# Patient Record
Sex: MALE | Race: WHITE | Employment: UNEMPLOYED | URBAN - METROPOLITAN AREA
[De-identification: names, ages, dates, MRNs, and addresses within clinical notes are randomized per-mention and may not be internally consistent; named-entity substitution may affect disease eponyms.]

---

## 2017-03-14 ENCOUNTER — HOSPITAL ENCOUNTER (INPATIENT)
Age: 47
LOS: 13 days | Discharge: HOME | DRG: 885 | End: 2017-03-28
Attending: EMERGENCY MEDICINE
Payer: MEDICARE

## 2017-03-14 DIAGNOSIS — F20.0 PARANOID SCHIZOPHRENIA (HCC): Primary | ICD-10-CM

## 2017-03-14 DIAGNOSIS — F29 PSYCHOSIS, UNSPECIFIED PSYCHOSIS TYPE (HCC): ICD-10-CM

## 2017-03-14 PROCEDURE — 80053 COMPREHEN METABOLIC PANEL: CPT | Performed by: EMERGENCY MEDICINE

## 2017-03-14 PROCEDURE — 81001 URINALYSIS AUTO W/SCOPE: CPT | Performed by: EMERGENCY MEDICINE

## 2017-03-14 PROCEDURE — 36415 COLL VENOUS BLD VENIPUNCTURE: CPT

## 2017-03-14 PROCEDURE — 82947 ASSAY GLUCOSE BLOOD QUANT: CPT

## 2017-03-14 PROCEDURE — 80307 DRUG TEST PRSMV CHEM ANLYZR: CPT | Performed by: EMERGENCY MEDICINE

## 2017-03-14 PROCEDURE — 80061 LIPID PANEL: CPT

## 2017-03-14 PROCEDURE — 83036 HEMOGLOBIN GLYCOSYLATED A1C: CPT | Performed by: PSYCHIATRY & NEUROLOGY

## 2017-03-14 PROCEDURE — 99283 EMERGENCY DEPT VISIT LOW MDM: CPT

## 2017-03-14 PROCEDURE — 84443 ASSAY THYROID STIM HORMONE: CPT

## 2017-03-14 PROCEDURE — 80164 ASSAY DIPROPYLACETIC ACD TOT: CPT | Performed by: PHYSICIAN ASSISTANT

## 2017-03-14 PROCEDURE — 85025 COMPLETE CBC W/AUTO DIFF WBC: CPT | Performed by: EMERGENCY MEDICINE

## 2017-03-14 NOTE — IP AVS SNAPSHOT
[address] 
[phone] Patient: Simran Cristobal MRN: [id number] :1970 You are allergic to the following Allergen Reactions Fluphenazine Unknown (comments) Pt is unable to communicate properly. Penicillins Unknown (comments) Pt is unable to communicate properly. Recent Documentation Height Weight BMI Smoking Status 1.803 m 95.6 kg 29.39 kg/m2 Never Smoker Emergency Contacts Name Discharge Info Relation Home Work Mobile Daisy Lindsay About your hospitalization You were admitted on:  March 15, 2017 You last received care in the:  [facility] You were discharged on:  2017 Unit phone number:  [phone] Why you were hospitalized Your primary diagnosis was:  Schizophrenia (Hcc) Providers Seen During Your Hospitalizations Provider Role Specialty Primary office phone Arturo Marvin MD Attending Provider Emergency Medicine [phone] Yovani Oseguera MD Attending Provider Psychiatry [phone] Arturo Thakkar MD Attending Provider Psychiatry [phone] Your Primary Care Physician (PCP) Primary Care Physician Office Phone Office Fax UNKNOWN, PROVIDER ** None ** ** None ** Follow-up Information Follow up With Details Comments Contact Info Haloperidol decanoate IM injection On 2017 Continuation of haloperidol decanoate 200 mg IM injection monthly. Patient received haloperidol decanoate 150 mg IM on 3/15/17 and haloperidol decanoate 50 mg IM on 3/20/17. Plan for haloperidol decanoate 200 mg IM on 17. [address] [place] [address] On 3/29/2017 you have a 9:30 appointment with Dr. Bishop Weston and a 10:00 appointment with Anel Reaves  [phone] [address] [address] [place],[address] [address] [phone] Current Discharge Medication List  
  
START taking these medications Dose & Instructions Dispensing Information Comments Morning Noon Evening Bedtime  
 haloperidol 5 mg tablet Commonly known as:  HALDOL Dose:  15 mg Take 3 Tabs by mouth nightly. Indications: discontinue after 2 weeks, when haldol dec 200 mg IM given Quantity:  42 Tab Refills:  0  
     
   
   
   
  
  
 sertraline 50 mg tablet Commonly known as:  ZOLOFT Dose:  50 mg Take 1 Tab by mouth daily. Indications: ANXIETY WITH DEPRESSION Quantity:  30 Tab Refills:  0  
     
9 am  
   
   
   
  
  
CONTINUE these medications which have CHANGED Dose & Instructions Dispensing Information Comments Morning Noon Evening Bedtime  
 benztropine 2 mg tablet Commonly known as:  COGENTIN What changed:  when to take this Dose:  2 mg Take 1 Tab by mouth nightly. Indications: extrapyramidal disease Quantity:  30 Tab Refills:  0  
     
   
   
   
  
  
 divalproex  mg ER tablet Commonly known as:  DEPAKOTE ER What changed:   
- how much to take - when to take this Dose:  2000 mg Take 4 Tabs by mouth nightly. Indications: MIXED BIPOLAR I DISORDER Quantity:  120 Tab Refills:  0  
     
   
   
   
  
  
 haloperidol decanoate 100 mg/mL injection Commonly known as:  HALDOL DECANOATE Start taking on:  4/17/2017 What changed:  how much to take Dose:  200 mg  
2 mL by IntraMUSCular route every twenty-eight (28) days. Indications: SCHIZOPHRENIA Quantity:  2 mL Refills:  0 STOP taking these medications   
 cloZAPine 200 mg tablet  
   
  
 traZODone 100 mg tablet Commonly known as:  Valentina Andrews Where to Get Your Medications Information on where to get these meds will be given to you by the nurse or doctor. ! Ask your nurse or doctor about these medications  
  benztropine 2 mg tablet divalproex  mg ER tablet  
 haloperidol 5 mg tablet  
 haloperidol decanoate 100 mg/mL injection  
 sertraline 50 mg tablet Discharge Instructions DISCHARGE SUMMARY 
 
NAME:Edwin Anderson : 1970 MRN: [id number] The patient Roger Armijo exhibits the ability to control behavior in a less restrictive environment. Patient's level of functioning is improving. No assaultive/destructive behavior has been observed for the past 24 hours. No suicidal/homicidal threat or behavior has been observed for the past 24 hours. There is no evidence of serious medication side effects. Patient has not been in physical or protective restraints for at least the past 24 hours. If weapons involved, how are they secured? No weapons involved Is patient aware of and in agreement with discharge plan? Patient is aware of discharre and is in agreement Arrangements for medication:  Prescriptions given to patient. Patient is a smoker and needs referral for smoking cessation? Does not smoke 1. Patient referred to the following for smoking cessation with an appointment: 2. Patient was offered medication to assist with smoking cessation at discharge: 3.  Education for smoking cessation added to discharge instructions:  
 
Patient has a history of substance/alcohol abuse and requires a referral for treatment ?  no 
1. Patient referred to the following for substance/alcohol abuse treatment with an appointment: 2. Patient was offered medication to assist with alcohol cessation at discharge: 3.  Education for substance/alcohol abuse added to discharge instructions:  
 
Copy of discharge instructions to  provider?:  Faxed to [phone] Arrangements for transportation home:  Sister to  Psychiatric Advanced Care Directives-- yes Name of Decision maker if patient has Psychiatric  Care Directive) Patient was offered information -  patient declined information. Keep all follow up appointments as scheduled, continue to take prescribed medications per physician instructions. Mental health crisis number:  081 or your local mental health crisis line number at [phone] DISCHARGE SUMMARY from Nurse The following personal items are in your possession at time of discharge: 
 
Dental Appliances: None Visual Aid: None Home Medications: None Jewelry: None Clothing: Pajamas, Shirt, With patient Other Valuables: None Personal Items Sent to Safe:  (none) PATIENT INSTRUCTIONS: 
 
 
What to do at Home: 
Recommended activity: Activity as tolerated. *  Please give a list of your current medications to your Primary Care Provider. *  Please update this list whenever your medications are discontinued, doses are 
    changed, or new medications (including over-the-counter products) are added. *  Please carry medication information at all times in case of emergency situations. These are general instructions for a healthy lifestyle: No smoking/ No tobacco products/ Avoid exposure to second hand smoke Surgeon General's Warning:  Quitting smoking now greatly reduces serious risk to your health. Obesity, smoking, and sedentary lifestyle greatly increases your risk for illness A healthy diet, regular physical exercise & weight monitoring are important for maintaining a healthy lifestyle You may be retaining fluid if you have a history of heart failure or if you experience any of the following symptoms:  Weight gain of 3 pounds or more overnight or 5 pounds in a week, increased swelling in our hands or feet or shortness of breath while lying flat in bed. Please call your doctor as soon as you notice any of these symptoms; do not wait until your next office visit. Recognize signs and symptoms of STROKE: 
 
F-face looks uneven A-arms unable to move or move unevenly S-speech slurred or non-existent T-time-call 911 as soon as signs and symptoms begin-DO NOT go Back to bed or wait to see if you get better-TIME IS BRAIN. Warning Signs of HEART ATTACK Call 911 if you have these symptoms: 
? Chest discomfort. Most heart attacks involve discomfort in the center of the chest that lasts more than a few minutes, or that goes away and comes back. It can feel like uncomfortable pressure, squeezing, fullness, or pain. ? Discomfort in other areas of the upper body. Symptoms can include pain or discomfort in one or both arms, the back, neck, jaw, or stomach. ? Shortness of breath with or without chest discomfort. ? Other signs may include breaking out in a cold sweat, nausea, or lightheadedness. Don't wait more than five minutes to call [address]! Fast action can save your life. Calling 911 is almost always the fastest way to get lifesaving treatment. Emergency Medical Services staff can begin treatment when they arrive  up to an hour sooner than if someone gets to the hospital by car. The discharge information has been reviewed with the patient. The patient verbalized understanding. Discharge medications reviewed with the patient and appropriate educational materials and side effects teaching were provided. Discharge Orders None [employer] Announcement We are excited to announce that we are making your provider's discharge notes available to you in [employer]. You will see these notes when they are completed and signed by the physician that discharged you from your recent hospital stay. If you have any questions or concerns about any information you see in [employer], please call the Health Information Department where you were seen or reach out to your Primary Care Provider for more information about your plan of care. Introducing [facility]!    
 Daniella Ivan introduces [employer] patient portal. Now you can access parts of your medical record, email your doctor's office, and request medication refills online. 1. In your internet browser, go to [email or web address] 2. Click on the First Time User? Click Here link in the Sign In box. You will see the New Member Sign Up page. 3. Enter your [employer] Access Code exactly as it appears below. You will not need to use this code after youve completed the sign-up process. If you do not sign up before the expiration date, you must request a new code. · [employer] Access Code: [email or web address] Expires: 6/12/2017 11:18 PM 
 
4. Enter the last four digits of your Social Security Number (xxxx) and Date of Birth (mm/dd/yyyy) as indicated and click Submit. You will be taken to the next sign-up page. 5. Create a [employer] ID. This will be your [employer] login ID and cannot be changed, so think of one that is secure and easy to remember. 6. Create a [employer] password. You can change your password at any time. 7. Enter your Password Reset Question and Answer. This can be used at a later time if you forget your password. 8. Enter your e-mail address. You will receive e-mail notification when new information is available in [address] 9. Click Sign Up. You can now view and download portions of your medical record. 10. Click the Download Summary menu link to download a portable copy of your medical information. If you have questions, please visit the Frequently Asked Questions section of the [employer] website. Remember, [employer] is NOT to be used for urgent needs. For medical emergencies, dial 911. Now available from your iPhone and Android! General Information Please provide this summary of care documentation to your next provider. Patient Signature:  ____________________________________________________________ Date:  ____________________________________________________________  
  
Burke Mais  Provider Signature: ____________________________________________________________ Date:  ____________________________________________________________

## 2017-03-14 NOTE — IP AVS SNAPSHOT
Summary of Care Report The Summary of Care report has been created to help improve care coordination. Users with access to [employer] or [address] (Web-based application) may access additional patient information including the Discharge Summary. If you are not currently a [address] user and need more information, please call the number listed below in the [place] [address] section and ask to be connected with Medical Records. Facility Information Name Address Phone [address] [phone] Patient Information Patient Name Sex  Alem Flood ([id number]) Male 1970 Discharge Information Admitting Provider Service Area Unit Larry Isidro MD / [phone] [address] [place] / [phone] Discharge Provider Discharge Date/Time Discharge Disposition Destination (none) 3/28/2017 (Pending) AHR (none) Patient Language Language ENGLISH [13] Hospital Problems as of 3/28/2017  Reviewed: 3/15/2017 10:53 AM by Sammie Snyder MD  
  
  
  
 Class Noted - Resolved Last Modified POA Active Problems * (Principal)Schizophrenia ([facility]  3/15/2017 - Present 3/15/2017 by Sammie Snyder MD Unknown Entered by Larry Isidro MD  
  
Non-Hospital Problems as of 3/28/2017  Reviewed: 3/15/2017 10:53 AM by Sammie Snyder MD  
  
  
  
 Class Noted - Resolved Last Modified Active Problems Psychosis (Chronic)  2012 - Present 2012 Entered by Phuc Greene MD  
  Overview Signed 2012 11:12 AM by Phuc Greene MD  
   R/o disorg schizophrenia vs. Schizoaffective dis vs. Bipolar dis manic w/psychosis   Schizophrenia, disorganized ([facility]  2015 - Present 2015 by Alexandre Fuchs MD  
  Entered by Korin Clarke MD  
 [facility]onia ([facility]  2/7/2015 - Present 2/9/2015 by Frances Bennett MD  
  Entered by Nasim Rosas You are allergic to the following Allergen Reactions Fluphenazine Unknown (comments) Pt is unable to communicate properly. Penicillins Unknown (comments) Pt is unable to communicate properly. Current Discharge Medication List  
  
START taking these medications Dose & Instructions Dispensing Information Comments  
 haloperidol 5 mg tablet Commonly known as:  HALDOL Dose:  15 mg Take 3 Tabs by mouth nightly. Indications: discontinue after 2 weeks, when haldol dec 200 mg IM given Quantity:  42 Tab Refills:  0  
   
 sertraline 50 mg tablet Commonly known as:  ZOLOFT Dose:  50 mg Take 1 Tab by mouth daily. Indications: ANXIETY WITH DEPRESSION Quantity:  30 Tab Refills:  0 CONTINUE these medications which have CHANGED Dose & Instructions Dispensing Information Comments  
 benztropine 2 mg tablet Commonly known as:  COGENTIN What changed:  when to take this Dose:  2 mg Take 1 Tab by mouth nightly. Indications: extrapyramidal disease Quantity:  30 Tab Refills:  0  
   
 divalproex  mg ER tablet Commonly known as:  DEPAKOTE ER What changed:   
- how much to take - when to take this Dose:  2000 mg Take 4 Tabs by mouth nightly. Indications: MIXED BIPOLAR I DISORDER Quantity:  120 Tab Refills:  0  
   
 haloperidol decanoate 100 mg/mL injection Commonly known as:  HALDOL DECANOATE Start taking on:  4/17/2017 What changed:  how much to take Dose:  200 mg  
2 mL by IntraMUSCular route every twenty-eight (28) days. Indications: SCHIZOPHRENIA Quantity:  2 mL Refills:  0 STOP taking these medications Comments  
 cloZAPine 200 mg tablet  
   
   
 traZODone 100 mg tablet Commonly known as:  Jose Luis Clarke Current Immunizations Name Date Influenza Vaccine Split  Deferred (Patient Refused) Follow-up Information Follow up With Details Comments Contact Info Haloperidol decanoate IM injection On 2017 Continuation of haloperidol decanoate 200 mg IM injection monthly. Patient received haloperidol decanoate 150 mg IM on 3/15/17 and haloperidol decanoate 50 mg IM on 3/20/17. Plan for haloperidol decanoate 200 mg IM on 17. [address]  [facility] On 3/29/2017 you have a 9:30 appointment with Dr. Georgeanna Cooks and a 10:00 appointment with Leeroy Oar  [phone] [address] [address] [address] [phone] Discharge Instructions DISCHARGE SUMMARY 
 
NAME:Edwin Anderson : 1970 MRN: [id number] The patient Sunil Miller exhibits the ability to control behavior in a less restrictive environment. Patient's level of functioning is improving. No assaultive/destructive behavior has been observed for the past 24 hours. No suicidal/homicidal threat or behavior has been observed for the past 24 hours. There is no evidence of serious medication side effects. Patient has not been in physical or protective restraints for at least the past 24 hours. If weapons involved, how are they secured? No weapons involved Is patient aware of and in agreement with discharge plan? Patient is aware of discharre and is in agreement Arrangements for medication:  Prescriptions given to patient. Patient is a smoker and needs referral for smoking cessation? Does not smoke 1. Patient referred to the following for smoking cessation with an appointment: 2. Patient was offered medication to assist with smoking cessation at discharge: 3.  Education for smoking cessation added to discharge instructions:  
 
Patient has a history of substance/alcohol abuse and requires a referral for treatment ?  no 
1.   Patient referred to the following for substance/alcohol abuse treatment with an appointment: 2. Patient was offered medication to assist with alcohol cessation at discharge: 3.  Education for substance/alcohol abuse added to discharge instructions:  
 
Copy of discharge instructions to  provider?:  Faxed to [phone] Arrangements for transportation home:  Sister to  Psychiatric Advanced Care Directives-- yes Name of Decision maker if patient has Psychiatric  Care Directive) Patient was offered information -  patient declined information. Keep all follow up appointments as scheduled, continue to take prescribed medications per physician instructions. Mental health crisis number:  803 or your local mental health crisis line number at [phone] DISCHARGE SUMMARY from Nurse The following personal items are in your possession at time of discharge: 
 
Dental Appliances: None Visual Aid: None Home Medications: None Jewelry: None Clothing: Pajamas, Shirt, With patient Other Valuables: None Personal Items Sent to Safe:  (none) PATIENT INSTRUCTIONS: 
 
 
What to do at Home: 
Recommended activity: Activity as tolerated. *  Please give a list of your current medications to your Primary Care Provider. *  Please update this list whenever your medications are discontinued, doses are 
    changed, or new medications (including over-the-counter products) are added. *  Please carry medication information at all times in case of emergency situations. These are general instructions for a healthy lifestyle: No smoking/ No tobacco products/ Avoid exposure to second hand smoke Surgeon General's Warning:  Quitting smoking now greatly reduces serious risk to your health. Obesity, smoking, and sedentary lifestyle greatly increases your risk for illness A healthy diet, regular physical exercise & weight monitoring are important for maintaining a healthy lifestyle You may be retaining fluid if you have a history of heart failure or if you experience any of the following symptoms:  Weight gain of 3 pounds or more overnight or 5 pounds in a week, increased swelling in our hands or feet or shortness of breath while lying flat in bed. Please call your doctor as soon as you notice any of these symptoms; do not wait until your next office visit. Recognize signs and symptoms of STROKE: 
 
F-face looks uneven A-arms unable to move or move unevenly S-speech slurred or non-existent T-time-call 911 as soon as signs and symptoms begin-DO NOT go Back to bed or wait to see if you get better-TIME IS BRAIN. Warning Signs of HEART ATTACK Call 911 if you have these symptoms: 
? Chest discomfort. Most heart attacks involve discomfort in the center of the chest that lasts more than a few minutes, or that goes away and comes back. It can feel like uncomfortable pressure, squeezing, fullness, or pain. ? Discomfort in other areas of the upper body. Symptoms can include pain or discomfort in one or both arms, the back, neck, jaw, or stomach. ? Shortness of breath with or without chest discomfort. ? Other signs may include breaking out in a cold sweat, nausea, or lightheadedness. Don't wait more than five minutes to call [address]! Fast action can save your life. Calling 911 is almost always the fastest way to get lifesaving treatment. Emergency Medical Services staff can begin treatment when they arrive  up to an hour sooner than if someone gets to the hospital by car. The discharge information has been reviewed with the patient. The patient verbalized understanding. Discharge medications reviewed with the patient and appropriate educational materials and side effects teaching were provided. Chart Review Routing History Recipient Method Report Sent By Jeffrie Purdue Provider Unknown, MD  
[address] Mail IP Auto Routed Notes Blaze Eddie [[id number]] 2/7/2015  3:29 PM 02/07/2015 Provider Hernan, MD Damien Solomon Mail IP Auto Routed Notes Baljinder Levin MD [[id number]] 2/13/2015  9:05 AM 02/13/2015

## 2017-03-14 NOTE — IP AVS SNAPSHOT
Current Discharge Medication List  
  
START taking these medications Dose & Instructions Dispensing Information Comments Morning Noon Evening Bedtime  
 haloperidol 5 mg tablet Commonly known as:  HALDOL Dose:  15 mg Take 3 Tabs by mouth nightly. Indications: discontinue after 2 weeks, when haldol dec 200 mg IM given Quantity:  42 Tab Refills:  0  
     
   
   
   
  
  
 sertraline 50 mg tablet Commonly known as:  ZOLOFT Dose:  50 mg Take 1 Tab by mouth daily. Indications: ANXIETY WITH DEPRESSION Quantity:  30 Tab Refills:  0  
     
9 am  
   
   
   
  
  
CONTINUE these medications which have CHANGED Dose & Instructions Dispensing Information Comments Morning Noon Evening Bedtime  
 benztropine 2 mg tablet Commonly known as:  COGENTIN What changed:  when to take this Dose:  2 mg Take 1 Tab by mouth nightly. Indications: extrapyramidal disease Quantity:  30 Tab Refills:  0  
     
   
   
   
  
  
 divalproex  mg ER tablet Commonly known as:  DEPAKOTE ER What changed:   
- how much to take - when to take this Dose:  2000 mg Take 4 Tabs by mouth nightly. Indications: MIXED BIPOLAR I DISORDER Quantity:  120 Tab Refills:  0  
     
   
   
   
  
  
 haloperidol decanoate 100 mg/mL injection Commonly known as:  HALDOL DECANOATE Start taking on:  4/17/2017 What changed:  how much to take Dose:  200 mg  
2 mL by IntraMUSCular route every twenty-eight (28) days. Indications: SCHIZOPHRENIA Quantity:  2 mL Refills:  0 STOP taking these medications   
 cloZAPine 200 mg tablet  
   
  
 traZODone 100 mg tablet Commonly known as:  Clarence Palomo Where to Get Your Medications Information on where to get these meds will be given to you by the nurse or doctor. ! Ask your nurse or doctor about these medications  
  benztropine 2 mg tablet divalproex  mg ER tablet  
 haloperidol 5 mg tablet  
 haloperidol decanoate 100 mg/mL injection  
 sertraline 50 mg tablet

## 2017-03-15 PROBLEM — F20.9 SCHIZOPHRENIA (HCC): Status: ACTIVE | Noted: 2017-03-15

## 2017-03-15 LAB
ALBUMIN SERPL BCP-MCNC: 3.8 G/DL (ref 3.5–5)
ALBUMIN/GLOB SERPL: 0.9 {RATIO} (ref 1.1–2.2)
ALP SERPL-CCNC: 128 U/L (ref 45–117)
ALT SERPL-CCNC: 43 U/L (ref 12–78)
AMPHET UR QL SCN: NEGATIVE
ANION GAP BLD CALC-SCNC: 8 MMOL/L (ref 5–15)
APAP SERPL-MCNC: <2 UG/ML (ref 10–30)
APPEARANCE UR: CLEAR
AST SERPL W P-5'-P-CCNC: 20 U/L (ref 15–37)
BACTERIA URNS QL MICRO: NEGATIVE /HPF
BARBITURATES UR QL SCN: NEGATIVE
BASOPHILS # BLD AUTO: 0.1 K/UL (ref 0–0.1)
BASOPHILS # BLD: 1 % (ref 0–1)
BENZODIAZ UR QL: NEGATIVE
BILIRUB SERPL-MCNC: 0.3 MG/DL (ref 0.2–1)
BILIRUB UR QL: NEGATIVE
BUN SERPL-MCNC: 16 MG/DL (ref 6–20)
BUN/CREAT SERPL: 18 (ref 12–20)
CALCIUM SERPL-MCNC: 9.2 MG/DL (ref 8.5–10.1)
CANNABINOIDS UR QL SCN: NEGATIVE
CHLORIDE SERPL-SCNC: 107 MMOL/L (ref 97–108)
CHOLEST SERPL-MCNC: 195 MG/DL
CO2 SERPL-SCNC: 25 MMOL/L (ref 21–32)
COCAINE UR QL SCN: NEGATIVE
COLOR UR: NORMAL
CREAT SERPL-MCNC: 0.87 MG/DL (ref 0.7–1.3)
DRUG SCRN COMMENT,DRGCM: NORMAL
EOSINOPHIL # BLD: 0.2 K/UL (ref 0–0.4)
EOSINOPHIL NFR BLD: 3 % (ref 0–7)
EPITH CASTS URNS QL MICRO: NORMAL /LPF
ERYTHROCYTE [DISTWIDTH] IN BLOOD BY AUTOMATED COUNT: 13.8 % (ref 11.5–14.5)
EST. AVERAGE GLUCOSE BLD GHB EST-MCNC: 114 MG/DL
ETHANOL SERPL-MCNC: <10 MG/DL
GLOBULIN SER CALC-MCNC: 4.1 G/DL (ref 2–4)
GLUCOSE P FAST SERPL-MCNC: 101 MG/DL (ref 65–100)
GLUCOSE SERPL-MCNC: 108 MG/DL (ref 65–100)
GLUCOSE UR STRIP.AUTO-MCNC: NEGATIVE MG/DL
HBA1C MFR BLD: 5.6 % (ref 4.2–6.3)
HCT VFR BLD AUTO: 44 % (ref 36.6–50.3)
HDLC SERPL-MCNC: 67 MG/DL
HDLC SERPL: 2.9 {RATIO} (ref 0–5)
HGB BLD-MCNC: 14.5 G/DL (ref 12.1–17)
HGB UR QL STRIP: NEGATIVE
HYALINE CASTS URNS QL MICRO: NORMAL /LPF (ref 0–5)
KETONES UR QL STRIP.AUTO: NEGATIVE MG/DL
LDLC SERPL CALC-MCNC: 87.2 MG/DL (ref 0–100)
LEUKOCYTE ESTERASE UR QL STRIP.AUTO: NEGATIVE
LIPID PROFILE,FLP: ABNORMAL
LYMPHOCYTES # BLD AUTO: 32 % (ref 12–49)
LYMPHOCYTES # BLD: 2.2 K/UL (ref 0.8–3.5)
MCH RBC QN AUTO: 29.3 PG (ref 26–34)
MCHC RBC AUTO-ENTMCNC: 33 G/DL (ref 30–36.5)
MCV RBC AUTO: 88.9 FL (ref 80–99)
METHADONE UR QL: NEGATIVE
MONOCYTES # BLD: 0.7 K/UL (ref 0–1)
MONOCYTES NFR BLD AUTO: 10 % (ref 5–13)
NEUTS SEG # BLD: 3.7 K/UL (ref 1.8–8)
NEUTS SEG NFR BLD AUTO: 54 % (ref 32–75)
NITRITE UR QL STRIP.AUTO: NEGATIVE
OPIATES UR QL: NEGATIVE
PCP UR QL: NEGATIVE
PH UR STRIP: 6.5 [PH] (ref 5–8)
PLATELET # BLD AUTO: 146 K/UL (ref 150–400)
POTASSIUM SERPL-SCNC: 3.8 MMOL/L (ref 3.5–5.1)
PROT SERPL-MCNC: 7.9 G/DL (ref 6.4–8.2)
PROT UR STRIP-MCNC: NEGATIVE MG/DL
RBC # BLD AUTO: 4.95 M/UL (ref 4.1–5.7)
RBC #/AREA URNS HPF: NORMAL /HPF (ref 0–5)
SALICYLATES SERPL-MCNC: <1.7 MG/DL (ref 2.8–20)
SODIUM SERPL-SCNC: 140 MMOL/L (ref 136–145)
SP GR UR REFRACTOMETRY: 1.02 (ref 1–1.03)
TRIGL SERPL-MCNC: 204 MG/DL (ref ?–150)
TSH SERPL DL<=0.05 MIU/L-ACNC: 4.29 UIU/ML (ref 0.36–3.74)
UA: UC IF INDICATED,UAUC: NORMAL
UROBILINOGEN UR QL STRIP.AUTO: 1 EU/DL (ref 0.2–1)
VALPROATE SERPL-MCNC: 62 UG/ML (ref 50–100)
VLDLC SERPL CALC-MCNC: 40.8 MG/DL
WBC # BLD AUTO: 6.9 K/UL (ref 4.1–11.1)
WBC URNS QL MICRO: NORMAL /HPF (ref 0–4)

## 2017-03-15 PROCEDURE — 74011000250 HC RX REV CODE- 250

## 2017-03-15 PROCEDURE — 65220000003 HC RM SEMIPRIVATE PSYCH

## 2017-03-15 PROCEDURE — 74011250637 HC RX REV CODE- 250/637: Performed by: PSYCHIATRY & NEUROLOGY

## 2017-03-15 PROCEDURE — 74011250636 HC RX REV CODE- 250/636

## 2017-03-15 PROCEDURE — 74011250636 HC RX REV CODE- 250/636: Performed by: PSYCHIATRY & NEUROLOGY

## 2017-03-15 RX ORDER — CLOZAPINE 100 MG/1
100 TABLET ORAL
Status: DISCONTINUED | OUTPATIENT
Start: 2017-03-15 | End: 2017-03-16

## 2017-03-15 RX ORDER — CLOZAPINE 200 MG/1
200 TABLET ORAL DAILY
COMMUNITY
End: 2017-03-28

## 2017-03-15 RX ORDER — HALOPERIDOL DECANOATE 100 MG/ML
150 INJECTION INTRAMUSCULAR
Status: DISCONTINUED | OUTPATIENT
Start: 2017-03-15 | End: 2017-03-20

## 2017-03-15 RX ORDER — ADHESIVE BANDAGE
30 BANDAGE TOPICAL DAILY PRN
Status: DISCONTINUED | OUTPATIENT
Start: 2017-03-15 | End: 2017-03-28 | Stop reason: HOSPADM

## 2017-03-15 RX ORDER — BENZTROPINE MESYLATE 2 MG/1
2 TABLET ORAL
Status: DISCONTINUED | OUTPATIENT
Start: 2017-03-15 | End: 2017-03-28 | Stop reason: HOSPADM

## 2017-03-15 RX ORDER — OLANZAPINE 5 MG/1
5 TABLET ORAL
Status: DISCONTINUED | OUTPATIENT
Start: 2017-03-15 | End: 2017-03-28 | Stop reason: HOSPADM

## 2017-03-15 RX ORDER — DIVALPROEX SODIUM 500 MG/1
1000 TABLET, EXTENDED RELEASE ORAL 2 TIMES DAILY
COMMUNITY
End: 2017-03-28

## 2017-03-15 RX ORDER — IBUPROFEN 400 MG/1
400 TABLET ORAL
Status: DISCONTINUED | OUTPATIENT
Start: 2017-03-15 | End: 2017-03-28 | Stop reason: HOSPADM

## 2017-03-15 RX ORDER — LORAZEPAM 1 MG/1
1 TABLET ORAL
Status: DISCONTINUED | OUTPATIENT
Start: 2017-03-15 | End: 2017-03-28 | Stop reason: HOSPADM

## 2017-03-15 RX ORDER — ZOLPIDEM TARTRATE 10 MG/1
10 TABLET ORAL
Status: DISCONTINUED | OUTPATIENT
Start: 2017-03-15 | End: 2017-03-28 | Stop reason: HOSPADM

## 2017-03-15 RX ORDER — LORAZEPAM 2 MG/ML
2 INJECTION INTRAMUSCULAR
Status: DISCONTINUED | OUTPATIENT
Start: 2017-03-15 | End: 2017-03-28 | Stop reason: HOSPADM

## 2017-03-15 RX ORDER — ACETAMINOPHEN 325 MG/1
650 TABLET ORAL
Status: DISCONTINUED | OUTPATIENT
Start: 2017-03-15 | End: 2017-03-28 | Stop reason: HOSPADM

## 2017-03-15 RX ORDER — IBUPROFEN 200 MG
1 TABLET ORAL
Status: DISCONTINUED | OUTPATIENT
Start: 2017-03-15 | End: 2017-03-28 | Stop reason: HOSPADM

## 2017-03-15 RX ORDER — CLOZAPINE 200 MG/1
600 TABLET ORAL
COMMUNITY
End: 2017-03-28

## 2017-03-15 RX ORDER — BENZTROPINE MESYLATE 1 MG/ML
2 INJECTION INTRAMUSCULAR; INTRAVENOUS
Status: DISCONTINUED | OUTPATIENT
Start: 2017-03-15 | End: 2017-03-28 | Stop reason: HOSPADM

## 2017-03-15 RX ORDER — LORAZEPAM 2 MG/1
2 TABLET ORAL 3 TIMES DAILY
Status: DISCONTINUED | OUTPATIENT
Start: 2017-03-15 | End: 2017-03-17

## 2017-03-15 RX ADMIN — HALOPERIDOL DECANOATE 150 MG: 100 INJECTION INTRAMUSCULAR at 17:27

## 2017-03-15 RX ADMIN — LORAZEPAM 2 MG: 2 INJECTION INTRAMUSCULAR; INTRAVENOUS at 03:09

## 2017-03-15 RX ADMIN — WATER 20 MG: 1 INJECTION INTRAMUSCULAR; INTRAVENOUS; SUBCUTANEOUS at 03:11

## 2017-03-15 RX ADMIN — LORAZEPAM 2 MG: 2 INJECTION INTRAMUSCULAR; INTRAVENOUS at 09:54

## 2017-03-15 NOTE — PROGRESS NOTES
Problem: Altered Thought Process (Adult/Pediatric)  Goal: *STG: Remains safe in hospital  Outcome: Progressing Towards Goal  Visible on the unit. Pacing the unit. Mood is anxious. Continue to encourage and monitor.

## 2017-03-15 NOTE — BH NOTES
TRANSFER - IN REPORT:    Verbal report received from Jossue Chaniff on Alma Delia Pavy  being received from [facility] [place] [facility] for routine progression of care      Report consisted of patients Situation, Background, Assessment and   Recommendations(SBAR). Information from the following report(s) SBAR, ED Summary, [facility] and Recent Results was reviewed with the receiving nurse. Opportunity for questions and clarification was provided. Assessment completed upon patients arrival to unit and care assumed.

## 2017-03-15 NOTE — ED PROVIDER NOTES
HPI Comments: 50 yo AAM with medical hx remarkable for paranoid schizophrenia presenting escorted by [facility] police for medical evaluation associated with TDO for in patient psychiatric therapy. Pt acutely psychotic and unable to give hx. Patient is a 49 y.o. male presenting with mental health disorder. The history is provided by the patient. Mental Health Problem           Past Medical History:   Diagnosis Date    Psychotic disorder     Withdrawal syndrome ([address]        History reviewed. No pertinent surgical history. History reviewed. No pertinent family history. Social History     Social History    Marital status: UNKNOWN     Spouse name: N/A    Number of children: N/A    Years of education: N/A     Occupational History    Not on file. Social History Main Topics    Smoking status: Never Smoker    Smokeless tobacco: Not on file    Alcohol use No    Drug use: Not on file    Sexual activity: Not on file     Other Topics Concern    Not on file     Social History Narrative         ALLERGIES: Fluphenazine and Penicillins    Review of Systems   Unable to perform ROS: Psychiatric disorder       Vitals:    03/14/17 2328   BP: 117/63   Pulse: (!) 124   Resp: 18   Temp: 97.8 °F (36.6 °C)   SpO2: 97%            Physical Exam   Constitutional: He appears well-developed and well-nourished. No distress. Calm non-communicative male, withdrawn in NAD   HENT:   Head: Normocephalic and atraumatic. Right Ear: Tympanic membrane, external ear and ear canal normal.   Left Ear: Tympanic membrane, external ear and ear canal normal.   Nose: Nose normal.   Mouth/Throat: Oropharynx is clear and moist. No oropharyngeal exudate. Eyes: Conjunctivae and EOM are normal. Pupils are equal, round, and reactive to light. Right eye exhibits no discharge. Left eye exhibits no discharge. Neck: Normal range of motion. Neck supple. Cardiovascular: Regular rhythm and normal heart sounds.     Pulmonary/Chest: Effort normal and breath sounds normal. He has no wheezes. He has no rales. Abdominal: Soft. Bowel sounds are normal. He exhibits no distension. There is no tenderness. There is no guarding. Musculoskeletal: Normal range of motion. Lymphadenopathy:     He has no cervical adenopathy. Neurological: He is alert. Skin: Skin is warm and dry. He is not diaphoretic. Psychiatric: His affect is inappropriate. He is slowed and withdrawn. He expresses inappropriate judgment. He is noncommunicative. Nursing note and vitals reviewed. MDM  Number of Diagnoses or Management Options  Diagnosis management comments: 50 yo male presenting for medical evaluation prior to in patient psychiatric TDO. Plan  CBC, CMP, ETOH, salicylate, acetaminophen, UA, UDS, depakote level and reassess Monalisa CollazoPlatter, [place]         Amount and/or Complexity of Data Reviewed  Clinical lab tests: ordered and reviewed      ED Course       Procedures      Progress note     Labs reviewed.  Monalisa RAMAN [place], [place]

## 2017-03-15 NOTE — ED TRIAGE NOTES
Triage note: Pt arrived in handcuffs with two [employer]. TDO in hand. Pt here for medical clearance. Pt non-verbal in triage.

## 2017-03-15 NOTE — PROGRESS NOTES
Admission Medication Reconciliation:    Information obtained from: Patient's sister, [employer]    Significant PMH/Disease States:   Past Medical History:   Diagnosis Date    Psychiatric disorder     Psychotic disorder     Withdrawal syndrome ([address]        Allergies:  Fluphenazine and Penicillins    Prior to Admission Medications:   Prior to Admission Medications   Prescriptions Last Dose Informant Patient Reported? Taking?   benztropine (COGENTIN) 2 mg tablet  Transfer Papers Yes Yes   Sig: Take 2 mg by mouth two (2) times a day. Indications: extrapyramidal disease   cloZAPine 200 mg tablet   Yes Yes   Sig: Take 600 mg by mouth nightly. Indications: TREATMENT-RESISTANT SCHIZOPHRENIA   cloZAPine 200 mg tablet   Yes Yes   Sig: Take 200 mg by mouth daily. Indications: TREATMENT-RESISTANT SCHIZOPHRENIA   divalproex ER (DEPAKOTE ER) 500 mg ER tablet   Yes Yes   Sig: Take 1,000 mg by mouth two (2) times a day. Indications: MOOD   haloperidol decanoate (HALDOL DECANOATE) 100 mg/mL injection 2017 Transfer Papers Yes Yes   Si mg by IntraMUSCular route every twenty-eight (28) days. Indications: SCHIZOPHRENIA   traZODone (DESYREL) 100 mg tablet  Transfer Papers Yes Yes   Sig: Take 100 mg by mouth nightly as needed for Sleep. Indications: sleep      Facility-Administered Medications: None         Comments/Recommendations: Of note, patient's sister reported that the patient last received a haloperidol decanoate injection on 17.       Mookie Cervantes, PharmD, [address], BCPS

## 2017-03-15 NOTE — ED NOTES
Pt repositioned for comfort. V/S stable, no distress noted. Will continue to assess and monitor closely. Officers at bedside.

## 2017-03-15 NOTE — BH NOTES
Patient admitted as a [facility] TDO under the care of Dr Stephanie Banegas  Patient brought to [facility] by his sister for deterioration in ADL's, AH and \"talking to Cristhian\"  Patient has a history of paranoid schizophrenia. Sister did not accompany patient to [facility]. Patient is non communicative and unable/unwilling to answer questions. He continues to sit with his hands pressed together as in prayer. Difficult time obtaining vitals.    Patient has been admitted to [facility] in January 2012 and February of 2015  Recently admitted to [facility] 10/27/2016 for 2 weeks    Patient had a valproic acid level drawn in the ED = 62      6:23 AM  Patient arrived on the unit at 2:30 am   Patient given IM medications about 3 am.  slept 2 hours

## 2017-03-15 NOTE — PROGRESS NOTES
Laboratory Monitoring for Antipsychotics and Mood Stabilizers    This patient is currently prescribed the following medication(s):   Current Facility-Administered Medications   Medication Dose Route Frequency    LORazepam (ATIVAN) tablet 1 mg  1 mg Oral TID    haloperidol (HALDOL) 2 mg/mL oral solution 10 mg+++++Court ordered medication+++++  10 mg Oral TID    Or    haloperidol lactate (HALDOL) injection 5 mg  5 mg IntraMUSCular TID    divalproex ER (DEPAKOTE ER) 24 hour tablet 750 mg+++++Court ordered medication+++++  750 mg Oral QHS    Or    LORazepam (ATIVAN) injection 1 mg  1 mg IntraMUSCular QHS    haloperidol decanoate (HALDOL DECANOATE) 100 mg/mL injection 150 mg  150 mg IntraMUSCular Q28D       The following labs have been completed for monitoring of antipsychotics and/or mood stabilizers:    Height, Weight, BMI Estimation  Estimated body mass index is 29.49 kg/(m^2) as calculated from the following:    Height as of this encounter: 180.3 cm (71\"). Weight as of this encounter: 95.9 kg (211 lb 7 oz). Vital Signs/Blood Pressure  Visit Vitals    /75    Pulse 88    Temp 97.4 °F (36.3 °C)    Resp 16    Ht 180.3 cm (71\")    Wt 95.9 kg (211 lb 7 oz)    SpO2 98%    BMI 29.49 kg/m2       Renal Function, Hepatic Function and Chemistry  Estimated Creatinine Clearance: 122.6 mL/min (based on Cr of 0.87). Lab Results   Component Value Date/Time    Sodium 140 03/14/2017 11:49 PM    Potassium 3.8 03/14/2017 11:49 PM    Chloride 107 03/14/2017 11:49 PM    CO2 25 03/14/2017 11:49 PM    Anion gap 8 03/14/2017 11:49 PM    BUN 16 03/14/2017 11:49 PM    Creatinine 0.87 03/14/2017 11:49 PM    BUN/Creatinine ratio 18 03/14/2017 11:49 PM    Bilirubin, total 0.3 03/14/2017 11:49 PM    Protein, total 7.9 03/14/2017 11:49 PM    Albumin 3.8 03/14/2017 11:49 PM    Globulin 4.1 03/14/2017 11:49 PM    A-G Ratio 0.9 03/14/2017 11:49 PM    ALT (SGPT) 43 03/14/2017 11:49 PM    Alk.  phosphatase 128 03/14/2017 11:49 PM       Lab Results   Component Value Date/Time    Glucose 108 03/14/2017 11:49 PM    Glucose 101 03/14/2017 11:49 PM    Glucose (POC) 112 01/19/2012 09:59 PM       Lab Results   Component Value Date/Time    Hemoglobin A1c 5.6 03/14/2017 11:49 PM       Hematology  Lab Results   Component Value Date/Time    WBC 6.9 03/14/2017 11:49 PM    RBC 4.95 03/14/2017 11:49 PM    HGB 14.5 03/14/2017 11:49 PM    HCT 44.0 03/14/2017 11:49 PM    MCV 88.9 03/14/2017 11:49 PM    MCH 29.3 03/14/2017 11:49 PM    MCHC 33.0 03/14/2017 11:49 PM    RDW 13.8 03/14/2017 11:49 PM    PLATELET 218 12/91/5975 11:49 PM       Lipids  Lab Results   Component Value Date/Time    Cholesterol, total 195 03/14/2017 11:49 PM    HDL Cholesterol 67 03/14/2017 11:49 PM    LDL, calculated 87.2 03/14/2017 11:49 PM    Triglyceride 204 03/14/2017 11:49 PM    CHOL/HDL Ratio 2.9 03/14/2017 11:49 PM       Thyroid Function  Lab Results   Component Value Date/Time    TSH 4.29 03/14/2017 11:49 PM    T4, Free 1.0 03/17/2017 05:15 AM       Assessment/Plan:  Recommended baseline laboratory monitoring has been completed based on this patient's current medication regimen.          Linda Ford, PharmD, [address], BCPS

## 2017-03-15 NOTE — INTERDISCIPLINARY ROUNDS
Behavioral Health Interdisciplinary Rounds     Patient Name: Phil Ryan  Age: 49 y.o. Room/Bed:  728/  Primary Diagnosis: <principal problem not specified>   Admission Status: TDO     Readmission within 30 days: no  Power of  in place:   Patient requires a blocked bed: yes        Reason for blocked bed: aggressive at home     VTE Prophylaxis:   Influenza vaccine screen completed:  Influenza vaccine given:   Mobility needs/Fall risk:     Nutritional Plan:   Consults:          Labs/Testing due today?:     Sleep hours:        Participation in Care/Groups:    Medication Compliant?:   PRNS (last 24 hours):     Restraints (last 24 hours):    Substance Abuse:  no  CIWA (range last 24 hours):no COWS (range last 24 hours): no  Alcohol screening (AUDIT) completed -     If applicable, date SBIRT discussed in treatment team AND documented:   Tobacco - patient is a smoker:   Date tobacco education completed by RN:   24 hour chart check complete:      Patient goal(s) for today:   Treatment team focus/goals: Plan to assess for medications and set up his hearing. SW to call his care team He will have his hearing tomorrow. LOS:  0  Expected LOS:TBD   Financial concerns/prescription coverage:   Date of last family contact: SW will call his sister      Family requesting physician contact today:   Discharge plan: he will return home when ready for discharge        Outpatient provider(s): [phone]   Participating treatment team members: Renato Barnes, RN - Kandis Ozuna, PharmD.

## 2017-03-15 NOTE — ED NOTES
TRANSFER - OUT REPORT:    Verbal report given to Sae Lewis (name) on Abraham Arevalo  being transferred to 7 psych ICU (unit) for routine progression of care       Report consisted of patients Situation, Background, Assessment and   Recommendations(SBAR). Information from the following report(s) SBAR, ED Summary, [facility] and Recent Results was reviewed with the receiving nurse. Lines:       Opportunity for questions and clarification was provided.       Patient transported with:   Registered Nurse

## 2017-03-15 NOTE — H&P
INITIAL PSYCHIATRIC EVALUATION         IDENTIFICATION:    Patient Name  Romana Martínez   Date of Birth 1970   [facility] [id number]   Medical Record Number  [id number]      Age  49 y.o. PCP PROVIDER UNKNOWN   Admit date:  3/14/2017    Room Number  732/01  @ [facility]   Date of Service  3/15/2017            HISTORY         REASON FOR HOSPITALIZATION:  CC: \"psychosis and catatonic sxs\". Pt admitted under a temporary prison order (TDO) for severe depression with suicidal ideations  for severe psychosis posing an imminent danger to self and others and an inability to care for self. HISTORY OF PRESENT ILLNESS:    The patient, Romana Martínez, is a 49 y.o. WHITE [place]  male with a past psychiatric history significant for schizophrenia, who presents at this time with complaints of (and/or evidence of) the following emotional symptoms: paranoid behavior. Additional symptomatology include anxiety. The above symptoms have been present for 3 days . These symptoms are of severe severity. These symptoms are constant  in nature. The patient's condition has been precipitated by medication non compliance and psychosocial stressors (limited social supports  ). Patient's condition made worse by treatment noncompliance. UDS: negative; BAL=0. ALLERGIES:  Allergies   Allergen Reactions    Fluphenazine Unknown (comments)     Pt is unable to communicate properly.  Penicillins Unknown (comments)     Pt is unable to communicate properly. MEDICATIONS PRIOR TO ADMISSION:  Prescriptions Prior to Admission   Medication Sig    divalproex ER (DEPAKOTE ER) 500 mg ER tablet Take 1,000 mg by mouth two (2) times a day.  cloZAPine 200 mg tablet Take 600 mg by mouth nightly.  cloZAPine 200 mg tablet Take 200 mg by mouth daily.  haloperidol decanoate (HALDOL DECANOATE) 100 mg/mL injection 100 mg by IntraMUSCular route every twenty-eight (28) days.  Indications: SCHIZOPHRENIA    traZODone (DESYREL) 100 mg tablet Take 100 mg by mouth nightly as needed for Sleep. Indications: sleep    benztropine (COGENTIN) 2 mg tablet Take 2 mg by mouth two (2) times a day. Indications: extrapyramidal disease      PAST MEDICAL HISTORY:  Past Medical History:   Diagnosis Date    Psychiatric disorder     Psychotic disorder     Withdrawal syndrome ([facility] [address]    History reviewed. No pertinent surgical history. SOCIAL HISTORY:    Social History     Social History    Marital status: UNKNOWN     Spouse name: N/A    Number of children: N/A    Years of education: N/A     Occupational History    Not on file. Social History Main Topics    Smoking status: Never Smoker    Smokeless tobacco: Not on file    Alcohol use No    Drug use: Not on file    Sexual activity: Not on file     Other Topics Concern    Not on file     Social History Narrative    49year old single  male admitted on TDO for worsening schizophrenia with catatonic symptoms. Medication compliance has been questionable. Pt was last admitted to [facility] in 2015 and successfully discharged to [facility] on clozapine, haldol dec, trazodone and depakote. Pt is a HS grad and is unemployed. He lives with his mother. FAMILY HISTORY:    History reviewed. No pertinent family history. REVIEW OF SYSTEMS:   Psychological ROS: positive for - disorientation  Respiratory ROS: no cough, shortness of breath, or wheezing  Cardiovascular ROS: no chest pain or dyspnea on exertion  Pertinent items are noted in the History of Present Illness. All other Systems reviewed and are considered negative.            MENTAL STATUS EXAM & VITALS         MENTAL STATUS EXAM (MSE):    MSE FINDINGS ARE WITHIN NORMAL LIMITS (WNL) UNLESS OTHERWISE STATED BELOW. ( ALL OF THE BELOW CATEGORIES OF THE MSE HAVE BEEN REVIEWED (reviewed 3/15/2017) AND UPDATED AS DEEMED APPROPRIATE )  General Presentation age appropriate, uncooperative   Orientation disoriented   Vital Signs  See below (reviewed 3/15/2017); Vital Signs (BP, Pulse, & Temp) are within normal limits if not listed below. Gait and Station Stable/steady, no ataxia   Musculoskeletal System No extrapyramidal symptoms (EPS); no abnormal muscular movements or Tardive Dyskinesia (TD); muscle strength and tone are within normal limits   Language No aphasia or dysarthria   Speech:  hypoverbal   Thought Processes blockedl; slow rate of thoughts; poor abstract reasoning/computation   Thought Associations blocked    Thought Content poverty of content   Suicidal Ideations none   Homicidal Ideations none   Mood:  neutral    Affect:  flat   Memory recent  impaired   Memory remote:  impaired   Concentration/Attention:  poor   Fund of Knowledge average   Insight:  poor   Reliability poor   Judgment:  poor            VITALS:     Patient Vitals for the past 24 hrs:   Temp Pulse Resp BP SpO2   03/15/17 0800 - - 16 - -   03/15/17 0230 97.5 °F (36.4 °C) 98 18 138/88 97 %   03/15/17 0131 97.5 °F (36.4 °C) 93 18 (!) 150/99 97 %   03/14/17 2328 97.8 °F (36.6 °C) (!) 124 18 117/63 97 %     Wt Readings from Last 3 Encounters:   02/09/15 97.5 kg (215 lb)   02/07/15 97.5 kg (215 lb)   01/19/12 81.6 kg (180 lb)     Temp Readings from Last 3 Encounters:   03/15/17 97.5 °F (36.4 °C)   02/07/15 98.7 °F (37.1 °C)     BP Readings from Last 3 Encounters:   03/15/17 138/88   02/12/15 105/73   02/07/15 148/79     Pulse Readings from Last 3 Encounters:   03/15/17 98   02/12/15 87   02/07/15 (!) 111            DATA       LABORATORY DATA:  Labs Reviewed   CBC WITH AUTOMATED DIFF - Abnormal; Notable for the following:        Result Value    PLATELET 557 (*)     All other components within normal limits   METABOLIC PANEL, COMPREHENSIVE - Abnormal; Notable for the following:     Glucose 108 (*)     Alk.  phosphatase 128 (*)     Globulin 4.1 (*)     A-G Ratio 0.9 (*)     All other components within normal limits   ACETAMINOPHEN - Abnormal; Notable for the following: ACETAMINOPHEN <2 (*)     All other components within normal limits   SALICYLATE - Abnormal; Notable for the following:     SALICYLATE <8.2 (*)     All other components within normal limits   TSH 3RD GENERATION - Abnormal; Notable for the following:     TSH 4.29 (*)     All other components within normal limits   LIPID PANEL - Abnormal; Notable for the following:     Triglyceride 204 (*)     All other components within normal limits   GLUCOSE, FASTING - Abnormal; Notable for the following:     Glucose 101 (*)     All other components within normal limits   ETHYL ALCOHOL   URINALYSIS W/ REFLEX CULTURE   DRUG SCREEN, URINE   VALPROIC ACID   HEMOGLOBIN A1C WITH EAG   SAMPLES BEING HELD   CLOZAPINE     Admission on 03/14/2017   Component Date Value Ref Range Status    WBC 03/14/2017 6.9  4.1 - 11.1 K/uL Final    RBC 03/14/2017 4.95  4.10 - 5.70 M/uL Final    HGB 03/14/2017 14.5  12.1 - 17.0 g/dL Final    HCT 03/14/2017 44.0  36.6 - 50.3 % Final    MCV 03/14/2017 88.9  80.0 - 99.0 FL Final    MCH 03/14/2017 29.3  26.0 - 34.0 PG Final    MCHC 03/14/2017 33.0  30.0 - 36.5 g/dL Final    RDW 03/14/2017 13.8  11.5 - 14.5 % Final    PLATELET 88/74/0796 049* 150 - 400 K/uL Final    NEUTROPHILS 03/14/2017 54  32 - 75 % Final    LYMPHOCYTES 03/14/2017 32  12 - 49 % Final    MONOCYTES 03/14/2017 10  5 - 13 % Final    EOSINOPHILS 03/14/2017 3  0 - 7 % Final    BASOPHILS 03/14/2017 1  0 - 1 % Final    ABS. NEUTROPHILS 03/14/2017 3.7  1.8 - 8.0 K/UL Final    ABS. LYMPHOCYTES 03/14/2017 2.2  0.8 - 3.5 K/UL Final    ABS. MONOCYTES 03/14/2017 0.7  0.0 - 1.0 K/UL Final    ABS. EOSINOPHILS 03/14/2017 0.2  0.0 - 0.4 K/UL Final    ABS.  BASOPHILS 03/14/2017 0.1  0.0 - 0.1 K/UL Final    Sodium 03/14/2017 140  136 - 145 mmol/L Final    Potassium 03/14/2017 3.8  3.5 - 5.1 mmol/L Final    Chloride 03/14/2017 107  97 - 108 mmol/L Final    CO2 03/14/2017 25  21 - 32 mmol/L Final    Anion gap 03/14/2017 8  5 - 15 mmol/L Final  Glucose 03/14/2017 108* 65 - 100 mg/dL Final    BUN 03/14/2017 16  6 - 20 MG/DL Final    Creatinine 03/14/2017 0.87  0.70 - 1.30 MG/DL Final    BUN/Creatinine ratio 03/14/2017 18  12 - 20   Final    GFR est AA 03/14/2017 >60  >60 ml/min/1.73m2 Final    GFR est non-AA 03/14/2017 >60  >60 ml/min/1.73m2 Final    Calcium 03/14/2017 9.2  8.5 - 10.1 MG/DL Final    Bilirubin, total 03/14/2017 0.3  0.2 - 1.0 MG/DL Final    ALT (SGPT) 03/14/2017 43  12 - 78 U/L Final    AST (SGOT) 03/14/2017 20  15 - 37 U/L Final    Alk.  phosphatase 03/14/2017 128* 45 - 117 U/L Final    Protein, total 03/14/2017 7.9  6.4 - 8.2 g/dL Final    Albumin 03/14/2017 3.8  3.5 - 5.0 g/dL Final    Globulin 03/14/2017 4.1* 2.0 - 4.0 g/dL Final    A-G Ratio 03/14/2017 0.9* 1.1 - 2.2   Final    ALCOHOL(ETHYL),SERUM 03/14/2017 <10  <10 MG/DL Final    Color 03/14/2017 YELLOW/STRAW    Final    Appearance 03/14/2017 CLEAR  CLEAR   Final    Specific gravity 03/14/2017 1.024  1.003 - 1.030   Final    pH (UA) 03/14/2017 6.5  5.0 - 8.0   Final    Protein 03/14/2017 NEGATIVE   NEG mg/dL Final    Glucose 03/14/2017 NEGATIVE   NEG mg/dL Final    Ketone 03/14/2017 NEGATIVE   NEG mg/dL Final    Bilirubin 03/14/2017 NEGATIVE   NEG   Final    Blood 03/14/2017 NEGATIVE   NEG   Final    Urobilinogen 03/14/2017 1.0  0.2 - 1.0 EU/dL Final    Nitrites 03/14/2017 NEGATIVE   NEG   Final    Leukocyte Esterase 03/14/2017 NEGATIVE   NEG   Final    WBC 03/14/2017 0-4  0 - 4 /hpf Final    RBC 03/14/2017 0-5  0 - 5 /hpf Final    Epithelial cells 03/14/2017 FEW  FEW /lpf Final    Bacteria 03/14/2017 NEGATIVE   NEG /hpf Final    UA:UC IF INDICATED 03/14/2017 CULTURE NOT INDICATED BY UA RESULT  CNI   Final    Hyaline cast 03/14/2017 0-2  0 - 5 /lpf Final    AMPHETAMINE 03/14/2017 NEGATIVE   NEG   Final    BARBITURATES 03/14/2017 NEGATIVE   NEG   Final    BENZODIAZEPINE 03/14/2017 NEGATIVE   NEG   Final    COCAINE 03/14/2017 NEGATIVE   NEG Final    METHADONE 03/14/2017 NEGATIVE   NEG   Final    OPIATES 03/14/2017 NEGATIVE   NEG   Final    PCP(PHENCYCLIDINE) 03/14/2017 NEGATIVE   NEG   Final    THC (TH-CANNABINOL) 03/14/2017 NEGATIVE   NEG   Final    Drug screen comment 03/14/2017 (NOTE)   Final    ACETAMINOPHEN 03/14/2017 <2* 10 - 30 ug/mL Final    SALICYLATE 70/73/0007 <4.5* 2.8 - 20.0 MG/DL Final    Valproic acid 03/14/2017 62  50 - 100 ug/ml Final    TSH 03/14/2017 4.29* 0.36 - 3.74 uIU/mL Final    LIPID PROFILE 03/14/2017        Final    Cholesterol, total 03/14/2017 195  <200 MG/DL Final    Triglyceride 03/14/2017 204* <150 MG/DL Final    HDL Cholesterol 03/14/2017 67  MG/DL Final    LDL, calculated 03/14/2017 87.2  0 - 100 MG/DL Final    VLDL, calculated 03/14/2017 40.8  MG/DL Final    CHOL/HDL Ratio 03/14/2017 2.9  0 - 5.0   Final    Glucose 03/14/2017 101* 65 - 100 MG/DL Final    Hemoglobin A1c 03/14/2017 5.6  4.2 - 6.3 % Final    Est. average glucose 03/14/2017 114  mg/dL Final        RADIOLOGY REPORTS:    Results from Hospital Encounter encounter on 02/07/15   XR CHEST PORT   Narrative **Final Report**      ICD Codes / Adm. Diagnosis: 295.30   / schizophrenia    Examination:  CR CHEST PORT  - [id number] - Feb 11 2015  9:30AM  Accession No:  [id number]  Reason:  rule out infection      REPORT:  INDICATION: Rule out infection. Portable AP upright view of the chest.    There is no prior study for direct comparison. Cardiomediastinal silhouette is within normal limits. Lungs are clear   bilaterally. Pleural spaces are normal. Osseous structures are intact. IMPRESSION: No acute cardiopulmonary disease. Signing/Reading Doctor: VICTORINA Bonner ([id number])    Approved: VICTORINA Bonner ([id number])  Feb 11 2015  9:34AM                                  No results found.            MEDICATIONS       ALL MEDICATIONS  Current Facility-Administered Medications   Medication Dose Route Frequency    ziprasidone (GEODON) 20 mg in sterile water (preservative free) 1 mL injection  20 mg IntraMUSCular BID PRN    OLANZapine (ZyPREXA) tablet 5 mg  5 mg Oral Q6H PRN    benztropine (COGENTIN) tablet 2 mg  2 mg Oral BID PRN    benztropine (COGENTIN) injection 2 mg  2 mg IntraMUSCular BID PRN    LORazepam (ATIVAN) injection 2 mg  2 mg IntraMUSCular Q4H PRN    LORazepam (ATIVAN) tablet 1 mg  1 mg Oral Q4H PRN    zolpidem (AMBIEN) tablet 10 mg  10 mg Oral QHS PRN    acetaminophen (TYLENOL) tablet 650 mg  650 mg Oral Q4H PRN    ibuprofen (MOTRIN) tablet 400 mg  400 mg Oral Q8H PRN    magnesium hydroxide (MILK OF MAGNESIA) 400 mg/5 mL oral suspension 30 mL  30 mL Oral DAILY PRN    nicotine (NICODERM CQ) 21 mg/24 hr patch 1 Patch  1 Patch TransDERmal DAILY PRN    LORazepam (ATIVAN) tablet 2 mg  2 mg Oral TID      SCHEDULED MEDICATIONS  Current Facility-Administered Medications   Medication Dose Route Frequency    LORazepam (ATIVAN) tablet 2 mg  2 mg Oral TID                ASSESSMENT & PLAN        The patient Zachary Pete is a 49 y.o.  male who presents at this time for treatment of the following diagnoses:  Patient Active Hospital Problem List:   Schizophrenia [facility]) (3/15/2017)    Assessment: negativism, posturing, waxy flexibility, mutism, internal preoccupation    Plan: Start ativan for catatonia. Increase haldol dec to 150 mg im q 28 d. Get clozapine level and restart dose. Continue depakote          In summary, Zachary Pete presents with a severe exacerbation of the principal diagnosis, Schizophrenia ([facility] [address]    While on the unit Zachary Pete will be provided with individual, milieu, occupational, group, and substance abuse therapies to address target symptoms as deemed appropriate for the individual patient.     I will continue to monitor blood levels (Depakote, Tegretol, lithium, clozapine---a drug with a narrow therapeutic index= NTI) and associated labs for drug therapy implemented that require intense monitoring for toxicity as deemed appropriate base on current medication side effects and pharmacodynamically determined drug 1/2 lives. I agree with decision to admit patient. I have spoken to [facility] psychiatric /ED staff regarding the nature of patients's admission at this time. A coordinated, multidisplinary treatment team (includes the nurse, unit pharmcist,  and writer) round was conducted for this initial evaluation with the patient present. The following regarding medications was addressed during rounds with patient:   the risks and benefits of the proposed medication. The patient was given the opportunity to ask questions. Informed consent given to the use of the above medications. I will continue to adjust psychiatric and non-psychiatric medications (see above \"medication\" section and orders section for details) as deemed appropriate & based upon diagnoses and response to treatment. I have reviewed admission (and previous/old) labs and medical tests in the EHR and or transferring hospital documents. I will continue to order blood tests/labs and diagnostic tests as deemed appropriate and review results as they become available (see orders for details). I have reviewed old psychiatric and medical records available in the EHR. I Will order additional psychiatric records from other institutions to further elucidate the nature of patient's psychopathology and review once available. I will gather additional collateral information from friends, family and o/p treatment team to further elucidate the nature of patient's psychopathology and baselline level of psychiatric functioning.         ESTIMATED LENGTH OF STAY:   5-10 days       STRENGTHS:  Access to housing/residential stability and Knowledge of medications                      SIGNED:    Garett Daigle MD  3/15/2017

## 2017-03-15 NOTE — BH NOTES
PRN Medication Documentation    Specific patient behavior that led to need for PRN medication: Patient fighting nurses and tech during his body assessment. Staff interventions attempted prior to PRN being given: talking  PRN medication given: Ativan and Geodon  Patient response/effectiveness of PRN medication: Patient in bed.

## 2017-03-15 NOTE — PROGRESS NOTES
Problem: Altered Thought Process (Adult/Pediatric)  Goal: *STG: Participates in treatment plan  Variance: Patient Condition Servando JOSEPHO scheduled for tomorrow  At 0730    Pt.  Met with Dr. Carol johansen  Requiring IM ativan to follow directions and get out of bed     eating mesals with much encouragement  Refusing vital signs and po medications  INTERVENTION   Will continue to monitor  Altered thought process  Medication compliance  Assist with meals and hygeine

## 2017-03-15 NOTE — BH NOTES
Primary Nurse Beltran Barrow RN and Lynnda Holter, RN performed a dual skin assessment on this patient. Patient was very uncooperative with skin assessment. Patients skin is unremarkable    Estrada score is 23    Pressure Ulcer Documentation  (COMPLETE ONE LABEL PER PRESSURE ULCER)  For further information, please review corresponding Wound Care flowsheet. Yrn Cloud has:    No pressure ulcer observed.  Pressure ulcer prevention protocol initiated      Beltran Barrow RN

## 2017-03-15 NOTE — BH NOTES
PSYCHOSOCIAL ASSESSMENT  :Patient identifying info: Lennox Mahajan is a 49 y.o., male admitted 3/14/2017 11:34 PM     Presenting problem and precipitating factors: Patient was brought in a [place] TDO due to inability to care for self. He has been none compliant with his medications and not caring for his ADL's. Lives with his mother who has been sick and she is unable to care for him. Sister brought  him to his appointment at [facility]   He has a long history of psych treatment and has been treated at many [facility] . He has had ECT int he past, and  has been treated at [facility]       Current psychiatric providers and contact info: [facility] Dr. Genia White and Luanne Desouza  [phone]    Previous psychiatric services/providers and response to treatment: long history of psych treatment       Substance abuse history:    Social History   Substance Use Topics    Smoking status: Never Smoker    Smokeless tobacco: Not on file    Alcohol use No       Family constellation: single, no children     Is significant other involved?        Describe support system:     Describe living arrangements and home environment:lives with his mother   Health issues:   Hospital Problems  Never Reviewed          Codes Class Noted POA    Schizophrenia [facility]) ICD-10-CM: F20.9  ICD-9-CM: 295.90  3/15/2017 Unknown              Trauma history: none noted     Legal issues: no    History of  service: no  Financial status: SSDI     Yazidism/cultural factors: none noted     Education/work history: high school education     Have you been licensed as a stephenie care professional (current or ): no  Leisure and recreation preferences: unknown  Describe coping skills:ineffectual     Narayan Vick  3/15/2017

## 2017-03-16 PROCEDURE — 74011250637 HC RX REV CODE- 250/637: Performed by: PSYCHIATRY & NEUROLOGY

## 2017-03-16 PROCEDURE — 65220000003 HC RM SEMIPRIVATE PSYCH

## 2017-03-16 RX ORDER — HALOPERIDOL 2 MG/ML
10 SOLUTION ORAL 2 TIMES DAILY
Status: DISCONTINUED | OUTPATIENT
Start: 2017-03-16 | End: 2017-03-17

## 2017-03-16 RX ADMIN — LORAZEPAM 2 MG: 2 TABLET ORAL at 09:43

## 2017-03-16 RX ADMIN — HALOPERIDOL 10 MG: 2 SOLUTION ORAL at 13:06

## 2017-03-16 RX ADMIN — HALOPERIDOL 10 MG: 2 SOLUTION ORAL at 16:22

## 2017-03-16 RX ADMIN — LORAZEPAM 2 MG: 2 TABLET ORAL at 16:20

## 2017-03-16 RX ADMIN — LORAZEPAM 2 MG: 2 TABLET ORAL at 21:30

## 2017-03-16 NOTE — BH NOTES
GROUP THERAPY PROGRESS NOTE    Hilda Samaria did not participate in an Afternoon Process Group with a focus on identifying feelings and planning for the rest of the day.

## 2017-03-16 NOTE — BH NOTES
1920: Visitation Note    Visitor and relation to patient: mother and grandmother    Staffs impression of interaction during visitation: Visit went well

## 2017-03-16 NOTE — BH NOTES
Second Opinion (regarding): Capacity to refuse medications    Reason for Admission:  Quin Gordillo was admitted for severe psychosis and pretty posing a risk of harm to himself and others. Diagnosis: Schizophrenia ([address]    The patient does not recognize or acknowledge that he has a mental illness requiring medications to stabilize his mental status. The patient does not recognize the dangers of not taking the recommended psychiatric medications. Kevin Sharp continues to refuse medications. Determination: Based on my independent evaluation of the patient on 3/16/2017, the patient does not have the capacity to refuse the psychiatric medications currently recommended for his illness. Recommendation: Referral to [facility] Over Objection order. The patient does not have a surrogate decision maker or guardian to make these decisions on his behalf.       Signed By:   Marine Vivar MD  3/16/2017

## 2017-03-16 NOTE — INTERDISCIPLINARY ROUNDS
Behavioral Health Interdisciplinary Rounds     Patient Name: Rajni Tinoco  Age: 49 y.o. Room/Bed:  732/01  Primary Diagnosis: Schizophrenia ([facility] [address]   Admission Status: TDO     Readmission within 30 days: no  Power of  in place: no  Patient requires a blocked bed: yes          Reason for blocked bed: aggression    VTE Prophylaxis: Not indicated  Influenza vaccine screen completed: yes Influenza vaccine given: no refused  Mobility needs/Fall risk: no    Nutritional Plan: no  Consults:           Labs/Testing due today?: yes refused labs    Sleep hours:   7 + hours      Participation in Care/Groups:  no  Medication Compliant?: Refusing Psychiatric Medications  PRNS (last 24 hours): Antipsychotic (IM) and Antianxiety    Restraints (last 24 hours):  no  Substance Abuse:  no  CIWA (range last 24 hours):   COWS (range last 24 hours):    Alcohol screening (AUDIT) completed -     If applicable, date SBIRT discussed in treatment team AND documented:    Tobacco - patient is a smoker: no   Date tobacco education completed by RN:    24 hour chart check complete: yes     Patient goal(s) for today:    Treatment team focus/goals:  Plan to set up an forced medication hearing. Plan to resume medications. LOS:  1  Expected LOS:  TBD  Financial concerns/prescription coverage:     Date of last family contact:        Family requesting physician contact today:     Discharge plan:  He will return home when ready for discharge        Outpatient provider(s):  [address]     Participating treatment team members: Lisa Oseguera, PharmD.  --  Babar Dixon

## 2017-03-16 NOTE — PROGRESS NOTES
Problem: Altered Thought Process (Adult/Pediatric)  Goal: *STG: Remains safe in hospital  Outcome: Progressing Towards Goal  Pt is pacing back and forth near nurses station. Appears to be preoccupied. With much encouragement from staff pt complies with scheduled medications. Staff will continue to monitor q 15 min checks.

## 2017-03-16 NOTE — BH NOTES
PSYCHIATRIC PROGRESS NOTE         Patient Name  Mauro Cruz   Date of Birth 1970   [facility] [id number]   Medical Record Number  [id number]      Age  49 y.o. PCP PROVIDER UNKNOWN   Admit date:  3/14/2017    Room Number  732/01  @ [facility]   Date of Service  3/16/2017          PSYCHOTHERAPY SESSION NOTE:  Length of psychotherapy session: 45 minutes    Main condition/diagnosis/issues treated during session today, 3/16/2017 :medication non compliance, paranoia, internal stimuli, poor coping skills    I employed Cognitive Behavioral therapy techniques, Reality-Oriented psychotherapy, as well as supportive psychotherapy in regards to various ongoing psychosocial stressors, including the following: pre-admission and current problems; housing issues; academic issues; medical issues; and stress of hospitalization. Interpersonal relationship issues and psychodynamic conflicts explored. Attempts made to alleviate maladaptive patterns. Overall, patient is not progressing    Treatment Plan Update (reviewed an updated 3/16/2017) : I will modify psychotherapy tx plan by implementing more stress management strategies, building upon cognitive behavioral techniques, increasing coping skills, as well as shoring up psychological defenses). An extended energy and skill set was needed to engage pt in psychotherapy due to some of the following: resistiveness, complexity, negativity, confrontational nature, hostile behaviors, and/or severe abnormalities in thought processes/psychosis resulting in the loss of expressive/receptive language communication skills. E & M PROGRESS NOTE:         HISTORY       CC:  \"SI, poor self care, medication non compliance resulting in catatonia \"  HISTORY OF PRESENT ILLNESS/INTERVAL HISTORY:  (reviewed/updated 3/16/2017).   per initial evaluation:     Mauro Cruz presents/reports/evidences the following emotional symptoms today, 3/16/2017:depression and psychotic behavior. The above symptoms have been present for years . These symptoms are of severe severity. The symptoms are constant  in nature. Additional symptomatology and features include psychosis. SIDE EFFECTS: (reviewed/updated 3/16/2017)  None reported or admitted to. No noted toxicity with use of Depakote/Tegretol/lithium/Clozaril/TCAs   ALLERGIES:(reviewed/updated 3/16/2017)  Allergies   Allergen Reactions    Fluphenazine Unknown (comments)     Pt is unable to communicate properly.  Penicillins Unknown (comments)     Pt is unable to communicate properly. MEDICATIONS PRIOR TO ADMISSION:(reviewed/updated 3/16/2017)  Prescriptions Prior to Admission   Medication Sig    divalproex ER (DEPAKOTE ER) 500 mg ER tablet Take 1,000 mg by mouth two (2) times a day. Indications: MOOD    cloZAPine 200 mg tablet Take 600 mg by mouth nightly. Indications: TREATMENT-RESISTANT SCHIZOPHRENIA    cloZAPine 200 mg tablet Take 200 mg by mouth daily. Indications: TREATMENT-RESISTANT SCHIZOPHRENIA    haloperidol decanoate (HALDOL DECANOATE) 100 mg/mL injection 100 mg by IntraMUSCular route every twenty-eight (28) days. Indications: SCHIZOPHRENIA    traZODone (DESYREL) 100 mg tablet Take 100 mg by mouth nightly as needed for Sleep. Indications: sleep    benztropine (COGENTIN) 2 mg tablet Take 2 mg by mouth two (2) times a day. Indications: extrapyramidal disease      PAST MEDICAL HISTORY: Past medical history from the initial psychiatric evaluation has been reviewed (reviewed/updated 3/16/2017) with no additional updates (I asked patient and no additional past medical history provided). Past Medical History:   Diagnosis Date    Psychiatric disorder     Psychotic disorder     Withdrawal syndrome ([facility] [address]    History reviewed. No pertinent surgical history.    SOCIAL HISTORY: Social history from the initial psychiatric evaluation has been reviewed (reviewed/updated 3/16/2017) with no additional updates (I asked patient and no additional social history provided). Social History     Social History    Marital status: UNKNOWN     Spouse name: N/A    Number of children: N/A    Years of education: N/A     Occupational History    Not on file. Social History Main Topics    Smoking status: Never Smoker    Smokeless tobacco: Not on file    Alcohol use No    Drug use: Not on file    Sexual activity: Not on file     Other Topics Concern    Not on file     Social History Narrative    49year old single  male admitted on TDO for worsening schizophrenia with catatonic symptoms. Medication compliance has been questionable. Pt was last admitted to [facility] in 2015 and successfully discharged to [facility] on clozapine, haldol dec, trazodone and depakote. Pt is a HS grad and is unemployed. He lives with his mother. FAMILY HISTORY: Family history from the initial psychiatric evaluation has been reviewed (reviewed/updated 3/16/2017) with no additional updates (I asked patient and no additional family history provided). History reviewed. No pertinent family history. REVIEW OF SYSTEMS: (reviewed/updated 3/16/2017)  Appetite:no change from normal   Sleep: does not feel rested   All other Review of Systems: Psychological ROS: positive for - hallucinations  Respiratory ROS: no cough, shortness of breath, or wheezing  Cardiovascular ROS: no chest pain or dyspnea on exertion         [facility] (MSE):    MSE FINDINGS ARE WITHIN NORMAL LIMITS (WNL) UNLESS OTHERWISE STATED BELOW. ( ALL OF THE BELOW CATEGORIES OF THE MSE HAVE BEEN REVIEWED (reviewed 3/16/2017) AND UPDATED AS DEEMED APPROPRIATE )  General Presentation age appropriate, evasive, guarded and uncooperative   Orientation disorganized   Vital Signs  See below (reviewed 3/16/2017); Vital Signs (BP, Pulse, & Temp) are within normal limits if not listed below.    Gait and Station Stable/steady, no ataxia Musculoskeletal System No extrapyramidal symptoms (EPS); no abnormal muscular movements or Tardive Dyskinesia (TD); muscle strength and tone are within normal limits   Language No aphasia or dysarthria   Speech:  hypoverbal   Thought Processes [place] ; slow rate of thoughts; poor abstract reasoning/computation   Thought Associations blocked    Thought Content paranoid delusions   Suicidal Ideations none   Homicidal Ideations none   Mood:  irritable   Affect:  mood-congruent   Memory recent  impaired   Memory remote:  impaired   Concentration/Attention:  poor   Fund of Knowledge average   Insight:  poor   Reliability poor   Judgment:  poor          VITALS:     Patient Vitals for the past 24 hrs:   Temp Pulse Resp BP SpO2   03/16/17 0800 - - 16 - -   03/15/17 2000 98.4 °F (36.9 °C) (!) 105 18 108/73 -   03/15/17 1545 96.7 °F (35.9 °C) (!) 107 16 124/73 98 %     Wt Readings from Last 3 Encounters:   02/09/15 97.5 kg (215 lb)   02/07/15 97.5 kg (215 lb)   01/19/12 81.6 kg (180 lb)     Temp Readings from Last 3 Encounters:   02/07/15 98.7 °F (37.1 °C)     BP Readings from Last 3 Encounters:   03/15/17 108/73   02/12/15 105/73   02/07/15 148/79     Pulse Readings from Last 3 Encounters:   03/15/17 (!) 105   02/12/15 87   02/07/15 (!) 111            DATA     LABORATORY DATA:(reviewed/updated 3/16/2017)  No results found for this or any previous visit (from the past 24 hour(s)). Lab Results   Component Value Date/Time    Valproic acid 62 03/14/2017 11:49 PM     No results found for: LI, LIH, LITHM   RADIOLOGY REPORTS:(reviewed/updated 3/16/2017)  No results found.        MEDICATIONS     ALL MEDICATIONS:   Current Facility-Administered Medications   Medication Dose Route Frequency    haloperidol (HALDOL) 2 mg/mL oral solution 10 mg  10 mg Oral BID    ziprasidone (GEODON) 20 mg in sterile water (preservative free) 1 mL injection  20 mg IntraMUSCular BID PRN    OLANZapine (ZyPREXA) tablet 5 mg  5 mg Oral Q6H PRN    benztropine (COGENTIN) tablet 2 mg  2 mg Oral BID PRN    benztropine (COGENTIN) injection 2 mg  2 mg IntraMUSCular BID PRN    LORazepam (ATIVAN) injection 2 mg  2 mg IntraMUSCular Q4H PRN    LORazepam (ATIVAN) tablet 1 mg  1 mg Oral Q4H PRN    zolpidem (AMBIEN) tablet 10 mg  10 mg Oral QHS PRN    acetaminophen (TYLENOL) tablet 650 mg  650 mg Oral Q4H PRN    ibuprofen (MOTRIN) tablet 400 mg  400 mg Oral Q8H PRN    magnesium hydroxide (MILK OF MAGNESIA) 400 mg/5 mL oral suspension 30 mL  30 mL Oral DAILY PRN    nicotine (NICODERM CQ) 21 mg/24 hr patch 1 Patch  1 Patch TransDERmal DAILY PRN    LORazepam (ATIVAN) tablet 2 mg  2 mg Oral TID    haloperidol decanoate (HALDOL DECANOATE) 100 mg/mL injection 150 mg  150 mg IntraMUSCular Q28D      SCHEDULED MEDICATIONS:   Current Facility-Administered Medications   Medication Dose Route Frequency    haloperidol (HALDOL) 2 mg/mL oral solution 10 mg  10 mg Oral BID    LORazepam (ATIVAN) tablet 2 mg  2 mg Oral TID    haloperidol decanoate (HALDOL DECANOATE) 100 mg/mL injection 150 mg  150 mg IntraMUSCular Q28D          ASSESSMENT & PLAN     DIAGNOSES REQUIRING ACTIVE TREATMENT AND MONITORING: (reviewed/updated 3/16/2017)  Patient Active Hospital Problem List:   Schizophrenia [facility]) (3/15/2017)    Assessment: delusions, formal thought disorder, paranoia, bizarre behavior    Plan: increase haldol dec. Add po haldol- as pt is refusing clozapine and if it is to be given needs to be restarted at 50 mg /day. For forced med hearing. (Depakote, Tegretol, lithium, clozapine---a drug with a narrow therapeutic index= NTI) and associated labs for drug therapy implemented that require intense monitoring for toxicity as deemed appropriate based on current medication side effects and pharmacodynamically determined drug 1/2 lives.     In summary, Genny Herrera, is a 49 y.o.  male who presents with a severe exacerbation of the principal diagnosis of Schizophrenia [facility])  Patient's condition is worsening/not improving/not stable Patient requires continued inpatient hospitalization for further stabilization, safety monitoring and medication management. I will continue to coordinate the provision of individual, milieu, occupational, group, and substance abuse therapies to address target symptoms/diagnoses as deemed appropriate for the individual patient. A coordinated, multidisplinary treatment team round was conducted with the patient (this team consists of the nurse, psychiatric unit pharmcist,  and writer). Complete current electronic health record for patient has been reviewed today including consultant notes, ancillary staff notes, nurses and psychiatric tech notes. Suicide risk assessment completed and patient deemed to be of low risk for suicide at this time. The following regarding medications was addressed during rounds with patient:   the risks and benefits of the proposed medication. The patient was given the opportunity to ask questions. Informed consent given to the use of the above medications. Will continue to adjust psychiatric and non-psychiatric medications (see above \"medication\" section and orders section for details) as deemed appropriate & based upon diagnoses and response to treatment. I will continue to order blood tests/labs and diagnostic tests as deemed appropriate and review results as they become available (see orders for details and above listed lab/test results). I will order psychiatric records from previous [facility] hospitals to further elucidate the nature of patient's psychopathology and review once available. I will gather additional collateral information from friends, family and o/p treatment team to further elucidate the nature of patient's psychopathology and baselline level of psychiatric functioning.          I certify that this patient's inpatient psychiatric hospital services furnished since the previous certification were, and continue to be, required for treatment that could reasonably be expected to improve the patient's condition, or for diagnostic study, and that the patient continues to need, on a daily basis, active treatment furnished directly by or requiring the supervision of inpatient psychiatric facility personnel. In addition, the hospital records show that services furnished were intensive treatment services, admission or related services, or equivalent services. EXPECTED DISCHARGE DATE/DAY: TBD     DISPOSITION: Home     Patient's family notified of forced med hearing. They do not want to be in the position to be the enforcers of forced meds for pt, but feel strongly that pt needs forced meds. They will support court ordered meds. Pt not refusing on [facility] or moral grounds.   Signed By:   Sammie Snyder MD  3/16/2017

## 2017-03-16 NOTE — PROGRESS NOTES
Problem: Altered Thought Process (Adult/Pediatric)  Goal: *STG: Participates in treatment plan  Pt. Involuntarily  Committed to [facility] this morning   Pt.  Met with Dr. Domingo Leung talking softly answering with one word  States no when asked to take his medications and allow  His vital signs to be taken  Planning court ordered medication hearing     Intervention:  Will continue to monitor   Assess vital signs  Delusional thinking

## 2017-03-16 NOTE — BH NOTES
Pt. Up fpr TDO hearing  Involuntarily committed to [facility]  Refusing vital signs  And breakfast on 15 min. Checks for safety.

## 2017-03-16 NOTE — BH NOTES
Received patient laying quietly in bed. Respirations are equal and unlabored. Patient in NAD. Continue to monitor Q 15 minutes and hourly for safety.     24 hour chart check complete

## 2017-03-17 LAB
T3FREE SERPL-MCNC: 2.4 PG/ML (ref 2.2–4)
T4 FREE SERPL-MCNC: 1 NG/DL (ref 0.8–1.5)

## 2017-03-17 PROCEDURE — 36415 COLL VENOUS BLD VENIPUNCTURE: CPT | Performed by: PSYCHIATRY & NEUROLOGY

## 2017-03-17 PROCEDURE — 74011250637 HC RX REV CODE- 250/637: Performed by: PSYCHIATRY & NEUROLOGY

## 2017-03-17 PROCEDURE — 80159 DRUG ASSAY CLOZAPINE: CPT | Performed by: PSYCHIATRY & NEUROLOGY

## 2017-03-17 PROCEDURE — 84481 FREE ASSAY (FT-3): CPT | Performed by: PSYCHIATRY & NEUROLOGY

## 2017-03-17 PROCEDURE — 65220000003 HC RM SEMIPRIVATE PSYCH

## 2017-03-17 PROCEDURE — 84439 ASSAY OF FREE THYROXINE: CPT | Performed by: PSYCHIATRY & NEUROLOGY

## 2017-03-17 RX ORDER — LORAZEPAM 1 MG/1
1 TABLET ORAL 3 TIMES DAILY
Status: DISCONTINUED | OUTPATIENT
Start: 2017-03-17 | End: 2017-03-18

## 2017-03-17 RX ORDER — HALOPERIDOL 2 MG/ML
10 SOLUTION ORAL 3 TIMES DAILY
Status: DISCONTINUED | OUTPATIENT
Start: 2017-03-17 | End: 2017-03-27

## 2017-03-17 RX ORDER — HALOPERIDOL 5 MG/ML
5 INJECTION INTRAMUSCULAR 3 TIMES DAILY
Status: DISCONTINUED | OUTPATIENT
Start: 2017-03-17 | End: 2017-03-27

## 2017-03-17 RX ORDER — LORAZEPAM 2 MG/ML
1 INJECTION INTRAMUSCULAR
Status: DISCONTINUED | OUTPATIENT
Start: 2017-03-17 | End: 2017-03-20

## 2017-03-17 RX ADMIN — LORAZEPAM 1 MG: 1 TABLET ORAL at 17:02

## 2017-03-17 RX ADMIN — HALOPERIDOL 10 MG: 2 SOLUTION ORAL at 08:52

## 2017-03-17 RX ADMIN — HALOPERIDOL 10 MG: 2 SOLUTION ORAL at 20:19

## 2017-03-17 RX ADMIN — LORAZEPAM 1 MG: 1 TABLET ORAL at 20:18

## 2017-03-17 RX ADMIN — DIVALPROEX SODIUM 750 MG: 500 TABLET, FILM COATED, EXTENDED RELEASE ORAL at 20:18

## 2017-03-17 RX ADMIN — HALOPERIDOL 10 MG: 2 SOLUTION ORAL at 17:02

## 2017-03-17 RX ADMIN — LORAZEPAM 2 MG: 2 TABLET ORAL at 08:51

## 2017-03-17 NOTE — PROGRESS NOTES
Problem: Altered Thought Process (Adult/Pediatric)  Goal: Interventions  Outcome: Progressing Towards Goal  Pt alert in bed. Calm. Distracted guarded. NAD. Pt isolating in room. Eating dinner in dining room sitting to self. Holding food try on lap. Laughing inappropriately. Not engaging with staff or peers. Refuse prn medications.

## 2017-03-17 NOTE — BH NOTES
GRATITUDE GROUP THERAPY PROGRESS NOTE    The patient Teddy Ganser a 49 y.o. male is participating in [facility].     Group time: 15 minutes    Personal goal for participation: Appreciation of all things big and small    Goal orientation: Coping exercises    Group therapy participation: active    Therapeutic interventions reviewed and discussed:  Yes    Impression of participation: active    Verenice Muñoz  3/16/2017  8:51 PM

## 2017-03-17 NOTE — INTERDISCIPLINARY ROUNDS
Behavioral Health Interdisciplinary Rounds     Patient Name: Zachary Malagon  Age: 49 y.o. Room/Bed:  732/01  Primary Diagnosis: Schizophrenia ([facility] [address]   Admission Status: TDO     Readmission within 30 days: no  Power of  in place: no  Patient requires a blocked bed: yes          Reason for blocked bed: aggression    VTE Prophylaxis: Not indicated  Influenza vaccine screen completed: yes Influenza vaccine given: no  Mobility needs/Fall risk: no    Nutritional Plan: no  Consults:          Labs/Testing due today?: yes    Sleep hours:  6      Participation in Care/Groups:  no  Medication Compliant?:  PRNS (last 24 hours):     Restraints (last 24 hours):  no  Substance Abuse:  no  CIWA (range last 24 hours):  COWS (range last 24 hours):   Alcohol screening (AUDIT) completed -  AUDIT Score: 0  If applicable, date SBIRT discussed in treatment team AND documented:   Tobacco - patient is a smoker: no   Date tobacco education completed by RN:   24 hour chart check complete: yes     Patient goal(s) for today:   Treatment team focus/goals: Plan to continue to titrate her medications. LOS:  2  Expected LOS: TBD   Financial concerns/prescription coverage:    Date of last family contact:       Family requesting physician contact today:   Discharge plan: He will return home when ready for discharge. Outpatient provider(s): [phone]     Participating treatment team members: Stasia Bureau, RN Dr. Barrington Severance, PharmD.

## 2017-03-17 NOTE — BH NOTES
GROUP THERAPY PROGRESS NOTE    Hilda Romeroe did not participate in a Process Group on the Acute Unit with a focus identifying feelings and planning for the rest of the day.

## 2017-03-17 NOTE — BH NOTES
PSYCHIATRIC PROGRESS NOTE         Patient Name  Elijah Kruger   Date of Birth 1970   [facility] [id number]   Medical Record Number  [id number]      Age  49 y.o. PCP PROVIDER UNKNOWN   Admit date:  3/14/2017    Room Number  732/01  @ [facility]   Date of Service  3/17/2017          PSYCHOTHERAPY SESSION NOTE:  Length of psychotherapy session: 20 minutes    Main condition/diagnosis/issues treated during session today, 3/17/2017 :medication non compliance, paranoia, internal stimuli, poor coping skills    I employed Cognitive Behavioral therapy techniques, Reality-Oriented psychotherapy, as well as supportive psychotherapy in regards to various ongoing psychosocial stressors, including the following: pre-admission and current problems; housing issues; academic issues; medical issues; and stress of hospitalization. Interpersonal relationship issues and psychodynamic conflicts explored. Attempts made to alleviate maladaptive patterns. Overall, patient is not progressing    Treatment Plan Update (reviewed an updated 3/17/2017) : I will modify psychotherapy tx plan by implementing more stress management strategies, building upon cognitive behavioral techniques, increasing coping skills, as well as shoring up psychological defenses). An extended energy and skill set was needed to engage pt in psychotherapy due to some of the following: resistiveness, complexity, negativity, confrontational nature, hostile behaviors, and/or severe abnormalities in thought processes/psychosis resulting in the loss of expressive/receptive language communication skills. E & M PROGRESS NOTE:         HISTORY       CC:  \"SI, poor self care, medication non compliance resulting in catatonia \"  HISTORY OF PRESENT ILLNESS/INTERVAL HISTORY:  (reviewed/updated 3/17/2017).   per initial evaluation:     Elijah Kruger presents/reports/evidences the following emotional symptoms today, 3/17/2017:depression and psychotic behavior. The above symptoms have been present for years . These symptoms are of severe severity. The symptoms are constant  in nature. Additional symptomatology and features include psychosis. 3/17/17- Patient was court approved for forced medications. When this was explained to him he had a very sudden change of affect (from blunted to animated with a sheepish grin). He whispered responses to questions and appeared to be amused by this. He had moments of thought blocking and eye movements indicating RTIS. Will increase haldol. Will start clozapine when patient demonstrates ability to consistently comply with oral medications. SIDE EFFECTS: (reviewed/updated 3/17/2017)  None reported or admitted to. No noted toxicity with use of Depakote/Tegretol/lithium/Clozaril/TCAs   ALLERGIES:(reviewed/updated 3/17/2017)  Allergies   Allergen Reactions    Fluphenazine Unknown (comments)     Pt is unable to communicate properly.  Penicillins Unknown (comments)     Pt is unable to communicate properly. MEDICATIONS PRIOR TO ADMISSION:(reviewed/updated 3/17/2017)  Prescriptions Prior to Admission   Medication Sig    divalproex ER (DEPAKOTE ER) 500 mg ER tablet Take 1,000 mg by mouth two (2) times a day. Indications: MOOD    cloZAPine 200 mg tablet Take 600 mg by mouth nightly. Indications: TREATMENT-RESISTANT SCHIZOPHRENIA    cloZAPine 200 mg tablet Take 200 mg by mouth daily. Indications: TREATMENT-RESISTANT SCHIZOPHRENIA    haloperidol decanoate (HALDOL DECANOATE) 100 mg/mL injection 100 mg by IntraMUSCular route every twenty-eight (28) days. Indications: SCHIZOPHRENIA    traZODone (DESYREL) 100 mg tablet Take 100 mg by mouth nightly as needed for Sleep. Indications: sleep    benztropine (COGENTIN) 2 mg tablet Take 2 mg by mouth two (2) times a day.  Indications: extrapyramidal disease      PAST MEDICAL HISTORY: Past medical history from the initial psychiatric evaluation has been reviewed (reviewed/updated 3/17/2017) with no additional updates (I asked patient and no additional past medical history provided). Past Medical History:   Diagnosis Date    Psychiatric disorder     Psychotic disorder     Withdrawal syndrome ([address]    History reviewed. No pertinent surgical history. SOCIAL HISTORY: Social history from the initial psychiatric evaluation has been reviewed (reviewed/updated 3/17/2017) with no additional updates (I asked patient and no additional social history provided). Social History     Social History    Marital status: UNKNOWN     Spouse name: N/A    Number of children: N/A    Years of education: N/A     Occupational History    Not on file. Social History Main Topics    Smoking status: Never Smoker    Smokeless tobacco: Not on file    Alcohol use No    Drug use: Not on file    Sexual activity: Not on file     Other Topics Concern    Not on file     Social History Narrative    49year old single  male admitted on TDO for worsening schizophrenia with catatonic symptoms. Medication compliance has been questionable. Pt was last admitted to [facility] in 2015 and successfully discharged to [facility] on clozapine, haldol dec, trazodone and depakote. Pt is a HS grad and is unemployed. He lives with his mother. FAMILY HISTORY: Family history from the initial psychiatric evaluation has been reviewed (reviewed/updated 3/17/2017) with no additional updates (I asked patient and no additional family history provided). History reviewed. No pertinent family history.     REVIEW OF SYSTEMS: (reviewed/updated 3/17/2017)  Appetite:no change from normal   Sleep: does not feel rested   All other Review of Systems: Psychological ROS: positive for - hallucinations  Respiratory ROS: no cough, shortness of breath, or wheezing  Cardiovascular ROS: no chest pain or dyspnea on exertion         [facility] ([facility]):    [facility] FINDINGS ARE WITHIN NORMAL LIMITS (WNL) UNLESS OTHERWISE STATED BELOW. ( ALL OF THE BELOW CATEGORIES OF THE MSE HAVE BEEN REVIEWED (reviewed 3/17/2017) AND UPDATED AS DEEMED APPROPRIATE )  General Presentation age appropriate, evasive, guarded and uncooperative   Orientation disorganized   Vital Signs  See below (reviewed 3/17/2017); Vital Signs (BP, Pulse, & Temp) are within normal limits if not listed below.    Gait and Station Stable/steady, no ataxia   Musculoskeletal System No extrapyramidal symptoms (EPS); no abnormal muscular movements or Tardive Dyskinesia (TD); muscle strength and tone are within normal limits   Language No aphasia or dysarthria   Speech:  hypoverbal   Thought Processes [place] ; slow rate of thoughts; poor abstract reasoning/computation   Thought Associations blocked    Thought Content paranoid delusions   Suicidal Ideations none   Homicidal Ideations none   Mood:  irritable   Affect:  mood-congruent   Memory recent  impaired   Memory remote:  impaired   Concentration/Attention:  poor   Fund of Knowledge average   Insight:  poor   Reliability poor   Judgment:  poor          VITALS:     Patient Vitals for the past 24 hrs:   Temp Pulse Resp BP SpO2   03/17/17 0735 97.4 °F (36.3 °C) 88 16 103/75 98 %   03/16/17 1620 99.5 °F (37.5 °C) 97 18 119/87 92 %   03/16/17 1200 97 °F (36.1 °C) 80 16 111/69 -     Wt Readings from Last 3 Encounters:   03/16/17 95.9 kg (211 lb 7 oz)   02/09/15 97.5 kg (215 lb)   02/07/15 97.5 kg (215 lb)     Temp Readings from Last 3 Encounters:   03/17/17 97.4 °F (36.3 °C)   02/07/15 98.7 °F (37.1 °C)     BP Readings from Last 3 Encounters:   03/17/17 103/75   02/12/15 105/73   02/07/15 148/79     Pulse Readings from Last 3 Encounters:   03/17/17 88   02/12/15 87   02/07/15 (!) 111            DATA     LABORATORY DATA:(reviewed/updated 3/17/2017)  Recent Results (from the past 24 hour(s))   T3, FREE    Collection Time: 03/17/17  5:15 AM   Result Value Ref Range    Free Triiodothyronine 2.4 2.2 - 4.0 pg/mL   T4, FREE    Collection Time: 03/17/17  5:15 AM   Result Value Ref Range    T4, Free 1.0 0.8 - 1.5 NG/DL     Lab Results   Component Value Date/Time    Valproic acid 62 03/14/2017 11:49 PM     No results found for: LI, LIH, LITHM   RADIOLOGY REPORTS:(reviewed/updated 3/17/2017)  No results found.        MEDICATIONS     ALL MEDICATIONS:   Current Facility-Administered Medications   Medication Dose Route Frequency    LORazepam (ATIVAN) tablet 1 mg  1 mg Oral TID    haloperidol (HALDOL) 2 mg/mL oral solution 10 mg+++++Court ordered medication+++++  10 mg Oral TID    Or    haloperidol lactate (HALDOL) injection 5 mg  5 mg IntraMUSCular TID    divalproex ER (DEPAKOTE ER) 24 hour tablet 750 mg+++++Court ordered medication+++++  750 mg Oral QHS    Or    LORazepam (ATIVAN) injection 1 mg  1 mg IntraMUSCular QHS    ziprasidone (GEODON) 20 mg in sterile water (preservative free) 1 mL injection  20 mg IntraMUSCular BID PRN    OLANZapine (ZyPREXA) tablet 5 mg  5 mg Oral Q6H PRN    benztropine (COGENTIN) tablet 2 mg  2 mg Oral BID PRN    benztropine (COGENTIN) injection 2 mg  2 mg IntraMUSCular BID PRN    LORazepam (ATIVAN) injection 2 mg  2 mg IntraMUSCular Q4H PRN    LORazepam (ATIVAN) tablet 1 mg  1 mg Oral Q4H PRN    zolpidem (AMBIEN) tablet 10 mg  10 mg Oral QHS PRN    acetaminophen (TYLENOL) tablet 650 mg  650 mg Oral Q4H PRN    ibuprofen (MOTRIN) tablet 400 mg  400 mg Oral Q8H PRN    magnesium hydroxide (MILK OF MAGNESIA) 400 mg/5 mL oral suspension 30 mL  30 mL Oral DAILY PRN    nicotine (NICODERM CQ) 21 mg/24 hr patch 1 Patch  1 Patch TransDERmal DAILY PRN    haloperidol decanoate (HALDOL DECANOATE) 100 mg/mL injection 150 mg  150 mg IntraMUSCular Q28D      SCHEDULED MEDICATIONS:   Current Facility-Administered Medications   Medication Dose Route Frequency    LORazepam (ATIVAN) tablet 1 mg  1 mg Oral TID    haloperidol (HALDOL) 2 mg/mL oral solution 10 mg+++++Court ordered medication+++++  10 mg Oral TID    Or    haloperidol lactate (HALDOL) injection 5 mg  5 mg IntraMUSCular TID    divalproex ER (DEPAKOTE ER) 24 hour tablet 750 mg+++++Court ordered medication+++++  750 mg Oral QHS    Or    LORazepam (ATIVAN) injection 1 mg  1 mg IntraMUSCular QHS    haloperidol decanoate (HALDOL DECANOATE) 100 mg/mL injection 150 mg  150 mg IntraMUSCular Q28D          ASSESSMENT & PLAN     DIAGNOSES REQUIRING ACTIVE TREATMENT AND MONITORING: (reviewed/updated 3/17/2017)  Patient Active Hospital Problem List:   Schizophrenia [facility]) (3/15/2017)    Assessment: delusions, formal thought disorder, paranoia, bizarre behavior    Plan: increase haldol dec. Add po haldol- as pt is refusing clozapine and if it is to be given needs to be restarted at 50 mg /day. For forced med hearing. (Depakote, Tegretol, lithium, clozapine---a drug with a narrow therapeutic index= NTI) and associated labs for drug therapy implemented that require intense monitoring for toxicity as deemed appropriate based on current medication side effects and pharmacodynamically determined drug 1/2 lives. In summary, Lyudmila Palmer, is a 49 y.o.  male who presents with a severe exacerbation of the principal diagnosis of Schizophrenia ([address]  Patient's condition is worsening/not improving/not stable Patient requires continued inpatient hospitalization for further stabilization, safety monitoring and medication management. I will continue to coordinate the provision of individual, milieu, occupational, group, and substance abuse therapies to address target symptoms/diagnoses as deemed appropriate for the individual patient. A coordinated, multidisplinary treatment team round was conducted with the patient (this team consists of the nurse, psychiatric unit pharmcist,  and writer).      Complete current electronic health record for patient has been reviewed today including consultant notes, ancillary staff notes, nurses and psychiatric tech notes. Suicide risk assessment completed and patient deemed to be of low risk for suicide at this time. The following regarding medications was addressed during rounds with patient:   the risks and benefits of the proposed medication. The patient was given the opportunity to ask questions. Informed consent given to the use of the above medications. Will continue to adjust psychiatric and non-psychiatric medications (see above \"medication\" section and orders section for details) as deemed appropriate & based upon diagnoses and response to treatment. I will continue to order blood tests/labs and diagnostic tests as deemed appropriate and review results as they become available (see orders for details and above listed lab/test results). I will order psychiatric records from previous [facility] hospitals to further elucidate the nature of patient's psychopathology and review once available. I will gather additional collateral information from friends, family and o/p treatment team to further elucidate the nature of patient's psychopathology and baselline level of psychiatric functioning. I certify that this patient's inpatient psychiatric hospital services furnished since the previous certification were, and continue to be, required for treatment that could reasonably be expected to improve the patient's condition, or for diagnostic study, and that the patient continues to need, on a daily basis, active treatment furnished directly by or requiring the supervision of inpatient psychiatric facility personnel. In addition, the hospital records show that services furnished were intensive treatment services, admission or related services, or equivalent services. EXPECTED DISCHARGE DATE/DAY: TBD     DISPOSITION: Home     Patient's family notified of forced med hearing.  They do not want to be in the position to be the enforcers of forced meds for pt, but feel strongly that pt needs forced meds. They will support court ordered meds. Pt not refusing on [employer] or moral grounds.   Signed By:   Santosh Morejon MD  3/17/2017

## 2017-03-17 NOTE — PROGRESS NOTES
Problem: Altered Thought Process (Adult/Pediatric)  Goal: *STG: Remains safe in hospital  Outcome: Progressing Towards Goal  Patient was received asleep on bed, not in acute distress. Respirations even and unlabored. Environmental checks done for safety.  Will monitor every 15 minutes

## 2017-03-17 NOTE — PROGRESS NOTES
Problem: Altered Thought Process (Adult/Pediatric)  Goal: *STG: Participates in treatment plan  Outcome: Progressing Towards Goal  Pt participated in treatment team. Arabella Cure in his room. Meal and medication compliant with am medication. Hygiene is poor and appearance is disheveled. Continues to appear preoccupied. Will continue to monitor.

## 2017-03-18 PROCEDURE — 65220000003 HC RM SEMIPRIVATE PSYCH

## 2017-03-18 PROCEDURE — 74011250637 HC RX REV CODE- 250/637: Performed by: PSYCHIATRY & NEUROLOGY

## 2017-03-18 RX ADMIN — LORAZEPAM 1 MG: 1 TABLET ORAL at 08:42

## 2017-03-18 RX ADMIN — LORAZEPAM 1.5 MG: 1 TABLET ORAL at 21:30

## 2017-03-18 RX ADMIN — LORAZEPAM 1.5 MG: 1 TABLET ORAL at 17:01

## 2017-03-18 RX ADMIN — HALOPERIDOL 10 MG: 2 SOLUTION ORAL at 08:41

## 2017-03-18 RX ADMIN — DIVALPROEX SODIUM 750 MG: 500 TABLET, FILM COATED, EXTENDED RELEASE ORAL at 21:30

## 2017-03-18 RX ADMIN — HALOPERIDOL 10 MG: 2 SOLUTION ORAL at 21:30

## 2017-03-18 RX ADMIN — HALOPERIDOL 10 MG: 2 SOLUTION ORAL at 17:01

## 2017-03-18 NOTE — BH NOTES
PSYCHIATRIC PROGRESS NOTE         Patient Name  Sunil Miller   Date of Birth 1970   CSN [id number]   Medical Record Number  [id number]      Age  49 y.o. PCP PROVIDER UNKNOWN   Admit date:  3/14/2017    Room Number  732/01  @ [facility]   Date of Service  3/18/2017          PSYCHOTHERAPY SESSION NOTE:  Length of psychotherapy session: 20 minutes    Main condition/diagnosis/issues treated during session today, 3/18/2017 :medication non compliance, paranoia, internal stimuli, poor coping skills    I employed Cognitive Behavioral therapy techniques, Reality-Oriented psychotherapy, as well as supportive psychotherapy in regards to various ongoing psychosocial stressors, including the following: pre-admission and current problems; housing issues; academic issues; medical issues; and stress of hospitalization. Interpersonal relationship issues and psychodynamic conflicts explored. Attempts made to alleviate maladaptive patterns. Overall, patient is not progressing    Treatment Plan Update (reviewed an updated 3/18/2017) : I will modify psychotherapy tx plan by implementing more stress management strategies, building upon cognitive behavioral techniques, increasing coping skills, as well as shoring up psychological defenses). An extended energy and skill set was needed to engage pt in psychotherapy due to some of the following: resistiveness, complexity, negativity, confrontational nature, hostile behaviors, and/or severe abnormalities in thought processes/psychosis resulting in the loss of expressive/receptive language communication skills. E & M PROGRESS NOTE:         HISTORY       CC:  \"SI, poor self care, medication non compliance resulting in catatonia \"  HISTORY OF PRESENT ILLNESS/INTERVAL HISTORY:  (reviewed/updated 3/18/2017).   per initial evaluation:     Sunil Miller presents/reports/evidences the following emotional symptoms today, 3/18/2017:depression and psychotic behavior. The above symptoms have been present for years . These symptoms are of severe severity. The symptoms are constant  in nature. Additional symptomatology and features include psychosis. 3/17/17- Patient was court approved for forced medications. When this was explained to him he had a very sudden change of affect (from blunted to animated with a sheepish grin). He whispered responses to questions and appeared to be amused by this. He had moments of thought blocking and eye movements indicating RTIS. Will increase haldol. Will start clozapine when patient demonstrates ability to consistently comply with oral medications. 3/18/17- Pt continues internally preoccupied, selectively mute and has RTIS. Will increase Ativan. SIDE EFFECTS: (reviewed/updated 3/18/2017)  None reported or admitted to. No noted toxicity with use of Depakote/Tegretol/lithium/Clozaril/TCAs   ALLERGIES:(reviewed/updated 3/18/2017)  Allergies   Allergen Reactions    Fluphenazine Unknown (comments)     Pt is unable to communicate properly.  Penicillins Unknown (comments)     Pt is unable to communicate properly. MEDICATIONS PRIOR TO ADMISSION:(reviewed/updated 3/18/2017)  Prescriptions Prior to Admission   Medication Sig    divalproex ER (DEPAKOTE ER) 500 mg ER tablet Take 1,000 mg by mouth two (2) times a day. Indications: MOOD    cloZAPine 200 mg tablet Take 600 mg by mouth nightly. Indications: TREATMENT-RESISTANT SCHIZOPHRENIA    cloZAPine 200 mg tablet Take 200 mg by mouth daily. Indications: TREATMENT-RESISTANT SCHIZOPHRENIA    haloperidol decanoate (HALDOL DECANOATE) 100 mg/mL injection 100 mg by IntraMUSCular route every twenty-eight (28) days. Indications: SCHIZOPHRENIA    traZODone (DESYREL) 100 mg tablet Take 100 mg by mouth nightly as needed for Sleep. Indications: sleep    benztropine (COGENTIN) 2 mg tablet Take 2 mg by mouth two (2) times a day.  Indications: extrapyramidal disease      PAST MEDICAL HISTORY: Past medical history from the initial psychiatric evaluation has been reviewed (reviewed/updated 3/18/2017) with no additional updates (I asked patient and no additional past medical history provided). Past Medical History:   Diagnosis Date    Psychiatric disorder     Psychotic disorder     Withdrawal syndrome ([facility] [address]    History reviewed. No pertinent surgical history. SOCIAL HISTORY: Social history from the initial psychiatric evaluation has been reviewed (reviewed/updated 3/18/2017) with no additional updates (I asked patient and no additional social history provided). Social History     Social History    Marital status: UNKNOWN     Spouse name: N/A    Number of children: N/A    Years of education: N/A     Occupational History    Not on file. Social History Main Topics    Smoking status: Never Smoker    Smokeless tobacco: Not on file    Alcohol use No    Drug use: Not on file    Sexual activity: Not on file     Other Topics Concern    Not on file     Social History Narrative    49year old single  male admitted on TDO for worsening schizophrenia with catatonic symptoms. Medication compliance has been questionable. Pt was last admitted to [facility] in 2015 and successfully discharged to [facility] on clozapine, haldol dec, trazodone and depakote. Pt is a HS grad and is unemployed. He lives with his mother. FAMILY HISTORY: Family history from the initial psychiatric evaluation has been reviewed (reviewed/updated 3/18/2017) with no additional updates (I asked patient and no additional family history provided). History reviewed. No pertinent family history.     REVIEW OF SYSTEMS: (reviewed/updated 3/18/2017)  Appetite:no change from normal   Sleep: does not feel rested   All other Review of Systems: Psychological ROS: positive for - hallucinations  Respiratory ROS: no cough, shortness of breath, or wheezing  Cardiovascular ROS: no chest pain or dyspnea on exertion         MENTAL STATUS EXAM & VITALS     MENTAL STATUS EXAM (MSE):    MSE FINDINGS ARE WITHIN NORMAL LIMITS (WNL) UNLESS OTHERWISE STATED BELOW. ( ALL OF THE BELOW CATEGORIES OF THE MSE HAVE BEEN REVIEWED (reviewed 3/18/2017) AND UPDATED AS DEEMED APPROPRIATE )  General Presentation age appropriate, evasive, guarded and uncooperative   Orientation disorganized   Vital Signs  See below (reviewed 3/18/2017); Vital Signs (BP, Pulse, & Temp) are within normal limits if not listed below.    Gait and Station Stable/steady, no ataxia   Musculoskeletal System No extrapyramidal symptoms (EPS); no abnormal muscular movements or Tardive Dyskinesia (TD); muscle strength and tone are within normal limits   Language No aphasia or dysarthria   Speech:  hypoverbal   Thought Processes [place] ; slow rate of thoughts; poor abstract reasoning/computation   Thought Associations blocked    Thought Content paranoid delusions   Suicidal Ideations none   Homicidal Ideations none   Mood:  irritable   Affect:  mood-congruent   Memory recent  impaired   Memory remote:  impaired   Concentration/Attention:  poor   Fund of Knowledge average   Insight:  poor   Reliability poor   Judgment:  poor          VITALS:     Patient Vitals for the past 24 hrs:   Temp Pulse Resp BP SpO2   03/18/17 1200 97 °F (36.1 °C) 85 19 134/77 94 %   03/18/17 0732 97.3 °F (36.3 °C) 94 19 122/80 96 %   03/17/17 1635 98.5 °F (36.9 °C) 84 16 108/67 -     Wt Readings from Last 3 Encounters:   03/16/17 95.9 kg (211 lb 7 oz)   02/09/15 97.5 kg (215 lb)   02/07/15 97.5 kg (215 lb)     Temp Readings from Last 3 Encounters:   03/18/17 97 °F (36.1 °C)   02/07/15 98.7 °F (37.1 °C)     BP Readings from Last 3 Encounters:   03/18/17 134/77   02/12/15 105/73   02/07/15 148/79     Pulse Readings from Last 3 Encounters:   03/18/17 85   02/12/15 87   02/07/15 (!) 111            DATA     LABORATORY DATA:(reviewed/updated 3/18/2017)  No results found for this or any previous visit (from the past 24 hour(s)). Lab Results   Component Value Date/Time    Valproic acid 62 03/14/2017 11:49 PM     No results found for: ESTUARDO, ASHLEY, IKKI   RADIOLOGY REPORTS:(reviewed/updated 3/18/2017)  No results found.        MEDICATIONS     ALL MEDICATIONS:   Current Facility-Administered Medications   Medication Dose Route Frequency    LORazepam (ATIVAN) tablet 1 mg  1 mg Oral TID    haloperidol (HALDOL) 2 mg/mL oral solution 10 mg+++++Court ordered medication+++++  10 mg Oral TID    Or    haloperidol lactate (HALDOL) injection 5 mg  5 mg IntraMUSCular TID    divalproex ER (DEPAKOTE ER) 24 hour tablet 750 mg+++++Court ordered medication+++++  750 mg Oral QHS    Or    LORazepam (ATIVAN) injection 1 mg  1 mg IntraMUSCular QHS    ziprasidone (GEODON) 20 mg in sterile water (preservative free) 1 mL injection  20 mg IntraMUSCular BID PRN    OLANZapine (ZyPREXA) tablet 5 mg  5 mg Oral Q6H PRN    benztropine (COGENTIN) tablet 2 mg  2 mg Oral BID PRN    benztropine (COGENTIN) injection 2 mg  2 mg IntraMUSCular BID PRN    LORazepam (ATIVAN) injection 2 mg  2 mg IntraMUSCular Q4H PRN    LORazepam (ATIVAN) tablet 1 mg  1 mg Oral Q4H PRN    zolpidem (AMBIEN) tablet 10 mg  10 mg Oral QHS PRN    acetaminophen (TYLENOL) tablet 650 mg  650 mg Oral Q4H PRN    ibuprofen (MOTRIN) tablet 400 mg  400 mg Oral Q8H PRN    magnesium hydroxide (MILK OF MAGNESIA) 400 mg/5 mL oral suspension 30 mL  30 mL Oral DAILY PRN    nicotine (NICODERM CQ) 21 mg/24 hr patch 1 Patch  1 Patch TransDERmal DAILY PRN    haloperidol decanoate (HALDOL DECANOATE) 100 mg/mL injection 150 mg  150 mg IntraMUSCular Q28D      SCHEDULED MEDICATIONS:   Current Facility-Administered Medications   Medication Dose Route Frequency    LORazepam (ATIVAN) tablet 1 mg  1 mg Oral TID    haloperidol (HALDOL) 2 mg/mL oral solution 10 mg+++++Court ordered medication+++++  10 mg Oral TID    Or    haloperidol lactate (HALDOL) injection 5 mg  5 mg IntraMUSCular TID  divalproex ER (DEPAKOTE ER) 24 hour tablet 750 mg+++++Court ordered medication+++++  750 mg Oral QHS    Or    LORazepam (ATIVAN) injection 1 mg  1 mg IntraMUSCular QHS    haloperidol decanoate (HALDOL DECANOATE) 100 mg/mL injection 150 mg  150 mg IntraMUSCular Q28D          ASSESSMENT & PLAN     DIAGNOSES REQUIRING ACTIVE TREATMENT AND MONITORING: (reviewed/updated 3/18/2017)  Patient Active Hospital Problem List:   Schizophrenia [facility]) (3/15/2017)    Assessment: delusions, formal thought disorder, paranoia, bizarre behavior    Plan: increase haldol dec. Add po haldol- as pt is refusing clozapine and if it is to be given needs to be restarted at 50 mg /day. For forced med hearing. (Depakote, Tegretol, lithium, clozapine---a drug with a narrow therapeutic index= NTI) and associated labs for drug therapy implemented that require intense monitoring for toxicity as deemed appropriate based on current medication side effects and pharmacodynamically determined drug 1/2 lives. In summary, Hilda Mera, is a 49 y.o.  male who presents with a severe exacerbation of the principal diagnosis of Schizophrenia ([facility] [address]  Patient's condition is worsening/not improving/not stable Patient requires continued inpatient hospitalization for further stabilization, safety monitoring and medication management. I will continue to coordinate the provision of individual, milieu, occupational, group, and substance abuse therapies to address target symptoms/diagnoses as deemed appropriate for the individual patient. A coordinated, multidisplinary treatment team round was conducted with the patient (this team consists of the nurse, psychiatric unit pharmcist,  and writer). Complete current electronic health record for patient has been reviewed today including consultant notes, ancillary staff notes, nurses and psychiatric tech notes.     Suicide risk assessment completed and patient deemed to be of low risk for suicide at this time. The following regarding medications was addressed during rounds with patient:   the risks and benefits of the proposed medication. The patient was given the opportunity to ask questions. Informed consent given to the use of the above medications. Will continue to adjust psychiatric and non-psychiatric medications (see above \"medication\" section and orders section for details) as deemed appropriate & based upon diagnoses and response to treatment. I will continue to order blood tests/labs and diagnostic tests as deemed appropriate and review results as they become available (see orders for details and above listed lab/test results). I will order psychiatric records from previous [facility] hospitals to further elucidate the nature of patient's psychopathology and review once available. I will gather additional collateral information from friends, family and o/p treatment team to further elucidate the nature of patient's psychopathology and baselline level of psychiatric functioning. I certify that this patient's inpatient psychiatric hospital services furnished since the previous certification were, and continue to be, required for treatment that could reasonably be expected to improve the patient's condition, or for diagnostic study, and that the patient continues to need, on a daily basis, active treatment furnished directly by or requiring the supervision of inpatient psychiatric facility personnel. In addition, the hospital records show that services furnished were intensive treatment services, admission or related services, or equivalent services. EXPECTED DISCHARGE DATE/DAY: TBD     DISPOSITION: Home     Patient's family notified of forced med hearing. They do not want to be in the position to be the enforcers of forced meds for pt, but feel strongly that pt needs forced meds. They will support court ordered meds. Pt not refusing on Sabianist or moral grounds.   Signed By:   Tj Lloyd MD  3/18/2017

## 2017-03-18 NOTE — INTERDISCIPLINARY ROUNDS
Behavioral Health Interdisciplinary Rounds     Patient Name: Fahad Cloud  Age: 49 y.o. Room/Bed:  732/  Primary Diagnosis: [facility] ([facility]   Admission Status: TDO     Readmission within 30 days: no  Power of  in place: no  Patient requires a blocked bed: yes          Reason for blocked bed: aggression    VTE Prophylaxis: Not indicated  Influenza vaccine screen completed: yes Influenza vaccine given: no  Mobility needs/Fall risk: no    Nutritional Plan: no  Consults:          Labs/Testing due today?: no    Sleep hours:  9.5      Participation in Care/Groups:  no  Medication Compliant?:   PRNS (last 24 hours): None    Restraints (last 24 hours):  no  Substance Abuse:  no  CIWA (range last 24 hours):  COWS (range last 24 hours):   Alcohol screening (AUDIT) completed -  AUDIT Score: 0  If applicable, date SBIRT discussed in treatment team AND documented:   Tobacco - patient is a smoker: no   Date tobacco education completed by RN:   24 hour chart check complete: yes     Patient goal(s) for today:   Treatment team focus/goals: Titrate medications  LOS:  3  Expected LOS:   Financial concerns/prescription coverage:    Date of last family contact:       Family requesting physician contact today:    Discharge plan: Return home      Outpatient provider(s): [facility]/ [address]     Participating treatment team members:  Fahad Cloud, Dr Johan Harmon and Gelacio Little RN

## 2017-03-18 NOTE — BH NOTES
GROUP THERAPY PROGRESS NOTE    Yrn Ai is participating in Reflections. Group time: 10 minutes    Personal goal for participation: unit rules and daily progress    Goal orientation: personal    Group therapy participation: minimal    Therapeutic interventions reviewed and discussed: yes    Impression of participation: Quiet in group. No verbal input. Seems withdrawn. Poor eye contact with staff.

## 2017-03-18 NOTE — BH NOTES
2015: \"That gets to me. \" Patient referring to vital signs machine as staff attempts to get patient's vital signs. Patient speaks in a whisper. Staff lets patient decide if he wants vital signs taken as he looks down at the floor and appears to be anxious and fearful. Patient ultimately declines vital signs. Staff will monitor and provide structure and supports.

## 2017-03-18 NOTE — PROGRESS NOTES
Problem: Altered Thought Process (Adult/Pediatric)  Goal: *STG: Participates in treatment plan  Variance: Patient Condition  Pt.  Met with Dr. Silvia Phan in treatment team  Soft spoken  One word answers  No eye contact   Preoccupied   Appears to be responding to internal stimuli  Medication compliant with PO medications (court ordered)  Not attending group  Poor hygiene  Goal: Interventions  Variance: Patient Condition  Will continue to monitor assess hallucinations  Medication compliance effectiveness   Encourage patient to shower

## 2017-03-18 NOTE — PROGRESS NOTES
Problem: Altered Thought Process (Adult/Pediatric)  Goal: *STG: Participates in treatment plan  Outcome: Progressing Towards Goal  Patient lying in bed with eyes closed. Respirations even and unlabored. No acute distress noted at this time. Will continue to monitor every 15 minutes for safety.

## 2017-03-19 PROCEDURE — 74011250637 HC RX REV CODE- 250/637: Performed by: PSYCHIATRY & NEUROLOGY

## 2017-03-19 PROCEDURE — 65220000003 HC RM SEMIPRIVATE PSYCH

## 2017-03-19 RX ADMIN — LORAZEPAM 1.5 MG: 1 TABLET ORAL at 09:20

## 2017-03-19 RX ADMIN — HALOPERIDOL 10 MG: 2 SOLUTION ORAL at 09:19

## 2017-03-19 RX ADMIN — LORAZEPAM 1.5 MG: 1 TABLET ORAL at 15:49

## 2017-03-19 RX ADMIN — LORAZEPAM 1.5 MG: 1 TABLET ORAL at 21:33

## 2017-03-19 RX ADMIN — HALOPERIDOL 10 MG: 2 SOLUTION ORAL at 21:34

## 2017-03-19 RX ADMIN — DIVALPROEX SODIUM 750 MG: 500 TABLET, FILM COATED, EXTENDED RELEASE ORAL at 21:32

## 2017-03-19 RX ADMIN — HALOPERIDOL 10 MG: 2 SOLUTION ORAL at 15:48

## 2017-03-19 NOTE — PROGRESS NOTES
Problem: Altered Thought Process (Adult/Pediatric)  Goal: *STG: Decreased delusional thinking  Outcome: Not Progressing Towards Goal  Patient remains delusional and paranoid. Pt refused vitals to be taken this morning. Refusing to shower. Isolative. No interaction with staff or peers. Pt is med and meal compliant. NAD. Q-15 checks for safety. Goal: Interventions  Staff to encourage group activities. Reorient patient to reality.

## 2017-03-19 NOTE — BH NOTES
PSYCHIATRIC PROGRESS NOTE         Patient Name  Sunil Miller   Date of Birth 1970   [facility] [id number]   Medical Record Number  [id number]      Age  49 y.o. PCP PROVIDER UNKNOWN   Admit date:  3/14/2017    Room Number  732/01  @ [facility]   Date of Service  3/19/2017          PSYCHOTHERAPY SESSION NOTE:  Length of psychotherapy session: 20 minutes    Main condition/diagnosis/issues treated during session today, 3/19/2017 :medication non compliance, paranoia, internal stimuli, poor coping skills    I employed Cognitive Behavioral therapy techniques, Reality-Oriented psychotherapy, as well as supportive psychotherapy in regards to various ongoing psychosocial stressors, including the following: pre-admission and current problems; housing issues; academic issues; medical issues; and stress of hospitalization. Interpersonal relationship issues and psychodynamic conflicts explored. Attempts made to alleviate maladaptive patterns. Overall, patient is not progressing    Treatment Plan Update (reviewed an updated 3/19/2017) : I will modify psychotherapy tx plan by implementing more stress management strategies, building upon cognitive behavioral techniques, increasing coping skills, as well as shoring up psychological defenses). An extended energy and skill set was needed to engage pt in psychotherapy due to some of the following: resistiveness, complexity, negativity, confrontational nature, hostile behaviors, and/or severe abnormalities in thought processes/psychosis resulting in the loss of expressive/receptive language communication skills. E & M PROGRESS NOTE:         HISTORY       CC:  \"SI, poor self care, medication non compliance resulting in catatonia \"  HISTORY OF PRESENT ILLNESS/INTERVAL HISTORY:  (reviewed/updated 3/19/2017).   per initial evaluation:     Sunil Miller presents/reports/evidences the following emotional symptoms today, 3/19/2017:depression and psychotic behavior. The above symptoms have been present for years . These symptoms are of severe severity. The symptoms are constant  in nature. Additional symptomatology and features include psychosis. 3/17/17- Patient was court approved for forced medications. When this was explained to him he had a very sudden change of affect (from blunted to animated with a sheepish grin). He whispered responses to questions and appeared to be amused by this. He had moments of thought blocking and eye movements indicating RTIS. Will increase haldol. Will start clozapine when patient demonstrates ability to consistently comply with oral medications. 3/18/17- medication compliant. Still RTIS and internally preoccupied. 3/19/17- refused team meeting today. Still internally preoccupied. Has a good visit w/ family. Continues to comply w/ meds. SIDE EFFECTS: (reviewed/updated 3/19/2017)  None reported or admitted to. No noted toxicity with use of Depakote/Tegretol/lithium/Clozaril/TCAs   ALLERGIES:(reviewed/updated 3/19/2017)  Allergies   Allergen Reactions    Fluphenazine Unknown (comments)     Pt is unable to communicate properly.  Penicillins Unknown (comments)     Pt is unable to communicate properly. MEDICATIONS PRIOR TO ADMISSION:(reviewed/updated 3/19/2017)  Prescriptions Prior to Admission   Medication Sig    divalproex ER (DEPAKOTE ER) 500 mg ER tablet Take 1,000 mg by mouth two (2) times a day. Indications: MOOD    cloZAPine 200 mg tablet Take 600 mg by mouth nightly. Indications: TREATMENT-RESISTANT SCHIZOPHRENIA    cloZAPine 200 mg tablet Take 200 mg by mouth daily. Indications: TREATMENT-RESISTANT SCHIZOPHRENIA    haloperidol decanoate (HALDOL DECANOATE) 100 mg/mL injection 100 mg by IntraMUSCular route every twenty-eight (28) days. Indications: SCHIZOPHRENIA    traZODone (DESYREL) 100 mg tablet Take 100 mg by mouth nightly as needed for Sleep.  Indications: sleep    benztropine (COGENTIN) 2 mg tablet Take 2 mg by mouth two (2) times a day. Indications: extrapyramidal disease      PAST MEDICAL HISTORY: Past medical history from the initial psychiatric evaluation has been reviewed (reviewed/updated 3/19/2017) with no additional updates (I asked patient and no additional past medical history provided). Past Medical History:   Diagnosis Date    Psychiatric disorder     Psychotic disorder     Withdrawal syndrome ([facility] [address]    History reviewed. No pertinent surgical history. SOCIAL HISTORY: Social history from the initial psychiatric evaluation has been reviewed (reviewed/updated 3/19/2017) with no additional updates (I asked patient and no additional social history provided). Social History     Social History    Marital status: UNKNOWN     Spouse name: N/A    Number of children: N/A    Years of education: N/A     Occupational History    Not on file. Social History Main Topics    Smoking status: Never Smoker    Smokeless tobacco: Not on file    Alcohol use No    Drug use: Not on file    Sexual activity: Not on file     Other Topics Concern    Not on file     Social History Narrative    49year old single  male admitted on TDO for worsening schizophrenia with catatonic symptoms. Medication compliance has been questionable. Pt was last admitted to [facility] in 2015 and successfully discharged to [facility] on clozapine, haldol dec, trazodone and depakote. Pt is a HS grad and is unemployed. He lives with his mother. FAMILY HISTORY: Family history from the initial psychiatric evaluation has been reviewed (reviewed/updated 3/19/2017) with no additional updates (I asked patient and no additional family history provided). History reviewed. No pertinent family history.     REVIEW OF SYSTEMS: (reviewed/updated 3/19/2017)  Appetite:no change from normal   Sleep: does not feel rested   All other Review of Systems: Psychological ROS: positive for - hallucinations  Respiratory ROS: no cough, shortness of breath, or wheezing  Cardiovascular ROS: no chest pain or dyspnea on exertion         [facility] (MSE):    MSE FINDINGS ARE WITHIN NORMAL LIMITS (WNL) UNLESS OTHERWISE STATED BELOW. ( ALL OF THE BELOW CATEGORIES OF THE MSE HAVE BEEN REVIEWED (reviewed 3/19/2017) AND UPDATED AS DEEMED APPROPRIATE )  General Presentation age appropriate, evasive, guarded and uncooperative   Orientation disorganized   Vital Signs  See below (reviewed 3/19/2017); Vital Signs (BP, Pulse, & Temp) are within normal limits if not listed below.    Gait and Station Stable/steady, no ataxia   Musculoskeletal System No extrapyramidal symptoms (EPS); no abnormal muscular movements or Tardive Dyskinesia (TD); muscle strength and tone are within normal limits   Language No aphasia or dysarthria   Speech:  hypoverbal   Thought Processes [place] ; slow rate of thoughts; poor abstract reasoning/computation   Thought Associations blocked    Thought Content paranoid delusions   Suicidal Ideations none   Homicidal Ideations none   Mood:  irritable   Affect:  mood-congruent   Memory recent  impaired   Memory remote:  impaired   Concentration/Attention:  poor   Fund of Knowledge average   Insight:  poor   Reliability poor   Judgment:  poor          VITALS:     Patient Vitals for the past 24 hrs:   Temp Pulse Resp BP SpO2   03/19/17 1200 - 97 16 (!) 118/92 -   03/19/17 0730 - 87 16 - 97 %   03/18/17 1625 98.1 °F (36.7 °C) 90 18 117/82 95 %     Wt Readings from Last 3 Encounters:   03/16/17 95.9 kg (211 lb 7 oz)   02/09/15 97.5 kg (215 lb)   02/07/15 97.5 kg (215 lb)     Temp Readings from Last 3 Encounters:   02/07/15 98.7 °F (37.1 °C)     BP Readings from Last 3 Encounters:   03/19/17 (!) 118/92   02/12/15 105/73   02/07/15 148/79     Pulse Readings from Last 3 Encounters:   03/19/17 97   02/12/15 87   02/07/15 (!) 111            DATA     LABORATORY DATA:(reviewed/updated 3/19/2017)  No results found for this or any previous visit (from the past 24 hour(s)). Lab Results   Component Value Date/Time    Valproic acid 62 03/14/2017 11:49 PM     No results found for: ASHLEY MARTE, KIKI   RADIOLOGY REPORTS:(reviewed/updated 3/19/2017)  No results found.        MEDICATIONS     ALL MEDICATIONS:   Current Facility-Administered Medications   Medication Dose Route Frequency    LORazepam (ATIVAN) tablet 1.5 mg  1.5 mg Oral TID    haloperidol (HALDOL) 2 mg/mL oral solution 10 mg+++++Court ordered medication+++++  10 mg Oral TID    Or    haloperidol lactate (HALDOL) injection 5 mg  5 mg IntraMUSCular TID    divalproex ER (DEPAKOTE ER) 24 hour tablet 750 mg+++++Court ordered medication+++++  750 mg Oral QHS    Or    LORazepam (ATIVAN) injection 1 mg  1 mg IntraMUSCular QHS    ziprasidone (GEODON) 20 mg in sterile water (preservative free) 1 mL injection  20 mg IntraMUSCular BID PRN    OLANZapine (ZyPREXA) tablet 5 mg  5 mg Oral Q6H PRN    benztropine (COGENTIN) tablet 2 mg  2 mg Oral BID PRN    benztropine (COGENTIN) injection 2 mg  2 mg IntraMUSCular BID PRN    LORazepam (ATIVAN) injection 2 mg  2 mg IntraMUSCular Q4H PRN    LORazepam (ATIVAN) tablet 1 mg  1 mg Oral Q4H PRN    zolpidem (AMBIEN) tablet 10 mg  10 mg Oral QHS PRN    acetaminophen (TYLENOL) tablet 650 mg  650 mg Oral Q4H PRN    ibuprofen (MOTRIN) tablet 400 mg  400 mg Oral Q8H PRN    magnesium hydroxide (MILK OF MAGNESIA) 400 mg/5 mL oral suspension 30 mL  30 mL Oral DAILY PRN    nicotine (NICODERM CQ) 21 mg/24 hr patch 1 Patch  1 Patch TransDERmal DAILY PRN    haloperidol decanoate (HALDOL DECANOATE) 100 mg/mL injection 150 mg  150 mg IntraMUSCular Q28D      SCHEDULED MEDICATIONS:   Current Facility-Administered Medications   Medication Dose Route Frequency    LORazepam (ATIVAN) tablet 1.5 mg  1.5 mg Oral TID    haloperidol (HALDOL) 2 mg/mL oral solution 10 mg+++++Court ordered medication+++++  10 mg Oral TID    Or    haloperidol lactate (HALDOL) injection 5 mg  5 mg IntraMUSCular TID    divalproex ER (DEPAKOTE ER) 24 hour tablet 750 mg+++++Court ordered medication+++++  750 mg Oral QHS    Or    LORazepam (ATIVAN) injection 1 mg  1 mg IntraMUSCular QHS    haloperidol decanoate (HALDOL DECANOATE) 100 mg/mL injection 150 mg  150 mg IntraMUSCular Q28D          ASSESSMENT & PLAN     DIAGNOSES REQUIRING ACTIVE TREATMENT AND MONITORING: (reviewed/updated 3/19/2017)  Patient Active Hospital Problem List:   Schizophrenia [facility]) (3/15/2017)    Assessment: delusions, formal thought disorder, paranoia, bizarre behavior    Plan: increase haldol dec. Add po haldol- as pt is refusing clozapine and if it is to be given needs to be restarted at 50 mg /day. For forced med hearing. (Depakote, Tegretol, lithium, clozapine---a drug with a narrow therapeutic index= NTI) and associated labs for drug therapy implemented that require intense monitoring for toxicity as deemed appropriate based on current medication side effects and pharmacodynamically determined drug 1/2 lives. In summary, Phil Ryan is a 49 y.o.  male who presents with a severe exacerbation of the principal diagnosis of Schizophrenia ([facility] [address]  Patient's condition is worsening/not improving/not stable Patient requires continued inpatient hospitalization for further stabilization, safety monitoring and medication management. I will continue to coordinate the provision of individual, milieu, occupational, group, and substance abuse therapies to address target symptoms/diagnoses as deemed appropriate for the individual patient. A coordinated, multidisplinary treatment team round was conducted with the patient (this team consists of the nurse, psychiatric unit pharmcist,  and writer). Complete current electronic health record for patient has been reviewed today including consultant notes, ancillary staff notes, nurses and psychiatric tech notes.     Suicide risk assessment completed and patient deemed to be of low risk for suicide at this time. The following regarding medications was addressed during rounds with patient:   the risks and benefits of the proposed medication. The patient was given the opportunity to ask questions. Informed consent given to the use of the above medications. Will continue to adjust psychiatric and non-psychiatric medications (see above \"medication\" section and orders section for details) as deemed appropriate & based upon diagnoses and response to treatment. I will continue to order blood tests/labs and diagnostic tests as deemed appropriate and review results as they become available (see orders for details and above listed lab/test results). I will order psychiatric records from previous [facility] hospitals to further elucidate the nature of patient's psychopathology and review once available. I will gather additional collateral information from friends, family and o/p treatment team to further elucidate the nature of patient's psychopathology and baselline level of psychiatric functioning. I certify that this patient's inpatient psychiatric hospital services furnished since the previous certification were, and continue to be, required for treatment that could reasonably be expected to improve the patient's condition, or for diagnostic study, and that the patient continues to need, on a daily basis, active treatment furnished directly by or requiring the supervision of inpatient psychiatric facility personnel. In addition, the hospital records show that services furnished were intensive treatment services, admission or related services, or equivalent services. EXPECTED DISCHARGE DATE/DAY: TBD     DISPOSITION: Home     Patient's family notified of forced med hearing. They do not want to be in the position to be the enforcers of forced meds for pt, but feel strongly that pt needs forced meds.  They will support court ordered meds. Pt not refusing on Anab[employer]ist or moral grounds.   Signed By:   Hermelinda Albarado MD  3/19/2017

## 2017-03-19 NOTE — PROGRESS NOTES
03/19/17 0730   Vital Signs   Temp (refused)   Pulse (Heart Rate) 87   Resp Rate 16   O2 Sat (%) 97 %   Level of Consciousness Alert   BP (refused)   Pain 1   Pain Scale 1 Numeric (0 - 10)   Pain Intensity 1 0   Patient Stated Pain Goal 0   Patient Observation   Observations nad   Hourly Rounds Yes

## 2017-03-19 NOTE — PROGRESS NOTES
Problem: Altered Thought Process (Adult/Pediatric)  Goal: *STG: Decreased delusional thinking  Outcome: Not Progressing Towards Goal  Variance: Patient Condition  Pt showered. Behavior essentially unchanged. Essentially mute. No interaction with staff or peers. Avoidant of eye contact. Meal and med compliant. Isolates in room much of the time. Will continue to monitor.

## 2017-03-19 NOTE — PROGRESS NOTES
Problem: Falls - Risk of  Goal: *Absence of falls  Outcome: Progressing Towards Goal  2315 Pt appears asleep in bed. Respirations even and unlabored. Night light on, door remains open. Will continue to monitor with Q 15 safety checks. Pt appeared to sleep 6.5 + hours.

## 2017-03-20 LAB
CLOZAPINE SERPL-MCNC: 116 NG/ML (ref 350–650)
CLOZAPINE+NOR SERPL-MCNC: 181 NG/ML
NORCLOZAPINE SERPL-MCNC: 65 NG/ML

## 2017-03-20 PROCEDURE — 74011250636 HC RX REV CODE- 250/636: Performed by: PSYCHIATRY & NEUROLOGY

## 2017-03-20 PROCEDURE — 74011250637 HC RX REV CODE- 250/637: Performed by: PSYCHIATRY & NEUROLOGY

## 2017-03-20 PROCEDURE — 65220000003 HC RM SEMIPRIVATE PSYCH

## 2017-03-20 RX ORDER — HALOPERIDOL DECANOATE 100 MG/ML
200 INJECTION INTRAMUSCULAR
Status: DISCONTINUED | OUTPATIENT
Start: 2017-04-17 | End: 2017-03-28 | Stop reason: HOSPADM

## 2017-03-20 RX ORDER — DIVALPROEX SODIUM 500 MG/1
1500 TABLET, EXTENDED RELEASE ORAL
Status: DISCONTINUED | OUTPATIENT
Start: 2017-03-20 | End: 2017-03-25

## 2017-03-20 RX ORDER — LORAZEPAM 2 MG/ML
1 INJECTION INTRAMUSCULAR
Status: DISCONTINUED | OUTPATIENT
Start: 2017-03-20 | End: 2017-03-25

## 2017-03-20 RX ORDER — BENZTROPINE MESYLATE 1 MG/1
1 TABLET ORAL 2 TIMES DAILY
Status: DISCONTINUED | OUTPATIENT
Start: 2017-03-20 | End: 2017-03-28

## 2017-03-20 RX ORDER — HALOPERIDOL DECANOATE 100 MG/ML
50 INJECTION INTRAMUSCULAR ONCE
Status: COMPLETED | OUTPATIENT
Start: 2017-03-20 | End: 2017-03-20

## 2017-03-20 RX ORDER — HALOPERIDOL DECANOATE 100 MG/ML
50 INJECTION INTRAMUSCULAR
Status: DISCONTINUED | OUTPATIENT
Start: 2017-03-20 | End: 2017-03-20

## 2017-03-20 RX ADMIN — HALOPERIDOL 10 MG: 2 SOLUTION ORAL at 22:01

## 2017-03-20 RX ADMIN — HALOPERIDOL DECANOATE 50 MG: 100 INJECTION INTRAMUSCULAR at 12:11

## 2017-03-20 RX ADMIN — HALOPERIDOL 10 MG: 2 SOLUTION ORAL at 17:41

## 2017-03-20 RX ADMIN — HALOPERIDOL 10 MG: 2 SOLUTION ORAL at 09:10

## 2017-03-20 RX ADMIN — LORAZEPAM 1.5 MG: 1 TABLET ORAL at 21:59

## 2017-03-20 RX ADMIN — DIVALPROEX SODIUM 1500 MG: 500 TABLET, FILM COATED, EXTENDED RELEASE ORAL at 22:00

## 2017-03-20 RX ADMIN — LORAZEPAM 1.5 MG: 1 TABLET ORAL at 09:10

## 2017-03-20 RX ADMIN — BENZTROPINE MESYLATE 1 MG: 1 TABLET ORAL at 12:10

## 2017-03-20 RX ADMIN — BENZTROPINE MESYLATE 1 MG: 1 TABLET ORAL at 22:00

## 2017-03-20 RX ADMIN — LORAZEPAM 1.5 MG: 1 TABLET ORAL at 17:42

## 2017-03-20 NOTE — INTERDISCIPLINARY ROUNDS
Behavioral Health Interdisciplinary Rounds     Patient Name: Quin Gordillo  Age: 49 y.o. Room/Bed:  732/01  Primary Diagnosis: Schizophrenia ([facility] [address]   Admission Status: Involuntary Commitment     Readmission within 30 days: no  Power of  in place: no  Patient requires a blocked bed: yes          Reason for blocked bed: behavioral    VTE Prophylaxis: No  Influenza vaccine screen completed: yes Influenza vaccine given: no  Mobility needs/Fall risk: no    Nutritional Plan: no  Consults:          Labs/Testing due today?: no    Sleep hours:  8.0      Participation in Care/Groups:  no  Medication Compliant?: Yes  PRNS (last 24 hours): None    Restraints (last 24 hours):  no  Substance Abuse:  no  CIWA (range last 24 hours):  COWS (range last 24 hours):   Alcohol screening (AUDIT) completed -  AUDIT Score: 0  If applicable, date SBIRT discussed in treatment team AND documented:   Tobacco - patient is a smoker: no   Date tobacco education completed by RN:   24 hour chart check complete: yes     Patient goal(s) for today: \" I dreamed about Cristhian \"  Treatment team focus/goals: Plan to continue to titrate her medications. Plan to encourage ADL care. LOS:  5  Expected LOS: TBD   Financial concerns/prescription coverage:  no  Date of last family contact:       Family requesting physician contact today:  no  Discharge plan: He will return home with his family when ready for dishcarge        Outpatient provider(s): [phone]     Participating treatment team members:  Sonja Blanco, RN

## 2017-03-20 NOTE — BH NOTES
PSYCHIATRIC PROGRESS NOTE         Patient Name  Romana Martínez   Date of Birth 1970   [facility] [id number]   Medical Record Number  [id number]      Age  49 y.o. PCP PROVIDER UNKNOWN   Admit date:  3/14/2017    Room Number  732/01  @ [facility]   Date of Service  3/20/2017          PSYCHOTHERAPY SESSION NOTE:  Length of psychotherapy session: 20 minutes    Main condition/diagnosis/issues treated during session today, 3/20/2017 :medication non compliance, paranoia, internal stimuli, poor coping skills    I employed Cognitive Behavioral therapy techniques, Reality-Oriented psychotherapy, as well as supportive psychotherapy in regards to various ongoing psychosocial stressors, including the following: pre-admission and current problems; housing issues; academic issues; medical issues; and stress of hospitalization. Interpersonal relationship issues and psychodynamic conflicts explored. Attempts made to alleviate maladaptive patterns. Overall, patient is not progressing    Treatment Plan Update (reviewed an updated 3/20/2017) : I will modify psychotherapy tx plan by implementing more stress management strategies, building upon cognitive behavioral techniques, increasing coping skills, as well as shoring up psychological defenses). An extended energy and skill set was needed to engage pt in psychotherapy due to some of the following: resistiveness, complexity, negativity, confrontational nature, hostile behaviors, and/or severe abnormalities in thought processes/psychosis resulting in the loss of expressive/receptive language communication skills. E & M PROGRESS NOTE:         HISTORY       CC:  \"SI, poor self care, medication non compliance resulting in catatonia \"  HISTORY OF PRESENT ILLNESS/INTERVAL HISTORY:  (reviewed/updated 3/20/2017).   per initial evaluation:     Romana Martínez presents/reports/evidences the following emotional symptoms today, 3/20/2017:depression and psychotic behavior. The above symptoms have been present for years . These symptoms are of severe severity. The symptoms are constant  in nature. Additional symptomatology and features include psychosis. 3/17/17- Patient was court approved for forced medications. When this was explained to him he had a very sudden change of affect (from blunted to animated with a sheepish grin). He whispered responses to questions and appeared to be amused by this. He had moments of thought blocking and eye movements indicating RTIS. Will increase haldol. Will start clozapine when patient demonstrates ability to consistently comply with oral medications. 3/18/17- medication compliant. Still RTIS and internally preoccupied. 3/19/17- refused team meeting today. Still internally preoccupied. Has a good visit w/ family. Continues to comply w/ meds. 3/20/17- More compliant with unit rules. Still isolative and internally preoccupied with thoughts blocking. Will increase haaldol dec      SIDE EFFECTS: (reviewed/updated 3/20/2017)  None reported or admitted to. No noted toxicity with use of Depakote/Tegretol/lithium/Clozaril/TCAs   ALLERGIES:(reviewed/updated 3/20/2017)  Allergies   Allergen Reactions    Fluphenazine Unknown (comments)     Pt is unable to communicate properly.  Penicillins Unknown (comments)     Pt is unable to communicate properly. MEDICATIONS PRIOR TO ADMISSION:(reviewed/updated 3/20/2017)  Prescriptions Prior to Admission   Medication Sig    divalproex ER (DEPAKOTE ER) 500 mg ER tablet Take 1,000 mg by mouth two (2) times a day. Indications: MOOD    cloZAPine 200 mg tablet Take 600 mg by mouth nightly. Indications: TREATMENT-RESISTANT SCHIZOPHRENIA    cloZAPine 200 mg tablet Take 200 mg by mouth daily. Indications: TREATMENT-RESISTANT SCHIZOPHRENIA    haloperidol decanoate (HALDOL DECANOATE) 100 mg/mL injection 100 mg by IntraMUSCular route every twenty-eight (28) days.  Indications: SCHIZOPHRENIA  traZODone (DESYREL) 100 mg tablet Take 100 mg by mouth nightly as needed for Sleep. Indications: sleep    benztropine (COGENTIN) 2 mg tablet Take 2 mg by mouth two (2) times a day. Indications: extrapyramidal disease      PAST MEDICAL HISTORY: Past medical history from the initial psychiatric evaluation has been reviewed (reviewed/updated 3/20/2017) with no additional updates (I asked patient and no additional past medical history provided). Past Medical History:   Diagnosis Date    Psychiatric disorder     Psychotic disorder     Withdrawal syndrome ([facility] [address]    History reviewed. No pertinent surgical history. SOCIAL HISTORY: Social history from the initial psychiatric evaluation has been reviewed (reviewed/updated 3/20/2017) with no additional updates (I asked patient and no additional social history provided). Social History     Social History    Marital status: UNKNOWN     Spouse name: N/A    Number of children: N/A    Years of education: N/A     Occupational History    Not on file. Social History Main Topics    Smoking status: Never Smoker    Smokeless tobacco: Not on file    Alcohol use No    Drug use: Not on file    Sexual activity: Not on file     Other Topics Concern    Not on file     Social History Narrative    49year old single  male admitted on TDO for worsening schizophrenia with catatonic symptoms. Medication compliance has been questionable. Pt was last admitted to [facility] in 2015 and successfully discharged to [facility] on clozapine, haldol dec, trazodone and depakote. Pt is a HS grad and is unemployed. He lives with his mother. FAMILY HISTORY: Family history from the initial psychiatric evaluation has been reviewed (reviewed/updated 3/20/2017) with no additional updates (I asked patient and no additional family history provided). History reviewed. No pertinent family history.     REVIEW OF SYSTEMS: (reviewed/updated 3/20/2017)  Appetite:no change from normal   Sleep: does not feel rested   All other Review of Systems: Psychological ROS: positive for - hallucinations  Respiratory ROS: no cough, shortness of breath, or wheezing  Cardiovascular ROS: no chest pain or dyspnea on exertion         [facility] ([facility] [place]    MSE FINDINGS ARE WITHIN NORMAL LIMITS (WNL) UNLESS OTHERWISE STATED BELOW. ( ALL OF THE BELOW CATEGORIES OF THE MSE HAVE BEEN REVIEWED (reviewed 3/20/2017) AND UPDATED AS DEEMED APPROPRIATE )  General Presentation age appropriate, evasive, guarded and uncooperative   Orientation disorganized   Vital Signs  See below (reviewed 3/20/2017); Vital Signs (BP, Pulse, & Temp) are within normal limits if not listed below.    Gait and Station Stable/steady, no ataxia   Musculoskeletal System No extrapyramidal symptoms (EPS); no abnormal muscular movements or Tardive Dyskinesia (TD); muscle strength and tone are within normal limits   Language No aphasia or dysarthria   Speech:  hypoverbal   Thought Processes [place] ; slow rate of thoughts; poor abstract reasoning/computation   Thought Associations blocked    Thought Content paranoid delusions   Suicidal Ideations none   Homicidal Ideations none   Mood:  irritable   Affect:  mood-congruent   Memory recent  impaired   Memory remote:  impaired   Concentration/Attention:  poor   Fund of Knowledge average   Insight:  poor   Reliability poor   Judgment:  poor          VITALS:     Patient Vitals for the past 24 hrs:   Temp Pulse Resp BP SpO2   03/20/17 0747 97.7 °F (36.5 °C) 93 18 107/75 96 %   03/19/17 1530 97.6 °F (36.4 °C) 83 18 124/87 96 %     Wt Readings from Last 3 Encounters:   03/19/17 94.8 kg (209 lb 1.6 oz)   02/09/15 97.5 kg (215 lb)   02/07/15 97.5 kg (215 lb)     Temp Readings from Last 3 Encounters:   03/20/17 97.7 °F (36.5 °C)   02/07/15 98.7 °F (37.1 °C)     BP Readings from Last 3 Encounters:   03/20/17 107/75   02/12/15 105/73   02/07/15 148/79     Pulse Readings from Last 3 Encounters:   03/20/17 93   02/12/15 87   02/07/15 (!) 111            DATA     LABORATORY DATA:(reviewed/updated 3/20/2017)  No results found for this or any previous visit (from the past 24 hour(s)). Lab Results   Component Value Date/Time    Valproic acid 62 03/14/2017 11:49 PM     No results found for: LI, LIH, LITHM   RADIOLOGY REPORTS:(reviewed/updated 3/20/2017)  No results found.        MEDICATIONS     ALL MEDICATIONS:   Current Facility-Administered Medications   Medication Dose Route Frequency    divalproex ER (DEPAKOTE ER) 24 hour tablet 1,500 mg+++++Court ordered medication+++++  1,500 mg Oral QHS    Or    LORazepam (ATIVAN) injection 1 mg  1 mg IntraMUSCular QHS    benztropine (COGENTIN) tablet 1 mg  1 mg Oral BID    [START ON 4/17/2017] haloperidol decanoate (HALDOL DECANOATE) 100 mg/mL injection 200 mg  200 mg IntraMUSCular Q28D    LORazepam (ATIVAN) tablet 1.5 mg  1.5 mg Oral TID    haloperidol (HALDOL) 2 mg/mL oral solution 10 mg+++++Court ordered medication+++++  10 mg Oral TID    Or    haloperidol lactate (HALDOL) injection 5 mg  5 mg IntraMUSCular TID    ziprasidone (GEODON) 20 mg in sterile water (preservative free) 1 mL injection  20 mg IntraMUSCular BID PRN    OLANZapine (ZyPREXA) tablet 5 mg  5 mg Oral Q6H PRN    benztropine (COGENTIN) tablet 2 mg  2 mg Oral BID PRN    benztropine (COGENTIN) injection 2 mg  2 mg IntraMUSCular BID PRN    LORazepam (ATIVAN) injection 2 mg  2 mg IntraMUSCular Q4H PRN    LORazepam (ATIVAN) tablet 1 mg  1 mg Oral Q4H PRN    zolpidem (AMBIEN) tablet 10 mg  10 mg Oral QHS PRN    acetaminophen (TYLENOL) tablet 650 mg  650 mg Oral Q4H PRN    ibuprofen (MOTRIN) tablet 400 mg  400 mg Oral Q8H PRN    magnesium hydroxide (MILK OF MAGNESIA) 400 mg/5 mL oral suspension 30 mL  30 mL Oral DAILY PRN    nicotine (NICODERM CQ) 21 mg/24 hr patch 1 Patch  1 Patch TransDERmal DAILY PRN      SCHEDULED MEDICATIONS:   Current Facility-Administered Medications   Medication Dose Route Frequency    divalproex ER (DEPAKOTE ER) 24 hour tablet 1,500 mg+++++Court ordered medication+++++  1,500 mg Oral QHS    Or    LORazepam (ATIVAN) injection 1 mg  1 mg IntraMUSCular QHS    benztropine (COGENTIN) tablet 1 mg  1 mg Oral BID    [START ON 4/17/2017] haloperidol decanoate (HALDOL DECANOATE) 100 mg/mL injection 200 mg  200 mg IntraMUSCular Q28D    LORazepam (ATIVAN) tablet 1.5 mg  1.5 mg Oral TID    haloperidol (HALDOL) 2 mg/mL oral solution 10 mg+++++Court ordered medication+++++  10 mg Oral TID    Or    haloperidol lactate (HALDOL) injection 5 mg  5 mg IntraMUSCular TID          ASSESSMENT & PLAN     DIAGNOSES REQUIRING ACTIVE TREATMENT AND MONITORING: (reviewed/updated 3/20/2017)  Patient Active Hospital Problem List:   Schizophrenia [facility]) (3/15/2017)    Assessment: delusions, formal thought disorder, paranoia, bizarre behavior    Plan: increase haldol dec. Add po haldol- as pt is refusing clozapine and if it is to be given needs to be restarted at 50 mg /day. For forced med hearing. (Depakote, Tegretol, lithium, clozapine---a drug with a narrow therapeutic index= NTI) and associated labs for drug therapy implemented that require intense monitoring for toxicity as deemed appropriate based on current medication side effects and pharmacodynamically determined drug 1/2 lives. In summary, Rajni Tinoco, is a 49 y.o.  male who presents with a severe exacerbation of the principal diagnosis of Schizophrenia ([facility] [address]  Patient's condition is worsening/not improving/not stable Patient requires continued inpatient hospitalization for further stabilization, safety monitoring and medication management. I will continue to coordinate the provision of individual, milieu, occupational, group, and substance abuse therapies to address target symptoms/diagnoses as deemed appropriate for the individual patient.   A coordinated, multidisplinary treatment team round was conducted with the patient (this team consists of the nurse, psychiatric unit pharmcist,  and writer). Complete current electronic health record for patient has been reviewed today including consultant notes, ancillary staff notes, nurses and psychiatric tech notes. Suicide risk assessment completed and patient deemed to be of low risk for suicide at this time. The following regarding medications was addressed during rounds with patient:   the risks and benefits of the proposed medication. The patient was given the opportunity to ask questions. Informed consent given to the use of the above medications. Will continue to adjust psychiatric and non-psychiatric medications (see above \"medication\" section and orders section for details) as deemed appropriate & based upon diagnoses and response to treatment. I will continue to order blood tests/labs and diagnostic tests as deemed appropriate and review results as they become available (see orders for details and above listed lab/test results). I will order psychiatric records from previous [facility] hospitals to further elucidate the nature of patient's psychopathology and review once available. I will gather additional collateral information from friends, family and o/p treatment team to further elucidate the nature of patient's psychopathology and baselline level of psychiatric functioning. I certify that this patient's inpatient psychiatric hospital services furnished since the previous certification were, and continue to be, required for treatment that could reasonably be expected to improve the patient's condition, or for diagnostic study, and that the patient continues to need, on a daily basis, active treatment furnished directly by or requiring the supervision of inpatient psychiatric facility personnel.  In addition, the hospital records show that services furnished were intensive treatment services, admission or related services, or equivalent services. EXPECTED DISCHARGE DATE/DAY: TBD     DISPOSITION: Home     Patient's family notified of forced med hearing. They do not want to be in the position to be the enforcers of forced meds for pt, but feel strongly that pt needs forced meds. They will support court ordered meds. Pt not refusing on Hindu or moral grounds.   Signed By:   Gini Garsia MD  3/20/2017

## 2017-03-20 NOTE — PROGRESS NOTES
Problem: Altered Thought Process (Adult/Pediatric)  Goal: *STG: Participates in treatment plan  Pt.  Up for breakfast  Remains preoccupied  Delusional   Answers with one word  Softly spoken  Minimal interaction with peers   Isolates in his room not attending groups    Goal: Interventions  Will continue to monitor  Assess thought process  medication compliance and effectiveness  Encourage group participation

## 2017-03-20 NOTE — PROGRESS NOTES
Problem: Falls - Risk of  Goal: *Absence of falls  Outcome: Progressing Towards Goal  Patient has been fall free since arriving on unit. He is currently sleeping. No stress noted.

## 2017-03-20 NOTE — BH NOTES
GROUP THERAPY PROGRESS NOTE    Zachary Malagon did not participate in an Afternoon Process Group with a focus on identifying feelings and planning for the rest of the day.

## 2017-03-20 NOTE — PROGRESS NOTES
Problem: Altered Thought Process (Adult/Pediatric)  Goal: *STG: Participates in treatment plan  Outcome: Progressing Towards Goal  Pt is anxious and blunted. Slow to respond and whispers to staff, stating \"I don't want the COURTNEY\", seems afraid/distrustful of medication. Verbalizes no hallucinations at this time. Med compliant with encouragement, isolative, sleeps often. Focus on redirection, reassurance, and monitoring. Q 15 min checks.

## 2017-03-21 PROCEDURE — 74011250637 HC RX REV CODE- 250/637: Performed by: PSYCHIATRY & NEUROLOGY

## 2017-03-21 PROCEDURE — 65220000003 HC RM SEMIPRIVATE PSYCH

## 2017-03-21 RX ORDER — LORAZEPAM 1 MG/1
1 TABLET ORAL 3 TIMES DAILY
Status: DISCONTINUED | OUTPATIENT
Start: 2017-03-21 | End: 2017-03-24

## 2017-03-21 RX ADMIN — BENZTROPINE MESYLATE 1 MG: 1 TABLET ORAL at 21:30

## 2017-03-21 RX ADMIN — BENZTROPINE MESYLATE 1 MG: 1 TABLET ORAL at 08:40

## 2017-03-21 RX ADMIN — HALOPERIDOL 10 MG: 2 SOLUTION ORAL at 08:40

## 2017-03-21 RX ADMIN — LORAZEPAM 1.5 MG: 1 TABLET ORAL at 08:41

## 2017-03-21 RX ADMIN — HALOPERIDOL 10 MG: 2 SOLUTION ORAL at 21:42

## 2017-03-21 RX ADMIN — HALOPERIDOL 10 MG: 2 SOLUTION ORAL at 16:42

## 2017-03-21 RX ADMIN — DIVALPROEX SODIUM 1500 MG: 500 TABLET, FILM COATED, EXTENDED RELEASE ORAL at 21:43

## 2017-03-21 RX ADMIN — LORAZEPAM 1 MG: 1 TABLET ORAL at 16:43

## 2017-03-21 RX ADMIN — LORAZEPAM 1 MG: 1 TABLET ORAL at 21:43

## 2017-03-21 NOTE — PROGRESS NOTES
Problem: Falls - Risk of  Goal: *Absence of falls  Outcome: Progressing Towards Goal  Lying quietly in bed with eyes closed , respirations even and unlabored , NAD noted   Bathroom light on for safety , maintaining Q15 min rounds for safety

## 2017-03-21 NOTE — BH NOTES
PSYCHIATRIC PROGRESS NOTE         Patient Name  Davina Miramontes   Date of Birth 1970   [facility] [id number]   Medical Record Number  [id number]      Age  49 y.o. PCP PROVIDER UNKNOWN   Admit date:  3/14/2017    Room Number  732/01  @ [facility]   Date of Service  3/21/2017          PSYCHOTHERAPY SESSION NOTE:  Length of psychotherapy session: 20 minutes    Main condition/diagnosis/issues treated during session today, 3/21/2017 :medication non compliance, paranoia, internal stimuli, poor coping skills    I employed Cognitive Behavioral therapy techniques, Reality-Oriented psychotherapy, as well as supportive psychotherapy in regards to various ongoing psychosocial stressors, including the following: pre-admission and current problems; housing issues; academic issues; medical issues; and stress of hospitalization. Interpersonal relationship issues and psychodynamic conflicts explored. Attempts made to alleviate maladaptive patterns. Overall, patient is not progressing    Treatment Plan Update (reviewed an updated 3/21/2017) : I will modify psychotherapy tx plan by implementing more stress management strategies, building upon cognitive behavioral techniques, increasing coping skills, as well as shoring up psychological defenses). An extended energy and skill set was needed to engage pt in psychotherapy due to some of the following: resistiveness, complexity, negativity, confrontational nature, hostile behaviors, and/or severe abnormalities in thought processes/psychosis resulting in the loss of expressive/receptive language communication skills. 3/21/17- Very poor hygiene. Still has internal stimuli, bizarre affect. Selectively mute or barely whispers responses. E & M PROGRESS NOTE:         HISTORY       CC:  \"SI, poor self care, medication non compliance resulting in catatonia \"  HISTORY OF PRESENT ILLNESS/INTERVAL HISTORY:  (reviewed/updated 3/21/2017).   per initial evaluation:     Alysia Fabry presents/reports/evidences the following emotional symptoms today, 3/21/2017:depression and psychotic behavior. The above symptoms have been present for years . These symptoms are of severe severity. The symptoms are constant  in nature. Additional symptomatology and features include psychosis. 3/17/17- Patient was court approved for forced medications. When this was explained to him he had a very sudden change of affect (from blunted to animated with a sheepish grin). He whispered responses to questions and appeared to be amused by this. He had moments of thought blocking and eye movements indicating RTIS. Will increase haldol. Will start clozapine when patient demonstrates ability to consistently comply with oral medications. 3/18/17- medication compliant. Still RTIS and internally preoccupied. 3/19/17- refused team meeting today. Still internally preoccupied. Has a good visit w/ family. Continues to comply w/ meds. 3/20/17- More compliant with unit rules. Still isolative and internally preoccupied with thoughts blocking. Will increase haaldol dec      SIDE EFFECTS: (reviewed/updated 3/21/2017)  None reported or admitted to. No noted toxicity with use of Depakote/Tegretol/lithium/Clozaril/TCAs   ALLERGIES:(reviewed/updated 3/21/2017)  Allergies   Allergen Reactions    Fluphenazine Unknown (comments)     Pt is unable to communicate properly.  Penicillins Unknown (comments)     Pt is unable to communicate properly. MEDICATIONS PRIOR TO ADMISSION:(reviewed/updated 3/21/2017)  Prescriptions Prior to Admission   Medication Sig    divalproex ER (DEPAKOTE ER) 500 mg ER tablet Take 1,000 mg by mouth two (2) times a day. Indications: MOOD    cloZAPine 200 mg tablet Take 600 mg by mouth nightly. Indications: TREATMENT-RESISTANT SCHIZOPHRENIA    cloZAPine 200 mg tablet Take 200 mg by mouth daily.  Indications: TREATMENT-RESISTANT SCHIZOPHRENIA    haloperidol decanoate (HALDOL DECANOATE) 100 mg/mL injection 100 mg by IntraMUSCular route every twenty-eight (28) days. Indications: SCHIZOPHRENIA    traZODone (DESYREL) 100 mg tablet Take 100 mg by mouth nightly as needed for Sleep. Indications: sleep    benztropine (COGENTIN) 2 mg tablet Take 2 mg by mouth two (2) times a day. Indications: extrapyramidal disease      PAST MEDICAL HISTORY: Past medical history from the initial psychiatric evaluation has been reviewed (reviewed/updated 3/21/2017) with no additional updates (I asked patient and no additional past medical history provided). Past Medical History:   Diagnosis Date    Psychiatric disorder     Psychotic disorder     Withdrawal syndrome ([facility] [address]    History reviewed. No pertinent surgical history. SOCIAL HISTORY: Social history from the initial psychiatric evaluation has been reviewed (reviewed/updated 3/21/2017) with no additional updates (I asked patient and no additional social history provided). Social History     Social History    Marital status: UNKNOWN     Spouse name: N/A    Number of children: N/A    Years of education: N/A     Occupational History    Not on file. Social History Main Topics    Smoking status: Never Smoker    Smokeless tobacco: Not on file    Alcohol use No    Drug use: Not on file    Sexual activity: Not on file     Other Topics Concern    Not on file     Social History Narrative    49year old single  male admitted on TDO for worsening schizophrenia with catatonic symptoms. Medication compliance has been questionable. Pt was last admitted to [facility] in 2015 and successfully discharged to [facility] on clozapine, haldol dec, trazodone and depakote. Pt is a HS grad and is unemployed. He lives with his mother. FAMILY HISTORY: Family history from the initial psychiatric evaluation has been reviewed (reviewed/updated 3/21/2017) with no additional updates (I asked patient and no additional family history provided). History reviewed. No pertinent family history. REVIEW OF SYSTEMS: (reviewed/updated 3/21/2017)  Appetite:no change from normal   Sleep: does not feel rested   All other Review of Systems: Psychological ROS: positive for - hallucinations  Respiratory ROS: no cough, shortness of breath, or wheezing  Cardiovascular ROS: no chest pain or dyspnea on exertion         [facility] [place]    MSE FINDINGS ARE WITHIN NORMAL LIMITS (WNL) UNLESS OTHERWISE STATED BELOW. ( ALL OF THE BELOW CATEGORIES OF THE MSE HAVE BEEN REVIEWED (reviewed 3/21/2017) AND UPDATED AS DEEMED APPROPRIATE )  General Presentation age appropriate, evasive, guarded and uncooperative   Orientation disorganized   Vital Signs  See below (reviewed 3/21/2017); Vital Signs (BP, Pulse, & Temp) are within normal limits if not listed below.    Gait and Station Stable/steady, no ataxia   Musculoskeletal System No extrapyramidal symptoms (EPS); no abnormal muscular movements or Tardive Dyskinesia (TD); muscle strength and tone are within normal limits   Language No aphasia or dysarthria   Speech:  hypoverbal   Thought Processes [facility] ; slow rate of thoughts; poor abstract reasoning/computation   Thought Associations blocked    Thought Content paranoid delusions   Suicidal Ideations none   Homicidal Ideations none   Mood:  irritable   Affect:  mood-congruent   Memory recent  impaired   Memory remote:  impaired   Concentration/Attention:  poor   Fund of Knowledge average   Insight:  poor   Reliability poor   Judgment:  poor          VITALS:     Patient Vitals for the past 24 hrs:   Temp Pulse Resp BP SpO2   03/20/17 1630 98.8 °F (37.1 °C) 78 16 125/86 95 %     Wt Readings from Last 3 Encounters:   03/19/17 94.8 kg (209 lb 1.6 oz)   02/09/15 97.5 kg (215 lb)   02/07/15 97.5 kg (215 lb)     Temp Readings from Last 3 Encounters:   03/20/17 98.8 °F (37.1 °C)   02/07/15 98.7 °F (37.1 °C)     BP Readings from Last 3 Encounters: 03/20/17 125/86   02/12/15 105/73   02/07/15 148/79     Pulse Readings from Last 3 Encounters:   03/20/17 78   02/12/15 87   02/07/15 (!) 111            DATA     LABORATORY DATA:(reviewed/updated 3/21/2017)  No results found for this or any previous visit (from the past 24 hour(s)). Lab Results   Component Value Date/Time    Valproic acid 62 03/14/2017 11:49 PM     No results found for: ESTUARDO, ASHLEY, KIKI   RADIOLOGY REPORTS:(reviewed/updated 3/21/2017)  No results found.        MEDICATIONS     ALL MEDICATIONS:   Current Facility-Administered Medications   Medication Dose Route Frequency    LORazepam (ATIVAN) tablet 1 mg  1 mg Oral TID    divalproex ER (DEPAKOTE ER) 24 hour tablet 1,500 mg+++++Court ordered medication+++++  1,500 mg Oral QHS    Or    LORazepam (ATIVAN) injection 1 mg  1 mg IntraMUSCular QHS    benztropine (COGENTIN) tablet 1 mg  1 mg Oral BID    [START ON 4/17/2017] haloperidol decanoate (HALDOL DECANOATE) 100 mg/mL injection 200 mg  200 mg IntraMUSCular Q28D    haloperidol (HALDOL) 2 mg/mL oral solution 10 mg+++++Court ordered medication+++++  10 mg Oral TID    Or    haloperidol lactate (HALDOL) injection 5 mg  5 mg IntraMUSCular TID    ziprasidone (GEODON) 20 mg in sterile water (preservative free) 1 mL injection  20 mg IntraMUSCular BID PRN    OLANZapine (ZyPREXA) tablet 5 mg  5 mg Oral Q6H PRN    benztropine (COGENTIN) tablet 2 mg  2 mg Oral BID PRN    benztropine (COGENTIN) injection 2 mg  2 mg IntraMUSCular BID PRN    LORazepam (ATIVAN) injection 2 mg  2 mg IntraMUSCular Q4H PRN    LORazepam (ATIVAN) tablet 1 mg  1 mg Oral Q4H PRN    zolpidem (AMBIEN) tablet 10 mg  10 mg Oral QHS PRN    acetaminophen (TYLENOL) tablet 650 mg  650 mg Oral Q4H PRN    ibuprofen (MOTRIN) tablet 400 mg  400 mg Oral Q8H PRN    magnesium hydroxide (MILK OF MAGNESIA) 400 mg/5 mL oral suspension 30 mL  30 mL Oral DAILY PRN    nicotine (NICODERM CQ) 21 mg/24 hr patch 1 Patch  1 Patch TransDERmal DAILY PRN SCHEDULED MEDICATIONS:   Current Facility-Administered Medications   Medication Dose Route Frequency    LORazepam (ATIVAN) tablet 1 mg  1 mg Oral TID    divalproex ER (DEPAKOTE ER) 24 hour tablet 1,500 mg+++++Court ordered medication+++++  1,500 mg Oral QHS    Or    LORazepam (ATIVAN) injection 1 mg  1 mg IntraMUSCular QHS    benztropine (COGENTIN) tablet 1 mg  1 mg Oral BID    [START ON 4/17/2017] haloperidol decanoate (HALDOL DECANOATE) 100 mg/mL injection 200 mg  200 mg IntraMUSCular Q28D    haloperidol (HALDOL) 2 mg/mL oral solution 10 mg+++++Court ordered medication+++++  10 mg Oral TID    Or    haloperidol lactate (HALDOL) injection 5 mg  5 mg IntraMUSCular TID          ASSESSMENT & PLAN     DIAGNOSES REQUIRING ACTIVE TREATMENT AND MONITORING: (reviewed/updated 3/21/2017)  Patient Active Hospital Problem List:   Schizophrenia [facility]) (3/15/2017)    Assessment: delusions, formal thought disorder, paranoia, bizarre behavior    Plan: increase haldol dec. Add po haldol- as pt is refusing clozapine and if it is to be given needs to be restarted at 50 mg /day. For forced med hearing. (Depakote, Tegretol, lithium, clozapine---a drug with a narrow therapeutic index= NTI) and associated labs for drug therapy implemented that require intense monitoring for toxicity as deemed appropriate based on current medication side effects and pharmacodynamically determined drug 1/2 lives. In summary, Lennox Mahajan, is a 49 y.o.  male who presents with a severe exacerbation of the principal diagnosis of Schizophrenia ([facility] [address]  Patient's condition is worsening/not improving/not stable Patient requires continued inpatient hospitalization for further stabilization, safety monitoring and medication management. I will continue to coordinate the provision of individual, milieu, occupational, group, and substance abuse therapies to address target symptoms/diagnoses as deemed appropriate for the individual patient.   A coordinated, multidisplinary treatment team round was conducted with the patient (this team consists of the nurse, psychiatric unit pharmcist,  and writer). Complete current electronic health record for patient has been reviewed today including consultant notes, ancillary staff notes, nurses and psychiatric tech notes. Suicide risk assessment completed and patient deemed to be of low risk for suicide at this time. The following regarding medications was addressed during rounds with patient:   the risks and benefits of the proposed medication. The patient was given the opportunity to ask questions. Informed consent given to the use of the above medications. Will continue to adjust psychiatric and non-psychiatric medications (see above \"medication\" section and orders section for details) as deemed appropriate & based upon diagnoses and response to treatment. I will continue to order blood tests/labs and diagnostic tests as deemed appropriate and review results as they become available (see orders for details and above listed lab/test results). I will order psychiatric records from previous [facility] hospitals to further elucidate the nature of patient's psychopathology and review once available. I will gather additional collateral information from friends, family and o/p treatment team to further elucidate the nature of patient's psychopathology and baselline level of psychiatric functioning. I certify that this patient's inpatient psychiatric hospital services furnished since the previous certification were, and continue to be, required for treatment that could reasonably be expected to improve the patient's condition, or for diagnostic study, and that the patient continues to need, on a daily basis, active treatment furnished directly by or requiring the supervision of inpatient psychiatric facility personnel.  In addition, the hospital records show that services furnished were intensive treatment services, admission or related services, or equivalent services. EXPECTED DISCHARGE DATE/DAY: TBD     DISPOSITION: Home     Patient's family notified of forced med hearing. They do not want to be in the position to be the enforcers of forced meds for pt, but feel strongly that pt needs forced meds. They will support court ordered meds. Pt not refusing on Islam or moral grounds.   Signed By:   Adolfo Starkey MD  3/21/2017

## 2017-03-21 NOTE — PROGRESS NOTES
Problem: Altered Thought Process (Adult/Pediatric)  Goal: *STG: Decreased delusional thinking  Outcome: Not Progressing Towards Goal  Patient with blunted affect. Pt is selectively mute, suspicious of others. Paranoid. Pt remains isolative to room coming out only for med and meals. No interaction with staff or peers. NAD. Q-15 checks for safety. Goal: Interventions  Outcome: Not Progressing Towards Goal  Staff encourage pt to be more social, less isolative. Staff reorient pt to reality. 1600: There's no change in patient's behavior. Med and meals compliant. NAD. Q-15 checks for safety.

## 2017-03-21 NOTE — INTERDISCIPLINARY ROUNDS
Behavioral Health Interdisciplinary Rounds     Patient Name: Alysia Fabry  Age: 49 y.o. Room/Bed:  732/  [facility]: [facility]   Admission Status: Involuntary Commitment     Readmission within 30 days: no  Power of  in place: no  Patient requires a blocked bed: yes          Reason for blocked bed: behavioral     VTE Prophylaxis: Yes  Influenza vaccine screen completed: yes Influenza vaccine given: no, pt declined   Mobility needs/Fall risk: no    Nutritional Plan:  no  Consults:        Labs/Testing due today?: no    Sleep hours:        Participation in Care/Groups:  no  Medication Compliant?: Yes  PRNS (last 24 hours):    Restraints (last 24 hours):  no  Substance Abuse:  no  CIWA (range last 24 hours):  COWS (range last 24 hours):   Alcohol screening (AUDIT) completed -  AUDIT Score: 0  If applicable, date SBIRT discussed in treatment team AND documented:   Tobacco - patient is a smoker: no   Date tobacco education completed by RN:   24 hour chart check complete: yes    Patient goal(s) for today:   Treatment team focus/goals: Plan to continue  to titrate his medications    LOS:  6  Expected LOS: TBD   Financial concerns/prescription coverage:  no  Date of last family contact:      Family requesting physician contact today:  no  Discharge plan: he will return home with his sister        Outpatient provider(s): [phone]     Participating treatment team members:  Handy Schmidt

## 2017-03-21 NOTE — BH NOTES
GROUP THERAPY PROGRESS NOTE    Yaima Vera did not participate in an Afternoon Process Group on the Acute Unit with a focus on identifying feelings, planning for the day, and the Language of Empowerment.

## 2017-03-22 PROCEDURE — 74011250637 HC RX REV CODE- 250/637: Performed by: PSYCHIATRY & NEUROLOGY

## 2017-03-22 PROCEDURE — 65220000003 HC RM SEMIPRIVATE PSYCH

## 2017-03-22 RX ADMIN — DIVALPROEX SODIUM 1500 MG: 500 TABLET, FILM COATED, EXTENDED RELEASE ORAL at 21:25

## 2017-03-22 RX ADMIN — LORAZEPAM 1 MG: 1 TABLET ORAL at 09:12

## 2017-03-22 RX ADMIN — BENZTROPINE MESYLATE 1 MG: 1 TABLET ORAL at 09:12

## 2017-03-22 RX ADMIN — BENZTROPINE MESYLATE 1 MG: 1 TABLET ORAL at 21:25

## 2017-03-22 RX ADMIN — HALOPERIDOL 10 MG: 2 SOLUTION ORAL at 09:12

## 2017-03-22 RX ADMIN — LORAZEPAM 1 MG: 1 TABLET ORAL at 21:25

## 2017-03-22 RX ADMIN — HALOPERIDOL 10 MG: 2 SOLUTION ORAL at 16:21

## 2017-03-22 RX ADMIN — LORAZEPAM 1 MG: 1 TABLET ORAL at 16:22

## 2017-03-22 RX ADMIN — HALOPERIDOL 10 MG: 2 SOLUTION ORAL at 21:25

## 2017-03-22 NOTE — PROGRESS NOTES
Problem: Altered Thought Process (Adult/Pediatric)  Goal: *STG: Complies with medication therapy  Outcome: Progressing Towards Goal  Patient is medication complaint. Pt remains delusional and isolative to room. Blunted. Voices no concern at this time. NAD. Q-15 checks for safety. Goal: Interventions  Outcome: Not Progressing Towards Goal  Staff to encourage pt to be more engage with group activities. 1545: Patient is in a brighter mood this evening. Pt is smiling. Pt took a shower. Voices no concern at his time. NAD. Q-15 checks for safety.

## 2017-03-22 NOTE — BH NOTES
PSYCHIATRIC PROGRESS NOTE         Patient Name  Odalis Henao   Date of Birth 1970   [facility] [id number]   Medical Record Number  [id number]      Age  49 y.o. PCP PROVIDER UNKNOWN   Admit date:  3/14/2017    Room Number  732/01  @ [facility]   Date of Service  3/22/2017          PSYCHOTHERAPY SESSION NOTE:  Length of psychotherapy session: 20 minutes    Main condition/diagnosis/issues treated during session today, 3/22/2017 :medication non compliance, paranoia, internal stimuli, poor coping skills    I employed Cognitive Behavioral therapy techniques, Reality-Oriented psychotherapy, as well as supportive psychotherapy in regards to various ongoing psychosocial stressors, including the following: pre-admission and current problems; housing issues; academic issues; medical issues; and stress of hospitalization. Interpersonal relationship issues and psychodynamic conflicts explored. Attempts made to alleviate maladaptive patterns. Overall, patient is not progressing    Treatment Plan Update (reviewed an updated 3/22/2017) : I will modify psychotherapy tx plan by implementing more stress management strategies, building upon cognitive behavioral techniques, increasing coping skills, as well as shoring up psychological defenses). An extended energy and skill set was needed to engage pt in psychotherapy due to some of the following: resistiveness, complexity, negativity, confrontational nature, hostile behaviors, and/or severe abnormalities in thought processes/psychosis resulting in the loss of expressive/receptive language communication skills. E & M PROGRESS NOTE:         HISTORY       CC:  \"SI, poor self care, medication non compliance resulting in catatonia \"  HISTORY OF PRESENT ILLNESS/INTERVAL HISTORY:  (reviewed/updated 3/22/2017).   per initial evaluation:     Odalis Henao presents/reports/evidences the following emotional symptoms today, 3/22/2017:depression and psychotic behavior. The above symptoms have been present for years . These symptoms are of severe severity. The symptoms are constant  in nature. Additional symptomatology and features include psychosis. 3/17/17- Patient was court approved for forced medications. When this was explained to him he had a very sudden change of affect (from blunted to animated with a sheepish grin). He whispered responses to questions and appeared to be amused by this. He had moments of thought blocking and eye movements indicating RTIS. Will increase haldol. Will start clozapine when patient demonstrates ability to consistently comply with oral medications. 3/18/17- medication compliant. Still RTIS and internally preoccupied. 3/19/17- refused team meeting today. Still internally preoccupied. Has a good visit w/ family. Continues to comply w/ meds. 3/20/17- More compliant with unit rules. Still isolative and internally preoccupied with thoughts blocking. Will increase haaldol dec  3/22/17- Isolative and selectively mute. Still appears to have internal stimuli- but much less. Poor hygiene. Will contact sister and  to determine where we are from baseline. SIDE EFFECTS: (reviewed/updated 3/22/2017)  None reported or admitted to. No noted toxicity with use of Depakote/Tegretol/lithium/Clozaril/TCAs   ALLERGIES:(reviewed/updated 3/22/2017)  Allergies   Allergen Reactions    Fluphenazine Unknown (comments)     Pt is unable to communicate properly.  Penicillins Unknown (comments)     Pt is unable to communicate properly. MEDICATIONS PRIOR TO ADMISSION:(reviewed/updated 3/22/2017)  Prescriptions Prior to Admission   Medication Sig    divalproex ER (DEPAKOTE ER) 500 mg ER tablet Take 1,000 mg by mouth two (2) times a day. Indications: MOOD    cloZAPine 200 mg tablet Take 600 mg by mouth nightly. Indications: TREATMENT-RESISTANT SCHIZOPHRENIA    cloZAPine 200 mg tablet Take 200 mg by mouth daily.  Indications: TREATMENT-RESISTANT SCHIZOPHRENIA    haloperidol decanoate (HALDOL DECANOATE) 100 mg/mL injection 100 mg by IntraMUSCular route every twenty-eight (28) days. Indications: SCHIZOPHRENIA    traZODone (DESYREL) 100 mg tablet Take 100 mg by mouth nightly as needed for Sleep. Indications: sleep    benztropine (COGENTIN) 2 mg tablet Take 2 mg by mouth two (2) times a day. Indications: extrapyramidal disease      PAST MEDICAL HISTORY: Past medical history from the initial psychiatric evaluation has been reviewed (reviewed/updated 3/22/2017) with no additional updates (I asked patient and no additional past medical history provided). Past Medical History:   Diagnosis Date    Psychiatric disorder     Psychotic disorder     Withdrawal syndrome ([address]    History reviewed. No pertinent surgical history. SOCIAL HISTORY: Social history from the initial psychiatric evaluation has been reviewed (reviewed/updated 3/22/2017) with no additional updates (I asked patient and no additional social history provided). Social History     Social History    Marital status: UNKNOWN     Spouse name: N/A    Number of children: N/A    Years of education: N/A     Occupational History    Not on file. Social History Main Topics    Smoking status: Never Smoker    Smokeless tobacco: Not on file    Alcohol use No    Drug use: Not on file    Sexual activity: Not on file     Other Topics Concern    Not on file     Social History Narrative    49year old single  male admitted on TDO for worsening schizophrenia with catatonic symptoms. Medication compliance has been questionable. Pt was last admitted to [facility] in 2015 and successfully [facility] to [facility] on clozapine, haldol dec, trazodone and depakote. Pt is a HS grad and is unemployed. He lives with his mother.       FAMILY HISTORY: Family history from the initial psychiatric evaluation has been reviewed (reviewed/updated 3/22/2017) with no additional updates (I asked patient and no additional family history provided). History reviewed. No pertinent family history. REVIEW OF SYSTEMS: (reviewed/updated 3/22/2017)  Appetite:no change from normal   Sleep: does not feel rested   All other Review of Systems: Psychological ROS: positive for - hallucinations  Respiratory ROS: no cough, shortness of breath, or wheezing  Cardiovascular ROS: no chest pain or dyspnea on exertion         [facility] (MSE):    MSE FINDINGS ARE WITHIN NORMAL LIMITS (WNL) UNLESS OTHERWISE STATED BELOW. ( ALL OF THE BELOW CATEGORIES OF THE MSE HAVE BEEN REVIEWED (reviewed 3/22/2017) AND UPDATED AS DEEMED APPROPRIATE )  General Presentation age appropriate, evasive, guarded and uncooperative   Orientation disorganized   Vital Signs  See below (reviewed 3/22/2017); Vital Signs (BP, Pulse, & Temp) are within normal limits if not listed below.    Gait and Station Stable/steady, no ataxia   Musculoskeletal System No extrapyramidal symptoms (EPS); no abnormal muscular movements or Tardive Dyskinesia (TD); muscle strength and tone are within normal limits   Language No aphasia or dysarthria   Speech:  hypoverbal   Thought Processes [place] ; slow rate of thoughts; poor abstract reasoning/computation   Thought Associations blocked    Thought Content paranoid delusions   Suicidal Ideations none   Homicidal Ideations none   Mood:  irritable   Affect:  mood-congruent   Memory recent  impaired   Memory remote:  impaired   Concentration/Attention:  poor   Fund of Knowledge average   Insight:  poor   Reliability poor   Judgment:  poor          VITALS:     Patient Vitals for the past 24 hrs:   Temp Pulse Resp BP SpO2   03/22/17 0730 98.1 °F (36.7 °C) 65 18 104/73 100 %     Wt Readings from Last 3 Encounters:   03/19/17 94.8 kg (209 lb 1.6 oz)   02/09/15 97.5 kg (215 lb)   02/07/15 97.5 kg (215 lb)     Temp Readings from Last 3 Encounters:   03/22/17 98.1 °F (36.7 °C) 02/07/15 98.7 °F (37.1 °C)     BP Readings from Last 3 Encounters:   03/22/17 104/73   02/12/15 105/73   02/07/15 148/79     Pulse Readings from Last 3 Encounters:   03/22/17 65   02/12/15 87   02/07/15 (!) 111            DATA     LABORATORY DATA:(reviewed/updated 3/22/2017)  No results found for this or any previous visit (from the past 24 hour(s)). Lab Results   Component Value Date/Time    Valproic acid 62 03/14/2017 11:49 PM     No results found for: LI, LIH, KATHM   RADIOLOGY REPORTS:(reviewed/updated 3/22/2017)  No results found.        MEDICATIONS     ALL MEDICATIONS:   Current Facility-Administered Medications   Medication Dose Route Frequency    LORazepam (ATIVAN) tablet 1 mg  1 mg Oral TID    divalproex ER (DEPAKOTE ER) 24 hour tablet 1,500 mg+++++Court ordered medication+++++  1,500 mg Oral QHS    Or    LORazepam (ATIVAN) injection 1 mg  1 mg IntraMUSCular QHS    benztropine (COGENTIN) tablet 1 mg  1 mg Oral BID    [START ON 4/17/2017] haloperidol decanoate (HALDOL DECANOATE) 100 mg/mL injection 200 mg  200 mg IntraMUSCular Q28D    haloperidol (HALDOL) 2 mg/mL oral solution 10 mg+++++Court ordered medication+++++  10 mg Oral TID    Or    haloperidol lactate (HALDOL) injection 5 mg  5 mg IntraMUSCular TID    ziprasidone (GEODON) 20 mg in sterile water (preservative free) 1 mL injection  20 mg IntraMUSCular BID PRN    OLANZapine (ZyPREXA) tablet 5 mg  5 mg Oral Q6H PRN    benztropine (COGENTIN) tablet 2 mg  2 mg Oral BID PRN    benztropine (COGENTIN) injection 2 mg  2 mg IntraMUSCular BID PRN    LORazepam (ATIVAN) injection 2 mg  2 mg IntraMUSCular Q4H PRN    LORazepam (ATIVAN) tablet 1 mg  1 mg Oral Q4H PRN    zolpidem (AMBIEN) tablet 10 mg  10 mg Oral QHS PRN    acetaminophen (TYLENOL) tablet 650 mg  650 mg Oral Q4H PRN    ibuprofen (MOTRIN) tablet 400 mg  400 mg Oral Q8H PRN    magnesium hydroxide (MILK OF MAGNESIA) 400 mg/5 mL oral suspension 30 mL  30 mL Oral DAILY PRN    nicotine (NICODERM CQ) 21 mg/24 hr patch 1 Patch  1 Patch TransDERmal DAILY PRN      SCHEDULED MEDICATIONS:   Current Facility-Administered Medications   Medication Dose Route Frequency    LORazepam (ATIVAN) tablet 1 mg  1 mg Oral TID    divalproex ER (DEPAKOTE ER) 24 hour tablet 1,500 mg+++++Court ordered medication+++++  1,500 mg Oral QHS    Or    LORazepam (ATIVAN) injection 1 mg  1 mg IntraMUSCular QHS    benztropine (COGENTIN) tablet 1 mg  1 mg Oral BID    [START ON 4/17/2017] haloperidol decanoate (HALDOL DECANOATE) 100 mg/mL injection 200 mg  200 mg IntraMUSCular Q28D    haloperidol (HALDOL) 2 mg/mL oral solution 10 mg+++++Court ordered medication+++++  10 mg Oral TID    Or    haloperidol lactate (HALDOL) injection 5 mg  5 mg IntraMUSCular TID          ASSESSMENT & PLAN     DIAGNOSES REQUIRING ACTIVE TREATMENT AND MONITORING: (reviewed/updated 3/22/2017)  Patient Active Hospital Problem List:   Schizophrenia [facility]) (3/15/2017)    Assessment: delusions, formal thought disorder, paranoia, bizarre behavior    Plan: increase haldol dec. Add po haldol- as pt is refusing clozapine and if it is to be given needs to be restarted at 50 mg /day. For forced med hearing. (Depakote, Tegretol, lithium, clozapine---a drug with a narrow therapeutic index= NTI) and associated labs for drug therapy implemented that require intense monitoring for toxicity as deemed appropriate based on current medication side effects and pharmacodynamically determined drug 1/2 lives. In summary, Genny Herrera, is a 49 y.o.  male who presents with a severe exacerbation of the principal diagnosis of Schizophrenia ([facility] [address]  Patient's condition is worsening/not improving/not stable Patient requires continued inpatient hospitalization for further stabilization, safety monitoring and medication management.   I will continue to coordinate the provision of individual, milieu, occupational, group, and substance abuse therapies to address target symptoms/diagnoses as deemed appropriate for the individual patient. A coordinated, multidisplinary treatment team round was conducted with the patient (this team consists of the nurse, psychiatric unit pharmcist,  and writer). Complete current electronic health record for patient has been reviewed today including consultant notes, ancillary staff notes, nurses and psychiatric tech notes. Suicide risk assessment completed and patient deemed to be of low risk for suicide at this time. The following regarding medications was addressed during rounds with patient:   the risks and benefits of the proposed medication. The patient was given the opportunity to ask questions. Informed consent given to the use of the above medications. Will continue to adjust psychiatric and non-psychiatric medications (see above \"medication\" section and orders section for details) as deemed appropriate & based upon diagnoses and response to treatment. I will continue to order blood tests/labs and diagnostic tests as deemed appropriate and review results as they become available (see orders for details and above listed lab/test results). I will order psychiatric records from previous [facility] hospitals to further elucidate the nature of patient's psychopathology and review once available. I will gather additional collateral information from friends, family and o/p treatment team to further elucidate the nature of patient's psychopathology and baselline level of psychiatric functioning.          I certify that this patient's inpatient psychiatric hospital services furnished since the previous certification were, and continue to be, required for treatment that could reasonably be expected to improve the patient's condition, or for diagnostic study, and that the patient continues to need, on a daily basis, active treatment furnished directly by or requiring the supervision of inpatient psychiatric facility personnel. In addition, the hospital records show that services furnished were intensive treatment services, admission or related services, or equivalent services. EXPECTED DISCHARGE DATE/DAY: TBD     DISPOSITION: Home     Patient's family notified of forced med hearing. They do not want to be in the position to be the enforcers of forced meds for pt, but feel strongly that pt needs forced meds. They will support court ordered meds. Pt not refusing on [place] or moral grounds.   Signed By:   Tj Lloyd MD  3/22/2017

## 2017-03-22 NOTE — INTERDISCIPLINARY ROUNDS
Behavioral Health Interdisciplinary Rounds     Patient Name: Joe Duong  Age: 49 y.o. Room/Bed:  732/  Primary Diagnosis: Schizophrenia ([facility] [address]   Admission Status: Involuntary Commitment     Readmission within 30 days: no  Power of  in place: no  Patient requires a blocked bed: yes          Reason for blocked bed: behavior    VTE Prophylaxis: Yes  Influenza vaccine screen completed: yes Influenza vaccine given: no declined  Mobility needs/Fall risk: no    Nutritional Plan: no  Consults:           Labs/Testing due today?: no    Sleep hours:   6 plus hours      Participation in Care/Groups:  no  Medication Compliant?: Yes  PRNS (last 24 hours): None    Restraints (last 24 hours):  no  Substance Abuse:  no  CIWA (range last 24 hours):   COWS (range last 24 hours):    Alcohol screening (AUDIT) completed -  AUDIT Score: 0  If applicable, date SBIRT discussed in treatment team AND documented:    Tobacco - patient is a smoker: no   Date tobacco education completed by RN:    24 hour chart check complete: yes     Patient goal(s) for today:    Treatment team focus/goals: Plan to have his  stop by and assess for discharge plans   LOS:  7  Expected LOS: TBD   Financial concerns/prescription coverage:     Date of last family contact:        Family requesting physician contact today:     Discharge plan:  He will return home when ready for discharge. Outpatient provider(s):  [facility] treatment team members:  Karoline Horn RN

## 2017-03-22 NOTE — PROGRESS NOTES
03/21/17 2000   Vital Signs   Temp (refused)   Resp Rate (refused)   Level of Consciousness Alert   BP (refused)   pt is refusing at this time.

## 2017-03-22 NOTE — BH NOTES
The documentation for this period is being entered following the guidelines as defined in the [facility] policy by Sandra Mendoza RN.

## 2017-03-23 PROCEDURE — 74011250637 HC RX REV CODE- 250/637: Performed by: PSYCHIATRY & NEUROLOGY

## 2017-03-23 PROCEDURE — 65220000003 HC RM SEMIPRIVATE PSYCH

## 2017-03-23 RX ADMIN — HALOPERIDOL 10 MG: 2 SOLUTION ORAL at 21:17

## 2017-03-23 RX ADMIN — LORAZEPAM 1 MG: 1 TABLET ORAL at 08:53

## 2017-03-23 RX ADMIN — HALOPERIDOL 10 MG: 2 SOLUTION ORAL at 08:53

## 2017-03-23 RX ADMIN — BENZTROPINE MESYLATE 1 MG: 1 TABLET ORAL at 21:16

## 2017-03-23 RX ADMIN — LORAZEPAM 1 MG: 1 TABLET ORAL at 21:16

## 2017-03-23 RX ADMIN — LORAZEPAM 1 MG: 1 TABLET ORAL at 16:41

## 2017-03-23 RX ADMIN — DIVALPROEX SODIUM 1500 MG: 500 TABLET, FILM COATED, EXTENDED RELEASE ORAL at 21:16

## 2017-03-23 RX ADMIN — BENZTROPINE MESYLATE 1 MG: 1 TABLET ORAL at 08:53

## 2017-03-23 RX ADMIN — HALOPERIDOL 10 MG: 2 SOLUTION ORAL at 16:41

## 2017-03-23 NOTE — BH NOTES
GROUP THERAPY PROGRESS NOTE    Deanna Cohen did not participate in the Acute Unit's Process Group, with a focus identifying feelings and planning for the rest of the day.    This is a late note for March 22, 2017 1:04 PM  1:44 PM.

## 2017-03-23 NOTE — BH NOTES
PSYCHIATRIC PROGRESS NOTE         Patient Name  Dangelo Torrez   Date of Birth 1970   CSN [id number]   Medical Record Number  [id number]      Age  49 y.o. PCP PROVIDER UNKNOWN   Admit date:  3/14/2017    Room Number  732/01  @ [facility]   Date of Service  3/23/2017          PSYCHOTHERAPY SESSION NOTE:  Length of psychotherapy session: 20 minutes    Main condition/diagnosis/issues treated during session today, 3/23/2017 :medication non compliance, paranoia, internal stimuli, poor coping skills    I employed Cognitive Behavioral therapy techniques, Reality-Oriented psychotherapy, as well as supportive psychotherapy in regards to various ongoing psychosocial stressors, including the following: pre-admission and current problems; housing issues; academic issues; medical issues; and stress of hospitalization. Interpersonal relationship issues and psychodynamic conflicts explored. Attempts made to alleviate maladaptive patterns. Overall, patient is not progressing    Treatment Plan Update (reviewed an updated 3/23/2017) : I will modify psychotherapy tx plan by implementing more stress management strategies, building upon cognitive behavioral techniques, increasing coping skills, as well as shoring up psychological defenses). An extended energy and skill set was needed to engage pt in psychotherapy due to some of the following: resistiveness, complexity, negativity, confrontational nature, hostile behaviors, and/or severe abnormalities in thought processes/psychosis resulting in the loss of expressive/receptive language communication skills. E & M PROGRESS NOTE:         HISTORY       CC:  \"SI, poor self care, medication non compliance resulting in catatonia \"  HISTORY OF PRESENT ILLNESS/INTERVAL HISTORY:  (reviewed/updated 3/23/2017).   per initial evaluation:     Dangelo Torrez presents/reports/evidences the following emotional symptoms today, 3/23/2017:depression and psychotic behavior. The above symptoms have been present for years . These symptoms are of severe severity. The symptoms are constant  in nature. Additional symptomatology and features include psychosis. 3/17/17- Patient was court approved for forced medications. When this was explained to him he had a very sudden change of affect (from blunted to animated with a sheepish grin). He whispered responses to questions and appeared to be amused by this. He had moments of thought blocking and eye movements indicating RTIS. Will increase haldol. Will start clozapine when patient demonstrates ability to consistently comply with oral medications. 3/18/17- medication compliant. Still RTIS and internally preoccupied. 3/19/17- refused team meeting today. Still internally preoccupied. Has a good visit w/ family. Continues to comply w/ meds. 3/20/17- More compliant with unit rules. Still isolative and internally preoccupied with thoughts blocking. Will increase haaldol dec  3/22/17- Isolative and selectively mute. Still appears to have internal stimuli- but much less. Poor hygiene. Will contact sister and  to determine where we are from baseline. 3/23/17- Eating well and getting good sleep. Still internally preoccupied, with sparse speech and thought blocking.  to visit to determine how close pt is to baseline. SIDE EFFECTS: (reviewed/updated 3/23/2017)  None reported or admitted to. No noted toxicity with use of Depakote/Tegretol/lithium/Clozaril/TCAs   ALLERGIES:(reviewed/updated 3/23/2017)  Allergies   Allergen Reactions    Fluphenazine Unknown (comments)     Pt is unable to communicate properly.  Penicillins Unknown (comments)     Pt is unable to communicate properly. MEDICATIONS PRIOR TO ADMISSION:(reviewed/updated 3/23/2017)  Prescriptions Prior to Admission   Medication Sig    divalproex ER (DEPAKOTE ER) 500 mg ER tablet Take 1,000 mg by mouth two (2) times a day. Indications: MOOD    cloZAPine 200 mg tablet Take 600 mg by mouth nightly. Indications: TREATMENT-RESISTANT SCHIZOPHRENIA    cloZAPine 200 mg tablet Take 200 mg by mouth daily. Indications: TREATMENT-RESISTANT SCHIZOPHRENIA    haloperidol decanoate (HALDOL DECANOATE) 100 mg/mL injection 100 mg by IntraMUSCular route every twenty-eight (28) days. Indications: SCHIZOPHRENIA    traZODone (DESYREL) 100 mg tablet Take 100 mg by mouth nightly as needed for Sleep. Indications: sleep    benztropine (COGENTIN) 2 mg tablet Take 2 mg by mouth two (2) times a day. Indications: extrapyramidal disease      PAST MEDICAL HISTORY: Past medical history from the initial psychiatric evaluation has been reviewed (reviewed/updated 3/23/2017) with no additional updates (I asked patient and no additional past medical history provided). Past Medical History:   Diagnosis Date    Psychiatric disorder     Psychotic disorder     Withdrawal syndrome ([facility] [address]    History reviewed. No pertinent surgical history. SOCIAL HISTORY: Social history from the initial psychiatric evaluation has been reviewed (reviewed/updated 3/23/2017) with no additional updates (I asked patient and no additional social history provided). Social History     Social History    Marital status: UNKNOWN     Spouse name: N/A    Number of children: N/A    Years of education: N/A     Occupational History    Not on file. Social History Main Topics    Smoking status: Never Smoker    Smokeless tobacco: Not on file    Alcohol use No    Drug use: Not on file    Sexual activity: Not on file     Other Topics Concern    Not on file     Social History Narrative    49year old single  male admitted on TDO for worsening schizophrenia with catatonic symptoms. Medication compliance has been questionable. Pt was last admitted to [facility] in 2015 and successfully discharged to [facility] on clozapine, haldol dec, trazodone and depakote.  Pt is a HS grad and is unemployed. He lives with his mother. FAMILY HISTORY: Family history from the initial psychiatric evaluation has been reviewed (reviewed/updated 3/23/2017) with no additional updates (I asked patient and no additional family history provided). History reviewed. No pertinent family history. REVIEW OF SYSTEMS: (reviewed/updated 3/23/2017)  Appetite:no change from normal   Sleep: does not feel rested   All other Review of Systems: Psychological ROS: positive for - hallucinations  Respiratory ROS: no cough, shortness of breath, or wheezing  Cardiovascular ROS: no chest pain or dyspnea on exertion         [address] (MSE):    MSE FINDINGS ARE WITHIN NORMAL LIMITS (WNL) UNLESS OTHERWISE STATED BELOW. ( ALL OF THE BELOW CATEGORIES OF THE MSE HAVE BEEN REVIEWED (reviewed 3/23/2017) AND UPDATED AS DEEMED APPROPRIATE )  General Presentation age appropriate, evasive, guarded and uncooperative   Orientation disorganized   Vital Signs  See below (reviewed 3/23/2017); Vital Signs (BP, Pulse, & Temp) are within normal limits if not listed below.    Gait and Station Stable/steady, no ataxia   Musculoskeletal System No extrapyramidal symptoms (EPS); no abnormal muscular movements or Tardive Dyskinesia (TD); muscle strength and tone are within normal limits   Language No aphasia or dysarthria   Speech:  hypoverbal   Thought Processes [place] ; slow rate of thoughts; poor abstract reasoning/computation   Thought Associations blocked    Thought Content paranoid delusions   Suicidal Ideations none   Homicidal Ideations none   Mood:  irritable   Affect:  mood-congruent   Memory recent  impaired   Memory remote:  impaired   Concentration/Attention:  poor   Fund of Knowledge average   Insight:  poor   Reliability poor   Judgment:  poor          VITALS:     Patient Vitals for the past 24 hrs:   Temp Pulse Resp BP SpO2   03/23/17 0745 97.8 °F (36.6 °C) 87 18 103/73 98 %   03/22/17 2130 97.8 °F (36.6 °C) (!) 52 16 124/88 -   03/22/17 1614 98.4 °F (36.9 °C) 70 16 119/84 97 %     Wt Readings from Last 3 Encounters:   03/19/17 94.8 kg (209 lb 1.6 oz)   02/09/15 97.5 kg (215 lb)   02/07/15 97.5 kg (215 lb)     Temp Readings from Last 3 Encounters:   03/23/17 97.8 °F (36.6 °C)   02/07/15 98.7 °F (37.1 °C)     BP Readings from Last 3 Encounters:   03/23/17 103/73   02/12/15 105/73   02/07/15 148/79     Pulse Readings from Last 3 Encounters:   03/23/17 87   02/12/15 87   02/07/15 (!) 111            DATA     LABORATORY DATA:(reviewed/updated 3/23/2017)  No results found for this or any previous visit (from the past 24 hour(s)). Lab Results   Component Value Date/Time    Valproic acid 62 03/14/2017 11:49 PM     No results found for: LI, LIMALLORY, KATHM   RADIOLOGY REPORTS:(reviewed/updated 3/23/2017)  No results found.        MEDICATIONS     ALL MEDICATIONS:   Current Facility-Administered Medications   Medication Dose Route Frequency    LORazepam (ATIVAN) tablet 1 mg  1 mg Oral TID    divalproex ER (DEPAKOTE ER) 24 hour tablet 1,500 mg+++++Court ordered medication+++++  1,500 mg Oral QHS    Or    LORazepam (ATIVAN) injection 1 mg  1 mg IntraMUSCular QHS    benztropine (COGENTIN) tablet 1 mg  1 mg Oral BID    [START ON 4/17/2017] haloperidol decanoate (HALDOL DECANOATE) 100 mg/mL injection 200 mg  200 mg IntraMUSCular Q28D    haloperidol (HALDOL) 2 mg/mL oral solution 10 mg+++++Court ordered medication+++++  10 mg Oral TID    Or    haloperidol lactate (HALDOL) injection 5 mg  5 mg IntraMUSCular TID    ziprasidone (GEODON) 20 mg in sterile water (preservative free) 1 mL injection  20 mg IntraMUSCular BID PRN    OLANZapine (ZyPREXA) tablet 5 mg  5 mg Oral Q6H PRN    benztropine (COGENTIN) tablet 2 mg  2 mg Oral BID PRN    benztropine (COGENTIN) injection 2 mg  2 mg IntraMUSCular BID PRN    LORazepam (ATIVAN) injection 2 mg  2 mg IntraMUSCular Q4H PRN    LORazepam (ATIVAN) tablet 1 mg  1 mg Oral Q4H PRN    zolpidem (AMBIEN) tablet 10 mg  10 mg Oral QHS PRN    acetaminophen (TYLENOL) tablet 650 mg  650 mg Oral Q4H PRN    ibuprofen (MOTRIN) tablet 400 mg  400 mg Oral Q8H PRN    magnesium hydroxide (MILK OF MAGNESIA) 400 mg/5 mL oral suspension 30 mL  30 mL Oral DAILY PRN    nicotine (NICODERM CQ) 21 mg/24 hr patch 1 Patch  1 Patch TransDERmal DAILY PRN      SCHEDULED MEDICATIONS:   Current Facility-Administered Medications   Medication Dose Route Frequency    LORazepam (ATIVAN) tablet 1 mg  1 mg Oral TID    divalproex ER (DEPAKOTE ER) 24 hour tablet 1,500 mg+++++Court ordered medication+++++  1,500 mg Oral QHS    Or    LORazepam (ATIVAN) injection 1 mg  1 mg IntraMUSCular QHS    benztropine (COGENTIN) tablet 1 mg  1 mg Oral BID    [START ON 4/17/2017] haloperidol decanoate (HALDOL DECANOATE) 100 mg/mL injection 200 mg  200 mg IntraMUSCular Q28D    haloperidol (HALDOL) 2 mg/mL oral solution 10 mg+++++Court ordered medication+++++  10 mg Oral TID    Or    haloperidol lactate (HALDOL) injection 5 mg  5 mg IntraMUSCular TID          ASSESSMENT & PLAN     DIAGNOSES REQUIRING ACTIVE TREATMENT AND MONITORING: (reviewed/updated 3/23/2017)  Patient Active Hospital Problem List:   Schizophrenia [facility]) (3/15/2017)    Assessment: delusions, formal thought disorder, paranoia, bizarre behavior    Plan: increase haldol dec. Add po haldol- as pt is refusing clozapine and if it is to be given needs to be restarted at 50 mg /day. For forced med hearing. (Depakote, Tegretol, lithium, clozapine---a drug with a narrow therapeutic index= NTI) and associated labs for drug therapy implemented that require intense monitoring for toxicity as deemed appropriate based on current medication side effects and pharmacodynamically determined drug 1/2 lives.     In summary, Yrn Cloud, is a 49 y.o.  male who presents with a severe exacerbation of the principal diagnosis of Schizophrenia ([facility] [address]  Patient's condition is worsening/not improving/not stable Patient requires continued inpatient hospitalization for further stabilization, safety monitoring and medication management. I will continue to coordinate the provision of individual, milieu, occupational, group, and substance abuse therapies to address target symptoms/diagnoses as deemed appropriate for the individual patient. A coordinated, multidisplinary treatment team round was conducted with the patient (this team consists of the nurse, psychiatric unit pharmcist,  and writer). Complete current electronic health record for patient has been reviewed today including consultant notes, ancillary staff notes, nurses and psychiatric tech notes. Suicide risk assessment completed and patient deemed to be of low risk for suicide at this time. The following regarding medications was addressed during rounds with patient:   the risks and benefits of the proposed medication. The patient was given the opportunity to ask questions. Informed consent given to the use of the above medications. Will continue to adjust psychiatric and non-psychiatric medications (see above \"medication\" section and orders section for details) as deemed appropriate & based upon diagnoses and response to treatment. I will continue to order blood tests/labs and diagnostic tests as deemed appropriate and review results as they become available (see orders for details and above listed lab/test results). I will order psychiatric records from previous [facility] hospitals to further elucidate the nature of patient's psychopathology and review once available. I will gather additional collateral information from friends, family and o/p treatment team to further elucidate the nature of patient's psychopathology and baselline level of psychiatric functioning.          I certify that this patient's inpatient psychiatric hospital services furnished since the previous certification were, and continue to be, required for treatment that could reasonably be expected to improve the patient's condition, or for diagnostic study, and that the patient continues to need, on a daily basis, active treatment furnished directly by or requiring the supervision of inpatient psychiatric facility personnel. In addition, the hospital records show that services furnished were intensive treatment services, admission or related services, or equivalent services. EXPECTED DISCHARGE DATE/DAY: TBD     DISPOSITION: Home     Patient's family notified of forced med hearing. They do not want to be in the position to be the enforcers of forced meds for pt, but feel strongly that pt needs forced meds. They will support court ordered meds. Pt not refusing on Jewish or moral grounds.   Signed By:   Hermelinda Albarado MD  3/23/2017

## 2017-03-23 NOTE — PROGRESS NOTES
03/23/17 1700   Vital Signs   Temp (refused)   Pulse (Heart Rate) (refused)   Resp Rate (refused)   Level of Consciousness Alert   BP (refused)   pt is refusing vitals at this time

## 2017-03-23 NOTE — INTERDISCIPLINARY ROUNDS
Behavioral Health Interdisciplinary Rounds     Patient Name: Oneida Macedo  Age: 49 y.o. Room/Bed:  732/  Primary Diagnosis: Schizophrenia ([facility] [address]   Admission Status: Involuntary Commitment     Readmission within 30 days: no  Power of  in place: no  Patient requires a blocked bed: yes          Reason for blocked bed: Behavior    VTE Prophylaxis: Yes  Influenza vaccine screen completed: yes Influenza vaccine given: no -Declined  Mobility needs/Fall risk: no    Nutritional Plan: no  Consults:          Labs/Testing due today?: no    Sleep hours: 7.25       Participation in Care/Groups:  no  Medication Compliant?: Yes  PRNS (last 24 hours): None    Restraints (last 24 hours):  no  Substance Abuse:  no  CIWA (range last 24 hours):  COWS (range last 24 hours):   Alcohol screening (AUDIT) completed -  AUDIT Score: 0  If applicable, date SBIRT discussed in treatment team AND documented:   Tobacco - patient is a smoker: no   Date tobacco education completed by RN: n/a  24 hour chart check complete: yes     Patient goal(s) for today:   Treatment team focus/goals:P;devan to call his sister regarding baseline. Plan to recall his  at [facility]    LOS:  8  Expected LOS: TBD   Financial concerns/prescription coverage:    Date of last family contact:       Family requesting physician contact today:    Discharge plan: he will return to his sister's house with ready for discharge        Outpatient provider(s): [facility]     Participating treatment team members:  Samuel Mir - Dr. Angelita Caldwell RN

## 2017-03-23 NOTE — PROGRESS NOTES
Problem: Altered Thought Process (Adult/Pediatric)  Goal: *STG: Participates in treatment plan  Variance: Patient slowly responding  Pt.  Met in treatment team with Dr. Antony Dejesus  Preoccupied less paranoid  Eating at table with peers  speaking softly  Answers no often   Court medications compliant  With PO medications  Goal: Interventions  Will continue to monitor  Assess thought process   Medication effectiveness and compliance  Encourage group

## 2017-03-23 NOTE — BH NOTES
GROUP THERAPY PROGRESS NOTE    John Son did not participate in an Afternoon Process Group with a focus on identifying feelings and planning for the rest of the day.

## 2017-03-23 NOTE — PROGRESS NOTES
Problem: Falls - Risk of  Goal: *Absence of falls  Outcome: Progressing Towards Goal  Received in bed asleep at the start of this shift. NAD. No falls noted/reported. Bathroom light on and Q 15 minute checks maintained for safety.

## 2017-03-24 PROCEDURE — 65220000003 HC RM SEMIPRIVATE PSYCH

## 2017-03-24 PROCEDURE — 74011250637 HC RX REV CODE- 250/637: Performed by: PSYCHIATRY & NEUROLOGY

## 2017-03-24 RX ORDER — SERTRALINE HYDROCHLORIDE 50 MG/1
50 TABLET, FILM COATED ORAL DAILY
Status: DISCONTINUED | OUTPATIENT
Start: 2017-03-25 | End: 2017-03-28 | Stop reason: HOSPADM

## 2017-03-24 RX ORDER — LORAZEPAM 0.5 MG/1
0.5 TABLET ORAL 2 TIMES DAILY
Status: DISCONTINUED | OUTPATIENT
Start: 2017-03-24 | End: 2017-03-27

## 2017-03-24 RX ADMIN — LORAZEPAM 0.5 MG: 0.5 TABLET ORAL at 17:11

## 2017-03-24 RX ADMIN — DIVALPROEX SODIUM 1500 MG: 500 TABLET, FILM COATED, EXTENDED RELEASE ORAL at 21:24

## 2017-03-24 RX ADMIN — HALOPERIDOL 10 MG: 2 SOLUTION ORAL at 21:24

## 2017-03-24 RX ADMIN — LORAZEPAM 1 MG: 1 TABLET ORAL at 09:14

## 2017-03-24 RX ADMIN — HALOPERIDOL 10 MG: 2 SOLUTION ORAL at 15:52

## 2017-03-24 RX ADMIN — BENZTROPINE MESYLATE 1 MG: 1 TABLET ORAL at 21:24

## 2017-03-24 RX ADMIN — BENZTROPINE MESYLATE 1 MG: 1 TABLET ORAL at 09:13

## 2017-03-24 RX ADMIN — HALOPERIDOL 10 MG: 2 SOLUTION ORAL at 08:08

## 2017-03-24 NOTE — PROGRESS NOTES
Problem: Falls - Risk of  Goal: *Absence of falls  Outcome: Progressing Towards Goal  2330 Pt appears asleep in bed. Respirations even and unlabored. Night light on. Will continue to monitor with Q 15 safety checks.

## 2017-03-24 NOTE — BH NOTES
PSYCHIATRIC PROGRESS NOTE         Patient Name  Phil Ryan   Date of Birth 1970   CSN [id number]   Medical Record Number  [id number]      Age  49 y.o. PCP PROVIDER UNKNOWN   Admit date:  3/14/2017    Room Number  732/01  @ [facility]   Date of Service  3/24/2017          PSYCHOTHERAPY SESSION NOTE:  Length of psychotherapy session: 20 minutes    Main condition/diagnosis/issues treated during session today, 3/24/2017 :medication non compliance, paranoia, internal stimuli, poor coping skills    I employed Cognitive Behavioral therapy techniques, Reality-Oriented psychotherapy, as well as supportive psychotherapy in regards to various ongoing psychosocial stressors, including the following: pre-admission and current problems; housing issues; academic issues; medical issues; and stress of hospitalization. Interpersonal relationship issues and psychodynamic conflicts explored. Attempts made to alleviate maladaptive patterns. Overall, patient is not progressing    Treatment Plan Update (reviewed an updated 3/24/2017) : I will modify psychotherapy tx plan by implementing more stress management strategies, building upon cognitive behavioral techniques, increasing coping skills, as well as shoring up psychological defenses). An extended energy and skill set was needed to engage pt in psychotherapy due to some of the following: resistiveness, complexity, negativity, confrontational nature, hostile behaviors, and/or severe abnormalities in thought processes/psychosis resulting in the loss of expressive/receptive language communication skills. E & M PROGRESS NOTE:         HISTORY       CC:  \"SI, poor self care, medication non compliance resulting in catatonia \"  HISTORY OF PRESENT ILLNESS/INTERVAL HISTORY:  (reviewed/updated 3/24/2017).   per initial evaluation:     Phil Ryan presents/reports/evidences the following emotional symptoms today, 3/24/2017:depression and psychotic behavior. The above symptoms have been present for years . These symptoms are of severe severity. The symptoms are constant  in nature. Additional symptomatology and features include psychosis. 3/17/17- Patient was court approved for forced medications. When this was explained to him he had a very sudden change of affect (from blunted to animated with a sheepish grin). He whispered responses to questions and appeared to be amused by this. He had moments of thought blocking and eye movements indicating RTIS. Will increase haldol. Will start clozapine when patient demonstrates ability to consistently comply with oral medications. 3/18/17- medication compliant. Still RTIS and internally preoccupied. 3/19/17- refused team meeting today. Still internally preoccupied. Has a good visit w/ family. Continues to comply w/ meds. 3/20/17- More compliant with unit rules. Still isolative and internally preoccupied with thoughts blocking. Will increase haaldol dec  3/22/17- Isolative and selectively mute. Still appears to have internal stimuli- but much less. Poor hygiene. Will contact sister and  to determine where we are from baseline. 3/23/17- Eating well and getting good sleep. Still internally preoccupied, with sparse speech and thought blocking.  to visit to determine how close pt is to baseline. 3/24/17- Patient is more goal directed in his thoughts. Reports on questioning that his mood is a bit depressed. Will add zoloft. SIDE EFFECTS: (reviewed/updated 3/24/2017)  None reported or admitted to. No noted toxicity with use of Depakote/Tegretol/lithium/Clozaril/TCAs   ALLERGIES:(reviewed/updated 3/24/2017)  Allergies   Allergen Reactions    Fluphenazine Unknown (comments)     Pt is unable to communicate properly.  Penicillins Unknown (comments)     Pt is unable to communicate properly.       MEDICATIONS PRIOR TO ADMISSION:(reviewed/updated 3/24/2017)  Prescriptions Prior to Admission   Medication Sig    divalproex ER (DEPAKOTE ER) 500 mg ER tablet Take 1,000 mg by mouth two (2) times a day. Indications: MOOD    cloZAPine 200 mg tablet Take 600 mg by mouth nightly. Indications: TREATMENT-RESISTANT SCHIZOPHRENIA    cloZAPine 200 mg tablet Take 200 mg by mouth daily. Indications: TREATMENT-RESISTANT SCHIZOPHRENIA    haloperidol decanoate (HALDOL DECANOATE) 100 mg/mL injection 100 mg by IntraMUSCular route every twenty-eight (28) days. Indications: SCHIZOPHRENIA    traZODone (DESYREL) 100 mg tablet Take 100 mg by mouth nightly as needed for Sleep. Indications: sleep    benztropine (COGENTIN) 2 mg tablet Take 2 mg by mouth two (2) times a day. Indications: extrapyramidal disease      PAST MEDICAL HISTORY: Past medical history from the initial psychiatric evaluation has been reviewed (reviewed/updated 3/24/2017) with no additional updates (I asked patient and no additional past medical history provided). Past Medical History:   Diagnosis Date    Psychiatric disorder     Psychotic disorder     Withdrawal syndrome ([facility] [address]    History reviewed. No pertinent surgical history. SOCIAL HISTORY: Social history from the initial psychiatric evaluation has been reviewed (reviewed/updated 3/24/2017) with no additional updates (I asked patient and no additional social history provided). Social History     Social History    Marital status: UNKNOWN     Spouse name: N/A    Number of children: N/A    Years of education: N/A     Occupational History    Not on file. Social History Main Topics    Smoking status: Never Smoker    Smokeless tobacco: Not on file    Alcohol use No    Drug use: Not on file    Sexual activity: Not on file     Other Topics Concern    Not on file     Social History Narrative    49year old single  male admitted on TDO for worsening schizophrenia with catatonic symptoms. Medication compliance has been questionable.  Pt was last admitted to [facility] in 2015 and successfully discharged to [facility] on clozapine, haldol dec, trazodone and depakote. Pt is a HS grad and is unemployed. He lives with his mother. FAMILY HISTORY: Family history from the initial psychiatric evaluation has been reviewed (reviewed/updated 3/24/2017) with no additional updates (I asked patient and no additional family history provided). History reviewed. No pertinent family history. REVIEW OF SYSTEMS: (reviewed/updated 3/24/2017)  Appetite:no change from normal   Sleep: does not feel rested   All other Review of Systems: Psychological ROS: positive for - hallucinations  Respiratory ROS: no cough, shortness of breath, or wheezing  Cardiovascular ROS: no chest pain or dyspnea on exertion         [facility] (MSE):    MSE FINDINGS ARE WITHIN NORMAL LIMITS (WNL) UNLESS OTHERWISE STATED BELOW. ( ALL OF THE BELOW CATEGORIES OF THE MSE HAVE BEEN REVIEWED (reviewed 3/24/2017) AND UPDATED AS DEEMED APPROPRIATE )  General Presentation age appropriate, evasive, guarded and uncooperative   Orientation disorganized   Vital Signs  See below (reviewed 3/24/2017); Vital Signs (BP, Pulse, & Temp) are within normal limits if not listed below.    Gait and Station Stable/steady, no ataxia   Musculoskeletal System No extrapyramidal symptoms (EPS); no abnormal muscular movements or Tardive Dyskinesia (TD); muscle strength and tone are within normal limits   Language No aphasia or dysarthria   Speech:  hypoverbal   Thought Processes [place] ; slow rate of thoughts; poor abstract reasoning/computation   Thought Associations blocked    Thought Content paranoid delusions   Suicidal Ideations none   Homicidal Ideations none   Mood:  irritable   Affect:  mood-congruent   Memory recent  impaired   Memory remote:  impaired   Concentration/Attention:  poor   Fund of Knowledge average   Insight:  poor   Reliability poor   Judgment:  poor          VITALS:     Patient Vitals for the past 24 hrs:   Temp Pulse Resp BP SpO2   03/24/17 0909 - 78 - 108/74 -   03/24/17 0800 97.3 °F (36.3 °C) 69 18 (!) 86/61 95 %   03/23/17 2100 97.9 °F (36.6 °C) 69 16 95/62 -     Wt Readings from Last 3 Encounters:   03/19/17 94.8 kg (209 lb 1.6 oz)   02/09/15 97.5 kg (215 lb)   02/07/15 97.5 kg (215 lb)     Temp Readings from Last 3 Encounters:   03/24/17 97.3 °F (36.3 °C)   02/07/15 98.7 °F (37.1 °C)     BP Readings from Last 3 Encounters:   03/24/17 108/74   02/12/15 105/73   02/07/15 148/79     Pulse Readings from Last 3 Encounters:   03/24/17 78   02/12/15 87   02/07/15 (!) 111            DATA     LABORATORY DATA:(reviewed/updated 3/24/2017)  No results found for this or any previous visit (from the past 24 hour(s)). Lab Results   Component Value Date/Time    Valproic acid 62 03/14/2017 11:49 PM     No results found for: ESTUARDO, ASHLEY, KIKI   RADIOLOGY REPORTS:(reviewed/updated 3/24/2017)  No results found.        MEDICATIONS     ALL MEDICATIONS:   Current Facility-Administered Medications   Medication Dose Route Frequency    [START ON 3/25/2017] sertraline (ZOLOFT) tablet 50 mg  50 mg Oral DAILY    LORazepam (ATIVAN) tablet 0.5 mg  0.5 mg Oral BID    divalproex ER (DEPAKOTE ER) 24 hour tablet 1,500 mg+++++Court ordered medication+++++  1,500 mg Oral QHS    Or    LORazepam (ATIVAN) injection 1 mg  1 mg IntraMUSCular QHS    benztropine (COGENTIN) tablet 1 mg  1 mg Oral BID    [START ON 4/17/2017] haloperidol decanoate (HALDOL DECANOATE) 100 mg/mL injection 200 mg  200 mg IntraMUSCular Q28D    haloperidol (HALDOL) 2 mg/mL oral solution 10 mg+++++Court ordered medication+++++  10 mg Oral TID    Or    haloperidol lactate (HALDOL) injection 5 mg  5 mg IntraMUSCular TID    ziprasidone (GEODON) 20 mg in sterile water (preservative free) 1 mL injection  20 mg IntraMUSCular BID PRN    OLANZapine (ZyPREXA) tablet 5 mg  5 mg Oral Q6H PRN    benztropine (COGENTIN) tablet 2 mg  2 mg Oral BID PRN    benztropine (COGENTIN) injection 2 mg  2 mg IntraMUSCular BID PRN    LORazepam (ATIVAN) injection 2 mg  2 mg IntraMUSCular Q4H PRN    LORazepam (ATIVAN) tablet 1 mg  1 mg Oral Q4H PRN    zolpidem (AMBIEN) tablet 10 mg  10 mg Oral QHS PRN    acetaminophen (TYLENOL) tablet 650 mg  650 mg Oral Q4H PRN    ibuprofen (MOTRIN) tablet 400 mg  400 mg Oral Q8H PRN    magnesium hydroxide (MILK OF MAGNESIA) 400 mg/5 mL oral suspension 30 mL  30 mL Oral DAILY PRN    nicotine (NICODERM CQ) 21 mg/24 hr patch 1 Patch  1 Patch TransDERmal DAILY PRN      SCHEDULED MEDICATIONS:   Current Facility-Administered Medications   Medication Dose Route Frequency    [START ON 3/25/2017] sertraline (ZOLOFT) tablet 50 mg  50 mg Oral DAILY    LORazepam (ATIVAN) tablet 0.5 mg  0.5 mg Oral BID    divalproex ER (DEPAKOTE ER) 24 hour tablet 1,500 mg+++++Court ordered medication+++++  1,500 mg Oral QHS    Or    LORazepam (ATIVAN) injection 1 mg  1 mg IntraMUSCular QHS    benztropine (COGENTIN) tablet 1 mg  1 mg Oral BID    [START ON 4/17/2017] haloperidol decanoate (HALDOL DECANOATE) 100 mg/mL injection 200 mg  200 mg IntraMUSCular Q28D    haloperidol (HALDOL) 2 mg/mL oral solution 10 mg+++++Court ordered medication+++++  10 mg Oral TID    Or    haloperidol lactate (HALDOL) injection 5 mg  5 mg IntraMUSCular TID          ASSESSMENT & PLAN     DIAGNOSES REQUIRING ACTIVE TREATMENT AND MONITORING: (reviewed/updated 3/24/2017)  Patient Active Hospital Problem List:   Schizophrenia [facility]) (3/15/2017)    Assessment: delusions, formal thought disorder, paranoia, bizarre behavior    Plan: increase haldol dec. Add po haldol- as pt is refusing clozapine and if it is to be given needs to be restarted at 50 mg /day. For forced med hearing.          (Depakote, Tegretol, lithium, clozapine---a drug with a narrow therapeutic index= NTI) and associated labs for drug therapy implemented that require intense monitoring for toxicity as deemed appropriate based on current medication side effects and pharmacodynamically determined drug 1/2 lives. In summary, Checo Clark, is a 49 y.o.  male who presents with a severe exacerbation of the principal diagnosis of Schizophrenia ([facility] [address]  Patient's condition is worsening/not improving/not stable Patient requires continued inpatient hospitalization for further stabilization, safety monitoring and medication management. I will continue to coordinate the provision of individual, milieu, occupational, group, and substance abuse therapies to address target symptoms/diagnoses as deemed appropriate for the individual patient. A coordinated, multidisplinary treatment team round was conducted with the patient (this team consists of the nurse, psychiatric unit pharmcist,  and writer). Complete current electronic health record for patient has been reviewed today including consultant notes, ancillary staff notes, nurses and psychiatric tech notes. Suicide risk assessment completed and patient deemed to be of low risk for suicide at this time. The following regarding medications was addressed during rounds with patient:   the risks and benefits of the proposed medication. The patient was given the opportunity to ask questions. Informed consent given to the use of the above medications. Will continue to adjust psychiatric and non-psychiatric medications (see above \"medication\" section and orders section for details) as deemed appropriate & based upon diagnoses and response to treatment. I will continue to order blood tests/labs and diagnostic tests as deemed appropriate and review results as they become available (see orders for details and above listed lab/test results). I will order psychiatric records from previous [facility] hospitals to further elucidate the nature of patient's psychopathology and review once available.     I will gather additional collateral information from friends, family and o/p treatment team to further elucidate the nature of patient's psychopathology and baselline level of psychiatric functioning. I certify that this patient's inpatient psychiatric hospital services furnished since the previous certification were, and continue to be, required for treatment that could reasonably be expected to improve the patient's condition, or for diagnostic study, and that the patient continues to need, on a daily basis, active treatment furnished directly by or requiring the supervision of inpatient psychiatric facility personnel. In addition, the hospital records show that services furnished were intensive treatment services, admission or related services, or equivalent services. EXPECTED DISCHARGE DATE/DAY: TBD     DISPOSITION: Home     Patient's family notified of forced med hearing. They do not want to be in the position to be the enforcers of forced meds for pt, but feel strongly that pt needs forced meds. They will support court ordered meds. Pt not refusing on Oriental orthodox or moral grounds.   Signed By:   Santosh Morejon MD  3/24/2017

## 2017-03-24 NOTE — BH NOTES
GROUP THERAPY PROGRESS NOTE    Ary Torresalixluis is participating in D/C Planning Group    Group time: 45 minutes    Personal goal for participation: Developing Your Individual Wellness    Goal orientation: community    Group therapy participation: minimal    Therapeutic interventions reviewed and discussed:     Impression of participation: Pt was in attendance,  made limited but significant contributions to the group by his feedback and support.

## 2017-03-24 NOTE — PROGRESS NOTES
Problem: Altered Thought Process (Adult/Pediatric)  Goal: *STG: Complies with medication therapy  Patient is resting quietly in bed. Awake and alert. No distress noted. Will continue to monitor.

## 2017-03-24 NOTE — PROGRESS NOTES
Problem: Altered Thought Process (Adult/Pediatric)  Goal: *STG: Complies with medication therapy  Outcome: Progressing Towards Goal  Has been medication compliant. Hygiene is poor and declines attendance on frequent basis. Appears preoccupied with minimal speech at very low tone and volume. Appetite is good. Whispered \"I want to go. \"

## 2017-03-25 PROCEDURE — 65220000003 HC RM SEMIPRIVATE PSYCH

## 2017-03-25 PROCEDURE — 74011250637 HC RX REV CODE- 250/637: Performed by: PSYCHIATRY & NEUROLOGY

## 2017-03-25 RX ORDER — DIVALPROEX SODIUM 500 MG/1
2000 TABLET, EXTENDED RELEASE ORAL
Status: DISCONTINUED | OUTPATIENT
Start: 2017-03-25 | End: 2017-03-28 | Stop reason: HOSPADM

## 2017-03-25 RX ORDER — LORAZEPAM 2 MG/ML
1 INJECTION INTRAMUSCULAR
Status: DISCONTINUED | OUTPATIENT
Start: 2017-03-25 | End: 2017-03-28 | Stop reason: HOSPADM

## 2017-03-25 RX ADMIN — BENZTROPINE MESYLATE 1 MG: 1 TABLET ORAL at 21:34

## 2017-03-25 RX ADMIN — HALOPERIDOL 10 MG: 2 SOLUTION ORAL at 16:10

## 2017-03-25 RX ADMIN — LORAZEPAM 0.5 MG: 0.5 TABLET ORAL at 17:45

## 2017-03-25 RX ADMIN — HALOPERIDOL 10 MG: 2 SOLUTION ORAL at 08:36

## 2017-03-25 RX ADMIN — HALOPERIDOL 10 MG: 2 SOLUTION ORAL at 21:34

## 2017-03-25 RX ADMIN — SERTRALINE HYDROCHLORIDE 50 MG: 50 TABLET ORAL at 08:36

## 2017-03-25 RX ADMIN — LORAZEPAM 0.5 MG: 0.5 TABLET ORAL at 08:37

## 2017-03-25 RX ADMIN — DIVALPROEX SODIUM 2000 MG: 500 TABLET, EXTENDED RELEASE ORAL at 21:34

## 2017-03-25 RX ADMIN — BENZTROPINE MESYLATE 1 MG: 1 TABLET ORAL at 08:37

## 2017-03-25 NOTE — PROGRESS NOTES
Problem: Altered Thought Process (Adult/Pediatric)  Goal: *STG: Complies with medication therapy  Outcome: Progressing Towards Goal  Visible on the unit. Compliant with meals and medications. Pt denies SI. Quiet. Pacing the unit. Continue to encourage and educate.

## 2017-03-25 NOTE — BH NOTES
PSYCHIATRIC PROGRESS NOTE         Patient Name  Phil Ryan   Date of Birth 1970   [facility] [id number]   Medical Record Number  [id number]      Age  49 y.o. PCP PROVIDER UNKNOWN   Admit date:  3/14/2017    Room Number  732/01  @ [facility]   Date of Service  3/25/2017          PSYCHOTHERAPY SESSION NOTE:  Length of psychotherapy session: 20 minutes    Main condition/diagnosis/issues treated during session today, 3/25/2017 :medication non compliance, paranoia, internal stimuli, poor coping skills    I employed Cognitive Behavioral therapy techniques, Reality-Oriented psychotherapy, as well as supportive psychotherapy in regards to various ongoing psychosocial stressors, including the following: pre-admission and current problems; housing issues; academic issues; medical issues; and stress of hospitalization. Interpersonal relationship issues and psychodynamic conflicts explored. Attempts made to alleviate maladaptive patterns. Overall, patient is not progressing    Treatment Plan Update (reviewed an updated 3/25/2017) : I will modify psychotherapy tx plan by implementing more stress management strategies, building upon cognitive behavioral techniques, increasing coping skills, as well as shoring up psychological defenses). An extended energy and skill set was needed to engage pt in psychotherapy due to some of the following: resistiveness, complexity, negativity, confrontational nature, hostile behaviors, and/or severe abnormalities in thought processes/psychosis resulting in the loss of expressive/receptive language communication skills. E & M PROGRESS NOTE:         HISTORY       CC:  \"SI, poor self care, medication non compliance resulting in catatonia \"  HISTORY OF PRESENT ILLNESS/INTERVAL HISTORY:  (reviewed/updated 3/25/2017).   per initial evaluation:     Phil Ryan presents/reports/evidences the following emotional symptoms today, 3/25/2017:depression and psychotic behavior. The above symptoms have been present for years . These symptoms are of severe severity. The symptoms are constant  in nature. Additional symptomatology and features include psychosis. 3/17/17- Patient was court approved for forced medications. When this was explained to him he had a very sudden change of affect (from blunted to animated with a sheepish grin). He whispered responses to questions and appeared to be amused by this. He had moments of thought blocking and eye movements indicating RTIS. Will increase haldol. Will start clozapine when patient demonstrates ability to consistently comply with oral medications. 3/18/17- medication compliant. Still RTIS and internally preoccupied. 3/19/17- refused team meeting today. Still internally preoccupied. Has a good visit w/ family. Continues to comply w/ meds. 3/20/17- More compliant with unit rules. Still isolative and internally preoccupied with thoughts blocking. Will increase haaldol dec  3/22/17- Isolative and selectively mute. Still appears to have internal stimuli- but much less. Poor hygiene. Will contact sister and  to determine where we are from baseline. 3/23/17- Eating well and getting good sleep. Still internally preoccupied, with sparse speech and thought blocking.  to visit to determine how close pt is to baseline. 3/24/17- Patient is more goal directed in his thoughts. Reports on questioning that his mood is a bit depressed. Will add zoloft. 3/25- isolative, bizarre behavior, guarded and paranoid, sleep is improved. Refusing labs      SIDE EFFECTS: (reviewed/updated 3/25/2017)  None reported or admitted to. No noted toxicity with use of Depakote/Tegretol/lithium/Clozaril/TCAs   ALLERGIES:(reviewed/updated 3/25/2017)  Allergies   Allergen Reactions    Fluphenazine Unknown (comments)     Pt is unable to communicate properly.     Penicillins Unknown (comments)     Pt is unable to communicate properly. MEDICATIONS PRIOR TO ADMISSION:(reviewed/updated 3/25/2017)  Prescriptions Prior to Admission   Medication Sig    divalproex ER (DEPAKOTE ER) 500 mg ER tablet Take 1,000 mg by mouth two (2) times a day. Indications: MOOD    cloZAPine 200 mg tablet Take 600 mg by mouth nightly. Indications: TREATMENT-RESISTANT SCHIZOPHRENIA    cloZAPine 200 mg tablet Take 200 mg by mouth daily. Indications: TREATMENT-RESISTANT SCHIZOPHRENIA    haloperidol decanoate (HALDOL DECANOATE) 100 mg/mL injection 100 mg by IntraMUSCular route every twenty-eight (28) days. Indications: SCHIZOPHRENIA    traZODone (DESYREL) 100 mg tablet Take 100 mg by mouth nightly as needed for Sleep. Indications: sleep    benztropine (COGENTIN) 2 mg tablet Take 2 mg by mouth two (2) times a day. Indications: extrapyramidal disease      PAST MEDICAL HISTORY: Past medical history from the initial psychiatric evaluation has been reviewed (reviewed/updated 3/25/2017) with no additional updates (I asked patient and no additional past medical history provided). Past Medical History:   Diagnosis Date    Psychiatric disorder     Psychotic disorder     Withdrawal syndrome ([facility] [address]    History reviewed. No pertinent surgical history. SOCIAL HISTORY: Social history from the initial psychiatric evaluation has been reviewed (reviewed/updated 3/25/2017) with no additional updates (I asked patient and no additional social history provided). Social History     Social History    Marital status: UNKNOWN     Spouse name: N/A    Number of children: N/A    Years of education: N/A     Occupational History    Not on file.      Social History Main Topics    Smoking status: Never Smoker    Smokeless tobacco: Not on file    Alcohol use No    Drug use: Not on file    Sexual activity: Not on file     Other Topics Concern    Not on file     Social History Narrative    49year old single  male admitted on TDO for worsening schizophrenia with catatonic symptoms. Medication compliance has been questionable. Pt was last admitted to [facility] in 2015 and successfully discharged to [facility] on clozapine, haldol dec, trazodone and depakote. Pt is a HS grad and is unemployed. He lives with his mother. FAMILY HISTORY: Family history from the initial psychiatric evaluation has been reviewed (reviewed/updated 3/25/2017) with no additional updates (I asked patient and no additional family history provided). History reviewed. No pertinent family history. REVIEW OF SYSTEMS: (reviewed/updated 3/25/2017)  Appetite:no change from normal   Sleep: 7 hrs  All other Review of Systems: Psychological ROS: positive for - hallucinations  Respiratory ROS: no cough, shortness of breath, or wheezing  Cardiovascular ROS: no chest pain or dyspnea on exertion         [facility] (MSE):    MSE FINDINGS ARE WITHIN NORMAL LIMITS (WNL) UNLESS OTHERWISE STATED BELOW. ( ALL OF THE BELOW CATEGORIES OF THE MSE HAVE BEEN REVIEWED (reviewed 3/25/2017) AND UPDATED AS DEEMED APPROPRIATE )  General Presentation age appropriate, evasive, guarded and uncooperative   Orientation disorganized   Vital Signs  See below (reviewed 3/25/2017); Vital Signs (BP, Pulse, & Temp) are within normal limits if not listed below.    Gait and Station Stable/steady, no ataxia   Musculoskeletal System No extrapyramidal symptoms (EPS); no abnormal muscular movements or Tardive Dyskinesia (TD); muscle strength and tone are within normal limits   Language No aphasia or dysarthria   Speech:  Soft,    Thought Processes [place] ; slow rate of thoughts; poor abstract reasoning/computation   Thought Associations blocked    Thought Content paranoid delusions   Suicidal Ideations none   Homicidal Ideations none   Mood:  irritable, labile   Affect:  mood-congruent   Memory recent  impaired   Memory remote:  impaired   Concentration/Attention:  poor   Fund of Knowledge average   Insight:  poor   Reliability poor   Judgment:  poor          VITALS:     Patient Vitals for the past 24 hrs:   Temp Pulse Resp BP SpO2   03/25/17 0831 - 65 - 109/71 -   03/25/17 0740 97.3 °F (36.3 °C) 73 16 96/68 97 %     Wt Readings from Last 3 Encounters:   03/25/17 95.6 kg (210 lb 11.2 oz)   02/09/15 97.5 kg (215 lb)   02/07/15 97.5 kg (215 lb)     Temp Readings from Last 3 Encounters:   03/25/17 97.3 °F (36.3 °C)   02/07/15 98.7 °F (37.1 °C)     BP Readings from Last 3 Encounters:   03/25/17 109/71   02/12/15 105/73   02/07/15 148/79     Pulse Readings from Last 3 Encounters:   03/25/17 65   02/12/15 87   02/07/15 (!) 111            DATA     LABORATORY DATA:(reviewed/updated 3/25/2017)  No results found for this or any previous visit (from the past 24 hour(s)). Lab Results   Component Value Date/Time    Valproic acid 62 03/14/2017 11:49 PM     No results found for: LI, LIH, LITHM   RADIOLOGY REPORTS:(reviewed/updated 3/25/2017)  No results found.        MEDICATIONS     ALL MEDICATIONS:   Current Facility-Administered Medications   Medication Dose Route Frequency    divalproex ER (DEPAKOTE ER) 24 hour tablet 2,000 mg  2,000 mg Oral QHS    Or    LORazepam (ATIVAN) injection 1 mg  1 mg IntraMUSCular QHS    sertraline (ZOLOFT) tablet 50 mg  50 mg Oral DAILY    LORazepam (ATIVAN) tablet 0.5 mg  0.5 mg Oral BID    benztropine (COGENTIN) tablet 1 mg  1 mg Oral BID    [START ON 4/17/2017] haloperidol decanoate (HALDOL DECANOATE) 100 mg/mL injection 200 mg  200 mg IntraMUSCular Q28D    haloperidol (HALDOL) 2 mg/mL oral solution 10 mg+++++Court ordered medication+++++  10 mg Oral TID    Or    haloperidol lactate (HALDOL) injection 5 mg  5 mg IntraMUSCular TID    ziprasidone (GEODON) 20 mg in sterile water (preservative free) 1 mL injection  20 mg IntraMUSCular BID PRN    OLANZapine (ZyPREXA) tablet 5 mg  5 mg Oral Q6H PRN    benztropine (COGENTIN) tablet 2 mg  2 mg Oral BID PRN    benztropine (COGENTIN) injection 2 mg  2 mg IntraMUSCular BID PRN    LORazepam (ATIVAN) injection 2 mg  2 mg IntraMUSCular Q4H PRN    LORazepam (ATIVAN) tablet 1 mg  1 mg Oral Q4H PRN    zolpidem (AMBIEN) tablet 10 mg  10 mg Oral QHS PRN    acetaminophen (TYLENOL) tablet 650 mg  650 mg Oral Q4H PRN    ibuprofen (MOTRIN) tablet 400 mg  400 mg Oral Q8H PRN    magnesium hydroxide (MILK OF MAGNESIA) 400 mg/5 mL oral suspension 30 mL  30 mL Oral DAILY PRN    nicotine (NICODERM CQ) 21 mg/24 hr patch 1 Patch  1 Patch TransDERmal DAILY PRN      SCHEDULED MEDICATIONS:   Current Facility-Administered Medications   Medication Dose Route Frequency    divalproex ER (DEPAKOTE ER) 24 hour tablet 2,000 mg  2,000 mg Oral QHS    Or    LORazepam (ATIVAN) injection 1 mg  1 mg IntraMUSCular QHS    sertraline (ZOLOFT) tablet 50 mg  50 mg Oral DAILY    LORazepam (ATIVAN) tablet 0.5 mg  0.5 mg Oral BID    benztropine (COGENTIN) tablet 1 mg  1 mg Oral BID    [START ON 4/17/2017] haloperidol decanoate (HALDOL DECANOATE) 100 mg/mL injection 200 mg  200 mg IntraMUSCular Q28D    haloperidol (HALDOL) 2 mg/mL oral solution 10 mg+++++Court ordered medication+++++  10 mg Oral TID    Or    haloperidol lactate (HALDOL) injection 5 mg  5 mg IntraMUSCular TID          ASSESSMENT & PLAN     DIAGNOSES REQUIRING ACTIVE TREATMENT AND MONITORING: (reviewed/updated 3/25/2017)  Patient Active Hospital Problem List:   Schizophrenia [facility]) (3/15/2017)    Assessment: delusions, formal thought disorder, paranoia, bizarre behavior    Plan: increase haldol dec. Add po haldol- as pt is refusing clozapine and if it is to be given needs to be restarted at 50 mg /day. For forced med hearing.     3/25- remains paranoid, labile in mood- titrate Depakote ( pt has been reported to be on 1000 mg bid in the past) , refusing lab     (Depakote, Tegretol, lithium, clozapine---a drug with a narrow therapeutic index= NTI) and associated labs for drug therapy implemented that require intense monitoring for toxicity as deemed appropriate based on current medication side effects and pharmacodynamically determined drug 1/2 lives. In summary, Lyudmila Palmer, is a 49 y.o.  male who presents with a severe exacerbation of the principal diagnosis of Schizophrenia ([address]  Patient's condition is worsening/not improving/not stable Patient requires continued inpatient hospitalization for further stabilization, safety monitoring and medication management. I will continue to coordinate the provision of individual, milieu, occupational, group, and substance abuse therapies to address target symptoms/diagnoses as deemed appropriate for the individual patient. A coordinated, multidisplinary treatment team round was conducted with the patient (this team consists of the nurse, psychiatric unit pharmcist,  and writer). Complete current electronic health record for patient has been reviewed today including consultant notes, ancillary staff notes, nurses and psychiatric tech notes. Suicide risk assessment completed and patient deemed to be of low risk for suicide at this time. The following regarding medications was addressed during rounds with patient:   the risks and benefits of the proposed medication. The patient was given the opportunity to ask questions. Informed consent given to the use of the above medications. Will continue to adjust psychiatric and non-psychiatric medications (see above \"medication\" section and orders section for details) as deemed appropriate & based upon diagnoses and response to treatment. I will continue to order blood tests/labs and diagnostic tests as deemed appropriate and review results as they become available (see orders for details and above listed lab/test results). I will order psychiatric records from previous [facility] hospitals to further elucidate the nature of patient's psychopathology and review once available.     I will gather additional collateral information from friends, family and o/p treatment team to further elucidate the nature of patient's psychopathology and baselline level of psychiatric functioning. I certify that this patient's inpatient psychiatric hospital services furnished since the previous certification were, and continue to be, required for treatment that could reasonably be expected to improve the patient's condition, or for diagnostic study, and that the patient continues to need, on a daily basis, active treatment furnished directly by or requiring the supervision of inpatient psychiatric facility personnel. In addition, the hospital records show that services furnished were intensive treatment services, admission or related services, or equivalent services. EXPECTED DISCHARGE DATE/DAY: TBD     DISPOSITION: Home     Patient's family notified of forced med hearing. They do not want to be in the position to be the enforcers of forced meds for pt, but feel strongly that pt needs forced meds. They will support court ordered meds. Pt not refusing on Voodoo or moral grounds.   Signed By:   Papa Alfonso MD  3/25/2017

## 2017-03-25 NOTE — INTERDISCIPLINARY ROUNDS
Behavioral Health Interdisciplinary Rounds     Patient Name: Ary Sandra  Age: 49 y.o. Room/Bed:  732/  Primary Diagnosis: [facility]izophren[facility] ([facility]   Admission Status: Involuntary Commitment and Forced Medication Order     Readmission within 30 days: no  Power of  in place: no  Patient requires a blocked bed: yes          Reason for blocked bed: Disruptive Behavior    VTE Prophylaxis: Not indicated  Influenza vaccine screen completed: yes Influenza vaccine given: no - Pt refused  Mobility needs/Fall risk: no    Nutritional Plan: no  Consults:          Labs/Testing due today?: No - Refused labs ordered for 3/24/17. Will attempt to obtain this am (3/25/17). Refused this am also. Sleep hours: 8.5       Participation in Care/Groups:  no  Medication Compliant?: Yes  PRNS (last 24 hours): None    Restraints (last 24 hours):  no  Substance Abuse:  no  CIWA (range last 24 hours):  COWS (range last 24 hours):   Alcohol screening (AUDIT) completed -  AUDIT Score: 0  If applicable, date SBIRT discussed in treatment team AND documented:   Tobacco - patient is a smoker: no   Date tobacco education completed by RN: n/a  24 hour chart check complete: yes     Patient goal(s) for today:   Treatment team focus/goals:   LOS:  10  Expected LOS:   Financial concerns/prescription coverage:    Date of last family contact:       Family requesting physician contact today:    Discharge plan:        Outpatient provider(s):     Participating treatment team members:  Ary Sandra

## 2017-03-25 NOTE — PROGRESS NOTES
Problem: Falls - Risk of  Goal: *Absence of falls  Outcome: Progressing Towards Goal  Received in bed asleep. NAD. No falls noted/reported. Bathroom Light is on and Q 15 minute checks maintained for safety. 2215 - Pt refused his ordered lab work on 3/24/17. Asked again this am in event pt might comply. Pt refused this am also.

## 2017-03-25 NOTE — PROGRESS NOTES
Problem: Altered Thought Process (Adult/Pediatric)  Goal: *STG: Complies with medication therapy  Patient is resting quietly in bed. Appears to be asleep. No respiratory distress noted. Will continue to monitor.

## 2017-03-26 PROCEDURE — 74011250637 HC RX REV CODE- 250/637: Performed by: PSYCHIATRY & NEUROLOGY

## 2017-03-26 PROCEDURE — 65220000003 HC RM SEMIPRIVATE PSYCH

## 2017-03-26 RX ADMIN — HALOPERIDOL 10 MG: 2 SOLUTION ORAL at 15:57

## 2017-03-26 RX ADMIN — DIVALPROEX SODIUM 2000 MG: 500 TABLET, EXTENDED RELEASE ORAL at 21:49

## 2017-03-26 RX ADMIN — LORAZEPAM 0.5 MG: 0.5 TABLET ORAL at 08:26

## 2017-03-26 RX ADMIN — LORAZEPAM 0.5 MG: 0.5 TABLET ORAL at 18:33

## 2017-03-26 RX ADMIN — BENZTROPINE MESYLATE 1 MG: 1 TABLET ORAL at 21:48

## 2017-03-26 RX ADMIN — HALOPERIDOL 10 MG: 2 SOLUTION ORAL at 08:25

## 2017-03-26 RX ADMIN — SERTRALINE HYDROCHLORIDE 50 MG: 50 TABLET ORAL at 08:26

## 2017-03-26 RX ADMIN — HALOPERIDOL 10 MG: 2 SOLUTION ORAL at 21:49

## 2017-03-26 RX ADMIN — BENZTROPINE MESYLATE 1 MG: 1 TABLET ORAL at 08:26

## 2017-03-26 NOTE — BH NOTES
Pt resting quietly in his room. Pt appears pre occupied at this time. No acute distress noted at this time.

## 2017-03-26 NOTE — BH NOTES
PSYCHIATRIC PROGRESS NOTE         Patient Name  Ary Sandra   Date of Birth 1970   [facility] [id number]   Medical Record Number  [id number]      Age  49 y.o. PCP PROVIDER UNKNOWN   Admit date:  3/14/2017    Room Number  732/01  @ [facility]   Date of Service  3/26/2017          PSYCHOTHERAPY SESSION NOTE:  Length of psychotherapy session: 20 minutes    Main condition/diagnosis/issues treated during session today, 3/26/2017 :medication non compliance, paranoia, internal stimuli, poor coping skills    I employed Cognitive Behavioral therapy techniques, Reality-Oriented psychotherapy, as well as supportive psychotherapy in regards to various ongoing psychosocial stressors, including the following: pre-admission and current problems; housing issues; academic issues; medical issues; and stress of hospitalization. Interpersonal relationship issues and psychodynamic conflicts explored. Attempts made to alleviate maladaptive patterns. Overall, patient is not progressing    Treatment Plan Update (reviewed an updated 3/26/2017) : I will modify psychotherapy tx plan by implementing more stress management strategies, building upon cognitive behavioral techniques, increasing coping skills, as well as shoring up psychological defenses). An extended energy and skill set was needed to engage pt in psychotherapy due to some of the following: resistiveness, complexity, negativity, confrontational nature, hostile behaviors, and/or severe abnormalities in thought processes/psychosis resulting in the loss of expressive/receptive language communication skills. E & M PROGRESS NOTE:         HISTORY       CC:  \"SI, poor self care, medication non compliance resulting in catatonia \"  HISTORY OF PRESENT ILLNESS/INTERVAL HISTORY:  (reviewed/updated 3/26/2017).   per initial evaluation:     Ary Sandra presents/reports/evidences the following emotional symptoms today, 3/26/2017:depression and psychotic behavior. The above symptoms have been present for years . These symptoms are of severe severity. The symptoms are constant  in nature. Additional symptomatology and features include psychosis. 3/17/17- Patient was court approved for forced medications. When this was explained to him he had a very sudden change of affect (from blunted to animated with a sheepish grin). He whispered responses to questions and appeared to be amused by this. He had moments of thought blocking and eye movements indicating RTIS. Will increase haldol. Will start clozapine when patient demonstrates ability to consistently comply with oral medications. 3/18/17- medication compliant. Still RTIS and internally preoccupied. 3/19/17- refused team meeting today. Still internally preoccupied. Has a good visit w/ family. Continues to comply w/ meds. 3/20/17- More compliant with unit rules. Still isolative and internally preoccupied with thoughts blocking. Will increase haaldol dec  3/22/17- Isolative and selectively mute. Still appears to have internal stimuli- but much less. Poor hygiene. Will contact sister and  to determine where we are from baseline. 3/23/17- Eating well and getting good sleep. Still internally preoccupied, with sparse speech and thought blocking.  to visit to determine how close pt is to baseline. 3/24/17- Patient is more goal directed in his thoughts. Reports on questioning that his mood is a bit depressed. Will add zoloft. 3/25- isolative, bizarre behavior, guarded and paranoid, sleep is improved. Refusing labs  3/26- evidence of thought block, speaking softly, paranoid delusional themes+, responding to internal stimuli. SIDE EFFECTS: (reviewed/updated 3/26/2017)  None reported or admitted to.   No noted toxicity with use of Depakote/Tegretol/lithium/Clozaril/TCAs   ALLERGIES:(reviewed/updated 3/26/2017)  Allergies   Allergen Reactions    Fluphenazine Unknown (comments) Pt is unable to communicate properly.  Penicillins Unknown (comments)     Pt is unable to communicate properly. MEDICATIONS PRIOR TO ADMISSION:(reviewed/updated 3/26/2017)  Prescriptions Prior to Admission   Medication Sig    divalproex ER (DEPAKOTE ER) 500 mg ER tablet Take 1,000 mg by mouth two (2) times a day. Indications: MOOD    cloZAPine 200 mg tablet Take 600 mg by mouth nightly. Indications: TREATMENT-RESISTANT SCHIZOPHRENIA    cloZAPine 200 mg tablet Take 200 mg by mouth daily. Indications: TREATMENT-RESISTANT SCHIZOPHRENIA    haloperidol decanoate (HALDOL DECANOATE) 100 mg/mL injection 100 mg by IntraMUSCular route every twenty-eight (28) days. Indications: SCHIZOPHRENIA    traZODone (DESYREL) 100 mg tablet Take 100 mg by mouth nightly as needed for Sleep. Indications: sleep    benztropine (COGENTIN) 2 mg tablet Take 2 mg by mouth two (2) times a day. Indications: extrapyramidal disease      PAST MEDICAL HISTORY: Past medical history from the initial psychiatric evaluation has been reviewed (reviewed/updated 3/26/2017) with no additional updates (I asked patient and no additional past medical history provided). Past Medical History:   Diagnosis Date    Psychiatric disorder     Psychotic disorder     Withdrawal syndrome ([facility] [address]    History reviewed. No pertinent surgical history. SOCIAL HISTORY: Social history from the initial psychiatric evaluation has been reviewed (reviewed/updated 3/26/2017) with no additional updates (I asked patient and no additional social history provided). Social History     Social History    Marital status: UNKNOWN     Spouse name: N/A    Number of children: N/A    Years of education: N/A     Occupational History    Not on file.      Social History Main Topics    Smoking status: Never Smoker    Smokeless tobacco: Not on file    Alcohol use No    Drug use: Not on file    Sexual activity: Not on file     Other Topics Concern    Not on file     Social History Narrative    49year old single  male admitted on TDO for worsening schizophrenia with catatonic symptoms. Medication compliance has been questionable. Pt was last admitted to [facility] in 2015 and successfully discharged to [facility] on clozapine, haldol dec, trazodone and depakote. Pt is a HS grad and is unemployed. He lives with his mother. FAMILY HISTORY: Family history from the initial psychiatric evaluation has been reviewed (reviewed/updated 3/26/2017) with no additional updates (I asked patient and no additional family history provided). History reviewed. No pertinent family history. REVIEW OF SYSTEMS: (reviewed/updated 3/26/2017)  Appetite:no change from normal   Sleep: 5 hrs  All other Review of Systems: Psychological ROS: positive for - hallucinations  Respiratory ROS: no cough, shortness of breath, or wheezing  Cardiovascular ROS: no chest pain or dyspnea on exertion         [facility] (MSE):    MSE FINDINGS ARE WITHIN NORMAL LIMITS (WNL) UNLESS OTHERWISE STATED BELOW. ( ALL OF THE BELOW CATEGORIES OF THE MSE HAVE BEEN REVIEWED (reviewed 3/26/2017) AND UPDATED AS DEEMED APPROPRIATE )  General Presentation age appropriate, evasive, guarded and uncooperative   Orientation disorganized   Vital Signs  See below (reviewed 3/26/2017); Vital Signs (BP, Pulse, & Temp) are within normal limits if not listed below.    Gait and Station Stable/steady, no ataxia   Musculoskeletal System No extrapyramidal symptoms (EPS); no abnormal muscular movements or Tardive Dyskinesia (TD); muscle strength and tone are within normal limits   Language No aphasia or dysarthria   Speech:  Soft, incoherent   Thought Processes Willis ; slow rate of thoughts; poor abstract reasoning/computation   Thought Associations Thought block   Thought Content paranoid delusions   Suicidal Ideations none   Homicidal Ideations none   Mood:  irritable, labile   Affect: mood-congruent   Memory recent  impaired   Memory remote:  impaired   Concentration/Attention:  poor   Fund of Knowledge average   Insight:  poor   Reliability poor   Judgment:  poor          VITALS:     Patient Vitals for the past 24 hrs:   Temp Pulse Resp BP SpO2   03/26/17 0808 97.9 °F (36.6 °C) 79 16 107/78 98 %     Wt Readings from Last 3 Encounters:   03/25/17 95.6 kg (210 lb 11.2 oz)   02/09/15 97.5 kg (215 lb)   02/07/15 97.5 kg (215 lb)     Temp Readings from Last 3 Encounters:   03/26/17 97.9 °F (36.6 °C)   02/07/15 98.7 °F (37.1 °C)     BP Readings from Last 3 Encounters:   03/26/17 107/78   02/12/15 105/73   02/07/15 148/79     Pulse Readings from Last 3 Encounters:   03/26/17 79   02/12/15 87   02/07/15 (!) 111            DATA     LABORATORY DATA:(reviewed/updated 3/26/2017)  No results found for this or any previous visit (from the past 24 hour(s)). Lab Results   Component Value Date/Time    Valproic acid 62 03/14/2017 11:49 PM     No results found for: LI, LIMALLORY, KATHM   RADIOLOGY REPORTS:(reviewed/updated 3/26/2017)  No results found.        MEDICATIONS     ALL MEDICATIONS:   Current Facility-Administered Medications   Medication Dose Route Frequency    divalproex ER (DEPAKOTE ER) 24 hour tablet 2,000mg +++++Court ordered medication+++++  2,000 mg Oral QHS    Or    LORazepam (ATIVAN) injection 1 mg  1 mg IntraMUSCular QHS    sertraline (ZOLOFT) tablet 50 mg  50 mg Oral DAILY    LORazepam (ATIVAN) tablet 0.5 mg  0.5 mg Oral BID    benztropine (COGENTIN) tablet 1 mg  1 mg Oral BID    [START ON 4/17/2017] haloperidol decanoate (HALDOL DECANOATE) 100 mg/mL injection 200 mg  200 mg IntraMUSCular Q28D    haloperidol (HALDOL) 2 mg/mL oral solution 10 mg+++++Court ordered medication+++++  10 mg Oral TID    Or    haloperidol lactate (HALDOL) injection 5 mg  5 mg IntraMUSCular TID    ziprasidone (GEODON) 20 mg in sterile water (preservative free) 1 mL injection  20 mg IntraMUSCular BID PRN    OLANZapine (ZyPREXA) tablet 5 mg  5 mg Oral Q6H PRN    benztropine (COGENTIN) tablet 2 mg  2 mg Oral BID PRN    benztropine (COGENTIN) injection 2 mg  2 mg IntraMUSCular BID PRN    LORazepam (ATIVAN) injection 2 mg  2 mg IntraMUSCular Q4H PRN    LORazepam (ATIVAN) tablet 1 mg  1 mg Oral Q4H PRN    zolpidem (AMBIEN) tablet 10 mg  10 mg Oral QHS PRN    acetaminophen (TYLENOL) tablet 650 mg  650 mg Oral Q4H PRN    ibuprofen (MOTRIN) tablet 400 mg  400 mg Oral Q8H PRN    magnesium hydroxide (MILK OF MAGNESIA) 400 mg/5 mL oral suspension 30 mL  30 mL Oral DAILY PRN    nicotine (NICODERM CQ) 21 mg/24 hr patch 1 Patch  1 Patch TransDERmal DAILY PRN      SCHEDULED MEDICATIONS:   Current Facility-Administered Medications   Medication Dose Route Frequency    divalproex ER (DEPAKOTE ER) 24 hour tablet 2,000mg +++++Court ordered medication+++++  2,000 mg Oral QHS    Or    LORazepam (ATIVAN) injection 1 mg  1 mg IntraMUSCular QHS    sertraline (ZOLOFT) tablet 50 mg  50 mg Oral DAILY    LORazepam (ATIVAN) tablet 0.5 mg  0.5 mg Oral BID    benztropine (COGENTIN) tablet 1 mg  1 mg Oral BID    [START ON 4/17/2017] haloperidol decanoate (HALDOL DECANOATE) 100 mg/mL injection 200 mg  200 mg IntraMUSCular Q28D    haloperidol (HALDOL) 2 mg/mL oral solution 10 mg+++++Court ordered medication+++++  10 mg Oral TID    Or    haloperidol lactate (HALDOL) injection 5 mg  5 mg IntraMUSCular TID          ASSESSMENT & PLAN     DIAGNOSES REQUIRING ACTIVE TREATMENT AND MONITORING: (reviewed/updated 3/26/2017)  Patient Active Hospital Problem List:   Schizophrenia [facility]) (3/15/2017)    Assessment: delusions, formal thought disorder, paranoia, bizarre behavior    Plan: increase haldol dec. Add po haldol- as pt is refusing clozapine and if it is to be given needs to be restarted at 50 mg /day. For forced med hearing.     3/26- remains paranoid, labile in mood- ct to adjust med- titrate Depakote ( pt has been reported to be on 1000 mg bid in the past) , refusing lab, consider augmentation with second AP, tx resistant     (Depakote, Tegretol, lithium, clozapine---a drug with a narrow therapeutic index= NTI) and associated labs for drug therapy implemented that require intense monitoring for toxicity as deemed appropriate based on current medication side effects and pharmacodynamically determined drug 1/2 lives. In summary, Albania Friedman, is a 49 y.o.  male who presents with a severe exacerbation of the principal diagnosis of Schizophrenia ([facility] [address]  Patient's condition is worsening/not improving/not stable Patient requires continued inpatient hospitalization for further stabilization, safety monitoring and medication management. I will continue to coordinate the provision of individual, milieu, occupational, group, and substance abuse therapies to address target symptoms/diagnoses as deemed appropriate for the individual patient. A coordinated, multidisplinary treatment team round was conducted with the patient (this team consists of the nurse, psychiatric unit pharmcist,  and writer). Complete current electronic health record for patient has been reviewed today including consultant notes, ancillary staff notes, nurses and psychiatric tech notes. Suicide risk assessment completed and patient deemed to be of low risk for suicide at this time. The following regarding medications was addressed during rounds with patient:   the risks and benefits of the proposed medication. The patient was given the opportunity to ask questions. Informed consent given to the use of the above medications. Will continue to adjust psychiatric and non-psychiatric medications (see above \"medication\" section and orders section for details) as deemed appropriate & based upon diagnoses and response to treatment.      I will continue to order blood tests/labs and diagnostic tests as deemed appropriate and review results as they become available (see orders for details and above listed lab/test results). I will order psychiatric records from previous [facility] hospitals to further elucidate the nature of patient's psychopathology and review once available. I will gather additional collateral information from friends, family and o/p treatment team to further elucidate the nature of patient's psychopathology and baselline level of psychiatric functioning. I certify that this patient's inpatient psychiatric hospital services furnished since the previous certification were, and continue to be, required for treatment that could reasonably be expected to improve the patient's condition, or for diagnostic study, and that the patient continues to need, on a daily basis, active treatment furnished directly by or requiring the supervision of inpatient psychiatric facility personnel. In addition, the hospital records show that services furnished were intensive treatment services, admission or related services, or equivalent services. EXPECTED DISCHARGE DATE/DAY: TBD     DISPOSITION: Home     Patient's family notified of forced med hearing. They do not want to be in the position to be the enforcers of forced meds for pt, but feel strongly that pt needs forced meds. They will support court ordered meds. Pt not refusing on L[employer] day or moral grounds.   Signed By:   Peter Ramirez MD  3/26/2017

## 2017-03-26 NOTE — PROGRESS NOTES
Problem: Altered Thought Process (Adult/Pediatric)  Goal: *STG: Complies with medication therapy  Outcome: Progressing Towards Goal  Received pt resting. Pt denies SI. Continue to encourage and educate.

## 2017-03-26 NOTE — BH NOTES
Behavioral Health Interdisciplinary Rounds     Patient Name: Joe Duong  Age: 49 y.o. Room/Bed:  732/  Primary Diagnosis: Schizophrenia ([facility] [address]   Admission Status: Involuntary Commitment     Readmission within 30 days: no  Power of  in place: no  Patient requires a blocked bed: yes          Reason for blocked bed: Behavior    VTE Prophylaxis: Not indicated  Influenza vaccine screen completed: yes Influenza vaccine given: no-pt refused  Mobility needs/Fall risk: no    Nutritional Plan: no  Consults:          Labs/Testing due today?: no    Sleep hours: 5.75       Participation in Care/Groups:  no  Medication Compliant?: Yes  PRNS (last 24 hours): None    Restraints (last 24 hours):  no  Substance Abuse:  no  CIWA (range last 24 hours):  COWS (range last 24 hours):   Alcohol screening (AUDIT) completed -  AUDIT Score: 0  If applicable, date SBIRT discussed in treatment team AND documented:   Tobacco - patient is a smoker: no   Date tobacco education completed by RN: n/a  24 hour chart check complete: yes     Patient goal(s) for today:   Treatment team focus/goals:   LOS:  11  Expected LOS: TBD  Financial concerns/prescription coverage:    Date of last family contact:       Family requesting physician contact today:   Discharge plan: TBD       Outpatient provider(s):     Participating treatment team members:  Joe Duong,

## 2017-03-27 PROCEDURE — 74011250637 HC RX REV CODE- 250/637: Performed by: PSYCHIATRY & NEUROLOGY

## 2017-03-27 PROCEDURE — 74011250637 HC RX REV CODE- 250/637

## 2017-03-27 PROCEDURE — 65220000003 HC RM SEMIPRIVATE PSYCH

## 2017-03-27 RX ORDER — HALOPERIDOL 10 MG/1
10 TABLET ORAL 2 TIMES DAILY
Status: DISCONTINUED | OUTPATIENT
Start: 2017-03-27 | End: 2017-03-28

## 2017-03-27 RX ORDER — HALOPERIDOL 5 MG/ML
5 INJECTION INTRAMUSCULAR 2 TIMES DAILY
Status: DISCONTINUED | OUTPATIENT
Start: 2017-03-27 | End: 2017-03-28

## 2017-03-27 RX ADMIN — ZOLPIDEM TARTRATE 10 MG: 10 TABLET ORAL at 01:29

## 2017-03-27 RX ADMIN — HALOPERIDOL 10 MG: 10 TABLET ORAL at 17:28

## 2017-03-27 RX ADMIN — BENZTROPINE MESYLATE 1 MG: 1 TABLET ORAL at 08:48

## 2017-03-27 RX ADMIN — SERTRALINE HYDROCHLORIDE 50 MG: 50 TABLET ORAL at 08:48

## 2017-03-27 RX ADMIN — HALOPERIDOL 10 MG: 2 SOLUTION ORAL at 08:48

## 2017-03-27 RX ADMIN — DIVALPROEX SODIUM 2000 MG: 500 TABLET, EXTENDED RELEASE ORAL at 21:35

## 2017-03-27 RX ADMIN — LORAZEPAM 0.5 MG: 0.5 TABLET ORAL at 08:48

## 2017-03-27 RX ADMIN — BENZTROPINE MESYLATE 1 MG: 1 TABLET ORAL at 21:35

## 2017-03-27 NOTE — INTERDISCIPLINARY ROUNDS
Behavioral Health Interdisciplinary Rounds     Patient Name: Checo Clark  Age: 49 y.o. Room/Bed:  732/01  [facility]: [facility]   Admission Status: Involuntary Commitment     Readmission within 30 days: no  Power of  in place: no  Patient requires a blocked bed: yes          Reason for blocked bed: Behavior    VTE Prophylaxis: Not indicated  Influenza vaccine screen completed: yes Influenza vaccine given: no -Pt refused  Mobility needs/Fall risk: no    Nutritional Plan: no  Consults:          Labs/Testing due today?: no    Sleep hours: 4.75       Participation in Care/Groups: No  Medication Compliant?: Yes  PRNS (last 24 hours): None    Restraints (last 24 hours):  no  Substance Abuse:  no  CIWA (range last 24 hours):  COWS (range last 24 hours):   Alcohol screening (AUDIT) completed -  AUDIT Score: 0  If applicable, date SBIRT discussed in treatment team AND documented:   Tobacco - patient is a smoker: no   Date tobacco education completed by RN: n/a  24 hour chart check complete: yes     Patient goal(s) for today:   Treatment team focus/goals: Plan for discharge today   LOS:  12  Expected LOS: plan for discharge today   Financial concerns/prescription coverage:    Date of last family contact: SW spoke to his sister this am.        Family requesting physician contact today:    Discharge plan: He will live with his sister        Outpatient provider(s): [phone]     Participating treatment team members:  Gregoria Schaumann Dr. Avie Bean, RN

## 2017-03-27 NOTE — PROGRESS NOTES
Problem: Altered Thought Process (Adult/Pediatric)  Goal: *STG: Decreased hallucinations  Outcome: Progressing Towards Goal  Appears to be responding to internal stimuli. Has been isolative and speaks softly with limited verbal interaction. Has been medication compliant. Behavior and mannerism are bizarre. No agitation or aggression observed.

## 2017-03-27 NOTE — PROGRESS NOTES
Problem: Altered Thought Process (Adult/Pediatric)  Goal: *STG: Complies with medication therapy  Outcome: Progressing Towards Goal  Patient is medication complaint. Pt remains delusional. Paranoid. Blunted. NAD. Q-15 checks for safety. Goal: Interventions  Outcome: Not Progressing Towards Goal  Staff encourage pt to participate in group activities.

## 2017-03-27 NOTE — BH NOTES
PSYCHIATRIC PROGRESS NOTE         Patient Name  Rajni Tinoco   Date of Birth 1970   [facility] [id number]   Medical Record Number  [id number]      Age  49 y.o. PCP PROVIDER UNKNOWN   Admit date:  3/14/2017    Room Number  732/01  @ [facility]   Date of Service  3/27/2017          PSYCHOTHERAPY SESSION NOTE:  Length of psychotherapy session: 20 minutes    Main condition/diagnosis/issues treated during session today, 3/27/2017 :medication non compliance, paranoia, internal stimuli, poor coping skills    I employed Cognitive Behavioral therapy techniques, Reality-Oriented psychotherapy, as well as supportive psychotherapy in regards to various ongoing psychosocial stressors, including the following: pre-admission and current problems; housing issues; academic issues; medical issues; and stress of hospitalization. Interpersonal relationship issues and psychodynamic conflicts explored. Attempts made to alleviate maladaptive patterns. Overall, patient is not progressing    Treatment Plan Update (reviewed an updated 3/27/2017) : I will modify psychotherapy tx plan by implementing more stress management strategies, building upon cognitive behavioral techniques, increasing coping skills, as well as shoring up psychological defenses). An extended energy and skill set was needed to engage pt in psychotherapy due to some of the following: resistiveness, complexity, negativity, confrontational nature, hostile behaviors, and/or severe abnormalities in thought processes/psychosis resulting in the loss of expressive/receptive language communication skills. E & M PROGRESS NOTE:         HISTORY       CC:  \"SI, poor self care, medication non compliance resulting in catatonia \"  HISTORY OF PRESENT ILLNESS/INTERVAL HISTORY:  (reviewed/updated 3/27/2017).   per initial evaluation:     Rajni Tinoco presents/reports/evidences the following emotional symptoms today, 3/27/2017:depression and psychotic behavior. The above symptoms have been present for years . These symptoms are of severe severity. The symptoms are constant  in nature. Additional symptomatology and features include psychosis. 3/17/17- Patient was court approved for forced medications. When this was explained to him he had a very sudden change of affect (from blunted to animated with a sheepish grin). He whispered responses to questions and appeared to be amused by this. He had moments of thought blocking and eye movements indicating RTIS. Will increase haldol. Will start clozapine when patient demonstrates ability to consistently comply with oral medications. 3/18/17- medication compliant. Still RTIS and internally preoccupied. 3/19/17- refused team meeting today. Still internally preoccupied. Has a good visit w/ family. Continues to comply w/ meds. 3/20/17- More compliant with unit rules. Still isolative and internally preoccupied with thoughts blocking. Will increase haaldol dec  3/22/17- Isolative and selectively mute. Still appears to have internal stimuli- but much less. Poor hygiene. Will contact sister and  to determine where we are from baseline. 3/23/17- Eating well and getting good sleep. Still internally preoccupied, with sparse speech and thought blocking.  to visit to determine how close pt is to baseline. 3/24/17- Patient is more goal directed in his thoughts. Reports on questioning that his mood is a bit depressed. Will add zoloft. 3/25- isolative, bizarre behavior, guarded and paranoid, sleep is improved. Refusing labs  3/26- evidence of thought block, speaking softly, paranoid delusional themes+, responding to internal stimuli. 3/27/17- seen by . Appears to be at baseline. Has benefitted from Zoloft and haldol dec      SIDE EFFECTS: (reviewed/updated 3/27/2017)  None reported or admitted to.   No noted toxicity with use of Depakote/Tegretol/lithium/Clozaril/TCAs ALLERGIES:(reviewed/updated 3/27/2017)  Allergies   Allergen Reactions    Fluphenazine Unknown (comments)     Pt is unable to communicate properly.  Penicillins Unknown (comments)     Pt is unable to communicate properly. MEDICATIONS PRIOR TO ADMISSION:(reviewed/updated 3/27/2017)  Prescriptions Prior to Admission   Medication Sig    divalproex ER (DEPAKOTE ER) 500 mg ER tablet Take 1,000 mg by mouth two (2) times a day. Indications: MOOD    cloZAPine 200 mg tablet Take 600 mg by mouth nightly. Indications: TREATMENT-RESISTANT SCHIZOPHRENIA    cloZAPine 200 mg tablet Take 200 mg by mouth daily. Indications: TREATMENT-RESISTANT SCHIZOPHRENIA    haloperidol decanoate (HALDOL DECANOATE) 100 mg/mL injection 100 mg by IntraMUSCular route every twenty-eight (28) days. Indications: SCHIZOPHRENIA    traZODone (DESYREL) 100 mg tablet Take 100 mg by mouth nightly as needed for Sleep. Indications: sleep    benztropine (COGENTIN) 2 mg tablet Take 2 mg by mouth two (2) times a day. Indications: extrapyramidal disease      PAST MEDICAL HISTORY: Past medical history from the initial psychiatric evaluation has been reviewed (reviewed/updated 3/27/2017) with no additional updates (I asked patient and no additional past medical history provided). Past Medical History:   Diagnosis Date    Psychiatric disorder     Psychotic disorder     Withdrawal syndrome ([facility] [address]    History reviewed. No pertinent surgical history. SOCIAL HISTORY: Social history from the initial psychiatric evaluation has been reviewed (reviewed/updated 3/27/2017) with no additional updates (I asked patient and no additional social history provided). Social History     Social History    Marital status: UNKNOWN     Spouse name: N/A    Number of children: N/A    Years of education: N/A     Occupational History    Not on file.      Social History Main Topics    Smoking status: Never Smoker    Smokeless tobacco: Not on file    Alcohol use No  Drug use: Not on file    Sexual activity: Not on file     Other Topics Concern    Not on file     Social History Narrative    49year old single  male admitted on TDO for worsening schizophrenia with catatonic symptoms. Medication compliance has been questionable. Pt was last admitted to [facility] in 2015 and successfully discharged to [facility] on clozapine, haldol dec, trazodone and depakote. Pt is a HS grad and is unemployed. He lives with his mother. FAMILY HISTORY: Family history from the initial psychiatric evaluation has been reviewed (reviewed/updated 3/27/2017) with no additional updates (I asked patient and no additional family history provided). History reviewed. No pertinent family history. REVIEW OF SYSTEMS: (reviewed/updated 3/27/2017)  Appetite:no change from normal   Sleep: 5 hrs  All other Review of Systems: Psychological ROS: positive for - hallucinations  Respiratory ROS: no cough, shortness of breath, or wheezing  Cardiovascular ROS: no chest pain or dyspnea on exertion         [address] ([facility]):    [facility] FINDINGS ARE WITHIN NORMAL LIMITS (WNL) UNLESS OTHERWISE STATED BELOW. ( ALL OF THE BELOW CATEGORIES OF THE MSE HAVE BEEN REVIEWED (reviewed 3/27/2017) AND UPDATED AS DEEMED APPROPRIATE )  General Presentation age appropriate, evasive, guarded and uncooperative   Orientation disorganized   Vital Signs  See below (reviewed 3/27/2017); Vital Signs (BP, Pulse, & Temp) are within normal limits if not listed below.    Gait and Station Stable/steady, no ataxia   Musculoskeletal System No extrapyramidal symptoms (EPS); no abnormal muscular movements or Tardive Dyskinesia (TD); muscle strength and tone are within normal limits   Language No aphasia or dysarthria   Speech:  Soft, incoherent   Thought Processes Cannon ; slow rate of thoughts; poor abstract reasoning/computation   Thought Associations Thought block   Thought Content paranoid delusions   Suicidal Ideations none   Homicidal Ideations none   Mood:  irritable, labile   Affect:  mood-congruent   Memory recent  impaired   Memory remote:  impaired   Concentration/Attention:  poor   Fund of Knowledge average   Insight:  poor   Reliability poor   Judgment:  poor          VITALS:     Patient Vitals for the past 24 hrs:   Temp Pulse Resp BP SpO2   03/27/17 0746 97.6 °F (36.4 °C) 68 18 106/64 97 %   03/27/17 0128 - 65 18 (!) 132/101 97 %   03/26/17 2030 97.1 °F (36.2 °C) 60 16 101/52 97 %     Wt Readings from Last 3 Encounters:   03/25/17 95.6 kg (210 lb 11.2 oz)   02/09/15 97.5 kg (215 lb)   02/07/15 97.5 kg (215 lb)     Temp Readings from Last 3 Encounters:   03/27/17 97.6 °F (36.4 °C)   02/07/15 98.7 °F (37.1 °C)     BP Readings from Last 3 Encounters:   03/27/17 106/64   02/12/15 105/73   02/07/15 148/79     Pulse Readings from Last 3 Encounters:   03/27/17 68   02/12/15 87   02/07/15 (!) 111            DATA     LABORATORY DATA:(reviewed/updated 3/27/2017)  No results found for this or any previous visit (from the past 24 hour(s)). Lab Results   Component Value Date/Time    Valproic acid 62 03/14/2017 11:49 PM     No results found for: LI, LIH, LITHM   RADIOLOGY REPORTS:(reviewed/updated 3/27/2017)  No results found.        MEDICATIONS     ALL MEDICATIONS:   Current Facility-Administered Medications   Medication Dose Route Frequency    haloperidol (HALDOL) tablet 10mg +++++court ordered medication+++++  10 mg Oral BID    Or    haloperidol lactate (HALDOL) injection 5 mg  5 mg IntraMUSCular BID    divalproex ER (DEPAKOTE ER) 24 hour tablet 2,000mg +++++Court ordered medication+++++  2,000 mg Oral QHS    Or    LORazepam (ATIVAN) injection 1 mg  1 mg IntraMUSCular QHS    sertraline (ZOLOFT) tablet 50 mg  50 mg Oral DAILY    benztropine (COGENTIN) tablet 1 mg  1 mg Oral BID    [START ON 4/17/2017] haloperidol decanoate (HALDOL DECANOATE) 100 mg/mL injection 200 mg  200 mg IntraMUSCular Q28D    ziprasidone (GEODON) 20 mg in sterile water (preservative free) 1 mL injection  20 mg IntraMUSCular BID PRN    OLANZapine (ZyPREXA) tablet 5 mg  5 mg Oral Q6H PRN    benztropine (COGENTIN) tablet 2 mg  2 mg Oral BID PRN    benztropine (COGENTIN) injection 2 mg  2 mg IntraMUSCular BID PRN    LORazepam (ATIVAN) injection 2 mg  2 mg IntraMUSCular Q4H PRN    LORazepam (ATIVAN) tablet 1 mg  1 mg Oral Q4H PRN    zolpidem (AMBIEN) tablet 10 mg  10 mg Oral QHS PRN    acetaminophen (TYLENOL) tablet 650 mg  650 mg Oral Q4H PRN    ibuprofen (MOTRIN) tablet 400 mg  400 mg Oral Q8H PRN    magnesium hydroxide (MILK OF MAGNESIA) 400 mg/5 mL oral suspension 30 mL  30 mL Oral DAILY PRN    nicotine (NICODERM CQ) 21 mg/24 hr patch 1 Patch  1 Patch TransDERmal DAILY PRN      SCHEDULED MEDICATIONS:   Current Facility-Administered Medications   Medication Dose Route Frequency    haloperidol (HALDOL) tablet 10mg +++++court ordered medication+++++  10 mg Oral BID    Or    haloperidol lactate (HALDOL) injection 5 mg  5 mg IntraMUSCular BID    divalproex ER (DEPAKOTE ER) 24 hour tablet 2,000mg +++++Court ordered medication+++++  2,000 mg Oral QHS    Or    LORazepam (ATIVAN) injection 1 mg  1 mg IntraMUSCular QHS    sertraline (ZOLOFT) tablet 50 mg  50 mg Oral DAILY    benztropine (COGENTIN) tablet 1 mg  1 mg Oral BID    [START ON 4/17/2017] haloperidol decanoate (HALDOL DECANOATE) 100 mg/mL injection 200 mg  200 mg IntraMUSCular Q28D          ASSESSMENT & PLAN     DIAGNOSES REQUIRING ACTIVE TREATMENT AND MONITORING: (reviewed/updated 3/27/2017)  Patient Active Hospital Problem List:   Schizophrenia [facility]) (3/15/2017)    Assessment: delusions, formal thought disorder, paranoia, bizarre behavior    Plan: increase haldol dec. Add po haldol- as pt is refusing clozapine and if it is to be given needs to be restarted at 50 mg /day. For forced med hearing.     3/26- remains paranoid, labile in mood- ct to adjust med- titrate Depakote ( pt has been reported to be on 1000 mg bid in the past) , refusing lab, consider augmentation with second AP, tx resistant     (Depakote, Tegretol, lithium, clozapine---a drug with a narrow therapeutic index= NTI) and associated labs for drug therapy implemented that require intense monitoring for toxicity as deemed appropriate based on current medication side effects and pharmacodynamically determined drug 1/2 lives. In summary, Quin Gordillo, is a 49 y.o.  male who presents with a severe exacerbation of the principal diagnosis of Schizophrenia ([facility] [address]  Patient's condition is worsening/not improving/not stable Patient requires continued inpatient hospitalization for further stabilization, safety monitoring and medication management. I will continue to coordinate the provision of individual, milieu, occupational, group, and substance abuse therapies to address target symptoms/diagnoses as deemed appropriate for the individual patient. A coordinated, multidisplinary treatment team round was conducted with the patient (this team consists of the nurse, psychiatric unit pharmcist,  and writer). Complete current electronic health record for patient has been reviewed today including consultant notes, ancillary staff notes, nurses and psychiatric tech notes. Suicide risk assessment completed and patient deemed to be of low risk for suicide at this time. The following regarding medications was addressed during rounds with patient:   the risks and benefits of the proposed medication. The patient was given the opportunity to ask questions. Informed consent given to the use of the above medications. Will continue to adjust psychiatric and non-psychiatric medications (see above \"medication\" section and orders section for details) as deemed appropriate & based upon diagnoses and response to treatment.      I will continue to order blood tests/labs and diagnostic tests as deemed appropriate and review results as they become available (see orders for details and above listed lab/test results). I will order psychiatric records from previous [facility] hospitals to further elucidate the nature of patient's psychopathology and review once available. I will gather additional collateral information from friends, family and o/p treatment team to further elucidate the nature of patient's psychopathology and baselline level of psychiatric functioning. I certify that this patient's inpatient psychiatric hospital services furnished since the previous certification were, and continue to be, required for treatment that could reasonably be expected to improve the patient's condition, or for diagnostic study, and that the patient continues to need, on a daily basis, active treatment furnished directly by or requiring the supervision of inpatient psychiatric facility personnel. In addition, the hospital records show that services furnished were intensive treatment services, admission or related services, or equivalent services. EXPECTED DISCHARGE DATE/DAY: TBD     DISPOSITION: Home     Patient's family notified of forced med hearing. They do not want to be in the position to be the enforcers of forced meds for pt, but feel strongly that pt needs forced meds. They will support court ordered meds. Pt not refusing on Holiness or moral grounds.   Signed By:   Claudetta Bi, MD  3/27/2017

## 2017-03-27 NOTE — BH NOTES
PRN Medication Documentation    Specific patient behavior that led to need for PRN medication: Patient is restless and unable to sleep. Staff interventions attempted prior to PRN being given: Decreased stimulations and light dimmed. PRN medication given: Ambien 10mg po. Patient response/effectiveness of PRN medication: Will assess later. 0230 Medication effective, patient sleeping.

## 2017-03-27 NOTE — BH NOTES
GROUP THERAPY PROGRESS NOTE    Roger Armijo did not participate in an Afternoon Process Group with a focus on identifying feelings and planning for the rest of the day.

## 2017-03-27 NOTE — PROGRESS NOTES
Problem: Falls - Risk of  Goal: *Absence of falls  Outcome: Progressing Towards Goal  In bed asleep. NAD. No falls noted/reported. Bathroom light is on  And Q 15 minute checks maintained for safety. 0123 - Pt c/o inability to sleep due to \"Legs moving\". No observable movement of legs noted. VS assessed prior to administering Ambien due to low diastolic pressure earlier. Diastolic pressure was increased; therefore Ambien 10 mg PO administered. (See MAR & VS flow sheet).

## 2017-03-28 VITALS
BODY MASS INDEX: 29.5 KG/M2 | DIASTOLIC BLOOD PRESSURE: 68 MMHG | TEMPERATURE: 97.5 F | RESPIRATION RATE: 16 BRPM | SYSTOLIC BLOOD PRESSURE: 100 MMHG | OXYGEN SATURATION: 95 % | HEART RATE: 67 BPM | WEIGHT: 210.7 LBS | HEIGHT: 71 IN

## 2017-03-28 LAB
ALBUMIN SERPL BCP-MCNC: 3.5 G/DL (ref 3.5–5)
ALBUMIN/GLOB SERPL: 1 {RATIO} (ref 1.1–2.2)
ALP SERPL-CCNC: 125 U/L (ref 45–117)
ALT SERPL-CCNC: 39 U/L (ref 12–78)
ANION GAP BLD CALC-SCNC: 9 MMOL/L (ref 5–15)
AST SERPL W P-5'-P-CCNC: 18 U/L (ref 15–37)
BILIRUB SERPL-MCNC: 0.4 MG/DL (ref 0.2–1)
BUN SERPL-MCNC: 14 MG/DL (ref 6–20)
BUN/CREAT SERPL: 17 (ref 12–20)
CALCIUM SERPL-MCNC: 8.8 MG/DL (ref 8.5–10.1)
CHLORIDE SERPL-SCNC: 103 MMOL/L (ref 97–108)
CO2 SERPL-SCNC: 24 MMOL/L (ref 21–32)
CREAT SERPL-MCNC: 0.84 MG/DL (ref 0.7–1.3)
ERYTHROCYTE [DISTWIDTH] IN BLOOD BY AUTOMATED COUNT: 13.5 % (ref 11.5–14.5)
GLOBULIN SER CALC-MCNC: 3.5 G/DL (ref 2–4)
GLUCOSE SERPL-MCNC: 86 MG/DL (ref 65–100)
HCT VFR BLD AUTO: 41 % (ref 36.6–50.3)
HGB BLD-MCNC: 13.7 G/DL (ref 12.1–17)
MCH RBC QN AUTO: 29.1 PG (ref 26–34)
MCHC RBC AUTO-ENTMCNC: 33.4 G/DL (ref 30–36.5)
MCV RBC AUTO: 87.2 FL (ref 80–99)
PLATELET # BLD AUTO: 201 K/UL (ref 150–400)
POTASSIUM SERPL-SCNC: 4 MMOL/L (ref 3.5–5.1)
PROT SERPL-MCNC: 7 G/DL (ref 6.4–8.2)
RBC # BLD AUTO: 4.7 M/UL (ref 4.1–5.7)
SODIUM SERPL-SCNC: 136 MMOL/L (ref 136–145)
VALPROATE SERPL-MCNC: 89 UG/ML (ref 50–100)
WBC # BLD AUTO: 5.6 K/UL (ref 4.1–11.1)

## 2017-03-28 PROCEDURE — 36415 COLL VENOUS BLD VENIPUNCTURE: CPT | Performed by: PSYCHIATRY & NEUROLOGY

## 2017-03-28 PROCEDURE — 85027 COMPLETE CBC AUTOMATED: CPT | Performed by: PSYCHIATRY & NEUROLOGY

## 2017-03-28 PROCEDURE — 36600 WITHDRAWAL OF ARTERIAL BLOOD: CPT

## 2017-03-28 PROCEDURE — 80053 COMPREHEN METABOLIC PANEL: CPT | Performed by: PSYCHIATRY & NEUROLOGY

## 2017-03-28 PROCEDURE — 74011250637 HC RX REV CODE- 250/637: Performed by: PSYCHIATRY & NEUROLOGY

## 2017-03-28 PROCEDURE — 80164 ASSAY DIPROPYLACETIC ACD TOT: CPT | Performed by: PSYCHIATRY & NEUROLOGY

## 2017-03-28 PROCEDURE — 74011250637 HC RX REV CODE- 250/637

## 2017-03-28 RX ORDER — LORAZEPAM 1 MG/1
1 TABLET ORAL ONCE
Status: COMPLETED | OUTPATIENT
Start: 2017-03-28 | End: 2017-03-28

## 2017-03-28 RX ORDER — SERTRALINE HYDROCHLORIDE 50 MG/1
50 TABLET, FILM COATED ORAL DAILY
Qty: 30 TAB | Refills: 0 | Status: ON HOLD | OUTPATIENT
Start: 2017-03-28 | End: 2017-11-27

## 2017-03-28 RX ORDER — HALOPERIDOL DECANOATE 100 MG/ML
200 INJECTION INTRAMUSCULAR
Qty: 2 ML | Refills: 0 | Status: ON HOLD | OUTPATIENT
Start: 2017-04-17 | End: 2020-09-21

## 2017-03-28 RX ORDER — HALOPERIDOL 5 MG/1
15 TABLET ORAL
Qty: 42 TAB | Refills: 0 | Status: ON HOLD | OUTPATIENT
Start: 2017-03-28 | End: 2017-11-27

## 2017-03-28 RX ORDER — HALOPERIDOL 5 MG/ML
5 INJECTION INTRAMUSCULAR
Status: DISCONTINUED | OUTPATIENT
Start: 2017-03-28 | End: 2017-03-28

## 2017-03-28 RX ORDER — DIVALPROEX SODIUM 500 MG/1
2000 TABLET, EXTENDED RELEASE ORAL
Qty: 120 TAB | Refills: 0 | Status: ON HOLD | OUTPATIENT
Start: 2017-03-28 | End: 2017-11-27

## 2017-03-28 RX ORDER — BENZTROPINE MESYLATE 2 MG/1
2 TABLET ORAL
Status: DISCONTINUED | OUTPATIENT
Start: 2017-03-28 | End: 2017-03-28 | Stop reason: HOSPADM

## 2017-03-28 RX ORDER — BENZTROPINE MESYLATE 2 MG/1
2 TABLET ORAL
Qty: 30 TAB | Refills: 0 | Status: SHIPPED | OUTPATIENT
Start: 2017-03-28 | End: 2018-01-24

## 2017-03-28 RX ORDER — HALOPERIDOL 5 MG/ML
5 INJECTION INTRAMUSCULAR
Status: DISCONTINUED | OUTPATIENT
Start: 2017-03-28 | End: 2017-03-28 | Stop reason: HOSPADM

## 2017-03-28 RX ADMIN — LORAZEPAM 1 MG: 1 TABLET ORAL at 04:25

## 2017-03-28 RX ADMIN — SERTRALINE HYDROCHLORIDE 50 MG: 50 TABLET ORAL at 09:11

## 2017-03-28 RX ADMIN — LORAZEPAM 1 MG: 1 TABLET ORAL at 10:49

## 2017-03-28 RX ADMIN — BENZTROPINE MESYLATE 1 MG: 1 TABLET ORAL at 09:11

## 2017-03-28 RX ADMIN — HALOPERIDOL 10 MG: 10 TABLET ORAL at 09:11

## 2017-03-28 NOTE — BH NOTES
Pt discharged per MD order. All belongings returned, upon discharge. Pt verbally contracts for safety. Copy of discharge instructions/prescriptions given to patient and pt's sister, who verbalizes understanding. Pt ambulatory off the unit with his sister.

## 2017-03-28 NOTE — PROGRESS NOTES
Laboratory Monitoring for Antipsychotics and Mood Stabilizers    This patient is currently prescribed the following medication(s):   Current Facility-Administered Medications   Medication Dose Route Frequency    benztropine (COGENTIN) tablet 2 mg  2 mg Oral QHS    haloperidol (HALDOL) tablet 15 mg+++++Court ordered medication+++++  15 mg Oral QHS    Or    haloperidol lactate (HALDOL) injection 5 mg  5 mg IntraMUSCular QHS    divalproex ER (DEPAKOTE ER) 24 hour tablet 2,000mg +++++Court ordered medication+++++  2,000 mg Oral QHS    Or    LORazepam (ATIVAN) injection 1 mg  1 mg IntraMUSCular QHS    sertraline (ZOLOFT) tablet 50 mg  50 mg Oral DAILY    [START ON 4/17/2017] haloperidol decanoate (HALDOL DECANOATE) 100 mg/mL injection 200 mg  200 mg IntraMUSCular Q28D       The following labs have been completed for monitoring of valproic acid:    Valproic Acid Serum Concentration  Lab Results   Component Value Date/Time    Valproic acid 89 03/28/2017 10:35 AM         Hepatic Function  Lab Results   Component Value Date/Time    Bilirubin, total 0.4 03/28/2017 10:35 AM    Protein, total 7.0 03/28/2017 10:35 AM    Albumin 3.5 03/28/2017 10:35 AM    Globulin 3.5 03/28/2017 10:35 AM    A-G Ratio 1.0 03/28/2017 10:35 AM    ALT (SGPT) 39 03/28/2017 10:35 AM    Alk. phosphatase 125 03/28/2017 10:35 AM       Hematology  Lab Results   Component Value Date/Time    WBC 5.6 03/28/2017 10:35 AM    RBC 4.70 03/28/2017 10:35 AM    HGB 13.7 03/28/2017 10:35 AM    HCT 41.0 03/28/2017 10:35 AM    MCV 87.2 03/28/2017 10:35 AM    MCH 29.1 03/28/2017 10:35 AM    MCHC 33.4 03/28/2017 10:35 AM    RDW 13.5 03/28/2017 10:35 AM    PLATELET 506 34/80/7161 10:35 AM       Assessment/Plan:  Valproic acid serum concentration is within therapeutic range. LFTs are within normal limits with the exception of slightly elevated alk phosphatase. PLT are within normal limits. No further laboratory monitoring for divalproex is needed at this time. Mariela Oswald, PharmD, [address], BCPS

## 2017-03-28 NOTE — DISCHARGE SUMMARY
PSYCHIATRIC DISCHARGE SUMMARY         IDENTIFICATION:    Patient Name  Terry Orozco   Date of Birth 1970   [facility] [id number]   Medical Record Number  [id number]      Age  49 y.o. PCP PROVIDER UNKNOWN   Admit date:  3/14/2017    Discharge date: 3/28/2017   Room Number  732/01  @ [facility]   Date of Service  3/28/2017               TYPE OF DISCHARGE: REGULAR               CONDITION AT DISCHARGE: good and improved       PROVISIONAL & DISCHARGE DIAGNOSES:    Problem List  Date Reviewed: 3/15/2017          Codes Class    * (Principal)Schizophrenia ([facility] ICD-10-CM: F20.9  ICD-9-CM: 295.90         Schizophrenia, disorganized ([facility] ICD-10-CM: F20.1  ICD-9-CM: 295.10         Catatonia ([facility] ICD-10-CM: F06.1  ICD-9-CM: 781.99, 295.20         Psychosis (Chronic) ICD-10-CM: F29  ICD-9-CM: 298.9     Overview Signed 1/20/2012 11:12 AM by Kaleigh Mota MD     R/o disorg schizophrenia vs. Schizoaffective dis vs. Bipolar dis manic w/psychosis                   Active Hospital Problems    *Schizophrenia ([facility]        DISCHARGE DIAGNOSIS:   Axis I:  SEE ABOVE  Axis II: SEE ABOVE  Axis III: SEE ABOVE  Axis IV:  lack of structure  Axis V:  40 on admission,60 on discharge 60(baseline)       CC & HISTORY OF PRESENT ILLNESS:  49year old single  male admitted for severe schizophrenia  disorder. Patient had stopped taking clozaril, and became catatonic (on admission). He responded well to ativan- which was tapered off. He was started on a higher dose of Haldol Dec (200 mg im q 4 weeks) and returned to baseline. At discharge he denied SI/HI intent and plan and had no catatonic sxs. SOCIAL HISTORY:    Social History     Social History    Marital status: UNKNOWN     Spouse name: N/A    Number of children: N/A    Years of education: N/A     Occupational History    Not on file.      Social History Main Topics    Smoking status: Never Smoker    Smokeless tobacco: Not on file    Alcohol use No    Drug use: Not on file    Sexual activity: Not on file     Other Topics Concern    Not on file     Social History Narrative    49year old single  male admitted on TDO for worsening schizophrenia with catatonic symptoms. Medication compliance has been questionable. Pt was last admitted to [facility] in 2015 and successfully discharged to [facility] on clozapine, haldol dec, trazodone and depakote. Pt is a HS grad and is unemployed. He lives with his mother. FAMILY HISTORY:   History reviewed. No pertinent family history. HOSPITALIZATION COURSE:    Deanna Cohen was admitted to the inpatient psychiatric unit [facility] for acute psychiatric stabilization in regards to symptomatology as described in the HPI above. The differential diagnosis at time of admission included: bipolar dis vs. schizoaffective vs. Schizophrenia. While on the unit Deanna Cohen was involved in individual, group, occupational and milieu therapy. Psychiatric medications were adjusted during this hospitalization including haldol decanoate. Deanna Cohen demonstrated a slow, but progressive improvement in overall condition. Much of patient's depression appeared to be related to situational stressors and psychological factors. Please see individual progress notes for more specific details regarding patient's hospitalization course. At time of dc, Deanna Cohen was without significant problems with depression psychosis  pretty. Overall presentation at time of discharge is most consistent with the diagnosis of schizophrenia Patient with request for discharge today. There are no grounds to seek a TDO. Patient has maximized benefit to be derived from acute inpatient psychiatric treatment.   All members of the treatment team concur with each other in regards to plans for discharge today per patient's request.          LABS AND IMAGAING:    Labs Reviewed   CBC WITH AUTOMATED DIFF - Abnormal; Notable for the following:        Result Value    PLATELET 426 (*)     All other components within normal limits   METABOLIC PANEL, COMPREHENSIVE - Abnormal; Notable for the following:     Glucose 108 (*)     Alk.  phosphatase 128 (*)     Globulin 4.1 (*)     A-G Ratio 0.9 (*)     All other components within normal limits   ACETAMINOPHEN - Abnormal; Notable for the following:     ACETAMINOPHEN <2 (*)     All other components within normal limits   SALICYLATE - Abnormal; Notable for the following:     SALICYLATE <5.4 (*)     All other components within normal limits   TSH 3RD GENERATION - Abnormal; Notable for the following:     TSH 4.29 (*)     All other components within normal limits   LIPID PANEL - Abnormal; Notable for the following:     Triglyceride 204 (*)     All other components within normal limits   GLUCOSE, FASTING - Abnormal; Notable for the following:     Glucose 101 (*)     All other components within normal limits   CLOZAPINE - Abnormal; Notable for the following:     Clozapine 116 (*)     All other components within normal limits   ETHYL ALCOHOL   URINALYSIS W/ REFLEX CULTURE   DRUG SCREEN, URINE   VALPROIC ACID   HEMOGLOBIN A1C WITH EAG   T3, FREE   T4, FREE   SAMPLES BEING HELD   CBC W/O DIFF   METABOLIC PANEL, COMPREHENSIVE   VALPROIC ACID     Admission on 03/14/2017   Component Date Value Ref Range Status    WBC 03/14/2017 6.9  4.1 - 11.1 K/uL Final    RBC 03/14/2017 4.95  4.10 - 5.70 M/uL Final    HGB 03/14/2017 14.5  12.1 - 17.0 g/dL Final    HCT 03/14/2017 44.0  36.6 - 50.3 % Final    MCV 03/14/2017 88.9  80.0 - 99.0 FL Final    MCH 03/14/2017 29.3  26.0 - 34.0 PG Final    MCHC 03/14/2017 33.0  30.0 - 36.5 g/dL Final    RDW 03/14/2017 13.8  11.5 - 14.5 % Final    PLATELET 30/42/2323 724* 150 - 400 K/uL Final    NEUTROPHILS 03/14/2017 54  32 - 75 % Final    LYMPHOCYTES 03/14/2017 32  12 - 49 % Final    MONOCYTES 03/14/2017 10  5 - 13 % Final    EOSINOPHILS 03/14/2017 3  0 - 7 % Final    BASOPHILS 03/14/2017 1  0 - 1 % Final    ABS. NEUTROPHILS 03/14/2017 3.7  1.8 - 8.0 K/UL Final    ABS. LYMPHOCYTES 03/14/2017 2.2  0.8 - 3.5 K/UL Final    ABS. MONOCYTES 03/14/2017 0.7  0.0 - 1.0 K/UL Final    ABS. EOSINOPHILS 03/14/2017 0.2  0.0 - 0.4 K/UL Final    ABS. BASOPHILS 03/14/2017 0.1  0.0 - 0.1 K/UL Final    Sodium 03/14/2017 140  136 - 145 mmol/L Final    Potassium 03/14/2017 3.8  3.5 - 5.1 mmol/L Final    Chloride 03/14/2017 107  97 - 108 mmol/L Final    CO2 03/14/2017 25  21 - 32 mmol/L Final    Anion gap 03/14/2017 8  5 - 15 mmol/L Final    Glucose 03/14/2017 108* 65 - 100 mg/dL Final    BUN 03/14/2017 16  6 - 20 MG/DL Final    Creatinine 03/14/2017 0.87  0.70 - 1.30 MG/DL Final    BUN/Creatinine ratio 03/14/2017 18  12 - 20   Final    GFR est AA 03/14/2017 >60  >60 ml/min/1.73m2 Final    GFR est non-AA 03/14/2017 >60  >60 ml/min/1.73m2 Final    Calcium 03/14/2017 9.2  8.5 - 10.1 MG/DL Final    Bilirubin, total 03/14/2017 0.3  0.2 - 1.0 MG/DL Final    ALT (SGPT) 03/14/2017 43  12 - 78 U/L Final    AST (SGOT) 03/14/2017 20  15 - 37 U/L Final    Alk.  phosphatase 03/14/2017 128* 45 - 117 U/L Final    Protein, total 03/14/2017 7.9  6.4 - 8.2 g/dL Final    Albumin 03/14/2017 3.8  3.5 - 5.0 g/dL Final    Globulin 03/14/2017 4.1* 2.0 - 4.0 g/dL Final    A-G Ratio 03/14/2017 0.9* 1.1 - 2.2   Final    ALCOHOL(ETHYL),SERUM 03/14/2017 <10  <10 MG/DL Final    Color 03/14/2017 YELLOW/STRAW    Final    Appearance 03/14/2017 CLEAR  CLEAR   Final    Specific gravity 03/14/2017 1.024  1.003 - 1.030   Final    pH (UA) 03/14/2017 6.5  5.0 - 8.0   Final    Protein 03/14/2017 NEGATIVE   NEG mg/dL Final    Glucose 03/14/2017 NEGATIVE   NEG mg/dL Final    Ketone 03/14/2017 NEGATIVE   NEG mg/dL Final    Bilirubin 03/14/2017 NEGATIVE   NEG   Final    Blood 03/14/2017 NEGATIVE   NEG   Final    Urobilinogen 03/14/2017 1.0  0.2 - 1.0 EU/dL Final    Nitrites 03/14/2017 NEGATIVE   NEG   Final    Leukocyte Esterase 03/14/2017 NEGATIVE   NEG   Final    WBC 03/14/2017 0-4  0 - 4 /hpf Final    RBC 03/14/2017 0-5  0 - 5 /hpf Final    Epithelial cells 03/14/2017 FEW  FEW /lpf Final    Bacteria 03/14/2017 NEGATIVE   NEG /hpf Final    UA:UC IF INDICATED 03/14/2017 CULTURE NOT INDICATED BY UA RESULT  CNI   Final    Hyaline cast 03/14/2017 0-2  0 - 5 /lpf Final    AMPHETAMINE 03/14/2017 NEGATIVE   NEG   Final    BARBITURATES 03/14/2017 NEGATIVE   NEG   Final    BENZODIAZEPINE 03/14/2017 NEGATIVE   NEG   Final    COCAINE 03/14/2017 NEGATIVE   NEG   Final    METHADONE 03/14/2017 NEGATIVE   NEG   Final    OPIATES 03/14/2017 NEGATIVE   NEG   Final    PCP(PHENCYCLIDINE) 03/14/2017 NEGATIVE   NEG   Final    THC (TH-CANNABINOL) 03/14/2017 NEGATIVE   NEG   Final    Drug screen comment 03/14/2017 (NOTE)   Final    ACETAMINOPHEN 03/14/2017 <2* 10 - 30 ug/mL Final    SALICYLATE 04/04/2227 <7.0* 2.8 - 20.0 MG/DL Final    Valproic acid 03/14/2017 62  50 - 100 ug/ml Final    TSH 03/14/2017 4.29* 0.36 - 3.74 uIU/mL Final    LIPID PROFILE 03/14/2017        Final    Cholesterol, total 03/14/2017 195  <200 MG/DL Final    Triglyceride 03/14/2017 204* <150 MG/DL Final    HDL Cholesterol 03/14/2017 67  MG/DL Final    LDL, calculated 03/14/2017 87.2  0 - 100 MG/DL Final    VLDL, calculated 03/14/2017 40.8  MG/DL Final    CHOL/HDL Ratio 03/14/2017 2.9  0 - 5.0   Final    Glucose 03/14/2017 101* 65 - 100 MG/DL Final    Hemoglobin A1c 03/14/2017 5.6  4.2 - 6.3 % Final    Est. average glucose 03/14/2017 114  mg/dL Final    Free Triiodothyronine 03/17/2017 2.4  2.2 - 4.0 pg/mL Final    T4, Free 03/17/2017 1.0  0.8 - 1.5 NG/DL Final    Clozapine 03/17/2017 116* 350 - 650 ng/mL Final    Norclozapine, Serum 03/17/2017 65  Not Estab. ng/mL Final    Total(Cloz+Norcloz) 03/17/2017 181  ng/mL Final     No results found. DISPOSITION:    ome.  Patient to f/u with o/p psychiatric, and psychotherapy appointments. Patient is to f/u with internist as directed. Patient should have a depakote level and associated labs checked within the next 1-2 weeks by patient's o/p psychiatrist/internist.               FOLLOW-UP CARE:    Activity as tolerated  Regular Diet  Wound Care: none needed. Follow-up Information     Follow up With Details Comments Contact Info    Haloperidol decanoate IM injection On 4/17/2017 Continuation of haloperidol decanoate 200 mg IM injection monthly. Patient received haloperidol decanoate 150 mg IM on 3/15/17 and haloperidol decanoate 50 mg IM on 3/20/17. Plan for haloperidol decanoate 200 mg IM on 4/17/17. [address]    Provider Unknown   Patient not available to ask                   PROGNOSIS:  Greatly dependent upon patient's ability to f/u with o/p  and psychiatric/psychotherapy appointments as well as to comply with psychiatric medications as prescribed. Patient denies suicidal or homicidal ideations. Hilda Mera fully contracts for safety. Patient reports many positive predictive factors in terms of not attempting suicide or homicide. Patient appears to be at low risk of suicide or homicide. Patient and family are aware and in agreement with discharge and discharge plan. DISCHARGE MEDICATIONS: (no changes made). Informed consent given for the use of following psychotropic medications:  Current Discharge Medication List      START taking these medications    Details   sertraline (ZOLOFT) 50 mg tablet Take 1 Tab by mouth daily. Indications: ANXIETY WITH DEPRESSION  Qty: 30 Tab, Refills: 0      haloperidol (HALDOL) 5 mg tablet Take 3 Tabs by mouth nightly. Indications: discontinue after 2 weeks, when haldol dec 200 mg IM given  Qty: 42 Tab, Refills: 0         CONTINUE these medications which have CHANGED    Details   haloperidol decanoate (HALDOL DECANOATE) 100 mg/mL injection 2 mL by IntraMUSCular route every twenty-eight (28) days.  Indications: SCHIZOPHRENIA  Qty: 2 mL, Refills: 0      divalproex ER (DEPAKOTE ER) 500 mg ER tablet Take 4 Tabs by mouth nightly. Indications: MIXED BIPOLAR I DISORDER  Qty: 120 Tab, Refills: 0      benztropine (COGENTIN) 2 mg tablet Take 1 Tab by mouth nightly. Indications: extrapyramidal disease  Qty: 30 Tab, Refills: 0         STOP taking these medications       cloZAPine 200 mg tablet Comments:   Reason for Stopping:         cloZAPine 200 mg tablet Comments:   Reason for Stopping:         traZODone (DESYREL) 100 mg tablet Comments:   Reason for Stopping:                      A coordinated, multidisplinary treatment team round was conducted with Lesa Pedroza is done daily here at [facility] . This team consists of the nurse, psychiatric unit pharmcist,  and writer. I have spent greater than 35 minutes on discharge work.     Signed:  Liat Moeller MD  3/28/2017

## 2017-03-28 NOTE — BH NOTES
Patient received in bed asleep. NAD noted. Q 15 minute safety checks maintained. Nursing will continue to monitor and give support.      Chart reviewed      PRN Medication Documentation  0425 hrs    Specific patient behavior that led to need for PRN medication: Anxiety  Staff interventions attempted prior to PRN being given:   PRN medication given: Ativan 1 mg  Patient response/effectiveness of PRN medication: Anxiety decreases

## 2017-03-28 NOTE — BH NOTES
GROUP THERAPY PROGRESS NOTE    John Huy did not participate in the Acute Unit's Process Group, with a focus identifying feelings and planning for the rest of the day.

## 2017-03-28 NOTE — PROGRESS NOTES
Pharmacist Discharge Medication Reconciliation    Discharging Provider: Dr. Tricia Turner [facility]:   Past Medical History:   Diagnosis Date    Psychiatric disorder     Psychotic disorder     Withdrawal syndrome [facility])      Chief Complaint for this Admission:   Chief Complaint   Patient presents with    Mental Health Problem     Allergies: Fluphenazine and Penicillins    Discharge Medications:   Current Discharge Medication List        START taking these medications    Details   sertraline (ZOLOFT) 50 mg tablet Take 1 Tab by mouth daily. Indications: ANXIETY WITH DEPRESSION  Qty: 30 Tab, Refills: 0      haloperidol (HALDOL) 5 mg tablet Take 3 Tabs by mouth nightly. Indications: discontinue after 2 weeks, when haldol dec 200 mg IM given  Qty: 42 Tab, Refills: 0           CONTINUE these medications which have CHANGED    Details   haloperidol decanoate (HALDOL DECANOATE) 100 mg/mL injection 2 mL by IntraMUSCular route every twenty-eight (28) days. Indications: SCHIZOPHRENIA  Qty: 2 mL, Refills: 0      divalproex ER (DEPAKOTE ER) 500 mg ER tablet Take 4 Tabs by mouth nightly. Indications: MIXED BIPOLAR I DISORDER  Qty: 120 Tab, Refills: 0      benztropine (COGENTIN) 2 mg tablet Take 1 Tab by mouth nightly.  Indications: extrapyramidal disease  Qty: 30 Tab, Refills: 0           STOP taking these medications       cloZAPine 200 mg tablet Comments:   Reason for Stopping:         cloZAPine 200 mg tablet Comments:   Reason for Stopping:         traZODone (DESYREL) 100 mg tablet Comments:   Reason for Stopping:               The patient's chart, MAR and AVS were reviewed by Aaron Caballero PHARMD.

## 2017-03-28 NOTE — BH NOTES
Pt isolating in his room. Plan for pt to be discharged today at 6 pm. No distress noted at this time. Will continue to monitor.

## 2017-03-28 NOTE — BH NOTES
Behavioral Health Transition Record to Provider    Patient Name: Yaima Vera  YOB: 1970  Medical Record Number: [id number]  Date of Admission: 3/14/2017  Date of Discharge: 3/28/2017    Attending Provider: Joe Earl MD  Discharging Provider: Dr. Gamboa Best   To contact this individual call [phone]  and ask the  to page. If unavailable, ask to be transferred to [facility] Provider on call. A Behavioral Health Provider will be available on call 24/7 and during holidays     Primary Care Provider: PROVIDER UNKNOWN    Allergies   Allergen Reactions    Fluphenazine Unknown (comments)     Pt is unable to communicate properly.  Penicillins Unknown (comments)     Pt is unable to communicate properly. H&P Summary Notes      H&P by Joe Earl MD at 03/15/17 8793     Author:  Joe Earl MD Service:  PSYCHIATRY Author Type:  Physician    Filed:  03/15/17 1547 Date of Service:  03/15/17 6327 Status:  Signed    :  Joe Earl MD (Physician)             INITIAL PSYCHIATRIC EVALUATION         IDENTIFICATION:    Patient Name  Yaima Vera   Date of Birth 1970   [facility] [id number]   Medical Record Number  [id number]      Age  49 y.o. PCP PROVIDER UNKNOWN   Admit date:  3/14/2017    Room Number  732/01  @ [facility]   Date of Service  3/15/2017            HISTORY         REASON FOR HOSPITALIZATION:  CC: \"psychosis and catatonic sxs\". Pt admitted under a temporary senior care order (TDO) for severe depression with suicidal ideations  for severe psychosis posing an imminent danger to self and others and an inability to care for self. HISTORY OF PRESENT ILLNESS:    The patient, Yaima Vera, is a 49 y.o. WHITE [place]  male with a past psychiatric history significant for[MH1.1] schizophrenia[MH1.2], who presents at this time with complaints of (and/or evidence of) the following emotional symptoms:[MH1.1] paranoid behavior[MH1.2].   Additional symptomatology include[MH1.1] anxiety[MH1.2]. The above symptoms have been present for[MH1.1] 3 days[MH1.2] . These symptoms are of[MH1.1] severe[MH1.2] severity. These symptoms are constant  in nature. The patient's condition has been precipitated by[MH1.1] medication non compliance[MH1.2] and psychosocial stressors ([MH1.1]limited social supports[MH1.2]  ). Patient's condition made worse by treatment noncompliance. UDS: negative; BAL=0. ALLERGIES:  Allergies   Allergen Reactions    Fluphenazine Unknown (comments)     Pt is unable to communicate properly.  Penicillins Unknown (comments)     Pt is unable to communicate properly. MEDICATIONS PRIOR TO ADMISSION:  Prescriptions Prior to Admission   Medication Sig    divalproex ER (DEPAKOTE ER) 500 mg ER tablet Take 1,000 mg by mouth two (2) times a day.  cloZAPine 200 mg tablet Take 600 mg by mouth nightly.  cloZAPine 200 mg tablet Take 200 mg by mouth daily.  haloperidol decanoate (HALDOL DECANOATE) 100 mg/mL injection 100 mg by IntraMUSCular route every twenty-eight (28) days. Indications: SCHIZOPHRENIA    traZODone (DESYREL) 100 mg tablet Take 100 mg by mouth nightly as needed for Sleep. Indications: sleep    benztropine (COGENTIN) 2 mg tablet Take 2 mg by mouth two (2) times a day. Indications: extrapyramidal disease      PAST MEDICAL HISTORY:  Past Medical History:   Diagnosis Date    Psychiatric disorder     Psychotic disorder     Withdrawal syndrome ([address]    History reviewed. No pertinent surgical history. SOCIAL HISTORY:    Social History     Social History    Marital status: UNKNOWN     Spouse name: N/A    Number of children: N/A    Years of education: N/A     Occupational History    Not on file.      Social History Main Topics    Smoking status: Never Smoker    Smokeless tobacco: Not on file    Alcohol use No    Drug use: Not on file    Sexual activity: Not on file     Other Topics Concern    Not on file     Social History Narrative    49year old single  male admitted on TDO for worsening schizophrenia with catatonic symptoms. Medication compliance has been questionable. Pt was last admitted to [facility] in 2015 and successfully discharged to [facility] on clozapine, haldol dec, trazodone and depakote. Pt is a HS grad and is unemployed. He lives with his mother. FAMILY HISTORY:    History reviewed. No pertinent family history. REVIEW OF SYSTEMS:[MH1.1]   Psychological ROS: positive for - disorientation  Respiratory ROS: no cough, shortness of breath, or wheezing  Cardiovascular ROS: no chest pain or dyspnea on exertion[MH1.2]  Pertinent items are noted in the History of Present Illness. All other Systems reviewed and are considered negative. MENTAL STATUS EXAM & VITALS         MENTAL STATUS EXAM (MSE):    MSE FINDINGS ARE WITHIN NORMAL LIMITS (WNL) UNLESS OTHERWISE STATED BELOW. ( ALL OF THE BELOW CATEGORIES OF THE MSE HAVE BEEN REVIEWED (reviewed 3/15/2017) AND UPDATED AS DEEMED APPROPRIATE )  General Presentation[MH1.1] age appropriate[MH1.2],[MH1.1] uncooperative[MH1.2]   Orientation[MH1.1] disoriented[MH1.2]   Vital Signs  See below (reviewed 3/15/2017); Vital Signs (BP, Pulse, & Temp) are within normal limits if not listed below. Gait and Station Stable/steady, no ataxia   Musculoskeletal System No extrapyramidal symptoms (EPS); no abnormal muscular movements or Tardive Dyskinesia (TD); muscle strength and tone are within normal limits   Language No aphasia or dysarthria   Speech:[MH1.1]  hypoverbal[MH1.2]   Thought Processes[MH1.1] blocked[MH1.2]l;[MH1.1] slow rate of thoughts[MH1.2]; [MH1.1] poor abstract reasoning/computation[MH1.2]   Thought Associations[MH1.1] blocked[MH1.2]    Thought Content[MH1.1] poverty of content[MH1.2]   Suicidal Ideations[MH1.1] none[MH1.2]   Homicidal Ideations[MH1.1] none[MH1.2]   Mood:[MH1.1]  neutral[MH1.2]    Affect:[MH1.1]  flat[MH1.2]   Memory recent[MH1.1]  impaired[MH1.2]   Memory remote:[MH1.1]  impaired[MH1.2]   Concentration/Attention:[MH1.1]  poor[MH1.2]   Fund of Knowledge[MH1.1] average[MH1.2]   Insight:[MH1.1]  poor[MH1.2]   Reliability[MH1.1] poor[MH1.2]   Judgment:[MH1.1]  poor[MH1.2]            VITALS:     Patient Vitals for the past 24 hrs:   Temp Pulse Resp BP SpO2   03/15/17 0800 - - 16 - -   03/15/17 0230 97.5 °F (36.4 °C) 98 18 138/88 97 %   03/15/17 0131 97.5 °F (36.4 °C) 93 18 (!) 150/99 97 %   03/14/17 2328 97.8 °F (36.6 °C) (!) 124 18 117/63 97 %     Wt Readings from Last 3 Encounters:   02/09/15 97.5 kg (215 lb)   02/07/15 97.5 kg (215 lb)   01/19/12 81.6 kg (180 lb)     Temp Readings from Last 3 Encounters:   03/15/17 97.5 °F (36.4 °C)   02/07/15 98.7 °F (37.1 °C)     BP Readings from Last 3 Encounters:   03/15/17 138/88   02/12/15 105/73   02/07/15 148/79     Pulse Readings from Last 3 Encounters:   03/15/17 98   02/12/15 87   02/07/15 (!) 111            DATA       LABORATORY DATA:  Labs Reviewed   CBC WITH AUTOMATED DIFF - Abnormal; Notable for the following:        Result Value    PLATELET 873 (*)     All other components within normal limits   METABOLIC PANEL, COMPREHENSIVE - Abnormal; Notable for the following:     Glucose 108 (*)     Alk.  phosphatase 128 (*)     Globulin 4.1 (*)     A-G Ratio 0.9 (*)     All other components within normal limits   ACETAMINOPHEN - Abnormal; Notable for the following:     ACETAMINOPHEN <2 (*)     All other components within normal limits   SALICYLATE - Abnormal; Notable for the following:     SALICYLATE <3.7 (*)     All other components within normal limits   TSH 3RD GENERATION - Abnormal; Notable for the following:     TSH 4.29 (*)     All other components within normal limits   LIPID PANEL - Abnormal; Notable for the following:     Triglyceride 204 (*)     All other components within normal limits   GLUCOSE, FASTING - Abnormal; Notable for the following:     Glucose 101 (*)     All other components within normal limits   ETHYL ALCOHOL   URINALYSIS W/ REFLEX CULTURE   DRUG SCREEN, URINE   VALPROIC ACID   HEMOGLOBIN A1C WITH EAG   SAMPLES BEING HELD   CLOZAPINE     Admission on 03/14/2017   Component Date Value Ref Range Status    WBC 03/14/2017 6.9  4.1 - 11.1 K/uL Final    RBC 03/14/2017 4.95  4.10 - 5.70 M/uL Final    HGB 03/14/2017 14.5  12.1 - 17.0 g/dL Final    HCT 03/14/2017 44.0  36.6 - 50.3 % Final    MCV 03/14/2017 88.9  80.0 - 99.0 FL Final    MCH 03/14/2017 29.3  26.0 - 34.0 PG Final    MCHC 03/14/2017 33.0  30.0 - 36.5 g/dL Final    RDW 03/14/2017 13.8  11.5 - 14.5 % Final    PLATELET 16/14/4715 578* 150 - 400 K/uL Final    NEUTROPHILS 03/14/2017 54  32 - 75 % Final    LYMPHOCYTES 03/14/2017 32  12 - 49 % Final    MONOCYTES 03/14/2017 10  5 - 13 % Final    EOSINOPHILS 03/14/2017 3  0 - 7 % Final    BASOPHILS 03/14/2017 1  0 - 1 % Final    ABS. NEUTROPHILS 03/14/2017 3.7  1.8 - 8.0 K/UL Final    ABS. LYMPHOCYTES 03/14/2017 2.2  0.8 - 3.5 K/UL Final    ABS. MONOCYTES 03/14/2017 0.7  0.0 - 1.0 K/UL Final    ABS. EOSINOPHILS 03/14/2017 0.2  0.0 - 0.4 K/UL Final    ABS.  BASOPHILS 03/14/2017 0.1  0.0 - 0.1 K/UL Final    Sodium 03/14/2017 140  136 - 145 mmol/L Final    Potassium 03/14/2017 3.8  3.5 - 5.1 mmol/L Final    Chloride 03/14/2017 107  97 - 108 mmol/L Final    CO2 03/14/2017 25  21 - 32 mmol/L Final    Anion gap 03/14/2017 8  5 - 15 mmol/L Final    Glucose 03/14/2017 108* 65 - 100 mg/dL Final    BUN 03/14/2017 16  6 - 20 MG/DL Final    Creatinine 03/14/2017 0.87  0.70 - 1.30 MG/DL Final    BUN/Creatinine ratio 03/14/2017 18  12 - 20   Final    GFR est AA 03/14/2017 >60  >60 ml/min/1.73m2 Final    GFR est non-AA 03/14/2017 >60  >60 ml/min/1.73m2 Final    Calcium 03/14/2017 9.2  8.5 - 10.1 MG/DL Final    Bilirubin, total 03/14/2017 0.3  0.2 - 1.0 MG/DL Final    ALT (SGPT) 03/14/2017 43  12 - 78 U/L Final    AST (SGOT) 03/14/2017 20  15 - 37 U/L Final    Alk. phosphatase 03/14/2017 128* 45 - 117 U/L Final    Protein, total 03/14/2017 7.9  6.4 - 8.2 g/dL Final    Albumin 03/14/2017 3.8  3.5 - 5.0 g/dL Final    Globulin 03/14/2017 4.1* 2.0 - 4.0 g/dL Final    A-G Ratio 03/14/2017 0.9* 1.1 - 2.2   Final    ALCOHOL(ETHYL),SERUM 03/14/2017 <10  <10 MG/DL Final    Color 03/14/2017 YELLOW/STRAW    Final    Appearance 03/14/2017 CLEAR  CLEAR   Final    Specific gravity 03/14/2017 1.024  1.003 - 1.030   Final    pH (UA) 03/14/2017 6.5  5.0 - 8.0   Final    Protein 03/14/2017 NEGATIVE   NEG mg/dL Final    Glucose 03/14/2017 NEGATIVE   NEG mg/dL Final    Ketone 03/14/2017 NEGATIVE   NEG mg/dL Final    Bilirubin 03/14/2017 NEGATIVE   NEG   Final    Blood 03/14/2017 NEGATIVE   NEG   Final    Urobilinogen 03/14/2017 1.0  0.2 - 1.0 EU/dL Final    Nitrites 03/14/2017 NEGATIVE   NEG   Final    Leukocyte Esterase 03/14/2017 NEGATIVE   NEG   Final    WBC 03/14/2017 0-4  0 - 4 /hpf Final    RBC 03/14/2017 0-5  0 - 5 /hpf Final    Epithelial cells 03/14/2017 FEW  FEW /lpf Final    Bacteria 03/14/2017 NEGATIVE   NEG /hpf Final    UA:UC IF INDICATED 03/14/2017 CULTURE NOT INDICATED BY UA RESULT  CNI   Final    Hyaline cast 03/14/2017 0-2  0 - 5 /lpf Final    AMPHETAMINE 03/14/2017 NEGATIVE   NEG   Final    BARBITURATES 03/14/2017 NEGATIVE   NEG   Final    BENZODIAZEPINE 03/14/2017 NEGATIVE   NEG   Final    COCAINE 03/14/2017 NEGATIVE   NEG   Final    METHADONE 03/14/2017 NEGATIVE   NEG   Final    OPIATES 03/14/2017 NEGATIVE   NEG   Final    PCP(PHENCYCLIDINE) 03/14/2017 NEGATIVE   NEG   Final    THC (TH-CANNABINOL) 03/14/2017 NEGATIVE   NEG   Final    Drug screen comment 03/14/2017 (NOTE)   Final    ACETAMINOPHEN 03/14/2017 <2* 10 - 30 ug/mL Final    SALICYLATE 14/82/7610 <3.6* 2.8 - 20.0 MG/DL Final    Valproic acid 03/14/2017 62  50 - 100 ug/ml Final    TSH 03/14/2017 4.29* 0.36 - 3.74 uIU/mL Final    LIPID PROFILE 03/14/2017        Final    Cholesterol, total 03/14/2017 195  <200 MG/DL Final    Triglyceride 03/14/2017 204* <150 MG/DL Final    HDL Cholesterol 03/14/2017 67  MG/DL Final    LDL, calculated 03/14/2017 87.2  0 - 100 MG/DL Final    VLDL, calculated 03/14/2017 40.8  MG/DL Final    CHOL/HDL Ratio 03/14/2017 2.9  0 - 5.0   Final    Glucose 03/14/2017 101* 65 - 100 MG/DL Final    Hemoglobin A1c 03/14/2017 5.6  4.2 - 6.3 % Final    Est. average glucose 03/14/2017 114  mg/dL Final        RADIOLOGY REPORTS:    Results from Hospital Encounter encounter on 02/07/15   XR CHEST PORT   Narrative **Final Report**      ICD Codes / Adm. Diagnosis: 295.30   / schizophrenia    Examination:  CR CHEST PORT  - [id number] - Feb 11 2015  9:30AM  Accession No:  [id number]  Reason:  rule out infection      REPORT:  INDICATION: Rule out infection. Portable AP upright view of the chest.    There is no prior study for direct comparison. Cardiomediastinal silhouette is within normal limits. Lungs are clear   bilaterally. Pleural spaces are normal. Osseous structures are intact. IMPRESSION: No acute cardiopulmonary disease. Signing/Reading Doctor: VICTORINA Thomas ([id number])    Approved: VICTORINA Thomas ([id number])  Feb 11 2015  9:34AM                                  No results found.            MEDICATIONS       ALL MEDICATIONS  Current Facility-Administered Medications   Medication Dose Route Frequency    ziprasidone (GEODON) 20 mg in sterile water (preservative free) 1 mL injection  20 mg IntraMUSCular BID PRN    OLANZapine (ZyPREXA) tablet 5 mg  5 mg Oral Q6H PRN    benztropine (COGENTIN) tablet 2 mg  2 mg Oral BID PRN    benztropine (COGENTIN) injection 2 mg  2 mg IntraMUSCular BID PRN    LORazepam (ATIVAN) injection 2 mg  2 mg IntraMUSCular Q4H PRN    LORazepam (ATIVAN) tablet 1 mg  1 mg Oral Q4H PRN    zolpidem (AMBIEN) tablet 10 mg  10 mg Oral QHS PRN    acetaminophen (TYLENOL) tablet 650 mg  650 mg Oral Q4H PRN    ibuprofen (MOTRIN) tablet 400 mg  400 mg Oral Q8H PRN    magnesium hydroxide (MILK OF MAGNESIA) 400 mg/5 mL oral suspension 30 mL  30 mL Oral DAILY PRN    nicotine (NICODERM CQ) 21 mg/24 hr patch 1 Patch  1 Patch TransDERmal DAILY PRN    LORazepam (ATIVAN) tablet 2 mg  2 mg Oral TID      SCHEDULED MEDICATIONS  Current Facility-Administered Medications   Medication Dose Route Frequency    LORazepam (ATIVAN) tablet 2 mg  2 mg Oral TID                ASSESSMENT & PLAN        The patient Simran Cristobal is a 49 y.o.  male who presents at this time for treatment of the following diagnoses:  Patient Active Hospital Problem List:   Schizophrenia ([facility] (3/15/2017)    Assessment:[MH1.1] negativism, posturing, waxy flexibility, mutism, internal preoccupation[MH1.2]    Plan:[MH1.1] Start ativan for catatonia. Increase haldol dec to 150 mg im q 28 d. Get clozapine level and restart dose. Continue depakote[MH1.2]          In summary, Simran Cristobal presents with a severe exacerbation of the principal diagnosis, Schizophrenia ([facility]    While on the unit Simran Cristobal will be provided with individual, milieu, occupational, group, and substance abuse therapies to address target symptoms as deemed appropriate for the individual patient. I will continue to monitor blood levels (Depakote, Tegretol, lithium, clozapine---a drug with a narrow therapeutic index= NTI) and associated labs for drug therapy implemented that require intense monitoring for toxicity as deemed appropriate base on current medication side effects and pharmacodynamically determined drug 1/2 lives. I agree with decision to admit patient. I have spoken to [facility] psychiatric /ED staff regarding the nature of patients's admission at this time. A coordinated, multidisplinary treatment team (includes the nurse, unit pharmcist,  and writer) round was conducted for this initial evaluation with the patient present.      The following regarding medications was addressed during rounds with patient:   the risks and benefits of the proposed medication. The patient was given the opportunity to ask questions. Informed consent given to the use of the above medications. I will continue to adjust psychiatric and non-psychiatric medications (see above \"medication\" section and orders section for details) as deemed appropriate & based upon diagnoses and response to treatment. I have reviewed admission (and previous/old) labs and medical tests in the EHR and or transferring hospital documents. I will continue to order blood tests/labs and diagnostic tests as deemed appropriate and review results as they become available (see orders for details). I have reviewed old psychiatric and medical records available in the EHR. I Will order additional psychiatric records from other institutions to further elucidate the nature of patient's psychopathology and review once available. I will gather additional collateral information from friends, family and o/p treatment team to further elucidate the nature of patient's psychopathology and baselline level of psychiatric functioning.         ESTIMATED LENGTH OF STAY:[MH1.1]   5-10[MH1.2] days       STRENGTHS:[MH1.1]  Access to housing/residential stability and Knowledge of medications[MH1.2]                      SIGNED:    Santosh Morejon MD  3/15/2017[MH1.1]                  Revision History       User Key Date/Time User Provider Type Action    > MH1.2 03/15/17 1547 Santosh Morejon MD Physician Sign     MH1.1 03/15/17 1055 Santosh Morejon MD Physician               Admission Diagnosis: [facility] ([facility]    * No surgery found *    Results for orders placed or performed during the hospital encounter of 03/14/17   CBC WITH AUTOMATED DIFF   Result Value Ref Range    WBC 6.9 4.1 - 11.1 K/uL    RBC 4.95 4.10 - 5.70 M/uL    HGB 14.5 12.1 - 17.0 g/dL    HCT 44.0 36.6 - 50.3 %    MCV 88.9 80.0 - 99.0 FL    MCH 29.3 26.0 - 34.0 PG    MCHC 33.0 30.0 - 36.5 g/dL    RDW 13.8 11.5 - 14.5 %    PLATELET 686 (L) 665 - 400 K/uL    NEUTROPHILS 54 32 - 75 %    LYMPHOCYTES 32 12 - 49 %    MONOCYTES 10 5 - 13 %    EOSINOPHILS 3 0 - 7 %    BASOPHILS 1 0 - 1 %    ABS. NEUTROPHILS 3.7 1.8 - 8.0 K/UL    ABS. LYMPHOCYTES 2.2 0.8 - 3.5 K/UL    ABS. MONOCYTES 0.7 0.0 - 1.0 K/UL    ABS. EOSINOPHILS 0.2 0.0 - 0.4 K/UL    ABS. BASOPHILS 0.1 0.0 - 0.1 K/UL   METABOLIC PANEL, COMPREHENSIVE   Result Value Ref Range    Sodium 140 136 - 145 mmol/L    Potassium 3.8 3.5 - 5.1 mmol/L    Chloride 107 97 - 108 mmol/L    CO2 25 21 - 32 mmol/L    Anion gap 8 5 - 15 mmol/L    Glucose 108 (H) 65 - 100 mg/dL    BUN 16 6 - 20 MG/DL    Creatinine 0.87 0.70 - 1.30 MG/DL    BUN/Creatinine ratio 18 12 - 20      GFR est AA >60 >60 ml/min/1.73m2    GFR est non-AA >60 >60 ml/min/1.73m2    Calcium 9.2 8.5 - 10.1 MG/DL    Bilirubin, total 0.3 0.2 - 1.0 MG/DL    ALT (SGPT) 43 12 - 78 U/L    AST (SGOT) 20 15 - 37 U/L    Alk.  phosphatase 128 (H) 45 - 117 U/L    Protein, total 7.9 6.4 - 8.2 g/dL    Albumin 3.8 3.5 - 5.0 g/dL    Globulin 4.1 (H) 2.0 - 4.0 g/dL    A-G Ratio 0.9 (L) 1.1 - 2.2     ETHYL ALCOHOL   Result Value Ref Range    ALCOHOL(ETHYL),SERUM <10 <10 MG/DL   URINALYSIS W/ REFLEX CULTURE   Result Value Ref Range    Color YELLOW/STRAW      Appearance CLEAR CLEAR      Specific gravity 1.024 1.003 - 1.030      pH (UA) 6.5 5.0 - 8.0      Protein NEGATIVE  NEG mg/dL    Glucose NEGATIVE  NEG mg/dL    Ketone NEGATIVE  NEG mg/dL    Bilirubin NEGATIVE  NEG      Blood NEGATIVE  NEG      Urobilinogen 1.0 0.2 - 1.0 EU/dL    Nitrites NEGATIVE  NEG      Leukocyte Esterase NEGATIVE  NEG      WBC 0-4 0 - 4 /hpf    RBC 0-5 0 - 5 /hpf    Epithelial cells FEW FEW /lpf    Bacteria NEGATIVE  NEG /hpf    UA:UC IF INDICATED CULTURE NOT INDICATED BY UA RESULT CNI      Hyaline cast 0-2 0 - 5 /lpf   DRUG SCREEN, URINE   Result Value Ref Range    AMPHETAMINE NEGATIVE  NEG      BARBITURATES NEGATIVE  NEG BENZODIAZEPINE NEGATIVE  NEG      COCAINE NEGATIVE  NEG      METHADONE NEGATIVE  NEG      OPIATES NEGATIVE  NEG      PCP(PHENCYCLIDINE) NEGATIVE  NEG      THC (TH-CANNABINOL) NEGATIVE  NEG      Drug screen comment (NOTE)    ACETAMINOPHEN   Result Value Ref Range    ACETAMINOPHEN <2 (L) 10 - 30 ug/mL   SALICYLATE   Result Value Ref Range    SALICYLATE <0.0 (L) 2.8 - 20.0 MG/DL   VALPROIC ACID   Result Value Ref Range    Valproic acid 62 50 - 100 ug/ml   TSH 3RD GENERATION   Result Value Ref Range    TSH 4.29 (H) 0.36 - 3.74 uIU/mL   LIPID PANEL   Result Value Ref Range    LIPID PROFILE          Cholesterol, total 195 <200 MG/DL    Triglyceride 204 (H) <150 MG/DL    HDL Cholesterol 67 MG/DL    LDL, calculated 87.2 0 - 100 MG/DL    VLDL, calculated 40.8 MG/DL    CHOL/HDL Ratio 2.9 0 - 5.0     GLUCOSE, FASTING   Result Value Ref Range    Glucose 101 (H) 65 - 100 MG/DL   HEMOGLOBIN A1C WITH EAG   Result Value Ref Range    Hemoglobin A1c 5.6 4.2 - 6.3 %    Est. average glucose 114 mg/dL   T3, FREE   Result Value Ref Range    Free Triiodothyronine 2.4 2.2 - 4.0 pg/mL   T4, FREE   Result Value Ref Range    T4, Free 1.0 0.8 - 1.5 NG/DL   CLOZAPINE   Result Value Ref Range    Clozapine 116 (L) 350 - 650 ng/mL    Norclozapine, Serum 65 Not Estab. ng/mL    Total(Cloz+Norcloz) 181 ng/mL   CBC W/O DIFF   Result Value Ref Range    WBC 5.6 4.1 - 11.1 K/uL    RBC 4.70 4. 10 - 5.70 M/uL    HGB 13.7 12.1 - 17.0 g/dL    HCT 41.0 36.6 - 50.3 %    MCV 87.2 80.0 - 99.0 FL    MCH 29.1 26.0 - 34.0 PG    MCHC 33.4 30.0 - 36.5 g/dL    RDW 13.5 11.5 - 14.5 %    PLATELET 965 551 - 876 K/uL   METABOLIC PANEL, COMPREHENSIVE   Result Value Ref Range    Sodium 136 136 - 145 mmol/L    Potassium 4.0 3.5 - 5.1 mmol/L    Chloride 103 97 - 108 mmol/L    CO2 24 21 - 32 mmol/L    Anion gap 9 5 - 15 mmol/L    Glucose 86 65 - 100 mg/dL    BUN 14 6 - 20 MG/DL    Creatinine 0.84 0.70 - 1.30 MG/DL    BUN/Creatinine ratio 17 12 - 20      GFR est AA >60 >60 ml/min/1.73m2    GFR est non-AA >60 >60 ml/min/1.73m2    Calcium 8.8 8.5 - 10.1 MG/DL    Bilirubin, total 0.4 0.2 - 1.0 MG/DL    ALT (SGPT) 39 12 - 78 U/L    AST (SGOT) 18 15 - 37 U/L    Alk. phosphatase 125 (H) 45 - 117 U/L    Protein, total 7.0 6.4 - 8.2 g/dL    Albumin 3.5 3.5 - 5.0 g/dL    Globulin 3.5 2.0 - 4.0 g/dL    A-G Ratio 1.0 (L) 1.1 - 2.2     VALPROIC ACID   Result Value Ref Range    Valproic acid 89 50 - 100 ug/ml       Immunizations administered during this encounter: There is no immunization history for the selected administration types on file for this patient. Screening for Metabolic Disorders for Patients on Antipsychotic Medications    Estimated Body Mass Index  Estimated body mass index is 29.39 kg/(m^2) as calculated from the following:    Height as of this encounter: 5' 11\" (1.803 m). Weight as of this encounter: 95.6 kg (210 lb 11.2 oz). Vital Signs/Blood Pressure  Visit Vitals    /68    Pulse 69    Temp 97.3 °F (36.3 °C)    Resp 16    Ht 5' 11\" (1.803 m)    Wt 95.6 kg (210 lb 11.2 oz)    SpO2 96%    BMI 29.39 kg/m2       Blood Glucose/Hemoglobin A1c  Lab Results   Component Value Date/Time    Glucose 86 2017 10:35 AM    Glucose 101 2017 11:49 PM    Glucose (POC) 112 2012 09:59 PM        Lab Results   Component Value Date/Time    Hemoglobin A1c 5.6 2017 11:49 PM        Lipid Panel  Lab Results   Component Value Date/Time    Cholesterol, total 195 2017 11:49 PM    HDL Cholesterol 67 2017 11:49 PM    LDL, calculated 87.2 2017 11:49 PM    Triglyceride 204 2017 11:49 PM    CHOL/HDL Ratio 2.9 2017 11:49 PM       Discharge Diagnosis: Schizophrenia     Discharge Plan:   DISCHARGE SUMMARY    NAME:Edwin Anderson  : 1970  MRN: [id number]    The patient Alysia Fabry exhibits the ability to control behavior in a less restrictive environment. Patient's level of functioning is improving.   No assaultive/destructive behavior has been observed for the past 24 hours. No suicidal/homicidal threat or behavior has been observed for the past 24 hours. There is no evidence of serious medication side effects. Patient has not been in physical or protective restraints for at least the past 24 hours. If weapons involved, how are they secured? No weapons involved     Is patient aware of and in agreement with discharge plan? Patient is aware of discharre and is in agreement     Arrangements for medication:  Prescriptions given to patient. Patient is a smoker and needs referral for smoking cessation? Does not smoke   1. Patient referred to the following for smoking cessation with an appointment:   2. Patient was offered medication to assist with smoking cessation at discharge:   3.  Education for smoking cessation added to discharge instructions:     Patient has a history of substance/alcohol abuse and requires a referral for treatment ?  no  1. Patient referred to the following for substance/alcohol abuse treatment with an appointment:     2. Patient was offered medication to assist with alcohol cessation at discharge:    3.  Education for substance/alcohol abuse added to discharge instructions:     Copy of discharge instructions to  provider?:  Faxed to [phone]    Arrangements for transportation home:  Sister to    [address] [place] [address]-- yes   Name of Decision maker if patient has Psychiatric  Care Directive)  Patient was offered information -  patient declined information. Keep all follow up appointments as scheduled, continue to take prescribed medications per physician instructions. Mental health crisis number:  [phone] or your local mental health crisis line number at [phone]            Discharge Medication List and Instructions:   Current Discharge Medication List      START taking these medications    Details   sertraline (ZOLOFT) 50 mg tablet Take 1 Tab by mouth daily.  Indications: ANXIETY WITH DEPRESSION  Qty: 30 Tab, Refills: 0      haloperidol (HALDOL) 5 mg tablet Take 3 Tabs by mouth nightly. Indications: discontinue after 2 weeks, when haldol dec 200 mg IM given  Qty: 42 Tab, Refills: 0         CONTINUE these medications which have CHANGED    Details   haloperidol decanoate (HALDOL DECANOATE) 100 mg/mL injection 2 mL by IntraMUSCular route every twenty-eight (28) days. Indications: SCHIZOPHRENIA  Qty: 2 mL, Refills: 0      divalproex ER (DEPAKOTE ER) 500 mg ER tablet Take 4 Tabs by mouth nightly. Indications: MIXED BIPOLAR I DISORDER  Qty: 120 Tab, Refills: 0      benztropine (COGENTIN) 2 mg tablet Take 1 Tab by mouth nightly. Indications: extrapyramidal disease  Qty: 30 Tab, Refills: 0         STOP taking these medications       cloZAPine 200 mg tablet Comments:   Reason for Stopping:         cloZAPine 200 mg tablet Comments:   Reason for Stopping:         traZODone (DESYREL) 100 mg tablet Comments:   Reason for Stopping:               Unresulted Labs     None        To obtain results of studies pending at discharge, please contact [phone]    Follow-up Information     Follow up With Details Comments Contact Info    Haloperidol decanoate IM injection On 4/17/2017 Continuation of haloperidol decanoate 200 mg IM injection monthly. Patient received haloperidol decanoate 150 mg IM on 3/15/17 and haloperidol decanoate 50 mg IM on 3/20/17. Plan for haloperidol decanoate 200 mg IM on 4/17/17. [facility] [address] On 3/29/2017 you have a 9:30 appointment with Dr. Derwin Schwab and a 10:00 appointment with Cherelle Han  ( [phone]  H .1 LAMINE/Silvano Brown Final  [phone]          Advanced Care Plan:   Confirm Advance Directive: None     Patient Would Like to Complete Advance Directive: No patient declined         Does the patient have other document types:  Other (comment) (n/a)    Patient Instructions: Please continue all medications until otherwise directed by physician. Patient discharged to Home; discussed with patient/caregiver, provided to the patient/caregiver either in hard copy or electronically. and patient refused hard copy.

## 2017-03-28 NOTE — PROGRESS NOTES
Problem: Altered Thought Process (Adult/Pediatric)  Goal: *STG: Complies with medication therapy  Outcome: Progressing Towards Goal  Pt took medications as ordered in the morning. Isolating in his room. Appears to be responding to internal stimuli. Hygiene is poor and appearance is disheveled. Behavior continues to be bizarre. Will continue to monitor.

## 2017-03-28 NOTE — DISCHARGE INSTRUCTIONS
DISCHARGE SUMMARY    NAME:Edwin Anderson  : 1970  MRN: [id number]    The patient Yaima Vera exhibits the ability to control behavior in a less restrictive environment. Patient's level of functioning is improving. No assaultive/destructive behavior has been observed for the past 24 hours. No suicidal/homicidal threat or behavior has been observed for the past 24 hours. There is no evidence of serious medication side effects. Patient has not been in physical or protective restraints for at least the past 24 hours. If weapons involved, how are they secured? No weapons involved     Is patient aware of and in agreement with discharge plan? Patient is aware of discharre and is in agreement     Arrangements for medication:  Prescriptions given to patient. Patient is a smoker and needs referral for smoking cessation? Does not smoke   1. Patient referred to the following for smoking cessation with an appointment:   2. Patient was offered medication to assist with smoking cessation at discharge:   3.  Education for smoking cessation added to discharge instructions:     Patient has a history of substance/alcohol abuse and requires a referral for treatment ?  no  1. Patient referred to the following for substance/alcohol abuse treatment with an appointment:     2. Patient was offered medication to assist with alcohol cessation at discharge:    3.  Education for substance/alcohol abuse added to discharge instructions:     Copy of discharge instructions to  provider?:  Faxed to [phone]    Arrangements for transportation home:  Sister to    [address]-- yes   Name of Decision maker if patient has Psychiatric  Care Directive)  Patient was offered information -  patient declined information. Keep all follow up appointments as scheduled, continue to take prescribed medications per physician instructions.   Mental health crisis number:  [phone] or your local mental health crisis line number at [facility] from Nurse    The following personal items are in your possession at time of discharge:    Dental Appliances: None  Visual Aid: None     Home Medications: None  Jewelry: None  Clothing: Pajamas, Shirt, With patient  Other Valuables: None  Personal Items Sent to Safe:  (none)          PATIENT INSTRUCTIONS:      What to do at Home:  Recommended activity: Activity as tolerated. *  Please give a list of your current medications to your Primary Care Provider. *  Please update this list whenever your medications are discontinued, doses are      changed, or new medications (including over-the-counter products) are added. *  Please carry medication information at all times in case of emergency situations. These are general instructions for a healthy lifestyle:    No smoking/ No tobacco products/ Avoid exposure to second hand smoke    Surgeon General's Warning:  Quitting smoking now greatly reduces serious risk to your health. Obesity, smoking, and sedentary lifestyle greatly increases your risk for illness    A healthy diet, regular physical exercise & weight monitoring are important for maintaining a healthy lifestyle    You may be retaining fluid if you have a history of heart failure or if you experience any of the following symptoms:  Weight gain of 3 pounds or more overnight or 5 pounds in a week, increased swelling in our hands or feet or shortness of breath while lying flat in bed. Please call your doctor as soon as you notice any of these symptoms; do not wait until your next office visit. Recognize signs and symptoms of STROKE:    F-face looks uneven    A-arms unable to move or move unevenly    S-speech slurred or non-existent    T-time-call 911 as soon as signs and symptoms begin-DO NOT go       Back to bed or wait to see if you get better-TIME IS BRAIN.     Warning Signs of HEART ATTACK     Call 911 if you have these symptoms:   Chest discomfort. Most heart attacks involve discomfort in the center of the chest that lasts more than a few minutes, or that goes away and comes back. It can feel like uncomfortable pressure, squeezing, fullness, or pain.  Discomfort in other areas of the upper body. Symptoms can include pain or discomfort in one or both arms, the back, neck, jaw, or stomach.  Shortness of breath with or without chest discomfort.  Other signs may include breaking out in a cold sweat, nausea, or lightheadedness. Don't wait more than five minutes to call 911 - MINUTES MATTER! Fast action can save your life. Calling 911 is almost always the fastest way to get lifesaving treatment. Emergency Medical Services staff can begin treatment when they arrive -- up to an hour sooner than if someone gets to the hospital by car. The discharge information has been reviewed with the patient. The patient verbalized understanding. Discharge medications reviewed with the patient and appropriate educational materials and side effects teaching were provided.

## 2017-03-28 NOTE — BH NOTES
Behavioral Health Interdisciplinary Rounds     Patient Name: Rajni Tinoco  Age: 49 y.o. Room/Bed:  732/  [facility]: [facility]   Admission Status: Involuntary Commitment     Readmission within 30 days: no  Power of  in place: no  Patient requires a blocked bed: yes          Reason for blocked bed: Behavior    VTE Prophylaxis: Not indicated  Influenza vaccine screen completed: yes Influenza vaccine given: no, pt refused  Mobility needs/Fall risk: yes    Nutritional Plan: no  Consults:          Labs/Testing due today?: yes    Sleep hours:  4 hours 45 minutes      Participation in Care/Groups:  no  Medication Compliant?: Yes  PRNS (last 24 hours): None    Restraints (last 24 hours):  no  Substance Abuse:  no  CIWA (range last 24 hours):  COWS (range last 24 hours):   Alcohol screening (AUDIT) completed -  AUDIT Score: 0  If applicable, date SBIRT discussed in treatment team AND documented:   Tobacco - patient is a smoker: no   Date tobacco education completed by RN: n/.a  24 hour chart check complete: yes    Patient goal(s) for today:   Treatment team focus/goals:   LOS:  13  Expected LOS: TBD  Financial concerns/prescription coverage:  no  Date of last family contact:       Family requesting physician contact today:  no  Discharge plan: TBD       Outpatient provider(s):     Participating treatment team members:  Rajni Tinoco

## 2017-11-25 ENCOUNTER — HOSPITAL ENCOUNTER (INPATIENT)
Age: 47
LOS: 60 days | Discharge: ANOTHER HOSPITAL | DRG: 885 | End: 2018-01-24
Attending: PSYCHIATRY & NEUROLOGY | Admitting: PSYCHIATRY & NEUROLOGY
Payer: MEDICARE

## 2017-11-25 DIAGNOSIS — F25.0 SCHIZOAFFECTIVE DISORDER, BIPOLAR TYPE (HCC): Primary | ICD-10-CM

## 2017-11-25 PROCEDURE — 65220000003 HC RM SEMIPRIVATE PSYCH

## 2017-11-25 RX ORDER — IBUPROFEN 200 MG
1 TABLET ORAL
Status: DISCONTINUED | OUTPATIENT
Start: 2017-11-25 | End: 2018-01-24 | Stop reason: HOSPADM

## 2017-11-25 RX ORDER — ACETAMINOPHEN 325 MG/1
650 TABLET ORAL
Status: DISCONTINUED | OUTPATIENT
Start: 2017-11-25 | End: 2018-01-24 | Stop reason: HOSPADM

## 2017-11-25 RX ORDER — LORAZEPAM 1 MG/1
1 TABLET ORAL
Status: DISCONTINUED | OUTPATIENT
Start: 2017-11-25 | End: 2018-01-24 | Stop reason: HOSPADM

## 2017-11-25 RX ORDER — LORAZEPAM 2 MG/ML
2 INJECTION INTRAMUSCULAR
Status: DISCONTINUED | OUTPATIENT
Start: 2017-11-25 | End: 2018-01-24 | Stop reason: HOSPADM

## 2017-11-25 RX ORDER — BENZTROPINE MESYLATE 1 MG/ML
2 INJECTION INTRAMUSCULAR; INTRAVENOUS
Status: DISCONTINUED | OUTPATIENT
Start: 2017-11-25 | End: 2018-01-24 | Stop reason: HOSPADM

## 2017-11-25 RX ORDER — ZOLPIDEM TARTRATE 10 MG/1
10 TABLET ORAL
Status: DISCONTINUED | OUTPATIENT
Start: 2017-11-25 | End: 2018-01-24 | Stop reason: HOSPADM

## 2017-11-25 RX ORDER — IBUPROFEN 400 MG/1
400 TABLET ORAL
Status: DISCONTINUED | OUTPATIENT
Start: 2017-11-25 | End: 2018-01-24 | Stop reason: HOSPADM

## 2017-11-25 RX ORDER — BENZTROPINE MESYLATE 2 MG/1
2 TABLET ORAL
Status: DISCONTINUED | OUTPATIENT
Start: 2017-11-25 | End: 2018-01-24 | Stop reason: HOSPADM

## 2017-11-25 RX ORDER — ADHESIVE BANDAGE
30 BANDAGE TOPICAL DAILY PRN
Status: DISCONTINUED | OUTPATIENT
Start: 2017-11-25 | End: 2018-01-24 | Stop reason: HOSPADM

## 2017-11-25 RX ORDER — OLANZAPINE 5 MG/1
5 TABLET ORAL
Status: DISCONTINUED | OUTPATIENT
Start: 2017-11-25 | End: 2018-01-24 | Stop reason: HOSPADM

## 2017-11-25 NOTE — IP AVS SNAPSHOT
Summary of Care Report The Summary of Care report has been created to help improve care coordination. Users with access to [employer] or [employer] [facility] (Web-based application) may access additional patient information including the Discharge Summary. If you are not currently a [address] user and need more information, please call the number listed below in the [place] [address] section and ask to be connected with Medical Records. Facility Information Name Address Phone [facility] [address] [phone] [phone] Patient Information Patient Name Sex  Eusebio Collins ([id number]) Male 1970 Discharge Information Admitting Provider Service Area Unit Mare Zee MD / [address] [place] [address] / [phone] Discharge Provider Discharge Date/Time Discharge Disposition Destination (none) 2018 Afternoon (Pending) AHR (none) Patient Language Language ENGLISH [13] Hospital Problems as of 2018  Reviewed: 3/15/2017 10:53 AM by Gertrude Reyes MD  
  
  
  
 Class Noted - Resolved Last Modified POA Active Problems * (Principal)Paranoid schizophrenia ([facility]  3/15/2017 - Present 2017 by Mare Zee MD Unknown Entered by Radha Chaparro MD  
  Schizophrenia [facility])  2017 - Present 2017 by Mare Zee MD Unknown Entered by Mare Zee MD  
  
Non-Hospital Problems as of 2018  Reviewed: 3/15/2017 10:53 AM by Gertrude Reyes MD  
 None You are allergic to the following Allergen Reactions Fluphenazine Unknown (comments) Pt is unable to communicate properly. Penicillins Unknown (comments) Pt is unable to communicate properly. Current Discharge Medication List  
  
START taking these medications Dose & Instructions Dispensing Information Comments FLUoxetine 40 mg capsule Commonly known as:  PROzac Start taking on:  1/25/2018 Dose:  40 mg Take 1 Cap by mouth daily for 14 days. Indications: major depressive disorder Quantity:  14 Cap Refills:  0 LORazepam 0.5 mg tablet Commonly known as:  ATIVAN Dose:  0.5 mg Take 1 Tab by mouth two (2) times a day for 14 days. Max Daily Amount: 1 mg. Indications: Catatonia Quantity:  28 Tab Refills:  0  
   
 OLANZapine 20 mg disintegrating tablet Commonly known as:  ZyPREXA zydis Dose:  20 mg Take 1 Tab by mouth nightly for 14 days. Indications: Schizophrenia Quantity:  14 Tab Refills:  0  
   
 valproic acid (as sodium salt) 250 mg/5 mL (5 mL) Soln oral solution Commonly known as:  Ladell Netter Dose:  750 mg Take 15 mL by mouth two (2) times a day for 14 days. Indications: Bipolar Disorder Quantity:  420 mL Refills:  0 CONTINUE these medications which have CHANGED Dose & Instructions Dispensing Information Comments  
 benztropine 2 mg tablet Commonly known as:  COGENTIN What changed:  when to take this Dose:  2 mg Take 1 Tab by mouth nightly for 14 days. Indications: extrapyramidal disease Quantity:  14 Tab Refills:  0  
   
 * haloperidol decanoate 100 mg/mL injection Commonly known as:  HALDOL DECANOATE What changed:  Another medication with the same name was added. Make sure you understand how and when to take each. Dose:  200 mg  
2 mL by IntraMUSCular route every twenty-eight (28) days. Indications: SCHIZOPHRENIA Quantity:  2 mL Refills:  0  
   
 * haloperidol decanoate 100 mg/mL injection Commonly known as:  HALDOL DECANOATE Start taking on:  2/12/2018 What changed: You were already taking a medication with the same name, and this prescription was added. Make sure you understand how and when to take each.   
 Dose:  200 mg  
 2 mL by IntraMUSCular route every twenty-eight (28) days for 28 days. Indications: Schizophrenia Quantity:  2 mL Refills:  0  
   
 * Notice: This list has 2 medication(s) that are the same as other medications prescribed for you. Read the directions carefully, and ask your doctor or other care provider to review them with you. STOP taking these medications Comments  
 cloZAPine 200 mg tablet  
   
   
 divalproex  mg ER tablet Commonly known as:  DEPAKOTE ER Follow-up Information Follow up With Details Comments Contact Info Dr. Eunice Hart on 2/13/2018 Medication management appointment on February 13th at 11:30 AM.  [facility] 
[phone] Fax: [phone] Haloperidol decanoate IM injection On 2/12/2018 Haloperidol decanoate 200 mg IM injection was given on 12/18/17 and 1/15/18 during hospitalization. Next injection is due on 2/12/18. [facility] Antonio Mauro   Case Management appointment on February 29th at 10:00 AM.  [facility] 
[phone] Fax: [phone] Provider Unknown   Patient not available to ask Discharge Instructions DISCHARGE SUMMARY from Nurse PATIENT INSTRUCTIONS: 
 
What to do at Home: 
 
Recommended activity: Activity as tolerated, *  Please give a list of your current medications to your Primary Care Provider. *  Please update this list whenever your medications are discontinued, doses are 
    changed, or new medications (including over-the-counter products) are added. *  Please carry medication information at all times in case of emergency situations. These are general instructions for a healthy lifestyle: No smoking/ No tobacco products/ Avoid exposure to second hand smoke Surgeon General's Warning:  Quitting smoking now greatly reduces serious risk to your health. Obesity, smoking, and sedentary lifestyle greatly increases your risk for illness A healthy diet, regular physical exercise & weight monitoring are important for maintaining a healthy lifestyle You may be retaining fluid if you have a history of heart failure or if you experience any of the following symptoms:  Weight gain of 3 pounds or more overnight or 5 pounds in a week, increased swelling in our hands or feet or shortness of breath while lying flat in bed. Please call your doctor as soon as you notice any of these symptoms; do not wait until your next office visit. Recognize signs and symptoms of STROKE: 
 
F-face looks uneven A-arms unable to move or move unevenly S-speech slurred or non-existent T-time-call 911 as soon as signs and symptoms begin-DO NOT go Back to bed or wait to see if you get better-TIME IS BRAIN. Warning Signs of HEART ATTACK Call 911 if you have these symptoms: 
? Chest discomfort. Most heart attacks involve discomfort in the center of the chest that lasts more than a few minutes, or that goes away and comes back. It can feel like uncomfortable pressure, squeezing, fullness, or pain. ? Discomfort in other areas of the upper body. Symptoms can include pain or discomfort in one or both arms, the back, neck, jaw, or stomach. ? Shortness of breath with or without chest discomfort. ? Other signs may include breaking out in a cold sweat, nausea, or lightheadedness. Don't wait more than five minutes to call [address]! Fast action can save your life. Calling 911 is almost always the fastest way to get lifesaving treatment. Emergency Medical Services staff can begin treatment when they arrive  up to an hour sooner than if someone gets to the hospital by car. The discharge information has been reviewed with the patient. The patient verbalized understanding.  
Discharge medications reviewed with the patient and appropriate educational materials and side effects teaching were provided. If I feel that I can not keep these promises and I am at risk of hurting myself or others,I will call the crisis office and speak with a crisis worker who will assist me during my crisis. [address] [place] [address] [phone] [address] [place] [address] [phone] [facility] Levi Jensen Crisis [phone] [facility] [phone] 
___________________________________________________________________________________________________________________________________ Chart Review Routing History Recipient Method Report Sent By Nigel Ortiz Provider Unknown, MD  
[address] Mail IP Auto Routed Notes Westley Ayala [[id number]] 2/7/2015  3:29 PM 02/07/2015 Provider Unknown, MD  
[address] IP Auto Routed Notes Kalina Mena MD [[id number]] 2/13/2015  9:05 AM 02/13/2015 Carmen Pruitt MD  
Fax: [phone] Phone: [phone] Fax IP Auto Routed [address] 1/19/2018  3:09 PM 01/19/2018

## 2017-11-25 NOTE — BH NOTES
ADMISSION NOTE:    Pt was admitted under a TDO for the professional services of Dr. Augie Cruz. Per report, pt sister notice that he was behaving bizarrely (responding to internal stimuli, pacing, yelling and non-compliant with medications). Pt UDS and BAL are negative. Pt has a recent admission to [facility] this year. Per screening, pt has a history of sexually inappropriate behavior therefore his room was blocked. Pt presented with a dull affect, selectively mute and withdrawn. He was cooperative with search done by Susana Morin and [facility], RN. Pt oriented to the unit, although he did not verbalize understand. Orders received. Will monitor pt q15 minutes for safety and support.

## 2017-11-25 NOTE — IP AVS SNAPSHOT
[facility] 
 
 
 [address] Patient: Juliette Shoemaker MRN: [id number] :1970 A check celeste indicates which time of day the medication should be taken. My Medications START taking these medications Instructions Each Dose to Equal  
 Morning Noon Evening Bedtime FLUoxetine 40 mg capsule Commonly known as:  PROzac Start taking on:  2018 Your last dose was: Your next dose is: Take 1 Cap by mouth daily for 14 days. Indications: major depressive disorder 40 mg LORazepam 0.5 mg tablet Commonly known as:  ATIVAN Your last dose was: Your next dose is: Take 1 Tab by mouth two (2) times a day for 14 days. Max Daily Amount: 1 mg. Indications: Catatonia  
 0.5 mg  
    
   
   
   
  
 OLANZapine 20 mg disintegrating tablet Commonly known as:  ZyPREXA zydis Your last dose was: Your next dose is: Take 1 Tab by mouth nightly for 14 days. Indications: Schizophrenia 20 mg  
    
   
   
   
  
 valproic acid (as sodium salt) 250 mg/5 mL (5 mL) Soln oral solution Commonly known as:  Ilsa Ledezma Your last dose was: Your next dose is: Take 15 mL by mouth two (2) times a day for 14 days. Indications: Bipolar Disorder 750 mg CHANGE how you take these medications Instructions Each Dose to Equal  
 Morning Noon Evening Bedtime  
 benztropine 2 mg tablet Commonly known as:  COGENTIN What changed:  when to take this Your last dose was: Your next dose is: Take 1 Tab by mouth nightly for 14 days. Indications: extrapyramidal disease 2 mg * haloperidol decanoate 100 mg/mL injection Commonly known as:  HALDOL DECANOATE What changed:  Another medication with the same name was added.  Make sure you understand how and when to take each. Your last dose was: Your next dose is:    
   
   
 2 mL by IntraMUSCular route every twenty-eight (28) days. Indications: SCHIZOPHRENIA  
 200 mg  
    
   
   
   
  
 * haloperidol decanoate 100 mg/mL injection Commonly known as:  HALDOL DECANOATE Start taking on:  2/12/2018 What changed: You were already taking a medication with the same name, and this prescription was added. Make sure you understand how and when to take each. Your last dose was: Your next dose is:    
   
   
 2 mL by IntraMUSCular route every twenty-eight (28) days for 28 days. Indications: Schizophrenia  
 200 mg * Notice: This list has 2 medication(s) that are the same as other medications prescribed for you. Read the directions carefully, and ask your doctor or other care provider to review them with you. STOP taking these medications   
 cloZAPine 200 mg tablet  
   
  
 divalproex  mg ER tablet Commonly known as:  DEPAKOTE ER Where to Get Your Medications Information on where to get these meds will be given to you by the nurse or doctor. ! Ask your nurse or doctor about these medications  
  benztropine 2 mg tablet FLUoxetine 40 mg capsule  
 haloperidol decanoate 100 mg/mL injection LORazepam 0.5 mg tablet OLANZapine 20 mg disintegrating tablet  
 valproic acid (as sodium salt) 250 mg/5 mL (5 mL) Soln oral solution

## 2017-11-25 NOTE — IP AVS SNAPSHOT
[facility] 
 
 
 [address] Patient: Conor Cabrera MRN: [id number] :[id number] About your hospitalization You were admitted on:  2017 You last received care in the:  [facility] You were discharged on:  2018 Why you were hospitalized Your primary diagnosis was:  Paranoid Schizophrenia (Hcc) Your diagnoses also included:  Schizophrenia (Hcc) Follow-up Information Follow up With Details Comments Contact Info Dr. Sruthi Be on 2018 Medication management appointment on  at 11:30 AM.  [facility] 
[phone] Fax: [phone] Haloperidol decanoate IM injection On 2018 Haloperidol decanoate 200 mg IM injection was given on 17 and 1/15/18 during hospitalization. Next injection is due on 18. [facility] Jesús Renae   Case Management appointment on  at 10:00 AM.  [facility] 
[phone] Fax: [phone] Provider Unknown   Patient not available to ask Discharge Orders None A check celeste indicates which time of day the medication should be taken. My Medications START taking these medications Instructions Each Dose to Equal  
 Morning Noon Evening Bedtime FLUoxetine 40 mg capsule Commonly known as:  PROzac Start taking on:  2018 Your last dose was: Your next dose is: Take 1 Cap by mouth daily for 14 days. Indications: major depressive disorder 40 mg LORazepam 0.5 mg tablet Commonly known as:  ATIVAN Your last dose was: Your next dose is: Take 1 Tab by mouth two (2) times a day for 14 days. Max Daily Amount: 1 mg.  Indications: Catatonia  
 0.5 mg  
    
   
   
   
  
 OLANZapine 20 mg disintegrating tablet Commonly known as:  ZyPREXA zydis Your last dose was: Your next dose is: Take 1 Tab by mouth nightly for 14 days. Indications: Schizophrenia 20 mg  
    
   
   
   
  
 valproic acid (as sodium salt) 250 mg/5 mL (5 mL) Soln oral solution Commonly known as:  Benjamin Wagner Your last dose was: Your next dose is: Take 15 mL by mouth two (2) times a day for 14 days. Indications: Bipolar Disorder 750 mg CHANGE how you take these medications Instructions Each Dose to Equal  
 Morning Noon Evening Bedtime  
 benztropine 2 mg tablet Commonly known as:  COGENTIN What changed:  when to take this Your last dose was: Your next dose is: Take 1 Tab by mouth nightly for 14 days. Indications: extrapyramidal disease 2 mg * haloperidol decanoate 100 mg/mL injection Commonly known as:  HALDOL DECANOATE What changed:  Another medication with the same name was added. Make sure you understand how and when to take each. Your last dose was: Your next dose is:    
   
   
 2 mL by IntraMUSCular route every twenty-eight (28) days. Indications: SCHIZOPHRENIA  
 200 mg  
    
   
   
   
  
 * haloperidol decanoate 100 mg/mL injection Commonly known as:  HALDOL DECANOATE Start taking on:  2/12/2018 What changed: You were already taking a medication with the same name, and this prescription was added. Make sure you understand how and when to take each. Your last dose was: Your next dose is:    
   
   
 2 mL by IntraMUSCular route every twenty-eight (28) days for 28 days. Indications: Schizophrenia  
 200 mg * Notice: This list has 2 medication(s) that are the same as other medications prescribed for you.  Read the directions carefully, and ask your doctor or other care provider to review them with you. STOP taking these medications   
 cloZAPine 200 mg tablet  
   
  
 divalproex  mg ER tablet Commonly known as:  DEPAKOTE ER Where to Get Your Medications Information on where to get these meds will be given to you by the nurse or doctor. ! Ask your nurse or doctor about these medications  
  benztropine 2 mg tablet FLUoxetine 40 mg capsule  
 haloperidol decanoate 100 mg/mL injection LORazepam 0.5 mg tablet OLANZapine 20 mg disintegrating tablet  
 valproic acid (as sodium salt) 250 mg/5 mL (5 mL) Soln oral solution Discharge Instructions DISCHARGE SUMMARY from Nurse PATIENT INSTRUCTIONS: 
 
What to do at Home: 
 
Recommended activity: Activity as tolerated, *  Please give a list of your current medications to your Primary Care Provider. *  Please update this list whenever your medications are discontinued, doses are 
    changed, or new medications (including over-the-counter products) are added. *  Please carry medication information at all times in case of emergency situations. These are general instructions for a healthy lifestyle: No smoking/ No tobacco products/ Avoid exposure to second hand smoke Surgeon General's Warning:  Quitting smoking now greatly reduces serious risk to your health. Obesity, smoking, and sedentary lifestyle greatly increases your risk for illness A healthy diet, regular physical exercise & weight monitoring are important for maintaining a healthy lifestyle You may be retaining fluid if you have a history of heart failure or if you experience any of the following symptoms:  Weight gain of 3 pounds or more overnight or 5 pounds in a week, increased swelling in our hands or feet or shortness of breath while lying flat in bed.   Please call your doctor as soon as you notice any of these symptoms; do not wait until your next office visit. Recognize signs and symptoms of STROKE: 
 
F-face looks uneven A-arms unable to move or move unevenly S-speech slurred or non-existent T-time-call 911 as soon as signs and symptoms begin-DO NOT go Back to bed or wait to see if you get better-TIME IS BRAIN. Warning Signs of HEART ATTACK Call 911 if you have these symptoms: 
? Chest discomfort. Most heart attacks involve discomfort in the center of the chest that lasts more than a few minutes, or that goes away and comes back. It can feel like uncomfortable pressure, squeezing, fullness, or pain. ? Discomfort in other areas of the upper body. Symptoms can include pain or discomfort in one or both arms, the back, neck, jaw, or stomach. ? Shortness of breath with or without chest discomfort. ? Other signs may include breaking out in a cold sweat, nausea, or lightheadedness. Don't wait more than five minutes to call [address]! Fast action can save your life. Calling 911 is almost always the fastest way to get lifesaving treatment. Emergency Medical Services staff can begin treatment when they arrive  up to an hour sooner than if someone gets to the hospital by car. The discharge information has been reviewed with the patient. The patient verbalized understanding. Discharge medications reviewed with the patient and appropriate educational materials and side effects teaching were provided. If I feel that I can not keep these promises and I am at risk of hurting myself or others,I will call the crisis office and speak with a crisis worker who will assist me during my crisis. [address] [place] [address] [phone] [address] [place] [address] [phone] Gayle Jensen [facility] [phone] [facility] [phone] 
___________________________________________________________________________________________________________________________________ Zwipe Announcement We are excited to announce that we are making your provider's discharge notes available to you in Zwipe. You will see these notes when they are completed and signed by the physician that discharged you from your recent hospital stay. If you have any questions or concerns about any information you see in Zwipe, please call the Health Information Department where you were seen or reach out to your Primary Care Provider for more information about your plan of care. Introducing [facility] [place] [facility]! [facility] introduces Zwipe patient portal. Now you can access parts of your medical record, email your doctor's office, and request medication refills online. 1. In your internet browser, go to [email or web address] 2. Click on the First Time User? Click Here link in the Sign In box. You will see the New Member Sign Up page. 3. Enter your Zwipe Access Code exactly as it appears below. You will not need to use this code after youve completed the sign-up process. If you do not sign up before the expiration date, you must request a new code. · Zwipe Access Code: [email or web address] Expires: 3/1/2018  1:38 PM 
 
4. Enter the last four digits of your Social Security Number (xxxx) and Date of Birth (mm/dd/yyyy) as indicated and click Submit. You will be taken to the next sign-up page. 5. Create a Zwipe ID. This will be your Zwipe login ID and cannot be changed, so think of one that is secure and easy to remember. 6. Create a Zwipe password. You can change your password at any time. 7. Enter your Password Reset Question and Answer. This can be used at a later time if you forget your password. 8. Enter your e-mail address. You will receive e-mail notification when new information is available in [address] 9. Click Sign Up. You can now view and download portions of your medical record.  
10. Click the Download Summary menu link to download a portable copy of your medical information. If you have questions, please visit the Frequently Asked Questions section of the MyChart website. Remember, MyChart is NOT to be used for urgent needs. For medical emergencies, dial 911. Now available from your iPhone and Android! Providers Seen During Your Hospitalization Provider Specialty Primary office phone Mercedes Rodriguez MD Psychiatry [phone] Diann Khan MD Psychiatry [phone] Your Primary Care Physician (PCP) Primary Care Physician Office Phone Office Fax UNKNOWN, PROVIDER ** None ** ** None ** You are allergic to the following Allergen Reactions Fluphenazine Unknown (comments) Pt is unable to communicate properly. Penicillins Unknown (comments) Pt is unable to communicate properly. Recent Documentation Height Weight BMI Smoking Status 1.702 m 84.9 kg 29.3 kg/m2 Never Smoker Emergency Contacts Name Discharge Info Relation Home Work Mobile No,One DECLINED CAREGIVER [4] Other Relative [6] [phone] Patient Belongings The following personal items are in your possession at time of discharge: 
                             
 
  
  
 Please provide this summary of care documentation to your next provider. Signatures-by signing, you are acknowledging that this After Visit Summary has been reviewed with you and you have received a copy. Patient Signature:  ____________________________________________________________ Date:  ____________________________________________________________  
  
Susan Tovar Provider Signature:  ____________________________________________________________ Date:  ____________________________________________________________

## 2017-11-26 PROBLEM — F20.0 PARANOID SCHIZOPHRENIA (HCC): Status: ACTIVE | Noted: 2017-03-15

## 2017-11-26 PROCEDURE — 65220000003 HC RM SEMIPRIVATE PSYCH

## 2017-11-26 PROCEDURE — 74011250637 HC RX REV CODE- 250/637: Performed by: PSYCHIATRY & NEUROLOGY

## 2017-11-26 RX ORDER — BENZTROPINE MESYLATE 2 MG/1
2 TABLET ORAL
Status: DISCONTINUED | OUTPATIENT
Start: 2017-11-26 | End: 2018-01-24 | Stop reason: HOSPADM

## 2017-11-26 RX ORDER — DIVALPROEX SODIUM 500 MG/1
1000 TABLET, EXTENDED RELEASE ORAL
Status: DISCONTINUED | OUTPATIENT
Start: 2017-11-26 | End: 2017-11-27

## 2017-11-26 RX ORDER — SERTRALINE HYDROCHLORIDE 50 MG/1
50 TABLET, FILM COATED ORAL DAILY
Status: DISCONTINUED | OUTPATIENT
Start: 2017-11-26 | End: 2017-11-28

## 2017-11-26 RX ADMIN — DIVALPROEX SODIUM 1000 MG: 500 TABLET, FILM COATED, EXTENDED RELEASE ORAL at 21:37

## 2017-11-26 RX ADMIN — BENZTROPINE MESYLATE 2 MG: 2 TABLET ORAL at 21:37
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
## 2017-11-26 NOTE — BH NOTES
COMMUNITY GROUP THERAPY PROGRESS NOTE    The patient Reed Mckeon is participating in the Community Therapy Group. Group time: 30 minutes    Personal goal for participation: To review unit guidelines and treatment plan    Goal orientation: personal    Group therapy participation: active    Therapeutic interventions reviewed and discussed: Yes    Impression of participation:  Positive input.     Elvira Chavez  11/26/2017

## 2017-11-26 NOTE — PROGRESS NOTES
Problem: Altered Thought Process (Adult/Pediatric)  Goal: *STG: Remains safe in hospital  Outcome: Progressing Towards Goal  Pt has been isolated to his room most of the time during the shift with dull affect and depressed mood. Pt has been dishelved and selectively mute, and was food compliant. Pt denied any S/I and H/I at this time. Pt encouraged to attend all group activities and to discuss any issues or concerns with staff. Staff will also continue to monitor pt with Q -15 checks for safety and needs.

## 2017-11-26 NOTE — BH NOTES
Alert and oriented to all spheres. Pt.'s self care skills and grooming habits are poor; dishleved. Encouraged to shower; refused. Pt.'s nutritional intake is adequate. During the shift this pt. have been seclusive to self in own room. Pt.'s insight and judgement regarding hospitalization and mental illness is poor. Presents as being guarded and preoccupied. Affect/Mood: Sad, depressed. Promotes compliance with ADL's. Encourages to verbalize issues and concerns; insight poor. Offers support and anticipates needs. Q-15 minute checks maintained for safety.

## 2017-11-26 NOTE — BH NOTES
GROUP THERAPY PROGRESS NOTE    The patient Lilian marie 49 y.o. male is participating in Ref[facility] Group    Group time: 30 minutes    Personal goal for participation: To discuss the daily goals    Goal orientation: personal    Group therapy participation: passive    Therapeutic interventions reviewed and discussed:  Yes    Impression of participation: nick Weston  11/25/2017  9:17 PM

## 2017-11-26 NOTE — CONSULTS
Medical Consult for [address] [place] [address] Patient    Attempted to see pt who appeared sleep and would not awake. Pt appeared as if he was playing sleep. Will attempt to evaluate pt later.    Bhavna Castillo MD  11/26/2017, 5:18 PM

## 2017-11-26 NOTE — BH NOTES
Pt is selectively mute and refused scheduled med. He would not respond to RN offering med. His eyes were open but pt stared away blankly when RN offered meds.

## 2017-11-27 PROBLEM — F20.9 SCHIZOPHRENIA (HCC): Status: ACTIVE | Noted: 2017-11-27

## 2017-11-27 PROCEDURE — 74011250637 HC RX REV CODE- 250/637: Performed by: PSYCHIATRY & NEUROLOGY

## 2017-11-27 PROCEDURE — 65220000003 HC RM SEMIPRIVATE PSYCH

## 2017-11-27 RX ORDER — DIVALPROEX SODIUM 500 MG/1
1500 TABLET, EXTENDED RELEASE ORAL
Status: DISCONTINUED | OUTPATIENT
Start: 2017-11-27 | End: 2017-11-28

## 2017-11-27 RX ORDER — CLOZAPINE 200 MG/1
600 TABLET ORAL
COMMUNITY
End: 2018-01-24

## 2017-11-27 RX ORDER — DIVALPROEX SODIUM 500 MG/1
1500 TABLET, EXTENDED RELEASE ORAL
COMMUNITY
End: 2018-01-24

## 2017-11-27 RX ADMIN — OLANZAPINE 5 MG: 5 TABLET, FILM COATED ORAL at 20:07

## 2017-11-27 NOTE — PROGRESS NOTES
Laboratory monitoring for mood stabilizer and antipsychotics:    Recommended baseline monitoring has been completed based on this patient's current medication regimen. The following labs have been ordered to complete baseline monitoring:N/A; lipid panel OK (TG elevated but likely not fasting), TSH OK, and height/weight entered into EHR. The following labs were completed at transferring facility: WBC 8.49, Hgb 13.2, Hct 40.5, Plts 192, sodium 140, potassium 4.0, BUN 13, creatinine 1.02, glucose 84, calcium 9.2, AST 11, ALT 21, alk phos 156, total bili 0.4, albumin 3.5, UDS negative, BAL negative, UA (+) protein, trace ketones     The patient is currently taking the following medication(s):   Current Facility-Administered Medications   Medication Dose Route Frequency    valproic acid (as sodium salt) (DEPAKENE) 250 mg/5 mL (5 mL) oral solution 750 mg  750 mg Oral TID    haloperidol decanoate (HALDOL DECANOATE) 100 mg/mL injection 200 mg  200 mg IntraMUSCular Q28D    LORazepam (ATIVAN) tablet 1 mg  1 mg Oral BID    OLANZapine (ZyPREXA zydis) disintegrating tablet 30 mg  30 mg Oral QHS    FLUoxetine (PROzac) capsule 40 mg  40 mg Oral DAILY    benztropine (COGENTIN) tablet 2 mg  2 mg Oral QHS       Serum Drug Level(s)  VALPROIC ACID  88 mcg/mL on 12/9/17    Height, Weight, BMI Estimation  Estimated body mass index is 29.3 kg/(m^2) as calculated from the following:    Height as of this encounter: 170.2 cm (67\"). Weight as of this encounter: 84.9 kg (187 lb 1.6 oz). Renal Function, Hepatic Function and Chemistry  Estimated Creatinine Clearance: 114.7 mL/min (based on Cr of 0.82).     Lab Results   Component Value Date/Time    Sodium 140 12/09/2017 08:10 AM    Potassium 3.7 12/09/2017 08:10 AM    Chloride 101 12/09/2017 08:10 AM    CO2 28 12/09/2017 08:10 AM    Anion gap 11 12/09/2017 08:10 AM    Glucose 81 12/09/2017 08:10 AM    Glucose 101 03/14/2017 11:49 PM    BUN 14 12/09/2017 08:10 AM    Creatinine 0.82 12/09/2017 08:10 AM    BUN/Creatinine ratio 17 12/09/2017 08:10 AM    GFR est AA >60 12/09/2017 08:10 AM    GFR est non-AA >60 12/09/2017 08:10 AM    Calcium 9.6 12/09/2017 08:10 AM    ALT (SGPT) 26 12/09/2017 08:10 AM    AST (SGOT) 16 12/09/2017 08:10 AM    Alk.  phosphatase 151 12/09/2017 08:10 AM    Protein, total 8.6 12/09/2017 08:10 AM    Albumin 4.1 12/09/2017 08:10 AM    Globulin 4.5 12/09/2017 08:10 AM    A-G Ratio 0.9 12/09/2017 08:10 AM    Bilirubin, total 0.5 12/09/2017 08:10 AM       Lab Results   Component Value Date/Time    Glucose 81 12/09/2017 08:10 AM    Glucose 101 03/14/2017 11:49 PM    Glucose (POC) 112 01/19/2012 09:59 PM       Lab Results   Component Value Date/Time    Hemoglobin A1c 5.6 03/14/2017 11:49 PM       Hematology  Lab Results   Component Value Date/Time    WBC 5.6 03/28/2017 10:35 AM    Hemoglobin (POC) 15.6 01/19/2012 09:59 PM    HGB 13.7 03/28/2017 10:35 AM    Hematocrit (POC) 46 01/19/2012 09:59 PM    HCT 41.0 03/28/2017 10:35 AM    PLATELET 266 08/58/2345 10:35 AM    MCV 87.2 03/28/2017 10:35 AM       Lipids  Lab Results   Component Value Date/Time    Cholesterol, total 184 12/09/2017 08:10 AM    HDL Cholesterol 48 12/09/2017 08:10 AM    LDL, calculated 90.6 12/09/2017 08:10 AM    Triglyceride 227 12/09/2017 08:10 AM    CHOL/HDL Ratio 3.8 12/09/2017 08:10 AM       Thyroid Function    Lab Results   Component Value Date/Time    TSH 3.78 12/09/2017 08:10 AM    T4, Free 1.0 03/17/2017 05:15 AM     Vitals  Visit Vitals    BP (!) 145/106 (BP 1 Location: Right arm)    Pulse 83    Temp 98.7 °F (37.1 °C)    Resp 18    Ht 170.2 cm (67\")    Wt 84.9 kg (187 lb 1.6 oz)    BMI 29.3 kg/m2       Akira Mahajan PHARMD, BCPS  Contact: [phone]

## 2017-11-27 NOTE — BH NOTES
GROUP THERAPY PROGRESS NOTE    Arpita Baumann is participating in [place].      Group time: 30 minutes    Personal goal for participation: daily orientation    Goal orientation: personal    Group therapy participation: active    Therapeutic interventions reviewed and discussed: yes    Impression of participation: ok

## 2017-11-27 NOTE — PROGRESS NOTES
[facility] Medication Reconciliation     Recommendations/Findings:   1) Patient last had haloperidol decanoate 200 mg IM injection on 17. He has been on this injection since at least . 2) At this time, I am unable to confirm when patient last took his clozapine. Patient is sleeping, did not participate in interview, and has not yet signed a release of information. Confirmed clozapine dose to be 600 mg nightly with patient's pharmacy and [facility]. Patient last filled clozapine on 10/18 and would have run out 9 days ago if he was 100% compliant. A new prescription is available at the pharmacy but has not been processed yet due to needing lab work. Per clozapine REMS program, patient's last [address] reported to the registry was [phone] on 10/28/17 and his monitoring frequency is monthly. Total Time Spent: 20 minutes    Information obtained from: [facility], [facility]     Patient allergies: Allergies as of 2017 - Review Complete 2017   Allergen Reaction Noted    Fluphenazine Unknown (comments) 2012    Penicillins Unknown (comments) 2012         Prior to Admission Medications   Prescriptions Last Dose Informant Patient Reported? Taking?   benztropine (COGENTIN) 2 mg tablet   No Yes   Sig: Take 1 Tab by mouth nightly. Indications: extrapyramidal disease   cloZAPine 200 mg tablet Unknown at Unknown time  Yes No   Sig: Take 600 mg by mouth nightly. Indications: TREATMENT-RESISTANT SCHIZOPHRENIA   divalproex ER (DEPAKOTE ER) 500 mg ER tablet   Yes Yes   Sig: Take 1,500 mg by mouth nightly. Indications: Treatment-resistant schizophrenia   haloperidol decanoate (HALDOL DECANOATE) 100 mg/mL injection 2017  No Yes   Si mL by IntraMUSCular route every twenty-eight (28) days.  Indications: SCHIZOPHRENIA      Facility-Administered Medications: None        Thank you,  Niko Dutta, PHARMD, BCPS

## 2017-11-27 NOTE — BH NOTES
A&Ox2.  Pt.'s nutritional intake is fair. Pt.'s personal hygiene and grooming habits are poor; dishelved. Throughout the shift this pt. have been isolative to self in own room. Pt.'s insight and judgement is poor. Affect/Mood: depressed. Pt. Denies presence of A/V hallucinations and S/H ideations. Offers support and anticipates needs. Q-15  Minute checks maintained for support and safety.

## 2017-11-27 NOTE — BH NOTES
PSYCHOSOCIAL ASSESSMENT  :Patient identifying info: Cecille Jain is a 49 y.o., male admitted 11/25/2017  3:07 PM     Presenting problem and precipitating factors: Admitted under a TDO to [facility]. Pt is selectively mute, refusing to talk to the treatment team. Per report, pt is disorganized in thought process, delusional themes and refusing to take some of his medications. Poor hygiene, poor insight, psychotic and sexually inappropriate. Per prescreening report, the sister states she does not feel safe when client is not on medications and she nicole snot want him in her house. Mental status assessment: Pt was selectively mute during this treatment team meeting/assessment. Pt appeared unkept, laying in bed with eyes closed. Current psychiatric providers and contact info: Pt is followed by [facility] and  is Zackary Jon -  left voicemail. Previous psychiatric services/providers and response to treatment: Client is known to [facility] and [facility] for medication noncompliant behaviors. Pt was hospitalized at [facility] is 1. Family history of mental illness: Unknown at this time. Substance abuse history:  UDS negative; BAL=0. Social History   Substance Use Topics    Smoking status: Never Smoker    Smokeless tobacco: Never Used    Alcohol use No       Family constellation: Pt is single and lives with sister. Is significant other involved? N/A      Describe support system: Pt's sister is supportive. Describe living arrangements and home environment: Pt lives with his sister. An ANEESH will be signed when pt will talk with this . Health issues:   Hospital Problems  Date Reviewed: 3/15/2017          Codes Class Noted POA    Schizophrenia ([facility] ICD-10-CM: F20.9  ICD-9-CM: 295.90  11/27/2017 Unknown        * (Principal)Paranoid schizophrenia ([facility] ICD-10-CM: F20.0  ICD-9-CM: 295.30  3/15/2017 Unknown              Trauma history: Unknown.      Legal issues: None known at this time. History of  service: None. Financial status: SSDI    Anabaptism/cultural factors: Unknown at this time. Education/work history: Unknown at this time. Have you been licensed as a stephenie care professional (current or ): No.   Leisure and recreation preferences: Unknown at this time. Describe coping skills:  Limited.      Bruno Velazquez  2017

## 2017-11-27 NOTE — CONSULTS
Medical Consult for [address] [place] [address] Patient    Consult H&P   dictated, see patient chart    Impression:    Diana marie 49 y.o. male with past medical history of schizophrenia presents with behavioral health problems of psychosis admitted for further psychiatric evaluation and treatment. Plan:   1. Psychiatry to manage mental health issues. 2. Medically stable at this time, will follow up as needed. 3. No VTE prophylaxis indicated or necessary at this time.      Thank you  Luc Booth MD  11/27/2017, 2:14 PM

## 2017-11-27 NOTE — BH NOTES
PSYCHIATRIC PROGRESS NOTE         Patient Name  Obey Marc   Date of Birth 1970   [facility] [id number]   Medical Record Number  [id number]      Age  49 y.o. PCP PROVIDER UNKNOWN   Admit date:  11/25/2017    Room Number  555/45  @ [facility]   Date of Service  11/27/2017           E & M PROGRESS NOTE:         HISTORY       CC:  \"selectively mute\"  HISTORY OF PRESENT ILLNESS/INTERVAL HISTORY:  (reviewed/updated 11/27/2017). per initial evaluation: The patient, Obey Marc, is a 49 y.o. WHITE OR  male with a past psychiatric history significant for schizophrenia, who presents at this time with complaints of (and/or evidence of) the following emotional symptoms: psychotic behavior. Additional symptomatology include paranoid delusion, sexually inappropriate behavior, pepisodes of yelling screaming followed by selectively mute, disorganized thought process, anxiety, concern about health problems, difficulty sleeping, fearfulness, hearing voices, increased irritability, poor concentration and problem with medication. The above symptoms have been present for many years. These symptoms are of severe severity. These symptoms are constant  in nature. The patient's condition has been precipitated by and psychosocial stressors (conflict with sister, non compliance ). Patient's condition made worse by treatment noncompliance. UDS: negative; BAL=0. Obey Marc presents/reports/evidences the following emotional symptoms today, 11/27/2017:psychotic behavior. The above symptoms have been present for many years. These symptoms are of severe severity. The symptoms are constant in nature. Additional symptomatology and features include selectively mute, refusing to talk to tx team. Pt is disorganized in thought process, delusional themes + and refusing to take some of his med.  Poor hygiene and poor insight       SIDE EFFECTS: (reviewed/updated 11/27/2017)  None reported or admitted to.  No noted toxicity with use of current med   ALLERGIES:(reviewed/updated 11/27/2017)  Allergies   Allergen Reactions    Fluphenazine Unknown (comments)     Pt is unable to communicate properly.  Penicillins Unknown (comments)     Pt is unable to communicate properly. MEDICATIONS PRIOR TO ADMISSION:(reviewed/updated 11/27/2017)  Prescriptions Prior to Admission   Medication Sig    divalproex ER (DEPAKOTE ER) 500 mg ER tablet Take 1,500 mg by mouth nightly.  benztropine (COGENTIN) 2 mg tablet Take 1 Tab by mouth nightly. Indications: extrapyramidal disease    sertraline (ZOLOFT) 50 mg tablet Take 1 Tab by mouth daily. Indications: ANXIETY WITH DEPRESSION    haloperidol decanoate (HALDOL DECANOATE) 100 mg/mL injection 2 mL by IntraMUSCular route every twenty-eight (28) days. Indications: SCHIZOPHRENIA    haloperidol (HALDOL) 5 mg tablet Take 3 Tabs by mouth nightly. Indications: discontinue after 2 weeks, when haldol dec 200 mg IM given      PAST MEDICAL HISTORY: Past medical history from the initial psychiatric evaluation has been reviewed (reviewed/updated 11/27/2017) with no additional updates (I asked patient and no additional past medical history provided). Past Medical History:   Diagnosis Date    Psychiatric disorder     Psychotic disorder     Withdrawal syndrome ([facility]    No past surgical history on file. SOCIAL HISTORY: Social history from the initial psychiatric evaluation has been reviewed (reviewed/updated 11/27/2017) with no additional updates (I asked patient and no additional social history provided). Social History     Social History    Marital status: SINGLE     Spouse name: N/A    Number of children: N/A    Years of education: N/A     Occupational History    Not on file.      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 He lives with his sister. On disability    No pending legal charge reported      FAMILY HISTORY: Family history from the initial psychiatric evaluation has been reviewed (reviewed/updated 11/27/2017) with no additional updates (I asked patient and no additional family history provided). No family history on file. REVIEW OF SYSTEMS: (reviewed/updated 11/27/2017)  Appetite:no change from normal   Sleep: fitful   All other Review of Systems: Psychological ROS: positive for - behavioral disorder, concentration difficulties, hallucinations, irritability, mood swings and delusion  Respiratory ROS: no cough, shortness of breath, or wheezing  Cardiovascular ROS: no chest pain or dyspnea on exertion         [facility] (MSE):    MSE FINDINGS ARE WITHIN NORMAL LIMITS (WNL) UNLESS OTHERWISE STATED BELOW. ( ALL OF THE BELOW CATEGORIES OF THE MSE HAVE BEEN REVIEWED (reviewed 11/27/2017) AND UPDATED AS DEEMED APPROPRIATE )  General Presentation age appropriate and disheveled, uncooperative and unreliable   Orientation disorganized   Vital Signs  See below (reviewed 11/27/2017); Vital Signs (BP, Pulse, & Temp) are within normal limits if not listed below.    Gait and Station Stable/steady, no ataxia   Musculoskeletal System No extrapyramidal symptoms (EPS); no abnormal muscular movements or Tardive Dyskinesia (TD); muscle strength and tone are within normal limits   Language No aphasia or dysarthria   Speech:  mute   Thought Processes illogical; slow rate of thoughts; poor abstract reasoning/computation   Thought Associations blocked    Thought Content paranoid delusions, bizarre delusions, auditory hallucinations and internally preoccupied   Suicidal Ideations none   Homicidal Ideations none   Mood:  anhedonia   Affect:  constricted, inappropriate and increased in intensity   Memory recent  impaired   Memory remote: intact   Concentration/Attention:  distractable and hypervigilance   Fund of Knowledge average   Insight:  limited   Reliability poor   Judgment:  limited          VITALS:     No data found. Wt Readings from Last 3 Encounters:   03/25/17 95.6 kg (210 lb 11.2 oz)   02/09/15 97.5 kg (215 lb)   02/07/15 97.5 kg (215 lb)     Temp Readings from Last 3 Encounters:   03/28/17 97.5 °F (36.4 °C)   02/07/15 98.7 °F (37.1 °C)     BP Readings from Last 3 Encounters:   11/25/17 121/88   03/28/17 100/68   02/12/15 105/73     Pulse Readings from Last 3 Encounters:   11/25/17 98   03/28/17 67   02/12/15 87            DATA     LABORATORY DATA:(reviewed/updated 11/27/2017)  No results found for this or any previous visit (from the past 24 hour(s)). Lab Results   Component Value Date/Time    Valproic acid 89 03/28/2017 10:35 AM     No results found for: KIKI   RADIOLOGY REPORTS:(reviewed/updated 11/27/2017)  No results found.        MEDICATIONS     ALL MEDICATIONS:   Current Facility-Administered Medications   Medication Dose Route Frequency    divalproex ER (DEPAKOTE ER) 24 hour tablet 1,500 mg  1,500 mg Oral QHS    sertraline (ZOLOFT) tablet 50 mg  50 mg Oral DAILY    benztropine (COGENTIN) tablet 2 mg  2 mg Oral QHS    ziprasidone (GEODON) 20 mg in sterile water (preservative free) 1 mL injection  20 mg IntraMUSCular BID PRN    OLANZapine (ZyPREXA) tablet 5 mg  5 mg Oral Q6H PRN    benztropine (COGENTIN) tablet 2 mg  2 mg Oral BID PRN    benztropine (COGENTIN) injection 2 mg  2 mg IntraMUSCular BID PRN    LORazepam (ATIVAN) injection 2 mg  2 mg IntraMUSCular Q4H PRN    LORazepam (ATIVAN) tablet 1 mg  1 mg Oral Q4H PRN    zolpidem (AMBIEN) tablet 10 mg  10 mg Oral QHS PRN    acetaminophen (TYLENOL) tablet 650 mg  650 mg Oral Q4H PRN    ibuprofen (MOTRIN) tablet 400 mg  400 mg Oral Q8H PRN    magnesium hydroxide (MILK OF MAGNESIA) 400 mg/5 mL oral suspension 30 mL  30 mL Oral DAILY PRN    nicotine (NICODERM CQ) 21 mg/24 hr patch 1 Patch  1 Patch TransDERmal DAILY PRN      SCHEDULED MEDICATIONS:   Current Facility-Administered Medications   Medication Dose Route Frequency    divalproex ER (DEPAKOTE ER) 24 hour tablet 1,500 mg  1,500 mg Oral QHS    sertraline (ZOLOFT) tablet 50 mg  50 mg Oral DAILY    benztropine (COGENTIN) tablet 2 mg  2 mg Oral QHS          ASSESSMENT & PLAN     DIAGNOSES REQUIRING ACTIVE TREATMENT AND MONITORING: (reviewed/updated 11/27/2017)  Patient Active Hospital Problem List:   Paranoid schizophrenia ([facility] (3/15/2017)- tx resistant    Assessment: acute on chronic- severe- psychotic, selectively mute, poor insight-no sig change    Plan: I will ct to adjust med- need collateral info, check Clozapine dose ? 600 mg HS - titrate Depakote        I will continue to monitor blood levels (Depakote,, clozapine---a drug with a narrow therapeutic index= NTI) and associated labs for drug therapy implemented that require intense monitoring for toxicity as deemed appropriate based on current medication side effects and pharmacodynamically determined drug 1/2 lives. In summary, Raquel Laurent, is a 49 y.o.  male who presents with a severe exacerbation of the principal diagnosis of Paranoid schizophrenia ([facility]  Patient's condition is worsening/not improving/not stable . Patient requires continued inpatient hospitalization for further stabilization, safety monitoring and medication management. I will continue to coordinate the provision of individual, milieu, occupational, group, and substance abuse therapies to address target symptoms/diagnoses as deemed appropriate for the individual patient. A coordinated, multidisplinary treatment team round was conducted with the patient (this team consists of the nurse, psychiatric unit pharmcist,  and writer).      Complete current electronic health record for patient has been reviewed today including consultant notes, ancillary staff notes, nurses and psychiatric tech notes. Suicide risk assessment completed and patient deemed to be of low risk for suicide at this time. The following regarding medications was addressed during rounds with patient:   the risks and benefits of the proposed medication. The patient was given the opportunity to ask questions. Informed consent given to the use of the above medications. Will continue to adjust psychiatric and non-psychiatric medications (see above \"medication\" section and orders section for details) as deemed appropriate & based upon diagnoses and response to treatment. I will continue to order blood tests/labs and diagnostic tests as deemed appropriate and review results as they become available (see orders for details and above listed lab/test results). I will order psychiatric records from previous [facility] hospitals to further elucidate the nature of patient's psychopathology and review once available. I will gather additional collateral information from friends, family and o/p treatment team to further elucidate the nature of patient's psychopathology and baselline level of psychiatric functioning. I certify that this patient's inpatient psychiatric hospital services furnished since the previous certification were, and continue to be, required for treatment that could reasonably be expected to improve the patient's condition, or for diagnostic study, and that the patient continues to need, on a daily basis, active treatment furnished directly by or requiring the supervision of inpatient psychiatric facility personnel. In addition, the hospital records show that services furnished were intensive treatment services, admission or related services, or equivalent services.     EXPECTED DISCHARGE DATE/DAY: TBD     DISPOSITION: Home       Signed By:   Greyson Casiano MD  11/27/2017

## 2017-11-27 NOTE — BH NOTES
GROUP THERAPY PROGRESS NOTE    The patient Trace marie 49 y.o. male is participating in [employer]    Group time: 30 minutes    Personal goal for participation: To discuss the daily goals    Goal orientation: personal    Group therapy participation: passive    Therapeutic interventions reviewed and discussed:  Yes    Impression of participation: randy Delgado  11/26/2017  9:00 PM

## 2017-11-28 PROCEDURE — 65220000001 HC RM PRIVATE PSYCH

## 2017-11-28 PROCEDURE — 74011250637 HC RX REV CODE- 250/637: Performed by: PSYCHIATRY & NEUROLOGY

## 2017-11-28 RX ORDER — HALOPERIDOL DECANOATE 100 MG/ML
200 INJECTION INTRAMUSCULAR
Status: DISCONTINUED | OUTPATIENT
Start: 2017-12-19 | End: 2017-12-17

## 2017-11-28 RX ORDER — LORAZEPAM 2 MG/ML
0.5 INJECTION INTRAMUSCULAR
Status: DISCONTINUED | OUTPATIENT
Start: 2017-11-28 | End: 2017-12-20

## 2017-11-28 RX ORDER — DIVALPROEX SODIUM 500 MG/1
1500 TABLET, EXTENDED RELEASE ORAL
Status: DISCONTINUED | OUTPATIENT
Start: 2017-11-28 | End: 2017-12-20

## 2017-11-28 RX ADMIN — DIVALPROEX SODIUM 1500 MG: 500 TABLET, FILM COATED, EXTENDED RELEASE ORAL at 21:12

## 2017-11-28 RX ADMIN — BENZTROPINE MESYLATE 2 MG: 2 TABLET ORAL at 21:12

## 2017-11-28 RX ADMIN — SERTRALINE 50 MG: 50 TABLET, FILM COATED ORAL at 08:56

## 2017-11-28 NOTE — BH NOTES
During free time pt observed in room resting ,staff initiate 1:1 with no self disclosure Also staff encouraged him to work his program by following his unit rules,compliant with medications and participating in all activities .  He will be monitor q15min

## 2017-11-28 NOTE — PROGRESS NOTES
Problem: Altered Thought Process (Adult/Pediatric)  Goal: *STG: Remains safe in hospital  Outcome: Progressing Towards Goal  Pt rested quietly in bed with eyes closed. No signs/symptoms of distress, agitation, or anxiety. Will monitor for changes. Q 15 minute checks continue.  Pt slept 7 hrs  Pt refused to have bloodwork drawn this am

## 2017-11-28 NOTE — PROGRESS NOTES
Problem: Altered Thought Process (Adult/Pediatric)  Goal: *STG: Remains safe in hospital  Outcome: Progressing Towards Goal  Pt is alert but not oriented to situation and has been isolative to room. Pt is disorganized and disheveled. Has spent most of shift in his room, does not attend groups. Pt is minimally verbal and has thought blocking. Assist to reality test, assess mood and behavior, encourage to verbalize thoughts and feeling, med and illness teaching, encourage participation in tx plan, observe on q15' checks.

## 2017-11-28 NOTE — CONSULTS
[facility] Callie Coulter, [address]   [address] [facility]       Name:  Olivia Westfall   MR#:  [id number]   :  1970   Account #:  [de-identified]    Date of Consultation:  2017   Date of Adm:  2017       REFERRING PHYSICIAN: David Gibbons MD.    REASON FOR CONSULTATION: Medical evaluation for psychiatric   admission. CHIEF COMPLAINT: Psychosis. HISTORY OF PRESENT ILLNESS: This is a 43-year-old male who   presents psychotic, requiring further psychiatric evaluation and   treatment. The patient is a poor historian and he is averbal. Hence, the   majority of history comes from his medical record. He does not   respond to verbal cues, is alert. PAST MEDICAL HISTORY: Schizophrenia and mental health disease. PAST SURGICAL HISTORY: None. ALLERGIES:    1. FLUPHENAZINE. 2. PENICILLIN. MEDICATIONS: Per record include    1. Zoloft 50 mg.   2. Haldol. 3. Depakote  mg 4 tablets at bedtime. 4. Cogentin 2 mg at bedtime. SOCIAL HISTORY: Unable to be determined. PHYSICAL EXAMINATION   VITAL SIGNS: Temperature 97.6, blood pressure 121/88, pulse 98,   respirations 18. GENERAL: The patient appears alert and again is just averbal, just will   not talk or follow commands. THROAT: Oropharynx is not able to be examined. NECK: Supple. LUNGS: Clear to auscultation. CARDIOVASCULAR: Regular rate, no murmurs, gallops, or rubs. ABDOMEN: Soft and benign. EXTREMITIES: No cyanosis, clubbing, edema. LABORATORY DATA: Urine drug screen was negative. Alcohol level   was negative. Remainder is pending from outside. IMPRESSION: This is a 43-year-old male with past medical history of   schizophrenia, mental health disease, presents with psychosis and is   averbal. Admitted for further psychiatric evaluation and treatment. PLAN:   1. Psychiatry management of mental health issues. 2. Medically stable. Followup on a p.r.n. basis. 3. No VTE prophylaxis indicated or warranted at this time.         MD Monalisa Lua / Amaya Parish   D:  11/28/2017   05:51   T:  11/28/2017   10:49   Job #:  [id number]

## 2017-11-28 NOTE — BH NOTES
GROUP THERAPY PROGRESS NOTE    Rui Hyde is participating in [employer].      Group time: 30 minutes    Personal goal for participation:  Unit orientation    Goal orientation: Community    Group therapy participation: active    Therapeutic interventions reviewed and discussed: Yes    Impression of participation: good

## 2017-11-28 NOTE — BH NOTES
Pt was received up, alert, and visible on unit when staff arrived. Pt is meal compliant but not so much medication. Pt continues to pace unit and stare bizarrely. Pt's hygiene is not good at all but at the present moment pt is not processing enough to allow staff to assist him. Will continue to monitor pt Q 15 min checks for safety and support.

## 2017-11-29 PROCEDURE — 74011250637 HC RX REV CODE- 250/637: Performed by: PSYCHIATRY & NEUROLOGY

## 2017-11-29 PROCEDURE — 65220000001 HC RM PRIVATE PSYCH

## 2017-11-29 RX ORDER — OLANZAPINE 5 MG/1
5 TABLET ORAL 2 TIMES DAILY
Status: DISCONTINUED | OUTPATIENT
Start: 2017-11-29 | End: 2017-11-30

## 2017-11-29 RX ORDER — LORAZEPAM 2 MG/ML
0.5 INJECTION INTRAMUSCULAR 2 TIMES DAILY
Status: DISCONTINUED | OUTPATIENT
Start: 2017-11-29 | End: 2017-11-30

## 2017-11-29 RX ADMIN — DIVALPROEX SODIUM 1500 MG: 500 TABLET, FILM COATED, EXTENDED RELEASE ORAL at 21:02

## 2017-11-29 RX ADMIN — BENZTROPINE MESYLATE 2 MG: 2 TABLET ORAL at 21:02

## 2017-11-29 RX ADMIN — OLANZAPINE 5 MG: 5 TABLET, FILM COATED ORAL at 19:11

## 2017-11-29 RX ADMIN — OLANZAPINE 5 MG: 5 TABLET, FILM COATED ORAL at 11:48

## 2017-11-29 NOTE — BH NOTES
PSYCHIATRIC PROGRESS NOTE         Patient Name  Carina Yancey   Date of Birth 1970   [facility] [id number]   Medical Record Number  [id number]      Age  49 y.o. PCP PROVIDER UNKNOWN   Admit date:  11/25/2017    Room Number  307/01  @ [facility]   Date of Service  11/29/2017           E & M PROGRESS NOTE:         HISTORY       CC:  \"selectively mute\"  HISTORY OF PRESENT ILLNESS/INTERVAL HISTORY:  (reviewed/updated 11/29/2017). per initial evaluation: The patient, Carina Yancey, is a 49 y.o. WHITE OR  male with a past psychiatric history significant for schizophrenia, who presents at this time with complaints of (and/or evidence of) the following emotional symptoms: psychotic behavior. Additional symptomatology include paranoid delusion, sexually inappropriate behavior, pepisodes of yelling screaming followed by selectively mute, disorganized thought process, anxiety, concern about health problems, difficulty sleeping, fearfulness, hearing voices, increased irritability, poor concentration and problem with medication. The above symptoms have been present for many years. These symptoms are of severe severity. These symptoms are constant  in nature. The patient's condition has been precipitated by and psychosocial stressors (conflict with sister, non compliance ). Patient's condition made worse by treatment noncompliance. UDS: negative; BAL=0. Carina Yancey presents/reports/evidences the following emotional symptoms today, 11/29/2017:psychotic behavior. The above symptoms have been present for many years. These symptoms are of severe severity. The symptoms are constant in nature. Additional symptomatology and features include selectively mute, refusing to talk to tx team. Pt is disorganized in thought process, delusional themes + and refusing to take some of his med. Poor hygiene and poor insight   11/28- minimal change, refusing labs and medication.  disorganize thought process. Delusional themes and poor insight. Evidence of thought block and poor hygiene  11/29- minimal change- guarded , preoccupied and delusional themes, hygiene is sl better. Poor insight. Refusing lab. Labile affect      SIDE EFFECTS: (reviewed/updated 11/29/2017)  None reported or admitted to. No noted toxicity with use of current med   ALLERGIES:(reviewed/updated 11/29/2017)  Allergies   Allergen Reactions    Fluphenazine Unknown (comments)     Pt is unable to communicate properly.  Penicillins Unknown (comments)     Pt is unable to communicate properly. MEDICATIONS PRIOR TO ADMISSION:(reviewed/updated 11/29/2017)  Prescriptions Prior to Admission   Medication Sig    divalproex ER (DEPAKOTE ER) 500 mg ER tablet Take 1,500 mg by mouth nightly. Indications: Treatment-resistant schizophrenia    haloperidol decanoate (HALDOL DECANOATE) 100 mg/mL injection 2 mL by IntraMUSCular route every twenty-eight (28) days. Indications: SCHIZOPHRENIA    benztropine (COGENTIN) 2 mg tablet Take 1 Tab by mouth nightly. Indications: extrapyramidal disease    cloZAPine 200 mg tablet Take 600 mg by mouth nightly. Indications: TREATMENT-RESISTANT SCHIZOPHRENIA      PAST MEDICAL HISTORY: Past medical history from the initial psychiatric evaluation has been reviewed (reviewed/updated 11/29/2017) with no additional updates (I asked patient and no additional past medical history provided). Past Medical History:   Diagnosis Date    Psychiatric disorder     Psychotic disorder     Withdrawal syndrome ([facility] [address]    No past surgical history on file. SOCIAL HISTORY: Social history from the initial psychiatric evaluation has been reviewed (reviewed/updated 11/29/2017) with no additional updates (I asked patient and no additional social history provided).    Social History     Social History    Marital status: SINGLE     Spouse name: N/A    Number of children: N/A    Years of education: N/A     Occupational History    Not on file.     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 He lives with his sister. On disability    No pending legal charge reported      FAMILY HISTORY: Family history from the initial psychiatric evaluation has been reviewed (reviewed/updated 11/29/2017) with no additional updates (I asked patient and no additional family history provided). No family history on file. REVIEW OF SYSTEMS: (reviewed/updated 11/29/2017)  Appetite:no change from normal   Sleep: fitful   All other Review of Systems: Psychological ROS: positive for - behavioral disorder, concentration difficulties, hallucinations, irritability, mood swings and delusion  Respiratory ROS: no cough, shortness of breath, or wheezing  Cardiovascular ROS: no chest pain or dyspnea on exertion         [address] (MSE):    MSE FINDINGS ARE WITHIN NORMAL LIMITS (WNL) UNLESS OTHERWISE STATED BELOW. ( ALL OF THE BELOW CATEGORIES OF THE MSE HAVE BEEN REVIEWED (reviewed 11/29/2017) AND UPDATED AS DEEMED APPROPRIATE )  General Presentation age appropriate and disheveled, uncooperative and unreliable   Orientation disorganized   Vital Signs  See below (reviewed 11/29/2017); Vital Signs (BP, Pulse, & Temp) are within normal limits if not listed below.    Gait and Station Stable/steady, no ataxia   Musculoskeletal System No extrapyramidal symptoms (EPS); no abnormal muscular movements or Tardive Dyskinesia (TD); muscle strength and tone are within normal limits   Language No aphasia or dysarthria   Speech:  mute   Thought Processes illogical; slow rate of thoughts; poor abstract reasoning/computation   Thought Associations blocked    Thought Content paranoid delusions, bizarre delusions, auditory hallucinations and internally preoccupied   Suicidal Ideations none   Homicidal Ideations none   Mood:  Labile mood   Affect:  Constricted, labile inappropriate and increased in intensity   Memory recent  impaired   Memory remote:  intact   Concentration/Attention:  distractable and hypervigilance   Fund of Knowledge average   Insight:  limited   Reliability poor   Judgment:  limited          VITALS:     Patient Vitals for the past 24 hrs:   Temp Pulse Resp BP   11/28/17 2020 - - 16 -     Wt Readings from Last 3 Encounters:   03/25/17 95.6 kg (210 lb 11.2 oz)   02/09/15 97.5 kg (215 lb)   02/07/15 97.5 kg (215 lb)     Temp Readings from Last 3 Encounters:   03/28/17 97.5 °F (36.4 °C)   02/07/15 98.7 °F (37.1 °C)     BP Readings from Last 3 Encounters:   03/28/17 100/68   02/12/15 105/73   02/07/15 148/79     Pulse Readings from Last 3 Encounters:   03/28/17 67   02/12/15 87   02/07/15 (!) 111            DATA     LABORATORY DATA:(reviewed/updated 11/29/2017)  No results found for this or any previous visit (from the past 24 hour(s)). Lab Results   Component Value Date/Time    Valproic acid 89 03/28/2017 10:35 AM     No results found for: LITHM   RADIOLOGY REPORTS:(reviewed/updated 11/29/2017)  No results found. MEDICATIONS     ALL MEDICATIONS:      SCHEDULED MEDICATIONS:          ASSESSMENT & PLAN     DIAGNOSES REQUIRING ACTIVE TREATMENT AND MONITORING: (reviewed/updated 11/29/2017)  Patient Active Hospital Problem List:   Paranoid schizophrenia ([facility] [address] (3/15/2017)- tx resistant    Assessment: acute on chronic- severe- psychotic, selectively mute, poor insight-refusing lab. Previous record reviewed- he has been tx resistant with poor baseline functioning. He has trial of several AP/AD/Mood stabilizer but has partial response to combination of Clozapine,Haldol dec, Depakote previously. Plan: I will ct to adjust med- Depakote, Haldol dec( received on 11/21),add cogentin.    pt refusing labs for Clozapine- not appropriate to re-start. start augmentation with Zyprexa    Patient is on two antipsychotics now due to the relative refractoriness to treatment thus far. Prior to admission patient had THREE or more failed trails of monotherapy, including: Haldol, Zyprexa, Risperdal and Clozapine. The patient is a very slow responder to medications. Risks and benefits in the use of two antipsychotics have been weighed in full, including the risk of metabolic syndrome and the potential increased risk of QTc  Based on this analysis, it is considered favorable for the utilization of two antipsychotics. In the future, once stable on the two antipsychotics, patient can possibly be tapered to one of the chosen antipsychotics. Will check on EKG results today to monitor QTc. Non compliance with tx- request court order med. I will continue to monitor blood levels (Depakote,, clozapine---a drug with a narrow therapeutic index= NTI) and associated labs for drug therapy implemented that require intense monitoring for toxicity as deemed appropriate based on current medication side effects and pharmacodynamically determined drug 1/2 lives. In summary, Luiza Pablo, is a 49 y.o.  male who presents with a severe exacerbation of the principal diagnosis of Paranoid schizophrenia ([facility] [address]  Patient's condition is worsening/not improving/not stable . Patient requires continued inpatient hospitalization for further stabilization, safety monitoring and medication management. I will continue to coordinate the provision of individual, milieu, occupational, group, and substance abuse therapies to address target symptoms/diagnoses as deemed appropriate for the individual patient. A coordinated, multidisplinary treatment team round was conducted with the patient (this team consists of the nurse, psychiatric unit pharmcist,  and writer).      Complete current electronic health record for patient has been reviewed today including consultant notes, ancillary staff notes, nurses and psychiatric tech notes. Suicide risk assessment completed and patient deemed to be of low risk for suicide at this time. The following regarding medications was addressed during rounds with patient:   the risks and benefits of the proposed medication. The patient was given the opportunity to ask questions. Informed consent given to the use of the above medications. Will continue to adjust psychiatric and non-psychiatric medications (see above \"medication\" section and orders section for details) as deemed appropriate & based upon diagnoses and response to treatment. I will continue to order blood tests/labs and diagnostic tests as deemed appropriate and review results as they become available (see orders for details and above listed lab/test results). I will order psychiatric records from previous [facility] hospitals to further elucidate the nature of patient's psychopathology and review once available. I will gather additional collateral information from friends, family and o/p treatment team to further elucidate the nature of patient's psychopathology and baselline level of psychiatric functioning. I certify that this patient's inpatient psychiatric hospital services furnished since the previous certification were, and continue to be, required for treatment that could reasonably be expected to improve the patient's condition, or for diagnostic study, and that the patient continues to need, on a daily basis, active treatment furnished directly by or requiring the supervision of inpatient psychiatric facility personnel. In addition, the hospital records show that services furnished were intensive treatment services, admission or related services, or equivalent services.     EXPECTED DISCHARGE DATE/DAY: TBD     DISPOSITION: Home       Signed By:   Kacy Wilson MD  11/29/2017

## 2017-11-29 NOTE — BH NOTES
PSYCHIATRIC PROGRESS NOTE         Patient Name  Zahra Mo   Date of Birth 1970   [facility] [id number]   Medical Record Number  [id number]      Age  49 y.o. PCP PROVIDER UNKNOWN   Admit date:  11/25/2017    Room Number  307/01  @ [facility]   Date of Service  11/28/2017           E & M PROGRESS NOTE:         HISTORY       CC:  \"selectively mute\"  HISTORY OF PRESENT ILLNESS/INTERVAL HISTORY:  (reviewed/updated 11/28/2017). per initial evaluation: The patient, Zahra Mo, is a 49 y.o. WHITE [place]  male with a past psychiatric history significant for schizophrenia, who presents at this time with complaints of (and/or evidence of) the following emotional symptoms: psychotic behavior. Additional symptomatology include paranoid delusion, sexually inappropriate behavior, pepisodes of yelling screaming followed by selectively mute, disorganized thought process, anxiety, concern about health problems, difficulty sleeping, fearfulness, hearing voices, increased irritability, poor concentration and problem with medication. The above symptoms have been present for many years. These symptoms are of severe severity. These symptoms are constant  in nature. The patient's condition has been precipitated by and psychosocial stressors (conflict with sister, non compliance ). Patient's condition made worse by treatment noncompliance. UDS: negative; BAL=0. Zahra Mo presents/reports/evidences the following emotional symptoms today, 11/28/2017:psychotic behavior. The above symptoms have been present for many years. These symptoms are of severe severity. The symptoms are constant in nature. Additional symptomatology and features include selectively mute, refusing to talk to tx team. Pt is disorganized in thought process, delusional themes + and refusing to take some of his med. Poor hygiene and poor insight   11/28- minimal change, refusing labs and medication.  disorganize thought process. Delusional themes and poor insight. Evidence of thought block and poor hygiene      SIDE EFFECTS: (reviewed/updated 11/28/2017)  None reported or admitted to. No noted toxicity with use of current med   ALLERGIES:(reviewed/updated 11/28/2017)  Allergies   Allergen Reactions    Fluphenazine Unknown (comments)     Pt is unable to communicate properly.  Penicillins Unknown (comments)     Pt is unable to communicate properly. MEDICATIONS PRIOR TO ADMISSION:(reviewed/updated 11/28/2017)  Prescriptions Prior to Admission   Medication Sig    divalproex ER (DEPAKOTE ER) 500 mg ER tablet Take 1,500 mg by mouth nightly. Indications: Treatment-resistant schizophrenia    haloperidol decanoate (HALDOL DECANOATE) 100 mg/mL injection 2 mL by IntraMUSCular route every twenty-eight (28) days. Indications: SCHIZOPHRENIA    benztropine (COGENTIN) 2 mg tablet Take 1 Tab by mouth nightly. Indications: extrapyramidal disease    cloZAPine 200 mg tablet Take 600 mg by mouth nightly. Indications: TREATMENT-RESISTANT SCHIZOPHRENIA      PAST MEDICAL HISTORY: Past medical history from the initial psychiatric evaluation has been reviewed (reviewed/updated 11/28/2017) with no additional updates (I asked patient and no additional past medical history provided). Past Medical History:   Diagnosis Date    Psychiatric disorder     Psychotic disorder     Withdrawal syndrome ([facility] [address]    No past surgical history on file. SOCIAL HISTORY: Social history from the initial psychiatric evaluation has been reviewed (reviewed/updated 11/28/2017) with no additional updates (I asked patient and no additional social history provided). Social History     Social History    Marital status: SINGLE     Spouse name: N/A    Number of children: N/A    Years of education: N/A     Occupational History    Not on file.      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 He lives with his sister. On disability    No pending legal charge reported      FAMILY HISTORY: Family history from the initial psychiatric evaluation has been reviewed (reviewed/updated 11/28/2017) with no additional updates (I asked patient and no additional family history provided). No family history on file. REVIEW OF SYSTEMS: (reviewed/updated 11/28/2017)  Appetite:no change from normal   Sleep: fitful   All other Review of Systems: Psychological ROS: positive for - behavioral disorder, concentration difficulties, hallucinations, irritability, mood swings and delusion  Respiratory ROS: no cough, shortness of breath, or wheezing  Cardiovascular ROS: no chest pain or dyspnea on exertion         [address] [facility] (MSE):    [facility] FINDINGS ARE WITHIN NORMAL LIMITS (WNL) UNLESS OTHERWISE STATED BELOW. ( ALL OF THE BELOW CATEGORIES OF THE MSE HAVE BEEN REVIEWED (reviewed 11/28/2017) AND UPDATED AS DEEMED APPROPRIATE )  General Presentation age appropriate and disheveled, uncooperative and unreliable   Orientation disorganized   Vital Signs  See below (reviewed 11/28/2017); Vital Signs (BP, Pulse, & Temp) are within normal limits if not listed below.    Gait and Station Stable/steady, no ataxia   Musculoskeletal System No extrapyramidal symptoms (EPS); no abnormal muscular movements or Tardive Dyskinesia (TD); muscle strength and tone are within normal limits   Language No aphasia or dysarthria   Speech:  mute   Thought Processes illogical; slow rate of thoughts; poor abstract reasoning/computation   Thought Associations blocked    Thought Content paranoid delusions, bizarre delusions, auditory hallucinations and internally preoccupied   Suicidal Ideations none   Homicidal Ideations none   Mood:  anhedonia   Affect:  constricted, inappropriate and increased in intensity   Memory recent  impaired   Memory remote:  intact   Concentration/Attention:  distractable and hypervigilance   Fund of Knowledge average   Insight:  limited   Reliability poor   Judgment:  limited          VITALS:     No data found. Wt Readings from Last 3 Encounters:   03/25/17 95.6 kg (210 lb 11.2 oz)   02/09/15 97.5 kg (215 lb)   02/07/15 97.5 kg (215 lb)     Temp Readings from Last 3 Encounters:   03/28/17 97.5 °F (36.4 °C)   02/07/15 98.7 °F (37.1 °C)     BP Readings from Last 3 Encounters:   11/25/17 121/88   03/28/17 100/68   02/12/15 105/73     Pulse Readings from Last 3 Encounters:   11/25/17 98   03/28/17 67   02/12/15 87            DATA     LABORATORY DATA:(reviewed/updated 11/28/2017)  No results found for this or any previous visit (from the past 24 hour(s)). Lab Results   Component Value Date/Time    Valproic acid 89 03/28/2017 10:35 AM     No results found for: LITHM   RADIOLOGY REPORTS:(reviewed/updated 11/28/2017)  No results found. MEDICATIONS     ALL MEDICATIONS:      SCHEDULED MEDICATIONS:          ASSESSMENT & PLAN     DIAGNOSES REQUIRING ACTIVE TREATMENT AND MONITORING: (reviewed/updated 11/28/2017)  Patient Active Hospital Problem List:   Paranoid schizophrenia ([facility] [address] (3/15/2017)- tx resistant    Assessment: acute on chronic- severe- psychotic, selectively mute, poor insight-refusing lab. Previous record reviewed- he has been tx resistant with poor baseline functioning. He has trial of several AP/AD/Mood stabilizer but has partial response to combination of Clozapine,Haldol dec, Depakote previously. Plan: I will ct to adjust med- Depakote, Haldol dec( received on 11/21),add cogentin. pt refusing labs for Clozapine- will discuss with [facility] for appropriateness of using Clozapine ( due to non compliance), may consider Augmentation with AP/ Topamax. Non compliance with tx- request court order med.         I will continue to monitor blood levels (Depakote,, clozapine---a drug with a narrow therapeutic index= NTI) and associated labs for drug therapy implemented that require intense monitoring for toxicity as deemed appropriate based on current medication side effects and pharmacodynamically determined drug 1/2 lives. In summary, David Huang, is a 49 y.o.  male who presents with a severe exacerbation of the principal diagnosis of Paranoid schizophrenia ([facility] [address]  Patient's condition is worsening/not improving/not stable . Patient requires continued inpatient hospitalization for further stabilization, safety monitoring and medication management. I will continue to coordinate the provision of individual, milieu, occupational, group, and substance abuse therapies to address target symptoms/diagnoses as deemed appropriate for the individual patient. A coordinated, multidisplinary treatment team round was conducted with the patient (this team consists of the nurse, psychiatric unit pharmcist,  and writer). Complete current electronic health record for patient has been reviewed today including consultant notes, ancillary staff notes, nurses and psychiatric tech notes. Suicide risk assessment completed and patient deemed to be of low risk for suicide at this time. The following regarding medications was addressed during rounds with patient:   the risks and benefits of the proposed medication. The patient was given the opportunity to ask questions. Informed consent given to the use of the above medications. Will continue to adjust psychiatric and non-psychiatric medications (see above \"medication\" section and orders section for details) as deemed appropriate & based upon diagnoses and response to treatment. I will continue to order blood tests/labs and diagnostic tests as deemed appropriate and review results as they become available (see orders for details and above listed lab/test results).     I will order psychiatric records from previous [facility] hospitals to further elucidate the nature of patient's psychopathology and review once available. I will gather additional collateral information from friends, family and o/p treatment team to further elucidate the nature of patient's psychopathology and baselline level of psychiatric functioning. I certify that this patient's inpatient psychiatric hospital services furnished since the previous certification were, and continue to be, required for treatment that could reasonably be expected to improve the patient's condition, or for diagnostic study, and that the patient continues to need, on a daily basis, active treatment furnished directly by or requiring the supervision of inpatient psychiatric facility personnel. In addition, the hospital records show that services furnished were intensive treatment services, admission or related services, or equivalent services.     EXPECTED DISCHARGE DATE/DAY: TBD     DISPOSITION: Home       Signed By:   Adilson Tamayo MD  11/28/2017

## 2017-11-29 NOTE — PROGRESS NOTES
Problem: Altered Thought Process (Adult/Pediatric)  Goal: *STG: Participates in treatment plan  [address] [facility] [place] [address]  Master Treatment Plan for Arpita Baumann    Date Treatment Plan Initiated: 11/25/17    Treatment Plan Modalities:    Type of Modality Amount(x minutes) Frequency (x/week) Duration (x days) Name of Responsible Staff    [facility] meetings to encourage peer interactions   x60 minutes, 14x/week, x7 days                                                                                                     Ezequiel Hamman, BHT       Group psychotherapy to assist in building coping skills and internal controls        Therapeutic activity groups to build coping skills  x60 minutes, 7x/week, x 7 days                                                                                Cindy Kendall        Psychoeducation in group setting to address:    Medication education        Coping skills        Relaxation techniques        Symptom management        Discharge planning        Spirituality         CHARLIE        Recovery/AA/NA        Physician medication management  7x/week          X 7days                                              Dr. Neelam Olvera        Family meeting/discharge planning                                     Outcome: Not Progressing Towards Goal  Pt rested quietly in bed with eyes closed. No signs/symptoms of distress, agitation, or anxiety. Will monitor for changes. Q 15 minute checks continue. PT slept 7 hrs .  Pt continue to refuse procedures such as vital signs and blood work

## 2017-11-29 NOTE — BH NOTES
Pt was seen in treatment team this morning. Pt denies SI/HI. Pt's mood is anxious, affect is labile. Pt's thought process is preoccupied with delusional themes. Pt's hygiene has improved and took shower yesterday with technician staff encouragement. Pt has been refusing labs.  spoke with  Fernando Mendieta who stated that pt experiences auditory hallucinations at baseline and has long history of medication noncompliance. Worker reports that pt has a history of decompensating around holiday due to his sister's active involvement in Voodoo and inability to assist in watching pt take medication during this time. Worker states that pt has been screened for ICT services multiple times and always denied due to behavioral issues - not wanting to take medications? This  will need further clarification on what criteria pt's must meet for [address] [place] [address].  spoke with sister who feels this is a physical problem - she reports pt has complained for a year of prostate pain but voices tell him not to tell others. Sister states that over the weekend he spent most of the time in the bathroom and could not urinate which was \"freaking him out\". Pt's sister would like the medications to remain the same but understands he is refusing lab work at this time.

## 2017-11-30 PROCEDURE — 65220000001 HC RM PRIVATE PSYCH

## 2017-11-30 PROCEDURE — 74011250637 HC RX REV CODE- 250/637: Performed by: PSYCHIATRY & NEUROLOGY

## 2017-11-30 RX ORDER — OLANZAPINE 10 MG/1
10 TABLET ORAL 2 TIMES DAILY
Status: DISCONTINUED | OUTPATIENT
Start: 2017-11-30 | End: 2017-12-05

## 2017-11-30 RX ORDER — LORAZEPAM 2 MG/ML
0.5 INJECTION INTRAMUSCULAR 2 TIMES DAILY
Status: DISCONTINUED | OUTPATIENT
Start: 2017-11-30 | End: 2017-12-05

## 2017-11-30 RX ADMIN — DIVALPROEX SODIUM 1500 MG: 500 TABLET, FILM COATED, EXTENDED RELEASE ORAL at 21:00

## 2017-11-30 RX ADMIN — OLANZAPINE 5 MG: 5 TABLET, FILM COATED ORAL at 08:43

## 2017-11-30 RX ADMIN — ZOLPIDEM TARTRATE 10 MG: 10 TABLET, FILM COATED ORAL at 21:00

## 2017-11-30 RX ADMIN — OLANZAPINE 10 MG: 10 TABLET, FILM COATED ORAL at 19:16

## 2017-11-30 RX ADMIN — BENZTROPINE MESYLATE 2 MG: 2 TABLET ORAL at 21:00

## 2017-11-30 NOTE — PROGRESS NOTES
Problem: Altered Thought Process (Adult/Pediatric)  Goal: *STG: Complies with medication therapy  Outcome: Progressing Towards Goal  Pt remains secluded in his room most of the shift. No communication noticed between staff or peers. Ate 75% of his dinner and 100% of snacks. Ate dinner and snacks in his room. Accepted scheduled medications at bed time with out any problems. No management issues noted.

## 2017-11-30 NOTE — PROGRESS NOTES
Problem: Altered Thought Process (Adult/Pediatric)  Goal: *STG: Remains safe in hospital  Outcome: Not Progressing Towards Goal  Pt continue to refuse to have vital signs done.  He slept 7 hrs

## 2017-11-30 NOTE — BH NOTES
PSYCHIATRIC PROGRESS NOTE         Patient Name  Germania Slade   Date of Birth 1970   [facility] [id number]   Medical Record Number  [id number]      Age  49 y.o. PCP PROVIDER UNKNOWN   Admit date:  11/25/2017    Room Number  307/01  @ [facility]   Date of Service  11/30/2017           E & M PROGRESS NOTE:         HISTORY       CC:  \"selectively mute\"  HISTORY OF PRESENT ILLNESS/INTERVAL HISTORY:  (reviewed/updated 11/30/2017). per initial evaluation: The patient, Germania Salde, is a 49 y.o. WHITE OR  male with a past psychiatric history significant for schizophrenia, who presents at this time with complaints of (and/or evidence of) the following emotional symptoms: psychotic behavior. Additional symptomatology include paranoid delusion, sexually inappropriate behavior, pepisodes of yelling screaming followed by selectively mute, disorganized thought process, anxiety, concern about health problems, difficulty sleeping, fearfulness, hearing voices, increased irritability, poor concentration and problem with medication. The above symptoms have been present for many years. These symptoms are of severe severity. These symptoms are constant  in nature. The patient's condition has been precipitated by and psychosocial stressors (conflict with sister, non compliance ). Patient's condition made worse by treatment noncompliance. UDS: negative; BAL=0. Germania Slade presents/reports/evidences the following emotional symptoms today, 11/30/2017:psychotic behavior. The above symptoms have been present for many years. These symptoms are of severe severity. The symptoms are constant in nature. Additional symptomatology and features include selectively mute, refusing to talk to tx team. Pt is disorganized in thought process, delusional themes + and refusing to take some of his med. Poor hygiene and poor insight   11/28- minimal change, refusing labs and medication.  disorganize thought process. Delusional themes and poor insight. Evidence of thought block and poor hygiene  11/29- minimal change- guarded , preoccupied and delusional themes, hygiene is sl better. Poor insight. Refusing lab. Labile affect  11/30- refused EKG/UA/Labs. Disorganized and delusional themes+. Guarded and sitting on his bed in monk like position with eyes closed. Selectively answer question      SIDE EFFECTS: (reviewed/updated 11/30/2017)  None reported or admitted to. No noted toxicity with use of current med   ALLERGIES:(reviewed/updated 11/30/2017)  Allergies   Allergen Reactions    Fluphenazine Unknown (comments)     Pt is unable to communicate properly.  Penicillins Unknown (comments)     Pt is unable to communicate properly. MEDICATIONS PRIOR TO ADMISSION:(reviewed/updated 11/30/2017)  Prescriptions Prior to Admission   Medication Sig    divalproex ER (DEPAKOTE ER) 500 mg ER tablet Take 1,500 mg by mouth nightly. Indications: Treatment-resistant schizophrenia    haloperidol decanoate (HALDOL DECANOATE) 100 mg/mL injection 2 mL by IntraMUSCular route every twenty-eight (28) days. Indications: SCHIZOPHRENIA    benztropine (COGENTIN) 2 mg tablet Take 1 Tab by mouth nightly. Indications: extrapyramidal disease    cloZAPine 200 mg tablet Take 600 mg by mouth nightly. Indications: TREATMENT-RESISTANT SCHIZOPHRENIA      PAST MEDICAL HISTORY: Past medical history from the initial psychiatric evaluation has been reviewed (reviewed/updated 11/30/2017) with no additional updates (I asked patient and no additional past medical history provided). Past Medical History:   Diagnosis Date    Psychiatric disorder     Psychotic disorder     Withdrawal syndrome ([facility] [address]    No past surgical history on file. SOCIAL HISTORY: Social history from the initial psychiatric evaluation has been reviewed (reviewed/updated 11/30/2017) with no additional updates (I asked patient and no additional social history provided).    Social History     Social History    Marital status: SINGLE     Spouse name: N/A    Number of children: N/A    Years of education: N/A     Occupational History    Not on file. 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 He lives with his sister. On disability    No pending legal charge reported      FAMILY HISTORY: Family history from the initial psychiatric evaluation has been reviewed (reviewed/updated 11/30/2017) with no additional updates (I asked patient and no additional family history provided). No family history on file. REVIEW OF SYSTEMS: (reviewed/updated 11/30/2017)  Appetite:no change from normal   Sleep: fitful   All other Review of Systems: Psychological ROS: positive for - behavioral disorder, concentration difficulties, hallucinations, irritability, mood swings and delusion  Respiratory ROS: no cough, shortness of breath, or wheezing  Cardiovascular ROS: no chest pain or dyspnea on exertion         [address] [facility] ([facility]):    [facility] FINDINGS ARE WITHIN NORMAL LIMITS (WNL) UNLESS OTHERWISE STATED BELOW. ( ALL OF THE BELOW CATEGORIES OF THE MSE HAVE BEEN REVIEWED (reviewed 11/30/2017) AND UPDATED AS DEEMED APPROPRIATE )  General Presentation age appropriate and disheveled, uncooperative and unreliable   Orientation disorganized   Vital Signs  See below (reviewed 11/30/2017); Vital Signs (BP, Pulse, & Temp) are within normal limits if not listed below.    Gait and Station Stable/steady, no ataxia   Musculoskeletal System No extrapyramidal symptoms (EPS); no abnormal muscular movements or Tardive Dyskinesia (TD); muscle strength and tone are within normal limits   Language No aphasia or dysarthria   Speech:  mute   Thought Processes illogical; slow rate of thoughts; poor abstract reasoning/computation   Thought Associations blocked    Thought Content paranoid delusions, bizarre delusions, auditory hallucinations and internally preoccupied   Suicidal Ideations none   Homicidal Ideations none   Mood:  Labile mood   Affect:  Constricted, labile inappropriate and increased in intensity   Memory recent  impaired   Memory remote:  intact   Concentration/Attention:  distractable and hypervigilance   Fund of Knowledge average   Insight:  limited   Reliability poor   Judgment:  limited          VITALS:     No data found. Wt Readings from Last 3 Encounters:   03/25/17 95.6 kg (210 lb 11.2 oz)   02/09/15 97.5 kg (215 lb)   02/07/15 97.5 kg (215 lb)     Temp Readings from Last 3 Encounters:   03/28/17 97.5 °F (36.4 °C)   02/07/15 98.7 °F (37.1 °C)     BP Readings from Last 3 Encounters:   03/28/17 100/68   02/12/15 105/73   02/07/15 148/79     Pulse Readings from Last 3 Encounters:   03/28/17 67   02/12/15 87   02/07/15 (!) 111            DATA     LABORATORY DATA:(reviewed/updated 11/30/2017)  No results found for this or any previous visit (from the past 24 hour(s)). Lab Results   Component Value Date/Time    Valproic acid 89 03/28/2017 10:35 AM     No results found for: LITHM   RADIOLOGY REPORTS:(reviewed/updated 11/30/2017)  No results found. MEDICATIONS     ALL MEDICATIONS:      SCHEDULED MEDICATIONS:          ASSESSMENT & PLAN     DIAGNOSES REQUIRING ACTIVE TREATMENT AND MONITORING: (reviewed/updated 11/30/2017)  Patient Active Hospital Problem List:   Paranoid schizophrenia ([facility] [address] (3/15/2017)- tx resistant    Assessment: no sig change- - severe- psychotic, selectively mute, poor insight-refusing lab. Previous record reviewed- he has been tx resistant with poor baseline functioning. He has trial of several AP/AD/Mood stabilizer but has partial response to combination of Clozapine,Haldol dec, Depakote previously.        Plan: I will ct to adjust med- Depakote, Haldol dec( received on 11/21),add cogentin. pt refusing labs for Clozapine- not appropriate to re-start. start augmentation with Zyprexa    Patient is on two antipsychotics now due to the relative refractoriness to treatment thus far. Prior to admission patient had THREE or more failed trails of monotherapy, including: Haldol, Zyprexa, Risperdal and Clozapine. The patient is a very slow responder to medications. Risks and benefits in the use of two antipsychotics have been weighed in full, including the risk of metabolic syndrome and the potential increased risk of QTc  Based on this analysis, it is considered favorable for the utilization of two antipsychotics. In the future, once stable on the two antipsychotics, patient can possibly be tapered to one of the chosen antipsychotics. Will check on EKG results today to monitor QTc.- refusing EKG    Non compliance with tx- request court order med. - granted        I will continue to monitor blood levels (Depakote,, clozapine---a drug with a narrow therapeutic index= NTI) and associated labs for drug therapy implemented that require intense monitoring for toxicity as deemed appropriate based on current medication side effects and pharmacodynamically determined drug 1/2 lives. In summary, Rui Hyde, is a 49 y.o.  male who presents with a severe exacerbation of the principal diagnosis of Paranoid schizophrenia ([facility] [address]  Patient's condition is worsening/not improving/not stable . Patient requires continued inpatient hospitalization for further stabilization, safety monitoring and medication management. I will continue to coordinate the provision of individual, milieu, occupational, group, and substance abuse therapies to address target symptoms/diagnoses as deemed appropriate for the individual patient.   A coordinated, multidisplinary treatment team round was conducted with the patient (this team consists of the nurse, psychiatric unit pharmcist, social worker and writer). Complete current electronic health record for patient has been reviewed today including consultant notes, ancillary staff notes, nurses and psychiatric tech notes. Suicide risk assessment completed and patient deemed to be of low risk for suicide at this time. The following regarding medications was addressed during rounds with patient:   the risks and benefits of the proposed medication. The patient was given the opportunity to ask questions. Informed consent given to the use of the above medications. Will continue to adjust psychiatric and non-psychiatric medications (see above \"medication\" section and orders section for details) as deemed appropriate & based upon diagnoses and response to treatment. I will continue to order blood tests/labs and diagnostic tests as deemed appropriate and review results as they become available (see orders for details and above listed lab/test results). I will order psychiatric records from previous [facility] hospitals to further elucidate the nature of patient's psychopathology and review once available. I will gather additional collateral information from friends, family and o/p treatment team to further elucidate the nature of patient's psychopathology and baselline level of psychiatric functioning. I certify that this patient's inpatient psychiatric hospital services furnished since the previous certification were, and continue to be, required for treatment that could reasonably be expected to improve the patient's condition, or for diagnostic study, and that the patient continues to need, on a daily basis, active treatment furnished directly by or requiring the supervision of inpatient psychiatric facility personnel. In addition, the hospital records show that services furnished were intensive treatment services, admission or related services, or equivalent services.     EXPECTED DISCHARGE DATE/DAY: TBD     DISPOSITION: Home Signed By:   Janell Payne MD  11/30/2017

## 2017-11-30 NOTE — BH NOTES
The patient continues to be confused disorganized and stays to himself. The patient is also continuing to refuse any labs. any blood pressure checks. He came to the medication window and accepted it. Safety checks Q 15 minutes.

## 2017-11-30 NOTE — BH NOTES
GROUP THERAPY PROGRESS NOTE    Juliette Shoemaker is participating in Reflections.      Group time: 30 minutes    Personal goal for participation: to review daily goal    Goal orientation: personal    Group therapy participation: active    Therapeutic interventions reviewed and discussed: yes    Impression of participation: good

## 2017-12-01 PROCEDURE — 65220000001 HC RM PRIVATE PSYCH

## 2017-12-01 PROCEDURE — 74011250637 HC RX REV CODE- 250/637: Performed by: PSYCHIATRY & NEUROLOGY

## 2017-12-01 RX ADMIN — BENZTROPINE MESYLATE 2 MG: 2 TABLET ORAL at 22:09

## 2017-12-01 RX ADMIN — OLANZAPINE 10 MG: 10 TABLET, FILM COATED ORAL at 08:18

## 2017-12-01 RX ADMIN — OLANZAPINE 10 MG: 10 TABLET, FILM COATED ORAL at 22:09

## 2017-12-01 RX ADMIN — DIVALPROEX SODIUM 1500 MG: 500 TABLET, FILM COATED, EXTENDED RELEASE ORAL at 22:09

## 2017-12-01 NOTE — PROGRESS NOTES
Problem: Altered Thought Process (Adult/Pediatric)  Goal: *STG: Remains safe in hospital  Outcome: Progressing Towards Goal  Pt rested quietly in bed with eyes closed. No signs/symptoms of distress, agitation, or anxiety. Will monitor for changes. Q 15 minute checks continue.  Pt slept 7 hrs

## 2017-12-01 NOTE — PROGRESS NOTES
Problem: Altered Thought Process (Adult/Pediatric)  Goal: *STG: Complies with medication therapy  Outcome: Progressing Towards Goal  Pt is mostly isolative from the milieu. Pt is alert and oriented to self and place. Pt appears mute and refuses to talk or interact. Pt is meds/meals compliant. Pt has been encouraged to attend activities in the unit. Will continue to monitor q 15 for safety, mood and behavior changes.

## 2017-12-01 NOTE — PROGRESS NOTES
Problem: Altered Thought Process (Adult/Pediatric)  Goal: *STG: Complies with medication therapy  Outcome: Progressing Towards Goal  Pt noticed pacing in the hallway this evening at 2000, came up to the nurse's station and requested for his medication , including sleeping pill. Received Ambien 10 mg po. Ate most of his dinner including snacks. Communicated once to the nurse with eye contact. Otherwise no behavioral problems noted.

## 2017-12-01 NOTE — BH NOTES
PSYCHIATRIC PROGRESS NOTE         Patient Name  Emil Cisneros   Date of Birth 1970   [facility] [id number]   Medical Record Number  [id number]      Age  49 y.o. PCP PROVIDER UNKNOWN   Admit date:  11/25/2017    Room Number  307/01  @ [facility]   Date of Service  12/1/2017           E & M PROGRESS NOTE:         HISTORY       CC:  \"selectively mute\"  HISTORY OF PRESENT ILLNESS/INTERVAL HISTORY:  (reviewed/updated 12/1/2017). per initial evaluation: The patient, Emil Cisneros, is a 49 y.o. WHITE OR  male with a past psychiatric history significant for schizophrenia, who presents at this time with complaints of (and/or evidence of) the following emotional symptoms: psychotic behavior. Additional symptomatology include paranoid delusion, sexually inappropriate behavior, pepisodes of yelling screaming followed by selectively mute, disorganized thought process, anxiety, concern about health problems, difficulty sleeping, fearfulness, hearing voices, increased irritability, poor concentration and problem with medication. The above symptoms have been present for many years. These symptoms are of severe severity. These symptoms are constant  in nature. The patient's condition has been precipitated by and psychosocial stressors (conflict with sister, non compliance ). Patient's condition made worse by treatment noncompliance. UDS: negative; BAL=0. Emil Cisneros presents/reports/evidences the following emotional symptoms today, 12/1/2017:psychotic behavior. The above symptoms have been present for many years. These symptoms are of severe severity. The symptoms are constant in nature. Additional symptomatology and features include selectively mute, refusing to talk to tx team. Pt is disorganized in thought process, delusional themes + and refusing to take some of his med. Poor hygiene and poor insight   11/28- minimal change, refusing labs and medication.  disorganize thought process. Delusional themes and poor insight. Evidence of thought block and poor hygiene  11/29- minimal change- guarded , preoccupied and delusional themes, hygiene is sl better. Poor insight. Refusing lab. Labile affect  11/30- refused EKG/UA/Labs. Disorganized and delusional themes+. Guarded and sitting on his bed in monk like position with eyes closed. Selectively answer question  12/1- minimal change, poor hygiene. Accepting med but refusing most of his labs. Sl better affect and more social      SIDE EFFECTS: (reviewed/updated 12/1/2017)  None reported or admitted to. No noted toxicity with use of current med   ALLERGIES:(reviewed/updated 12/1/2017)  Allergies   Allergen Reactions    Fluphenazine Unknown (comments)     Pt is unable to communicate properly.  Penicillins Unknown (comments)     Pt is unable to communicate properly. MEDICATIONS PRIOR TO ADMISSION:(reviewed/updated 12/1/2017)  Prescriptions Prior to Admission   Medication Sig    divalproex ER (DEPAKOTE ER) 500 mg ER tablet Take 1,500 mg by mouth nightly. Indications: Treatment-resistant schizophrenia    haloperidol decanoate (HALDOL DECANOATE) 100 mg/mL injection 2 mL by IntraMUSCular route every twenty-eight (28) days. Indications: SCHIZOPHRENIA    benztropine (COGENTIN) 2 mg tablet Take 1 Tab by mouth nightly. Indications: extrapyramidal disease    cloZAPine 200 mg tablet Take 600 mg by mouth nightly. Indications: TREATMENT-RESISTANT SCHIZOPHRENIA      PAST MEDICAL HISTORY: Past medical history from the initial psychiatric evaluation has been reviewed (reviewed/updated 12/1/2017) with no additional updates (I asked patient and no additional past medical history provided). Past Medical History:   Diagnosis Date    Psychiatric disorder     Psychotic disorder     Withdrawal syndrome ([facility] [place] [address]    No past surgical history on file.    SOCIAL HISTORY: Social history from the initial psychiatric evaluation has been reviewed (reviewed/updated 12/1/2017) with no additional updates (I asked patient and no additional social history provided). Social History     Social History    Marital status: SINGLE     Spouse name: N/A    Number of children: N/A    Years of education: N/A     Occupational History    Not on file. 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 He lives with his sister. On disability    No pending legal charge reported      FAMILY HISTORY: Family history from the initial psychiatric evaluation has been reviewed (reviewed/updated 12/1/2017) with no additional updates (I asked patient and no additional family history provided). No family history on file. REVIEW OF SYSTEMS: (reviewed/updated 12/1/2017)  Appetite:no change from normal   Sleep: fitful   All other Review of Systems: Psychological ROS: positive for - behavioral disorder, concentration difficulties, hallucinations, irritability, mood swings and delusion  Respiratory ROS: no cough, shortness of breath, or wheezing  Cardiovascular ROS: no chest pain or dyspnea on exertion         [address] [facility] (MSE):    [facility] FINDINGS ARE WITHIN NORMAL LIMITS (WNL) UNLESS OTHERWISE STATED BELOW. ( ALL OF THE BELOW CATEGORIES OF THE MSE HAVE BEEN REVIEWED (reviewed 12/1/2017) AND UPDATED AS DEEMED APPROPRIATE )  General Presentation age appropriate and disheveled, uncooperative and unreliable   Orientation disorganized   Vital Signs  See below (reviewed 12/1/2017); Vital Signs (BP, Pulse, & Temp) are within normal limits if not listed below.    Gait and Station Stable/steady, no ataxia   Musculoskeletal System No extrapyramidal symptoms (EPS); no abnormal muscular movements or Tardive Dyskinesia (TD); muscle strength and tone are within normal limits   Language No aphasia or dysarthria   Speech:  mute   Thought Processes illogical; slow rate of thoughts; poor abstract reasoning/computation   Thought Associations blocked    Thought Content paranoid delusions, bizarre delusions, auditory hallucinations and internally preoccupied   Suicidal Ideations none   Homicidal Ideations none   Mood:  Labile mood   Affect:  Constricted, labile inappropriate and increased in intensity   Memory recent  impaired   Memory remote:  intact   Concentration/Attention:  distractable and hypervigilance   Fund of Knowledge average   Insight:  limited   Reliability poor   Judgment:  limited          VITALS:     No data found. Wt Readings from Last 3 Encounters:   03/25/17 95.6 kg (210 lb 11.2 oz)   02/09/15 97.5 kg (215 lb)   02/07/15 97.5 kg (215 lb)     Temp Readings from Last 3 Encounters:   03/28/17 97.5 °F (36.4 °C)   02/07/15 98.7 °F (37.1 °C)     BP Readings from Last 3 Encounters:   03/28/17 100/68   02/12/15 105/73   02/07/15 148/79     Pulse Readings from Last 3 Encounters:   03/28/17 67   02/12/15 87   02/07/15 (!) 111            DATA     LABORATORY DATA:(reviewed/updated 12/1/2017)  No results found for this or any previous visit (from the past 24 hour(s)). Lab Results   Component Value Date/Time    Valproic acid 89 03/28/2017 10:35 AM     No results found for: LITHM   RADIOLOGY REPORTS:(reviewed/updated 12/1/2017)  No results found. MEDICATIONS     ALL MEDICATIONS:      SCHEDULED MEDICATIONS:          ASSESSMENT & PLAN     DIAGNOSES REQUIRING ACTIVE TREATMENT AND MONITORING: (reviewed/updated 12/1/2017)  Patient Active Hospital Problem List:   Paranoid schizophrenia ([facility] [address] (3/15/2017)- tx resistant    Assessment: minimal change       Plan: I will ct to adjust med- Depakote, Haldol dec( received on 11/21),add cogentin.  Augment with Zyprexa  pt refusing labs for Clozapine- not appropriate     Patient is on two antipsychotics now due to the relative refractoriness to treatment thus far. Prior to admission patient had THREE or more failed trails of monotherapy, including: Haldol, Zyprexa, Risperdal and Clozapine. The patient is a very slow responder to medications. Risks and benefits in the use of two antipsychotics have been weighed in full, including the risk of metabolic syndrome and the potential increased risk of QTc  Based on this analysis, it is considered favorable for the utilization of two antipsychotics. In the future, once stable on the two antipsychotics, patient can possibly be tapered to one of the chosen antipsychotics. Will check on EKG results today to monitor QTc.- refusing EKG    Non compliance with tx- request court order med. - granted        I will continue to monitor blood levels (Depakote,, clozapine---a drug with a narrow therapeutic index= NTI) and associated labs for drug therapy implemented that require intense monitoring for toxicity as deemed appropriate based on current medication side effects and pharmacodynamically determined drug 1/2 lives. In summary, Rui Hyde, is a 49 y.o.  male who presents with a severe exacerbation of the principal diagnosis of Paranoid schizophrenia ([facility] [address]  Patient's condition is worsening/not improving/not stable . Patient requires continued inpatient hospitalization for further stabilization, safety monitoring and medication management. I will continue to coordinate the provision of individual, milieu, occupational, group, and substance abuse therapies to address target symptoms/diagnoses as deemed appropriate for the individual patient. A coordinated, multidisplinary treatment team round was conducted with the patient (this team consists of the nurse, psychiatric unit pharmcist,  and writer).      Complete current electronic health record for patient has been reviewed today including consultant notes, ancillary staff notes, nurses and psychiatric tech notes.    Suicide risk assessment completed and patient deemed to be of low risk for suicide at this time. The following regarding medications was addressed during rounds with patient:   the risks and benefits of the proposed medication. The patient was given the opportunity to ask questions. Informed consent given to the use of the above medications. Will continue to adjust psychiatric and non-psychiatric medications (see above \"medication\" section and orders section for details) as deemed appropriate & based upon diagnoses and response to treatment. I will continue to order blood tests/labs and diagnostic tests as deemed appropriate and review results as they become available (see orders for details and above listed lab/test results). I will order psychiatric records from previous [facility] hospitals to further elucidate the nature of patient's psychopathology and review once available. I will gather additional collateral information from friends, family and o/p treatment team to further elucidate the nature of patient's psychopathology and baselline level of psychiatric functioning. I certify that this patient's inpatient psychiatric hospital services furnished since the previous certification were, and continue to be, required for treatment that could reasonably be expected to improve the patient's condition, or for diagnostic study, and that the patient continues to need, on a daily basis, active treatment furnished directly by or requiring the supervision of inpatient psychiatric facility personnel. In addition, the hospital records show that services furnished were intensive treatment services, admission or related services, or equivalent services.     EXPECTED DISCHARGE DATE/DAY: TBD     DISPOSITION: Home       Signed By:   Mallika Luther MD  12/1/2017

## 2017-12-01 NOTE — BH NOTES
GROUP THERAPY PROGRESS NOTE    Nadira Browning is participating in [employer].      Group time: 30 minutes    Personal goal for participation:  Unit orientation    Goal orientation: Community    Group therapy participation: minimal    Therapeutic interventions reviewed and discussed: Yes    Impression of participation: good

## 2017-12-02 PROCEDURE — 74011250637 HC RX REV CODE- 250/637: Performed by: PSYCHIATRY & NEUROLOGY

## 2017-12-02 PROCEDURE — 65220000001 HC RM PRIVATE PSYCH

## 2017-12-02 RX ADMIN — OLANZAPINE 10 MG: 10 TABLET, FILM COATED ORAL at 21:45

## 2017-12-02 RX ADMIN — OLANZAPINE 10 MG: 10 TABLET, FILM COATED ORAL at 08:54

## 2017-12-02 RX ADMIN — BENZTROPINE MESYLATE 2 MG: 2 TABLET ORAL at 21:44

## 2017-12-02 RX ADMIN — DIVALPROEX SODIUM 1500 MG: 500 TABLET, FILM COATED, EXTENDED RELEASE ORAL at 21:44

## 2017-12-02 NOTE — BH NOTES
PSYCHIATRIC PROGRESS NOTE         Patient Name  Ene Marquez   Date of Birth 1970   [facility] [id number]   Medical Record Number  [id number]      Age  49 y.o. PCP PROVIDER UNKNOWN   Admit date:  11/25/2017    Room Number  307/01  @ [facility]   Date of Service  12/2/2017           E & M PROGRESS NOTE:         HISTORY       CC:  \"selectively mute\"  HISTORY OF PRESENT ILLNESS/INTERVAL HISTORY:  (reviewed/updated 12/2/2017). per initial evaluation: The patient, Ene Marquez, is a 49 y.o. WHITE OR  male with a past psychiatric history significant for schizophrenia, who presents at this time with complaints of (and/or evidence of) the following emotional symptoms: psychotic behavior. Additional symptomatology include paranoid delusion, sexually inappropriate behavior, pepisodes of yelling screaming followed by selectively mute, disorganized thought process, anxiety, concern about health problems, difficulty sleeping, fearfulness, hearing voices, increased irritability, poor concentration and problem with medication. The above symptoms have been present for many years. These symptoms are of severe severity. These symptoms are constant  in nature. The patient's condition has been precipitated by and psychosocial stressors (conflict with sister, non compliance ). Patient's condition made worse by treatment noncompliance. UDS: negative; BAL=0. Ene Marquez presents/reports/evidences the following emotional symptoms today, 12/2/2017:psychotic behavior. The above symptoms have been present for many years. These symptoms are of severe severity. The symptoms are constant in nature. Additional symptomatology and features include selectively mute, refusing to talk to tx team. Pt is disorganized in thought process, delusional themes + and refusing to take some of his med. Poor hygiene and poor insight   11/28- minimal change, refusing labs and medication.  disorganize thought process. Delusional themes and poor insight. Evidence of thought block and poor hygiene  11/29- minimal change- guarded , preoccupied and delusional themes, hygiene is sl better. Poor insight. Refusing lab. Labile affect  11/30- refused EKG/UA/Labs. Disorganized and delusional themes+. Guarded and sitting on his bed in monk like position with eyes closed. Selectively answer question  12/1- minimal change, poor hygiene. Accepting med but refusing most of his labs. Sl better affect and more social  12/2-Remains confined to his room mostly and voluntarily. Paces in his room. No eye contact. Poor hygiene and disorganized room. No prn's used. SIDE EFFECTS: (reviewed/updated 12/2/2017)  None reported or admitted to. No noted toxicity with use of current med   ALLERGIES:(reviewed/updated 12/2/2017)  Allergies   Allergen Reactions    Fluphenazine Unknown (comments)     Pt is unable to communicate properly.  Penicillins Unknown (comments)     Pt is unable to communicate properly. MEDICATIONS PRIOR TO ADMISSION:(reviewed/updated 12/2/2017)  Prescriptions Prior to Admission   Medication Sig    divalproex ER (DEPAKOTE ER) 500 mg ER tablet Take 1,500 mg by mouth nightly. Indications: Treatment-resistant schizophrenia    haloperidol decanoate (HALDOL DECANOATE) 100 mg/mL injection 2 mL by IntraMUSCular route every twenty-eight (28) days. Indications: SCHIZOPHRENIA    benztropine (COGENTIN) 2 mg tablet Take 1 Tab by mouth nightly. Indications: extrapyramidal disease    cloZAPine 200 mg tablet Take 600 mg by mouth nightly. Indications: TREATMENT-RESISTANT SCHIZOPHRENIA      PAST MEDICAL HISTORY: Past medical history from the initial psychiatric evaluation has been reviewed (reviewed/updated 12/2/2017) with no additional updates (I asked patient and no additional past medical history provided).    Past Medical History:   Diagnosis Date    Psychiatric disorder     Psychotic disorder     Withdrawal syndrome ([facility] [address] No past surgical history on file. SOCIAL HISTORY: Social history from the initial psychiatric evaluation has been reviewed (reviewed/updated 12/2/2017) with no additional updates (I asked patient and no additional social history provided). Social History     Social History    Marital status: SINGLE     Spouse name: N/A    Number of children: N/A    Years of education: N/A     Occupational History    Not on file. 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 He lives with his sister. On disability    No pending legal charge reported      FAMILY HISTORY: Family history from the initial psychiatric evaluation has been reviewed (reviewed/updated 12/2/2017) with no additional updates (I asked patient and no additional family history provided). No family history on file. REVIEW OF SYSTEMS: (reviewed/updated 12/2/2017)  Appetite:no change from normal   Sleep: fitful   All other Review of Systems: Psychological ROS: positive for - behavioral disorder, concentration difficulties, hallucinations, irritability, mood swings and delusion  Respiratory ROS: no cough, shortness of breath, or wheezing  Cardiovascular ROS: no chest pain or dyspnea on exertion         [address] [facility] (MSE):    MSE FINDINGS ARE WITHIN NORMAL LIMITS (WNL) UNLESS OTHERWISE STATED BELOW. ( ALL OF THE BELOW CATEGORIES OF THE MSE HAVE BEEN REVIEWED (reviewed 12/2/2017) AND UPDATED AS DEEMED APPROPRIATE )  General Presentation age appropriate and disheveled, uncooperative and unreliable   Orientation disorganized   Vital Signs  See below (reviewed 12/2/2017); Vital Signs (BP, Pulse, & Temp) are within normal limits if not listed below.    Gait and Station Stable/steady, no ataxia Musculoskeletal System No extrapyramidal symptoms (EPS); no abnormal muscular movements or Tardive Dyskinesia (TD); muscle strength and tone are within normal limits   Language No aphasia or dysarthria   Speech:  mute   Thought Processes illogical; slow rate of thoughts; poor abstract reasoning/computation   Thought Associations blocked    Thought Content paranoid delusions, bizarre delusions, auditory hallucinations and internally preoccupied   Suicidal Ideations none   Homicidal Ideations none   Mood:  Labile mood   Affect:  Constricted, labile inappropriate and increased in intensity   Memory recent  impaired   Memory remote:  intact   Concentration/Attention:  distractable and hypervigilance   Fund of Knowledge average   Insight:  limited   Reliability poor   Judgment:  limited          VITALS:     No data found. Wt Readings from Last 3 Encounters:   03/25/17 95.6 kg (210 lb 11.2 oz)   02/09/15 97.5 kg (215 lb)   02/07/15 97.5 kg (215 lb)     Temp Readings from Last 3 Encounters:   03/28/17 97.5 °F (36.4 °C)   02/07/15 98.7 °F (37.1 °C)     BP Readings from Last 3 Encounters:   03/28/17 100/68   02/12/15 105/73   02/07/15 148/79     Pulse Readings from Last 3 Encounters:   03/28/17 67   02/12/15 87   02/07/15 (!) 111            DATA     LABORATORY DATA:(reviewed/updated 12/2/2017)  No results found for this or any previous visit (from the past 24 hour(s)). Lab Results   Component Value Date/Time    Valproic acid 89 03/28/2017 10:35 AM     No results found for: LITHM   RADIOLOGY REPORTS:(reviewed/updated 12/2/2017)  No results found.        MEDICATIONS     ALL MEDICATIONS:      SCHEDULED MEDICATIONS:          ASSESSMENT & PLAN     DIAGNOSES REQUIRING ACTIVE TREATMENT AND MONITORING: (reviewed/updated 12/2/2017)  Patient Active Hospital Problem List:   Paranoid schizophrenia ([facility] [address] (3/15/2017)- tx resistant    Assessment: minimal change       Plan: I will ct to adjust med- Depakote, Haldol dec( received on 11/21),add cogentin. Augment with Zyprexa  pt refusing labs for Clozapine- not appropriate     Patient is on two antipsychotics now due to the relative refractoriness to treatment thus far. Prior to admission patient had THREE or more failed trails of monotherapy, including: Haldol, Zyprexa, Risperdal and Clozapine. The patient is a very slow responder to medications. Risks and benefits in the use of two antipsychotics have been weighed in full, including the risk of metabolic syndrome and the potential increased risk of QTc  Based on this analysis, it is considered favorable for the utilization of two antipsychotics. In the future, once stable on the two antipsychotics, patient can possibly be tapered to one of the chosen antipsychotics. Will check on EKG results today to monitor QTc.- refusing EKG    Non compliance with tx- request court order med. - granted        I will continue to monitor blood levels (Depakote,, clozapine---a drug with a narrow therapeutic index= NTI) and associated labs for drug therapy implemented that require intense monitoring for toxicity as deemed appropriate based on current medication side effects and pharmacodynamically determined drug 1/2 lives. In summary, Ene Marquez, is a 49 y.o.  male who presents with a severe exacerbation of the principal diagnosis of Paranoid schizophrenia ([facility] [address]  Patient's condition is worsening/not improving/not stable . Patient requires continued inpatient hospitalization for further stabilization, safety monitoring and medication management. I will continue to coordinate the provision of individual, milieu, occupational, group, and substance abuse therapies to address target symptoms/diagnoses as deemed appropriate for the individual patient. A coordinated, multidisplinary treatment team round was conducted with the patient (this team consists of the nurse, psychiatric unit pharmcist,  and writer).      Complete current electronic health record for patient has been reviewed today including consultant notes, ancillary staff notes, nurses and psychiatric tech notes. Suicide risk assessment completed and patient deemed to be of low risk for suicide at this time. The following regarding medications was addressed during rounds with patient:   the risks and benefits of the proposed medication. The patient was given the opportunity to ask questions. Informed consent given to the use of the above medications. Will continue to adjust psychiatric and non-psychiatric medications (see above \"medication\" section and orders section for details) as deemed appropriate & based upon diagnoses and response to treatment. I will continue to order blood tests/labs and diagnostic tests as deemed appropriate and review results as they become available (see orders for details and above listed lab/test results). I will order psychiatric records from previous [facility] hospitals to further elucidate the nature of patient's psychopathology and review once available. I will gather additional collateral information from friends, family and o/p treatment team to further elucidate the nature of patient's psychopathology and baselline level of psychiatric functioning. I certify that this patient's inpatient psychiatric hospital services furnished since the previous certification were, and continue to be, required for treatment that could reasonably be expected to improve the patient's condition, or for diagnostic study, and that the patient continues to need, on a daily basis, active treatment furnished directly by or requiring the supervision of inpatient psychiatric facility personnel. In addition, the hospital records show that services furnished were intensive treatment services, admission or related services, or equivalent services.     EXPECTED DISCHARGE DATE/DAY: TBD     DISPOSITION: Home       Signed By:   Tirso Zamora MD  12/2/2017

## 2017-12-02 NOTE — PROGRESS NOTES
Problem: Altered Thought Process (Adult/Pediatric)  Goal: *STG: Remains safe in hospital  Outcome: Progressing Towards Goal  Pt has been isolated to his room most of the time during the shift with poor hygiene. Pt was food and med compliant without any problem on this shift, Pt observed responding to internal stimuli while resting on his bed. Pt denied any S/I and H/I at this time. Pt encouraged to attend all group activities and to discuss any issues or concerns with staff. Staff will also continue to monitor pt with Q -15 checks for safety.

## 2017-12-02 NOTE — PROGRESS NOTES
Problem: Altered Thought Process (Adult/Pediatric)  Goal: *STG: Remains safe in hospital  Outcome: Progressing Towards Goal  Pt is alert. He is compliant with medications and meals. Observed pt quietly pacing the hallway. Pt has minimal interaction with staff and peers. Pt denies A/V hallucinations and denies S/H ideations. Pt denies any needs at this time. Will continue to monitor pt q15 minutes for safety and support.

## 2017-12-03 PROCEDURE — 65220000001 HC RM PRIVATE PSYCH

## 2017-12-03 PROCEDURE — 74011250637 HC RX REV CODE- 250/637: Performed by: PSYCHIATRY & NEUROLOGY

## 2017-12-03 RX ADMIN — OLANZAPINE 10 MG: 10 TABLET, FILM COATED ORAL at 19:29

## 2017-12-03 RX ADMIN — OLANZAPINE 10 MG: 10 TABLET, FILM COATED ORAL at 08:39

## 2017-12-03 RX ADMIN — DIVALPROEX SODIUM 1500 MG: 500 TABLET, FILM COATED, EXTENDED RELEASE ORAL at 21:03

## 2017-12-03 RX ADMIN — BENZTROPINE MESYLATE 2 MG: 2 TABLET ORAL at 21:04

## 2017-12-03 NOTE — BH NOTES
During free time pt observed in hallway anxious at times  Pacing, affect flat,sad staff initiate 1:1 with no new self disclosure , also he was encouraged to work his program by being compliant with medications and following his unit rules  He will be monitor q15min

## 2017-12-03 NOTE — BH NOTES
GROUP THERAPY PROGRESS NOTE    Tish See is participating in Reflections.      Group time: 30 minutes    Personal goal for participation: to review daily goal    Goal orientation: personal    Group therapy participation: active    Therapeutic interventions reviewed and discussed: yes    Impression of participation: ok

## 2017-12-03 NOTE — BH NOTES
Pt is mostly isolative to his room during this shift. He will come out for meals and medications. PT denies any needs and does not appear to be in any distress at this time. Will continue to monitor pt q15 minutes for safety and support.

## 2017-12-03 NOTE — BH NOTES
PSYCHIATRIC PROGRESS NOTE         Patient Name  Lilian Stanley   Date of Birth 1970   [facility] [id number]   Medical Record Number  [id number]      Age  49 y.o. PCP PROVIDER UNKNOWN   Admit date:  11/25/2017    Room Number  307/01  @ [facility]   Date of Service  12/3/2017           E & M PROGRESS NOTE:         HISTORY       CC:  \"selectively mute\"  HISTORY OF PRESENT ILLNESS/INTERVAL HISTORY:  (reviewed/updated 12/3/2017). per initial evaluation: The patient, Lilian Stanley, is a 49 y.o. WHITE [place]  male with a past psychiatric history significant for schizophrenia, who presents at this time with complaints of (and/or evidence of) the following emotional symptoms: psychotic behavior. Additional symptomatology include paranoid delusion, sexually inappropriate behavior, pepisodes of yelling screaming followed by selectively mute, disorganized thought process, anxiety, concern about health problems, difficulty sleeping, fearfulness, hearing voices, increased irritability, poor concentration and problem with medication. The above symptoms have been present for many years. These symptoms are of severe severity. These symptoms are constant  in nature. The patient's condition has been precipitated by and psychosocial stressors (conflict with sister, non compliance ). Patient's condition made worse by treatment noncompliance. UDS: negative; BAL=0. Lilian Stanley presents/reports/evidences the following emotional symptoms today, 12/3/2017:psychotic behavior. The above symptoms have been present for many years. These symptoms are of severe severity. The symptoms are constant in nature. Additional symptomatology and features include selectively mute, refusing to talk to tx team. Pt is disorganized in thought process, delusional themes + and refusing to take some of his med. Poor hygiene and poor insight   11/28- minimal change, refusing labs and medication.  disorganize thought process. Delusional themes and poor insight. Evidence of thought block and poor hygiene  11/29- minimal change- guarded , preoccupied and delusional themes, hygiene is sl better. Poor insight. Refusing lab. Labile affect  11/30- refused EKG/UA/Labs. Disorganized and delusional themes+. Guarded and sitting on his bed in monk like position with eyes closed. Selectively answer question  12/1- minimal change, poor hygiene. Accepting med but refusing most of his labs. Sl better affect and more social  12/2-Remains confined to his room mostly and voluntarily. Paces in his room. No eye contact. Poor hygiene and disorganized room. No prn's used. 12/3- Slept 7 hours. Mostly isolates in his room, frequently pacing, minimal socialization, selectively mute, poor self care. No prn's needed. Compliant with meds. SIDE EFFECTS: (reviewed/updated 12/3/2017)  None reported or admitted to. No noted toxicity with use of current med   ALLERGIES:(reviewed/updated 12/3/2017)  Allergies   Allergen Reactions    Fluphenazine Unknown (comments)     Pt is unable to communicate properly.  Penicillins Unknown (comments)     Pt is unable to communicate properly. MEDICATIONS PRIOR TO ADMISSION:(reviewed/updated 12/3/2017)  Prescriptions Prior to Admission   Medication Sig    divalproex ER (DEPAKOTE ER) 500 mg ER tablet Take 1,500 mg by mouth nightly. Indications: Treatment-resistant schizophrenia    haloperidol decanoate (HALDOL DECANOATE) 100 mg/mL injection 2 mL by IntraMUSCular route every twenty-eight (28) days. Indications: SCHIZOPHRENIA    benztropine (COGENTIN) 2 mg tablet Take 1 Tab by mouth nightly. Indications: extrapyramidal disease    cloZAPine 200 mg tablet Take 600 mg by mouth nightly.  Indications: TREATMENT-RESISTANT SCHIZOPHRENIA      PAST MEDICAL HISTORY: Past medical history from the initial psychiatric evaluation has been reviewed (reviewed/updated 12/3/2017) with no additional updates (I asked patient and no additional past medical history provided). Past Medical History:   Diagnosis Date    Psychiatric disorder     Psychotic disorder     Withdrawal syndrome ([address]    No past surgical history on file. SOCIAL HISTORY: Social history from the initial psychiatric evaluation has been reviewed (reviewed/updated 12/3/2017) with no additional updates (I asked patient and no additional social history provided). Social History     Social History    Marital status: SINGLE     Spouse name: N/A    Number of children: N/A    Years of education: N/A     Occupational History    Not on file. 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 He lives with his sister. On disability    No pending legal charge reported      FAMILY HISTORY: Family history from the initial psychiatric evaluation has been reviewed (reviewed/updated 12/3/2017) with no additional updates (I asked patient and no additional family history provided). No family history on file.     REVIEW OF SYSTEMS: (reviewed/updated 12/3/2017)  Appetite:no change from normal   Sleep: fitful   All other Review of Systems: Psychological ROS: positive for - behavioral disorder, concentration difficulties, hallucinations, irritability, mood swings and delusion  Respiratory ROS: no cough, shortness of breath, or wheezing  Cardiovascular ROS: no chest pain or dyspnea on exertion         [facility] (MSE):    MSE FINDINGS ARE WITHIN NORMAL LIMITS (WNL) UNLESS OTHERWISE STATED BELOW. ( ALL OF THE BELOW CATEGORIES OF THE MSE HAVE BEEN REVIEWED (reviewed 12/3/2017) AND UPDATED AS DEEMED APPROPRIATE )  General Presentation age appropriate and disheveled, uncooperative and unreliable   Orientation disorganized   Vital Signs  See below (reviewed 12/3/2017); Vital Signs (BP, Pulse, & Temp) are within normal limits if not listed below. Gait and Station Stable/steady, no ataxia   Musculoskeletal System No extrapyramidal symptoms (EPS); no abnormal muscular movements or Tardive Dyskinesia (TD); muscle strength and tone are within normal limits   Language No aphasia or dysarthria   Speech:  mute   Thought Processes illogical; slow rate of thoughts; poor abstract reasoning/computation   Thought Associations blocked    Thought Content paranoid delusions, bizarre delusions, auditory hallucinations and internally preoccupied   Suicidal Ideations none   Homicidal Ideations none   Mood:  Labile mood   Affect:  Constricted, labile inappropriate and increased in intensity   Memory recent  impaired   Memory remote:  intact   Concentration/Attention:  distractable and hypervigilance   Fund of Knowledge average   Insight:  limited   Reliability poor   Judgment:  limited          VITALS:     No data found. Wt Readings from Last 3 Encounters:   03/25/17 95.6 kg (210 lb 11.2 oz)   02/09/15 97.5 kg (215 lb)   02/07/15 97.5 kg (215 lb)     Temp Readings from Last 3 Encounters:   03/28/17 97.5 °F (36.4 °C)   02/07/15 98.7 °F (37.1 °C)     BP Readings from Last 3 Encounters:   03/28/17 100/68   02/12/15 105/73   02/07/15 148/79     Pulse Readings from Last 3 Encounters:   03/28/17 67   02/12/15 87   02/07/15 (!) 111            DATA     LABORATORY DATA:(reviewed/updated 12/3/2017)  No results found for this or any previous visit (from the past 24 hour(s)). Lab Results   Component Value Date/Time    Valproic acid 89 03/28/2017 10:35 AM     No results found for: LITHM   RADIOLOGY REPORTS:(reviewed/updated 12/3/2017)  No results found.        MEDICATIONS     ALL MEDICATIONS:      SCHEDULED MEDICATIONS:          ASSESSMENT & PLAN     DIAGNOSES REQUIRING ACTIVE TREATMENT AND MONITORING: (reviewed/updated 12/3/2017)  Patient Active Hospital Problem List:   Paranoid schizophrenia ([facility] (3/15/2017)- tx resistant    Assessment: minimal change       Plan: I will ct to adjust med- Depakote, Haldol dec( received on 11/21),add cogentin. Augment with Zyprexa  pt refusing labs for Clozapine- not appropriate     Patient is on two antipsychotics now due to the relative refractoriness to treatment thus far. Prior to admission patient had THREE or more failed trails of monotherapy, including: Haldol, Zyprexa, Risperdal and Clozapine. The patient is a very slow responder to medications. Risks and benefits in the use of two antipsychotics have been weighed in full, including the risk of metabolic syndrome and the potential increased risk of QTc  Based on this analysis, it is considered favorable for the utilization of two antipsychotics. In the future, once stable on the two antipsychotics, patient can possibly be tapered to one of the chosen antipsychotics. Will check on EKG results today to monitor QTc.- refusing EKG    Non compliance with tx- request court order med. - granted        I will continue to monitor blood levels (Depakote,, clozapine---a drug with a narrow therapeutic index= NTI) and associated labs for drug therapy implemented that require intense monitoring for toxicity as deemed appropriate based on current medication side effects and pharmacodynamically determined drug 1/2 lives. In summary, Trace Dailey, is a 49 y.o.  male who presents with a severe exacerbation of the principal diagnosis of Paranoid schizophrenia ([facility]  Patient's condition is worsening/not improving/not stable . Patient requires continued inpatient hospitalization for further stabilization, safety monitoring and medication management. I will continue to coordinate the provision of individual, milieu, occupational, group, and substance abuse therapies to address target symptoms/diagnoses as deemed appropriate for the individual patient.   A coordinated, multidisplinary treatment team round was conducted with the patient (this team consists of the nurse, psychiatric unit pharmcist,  and writer). Complete current electronic health record for patient has been reviewed today including consultant notes, ancillary staff notes, nurses and psychiatric tech notes. Suicide risk assessment completed and patient deemed to be of low risk for suicide at this time. The following regarding medications was addressed during rounds with patient:   the risks and benefits of the proposed medication. The patient was given the opportunity to ask questions. Informed consent given to the use of the above medications. Will continue to adjust psychiatric and non-psychiatric medications (see above \"medication\" section and orders section for details) as deemed appropriate & based upon diagnoses and response to treatment. I will continue to order blood tests/labs and diagnostic tests as deemed appropriate and review results as they become available (see orders for details and above listed lab/test results). I will order psychiatric records from previous [facility] hospitals to further elucidate the nature of patient's psychopathology and review once available. I will gather additional collateral information from friends, family and o/p treatment team to further elucidate the nature of patient's psychopathology and baselline level of psychiatric functioning. I certify that this patient's inpatient psychiatric hospital services furnished since the previous certification were, and continue to be, required for treatment that could reasonably be expected to improve the patient's condition, or for diagnostic study, and that the patient continues to need, on a daily basis, active treatment furnished directly by or requiring the supervision of inpatient psychiatric facility personnel.  In addition, the hospital records show that services furnished were intensive treatment services, admission or related services, or equivalent services.     EXPECTED DISCHARGE DATE/DAY: TBD     DISPOSITION: Home       Signed By:   Toribio Aaron MD  12/3/2017

## 2017-12-04 PROCEDURE — 74011250637 HC RX REV CODE- 250/637: Performed by: PSYCHIATRY & NEUROLOGY

## 2017-12-04 PROCEDURE — 65220000001 HC RM PRIVATE PSYCH

## 2017-12-04 RX ADMIN — OLANZAPINE 10 MG: 10 TABLET, FILM COATED ORAL at 21:42

## 2017-12-04 RX ADMIN — BENZTROPINE MESYLATE 2 MG: 2 TABLET ORAL at 21:41

## 2017-12-04 RX ADMIN — OLANZAPINE 10 MG: 10 TABLET, FILM COATED ORAL at 09:40

## 2017-12-04 RX ADMIN — DIVALPROEX SODIUM 1500 MG: 500 TABLET, FILM COATED, EXTENDED RELEASE ORAL at 21:41

## 2017-12-04 NOTE — PROGRESS NOTES
Problem: Altered Thought Process (Adult/Pediatric)  Goal: *STG: Remains safe in hospital  Outcome: Progressing Towards Goal  Q15 min checks performed for safety. Patient isolated in his room most of the shift. Patient refused to come out for medications; hence they were taken to patient. Patient would not come to door, rather instructed staff bring medications to him. Patient spoke in a whisper. Patient did take his medication. Behavior continues to be bizarre, but thus far, patient has not exhibited any aggression.

## 2017-12-04 NOTE — BH NOTES
PSYCHIATRIC PROGRESS NOTE         Patient Name  Juaquin Chavez   Date of Birth 1970   [facility] [id number]   Medical Record Number  [id number]      Age  49 y.o. PCP PROVIDER UNKNOWN   Admit date:  11/25/2017    Room Number  307/01  @ [facility]   Date of Service  12/4/2017           E & M PROGRESS NOTE:         HISTORY       CC:  \"selectively mute\"  HISTORY OF PRESENT ILLNESS/INTERVAL HISTORY:  (reviewed/updated 12/4/2017). per initial evaluation: The patient, Juaquin Chavez, is a 49 y.o. WHITE [place]  male with a past psychiatric history significant for schizophrenia, who presents at this time with complaints of (and/or evidence of) the following emotional symptoms: psychotic behavior. Additional symptomatology include paranoid delusion, sexually inappropriate behavior, pepisodes of yelling screaming followed by selectively mute, disorganized thought process, anxiety, concern about health problems, difficulty sleeping, fearfulness, hearing voices, increased irritability, poor concentration and problem with medication. The above symptoms have been present for many years. These symptoms are of severe severity. These symptoms are constant  in nature. The patient's condition has been precipitated by and psychosocial stressors (conflict with sister, non compliance ). Patient's condition made worse by treatment noncompliance. UDS: negative; BAL=0. Juaquin Chavez presents/reports/evidences the following emotional symptoms today, 12/4/2017:psychotic behavior. The above symptoms have been present for many years. These symptoms are of severe severity. The symptoms are constant in nature. Additional symptomatology and features include     12/1- minimal change, poor hygiene. Accepting med but refusing most of his labs. Sl better affect and more social  12/2-Remains confined to his room mostly and voluntarily. Paces in his room. No eye contact. Poor hygiene and disorganized room.  No prn's used.  12/3- Slept 7 hours. Mostly isolates in his room, frequently pacing, minimal socialization, selectively mute, poor self care. No prn's needed. Compliant with meds. 12/4- isolative, poor hygiene and poor social interaction. Negative sx+. Refusing labs and EKG, soft speech, delusional and disorganized      SIDE EFFECTS: (reviewed/updated 12/4/2017)  None reported or admitted to. No noted toxicity with use of current med   ALLERGIES:(reviewed/updated 12/4/2017)  Allergies   Allergen Reactions    Fluphenazine Unknown (comments)     Pt is unable to communicate properly.  Penicillins Unknown (comments)     Pt is unable to communicate properly. MEDICATIONS PRIOR TO ADMISSION:(reviewed/updated 12/4/2017)  Prescriptions Prior to Admission   Medication Sig    divalproex ER (DEPAKOTE ER) 500 mg ER tablet Take 1,500 mg by mouth nightly. Indications: Treatment-resistant schizophrenia    haloperidol decanoate (HALDOL DECANOATE) 100 mg/mL injection 2 mL by IntraMUSCular route every twenty-eight (28) days. Indications: SCHIZOPHRENIA    benztropine (COGENTIN) 2 mg tablet Take 1 Tab by mouth nightly. Indications: extrapyramidal disease    cloZAPine 200 mg tablet Take 600 mg by mouth nightly. Indications: TREATMENT-RESISTANT SCHIZOPHRENIA      PAST MEDICAL HISTORY: Past medical history from the initial psychiatric evaluation has been reviewed (reviewed/updated 12/4/2017) with no additional updates (I asked patient and no additional past medical history provided). Past Medical History:   Diagnosis Date    Psychiatric disorder     Psychotic disorder     Withdrawal syndrome ([facility] [address]    No past surgical history on file. SOCIAL HISTORY: Social history from the initial psychiatric evaluation has been reviewed (reviewed/updated 12/4/2017) with no additional updates (I asked patient and no additional social history provided).    Social History     Social History    Marital status: SINGLE     Spouse name: N/A   Amira Cox Number of children: N/A    Years of education: N/A     Occupational History    Not on file. 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 He lives with his sister. On disability    No pending legal charge reported      FAMILY HISTORY: Family history from the initial psychiatric evaluation has been reviewed (reviewed/updated 12/4/2017) with no additional updates (I asked patient and no additional family history provided). No family history on file. REVIEW OF SYSTEMS: (reviewed/updated 12/4/2017)  Appetite:no change from normal   Sleep: fitful   All other Review of Systems: Psychological ROS: positive for - behavioral disorder, concentration difficulties, hallucinations, irritability, mood swings and delusion  Respiratory ROS: no cough, shortness of breath, or wheezing  Cardiovascular ROS: no chest pain or dyspnea on exertion         [facility] ([employer] [facility] [employer]):    [employer] [facility] [employer] FINDINGS ARE WITHIN NORMAL LIMITS (WNL) UNLESS OTHERWISE STATED BELOW. ( ALL OF THE BELOW CATEGORIES OF THE [employer] [facility] [employer] HAVE BEEN REVIEWED (reviewed 12/4/2017) AND UPDATED AS DEEMED APPROPRIATE )  General Presentation age appropriate and disheveled, uncooperative and unreliable   Orientation disorganized   Vital Signs  See below (reviewed 12/4/2017); Vital Signs (BP, Pulse, & Temp) are within normal limits if not listed below.    Gait and Station Stable/steady, no ataxia   Musculoskeletal System No extrapyramidal symptoms (EPS); no abnormal muscular movements or Tardive Dyskinesia (TD); muscle strength and tone are within normal limits   Language No aphasia or dysarthria   Speech:  mute   Thought Processes illogical; slow rate of thoughts; poor abstract reasoning/computation   Thought Associations blocked    Thought Content paranoid delusions, bizarre delusions, auditory hallucinations and internally preoccupied   Suicidal Ideations none   Homicidal Ideations none   Mood:  Labile mood   Affect:  Constricted, labile inappropriate and increased in intensity   Memory recent  impaired   Memory remote:  intact   Concentration/Attention:  distractable and hypervigilance   Fund of Knowledge average   Insight:  limited   Reliability poor   Judgment:  limited          VITALS:     Patient Vitals for the past 24 hrs:   Temp Pulse Resp BP   12/03/17 1830 97.5 °F (36.4 °C) 70 16 115/75     Wt Readings from Last 3 Encounters:   03/25/17 95.6 kg (210 lb 11.2 oz)   02/09/15 97.5 kg (215 lb)   02/07/15 97.5 kg (215 lb)     Temp Readings from Last 3 Encounters:   12/03/17 97.5 °F (36.4 °C)   03/28/17 97.5 °F (36.4 °C)   02/07/15 98.7 °F (37.1 °C)     BP Readings from Last 3 Encounters:   12/03/17 115/75   03/28/17 100/68   02/12/15 105/73     Pulse Readings from Last 3 Encounters:   12/03/17 70   03/28/17 67   02/12/15 87            DATA     LABORATORY DATA:(reviewed/updated 12/4/2017)  No results found for this or any previous visit (from the past 24 hour(s)). Lab Results   Component Value Date/Time    Valproic acid 89 03/28/2017 10:35 AM     No results found for: LITHM   RADIOLOGY REPORTS:(reviewed/updated 12/4/2017)  No results found. MEDICATIONS     ALL MEDICATIONS:      SCHEDULED MEDICATIONS:          ASSESSMENT & PLAN     DIAGNOSES REQUIRING ACTIVE TREATMENT AND MONITORING: (reviewed/updated 12/4/2017)  Patient Active Hospital Problem List:   Paranoid schizophrenia ([facility] [address] (3/15/2017)- tx resistant    Assessment: minimal change - psychotic, -ve sx, poor insight      Plan: I will ct to adjust med- ct to titrate Depakote- encourage compliance with Labs. Ct Haldol dec and Zyprexa     Patient is on two antipsychotics now due to the relative refractoriness to treatment thus far.   Prior to admission patient had THREE or more failed trails of monotherapy, including: Haldol, Zyprexa, Risperdal and Clozapine. The patient is a very slow responder to medications. Risks and benefits in the use of two antipsychotics have been weighed in full, including the risk of metabolic syndrome and the potential increased risk of QTc  Based on this analysis, it is considered favorable for the utilization of two antipsychotics. In the future, once stable on the two antipsychotics, patient can possibly be tapered to one of the chosen antipsychotics. Will check on EKG results today to monitor QTc.- refusing EKG    Non compliance with tx- request court order med. - granted        I will continue to monitor blood levels (Depakote,, clozapine---a drug with a narrow therapeutic index= NTI) and associated labs for drug therapy implemented that require intense monitoring for toxicity as deemed appropriate based on current medication side effects and pharmacodynamically determined drug 1/2 lives. In summary, Martin Camara, is a 49 y.o.  male who presents with a severe exacerbation of the principal diagnosis of Paranoid schizophrenia ([facility] [address]  Patient's condition is worsening/not improving/not stable . Patient requires continued inpatient hospitalization for further stabilization, safety monitoring and medication management. I will continue to coordinate the provision of individual, milieu, occupational, group, and substance abuse therapies to address target symptoms/diagnoses as deemed appropriate for the individual patient. A coordinated, multidisplinary treatment team round was conducted with the patient (this team consists of the nurse, psychiatric unit pharmcist,  and writer). Complete current electronic health record for patient has been reviewed today including consultant notes, ancillary staff notes, nurses and psychiatric tech notes.     Suicide risk assessment completed and patient deemed to be of low risk for suicide at this time.     The following regarding medications was addressed during rounds with patient:   the risks and benefits of the proposed medication. The patient was given the opportunity to ask questions. Informed consent given to the use of the above medications. Will continue to adjust psychiatric and non-psychiatric medications (see above \"medication\" section and orders section for details) as deemed appropriate & based upon diagnoses and response to treatment. I will continue to order blood tests/labs and diagnostic tests as deemed appropriate and review results as they become available (see orders for details and above listed lab/test results). I will order psychiatric records from previous [facility] hospitals to further elucidate the nature of patient's psychopathology and review once available. I will gather additional collateral information from friends, family and o/p treatment team to further elucidate the nature of patient's psychopathology and baselline level of psychiatric functioning. I certify that this patient's inpatient psychiatric hospital services furnished since the previous certification were, and continue to be, required for treatment that could reasonably be expected to improve the patient's condition, or for diagnostic study, and that the patient continues to need, on a daily basis, active treatment furnished directly by or requiring the supervision of inpatient psychiatric facility personnel. In addition, the hospital records show that services furnished were intensive treatment services, admission or related services, or equivalent services.     EXPECTED DISCHARGE DATE/DAY: TBD     DISPOSITION: Home       Signed By:   Heather Ball MD  12/4/2017

## 2017-12-04 NOTE — BH NOTES
Pt was seen in treatment team this morning. Pt denies SI/HI. Pt's mood is anxious, affect is labile. Speech is soft. Pt's thought process is preoccupied with delusional themes. Pt's hygiene is poor and remains isolative. Pt has been medication compliant but refusing labs. Pt agreed to complete blood work in morning.

## 2017-12-04 NOTE — BH NOTES
GROUP THERAPY PROGRESS NOTE    Amina Joe is participating in [place].      Group time: 30 minutes    Personal goal for participation: daily orientation    Goal orientation: personal    Group therapy participation: active    Therapeutic interventions reviewed and discussed: yes    Impression of participation: ok

## 2017-12-04 NOTE — BH NOTES
Behavior continues to be bizarre. Patient noted pacing outside his room. Patient kneeled to floor and bowed down. Patient then got up. and began pacing again. Hygiene remains poor.

## 2017-12-05 PROCEDURE — 74011250637 HC RX REV CODE- 250/637: Performed by: PSYCHIATRY & NEUROLOGY

## 2017-12-05 PROCEDURE — 65220000001 HC RM PRIVATE PSYCH

## 2017-12-05 RX ORDER — OLANZAPINE 5 MG/1
5 TABLET ORAL DAILY
Status: DISCONTINUED | OUTPATIENT
Start: 2017-12-06 | End: 2017-12-06

## 2017-12-05 RX ORDER — LORAZEPAM 2 MG/ML
0.5 INJECTION INTRAMUSCULAR EVERY EVENING
Status: DISCONTINUED | OUTPATIENT
Start: 2017-12-06 | End: 2017-12-06

## 2017-12-05 RX ORDER — OLANZAPINE 10 MG/1
20 TABLET ORAL EVERY EVENING
Status: DISCONTINUED | OUTPATIENT
Start: 2017-12-06 | End: 2017-12-06

## 2017-12-05 RX ADMIN — BENZTROPINE MESYLATE 2 MG: 2 TABLET ORAL at 21:36

## 2017-12-05 RX ADMIN — DIVALPROEX SODIUM 1500 MG: 500 TABLET, FILM COATED, EXTENDED RELEASE ORAL at 21:35

## 2017-12-05 RX ADMIN — OLANZAPINE 10 MG: 10 TABLET, FILM COATED ORAL at 08:31

## 2017-12-05 NOTE — PROGRESS NOTES
Problem: Altered Thought Process (Adult/Pediatric)  Goal: *STG: Remains safe in hospital  Outcome: Progressing Towards Goal  Pt continues to be selectively mute and appears to be preoccupied. He remains disorganized and disheveled. Pt is compliant with meds and meals. He isolates to himself, spending most of the shift in his room. Pt did not receive any prn meds on the shift. Staff continues to encourage pt to become more interactive in the milieu and place more focus on personal hygiene. Pt will continue to be monitored q15 min checks for safety and support.

## 2017-12-05 NOTE — BH NOTES
GROUP THERAPY PROGRESS NOTE    The patient Fahad Nolasco a 49 y.o. male is participating in [employer]. Group time: 1 hour    Personal goal for participation:  To concentrate on selected task    Goal orientation: social    Group therapy participation: minimal    Therapeutic interventions reviewed and discussed: Crafts, games, music    Impression of participation: The patient was present-left early    Rawleigh Leyden  12/5/2017  5:24 PM

## 2017-12-05 NOTE — PROGRESS NOTES
Problem: Altered Thought Process (Adult/Pediatric)  Goal: *STG: Remains safe in hospital  Outcome: Progressing Towards Goal  Pt is alert but disoriented, pt is disheveled & disorganized, pt is selectively mute and speech is garbled when he talks. Pt has spent all shift in room. Pt eats his meals in his room. Pt is compliant with meds. Encourage adl's. Assist to reality test, assess mood and behavior, encourage to verbalize thoughts and feeling, med and illness teaching, encourage participation in tx plan, observe on q15' checks.

## 2017-12-05 NOTE — PROGRESS NOTES
Spoke with rn on acute side and she stated\" He is still not ready to get ekg completed\" I noted to her that a new order will  Be needed when he is willing to get it completed.

## 2017-12-05 NOTE — PROGRESS NOTES
Problem: Altered Thought Process (Adult/Pediatric)  Goal: *STG: Remains safe in hospital  Outcome: Progressing Towards Goal  Pt rested quietly in bed with eyes closed. No signs/symptoms of distress, agitation, or anxiety. Will monitor for changes. Q 15 minute checks continue.  Pt slept 7hrs

## 2017-12-06 PROCEDURE — 65220000001 HC RM PRIVATE PSYCH

## 2017-12-06 PROCEDURE — 74011250637 HC RX REV CODE- 250/637: Performed by: PSYCHIATRY & NEUROLOGY

## 2017-12-06 RX ORDER — OLANZAPINE 5 MG/1
5 TABLET, ORALLY DISINTEGRATING ORAL DAILY
Status: DISCONTINUED | OUTPATIENT
Start: 2017-12-07 | End: 2017-12-13

## 2017-12-06 RX ORDER — OLANZAPINE 5 MG/1
20 TABLET, ORALLY DISINTEGRATING ORAL
Status: DISCONTINUED | OUTPATIENT
Start: 2017-12-06 | End: 2017-12-15

## 2017-12-06 RX ADMIN — OLANZAPINE 5 MG: 5 TABLET, FILM COATED ORAL at 08:58

## 2017-12-06 RX ADMIN — DIVALPROEX SODIUM 1500 MG: 500 TABLET, FILM COATED, EXTENDED RELEASE ORAL at 21:24

## 2017-12-06 RX ADMIN — BENZTROPINE MESYLATE 2 MG: 2 TABLET ORAL at 21:25

## 2017-12-06 RX ADMIN — OLANZAPINE 20 MG: 5 TABLET, ORALLY DISINTEGRATING ORAL at 21:25

## 2017-12-06 NOTE — BH NOTES
PSYCHIATRIC PROGRESS NOTE         Patient Name  Cecille Jain   Date of Birth 1970   [facility] [id number]   Medical Record Number  [id number]      Age  49 y.o. PCP PROVIDER UNKNOWN   Admit date:  11/25/2017    Room Number  307/01  @ [facility]   Date of Service  12/5/2017           E & M PROGRESS NOTE:         HISTORY       CC:  \"selectively mute\"  HISTORY OF PRESENT ILLNESS/INTERVAL HISTORY:  (reviewed/updated 12/5/2017). per initial evaluation: The patient, Cecille Jain, is a 49 y.o. WHITE [place]  male with a past psychiatric history significant for schizophrenia, who presents at this time with complaints of (and/or evidence of) the following emotional symptoms: psychotic behavior. Additional symptomatology include paranoid delusion, sexually inappropriate behavior, pepisodes of yelling screaming followed by selectively mute, disorganized thought process, anxiety, concern about health problems, difficulty sleeping, fearfulness, hearing voices, increased irritability, poor concentration and problem with medication. The above symptoms have been present for many years. These symptoms are of severe severity. These symptoms are constant  in nature. The patient's condition has been precipitated by and psychosocial stressors (conflict with sister, non compliance ). Patient's condition made worse by treatment noncompliance. UDS: negative; BAL=0. Cecille Jain presents/reports/evidences the following emotional symptoms today, 12/5/2017:psychotic behavior. The above symptoms have been present for many years. These symptoms are of severe severity. The symptoms are constant in nature. Additional symptomatology and features include     12/1- minimal change, poor hygiene. Accepting med but refusing most of his labs. Sl better affect and more social  12/2-Remains confined to his room mostly and voluntarily. Paces in his room. No eye contact. Poor hygiene and disorganized room.  No prn's used.  12/3- Slept 7 hours. Mostly isolates in his room, frequently pacing, minimal socialization, selectively mute, poor self care. No prn's needed. Compliant with meds. 12/4- isolative, poor hygiene and poor social interaction. Negative sx+. Refusing labs and EKG, soft speech, delusional and disorganized  12/5- minimal change- sig negative sx, poor hygiene, refusing labs, disorganized and isaolative      SIDE EFFECTS: (reviewed/updated 12/5/2017)  None reported or admitted to. No noted toxicity with use of current med   ALLERGIES:(reviewed/updated 12/5/2017)  Allergies   Allergen Reactions    Fluphenazine Unknown (comments)     Pt is unable to communicate properly.  Penicillins Unknown (comments)     Pt is unable to communicate properly. MEDICATIONS PRIOR TO ADMISSION:(reviewed/updated 12/5/2017)  Prescriptions Prior to Admission   Medication Sig    divalproex ER (DEPAKOTE ER) 500 mg ER tablet Take 1,500 mg by mouth nightly. Indications: Treatment-resistant schizophrenia    haloperidol decanoate (HALDOL DECANOATE) 100 mg/mL injection 2 mL by IntraMUSCular route every twenty-eight (28) days. Indications: SCHIZOPHRENIA    benztropine (COGENTIN) 2 mg tablet Take 1 Tab by mouth nightly. Indications: extrapyramidal disease    cloZAPine 200 mg tablet Take 600 mg by mouth nightly. Indications: TREATMENT-RESISTANT SCHIZOPHRENIA      PAST MEDICAL HISTORY: Past medical history from the initial psychiatric evaluation has been reviewed (reviewed/updated 12/5/2017) with no additional updates (I asked patient and no additional past medical history provided). Past Medical History:   Diagnosis Date    Psychiatric disorder     Psychotic disorder     Withdrawal syndrome ([facility] [address]    No past surgical history on file.    SOCIAL HISTORY: Social history from the initial psychiatric evaluation has been reviewed (reviewed/updated 12/5/2017) with no additional updates (I asked patient and no additional social history provided). Social History     Social History    Marital status: SINGLE     Spouse name: N/A    Number of children: N/A    Years of education: N/A     Occupational History    Not on file. 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 He lives with his sister. On disability    No pending legal charge reported      FAMILY HISTORY: Family history from the initial psychiatric evaluation has been reviewed (reviewed/updated 12/5/2017) with no additional updates (I asked patient and no additional family history provided). No family history on file. REVIEW OF SYSTEMS: (reviewed/updated 12/5/2017)  Appetite:no change from normal   Sleep: fitful   All other Review of Systems: Psychological ROS: positive for - behavioral disorder, concentration difficulties, hallucinations, irritability, mood swings and delusion  Respiratory ROS: no cough, shortness of breath, or wheezing  Cardiovascular ROS: no chest pain or dyspnea on exertion         [facility] ([facility]    [facility] FINDINGS ARE WITHIN NORMAL LIMITS (WNL) UNLESS OTHERWISE STATED BELOW. ( ALL OF THE BELOW CATEGORIES OF THE MSE HAVE BEEN REVIEWED (reviewed 12/5/2017) AND UPDATED AS DEEMED APPROPRIATE )  General Presentation age appropriate and disheveled, uncooperative and unreliable   Orientation disorganized   Vital Signs  See below (reviewed 12/5/2017); Vital Signs (BP, Pulse, & Temp) are within normal limits if not listed below.    Gait and Station Stable/steady, no ataxia   Musculoskeletal System No extrapyramidal symptoms (EPS); no abnormal muscular movements or Tardive Dyskinesia (TD); muscle strength and tone are within normal limits   Language No aphasia or dysarthria   Speech:  mute   Thought Processes illogical; slow rate of thoughts; poor abstract reasoning/computation   Thought Associations blocked    Thought Content paranoid delusions, bizarre delusions, auditory hallucinations and internally preoccupied   Suicidal Ideations none   Homicidal Ideations none   Mood:  Labile mood   Affect:  Constricted, labile inappropriate and increased in intensity   Memory recent  impaired   Memory remote:  intact   Concentration/Attention:  distractable and hypervigilance   Fund of Knowledge average   Insight:  limited   Reliability poor   Judgment:  limited          VITALS:     Patient Vitals for the past 24 hrs:   Temp Pulse Resp BP   12/05/17 1600 98.7 °F (37.1 °C) 78 28 92/62     Wt Readings from Last 3 Encounters:   03/25/17 95.6 kg (210 lb 11.2 oz)   02/09/15 97.5 kg (215 lb)   02/07/15 97.5 kg (215 lb)     Temp Readings from Last 3 Encounters:   12/05/17 98.7 °F (37.1 °C)   03/28/17 97.5 °F (36.4 °C)   02/07/15 98.7 °F (37.1 °C)     BP Readings from Last 3 Encounters:   12/05/17 92/62   03/28/17 100/68   02/12/15 105/73     Pulse Readings from Last 3 Encounters:   12/05/17 78   03/28/17 67   02/12/15 87            DATA     LABORATORY DATA:(reviewed/updated 12/5/2017)  No results found for this or any previous visit (from the past 24 hour(s)). Lab Results   Component Value Date/Time    Valproic acid 89 03/28/2017 10:35 AM     No results found for: LITH   RADIOLOGY REPORTS:(reviewed/updated 12/5/2017)  No results found. MEDICATIONS     ALL MEDICATIONS:      SCHEDULED MEDICATIONS:          ASSESSMENT & PLAN     DIAGNOSES REQUIRING ACTIVE TREATMENT AND MONITORING: (reviewed/updated 12/5/2017)  Patient Active Hospital Problem List:   Paranoid schizophrenia ([facility] [address] (3/15/2017)- tx resistant    Assessment: no sig change - psychotic, -ve sx, poor insight      Plan: I will ct to adjust med- ct to titrate Depakote- encourage compliance with Labs.  Ct Haldol dec and Zyprexa dose titrated, consider augmentation with Topamax vs AD    Patient is on two antipsychotics now due to the relative refractoriness to treatment thus far. Prior to admission patient had THREE or more failed trails of monotherapy, including: Haldol, Zyprexa, Risperdal and Clozapine. The patient is a very slow responder to medications. Risks and benefits in the use of two antipsychotics have been weighed in full, including the risk of metabolic syndrome and the potential increased risk of QTc  Based on this analysis, it is considered favorable for the utilization of two antipsychotics. In the future, once stable on the two antipsychotics, patient can possibly be tapered to one of the chosen antipsychotics. Will check on EKG results today to monitor QTc.- refusing EKG    Non compliance with tx- request court order med. - granted        I will continue to monitor blood levels (Depakote,, clozapine---a drug with a narrow therapeutic index= NTI) and associated labs for drug therapy implemented that require intense monitoring for toxicity as deemed appropriate based on current medication side effects and pharmacodynamically determined drug 1/2 lives. In summary, Petra Wray, is a 49 y.o.  male who presents with a severe exacerbation of the principal diagnosis of Paranoid schizophrenia ([facility] [address]  Patient's condition is worsening/not improving/not stable . Patient requires continued inpatient hospitalization for further stabilization, safety monitoring and medication management. I will continue to coordinate the provision of individual, milieu, occupational, group, and substance abuse therapies to address target symptoms/diagnoses as deemed appropriate for the individual patient. A coordinated, multidisplinary treatment team round was conducted with the patient (this team consists of the nurse, psychiatric unit pharmcist,  and writer).      Complete current electronic health record for patient has been reviewed today including consultant notes, ancillary staff notes, nurses and psychiatric tech notes. Suicide risk assessment completed and patient deemed to be of low risk for suicide at this time. The following regarding medications was addressed during rounds with patient:   the risks and benefits of the proposed medication. The patient was given the opportunity to ask questions. Informed consent given to the use of the above medications. Will continue to adjust psychiatric and non-psychiatric medications (see above \"medication\" section and orders section for details) as deemed appropriate & based upon diagnoses and response to treatment. I will continue to order blood tests/labs and diagnostic tests as deemed appropriate and review results as they become available (see orders for details and above listed lab/test results). I will order psychiatric records from previous [facility] hospitals to further elucidate the nature of patient's psychopathology and review once available. I will gather additional collateral information from friends, family and o/p treatment team to further elucidate the nature of patient's psychopathology and baselline level of psychiatric functioning. I certify that this patient's inpatient psychiatric hospital services furnished since the previous certification were, and continue to be, required for treatment that could reasonably be expected to improve the patient's condition, or for diagnostic study, and that the patient continues to need, on a daily basis, active treatment furnished directly by or requiring the supervision of inpatient psychiatric facility personnel. In addition, the hospital records show that services furnished were intensive treatment services, admission or related services, or equivalent services.     EXPECTED DISCHARGE DATE/DAY: TBD     DISPOSITION: Home       Signed By:   Naveen Arriaza MD  12/5/2017

## 2017-12-06 NOTE — BH NOTES
PSYCHIATRIC PROGRESS NOTE         Patient Name  Conor Cabrera   Date of Birth 1970   [facility] [id number]   Medical Record Number  [id number]      Age  49 y.o. PCP PROVIDER UNKNOWN   Admit date:  11/25/2017    Room Number  307/01  @ [facility]   Date of Service  12/6/2017           E & M PROGRESS NOTE:         HISTORY       CC:  \"selectively mute\"  HISTORY OF PRESENT ILLNESS/INTERVAL HISTORY:  (reviewed/updated 12/6/2017). per initial evaluation: The patient, Conor Cabrera, is a 49 y.o. WHITE [place]  male with a past psychiatric history significant for schizophrenia, who presents at this time with complaints of (and/or evidence of) the following emotional symptoms: psychotic behavior. Additional symptomatology include paranoid delusion, sexually inappropriate behavior, pepisodes of yelling screaming followed by selectively mute, disorganized thought process, anxiety, concern about health problems, difficulty sleeping, fearfulness, hearing voices, increased irritability, poor concentration and problem with medication. The above symptoms have been present for many years. These symptoms are of severe severity. These symptoms are constant  in nature. The patient's condition has been precipitated by and psychosocial stressors (conflict with sister, non compliance ). Patient's condition made worse by treatment noncompliance. UDS: negative; BAL=0. Conor Cabrera presents/reports/evidences the following emotional symptoms today, 12/6/2017:psychotic behavior. The above symptoms have been present for many years. These symptoms are of severe severity. The symptoms are constant in nature. Additional symptomatology and features include     12/1- minimal change, poor hygiene. Accepting med but refusing most of his labs. Sl better affect and more social  12/2-Remains confined to his room mostly and voluntarily. Paces in his room. No eye contact. Poor hygiene and disorganized room.  No prn's used.  12/3- Slept 7 hours. Mostly isolates in his room, frequently pacing, minimal socialization, selectively mute, poor self care. No prn's needed. Compliant with meds. 12/4- isolative, poor hygiene and poor social interaction. Negative sx+. Refusing labs and EKG, soft speech, delusional and disorganized  12/5- minimal change- sig negative sx, poor hygiene, refusing labs, disorganized and isolative  12/6- no sig change-negative sx, refusing labs, concern about cheeking behavior. paranoid      SIDE EFFECTS: (reviewed/updated 12/6/2017)  None reported or admitted to. No noted toxicity with use of current med   ALLERGIES:(reviewed/updated 12/6/2017)  Allergies   Allergen Reactions    Fluphenazine Unknown (comments)     Pt is unable to communicate properly.  Penicillins Unknown (comments)     Pt is unable to communicate properly. MEDICATIONS PRIOR TO ADMISSION:(reviewed/updated 12/6/2017)  Prescriptions Prior to Admission   Medication Sig    divalproex ER (DEPAKOTE ER) 500 mg ER tablet Take 1,500 mg by mouth nightly. Indications: Treatment-resistant schizophrenia    haloperidol decanoate (HALDOL DECANOATE) 100 mg/mL injection 2 mL by IntraMUSCular route every twenty-eight (28) days. Indications: SCHIZOPHRENIA    benztropine (COGENTIN) 2 mg tablet Take 1 Tab by mouth nightly. Indications: extrapyramidal disease    cloZAPine 200 mg tablet Take 600 mg by mouth nightly. Indications: TREATMENT-RESISTANT SCHIZOPHRENIA      PAST MEDICAL HISTORY: Past medical history from the initial psychiatric evaluation has been reviewed (reviewed/updated 12/6/2017) with no additional updates (I asked patient and no additional past medical history provided). Past Medical History:   Diagnosis Date    Psychiatric disorder     Psychotic disorder     Withdrawal syndrome ([facility] [address]    No past surgical history on file.    SOCIAL HISTORY: Social history from the initial psychiatric evaluation has been reviewed (reviewed/updated 12/6/2017) with no additional updates (I asked patient and no additional social history provided). Social History     Social History    Marital status: SINGLE     Spouse name: N/A    Number of children: N/A    Years of education: N/A     Occupational History    Not on file. 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 He lives with his sister. On disability    No pending legal charge reported      FAMILY HISTORY: Family history from the initial psychiatric evaluation has been reviewed (reviewed/updated 12/6/2017) with no additional updates (I asked patient and no additional family history provided). No family history on file. REVIEW OF SYSTEMS: (reviewed/updated 12/6/2017)  Appetite:no change from normal   Sleep: fitful   All other Review of Systems: Psychological ROS: positive for - behavioral disorder, concentration difficulties, hallucinations, irritability, mood swings and delusion  Respiratory ROS: no cough, shortness of breath, or wheezing  Cardiovascular ROS: no chest pain or dyspnea on exertion         [facility]    [facility] FINDINGS ARE WITHIN NORMAL LIMITS (WNL) UNLESS OTHERWISE STATED BELOW. ( ALL OF THE BELOW CATEGORIES OF THE MSE HAVE BEEN REVIEWED (reviewed 12/6/2017) AND UPDATED AS DEEMED APPROPRIATE )  General Presentation age appropriate and disheveled, uncooperative and unreliable   Orientation disorganized   Vital Signs  See below (reviewed 12/6/2017); Vital Signs (BP, Pulse, & Temp) are within normal limits if not listed below.    Gait and Station Stable/steady, no ataxia   Musculoskeletal System No extrapyramidal symptoms (EPS); no abnormal muscular movements or Tardive Dyskinesia (TD); muscle strength and tone are within normal limits   Language No aphasia or dysarthria   Speech:  mute   Thought Processes illogical; slow rate of thoughts; poor abstract reasoning/computation   Thought Associations blocked    Thought Content paranoid delusions, bizarre delusions, auditory hallucinations and internally preoccupied   Suicidal Ideations none   Homicidal Ideations none   Mood:  Labile mood   Affect:  Constricted, labile inappropriate and increased in intensity   Memory recent  impaired   Memory remote:  intact   Concentration/Attention:  distractable and hypervigilance   Fund of Knowledge average   Insight:  limited   Reliability poor   Judgment:  limited          VITALS:     Patient Vitals for the past 24 hrs:   Temp Pulse Resp BP   12/05/17 2119 - - 22 -   12/05/17 1600 98.7 °F (37.1 °C) 78 28 92/62     Wt Readings from Last 3 Encounters:   03/25/17 95.6 kg (210 lb 11.2 oz)   02/09/15 97.5 kg (215 lb)   02/07/15 97.5 kg (215 lb)     Temp Readings from Last 3 Encounters:   03/28/17 97.5 °F (36.4 °C)   02/07/15 98.7 °F (37.1 °C)     BP Readings from Last 3 Encounters:   12/05/17 92/62   03/28/17 100/68   02/12/15 105/73     Pulse Readings from Last 3 Encounters:   12/05/17 78   03/28/17 67   02/12/15 87            DATA     LABORATORY DATA:(reviewed/updated 12/6/2017)  No results found for this or any previous visit (from the past 24 hour(s)). Lab Results   Component Value Date/Time    Valproic acid 89 03/28/2017 10:35 AM     No results found for: LITHM   RADIOLOGY REPORTS:(reviewed/updated 12/6/2017)  No results found.        MEDICATIONS     ALL MEDICATIONS:      SCHEDULED MEDICATIONS:          ASSESSMENT & PLAN     DIAGNOSES REQUIRING ACTIVE TREATMENT AND MONITORING: (reviewed/updated 12/6/2017)  Patient Active Hospital Problem List:   Paranoid schizophrenia ([facility] [address] (3/15/2017)- tx resistant    Assessment: no sig change - psychotic, -ve sx, poor insight      Plan: I will ct to adjust med- ct to titrate Depakote- encourage compliance with Labs. Change Zyprexa to Zydis- ?cheeking  Ct Haldol dec and Zyprexa dose titrated, consider augmentation with Topamax vs AD    Patient is on two antipsychotics now due to the relative refractoriness to treatment thus far. Prior to admission patient had THREE or more failed trails of monotherapy, including: Haldol, Zyprexa, Risperdal and Clozapine. The patient is a very slow responder to medications. Risks and benefits in the use of two antipsychotics have been weighed in full, including the risk of metabolic syndrome and the potential increased risk of QTc  Based on this analysis, it is considered favorable for the utilization of two antipsychotics. In the future, once stable on the two antipsychotics, patient can possibly be tapered to one of the chosen antipsychotics. Will check on EKG results today to monitor QTc.- refusing EKG    Non compliance with tx- request court order med. - granted        I will continue to monitor blood levels (Depakote,, clozapine---a drug with a narrow therapeutic index= NTI) and associated labs for drug therapy implemented that require intense monitoring for toxicity as deemed appropriate based on current medication side effects and pharmacodynamically determined drug 1/2 lives. In summary, Luiza Pablo, is a 49 y.o.  male who presents with a severe exacerbation of the principal diagnosis of Paranoid schizophrenia ([address]  Patient's condition is worsening/not improving/not stable . Patient requires continued inpatient hospitalization for further stabilization, safety monitoring and medication management. I will continue to coordinate the provision of individual, milieu, occupational, group, and substance abuse therapies to address target symptoms/diagnoses as deemed appropriate for the individual patient.   A coordinated, multidisplinary treatment team round was conducted with the patient (this team consists of the nurse, psychiatric unit pharmcist,  and writer). Complete current electronic health record for patient has been reviewed today including consultant notes, ancillary staff notes, nurses and psychiatric tech notes. Suicide risk assessment completed and patient deemed to be of low risk for suicide at this time. The following regarding medications was addressed during rounds with patient:   the risks and benefits of the proposed medication. The patient was given the opportunity to ask questions. Informed consent given to the use of the above medications. Will continue to adjust psychiatric and non-psychiatric medications (see above \"medication\" section and orders section for details) as deemed appropriate & based upon diagnoses and response to treatment. I will continue to order blood tests/labs and diagnostic tests as deemed appropriate and review results as they become available (see orders for details and above listed lab/test results). I will order psychiatric records from previous [facility] hospitals to further elucidate the nature of patient's psychopathology and review once available. I will gather additional collateral information from friends, family and o/p treatment team to further elucidate the nature of patient's psychopathology and baselline level of psychiatric functioning. I certify that this patient's inpatient psychiatric hospital services furnished since the previous certification were, and continue to be, required for treatment that could reasonably be expected to improve the patient's condition, or for diagnostic study, and that the patient continues to need, on a daily basis, active treatment furnished directly by or requiring the supervision of inpatient psychiatric facility personnel. In addition, the hospital records show that services furnished were intensive treatment services, admission or related services, or equivalent services.     EXPECTED DISCHARGE DATE/DAY: TBD     DISPOSITION: Home       Signed By: Moo Simon MD  12/6/2017

## 2017-12-06 NOTE — PROGRESS NOTES
Problem: Altered Thought Process (Adult/Pediatric)  Goal: *STG: Participates in treatment plan  [address] [facility] 60  Master Treatment Plan for Arpita Baumann    Date Treatment Plan Initiated: 11/25/17    Treatment Plan Modalities:    Type of Modality Amount(x minutes) Frequency (x/week) Duration (x days) Name of Responsible Staff    [address] [facility] meetings to encourage peer interactions   x60 minutes, 14x/week, x7 days                                                                                                     Ezequiel Hamman, BHT       Group psychotherapy to assist in building coping skills and internal controls        Therapeutic activity groups to build coping skills  x60 minutes, 7x/week, x 7 days                                                                                Cindy Kendall        Psychoeducation in group setting to address:    Medication education        Coping skills        Relaxation techniques        Symptom management        Discharge planning        Spirituality         CHARLIE        Recovery/AA/NA        Physician medication management  7x/week          X 7days                                              Dr. Neelam Olvera        Family meeting/discharge planning                                     Outcome: Not Progressing Towards Goal  Pt rested quietly in bed with eyes closed. No signs/symptoms of distress, agitation, or anxiety. Will monitor for changes. Q 15 minute checks continue. Pt slept 7 hrs. He refused to have his blood work drawn this am . When he was asked he replied with \" get away. [place] get away. [place] \"

## 2017-12-06 NOTE — BH NOTES
GROUP THERAPY PROGRESS NOTE    Ene Marquez is participating in [place].      Group time: 30 minutes    Personal goal for participation: daily orientation    Goal orientation: personal    Group therapy participation: active    Therapeutic interventions reviewed and discussed: yes    Impression of participation: ok

## 2017-12-06 NOTE — PROGRESS NOTES
Problem: Altered Thought Process (Adult/Pediatric)  Goal: *STG: Complies with medication therapy  Outcome: Progressing Towards Goal  Patient continues to isolates self to his room, He remains disorganized and selectively mute when approached. He did accept his HS medication with sip of water but refused to let staff do his mouth check if he swallowed pills. Compliant with meals. Denies any pain or discomfort. Continue to monitor the patient and assess needs.

## 2017-12-06 NOTE — PROGRESS NOTES
Problem: Altered Thought Process (Adult/Pediatric)  Goal: *STG: Remains safe in hospital  Outcome: Progressing Towards Goal  Patient remains isolative. Non complaint with labs. Meal complaint. Patient continues to have poor hygiene. encouraged to attend groups and shower. Will continue to monitor patient and assess needs.

## 2017-12-06 NOTE — BH NOTES
BEHAVIORAL HEALTH RESTRAINT/SECLUSION INITIAL ASSESSMENT      1. Assessment of High Risk factors: Preexisting medical conditions that places patient at greater risk when placed in restraints are as follows: {[facility] PREEXISITNG MEDICAL CONDITIONS THAT PLACES PATIENT AT GREATER RISK WHEN PLACED IN RESTRAINTS ARE:19738}    2. Preexisting history of psychological conditions that place patient at greater risk when placed in restraints: {Preexisting history of psychological conditions that places patient at greater risk when placed in restraints:19739}    3. Current behavior/mental status: {[facility] CURRENT BEHAVIOR/MENTAL STATUS:19731}    4. Initial use of restraint for this patient is justified by: {[facility] INITIAL USE OF RESTRAINT FOR THIS PATIENT IS JUSTIFIED BY:19740}    5. Least restrictive tools/methods (Check all that apply): {[facility] LEAST RESTRICTIVE TOOLS/METHODS (CHECK ALL THAT APPLY):19741}    6. Criteria for release: {CRITERIA FOR RELEASE:19734}    7. Treatment plan revisions to assist the patient in regaining control: {TREATMENT PLAN REVISIONS TO ASSIST THE PATIENT IN REGAINING CONTROL:19737}    8. Patient consented to family involvement in restraint/seclusion process: {[facility]37[facility]}    9. Personal safety search completed: {[facility] YES/NO:69964}    10.  Vital Signs:         B/P:         Pulse:         Respirations:         Temperature:        MD/RN Signature:_________________________________  Date:_____________

## 2017-12-07 PROCEDURE — 74011250637 HC RX REV CODE- 250/637: Performed by: PSYCHIATRY & NEUROLOGY

## 2017-12-07 PROCEDURE — 65220000001 HC RM PRIVATE PSYCH

## 2017-12-07 RX ORDER — FLUOXETINE HYDROCHLORIDE 20 MG/1
20 CAPSULE ORAL DAILY
Status: DISCONTINUED | OUTPATIENT
Start: 2017-12-07 | End: 2017-12-11

## 2017-12-07 RX ADMIN — DIVALPROEX SODIUM 1500 MG: 500 TABLET, FILM COATED, EXTENDED RELEASE ORAL at 21:11

## 2017-12-07 RX ADMIN — BENZTROPINE MESYLATE 2 MG: 2 TABLET ORAL at 21:14

## 2017-12-07 RX ADMIN — FLUOXETINE 20 MG: 20 CAPSULE ORAL at 11:51

## 2017-12-07 RX ADMIN — OLANZAPINE 5 MG: 5 TABLET, ORALLY DISINTEGRATING ORAL at 08:16

## 2017-12-07 RX ADMIN — OLANZAPINE 20 MG: 5 TABLET, ORALLY DISINTEGRATING ORAL at 21:12

## 2017-12-07 NOTE — BH NOTES
Pt was seen in treatment team this morning.  Pt denies SI/HI.  Pt's mood is anxious, affect is labile. Speech is soft. Pt's thought process is preoccupied with delusional themes. Pt came out of room to eat breakfast in dayroom.  Pt's hygiene is poor and remains isolative.  Pt has been medication compliant but continues to refuse blood work.

## 2017-12-07 NOTE — BH NOTES
PSYCHIATRIC PROGRESS NOTE         Patient Name  Paul Correa   Date of Birth 1970   [facility] [id number]   Medical Record Number  [id number]      Age  49 y.o. PCP PROVIDER UNKNOWN   Admit date:  11/25/2017    Room Number  307/01  @ [facility] [place]   Date of Service  12/7/2017           E & M PROGRESS NOTE:         HISTORY       CC:  \"selectively mute\"  HISTORY OF PRESENT ILLNESS/INTERVAL HISTORY:  (reviewed/updated 12/7/2017). per initial evaluation: The patient, Paul Correa, is a 49 y.o. WHITE [place]  male with a past psychiatric history significant for schizophrenia, who presents at this time with complaints of (and/or evidence of) the following emotional symptoms: psychotic behavior. Additional symptomatology include paranoid delusion, sexually inappropriate behavior, pepisodes of yelling screaming followed by selectively mute, disorganized thought process, anxiety, concern about health problems, difficulty sleeping, fearfulness, hearing voices, increased irritability, poor concentration and problem with medication. The above symptoms have been present for many years. These symptoms are of severe severity. These symptoms are constant  in nature. The patient's condition has been precipitated by and psychosocial stressors (conflict with sister, non compliance ). Patient's condition made worse by treatment noncompliance. UDS: negative; BAL=0. Paul Correa presents/reports/evidences the following emotional symptoms today, 12/7/2017:psychotic behavior. The above symptoms have been present for many years. These symptoms are of severe severity. The symptoms are constant in nature. Additional symptomatology and features include     12/1- minimal change, poor hygiene. Accepting med but refusing most of his labs. Sl better affect and more social  12/2-Remains confined to his room mostly and voluntarily. Paces in his room. No eye contact. Poor hygiene and disorganized room.  No prn's used.  12/3- Slept 7 hours. Mostly isolates in his room, frequently pacing, minimal socialization, selectively mute, poor self care. No prn's needed. Compliant with meds. 12/4- isolative, poor hygiene and poor social interaction. Negative sx+. Refusing labs and EKG, soft speech, delusional and disorganized  12/5- minimal change- sig negative sx, poor hygiene, refusing labs, disorganized and isolative  12/6- no sig change-negative sx, refusing labs, concern about cheeking behavior. Paranoid  12/7- minimal change- came out to the day room for his breakfast. Pt curled up in his bed and selectively mute. Eyes closed even though he was seen opening his eyes few minutes ago. passive      SIDE EFFECTS: (reviewed/updated 12/7/2017)  None reported or admitted to. No noted toxicity with use of current med   ALLERGIES:(reviewed/updated 12/7/2017)  Allergies   Allergen Reactions    Fluphenazine Unknown (comments)     Pt is unable to communicate properly.  Penicillins Unknown (comments)     Pt is unable to communicate properly. MEDICATIONS PRIOR TO ADMISSION:(reviewed/updated 12/7/2017)  Prescriptions Prior to Admission   Medication Sig    divalproex ER (DEPAKOTE ER) 500 mg ER tablet Take 1,500 mg by mouth nightly. Indications: Treatment-resistant schizophrenia    haloperidol decanoate (HALDOL DECANOATE) 100 mg/mL injection 2 mL by IntraMUSCular route every twenty-eight (28) days. Indications: SCHIZOPHRENIA    benztropine (COGENTIN) 2 mg tablet Take 1 Tab by mouth nightly. Indications: extrapyramidal disease    cloZAPine 200 mg tablet Take 600 mg by mouth nightly. Indications: TREATMENT-RESISTANT SCHIZOPHRENIA      PAST MEDICAL HISTORY: Past medical history from the initial psychiatric evaluation has been reviewed (reviewed/updated 12/7/2017) with no additional updates (I asked patient and no additional past medical history provided).    Past Medical History:   Diagnosis Date    Psychiatric disorder     Psychotic disorder     Withdrawal syndrome ([facility] [address]    No past surgical history on file. SOCIAL HISTORY: Social history from the initial psychiatric evaluation has been reviewed (reviewed/updated 12/7/2017) with no additional updates (I asked patient and no additional social history provided). Social History     Social History    Marital status: SINGLE     Spouse name: N/A    Number of children: N/A    Years of education: N/A     Occupational History    Not on file. 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 He lives with his sister. On disability    No pending legal charge reported      FAMILY HISTORY: Family history from the initial psychiatric evaluation has been reviewed (reviewed/updated 12/7/2017) with no additional updates (I asked patient and no additional family history provided). No family history on file. REVIEW OF SYSTEMS: (reviewed/updated 12/7/2017)  Appetite:no change from normal   Sleep: fitful   All other Review of Systems: Psychological ROS: positive for - behavioral disorder, concentration difficulties, hallucinations, irritability, mood swings and delusion  Respiratory ROS: no cough, shortness of breath, or wheezing  Cardiovascular ROS: no chest pain or dyspnea on exertion         [address] [facility] (MSE):    MSE FINDINGS ARE WITHIN NORMAL LIMITS (WNL) UNLESS OTHERWISE STATED BELOW. ( ALL OF THE BELOW CATEGORIES OF THE MSE HAVE BEEN REVIEWED (reviewed 12/7/2017) AND UPDATED AS DEEMED APPROPRIATE )  General Presentation age appropriate and disheveled, uncooperative and unreliable   Orientation disorganized   Vital Signs  See below (reviewed 12/7/2017); Vital Signs (BP, Pulse, & Temp) are within normal limits if not listed below.    Gait and Station Stable/steady, no ataxia   Musculoskeletal System No extrapyramidal symptoms (EPS); no abnormal muscular movements or Tardive Dyskinesia (TD); muscle strength and tone are within normal limits   Language No aphasia or dysarthria   Speech:  mute   Thought Processes illogical; slow rate of thoughts; poor abstract reasoning/computation   Thought Associations blocked    Thought Content paranoid delusions, bizarre delusions, auditory hallucinations and internally preoccupied   Suicidal Ideations none   Homicidal Ideations none   Mood:  Labile mood   Affect:  Constricted, labile inappropriate and increased in intensity   Memory recent  impaired   Memory remote:  intact   Concentration/Attention:  distractable and hypervigilance   Fund of Knowledge average   Insight:  limited   Reliability poor   Judgment:  limited          VITALS:     No data found. Wt Readings from Last 3 Encounters:   03/25/17 95.6 kg (210 lb 11.2 oz)   02/09/15 97.5 kg (215 lb)   02/07/15 97.5 kg (215 lb)     Temp Readings from Last 3 Encounters:   03/28/17 97.5 °F (36.4 °C)   02/07/15 98.7 °F (37.1 °C)     BP Readings from Last 3 Encounters:   12/05/17 92/62   03/28/17 100/68   02/12/15 105/73     Pulse Readings from Last 3 Encounters:   12/05/17 78   03/28/17 67   02/12/15 87            DATA     LABORATORY DATA:(reviewed/updated 12/7/2017)  No results found for this or any previous visit (from the past 24 hour(s)). Lab Results   Component Value Date/Time    Valproic acid 89 03/28/2017 10:35 AM     No results found for: LITHM   RADIOLOGY REPORTS:(reviewed/updated 12/7/2017)  No results found. MEDICATIONS     ALL MEDICATIONS:      SCHEDULED MEDICATIONS:          ASSESSMENT & PLAN     DIAGNOSES REQUIRING ACTIVE TREATMENT AND MONITORING: (reviewed/updated 12/7/2017)  Patient Active Hospital Problem List:   Paranoid schizophrenia ([facility] [address] (3/15/2017)- tx resistant    Assessment: no sig change - psychotic, -ve sx, poor insight      Plan:  I will ct to adjust med- ct to titrate Depakote- encourage compliance with Labs. Change Zyprexa to Zydis- ?cheeking  Ct Haldol dec and Zyprexa dose titrated, will augment with Prozac    Patient is on two antipsychotics now due to the relative refractoriness to treatment thus far. Prior to admission patient had THREE or more failed trails of monotherapy, including: Haldol, Zyprexa, Risperdal and Clozapine. The patient is a very slow responder to medications. Risks and benefits in the use of two antipsychotics have been weighed in full, including the risk of metabolic syndrome and the potential increased risk of QTc  Based on this analysis, it is considered favorable for the utilization of two antipsychotics. In the future, once stable on the two antipsychotics, patient can possibly be tapered to one of the chosen antipsychotics. Will check on EKG results today to monitor QTc.- refusing EKG    Non compliance with tx- request court order med. - granted        I will continue to monitor blood levels (Depakote,, clozapine---a drug with a narrow therapeutic index= NTI) and associated labs for drug therapy implemented that require intense monitoring for toxicity as deemed appropriate based on current medication side effects and pharmacodynamically determined drug 1/2 lives. In summary, Betsy Hunter, is a 49 y.o.  male who presents with a severe exacerbation of the principal diagnosis of Paranoid schizophrenia ([facility] [address]  Patient's condition is worsening/not improving/not stable . Patient requires continued inpatient hospitalization for further stabilization, safety monitoring and medication management. I will continue to coordinate the provision of individual, milieu, occupational, group, and substance abuse therapies to address target symptoms/diagnoses as deemed appropriate for the individual patient.   A coordinated, multidisplinary treatment team round was conducted with the patient (this team consists of the nurse, psychiatric unit pharmcist,  and writer). Complete current electronic health record for patient has been reviewed today including consultant notes, ancillary staff notes, nurses and psychiatric tech notes. Suicide risk assessment completed and patient deemed to be of low risk for suicide at this time. The following regarding medications was addressed during rounds with patient:   the risks and benefits of the proposed medication. The patient was given the opportunity to ask questions. Informed consent given to the use of the above medications. Will continue to adjust psychiatric and non-psychiatric medications (see above \"medication\" section and orders section for details) as deemed appropriate & based upon diagnoses and response to treatment. I will continue to order blood tests/labs and diagnostic tests as deemed appropriate and review results as they become available (see orders for details and above listed lab/test results). I will order psychiatric records from previous [facility] hospitals to further elucidate the nature of patient's psychopathology and review once available. I will gather additional collateral information from friends, family and o/p treatment team to further elucidate the nature of patient's psychopathology and baselline level of psychiatric functioning. I certify that this patient's inpatient psychiatric hospital services furnished since the previous certification were, and continue to be, required for treatment that could reasonably be expected to improve the patient's condition, or for diagnostic study, and that the patient continues to need, on a daily basis, active treatment furnished directly by or requiring the supervision of inpatient psychiatric facility personnel. In addition, the hospital records show that services furnished were intensive treatment services, admission or related services, or equivalent services.     EXPECTED DISCHARGE DATE/DAY: TBD     DISPOSITION: Home       Signed By:   Cruz Cancino MD  12/7/2017

## 2017-12-07 NOTE — BH NOTES
GROUP THERAPY PROGRESS NOTE    The patient William Oviedo is participating in [employer]. Group time: 30 minutes    Personal goal for participation: to orient the patient to the unit.     Goal orientation: successful adoption of unit rules    Group therapy participation: minimal    Therapeutic interventions reviewed and discussed: Yes    Impression of participation: randy Fink  12/7/2017 12:55 PM

## 2017-12-07 NOTE — PROGRESS NOTES
Problem: Altered Thought Process (Adult/Pediatric)  Goal: *STG: Remains safe in hospital  Outcome: Progressing Towards Goal  Patient remains isolative to self. Medication and meal complaint. Patient need encouragement to attend groups. Poor hygiene. will continue to monitor patient and assess needs.

## 2017-12-07 NOTE — PROGRESS NOTES
Problem: Altered Thought Process (Adult/Pediatric)  Goal: *STG: Remains safe in hospital  Outcome: Not Progressing Towards Goal  Pt has been awake most of the shift. He was observed responding. He was seen in his room at one time naked in the middle of the room with the lights out. He continues to be mute and does not interact with others. He slept 3 hrs this shift.

## 2017-12-07 NOTE — PROGRESS NOTES
Problem: Altered Thought Process (Adult/Pediatric)  Goal: *STG: Remains safe in hospital  Client has been out for his meal this pm.  Does not inter act with peers or staff, but does follow routine. Noted last night to be pacing back and forth in front of the double doors to the unit. Does isolate to himself, hygiene remains poor. Q 15 min checks for safety continue.

## 2017-12-08 PROCEDURE — 74011250637 HC RX REV CODE- 250/637: Performed by: PSYCHIATRY & NEUROLOGY

## 2017-12-08 PROCEDURE — 65220000001 HC RM PRIVATE PSYCH

## 2017-12-08 RX ADMIN — BENZTROPINE MESYLATE 2 MG: 2 TABLET ORAL at 21:20

## 2017-12-08 RX ADMIN — DIVALPROEX SODIUM 1500 MG: 500 TABLET, FILM COATED, EXTENDED RELEASE ORAL at 21:20

## 2017-12-08 RX ADMIN — OLANZAPINE 5 MG: 5 TABLET, ORALLY DISINTEGRATING ORAL at 08:40

## 2017-12-08 RX ADMIN — OLANZAPINE 20 MG: 5 TABLET, ORALLY DISINTEGRATING ORAL at 21:21

## 2017-12-08 RX ADMIN — FLUOXETINE 20 MG: 20 CAPSULE ORAL at 08:40

## 2017-12-08 NOTE — BH NOTES
PSYCHIATRIC PROGRESS NOTE         Patient Name  Raquel Laurent   Date of Birth 1970   [facility] [id number]   Medical Record Number  [id number]      Age  49 y.o. PCP PROVIDER UNKNOWN   Admit date:  11/25/2017    Room Number  307/01  @ [facility]   Date of Service  12/8/2017           E & M PROGRESS NOTE:         HISTORY       CC:  \"selectively mute\"  HISTORY OF PRESENT ILLNESS/INTERVAL HISTORY:  (reviewed/updated 12/8/2017). per initial evaluation: The patient, Raquel Laurent, is a 49 y.o. WHITE [place]  male with a past psychiatric history significant for schizophrenia, who presents at this time with complaints of (and/or evidence of) the following emotional symptoms: psychotic behavior. Additional symptomatology include paranoid delusion, sexually inappropriate behavior, pepisodes of yelling screaming followed by selectively mute, disorganized thought process, anxiety, concern about health problems, difficulty sleeping, fearfulness, hearing voices, increased irritability, poor concentration and problem with medication. The above symptoms have been present for many years. These symptoms are of severe severity. These symptoms are constant  in nature. The patient's condition has been precipitated by and psychosocial stressors (conflict with sister, non compliance ). Patient's condition made worse by treatment noncompliance. UDS: negative; BAL=0. Raquel Laurent presents/reports/evidences the following emotional symptoms today, 12/8/2017:psychotic behavior. The above symptoms have been present for many years. These symptoms are of severe severity. The symptoms are constant in nature. Additional symptomatology and features include     12/1- minimal change, poor hygiene. Accepting med but refusing most of his labs. Sl better affect and more social  12/2-Remains confined to his room mostly and voluntarily. Paces in his room. No eye contact. Poor hygiene and disorganized room.  No prn's used.  12/3- Slept 7 hours. Mostly isolates in his room, frequently pacing, minimal socialization, selectively mute, poor self care. No prn's needed. Compliant with meds. 12/4- isolative, poor hygiene and poor social interaction. Negative sx+. Refusing labs and EKG, soft speech, delusional and disorganized  12/5- minimal change- sig negative sx, poor hygiene, refusing labs, disorganized and isolative  12/6- no sig change-negative sx, refusing labs, concern about cheeking behavior. Paranoid  12/7- minimal change- came out to the day room for his breakfast. Pt curled up in his bed and selectively mute. Eyes closed even though he was seen opening his eyes few minutes ago. Passive  12/8- minimal interaction but accepting med and meal. Pt remains disorganized and delusional. Talking in a soft voice, still refusing labs      SIDE EFFECTS: (reviewed/updated 12/8/2017)  None reported or admitted to. No noted toxicity with use of current med   ALLERGIES:(reviewed/updated 12/8/2017)  Allergies   Allergen Reactions    Fluphenazine Unknown (comments)     Pt is unable to communicate properly.  Penicillins Unknown (comments)     Pt is unable to communicate properly. MEDICATIONS PRIOR TO ADMISSION:(reviewed/updated 12/8/2017)  Prescriptions Prior to Admission   Medication Sig    divalproex ER (DEPAKOTE ER) 500 mg ER tablet Take 1,500 mg by mouth nightly. Indications: Treatment-resistant schizophrenia    haloperidol decanoate (HALDOL DECANOATE) 100 mg/mL injection 2 mL by IntraMUSCular route every twenty-eight (28) days. Indications: SCHIZOPHRENIA    benztropine (COGENTIN) 2 mg tablet Take 1 Tab by mouth nightly. Indications: extrapyramidal disease    cloZAPine 200 mg tablet Take 600 mg by mouth nightly.  Indications: TREATMENT-RESISTANT SCHIZOPHRENIA      PAST MEDICAL HISTORY: Past medical history from the initial psychiatric evaluation has been reviewed (reviewed/updated 12/8/2017) with no additional updates (I asked patient and no additional past medical history provided). Past Medical History:   Diagnosis Date    Psychiatric disorder     Psychotic disorder     Withdrawal syndrome ([facility] [address]    No past surgical history on file. SOCIAL HISTORY: Social history from the initial psychiatric evaluation has been reviewed (reviewed/updated 12/8/2017) with no additional updates (I asked patient and no additional social history provided). Social History     Social History    Marital status: SINGLE     Spouse name: N/A    Number of children: N/A    Years of education: N/A     Occupational History    Not on file. 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 He lives with his sister. On disability    No pending legal charge reported      FAMILY HISTORY: Family history from the initial psychiatric evaluation has been reviewed (reviewed/updated 12/8/2017) with no additional updates (I asked patient and no additional family history provided). No family history on file.     REVIEW OF SYSTEMS: (reviewed/updated 12/8/2017)  Appetite:no change from normal   Sleep: fitful   All other Review of Systems: Psychological ROS: positive for - behavioral disorder, concentration difficulties, hallucinations, irritability, mood swings and delusion  Respiratory ROS: no cough, shortness of breath, or wheezing  Cardiovascular ROS: no chest pain or dyspnea on exertion         [facility] (MSE):    MSE FINDINGS ARE WITHIN NORMAL LIMITS (WNL) UNLESS OTHERWISE STATED BELOW. ( ALL OF THE BELOW CATEGORIES OF THE MSE HAVE BEEN REVIEWED (reviewed 12/8/2017) AND UPDATED AS DEEMED APPROPRIATE )  General Presentation age appropriate and disheveled, uncooperative and unreliable   Orientation disorganized   Vital Signs  See below (reviewed 12/8/2017); Vital Signs (BP, Pulse, & Temp) are within normal limits if not listed below. Gait and Station Stable/steady, no ataxia   Musculoskeletal System No extrapyramidal symptoms (EPS); no abnormal muscular movements or Tardive Dyskinesia (TD); muscle strength and tone are within normal limits   Language No aphasia or dysarthria   Speech:  mute   Thought Processes illogical; slow rate of thoughts; poor abstract reasoning/computation   Thought Associations blocked    Thought Content paranoid delusions, bizarre delusions, auditory hallucinations and internally preoccupied   Suicidal Ideations none   Homicidal Ideations none   Mood:  Labile mood   Affect:  Constricted, labile inappropriate and increased in intensity   Memory recent  impaired   Memory remote:  intact   Concentration/Attention:  distractable and hypervigilance   Fund of Knowledge average   Insight:  limited   Reliability poor   Judgment:  limited          VITALS:     No data found. Wt Readings from Last 3 Encounters:   03/25/17 95.6 kg (210 lb 11.2 oz)   02/09/15 97.5 kg (215 lb)   02/07/15 97.5 kg (215 lb)     Temp Readings from Last 3 Encounters:   03/28/17 97.5 °F (36.4 °C)   02/07/15 98.7 °F (37.1 °C)     BP Readings from Last 3 Encounters:   12/05/17 92/62   03/28/17 100/68   02/12/15 105/73     Pulse Readings from Last 3 Encounters:   12/05/17 78   03/28/17 67   02/12/15 87            DATA     LABORATORY DATA:(reviewed/updated 12/8/2017)  No results found for this or any previous visit (from the past 24 hour(s)). Lab Results   Component Value Date/Time    Valproic acid 89 03/28/2017 10:35 AM     No results found for: LITHM   RADIOLOGY REPORTS:(reviewed/updated 12/8/2017)  No results found.        MEDICATIONS     ALL MEDICATIONS:      SCHEDULED MEDICATIONS:          ASSESSMENT & PLAN     DIAGNOSES REQUIRING ACTIVE TREATMENT AND MONITORING: (reviewed/updated 12/8/2017)  Patient C/Alaina Crespo [address] Problem List:   Paranoid schizophrenia ([facility] (3/15/2017)- tx resistant    Assessment: sl better- more social and accepting med - psychotic, -ve sx, poor insight      Plan: I will ct to adjust med- ct to titrate Depakote- encourage compliance with Labs. Change Zyprexa to Zydis- ?cheeking  Ct Haldol dec and Zyprexa dose titrated, will augment with Prozac and titrate    Patient is on two antipsychotics now due to the relative refractoriness to treatment thus far. Prior to admission patient had THREE or more failed trails of monotherapy, including: Haldol, Zyprexa, Risperdal and Clozapine. The patient is a very slow responder to medications. Risks and benefits in the use of two antipsychotics have been weighed in full, including the risk of metabolic syndrome and the potential increased risk of QTc  Based on this analysis, it is considered favorable for the utilization of two antipsychotics. In the future, once stable on the two antipsychotics, patient can possibly be tapered to one of the chosen antipsychotics. Will check on EKG results today to monitor QTc.- refusing EKG    Non compliance with tx- request court order med. - granted        I will continue to monitor blood levels (Depakote,, clozapine---a drug with a narrow therapeutic index= NTI) and associated labs for drug therapy implemented that require intense monitoring for toxicity as deemed appropriate based on current medication side effects and pharmacodynamically determined drug 1/2 lives. In summary, Marj Maria, is a 49 y.o.  male who presents with a severe exacerbation of the principal diagnosis of Paranoid schizophrenia ([facility]  Patient's condition is worsening/not improving/not stable . Patient requires continued inpatient hospitalization for further stabilization, safety monitoring and medication management.   I will continue to coordinate the provision of individual, milieu, occupational, group, and substance abuse therapies to address target symptoms/diagnoses as deemed appropriate for the individual patient. A coordinated, multidisplinary treatment team round was conducted with the patient (this team consists of the nurse, psychiatric unit pharmcist,  and writer). Complete current electronic health record for patient has been reviewed today including consultant notes, ancillary staff notes, nurses and psychiatric tech notes. Suicide risk assessment completed and patient deemed to be of low risk for suicide at this time. The following regarding medications was addressed during rounds with patient:   the risks and benefits of the proposed medication. The patient was given the opportunity to ask questions. Informed consent given to the use of the above medications. Will continue to adjust psychiatric and non-psychiatric medications (see above \"medication\" section and orders section for details) as deemed appropriate & based upon diagnoses and response to treatment. I will continue to order blood tests/labs and diagnostic tests as deemed appropriate and review results as they become available (see orders for details and above listed lab/test results). I will order psychiatric records from previous [facility] hospitals to further elucidate the nature of patient's psychopathology and review once available. I will gather additional collateral information from friends, family and o/p treatment team to further elucidate the nature of patient's psychopathology and baselline level of psychiatric functioning.          I certify that this patient's inpatient psychiatric hospital services furnished since the previous certification were, and continue to be, required for treatment that could reasonably be expected to improve the patient's condition, or for diagnostic study, and that the patient continues to need, on a daily basis, active treatment furnished directly by or requiring the supervision of inpatient psychiatric facility personnel. In addition, the hospital records show that services furnished were intensive treatment services, admission or related services, or equivalent services.     EXPECTED DISCHARGE DATE/DAY: TBD     DISPOSITION: Home       Signed By:   Adilson Tamayo MD  12/8/2017

## 2017-12-08 NOTE — PROGRESS NOTES
Problem: Altered Thought Process (Adult/Pediatric)  Goal: *STG: Complies with medication therapy  Outcome: Progressing Towards Goal  Pt is mostly isolative from milieu. Pt has no interactions except eating his meals and taking his medications. Pt is meds/meals compliant. Will continue to encourage spending time outside his room, monitor q 15 for safety, mood and behavior changes.

## 2017-12-08 NOTE — BH NOTES
Pt accepted his meals and HS medications without any problems. Paced in the hallway for sometime and returned to his room. Answered only \"Yes or no\" , otherwise no other communication observed.

## 2017-12-08 NOTE — PROGRESS NOTES
Problem: Altered Thought Process (Adult/Pediatric)  Goal: *STG: Remains safe in hospital  Client continues with same behaviors, client continues to isolate in his room. Comes out for meals, takes his meds and otherwise stays to himself. Seldom talks with staff or peers. Hygiene remains poor at best.  Q 15 min checks for safety continue.

## 2017-12-09 LAB
ALBUMIN SERPL-MCNC: 4.1 G/DL (ref 3.5–5)
ALBUMIN/GLOB SERPL: 0.9 {RATIO} (ref 1.1–2.2)
ALP SERPL-CCNC: 151 U/L (ref 45–117)
ALT SERPL-CCNC: 26 U/L (ref 12–78)
ANION GAP SERPL CALC-SCNC: 11 MMOL/L (ref 5–15)
AST SERPL-CCNC: 16 U/L (ref 15–37)
BILIRUB SERPL-MCNC: 0.5 MG/DL (ref 0.2–1)
BUN SERPL-MCNC: 14 MG/DL (ref 6–20)
BUN/CREAT SERPL: 17 (ref 12–20)
CALCIUM SERPL-MCNC: 9.6 MG/DL (ref 8.5–10.1)
CHLORIDE SERPL-SCNC: 101 MMOL/L (ref 97–108)
CHOLEST SERPL-MCNC: 184 MG/DL
CO2 SERPL-SCNC: 28 MMOL/L (ref 21–32)
CREAT SERPL-MCNC: 0.82 MG/DL (ref 0.7–1.3)
GLOBULIN SER CALC-MCNC: 4.5 G/DL (ref 2–4)
GLUCOSE SERPL-MCNC: 81 MG/DL (ref 65–100)
HDLC SERPL-MCNC: 48 MG/DL
HDLC SERPL: 3.8 {RATIO} (ref 0–5)
LDLC SERPL CALC-MCNC: 90.6 MG/DL (ref 0–100)
LIPID PROFILE,FLP: ABNORMAL
POTASSIUM SERPL-SCNC: 3.7 MMOL/L (ref 3.5–5.1)
PROT SERPL-MCNC: 8.6 G/DL (ref 6.4–8.2)
SODIUM SERPL-SCNC: 140 MMOL/L (ref 136–145)
TRIGL SERPL-MCNC: 227 MG/DL (ref ?–150)
TSH SERPL DL<=0.05 MIU/L-ACNC: 3.78 UIU/ML (ref 0.36–3.74)
VALPROATE SERPL-MCNC: 88 UG/ML (ref 50–100)
VLDLC SERPL CALC-MCNC: 45.4 MG/DL

## 2017-12-09 PROCEDURE — 36415 COLL VENOUS BLD VENIPUNCTURE: CPT | Performed by: PSYCHIATRY & NEUROLOGY

## 2017-12-09 PROCEDURE — 74011250636 HC RX REV CODE- 250/636: Performed by: PSYCHIATRY & NEUROLOGY

## 2017-12-09 PROCEDURE — 80164 ASSAY DIPROPYLACETIC ACD TOT: CPT | Performed by: PSYCHIATRY & NEUROLOGY

## 2017-12-09 PROCEDURE — 80061 LIPID PANEL: CPT | Performed by: PSYCHIATRY & NEUROLOGY

## 2017-12-09 PROCEDURE — 84153 ASSAY OF PSA TOTAL: CPT | Performed by: PSYCHIATRY & NEUROLOGY

## 2017-12-09 PROCEDURE — 65220000001 HC RM PRIVATE PSYCH

## 2017-12-09 PROCEDURE — 74011250637 HC RX REV CODE- 250/637: Performed by: PSYCHIATRY & NEUROLOGY

## 2017-12-09 PROCEDURE — 80053 COMPREHEN METABOLIC PANEL: CPT | Performed by: PSYCHIATRY & NEUROLOGY

## 2017-12-09 PROCEDURE — 84443 ASSAY THYROID STIM HORMONE: CPT | Performed by: PSYCHIATRY & NEUROLOGY

## 2017-12-09 RX ADMIN — FLUOXETINE 20 MG: 20 CAPSULE ORAL at 08:54

## 2017-12-09 RX ADMIN — OLANZAPINE 5 MG: 5 TABLET, ORALLY DISINTEGRATING ORAL at 08:54

## 2017-12-09 RX ADMIN — LORAZEPAM 0.5 MG: 2 INJECTION, SOLUTION INTRAMUSCULAR; INTRAVENOUS at 22:06

## 2017-12-09 NOTE — PROGRESS NOTES
Problem: Altered Thought Process (Adult/Pediatric)  Goal: *STG: Seeks staff when feelings of anxiety and fear arise  Pt is mostly isolative to his room during this shift. Encouraged pt to interact more with peers and staff. Pt is selectively mute so no evidence of learning. He will come out for meals and medications. Pt denies any needs and does not appear to be in any distress at this time. Will continue to monitor pt q15 minutes for safety and support.

## 2017-12-09 NOTE — PROGRESS NOTES
Problem: Altered Thought Process (Adult/Pediatric)  Goal: *STG: Remains safe in hospital  The patient was observed lying in bed with eyes closed; the patient didn't appear to be in any distress. The patient had unlabored breathing. The patient slept for 7 hours and was safe during the night. Patient approached for am blood work. After asking several times patient did not respond.

## 2017-12-09 NOTE — BH NOTES
GROUP THERAPY PROGRESS NOTE    Newt Kin is participating in [place]. Group time: 20 minutes    Personal goal for participation: no response    Goal orientation: community    Group therapy participation: minimal    Therapeutic interventions reviewed and discussed: yes    Impression of participation: not verbally engaged in group.

## 2017-12-09 NOTE — BH NOTES
PSYCHIATRIC PROGRESS NOTE         Patient Name  Charles Stoddard   Date of Birth 1970   [facility] [id number]   Medical Record Number  [id number]      Age  49 y.o. PCP PROVIDER UNKNOWN   Admit date:  11/25/2017    Room Number  307/01  @ [facility]   Date of Service  12/9/2017           E & M PROGRESS NOTE:         HISTORY       CC:  \"selectively mute\"  HISTORY OF PRESENT ILLNESS/INTERVAL HISTORY:  (reviewed/updated 12/9/2017). per initial evaluation: The patient, Charles Stoddard, is a 49 y.o. WHITE OR  male with a past psychiatric history significant for schizophrenia, who presents at this time with complaints of (and/or evidence of) the following emotional symptoms: psychotic behavior. Additional symptomatology include paranoid delusion, sexually inappropriate behavior, pepisodes of yelling screaming followed by selectively mute, disorganized thought process, anxiety, concern about health problems, difficulty sleeping, fearfulness, hearing voices, increased irritability, poor concentration and problem with medication. The above symptoms have been present for many years. These symptoms are of severe severity. These symptoms are constant  in nature. The patient's condition has been precipitated by and psychosocial stressors (conflict with sister, non compliance ). Patient's condition made worse by treatment noncompliance. UDS: negative; BAL=0. Charles Stoddard presents/reports/evidences the following emotional symptoms today, 12/9/2017:psychotic behavior. The above symptoms have been present for many years. These symptoms are of severe severity. The symptoms are constant in nature. Additional symptomatology and features include     12/1- minimal change, poor hygiene. Accepting med but refusing most of his labs. Sl better affect and more social  12/2-Remains confined to his room mostly and voluntarily. Paces in his room. No eye contact. Poor hygiene and disorganized room.  No prn's used.  12/3- Slept 7 hours. Mostly isolates in his room, frequently pacing, minimal socialization, selectively mute, poor self care. No prn's needed. Compliant with meds. 12/4- isolative, poor hygiene and poor social interaction. Negative sx+. Refusing labs and EKG, soft speech, delusional and disorganized  12/5- minimal change- sig negative sx, poor hygiene, refusing labs, disorganized and isolative  12/6- no sig change-negative sx, refusing labs, concern about cheeking behavior. Paranoid  12/7- minimal change- came out to the day room for his breakfast. Pt curled up in his bed and selectively mute. Eyes closed even though he was seen opening his eyes few minutes ago. Passive  12/8- minimal interaction but accepting med and meal. Pt remains disorganized and delusional. Talking in a soft voice, still refusing labs  12/9-Prefers to isolate self in his room except coming out for meals. Minimal social interaction. Tp remains disorganized. Accepting meds. SIDE EFFECTS: (reviewed/updated 12/9/2017)  None reported or admitted to. No noted toxicity with use of current med   ALLERGIES:(reviewed/updated 12/9/2017)  Allergies   Allergen Reactions    Fluphenazine Unknown (comments)     Pt is unable to communicate properly.  Penicillins Unknown (comments)     Pt is unable to communicate properly. MEDICATIONS PRIOR TO ADMISSION:(reviewed/updated 12/9/2017)  Prescriptions Prior to Admission   Medication Sig    divalproex ER (DEPAKOTE ER) 500 mg ER tablet Take 1,500 mg by mouth nightly. Indications: Treatment-resistant schizophrenia    haloperidol decanoate (HALDOL DECANOATE) 100 mg/mL injection 2 mL by IntraMUSCular route every twenty-eight (28) days. Indications: SCHIZOPHRENIA    benztropine (COGENTIN) 2 mg tablet Take 1 Tab by mouth nightly. Indications: extrapyramidal disease    cloZAPine 200 mg tablet Take 600 mg by mouth nightly.  Indications: TREATMENT-RESISTANT SCHIZOPHRENIA      PAST MEDICAL HISTORY: Past medical history from the initial psychiatric evaluation has been reviewed (reviewed/updated 12/9/2017) with no additional updates (I asked patient and no additional past medical history provided). Past Medical History:   Diagnosis Date    Psychiatric disorder     Psychotic disorder     Withdrawal syndrome ([facility] [address]    No past surgical history on file. SOCIAL HISTORY: Social history from the initial psychiatric evaluation has been reviewed (reviewed/updated 12/9/2017) with no additional updates (I asked patient and no additional social history provided). Social History     Social History    Marital status: SINGLE     Spouse name: N/A    Number of children: N/A    Years of education: N/A     Occupational History    Not on file. 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 He lives with his sister. On disability    No pending legal charge reported      FAMILY HISTORY: Family history from the initial psychiatric evaluation has been reviewed (reviewed/updated 12/9/2017) with no additional updates (I asked patient and no additional family history provided). No family history on file.     REVIEW OF SYSTEMS: (reviewed/updated 12/9/2017)  Appetite:no change from normal   Sleep: fitful   All other Review of Systems: Psychological ROS: positive for - behavioral disorder, concentration difficulties, hallucinations, irritability, mood swings and delusion  Respiratory ROS: no cough, shortness of breath, or wheezing  Cardiovascular ROS: no chest pain or dyspnea on exertion         [facility] (MSE):    MSE FINDINGS ARE WITHIN NORMAL LIMITS (WNL) UNLESS OTHERWISE STATED BELOW. ( ALL OF THE BELOW CATEGORIES OF THE MSE HAVE BEEN REVIEWED (reviewed 12/9/2017) AND UPDATED AS DEEMED APPROPRIATE )  General Presentation age appropriate and disheveled, uncooperative and unreliable   Orientation disorganized   Vital Signs  See below (reviewed 12/9/2017); Vital Signs (BP, Pulse, & Temp) are within normal limits if not listed below. Gait and Station Stable/steady, no ataxia   Musculoskeletal System No extrapyramidal symptoms (EPS); no abnormal muscular movements or Tardive Dyskinesia (TD); muscle strength and tone are within normal limits   Language No aphasia or dysarthria   Speech:  mute   Thought Processes illogical; slow rate of thoughts; poor abstract reasoning/computation   Thought Associations blocked    Thought Content paranoid delusions, bizarre delusions, auditory hallucinations and internally preoccupied   Suicidal Ideations none   Homicidal Ideations none   Mood:  Labile mood   Affect:  Constricted, labile inappropriate and increased in intensity   Memory recent  impaired   Memory remote:  intact   Concentration/Attention:  distractable and hypervigilance   Fund of Knowledge average   Insight:  limited   Reliability poor   Judgment:  limited          VITALS:     No data found.     Wt Readings from Last 3 Encounters:   03/25/17 95.6 kg (210 lb 11.2 oz)   02/09/15 97.5 kg (215 lb)   02/07/15 97.5 kg (215 lb)     Temp Readings from Last 3 Encounters:   03/28/17 97.5 °F (36.4 °C)   02/07/15 98.7 °F (37.1 °C)     BP Readings from Last 3 Encounters:   12/05/17 92/62   03/28/17 100/68   02/12/15 105/73     Pulse Readings from Last 3 Encounters:   12/05/17 78   03/28/17 67   02/12/15 87            DATA     LABORATORY DATA:(reviewed/updated 12/9/2017)  Recent Results (from the past 24 hour(s))   LIPID PANEL    Collection Time: 12/09/17  8:10 AM   Result Value Ref Range    LIPID PROFILE          Cholesterol, total 184 <200 MG/DL    Triglyceride 227 (H) <150 MG/DL    HDL Cholesterol 48 MG/DL    LDL, calculated 90.6 0 - 100 MG/DL    VLDL, calculated 45.4 MG/DL CHOL/HDL Ratio 3.8 0 - 5.0     TSH 3RD GENERATION    Collection Time: 12/09/17  8:10 AM   Result Value Ref Range    TSH 3.78 (H) 0.36 - 3.74 uIU/mL   VALPROIC ACID    Collection Time: 12/09/17  8:10 AM   Result Value Ref Range    Valproic acid 88 50 - 079 ug/ml   METABOLIC PANEL, COMPREHENSIVE    Collection Time: 12/09/17  8:10 AM   Result Value Ref Range    Sodium 140 136 - 145 mmol/L    Potassium 3.7 3.5 - 5.1 mmol/L    Chloride 101 97 - 108 mmol/L    CO2 28 21 - 32 mmol/L    Anion gap 11 5 - 15 mmol/L    Glucose 81 65 - 100 mg/dL    BUN 14 6 - 20 MG/DL    Creatinine 0.82 0.70 - 1.30 MG/DL    BUN/Creatinine ratio 17 12 - 20      GFR est AA >60 >60 ml/min/1.73m2    GFR est non-AA >60 >60 ml/min/1.73m2    Calcium 9.6 8.5 - 10.1 MG/DL    Bilirubin, total 0.5 0.2 - 1.0 MG/DL    ALT (SGPT) 26 12 - 78 U/L    AST (SGOT) 16 15 - 37 U/L    Alk. phosphatase 151 (H) 45 - 117 U/L    Protein, total 8.6 (H) 6.4 - 8.2 g/dL    Albumin 4.1 3.5 - 5.0 g/dL    Globulin 4.5 (H) 2.0 - 4.0 g/dL    A-G Ratio 0.9 (L) 1.1 - 2.2       Lab Results   Component Value Date/Time    Valproic acid 88 12/09/2017 08:10 AM     No results found for: LITHM   RADIOLOGY REPORTS:(reviewed/updated 12/9/2017)  No results found. MEDICATIONS     ALL MEDICATIONS:      SCHEDULED MEDICATIONS:          ASSESSMENT & PLAN     DIAGNOSES REQUIRING ACTIVE TREATMENT AND MONITORING: (reviewed/updated 12/9/2017)  Patient Active Hospital Problem List:   Paranoid schizophrenia ([facility] [address] (3/15/2017)- tx resistant    Assessment: sl better- more social and accepting med - psychotic, -ve sx, poor insight      Plan: I will ct to adjust med- ct to titrate Depakote- encourage compliance with Labs. Change Zyprexa to Zydis- ?cheeking  Ct Haldol dec and Zyprexa dose titrated, will augment with Prozac and titrate    Patient is on two antipsychotics now due to the relative refractoriness to treatment thus far.   Prior to admission patient had THREE or more failed trails of monotherapy, including: Haldol, Zyprexa, Risperdal and Clozapine. The patient is a very slow responder to medications. Risks and benefits in the use of two antipsychotics have been weighed in full, including the risk of metabolic syndrome and the potential increased risk of QTc  Based on this analysis, it is considered favorable for the utilization of two antipsychotics. In the future, once stable on the two antipsychotics, patient can possibly be tapered to one of the chosen antipsychotics. Will check on EKG results today to monitor QTc.- refusing EKG    Non compliance with tx- request court order med. - granted        I will continue to monitor blood levels (Depakote,, clozapine---a drug with a narrow therapeutic index= NTI) and associated labs for drug therapy implemented that require intense monitoring for toxicity as deemed appropriate based on current medication side effects and pharmacodynamically determined drug 1/2 lives. In summary, Martin Camara, is a 49 y.o.  male who presents with a severe exacerbation of the principal diagnosis of Paranoid schizophrenia ([facility] [address]  Patient's condition is worsening/not improving/not stable . Patient requires continued inpatient hospitalization for further stabilization, safety monitoring and medication management. I will continue to coordinate the provision of individual, milieu, occupational, group, and substance abuse therapies to address target symptoms/diagnoses as deemed appropriate for the individual patient. A coordinated, multidisplinary treatment team round was conducted with the patient (this team consists of the nurse, psychiatric unit pharmcist,  and writer). Complete current electronic health record for patient has been reviewed today including consultant notes, ancillary staff notes, nurses and psychiatric tech notes. Suicide risk assessment completed and patient deemed to be of low risk for suicide at this time.      The following regarding medications was addressed during rounds with patient:   the risks and benefits of the proposed medication. The patient was given the opportunity to ask questions. Informed consent given to the use of the above medications. Will continue to adjust psychiatric and non-psychiatric medications (see above \"medication\" section and orders section for details) as deemed appropriate & based upon diagnoses and response to treatment. I will continue to order blood tests/labs and diagnostic tests as deemed appropriate and review results as they become available (see orders for details and above listed lab/test results). I will order psychiatric records from previous [facility] hospitals to further elucidate the nature of patient's psychopathology and review once available. I will gather additional collateral information from friends, family and o/p treatment team to further elucidate the nature of patient's psychopathology and baselline level of psychiatric functioning. I certify that this patient's inpatient psychiatric hospital services furnished since the previous certification were, and continue to be, required for treatment that could reasonably be expected to improve the patient's condition, or for diagnostic study, and that the patient continues to need, on a daily basis, active treatment furnished directly by or requiring the supervision of inpatient psychiatric facility personnel. In addition, the hospital records show that services furnished were intensive treatment services, admission or related services, or equivalent services.     EXPECTED DISCHARGE DATE/DAY: TBD     DISPOSITION: Home       Signed By:   Blayne Yang MD  12/9/2017

## 2017-12-09 NOTE — PROGRESS NOTES
Problem: Altered Thought Process (Adult/Pediatric)  Goal: *STG: Remains safe in hospital  Outcome: Progressing Towards Goal  Patient remains isolative in room, visible for meals and medication. Little interaction with staff or peers. Continue safety checks Q 15  Minutes.

## 2017-12-10 PROCEDURE — 65220000001 HC RM PRIVATE PSYCH

## 2017-12-10 PROCEDURE — 74011250637 HC RX REV CODE- 250/637: Performed by: PSYCHIATRY & NEUROLOGY

## 2017-12-10 RX ADMIN — OLANZAPINE 20 MG: 5 TABLET, ORALLY DISINTEGRATING ORAL at 21:36

## 2017-12-10 RX ADMIN — OLANZAPINE 5 MG: 5 TABLET, ORALLY DISINTEGRATING ORAL at 09:02

## 2017-12-10 RX ADMIN — FLUOXETINE 20 MG: 20 CAPSULE ORAL at 09:02

## 2017-12-10 RX ADMIN — DIVALPROEX SODIUM 1500 MG: 500 TABLET, FILM COATED, EXTENDED RELEASE ORAL at 21:36

## 2017-12-10 RX ADMIN — BENZTROPINE MESYLATE 2 MG: 2 TABLET ORAL at 21:36

## 2017-12-10 NOTE — PROGRESS NOTES
Problem: Altered Thought Process (Adult/Pediatric)  Goal: *STG: Complies with medication therapy  Outcome: Progressing Towards Goal  Pt remains withdrawn and selectively mute. Pt appears to be preoccupied and responding to internal stimuli. Pt is disheveled with poor hygiene. He is compliant with meals and meds. Pt was encouraged to spend more time out in the milieu and to make needs known to staff. Staff will continue to monitor pt for safety and offer support as needed.

## 2017-12-10 NOTE — BH NOTES
Pt is mostly isolative from milieu. Pt has no interactions except eating his meals and taking his medications. Pt is meds/meals compliant.  Will continue to encourage spending time outside his room, monitor q 15 for safety, mood and behavior changes.

## 2017-12-10 NOTE — BH NOTES
PSYCHIATRIC PROGRESS NOTE         Patient Name  Luiza Pablo   Date of Birth 1970   [facility] [id number]   Medical Record Number  [id number]      Age  49 y.o. PCP PROVIDER UNKNOWN   Admit date:  11/25/2017    Room Number  307/01  @ [facility]   Date of Service  12/10/2017           E & M PROGRESS NOTE:         HISTORY       CC:  \"selectively mute\"  HISTORY OF PRESENT ILLNESS/INTERVAL HISTORY:  (reviewed/updated 12/10/2017). per initial evaluation: The patient, Luiza Pablo, is a 49 y.o. WHITE OR  male with a past psychiatric history significant for schizophrenia, who presents at this time with complaints of (and/or evidence of) the following emotional symptoms: psychotic behavior. Additional symptomatology include paranoid delusion, sexually inappropriate behavior, pepisodes of yelling screaming followed by selectively mute, disorganized thought process, anxiety, concern about health problems, difficulty sleeping, fearfulness, hearing voices, increased irritability, poor concentration and problem with medication. The above symptoms have been present for many years. These symptoms are of severe severity. These symptoms are constant  in nature. The patient's condition has been precipitated by and psychosocial stressors (conflict with sister, non compliance ). Patient's condition made worse by treatment noncompliance. UDS: negative; BAL=0. Luiza Pablo presents/reports/evidences the following emotional symptoms today, 12/10/2017:psychotic behavior. The above symptoms have been present for many years. These symptoms are of severe severity. The symptoms are constant in nature. Additional symptomatology and features include     12/1- minimal change, poor hygiene. Accepting med but refusing most of his labs. Sl better affect and more social  12/2-Remains confined to his room mostly and voluntarily. Paces in his room. No eye contact. Poor hygiene and disorganized room.  No prn's used. 12/3- Slept 7 hours. Mostly isolates in his room, frequently pacing, minimal socialization, selectively mute, poor self care. No prn's needed. Compliant with meds. 12/4- isolative, poor hygiene and poor social interaction. Negative sx+. Refusing labs and EKG, soft speech, delusional and disorganized  12/5- minimal change- sig negative sx, poor hygiene, refusing labs, disorganized and isolative  12/6- no sig change-negative sx, refusing labs, concern about cheeking behavior. Paranoid  12/7- minimal change- came out to the day room for his breakfast. Pt curled up in his bed and selectively mute. Eyes closed even though he was seen opening his eyes few minutes ago. Passive  12/8- minimal interaction but accepting med and meal. Pt remains disorganized and delusional. Talking in a soft voice, still refusing labs  12/9-Prefers to isolate self in his room except coming out for meals. Minimal social interaction. Tp remains disorganized. Accepting meds. 12/10- Selectively mute. Did not interact. Curled up in his bed, disheveled with poor hygiene,  briefly opened his eyes. Refused h.s. Meds. Sonja Batters was given. SIDE EFFECTS: (reviewed/updated 12/10/2017)  None reported or admitted to. No noted toxicity with use of current med   ALLERGIES:(reviewed/updated 12/10/2017)  Allergies   Allergen Reactions    Fluphenazine Unknown (comments)     Pt is unable to communicate properly.  Penicillins Unknown (comments)     Pt is unable to communicate properly. MEDICATIONS PRIOR TO ADMISSION:(reviewed/updated 12/10/2017)  Prescriptions Prior to Admission   Medication Sig    divalproex ER (DEPAKOTE ER) 500 mg ER tablet Take 1,500 mg by mouth nightly. Indications: Treatment-resistant schizophrenia    haloperidol decanoate (HALDOL DECANOATE) 100 mg/mL injection 2 mL by IntraMUSCular route every twenty-eight (28) days.  Indications: SCHIZOPHRENIA    benztropine (COGENTIN) 2 mg tablet Take 1 Tab by mouth nightly. Indications: extrapyramidal disease    cloZAPine 200 mg tablet Take 600 mg by mouth nightly. Indications: TREATMENT-RESISTANT SCHIZOPHRENIA      PAST MEDICAL HISTORY: Past medical history from the initial psychiatric evaluation has been reviewed (reviewed/updated 12/10/2017) with no additional updates (I asked patient and no additional past medical history provided). Past Medical History:   Diagnosis Date    Psychiatric disorder     Psychotic disorder     Withdrawal syndrome ([facility] [address]    No past surgical history on file. SOCIAL HISTORY: Social history from the initial psychiatric evaluation has been reviewed (reviewed/updated 12/10/2017) with no additional updates (I asked patient and no additional social history provided). Social History     Social History    Marital status: SINGLE     Spouse name: N/A    Number of children: N/A    Years of education: N/A     Occupational History    Not on file. 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 He lives with his sister. On disability    No pending legal charge reported      FAMILY HISTORY: Family history from the initial psychiatric evaluation has been reviewed (reviewed/updated 12/10/2017) with no additional updates (I asked patient and no additional family history provided). No family history on file.     REVIEW OF SYSTEMS: (reviewed/updated 12/10/2017)  Appetite:no change from normal   Sleep: fitful   All other Review of Systems: Psychological ROS: positive for - behavioral disorder, concentration difficulties, hallucinations, irritability, mood swings and delusion  Respiratory ROS: no cough, shortness of breath, or wheezing  Cardiovascular ROS: no chest pain or dyspnea on exertion         MENTAL STATUS EXAM & VITALS MENTAL STATUS EXAM (MSE):    MSE FINDINGS ARE WITHIN NORMAL LIMITS (WNL) UNLESS OTHERWISE STATED BELOW. ( ALL OF THE BELOW CATEGORIES OF THE MSE HAVE BEEN REVIEWED (reviewed 12/10/2017) AND UPDATED AS DEEMED APPROPRIATE )  General Presentation age appropriate and disheveled, uncooperative and unreliable   Orientation disorganized   Vital Signs  See below (reviewed 12/10/2017); Vital Signs (BP, Pulse, & Temp) are within normal limits if not listed below. Gait and Station Stable/steady, no ataxia   Musculoskeletal System No extrapyramidal symptoms (EPS); no abnormal muscular movements or Tardive Dyskinesia (TD); muscle strength and tone are within normal limits   Language No aphasia or dysarthria   Speech:  mute   Thought Processes illogical; slow rate of thoughts; poor abstract reasoning/computation   Thought Associations blocked    Thought Content paranoid delusions, bizarre delusions, auditory hallucinations and internally preoccupied   Suicidal Ideations none   Homicidal Ideations none   Mood:  Labile mood   Affect:  Constricted, labile inappropriate and increased in intensity   Memory recent  impaired   Memory remote:  intact   Concentration/Attention:  distractable and hypervigilance   Fund of Knowledge average   Insight:  limited   Reliability poor   Judgment:  limited          VITALS:     No data found. Wt Readings from Last 3 Encounters:   03/25/17 95.6 kg (210 lb 11.2 oz)   02/09/15 97.5 kg (215 lb)   02/07/15 97.5 kg (215 lb)     Temp Readings from Last 3 Encounters:   03/28/17 97.5 °F (36.4 °C)   02/07/15 98.7 °F (37.1 °C)     BP Readings from Last 3 Encounters:   03/28/17 100/68   02/12/15 105/73   02/07/15 148/79     Pulse Readings from Last 3 Encounters:   12/05/17 78   03/28/17 67   02/12/15 87            DATA     LABORATORY DATA:(reviewed/updated 12/10/2017)  No results found for this or any previous visit (from the past 24 hour(s)).   Lab Results   Component Value Date/Time    Valproic acid 88 12/09/2017 08:10 AM     No results found for: LITHM   RADIOLOGY REPORTS:(reviewed/updated 12/10/2017)  No results found. MEDICATIONS     ALL MEDICATIONS:      SCHEDULED MEDICATIONS:          ASSESSMENT & PLAN     DIAGNOSES REQUIRING ACTIVE TREATMENT AND MONITORING: (reviewed/updated 12/10/2017)  Patient Active Hospital Problem List:   Paranoid schizophrenia ([facility] [address] (3/15/2017)- tx resistant    Assessment: sl better- more social and accepting med - psychotic, -ve sx, poor insight      Plan: I will ct to adjust med- ct to titrate Depakote- encourage compliance with Labs. Change Zyprexa to Zydis- ?cheeking  Ct Haldol dec and Zyprexa dose titrated, will augment with Prozac and titrate    Patient is on two antipsychotics now due to the relative refractoriness to treatment thus far. Prior to admission patient had THREE or more failed trails of monotherapy, including: Haldol, Zyprexa, Risperdal and Clozapine. The patient is a very slow responder to medications. Risks and benefits in the use of two antipsychotics have been weighed in full, including the risk of metabolic syndrome and the potential increased risk of QTc  Based on this analysis, it is considered favorable for the utilization of two antipsychotics. In the future, once stable on the two antipsychotics, patient can possibly be tapered to one of the chosen antipsychotics. Will check on EKG results today to monitor QTc.- refusing EKG    Non compliance with tx- request court order med. - granted        I will continue to monitor blood levels (Depakote,, clozapine---a drug with a narrow therapeutic index= NTI) and associated labs for drug therapy implemented that require intense monitoring for toxicity as deemed appropriate based on current medication side effects and pharmacodynamically determined drug 1/2 lives.     In summary, Meliton Medina, is a 49 y.o.  male who presents with a severe exacerbation of the principal diagnosis of Paranoid schizophrenia [facility])  Patient's condition is worsening/not improving/not stable . Patient requires continued inpatient hospitalization for further stabilization, safety monitoring and medication management. I will continue to coordinate the provision of individual, milieu, occupational, group, and substance abuse therapies to address target symptoms/diagnoses as deemed appropriate for the individual patient. A coordinated, multidisplinary treatment team round was conducted with the patient (this team consists of the nurse, psychiatric unit pharmcist,  and writer). Complete current electronic health record for patient has been reviewed today including consultant notes, ancillary staff notes, nurses and psychiatric tech notes. Suicide risk assessment completed and patient deemed to be of low risk for suicide at this time. The following regarding medications was addressed during rounds with patient:   the risks and benefits of the proposed medication. The patient was given the opportunity to ask questions. Informed consent given to the use of the above medications. Will continue to adjust psychiatric and non-psychiatric medications (see above \"medication\" section and orders section for details) as deemed appropriate & based upon diagnoses and response to treatment. I will continue to order blood tests/labs and diagnostic tests as deemed appropriate and review results as they become available (see orders for details and above listed lab/test results). I will order psychiatric records from previous [facility] hospitals to further elucidate the nature of patient's psychopathology and review once available. I will gather additional collateral information from friends, family and o/p treatment team to further elucidate the nature of patient's psychopathology and baselline level of psychiatric functioning.          I certify that this patient's inpatient psychiatric hospital services furnished since the previous certification were, and continue to be, required for treatment that could reasonably be expected to improve the patient's condition, or for diagnostic study, and that the patient continues to need, on a daily basis, active treatment furnished directly by or requiring the supervision of inpatient psychiatric facility personnel. In addition, the hospital records show that services furnished were intensive treatment services, admission or related services, or equivalent services.     EXPECTED DISCHARGE DATE/DAY: TBD     DISPOSITION: Home       Signed By:   Serge Cary MD  12/10/2017

## 2017-12-10 NOTE — PROGRESS NOTES
Problem: Altered Thought Process (Adult/Pediatric)  Goal: *STG: Attends activities and groups  Outcome: Progressing Towards Goal  Pt is alert. He is compliant with medications and meals. Pt continues to isolate. His hygiene is poor. Pt is selectively mute only responding to questions about food. Pt does not appear to be in any distress at this time. Will continue to monitor pt q15 minutes for safety and support.

## 2017-12-11 LAB — PSA SERPL-MCNC: 0.6 NG/ML (ref 0–4)

## 2017-12-11 PROCEDURE — 74011250637 HC RX REV CODE- 250/637: Performed by: PSYCHIATRY & NEUROLOGY

## 2017-12-11 PROCEDURE — 65220000001 HC RM PRIVATE PSYCH

## 2017-12-11 RX ORDER — FLUOXETINE HYDROCHLORIDE 20 MG/1
40 CAPSULE ORAL DAILY
Status: DISCONTINUED | OUTPATIENT
Start: 2017-12-12 | End: 2018-01-03

## 2017-12-11 RX ADMIN — BENZTROPINE MESYLATE 2 MG: 2 TABLET ORAL at 21:27

## 2017-12-11 RX ADMIN — FLUOXETINE 20 MG: 20 CAPSULE ORAL at 08:50

## 2017-12-11 RX ADMIN — OLANZAPINE 20 MG: 5 TABLET, ORALLY DISINTEGRATING ORAL at 21:27

## 2017-12-11 RX ADMIN — DIVALPROEX SODIUM 1500 MG: 500 TABLET, FILM COATED, EXTENDED RELEASE ORAL at 21:27

## 2017-12-11 RX ADMIN — OLANZAPINE 5 MG: 5 TABLET, ORALLY DISINTEGRATING ORAL at 08:50

## 2017-12-11 NOTE — PROGRESS NOTES
Problem: Altered Thought Process (Adult/Pediatric)  Goal: *STG: Complies with medication therapy  Outcome: Progressing Towards Goal  Pt remains preoccupied, isolative, and selectively mute. Pt only comes out of his room for meals. Pt is also disheveled with poor hygiene. Pt has been meal and med compliant. He did not receive any prn meds on the shift. Staff will continue to monitor pt for safety and offer support as needed.

## 2017-12-11 NOTE — BH NOTES
GROUP THERAPY PROGRESS NOTE    The patient Lilian marie 49 y.o. male is participating in Reflections Group    Group time: 30 minutes    Personal goal for participation: To discuss the daily goals    Goal orientation: personal    Group therapy participation: passive    Therapeutic interventions reviewed and discussed:  Yes    Impression of participation: nick Weston  12/10/2017  8:53 PM

## 2017-12-11 NOTE — PROGRESS NOTES
Laboratory Monitoring for Valproic Acid    This patient is currently prescribed the following medication(s):   Current Facility-Administered Medications   Medication Dose Route Frequency    FLUoxetine (PROzac) capsule 20 mg  20 mg Oral DAILY    OLANZapine (ZyPREXA zydis) disintegrating tablet 20 mg  20 mg Oral QHS    OLANZapine (ZyPREXA zydis) disintegrating tablet 5 mg  5 mg Oral DAILY    [START ON 12/19/2017] haloperidol decanoate (HALDOL DECANOATE) 100 mg/mL injection 200 mg  200 mg IntraMUSCular Q28D    divalproex ER (DEPAKOTE ER) 24 hour tablet 1,500 mg  1,500 mg Oral QHS    Or    LORazepam (ATIVAN) injection 0.5 mg+++COURT ORDERED MEDICATION FOR REFUSAL OF PO DEPAKOTE+++  0.5 mg IntraMUSCular QHS    benztropine (COGENTIN) tablet 2 mg  2 mg Oral QHS       The following labs have been completed for monitoring of valproic acid:    Valproic Acid Serum Concentration  Lab Results   Component Value Date/Time    Valproic acid 88 12/09/2017 08:10 AM         Hepatic Function  Lab Results   Component Value Date/Time    Bilirubin, total 0.5 12/09/2017 08:10 AM    Protein, total 8.6 12/09/2017 08:10 AM    Albumin 4.1 12/09/2017 08:10 AM    Globulin 4.5 12/09/2017 08:10 AM    A-G Ratio 0.9 12/09/2017 08:10 AM    ALT (SGPT) 26 12/09/2017 08:10 AM    Alk. phosphatase 151 12/09/2017 08:10 AM       Hematology  Lab Results   Component Value Date/Time    WBC 5.6 03/28/2017 10:35 AM    RBC 4.70 03/28/2017 10:35 AM    HGB 13.7 03/28/2017 10:35 AM    HCT 41.0 03/28/2017 10:35 AM    MCV 87.2 03/28/2017 10:35 AM    MCH 29.1 03/28/2017 10:35 AM    MCHC 33.4 03/28/2017 10:35 AM    RDW 13.5 03/28/2017 10:35 AM    PLATELET 808 35/14/3908 10:35 AM       Assessment/Plan:  Valproic acid level is within therapeutic limits. Level was drawn appropriately and is at steady-state. Same dose continued.       Cece Cohen, OsielD, BCPS  [phone]

## 2017-12-11 NOTE — BH NOTES
PSYCHIATRIC PROGRESS NOTE         Patient Name  Raquel Laurent   Date of Birth 1970   [facility] [id number]   Medical Record Number  [id number]      Age  49 y.o. PCP PROVIDER UNKNOWN   Admit date:  11/25/2017    Room Number  307/01  @ [facility]   Date of Service  12/11/2017           E & M PROGRESS NOTE:         HISTORY       CC:  \"selectively mute\"  HISTORY OF PRESENT ILLNESS/INTERVAL HISTORY:  (reviewed/updated 12/11/2017). per initial evaluation: The patient, Raquel Laurent, is a 49 y.o. WHITE [place]  male with a past psychiatric history significant for schizophrenia, who presents at this time with complaints of (and/or evidence of) the following emotional symptoms: psychotic behavior. Additional symptomatology include paranoid delusion, sexually inappropriate behavior, pepisodes of yelling screaming followed by selectively mute, disorganized thought process, anxiety, concern about health problems, difficulty sleeping, fearfulness, hearing voices, increased irritability, poor concentration and problem with medication. The above symptoms have been present for many years. These symptoms are of severe severity. These symptoms are constant  in nature. The patient's condition has been precipitated by and psychosocial stressors (conflict with sister, non compliance ). Patient's condition made worse by treatment noncompliance. UDS: negative; BAL=0. Raquel Laurent presents/reports/evidences the following emotional symptoms today, 12/11/2017:psychotic behavior. The above symptoms have been present for many years. These symptoms are of severe severity. The symptoms are constant in nature. Additional symptomatology and features include     12/1- minimal change, poor hygiene. Accepting med but refusing most of his labs. Sl better affect and more social  12/2-Remains confined to his room mostly and voluntarily. Paces in his room. No eye contact. Poor hygiene and disorganized room.  No prn's used. 12/3- Slept 7 hours. Mostly isolates in his room, frequently pacing, minimal socialization, selectively mute, poor self care. No prn's needed. Compliant with meds. 12/4- isolative, poor hygiene and poor social interaction. Negative sx+. Refusing labs and EKG, soft speech, delusional and disorganized  12/5- minimal change- sig negative sx, poor hygiene, refusing labs, disorganized and isolative  12/6- no sig change-negative sx, refusing labs, concern about cheeking behavior. Paranoid  12/7- minimal change- came out to the day room for his breakfast. Pt curled up in his bed and selectively mute. Eyes closed even though he was seen opening his eyes few minutes ago. Passive  12/8- minimal interaction but accepting med and meal. Pt remains disorganized and delusional. Talking in a soft voice, still refusing labs  12/9-Prefers to isolate self in his room except coming out for meals. Minimal social interaction. Tp remains disorganized. Accepting meds. 12/11- minimal change- isolative and does not interact with pt/staff except going out for his Breakfast. Has refused med at times. Will lay in bed, poor hygiene. SIDE EFFECTS: (reviewed/updated 12/11/2017)  None reported or admitted to. No noted toxicity with use of current med   ALLERGIES:(reviewed/updated 12/11/2017)  Allergies   Allergen Reactions    Fluphenazine Unknown (comments)     Pt is unable to communicate properly.  Penicillins Unknown (comments)     Pt is unable to communicate properly. MEDICATIONS PRIOR TO ADMISSION:(reviewed/updated 12/11/2017)  Prescriptions Prior to Admission   Medication Sig    divalproex ER (DEPAKOTE ER) 500 mg ER tablet Take 1,500 mg by mouth nightly. Indications: Treatment-resistant schizophrenia    haloperidol decanoate (HALDOL DECANOATE) 100 mg/mL injection 2 mL by IntraMUSCular route every twenty-eight (28) days.  Indications: SCHIZOPHRENIA    benztropine (COGENTIN) 2 mg tablet Take 1 Tab by mouth nightly. Indications: extrapyramidal disease    cloZAPine 200 mg tablet Take 600 mg by mouth nightly. Indications: TREATMENT-RESISTANT SCHIZOPHRENIA      PAST MEDICAL HISTORY: Past medical history from the initial psychiatric evaluation has been reviewed (reviewed/updated 12/11/2017) with no additional updates (I asked patient and no additional past medical history provided). Past Medical History:   Diagnosis Date    Psychiatric disorder     Psychotic disorder     Withdrawal syndrome ([facility] [address]    No past surgical history on file. SOCIAL HISTORY: Social history from the initial psychiatric evaluation has been reviewed (reviewed/updated 12/11/2017) with no additional updates (I asked patient and no additional social history provided). Social History     Social History    Marital status: SINGLE     Spouse name: N/A    Number of children: N/A    Years of education: N/A     Occupational History    Not on file. 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 He lives with his sister. On disability    No pending legal charge reported      FAMILY HISTORY: Family history from the initial psychiatric evaluation has been reviewed (reviewed/updated 12/11/2017) with no additional updates (I asked patient and no additional family history provided). No family history on file.     REVIEW OF SYSTEMS: (reviewed/updated 12/11/2017)  Appetite:no change from normal   Sleep: fitful   All other Review of Systems: Psychological ROS: positive for - behavioral disorder, concentration difficulties, hallucinations, irritability, mood swings and delusion  Respiratory ROS: no cough, shortness of breath, or wheezing  Cardiovascular ROS: no chest pain or dyspnea on exertion         Phu MENTAL STATUS EXAM (MSE):    MSE FINDINGS ARE WITHIN NORMAL LIMITS (WNL) UNLESS OTHERWISE STATED BELOW. ( ALL OF THE BELOW CATEGORIES OF THE MSE HAVE BEEN REVIEWED (reviewed 12/11/2017) AND UPDATED AS DEEMED APPROPRIATE )  General Presentation age appropriate and disheveled, uncooperative and unreliable   Orientation disorganized   Vital Signs  See below (reviewed 12/11/2017); Vital Signs (BP, Pulse, & Temp) are within normal limits if not listed below. Gait and Station Stable/steady, no ataxia   Musculoskeletal System No extrapyramidal symptoms (EPS); no abnormal muscular movements or Tardive Dyskinesia (TD); muscle strength and tone are within normal limits   Language No aphasia or dysarthria   Speech:  mute   Thought Processes illogical; slow rate of thoughts; poor abstract reasoning/computation   Thought Associations blocked    Thought Content paranoid delusions, bizarre delusions, auditory hallucinations and internally preoccupied   Suicidal Ideations none   Homicidal Ideations none   Mood:  Labile mood   Affect:  Constricted, labile inappropriate and increased in intensity   Memory recent  impaired   Memory remote:  intact   Concentration/Attention:  distractable and hypervigilance   Fund of Knowledge average   Insight:  limited   Reliability poor   Judgment:  limited          VITALS:     No data found. Wt Readings from Last 3 Encounters:   03/25/17 95.6 kg (210 lb 11.2 oz)   02/09/15 97.5 kg (215 lb)   02/07/15 97.5 kg (215 lb)     Temp Readings from Last 3 Encounters:   03/28/17 97.5 °F (36.4 °C)   02/07/15 98.7 °F (37.1 °C)     BP Readings from Last 3 Encounters:   03/28/17 100/68   02/12/15 105/73   02/07/15 148/79     Pulse Readings from Last 3 Encounters:   12/05/17 78   03/28/17 67   02/12/15 87            DATA     LABORATORY DATA:(reviewed/updated 12/11/2017)  No results found for this or any previous visit (from the past 24 hour(s)).   Lab Results   Component Value Date/Time    Valproic acid 88 12/09/2017 08:10 AM     No results found for: LITHM   RADIOLOGY REPORTS:(reviewed/updated 12/11/2017)  No results found. MEDICATIONS     ALL MEDICATIONS:      SCHEDULED MEDICATIONS:          ASSESSMENT & PLAN     DIAGNOSES REQUIRING ACTIVE TREATMENT AND MONITORING: (reviewed/updated 12/11/2017)  Patient Active Hospital Problem List:   Paranoid schizophrenia ([facility] [address] (3/15/2017)- tx resistant    Assessment: sl better- more social and accepting med - psychotic, -ve sx, poor insight      Plan: I will ct to adjust med- ct to titrate Depakote- encourage compliance with Labs. Change Zyprexa to Zydis- ?cheeking  Ct Haldol dec and Zyprexa . Prozac dose increased to 40 mg daily    Patient is on two antipsychotics now due to the relative refractoriness to treatment thus far. Prior to admission patient had THREE or more failed trails of monotherapy, including: Haldol, Zyprexa, Risperdal and Clozapine. The patient is a very slow responder to medications. Risks and benefits in the use of two antipsychotics have been weighed in full, including the risk of metabolic syndrome and the potential increased risk of QTc  Based on this analysis, it is considered favorable for the utilization of two antipsychotics. In the future, once stable on the two antipsychotics, patient can possibly be tapered to one of the chosen antipsychotics. Will check on EKG results today to monitor QTc.- refusing EKG    Non compliance with tx- request court order med. - granted        I will continue to monitor blood levels (Depakote,, clozapine---a drug with a narrow therapeutic index= NTI) and associated labs for drug therapy implemented that require intense monitoring for toxicity as deemed appropriate based on current medication side effects and pharmacodynamically determined drug 1/2 lives.     In summary, Fahad Nolasco, is a 49 y.o.  male who presents with a severe exacerbation of the principal diagnosis of Paranoid schizophrenia [facility])  Patient's condition is worsening/not improving/not stable . Patient requires continued inpatient hospitalization for further stabilization, safety monitoring and medication management. I will continue to coordinate the provision of individual, milieu, occupational, group, and substance abuse therapies to address target symptoms/diagnoses as deemed appropriate for the individual patient. A coordinated, multidisplinary treatment team round was conducted with the patient (this team consists of the nurse, psychiatric unit pharmcist,  and writer). Complete current electronic health record for patient has been reviewed today including consultant notes, ancillary staff notes, nurses and psychiatric tech notes. Suicide risk assessment completed and patient deemed to be of low risk for suicide at this time. The following regarding medications was addressed during rounds with patient:   the risks and benefits of the proposed medication. The patient was given the opportunity to ask questions. Informed consent given to the use of the above medications. Will continue to adjust psychiatric and non-psychiatric medications (see above \"medication\" section and orders section for details) as deemed appropriate & based upon diagnoses and response to treatment. I will continue to order blood tests/labs and diagnostic tests as deemed appropriate and review results as they become available (see orders for details and above listed lab/test results). I will order psychiatric records from previous [facility] hospitals to further elucidate the nature of patient's psychopathology and review once available. I will gather additional collateral information from friends, family and o/p treatment team to further elucidate the nature of patient's psychopathology and baselline level of psychiatric functioning.          I certify that this patient's inpatient psychiatric hospital services furnished since the previous certification were, and continue to be, required for treatment that could reasonably be expected to improve the patient's condition, or for diagnostic study, and that the patient continues to need, on a daily basis, active treatment furnished directly by or requiring the supervision of inpatient psychiatric facility personnel. In addition, the hospital records show that services furnished were intensive treatment services, admission or related services, or equivalent services.     EXPECTED DISCHARGE DATE/DAY: TBD     DISPOSITION: Home       Signed By:   Diann Khan MD  12/11/2017

## 2017-12-12 PROCEDURE — 74011250637 HC RX REV CODE- 250/637: Performed by: PSYCHIATRY & NEUROLOGY

## 2017-12-12 PROCEDURE — 65220000001 HC RM PRIVATE PSYCH

## 2017-12-12 RX ADMIN — OLANZAPINE 5 MG: 5 TABLET, ORALLY DISINTEGRATING ORAL at 09:15

## 2017-12-12 RX ADMIN — OLANZAPINE 20 MG: 5 TABLET, ORALLY DISINTEGRATING ORAL at 21:03

## 2017-12-12 RX ADMIN — FLUOXETINE 40 MG: 20 CAPSULE ORAL at 09:15

## 2017-12-12 RX ADMIN — DIVALPROEX SODIUM 1500 MG: 500 TABLET, FILM COATED, EXTENDED RELEASE ORAL at 21:02

## 2017-12-12 RX ADMIN — BENZTROPINE MESYLATE 2 MG: 2 TABLET ORAL at 21:03

## 2017-12-12 NOTE — PROGRESS NOTES
Pt was seen in treatment team this morning.  Pt denies SI/HI.  Pt's mood is anxious, affect is labile. Speech is soft.  Pt's thought process is preoccupied, isolative and bizarre. Pt has been coming out to dayroom to eat meals.  Pt remains medication compliant.

## 2017-12-12 NOTE — BH NOTES
GROUP THERAPY PROGRESS NOTE    Tish See is participating in [place].      Group time: 30 minutes    Personal goal for participation: reality orientation    Goal orientation: social    Group therapy participation: passive    Therapeutic interventions reviewed and discussed: yes    Impression of participation: no problem

## 2017-12-12 NOTE — BH NOTES
PSYCHIATRIC PROGRESS NOTE         Patient Name  Raquel Laurent   Date of Birth 1970   [facility] [id number]   Medical Record Number  [id number]      Age  49 y.o. PCP PROVIDER UNKNOWN   Admit date:  11/25/2017    Room Number  307/01  @ [facility]   Date of Service  12/12/2017           E & M PROGRESS NOTE:         HISTORY       CC:  \"selectively mute\"  HISTORY OF PRESENT ILLNESS/INTERVAL HISTORY:  (reviewed/updated 12/12/2017). per initial evaluation: The patient, Raquel Laurent, is a 49 y.o. WHITE OR  male with a past psychiatric history significant for schizophrenia, who presents at this time with complaints of (and/or evidence of) the following emotional symptoms: psychotic behavior. Additional symptomatology include paranoid delusion, sexually inappropriate behavior, pepisodes of yelling screaming followed by selectively mute, disorganized thought process, anxiety, concern about health problems, difficulty sleeping, fearfulness, hearing voices, increased irritability, poor concentration and problem with medication. The above symptoms have been present for many years. These symptoms are of severe severity. These symptoms are constant  in nature. The patient's condition has been precipitated by and psychosocial stressors (conflict with sister, non compliance ). Patient's condition made worse by treatment noncompliance. UDS: negative; BAL=0. Raquel Laurent presents/reports/evidences the following emotional symptoms today, 12/12/2017:psychotic behavior. The above symptoms have been present for many years. These symptoms are of severe severity. The symptoms are constant in nature. Additional symptomatology and features include     12/1- minimal change, poor hygiene. Accepting med but refusing most of his labs. Sl better affect and more social  12/2-Remains confined to his room mostly and voluntarily. Paces in his room. No eye contact. Poor hygiene and disorganized room.  No prn's used. 12/3- Slept 7 hours. Mostly isolates in his room, frequently pacing, minimal socialization, selectively mute, poor self care. No prn's needed. Compliant with meds. 12/4- isolative, poor hygiene and poor social interaction. Negative sx+. Refusing labs and EKG, soft speech, delusional and disorganized  12/5- minimal change- sig negative sx, poor hygiene, refusing labs, disorganized and isolative  12/6- no sig change-negative sx, refusing labs, concern about cheeking behavior. Paranoid  12/7- minimal change- came out to the day room for his breakfast. Pt curled up in his bed and selectively mute. Eyes closed even though he was seen opening his eyes few minutes ago. Passive  12/8- minimal interaction but accepting med and meal. Pt remains disorganized and delusional. Talking in a soft voice, still refusing labs  12/9-Prefers to isolate self in his room except coming out for meals. Minimal social interaction. Tp remains disorganized. Accepting meds. 12/11- minimal change- isolative and does not interact with pt/staff except going out for his Breakfast. Has refused med at times. Will lay in bed, poor hygiene. 12/12- affect is sl better, out of his room today and talked in a soft voice. Remains disorganized and delusional.      SIDE EFFECTS: (reviewed/updated 12/12/2017)  None reported or admitted to. No noted toxicity with use of current med   ALLERGIES:(reviewed/updated 12/12/2017)  Allergies   Allergen Reactions    Fluphenazine Unknown (comments)     Pt is unable to communicate properly.  Penicillins Unknown (comments)     Pt is unable to communicate properly. MEDICATIONS PRIOR TO ADMISSION:(reviewed/updated 12/12/2017)  Prescriptions Prior to Admission   Medication Sig    divalproex ER (DEPAKOTE ER) 500 mg ER tablet Take 1,500 mg by mouth nightly.  Indications: Treatment-resistant schizophrenia    haloperidol decanoate (HALDOL DECANOATE) 100 mg/mL injection 2 mL by IntraMUSCular route every twenty-eight (28) days. Indications: SCHIZOPHRENIA    benztropine (COGENTIN) 2 mg tablet Take 1 Tab by mouth nightly. Indications: extrapyramidal disease    cloZAPine 200 mg tablet Take 600 mg by mouth nightly. Indications: TREATMENT-RESISTANT SCHIZOPHRENIA      PAST MEDICAL HISTORY: Past medical history from the initial psychiatric evaluation has been reviewed (reviewed/updated 12/12/2017) with no additional updates (I asked patient and no additional past medical history provided). Past Medical History:   Diagnosis Date    Psychiatric disorder     Psychotic disorder     Withdrawal syndrome ([facility]    No past surgical history on file. SOCIAL HISTORY: Social history from the initial psychiatric evaluation has been reviewed (reviewed/updated 12/12/2017) with no additional updates (I asked patient and no additional social history provided). Social History     Social History    Marital status: SINGLE     Spouse name: N/A    Number of children: N/A    Years of education: N/A     Occupational History    Not on file. 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 He lives with his sister. On disability    No pending legal charge reported      FAMILY HISTORY: Family history from the initial psychiatric evaluation has been reviewed (reviewed/updated 12/12/2017) with no additional updates (I asked patient and no additional family history provided). No family history on file.     REVIEW OF SYSTEMS: (reviewed/updated 12/12/2017)  Appetite:no change from normal   Sleep: fitful   All other Review of Systems: Psychological ROS: positive for - behavioral disorder, concentration difficulties, hallucinations, irritability, mood swings and delusion  Respiratory ROS: no cough, shortness of breath, or wheezing  Cardiovascular ROS: no chest pain or dyspnea on exertion         [address] (MSE):    MSE FINDINGS ARE WITHIN NORMAL LIMITS (WNL) UNLESS OTHERWISE STATED BELOW. ( ALL OF THE BELOW CATEGORIES OF THE MSE HAVE BEEN REVIEWED (reviewed 12/12/2017) AND UPDATED AS DEEMED APPROPRIATE )  General Presentation age appropriate and disheveled, uncooperative and unreliable   Orientation disorganized   Vital Signs  See below (reviewed 12/12/2017); Vital Signs (BP, Pulse, & Temp) are within normal limits if not listed below. Gait and Station Stable/steady, no ataxia   Musculoskeletal System No extrapyramidal symptoms (EPS); no abnormal muscular movements or Tardive Dyskinesia (TD); muscle strength and tone are within normal limits   Language No aphasia or dysarthria   Speech:  mute   Thought Processes illogical; slow rate of thoughts; poor abstract reasoning/computation   Thought Associations blocked    Thought Content paranoid delusions, bizarre delusions, auditory hallucinations and internally preoccupied   Suicidal Ideations none   Homicidal Ideations none   Mood:  Labile mood   Affect:  Constricted, labile inappropriate and increased in intensity   Memory recent  impaired   Memory remote:  intact   Concentration/Attention:  distractable and hypervigilance   Fund of Knowledge average   Insight:  limited   Reliability poor   Judgment:  limited          VITALS:     No data found.     Wt Readings from Last 3 Encounters:   03/25/17 95.6 kg (210 lb 11.2 oz)   02/09/15 97.5 kg (215 lb)   02/07/15 97.5 kg (215 lb)     Temp Readings from Last 3 Encounters:   03/28/17 97.5 °F (36.4 °C)   02/07/15 98.7 °F (37.1 °C)     BP Readings from Last 3 Encounters:   03/28/17 100/68   02/12/15 105/73   02/07/15 148/79     Pulse Readings from Last 3 Encounters:   03/28/17 67   02/12/15 87   02/07/15 (!) 111            DATA     LABORATORY DATA:(reviewed/updated 12/12/2017)  No results found for this or any previous visit (from the past 24 hour(s)). Lab Results   Component Value Date/Time    Valproic acid 88 12/09/2017 08:10 AM     No results found for: LITHM   RADIOLOGY REPORTS:(reviewed/updated 12/12/2017)  No results found. MEDICATIONS     ALL MEDICATIONS:      SCHEDULED MEDICATIONS:          ASSESSMENT & PLAN     DIAGNOSES REQUIRING ACTIVE TREATMENT AND MONITORING: (reviewed/updated 12/12/2017)  Patient Active Hospital Problem List:   Paranoid schizophrenia ([facility] (3/15/2017)- tx resistant    Assessment: sl better- more social and accepting med - psychotic, -ve sx, poor insight- accepting med      Plan: I will ct to adjust med- Prozac, Haldol Dec and Zydis    Patient is on two antipsychotics now due to the relative refractoriness to treatment thus far. Prior to admission patient had THREE or more failed trails of monotherapy, including: Haldol, Zyprexa, Risperdal and Clozapine. The patient is a very slow responder to medications. Risks and benefits in the use of two antipsychotics have been weighed in full, including the risk of metabolic syndrome and the potential increased risk of QTc  Based on this analysis, it is considered favorable for the utilization of two antipsychotics. In the future, once stable on the two antipsychotics, patient can possibly be tapered to one of the chosen antipsychotics. Will check on EKG results today to monitor QTc.- refusing EKG    Non compliance with tx- request court order med. - granted        I will continue to monitor blood levels (Depakote,, clozapine---a drug with a narrow therapeutic index= NTI) and associated labs for drug therapy implemented that require intense monitoring for toxicity as deemed appropriate based on current medication side effects and pharmacodynamically determined drug 1/2 lives.     In summary, Raquel Laurent, is a 49 y.o.  male who presents with a severe exacerbation of the principal diagnosis of Paranoid schizophrenia ([facility]  Patient's condition is worsening/not improving/not stable . Patient requires continued inpatient hospitalization for further stabilization, safety monitoring and medication management. I will continue to coordinate the provision of individual, milieu, occupational, group, and substance abuse therapies to address target symptoms/diagnoses as deemed appropriate for the individual patient. A coordinated, multidisplinary treatment team round was conducted with the patient (this team consists of the nurse, psychiatric unit pharmcist,  and writer). Complete current electronic health record for patient has been reviewed today including consultant notes, ancillary staff notes, nurses and psychiatric tech notes. Suicide risk assessment completed and patient deemed to be of low risk for suicide at this time. The following regarding medications was addressed during rounds with patient:   the risks and benefits of the proposed medication. The patient was given the opportunity to ask questions. Informed consent given to the use of the above medications. Will continue to adjust psychiatric and non-psychiatric medications (see above \"medication\" section and orders section for details) as deemed appropriate & based upon diagnoses and response to treatment. I will continue to order blood tests/labs and diagnostic tests as deemed appropriate and review results as they become available (see orders for details and above listed lab/test results). I will order psychiatric records from previous [facility] hospitals to further elucidate the nature of patient's psychopathology and review once available. I will gather additional collateral information from friends, family and o/p treatment team to further elucidate the nature of patient's psychopathology and baselline level of psychiatric functioning.          I certify that this patient's inpatient psychiatric hospital services furnished since the previous certification were, and continue to be, required for treatment that could reasonably be expected to improve the patient's condition, or for diagnostic study, and that the patient continues to need, on a daily basis, active treatment furnished directly by or requiring the supervision of inpatient psychiatric facility personnel. In addition, the hospital records show that services furnished were intensive treatment services, admission or related services, or equivalent services.     EXPECTED DISCHARGE DATE/DAY: TBD     DISPOSITION: Home       Signed By:   Ros Lechuga MD  12/12/2017

## 2017-12-12 NOTE — PROGRESS NOTES
Problem: Altered Thought Process (Adult/Pediatric)  Goal: *STG: Participates in treatment plan  [facility]  Master Treatment Plan for Michaell Grief    Date Treatment Plan Initiated: 11/25/17    Treatment Plan Modalities:    Type of Modality Amount(x minutes) Frequency (x/week) Duration (x days) Name of Responsible Staff    [facility] meetings to encourage peer interactions   x60 minutes, 14x/week, x7 days                                                                                                     MEG Person       Group psychotherapy to assist in building coping skills and internal controls        Therapeutic activity groups to build coping skills  x60 minutes, 7x/week, x 7 days                                                                                [facility]        Psychoeducation in group setting to address:    Medication education        Coping skills        Relaxation techniques        Symptom management        Discharge planning        Spirituality         CHARLIE        Recovery/AA/NA        Physician medication management  7x/week          X 7days                                              Dr. Cristino Ambrosio        Family meeting/discharge planning                                     Outcome: Progressing Towards Goal  Pt is alert. He is compliant with medications and meals. Pt continues to isolate. His hygiene is poor. Pt is selectively mute, pacing and behaving bizarrely. Pt does not appear to be in any distress at this time. Will continue to monitor pt q15 minutes for safety and support.

## 2017-12-12 NOTE — PROGRESS NOTES
Problem: Altered Thought Process (Adult/Pediatric)  Goal: *STG: Remains safe in hospital  Outcome: Progressing Towards Goal  Pt remains preoccupied,isolative and bizarre. Pt was observed crawling in the hallway. Redirectable. Eats meals in dayroom but does not participate in group. Compliant with medications. Encouraged to take a shower and participate in unit activities. Will continue to monitor pt and assess needs.

## 2017-12-13 PROCEDURE — 65220000001 HC RM PRIVATE PSYCH

## 2017-12-13 PROCEDURE — 74011250637 HC RX REV CODE- 250/637: Performed by: PSYCHIATRY & NEUROLOGY

## 2017-12-13 RX ORDER — OLANZAPINE 5 MG/1
10 TABLET, ORALLY DISINTEGRATING ORAL DAILY
Status: DISCONTINUED | OUTPATIENT
Start: 2017-12-14 | End: 2017-12-15

## 2017-12-13 RX ADMIN — OLANZAPINE 5 MG: 5 TABLET, ORALLY DISINTEGRATING ORAL at 08:27

## 2017-12-13 RX ADMIN — OLANZAPINE 20 MG: 5 TABLET, ORALLY DISINTEGRATING ORAL at 22:15

## 2017-12-13 RX ADMIN — BENZTROPINE MESYLATE 2 MG: 2 TABLET ORAL at 22:14

## 2017-12-13 RX ADMIN — FLUOXETINE 40 MG: 20 CAPSULE ORAL at 08:27

## 2017-12-13 RX ADMIN — DIVALPROEX SODIUM 1500 MG: 500 TABLET, FILM COATED, EXTENDED RELEASE ORAL at 22:15

## 2017-12-13 NOTE — PROGRESS NOTES
Problem: Altered Thought Process (Adult/Pediatric)  Goal: *STG: Remains safe in hospital  Outcome: Progressing Towards Goal  Pt remains withdrawn to self. Poor hygiene. Compliant with meals and medications.  to call sister to visit pt. Encouraged pt to take a shower. Will continue safety checks and anticipate needs.

## 2017-12-13 NOTE — PROGRESS NOTES
Problem: Altered Thought Process (Adult/Pediatric)  Goal: *STG: Remains safe in hospital  Outcome: Not Progressing Towards Goal  Pt is alert but not oriented to situation. Pt is disheveled and disorganized. Pt has been in his room all shift. Pt has poor eye contact and thought blocking. Pt is compliant with meds but needs coaxing. Pt eats his meals in his room. Pt has no eye contact and is selectively mute when approached by staff, pt does not attend groups or interact with peers. Assist to reality test, assess mood and behavior, encourage to verbalize thoughts and feeling, med and illness teaching, encourage participation in tx plan, observe on q15' checks.

## 2017-12-13 NOTE — PROGRESS NOTES
Problem: Altered Thought Process (Adult/Pediatric)  Goal: *STG: Complies with medication therapy  Outcome: Progressing Towards Goal  Pt remains withdrawn to self. No interaction with this staff upon approach. Pt makes no eye contact. Affect is flat, mood is guarded. Hygiene is poor, patient appears disheveled. Gait is steady, patient pacing at times. Appetite patterns WNL per BHTs. No evidence of intent to harm self or others. Will continue to monitor pt and prompt for participation in care.

## 2017-12-14 PROCEDURE — 65220000001 HC RM PRIVATE PSYCH

## 2017-12-14 PROCEDURE — 74011250637 HC RX REV CODE- 250/637: Performed by: PSYCHIATRY & NEUROLOGY

## 2017-12-14 RX ADMIN — OLANZAPINE 10 MG: 5 TABLET, ORALLY DISINTEGRATING ORAL at 10:44

## 2017-12-14 RX ADMIN — FLUOXETINE 40 MG: 20 CAPSULE ORAL at 09:39

## 2017-12-14 RX ADMIN — OLANZAPINE 20 MG: 5 TABLET, ORALLY DISINTEGRATING ORAL at 21:24

## 2017-12-14 RX ADMIN — BENZTROPINE MESYLATE 2 MG: 2 TABLET ORAL at 21:24

## 2017-12-14 RX ADMIN — DIVALPROEX SODIUM 1500 MG: 500 TABLET, FILM COATED, EXTENDED RELEASE ORAL at 21:24

## 2017-12-14 NOTE — BH NOTES
PSYCHIATRIC PROGRESS NOTE         Patient Name  Miguelangel Faulkner   Date of Birth 1970   [facility] [id number]   Medical Record Number  [id number]      Age  49 y.o. PCP PROVIDER UNKNOWN   Admit date:  11/25/2017    Room Number  307/01  @ [facility]   Date of Service  12/13/2017           E & M PROGRESS NOTE:         HISTORY       CC:  \"selectively mute\"  HISTORY OF PRESENT ILLNESS/INTERVAL HISTORY:  (reviewed/updated 12/13/2017). per initial evaluation: The patient, Miguelangel Faulkner, is a 49 y.o. WHITE OR  male with a past psychiatric history significant for schizophrenia, who presents at this time with complaints of (and/or evidence of) the following emotional symptoms: psychotic behavior. Additional symptomatology include paranoid delusion, sexually inappropriate behavior, pepisodes of yelling screaming followed by selectively mute, disorganized thought process, anxiety, concern about health problems, difficulty sleeping, fearfulness, hearing voices, increased irritability, poor concentration and problem with medication. The above symptoms have been present for many years. These symptoms are of severe severity. These symptoms are constant  in nature. The patient's condition has been precipitated by and psychosocial stressors (conflict with sister, non compliance ). Patient's condition made worse by treatment noncompliance. UDS: negative; BAL=0. Miguelangel Faulkner presents/reports/evidences the following emotional symptoms today, 12/13/2017:psychotic behavior. The above symptoms have been present for many years. These symptoms are of severe severity. The symptoms are constant in nature. Additional symptomatology and features include       12/9-Prefers to isolate self in his room except coming out for meals. Minimal social interaction. Tp remains disorganized. Accepting meds.   12/11- minimal change- isolative and does not interact with pt/staff except going out for his Breakfast. Has refused med at times. Will lay in bed, poor hygiene. 12/12- affect is sl better, out of his room today and talked in a soft voice. Remains disorganized and delusional.  12/13- poor hygiene and disorganized, will lay in bed with eyes closed, pt was able to get up and talk when told that we are discussing his dc planning. Talks in a soft voice. Accepting med but need lot of prompting to do his ADLs      SIDE EFFECTS: (reviewed/updated 12/13/2017)  None reported or admitted to. No noted toxicity with use of current med   ALLERGIES:(reviewed/updated 12/13/2017)  Allergies   Allergen Reactions    Fluphenazine Unknown (comments)     Pt is unable to communicate properly.  Penicillins Unknown (comments)     Pt is unable to communicate properly. MEDICATIONS PRIOR TO ADMISSION:(reviewed/updated 12/13/2017)  Prescriptions Prior to Admission   Medication Sig    divalproex ER (DEPAKOTE ER) 500 mg ER tablet Take 1,500 mg by mouth nightly. Indications: Treatment-resistant schizophrenia    haloperidol decanoate (HALDOL DECANOATE) 100 mg/mL injection 2 mL by IntraMUSCular route every twenty-eight (28) days. Indications: SCHIZOPHRENIA    benztropine (COGENTIN) 2 mg tablet Take 1 Tab by mouth nightly. Indications: extrapyramidal disease    cloZAPine 200 mg tablet Take 600 mg by mouth nightly. Indications: TREATMENT-RESISTANT SCHIZOPHRENIA      PAST MEDICAL HISTORY: Past medical history from the initial psychiatric evaluation has been reviewed (reviewed/updated 12/13/2017) with no additional updates (I asked patient and no additional past medical history provided). Past Medical History:   Diagnosis Date    Psychiatric disorder     Psychotic disorder     Withdrawal syndrome ([facility] [address]    No past surgical history on file.    SOCIAL HISTORY: Social history from the initial psychiatric evaluation has been reviewed (reviewed/updated 12/13/2017) with no additional updates (I asked patient and no additional social history provided). Social History     Social History    Marital status: SINGLE     Spouse name: N/A    Number of children: N/A    Years of education: N/A     Occupational History    Not on file. 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 He lives with his sister. On disability    No pending legal charge reported      FAMILY HISTORY: Family history from the initial psychiatric evaluation has been reviewed (reviewed/updated 12/13/2017) with no additional updates (I asked patient and no additional family history provided). No family history on file. REVIEW OF SYSTEMS: (reviewed/updated 12/13/2017)  Appetite:no change from normal   Sleep: fitful   All other Review of Systems: Psychological ROS: positive for - behavioral disorder, concentration difficulties, hallucinations, irritability, mood swings and delusion  Respiratory ROS: no cough, shortness of breath, or wheezing  Cardiovascular ROS: no chest pain or dyspnea on exertion         [address] ([facility]):    [facility] FINDINGS ARE WITHIN NORMAL LIMITS (WNL) UNLESS OTHERWISE STATED BELOW. ( ALL OF THE BELOW CATEGORIES OF THE MSE HAVE BEEN REVIEWED (reviewed 12/13/2017) AND UPDATED AS DEEMED APPROPRIATE )  General Presentation age appropriate and disheveled, uncooperative and unreliable   Orientation disorganized   Vital Signs  See below (reviewed 12/13/2017); Vital Signs (BP, Pulse, & Temp) are within normal limits if not listed below.    Gait and Station Stable/steady, no ataxia   Musculoskeletal System No extrapyramidal symptoms (EPS); no abnormal muscular movements or Tardive Dyskinesia (TD); muscle strength and tone are within normal limits   Language No aphasia or dysarthria   Speech:  mute   Thought Processes illogical; slow rate of thoughts; poor abstract reasoning/computation   Thought Associations blocked    Thought Content paranoid delusions, bizarre delusions, auditory hallucinations and internally preoccupied   Suicidal Ideations none   Homicidal Ideations none   Mood:  Labile mood   Affect:  Constricted, labile inappropriate and increased in intensity   Memory recent  impaired   Memory remote:  intact   Concentration/Attention:  distractable and hypervigilance   Fund of Knowledge average   Insight:  limited   Reliability poor   Judgment:  limited          VITALS:     No data found. Wt Readings from Last 3 Encounters:   03/25/17 95.6 kg (210 lb 11.2 oz)   02/09/15 97.5 kg (215 lb)   02/07/15 97.5 kg (215 lb)     Temp Readings from Last 3 Encounters:   03/28/17 97.5 °F (36.4 °C)   02/07/15 98.7 °F (37.1 °C)     BP Readings from Last 3 Encounters:   03/28/17 100/68   02/12/15 105/73   02/07/15 148/79     Pulse Readings from Last 3 Encounters:   03/28/17 67   02/12/15 87   02/07/15 (!) 111            DATA     LABORATORY DATA:(reviewed/updated 12/13/2017)  No results found for this or any previous visit (from the past 24 hour(s)). Lab Results   Component Value Date/Time    Valproic acid 88 12/09/2017 08:10 AM     No results found for: LITHM   RADIOLOGY REPORTS:(reviewed/updated 12/13/2017)  No results found.        MEDICATIONS     ALL MEDICATIONS:      SCHEDULED MEDICATIONS:          ASSESSMENT & PLAN     DIAGNOSES REQUIRING ACTIVE TREATMENT AND MONITORING: (reviewed/updated 12/13/2017)  Patient Active Hospital Problem List:   Paranoid schizophrenia ([facility] [address] (3/15/2017)- tx resistant, chronic and with negative sx    Assessment: sl better- affect is sl better, more social and accepting med - psychotic, -ve sx, poor insight- accepting med      Plan: I will ct to adjust med- Prozac, Haldol Dec and Zydis dose titrated    Patient is on two antipsychotics now due to the relative refractoriness to treatment thus far.  Prior to admission patient had THREE or more failed trails of monotherapy, including: Haldol, Zyprexa, Risperdal and Clozapine. The patient is a very slow responder to medications. Risks and benefits in the use of two antipsychotics have been weighed in full, including the risk of metabolic syndrome and the potential increased risk of QTc  Based on this analysis, it is considered favorable for the utilization of two antipsychotics. In the future, once stable on the two antipsychotics, patient can possibly be tapered to one of the chosen antipsychotics. Will check on EKG results today to monitor QTc.- refusing EKG    Non compliance with tx- request court order med. - granted        I will continue to monitor blood levels (Depakote,, clozapine---a drug with a narrow therapeutic index= NTI) and associated labs for drug therapy implemented that require intense monitoring for toxicity as deemed appropriate based on current medication side effects and pharmacodynamically determined drug 1/2 lives.-88 therapeutic    In summary, Diana Coronado, is a 49 y.o.  male who presents with a severe exacerbation of the principal diagnosis of Paranoid schizophrenia ([facility] [address]  Patient's condition is worsening/not improving/not stable . Patient requires continued inpatient hospitalization for further stabilization, safety monitoring and medication management. I will continue to coordinate the provision of individual, milieu, occupational, group, and substance abuse therapies to address target symptoms/diagnoses as deemed appropriate for the individual patient. A coordinated, multidisplinary treatment team round was conducted with the patient (this team consists of the nurse, psychiatric unit pharmcist,  and writer). Complete current electronic health record for patient has been reviewed today including consultant notes, ancillary staff notes, nurses and psychiatric tech notes.     Suicide risk assessment completed and patient deemed to be of low risk for suicide at this time. The following regarding medications was addressed during rounds with patient:   the risks and benefits of the proposed medication. The patient was given the opportunity to ask questions. Informed consent given to the use of the above medications. Will continue to adjust psychiatric and non-psychiatric medications (see above \"medication\" section and orders section for details) as deemed appropriate & based upon diagnoses and response to treatment. I will continue to order blood tests/labs and diagnostic tests as deemed appropriate and review results as they become available (see orders for details and above listed lab/test results). I will order psychiatric records from previous [facility] hospitals to further elucidate the nature of patient's psychopathology and review once available. I will gather additional collateral information from friends, family and o/p treatment team to further elucidate the nature of patient's psychopathology and baselline level of psychiatric functioning. I certify that this patient's inpatient psychiatric hospital services furnished since the previous certification were, and continue to be, required for treatment that could reasonably be expected to improve the patient's condition, or for diagnostic study, and that the patient continues to need, on a daily basis, active treatment furnished directly by or requiring the supervision of inpatient psychiatric facility personnel. In addition, the hospital records show that services furnished were intensive treatment services, admission or related services, or equivalent services.     EXPECTED DISCHARGE DATE/DAY: TBD     DISPOSITION: Home       Signed By:   Sahra Ugalde MD  12/13/2017

## 2017-12-14 NOTE — PROGRESS NOTES
Problem: Altered Thought Process (Adult/Pediatric)  Goal: *STG: Remains safe in hospital  Outcome: Progressing Towards Goal  Patient remains isolative to self. Patient continues to be preoccupied. Poor hygiene. Isolative. Medication and meal complaint. encourged to attend groups. Will continue to monitor patient and assess needs.

## 2017-12-14 NOTE — PROGRESS NOTES
Problem: Altered Thought Process (Adult/Pediatric)  Goal: *STG: Remains safe in hospital  Client continues to isolate in his room. When he does come out for meals he does not interact with the others, eats and then returns immediately to his room. Hygiene remains poor, needs lots of encouragement. When he allows staff to take his b/p, he then flexes his muscles so the readings are not correct. Does take his meds as ordered. Q 15 min checks for safety continue.

## 2017-12-14 NOTE — BH NOTES
PSYCHIATRIC PROGRESS NOTE         Patient Name  Raquel Laurent   Date of Birth 1970   [facility] [id number]   Medical Record Number  [id number]      Age  49 y.o. PCP PROVIDER UNKNOWN   Admit date:  11/25/2017    Room Number  307/01  @ [facility]   Date of Service  12/14/2017           E & M PROGRESS NOTE:         HISTORY       CC:  \"selectively mute\"  HISTORY OF PRESENT ILLNESS/INTERVAL HISTORY:  (reviewed/updated 12/14/2017). per initial evaluation: The patient, Raquel Laurent, is a 49 y.o. WHITE OR  male with a past psychiatric history significant for schizophrenia, who presents at this time with complaints of (and/or evidence of) the following emotional symptoms: psychotic behavior. Additional symptomatology include paranoid delusion, sexually inappropriate behavior, pepisodes of yelling screaming followed by selectively mute, disorganized thought process, anxiety, concern about health problems, difficulty sleeping, fearfulness, hearing voices, increased irritability, poor concentration and problem with medication. The above symptoms have been present for many years. These symptoms are of severe severity. These symptoms are constant  in nature. The patient's condition has been precipitated by and psychosocial stressors (conflict with sister, non compliance ). Patient's condition made worse by treatment noncompliance. UDS: negative; BAL=0. Raquel Laurent presents/reports/evidences the following emotional symptoms today, 12/14/2017:psychotic behavior. The above symptoms have been present for many years. These symptoms are of severe severity. The symptoms are constant in nature. Additional symptomatology and features include       12/9-Prefers to isolate self in his room except coming out for meals. Minimal social interaction. Tp remains disorganized. Accepting meds.   12/11- minimal change- isolative and does not interact with pt/staff except going out for his Breakfast. Has refused med at times. Will lay in bed, poor hygiene. 12/12- affect is sl better, out of his room today and talked in a soft voice. Remains disorganized and delusional.  12/13- poor hygiene and disorganized, will lay in bed with eyes closed, pt was able to get up and talk when told that we are discussing his dc planning. Talks in a soft voice. Accepting med but need lot of prompting to do his ADLs  12/14- affect is sl better, tendency to lay in bed, will only talk when we talk about dc planning      SIDE EFFECTS: (reviewed/updated 12/14/2017)  None reported or admitted to. No noted toxicity with use of current med   ALLERGIES:(reviewed/updated 12/14/2017)  Allergies   Allergen Reactions    Fluphenazine Unknown (comments)     Pt is unable to communicate properly.  Penicillins Unknown (comments)     Pt is unable to communicate properly. MEDICATIONS PRIOR TO ADMISSION:(reviewed/updated 12/14/2017)  Prescriptions Prior to Admission   Medication Sig    divalproex ER (DEPAKOTE ER) 500 mg ER tablet Take 1,500 mg by mouth nightly. Indications: Treatment-resistant schizophrenia    haloperidol decanoate (HALDOL DECANOATE) 100 mg/mL injection 2 mL by IntraMUSCular route every twenty-eight (28) days. Indications: SCHIZOPHRENIA    benztropine (COGENTIN) 2 mg tablet Take 1 Tab by mouth nightly. Indications: extrapyramidal disease    cloZAPine 200 mg tablet Take 600 mg by mouth nightly. Indications: TREATMENT-RESISTANT SCHIZOPHRENIA      PAST MEDICAL HISTORY: Past medical history from the initial psychiatric evaluation has been reviewed (reviewed/updated 12/14/2017) with no additional updates (I asked patient and no additional past medical history provided). Past Medical History:   Diagnosis Date    Psychiatric disorder     Psychotic disorder     Withdrawal syndrome ([facility] [address]    No past surgical history on file.    SOCIAL HISTORY: Social history from the initial psychiatric evaluation has been reviewed (reviewed/updated 12/14/2017) with no additional updates (I asked patient and no additional social history provided). Social History     Social History    Marital status: SINGLE     Spouse name: N/A    Number of children: N/A    Years of education: N/A     Occupational History    Not on file. 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 He lives with his sister. On disability    No pending legal charge reported      FAMILY HISTORY: Family history from the initial psychiatric evaluation has been reviewed (reviewed/updated 12/14/2017) with no additional updates (I asked patient and no additional family history provided). No family history on file. REVIEW OF SYSTEMS: (reviewed/updated 12/14/2017)  Appetite:no change from normal   Sleep: fitful   All other Review of Systems: Psychological ROS: positive for - behavioral disorder, concentration difficulties, hallucinations, irritability, mood swings and delusion  Respiratory ROS: no cough, shortness of breath, or wheezing  Cardiovascular ROS: no chest pain or dyspnea on exertion         [facility] (MSE):    MSE FINDINGS ARE WITHIN NORMAL LIMITS (WNL) UNLESS OTHERWISE STATED BELOW. ( ALL OF THE BELOW CATEGORIES OF THE MSE HAVE BEEN REVIEWED (reviewed 12/14/2017) AND UPDATED AS DEEMED APPROPRIATE )  General Presentation age appropriate and disheveled, uncooperative and unreliable   Orientation disorganized   Vital Signs  See below (reviewed 12/14/2017); Vital Signs (BP, Pulse, & Temp) are within normal limits if not listed below.    Gait and Station Stable/steady, no ataxia   Musculoskeletal System No extrapyramidal symptoms (EPS); no abnormal muscular movements or Tardive Dyskinesia (TD); muscle strength and tone are within normal limits   Language No aphasia or dysarthria   Speech:  mute   Thought Processes illogical; slow rate of thoughts; poor abstract reasoning/computation   Thought Associations blocked    Thought Content paranoid delusions, bizarre delusions, auditory hallucinations and internally preoccupied   Suicidal Ideations none   Homicidal Ideations none   Mood:  Labile mood   Affect:  Constricted, labile inappropriate and increased in intensity   Memory recent  impaired   Memory remote:  intact   Concentration/Attention:  distractable and hypervigilance   Fund of Knowledge average   Insight:  limited   Reliability poor   Judgment:  limited          VITALS:     No data found. Wt Readings from Last 3 Encounters:   03/25/17 95.6 kg (210 lb 11.2 oz)   02/09/15 97.5 kg (215 lb)   02/07/15 97.5 kg (215 lb)     Temp Readings from Last 3 Encounters:   03/28/17 97.5 °F (36.4 °C)   02/07/15 98.7 °F (37.1 °C)     BP Readings from Last 3 Encounters:   03/28/17 100/68   02/12/15 105/73   02/07/15 148/79     Pulse Readings from Last 3 Encounters:   03/28/17 67   02/12/15 87   02/07/15 (!) 111            DATA     LABORATORY DATA:(reviewed/updated 12/14/2017)  No results found for this or any previous visit (from the past 24 hour(s)). Lab Results   Component Value Date/Time    Valproic acid 88 12/09/2017 08:10 AM     No results found for: LITHM   RADIOLOGY REPORTS:(reviewed/updated 12/14/2017)  No results found.        MEDICATIONS     ALL MEDICATIONS:      SCHEDULED MEDICATIONS:          ASSESSMENT & PLAN     DIAGNOSES REQUIRING ACTIVE TREATMENT AND MONITORING: (reviewed/updated 12/14/2017)  Patient Active Hospital Problem List:   Paranoid schizophrenia ([facility] [place] [address] (3/15/2017)- tx resistant, chronic and with negative sx    Assessment: sl better- affect is sl better, more social and accepting med - psychotic, -ve sx, poor insight- accepting med      Plan: I will ct to adjust med- Prozac, Haldol Dec - next dose due 12/19. and Zydis dose titrated    Patient is on two antipsychotics now due to the relative refractoriness to treatment thus far. Prior to admission patient had THREE or more failed trails of monotherapy, including: Haldol, Zyprexa, Risperdal and Clozapine. The patient is a very slow responder to medications. Risks and benefits in the use of two antipsychotics have been weighed in full, including the risk of metabolic syndrome and the potential increased risk of QTc  Based on this analysis, it is considered favorable for the utilization of two antipsychotics. In the future, once stable on the two antipsychotics, patient can possibly be tapered to one of the chosen antipsychotics. Will check on EKG results today to monitor QTc.- refusing EKG    Non compliance with tx- request court order med. - granted        I will continue to monitor blood levels (Depakote,, clozapine---a drug with a narrow therapeutic index= NTI) and associated labs for drug therapy implemented that require intense monitoring for toxicity as deemed appropriate based on current medication side effects and pharmacodynamically determined drug 1/2 lives.-88 therapeutic    In summary, Zahra Mo, is a 49 y.o.  male who presents with a severe exacerbation of the principal diagnosis of Paranoid schizophrenia ([facility] [address]  Patient's condition is worsening/not improving/not stable . Patient requires continued inpatient hospitalization for further stabilization, safety monitoring and medication management. I will continue to coordinate the provision of individual, milieu, occupational, group, and substance abuse therapies to address target symptoms/diagnoses as deemed appropriate for the individual patient. A coordinated, multidisplinary treatment team round was conducted with the patient (this team consists of the nurse, psychiatric unit pharmcist,  and writer).      Complete current electronic health record for patient has been reviewed today including consultant notes, ancillary staff notes, nurses and psychiatric tech notes. Suicide risk assessment completed and patient deemed to be of low risk for suicide at this time. The following regarding medications was addressed during rounds with patient:   the risks and benefits of the proposed medication. The patient was given the opportunity to ask questions. Informed consent given to the use of the above medications. Will continue to adjust psychiatric and non-psychiatric medications (see above \"medication\" section and orders section for details) as deemed appropriate & based upon diagnoses and response to treatment. I will continue to order blood tests/labs and diagnostic tests as deemed appropriate and review results as they become available (see orders for details and above listed lab/test results). I will order psychiatric records from previous [facility] hospitals to further elucidate the nature of patient's psychopathology and review once available. I will gather additional collateral information from friends, family and o/p treatment team to further elucidate the nature of patient's psychopathology and baselline level of psychiatric functioning. I certify that this patient's inpatient psychiatric hospital services furnished since the previous certification were, and continue to be, required for treatment that could reasonably be expected to improve the patient's condition, or for diagnostic study, and that the patient continues to need, on a daily basis, active treatment furnished directly by or requiring the supervision of inpatient psychiatric facility personnel. In addition, the hospital records show that services furnished were intensive treatment services, admission or related services, or equivalent services.     EXPECTED DISCHARGE DATE/DAY: TBD     DISPOSITION: Home       Signed By:   Shawn Russo MD  12/14/2017

## 2017-12-14 NOTE — PROGRESS NOTES
Problem: Altered Thought Process (Adult/Pediatric)  Goal: *STG: Remains safe in hospital  Outcome: Progressing Towards Goal  Pt is alert but disoriented to situation and date. Pt is selectively mute and has poor eye contact. Pt has been isolative to room all of shift. Pt is disheveled and disorganized. Pt eats his meals in his room and needs coaxing with meds. Hygiene is poor and pt needs encouragement to complete adls. Pt refused his shower. Assist to reality test, assess mood and behavior, encourage to verbalize thoughts and feeling, med and illness teaching, encourage participation in tx plan, observe on q15' checks.

## 2017-12-15 PROCEDURE — 74011250637 HC RX REV CODE- 250/637: Performed by: PSYCHIATRY & NEUROLOGY

## 2017-12-15 PROCEDURE — 65220000001 HC RM PRIVATE PSYCH

## 2017-12-15 RX ORDER — OLANZAPINE 5 MG/1
30 TABLET, ORALLY DISINTEGRATING ORAL
Status: DISCONTINUED | OUTPATIENT
Start: 2017-12-15 | End: 2018-01-02

## 2017-12-15 RX ADMIN — OLANZAPINE 30 MG: 5 TABLET, ORALLY DISINTEGRATING ORAL at 21:45

## 2017-12-15 RX ADMIN — OLANZAPINE 10 MG: 5 TABLET, ORALLY DISINTEGRATING ORAL at 08:37

## 2017-12-15 RX ADMIN — DIVALPROEX SODIUM 1500 MG: 500 TABLET, FILM COATED, EXTENDED RELEASE ORAL at 21:45

## 2017-12-15 RX ADMIN — FLUOXETINE 40 MG: 20 CAPSULE ORAL at 08:37

## 2017-12-15 RX ADMIN — BENZTROPINE MESYLATE 2 MG: 2 TABLET ORAL at 21:45

## 2017-12-15 NOTE — BH NOTES
GROUP THERAPY PROGRESS NOTE    Miguelangel Faulkner is participating in Coping Skill     Group time: 30 minutes    Personal goal for participation: Positive ways to cope    Goal orientation: Personal     Group therapy participation:  Pt did not attend    Therapeutic interventions reviewed and discussed: Yes    Impression of participation: N/A

## 2017-12-15 NOTE — BH NOTES
PSYCHIATRIC PROGRESS NOTE         Patient Name  Martin Camara   Date of Birth 1970   [facility] [id number]   Medical Record Number  [id number]      Age  49 y.o. PCP PROVIDER UNKNOWN   Admit date:  11/25/2017    Room Number  307/01  @ [facility]   Date of Service  12/15/2017           E & M PROGRESS NOTE:         HISTORY       CC:  \"selectively mute\"  HISTORY OF PRESENT ILLNESS/INTERVAL HISTORY:  (reviewed/updated 12/15/2017). per initial evaluation: The patient, Martin Camara, is a 49 y.o. WHITE [place]  male with a past psychiatric history significant for schizophrenia, who presents at this time with complaints of (and/or evidence of) the following emotional symptoms: psychotic behavior. Additional symptomatology include paranoid delusion, sexually inappropriate behavior, pepisodes of yelling screaming followed by selectively mute, disorganized thought process, anxiety, concern about health problems, difficulty sleeping, fearfulness, hearing voices, increased irritability, poor concentration and problem with medication. The above symptoms have been present for many years. These symptoms are of severe severity. These symptoms are constant  in nature. The patient's condition has been precipitated by and psychosocial stressors (conflict with sister, non compliance ). Patient's condition made worse by treatment noncompliance. UDS: negative; BAL=0. Martin Camara presents/reports/evidences the following emotional symptoms today, 12/15/2017:psychotic behavior. The above symptoms have been present for many years. These symptoms are of severe severity. The symptoms are constant in nature. Additional symptomatology and features include       12/9-Prefers to isolate self in his room except coming out for meals. Minimal social interaction. Tp remains disorganized. Accepting meds.   12/11- minimal change- isolative and does not interact with pt/staff except going out for his Breakfast. Has refused med at times. Will lay in bed, poor hygiene. 12/12- affect is sl better, out of his room today and talked in a soft voice. Remains disorganized and delusional.  12/13- poor hygiene and disorganized, will lay in bed with eyes closed, pt was able to get up and talk when told that we are discussing his dc planning. Talks in a soft voice. Accepting med but need lot of prompting to do his ADLs  12/14- affect is sl better, tendency to lay in bed, will only talk when we talk about dc planning  12/15- poor hygiene, selectively mute, disorganized and delusional. Poor insight      SIDE EFFECTS: (reviewed/updated 12/15/2017)  None reported or admitted to. No noted toxicity with use of current med   ALLERGIES:(reviewed/updated 12/15/2017)  Allergies   Allergen Reactions    Fluphenazine Unknown (comments)     Pt is unable to communicate properly.  Penicillins Unknown (comments)     Pt is unable to communicate properly. MEDICATIONS PRIOR TO ADMISSION:(reviewed/updated 12/15/2017)  Prescriptions Prior to Admission   Medication Sig    divalproex ER (DEPAKOTE ER) 500 mg ER tablet Take 1,500 mg by mouth nightly. Indications: Treatment-resistant schizophrenia    haloperidol decanoate (HALDOL DECANOATE) 100 mg/mL injection 2 mL by IntraMUSCular route every twenty-eight (28) days. Indications: SCHIZOPHRENIA    benztropine (COGENTIN) 2 mg tablet Take 1 Tab by mouth nightly. Indications: extrapyramidal disease    cloZAPine 200 mg tablet Take 600 mg by mouth nightly. Indications: TREATMENT-RESISTANT SCHIZOPHRENIA      PAST MEDICAL HISTORY: Past medical history from the initial psychiatric evaluation has been reviewed (reviewed/updated 12/15/2017) with no additional updates (I asked patient and no additional past medical history provided). Past Medical History:   Diagnosis Date    Psychiatric disorder     Psychotic disorder     Withdrawal syndrome ([facility] [address]    No past surgical history on file.    SOCIAL HISTORY: Social history from the initial psychiatric evaluation has been reviewed (reviewed/updated 12/15/2017) with no additional updates (I asked patient and no additional social history provided). Social History     Social History    Marital status: SINGLE     Spouse name: N/A    Number of children: N/A    Years of education: N/A     Occupational History    Not on file. 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 He lives with his sister. On disability    No pending legal charge reported      FAMILY HISTORY: Family history from the initial psychiatric evaluation has been reviewed (reviewed/updated 12/15/2017) with no additional updates (I asked patient and no additional family history provided). No family history on file. REVIEW OF SYSTEMS: (reviewed/updated 12/15/2017)  Appetite:no change from normal   Sleep: fitful   All other Review of Systems: Psychological ROS: positive for - behavioral disorder, concentration difficulties, hallucinations, irritability, mood swings and delusion  Respiratory ROS: no cough, shortness of breath, or wheezing  Cardiovascular ROS: no chest pain or dyspnea on exertion         [facility] (MSE):    MSE FINDINGS ARE WITHIN NORMAL LIMITS (WNL) UNLESS OTHERWISE STATED BELOW. ( ALL OF THE BELOW CATEGORIES OF THE MSE HAVE BEEN REVIEWED (reviewed 12/15/2017) AND UPDATED AS DEEMED APPROPRIATE )  General Presentation age appropriate and disheveled, uncooperative and unreliable   Orientation disorganized   Vital Signs  See below (reviewed 12/15/2017); Vital Signs (BP, Pulse, & Temp) are within normal limits if not listed below.    Gait and Station Stable/steady, no ataxia   Musculoskeletal System No extrapyramidal symptoms (EPS); no abnormal muscular movements or Tardive Dyskinesia (TD); muscle strength and tone are within normal limits   Language No aphasia or dysarthria   Speech:  mute   Thought Processes illogical; slow rate of thoughts; poor abstract reasoning/computation   Thought Associations blocked    Thought Content paranoid delusions, bizarre delusions, auditory hallucinations and internally preoccupied   Suicidal Ideations none   Homicidal Ideations none   Mood:  Labile mood   Affect:  Constricted, labile inappropriate and increased in intensity   Memory recent  impaired   Memory remote:  intact   Concentration/Attention:  distractable and hypervigilance   Fund of Knowledge average   Insight:  limited   Reliability poor   Judgment:  limited          VITALS:     No data found. Wt Readings from Last 3 Encounters:   03/25/17 95.6 kg (210 lb 11.2 oz)   02/09/15 97.5 kg (215 lb)   02/07/15 97.5 kg (215 lb)     Temp Readings from Last 3 Encounters:   03/28/17 97.5 °F (36.4 °C)   02/07/15 98.7 °F (37.1 °C)     BP Readings from Last 3 Encounters:   03/28/17 100/68   02/12/15 105/73   02/07/15 148/79     Pulse Readings from Last 3 Encounters:   03/28/17 67   02/12/15 87   02/07/15 (!) 111            DATA     LABORATORY DATA:(reviewed/updated 12/15/2017)  No results found for this or any previous visit (from the past 24 hour(s)). Lab Results   Component Value Date/Time    Valproic acid 88 12/09/2017 08:10 AM     No results found for: LITHM   RADIOLOGY REPORTS:(reviewed/updated 12/15/2017)  No results found.        MEDICATIONS     ALL MEDICATIONS:      SCHEDULED MEDICATIONS:          ASSESSMENT & PLAN     DIAGNOSES REQUIRING ACTIVE TREATMENT AND MONITORING: (reviewed/updated 12/15/2017)  Patient Active Hospital Problem List:   Paranoid schizophrenia ([facility] [address] (3/15/2017)- tx resistant, chronic and with negative sx    Assessment: sl better- affect is sl better, more social and accepting med - psychotic, -ve sx, poor insight- accepting med      Plan: I will ct to adjust med- Prozac, Haldol Dec - next dose due 12/19. zydis dose changed to HS    Patient is on two antipsychotics now due to the relative refractoriness to treatment thus far. Prior to admission patient had THREE or more failed trails of monotherapy, including: Haldol, Zyprexa, Risperdal and Clozapine. The patient is a very slow responder to medications. Risks and benefits in the use of two antipsychotics have been weighed in full, including the risk of metabolic syndrome and the potential increased risk of QTc  Based on this analysis, it is considered favorable for the utilization of two antipsychotics. In the future, once stable on the two antipsychotics, patient can possibly be tapered to one of the chosen antipsychotics. Will check on EKG results today to monitor QTc.- refusing EKG    Non compliance with tx- request court order med. - granted        I will continue to monitor blood levels (Depakote,, clozapine---a drug with a narrow therapeutic index= NTI) and associated labs for drug therapy implemented that require intense monitoring for toxicity as deemed appropriate based on current medication side effects and pharmacodynamically determined drug 1/2 lives.-88 therapeutic    In summary, Ene Marquez, is a 49 y.o.  male who presents with a severe exacerbation of the principal diagnosis of Paranoid schizophrenia ([facility] [address]  Patient's condition is worsening/not improving/not stable . Patient requires continued inpatient hospitalization for further stabilization, safety monitoring and medication management. I will continue to coordinate the provision of individual, milieu, occupational, group, and substance abuse therapies to address target symptoms/diagnoses as deemed appropriate for the individual patient. A coordinated, multidisplinary treatment team round was conducted with the patient (this team consists of the nurse, psychiatric unit pharmcist,  and writer). Complete current electronic health record for patient has been reviewed today including consultant notes, ancillary staff notes, nurses and psychiatric tech notes. Suicide risk assessment completed and patient deemed to be of low risk for suicide at this time. The following regarding medications was addressed during rounds with patient:   the risks and benefits of the proposed medication. The patient was given the opportunity to ask questions. Informed consent given to the use of the above medications. Will continue to adjust psychiatric and non-psychiatric medications (see above \"medication\" section and orders section for details) as deemed appropriate & based upon diagnoses and response to treatment. I will continue to order blood tests/labs and diagnostic tests as deemed appropriate and review results as they become available (see orders for details and above listed lab/test results). I will order psychiatric records from previous [facility] hospitals to further elucidate the nature of patient's psychopathology and review once available. I will gather additional collateral information from friends, family and o/p treatment team to further elucidate the nature of patient's psychopathology and baselline level of psychiatric functioning. I certify that this patient's inpatient psychiatric hospital services furnished since the previous certification were, and continue to be, required for treatment that could reasonably be expected to improve the patient's condition, or for diagnostic study, and that the patient continues to need, on a daily basis, active treatment furnished directly by or requiring the supervision of inpatient psychiatric facility personnel. In addition, the hospital records show that services furnished were intensive treatment services, admission or related services, or equivalent services.     EXPECTED DISCHARGE DATE/DAY: TBD     DISPOSITION: Home       Signed By:   Daniella Comer JOAN Berry MD  12/15/2017

## 2017-12-15 NOTE — BH NOTES
GROUP THERAPY PROGRESS NOTE    Rui Hyde is participating in Goals Group.      Group time: 30 minutes    Personal goal for participation: Set Daily Goal    Goal orientation: personal    Group therapy participation:  Pt did not attend    Therapeutic interventions reviewed and discussed: Yes    Impression of participation: N/A

## 2017-12-15 NOTE — PROGRESS NOTES
Problem: Altered Thought Process (Adult/Pediatric)  Goal: *STG: Complies with medication therapy  Outcome: Progressing Towards Goal  Pt is isolative from the milieu. Pt is blunted, mood limited and withdrawn. Pt is meds/meals compliant. Pt is not interested in coming out of room. Pt is not interested in interactions in the unit. Pt is maintained safe. Will continue to monitor q 15 for safety, mood and behavior changes.

## 2017-12-15 NOTE — BH NOTES
Problem: Altered Thought Process (Adult/Pediatric)  Goal: *STG: Complies with medication therapy  Outcome: Progressing Towards Goal  Pt is isolative from the milieu. Pt is flat and withdrawn. Pt is meds/meals compliant. Pt is not interested in coming out of his room. Pt is not interested in interactions in the unit. Will continue to monitor q 15 for safety, mood and behavior changes.

## 2017-12-16 PROCEDURE — 74011250637 HC RX REV CODE- 250/637: Performed by: PSYCHIATRY & NEUROLOGY

## 2017-12-16 PROCEDURE — 65220000001 HC RM PRIVATE PSYCH

## 2017-12-16 RX ORDER — LORAZEPAM 1 MG/1
1 TABLET ORAL 2 TIMES DAILY
Status: DISCONTINUED | OUTPATIENT
Start: 2017-12-16 | End: 2018-01-02

## 2017-12-16 RX ADMIN — FLUOXETINE 40 MG: 20 CAPSULE ORAL at 08:23

## 2017-12-16 RX ADMIN — OLANZAPINE 25 MG: 5 TABLET, ORALLY DISINTEGRATING ORAL at 22:05

## 2017-12-16 RX ADMIN — LORAZEPAM 1 MG: 1 TABLET ORAL at 17:12

## 2017-12-16 RX ADMIN — BENZTROPINE MESYLATE 2 MG: 2 TABLET ORAL at 22:08

## 2017-12-16 RX ADMIN — DIVALPROEX SODIUM 1500 MG: 500 TABLET, FILM COATED, EXTENDED RELEASE ORAL at 22:05

## 2017-12-16 NOTE — BH NOTES
GROUP THERAPY PROGRESS NOTE    [place] Son is participating in nursing education group.      Group time: 30 minutes    Personal goal for participation: medication teaching    Goal orientation: personal    Group therapy participation: passive    Therapeutic interventions reviewed and discussed: yes    Impression of participation: pt is not receptive teaching

## 2017-12-16 NOTE — BH NOTES
PSYCHIATRIC PROGRESS NOTE         Patient Name  Lucy Conway   Date of Birth 1970   [facility] [id number]   Medical Record Number  [id number]      Age  49 y.o. PCP PROVIDER UNKNOWN   Admit date:  11/25/2017    Room Number  307/01  @ [facility]   Date of Service  12/16/2017           E & M PROGRESS NOTE:         HISTORY       CC:  \"selectively mute\"  HISTORY OF PRESENT ILLNESS/INTERVAL HISTORY:  (reviewed/updated 12/16/2017). per initial evaluation: The patient, Lucy Son, is a 49 y.o. WHITE OR  male with a past psychiatric history significant for schizophrenia, who presents at this time with complaints of (and/or evidence of) the following emotional symptoms: psychotic behavior. Additional symptomatology include paranoid delusion, sexually inappropriate behavior, pepisodes of yelling screaming followed by selectively mute, disorganized thought process, anxiety, concern about health problems, difficulty sleeping, fearfulness, hearing voices, increased irritability, poor concentration and problem with medication. The above symptoms have been present for many years. These symptoms are of severe severity. These symptoms are constant  in nature. The patient's condition has been precipitated by and psychosocial stressors (conflict with sister, non compliance ). Patient's condition made worse by treatment noncompliance. UDS: negative; BAL=0. Lucy Son presents/reports/evidences the following emotional symptoms today, 12/16/2017:psychotic behavior. The above symptoms have been present for many years. These symptoms are of severe severity. The symptoms are constant in nature. Additional symptomatology and features include       12/9-Prefers to isolate self in his room except coming out for meals. Minimal social interaction. Tp remains disorganized. Accepting meds.   12/11- minimal change- isolative and does not interact with pt/staff except going out for his Breakfast. Has refused med at times. Will lay in bed, poor hygiene. 12/12- affect is sl better, out of his room today and talked in a soft voice. Remains disorganized and delusional.  12/13- poor hygiene and disorganized, will lay in bed with eyes closed, pt was able to get up and talk when told that we are discussing his dc planning. Talks in a soft voice. Accepting med but need lot of prompting to do his ADLs  12/14- affect is sl better, tendency to lay in bed, will only talk when we talk about dc planning  12/15- poor hygiene, selectively mute, disorganized and delusional. Poor insight  12/16- inappropriate posturing- sitting on the side of the bed and half naked. Selectively mute and isolative. poor hygiene      SIDE EFFECTS: (reviewed/updated 12/16/2017)  None reported or admitted to. No noted toxicity with use of current med   ALLERGIES:(reviewed/updated 12/16/2017)  Allergies   Allergen Reactions    Fluphenazine Unknown (comments)     Pt is unable to communicate properly.  Penicillins Unknown (comments)     Pt is unable to communicate properly. MEDICATIONS PRIOR TO ADMISSION:(reviewed/updated 12/16/2017)  Prescriptions Prior to Admission   Medication Sig    divalproex ER (DEPAKOTE ER) 500 mg ER tablet Take 1,500 mg by mouth nightly. Indications: Treatment-resistant schizophrenia    haloperidol decanoate (HALDOL DECANOATE) 100 mg/mL injection 2 mL by IntraMUSCular route every twenty-eight (28) days. Indications: SCHIZOPHRENIA    benztropine (COGENTIN) 2 mg tablet Take 1 Tab by mouth nightly. Indications: extrapyramidal disease    cloZAPine 200 mg tablet Take 600 mg by mouth nightly. Indications: TREATMENT-RESISTANT SCHIZOPHRENIA      PAST MEDICAL HISTORY: Past medical history from the initial psychiatric evaluation has been reviewed (reviewed/updated 12/16/2017) with no additional updates (I asked patient and no additional past medical history provided).    Past Medical History:   Diagnosis Date    Psychiatric disorder     Psychotic disorder     Withdrawal syndrome ([facility] [address]    No past surgical history on file. SOCIAL HISTORY: Social history from the initial psychiatric evaluation has been reviewed (reviewed/updated 12/16/2017) with no additional updates (I asked patient and no additional social history provided). Social History     Social History    Marital status: SINGLE     Spouse name: N/A    Number of children: N/A    Years of education: N/A     Occupational History    Not on file. 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 He lives with his sister. On disability    No pending legal charge reported      FAMILY HISTORY: Family history from the initial psychiatric evaluation has been reviewed (reviewed/updated 12/16/2017) with no additional updates (I asked patient and no additional family history provided). No family history on file. REVIEW OF SYSTEMS: (reviewed/updated 12/16/2017)  Appetite:no change from normal   Sleep: fitful   All other Review of Systems: Psychological ROS: positive for - behavioral disorder, concentration difficulties, hallucinations, irritability, mood swings and delusion  Respiratory ROS: no cough, shortness of breath, or wheezing  Cardiovascular ROS: no chest pain or dyspnea on exertion         [facility] (MSE):    MSE FINDINGS ARE WITHIN NORMAL LIMITS (WNL) UNLESS OTHERWISE STATED BELOW. ( ALL OF THE BELOW CATEGORIES OF THE MSE HAVE BEEN REVIEWED (reviewed 12/16/2017) AND UPDATED AS DEEMED APPROPRIATE )  General Presentation age appropriate and disheveled, uncooperative and unreliable   Orientation disorganized   Vital Signs  See below (reviewed 12/16/2017);  Vital Signs (BP, Pulse, & Temp) are within normal limits if not listed below. Gait and Station Stable/steady, no ataxia   Musculoskeletal System No extrapyramidal symptoms (EPS); no abnormal muscular movements or Tardive Dyskinesia (TD); muscle strength and tone are within normal limits   Language No aphasia or dysarthria   Speech:  mute   Thought Processes illogical; slow rate of thoughts; poor abstract reasoning/computation   Thought Associations blocked    Thought Content paranoid delusions, bizarre delusions, auditory hallucinations and internally preoccupied   Suicidal Ideations none   Homicidal Ideations none   Mood:  Labile mood   Affect:  Constricted, labile inappropriate and increased in intensity   Memory recent  impaired   Memory remote:  intact   Concentration/Attention:  distractable and hypervigilance   Fund of Knowledge average   Insight:  limited   Reliability poor   Judgment:  limited          VITALS:     No data found. Wt Readings from Last 3 Encounters:   03/25/17 95.6 kg (210 lb 11.2 oz)   02/09/15 97.5 kg (215 lb)   02/07/15 97.5 kg (215 lb)     Temp Readings from Last 3 Encounters:   03/28/17 97.5 °F (36.4 °C)   02/07/15 98.7 °F (37.1 °C)     BP Readings from Last 3 Encounters:   03/28/17 100/68   02/12/15 105/73   02/07/15 148/79     Pulse Readings from Last 3 Encounters:   03/28/17 67   02/12/15 87   02/07/15 (!) 111            DATA     LABORATORY DATA:(reviewed/updated 12/16/2017)  No results found for this or any previous visit (from the past 24 hour(s)). Lab Results   Component Value Date/Time    Valproic acid 88 12/09/2017 08:10 AM     No results found for: LITHM   RADIOLOGY REPORTS:(reviewed/updated 12/16/2017)  No results found.        MEDICATIONS     ALL MEDICATIONS:      SCHEDULED MEDICATIONS:          ASSESSMENT & PLAN     DIAGNOSES REQUIRING ACTIVE TREATMENT AND MONITORING: (reviewed/updated 12/16/2017)  Patient Active Hospital Problem List:   Paranoid schizophrenia ([facility] [address] (3/15/2017)- tx resistant, chronic and with negative sx    Assessment: minimal change- getting worse - psychotic, -ve sx, poor insight- accepting med ? Check for cheeking behavior      Plan: I will ct to adjust med- Prozac, Haldol Dec - next dose due 12/19. zydis dose changed to HS. Will add Ativan     Patient is on two antipsychotics now due to the relative refractoriness to treatment thus far. Prior to admission patient had THREE or more failed trails of monotherapy, including: Haldol, Zyprexa, Risperdal and Clozapine. The patient is a very slow responder to medications. Risks and benefits in the use of two antipsychotics have been weighed in full, including the risk of metabolic syndrome and the potential increased risk of QTc  Based on this analysis, it is considered favorable for the utilization of two antipsychotics. In the future, once stable on the two antipsychotics, patient can possibly be tapered to one of the chosen antipsychotics. Will check on EKG results today to monitor QTc.- refusing EKG    Non compliance with tx- request court order med. - granted        I will continue to monitor blood levels (Depakote,, clozapine---a drug with a narrow therapeutic index= NTI) and associated labs for drug therapy implemented that require intense monitoring for toxicity as deemed appropriate based on current medication side effects and pharmacodynamically determined drug 1/2 lives.-88 therapeutic    In summary, Juaquin Chavez, is a 49 y.o.  male who presents with a severe exacerbation of the principal diagnosis of Paranoid schizophrenia ([facility] [address]  Patient's condition is worsening/not improving/not stable . Patient requires continued inpatient hospitalization for further stabilization, safety monitoring and medication management. I will continue to coordinate the provision of individual, milieu, occupational, group, and substance abuse therapies to address target symptoms/diagnoses as deemed appropriate for the individual patient.   A coordinated, multidisplinary treatment team round was conducted with the patient (this team consists of the nurse, psychiatric unit pharmcist,  and writer). Complete current electronic health record for patient has been reviewed today including consultant notes, ancillary staff notes, nurses and psychiatric tech notes. Suicide risk assessment completed and patient deemed to be of low risk for suicide at this time. The following regarding medications was addressed during rounds with patient:   the risks and benefits of the proposed medication. The patient was given the opportunity to ask questions. Informed consent given to the use of the above medications. Will continue to adjust psychiatric and non-psychiatric medications (see above \"medication\" section and orders section for details) as deemed appropriate & based upon diagnoses and response to treatment. I will continue to order blood tests/labs and diagnostic tests as deemed appropriate and review results as they become available (see orders for details and above listed lab/test results). I will order psychiatric records from previous [facility] hospitals to further elucidate the nature of patient's psychopathology and review once available. I will gather additional collateral information from friends, family and o/p treatment team to further elucidate the nature of patient's psychopathology and baselline level of psychiatric functioning. I certify that this patient's inpatient psychiatric hospital services furnished since the previous certification were, and continue to be, required for treatment that could reasonably be expected to improve the patient's condition, or for diagnostic study, and that the patient continues to need, on a daily basis, active treatment furnished directly by or requiring the supervision of inpatient psychiatric facility personnel.  In addition, the hospital records show that services furnished were intensive treatment services, admission or related services, or equivalent services.     EXPECTED DISCHARGE DATE/DAY: TBD     DISPOSITION: Home       Signed By:   Mallika Luther MD  12/16/2017

## 2017-12-16 NOTE — PROGRESS NOTES
Problem: Altered Thought Process (Adult/Pediatric)  Goal: *STG: Remains safe in hospital  Outcome: Progressing Towards Goal  Patient has been isolative this morning. Did not come out of room for breakfast. Selectively mute and minimal interaction. Will continue to monitor patient status and assess needs.

## 2017-12-16 NOTE — PROGRESS NOTES
Problem: Altered Thought Process (Adult/Pediatric)  Goal: *STG: Complies with medication therapy  Outcome: Progressing Towards Goal  Patient continues to be isolative and with drawn. He was selectively mute and reluctant to respond verbally and nodded his head when approached. Complaint with medication and meals. Q 15 minutes monitoring maintained for the safety.

## 2017-12-17 PROCEDURE — 74011250637 HC RX REV CODE- 250/637: Performed by: PSYCHIATRY & NEUROLOGY

## 2017-12-17 PROCEDURE — 65220000001 HC RM PRIVATE PSYCH

## 2017-12-17 RX ORDER — HALOPERIDOL DECANOATE 100 MG/ML
200 INJECTION INTRAMUSCULAR
Status: DISCONTINUED | OUTPATIENT
Start: 2017-12-18 | End: 2018-01-24 | Stop reason: HOSPADM

## 2017-12-17 RX ADMIN — OLANZAPINE 30 MG: 5 TABLET, ORALLY DISINTEGRATING ORAL at 21:36

## 2017-12-17 RX ADMIN — FLUOXETINE 40 MG: 20 CAPSULE ORAL at 08:14

## 2017-12-17 RX ADMIN — LORAZEPAM 1 MG: 1 TABLET ORAL at 16:33

## 2017-12-17 RX ADMIN — BENZTROPINE MESYLATE 2 MG: 2 TABLET ORAL at 21:36

## 2017-12-17 RX ADMIN — DIVALPROEX SODIUM 1500 MG: 500 TABLET, FILM COATED, EXTENDED RELEASE ORAL at 21:36

## 2017-12-17 RX ADMIN — LORAZEPAM 1 MG: 1 TABLET ORAL at 08:15

## 2017-12-17 NOTE — BH NOTES
Patient was observed to be sleeping for 6 hours without any distress and even respirations. Q 15 minutes monitoring maintained for the safety and support.

## 2017-12-17 NOTE — BH NOTES
Patient dose of zyrpexa requires 6 tablets but only 5 available in pyxis. Nursing supervisor called and medication only showing that it is loaded on [address].   MD Cal Hodgkins notified that patient will only be receiving 25 mg of Zyprexa because out of stock in entire hospital.

## 2017-12-17 NOTE — PROGRESS NOTES
Problem: Altered Thought Process (Adult/Pediatric)  Goal: *STG: Remains safe in hospital  Outcome: Progressing Towards Goal  Patient continues to be selectively mute and isolative. Comes out of the room for medication and meals. Will continue to monitor patient and assess needs per behavioral health protocol.

## 2017-12-17 NOTE — BH NOTES
Patient continues to isolates to room, disorganized, selectively mute, does not respond verbally, at times he smiles and nods his head when asked. Compliant with medication and meals. Denies any pain or discomfort. Continue to monitor the patient and assess needs.

## 2017-12-18 PROCEDURE — 74011250637 HC RX REV CODE- 250/637: Performed by: PSYCHIATRY & NEUROLOGY

## 2017-12-18 PROCEDURE — 65220000001 HC RM PRIVATE PSYCH

## 2017-12-18 PROCEDURE — 74011250636 HC RX REV CODE- 250/636: Performed by: PSYCHIATRY & NEUROLOGY

## 2017-12-18 RX ADMIN — OLANZAPINE 30 MG: 5 TABLET, ORALLY DISINTEGRATING ORAL at 21:39

## 2017-12-18 RX ADMIN — HALOPERIDOL DECANOATE 200 MG: 100 INJECTION INTRAMUSCULAR at 12:28

## 2017-12-18 RX ADMIN — DIVALPROEX SODIUM 1500 MG: 500 TABLET, FILM COATED, EXTENDED RELEASE ORAL at 21:40

## 2017-12-18 RX ADMIN — BENZTROPINE MESYLATE 2 MG: 2 TABLET ORAL at 21:40

## 2017-12-18 RX ADMIN — LORAZEPAM 1 MG: 1 TABLET ORAL at 16:44

## 2017-12-18 RX ADMIN — LORAZEPAM 1 MG: 1 TABLET ORAL at 09:08

## 2017-12-18 RX ADMIN — FLUOXETINE 40 MG: 20 CAPSULE ORAL at 09:08

## 2017-12-18 NOTE — PROGRESS NOTES
Problem: Altered Thought Process (Adult/Pediatric)  Goal: *STG: Remains safe in hospital  Outcome: Progressing Towards Goal  Pt remains withdrawn to room. Selectively mute. Compliant with meals and medications. Poor hygiene. Observed curled up on the bed. Will continue to monitor pt and anticipate needs.

## 2017-12-18 NOTE — BH NOTES
PSYCHIATRIC PROGRESS NOTE         Patient Name  Ene Marquez   Date of Birth 1970   CSN [id number]   Medical Record Number  [id number]      Age  49 y.o. PCP PROVIDER UNKNOWN   Admit date:  11/25/2017    Room Number  307/01  @ [facility]   Date of Service  12/18/2017           E & M PROGRESS NOTE:         HISTORY       CC:  \"selectively mute\"  HISTORY OF PRESENT ILLNESS/INTERVAL HISTORY:  (reviewed/updated 12/18/2017). per initial evaluation: The patient, Ene Marquez, is a 49 y.o. WHITE OR  male with a past psychiatric history significant for schizophrenia, who presents at this time with complaints of (and/or evidence of) the following emotional symptoms: psychotic behavior. Additional symptomatology include paranoid delusion, sexually inappropriate behavior, pepisodes of yelling screaming followed by selectively mute, disorganized thought process, anxiety, concern about health problems, difficulty sleeping, fearfulness, hearing voices, increased irritability, poor concentration and problem with medication. The above symptoms have been present for many years. These symptoms are of severe severity. These symptoms are constant  in nature. The patient's condition has been precipitated by and psychosocial stressors (conflict with sister, non compliance ). Patient's condition made worse by treatment noncompliance. UDS: negative; BAL=0. Ene Marquez presents/reports/evidences the following emotional symptoms today, 12/18/2017:psychotic behavior. The above symptoms have been present for many years. These symptoms are of severe severity. The symptoms are constant in nature. Additional symptomatology and features include       12/9-Prefers to isolate self in his room except coming out for meals. Minimal social interaction. Tp remains disorganized. Accepting meds.   12/11- minimal change- isolative and does not interact with pt/staff except going out for his Breakfast. Has refused med at times. Will lay in bed, poor hygiene. 12/12- affect is sl better, out of his room today and talked in a soft voice. Remains disorganized and delusional.  12/13- poor hygiene and disorganized, will lay in bed with eyes closed, pt was able to get up and talk when told that we are discussing his dc planning. Talks in a soft voice. Accepting med but need lot of prompting to do his ADLs  12/14- affect is sl better, tendency to lay in bed, will only talk when we talk about dc planning  12/15- poor hygiene, selectively mute, disorganized and delusional. Poor insight  12/16- inappropriate posturing- sitting on the side of the bed and half naked. Selectively mute and isolative. poor hygiene  12/17- odd and bizarre behavior. Selectively mute and poor hygiene. Sitting at the edge of his bed with face downward and half naked. Accepting med  12/18- appear to have deteriorated over the weekend, selectively mute, odd behavior and delusional. Poor hygiene and passive. SIDE EFFECTS: (reviewed/updated 12/18/2017)  None reported or admitted to. No noted toxicity with use of current med   ALLERGIES:(reviewed/updated 12/18/2017)  Allergies   Allergen Reactions    Fluphenazine Unknown (comments)     Pt is unable to communicate properly.  Penicillins Unknown (comments)     Pt is unable to communicate properly. MEDICATIONS PRIOR TO ADMISSION:(reviewed/updated 12/18/2017)  Prescriptions Prior to Admission   Medication Sig    divalproex ER (DEPAKOTE ER) 500 mg ER tablet Take 1,500 mg by mouth nightly. Indications: Treatment-resistant schizophrenia    haloperidol decanoate (HALDOL DECANOATE) 100 mg/mL injection 2 mL by IntraMUSCular route every twenty-eight (28) days. Indications: SCHIZOPHRENIA    benztropine (COGENTIN) 2 mg tablet Take 1 Tab by mouth nightly. Indications: extrapyramidal disease    cloZAPine 200 mg tablet Take 600 mg by mouth nightly.  Indications: TREATMENT-RESISTANT SCHIZOPHRENIA PAST MEDICAL HISTORY: Past medical history from the initial psychiatric evaluation has been reviewed (reviewed/updated 12/18/2017) with no additional updates (I asked patient and no additional past medical history provided). Past Medical History:   Diagnosis Date    Psychiatric disorder     Psychotic disorder     Withdrawal syndrome ([facility] [address] [facility]    No past surgical history on file. SOCIAL HISTORY: Social history from the initial psychiatric evaluation has been reviewed (reviewed/updated 12/18/2017) with no additional updates (I asked patient and no additional social history provided). Social History     Social History    Marital status: SINGLE     Spouse name: N/A    Number of children: N/A    Years of education: N/A     Occupational History    Not on file. 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 He lives with his sister. On disability    No pending legal charge reported      FAMILY HISTORY: Family history from the initial psychiatric evaluation has been reviewed (reviewed/updated 12/18/2017) with no additional updates (I asked patient and no additional family history provided). No family history on file.     REVIEW OF SYSTEMS: (reviewed/updated 12/18/2017)  Appetite:no change from normal   Sleep: fitful   All other Review of Systems: Psychological ROS: positive for - behavioral disorder, concentration difficulties, hallucinations, irritability, mood swings and delusion  Respiratory ROS: no cough, shortness of breath, or wheezing  Cardiovascular ROS: no chest pain or dyspnea on exertion         MENTAL STATUS EXAM & VITALS     MENTAL STATUS EXAM (MSE):    MSE FINDINGS ARE WITHIN NORMAL LIMITS (WNL) UNLESS OTHERWISE STATED BELOW. ( ALL OF THE BELOW CATEGORIES OF THE MSE HAVE BEEN REVIEWED (reviewed 12/18/2017) AND UPDATED AS DEEMED APPROPRIATE )  General Presentation age appropriate and disheveled, uncooperative and unreliable   Orientation disorganized   Vital Signs  See below (reviewed 12/18/2017); Vital Signs (BP, Pulse, & Temp) are within normal limits if not listed below. Gait and Station Stable/steady, no ataxia   Musculoskeletal System No extrapyramidal symptoms (EPS); no abnormal muscular movements or Tardive Dyskinesia (TD); muscle strength and tone are within normal limits   Language No aphasia or dysarthria   Speech:  mute   Thought Processes illogical; slow rate of thoughts; poor abstract reasoning/computation   Thought Associations blocked    Thought Content paranoid delusions, bizarre delusions, auditory hallucinations and internally preoccupied   Suicidal Ideations none   Homicidal Ideations none   Mood:  Labile mood   Affect:  Constricted, labile inappropriate and increased in intensity   Memory recent  impaired   Memory remote:  intact   Concentration/Attention:  distractable and hypervigilance   Fund of Knowledge average   Insight:  limited   Reliability poor   Judgment:  limited          VITALS:     Patient Vitals for the past 24 hrs:   Pulse Resp BP   12/17/17 1642 (!) 106 20 -     Wt Readings from Last 3 Encounters:   03/25/17 95.6 kg (210 lb 11.2 oz)   02/09/15 97.5 kg (215 lb)   02/07/15 97.5 kg (215 lb)     Temp Readings from Last 3 Encounters:   03/28/17 97.5 °F (36.4 °C)   02/07/15 98.7 °F (37.1 °C)     BP Readings from Last 3 Encounters:   03/28/17 100/68   02/12/15 105/73   02/07/15 148/79     Pulse Readings from Last 3 Encounters:   12/17/17 (!) 106   03/28/17 67   02/12/15 87            DATA     LABORATORY DATA:(reviewed/updated 12/18/2017)  No results found for this or any previous visit (from the past 24 hour(s)).   Lab Results   Component Value Date/Time    Valproic acid 88 12/09/2017 08:10 AM     No results found for: [facility] REPORTS:(reviewed/updated 12/18/2017)  No results found. MEDICATIONS     ALL MEDICATIONS:      SCHEDULED MEDICATIONS:          ASSESSMENT & PLAN     DIAGNOSES REQUIRING ACTIVE TREATMENT AND MONITORING: (reviewed/updated 12/18/2017)  Patient Active Hospital Problem List:   Paranoid schizophrenia ([facility] (3/15/2017)- tx resistant, chronic and with negative sx    Assessment: worsening of sx-- psychotic, odd and bizarre behavior, -ve sx, poor insight- accepting med ? Check for cheeking behavior      Plan: I will ct to adjust med- Prozac, haldol dec today. Ct with Zydis and Ativan    Patient is on two antipsychotics now due to the relative refractoriness to treatment thus far. Prior to admission patient had THREE or more failed trails of monotherapy, including: Haldol, Zyprexa, Risperdal and Clozapine. The patient is a very slow responder to medications. Risks and benefits in the use of two antipsychotics have been weighed in full, including the risk of metabolic syndrome and the potential increased risk of QTc  Based on this analysis, it is considered favorable for the utilization of two antipsychotics. In the future, once stable on the two antipsychotics, patient can possibly be tapered to one of the chosen antipsychotics. Will check on EKG results today to monitor QTc.- refusing EKG    Non compliance with tx- request court order med. - granted        I will continue to monitor blood levels (Depakote,, clozapine---a drug with a narrow therapeutic index= NTI) and associated labs for drug therapy implemented that require intense monitoring for toxicity as deemed appropriate based on current medication side effects and pharmacodynamically determined drug 1/2 lives.-88 therapeutic    In summary, Calvin Byers, is a 49 y.o.  male who presents with a severe exacerbation of the principal diagnosis of Paranoid schizophrenia ([facility]  Patient's condition is worsening/not improving/not stable .   Patient requires continued inpatient hospitalization for further stabilization, safety monitoring and medication management. I will continue to coordinate the provision of individual, milieu, occupational, group, and substance abuse therapies to address target symptoms/diagnoses as deemed appropriate for the individual patient. A coordinated, multidisplinary treatment team round was conducted with the patient (this team consists of the nurse, psychiatric unit pharmcist,  and writer). Complete current electronic health record for patient has been reviewed today including consultant notes, ancillary staff notes, nurses and psychiatric tech notes. Suicide risk assessment completed and patient deemed to be of low risk for suicide at this time. The following regarding medications was addressed during rounds with patient:   the risks and benefits of the proposed medication. The patient was given the opportunity to ask questions. Informed consent given to the use of the above medications. Will continue to adjust psychiatric and non-psychiatric medications (see above \"medication\" section and orders section for details) as deemed appropriate & based upon diagnoses and response to treatment. I will continue to order blood tests/labs and diagnostic tests as deemed appropriate and review results as they become available (see orders for details and above listed lab/test results). I will order psychiatric records from previous [facility] hospitals to further elucidate the nature of patient's psychopathology and review once available. I will gather additional collateral information from friends, family and o/p treatment team to further elucidate the nature of patient's psychopathology and baselline level of psychiatric functioning.          I certify that this patient's inpatient psychiatric hospital services furnished since the previous certification were, and continue to be, required for treatment that could reasonably be expected to improve the patient's condition, or for diagnostic study, and that the patient continues to need, on a daily basis, active treatment furnished directly by or requiring the supervision of inpatient psychiatric facility personnel. In addition, the hospital records show that services furnished were intensive treatment services, admission or related services, or equivalent services.     EXPECTED DISCHARGE DATE/DAY: TBD     DISPOSITION: Home       Signed By:   Moo Simon MD  12/18/2017

## 2017-12-18 NOTE — BH NOTES
Pt was seen in treatment team this morning.  Pt was curled up on the bed with his face covered and remains selectively mute.  Pt seems to have deteriorated over the weekend, hygiene is poor, isolative, odd behaviors and delusional.  Pt's , Bebe Fraga came today and stated this is not his baseline -he remained mute, would not look up or pull his pants up in her presence.  stated he usually answers questions she asks when prompted, always wears clothes and gets out of the bed when at baseline. Pt prefers to be called ULISES. He does not like to be called Mr. Anderson or Sonya Ocampo.   stated that pt listened to male staff better when previously hospitalized at [facility]. Sister stated that he did well at [facility] because of the structure in his day. He was forced to attend groups and leave his bedroom.   At home his sister has rules such as making his bed before he can have breakfast.

## 2017-12-18 NOTE — BH NOTES
Patient remains selectively mute, isolates to room, refused to come out at snack time but did ate snack when offered in the room. Patient was noted to be sitting with no pants on in bed for a long period of time. Encouraged to dress appropriately and rest in bed. He was compliant with medication. Q 15 minutes monitoring maintained for the safety,.

## 2017-12-18 NOTE — BH NOTES
GROUP THERAPY PROGRESS NOTE    Martin Camara is participating in [employer].      Group time: 30 minutes    Personal goal for participation:  Unit orientation    Goal orientation: Community    Group therapy participation: Pt did not attend    Therapeutic interventions reviewed and discussed: Yes    Impression of participation: N/A

## 2017-12-19 PROCEDURE — 65220000001 HC RM PRIVATE PSYCH

## 2017-12-19 PROCEDURE — 74011250637 HC RX REV CODE- 250/637: Performed by: PSYCHIATRY & NEUROLOGY

## 2017-12-19 RX ADMIN — DIVALPROEX SODIUM 1500 MG: 500 TABLET, FILM COATED, EXTENDED RELEASE ORAL at 21:35

## 2017-12-19 RX ADMIN — LORAZEPAM 1 MG: 1 TABLET ORAL at 09:03

## 2017-12-19 RX ADMIN — BENZTROPINE MESYLATE 2 MG: 2 TABLET ORAL at 21:35

## 2017-12-19 RX ADMIN — LORAZEPAM 1 MG: 1 TABLET ORAL at 16:53

## 2017-12-19 RX ADMIN — FLUOXETINE 40 MG: 20 CAPSULE ORAL at 09:03

## 2017-12-19 RX ADMIN — OLANZAPINE 30 MG: 5 TABLET, ORALLY DISINTEGRATING ORAL at 21:35

## 2017-12-19 NOTE — BH NOTES
GROUP THERAPY PROGRESS NOTE    Lilian Stanley is participating in [email or web address].      Group time: 30 minutes    Personal goal for participation:  Unit orientation    Goal orientation: Community    Group therapy participation: Pt did not attend    Therapeutic interventions reviewed and discussed: Yes    Impression of participation: N/A

## 2017-12-19 NOTE — PROGRESS NOTES
Problem: Altered Thought Process (Adult/Pediatric)  Goal: *STG: Remains safe in hospital  Outcome: Progressing Towards Goal  Pt is mostly isolative to his room. He is eating and taking his meds in his room. Continues to be withdrawn and selectively mute. Will  continue to monitor pt q15 minutes for safety and support.

## 2017-12-19 NOTE — PROGRESS NOTES
Problem: Altered Thought Process (Adult/Pediatric)  Goal: *STG: Remains safe in hospital  Outcome: Progressing Towards Goal  Patient remains isolative to self. Poor hygiene. Patient is being monitored for cheeking medications. Remains selectively mute. Disorganized. Patient encouraged to attend groups and socialize with peers. Will continue to monitor patient and assess needs.

## 2017-12-19 NOTE — PROGRESS NOTES
Problem: Altered Thought Process (Adult/Pediatric)  Goal: *STG: Seeks staff when feelings of anxiety and fear arise  Outcome: Not Progressing Towards Goal  Pt is alert but not oriented. Pt is selectively mute and eye contact is poor. Pt is disheveled and disorganized. Pt needs a shower but refuses all attempts by staff to tend to his hygiene. Pt lies in bed and postures. Pt is compliant with meds but does not come to med room and staff has to bring his meds to him. Pt checked for cheeking but does not open his mouth when requested. Pt given ativan. bp is elevated and pt clenches his fists and arms. Encourage adl's. Assist to reality test, assess mood and behavior, encourage to verbalize thoughts and feeling, med and illness teaching, encoruage participation in tx plan, observe on q15' checks.

## 2017-12-19 NOTE — BH NOTES
PSYCHIATRIC PROGRESS NOTE         Patient Name  Petra Wray   Date of Birth 1970   [facility] [id number]   Medical Record Number  [id number]      Age  49 y.o. PCP PROVIDER UNKNOWN   Admit date:  11/25/2017    Room Number  307/01  @ [facility]   Date of Service  12/19/2017           E & M PROGRESS NOTE:         HISTORY       CC:  \"selectively mute\"  HISTORY OF PRESENT ILLNESS/INTERVAL HISTORY:  (reviewed/updated 12/19/2017). per initial evaluation: The patient, Petra Wray, is a 49 y.o. WHITE [place]  male with a past psychiatric history significant for schizophrenia, who presents at this time with complaints of (and/or evidence of) the following emotional symptoms: psychotic behavior. Additional symptomatology include paranoid delusion, sexually inappropriate behavior, pepisodes of yelling screaming followed by selectively mute, disorganized thought process, anxiety, concern about health problems, difficulty sleeping, fearfulness, hearing voices, increased irritability, poor concentration and problem with medication. The above symptoms have been present for many years. These symptoms are of severe severity. These symptoms are constant  in nature. The patient's condition has been precipitated by and psychosocial stressors (conflict with sister, non compliance ). Patient's condition made worse by treatment noncompliance. UDS: negative; BAL=0. Petra Wray presents/reports/evidences the following emotional symptoms today, 12/19/2017:psychotic behavior. The above symptoms have been present for many years. These symptoms are of severe severity. The symptoms are constant in nature. Additional symptomatology and features include       12/9-Prefers to isolate self in his room except coming out for meals. Minimal social interaction. Tp remains disorganized. Accepting meds.   12/11- minimal change- isolative and does not interact with pt/staff except going out for his Breakfast. Has refused med at times. Will lay in bed, poor hygiene. 12/12- affect is sl better, out of his room today and talked in a soft voice. Remains disorganized and delusional.  12/13- poor hygiene and disorganized, will lay in bed with eyes closed, pt was able to get up and talk when told that we are discussing his dc planning. Talks in a soft voice. Accepting med but need lot of prompting to do his ADLs  12/14- affect is sl better, tendency to lay in bed, will only talk when we talk about dc planning  12/15- poor hygiene, selectively mute, disorganized and delusional. Poor insight  12/16- inappropriate posturing- sitting on the side of the bed and half naked. Selectively mute and isolative. poor hygiene  12/17- odd and bizarre behavior. Selectively mute and poor hygiene. Sitting at the edge of his bed with face downward and half naked. Accepting med  12/18- appear to have deteriorated over the weekend, selectively mute, odd behavior and delusional. Poor hygiene and passive. 12/19- delusional and disorganized. Stays in his room and found to be in odd position at the side of the bed. Staff informed pt was eating his break fast but quickly changed to this position ( ?behavioral). Poor hygiene       SIDE EFFECTS: (reviewed/updated 12/19/2017)  None reported or admitted to. No noted toxicity with use of current med   ALLERGIES:(reviewed/updated 12/19/2017)  Allergies   Allergen Reactions    Fluphenazine Unknown (comments)     Pt is unable to communicate properly.  Penicillins Unknown (comments)     Pt is unable to communicate properly. MEDICATIONS PRIOR TO ADMISSION:(reviewed/updated 12/19/2017)  Prescriptions Prior to Admission   Medication Sig    divalproex ER (DEPAKOTE ER) 500 mg ER tablet Take 1,500 mg by mouth nightly. Indications: Treatment-resistant schizophrenia    haloperidol decanoate (HALDOL DECANOATE) 100 mg/mL injection 2 mL by IntraMUSCular route every twenty-eight (28) days.  Indications: SCHIZOPHRENIA    benztropine (COGENTIN) 2 mg tablet Take 1 Tab by mouth nightly. Indications: extrapyramidal disease    cloZAPine 200 mg tablet Take 600 mg by mouth nightly. Indications: TREATMENT-RESISTANT SCHIZOPHRENIA      PAST MEDICAL HISTORY: Past medical history from the initial psychiatric evaluation has been reviewed (reviewed/updated 12/19/2017) with no additional updates (I asked patient and no additional past medical history provided). Past Medical History:   Diagnosis Date    Psychiatric disorder     Psychotic disorder     Withdrawal syndrome ([facility] [address]    No past surgical history on file. SOCIAL HISTORY: Social history from the initial psychiatric evaluation has been reviewed (reviewed/updated 12/19/2017) with no additional updates (I asked patient and no additional social history provided). Social History     Social History    Marital status: SINGLE     Spouse name: N/A    Number of children: N/A    Years of education: N/A     Occupational History    Not on file. 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 He lives with his sister. On disability    No pending legal charge reported      FAMILY HISTORY: Family history from the initial psychiatric evaluation has been reviewed (reviewed/updated 12/19/2017) with no additional updates (I asked patient and no additional family history provided). No family history on file.     REVIEW OF SYSTEMS: (reviewed/updated 12/19/2017)  Appetite:no change from normal   Sleep: fitful   All other Review of Systems: Psychological ROS: positive for - behavioral disorder, concentration difficulties, hallucinations, irritability, mood swings and delusion  Respiratory ROS: no cough, shortness of breath, or wheezing  Cardiovascular ROS: no chest pain or dyspnea on exertion         [facility] (MSE):    MSE FINDINGS ARE WITHIN NORMAL LIMITS (WNL) UNLESS OTHERWISE STATED BELOW. ( ALL OF THE BELOW CATEGORIES OF THE MSE HAVE BEEN REVIEWED (reviewed 12/19/2017) AND UPDATED AS DEEMED APPROPRIATE )  General Presentation age appropriate and disheveled, uncooperative and unreliable   Orientation disorganized   Vital Signs  See below (reviewed 12/19/2017); Vital Signs (BP, Pulse, & Temp) are within normal limits if not listed below. Gait and Station Stable/steady, no ataxia   Musculoskeletal System No extrapyramidal symptoms (EPS); no abnormal muscular movements or Tardive Dyskinesia (TD); muscle strength and tone are within normal limits   Language No aphasia or dysarthria   Speech:  mute   Thought Processes illogical; slow rate of thoughts; poor abstract reasoning/computation   Thought Associations blocked    Thought Content paranoid delusions, bizarre delusions, auditory hallucinations and internally preoccupied   Suicidal Ideations none   Homicidal Ideations none   Mood:  Labile mood   Affect:  Constricted, labile inappropriate and increased in intensity   Memory recent  impaired   Memory remote:  intact   Concentration/Attention:  distractable and hypervigilance   Fund of Knowledge average   Insight:  limited   Reliability poor   Judgment:  limited          VITALS:     No data found.     Wt Readings from Last 3 Encounters:   03/25/17 95.6 kg (210 lb 11.2 oz)   02/09/15 97.5 kg (215 lb)   02/07/15 97.5 kg (215 lb)     Temp Readings from Last 3 Encounters:   03/28/17 97.5 °F (36.4 °C)   02/07/15 98.7 °F (37.1 °C)     BP Readings from Last 3 Encounters:   03/28/17 100/68   02/12/15 105/73   02/07/15 148/79     Pulse Readings from Last 3 Encounters:   12/17/17 (!) 106   03/28/17 67   02/12/15 87            DATA     LABORATORY DATA:(reviewed/updated 12/19/2017)  No results found for this or any previous visit (from the past 24 hour(s)). Lab Results   Component Value Date/Time    Valproic acid 88 12/09/2017 08:10 AM     No results found for: LITHM   RADIOLOGY REPORTS:(reviewed/updated 12/19/2017)  No results found. MEDICATIONS     ALL MEDICATIONS:      SCHEDULED MEDICATIONS:          ASSESSMENT & PLAN     DIAGNOSES REQUIRING ACTIVE TREATMENT AND MONITORING: (reviewed/updated 12/19/2017)  Patient Active Hospital Problem List:   Paranoid schizophrenia ([facility] [address] (3/15/2017)- tx resistant, chronic and with negative sx    Assessment: minimal change- psychotic, odd and bizarre behavior, -ve sx, poor insight- accepting med ? Check for cheeking behavior      Plan: I will ct to adjust med- Prozac, haldol dec today. Ct with Zydis and Ativan. Encourage to get out of his room more    Patient is on two antipsychotics now due to the relative refractoriness to treatment thus far. Prior to admission patient had THREE or more failed trails of monotherapy, including: Haldol, Zyprexa, Risperdal and Clozapine. The patient is a very slow responder to medications. Risks and benefits in the use of two antipsychotics have been weighed in full, including the risk of metabolic syndrome and the potential increased risk of QTc  Based on this analysis, it is considered favorable for the utilization of two antipsychotics. In the future, once stable on the two antipsychotics, patient can possibly be tapered to one of the chosen antipsychotics. Will check on EKG results today to monitor QTc.- refusing EKG    Non compliance with tx- request court order med. - granted        I will continue to monitor blood levels (Depakote,, clozapine---a drug with a narrow therapeutic index= NTI) and associated labs for drug therapy implemented that require intense monitoring for toxicity as deemed appropriate based on current medication side effects and pharmacodynamically determined drug 1/2 lives.-88 therapeutic    In summary, Obey Marc, is a 49 y.o.  male who presents with a severe exacerbation of the principal diagnosis of Paranoid schizophrenia ([facility] [address]  Patient's condition is worsening/not improving/not stable . Patient requires continued inpatient hospitalization for further stabilization, safety monitoring and medication management. I will continue to coordinate the provision of individual, milieu, occupational, group, and substance abuse therapies to address target symptoms/diagnoses as deemed appropriate for the individual patient. A coordinated, multidisplinary treatment team round was conducted with the patient (this team consists of the nurse, psychiatric unit pharmcist,  and writer). Complete current electronic health record for patient has been reviewed today including consultant notes, ancillary staff notes, nurses and psychiatric tech notes. Suicide risk assessment completed and patient deemed to be of low risk for suicide at this time. The following regarding medications was addressed during rounds with patient:   the risks and benefits of the proposed medication. The patient was given the opportunity to ask questions. Informed consent given to the use of the above medications. Will continue to adjust psychiatric and non-psychiatric medications (see above \"medication\" section and orders section for details) as deemed appropriate & based upon diagnoses and response to treatment. I will continue to order blood tests/labs and diagnostic tests as deemed appropriate and review results as they become available (see orders for details and above listed lab/test results). I will order psychiatric records from previous [facility] to further elucidate the nature of patient's psychopathology and review once available. I will gather additional collateral information from friends, family and o/p treatment team to further elucidate the nature of patient's psychopathology and baselline level of psychiatric functioning.          I certify that this patient's inpatient psychiatric hospital services furnished since the previous certification were, and continue to be, required for treatment that could reasonably be expected to improve the patient's condition, or for diagnostic study, and that the patient continues to need, on a daily basis, active treatment furnished directly by or requiring the supervision of inpatient psychiatric facility personnel. In addition, the hospital records show that services furnished were intensive treatment services, admission or related services, or equivalent services.     EXPECTED DISCHARGE DATE/DAY: TBD     DISPOSITION: Home       Signed By:   Immanuel Groves MD  12/19/2017

## 2017-12-20 PROCEDURE — 65220000001 HC RM PRIVATE PSYCH

## 2017-12-20 PROCEDURE — 74011250637 HC RX REV CODE- 250/637: Performed by: PSYCHIATRY & NEUROLOGY

## 2017-12-20 RX ORDER — VALPROIC ACID 250 MG/5ML
750 SOLUTION ORAL 3 TIMES DAILY
Status: DISCONTINUED | OUTPATIENT
Start: 2017-12-20 | End: 2017-12-29

## 2017-12-20 RX ADMIN — VALPROIC ACID 750 MG: 250 SOLUTION ORAL at 11:45

## 2017-12-20 RX ADMIN — LORAZEPAM 1 MG: 1 TABLET ORAL at 08:11

## 2017-12-20 RX ADMIN — FLUOXETINE 40 MG: 20 CAPSULE ORAL at 08:11

## 2017-12-20 RX ADMIN — BENZTROPINE MESYLATE 2 MG: 2 TABLET ORAL at 21:28

## 2017-12-20 RX ADMIN — LORAZEPAM 1 MG: 1 TABLET ORAL at 17:48

## 2017-12-20 RX ADMIN — OLANZAPINE 30 MG: 5 TABLET, ORALLY DISINTEGRATING ORAL at 21:28

## 2017-12-20 RX ADMIN — VALPROIC ACID 750 MG: 250 SOLUTION ORAL at 17:48

## 2017-12-20 NOTE — BH NOTES
PSYCHIATRIC PROGRESS NOTE         Patient Name  Mc Khan   Date of Birth 1970   [facility] [id number]   Medical Record Number  [id number]      Age  49 y.o. PCP PROVIDER UNKNOWN   Admit date:  11/25/2017    Room Number  307/01  @ [facility]   Date of Service  12/20/2017           E & M PROGRESS NOTE:         HISTORY       CC:  \"selectively mute\"  HISTORY OF PRESENT ILLNESS/INTERVAL HISTORY:  (reviewed/updated 12/20/2017). per initial evaluation: The patient, Mc Khan, is a 49 y.o. WHITE OR  male with a past psychiatric history significant for schizophrenia, who presents at this time with complaints of (and/or evidence of) the following emotional symptoms: psychotic behavior. Additional symptomatology include paranoid delusion, sexually inappropriate behavior, pepisodes of yelling screaming followed by selectively mute, disorganized thought process, anxiety, concern about health problems, difficulty sleeping, fearfulness, hearing voices, increased irritability, poor concentration and problem with medication. The above symptoms have been present for many years. These symptoms are of severe severity. These symptoms are constant  in nature. The patient's condition has been precipitated by and psychosocial stressors (conflict with sister, non compliance ). Patient's condition made worse by treatment noncompliance. UDS: negative; BAL=0. Mc Khan presents/reports/evidences the following emotional symptoms today, 12/20/2017:psychotic behavior. The above symptoms have been present for many years. These symptoms are of severe severity. The symptoms are constant in nature. Additional symptomatology and features include       12/9-Prefers to isolate self in his room except coming out for meals. Minimal social interaction. Tp remains disorganized. Accepting meds.   12/11- minimal change- isolative and does not interact with pt/staff except going out for his Breakfast. Has refused med at times. Will lay in bed, poor hygiene. 12/12- affect is sl better, out of his room today and talked in a soft voice. Remains disorganized and delusional.  12/13- poor hygiene and disorganized, will lay in bed with eyes closed, pt was able to get up and talk when told that we are discussing his dc planning. Talks in a soft voice. Accepting med but need lot of prompting to do his ADLs  12/14- affect is sl better, tendency to lay in bed, will only talk when we talk about dc planning  12/15- poor hygiene, selectively mute, disorganized and delusional. Poor insight  12/16- inappropriate posturing- sitting on the side of the bed and half naked. Selectively mute and isolative. poor hygiene  12/17- odd and bizarre behavior. Selectively mute and poor hygiene. Sitting at the edge of his bed with face downward and half naked. Accepting med  12/18- appear to have deteriorated over the weekend, selectively mute, odd behavior and delusional. Poor hygiene and passive. 12/19- delusional and disorganized. Stays in his room and found to be in odd position at the side of the bed. Staff informed pt was eating his break fast but quickly changed to this position ( ?behavioral). Poor hygiene   12/20- minimal change in presentation, pills found in his room ( ?compliance), poor hygiene and odd posturing      SIDE EFFECTS: (reviewed/updated 12/20/2017)  None reported or admitted to. No noted toxicity with use of current med   ALLERGIES:(reviewed/updated 12/20/2017)  Allergies   Allergen Reactions    Fluphenazine Unknown (comments)     Pt is unable to communicate properly.  Penicillins Unknown (comments)     Pt is unable to communicate properly. MEDICATIONS PRIOR TO ADMISSION:(reviewed/updated 12/20/2017)  Prescriptions Prior to Admission   Medication Sig    divalproex ER (DEPAKOTE ER) 500 mg ER tablet Take 1,500 mg by mouth nightly.  Indications: Treatment-resistant schizophrenia    haloperidol decanoate (HALDOL DECANOATE) 100 mg/mL injection 2 mL by IntraMUSCular route every twenty-eight (28) days. Indications: SCHIZOPHRENIA    benztropine (COGENTIN) 2 mg tablet Take 1 Tab by mouth nightly. Indications: extrapyramidal disease    cloZAPine 200 mg tablet Take 600 mg by mouth nightly. Indications: TREATMENT-RESISTANT SCHIZOPHRENIA      PAST MEDICAL HISTORY: Past medical history from the initial psychiatric evaluation has been reviewed (reviewed/updated 12/20/2017) with no additional updates (I asked patient and no additional past medical history provided). Past Medical History:   Diagnosis Date    Psychiatric disorder     Psychotic disorder     Withdrawal syndrome ([facility] [address]    No past surgical history on file. SOCIAL HISTORY: Social history from the initial psychiatric evaluation has been reviewed (reviewed/updated 12/20/2017) with no additional updates (I asked patient and no additional social history provided). Social History     Social History    Marital status: SINGLE     Spouse name: N/A    Number of children: N/A    Years of education: N/A     Occupational History    Not on file. 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 He lives with his sister. On disability    No pending legal charge reported      FAMILY HISTORY: Family history from the initial psychiatric evaluation has been reviewed (reviewed/updated 12/20/2017) with no additional updates (I asked patient and no additional family history provided). No family history on file.     REVIEW OF SYSTEMS: (reviewed/updated 12/20/2017)  Appetite:no change from normal   Sleep: fitful   All other Review of Systems: Psychological ROS: positive for - behavioral disorder, concentration difficulties, hallucinations, irritability, mood swings and delusion  Respiratory ROS: no cough, shortness of breath, or wheezing  Cardiovascular ROS: no chest pain or dyspnea on exertion         [address] (MSE):    MSE FINDINGS ARE WITHIN NORMAL LIMITS (WNL) UNLESS OTHERWISE STATED BELOW. ( ALL OF THE BELOW CATEGORIES OF THE MSE HAVE BEEN REVIEWED (reviewed 12/20/2017) AND UPDATED AS DEEMED APPROPRIATE )  General Presentation age appropriate and disheveled, uncooperative and unreliable   Orientation disorganized   Vital Signs  See below (reviewed 12/20/2017); Vital Signs (BP, Pulse, & Temp) are within normal limits if not listed below.    Gait and Station Stable/steady, no ataxia   Musculoskeletal System No extrapyramidal symptoms (EPS); no abnormal muscular movements or Tardive Dyskinesia (TD); muscle strength and tone are within normal limits   Language No aphasia or dysarthria   Speech:  mute   Thought Processes illogical; slow rate of thoughts; poor abstract reasoning/computation   Thought Associations blocked    Thought Content paranoid delusions, bizarre delusions, auditory hallucinations and internally preoccupied   Suicidal Ideations none   Homicidal Ideations none   Mood:  Labile mood   Affect:  Constricted, labile inappropriate and increased in intensity   Memory recent  impaired   Memory remote:  intact   Concentration/Attention:  distractable and hypervigilance   Fund of Knowledge average   Insight:  limited   Reliability poor   Judgment:  limited          VITALS:     Patient Vitals for the past 24 hrs:   Temp Pulse Resp BP   12/19/17 2051 - - 16 -   12/19/17 1705 - 86 22 (!) 168/105     Wt Readings from Last 3 Encounters:   03/25/17 95.6 kg (210 lb 11.2 oz)   02/09/15 97.5 kg (215 lb)   02/07/15 97.5 kg (215 lb)     Temp Readings from Last 3 Encounters:   03/28/17 97.5 °F (36.4 °C)   02/07/15 98.7 °F (37.1 °C)     BP Readings from Last 3 Encounters:   12/19/17 (!) 168/105   03/28/17 100/68   02/12/15 105/73     Pulse Readings from Last 3 Encounters:   12/19/17 86   03/28/17 67   02/12/15 87            DATA     LABORATORY DATA:(reviewed/updated 12/20/2017)  No results found for this or any previous visit (from the past 24 hour(s)). Lab Results   Component Value Date/Time    Valproic acid 88 12/09/2017 08:10 AM     No results found for: LITHM   RADIOLOGY REPORTS:(reviewed/updated 12/20/2017)  No results found. MEDICATIONS     ALL MEDICATIONS:      SCHEDULED MEDICATIONS:          ASSESSMENT & PLAN     DIAGNOSES REQUIRING ACTIVE TREATMENT AND MONITORING: (reviewed/updated 12/20/2017)  Patient Active Hospital Problem List:   Paranoid schizophrenia ([facility] [address] (3/15/2017)- tx resistant, chronic and with negative sx    Assessment: minimal change- psychotic, odd and bizarre behavior, -ve sx, poor insight- accepting med ? Check for cheeking behavior      Plan: I will ct to adjust med- Prozac, haldol dec today. Ct with Zydis and Ativan. Change Depakote to liq    Request second opinion for ECT- pt has been on Clozapine but refuses to have labs marbin. Has been tx with ECT in the past.     Patient is on two antipsychotics now due to the relative refractoriness to treatment thus far. Prior to admission patient had THREE or more failed trails of monotherapy, including: Haldol, Zyprexa, Risperdal and Clozapine. The patient is a very slow responder to medications. Risks and benefits in the use of two antipsychotics have been weighed in full, including the risk of metabolic syndrome and the potential increased risk of QTc  Based on this analysis, it is considered favorable for the utilization of two antipsychotics. In the future, once stable on the two antipsychotics, patient can possibly be tapered to one of the chosen antipsychotics. Will check on EKG results today to monitor QTc.- refusing EKG    Non compliance with tx- request court order med. - granted        I will continue to monitor blood levels (Depakote,, clozapine---a drug with a narrow therapeutic index= NTI) and associated labs for drug therapy implemented that require intense monitoring for toxicity as deemed appropriate based on current medication side effects and pharmacodynamically determined drug 1/2 lives.-88 therapeutic    In summary, Fahad Nolasco, is a 49 y.o.  male who presents with a severe exacerbation of the principal diagnosis of Paranoid schizophrenia ([facility] [address]  Patient's condition is worsening/not improving/not stable . Patient requires continued inpatient hospitalization for further stabilization, safety monitoring and medication management. I will continue to coordinate the provision of individual, milieu, occupational, group, and substance abuse therapies to address target symptoms/diagnoses as deemed appropriate for the individual patient. A coordinated, multidisplinary treatment team round was conducted with the patient (this team consists of the nurse, psychiatric unit pharmcist,  and writer). Complete current electronic health record for patient has been reviewed today including consultant notes, ancillary staff notes, nurses and psychiatric tech notes. Suicide risk assessment completed and patient deemed to be of low risk for suicide at this time. The following regarding medications was addressed during rounds with patient:   the risks and benefits of the proposed medication. The patient was given the opportunity to ask questions. Informed consent given to the use of the above medications. Will continue to adjust psychiatric and non-psychiatric medications (see above \"medication\" section and orders section for details) as deemed appropriate & based upon diagnoses and response to treatment. I will continue to order blood tests/labs and diagnostic tests as deemed appropriate and review results as they become available (see orders for details and above listed lab/test results).     I will order psychiatric records from previous [facility] hospitals to further elucidate the nature of patient's psychopathology and review once available. I will gather additional collateral information from friends, family and o/p treatment team to further elucidate the nature of patient's psychopathology and baselline level of psychiatric functioning. I certify that this patient's inpatient psychiatric hospital services furnished since the previous certification were, and continue to be, required for treatment that could reasonably be expected to improve the patient's condition, or for diagnostic study, and that the patient continues to need, on a daily basis, active treatment furnished directly by or requiring the supervision of inpatient psychiatric facility personnel. In addition, the hospital records show that services furnished were intensive treatment services, admission or related services, or equivalent services.     EXPECTED DISCHARGE DATE/DAY: TBD     DISPOSITION: Home       Signed By:   Saintclair Estelle, MD  12/20/2017

## 2017-12-20 NOTE — BH NOTES
Pt was observed for cheeking his hs meds, room check done and there were several unidentified pills under his bed. Will request liquid meds if possible.

## 2017-12-20 NOTE — BH NOTES
GROUP THERAPY PROGRESS NOTE    Ene Marquez is participating in [employer].      Group time: 30 minutes    Personal goal for participation: daily orientation    Goal orientation: personal    Group therapy participation: active    Therapeutic interventions reviewed and discussed: yes    Impression of participation: Attend    Jeniffer Mcneal  12/20/2017

## 2017-12-20 NOTE — PROGRESS NOTES
Problem: Altered Thought Process (Adult/Pediatric)  Goal: *STG: Complies with medication therapy  Outcome: Progressing Towards Goal  Pt remains withdrawn, disheveled, and disorganized. He appears to be preoccupied and responding to internal stimulus. He spent most of the shift either on his bed or on the floor, bent over as if he was in a prayer position. He will not respond to staff's requests or prompts. He has been compliant with meds but not meals. There were no prns given on the day shift. No evidence of med non compliance was found during med pass. Staff will continue to monitor pt for safety, needs, and med compliance. Staff will also offer support as needed.

## 2017-12-21 PROCEDURE — 65220000001 HC RM PRIVATE PSYCH

## 2017-12-21 PROCEDURE — 74011250637 HC RX REV CODE- 250/637: Performed by: PSYCHIATRY & NEUROLOGY

## 2017-12-21 RX ADMIN — VALPROIC ACID 750 MG: 250 SOLUTION ORAL at 08:58

## 2017-12-21 RX ADMIN — LORAZEPAM 1 MG: 1 TABLET ORAL at 08:58

## 2017-12-21 RX ADMIN — VALPROIC ACID 750 MG: 250 SOLUTION ORAL at 11:41

## 2017-12-21 RX ADMIN — LORAZEPAM 1 MG: 1 TABLET ORAL at 17:01

## 2017-12-21 RX ADMIN — OLANZAPINE 30 MG: 5 TABLET, ORALLY DISINTEGRATING ORAL at 21:28

## 2017-12-21 RX ADMIN — VALPROIC ACID 750 MG: 250 SOLUTION ORAL at 17:01

## 2017-12-21 RX ADMIN — FLUOXETINE 40 MG: 20 CAPSULE ORAL at 08:58

## 2017-12-21 RX ADMIN — BENZTROPINE MESYLATE 2 MG: 2 TABLET ORAL at 21:28

## 2017-12-21 NOTE — PROGRESS NOTES
Problem: Altered Thought Process (Adult/Pediatric)  Goal: *STG: Remains safe in hospital  Outcome: Progressing Towards Goal  Patient remains isolative to self. Disorganized. Poor hygiene. Disheveled. Patient encouraged to attend groups and socialize with peers. Patient is eating meals. Nurse is assessing patient for checking medications. Will continue to monitor patient and assess needs.

## 2017-12-21 NOTE — PROGRESS NOTES
Problem: Altered Thought Process (Adult/Pediatric)  Goal: *STG: Remains safe in hospital  Outcome: Progressing Towards Goal  Pt is alert but disoriented to situation and is selectively mute. Pt is preoccupied and guarded. Pt is disheveled. Pt is posturing in a kneeling praying position. Pt took his med sAssist to reality test, assess mood and behavior, encourage to verbalize thoughts and feeling, med and illness teaching, encoruage participation in tx plan, observe on q15' checks. and was checked for cheeking. Pt ate all his dinner and is voiding in bathroom. Pt has no peer interaction and does not attend groups.

## 2017-12-21 NOTE — BH NOTES
PSYCHIATRIC PROGRESS NOTE         Patient Name  Fahad Nolasco   Date of Birth 1970   [facility] [id number]   Medical Record Number  [id number]      Age  49 y.o. PCP PROVIDER UNKNOWN   Admit date:  11/25/2017    Room Number  307/01  @ [facility]   Date of Service  12/21/2017           E & M PROGRESS NOTE:         HISTORY       CC:  \"selectively mute\"  HISTORY OF PRESENT ILLNESS/INTERVAL HISTORY:  (reviewed/updated 12/21/2017). per initial evaluation: The patient, Fahad Nolasco, is a 49 y.o. WHITE OR  male with a past psychiatric history significant for schizophrenia, who presents at this time with complaints of (and/or evidence of) the following emotional symptoms: psychotic behavior. Additional symptomatology include paranoid delusion, sexually inappropriate behavior, pepisodes of yelling screaming followed by selectively mute, disorganized thought process, anxiety, concern about health problems, difficulty sleeping, fearfulness, hearing voices, increased irritability, poor concentration and problem with medication. The above symptoms have been present for many years. These symptoms are of severe severity. These symptoms are constant  in nature. The patient's condition has been precipitated by and psychosocial stressors (conflict with sister, non compliance ). Patient's condition made worse by treatment noncompliance. UDS: negative; BAL=0. Fahad Nolasco presents/reports/evidences the following emotional symptoms today, 12/21/2017:psychotic behavior. The above symptoms have been present for many years. These symptoms are of severe severity. The symptoms are constant in nature. Additional symptomatology and features include       12/9-Prefers to isolate self in his room except coming out for meals. Minimal social interaction. Tp remains disorganized. Accepting meds.   12/11- minimal change- isolative and does not interact with pt/staff except going out for his Breakfast. Has refused med at times. Will lay in bed, poor hygiene. 12/12- affect is sl better, out of his room today and talked in a soft voice. Remains disorganized and delusional.  12/13- poor hygiene and disorganized, will lay in bed with eyes closed, pt was able to get up and talk when told that we are discussing his dc planning. Talks in a soft voice. Accepting med but need lot of prompting to do his ADLs  12/14- affect is sl better, tendency to lay in bed, will only talk when we talk about dc planning  12/15- poor hygiene, selectively mute, disorganized and delusional. Poor insight  12/16- inappropriate posturing- sitting on the side of the bed and half naked. Selectively mute and isolative. poor hygiene  12/17- odd and bizarre behavior. Selectively mute and poor hygiene. Sitting at the edge of his bed with face downward and half naked. Accepting med  12/18- appear to have deteriorated over the weekend, selectively mute, odd behavior and delusional. Poor hygiene and passive. 12/19- delusional and disorganized. Stays in his room and found to be in odd position at the side of the bed. Staff informed pt was eating his break fast but quickly changed to this position ( ?behavioral). Poor hygiene   12/20- minimal change in presentation, pills found in his room ( ?compliance), poor hygiene and odd posturing  12/21/17 he is not engaging in talking and not eating well and he is in fetal position and he is paranoid and disorganized and poor hygiene and he refuse to take medications       SIDE EFFECTS: (reviewed/updated 12/21/2017)  None reported or admitted to. No noted toxicity with use of current med   ALLERGIES:(reviewed/updated 12/21/2017)  Allergies   Allergen Reactions    Fluphenazine Unknown (comments)     Pt is unable to communicate properly.  Penicillins Unknown (comments)     Pt is unable to communicate properly.       MEDICATIONS PRIOR TO ADMISSION:(reviewed/updated 12/21/2017)  Prescriptions Prior to Admission Medication Sig    divalproex ER (DEPAKOTE ER) 500 mg ER tablet Take 1,500 mg by mouth nightly. Indications: Treatment-resistant schizophrenia    haloperidol decanoate (HALDOL DECANOATE) 100 mg/mL injection 2 mL by IntraMUSCular route every twenty-eight (28) days. Indications: SCHIZOPHRENIA    benztropine (COGENTIN) 2 mg tablet Take 1 Tab by mouth nightly. Indications: extrapyramidal disease    cloZAPine 200 mg tablet Take 600 mg by mouth nightly. Indications: TREATMENT-RESISTANT SCHIZOPHRENIA      PAST MEDICAL HISTORY: Past medical history from the initial psychiatric evaluation has been reviewed (reviewed/updated 12/21/2017) with no additional updates (I asked patient and no additional past medical history provided). Past Medical History:   Diagnosis Date    Psychiatric disorder     Psychotic disorder     [facility]    No past surgical history on file. SOCIAL HISTORY: Social history from the initial psychiatric evaluation has been reviewed (reviewed/updated 12/21/2017) with no additional updates (I asked patient and no additional social history provided). Social History     Social History    Marital status: SINGLE     Spouse name: N/A    Number of children: N/A    Years of education: N/A     Occupational History    Not on file. 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 He lives with his sister. On disability    No pending legal charge reported      FAMILY HISTORY: Family history from the initial psychiatric evaluation has been reviewed (reviewed/updated 12/21/2017) with no additional updates (I asked patient and no additional family history provided). No family history on file.     REVIEW OF SYSTEMS: (reviewed/updated 12/21/2017)  Appetite:no change from normal   Sleep: fitful   All other Review of Systems: Psychological ROS: positive for - behavioral disorder, concentration difficulties, hallucinations, irritability, mood swings and delusion  Respiratory ROS: no cough, shortness of breath, or wheezing  Cardiovascular ROS: no chest pain or dyspnea on exertion         [facility] (MSE):    MSE FINDINGS ARE WITHIN NORMAL LIMITS (WNL) UNLESS OTHERWISE STATED BELOW. ( ALL OF THE BELOW CATEGORIES OF THE MSE HAVE BEEN REVIEWED (reviewed 12/21/2017) AND UPDATED AS DEEMED APPROPRIATE )  General Presentation age appropriate and disheveled, uncooperative and unreliable   Orientation disorganized   Vital Signs  See below (reviewed 12/21/2017); Vital Signs (BP, Pulse, & Temp) are within normal limits if not listed below. Gait and Station Stable/steady, no ataxia   Musculoskeletal System No extrapyramidal symptoms (EPS); no abnormal muscular movements or Tardive Dyskinesia (TD); muscle strength and tone are within normal limits   Language No aphasia or dysarthria   Speech:  mute   Thought Processes illogical; slow rate of thoughts; poor abstract reasoning/computation   Thought Associations blocked    Thought Content paranoid delusions, bizarre delusions, auditory hallucinations and internally preoccupied   Suicidal Ideations none   Homicidal Ideations none   Mood:  Labile mood   Affect:  Constricted, labile inappropriate and increased in intensity   Memory recent  impaired   Memory remote:  intact   Concentration/Attention:  distractable and hypervigilance   Fund of Knowledge average   Insight:  limited   Reliability poor   Judgment:  limited          VITALS:     No data found.     Wt Readings from Last 3 Encounters:   03/25/17 95.6 kg (210 lb 11.2 oz)   02/09/15 97.5 kg (215 lb)   02/07/15 97.5 kg (215 lb)     Temp Readings from Last 3 Encounters:   03/28/17 97.5 °F (36.4 °C)   02/07/15 98.7 °F (37.1 °C)     BP Readings from Last 3 Encounters:   12/19/17 (!) 168/105   03/28/17 100/68   02/12/15 105/73     Pulse Readings from Last 3 Encounters:   12/19/17 86   03/28/17 67   02/12/15 87            DATA     LABORATORY DATA:(reviewed/updated 12/21/2017)  No results found for this or any previous visit (from the past 24 hour(s)). Lab Results   Component Value Date/Time    Valproic acid 88 12/09/2017 08:10 AM     No results found for: LITHM   RADIOLOGY REPORTS:(reviewed/updated 12/21/2017)  No results found. MEDICATIONS     ALL MEDICATIONS:      SCHEDULED MEDICATIONS:          ASSESSMENT & PLAN     DIAGNOSES REQUIRING ACTIVE TREATMENT AND MONITORING: (reviewed/updated 12/21/2017)  Patient Active Hospital Problem List:   [facility] (3/15/2017)- tx resistant, chronic and with negative sx    Assessment: minimal change- psychotic, odd and bizarre behavior, -ve sx, poor insight- accepting med ? Check for cheeking behavior      Plan: I will ct to adjust med- Prozac, haldol dec today. Ct with Zydis and Ativan. Change Depakote to liq    Request second opinion for ECT- pt has been on Clozapine but refuses to have labs marbin. Has been tx with ECT in the past.     Patient is on two antipsychotics now due to the relative refractoriness to treatment thus far. Prior to admission patient had THREE or more failed trails of monotherapy, including: Haldol, Zyprexa, Risperdal and Clozapine. The patient is a very slow responder to medications. Risks and benefits in the use of two antipsychotics have been weighed in full, including the risk of metabolic syndrome and the potential increased risk of QTc  Based on this analysis, it is considered favorable for the utilization of two antipsychotics. In the future, once stable on the two antipsychotics, patient can possibly be tapered to one of the chosen antipsychotics. Will check on EKG results today to monitor QTc.- refusing EKG    Non compliance with tx- request court order med. - granted  12/21/17 will continue medications and he may need [place] [facility]          I will continue to monitor blood levels (Depakote,, clozapine---a drug with a narrow therapeutic index= NTI) and associated labs for drug therapy implemented that require intense monitoring for toxicity as deemed appropriate based on current medication side effects and pharmacodynamically determined drug 1/2 lives.-88 therapeutic    In summary, Martin Camara, is a 49 y.o.  male who presents with a severe exacerbation of the principal diagnosis of Paranoid schizophrenia ([facility]  Patient's condition is worsening/not improving/not stable . Patient requires continued inpatient hospitalization for further stabilization, safety monitoring and medication management. I will continue to coordinate the provision of individual, milieu, occupational, group, and substance abuse therapies to address target symptoms/diagnoses as deemed appropriate for the individual patient. A coordinated, multidisplinary treatment team round was conducted with the patient (this team consists of the nurse, psychiatric unit pharmcist,  and writer). Complete current electronic health record for patient has been reviewed today including consultant notes, ancillary staff notes, nurses and psychiatric tech notes. Suicide risk assessment completed and patient deemed to be of low risk for suicide at this time. The following regarding medications was addressed during rounds with patient:   the risks and benefits of the proposed medication. The patient was given the opportunity to ask questions. Informed consent given to the use of the above medications. Will continue to adjust psychiatric and non-psychiatric medications (see above \"medication\" section and orders section for details) as deemed appropriate & based upon diagnoses and response to treatment.      I will continue to order blood tests/labs and diagnostic tests as deemed appropriate and review results as they become available (see orders for details and above listed lab/test results). I will order psychiatric records from previous [facility] hospitals to further elucidate the nature of patient's psychopathology and review once available. I will gather additional collateral information from friends, family and o/p treatment team to further elucidate the nature of patient's psychopathology and baselline level of psychiatric functioning. I certify that this patient's inpatient psychiatric hospital services furnished since the previous certification were, and continue to be, required for treatment that could reasonably be expected to improve the patient's condition, or for diagnostic study, and that the patient continues to need, on a daily basis, active treatment furnished directly by or requiring the supervision of inpatient psychiatric facility personnel. In addition, the hospital records show that services furnished were intensive treatment services, admission or related services, or equivalent services.     EXPECTED DISCHARGE DATE/DAY: TBD     DISPOSITION: Home       Signed By:   Hilda Brewer MD  12/21/2017

## 2017-12-22 PROCEDURE — 65220000001 HC RM PRIVATE PSYCH

## 2017-12-22 PROCEDURE — 74011250637 HC RX REV CODE- 250/637: Performed by: PSYCHIATRY & NEUROLOGY

## 2017-12-22 RX ADMIN — OLANZAPINE 30 MG: 5 TABLET, ORALLY DISINTEGRATING ORAL at 21:00

## 2017-12-22 RX ADMIN — VALPROIC ACID 750 MG: 250 SOLUTION ORAL at 12:06

## 2017-12-22 RX ADMIN — FLUOXETINE 40 MG: 20 CAPSULE ORAL at 08:56

## 2017-12-22 RX ADMIN — VALPROIC ACID 750 MG: 250 SOLUTION ORAL at 16:41

## 2017-12-22 RX ADMIN — LORAZEPAM 1 MG: 1 TABLET ORAL at 08:56

## 2017-12-22 RX ADMIN — VALPROIC ACID 750 MG: 250 SOLUTION ORAL at 08:56

## 2017-12-22 RX ADMIN — BENZTROPINE MESYLATE 2 MG: 2 TABLET ORAL at 21:00

## 2017-12-22 RX ADMIN — LORAZEPAM 1 MG: 1 TABLET ORAL at 16:41

## 2017-12-22 NOTE — PROGRESS NOTES
Problem: Altered Thought Process (Adult/Pediatric)  Goal: *STG: Complies with medication therapy  Outcome: Progressing Towards Goal  Patient is alert, remains in a sitting position on the floor eating dinner. Patient remains paranoid,mute, but compliant with medications and meals. Hygiene remains poor, appearance is disheveled, hair uncombed, and clothes appear dirty. Staff will continue to monitor for safety Q 15 minutes.

## 2017-12-22 NOTE — BH NOTES
GROUP THERAPY PROGRESS NOTE    Zahra Mo is participating in TM.      Group time: 30 minutes    Personal goal for participation:  Unit orientation    Goal orientation: Community    Group therapy participation: Pt did not attend    Therapeutic interventions reviewed and discussed: Yes    Impression of participation: N/A

## 2017-12-22 NOTE — PROGRESS NOTES
Problem: Altered Thought Process (Adult/Pediatric)  Goal: *STG: Remains safe in hospital  Outcome: Progressing Towards Goal  Pt continued in fetal position partially dressed sleeping in his bed.  He slept 7 hrs during the night

## 2017-12-22 NOTE — BH NOTES
PSYCHIATRIC PROGRESS NOTE         Patient Name  Trace Dailey   Date of Birth 1970   [facility] [id number]   Medical Record Number  [id number]      Age  49 y.o. PCP PROVIDER UNKNOWN   Admit date:  11/25/2017    Room Number  307/01  @ [facility]   Date of Service  12/22/2017           E & M PROGRESS NOTE:         HISTORY       CC:  \"selectively mute\"  HISTORY OF PRESENT ILLNESS/INTERVAL HISTORY:  (reviewed/updated 12/22/2017). per initial evaluation: The patient, Trace Dailey, is a 49 y.o. WHITE OR  male with a past psychiatric history significant for schizophrenia, who presents at this time with complaints of (and/or evidence of) the following emotional symptoms: psychotic behavior. Additional symptomatology include paranoid delusion, sexually inappropriate behavior, pepisodes of yelling screaming followed by selectively mute, disorganized thought process, anxiety, concern about health problems, difficulty sleeping, fearfulness, hearing voices, increased irritability, poor concentration and problem with medication. The above symptoms have been present for many years. These symptoms are of severe severity. These symptoms are constant  in nature. The patient's condition has been precipitated by and psychosocial stressors (conflict with sister, non compliance ). Patient's condition made worse by treatment noncompliance. UDS: negative; BAL=0. Trace Dailey presents/reports/evidences the following emotional symptoms today, 12/22/2017:psychotic behavior. The above symptoms have been present for many years. These symptoms are of severe severity. The symptoms are constant in nature. Additional symptomatology and features include       12/9-Prefers to isolate self in his room except coming out for meals. Minimal social interaction. Tp remains disorganized. Accepting meds.   12/11- minimal change- isolative and does not interact with pt/staff except going out for his Breakfast. Has refused med at times. Will lay in bed, poor hygiene. 12/12- affect is sl better, out of his room today and talked in a soft voice. Remains disorganized and delusional.  12/13- poor hygiene and disorganized, will lay in bed with eyes closed, pt was able to get up and talk when told that we are discussing his dc planning. Talks in a soft voice. Accepting med but need lot of prompting to do his ADLs  12/14- affect is sl better, tendency to lay in bed, will only talk when we talk about dc planning  12/15- poor hygiene, selectively mute, disorganized and delusional. Poor insight  12/16- inappropriate posturing- sitting on the side of the bed and half naked. Selectively mute and isolative. poor hygiene  12/17- odd and bizarre behavior. Selectively mute and poor hygiene. Sitting at the edge of his bed with face downward and half naked. Accepting med  12/18- appear to have deteriorated over the weekend, selectively mute, odd behavior and delusional. Poor hygiene and passive. 12/19- delusional and disorganized. Stays in his room and found to be in odd position at the side of the bed. Staff informed pt was eating his break fast but quickly changed to this position ( ?behavioral). Poor hygiene   12/20- minimal change in presentation, pills found in his room ( ?compliance), poor hygiene and odd posturing  12/21/17 he is not engaging in talking and not eating well and he is in fetal position and he is paranoid and disorganized and poor hygiene and he refuse to take medications   12/22/17 he still not engaging and he still showing catatonic features and has been having posturing and he is mute and not verbal and he is not eating that well , respond to internal stimuli       SIDE EFFECTS: (reviewed/updated 12/22/2017)  None reported or admitted to.   No noted toxicity with use of current med   ALLERGIES:(reviewed/updated 12/22/2017)  Allergies   Allergen Reactions    Fluphenazine Unknown (comments)     Pt is unable to communicate properly.  Penicillins Unknown (comments)     Pt is unable to communicate properly. MEDICATIONS PRIOR TO ADMISSION:(reviewed/updated 12/22/2017)  Prescriptions Prior to Admission   Medication Sig    divalproex ER (DEPAKOTE ER) 500 mg ER tablet Take 1,500 mg by mouth nightly. Indications: Treatment-resistant schizophrenia    haloperidol decanoate (HALDOL DECANOATE) 100 mg/mL injection 2 mL by IntraMUSCular route every twenty-eight (28) days. Indications: SCHIZOPHRENIA    benztropine (COGENTIN) 2 mg tablet Take 1 Tab by mouth nightly. Indications: extrapyramidal disease    cloZAPine 200 mg tablet Take 600 mg by mouth nightly. Indications: TREATMENT-RESISTANT SCHIZOPHRENIA      PAST MEDICAL HISTORY: Past medical history from the initial psychiatric evaluation has been reviewed (reviewed/updated 12/22/2017) with no additional updates (I asked patient and no additional past medical history provided). Past Medical History:   Diagnosis Date    Psychiatric disorder     Psychotic disorder     Withdrawal syndrome ([facility] [place] [address]    No past surgical history on file. SOCIAL HISTORY: Social history from the initial psychiatric evaluation has been reviewed (reviewed/updated 12/22/2017) with no additional updates (I asked patient and no additional social history provided). Social History     Social History    Marital status: SINGLE     Spouse name: N/A    Number of children: N/A    Years of education: N/A     Occupational History    Not on file. 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 He lives with his sister.  On disability    No pending legal charge reported      FAMILY HISTORY: Family history from the initial psychiatric evaluation has been reviewed (reviewed/updated 12/22/2017) with no additional updates (I asked patient and no additional family history provided). No family history on file. REVIEW OF SYSTEMS: (reviewed/updated 12/22/2017)  Appetite:no change from normal   Sleep: fitful   All other Review of Systems: Psychological ROS: positive for - behavioral disorder, concentration difficulties, hallucinations, irritability, mood swings and delusion  Respiratory ROS: no cough, shortness of breath, or wheezing  Cardiovascular ROS: no chest pain or dyspnea on exertion         [facility] (MSE):    MSE FINDINGS ARE WITHIN NORMAL LIMITS (WNL) UNLESS OTHERWISE STATED BELOW. ( ALL OF THE BELOW CATEGORIES OF THE MSE HAVE BEEN REVIEWED (reviewed 12/22/2017) AND UPDATED AS DEEMED APPROPRIATE )  General Presentation age appropriate and disheveled, uncooperative and unreliable   Orientation disorganized   Vital Signs  See below (reviewed 12/22/2017); Vital Signs (BP, Pulse, & Temp) are within normal limits if not listed below.    Gait and Station Stable/steady, no ataxia   Musculoskeletal System No extrapyramidal symptoms (EPS); no abnormal muscular movements or Tardive Dyskinesia (TD); muscle strength and tone are within normal limits   Language No aphasia or dysarthria   Speech:  mute   Thought Processes illogical; slow rate of thoughts; poor abstract reasoning/computation   Thought Associations blocked    Thought Content paranoid delusions, bizarre delusions, auditory hallucinations and internally preoccupied   Suicidal Ideations none   Homicidal Ideations none   Mood:  Labile mood   Affect:  Constricted, labile inappropriate and increased in intensity   Memory recent  impaired   Memory remote:  intact   Concentration/Attention:  distractable and hypervigilance   Fund of Knowledge average   Insight:  limited   Reliability poor   Judgment:  limited          VITALS:     Patient Vitals for the past 24 hrs:   Pulse Resp BP   12/21/17 2148 83 18 (!) 145/106     Wt Readings from Last 3 Encounters:   03/25/17 95.6 kg (210 lb 11.2 oz)   02/09/15 97.5 kg (215 lb)   02/07/15 97.5 kg (215 lb)     Temp Readings from Last 3 Encounters:   03/28/17 97.5 °F (36.4 °C)   02/07/15 98.7 °F (37.1 °C)     BP Readings from Last 3 Encounters:   12/21/17 (!) 145/106   03/28/17 100/68   02/12/15 105/73     Pulse Readings from Last 3 Encounters:   12/21/17 83   03/28/17 67   02/12/15 87            DATA     LABORATORY DATA:(reviewed/updated 12/22/2017)  No results found for this or any previous visit (from the past 24 hour(s)). Lab Results   Component Value Date/Time    Valproic acid 88 12/09/2017 08:10 AM     No results found for: LITHM   RADIOLOGY REPORTS:(reviewed/updated 12/22/2017)  No results found. MEDICATIONS     ALL MEDICATIONS:      SCHEDULED MEDICATIONS:          ASSESSMENT & PLAN     DIAGNOSES REQUIRING ACTIVE TREATMENT AND MONITORING: (reviewed/updated 12/22/2017)  Patient Active Hospital Problem List:   Paranoid schizophrenia ([facility] [place] [address] (3/15/2017)- tx resistant, chronic and with negative sx    Assessment: minimal change- psychotic, odd and bizarre behavior, -ve sx, poor insight- accepting med ? Check for cheeking behavior      Plan: I will ct to adjust med- Prozac, haldol dec today. Ct with Zydis and Ativan. Change Depakote to liq    Request second opinion for ECT- pt has been on Clozapine but refuses to have labs drwan. Has been tx with ECT in the past.     Patient is on two antipsychotics now due to the relative refractoriness to treatment thus far. Prior to admission patient had THREE or more failed trails of monotherapy, including: Haldol, Zyprexa, Risperdal and Clozapine. The patient is a very slow responder to medications.   Risks and benefits in the use of two antipsychotics have been weighed in full, including the risk of metabolic syndrome and the potential increased risk of QTc  Based on this analysis, it is considered favorable for the utilization of two antipsychotics. In the future, once stable on the two antipsychotics, patient can possibly be tapered to one of the chosen antipsychotics. Will check on EKG results today to monitor QTc.- refusing EKG    Non compliance with tx- request court order med. - granted  12/21/17 will continue medications and he may need [facility]    12/22/17 need depakote level , BMP and ECT           I will continue to monitor blood levels (Depakote,, clozapine---a drug with a narrow therapeutic index= NTI) and associated labs for drug therapy implemented that require intense monitoring for toxicity as deemed appropriate based on current medication side effects and pharmacodynamically determined drug 1/2 lives.-88 therapeutic    In summary, Conor Cabrera, is a 49 y.o.  male who presents with a severe exacerbation of the principal diagnosis of Paranoid schizophrenia ([facility] [address]  Patient's condition is worsening/not improving/not stable . Patient requires continued inpatient hospitalization for further stabilization, safety monitoring and medication management. I will continue to coordinate the provision of individual, milieu, occupational, group, and substance abuse therapies to address target symptoms/diagnoses as deemed appropriate for the individual patient. A coordinated, multidisplinary treatment team round was conducted with the patient (this team consists of the nurse, psychiatric unit pharmcist,  and writer). Complete current electronic health record for patient has been reviewed today including consultant notes, ancillary staff notes, nurses and psychiatric tech notes. Suicide risk assessment completed and patient deemed to be of low risk for suicide at this time. The following regarding medications was addressed during rounds with patient:   the risks and benefits of the proposed medication. The patient was given the opportunity to ask questions.  Informed consent given to the use of the above medications. Will continue to adjust psychiatric and non-psychiatric medications (see above \"medication\" section and orders section for details) as deemed appropriate & based upon diagnoses and response to treatment. I will continue to order blood tests/labs and diagnostic tests as deemed appropriate and review results as they become available (see orders for details and above listed lab/test results). I will order psychiatric records from previous [facility] hospitals to further elucidate the nature of patient's psychopathology and review once available. I will gather additional collateral information from friends, family and o/p treatment team to further elucidate the nature of patient's psychopathology and baselline level of psychiatric functioning. I certify that this patient's inpatient psychiatric hospital services furnished since the previous certification were, and continue to be, required for treatment that could reasonably be expected to improve the patient's condition, or for diagnostic study, and that the patient continues to need, on a daily basis, active treatment furnished directly by or requiring the supervision of inpatient psychiatric facility personnel. In addition, the hospital records show that services furnished were intensive treatment services, admission or related services, or equivalent services.     EXPECTED DISCHARGE DATE/DAY: TBD     DISPOSITION: Home       Signed By:   Agueda Ruffin MD  12/22/2017

## 2017-12-22 NOTE — BH NOTES
Pt was seen in treatment team this morning.  Pt was curled up on the bed with his face covered, mute and showing catatonic features. Pt skips meals and continues to have poor hygiene.  Pt was evaluated for recommitment by [facility] - paperwork faxed to [facility] today. Dr. Manoj Willett signed mandatory ECT paperwork to be reviewed by Vinod Estrada on Wednesday 12/27/17.

## 2017-12-22 NOTE — BH NOTES
Patient refused blood pressure check after several attempts from staff. Patient is holding his arm tight and remains in a praying position in room. Will continue to monitor patient.

## 2017-12-23 PROCEDURE — 65220000001 HC RM PRIVATE PSYCH

## 2017-12-23 PROCEDURE — 74011250637 HC RX REV CODE- 250/637: Performed by: PSYCHIATRY & NEUROLOGY

## 2017-12-23 RX ADMIN — LORAZEPAM 1 MG: 1 TABLET ORAL at 08:16

## 2017-12-23 RX ADMIN — BENZTROPINE MESYLATE 2 MG: 2 TABLET ORAL at 21:29

## 2017-12-23 RX ADMIN — OLANZAPINE 30 MG: 5 TABLET, ORALLY DISINTEGRATING ORAL at 21:29

## 2017-12-23 RX ADMIN — LORAZEPAM 1 MG: 1 TABLET ORAL at 16:52

## 2017-12-23 RX ADMIN — VALPROIC ACID 750 MG: 250 SOLUTION ORAL at 16:52

## 2017-12-23 RX ADMIN — VALPROIC ACID 750 MG: 250 SOLUTION ORAL at 11:35

## 2017-12-23 RX ADMIN — VALPROIC ACID 750 MG: 250 SOLUTION ORAL at 08:16

## 2017-12-23 RX ADMIN — FLUOXETINE 40 MG: 20 CAPSULE ORAL at 08:16

## 2017-12-23 NOTE — PROGRESS NOTES
Problem: Altered Thought Process (Adult/Pediatric)  Goal: *STG: Participates in treatment plan  [address] [facility] [address]  Master Treatment Plan for David Huang    Date Treatment Plan Initiated: 11/25/17    Treatment Plan Modalities:    Type of Modality Amount(x minutes) Frequency (x/week) Duration (x days) Name of Responsible Staff    [address] [facility] meetings to encourage peer interactions   x60 minutes, 14x/week, x7 days                                                                                                     MEG Locke       Group psychotherapy to assist in building coping skills and internal controls        Therapeutic activity groups to build coping skills  x60 minutes, 7x/week, x 7 days                                                                                Rivka Camara        Psychoeducation in group setting to address:    Medication education        Coping skills        Relaxation techniques        Symptom management        Discharge planning        Spirituality         CHARLIE        Recovery/AA/NA        Physician medication management  7x/week          X 7days                                              Dr. Eliot Montelongo        Family meeting/discharge planning                                     Outcome: Progressing Towards Goal  PT is isolative to his room bent over with his butt exposed. Pt refuses to answer direct questions. He is compliant with medications and meals. Pt does not appear to be in distress. Will continue to monitor pt q15 minutes for safety and support.

## 2017-12-24 PROCEDURE — 65220000001 HC RM PRIVATE PSYCH

## 2017-12-24 PROCEDURE — 74011250637 HC RX REV CODE- 250/637: Performed by: PSYCHIATRY & NEUROLOGY

## 2017-12-24 RX ADMIN — LORAZEPAM 1 MG: 1 TABLET ORAL at 16:20

## 2017-12-24 RX ADMIN — VALPROIC ACID 750 MG: 250 SOLUTION ORAL at 11:30

## 2017-12-24 RX ADMIN — VALPROIC ACID 750 MG: 250 SOLUTION ORAL at 16:20

## 2017-12-24 RX ADMIN — BENZTROPINE MESYLATE 2 MG: 2 TABLET ORAL at 21:17

## 2017-12-24 RX ADMIN — FLUOXETINE 40 MG: 20 CAPSULE ORAL at 07:53

## 2017-12-24 RX ADMIN — OLANZAPINE 30 MG: 5 TABLET, ORALLY DISINTEGRATING ORAL at 21:17

## 2017-12-24 RX ADMIN — LORAZEPAM 1 MG: 1 TABLET ORAL at 07:53

## 2017-12-24 RX ADMIN — VALPROIC ACID 750 MG: 250 SOLUTION ORAL at 08:10

## 2017-12-24 NOTE — BH NOTES
PSYCHIATRIC PROGRESS NOTE         Patient Name  Rui Hyde   Date of Birth 1970   [facility] [id number]   Medical Record Number  [id number]      Age  49 y.o. PCP PROVIDER UNKNOWN   Admit date:  11/25/2017    Room Number  307/01  @ [facility]   Date of Service  12/24/2017           E & M PROGRESS NOTE:         HISTORY       CC:  \"selectively mute\"  HISTORY OF PRESENT ILLNESS/INTERVAL HISTORY:  (reviewed/updated 12/24/2017). per initial evaluation: The patient, Rui Hyde, is a 49 y.o. WHITE OR  male with a past psychiatric history significant for schizophrenia, who presents at this time with complaints of (and/or evidence of) the following emotional symptoms: psychotic behavior. Additional symptomatology include paranoid delusion, sexually inappropriate behavior, pepisodes of yelling screaming followed by selectively mute, disorganized thought process, anxiety, concern about health problems, difficulty sleeping, fearfulness, hearing voices, increased irritability, poor concentration and problem with medication. The above symptoms have been present for many years. These symptoms are of severe severity. These symptoms are constant  in nature. The patient's condition has been precipitated by and psychosocial stressors (conflict with sister, non compliance ). Patient's condition made worse by treatment noncompliance. UDS: negative; BAL=0. Rui Hyde presents/reports/evidences the following emotional symptoms today, 12/24/2017:psychotic behavior. The above symptoms have been present for many years. These symptoms are of severe severity. The symptoms are constant in nature. Additional symptomatology and features include       12/9-Prefers to isolate self in his room except coming out for meals. Minimal social interaction. Tp remains disorganized. Accepting meds.   12/11- minimal change- isolative and does not interact with pt/staff except going out for his Breakfast. Has refused med at times. Will lay in bed, poor hygiene. 12/12- affect is sl better, out of his room today and talked in a soft voice. Remains disorganized and delusional.  12/13- poor hygiene and disorganized, will lay in bed with eyes closed, pt was able to get up and talk when told that we are discussing his dc planning. Talks in a soft voice. Accepting med but need lot of prompting to do his ADLs  12/14- affect is sl better, tendency to lay in bed, will only talk when we talk about dc planning  12/15- poor hygiene, selectively mute, disorganized and delusional. Poor insight  12/16- inappropriate posturing- sitting on the side of the bed and half naked. Selectively mute and isolative. poor hygiene  12/17- odd and bizarre behavior. Selectively mute and poor hygiene. Sitting at the edge of his bed with face downward and half naked. Accepting med  12/18- appear to have deteriorated over the weekend, selectively mute, odd behavior and delusional. Poor hygiene and passive. 12/19- delusional and disorganized. Stays in his room and found to be in odd position at the side of the bed. Staff informed pt was eating his break fast but quickly changed to this position ( ?behavioral). Poor hygiene   12/20- minimal change in presentation, pills found in his room ( ?compliance), poor hygiene and odd posturing  12/21/17 he is not engaging in talking and not eating well and he is in fetal position and he is paranoid and disorganized and poor hygiene and he refuse to take medications   12/22/17 he still not engaging and he still showing catatonic features and has been having posturing and he is mute and not verbal and he is not eating that well , respond to internal stimuli   12/23/17: Patient was sitting on the floor,in a fetal position,appears disheveled,poor hygiene,mute ,did not engage in conversation,margaret johansen. 12/24/17:seen today in his room with staff. poor hygiene, unkempt, continues to sit in squatting position on the floor  with head down in to knees,did not engage in any conversation,internally preoccupied,As per staff accepting medications and meals. Received no prn. SIDE EFFECTS: (reviewed/updated 12/24/2017)  None reported or admitted to. No noted toxicity with use of current med   ALLERGIES:(reviewed/updated 12/24/2017)  Allergies   Allergen Reactions    Fluphenazine Unknown (comments)     Pt is unable to communicate properly.  Penicillins Unknown (comments)     Pt is unable to communicate properly. MEDICATIONS PRIOR TO ADMISSION:(reviewed/updated 12/24/2017)  Prescriptions Prior to Admission   Medication Sig    divalproex ER (DEPAKOTE ER) 500 mg ER tablet Take 1,500 mg by mouth nightly. Indications: Treatment-resistant schizophrenia    haloperidol decanoate (HALDOL DECANOATE) 100 mg/mL injection 2 mL by IntraMUSCular route every twenty-eight (28) days. Indications: SCHIZOPHRENIA    benztropine (COGENTIN) 2 mg tablet Take 1 Tab by mouth nightly. Indications: extrapyramidal disease    cloZAPine 200 mg tablet Take 600 mg by mouth nightly. Indications: TREATMENT-RESISTANT SCHIZOPHRENIA      PAST MEDICAL HISTORY: Past medical history from the initial psychiatric evaluation has been reviewed (reviewed/updated 12/24/2017) with no additional updates (I asked patient and no additional past medical history provided). Past Medical History:   Diagnosis Date    Psychiatric disorder     Psychotic disorder     Withdrawal syndrome ([facility] [address]    No past surgical history on file. SOCIAL HISTORY: Social history from the initial psychiatric evaluation has been reviewed (reviewed/updated 12/24/2017) with no additional updates (I asked patient and no additional social history provided). Social History     Social History    Marital status: SINGLE     Spouse name: N/A    Number of children: N/A    Years of education: N/A     Occupational History    Not on file.      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 He lives with his sister. On disability    No pending legal charge reported      FAMILY HISTORY: Family history from the initial psychiatric evaluation has been reviewed (reviewed/updated 12/24/2017) with no additional updates (I asked patient and no additional family history provided). No family history on file. REVIEW OF SYSTEMS: (reviewed/updated 12/24/2017)  Appetite:no change from normal   Sleep: fitful   All other Review of Systems: Psychological ROS: positive for - behavioral disorder, concentration difficulties, hallucinations, irritability, mood swings and delusion  Respiratory ROS: no cough, shortness of breath, or wheezing  Cardiovascular ROS: no chest pain or dyspnea on exertion         [facility] (MSE):    MSE FINDINGS ARE WITHIN NORMAL LIMITS (WNL) UNLESS OTHERWISE STATED BELOW. ( ALL OF THE BELOW CATEGORIES OF THE MSE HAVE BEEN REVIEWED (reviewed 12/24/2017) AND UPDATED AS DEEMED APPROPRIATE )  General Presentation age appropriate and disheveled, uncooperative and unreliable   Orientation disorganized   Vital Signs  See below (reviewed 12/24/2017); Vital Signs (BP, Pulse, & Temp) are within normal limits if not listed below.    Gait and Station Stable/steady, no ataxia   Musculoskeletal System No extrapyramidal symptoms (EPS); no abnormal muscular movements or Tardive Dyskinesia (TD); muscle strength and tone are within normal limits   Language No aphasia or dysarthria   Speech:  mute   Thought Processes illogical; slow rate of thoughts; poor abstract reasoning/computation   Thought Associations blocked    Thought Content paranoid delusions, bizarre delusions, auditory hallucinations and internally preoccupied   Suicidal Ideations none Homicidal Ideations none   Mood:  Labile mood   Affect:  Constricted, labile inappropriate and increased in intensity   Memory recent  impaired   Memory remote:  intact   Concentration/Attention:  distractable and hypervigilance   Fund of Knowledge average   Insight:  limited   Reliability poor   Judgment:  limited          VITALS:     No data found. Wt Readings from Last 3 Encounters:   12/23/17 84.9 kg (187 lb 1.6 oz)   03/25/17 95.6 kg (210 lb 11.2 oz)   02/09/15 97.5 kg (215 lb)     Temp Readings from Last 3 Encounters:   03/28/17 97.5 °F (36.4 °C)   02/07/15 98.7 °F (37.1 °C)     BP Readings from Last 3 Encounters:   12/21/17 (!) 145/106   03/28/17 100/68   02/12/15 105/73     Pulse Readings from Last 3 Encounters:   12/21/17 83   03/28/17 67   02/12/15 87            DATA     LABORATORY DATA:(reviewed/updated 12/24/2017)  No results found for this or any previous visit (from the past 24 hour(s)). Lab Results   Component Value Date/Time    Valproic acid 88 12/09/2017 08:10 AM     No results found for: LITHM   RADIOLOGY REPORTS:(reviewed/updated 12/24/2017)  No results found. MEDICATIONS     ALL MEDICATIONS:      SCHEDULED MEDICATIONS:          ASSESSMENT & PLAN     DIAGNOSES REQUIRING ACTIVE TREATMENT AND MONITORING: (reviewed/updated 12/24/2017)  Patient Active Hospital Problem List:   Paranoid schizophrenia ([facility] [address] (3/15/2017)- tx resistant, chronic and with negative sx    Assessment: minimal change- psychotic, odd and bizarre behavior, -ve sx, poor insight- accepting med ? Check for cheeking behavior      Plan: I will ct to adjust med- Prozac, haldol dec today. Ct with Zydis and Ativan. Change Depakote to liq    Request second opinion for ECT- pt has been on Clozapine but refuses to have labs marbin. Has been tx with ECT in the past.     Patient is on two antipsychotics now due to the relative refractoriness to treatment thus far.   Prior to admission patient had THREE or more failed trails of monotherapy, including: Haldol, Zyprexa, Risperdal and Clozapine. The patient is a very slow responder to medications. Risks and benefits in the use of two antipsychotics have been weighed in full, including the risk of metabolic syndrome and the potential increased risk of QTc  Based on this analysis, it is considered favorable for the utilization of two antipsychotics. In the future, once stable on the two antipsychotics, patient can possibly be tapered to one of the chosen antipsychotics. Will check on EKG results today to monitor QTc.- refusing EKG    Non compliance with tx- request court order med. - granted  12/21/17 will continue medications and he may need [facility]    12/22/17 need depakote level , BMP and ECT     12/23/17: Continue current treatment and supportive  care  12/24/17: No significant change noted,still has Odd posture,slectiviely mute. Continue current treatment,porvide supportive care. I will continue to monitor blood levels (Depakote,, clozapine---a drug with a narrow therapeutic index= NTI) and associated labs for drug therapy implemented that require intense monitoring for toxicity as deemed appropriate based on current medication side effects and pharmacodynamically determined drug 1/2 lives.-88 therapeutic    In summary, Germania Slade, is a 49 y.o.  male who presents with a severe exacerbation of the principal diagnosis of Paranoid schizophrenia ([address]  Patient's condition is worsening/not improving/not stable . Patient requires continued inpatient hospitalization for further stabilization, safety monitoring and medication management. I will continue to coordinate the provision of individual, milieu, occupational, group, and substance abuse therapies to address target symptoms/diagnoses as deemed appropriate for the individual patient.   A coordinated, multidisplinary treatment team round was conducted with the patient (this team consists of the nurse, psychiatric unit pharmcist,  and writer). Complete current electronic health record for patient has been reviewed today including consultant notes, ancillary staff notes, nurses and psychiatric tech notes. Suicide risk assessment completed and patient deemed to be of low risk for suicide at this time. The following regarding medications was addressed during rounds with patient:   the risks and benefits of the proposed medication. The patient was given the opportunity to ask questions. Informed consent given to the use of the above medications. Will continue to adjust psychiatric and non-psychiatric medications (see above \"medication\" section and orders section for details) as deemed appropriate & based upon diagnoses and response to treatment. I will continue to order blood tests/labs and diagnostic tests as deemed appropriate and review results as they become available (see orders for details and above listed lab/test results). I will order psychiatric records from previous [facility] hospitals to further elucidate the nature of patient's psychopathology and review once available. I will gather additional collateral information from friends, family and o/p treatment team to further elucidate the nature of patient's psychopathology and baselline level of psychiatric functioning. I certify that this patient's inpatient psychiatric hospital services furnished since the previous certification were, and continue to be, required for treatment that could reasonably be expected to improve the patient's condition, or for diagnostic study, and that the patient continues to need, on a daily basis, active treatment furnished directly by or requiring the supervision of inpatient psychiatric facility personnel. In addition, the hospital records show that services furnished were intensive treatment services, admission or related services, or equivalent services.     EXPECTED DISCHARGE DATE/DAY: TBD DISPOSITION: Home       Signed By:   Gerard Pierre MD  12/24/2017

## 2017-12-24 NOTE — BH NOTES
GROUP THERAPY PROGRESS NOTE    Cecille Jain is participating in medication education group. Group time: 30 minutes    Personal goal for participation:medication education    Goal orientation: personal    Group therapy participation: passive    Therapeutic interventions reviewed and discussed: yes    Impression of participation: not receptive to teaching. Needs reinforcement.

## 2017-12-24 NOTE — PROGRESS NOTES
Problem: Altered Thought Process (Adult/Pediatric)  Goal: *STG: Attends activities and groups  Outcome: Not Progressing Towards Goal  Pt continues to isolate in room. He continues to sit in a fetal/praying position. He is selectively mute, not answering any questions from staff. Pt does not appear to be in any distress or pain. He comes out of the room for meals. He is compliant with medications. Will continue to monitor pt q15 minutes for safety and support.

## 2017-12-24 NOTE — PROGRESS NOTES
Diet as tolerated. Pt noted to be meal compliant. PMH unremarkable per nutrition  Ht: 5'7\"  Wt: 187 lb 1/6 oz  BMI: 29.3 kg/(m^2) c/w overweight.   Est energy needs: 1820 kcal, 68 g protein, 1 mL/kcal fluids  Pt will consume > 75% of meals at follow up

## 2017-12-24 NOTE — PROGRESS NOTES
Problem: Altered Thought Process (Adult/Pediatric)  Goal: *STG: Attends activities and groups  Outcome: Not Progressing Towards Goal  Patient remains very disorganized, poor hygiene, unkempt, continues to sit in squatting position on the floor or in the bed with head down in to knees. Selectively mute, absolutely no interaction, no eye contact, does not acknowledge when staff approaches or questions him. Accepts medication and meals in his room only. Continue to monitor the patient and assess needs.

## 2017-12-25 PROCEDURE — 74011250636 HC RX REV CODE- 250/636: Performed by: PSYCHIATRY & NEUROLOGY

## 2017-12-25 PROCEDURE — 65220000001 HC RM PRIVATE PSYCH

## 2017-12-25 PROCEDURE — 74011250637 HC RX REV CODE- 250/637: Performed by: PSYCHIATRY & NEUROLOGY

## 2017-12-25 RX ADMIN — BENZTROPINE MESYLATE 2 MG: 2 TABLET ORAL at 21:33

## 2017-12-25 RX ADMIN — LORAZEPAM 1 MG: 1 TABLET ORAL at 08:30

## 2017-12-25 RX ADMIN — OLANZAPINE 30 MG: 5 TABLET, ORALLY DISINTEGRATING ORAL at 21:32

## 2017-12-25 RX ADMIN — VALPROIC ACID 750 MG: 250 SOLUTION ORAL at 08:30

## 2017-12-25 RX ADMIN — FLUOXETINE 40 MG: 20 CAPSULE ORAL at 08:30

## 2017-12-25 RX ADMIN — LORAZEPAM 1 MG: 1 TABLET ORAL at 17:04

## 2017-12-25 RX ADMIN — VALPROIC ACID 750 MG: 250 SOLUTION ORAL at 17:03

## 2017-12-25 RX ADMIN — VALPROIC ACID 750 MG: 250 SOLUTION ORAL at 12:04

## 2017-12-25 RX ADMIN — LORAZEPAM 2 MG: 2 INJECTION INTRAMUSCULAR; INTRAVENOUS at 12:04

## 2017-12-25 NOTE — PROGRESS NOTES
Problem: Altered Thought Process (Adult/Pediatric)  Goal: *STG: Attends activities and groups  Outcome: Not Progressing Towards Goal  Patient continues to isolates self to the room, noted him to be sitting in prayer position with head down in to his knees, with bottom exposed for a longer durations. He does not acknowledge or respond to staff when questioned. He pulls his arm away or makes his arms stiff keeps close to his body when attempted to check his vital signs. medication and meal compliant and was not noted in any distress. Q 15 minutes monitoring maintained for the safety.

## 2017-12-25 NOTE — BH NOTES
Patient was observed to be sleeping for 6 hours without any distress and even respirations. Q 15 minutes monitoring maintained for the safety and support. Patient refused to allow staff to draw blood for labs.

## 2017-12-25 NOTE — PROGRESS NOTES
Problem: Altered Thought Process (Adult/Pediatric)  Goal: *STG: Complies with medication therapy  Outcome: Progressing Towards Goal  Nsg note 7-3p shift: Pt remains disorganized, disheveled, and selectively mute. He maintains a fetal/prayer position throughout the shift. He was encouraged to bathe and pt interaction was attempted throughout the shift. Staff was able to assist pt with hygiene during the shift. With help from staff, pt used a wash cloth to clean some areas on his body. Staff assisted pt into changing into clean clothes and washed his clothes that he had on previously. He did not want any assistance with his hygiene. Staff also disinfected his bed and other items in hs room. Pt did eat his meals and was compliant with meds. He received prn Ativan IM to assist with his catatonic behavior. Staff will continue to monitor pt and offer supportive care as needed.

## 2017-12-25 NOTE — BH NOTES
PSYCHIATRIC PROGRESS NOTE         Patient Name  Zahra Mo   Date of Birth 1970   [facility] [id number]   Medical Record Number  [id number]      Age  49 y.o. PCP PROVIDER UNKNOWN   Admit date:  11/25/2017    Room Number  307/01  @ [facility]   Date of Service  12/25/2017           E & M PROGRESS NOTE:         HISTORY       CC:  \"selectively mute\"  HISTORY OF PRESENT ILLNESS/INTERVAL HISTORY:  (reviewed/updated 12/25/2017). per initial evaluation: The patient, Zahra Mo, is a 49 y.o. WHITE OR  male with a past psychiatric history significant for schizophrenia, who presents at this time with complaints of (and/or evidence of) the following emotional symptoms: psychotic behavior. Additional symptomatology include paranoid delusion, sexually inappropriate behavior, pepisodes of yelling screaming followed by selectively mute, disorganized thought process, anxiety, concern about health problems, difficulty sleeping, fearfulness, hearing voices, increased irritability, poor concentration and problem with medication. The above symptoms have been present for many years. These symptoms are of severe severity. These symptoms are constant  in nature. The patient's condition has been precipitated by and psychosocial stressors (conflict with sister, non compliance ). Patient's condition made worse by treatment noncompliance. UDS: negative; BAL=0. Zahra Mo presents/reports/evidences the following emotional symptoms today, 12/25/2017:psychotic behavior. The above symptoms have been present for many years. These symptoms are of severe severity. The symptoms are constant in nature. Additional symptomatology and features include       12/9-Prefers to isolate self in his room except coming out for meals. Minimal social interaction. Tp remains disorganized. Accepting meds.   12/11- minimal change- isolative and does not interact with pt/staff except going out for his Breakfast. Has refused med at times. Will lay in bed, poor hygiene. 12/12- affect is sl better, out of his room today and talked in a soft voice. Remains disorganized and delusional.  12/13- poor hygiene and disorganized, will lay in bed with eyes closed, pt was able to get up and talk when told that we are discussing his dc planning. Talks in a soft voice. Accepting med but need lot of prompting to do his ADLs  12/14- affect is sl better, tendency to lay in bed, will only talk when we talk about dc planning  12/15- poor hygiene, selectively mute, disorganized and delusional. Poor insight  12/16- inappropriate posturing- sitting on the side of the bed and half naked. Selectively mute and isolative. poor hygiene  12/17- odd and bizarre behavior. Selectively mute and poor hygiene. Sitting at the edge of his bed with face downward and half naked. Accepting med  12/18- appear to have deteriorated over the weekend, selectively mute, odd behavior and delusional. Poor hygiene and passive. 12/19- delusional and disorganized. Stays in his room and found to be in odd position at the side of the bed. Staff informed pt was eating his break fast but quickly changed to this position ( ?behavioral). Poor hygiene   12/20- minimal change in presentation, pills found in his room ( ?compliance), poor hygiene and odd posturing  12/21/17 he is not engaging in talking and not eating well and he is in fetal position and he is paranoid and disorganized and poor hygiene and he refuse to take medications   12/22/17 he still not engaging and he still showing catatonic features and has been having posturing and he is mute and not verbal and he is not eating that well , respond to internal stimuli   12/23/17: Patient was sitting on the floor,in a fetal position,appears disheveled,poor hygiene,mute ,did not engage in conversation,margaret johansen. 12/24/17:seen today in his room with staff. poor hygiene, unkempt, continues to sit in squatting position on the floor  with head down in to knees,did not engage in any conversation,internally preoccupied,As per staff accepting medications and meals. Received no prn.   12/25/17: Patient was seen in his room, he was laying in bed in a fetal position, did not engage in any conversation,poor hygiene, odd posture, eats his meals. Has not shown much improvement with current medications. Treatment team is working on to get ECT. SIDE EFFECTS: (reviewed/updated 12/25/2017)  None reported or admitted to. No noted toxicity with use of current med   ALLERGIES:(reviewed/updated 12/25/2017)  Allergies   Allergen Reactions    Fluphenazine Unknown (comments)     Pt is unable to communicate properly.  Penicillins Unknown (comments)     Pt is unable to communicate properly. MEDICATIONS PRIOR TO ADMISSION:(reviewed/updated 12/25/2017)  Prescriptions Prior to Admission   Medication Sig    divalproex ER (DEPAKOTE ER) 500 mg ER tablet Take 1,500 mg by mouth nightly. Indications: Treatment-resistant schizophrenia    haloperidol decanoate (HALDOL DECANOATE) 100 mg/mL injection 2 mL by IntraMUSCular route every twenty-eight (28) days. Indications: SCHIZOPHRENIA    benztropine (COGENTIN) 2 mg tablet Take 1 Tab by mouth nightly. Indications: extrapyramidal disease    cloZAPine 200 mg tablet Take 600 mg by mouth nightly. Indications: TREATMENT-RESISTANT SCHIZOPHRENIA      PAST MEDICAL HISTORY: Past medical history from the initial psychiatric evaluation has been reviewed (reviewed/updated 12/25/2017) with no additional updates (I asked patient and no additional past medical history provided). Past Medical History:   Diagnosis Date    Psychiatric disorder     Psychotic disorder     Withdrawal syndrome ([facility] [address]    No past surgical history on file.    SOCIAL HISTORY: Social history from the initial psychiatric evaluation has been reviewed (reviewed/updated 12/25/2017) with no additional updates (I asked patient and no additional social history provided). Social History     Social History    Marital status: SINGLE     Spouse name: N/A    Number of children: N/A    Years of education: N/A     Occupational History    Not on file. 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 He lives with his sister. On disability    No pending legal charge reported      FAMILY HISTORY: Family history from the initial psychiatric evaluation has been reviewed (reviewed/updated 12/25/2017) with no additional updates (I asked patient and no additional family history provided). No family history on file. REVIEW OF SYSTEMS: (reviewed/updated 12/25/2017)  Appetite:no change from normal   Sleep: fitful   All other Review of Systems: Psychological ROS: positive for - behavioral disorder, concentration difficulties, hallucinations, irritability, mood swings and delusion  Respiratory ROS: no cough, shortness of breath, or wheezing  Cardiovascular ROS: no chest pain or dyspnea on exertion         [facility] [place]    [facility] FINDINGS ARE WITHIN NORMAL LIMITS (WNL) UNLESS OTHERWISE STATED BELOW. ( ALL OF THE BELOW CATEGORIES OF THE MSE HAVE BEEN REVIEWED (reviewed 12/25/2017) AND UPDATED AS DEEMED APPROPRIATE )  General Presentation age appropriate and disheveled, uncooperative and unreliable   Orientation disorganized   Vital Signs  See below (reviewed 12/25/2017); Vital Signs (BP, Pulse, & Temp) are within normal limits if not listed below.    Gait and Station Stable/steady, no ataxia   Musculoskeletal System No extrapyramidal symptoms (EPS); no abnormal muscular movements or Tardive Dyskinesia (TD); muscle strength and tone are within normal limits   Language No aphasia or dysarthria   Speech:  mute Thought Processes illogical; slow rate of thoughts; poor abstract reasoning/computation   Thought Associations blocked    Thought Content paranoid delusions, bizarre delusions, auditory hallucinations and internally preoccupied   Suicidal Ideations none   Homicidal Ideations none   Mood:  Labile mood   Affect:  Constricted, labile inappropriate and increased in intensity   Memory recent  impaired   Memory remote:  intact   Concentration/Attention:  distractable and hypervigilance   Fund of Knowledge average   Insight:  limited   Reliability poor   Judgment:  limited          VITALS:     No data found. Wt Readings from Last 3 Encounters:   12/23/17 84.9 kg (187 lb 1.6 oz)   03/25/17 95.6 kg (210 lb 11.2 oz)   02/09/15 97.5 kg (215 lb)     Temp Readings from Last 3 Encounters:   03/28/17 97.5 °F (36.4 °C)   02/07/15 98.7 °F (37.1 °C)     BP Readings from Last 3 Encounters:   12/21/17 (!) 145/106   03/28/17 100/68   02/12/15 105/73     Pulse Readings from Last 3 Encounters:   12/21/17 83   03/28/17 67   02/12/15 87            DATA     LABORATORY DATA:(reviewed/updated 12/25/2017)  No results found for this or any previous visit (from the past 24 hour(s)). Lab Results   Component Value Date/Time    Valproic acid 88 12/09/2017 08:10 AM     No results found for: LITHM   RADIOLOGY REPORTS:(reviewed/updated 12/25/2017)  No results found. MEDICATIONS     ALL MEDICATIONS:      SCHEDULED MEDICATIONS:          ASSESSMENT & PLAN     DIAGNOSES REQUIRING ACTIVE TREATMENT AND MONITORING: (reviewed/updated 12/25/2017)  Patient Active Hospital Problem List:   Paranoid schizophrenia ([facility] [address] (3/15/2017)- tx resistant, chronic and with negative sx    Assessment: minimal change- psychotic, odd and bizarre behavior, -ve sx, poor insight- accepting med ? Check for cheeking behavior      Plan: I will ct to adjust med- Prozac, haldol dec today. Ct with Zydis and Ativan.  Change Depakote to liq    Request second opinion for ECT- pt has been on Clozapine but refuses to have labs drwan. Has been tx with ECT in the past.     Patient is on two antipsychotics now due to the relative refractoriness to treatment thus far. Prior to admission patient had THREE or more failed trails of monotherapy, including: Haldol, Zyprexa, Risperdal and Clozapine. The patient is a very slow responder to medications. Risks and benefits in the use of two antipsychotics have been weighed in full, including the risk of metabolic syndrome and the potential increased risk of QTc  Based on this analysis, it is considered favorable for the utilization of two antipsychotics. In the future, once stable on the two antipsychotics, patient can possibly be tapered to one of the chosen antipsychotics. Will check on EKG results today to monitor QTc.- refusing EKG    Non compliance with tx- request court order med. - granted  12/21/17 will continue medications and he may need [facility]    12/22/17 need depakote level , BMP and ECT     12/23/17: Continue current treatment and supportive  care  12/24/17: No significant change noted,still has Odd posture,slectiviely mute. Continue current treatment,porvide supportive care. 12/25/17:  Patient is not progressing, psychosis with catatonia continues, on Lorazepam,staff reported eating meals fairly well, poor self care, selectively mute,consider ECT. I will continue to monitor blood levels (Depakote,, clozapine---a drug with a narrow therapeutic index= NTI) and associated labs for drug therapy implemented that require intense monitoring for toxicity as deemed appropriate based on current medication side effects and pharmacodynamically determined drug 1/2 lives.-88 therapeutic    In summary, Paul Correa, is a 49 y.o.  male who presents with a severe exacerbation of the principal diagnosis of Paranoid schizophrenia ([facility] [address]  Patient's condition is worsening/not improving/not stable .   Patient requires continued inpatient hospitalization for further stabilization, safety monitoring and medication management. I will continue to coordinate the provision of individual, milieu, occupational, group, and substance abuse therapies to address target symptoms/diagnoses as deemed appropriate for the individual patient. A coordinated, multidisplinary treatment team round was conducted with the patient (this team consists of the nurse, psychiatric unit pharmcist,  and writer). Complete current electronic health record for patient has been reviewed today including consultant notes, ancillary staff notes, nurses and psychiatric tech notes. Suicide risk assessment completed and patient deemed to be of low risk for suicide at this time. The following regarding medications was addressed during rounds with patient:   the risks and benefits of the proposed medication. The patient was given the opportunity to ask questions. Informed consent given to the use of the above medications. Will continue to adjust psychiatric and non-psychiatric medications (see above \"medication\" section and orders section for details) as deemed appropriate & based upon diagnoses and response to treatment. I will continue to order blood tests/labs and diagnostic tests as deemed appropriate and review results as they become available (see orders for details and above listed lab/test results). I will order psychiatric records from previous [facility] hospitals to further elucidate the nature of patient's psychopathology and review once available. I will gather additional collateral information from friends, family and o/p treatment team to further elucidate the nature of patient's psychopathology and baselline level of psychiatric functioning.          I certify that this patient's inpatient psychiatric hospital services furnished since the previous certification were, and continue to be, required for treatment that could reasonably be expected to improve the patient's condition, or for diagnostic study, and that the patient continues to need, on a daily basis, active treatment furnished directly by or requiring the supervision of inpatient psychiatric facility personnel. In addition, the hospital records show that services furnished were intensive treatment services, admission or related services, or equivalent services.     EXPECTED DISCHARGE DATE/DAY: TBD     DISPOSITION: Home       Signed By:   Vivek Bartlett MD  12/25/2017

## 2017-12-26 PROCEDURE — 65220000001 HC RM PRIVATE PSYCH

## 2017-12-26 PROCEDURE — 74011250637 HC RX REV CODE- 250/637: Performed by: PSYCHIATRY & NEUROLOGY

## 2017-12-26 RX ADMIN — VALPROIC ACID 750 MG: 250 SOLUTION ORAL at 09:11

## 2017-12-26 RX ADMIN — FLUOXETINE 40 MG: 20 CAPSULE ORAL at 09:08

## 2017-12-26 RX ADMIN — BENZTROPINE MESYLATE 2 MG: 2 TABLET ORAL at 21:55

## 2017-12-26 RX ADMIN — OLANZAPINE 30 MG: 5 TABLET, ORALLY DISINTEGRATING ORAL at 21:55

## 2017-12-26 RX ADMIN — LORAZEPAM 1 MG: 1 TABLET ORAL at 17:09

## 2017-12-26 RX ADMIN — VALPROIC ACID 750 MG: 250 SOLUTION ORAL at 12:31

## 2017-12-26 RX ADMIN — VALPROIC ACID 750 MG: 250 SOLUTION ORAL at 17:09

## 2017-12-26 RX ADMIN — LORAZEPAM 1 MG: 1 TABLET ORAL at 09:09

## 2017-12-26 NOTE — PROGRESS NOTES
Problem: Altered Thought Process (Adult/Pediatric)  Goal: *STG: Remains safe in hospital  Outcome: Progressing Towards Goal  Pt continues to isolate in his room. At times he is bent over in a praying/fetal position on the floor. Encouraged pt to sit on his bed. Pt is compliant with meals and snacks. Pt does not appear to be in any distress at this time. Pt continues to be selectively mute. Will continue to monitor pt q15 minutes for safety and support.

## 2017-12-26 NOTE — BH NOTES
PSYCHIATRIC PROGRESS NOTE         Patient Name  Conor Cabrera   Date of Birth 1970   [facility] [id number]   Medical Record Number  [id number]      Age  49 y.o. PCP PROVIDER UNKNOWN   Admit date:  11/25/2017    Room Number  307/01  @ [facility]   Date of Service  12/26/2017           E & M PROGRESS NOTE:         HISTORY       CC:  \"selectively mute\"  HISTORY OF PRESENT ILLNESS/INTERVAL HISTORY:  (reviewed/updated 12/26/2017). per initial evaluation: The patient, Conor Cabrera, is a 49 y.o. WHITE OR  male with a past psychiatric history significant for schizophrenia, who presents at this time with complaints of (and/or evidence of) the following emotional symptoms: psychotic behavior. Additional symptomatology include paranoid delusion, sexually inappropriate behavior, pepisodes of yelling screaming followed by selectively mute, disorganized thought process, anxiety, concern about health problems, difficulty sleeping, fearfulness, hearing voices, increased irritability, poor concentration and problem with medication. The above symptoms have been present for many years. These symptoms are of severe severity. These symptoms are constant  in nature. The patient's condition has been precipitated by and psychosocial stressors (conflict with sister, non compliance ). Patient's condition made worse by treatment noncompliance. UDS: negative; BAL=0. Conor Cabrera presents/reports/evidences the following emotional symptoms today, 12/26/2017:psychotic behavior. The above symptoms have been present for many years. These symptoms are of severe severity. The symptoms are constant in nature. Additional symptomatology and features include       12/9-Prefers to isolate self in his room except coming out for meals. Minimal social interaction. Tp remains disorganized. Accepting meds.   12/11- minimal change- isolative and does not interact with pt/staff except going out for his Breakfast. Has refused med at times. Will lay in bed, poor hygiene. 12/12- affect is sl better, out of his room today and talked in a soft voice. Remains disorganized and delusional.  12/13- poor hygiene and disorganized, will lay in bed with eyes closed, pt was able to get up and talk when told that we are discussing his dc planning. Talks in a soft voice. Accepting med but need lot of prompting to do his ADLs  12/14- affect is sl better, tendency to lay in bed, will only talk when we talk about dc planning  12/15- poor hygiene, selectively mute, disorganized and delusional. Poor insight  12/16- inappropriate posturing- sitting on the side of the bed and half naked. Selectively mute and isolative. poor hygiene  12/17- odd and bizarre behavior. Selectively mute and poor hygiene. Sitting at the edge of his bed with face downward and half naked. Accepting med  12/18- appear to have deteriorated over the weekend, selectively mute, odd behavior and delusional. Poor hygiene and passive. 12/19- delusional and disorganized. Stays in his room and found to be in odd position at the side of the bed. Staff informed pt was eating his break fast but quickly changed to this position ( ?behavioral). Poor hygiene   12/20- minimal change in presentation, pills found in his room ( ?compliance), poor hygiene and odd posturing  12/21/17 he is not engaging in talking and not eating well and he is in fetal position and he is paranoid and disorganized and poor hygiene and he refuse to take medications   12/22/17 he still not engaging and he still showing catatonic features and has been having posturing and he is mute and not verbal and he is not eating that well , respond to internal stimuli   12/23/17: Patient was sitting on the floor,in a fetal position,appears disheveled,poor hygiene,mute ,did not engage in conversation,margaret johansen. 12/24/17:seen today in his room with staff. poor hygiene, unkempt, continues to sit in squatting position on the floor  with head down in to knees,did not engage in any conversation,internally preoccupied,As per staff accepting medications and meals. Received no prn.   12/25/17: Patient was seen in his room, he was laying in bed in a fetal position, did not engage in any conversation,poor hygiene, odd posture, eats his meals. Has not shown much improvement with current medications. Treatment team is working on to get ECT. 12/26/17: Not progressing, no significant change noted,laying on the floor, fetal position, half naked,mute ,catatonic, Poor hygiene,accepts medications and meals. treatment team has requested judicial consent for  ECT which is pending,he has received E[facility] at [facility] in the past.       SIDE EFFECTS: (reviewed/updated 12/26/2017)  None reported or admitted to. No noted toxicity with use of current med   ALLERGIES:(reviewed/updated 12/26/2017)  Allergies   Allergen Reactions    Fluphenazine Unknown (comments)     Pt is unable to communicate properly.  Penicillins Unknown (comments)     Pt is unable to communicate properly. MEDICATIONS PRIOR TO ADMISSION:(reviewed/updated 12/26/2017)  Prescriptions Prior to Admission   Medication Sig    divalproex ER (DEPAKOTE ER) 500 mg ER tablet Take 1,500 mg by mouth nightly. Indications: Treatment-resistant schizophrenia    haloperidol decanoate (HALDOL DECANOATE) 100 mg/mL injection 2 mL by IntraMUSCular route every twenty-eight (28) days. Indications: SCHIZOPHRENIA    benztropine (COGENTIN) 2 mg tablet Take 1 Tab by mouth nightly. Indications: extrapyramidal disease    cloZAPine 200 mg tablet Take 600 mg by mouth nightly. Indications: TREATMENT-RESISTANT SCHIZOPHRENIA      PAST MEDICAL HISTORY: Past medical history from the initial psychiatric evaluation has been reviewed (reviewed/updated 12/26/2017) with no additional updates (I asked patient and no additional past medical history provided).    Past Medical History:   Diagnosis Date    Psychiatric disorder     Psychotic disorder     Withdrawal syndrome ([facility] [address]    No past surgical history on file. SOCIAL HISTORY: Social history from the initial psychiatric evaluation has been reviewed (reviewed/updated 12/26/2017) with no additional updates (I asked patient and no additional social history provided). Social History     Social History    Marital status: SINGLE     Spouse name: N/A    Number of children: N/A    Years of education: N/A     Occupational History    Not on file. 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 He lives with his sister. On disability    No pending legal charge reported      FAMILY HISTORY: Family history from the initial psychiatric evaluation has been reviewed (reviewed/updated 12/26/2017) with no additional updates (I asked patient and no additional family history provided). No family history on file. REVIEW OF SYSTEMS: (reviewed/updated 12/26/2017)  Appetite:no change from normal   Sleep: fitful   All other Review of Systems: Psychological ROS: positive for - behavioral disorder, concentration difficulties, hallucinations, irritability, mood swings and delusion  Respiratory ROS: no cough, shortness of breath, or wheezing  Cardiovascular ROS: no chest pain or dyspnea on exertion         [facility] (MSE):    MSE FINDINGS ARE WITHIN NORMAL LIMITS (WNL) UNLESS OTHERWISE STATED BELOW. ( ALL OF THE BELOW CATEGORIES OF THE MSE HAVE BEEN REVIEWED (reviewed 12/26/2017) AND UPDATED AS DEEMED APPROPRIATE )  General Presentation age appropriate and disheveled, uncooperative and unreliable   Orientation disorganized   Vital Signs  See below (reviewed 12/26/2017);  Vital Signs (BP, Pulse, & Temp) are within normal limits if not listed below. Gait and Station Stable/steady, no ataxia   Musculoskeletal System No extrapyramidal symptoms (EPS); no abnormal muscular movements or Tardive Dyskinesia (TD); muscle strength and tone are within normal limits   Language No aphasia or dysarthria   Speech:  mute   Thought Processes illogical; slow rate of thoughts; poor abstract reasoning/computation   Thought Associations blocked    Thought Content paranoid delusions, bizarre delusions, auditory hallucinations and internally preoccupied   Suicidal Ideations none   Homicidal Ideations none   Mood:  Labile mood   Affect:  Constricted, labile inappropriate and increased in intensity   Memory recent  impaired   Memory remote:  intact   Concentration/Attention:  distractable and hypervigilance   Fund of Knowledge average   Insight:  limited   Reliability poor   Judgment:  limited          VITALS:     No data found. Wt Readings from Last 3 Encounters:   12/23/17 84.9 kg (187 lb 1.6 oz)   03/25/17 95.6 kg (210 lb 11.2 oz)   02/09/15 97.5 kg (215 lb)     Temp Readings from Last 3 Encounters:   03/28/17 97.5 °F (36.4 °C)   02/07/15 98.7 °F (37.1 °C)     BP Readings from Last 3 Encounters:   03/28/17 100/68   02/12/15 105/73   02/07/15 148/79     Pulse Readings from Last 3 Encounters:   12/21/17 83   03/28/17 67   02/12/15 87            DATA     LABORATORY DATA:(reviewed/updated 12/26/2017)  No results found for this or any previous visit (from the past 24 hour(s)). Lab Results   Component Value Date/Time    Valproic acid 88 12/09/2017 08:10 AM     No results found for: LITHM   RADIOLOGY REPORTS:(reviewed/updated 12/26/2017)  No results found.        MEDICATIONS     ALL MEDICATIONS:      SCHEDULED MEDICATIONS:          ASSESSMENT & PLAN     DIAGNOSES REQUIRING ACTIVE TREATMENT AND MONITORING: (reviewed/updated 12/26/2017)  Patient Active Hospital Problem List:   Paranoid schizophrenia ([facility] [address] (3/15/2017)- tx resistant, chronic and with negative sx    Assessment: minimal change- psychotic, odd and bizarre behavior, -ve sx, poor insight- accepting med ? Check for cheeking behavior      Plan: I will ct to adjust med- Prozac, haldol dec today. Ct with Zydis and Ativan. Change Depakote to liq    Request second opinion for ECT- pt has been on Clozapine but refuses to have labs drwan. Has been tx with ECT in the past.     Patient is on two antipsychotics now due to the relative refractoriness to treatment thus far. Prior to admission patient had THREE or more failed trails of monotherapy, including: Haldol, Zyprexa, Risperdal and Clozapine. The patient is a very slow responder to medications. Risks and benefits in the use of two antipsychotics have been weighed in full, including the risk of metabolic syndrome and the potential increased risk of QTc  Based on this analysis, it is considered favorable for the utilization of two antipsychotics. In the future, once stable on the two antipsychotics, patient can possibly be tapered to one of the chosen antipsychotics. Will check on EKG results today to monitor QTc.- refusing EKG    Non compliance with tx- request court order med. - granted  12/21/17 will continue medications and he may need [facility]    12/22/17 need depakote level , BMP and ECT     12/23/17: Continue current treatment and supportive  care  12/24/17: No significant change noted,still has Odd posture,slectiviely mute. Continue current treatment,porvide supportive care. 12/25/17:  Patient is not progressing, psychosis with catatonia continues, on Lorazepam,staff reported eating meals fairly well, poor self care, selectively mute,consider ECT.    12/26/17: Treatment resistant case,not progressing on current medications,discussed with ,Pending judical consent for ECT,[facility]ore [facility] [place] [facility]  I will continue to monitor blood levels (Depakote,, clozapine---a drug with a narrow therapeutic index= NTI) and associated labs for drug therapy implemented that require intense monitoring for toxicity as deemed appropriate based on current medication side effects and pharmacodynamically determined drug 1/2 lives.-88 therapeutic    In summary, Arpita Baumann, is a 49 y.o.  male who presents with a severe exacerbation of the principal diagnosis of Paranoid schizophrenia ([facility] [address]  Patient's condition is worsening/not improving/not stable . Patient requires continued inpatient hospitalization for further stabilization, safety monitoring and medication management. I will continue to coordinate the provision of individual, milieu, occupational, group, and substance abuse therapies to address target symptoms/diagnoses as deemed appropriate for the individual patient. A coordinated, multidisplinary treatment team round was conducted with the patient (this team consists of the nurse, psychiatric unit pharmcist,  and writer). Complete current electronic health record for patient has been reviewed today including consultant notes, ancillary staff notes, nurses and psychiatric tech notes. Suicide risk assessment completed and patient deemed to be of low risk for suicide at this time. The following regarding medications was addressed during rounds with patient:   the risks and benefits of the proposed medication. The patient was given the opportunity to ask questions. Informed consent given to the use of the above medications. Will continue to adjust psychiatric and non-psychiatric medications (see above \"medication\" section and orders section for details) as deemed appropriate & based upon diagnoses and response to treatment. I will continue to order blood tests/labs and diagnostic tests as deemed appropriate and review results as they become available (see orders for details and above listed lab/test results).     I will order psychiatric records from previous [facility] hospitals to further elucidate the nature of patient's psychopathology and review once available. I will gather additional collateral information from friends, family and o/p treatment team to further elucidate the nature of patient's psychopathology and baselline level of psychiatric functioning. I certify that this patient's inpatient psychiatric hospital services furnished since the previous certification were, and continue to be, required for treatment that could reasonably be expected to improve the patient's condition, or for diagnostic study, and that the patient continues to need, on a daily basis, active treatment furnished directly by or requiring the supervision of inpatient psychiatric facility personnel. In addition, the hospital records show that services furnished were intensive treatment services, admission or related services, or equivalent services.     EXPECTED DISCHARGE DATE/DAY: TBD     DISPOSITION: Home       Signed By:   Nguyễn Porter MD  12/26/2017

## 2017-12-26 NOTE — PROGRESS NOTES
Problem: Altered Thought Process (Adult/Pediatric)  Goal: *STG: Remains safe in hospital  Outcome: Progressing Towards Goal  Patient remains isolative to self. Continues to be in a praying position in room. Patient is encouraged to talk with staff. Poor hygiene. Disheveled. Patient has medications and meals brought in room. Will continue to monitor patient and assess needs.

## 2017-12-26 NOTE — BH NOTES
Pt was seen in treatment team this morning.  Pt was curled up on the floor with his face covered, mute and showing catatonic features. Pt is eating his meals, leaves trash all over his room and continues to have poor hygiene.  Not improving with current medication regime and beginning process for [facility] ECT as Dr. Green Burkitt has signed mandatory ECT paperwork to be reviewed by Virgen Ugarte on Wednesday 12/27/17.  will contact [facility] ECT admissions, Abdulaziz Haley on Wednesday if  signs order.

## 2017-12-27 PROCEDURE — 74011250637 HC RX REV CODE- 250/637: Performed by: PSYCHIATRY & NEUROLOGY

## 2017-12-27 PROCEDURE — 65220000001 HC RM PRIVATE PSYCH

## 2017-12-27 RX ADMIN — BENZTROPINE MESYLATE 2 MG: 2 TABLET ORAL at 21:23

## 2017-12-27 RX ADMIN — LORAZEPAM 1 MG: 1 TABLET ORAL at 08:22

## 2017-12-27 RX ADMIN — FLUOXETINE 40 MG: 20 CAPSULE ORAL at 08:22

## 2017-12-27 RX ADMIN — VALPROIC ACID 750 MG: 250 SOLUTION ORAL at 11:46

## 2017-12-27 RX ADMIN — OLANZAPINE 30 MG: 5 TABLET, ORALLY DISINTEGRATING ORAL at 21:24

## 2017-12-27 RX ADMIN — LORAZEPAM 1 MG: 1 TABLET ORAL at 16:57

## 2017-12-27 RX ADMIN — VALPROIC ACID 750 MG: 250 SOLUTION ORAL at 16:57

## 2017-12-27 RX ADMIN — VALPROIC ACID 750 MG: 250 SOLUTION ORAL at 08:22

## 2017-12-27 NOTE — BH NOTES
PSYCHIATRIC PROGRESS NOTE         Patient Name  Conor Cabrera   Date of Birth 1970   [facility] [id number]   Medical Record Number  [id number]      Age  49 y.o. PCP PROVIDER UNKNOWN   Admit date:  11/25/2017    Room Number  307/01  @ [facility]   Date of Service  12/27/2017           E & M PROGRESS NOTE:         HISTORY       CC:  \"selectively mute\"  HISTORY OF PRESENT ILLNESS/INTERVAL HISTORY:  (reviewed/updated 12/27/2017). per initial evaluation: The patient, Conor Cabrera, is a 49 y.o. WHITE [place]  male with a past psychiatric history significant for schizophrenia, who presents at this time with complaints of (and/or evidence of) the following emotional symptoms: psychotic behavior. Additional symptomatology include paranoid delusion, sexually inappropriate behavior, pepisodes of yelling screaming followed by selectively mute, disorganized thought process, anxiety, concern about health problems, difficulty sleeping, fearfulness, hearing voices, increased irritability, poor concentration and problem with medication. The above symptoms have been present for many years. These symptoms are of severe severity. These symptoms are constant  in nature. The patient's condition has been precipitated by and psychosocial stressors (conflict with sister, non compliance ). Patient's condition made worse by treatment noncompliance. UDS: negative; BAL=0. Conor Cabrera presents/reports/evidences the following emotional symptoms today, 12/27/2017:psychotic behavior. The above symptoms have been present for many years. These symptoms are of severe severity. The symptoms are constant in nature. Additional symptomatology and features include       12/9-Prefers to isolate self in his room except coming out for meals. Minimal social interaction. Tp remains disorganized. Accepting meds.   12/11- minimal change- isolative and does not interact with pt/staff except going out for his Breakfast. Has refused med at times. Will lay in bed, poor hygiene. 12/12- affect is sl better, out of his room today and talked in a soft voice. Remains disorganized and delusional.  12/13- poor hygiene and disorganized, will lay in bed with eyes closed, pt was able to get up and talk when told that we are discussing his dc planning. Talks in a soft voice. Accepting med but need lot of prompting to do his ADLs  12/14- affect is sl better, tendency to lay in bed, will only talk when we talk about dc planning  12/15- poor hygiene, selectively mute, disorganized and delusional. Poor insight  12/16- inappropriate posturing- sitting on the side of the bed and half naked. Selectively mute and isolative. poor hygiene  12/17- odd and bizarre behavior. Selectively mute and poor hygiene. Sitting at the edge of his bed with face downward and half naked. Accepting med  12/18- appear to have deteriorated over the weekend, selectively mute, odd behavior and delusional. Poor hygiene and passive. 12/19- delusional and disorganized. Stays in his room and found to be in odd position at the side of the bed. Staff informed pt was eating his break fast but quickly changed to this position ( ?behavioral). Poor hygiene   12/20- minimal change in presentation, pills found in his room ( ?compliance), poor hygiene and odd posturing  12/21/17 he is not engaging in talking and not eating well and he is in fetal position and he is paranoid and disorganized and poor hygiene and he refuse to take medications   12/22/17 he still not engaging and he still showing catatonic features and has been having posturing and he is mute and not verbal and he is not eating that well , respond to internal stimuli   12/23/17: Patient was sitting on the floor,in a fetal position,appears disheveled,poor hygiene,mute ,did not engage in conversation,margaret johansen. 12/24/17:seen today in his room with staff. poor hygiene, unkempt, continues to sit in squatting position on the floor  with head down in to knees,did not engage in any conversation,internally preoccupied,As per staff accepting medications and meals. Received no prn.   12/25/17: Patient was seen in his room, he was laying in bed in a fetal position, did not engage in any conversation,poor hygiene, odd posture, eats his meals. Has not shown much improvement with current medications. Treatment team is working on to get ECT. 12/26/17: Not progressing, no significant change noted,laying on the floor, fetal position, half naked,mute ,catatonic, Poor hygiene,accepts medications and meals. treatment team has requested judicial consent for  ECT which is pending,he has [place] [facility] in the past.   12/27/17: Seen today in his room, no significant change,sitting on the floor,in fetal position,mute catatonic,did not engage in any conversation. Staff reports that he eats his meals and takes his med,he resist to have  lab work and vital signs.  received fee back from [facility] reported that \"patient received 17 ECT treatments were given to pt in August 2016 and  pt did not improve\". SIDE EFFECTS: (reviewed/updated 12/27/2017)  None reported or admitted to. No noted toxicity with use of current med   ALLERGIES:(reviewed/updated 12/27/2017)  Allergies   Allergen Reactions    Fluphenazine Unknown (comments)     Pt is unable to communicate properly.  Penicillins Unknown (comments)     Pt is unable to communicate properly. MEDICATIONS PRIOR TO ADMISSION:(reviewed/updated 12/27/2017)  Prescriptions Prior to Admission   Medication Sig    divalproex ER (DEPAKOTE ER) 500 mg ER tablet Take 1,500 mg by mouth nightly. Indications: Treatment-resistant schizophrenia    haloperidol decanoate (HALDOL DECANOATE) 100 mg/mL injection 2 mL by IntraMUSCular route every twenty-eight (28) days. Indications: SCHIZOPHRENIA    benztropine (COGENTIN) 2 mg tablet Take 1 Tab by mouth nightly.  Indications: extrapyramidal disease    cloZAPine 200 mg tablet Take 600 mg by mouth nightly. Indications: TREATMENT-RESISTANT SCHIZOPHRENIA      PAST MEDICAL HISTORY: Past medical history from the initial psychiatric evaluation has been reviewed (reviewed/updated 12/27/2017) with no additional updates (I asked patient and no additional past medical history provided). Past Medical History:   Diagnosis Date    Psychiatric disorder     Psychotic disorder     Withdrawal syndrome ([facility] [address]    No past surgical history on file. SOCIAL HISTORY: Social history from the initial psychiatric evaluation has been reviewed (reviewed/updated 12/27/2017) with no additional updates (I asked patient and no additional social history provided). Social History     Social History    Marital status: SINGLE     Spouse name: N/A    Number of children: N/A    Years of education: N/A     Occupational History    Not on file. 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 He lives with his sister. On disability    No pending legal charge reported      FAMILY HISTORY: Family history from the initial psychiatric evaluation has been reviewed (reviewed/updated 12/27/2017) with no additional updates (I asked patient and no additional family history provided). No family history on file.     REVIEW OF SYSTEMS: (reviewed/updated 12/27/2017)  Appetite:no change from normal   Sleep: fitful   All other Review of Systems: Psychological ROS: positive for - behavioral disorder, concentration difficulties, hallucinations, irritability, mood swings and delusion  Respiratory ROS: no cough, shortness of breath, or wheezing  Cardiovascular ROS: no chest pain or dyspnea on exertion         [facility] (MSE):    MSE FINDINGS ARE WITHIN NORMAL LIMITS (WNL) UNLESS OTHERWISE STATED BELOW. ( ALL OF THE BELOW CATEGORIES OF THE MSE HAVE BEEN REVIEWED (reviewed 12/27/2017) AND UPDATED AS DEEMED APPROPRIATE )  General Presentation age appropriate and disheveled, uncooperative and unreliable   Orientation disorganized   Vital Signs  See below (reviewed 12/27/2017); Vital Signs (BP, Pulse, & Temp) are within normal limits if not listed below. Gait and Station Stable/steady, no ataxia   Musculoskeletal System No extrapyramidal symptoms (EPS); no abnormal muscular movements or Tardive Dyskinesia (TD); muscle strength and tone are within normal limits   Language No aphasia or dysarthria   Speech:  mute   Thought Processes illogical; slow rate of thoughts; poor abstract reasoning/computation   Thought Associations blocked    Thought Content paranoid delusions, bizarre delusions, auditory hallucinations and internally preoccupied   Suicidal Ideations none   Homicidal Ideations none   Mood:  Labile mood   Affect:  Constricted, labile inappropriate and increased in intensity   Memory recent  impaired   Memory remote:  intact   Concentration/Attention:  distractable and hypervigilance   Fund of Knowledge average   Insight:  limited   Reliability poor   Judgment:  limited          VITALS:     No data found. Wt Readings from Last 3 Encounters:   12/23/17 84.9 kg (187 lb 1.6 oz)   03/25/17 95.6 kg (210 lb 11.2 oz)   02/09/15 97.5 kg (215 lb)     Temp Readings from Last 3 Encounters:   03/28/17 97.5 °F (36.4 °C)   02/07/15 98.7 °F (37.1 °C)     BP Readings from Last 3 Encounters:   03/28/17 100/68   02/12/15 105/73   02/07/15 148/79     Pulse Readings from Last 3 Encounters:   03/28/17 67   02/12/15 87   02/07/15 (!) 111            DATA     LABORATORY DATA:(reviewed/updated 12/27/2017)  No results found for this or any previous visit (from the past 24 hour(s)).   Lab Results   Component Value Date/Time    Valproic acid 88 12/09/2017 08:10 AM     No results found for: LITHM   RADIOLOGY REPORTS:(reviewed/updated 12/27/2017)  No results found. MEDICATIONS     ALL MEDICATIONS:      SCHEDULED MEDICATIONS:          ASSESSMENT & PLAN     DIAGNOSES REQUIRING ACTIVE TREATMENT AND MONITORING: (reviewed/updated 12/27/2017)  Patient Active Hospital Problem List:   Paranoid schizophrenia ([facility] [address] (3/15/2017)- tx resistant, chronic and with negative sx    Assessment: minimal change- psychotic, odd and bizarre behavior, -ve sx, poor insight- accepting med ? Check for cheeking behavior      Plan: I will ct to adjust med- Prozac, haldol dec today. Ct with Zydis and Ativan. Change Depakote to liq    Request second opinion for ECT- pt has been on Clozapine but refuses to have labs marbin. Has been tx with ECT in the past.     Patient is on two antipsychotics now due to the relative refractoriness to treatment thus far. Prior to admission patient had THREE or more failed trails of monotherapy, including: Haldol, Zyprexa, Risperdal and Clozapine. The patient is a very slow responder to medications. Risks and benefits in the use of two antipsychotics have been weighed in full, including the risk of metabolic syndrome and the potential increased risk of QTc  Based on this analysis, it is considered favorable for the utilization of two antipsychotics. In the future, once stable on the two antipsychotics, patient can possibly be tapered to one of the chosen antipsychotics. Will check on EKG results today to monitor QTc.- refusing EKG    Non compliance with tx- request court order med. - granted  12/21/17 will continue medications and he may need [facility]    12/22/17 need depakote level , BMP and ECT     12/23/17: Continue current treatment and supportive  care  12/24/17: No significant change noted,still has Odd posture,slectiviely mute. Continue current treatment,porvide supportive care.   12/25/17:  Patient is not progressing, psychosis with catatonia continues, on Lorazepam,staff reported eating meals fairly well, poor self care, selectively mute,consider ECT. 12/26/17: Treatment resistant case,not progressing on current medications,discussed with ,Pending judical consent for ECT,explore to [facility] [place] [facility]  12/27/17: As per  note ,patient has been denied for ECT treatment at [facility] due to poor response in the past. Patient is resistive to cooperative with Vital signs and lab work. I will continue to monitor blood levels (Depakote,, clozapine---a drug with a narrow therapeutic index= NTI) and associated labs for drug therapy implemented that require intense monitoring for toxicity as deemed appropriate based on current medication side effects and pharmacodynamically determined drug 1/2 lives.-88 therapeutic    In summary, Betsy Hunter, is a 49 y.o.  male who presents with a severe exacerbation of the principal diagnosis of Paranoid schizophrenia ([address]  Patient's condition is worsening/not improving/not stable . Patient requires continued inpatient hospitalization for further stabilization, safety monitoring and medication management. I will continue to coordinate the provision of individual, milieu, occupational, group, and substance abuse therapies to address target symptoms/diagnoses as deemed appropriate for the individual patient. A coordinated, multidisplinary treatment team round was conducted with the patient (this team consists of the nurse, psychiatric unit pharmcist,  and writer). Complete current electronic health record for patient has been reviewed today including consultant notes, ancillary staff notes, nurses and psychiatric tech notes. Suicide risk assessment completed and patient deemed to be of low risk for suicide at this time. The following regarding medications was addressed during rounds with patient:   the risks and benefits of the proposed medication. The patient was given the opportunity to ask questions. Informed consent given to the use of the above medications. Will continue to adjust psychiatric and non-psychiatric medications (see above \"medication\" section and orders section for details) as deemed appropriate & based upon diagnoses and response to treatment. I will continue to order blood tests/labs and diagnostic tests as deemed appropriate and review results as they become available (see orders for details and above listed lab/test results). I will order psychiatric records from previous [facility] hospitals to further elucidate the nature of patient's psychopathology and review once available. I will gather additional collateral information from friends, family and o/p treatment team to further elucidate the nature of patient's psychopathology and baselline level of psychiatric functioning. I certify that this patient's inpatient psychiatric hospital services furnished since the previous certification were, and continue to be, required for treatment that could reasonably be expected to improve the patient's condition, or for diagnostic study, and that the patient continues to need, on a daily basis, active treatment furnished directly by or requiring the supervision of inpatient psychiatric facility personnel. In addition, the hospital records show that services furnished were intensive treatment services, admission or related services, or equivalent services.     EXPECTED DISCHARGE DATE/DAY: TBD     DISPOSITION: Home       Signed By:   Rosas Grajeda MD  12/27/2017

## 2017-12-27 NOTE — PROGRESS NOTES
Problem: Altered Thought Process (Adult/Pediatric)  Goal: *STG: Remains safe in hospital  Client has been a little less psychotic this pm.  Client able to look at the nurse and take his meds, but still refusing to let staff get his B/P. When asked just says no. Client is eating his meals, taking the meds and continues on q 15 min checks for safety. Still needs encouragement to shower or take part in any of his hygiene process.

## 2017-12-27 NOTE — BH NOTES
GROUP THERAPY PROGRESS NOTE    Lilian Stanley is participating in [employer].      Group time: 30 minutes    Personal goal for participation:  Unit orientation    Goal orientation: Community    Group therapy participation: pt did not attend    Therapeutic interventions reviewed and discussed: Yes    Impression of participation: N/A

## 2017-12-27 NOTE — BH NOTES
Pt was seen in treatment team this morning.  Pt was curled up on the floor with his face covered, mute and showing catatonic features.  Pt is eating his meals but continues to have poor hygiene.  Mandatory ECT paperwork and recommitment paperwork was signed by Zina Becerra.  spoke with Yovana Oleary NP - [facility] ECT admissions coordinator [phone] whom reviewed all [facility] paperwork faxed and knew pt's history well from years of treatment at [facility]. Xochilt Porter and [facility] doctors reported that 17 ECT treatments were given to pt in August 2016 and  pt did not improve. U stated that from the current paperwork pt appears to be at baseline since he is accepting medication and eating meals. U feels that a state hospitalization is necessary for pt and denied acceptance for ECT.   This  will discuss options with treatment team in morning due to [facility] currently not accepting transfers, RACs or 2 for 1 options.

## 2017-12-27 NOTE — PROGRESS NOTES
Problem: Altered Thought Process (Adult/Pediatric)  Goal: *STG: Complies with medication therapy  Outcome: Progressing Towards Goal  Pt remains isolative. Selectively mute. Poor hygiene. Flat affect. Appearance disheveled. Observed on room floor in a fetal position. Compliant with meals and medications. Awaiting courts for recommitment hearing and possible ECT treatment. Will continue to monitor pt and anticipate needs.

## 2017-12-28 PROCEDURE — 65220000001 HC RM PRIVATE PSYCH

## 2017-12-28 PROCEDURE — 74011250637 HC RX REV CODE- 250/637: Performed by: PSYCHIATRY & NEUROLOGY

## 2017-12-28 RX ADMIN — BENZTROPINE MESYLATE 2 MG: 2 TABLET ORAL at 22:08

## 2017-12-28 RX ADMIN — VALPROIC ACID 750 MG: 250 SOLUTION ORAL at 08:44

## 2017-12-28 RX ADMIN — VALPROIC ACID 750 MG: 250 SOLUTION ORAL at 12:27

## 2017-12-28 RX ADMIN — FLUOXETINE 40 MG: 20 CAPSULE ORAL at 08:44

## 2017-12-28 RX ADMIN — LORAZEPAM 1 MG: 1 TABLET ORAL at 17:14

## 2017-12-28 RX ADMIN — LORAZEPAM 1 MG: 1 TABLET ORAL at 08:44

## 2017-12-28 RX ADMIN — VALPROIC ACID 750 MG: 250 SOLUTION ORAL at 17:14

## 2017-12-28 NOTE — BH NOTES
PSYCHIATRIC PROGRESS NOTE         Patient Name  Luiza Pablo   Date of Birth 1970   [facility] [id number]   Medical Record Number  [id number]      Age  49 y.o. PCP PROVIDER UNKNOWN   Admit date:  11/25/2017    Room Number  307/01  @ [facility]   Date of Service  12/28/2017           E & M PROGRESS NOTE:         HISTORY       CC:  \"selectively mute\"  HISTORY OF PRESENT ILLNESS/INTERVAL HISTORY:  (reviewed/updated 12/28/2017). per initial evaluation: The patient, Luiza Pablo, is a 49 y.o. WHITE OR  male with a past psychiatric history significant for schizophrenia, who presents at this time with complaints of (and/or evidence of) the following emotional symptoms: psychotic behavior. Additional symptomatology include paranoid delusion, sexually inappropriate behavior, pepisodes of yelling screaming followed by selectively mute, disorganized thought process, anxiety, concern about health problems, difficulty sleeping, fearfulness, hearing voices, increased irritability, poor concentration and problem with medication. The above symptoms have been present for many years. These symptoms are of severe severity. These symptoms are constant  in nature. The patient's condition has been precipitated by and psychosocial stressors (conflict with sister, non compliance ). Patient's condition made worse by treatment noncompliance. UDS: negative; BAL=0. Liuza Pablo presents/reports/evidences the following emotional symptoms today, 12/28/2017:psychotic behavior. The above symptoms have been present for many years. These symptoms are of severe severity. The symptoms are constant in nature. Additional symptomatology and features include       12/9-Prefers to isolate self in his room except coming out for meals. Minimal social interaction. Tp remains disorganized. Accepting meds.   12/11- minimal change- isolative and does not interact with pt/staff except going out for his Breakfast. Has refused med at times. Will lay in bed, poor hygiene. 12/12- affect is sl better, out of his room today and talked in a soft voice. Remains disorganized and delusional.  12/13- poor hygiene and disorganized, will lay in bed with eyes closed, pt was able to get up and talk when told that we are discussing his dc planning. Talks in a soft voice. Accepting med but need lot of prompting to do his ADLs  12/14- affect is sl better, tendency to lay in bed, will only talk when we talk about dc planning  12/15- poor hygiene, selectively mute, disorganized and delusional. Poor insight  12/16- inappropriate posturing- sitting on the side of the bed and half naked. Selectively mute and isolative. poor hygiene  12/17- odd and bizarre behavior. Selectively mute and poor hygiene. Sitting at the edge of his bed with face downward and half naked. Accepting med  12/18- appear to have deteriorated over the weekend, selectively mute, odd behavior and delusional. Poor hygiene and passive. 12/19- delusional and disorganized. Stays in his room and found to be in odd position at the side of the bed. Staff informed pt was eating his break fast but quickly changed to this position ( ?behavioral). Poor hygiene   12/20- minimal change in presentation, pills found in his room ( ?compliance), poor hygiene and odd posturing  12/21/17 he is not engaging in talking and not eating well and he is in fetal position and he is paranoid and disorganized and poor hygiene and he refuse to take medications   12/22/17 he still not engaging and he still showing catatonic features and has been having posturing and he is mute and not verbal and he is not eating that well , respond to internal stimuli   12/23/17: Patient was sitting on the floor,in a fetal position,appears disheveled,poor hygiene,mute ,did not engage in conversation,margaret johansen. 12/24/17:seen today in his room with staff. poor hygiene, unkempt, continues to sit in squatting position on the floor  with head down in to knees,did not engage in any conversation,internally preoccupied,As per staff accepting medications and meals. Received no prn.   12/25/17: Patient was seen in his room, he was laying in bed in a fetal position, did not engage in any conversation,poor hygiene, odd posture, eats his meals. Has not shown much improvement with current medications. Treatment team is working on to get ECT. 12/26/17: Not progressing, no significant change noted,laying on the floor, fetal position, half naked,mute ,catatonic, Poor hygiene,accepts medications and meals. treatment team has requested judicial consent for  ECT which is pending,he has received ECT treatment at [facility] in the past.   12/27/17: Seen today in his room, no significant change,sitting on the floor,in fetal position,mute catatonic,did not engage in any conversation. Staff reports that he eats his meals and takes his med,he resist to have  lab work and vital signs.  received fee back from [facility] reported that \"patient received 17 ECT treatments were given to pt in August 2016 and  pt did not improve\". 12/28- no sig change in presentation. Poor hygiene, odd and inappropriate posturing. SIDE EFFECTS: (reviewed/updated 12/28/2017)  None reported or admitted to. No noted toxicity with use of current med   ALLERGIES:(reviewed/updated 12/28/2017)  Allergies   Allergen Reactions    Fluphenazine Unknown (comments)     Pt is unable to communicate properly.  Penicillins Unknown (comments)     Pt is unable to communicate properly. MEDICATIONS PRIOR TO ADMISSION:(reviewed/updated 12/28/2017)  Prescriptions Prior to Admission   Medication Sig    divalproex ER (DEPAKOTE ER) 500 mg ER tablet Take 1,500 mg by mouth nightly. Indications: Treatment-resistant schizophrenia    haloperidol decanoate (HALDOL DECANOATE) 100 mg/mL injection 2 mL by IntraMUSCular route every twenty-eight (28) days.  Indications: SCHIZOPHRENIA    benztropine (COGENTIN) 2 mg tablet Take 1 Tab by mouth nightly. Indications: extrapyramidal disease    cloZAPine 200 mg tablet Take 600 mg by mouth nightly. Indications: TREATMENT-RESISTANT SCHIZOPHRENIA      PAST MEDICAL HISTORY: Past medical history from the initial psychiatric evaluation has been reviewed (reviewed/updated 12/28/2017) with no additional updates (I asked patient and no additional past medical history provided). Past Medical History:   Diagnosis Date    Psychiatric disorder     Psychotic disorder     Withdrawal syndrome ([facility] [address]    No past surgical history on file. SOCIAL HISTORY: Social history from the initial psychiatric evaluation has been reviewed (reviewed/updated 12/28/2017) with no additional updates (I asked patient and no additional social history provided). Social History     Social History    Marital status: SINGLE     Spouse name: N/A    Number of children: N/A    Years of education: N/A     Occupational History    Not on file. 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 He lives with his sister. On disability    No pending legal charge reported      FAMILY HISTORY: Family history from the initial psychiatric evaluation has been reviewed (reviewed/updated 12/28/2017) with no additional updates (I asked patient and no additional family history provided). No family history on file.     REVIEW OF SYSTEMS: (reviewed/updated 12/28/2017)  Appetite:no change from normal   Sleep: fitful   All other Review of Systems: Psychological ROS: positive for - behavioral disorder, concentration difficulties, hallucinations, irritability, mood swings and delusion  Respiratory ROS: no cough, shortness of breath, or wheezing  Cardiovascular ROS: no chest pain or dyspnea on exertion         [facility] (MSE):    MSE FINDINGS ARE WITHIN NORMAL LIMITS (WNL) UNLESS OTHERWISE STATED BELOW. ( ALL OF THE BELOW CATEGORIES OF THE MSE HAVE BEEN REVIEWED (reviewed 12/28/2017) AND UPDATED AS DEEMED APPROPRIATE )  General Presentation age appropriate and disheveled, uncooperative and unreliable   Orientation disorganized   Vital Signs  See below (reviewed 12/28/2017); Vital Signs (BP, Pulse, & Temp) are within normal limits if not listed below. Gait and Station Stable/steady, no ataxia   Musculoskeletal System No extrapyramidal symptoms (EPS); no abnormal muscular movements or Tardive Dyskinesia (TD); muscle strength and tone are within normal limits   Language No aphasia or dysarthria   Speech:  mute   Thought Processes illogical; slow rate of thoughts; poor abstract reasoning/computation   Thought Associations blocked    Thought Content paranoid delusions, bizarre delusions, auditory hallucinations and internally preoccupied   Suicidal Ideations none   Homicidal Ideations none   Mood:  Labile mood   Affect:  Constricted, labile inappropriate and increased in intensity   Memory recent  impaired   Memory remote:  intact   Concentration/Attention:  distractable and hypervigilance   Fund of Knowledge average   Insight:  limited   Reliability poor   Judgment:  limited          VITALS:     No data found.     Wt Readings from Last 3 Encounters:   12/23/17 84.9 kg (187 lb 1.6 oz)   03/25/17 95.6 kg (210 lb 11.2 oz)   02/09/15 97.5 kg (215 lb)     Temp Readings from Last 3 Encounters:   03/28/17 97.5 °F (36.4 °C)   02/07/15 98.7 °F (37.1 °C)     BP Readings from Last 3 Encounters:   03/28/17 100/68   02/12/15 105/73   02/07/15 148/79     Pulse Readings from Last 3 Encounters:   03/28/17 67   02/12/15 87   02/07/15 (!) 111            DATA     LABORATORY DATA:(reviewed/updated 12/28/2017)  No results found for this or any previous visit (from the past 24 hour(s)). Lab Results   Component Value Date/Time    Valproic acid 88 12/09/2017 08:10 AM     No results found for: LITHM   RADIOLOGY REPORTS:(reviewed/updated 12/28/2017)  No results found. MEDICATIONS     ALL MEDICATIONS:      SCHEDULED MEDICATIONS:          ASSESSMENT & PLAN     DIAGNOSES REQUIRING ACTIVE TREATMENT AND MONITORING: (reviewed/updated 12/28/2017)  Patient Active Hospital Problem List:   Paranoid schizophrenia ([facility] [address] (3/15/2017)- tx resistant, chronic and with negative sx    Assessment: minimal change- psychotic, odd and bizarre behavior, -ve sx, poor insight- accepting med ? Check for cheeking behavior      Plan: I will ct to adjust med- Prozac, haldol dec today. Ct with Zydis and Ativan. Change Depakote to liq    Request second opinion for ECT- pt has been on Clozapine but refuses to have labs wan. Has been tx with ECT in the past.     Patient is on two antipsychotics now due to the relative refractoriness to treatment thus far. Prior to admission patient had THREE or more failed trails of monotherapy, including: Haldol, Zyprexa, Risperdal and Clozapine. The patient is a very slow responder to medications. Risks and benefits in the use of two antipsychotics have been weighed in full, including the risk of metabolic syndrome and the potential increased risk of QTc  Based on this analysis, it is considered favorable for the utilization of two antipsychotics. In the future, once stable on the two antipsychotics, patient can possibly be tapered to one of the chosen antipsychotics. Will check on EKG results today to monitor QTc.- refusing EKG    Non compliance with tx- request court order med. - granted  12/21/17 will continue medications and he may need [place] state    12/22/17 need depakote level , BMP and ECT     12/23/17: Continue current treatment and supportive  care  12/24/17: No significant change noted,still has Odd posture,slectiviely mute.  Continue current treatment,porvide supportive care. 12/25/17:  Patient is not progressing, psychosis with catatonia continues, on Lorazepam,staff reported eating meals fairly well, poor self care, selectively mute,consider ECT. 12/26/17: Treatment resistant case,not progressing on current medications,discussed with ,Pending judical consent for ECT,explore to traNsfer patient to [facility]  12/27/17: As per  note ,patient has been denied for ECT treatment at [facility] due to poor response in the past. Patient is resistive to cooperative with Vital signs and lab work. 12/28- will reconsider Clozapine and check Blood works for tomorrow- long term hospitalization              I will continue to monitor blood levels (Depakote,, clozapine---a drug with a narrow therapeutic index= NTI) and associated labs for drug therapy implemented that require intense monitoring for toxicity as deemed appropriate based on current medication side effects and pharmacodynamically determined drug 1/2 lives.-88 therapeutic    In summary, Raquel Laurent, is a 49 y.o.  male who presents with a severe exacerbation of the principal diagnosis of Paranoid schizophrenia ([facility] [address]  Patient's condition is worsening/not improving/not stable . Patient requires continued inpatient hospitalization for further stabilization, safety monitoring and medication management. I will continue to coordinate the provision of individual, milieu, occupational, group, and substance abuse therapies to address target symptoms/diagnoses as deemed appropriate for the individual patient. A coordinated, multidisplinary treatment team round was conducted with the patient (this team consists of the nurse, psychiatric unit pharmcist,  and writer). Complete current electronic health record for patient has been reviewed today including consultant notes, ancillary staff notes, nurses and psychiatric tech notes.     Suicide risk assessment completed and patient deemed to be of low risk for suicide at this time. The following regarding medications was addressed during rounds with patient:   the risks and benefits of the proposed medication. The patient was given the opportunity to ask questions. Informed consent given to the use of the above medications. Will continue to adjust psychiatric and non-psychiatric medications (see above \"medication\" section and orders section for details) as deemed appropriate & based upon diagnoses and response to treatment. I will continue to order blood tests/labs and diagnostic tests as deemed appropriate and review results as they become available (see orders for details and above listed lab/test results). I will order psychiatric records from previous [facility] hospitals to further elucidate the nature of patient's psychopathology and review once available. I will gather additional collateral information from friends, family and o/p treatment team to further elucidate the nature of patient's psychopathology and baselline level of psychiatric functioning. I certify that this patient's inpatient psychiatric hospital services furnished since the previous certification were, and continue to be, required for treatment that could reasonably be expected to improve the patient's condition, or for diagnostic study, and that the patient continues to need, on a daily basis, active treatment furnished directly by or requiring the supervision of inpatient psychiatric facility personnel. In addition, the hospital records show that services furnished were intensive treatment services, admission or related services, or equivalent services.     EXPECTED DISCHARGE DATE/DAY: TBD     DISPOSITION: Home       Signed By:   Anu Yu MD  12/28/2017

## 2017-12-28 NOTE — PROGRESS NOTES
Problem: Altered Thought Process (Adult/Pediatric)  Goal: *STG: Remains safe in hospital  Outcome: Progressing Towards Goal  Pt remains withdrawn to room. Meals were eaten in the dayroom this shift. Observed in room on the floor kneeling and curled up at times near the bed. Selectively mute. Poor hygiene. Compliant with medications. No social interaction noted amongst peers. Will continue q15 minute safety checks and assess needs.

## 2017-12-28 NOTE — BH NOTES
Staying in the fetal position other than when he gets up to eat. Otherwise no real change noted. Taking his meds, eating his meals but very little interaction otherwise. Meal and med compliant. q 15 min checks for safety continue.

## 2017-12-29 LAB
BASOPHILS # BLD: 0 K/UL (ref 0–0.1)
BASOPHILS NFR BLD: 1 % (ref 0–1)
EOSINOPHIL # BLD: 0.4 K/UL (ref 0–0.4)
EOSINOPHIL NFR BLD: 6 % (ref 0–7)
ERYTHROCYTE [DISTWIDTH] IN BLOOD BY AUTOMATED COUNT: 13.7 % (ref 11.5–14.5)
HCT VFR BLD AUTO: 40.5 % (ref 36.6–50.3)
HGB BLD-MCNC: 12.7 G/DL (ref 12.1–17)
LYMPHOCYTES # BLD: 2.2 K/UL (ref 0.8–3.5)
LYMPHOCYTES NFR BLD: 38 % (ref 12–49)
MCH RBC QN AUTO: 28.2 PG (ref 26–34)
MCHC RBC AUTO-ENTMCNC: 31.4 G/DL (ref 30–36.5)
MCV RBC AUTO: 89.8 FL (ref 80–99)
MONOCYTES # BLD: 0.7 K/UL (ref 0–1)
MONOCYTES NFR BLD: 12 % (ref 5–13)
NEUTS SEG # BLD: 2.5 K/UL (ref 1.8–8)
NEUTS SEG NFR BLD: 43 % (ref 32–75)
PLATELET # BLD AUTO: 167 K/UL (ref 150–400)
RBC # BLD AUTO: 4.51 M/UL (ref 4.1–5.7)
VALPROATE SERPL-MCNC: 69 UG/ML (ref 50–100)
WBC # BLD AUTO: 5.7 K/UL (ref 4.1–11.1)

## 2017-12-29 PROCEDURE — 65220000001 HC RM PRIVATE PSYCH

## 2017-12-29 PROCEDURE — 80164 ASSAY DIPROPYLACETIC ACD TOT: CPT | Performed by: PSYCHIATRY & NEUROLOGY

## 2017-12-29 PROCEDURE — 85025 COMPLETE CBC W/AUTO DIFF WBC: CPT | Performed by: PSYCHIATRY & NEUROLOGY

## 2017-12-29 PROCEDURE — 36415 COLL VENOUS BLD VENIPUNCTURE: CPT | Performed by: PSYCHIATRY & NEUROLOGY

## 2017-12-29 PROCEDURE — 74011250637 HC RX REV CODE- 250/637: Performed by: PSYCHIATRY & NEUROLOGY

## 2017-12-29 RX ORDER — DIVALPROEX SODIUM 500 MG/1
1000 TABLET, EXTENDED RELEASE ORAL
Status: DISCONTINUED | OUTPATIENT
Start: 2017-12-29 | End: 2017-12-29 | Stop reason: DRUGHIGH

## 2017-12-29 RX ORDER — DIVALPROEX SODIUM 500 MG/1
1500 TABLET, EXTENDED RELEASE ORAL
Status: DISCONTINUED | OUTPATIENT
Start: 2017-12-29 | End: 2018-01-03

## 2017-12-29 RX ADMIN — VALPROIC ACID 750 MG: 250 SOLUTION ORAL at 11:36

## 2017-12-29 RX ADMIN — VALPROIC ACID 750 MG: 250 SOLUTION ORAL at 08:09

## 2017-12-29 RX ADMIN — LORAZEPAM 1 MG: 1 TABLET ORAL at 08:09

## 2017-12-29 RX ADMIN — DIVALPROEX SODIUM 1500 MG: 500 TABLET, FILM COATED, EXTENDED RELEASE ORAL at 21:40

## 2017-12-29 RX ADMIN — LORAZEPAM 1 MG: 1 TABLET ORAL at 16:59

## 2017-12-29 RX ADMIN — BENZTROPINE MESYLATE 2 MG: 2 TABLET ORAL at 21:39

## 2017-12-29 RX ADMIN — OLANZAPINE 30 MG: 5 TABLET, ORALLY DISINTEGRATING ORAL at 21:39

## 2017-12-29 RX ADMIN — FLUOXETINE 40 MG: 20 CAPSULE ORAL at 08:09

## 2017-12-29 NOTE — PROGRESS NOTES
Problem: Altered Thought Process (Adult/Pediatric)  Goal: *STG: Remains safe in hospital  Client continues to isolate in his room, continues to curl up in the fetal position. Does eat his meals and take his meds. Otherwise stays to himself. QF 15 mn checks for safety continue.

## 2017-12-29 NOTE — BH NOTES
Problem: Depressed Mood (Adult/Pediatric)  Goal: *STG: Remains safe in hospital  Outcome: Progressing Towards Goal  Pt is mostly isolative from the milieu. Pt is meds/meals compliant. Pt has set on his bed with his head down to his knee most time but he is responding to command from staff.  Will continue to monitor q 15 for safety, mood and behavior changes.

## 2017-12-29 NOTE — PROGRESS NOTES
Laboratory Monitoring for Valproic Acid    This patient is currently prescribed the following medication(s):   Current Facility-Administered Medications   Medication Dose Route Frequency    divalproex ER (DEPAKOTE ER) 24 hour tablet 1,500 mg  1,500 mg Oral QHS    divalproex ER (DEPAKOTE ER) 24 hour tablet 1,000 mg  1,000 mg Oral QHS    haloperidol decanoate (HALDOL DECANOATE) 100 mg/mL injection 200 mg  200 mg IntraMUSCular Q28D    LORazepam (ATIVAN) tablet 1 mg  1 mg Oral BID    OLANZapine (ZyPREXA zydis) disintegrating tablet 30 mg  30 mg Oral QHS    FLUoxetine (PROzac) capsule 40 mg  40 mg Oral DAILY    benztropine (COGENTIN) tablet 2 mg  2 mg Oral QHS       The following labs have been completed for monitoring of valproic acid:    Valproic Acid Serum Concentration  Lab Results   Component Value Date/Time    Valproic acid 69 12/29/2017 04:43 AM         Hepatic Function  Lab Results   Component Value Date/Time    Bilirubin, total 0.5 12/09/2017 08:10 AM    Protein, total 8.6 12/09/2017 08:10 AM    Albumin 4.1 12/09/2017 08:10 AM    Globulin 4.5 12/09/2017 08:10 AM    A-G Ratio 0.9 12/09/2017 08:10 AM    ALT (SGPT) 26 12/09/2017 08:10 AM    Alk. phosphatase 151 12/09/2017 08:10 AM       Hematology  Lab Results   Component Value Date/Time    WBC 5.7 12/29/2017 04:43 AM    RBC 4.51 12/29/2017 04:43 AM    HGB 12.7 12/29/2017 04:43 AM    HCT 40.5 12/29/2017 04:43 AM    MCV 89.8 12/29/2017 04:43 AM    MCH 28.2 12/29/2017 04:43 AM    MCHC 31.4 12/29/2017 04:43 AM    RDW 13.7 12/29/2017 04:43 AM    PLATELET 205 69/40/5217 04:43 AM       Assessment/Plan:  Valproic acid level is within therapeutic limits, 68 mcg/mL. Level was drawn appropriately and is at steady-state. Valproic acid oral solution changed to divalproex ER 1500 mg at bedtime.        Na Sanchez, PharmD, BCPS  [phone]

## 2017-12-29 NOTE — BH NOTES
PSYCHIATRIC PROGRESS NOTE         Patient Name  William Oviedo   Date of Birth 1970   [facility] [id number]   Medical Record Number  [id number]      Age  49 y.o. PCP PROVIDER UNKNOWN   Admit date:  11/25/2017    Room Number  307/01  @ [facility]   Date of Service  12/29/2017           E & M PROGRESS NOTE:         HISTORY       CC:  \"selectively mute\"  HISTORY OF PRESENT ILLNESS/INTERVAL HISTORY:  (reviewed/updated 12/29/2017). per initial evaluation: The patient, William Oviedo, is a 49 y.o. WHITE OR  male with a past psychiatric history significant for schizophrenia, who presents at this time with complaints of (and/or evidence of) the following emotional symptoms: psychotic behavior. Additional symptomatology include paranoid delusion, sexually inappropriate behavior, pepisodes of yelling screaming followed by selectively mute, disorganized thought process, anxiety, concern about health problems, difficulty sleeping, fearfulness, hearing voices, increased irritability, poor concentration and problem with medication. The above symptoms have been present for many years. These symptoms are of severe severity. These symptoms are constant  in nature. The patient's condition has been precipitated by and psychosocial stressors (conflict with sister, non compliance ). Patient's condition made worse by treatment noncompliance. UDS: negative; BAL=0. William Oviedo presents/reports/evidences the following emotional symptoms today, 12/29/2017:psychotic behavior. The above symptoms have been present for many years. These symptoms are of severe severity. The symptoms are constant in nature. Additional symptomatology and features include       12/9-Prefers to isolate self in his room except coming out for meals. Minimal social interaction. Tp remains disorganized. Accepting meds.   12/11- minimal change- isolative and does not interact with pt/staff except going out for his Breakfast. Has refused med at times. Will lay in bed, poor hygiene. 12/12- affect is sl better, out of his room today and talked in a soft voice. Remains disorganized and delusional.  12/13- poor hygiene and disorganized, will lay in bed with eyes closed, pt was able to get up and talk when told that we are discussing his dc planning. Talks in a soft voice. Accepting med but need lot of prompting to do his ADLs  12/14- affect is sl better, tendency to lay in bed, will only talk when we talk about dc planning  12/15- poor hygiene, selectively mute, disorganized and delusional. Poor insight  12/16- inappropriate posturing- sitting on the side of the bed and half naked. Selectively mute and isolative. poor hygiene  12/17- odd and bizarre behavior. Selectively mute and poor hygiene. Sitting at the edge of his bed with face downward and half naked. Accepting med  12/18- appear to have deteriorated over the weekend, selectively mute, odd behavior and delusional. Poor hygiene and passive. 12/19- delusional and disorganized. Stays in his room and found to be in odd position at the side of the bed. Staff informed pt was eating his break fast but quickly changed to this position ( ?behavioral). Poor hygiene   12/20- minimal change in presentation, pills found in his room ( ?compliance), poor hygiene and odd posturing  12/21/17 he is not engaging in talking and not eating well and he is in fetal position and he is paranoid and disorganized and poor hygiene and he refuse to take medications   12/22/17 he still not engaging and he still showing catatonic features and has been having posturing and he is mute and not verbal and he is not eating that well , respond to internal stimuli   12/23/17: Patient was sitting on the floor,in a fetal position,appears disheveled,poor hygiene,mute ,did not engage in conversation,margaret johansen. 12/24/17:seen today in his room with staff. poor hygiene, unkempt, continues to sit in squatting position on the floor  with head down in to knees,did not engage in any conversation,internally preoccupied,As per staff accepting medications and meals. Received no prn.   12/25/17: Patient was seen in his room, he was laying in bed in a fetal position, did not engage in any conversation,poor hygiene, odd posture, eats his meals. Has not shown much improvement with current medications. Treatment team is working on to get ECT. 12/26/17: Not progressing, no significant change noted,laying on the floor, fetal position, half naked,mute ,catatonic, Poor hygiene,accepts medications and meals. treatment team has requested judicial consent for  ECT which is pending,he has [facility] [place] [facility] in the past.   12/27/17: Seen today in his room, no significant change,sitting on the floor,in fetal position,mute catatonic,did not engage in any conversation. Staff reports that he eats his meals and takes his med,he resist to have  lab work and vital signs.  [place] [facility] [place] 17 [place] treatments were given to pt in August 2016 and  pt did not improve\". 12/28- no sig change in presentation. Poor hygiene, odd and inappropriate posturing. 12/29- seen in the hallway, more alert today and talked rich  Soft voice, remains preoccupied, delusional with poor hygiene. Was co operative with labs today      SIDE EFFECTS: (reviewed/updated 12/29/2017)  None reported or admitted to. No noted toxicity with use of current med   ALLERGIES:(reviewed/updated 12/29/2017)  Allergies   Allergen Reactions    Fluphenazine Unknown (comments)     Pt is unable to communicate properly.  Penicillins Unknown (comments)     Pt is unable to communicate properly. MEDICATIONS PRIOR TO ADMISSION:(reviewed/updated 12/29/2017)  Prescriptions Prior to Admission   Medication Sig    divalproex ER (DEPAKOTE ER) 500 mg ER tablet Take 1,500 mg by mouth nightly.  Indications: Treatment-resistant schizophrenia    haloperidol decanoate (HALDOL DECANOATE) 100 mg/mL injection 2 mL by IntraMUSCular route every twenty-eight (28) days. Indications: SCHIZOPHRENIA    benztropine (COGENTIN) 2 mg tablet Take 1 Tab by mouth nightly. Indications: extrapyramidal disease    cloZAPine 200 mg tablet Take 600 mg by mouth nightly. Indications: TREATMENT-RESISTANT SCHIZOPHRENIA      PAST MEDICAL HISTORY: Past medical history from the initial psychiatric evaluation has been reviewed (reviewed/updated 12/29/2017) with no additional updates (I asked patient and no additional past medical history provided). Past Medical History:   Diagnosis Date    Psychiatric disorder     Psychotic disorder     Withdrawal syndrome ([facility] [address]    No past surgical history on file. SOCIAL HISTORY: Social history from the initial psychiatric evaluation has been reviewed (reviewed/updated 12/29/2017) with no additional updates (I asked patient and no additional social history provided). Social History     Social History    Marital status: SINGLE     Spouse name: N/A    Number of children: N/A    Years of education: N/A     Occupational History    Not on file. 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 He lives with his sister. On disability    No pending legal charge reported      FAMILY HISTORY: Family history from the initial psychiatric evaluation has been reviewed (reviewed/updated 12/29/2017) with no additional updates (I asked patient and no additional family history provided). No family history on file.     REVIEW OF SYSTEMS: (reviewed/updated 12/29/2017)  Appetite:no change from normal   Sleep: fitful   All other Review of Systems: Psychological ROS: positive for - behavioral disorder, concentration difficulties, hallucinations, irritability, mood swings and delusion  Respiratory ROS: no cough, shortness of breath, or wheezing  Cardiovascular ROS: no chest pain or dyspnea on exertion         [facility] (MSE):    MSE FINDINGS ARE WITHIN NORMAL LIMITS (WNL) UNLESS OTHERWISE STATED BELOW. ( ALL OF THE BELOW CATEGORIES OF THE MSE HAVE BEEN REVIEWED (reviewed 12/29/2017) AND UPDATED AS DEEMED APPROPRIATE )  General Presentation age appropriate and disheveled, uncooperative and unreliable   Orientation disorganized   Vital Signs  See below (reviewed 12/29/2017); Vital Signs (BP, Pulse, & Temp) are within normal limits if not listed below. Gait and Station Stable/steady, no ataxia   Musculoskeletal System No extrapyramidal symptoms (EPS); no abnormal muscular movements or Tardive Dyskinesia (TD); muscle strength and tone are within normal limits   Language No aphasia or dysarthria   Speech:  mute   Thought Processes illogical; slow rate of thoughts; poor abstract reasoning/computation   Thought Associations blocked    Thought Content paranoid delusions, bizarre delusions, auditory hallucinations and internally preoccupied   Suicidal Ideations none   Homicidal Ideations none   Mood:  Labile mood   Affect:  Constricted, labile inappropriate and increased in intensity   Memory recent  impaired   Memory remote:  intact   Concentration/Attention:  distractable and hypervigilance   Fund of Knowledge average   Insight:  limited   Reliability poor   Judgment:  limited          VITALS:     No data found.     Wt Readings from Last 3 Encounters:   12/23/17 84.9 kg (187 lb 1.6 oz)   03/25/17 95.6 kg (210 lb 11.2 oz)   02/09/15 97.5 kg (215 lb)     Temp Readings from Last 3 Encounters:   03/28/17 97.5 °F (36.4 °C)   02/07/15 98.7 °F (37.1 °C)     BP Readings from Last 3 Encounters:   03/28/17 100/68   02/12/15 105/73   02/07/15 148/79     Pulse Readings from Last 3 Encounters:   03/28/17 67 02/12/15 87   02/07/15 (!) 111            DATA     LABORATORY DATA:(reviewed/updated 12/29/2017)  Recent Results (from the past 24 hour(s))   CBC WITH AUTOMATED DIFF    Collection Time: 12/29/17  4:43 AM   Result Value Ref Range    WBC 5.7 4.1 - 11.1 K/uL    RBC 4.51 4.10 - 5.70 M/uL    HGB 12.7 12.1 - 17.0 g/dL    HCT 40.5 36.6 - 50.3 %    MCV 89.8 80.0 - 99.0 FL    MCH 28.2 26.0 - 34.0 PG    MCHC 31.4 30.0 - 36.5 g/dL    RDW 13.7 11.5 - 14.5 %    PLATELET 276 071 - 576 K/uL    NEUTROPHILS 43 32 - 75 %    LYMPHOCYTES 38 12 - 49 %    MONOCYTES 12 5 - 13 %    EOSINOPHILS 6 0 - 7 %    BASOPHILS 1 0 - 1 %    ABS. NEUTROPHILS 2.5 1.8 - 8.0 K/UL    ABS. LYMPHOCYTES 2.2 0.8 - 3.5 K/UL    ABS. MONOCYTES 0.7 0.0 - 1.0 K/UL    ABS. EOSINOPHILS 0.4 0.0 - 0.4 K/UL    ABS. BASOPHILS 0.0 0.0 - 0.1 K/UL   VALPROIC ACID    Collection Time: 12/29/17  4:43 AM   Result Value Ref Range    Valproic acid 69 50 - 100 ug/ml     Lab Results   Component Value Date/Time    Valproic acid 69 12/29/2017 04:43 AM     No results found for: LITHM   RADIOLOGY REPORTS:(reviewed/updated 12/29/2017)  No results found. MEDICATIONS     ALL MEDICATIONS:      SCHEDULED MEDICATIONS:          ASSESSMENT & PLAN     DIAGNOSES REQUIRING ACTIVE TREATMENT AND MONITORING: (reviewed/updated 12/29/2017)  Patient Active Hospital Problem List:   Paranoid schizophrenia ([facility] [address] (3/15/2017)- tx resistant, chronic and with negative sx    Assessment: minimal change- psychotic, odd and bizarre behavior, -ve sx, poor insight- accepting med ? Check for cheeking behavior      Plan: I will ct to adjust med- Prozac, haldol dec today. Ct with Zydis and Ativan. Change Depakote to liq    Request second opinion for ECT- pt has been on Clozapine but refuses to have labs drwan. Has been tx with ECT in the past.     Patient is on two antipsychotics now due to the relative refractoriness to treatment thus far.   Prior to admission patient had THREE or more failed trails of monotherapy, including: Haldol, Zyprexa, Risperdal and Clozapine. The patient is a very slow responder to medications. Risks and benefits in the use of two antipsychotics have been weighed in full, including the risk of metabolic syndrome and the potential increased risk of QTc  Based on this analysis, it is considered favorable for the utilization of two antipsychotics. In the future, once stable on the two antipsychotics, patient can possibly be tapered to one of the chosen antipsychotics. Will check on EKG results today to monitor QTc.- refusing EKG    Non compliance with tx- request court order med. - granted  12/21/17 will continue medications and he may need [facility]    12/22/17 need depakote level , BMP and ECT     12/23/17: Continue current treatment and supportive  care  12/24/17: No significant change noted,still has Odd posture,slectiviely mute. Continue current treatment,porvide supportive care. 12/25/17:  Patient is not progressing, psychosis with catatonia continues, on Lorazepam,staff reported eating meals fairly well, poor self care, selectively mute,consider ECT. 12/26/17: Treatment resistant case,not progressing on current medications,discussed with ,Pending judical consent for ECT,explore to tra[facility] patient to [facility]  12/27/17: As per  note ,patient has been denied for ECT treatment at [facility] due to poor response in the past. Patient is resistive to cooperative with Vital signs and lab work. 12/28- will reconsider Clozapine and check Blood works for tomorrow- long term hospitalization  12/29- VPA- 69 sub therapeutic- change to ER prep, consider restarting Clozapine.  ANC- 2.5              I will continue to monitor blood levels (Depakote,, clozapine---a drug with a narrow therapeutic index= NTI) and associated labs for drug therapy implemented that require intense monitoring for toxicity as deemed appropriate based on current medication side effects and pharmacodynamically determined drug 1/2 lives. -    In summary, Floweree Son, is a 49 y.o.  male who presents with a severe exacerbation of the principal diagnosis of Paranoid schizophrenia ([address]  Patient's condition is worsening/not improving/not stable . Patient requires continued inpatient hospitalization for further stabilization, safety monitoring and medication management. I will continue to coordinate the provision of individual, milieu, occupational, group, and substance abuse therapies to address target symptoms/diagnoses as deemed appropriate for the individual patient. A coordinated, multidisplinary treatment team round was conducted with the patient (this team consists of the nurse, psychiatric unit pharmcist,  and writer). Complete current electronic health record for patient has been reviewed today including consultant notes, ancillary staff notes, nurses and psychiatric tech notes. Suicide risk assessment completed and patient deemed to be of low risk for suicide at this time. The following regarding medications was addressed during rounds with patient:   the risks and benefits of the proposed medication. The patient was given the opportunity to ask questions. Informed consent given to the use of the above medications. Will continue to adjust psychiatric and non-psychiatric medications (see above \"medication\" section and orders section for details) as deemed appropriate & based upon diagnoses and response to treatment. I will continue to order blood tests/labs and diagnostic tests as deemed appropriate and review results as they become available (see orders for details and above listed lab/test results). I will order psychiatric records from previous [facility] hospitals to further elucidate the nature of patient's psychopathology and review once available.     I will gather additional collateral information from friends, family and o/p treatment team to further elucidate the nature of patient's psychopathology and baselline level of psychiatric functioning. I certify that this patient's inpatient psychiatric hospital services furnished since the previous certification were, and continue to be, required for treatment that could reasonably be expected to improve the patient's condition, or for diagnostic study, and that the patient continues to need, on a daily basis, active treatment furnished directly by or requiring the supervision of inpatient psychiatric facility personnel. In addition, the hospital records show that services furnished were intensive treatment services, admission or related services, or equivalent services.     EXPECTED DISCHARGE DATE/DAY: TBD     DISPOSITION: Home       Signed By:   Mallika Luther MD  12/29/2017

## 2017-12-29 NOTE — BH NOTES
Pt was seen in treatment team this morning in the jeff and spoke softly to Dr. Kacy Bowers. Pt's thought process is delusional, remains preoccupied. Pt's hygiene is poor. Pt's sister Davidson Rowe - [phone] was updated that pt was denied from ECT from [facility] and [facility]. Pt's sister was disappointed stating she realizes his baseline is poor but that he maintains longer after having the ECT. Pt's sister would like for pt to participate in a day treatment program a few days each week. [place]  was called daily with negative results - this  will continue to reach out to  for discharge planning.

## 2017-12-29 NOTE — PROGRESS NOTES
Problem: Altered Thought Process (Adult/Pediatric)  Goal: *STG: Complies with medication therapy  Outcome: Progressing Towards Goal  Pt was approached and the process of collecting blood was explained to him. He responded by sitting up and placing his arm in a straight position. He cooperated with the blood draw and then asked what time it was. He went back to bed.  Pt slept 7 hrs

## 2017-12-30 PROCEDURE — 74011250637 HC RX REV CODE- 250/637: Performed by: PSYCHIATRY & NEUROLOGY

## 2017-12-30 PROCEDURE — 65220000001 HC RM PRIVATE PSYCH

## 2017-12-30 RX ADMIN — BENZTROPINE MESYLATE 2 MG: 2 TABLET ORAL at 21:16

## 2017-12-30 RX ADMIN — FLUOXETINE 40 MG: 20 CAPSULE ORAL at 08:16

## 2017-12-30 RX ADMIN — OLANZAPINE 30 MG: 5 TABLET, ORALLY DISINTEGRATING ORAL at 21:16

## 2017-12-30 RX ADMIN — LORAZEPAM 1 MG: 1 TABLET ORAL at 08:16

## 2017-12-30 RX ADMIN — LORAZEPAM 1 MG: 1 TABLET ORAL at 16:21

## 2017-12-30 RX ADMIN — DIVALPROEX SODIUM 1500 MG: 500 TABLET, FILM COATED, EXTENDED RELEASE ORAL at 21:16

## 2017-12-30 NOTE — BH NOTES
PSYCHIATRIC PROGRESS NOTE         Patient Name  Conor Cabrera   Date of Birth 1970   [facility] [id number]   Medical Record Number  [id number]      Age  49 y.o. PCP PROVIDER UNKNOWN   Admit date:  11/25/2017    Room Number  307/01  @ [facility]   Date of Service  12/30/2017           E & M PROGRESS NOTE:         HISTORY       CC:  \"selectively mute\"  HISTORY OF PRESENT ILLNESS/INTERVAL HISTORY:  (reviewed/updated 12/30/2017). per initial evaluation: The patient, Conor Cabrera, is a 49 y.o. WHITE OR  male with a past psychiatric history significant for schizophrenia, who presents at this time with complaints of (and/or evidence of) the following emotional symptoms: psychotic behavior. Additional symptomatology include paranoid delusion, sexually inappropriate behavior, pepisodes of yelling screaming followed by selectively mute, disorganized thought process, anxiety, concern about health problems, difficulty sleeping, fearfulness, hearing voices, increased irritability, poor concentration and problem with medication. The above symptoms have been present for many years. These symptoms are of severe severity. These symptoms are constant  in nature. The patient's condition has been precipitated by and psychosocial stressors (conflict with sister, non compliance ). Patient's condition made worse by treatment noncompliance. UDS: negative; BAL=0. Conor Cabrera presents/reports/evidences the following emotional symptoms today, 12/30/2017:psychotic behavior. The above symptoms have been present for many years. These symptoms are of severe severity. The symptoms are constant in nature. Additional symptomatology and features include       12/9-Prefers to isolate self in his room except coming out for meals. Minimal social interaction. Tp remains disorganized. Accepting meds.   12/11- minimal change- isolative and does not interact with pt/staff except going out for his Breakfast. Has refused med at times. Will lay in bed, poor hygiene. 12/12- affect is sl better, out of his room today and talked in a soft voice. Remains disorganized and delusional.  12/13- poor hygiene and disorganized, will lay in bed with eyes closed, pt was able to get up and talk when told that we are discussing his dc planning. Talks in a soft voice. Accepting med but need lot of prompting to do his ADLs  12/14- affect is sl better, tendency to lay in bed, will only talk when we talk about dc planning  12/15- poor hygiene, selectively mute, disorganized and delusional. Poor insight  12/16- inappropriate posturing- sitting on the side of the bed and half naked. Selectively mute and isolative. poor hygiene  12/17- odd and bizarre behavior. Selectively mute and poor hygiene. Sitting at the edge of his bed with face downward and half naked. Accepting med  12/18- appear to have deteriorated over the weekend, selectively mute, odd behavior and delusional. Poor hygiene and passive. 12/19- delusional and disorganized. Stays in his room and found to be in odd position at the side of the bed. Staff informed pt was eating his break fast but quickly changed to this position ( ?behavioral). Poor hygiene   12/20- minimal change in presentation, pills found in his room ( ?compliance), poor hygiene and odd posturing  12/21/17 he is not engaging in talking and not eating well and he is in fetal position and he is paranoid and disorganized and poor hygiene and he refuse to take medications   12/22/17 he still not engaging and he still showing catatonic features and has been having posturing and he is mute and not verbal and he is not eating that well , respond to internal stimuli   12/23/17: Patient was sitting on the floor,in a fetal position,appears disheveled,poor hygiene,mute ,did not engage in conversation,margaret johansen. 12/24/17:seen today in his room with staff. poor hygiene, unkempt, continues to sit in squatting position on the floor  with head down in to knees,did not engage in any conversation,internally preoccupied,As per staff accepting medications and meals. Received no prn.   12/25/17: Patient was seen in his room, he was laying in bed in a fetal position, did not engage in any conversation,poor hygiene, odd posture, eats his meals. Has not shown much improvement with current medications. Treatment team is working on to get ECT. 12/26/17: Not progressing, no significant change noted,laying on the floor, fetal position, half naked,mute ,catatonic, Poor hygiene,accepts medications and meals. treatment team has requested judicial consent for  ECT which is pending,he has [place] [facility] in the past.   12/27/17: Seen today in his room, no significant change,sitting on the floor,in fetal position,mute catatonic,did not engage in any conversation. Staff reports that he eats his meals and takes his med,he resist to have  lab work and vital signs.  received fee back from [facility] reported that \"patient received 17 ECT treatments were given to pt in August 2016 and  pt did not improve\". 12/28- no sig change in presentation. Poor hygiene, odd and inappropriate posturing. 12/29- seen in the hallway, more alert today and talked rich  Soft voice, remains preoccupied, delusional with poor hygiene. Was co operative with labs today  12/30/17: seen in his room today with RN,staff reported accepting medications and meals, still remains selectively mute, only brief interaction with staff,odd and inappropriate posture. remains preoccupied. SIDE EFFECTS: (reviewed/updated 12/30/2017)  None reported or admitted to. No noted toxicity with use of current med   ALLERGIES:(reviewed/updated 12/30/2017)  Allergies   Allergen Reactions    Fluphenazine Unknown (comments)     Pt is unable to communicate properly.  Penicillins Unknown (comments)     Pt is unable to communicate properly.       MEDICATIONS PRIOR TO ADMISSION:(reviewed/updated 12/30/2017)  Prescriptions Prior to Admission   Medication Sig    divalproex ER (DEPAKOTE ER) 500 mg ER tablet Take 1,500 mg by mouth nightly. Indications: Treatment-resistant schizophrenia    haloperidol decanoate (HALDOL DECANOATE) 100 mg/mL injection 2 mL by IntraMUSCular route every twenty-eight (28) days. Indications: SCHIZOPHRENIA    benztropine (COGENTIN) 2 mg tablet Take 1 Tab by mouth nightly. Indications: extrapyramidal disease    cloZAPine 200 mg tablet Take 600 mg by mouth nightly. Indications: TREATMENT-RESISTANT SCHIZOPHRENIA      PAST MEDICAL HISTORY: Past medical history from the initial psychiatric evaluation has been reviewed (reviewed/updated 12/30/2017) with no additional updates (I asked patient and no additional past medical history provided). Past Medical History:   Diagnosis Date    Psychiatric disorder     Psychotic disorder     Withdrawal syndrome ([facility] [address]    No past surgical history on file. SOCIAL HISTORY: Social history from the initial psychiatric evaluation has been reviewed (reviewed/updated 12/30/2017) with no additional updates (I asked patient and no additional social history provided). Social History     Social History    Marital status: SINGLE     Spouse name: N/A    Number of children: N/A    Years of education: N/A     Occupational History    Not on file. 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 He lives with his sister. On disability    No pending legal charge reported      FAMILY HISTORY: Family history from the initial psychiatric evaluation has been reviewed (reviewed/updated 12/30/2017) with no additional updates (I asked patient and no additional family history provided). No family history on file. REVIEW OF SYSTEMS: (reviewed/updated 12/30/2017)  Appetite:no change from normal   Sleep: fitful   All other Review of Systems: Psychological ROS: positive for - behavioral disorder, concentration difficulties, hallucinations, irritability, mood swings and delusion  Respiratory ROS: no cough, shortness of breath, or wheezing  Cardiovascular ROS: no chest pain or dyspnea on exertion         [facility] (MSE):    MSE FINDINGS ARE WITHIN NORMAL LIMITS (WNL) UNLESS OTHERWISE STATED BELOW. ( ALL OF THE BELOW CATEGORIES OF THE MSE HAVE BEEN REVIEWED (reviewed 12/30/2017) AND UPDATED AS DEEMED APPROPRIATE )  General Presentation age appropriate and disheveled, uncooperative and unreliable   Orientation disorganized   Vital Signs  See below (reviewed 12/30/2017); Vital Signs (BP, Pulse, & Temp) are within normal limits if not listed below. Gait and Station Stable/steady, no ataxia   Musculoskeletal System No extrapyramidal symptoms (EPS); no abnormal muscular movements or Tardive Dyskinesia (TD); muscle strength and tone are within normal limits   Language No aphasia or dysarthria   Speech:  mute   Thought Processes illogical; slow rate of thoughts; poor abstract reasoning/computation   Thought Associations blocked    Thought Content paranoid delusions, bizarre delusions, auditory hallucinations and internally preoccupied   Suicidal Ideations none   Homicidal Ideations none   Mood:  Labile mood   Affect:  Constricted, labile inappropriate and increased in intensity   Memory recent  impaired   Memory remote:  intact   Concentration/Attention:  distractable and hypervigilance   Fund of Knowledge average   Insight:  limited   Reliability poor   Judgment:  limited          VITALS:     No data found.     Wt Readings from Last 3 Encounters:   12/23/17 84.9 kg (187 lb 1.6 oz)   03/25/17 95.6 kg (210 lb 11.2 oz)   02/09/15 97.5 kg (215 lb)     Temp Readings from Last 3 Encounters: 03/28/17 97.5 °F (36.4 °C)   02/07/15 98.7 °F (37.1 °C)     BP Readings from Last 3 Encounters:   03/28/17 100/68   02/12/15 105/73   02/07/15 148/79     Pulse Readings from Last 3 Encounters:   03/28/17 67   02/12/15 87   02/07/15 (!) 111            DATA     LABORATORY DATA:(reviewed/updated 12/30/2017)  No results found for this or any previous visit (from the past 24 hour(s)). Lab Results   Component Value Date/Time    Valproic acid 69 12/29/2017 04:43 AM     No results found for: LITHM   RADIOLOGY REPORTS:(reviewed/updated 12/30/2017)  No results found. MEDICATIONS     ALL MEDICATIONS:      SCHEDULED MEDICATIONS:          ASSESSMENT & PLAN     DIAGNOSES REQUIRING ACTIVE TREATMENT AND MONITORING: (reviewed/updated 12/30/2017)  Patient Active Hospital Problem List:   Paranoid schizophrenia ([facility] [address] (3/15/2017)- tx resistant, chronic and with negative sx    Assessment: minimal change- psychotic, odd and bizarre behavior, -ve sx, poor insight- accepting med ? Check for cheeking behavior      Plan: I will ct to adjust med- Prozac, haldol dec today. Ct with Zydis and Ativan. Change Depakote to liq    Request second opinion for ECT- pt has been on Clozapine but refuses to have labs drwan. Has been tx with ECT in the past.     Patient is on two antipsychotics now due to the relative refractoriness to treatment thus far. Prior to admission patient had THREE or more failed trails of monotherapy, including: Haldol, Zyprexa, Risperdal and Clozapine. The patient is a very slow responder to medications. Risks and benefits in the use of two antipsychotics have been weighed in full, including the risk of metabolic syndrome and the potential increased risk of QTc  Based on this analysis, it is considered favorable for the utilization of two antipsychotics. In the future, once stable on the two antipsychotics, patient can possibly be tapered to one of the chosen antipsychotics.   Will check on EKG results today to monitor QTc.- refusing EKG    Non compliance with tx- request court order med. - granted  12/21/17 will continue medications and he may need [facility]    12/22/17 need depakote level , BMP and ECT     12/23/17: Continue current treatment and supportive  care  12/24/17: No significant change noted,still has Odd posture,slectiviely mute. Continue current treatment,porvide supportive care. 12/25/17:  Patient is not progressing, psychosis with catatonia continues, on Lorazepam,staff reported eating meals fairly well, poor self care, selectively mute,consider ECT. 12/26/17: Treatment resistant case,not progressing on current medications,discussed with ,Pending judical consent for ECT,explore to tra[facility] patient to [facility]  12/27/17: As per  note ,[place] been [place] [facility] [place] to poor response in the past. Patient is resistive to cooperative with Vital signs and lab work. 12/28- will reconsider Clozapine and check Blood works for tomorrow- long term hospitalization  12/29- VPA- 69 sub therapeutic- change to ER prep, consider restarting Clozapine. ANC- 2.5  12/30/17: Continue current treatment and provide supportive care. I will continue to monitor blood levels (Depakote,, clozapine---a drug with a narrow therapeutic index= NTI) and associated labs for drug therapy implemented that require intense monitoring for toxicity as deemed appropriate based on current medication side effects and pharmacodynamically determined drug 1/2 lives. -    In summary, Juaquin Chavez, is a 49 y.o.  male who presents with a severe exacerbation of the principal diagnosis of Paranoid schizophrenia ([facility] [address]  Patient's condition is worsening/not improving/not stable . Patient requires continued inpatient hospitalization for further stabilization, safety monitoring and medication management.   I will continue to coordinate the provision of individual, milieu, occupational, group, and substance abuse therapies to address target symptoms/diagnoses as deemed appropriate for the individual patient. A coordinated, multidisplinary treatment team round was conducted with the patient (this team consists of the nurse, psychiatric unit pharmcist,  and writer). Complete current electronic health record for patient has been reviewed today including consultant notes, ancillary staff notes, nurses and psychiatric tech notes. Suicide risk assessment completed and patient deemed to be of low risk for suicide at this time. The following regarding medications was addressed during rounds with patient:   the risks and benefits of the proposed medication. The patient was given the opportunity to ask questions. Informed consent given to the use of the above medications. Will continue to adjust psychiatric and non-psychiatric medications (see above \"medication\" section and orders section for details) as deemed appropriate & based upon diagnoses and response to treatment. I will continue to order blood tests/labs and diagnostic tests as deemed appropriate and review results as they become available (see orders for details and above listed lab/test results). I will order psychiatric records from previous [facility] hospitals to further elucidate the nature of patient's psychopathology and review once available. I will gather additional collateral information from friends, family and o/p treatment team to further elucidate the nature of patient's psychopathology and baselline level of psychiatric functioning.          I certify that this patient's inpatient psychiatric hospital services furnished since the previous certification were, and continue to be, required for treatment that could reasonably be expected to improve the patient's condition, or for diagnostic study, and that the patient continues to need, on a daily basis, active treatment furnished directly by or requiring the supervision of inpatient psychiatric facility personnel. In addition, the hospital records show that services furnished were intensive treatment services, admission or related services, or equivalent services.     EXPECTED DISCHARGE DATE/DAY: TBD     DISPOSITION: Home       Signed By:   Alina Mendoza MD  12/30/2017

## 2017-12-30 NOTE — PROGRESS NOTES
Problem: Altered Thought Process (Adult/Pediatric)  Goal: *STG: Complies with medication therapy  Outcome: Progressing Towards Goal  Pt is alert. He is compliant with medications and meals. Pt continues to stay in his in his room, bent over in a praying/fetal position. Pt will not respond when asked questions. Pt is disheveled and half dresses. Pt does not appear to be in distress. Staff will continue to monitor pt q15 minutes for safety and support.

## 2017-12-30 NOTE — PROGRESS NOTES
Problem: Altered Thought Process (Adult/Pediatric)  Goal: *STG: Remains safe in hospital  Outcome: Progressing Towards Goal  Pt slept 7 hours. Pt had uneventful night and required no PRN's. Pt had no complaints or signs of distress throughout night. Respirations were unlabored. Continuing to monitor with q15 min rounds for safety.

## 2017-12-30 NOTE — PROGRESS NOTES
Problem: Altered Thought Process (Adult/Pediatric)  Goal: *STG: Remains safe in hospital  Client showing slight improvement today. Culturally looked at nurse and spoke to her when taking his meds. Still isolating in his room, still curled up in the fetal position when laying down. Is eating and taking the meds. Q 15 min checks for safety continue.

## 2017-12-31 PROCEDURE — 65220000001 HC RM PRIVATE PSYCH

## 2017-12-31 PROCEDURE — 74011250637 HC RX REV CODE- 250/637: Performed by: PSYCHIATRY & NEUROLOGY

## 2017-12-31 RX ADMIN — LORAZEPAM 1 MG: 1 TABLET ORAL at 07:45

## 2017-12-31 RX ADMIN — OLANZAPINE 30 MG: 5 TABLET, ORALLY DISINTEGRATING ORAL at 21:19

## 2017-12-31 RX ADMIN — LORAZEPAM 1 MG: 1 TABLET ORAL at 16:53

## 2017-12-31 RX ADMIN — FLUOXETINE 40 MG: 20 CAPSULE ORAL at 07:45

## 2017-12-31 RX ADMIN — DIVALPROEX SODIUM 1500 MG: 500 TABLET, FILM COATED, EXTENDED RELEASE ORAL at 21:18

## 2017-12-31 RX ADMIN — BENZTROPINE MESYLATE 2 MG: 2 TABLET ORAL at 21:19

## 2017-12-31 NOTE — BH NOTES
Pt sat up took his medication with minimal  prompting. Pt then stated\" Have we had lunch yet? \". Provided support and praise for patient speaking. Pt is usually mute. Reinforce reality with pt and informed him that it was Sunday morning and breakfast is expected to come in the next 30 minutes and staff will alert him when breakfast is on the unit. Alethea Wesley

## 2017-12-31 NOTE — BH NOTES
Pt accepted scheduled medications after prompting. Ate 75% of his dinner and snacks at bedtime. Continues to bend on the bed, fetal position. Staff continued to prompt for comfortable position.

## 2017-12-31 NOTE — BH NOTES
PSYCHIATRIC PROGRESS NOTE         Patient Name  Zahra Mo   Date of Birth 1970   [facility] [id number]   Medical Record Number  [id number]      Age  49 y.o. PCP PROVIDER UNKNOWN   Admit date:  11/25/2017    Room Number  307/01  @ [facility]   Date of Service  12/31/2017           E & M PROGRESS NOTE:         HISTORY       CC:  \"selectively mute\"  HISTORY OF PRESENT ILLNESS/INTERVAL HISTORY:  (reviewed/updated 12/31/2017). per initial evaluation: The patient, Zahra Mo, is a 49 y.o. WHITE OR  male with a past psychiatric history significant for schizophrenia, who presents at this time with complaints of (and/or evidence of) the following emotional symptoms: psychotic behavior. Additional symptomatology include paranoid delusion, sexually inappropriate behavior, pepisodes of yelling screaming followed by selectively mute, disorganized thought process, anxiety, concern about health problems, difficulty sleeping, fearfulness, hearing voices, increased irritability, poor concentration and problem with medication. The above symptoms have been present for many years. These symptoms are of severe severity. These symptoms are constant  in nature. The patient's condition has been precipitated by and psychosocial stressors (conflict with sister, non compliance ). Patient's condition made worse by treatment noncompliance. UDS: negative; BAL=0. Zahra Mo presents/reports/evidences the following emotional symptoms today, 12/31/2017:psychotic behavior. The above symptoms have been present for many years. These symptoms are of severe severity. The symptoms are constant in nature. Additional symptomatology and features include       12/9-Prefers to isolate self in his room except coming out for meals. Minimal social interaction. Tp remains disorganized. Accepting meds.   12/11- minimal change- isolative and does not interact with pt/staff except going out for his Breakfast. Has refused med at times. Will lay in bed, poor hygiene. 12/12- affect is sl better, out of his room today and talked in a soft voice. Remains disorganized and delusional.  12/13- poor hygiene and disorganized, will lay in bed with eyes closed, pt was able to get up and talk when told that we are discussing his dc planning. Talks in a soft voice. Accepting med but need lot of prompting to do his ADLs  12/14- affect is sl better, tendency to lay in bed, will only talk when we talk about dc planning  12/15- poor hygiene, selectively mute, disorganized and delusional. Poor insight  12/16- inappropriate posturing- sitting on the side of the bed and half naked. Selectively mute and isolative. poor hygiene  12/17- odd and bizarre behavior. Selectively mute and poor hygiene. Sitting at the edge of his bed with face downward and half naked. Accepting med  12/18- appear to have deteriorated over the weekend, selectively mute, odd behavior and delusional. Poor hygiene and passive. 12/19- delusional and disorganized. Stays in his room and found to be in odd position at the side of the bed. Staff informed pt was eating his break fast but quickly changed to this position ( ?behavioral). Poor hygiene   12/20- minimal change in presentation, pills found in his room ( ?compliance), poor hygiene and odd posturing  12/21/17 he is not engaging in talking and not eating well and he is in fetal position and he is paranoid and disorganized and poor hygiene and he refuse to take medications   12/22/17 he still not engaging and he still showing catatonic features and has been having posturing and he is mute and not verbal and he is not eating that well , respond to internal stimuli   12/23/17: Patient was sitting on the floor,in a fetal position,appears disheveled,poor hygiene,mute ,did not engage in conversation,margaret johansen. 12/24/17:seen today in his room with staff. poor hygiene, unkempt, continues to sit in squatting position on the floor  with head down in to knees,did not engage in any conversation,internally preoccupied,As per staff accepting medications and meals. Received no prn.   12/25/17: Patient was seen in his room, he was laying in bed in a fetal position, did not engage in any conversation,poor hygiene, odd posture, eats his meals. Has not shown much improvement with current medications. Treatment team is working on to get ECT. 12/26/17: Not progressing, no significant change noted,laying on the floor, fetal position, half naked,mute ,catatonic, Poor hygiene,accepts medications and meals. treatment team has requested judicial consent for  ECT which is pending,he has received ECT treatment at [facility] in the past.   12/27/17: Seen today in his room, no significant change,sitting on the floor,in fetal position,mute catatonic,did not engage in any conversation. Staff reports that he eats his meals and takes his med,he resist to have  lab work and vital signs.  [place] [facility] [place] \"patient received 17 E[place] treatments were given to pt in August 2016 and  pt did not improve\". 12/28- no sig change in presentation. Poor hygiene, odd and inappropriate posturing. 12/29- seen in the hallway, more alert today and talked rich  Soft voice, remains preoccupied, delusional with poor hygiene. Was co operative with labs today  12/30/17: seen in his room today with RN,staff reported accepting medications and meals, still remains selectively mute, only brief interaction with staff,odd and inappropriate posture. remains preoccupied. 12/31/17: Seen today in his room,inappropriate posturing continues,mute, only spoke one word to RN,acceptign medications and eating his meals. No significant change noted. SIDE EFFECTS: (reviewed/updated 12/31/2017)  None reported or admitted to.   No noted toxicity with use of current med   ALLERGIES:(reviewed/updated 12/31/2017)  Allergies   Allergen Reactions    Fluphenazine Unknown (comments)     Pt is unable to communicate properly.  Penicillins Unknown (comments)     Pt is unable to communicate properly. MEDICATIONS PRIOR TO ADMISSION:(reviewed/updated 12/31/2017)  Prescriptions Prior to Admission   Medication Sig    divalproex ER (DEPAKOTE ER) 500 mg ER tablet Take 1,500 mg by mouth nightly. Indications: Treatment-resistant schizophrenia    haloperidol decanoate (HALDOL DECANOATE) 100 mg/mL injection 2 mL by IntraMUSCular route every twenty-eight (28) days. Indications: SCHIZOPHRENIA    benztropine (COGENTIN) 2 mg tablet Take 1 Tab by mouth nightly. Indications: extrapyramidal disease    cloZAPine 200 mg tablet Take 600 mg by mouth nightly. Indications: TREATMENT-RESISTANT SCHIZOPHRENIA      PAST MEDICAL HISTORY: Past medical history from the initial psychiatric evaluation has been reviewed (reviewed/updated 12/31/2017) with no additional updates (I asked patient and no additional past medical history provided). Past Medical History:   Diagnosis Date    Psychiatric disorder     Psychotic disorder     [facility]    No past surgical history on file. SOCIAL HISTORY: Social history from the initial psychiatric evaluation has been reviewed (reviewed/updated 12/31/2017) with no additional updates (I asked patient and no additional social history provided). Social History     Social History    Marital status: SINGLE     Spouse name: N/A    Number of children: N/A    Years of education: N/A     Occupational History    Not on file. 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 He lives with his sister.  On disability    No pending legal charge reported      FAMILY HISTORY: Family history from the initial psychiatric evaluation has been reviewed (reviewed/updated 12/31/2017) with no additional updates (I asked patient and no additional family history provided). No family history on file. REVIEW OF SYSTEMS: (reviewed/updated 12/31/2017)  Appetite:no change from normal   Sleep: fitful   All other Review of Systems: Psychological ROS: positive for - behavioral disorder, concentration difficulties, hallucinations, irritability, mood swings and delusion  Respiratory ROS: no cough, shortness of breath, or wheezing  Cardiovascular ROS: no chest pain or dyspnea on exertion         [facility] (MSE):    MSE FINDINGS ARE WITHIN NORMAL LIMITS (WNL) UNLESS OTHERWISE STATED BELOW. ( ALL OF THE BELOW CATEGORIES OF THE MSE HAVE BEEN REVIEWED (reviewed 12/31/2017) AND UPDATED AS DEEMED APPROPRIATE )  General Presentation age appropriate and disheveled, uncooperative and unreliable   Orientation disorganized   Vital Signs  See below (reviewed 12/31/2017); Vital Signs (BP, Pulse, & Temp) are within normal limits if not listed below. Gait and Station Stable/steady, no ataxia   Musculoskeletal System No extrapyramidal symptoms (EPS); no abnormal muscular movements or Tardive Dyskinesia (TD); muscle strength and tone are within normal limits   Language No aphasia or dysarthria   Speech:  mute   Thought Processes illogical; slow rate of thoughts; poor abstract reasoning/computation   Thought Associations blocked    Thought Content paranoid delusions, bizarre delusions, auditory hallucinations and internally preoccupied   Suicidal Ideations none   Homicidal Ideations none   Mood:  Labile mood   Affect:  Constricted, labile inappropriate and increased in intensity   Memory recent  impaired   Memory remote:  intact   Concentration/Attention:  distractable and hypervigilance   Fund of Knowledge average   Insight:  limited   Reliability poor   Judgment:  limited          VITALS:     No data found.     Wt Readings from Last 3 Encounters:   12/23/17 84.9 kg (187 lb 1.6 oz)   03/25/17 95.6 kg (210 lb 11.2 oz)   02/09/15 97.5 kg (215 lb)     Temp Readings from Last 3 Encounters:   03/28/17 97.5 °F (36.4 °C)   02/07/15 98.7 °F (37.1 °C)     BP Readings from Last 3 Encounters:   03/28/17 100/68   02/12/15 105/73   02/07/15 148/79     Pulse Readings from Last 3 Encounters:   03/28/17 67   02/12/15 87   02/07/15 (!) 111            DATA     LABORATORY DATA:(reviewed/updated 12/31/2017)  No results found for this or any previous visit (from the past 24 hour(s)). Lab Results   Component Value Date/Time    Valproic acid 69 12/29/2017 04:43 AM     No results found for: LITHM   RADIOLOGY REPORTS:(reviewed/updated 12/31/2017)  No results found. MEDICATIONS     ALL MEDICATIONS:      SCHEDULED MEDICATIONS:          ASSESSMENT & PLAN     DIAGNOSES REQUIRING ACTIVE TREATMENT AND MONITORING: (reviewed/updated 12/31/2017)  Patient Active Hospital Problem List:   Paranoid schizophrenia ([facility] (3/15/2017)- tx resistant, chronic and with negative sx    Assessment: minimal change- psychotic, odd and bizarre behavior, -ve sx, poor insight- accepting med ? Check for cheeking behavior      Plan: I will ct to adjust med- Prozac, haldol dec today. Ct with Zydis and Ativan. Change Depakote to liq    Request second opinion for ECT- pt has been on Clozapine but refuses to have labs drwan. Has been tx with ECT in the past.     Patient is on two antipsychotics now due to the relative refractoriness to treatment thus far. Prior to admission patient had THREE or more failed trails of monotherapy, including: Haldol, Zyprexa, Risperdal and Clozapine. The patient is a very slow responder to medications. Risks and benefits in the use of two antipsychotics have been weighed in full, including the risk of metabolic syndrome and the potential increased risk of QTc  Based on this analysis, it is considered favorable for the utilization of two antipsychotics. In the future, once stable on the two antipsychotics, patient can possibly be tapered to one of the chosen antipsychotics. Will check on EKG results today to monitor QTc.- refusing EKG    Non compliance with tx- request court order med. - granted  12/21/17 will continue medications and he may need [facility]    12/22/17 need depakote level , BMP and ECT     12/23/17: Continue current treatment and supportive  care  12/24/17: No significant change noted,still has Odd posture,slectiviely mute. Continue current treatment,porvide supportive care. 12/25/17:  Patient is not progressing, psychosis with catatonia continues, on Lorazepam,staff reported eating meals fairly well, poor self care, selectively mute,consider ECT. 12/26/17: Treatment resistant case,not progressing on current medications,discussed with ,Pending judical consent for ECT,[facility]ore to [facility] [place] [facility]  12/27/17: As per  note ,patient has been denied for ECT treatment at [facility] due to poor response in the past. Patient is resistive to cooperative with Vital signs and lab work. 12/28- will reconsider Clozapine and check Blood works for tomorrow- long term hospitalization  12/29- VPA- 69 sub therapeutic- change to ER prep, consider restarting Clozapine. ANC- 2.5  12/30/17: Continue current treatment and provide supportive care. 12/31/17: No significant change in his condition. I will continue to monitor blood levels (Depakote,, clozapine---a drug with a narrow therapeutic index= NTI) and associated labs for drug therapy implemented that require intense monitoring for toxicity as deemed appropriate based on current medication side effects and pharmacodynamically determined drug 1/2 lives. -    In summary, Dwight Valdez, is a 49 y.o.  male who presents with a severe exacerbation of the principal diagnosis of Paranoid schizophrenia ([facility]  Patient's condition is worsening/not improving/not stable . Patient requires continued inpatient hospitalization for further stabilization, safety monitoring and medication management. I will continue to coordinate the provision of individual, milieu, occupational, group, and substance abuse therapies to address target symptoms/diagnoses as deemed appropriate for the individual patient. A coordinated, multidisplinary treatment team round was conducted with the patient (this team consists of the nurse, psychiatric unit pharmcist,  and writer). Complete current electronic health record for patient has been reviewed today including consultant notes, ancillary staff notes, nurses and psychiatric tech notes. Suicide risk assessment completed and patient deemed to be of low risk for suicide at this time. The following regarding medications was addressed during rounds with patient:   the risks and benefits of the proposed medication. The patient was given the opportunity to ask questions. Informed consent given to the use of the above medications. Will continue to adjust psychiatric and non-psychiatric medications (see above \"medication\" section and orders section for details) as deemed appropriate & based upon diagnoses and response to treatment. I will continue to order blood tests/labs and diagnostic tests as deemed appropriate and review results as they become available (see orders for details and above listed lab/test results). I will order psychiatric records from previous [facility] hospitals to further elucidate the nature of patient's psychopathology and review once available. I will gather additional collateral information from friends, family and o/p treatment team to further elucidate the nature of patient's psychopathology and baselline level of psychiatric functioning.          I certify that this patient's inpatient psychiatric hospital services furnished since the previous certification were, and continue to be, required for treatment that could reasonably be expected to improve the patient's condition, or for diagnostic study, and that the patient continues to need, on a daily basis, active treatment furnished directly by or requiring the supervision of inpatient psychiatric facility personnel. In addition, the hospital records show that services furnished were intensive treatment services, admission or related services, or equivalent services.     EXPECTED DISCHARGE DATE/DAY: TBD     DISPOSITION: Home       Signed By:   Kaleigh Powell MD  12/31/2017

## 2018-01-01 PROCEDURE — 65220000001 HC RM PRIVATE PSYCH

## 2018-01-01 PROCEDURE — 74011250637 HC RX REV CODE- 250/637: Performed by: PSYCHIATRY & NEUROLOGY

## 2018-01-01 RX ADMIN — DIVALPROEX SODIUM 1500 MG: 500 TABLET, FILM COATED, EXTENDED RELEASE ORAL at 21:35

## 2018-01-01 RX ADMIN — FLUOXETINE 40 MG: 20 CAPSULE ORAL at 07:57

## 2018-01-01 RX ADMIN — LORAZEPAM 1 MG: 1 TABLET ORAL at 07:57

## 2018-01-01 RX ADMIN — BENZTROPINE MESYLATE 2 MG: 2 TABLET ORAL at 21:36

## 2018-01-01 RX ADMIN — OLANZAPINE 30 MG: 5 TABLET, ORALLY DISINTEGRATING ORAL at 21:36

## 2018-01-01 RX ADMIN — LORAZEPAM 1 MG: 1 TABLET ORAL at 16:43

## 2018-01-01 NOTE — PROGRESS NOTES
Problem: Altered Thought Process (Adult/Pediatric)  Goal: *STG: Complies with medication therapy  Outcome: Progressing Towards Goal  Pt remains mute, disheveled, and disorganized. Hygiene remains poor. He remains in a prayer position on his bed. He has been in this position for most of the shift. Pt is compliant with meals and meds. He did not receive any prn meds on the shift. Staff will continue to place emphasis hygiene focus, monitor safety, and offer support as needed.

## 2018-01-01 NOTE — BH NOTES
PSYCHIATRIC PROGRESS NOTE         Patient Name  Cecille Jain   Date of Birth 1970   [facility] [id number]   Medical Record Number  [id number]      Age  49 y.o. PCP PROVIDER UNKNOWN   Admit date:  11/25/2017    Room Number  307/01  @ [facility]   Date of Service  1/1/2018           E & M PROGRESS NOTE:         HISTORY       CC:  \"selectively mute\"  HISTORY OF PRESENT ILLNESS/INTERVAL HISTORY:  (reviewed/updated 1/1/2018). per initial evaluation: The patient, Cecille Jain, is a 49 y.o. WHITE OR  male with a past psychiatric history significant for schizophrenia, who presents at this time with complaints of (and/or evidence of) the following emotional symptoms: psychotic behavior. Additional symptomatology include paranoid delusion, sexually inappropriate behavior, pepisodes of yelling screaming followed by selectively mute, disorganized thought process, anxiety, concern about health problems, difficulty sleeping, fearfulness, hearing voices, increased irritability, poor concentration and problem with medication. The above symptoms have been present for many years. These symptoms are of severe severity. These symptoms are constant  in nature. The patient's condition has been precipitated by and psychosocial stressors (conflict with sister, non compliance ). Patient's condition made worse by treatment noncompliance. UDS: negative; BAL=0. Cecille Jain presents/reports/evidences the following emotional symptoms today, 1/1/2018:psychotic behavior. The above symptoms have been present for many years. These symptoms are of severe severity. The symptoms are constant in nature. Additional symptomatology and features include       12/9-Prefers to isolate self in his room except coming out for meals. Minimal social interaction. Tp remains disorganized. Accepting meds.   12/11- minimal change- isolative and does not interact with pt/staff except going out for his Breakfast. Has refused med at times. Will lay in bed, poor hygiene. 12/12- affect is sl better, out of his room today and talked in a soft voice. Remains disorganized and delusional.  12/13- poor hygiene and disorganized, will lay in bed with eyes closed, pt was able to get up and talk when told that we are discussing his dc planning. Talks in a soft voice. Accepting med but need lot of prompting to do his ADLs  12/14- affect is sl better, tendency to lay in bed, will only talk when we talk about dc planning  12/15- poor hygiene, selectively mute, disorganized and delusional. Poor insight  12/16- inappropriate posturing- sitting on the side of the bed and half naked. Selectively mute and isolative. poor hygiene  12/17- odd and bizarre behavior. Selectively mute and poor hygiene. Sitting at the edge of his bed with face downward and half naked. Accepting med  12/18- appear to have deteriorated over the weekend, selectively mute, odd behavior and delusional. Poor hygiene and passive. 12/19- delusional and disorganized. Stays in his room and found to be in odd position at the side of the bed. Staff informed pt was eating his break fast but quickly changed to this position ( ?behavioral). Poor hygiene   12/20- minimal change in presentation, pills found in his room ( ?compliance), poor hygiene and odd posturing  12/21/17 he is not engaging in talking and not eating well and he is in fetal position and he is paranoid and disorganized and poor hygiene and he refuse to take medications   12/22/17 he still not engaging and he still showing catatonic features and has been having posturing and he is mute and not verbal and he is not eating that well , respond to internal stimuli   12/23/17: Patient was sitting on the floor,in a fetal position,appears disheveled,poor hygiene,mute ,did not engage in conversation,margaret johansen. 12/24/17:seen today in his room with staff. poor hygiene, unkempt, continues to sit in squatting position on the floor  with head down in to knees,did not engage in any conversation,internally preoccupied,As per staff accepting medications and meals. Received no prn.   12/25/17: Patient was seen in his room, he was laying in bed in a fetal position, did not engage in any conversation,poor hygiene, odd posture, eats his meals. Has not shown much improvement with current medications. Treatment team is working on to get ECT. 12/26/17: Not progressing, no significant change noted,laying on the floor, fetal position, half naked,mute ,catatonic, Poor hygiene,accepts medications and meals. treatment team has requested judicial consent for  ECT which is pending,he has [place] [facility] in the past.   12/27/17: Seen today in his room, no significant change,sitting on the floor,in fetal position,mute catatonic,did not engage in any conversation. Staff reports that he eats his meals and takes his med,he resist to have  lab work and vital signs.  received fee back from [facility] reported that \"patient received 17 ECT treatments were given to pt in August 2016 and  pt did not improve\". 12/28- no sig change in presentation. Poor hygiene, odd and inappropriate posturing. 12/29- seen in the hallway, more alert today and talked rich  Soft voice, remains preoccupied, delusional with poor hygiene. Was co operative with labs today  12/30/17: seen in his room today with RN,staff reported accepting medications and meals, still remains selectively mute, only brief interaction with staff,odd and inappropriate posture. remains preoccupied. 12/31/17: Seen today in his room,inappropriate posturing continues,mute, only spoke one word to RN,acceptign medications and eating his meals. No significant change noted. 1/1/18- initially seen in odd (praying)  position but did respond to command and pt was more talkative.  He remains disorganized- talking of \" couple of nuns and daughters\" when asked about where he will be living. Poor hygiene. Accepting med      SIDE EFFECTS: (reviewed/updated 1/1/2018)  None reported or admitted to. No noted toxicity with use of current med   ALLERGIES:(reviewed/updated 1/1/2018)  Allergies   Allergen Reactions    Fluphenazine Unknown (comments)     Pt is unable to communicate properly.  Penicillins Unknown (comments)     Pt is unable to communicate properly. MEDICATIONS PRIOR TO ADMISSION:(reviewed/updated 1/1/2018)  Prescriptions Prior to Admission   Medication Sig    divalproex ER (DEPAKOTE ER) 500 mg ER tablet Take 1,500 mg by mouth nightly. Indications: Treatment-resistant schizophrenia    haloperidol decanoate (HALDOL DECANOATE) 100 mg/mL injection 2 mL by IntraMUSCular route every twenty-eight (28) days. Indications: SCHIZOPHRENIA    benztropine (COGENTIN) 2 mg tablet Take 1 Tab by mouth nightly. Indications: extrapyramidal disease    cloZAPine 200 mg tablet Take 600 mg by mouth nightly. Indications: TREATMENT-RESISTANT SCHIZOPHRENIA      PAST MEDICAL HISTORY: Past medical history from the initial psychiatric evaluation has been reviewed (reviewed/updated 1/1/2018) with no additional updates (I asked patient and no additional past medical history provided). Past Medical History:   Diagnosis Date    Psychiatric disorder     Psychotic disorder     Withdrawal syndrome ([facility] [address]    No past surgical history on file. SOCIAL HISTORY: Social history from the initial psychiatric evaluation has been reviewed (reviewed/updated 1/1/2018) with no additional updates (I asked patient and no additional social history provided). Social History     Social History    Marital status: SINGLE     Spouse name: N/A    Number of children: N/A    Years of education: N/A     Occupational History    Not on file.      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 He lives with his sister. On disability    No pending legal charge reported      FAMILY HISTORY: Family history from the initial psychiatric evaluation has been reviewed (reviewed/updated 1/1/2018) with no additional updates (I asked patient and no additional family history provided). No family history on file. REVIEW OF SYSTEMS: (reviewed/updated 1/1/2018)  Appetite:no change from normal   Sleep: fitful   All other Review of Systems: Psychological ROS: positive for - behavioral disorder, concentration difficulties, hallucinations, irritability, mood swings and delusion  Respiratory ROS: no cough, shortness of breath, or wheezing  Cardiovascular ROS: no chest pain or dyspnea on exertion         [facility] (MSE):    MSE FINDINGS ARE WITHIN NORMAL LIMITS (WNL) UNLESS OTHERWISE STATED BELOW. ( ALL OF THE BELOW CATEGORIES OF THE MSE HAVE BEEN REVIEWED (reviewed 1/1/2018) AND UPDATED AS DEEMED APPROPRIATE )  General Presentation age appropriate and disheveled, uncooperative and unreliable   Orientation disorganized   Vital Signs  See below (reviewed 1/1/2018); Vital Signs (BP, Pulse, & Temp) are within normal limits if not listed below.    Gait and Station Stable/steady, no ataxia   Musculoskeletal System No extrapyramidal symptoms (EPS); no abnormal muscular movements or Tardive Dyskinesia (TD); muscle strength and tone are within normal limits   Language No aphasia or dysarthria   Speech:  mute   Thought Processes illogical; slow rate of thoughts; poor abstract reasoning/computation   Thought Associations blocked    Thought Content paranoid delusions, bizarre delusions, auditory hallucinations and internally preoccupied   Suicidal Ideations none   Homicidal Ideations none   Mood:  Labile mood   Affect:  Constricted, labile inappropriate and increased in intensity   Memory recent  impaired Memory remote:  intact   Concentration/Attention:  distractable and hypervigilance   Fund of Knowledge average   Insight:  limited   Reliability poor   Judgment:  limited          VITALS:     No data found. Wt Readings from Last 3 Encounters:   12/23/17 84.9 kg (187 lb 1.6 oz)   03/25/17 95.6 kg (210 lb 11.2 oz)   02/09/15 97.5 kg (215 lb)     Temp Readings from Last 3 Encounters:   03/28/17 97.5 °F (36.4 °C)   02/07/15 98.7 °F (37.1 °C)     BP Readings from Last 3 Encounters:   03/28/17 100/68   02/12/15 105/73   02/07/15 148/79     Pulse Readings from Last 3 Encounters:   03/28/17 67   02/12/15 87   02/07/15 (!) 111            DATA     LABORATORY DATA:(reviewed/updated 1/1/2018)  No results found for this or any previous visit (from the past 24 hour(s)). Lab Results   Component Value Date/Time    Valproic acid 69 12/29/2017 04:43 AM     No results found for: LITHM   RADIOLOGY REPORTS:(reviewed/updated 1/1/2018)  No results found. MEDICATIONS     ALL MEDICATIONS:      SCHEDULED MEDICATIONS:          ASSESSMENT & PLAN     DIAGNOSES REQUIRING ACTIVE TREATMENT AND MONITORING: (reviewed/updated 1/1/2018)  Patient Active Hospital Problem List:   Paranoid schizophrenia ([facility] [address] (3/15/2017)- tx resistant, chronic and with negative sx    Assessment: minimal change- negative sx, psychotic and disorganized, poor baseline and reported to have poor response to ECT [place] [facility] Plan: I will ct to adjust med- Prozac, Haldol dec, Zydis and Depakote. Re consider Clozapine as pt functioning was sl better. Patient is on two antipsychotics now due to the relative refractoriness to treatment thus far. Prior to admission patient had THREE or more failed trails of monotherapy, including: Haldol, Zyprexa, Risperdal and Clozapine. The patient is a very slow responder to medications.   Risks and benefits in the use of two antipsychotics have been weighed in full, including the risk of metabolic syndrome and the potential increased risk of QTc  Based on this analysis, it is considered favorable for the utilization of two antipsychotics. In the future, once stable on the two antipsychotics, patient can possibly be tapered to one of the chosen antipsychotics. Will check on EKG results today to monitor QTc.- refusing EKG        I will continue to monitor blood levels (Depakote,, clozapine---a drug with a narrow therapeutic index= NTI) and associated labs for drug therapy implemented that require intense monitoring for toxicity as deemed appropriate based on current medication side effects and pharmacodynamically determined drug 1/2 lives. -    In summary, Fahad Nolasco, is a 49 y.o.  male who presents with a severe exacerbation of the principal diagnosis of Paranoid schizophrenia ([facility] [address]  Patient's condition is worsening/not improving/not stable . Patient requires continued inpatient hospitalization for further stabilization, safety monitoring and medication management. I will continue to coordinate the provision of individual, milieu, occupational, group, and substance abuse therapies to address target symptoms/diagnoses as deemed appropriate for the individual patient. A coordinated, multidisplinary treatment team round was conducted with the patient (this team consists of the nurse, psychiatric unit pharmcist,  and writer). Complete current electronic health record for patient has been reviewed today including consultant notes, ancillary staff notes, nurses and psychiatric tech notes. Suicide risk assessment completed and patient deemed to be of low risk for suicide at this time. The following regarding medications was addressed during rounds with patient:   the risks and benefits of the proposed medication. The patient was given the opportunity to ask questions. Informed consent given to the use of the above medications.  Will continue to adjust psychiatric and non-psychiatric medications (see above \"medication\" section and orders section for details) as deemed appropriate & based upon diagnoses and response to treatment. I will continue to order blood tests/labs and diagnostic tests as deemed appropriate and review results as they become available (see orders for details and above listed lab/test results). I will order psychiatric records from previous [facility] hospitals to further elucidate the nature of patient's psychopathology and review once available. I will gather additional collateral information from friends, family and o/p treatment team to further elucidate the nature of patient's psychopathology and baselline level of psychiatric functioning. I certify that this patient's inpatient psychiatric hospital services furnished since the previous certification were, and continue to be, required for treatment that could reasonably be expected to improve the patient's condition, or for diagnostic study, and that the patient continues to need, on a daily basis, active treatment furnished directly by or requiring the supervision of inpatient psychiatric facility personnel. In addition, the hospital records show that services furnished were intensive treatment services, admission or related services, or equivalent services.     EXPECTED DISCHARGE DATE/DAY: TBD     DISPOSITION: Home       Signed By:   Heather Ball MD  1/1/2018

## 2018-01-02 PROCEDURE — 74011250637 HC RX REV CODE- 250/637: Performed by: PSYCHIATRY & NEUROLOGY

## 2018-01-02 PROCEDURE — 65220000001 HC RM PRIVATE PSYCH

## 2018-01-02 RX ORDER — LORAZEPAM 0.5 MG/1
0.5 TABLET ORAL 2 TIMES DAILY
Status: DISCONTINUED | OUTPATIENT
Start: 2018-01-02 | End: 2018-01-24 | Stop reason: HOSPADM

## 2018-01-02 RX ORDER — OLANZAPINE 5 MG/1
20 TABLET, ORALLY DISINTEGRATING ORAL
Status: DISCONTINUED | OUTPATIENT
Start: 2018-01-02 | End: 2018-01-03

## 2018-01-02 RX ORDER — CLOZAPINE 25 MG/1
25 TABLET ORAL 2 TIMES DAILY
Status: DISCONTINUED | OUTPATIENT
Start: 2018-01-03 | End: 2018-01-03

## 2018-01-02 RX ORDER — CLOZAPINE 25 MG/1
25 TABLET ORAL
Status: COMPLETED | OUTPATIENT
Start: 2018-01-02 | End: 2018-01-02

## 2018-01-02 RX ORDER — CLOZAPINE 25 MG/1
25 TABLET ORAL 2 TIMES DAILY
Status: DISCONTINUED | OUTPATIENT
Start: 2018-01-03 | End: 2018-01-02

## 2018-01-02 RX ADMIN — BENZTROPINE MESYLATE 2 MG: 2 TABLET ORAL at 21:52

## 2018-01-02 RX ADMIN — OLANZAPINE 20 MG: 5 TABLET, ORALLY DISINTEGRATING ORAL at 21:52

## 2018-01-02 RX ADMIN — LORAZEPAM 0.5 MG: 0.5 TABLET ORAL at 17:40

## 2018-01-02 RX ADMIN — LORAZEPAM 1 MG: 1 TABLET ORAL at 08:54

## 2018-01-02 RX ADMIN — CLOZAPINE 25 MG: 25 TABLET ORAL at 21:54

## 2018-01-02 RX ADMIN — FLUOXETINE 40 MG: 20 CAPSULE ORAL at 08:54

## 2018-01-02 RX ADMIN — DIVALPROEX SODIUM 1500 MG: 500 TABLET, FILM COATED, EXTENDED RELEASE ORAL at 21:52

## 2018-01-02 NOTE — PROGRESS NOTES
Problem: Altered Thought Process (Adult/Pediatric)  Goal: *STG: Remains safe in hospital  Outcome: Progressing Towards Goal  Pt remains isolative to room. Observed in the bathroom kneeled over or on the bed. Selectively mute. Compliant with meals and medications. Poor hygiene. Encouraged to shower. Pt did not eat his lunch tray. Will continue q15 minute safety checks and anticipate needs.

## 2018-01-02 NOTE — PROGRESS NOTES
Clozapine Monitoring - Initial Verification    1. Prescriber is registered with the Clozapine [facility] Program and the patient is eligible to receive clozapine. 2. ANC is current (within past 7 days) and is acceptable for clozapine dispensing. DATE ANC   12/29/17 2.5             Patient's CBC should be obtained weekly  Next CBC due to be reported to the [facility]S Program: 1/5/18    3. Current dosing regimen:  25 mg PO QHS 1/2/18 then 25 mg PO twice daily on 1/3/18    For new start or re-titration (>48 hours since last dose), clozapine is initiated at 12.5 or 25 mg once daily?  YES    Thank you,  Carolynn Oh [place], [facility]  [phone]

## 2018-01-02 NOTE — BH NOTES
Pt was seen in treatment team this morning in his room. Pt was curled up on bathroom floor and did not raise his head. Pt remains selectively mute. Pt's thought process is delusional, remains preoccupied. Pt will begin Clozaril medication.  will continue to reach out to  and suggest need for ICT in community which he has been denied multiple times.

## 2018-01-02 NOTE — BH NOTES
A&O to all spheres. Pt.'s personal hygiene skills and grooming habits are poor; dishelved. Encourages to shower; no response. Pt. is selectively mute. Presents as being preoccupied and bizarre; thoughts are disorganized. Pt.'s insight and judgement regarding mental illness and hospitalization is limited. Throughout the shift this pt. have been  isolative to self in own room. Periodically, observed in a fetal position. Affect/Mood: Blunt. Maintains Q-15 minute checks for safety and support. No adverse behaviors to report during this timeframe.

## 2018-01-02 NOTE — BH NOTES
PSYCHIATRIC PROGRESS NOTE         Patient Name  Paxton Anthony   Date of Birth 1970   [facility] [id number]   Medical Record Number  [id number]      Age  49 y.o. PCP PROVIDER UNKNOWN   Admit date:  11/25/2017    Room Number  307/01  @ [facility]   Date of Service  1/2/2018           E & M PROGRESS NOTE:         HISTORY       CC:  \"selectively mute\"  HISTORY OF PRESENT ILLNESS/INTERVAL HISTORY:  (reviewed/updated 1/2/2018). per initial evaluation: The patient, Paxton Anthony, is a 49 y.o. WHITE OR  male with a past psychiatric history significant for schizophrenia, who presents at this time with complaints of (and/or evidence of) the following emotional symptoms: psychotic behavior. Additional symptomatology include paranoid delusion, sexually inappropriate behavior, pepisodes of yelling screaming followed by selectively mute, disorganized thought process, anxiety, concern about health problems, difficulty sleeping, fearfulness, hearing voices, increased irritability, poor concentration and problem with medication. The above symptoms have been present for many years. These symptoms are of severe severity. These symptoms are constant  in nature. The patient's condition has been precipitated by and psychosocial stressors (conflict with sister, non compliance ). Patient's condition made worse by treatment noncompliance. UDS: negative; BAL=0. Paxton Anthony presents/reports/evidences the following emotional symptoms today, 1/2/2018:psychotic behavior. The above symptoms have been present for many years. These symptoms are of severe severity. The symptoms are constant in nature. Additional symptomatology and features include       12/9-Prefers to isolate self in his room except coming out for meals. Minimal social interaction. Tp remains disorganized. Accepting meds.   12/11- minimal change- isolative and does not interact with pt/staff except going out for his Breakfast. Has refused med at times. Will lay in bed, poor hygiene. 12/12- affect is sl better, out of his room today and talked in a soft voice. Remains disorganized and delusional.  12/13- poor hygiene and disorganized, will lay in bed with eyes closed, pt was able to get up and talk when told that we are discussing his dc planning. Talks in a soft voice. Accepting med but need lot of prompting to do his ADLs  12/14- affect is sl better, tendency to lay in bed, will only talk when we talk about dc planning  12/15- poor hygiene, selectively mute, disorganized and delusional. Poor insight  12/16- inappropriate posturing- sitting on the side of the bed and half naked. Selectively mute and isolative. poor hygiene  12/17- odd and bizarre behavior. Selectively mute and poor hygiene. Sitting at the edge of his bed with face downward and half naked. Accepting med  12/18- appear to have deteriorated over the weekend, selectively mute, odd behavior and delusional. Poor hygiene and passive. 12/19- delusional and disorganized. Stays in his room and found to be in odd position at the side of the bed. Staff informed pt was eating his break fast but quickly changed to this position ( ?behavioral). Poor hygiene   12/20- minimal change in presentation, pills found in his room ( ?compliance), poor hygiene and odd posturing  12/21/17 he is not engaging in talking and not eating well and he is in fetal position and he is paranoid and disorganized and poor hygiene and he refuse to take medications   12/22/17 he still not engaging and he still showing catatonic features and has been having posturing and he is mute and not verbal and he is not eating that well , respond to internal stimuli   12/23/17: Patient was sitting on the floor,in a fetal position,appears disheveled,poor hygiene,mute ,did not engage in conversation,margaret johansen. 12/24/17:seen today in his room with staff. poor hygiene, unkempt, continues to sit in squatting position on the floor  with head down in to knees,did not engage in any conversation,internally preoccupied,As per staff accepting medications and meals. Received no prn.   12/25/17: Patient was seen in his room, he was laying in bed in a fetal position, did not engage in any conversation,poor hygiene, odd posture, eats his meals. Has not shown much improvement with current medications. Treatment team is working on to get ECT. 12/26/17: Not progressing, no significant change noted,laying on the floor, fetal position, half naked,mute ,catatonic, Poor hygiene,accepts medications and meals. treatment team has requested judicial consent for  ECT which is pending,he has [place] [facility] in the past.   12/27/17: Seen today in his room, no significant change,sitting on the floor,in fetal position,mute catatonic,did not engage in any conversation. Staff reports that he eats his meals and takes his med,he resist to have  lab work and vital signs.  [place] [facility] [place] \"patient received 17 ECT treatments were given to pt in August 2016 and  pt did not improve\". 12/28- no sig change in presentation. Poor hygiene, odd and inappropriate posturing. 12/29- seen in the hallway, more alert today and talked rich  Soft voice, remains preoccupied, delusional with poor hygiene. Was co operative with labs today  12/30/17: seen in his room today with RN,staff reported accepting medications and meals, still remains selectively mute, only brief interaction with staff,odd and inappropriate posture. remains preoccupied. 12/31/17: Seen today in his room,inappropriate posturing continues,mute, only spoke one word to RN,acceptign medications and eating his meals. No significant change noted. 1/1/18- initially seen in odd (praying)  position but did respond to command and pt was more talkative.  He remains disorganized- talking of \" couple of nuns and daughters\" when asked about where he will be living. Poor hygiene. Accepting med  1/2/18- no sig change, ct to present with poor hygiene. Delusional and odd posturing. SIDE EFFECTS: (reviewed/updated 1/2/2018)  None reported or admitted to. No noted toxicity with use of current med   ALLERGIES:(reviewed/updated 1/2/2018)  Allergies   Allergen Reactions    Fluphenazine Unknown (comments)     Pt is unable to communicate properly.  Penicillins Unknown (comments)     Pt is unable to communicate properly. MEDICATIONS PRIOR TO ADMISSION:(reviewed/updated 1/2/2018)  Prescriptions Prior to Admission   Medication Sig    divalproex ER (DEPAKOTE ER) 500 mg ER tablet Take 1,500 mg by mouth nightly. Indications: Treatment-resistant schizophrenia    haloperidol decanoate (HALDOL DECANOATE) 100 mg/mL injection 2 mL by IntraMUSCular route every twenty-eight (28) days. Indications: SCHIZOPHRENIA    benztropine (COGENTIN) 2 mg tablet Take 1 Tab by mouth nightly. Indications: extrapyramidal disease    cloZAPine 200 mg tablet Take 600 mg by mouth nightly. Indications: TREATMENT-RESISTANT SCHIZOPHRENIA      PAST MEDICAL HISTORY: Past medical history from the initial psychiatric evaluation has been reviewed (reviewed/updated 1/2/2018) with no additional updates (I asked patient and no additional past medical history provided). Past Medical History:   Diagnosis Date    Psychiatric disorder     Psychotic disorder     Withdrawal syndrome ([facility] [address]    No past surgical history on file. SOCIAL HISTORY: Social history from the initial psychiatric evaluation has been reviewed (reviewed/updated 1/2/2018) with no additional updates (I asked patient and no additional social history provided). Social History     Social History    Marital status: SINGLE     Spouse name: N/A    Number of children: N/A    Years of education: N/A     Occupational History    Not on file.      Social History Main Topics    Smoking status: Never Smoker    Smokeless tobacco: Never Used   [employer] Alcohol use No    Drug use: No    Sexual activity: Not on file     Other Topics Concern    Not on file     Social History Narrative    Pt is a HS grad and is unemployed. He lives with his sister. On disability    No pending legal charge reported      FAMILY HISTORY: Family history from the initial psychiatric evaluation has been reviewed (reviewed/updated 1/2/2018) with no additional updates (I asked patient and no additional family history provided). No family history on file. REVIEW OF SYSTEMS: (reviewed/updated 1/2/2018)  Appetite:no change from normal   Sleep: fitful   All other Review of Systems: Psychological ROS: positive for - behavioral disorder, concentration difficulties, hallucinations, irritability, mood swings and delusion  Respiratory ROS: no cough, shortness of breath, or wheezing  Cardiovascular ROS: no chest pain or dyspnea on exertion         [facility] (MSE):    MSE FINDINGS ARE WITHIN NORMAL LIMITS (WNL) UNLESS OTHERWISE STATED BELOW. ( ALL OF THE BELOW CATEGORIES OF THE MSE HAVE BEEN REVIEWED (reviewed 1/2/2018) AND UPDATED AS DEEMED APPROPRIATE )  General Presentation age appropriate and disheveled, uncooperative and unreliable   Orientation disorganized   Vital Signs  See below (reviewed 1/2/2018); Vital Signs (BP, Pulse, & Temp) are within normal limits if not listed below.    Gait and Station Stable/steady, no ataxia   Musculoskeletal System No extrapyramidal symptoms (EPS); no abnormal muscular movements or Tardive Dyskinesia (TD); muscle strength and tone are within normal limits   Language No aphasia or dysarthria   Speech:  mute   Thought Processes illogical; slow rate of thoughts; poor abstract reasoning/computation   Thought Associations blocked    Thought Content paranoid delusions, bizarre delusions, auditory hallucinations and internally preoccupied   Suicidal Ideations none   Homicidal Ideations none   Mood:  Labile mood   Affect: Constricted, labile inappropriate and increased in intensity   Memory recent  impaired   Memory remote:  intact   Concentration/Attention:  distractable and hypervigilance   Fund of Knowledge average   Insight:  limited   Reliability poor   Judgment:  limited          VITALS:     No data found. Wt Readings from Last 3 Encounters:   12/23/17 84.9 kg (187 lb 1.6 oz)   03/25/17 95.6 kg (210 lb 11.2 oz)   02/09/15 97.5 kg (215 lb)     Temp Readings from Last 3 Encounters:   03/28/17 97.5 °F (36.4 °C)   02/07/15 98.7 °F (37.1 °C)     BP Readings from Last 3 Encounters:   03/28/17 100/68   02/12/15 105/73   02/07/15 148/79     Pulse Readings from Last 3 Encounters:   03/28/17 67   02/12/15 87   02/07/15 (!) 111            DATA     LABORATORY DATA:(reviewed/updated 1/2/2018)  No results found for this or any previous visit (from the past 24 hour(s)). Lab Results   Component Value Date/Time    Valproic acid 69 12/29/2017 04:43 AM     No results found for: LITHM   RADIOLOGY REPORTS:(reviewed/updated 1/2/2018)  No results found. MEDICATIONS     ALL MEDICATIONS:      SCHEDULED MEDICATIONS:          ASSESSMENT & PLAN     DIAGNOSES REQUIRING ACTIVE TREATMENT AND MONITORING: (reviewed/updated 1/2/2018)  Patient Active Hospital Problem List:   Paranoid schizophrenia ([facility] [address] (3/15/2017)- tx resistant, chronic and with negative sx    Assessment: minimal change- negative sx, psychotic and disorganized, poor baseline and reported to have poor response to ECT per sw report from [facility] [place] [facility]. Plan: I will ct to adjust med- Prozac, Haldol dec, Zydis and Depakote. - will restart Clozapine titration. Cross taper Zyprexa. Taper Ativan for now          Patient is on two antipsychotics now due to the relative refractoriness to treatment thus far. Prior to admission patient had THREE or more failed trails of monotherapy, including: Haldol, Zyprexa, Risperdal and Clozapine. The patient is a very slow responder to medications.   Risks and benefits in the use of two antipsychotics have been weighed in full, including the risk of metabolic syndrome and the potential increased risk of QTc  Based on this analysis, it is considered favorable for the utilization of two antipsychotics. In the future, once stable on the two antipsychotics, patient can possibly be tapered to one of the chosen antipsychotics. Will check on EKG results today to monitor QTc.- refusing EKG        I will continue to monitor blood levels (Depakote,, clozapine---a drug with a narrow therapeutic index= NTI) and associated labs for drug therapy implemented that require intense monitoring for toxicity as deemed appropriate based on current medication side effects and pharmacodynamically determined drug 1/2 lives. -    In summary, Diana Coronado, is a 49 y.o.  male who presents with a severe exacerbation of the principal diagnosis of Paranoid schizophrenia ([facility] [address]  Patient's condition is worsening/not improving/not stable . Patient requires continued inpatient hospitalization for further stabilization, safety monitoring and medication management. I will continue to coordinate the provision of individual, milieu, occupational, group, and substance abuse therapies to address target symptoms/diagnoses as deemed appropriate for the individual patient. A coordinated, multidisplinary treatment team round was conducted with the patient (this team consists of the nurse, psychiatric unit pharmcist,  and writer). Complete current electronic health record for patient has been reviewed today including consultant notes, ancillary staff notes, nurses and psychiatric tech notes. Suicide risk assessment completed and patient deemed to be of low risk for suicide at this time. The following regarding medications was addressed during rounds with patient:   the risks and benefits of the proposed medication. The patient was given the opportunity to ask questions.  Informed consent given to the use of the above medications. Will continue to adjust psychiatric and non-psychiatric medications (see above \"medication\" section and orders section for details) as deemed appropriate & based upon diagnoses and response to treatment. I will continue to order blood tests/labs and diagnostic tests as deemed appropriate and review results as they become available (see orders for details and above listed lab/test results). I will order psychiatric records from previous [facility] hospitals to further elucidate the nature of patient's psychopathology and review once available. I will gather additional collateral information from friends, family and o/p treatment team to further elucidate the nature of patient's psychopathology and baselline level of psychiatric functioning. I certify that this patient's inpatient psychiatric hospital services furnished since the previous certification were, and continue to be, required for treatment that could reasonably be expected to improve the patient's condition, or for diagnostic study, and that the patient continues to need, on a daily basis, active treatment furnished directly by or requiring the supervision of inpatient psychiatric facility personnel. In addition, the hospital records show that services furnished were intensive treatment services, admission or related services, or equivalent services.     EXPECTED DISCHARGE DATE/DAY: TBD     DISPOSITION: Home       Signed By:   Frankie Brody MD  1/2/2018

## 2018-01-02 NOTE — PROGRESS NOTES
New order for ekg, this patient has been refusing since admission, rn and md aware.  Angelique moss went into room with rn attempting to do ekg , pt was in fetal position a nd nonverbal. Angelique moss asked pt to cooperate with ekg he said \"no\"

## 2018-01-03 PROCEDURE — 74011250637 HC RX REV CODE- 250/637: Performed by: PSYCHIATRY & NEUROLOGY

## 2018-01-03 PROCEDURE — 65220000001 HC RM PRIVATE PSYCH

## 2018-01-03 RX ORDER — VALPROIC ACID 250 MG/5ML
750 SOLUTION ORAL 2 TIMES DAILY
Status: DISCONTINUED | OUTPATIENT
Start: 2018-01-03 | End: 2018-01-24 | Stop reason: HOSPADM

## 2018-01-03 RX ORDER — OLANZAPINE 5 MG/1
10 TABLET, ORALLY DISINTEGRATING ORAL
Status: DISCONTINUED | OUTPATIENT
Start: 2018-01-03 | End: 2018-01-05

## 2018-01-03 RX ORDER — FLUOXETINE HYDROCHLORIDE 20 MG/1
40 CAPSULE ORAL DAILY
Status: DISCONTINUED | OUTPATIENT
Start: 2018-01-04 | End: 2018-01-24 | Stop reason: HOSPADM

## 2018-01-03 RX ORDER — CLOZAPINE 25 MG/1
50 TABLET ORAL 2 TIMES DAILY
Status: DISCONTINUED | OUTPATIENT
Start: 2018-01-03 | End: 2018-01-05

## 2018-01-03 RX ORDER — FLUOXETINE HYDROCHLORIDE 20 MG/1
20 CAPSULE ORAL DAILY
Status: DISCONTINUED | OUTPATIENT
Start: 2018-01-04 | End: 2018-01-03

## 2018-01-03 RX ADMIN — CLOZAPINE 25 MG: 25 TABLET ORAL at 08:53

## 2018-01-03 RX ADMIN — CLOZAPINE 50 MG: 25 TABLET ORAL at 21:45

## 2018-01-03 RX ADMIN — VALPROIC ACID 750 MG: 250 SOLUTION ORAL at 11:51

## 2018-01-03 RX ADMIN — BENZTROPINE MESYLATE 2 MG: 2 TABLET ORAL at 21:52

## 2018-01-03 RX ADMIN — FLUOXETINE 40 MG: 20 CAPSULE ORAL at 08:53

## 2018-01-03 RX ADMIN — LORAZEPAM 0.5 MG: 0.5 TABLET ORAL at 08:53

## 2018-01-03 RX ADMIN — VALPROIC ACID 750 MG: 250 SOLUTION ORAL at 21:45

## 2018-01-03 RX ADMIN — LORAZEPAM 0.5 MG: 0.5 TABLET ORAL at 17:32

## 2018-01-03 RX ADMIN — OLANZAPINE 10 MG: 5 TABLET, ORALLY DISINTEGRATING ORAL at 21:45

## 2018-01-03 NOTE — BH NOTES
Unable to obtain VS. Patient prostrate in praying position and clenching arm when staff approaches to take BP. Respirations are even and unlabored. Will continue to monitor and encouraged participation in care.

## 2018-01-03 NOTE — BH NOTES
PSYCHIATRIC PROGRESS NOTE         Patient Name  Amina Joe   Date of Birth 1970   [facility] [id number]   Medical Record Number  [id number]      Age  49 y.o. PCP PROVIDER UNKNOWN   Admit date:  11/25/2017    Room Number  307/01  @ [facility]   Date of Service  1/3/2018           E & M PROGRESS NOTE:         HISTORY       CC:  \"selectively mute\"  HISTORY OF PRESENT ILLNESS/INTERVAL HISTORY:  (reviewed/updated 1/3/2018). per initial evaluation: The patient, Amina Joe, is a 49 y.o. WHITE OR  male with a past psychiatric history significant for schizophrenia, who presents at this time with complaints of (and/or evidence of) the following emotional symptoms: psychotic behavior. Additional symptomatology include paranoid delusion, sexually inappropriate behavior, pepisodes of yelling screaming followed by selectively mute, disorganized thought process, anxiety, concern about health problems, difficulty sleeping, fearfulness, hearing voices, increased irritability, poor concentration and problem with medication. The above symptoms have been present for many years. These symptoms are of severe severity. These symptoms are constant  in nature. The patient's condition has been precipitated by and psychosocial stressors (conflict with sister, non compliance ). Patient's condition made worse by treatment noncompliance. UDS: negative; BAL=0. Amina Joe presents/reports/evidences the following emotional symptoms today, 1/3/2018:psychotic behavior. The above symptoms have been present for many years. These symptoms are of severe severity. The symptoms are constant in nature. Additional symptomatology and features include     12/23/17: Patient was sitting on the floor,in a fetal position,appears disheveled,poor hygiene,mute ,did not engage in conversation,margaret spencere. 12/24/17:seen today in his room with staff. poor hygiene, unkempt, continues to sit in squatting position on the floor  with head down in to knees,did not engage in any conversation,internally preoccupied,As per staff accepting medications and meals. Received no prn.   12/25/17: Patient was seen in his room, he was laying in bed in a fetal position, did not engage in any conversation,poor hygiene, odd posture, eats his meals. Has not shown much improvement with current medications. Treatment team is working on to get ECT. 12/26/17: Not progressing, no significant change noted,laying on the floor, fetal position, half naked,mute ,catatonic, Poor hygiene,accepts medications and meals. treatment team has requested judicial consent for  ECT which is pending,he has [place] [facility] in the past.   12/27/17: Seen today in his room, no significant change,sitting on the floor,in fetal position,mute catatonic,did not engage in any conversation. Staff reports that he eats his meals and takes his med,he resist to have  lab work and vital signs.  received [place] [facility] [place] that \"patient received 17 ECT treatments were given to pt in August 2016 and  pt did not improve\". 12/28- no sig change in presentation. Poor hygiene, odd and inappropriate posturing. 12/29- seen in the hallway, more alert today and talked rich  Soft voice, remains preoccupied, delusional with poor hygiene. Was co operative with labs today  12/30/17: seen in his room today with RN,staff reported accepting medications and meals, still remains selectively mute, only brief interaction with staff,odd and inappropriate posture. remains preoccupied. 12/31/17: Seen today in his room,inappropriate posturing continues,mute, only spoke one word to RN,acceptign medications and eating his meals. No significant change noted. 1/1/18- initially seen in odd (praying)  position but did respond to command and pt was more talkative.  He remains disorganized- talking of \" couple of nuns and daughters\" when asked about where he will be living. Poor hygiene. Accepting med  1/2/18- no sig change, ct to present with poor hygiene. Delusional and odd posturing. 1/3- remains preoccupied with odd posturing ( fetal position), he did change his position on request. Talked in soft voice, poor hygiene, refused his Depakote last night      SIDE EFFECTS: (reviewed/updated 1/3/2018)  None reported or admitted to. No noted toxicity with use of current med   ALLERGIES:(reviewed/updated 1/3/2018)  Allergies   Allergen Reactions    Fluphenazine Unknown (comments)     Pt is unable to communicate properly.  Penicillins Unknown (comments)     Pt is unable to communicate properly. MEDICATIONS PRIOR TO ADMISSION:(reviewed/updated 1/3/2018)  Prescriptions Prior to Admission   Medication Sig    divalproex ER (DEPAKOTE ER) 500 mg ER tablet Take 1,500 mg by mouth nightly. Indications: Treatment-resistant schizophrenia    haloperidol decanoate (HALDOL DECANOATE) 100 mg/mL injection 2 mL by IntraMUSCular route every twenty-eight (28) days. Indications: SCHIZOPHRENIA    benztropine (COGENTIN) 2 mg tablet Take 1 Tab by mouth nightly. Indications: extrapyramidal disease    cloZAPine 200 mg tablet Take 600 mg by mouth nightly. Indications: TREATMENT-RESISTANT SCHIZOPHRENIA      PAST MEDICAL HISTORY: Past medical history from the initial psychiatric evaluation has been reviewed (reviewed/updated 1/3/2018) with no additional updates (I asked patient and no additional past medical history provided). Past Medical History:   Diagnosis Date    Psychiatric disorder     Psychotic disorder     Withdrawal syndrome ([facility] [address]    No past surgical history on file. SOCIAL HISTORY: Social history from the initial psychiatric evaluation has been reviewed (reviewed/updated 1/3/2018) with no additional updates (I asked patient and no additional social history provided).    Social History     Social History    Marital status: SINGLE     Spouse name: N/A    Number of children: N/A    Years of education: N/A     Occupational History    Not on file. 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 He lives with his sister. On disability    No pending legal charge reported      FAMILY HISTORY: Family history from the initial psychiatric evaluation has been reviewed (reviewed/updated 1/3/2018) with no additional updates (I asked patient and no additional family history provided). No family history on file. REVIEW OF SYSTEMS: (reviewed/updated 1/3/2018)  Appetite:no change from normal   Sleep: fitful   All other Review of Systems: Psychological ROS: positive for - behavioral disorder, concentration difficulties, hallucinations, irritability, mood swings and delusion  Respiratory ROS: no cough, shortness of breath, or wheezing  Cardiovascular ROS: no chest pain or dyspnea on exertion         [facility] [place]    [facility] FINDINGS ARE WITHIN NORMAL LIMITS (WNL) UNLESS OTHERWISE STATED BELOW. ( ALL OF THE BELOW CATEGORIES OF THE [facility] HAVE BEEN REVIEWED (reviewed 1/3/2018) AND UPDATED AS DEEMED APPROPRIATE )  General Presentation age appropriate and disheveled, uncooperative and unreliable   Orientation disorganized   Vital Signs  See below (reviewed 1/3/2018); Vital Signs (BP, Pulse, & Temp) are within normal limits if not listed below.    Gait and Station Stable/steady, no ataxia   Musculoskeletal System No extrapyramidal symptoms (EPS); no abnormal muscular movements or Tardive Dyskinesia (TD); muscle strength and tone are within normal limits   Language No aphasia or dysarthria   Speech:  mute   Thought Processes illogical; slow rate of thoughts; poor abstract reasoning/computation   Thought Associations blocked    Thought Content paranoid delusions, bizarre delusions, auditory hallucinations and internally preoccupied   Suicidal Ideations none   Homicidal Ideations none   Mood:  Labile mood   Affect:  Constricted, labile inappropriate and increased in intensity   Memory recent  impaired   Memory remote:  intact   Concentration/Attention:  distractable and hypervigilance   Fund of Knowledge average   Insight:  limited   Reliability poor   Judgment:  limited          VITALS:     Patient Vitals for the past 24 hrs:   Temp Pulse Resp BP   01/02/18 2015 - - 16 -     Wt Readings from Last 3 Encounters:   12/23/17 84.9 kg (187 lb 1.6 oz)   03/25/17 95.6 kg (210 lb 11.2 oz)   02/09/15 97.5 kg (215 lb)     Temp Readings from Last 3 Encounters:   03/28/17 97.5 °F (36.4 °C)   02/07/15 98.7 °F (37.1 °C)     BP Readings from Last 3 Encounters:   03/28/17 100/68   02/12/15 105/73   02/07/15 148/79     Pulse Readings from Last 3 Encounters:   03/28/17 67   02/12/15 87   02/07/15 (!) 111            DATA     LABORATORY DATA:(reviewed/updated 1/3/2018)  No results found for this or any previous visit (from the past 24 hour(s)). Lab Results   Component Value Date/Time    Valproic acid 69 12/29/2017 04:43 AM     No results found for: LITHM   RADIOLOGY REPORTS:(reviewed/updated 1/3/2018)  No results found. MEDICATIONS     ALL MEDICATIONS:      SCHEDULED MEDICATIONS:          ASSESSMENT & PLAN     DIAGNOSES REQUIRING ACTIVE TREATMENT AND MONITORING: (reviewed/updated 1/3/2018)  Patient Active Hospital Problem List:   Paranoid schizophrenia ([facility] [address] (3/15/2017)- tx resistant, chronic and with negative sx    Assessment: minimal change- negative sx, psychotic and disorganized, poor baseline and reported to have poor response to ECT per [place] from [facility]. Plan: I will ct to adjust med- Prozac, Haldol dec, Zydis and Depakote ( changed to liquid). - will restart Clozapine titration. Cross taper Zyprexa.    Ct with cross titration          Patient is on two antipsychotics now due to the relative refractoriness to treatment thus far. Prior to admission patient had THREE or more failed trails of monotherapy, including: Haldol, Zyprexa, Risperdal and Clozapine. The patient is a very slow responder to medications. Risks and benefits in the use of two antipsychotics have been weighed in full, including the risk of metabolic syndrome and the potential increased risk of QTc  Based on this analysis, it is considered favorable for the utilization of two antipsychotics. In the future, once stable on the two antipsychotics, patient can possibly be tapered to one of the chosen antipsychotics. Will check on EKG results today to monitor QTc.- refused EKG agian        I will continue to monitor blood levels (Depakote,, clozapine---a drug with a narrow therapeutic index= NTI) and associated labs for drug therapy implemented that require intense monitoring for toxicity as deemed appropriate based on current medication side effects and pharmacodynamically determined drug 1/2 lives. -    In summary, Paxton Anthony, is a 49 y.o.  male who presents with a severe exacerbation of the principal diagnosis of Paranoid schizophrenia ([facility] [address]  Patient's condition is worsening/not improving/not stable . Patient requires continued inpatient hospitalization for further stabilization, safety monitoring and medication management. I will continue to coordinate the provision of individual, milieu, occupational, group, and substance abuse therapies to address target symptoms/diagnoses as deemed appropriate for the individual patient. A coordinated, multidisplinary treatment team round was conducted with the patient (this team consists of the nurse, psychiatric unit pharmcist,  and writer). Complete current electronic health record for patient has been reviewed today including consultant notes, ancillary staff notes, nurses and psychiatric tech notes.     Suicide risk assessment completed and patient deemed to be of low risk for suicide at this time. The following regarding medications was addressed during rounds with patient:   the risks and benefits of the proposed medication. The patient was given the opportunity to ask questions. Informed consent given to the use of the above medications. Will continue to adjust psychiatric and non-psychiatric medications (see above \"medication\" section and orders section for details) as deemed appropriate & based upon diagnoses and response to treatment. I will continue to order blood tests/labs and diagnostic tests as deemed appropriate and review results as they become available (see orders for details and above listed lab/test results). I will order psychiatric records from previous [facility] hospitals to further elucidate the nature of patient's psychopathology and review once available. I will gather additional collateral information from friends, family and o/p treatment team to further elucidate the nature of patient's psychopathology and baselline level of psychiatric functioning. I certify that this patient's inpatient psychiatric hospital services furnished since the previous certification were, and continue to be, required for treatment that could reasonably be expected to improve the patient's condition, or for diagnostic study, and that the patient continues to need, on a daily basis, active treatment furnished directly by or requiring the supervision of inpatient psychiatric facility personnel. In addition, the hospital records show that services furnished were intensive treatment services, admission or related services, or equivalent services.     EXPECTED DISCHARGE DATE/DAY: TBD     DISPOSITION: Home       Signed By:   Huma Alan MD  1/3/2018

## 2018-01-03 NOTE — BH NOTES
GROUP THERAPY PROGRESS NOTE    Betsy Hutner is participating in [place].      Group time: 30 minutes    Personal goal for participation: daily orientation    Goal orientation: personal    Group therapy participation: passive    Therapeutic interventions reviewed and discussed: yes    Impression of participation:did'nt attend    [facility]  1/3/2018

## 2018-01-03 NOTE — PROGRESS NOTES
Problem: Altered Thought Process (Adult/Pediatric)  Goal: *STG: Seeks staff when feelings of anxiety and fear arise  Outcome: Not Progressing Towards Goal  Pt is alert but not oriented, pt is disheveled, pt is isolative to room and spends all shift posturing in praying position with his pants down. Pt is non verbal. Pt ate all his meal in his room and took his meds. Assist to reality test, assess mood and behavior, encourage to verbalize thoughts and feeling, med and illness teaching, encourage participation in tx plan, observe on q15' checks.

## 2018-01-03 NOTE — PROGRESS NOTES
Problem: Altered Thought Process (Adult/Pediatric)  Goal: *STG: Remains safe in hospital  Outcome: Progressing Towards Goal  Pt alert to self. Poor hygiene. Compliant with meals and medications. Continues to remain in a prayer like position on the bed. Pt was able to participate in treatment team and respond when questions were asked. Noted to be more verbal.Medications adjusted by Dr Vini Lucia. Continue q15 minute safety checks and anticipate needs.

## 2018-01-04 PROCEDURE — 74011250637 HC RX REV CODE- 250/637: Performed by: PSYCHIATRY & NEUROLOGY

## 2018-01-04 PROCEDURE — 65220000001 HC RM PRIVATE PSYCH

## 2018-01-04 RX ADMIN — CLOZAPINE 50 MG: 25 TABLET ORAL at 21:01

## 2018-01-04 RX ADMIN — VALPROIC ACID 750 MG: 250 SOLUTION ORAL at 21:00

## 2018-01-04 RX ADMIN — OLANZAPINE 10 MG: 5 TABLET, ORALLY DISINTEGRATING ORAL at 21:00

## 2018-01-04 RX ADMIN — VALPROIC ACID 750 MG: 250 SOLUTION ORAL at 08:08

## 2018-01-04 RX ADMIN — CLOZAPINE 50 MG: 25 TABLET ORAL at 08:38

## 2018-01-04 RX ADMIN — FLUOXETINE 40 MG: 20 CAPSULE ORAL at 08:09

## 2018-01-04 RX ADMIN — BENZTROPINE MESYLATE 2 MG: 2 TABLET ORAL at 21:00

## 2018-01-04 RX ADMIN — LORAZEPAM 0.5 MG: 0.5 TABLET ORAL at 08:09

## 2018-01-04 RX ADMIN — LORAZEPAM 0.5 MG: 0.5 TABLET ORAL at 16:13

## 2018-01-04 NOTE — BH NOTES
PSYCHIATRIC PROGRESS NOTE         Patient Name  Trace Dailey   Date of Birth 1970   [facility] [id number]   Medical Record Number  [id number]      Age  49 y.o. PCP PROVIDER UNKNOWN   Admit date:  11/25/2017    Room Number  307/01  @ [facility]   Date of Service  1/4/2018           E & M PROGRESS NOTE:         HISTORY       CC:  \"selectively mute\"  HISTORY OF PRESENT ILLNESS/INTERVAL HISTORY:  (reviewed/updated 1/4/2018). per initial evaluation: The patient, Trace Dailey, is a 49 y.o. WHITE OR  male with a past psychiatric history significant for schizophrenia, who presents at this time with complaints of (and/or evidence of) the following emotional symptoms: psychotic behavior. Additional symptomatology include paranoid delusion, sexually inappropriate behavior, pepisodes of yelling screaming followed by selectively mute, disorganized thought process, anxiety, concern about health problems, difficulty sleeping, fearfulness, hearing voices, increased irritability, poor concentration and problem with medication. The above symptoms have been present for many years. These symptoms are of severe severity. These symptoms are constant  in nature. The patient's condition has been precipitated by and psychosocial stressors (conflict with sister, non compliance ). Patient's condition made worse by treatment noncompliance. UDS: negative; BAL=0. Trace Dailey presents/reports/evidences the following emotional symptoms today, 1/4/2018:psychotic behavior. The above symptoms have been present for many years. These symptoms are of severe severity. The symptoms are constant in nature. Additional symptomatology and features include     12/23/17: Patient was sitting on the floor,in a fetal position,appears disheveled,poor hygiene,mute ,did not engage in conversation,margaret spencere. 12/24/17:seen today in his room with staff. poor hygiene, unkempt, continues to sit in squatting position on the floor  with head down in to knees,did not engage in any conversation,internally preoccupied,As per staff accepting medications and meals. Received no prn.   12/25/17: Patient was seen in his room, he was laying in bed in a fetal position, did not engage in any conversation,poor hygiene, odd posture, eats his meals. Has not shown much improvement with current medications. Treatment team is working on to get ECT. 12/26/17: Not progressing, no significant change noted,laying on the floor, fetal position, half naked,mute ,catatonic, Poor hygiene,accepts medications and meals. treatment team has requested judicial consent for  ECT which is pending,he has received ECT treatment at [facility] in the past.   12/27/17: Seen today in his room, no significant change,sitting on the floor,in fetal position,mute catatonic,did not engage in any conversation. Staff reports that he eats his meals and takes his med,he resist to have  lab work and vital signs.  received fee back from [facility] reported that \"patient received 17 ECT treatments were given to pt in August 2016 and  pt did not improve\". 12/28- no sig change in presentation. Poor hygiene, odd and inappropriate posturing. 12/29- seen in the hallway, more alert today and talked rich  Soft voice, remains preoccupied, delusional with poor hygiene. Was co operative with labs today  12/30/17: seen in his room today with RN,staff reported accepting medications and meals, still remains selectively mute, only brief interaction with staff,odd and inappropriate posture. remains preoccupied. 12/31/17: Seen today in his room,inappropriate posturing continues,mute, only spoke one word to RN,acceptign medications and eating his meals. No significant change noted. 1/1/18- initially seen in odd (praying)  position but did respond to command and pt was more talkative.  He remains disorganized- talking of \" couple of nuns and daughters\" when asked about where he will be living. Poor hygiene. Accepting med  1/2/18- no sig change, ct to present with poor hygiene. Delusional and odd posturing. 1/3- remains preoccupied with odd posturing ( fetal position), he did change his position on request. Talked in soft voice, poor hygiene, refused his Depakote last night  1/4- no sig change- refusing labs, isolative, poor hygiene, lying in bed in a fetal position. Eating well      SIDE EFFECTS: (reviewed/updated 1/4/2018)  None reported or admitted to. No noted toxicity with use of current med   ALLERGIES:(reviewed/updated 1/4/2018)  Allergies   Allergen Reactions    Fluphenazine Unknown (comments)     Pt is unable to communicate properly.  Penicillins Unknown (comments)     Pt is unable to communicate properly. MEDICATIONS PRIOR TO ADMISSION:(reviewed/updated 1/4/2018)  Prescriptions Prior to Admission   Medication Sig    divalproex ER (DEPAKOTE ER) 500 mg ER tablet Take 1,500 mg by mouth nightly. Indications: Treatment-resistant schizophrenia    haloperidol decanoate (HALDOL DECANOATE) 100 mg/mL injection 2 mL by IntraMUSCular route every twenty-eight (28) days. Indications: SCHIZOPHRENIA    benztropine (COGENTIN) 2 mg tablet Take 1 Tab by mouth nightly. Indications: extrapyramidal disease    cloZAPine 200 mg tablet Take 600 mg by mouth nightly. Indications: TREATMENT-RESISTANT SCHIZOPHRENIA      PAST MEDICAL HISTORY: Past medical history from the initial psychiatric evaluation has been reviewed (reviewed/updated 1/4/2018) with no additional updates (I asked patient and no additional past medical history provided). Past Medical History:   Diagnosis Date    Psychiatric disorder     Psychotic disorder     Withdrawal syndrome ([facility] [address]    No past surgical history on file.    SOCIAL HISTORY: Social history from the initial psychiatric evaluation has been reviewed (reviewed/updated 1/4/2018) with no additional updates (I asked patient and no additional social history provided). Social History     Social History    Marital status: SINGLE     Spouse name: N/A    Number of children: N/A    Years of education: N/A     Occupational History    Not on file. 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 He lives with his sister. On disability    No pending legal charge reported      FAMILY HISTORY: Family history from the initial psychiatric evaluation has been reviewed (reviewed/updated 1/4/2018) with no additional updates (I asked patient and no additional family history provided). No family history on file. REVIEW OF SYSTEMS: (reviewed/updated 1/4/2018)  Appetite:no change from normal   Sleep: fitful   All other Review of Systems: Psychological ROS: positive for - behavioral disorder, concentration difficulties, hallucinations, irritability, mood swings and delusion  Respiratory ROS: no cough, shortness of breath, or wheezing  Cardiovascular ROS: no chest pain or dyspnea on exertion         [address] ([facility]):    [facility] FINDINGS ARE WITHIN NORMAL LIMITS (WNL) UNLESS OTHERWISE STATED BELOW. ( ALL OF THE BELOW CATEGORIES OF THE MSE HAVE BEEN REVIEWED (reviewed 1/4/2018) AND UPDATED AS DEEMED APPROPRIATE )  General Presentation age appropriate and disheveled, uncooperative and unreliable   Orientation disorganized   Vital Signs  See below (reviewed 1/4/2018); Vital Signs (BP, Pulse, & Temp) are within normal limits if not listed below.    Gait and Station Stable/steady, no ataxia   Musculoskeletal System No extrapyramidal symptoms (EPS); no abnormal muscular movements or Tardive Dyskinesia (TD); muscle strength and tone are within normal limits   Language No aphasia or dysarthria   Speech:  mute   Thought Processes illogical; slow rate of thoughts; poor abstract reasoning/computation   Thought Associations blocked    Thought Content paranoid delusions, bizarre delusions, auditory hallucinations and internally preoccupied   Suicidal Ideations none   Homicidal Ideations none   Mood:  Labile mood   Affect:  Constricted, labile inappropriate and increased in intensity   Memory recent  impaired   Memory remote:  intact   Concentration/Attention:  distractable and hypervigilance   Fund of Knowledge average   Insight:  limited   Reliability poor   Judgment:  limited          VITALS:     No data found. Wt Readings from Last 3 Encounters:   12/23/17 84.9 kg (187 lb 1.6 oz)   03/25/17 95.6 kg (210 lb 11.2 oz)   02/09/15 97.5 kg (215 lb)     Temp Readings from Last 3 Encounters:   03/28/17 97.5 °F (36.4 °C)   02/07/15 98.7 °F (37.1 °C)     BP Readings from Last 3 Encounters:   03/28/17 100/68   02/12/15 105/73   02/07/15 148/79     Pulse Readings from Last 3 Encounters:   03/28/17 67   02/12/15 87   02/07/15 (!) 111            DATA     LABORATORY DATA:(reviewed/updated 1/4/2018)  No results found for this or any previous visit (from the past 24 hour(s)). Lab Results   Component Value Date/Time    Valproic acid 69 12/29/2017 04:43 AM     No results found for: LITHM   RADIOLOGY REPORTS:(reviewed/updated 1/4/2018)  No results found. MEDICATIONS     ALL MEDICATIONS:      SCHEDULED MEDICATIONS:          ASSESSMENT & PLAN     DIAGNOSES REQUIRING ACTIVE TREATMENT AND MONITORING: (reviewed/updated 1/4/2018)  Patient Active Hospital Problem List:   Paranoid schizophrenia ([facility] [address] (3/15/2017)- tx resistant, chronic and with negative sx    Assessment: minimal change- negative sx, psychotic and disorganized, poor baseline and reported to have poor response to ECT per sw report from [facility]. Pt now refusing Clozapine labs      Plan: I will ct to adjust med- Prozac, Haldol dec, Zydis and Depakote ( changed to liquid). - will restart Clozapine titration. Cross taper Zyprexa. Ct with cross titration. Consider long term hospitalization- sw to discuss with [facility] for possible transfer          Patient is on two antipsychotics now due to the relative refractoriness to treatment thus far. Prior to admission patient had THREE or more failed trails of monotherapy, including: Haldol, Zyprexa, Risperdal and Clozapine. The patient is a very slow responder to medications. Risks and benefits in the use of two antipsychotics have been weighed in full, including the risk of metabolic syndrome and the potential increased risk of QTc  Based on this analysis, it is considered favorable for the utilization of two antipsychotics. In the future, once stable on the two antipsychotics, patient can possibly be tapered to one of the chosen antipsychotics. Will check on EKG results today to monitor QTc.- refused EKG agian        I will continue to monitor blood levels (Depakote,, clozapine---a drug with a narrow therapeutic index= NTI) and associated labs for drug therapy implemented that require intense monitoring for toxicity as deemed appropriate based on current medication side effects and pharmacodynamically determined drug 1/2 lives. -    In summary, Dwight Valdez, is a 49 y.o.  male who presents with a severe exacerbation of the principal diagnosis of Paranoid schizophrenia ([facility] [address]  Patient's condition is worsening/not improving/not stable . Patient requires continued inpatient hospitalization for further stabilization, safety monitoring and medication management. I will continue to coordinate the provision of individual, milieu, occupational, group, and substance abuse therapies to address target symptoms/diagnoses as deemed appropriate for the individual patient. A coordinated, multidisplinary treatment team round was conducted with the patient (this team consists of the nurse, psychiatric unit pharmcist,  and writer).      Complete current electronic health record for patient has been reviewed today including consultant notes, ancillary staff notes, nurses and psychiatric tech notes. Suicide risk assessment completed and patient deemed to be of low risk for suicide at this time. The following regarding medications was addressed during rounds with patient:   the risks and benefits of the proposed medication. The patient was given the opportunity to ask questions. Informed consent given to the use of the above medications. Will continue to adjust psychiatric and non-psychiatric medications (see above \"medication\" section and orders section for details) as deemed appropriate & based upon diagnoses and response to treatment. I will continue to order blood tests/labs and diagnostic tests as deemed appropriate and review results as they become available (see orders for details and above listed lab/test results). I will order psychiatric records from previous [facility] to further elucidate the nature of patient's psychopathology and review once available. I will gather additional collateral information from friends, family and o/p treatment team to further elucidate the nature of patient's psychopathology and baselline level of psychiatric functioning. I certify that this patient's inpatient psychiatric hospital services furnished since the previous certification were, and continue to be, required for treatment that could reasonably be expected to improve the patient's condition, or for diagnostic study, and that the patient continues to need, on a daily basis, active treatment furnished directly by or requiring the supervision of inpatient psychiatric facility personnel. In addition, the hospital records show that services furnished were intensive treatment services, admission or related services, or equivalent services.     EXPECTED DISCHARGE DATE/DAY: TBD     DISPOSITION: Home/long term hospitalization       Signed By:   Emi Kapoor. Nai Gilbert MD  1/4/2018

## 2018-01-04 NOTE — PROGRESS NOTES
Problem: Altered Thought Process (Adult/Pediatric)  Goal: *STG: Remains safe in hospital  Outcome: Progressing Towards Goal  Patient is isolative to self. Continues to have poor hygiene. Mute. Not participating in treatment team. encouraged to come into dayroom to eat meals. Refused labs. Disorganized. Will continue to monitor patient and assess needs.

## 2018-01-04 NOTE — PROGRESS NOTES
Problem: Altered Thought Process (Adult/Pediatric)  Goal: *STG: Attends activities and groups  Outcome: Not Progressing Towards Goal  Pt is alert but selectively mute and does not leave his room, pt is disheveled and has poor hygiene. Pt refused to shower and ate his meals and snack in his room. Pt took his meds but needs encouragement. Pt does not answer any questions and has poor eye contact. Assist to reality test, assess mood and behavior, encourage to verbalize thoughts and feeling, med and illness teaching, encourage participation in tx plan, observe on q15' checks.

## 2018-01-04 NOTE — PROGRESS NOTES
Problem: Altered Thought Process (Adult/Pediatric)  Goal: *STG: Remains safe in hospital  Outcome: Progressing Towards Goal  Pt rested quietly in bed with eyes closed. No signs/symptoms of distress, agitation, or anxiety. Will monitor for changes. Q 15 minute checks continue. Pt slept 7 hrs.  Pt refused to have blood drawn this am

## 2018-01-04 NOTE — BH NOTES
GROUP THERAPY PROGRESS NOTE    Diana Coronado is participating in [employer].      Group time: 30 minutes    Personal goal for participation:  Unit orientation    Goal orientation: Community    Group therapy participation: Pt did not attend    Therapeutic interventions reviewed and discussed: Yes    Impression of participation: N/A

## 2018-01-05 PROCEDURE — 74011250637 HC RX REV CODE- 250/637: Performed by: PSYCHIATRY & NEUROLOGY

## 2018-01-05 PROCEDURE — 65220000001 HC RM PRIVATE PSYCH

## 2018-01-05 RX ORDER — OLANZAPINE 5 MG/1
20 TABLET, ORALLY DISINTEGRATING ORAL
Status: DISCONTINUED | OUTPATIENT
Start: 2018-01-05 | End: 2018-01-24 | Stop reason: HOSPADM

## 2018-01-05 RX ADMIN — OLANZAPINE 20 MG: 5 TABLET, ORALLY DISINTEGRATING ORAL at 21:02

## 2018-01-05 RX ADMIN — BENZTROPINE MESYLATE 2 MG: 2 TABLET ORAL at 21:02

## 2018-01-05 RX ADMIN — LORAZEPAM 0.5 MG: 0.5 TABLET ORAL at 17:23

## 2018-01-05 RX ADMIN — VALPROIC ACID 750 MG: 250 SOLUTION ORAL at 20:53

## 2018-01-05 RX ADMIN — FLUOXETINE 40 MG: 20 CAPSULE ORAL at 08:13

## 2018-01-05 RX ADMIN — LORAZEPAM 0.5 MG: 0.5 TABLET ORAL at 08:13

## 2018-01-05 RX ADMIN — VALPROIC ACID 750 MG: 250 SOLUTION ORAL at 08:14

## 2018-01-05 NOTE — PROGRESS NOTES
Problem: Altered Thought Process (Adult/Pediatric)  Goal: *STG: Remains safe in hospital  Outcome: Not Progressing Towards Goal  Pt has been isolated to his room most of the time during the shift with poor hygiene, disorganized and disheveled. Pt was food and med compliant with prompt. Pt denied any S/I and H/I at this time. Pt encouraged to attend all group activities and to discuss any issues or concerns with staff. Staff will also continue to monitor pt with Q -15 checks for safety and needs.

## 2018-01-05 NOTE — BH NOTES
Pt refused to have his blood work drawn this am. He was encouraged several times however he continued to refuse.

## 2018-01-05 NOTE — PROGRESS NOTES
Problem: Altered Thought Process (Adult/Pediatric)  Goal: *STG: Complies with medication therapy  Outcome: Progressing Towards Goal  Pt is isolative from the milieu. Pt is flat and blunted. Pt is meds/meals compliant. Pt only comes out of his room to eat. Pt took a nap after he ate breakfast. Pt is nonverbal most time, remains in his room. Will continue to monitor q 15 for safety, mood and behavior changes.

## 2018-01-05 NOTE — PROGRESS NOTES
Dr. Daphnie Pacheco notified of this rn not being able to obtain ekg from this patient, pt not cooperative.

## 2018-01-05 NOTE — BH NOTES
PSYCHIATRIC PROGRESS NOTE         Patient Name  Diana Coronado   Date of Birth 1970   [facility] [id number]   Medical Record Number  [id number]      Age  49 y.o. PCP PROVIDER UNKNOWN   Admit date:  11/25/2017    Room Number  307/01  @ [facility]   Date of Service  1/5/2018           E & M PROGRESS NOTE:         HISTORY       CC:  \"selectively mute\"  HISTORY OF PRESENT ILLNESS/INTERVAL HISTORY:  (reviewed/updated 1/5/2018). per initial evaluation: The patient, Diana Coronado, is a 49 y.o. WHITE OR  male with a past psychiatric history significant for schizophrenia, who presents at this time with complaints of (and/or evidence of) the following emotional symptoms: psychotic behavior. Additional symptomatology include paranoid delusion, sexually inappropriate behavior, pepisodes of yelling screaming followed by selectively mute, disorganized thought process, anxiety, concern about health problems, difficulty sleeping, fearfulness, hearing voices, increased irritability, poor concentration and problem with medication. The above symptoms have been present for many years. These symptoms are of severe severity. These symptoms are constant  in nature. The patient's condition has been precipitated by and psychosocial stressors (conflict with sister, non compliance ). Patient's condition made worse by treatment noncompliance. UDS: negative; BAL=0. Diana Coronado presents/reports/evidences the following emotional symptoms today, 1/5/2018:psychotic behavior. The above symptoms have been present for many years. These symptoms are of severe severity. The symptoms are constant in nature. Additional symptomatology and features include     12/23/17: Patient was sitting on the floor,in a fetal position,appears disheveled,poor hygiene,mute ,did not engage in conversation,margaret spencere. 12/24/17:seen today in his room with staff. poor hygiene, unkempt, continues to sit in squatting position on the floor  with head down in to knees,did not engage in any conversation,internally preoccupied,As per staff accepting medications and meals. Received no prn.   12/25/17: Patient was seen in his room, he was laying in bed in a fetal position, did not engage in any conversation,poor hygiene, odd posture, eats his meals. Has not shown much improvement with current medications. Treatment team is working on to get ECT. 12/26/17: Not progressing, no significant change noted,laying on the floor, fetal position, half naked,mute ,catatonic, Poor hygiene,accepts medications and meals. treatment team has requested judicial consent for  ECT which is pending,he has received ECT treatment at [facility] in the past.   12/27/17: Seen today in his room, no significant change,sitting on the floor,in fetal position,mute catatonic,did not engage in any conversation. Staff reports that he eats his meals and takes his med,he resist to have  lab work and vital signs.  received fee back from [facility] reported that \"patient received 17 ECT treatments were given to pt in August 2016 and  pt did not improve\". 12/28- no sig change in presentation. Poor hygiene, odd and inappropriate posturing. 12/29- seen in the hallway, more alert today and talked rich  Soft voice, remains preoccupied, delusional with poor hygiene. Was co operative with labs today  12/30/17: seen in his room today with RN,staff reported accepting medications and meals, still remains selectively mute, only brief interaction with staff,odd and inappropriate posture. remains preoccupied. 12/31/17: Seen today in his room,inappropriate posturing continues,mute, only spoke one word to RN,acceptign medications and eating his meals. No significant change noted. 1/1/18- initially seen in odd (praying)  position but did respond to command and pt was more talkative.  He remains disorganized- talking of \" couple of nuns and daughters\" when asked about where he will be living. Poor hygiene. Accepting med  1/2/18- no sig change, ct to present with poor hygiene. Delusional and odd posturing. 1/3- remains preoccupied with odd posturing ( fetal position), he did change his position on request. Talked in soft voice, poor hygiene, refused his Depakote last night  1/4- no sig change- refusing labs, isolative, poor hygiene, lying in bed in a fetal position. Eating well  1/5- ct to refuse labs for clozapine, passive aggressive and tend to sit in praying position when upset. Poor insight . No agitation      SIDE EFFECTS: (reviewed/updated 1/5/2018)  None reported or admitted to. No noted toxicity with use of current med   ALLERGIES:(reviewed/updated 1/5/2018)  Allergies   Allergen Reactions    Fluphenazine Unknown (comments)     Pt is unable to communicate properly.  Penicillins Unknown (comments)     Pt is unable to communicate properly. MEDICATIONS PRIOR TO ADMISSION:(reviewed/updated 1/5/2018)  Prescriptions Prior to Admission   Medication Sig    divalproex ER (DEPAKOTE ER) 500 mg ER tablet Take 1,500 mg by mouth nightly. Indications: Treatment-resistant schizophrenia    haloperidol decanoate (HALDOL DECANOATE) 100 mg/mL injection 2 mL by IntraMUSCular route every twenty-eight (28) days. Indications: SCHIZOPHRENIA    benztropine (COGENTIN) 2 mg tablet Take 1 Tab by mouth nightly. Indications: extrapyramidal disease    cloZAPine 200 mg tablet Take 600 mg by mouth nightly. Indications: TREATMENT-RESISTANT SCHIZOPHRENIA      PAST MEDICAL HISTORY: Past medical history from the initial psychiatric evaluation has been reviewed (reviewed/updated 1/5/2018) with no additional updates (I asked patient and no additional past medical history provided). Past Medical History:   Diagnosis Date    Psychiatric disorder     Psychotic disorder     Withdrawal syndrome ([facility] [address]    No past surgical history on file.    SOCIAL HISTORY: Social history from the initial psychiatric evaluation has been reviewed (reviewed/updated 1/5/2018) with no additional updates (I asked patient and no additional social history provided). Social History     Social History    Marital status: SINGLE     Spouse name: N/A    Number of children: N/A    Years of education: N/A     Occupational History    Not on file. 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 He lives with his sister. On disability    No pending legal charge reported      FAMILY HISTORY: Family history from the initial psychiatric evaluation has been reviewed (reviewed/updated 1/5/2018) with no additional updates (I asked patient and no additional family history provided). No family history on file. REVIEW OF SYSTEMS: (reviewed/updated 1/5/2018)  Appetite:no change from normal   Sleep: fitful   All other Review of Systems: Psychological ROS: positive for - behavioral disorder, concentration difficulties, hallucinations, irritability, mood swings and delusion  Respiratory ROS: no cough, shortness of breath, or wheezing  Cardiovascular ROS: no chest pain or dyspnea on exertion         [facility] (MSE):    MSE FINDINGS ARE WITHIN NORMAL LIMITS (WNL) UNLESS OTHERWISE STATED BELOW. ( ALL OF THE BELOW CATEGORIES OF THE MSE HAVE BEEN REVIEWED (reviewed 1/5/2018) AND UPDATED AS DEEMED APPROPRIATE )  General Presentation age appropriate and disheveled, uncooperative and unreliable   Orientation disorganized   Vital Signs  See below (reviewed 1/5/2018); Vital Signs (BP, Pulse, & Temp) are within normal limits if not listed below.    Gait and Station Stable/steady, no ataxia   Musculoskeletal System No extrapyramidal symptoms (EPS); no abnormal muscular movements or Tardive Dyskinesia (TD); muscle strength and tone are within normal limits   Language No aphasia or dysarthria   Speech:  mute   Thought Processes illogical; slow rate of thoughts; poor abstract reasoning/computation   Thought Associations blocked    Thought Content paranoid delusions, bizarre delusions, auditory hallucinations and internally preoccupied   Suicidal Ideations none   Homicidal Ideations none   Mood:  Labile mood   Affect:  Constricted, labile inappropriate and increased in intensity   Memory recent  impaired   Memory remote:  intact   Concentration/Attention:  distractable and hypervigilance   Fund of Knowledge average   Insight:  limited   Reliability poor   Judgment:  limited          VITALS:     No data found. Wt Readings from Last 3 Encounters:   12/23/17 84.9 kg (187 lb 1.6 oz)   03/25/17 95.6 kg (210 lb 11.2 oz)   02/09/15 97.5 kg (215 lb)     Temp Readings from Last 3 Encounters:   03/28/17 97.5 °F (36.4 °C)   02/07/15 98.7 °F (37.1 °C)     BP Readings from Last 3 Encounters:   03/28/17 100/68   02/12/15 105/73   02/07/15 148/79     Pulse Readings from Last 3 Encounters:   03/28/17 67   02/12/15 87   02/07/15 (!) 111            DATA     LABORATORY DATA:(reviewed/updated 1/5/2018)  No results found for this or any previous visit (from the past 24 hour(s)). Lab Results   Component Value Date/Time    Valproic acid 69 12/29/2017 04:43 AM     No results found for: LITHM   RADIOLOGY REPORTS:(reviewed/updated 1/5/2018)  No results found.        MEDICATIONS     ALL MEDICATIONS:      SCHEDULED MEDICATIONS:          ASSESSMENT & PLAN     DIAGNOSES REQUIRING ACTIVE TREATMENT AND MONITORING: (reviewed/updated 1/5/2018)  Patient Active Hospital Problem List:   Paranoid schizophrenia ([facility] [address] (3/15/2017)- tx resistant, chronic and with negative sx    Assessment: no sig change and now refusing the Clozapine lab-negative sx, psychotic and disorganized, poor baseline and reported to have poor response to ECT per [place] [facility] Plan: I will ct to adjust med- dc Clozapine and titrate Zydis again. Consider augmentation strategy. Depakote and Prozac.? Topamax  Consider long term hospitalization- sw to discuss with [facility] for possible transfer          Patient is on two antipsychotics now due to the relative refractoriness to treatment thus far. Prior to admission patient had THREE or more failed trails of monotherapy, including: Haldol, Zyprexa, Risperdal and Clozapine. The patient is a very slow responder to medications. Risks and benefits in the use of two antipsychotics have been weighed in full, including the risk of metabolic syndrome and the potential increased risk of QTc  Based on this analysis, it is considered favorable for the utilization of two antipsychotics. In the future, once stable on the two antipsychotics, patient can possibly be tapered to one of the chosen antipsychotics. Will check on EKG results today to monitor QTc.- refused EKG again        I will continue to monitor blood levels (Depakote,, clozapine---a drug with a narrow therapeutic index= NTI) and associated labs for drug therapy implemented that require intense monitoring for toxicity as deemed appropriate based on current medication side effects and pharmacodynamically determined drug 1/2 lives. -    In summary, Petra Wray, is a 49 y.o.  male who presents with a severe exacerbation of the principal diagnosis of Paranoid schizophrenia ([facility] [address]  Patient's condition is worsening/not improving/not stable . Patient requires continued inpatient hospitalization for further stabilization, safety monitoring and medication management. I will continue to coordinate the provision of individual, milieu, occupational, group, and substance abuse therapies to address target symptoms/diagnoses as deemed appropriate for the individual patient.   A coordinated, multidisplinary treatment team round was conducted with the patient (this team consists of the nurse, psychiatric unit pharmcist,  and writer). Complete current electronic health record for patient has been reviewed today including consultant notes, ancillary staff notes, nurses and psychiatric tech notes. Suicide risk assessment completed and patient deemed to be of low risk for suicide at this time. The following regarding medications was addressed during rounds with patient:   the risks and benefits of the proposed medication. The patient was given the opportunity to ask questions. Informed consent given to the use of the above medications. Will continue to adjust psychiatric and non-psychiatric medications (see above \"medication\" section and orders section for details) as deemed appropriate & based upon diagnoses and response to treatment. I will continue to order blood tests/labs and diagnostic tests as deemed appropriate and review results as they become available (see orders for details and above listed lab/test results). I will order psychiatric records from previous [facility] hospitals to further elucidate the nature of patient's psychopathology and review once available. I will gather additional collateral information from friends, family and o/p treatment team to further elucidate the nature of patient's psychopathology and baselline level of psychiatric functioning. I certify that this patient's inpatient psychiatric hospital services furnished since the previous certification were, and continue to be, required for treatment that could reasonably be expected to improve the patient's condition, or for diagnostic study, and that the patient continues to need, on a daily basis, active treatment furnished directly by or requiring the supervision of inpatient psychiatric facility personnel. In addition, the hospital records show that services furnished were intensive treatment services, admission or related services, or equivalent services.     EXPECTED DISCHARGE DATE/DAY: TBD     DISPOSITION: Home/long term hospitalization       Signed By:   Mallika Luther MD  1/5/2018

## 2018-01-05 NOTE — BH NOTES
GROUP THERAPY PROGRESS NOTE    Tish See is participating in [email or web address]      Group time: 30 minutes    Personal goal for participation:  Unit orientation    Goal orientation: Community    Group therapy participation: minimal    Therapeutic interventions reviewed and discussed: Yes    Impression of participation: good

## 2018-01-05 NOTE — BH NOTES
Pt was seen in treatment team this morning in his room. Pt was in bed, laying on his stomach with face in pillow and stated \"discharge\".  Pt continues to refuse lab work for clozapine and sits in prayer positions when upset with staff. Pt transitioned into prayer position during discussion of medication. Pt leaves room only to eat meals. Pt's thought process is delusional, remains preoccupied, no insight. This  contacted [facility] in regard to long term option for pt and [facility] social work director - Livan Kwon stated \"The RAC process in unfortunately nonexistent and we simply don't have beds. \"

## 2018-01-06 PROCEDURE — 74011250637 HC RX REV CODE- 250/637: Performed by: PSYCHIATRY & NEUROLOGY

## 2018-01-06 PROCEDURE — 65220000001 HC RM PRIVATE PSYCH

## 2018-01-06 RX ADMIN — LORAZEPAM 0.5 MG: 0.5 TABLET ORAL at 17:03

## 2018-01-06 RX ADMIN — BENZTROPINE MESYLATE 2 MG: 2 TABLET ORAL at 21:31

## 2018-01-06 RX ADMIN — LORAZEPAM 0.5 MG: 0.5 TABLET ORAL at 08:14

## 2018-01-06 RX ADMIN — FLUOXETINE 40 MG: 20 CAPSULE ORAL at 08:14

## 2018-01-06 RX ADMIN — OLANZAPINE 20 MG: 5 TABLET, ORALLY DISINTEGRATING ORAL at 21:31

## 2018-01-06 RX ADMIN — VALPROIC ACID 750 MG: 250 SOLUTION ORAL at 08:14

## 2018-01-06 RX ADMIN — VALPROIC ACID 750 MG: 250 SOLUTION ORAL at 21:26

## 2018-01-06 NOTE — PROGRESS NOTES
Problem: Altered Thought Process (Adult/Pediatric)  Goal: *STG: Attends activities and groups  Outcome: Not Progressing Towards Goal  Patient is isolates to the room. Remains non verbal and stays in fetal position but responds to the prompts and lays in bed at times. Compliant with medication and meals. Denies any pain or discomfort. Continue to monitor the patient and assess needs.

## 2018-01-06 NOTE — BH NOTES
GROUP THERAPY PROGRESS NOTE    The patient Calvin Byers is participating in [employer]. Group time: 30 minutes    Personal goal for participation: to orient the patient to the unit.     Goal orientation: successful adoption of unit rules    Group therapy participation: minimal    Therapeutic interventions reviewed and discussed: Yes    Impression of participation: minimal  Burke Mcburney  1/6/2018 11:08 AM

## 2018-01-06 NOTE — PROGRESS NOTES
Problem: Altered Thought Process (Adult/Pediatric)  Goal: *STG: Attends activities and groups     Outcome: Progressing Towards Goal  Pt is alert but isolative to his room at this time. Pt continues to be selectively mute, staying in a praying fetal position without looking up. Pt did come out of his room to eat breakfast but did not eat lunch. Pt refuses assistance with ADLs. Writer is concerned about pt staying in this position so long, requested PT consult. Pt Consult ordered. Will continue to monitor pt q15 minutes for safety and support.

## 2018-01-07 PROCEDURE — 74011250637 HC RX REV CODE- 250/637: Performed by: PSYCHIATRY & NEUROLOGY

## 2018-01-07 PROCEDURE — 65220000001 HC RM PRIVATE PSYCH

## 2018-01-07 RX ADMIN — OLANZAPINE 20 MG: 5 TABLET, ORALLY DISINTEGRATING ORAL at 21:30

## 2018-01-07 RX ADMIN — LORAZEPAM 0.5 MG: 0.5 TABLET ORAL at 09:00

## 2018-01-07 RX ADMIN — BENZTROPINE MESYLATE 2 MG: 2 TABLET ORAL at 21:30

## 2018-01-07 RX ADMIN — LORAZEPAM 0.5 MG: 0.5 TABLET ORAL at 16:48

## 2018-01-07 RX ADMIN — VALPROIC ACID 750 MG: 250 SOLUTION ORAL at 21:31

## 2018-01-07 RX ADMIN — VALPROIC ACID 750 MG: 250 SOLUTION ORAL at 09:00

## 2018-01-07 RX ADMIN — FLUOXETINE 40 MG: 20 CAPSULE ORAL at 09:00

## 2018-01-07 NOTE — PROGRESS NOTES
Problem: Altered Thought Process (Adult/Pediatric)  Goal: *STG: Attends activities and groups      Outcome: Not Progressing Towards Goal  Patient isolates self to the room, he did come out of the room to eat snack. Attempted to check his vital signs, made his arms stiff and would not allow to check vitals. Refused to talk or look at  acknowledge staff's presence. He was noted to be in praying/ fetal position for extended period of time. Compliant with medication. Denies any pain or discomfort. Continue to monitor the patient and assess needs.

## 2018-01-07 NOTE — PROGRESS NOTES
Problem: Altered Thought Process (Adult/Pediatric)  Goal: *STG: Attends activities and groups      Outcome: Not Progressing Towards Goal  Pt is alert but isolative to his room. Pt continue to be selectively mute. Pt is compliant with his medications and meals. He did come out of his room for breakfast but ate lunch in his room. Pt denies any needs at this time. He does not appear to be in any pain. Will continue to monitor pt q15 minutes for safety and support.

## 2018-01-07 NOTE — BH NOTES
PSYCHIATRIC PROGRESS NOTE         Patient Name  Conor Cabrera   Date of Birth 1970   [facility] [id number]   Medical Record Number  [id number]      Age  49 y.o. PCP PROVIDER UNKNOWN   Admit date:  11/25/2017    Room Number  307/01  @ [facility]   Date of Service  1/7/2018           E & M PROGRESS NOTE:         HISTORY       CC:  \"selectively mute\"  HISTORY OF PRESENT ILLNESS/INTERVAL HISTORY:  (reviewed/updated 1/7/2018). per initial evaluation: The patient, Conor Cabrera, is a 49 y.o. WHITE OR  male with a past psychiatric history significant for schizophrenia, who presents at this time with complaints of (and/or evidence of) the following emotional symptoms: psychotic behavior. Additional symptomatology include paranoid delusion, sexually inappropriate behavior, pepisodes of yelling screaming followed by selectively mute, disorganized thought process, anxiety, concern about health problems, difficulty sleeping, fearfulness, hearing voices, increased irritability, poor concentration and problem with medication. The above symptoms have been present for many years. These symptoms are of severe severity. These symptoms are constant  in nature. The patient's condition has been precipitated by and psychosocial stressors (conflict with sister, non compliance ). Patient's condition made worse by treatment noncompliance. UDS: negative; BAL=0. Conor Cabrera presents/reports/evidences the following emotional symptoms today, 1/7/2018:psychotic behavior. The above symptoms have been present for many years. These symptoms are of severe severity. The symptoms are constant in nature. Additional symptomatology and features include     12/23/17: Patient was sitting on the floor,in a fetal position,appears disheveled,poor hygiene,mute ,did not engage in conversation,margaret johansen. 12/24/17:seen today in his room with staff. poor hygiene, unkempt, continues to sit in squatting position on the floor  with head down in to knees,did not engage in any conversation,internally preoccupied,As per staff accepting medications and meals. Received no prn.   12/25/17: Patient was seen in his room, he was laying in bed in a fetal position, did not engage in any conversation,poor hygiene, odd posture, eats his meals. Has not shown much improvement with current medications. Treatment team is working on to get ECT. 12/26/17: Not progressing, no significant change noted,laying on the floor, fetal position, half naked,mute ,catatonic, Poor hygiene,accepts medications and meals. treatment team has requested judicial consent for  ECT which is pending,he has received ECT treatment at [facility] in the past.   12/27/17: Seen today in his room, no significant change,sitting on the floor,in fetal position,mute catatonic,did not engage in any conversation. Staff reports that he eats his meals and takes his med,he resist to have  lab work and vital signs.  received [place] [facility] [place] that \"patient received 17 ECT treatments were given to pt in August 2016 and  pt did not improve\". 12/28- no sig change in presentation. Poor hygiene, odd and inappropriate posturing. 12/29- seen in the hallway, more alert today and talked rich  Soft voice, remains preoccupied, delusional with poor hygiene. Was co operative with labs today  12/30/17: seen in his room today with RN,staff reported accepting medications and meals, still remains selectively mute, only brief interaction with staff,odd and inappropriate posture. remains preoccupied. 12/31/17: Seen today in his room,inappropriate posturing continues,mute, only spoke one word to RN,acceptign medications and eating his meals. No significant change noted. 1/1/18- initially seen in odd (praying)  position but did respond to command and pt was more talkative.  He remains disorganized- talking of \" couple of nuns and daughters\" when asked about where he will be living. Poor hygiene. Accepting med  1/2/18- no sig change, ct to present with poor hygiene. Delusional and odd posturing. 1/3- remains preoccupied with odd posturing ( fetal position), he did change his position on request. Talked in soft voice, poor hygiene, refused his Depakote last night  1/4- no sig change- refusing labs, isolative, poor hygiene, lying in bed in a fetal position. Eating well  1/5- ct to refuse labs for clozapine, passive aggressive and tend to sit in praying position when upset. Poor insight . No agitation  1/6-Isolating, selectively mute, staying in his room in praying position. Minimal activity. PT consult ordered. Consider ECT.  1/7- Found kneeling down on his bed with his pants down. Did not respond to any questions. Remains disorganized with poor hygiene. SIDE EFFECTS: (reviewed/updated 1/7/2018)  None reported or admitted to. No noted toxicity with use of current med   ALLERGIES:(reviewed/updated 1/7/2018)  Allergies   Allergen Reactions    Fluphenazine Unknown (comments)     Pt is unable to communicate properly.  Penicillins Unknown (comments)     Pt is unable to communicate properly. MEDICATIONS PRIOR TO ADMISSION:(reviewed/updated 1/7/2018)  Prescriptions Prior to Admission   Medication Sig    divalproex ER (DEPAKOTE ER) 500 mg ER tablet Take 1,500 mg by mouth nightly. Indications: Treatment-resistant schizophrenia    haloperidol decanoate (HALDOL DECANOATE) 100 mg/mL injection 2 mL by IntraMUSCular route every twenty-eight (28) days. Indications: SCHIZOPHRENIA    benztropine (COGENTIN) 2 mg tablet Take 1 Tab by mouth nightly. Indications: extrapyramidal disease    cloZAPine 200 mg tablet Take 600 mg by mouth nightly.  Indications: TREATMENT-RESISTANT SCHIZOPHRENIA      PAST MEDICAL HISTORY: Past medical history from the initial psychiatric evaluation has been reviewed (reviewed/updated 1/7/2018) with no additional updates (I asked patient and no additional past medical history provided). Past Medical History:   Diagnosis Date    Psychiatric disorder     Psychotic disorder     Withdrawal syndrome ([facility] [facility]    No past surgical history on file. SOCIAL HISTORY: Social history from the initial psychiatric evaluation has been reviewed (reviewed/updated 1/7/2018) with no additional updates (I asked patient and no additional social history provided). Social History     Social History    Marital status: SINGLE     Spouse name: N/A    Number of children: N/A    Years of education: N/A     Occupational History    Not on file. 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 He lives with his sister. On disability    No pending legal charge reported      FAMILY HISTORY: Family history from the initial psychiatric evaluation has been reviewed (reviewed/updated 1/7/2018) with no additional updates (I asked patient and no additional family history provided). No family history on file.     REVIEW OF SYSTEMS: (reviewed/updated 1/7/2018)  Appetite:no change from normal   Sleep: fitful   All other Review of Systems: Psychological ROS: positive for - behavioral disorder, concentration difficulties, hallucinations, irritability, mood swings and delusion  Respiratory ROS: no cough, shortness of breath, or wheezing  Cardiovascular ROS: no chest pain or dyspnea on exertion         [address] (MSE):    MSE FINDINGS ARE WITHIN NORMAL LIMITS (WNL) UNLESS OTHERWISE STATED BELOW. ( ALL OF THE BELOW CATEGORIES OF THE MSE HAVE BEEN REVIEWED (reviewed 1/7/2018) AND UPDATED AS DEEMED APPROPRIATE )  General Presentation age appropriate and disheveled, uncooperative and unreliable   Orientation disorganized   Vital Signs See below (reviewed 1/7/2018); Vital Signs (BP, Pulse, & Temp) are within normal limits if not listed below. Gait and Station Stable/steady, no ataxia   Musculoskeletal System No extrapyramidal symptoms (EPS); no abnormal muscular movements or Tardive Dyskinesia (TD); muscle strength and tone are within normal limits   Language No aphasia or dysarthria   Speech:  mute   Thought Processes illogical; slow rate of thoughts; poor abstract reasoning/computation   Thought Associations blocked    Thought Content paranoid delusions, bizarre delusions, auditory hallucinations and internally preoccupied   Suicidal Ideations none   Homicidal Ideations none   Mood:  Labile mood   Affect:  Constricted, labile inappropriate and increased in intensity   Memory recent  impaired   Memory remote:  intact   Concentration/Attention:  distractable and hypervigilance   Fund of Knowledge average   Insight:  limited   Reliability poor   Judgment:  limited          VITALS:     Patient Vitals for the past 24 hrs:   Temp Pulse Resp BP SpO2   01/07/18 0543 97.7 °F (36.5 °C) 71 16 118/84 100 %     Wt Readings from Last 3 Encounters:   12/23/17 84.9 kg (187 lb 1.6 oz)   03/25/17 95.6 kg (210 lb 11.2 oz)   02/09/15 97.5 kg (215 lb)     Temp Readings from Last 3 Encounters:   01/07/18 97.7 °F (36.5 °C)   03/28/17 97.5 °F (36.4 °C)   02/07/15 98.7 °F (37.1 °C)     BP Readings from Last 3 Encounters:   01/07/18 118/84   03/28/17 100/68   02/12/15 105/73     Pulse Readings from Last 3 Encounters:   01/07/18 71   03/28/17 67   02/12/15 87            DATA     LABORATORY DATA:(reviewed/updated 1/7/2018)  No results found for this or any previous visit (from the past 24 hour(s)). Lab Results   Component Value Date/Time    Valproic acid 69 12/29/2017 04:43 AM     No results found for: LITHM   RADIOLOGY REPORTS:(reviewed/updated 1/7/2018)  No results found.        MEDICATIONS     ALL MEDICATIONS:      SCHEDULED MEDICATIONS:          ASSESSMENT & PLAN DIAGNOSES REQUIRING ACTIVE TREATMENT AND MONITORING: (reviewed/updated 1/7/2018)  Patient Active Hospital Problem List:   Paranoid schizophrenia ([facility] (3/15/2017)- tx resistant, chronic and with negative sx    Assessment: no sig change and now refusing the Clozapine lab-negative sx, psychotic and disorganized, poor baseline and reported to have poor response to ECT per sw report from [facility]. Plan: I will ct to adjust med- dc Clozapine and titrate Zydis again. Consider augmentation strategy. Depakote and Prozac.? Topamax  Consider long term hospitalization- sw to discuss with [facility] for possible transfer          Patient is on two antipsychotics now due to the relative refractoriness to treatment thus far. Prior to admission patient had THREE or more failed trails of monotherapy, including: Haldol, Zyprexa, Risperdal and Clozapine. The patient is a very slow responder to medications. Risks and benefits in the use of two antipsychotics have been weighed in full, including the risk of metabolic syndrome and the potential increased risk of QTc  Based on this analysis, it is considered favorable for the utilization of two antipsychotics. In the future, once stable on the two antipsychotics, patient can possibly be tapered to one of the chosen antipsychotics. Will check on EKG results today to monitor QTc.- refused EKG again        I will continue to monitor blood levels (Depakote,, clozapine---a drug with a narrow therapeutic index= NTI) and associated labs for drug therapy implemented that require intense monitoring for toxicity as deemed appropriate based on current medication side effects and pharmacodynamically determined drug 1/2 lives. -    In summary, Marj Maria, is a 49 y.o.  male who presents with a severe exacerbation of the principal diagnosis of Paranoid schizophrenia ([facility]  Patient's condition is worsening/not improving/not stable .   Patient requires continued inpatient hospitalization for further stabilization, safety monitoring and medication management. I will continue to coordinate the provision of individual, milieu, occupational, group, and substance abuse therapies to address target symptoms/diagnoses as deemed appropriate for the individual patient. A coordinated, multidisplinary treatment team round was conducted with the patient (this team consists of the nurse, psychiatric unit pharmcist,  and writer). Complete current electronic health record for patient has been reviewed today including consultant notes, ancillary staff notes, nurses and psychiatric tech notes. Suicide risk assessment completed and patient deemed to be of low risk for suicide at this time. The following regarding medications was addressed during rounds with patient:   the risks and benefits of the proposed medication. The patient was given the opportunity to ask questions. Informed consent given to the use of the above medications. Will continue to adjust psychiatric and non-psychiatric medications (see above \"medication\" section and orders section for details) as deemed appropriate & based upon diagnoses and response to treatment. I will continue to order blood tests/labs and diagnostic tests as deemed appropriate and review results as they become available (see orders for details and above listed lab/test results). I will order psychiatric records from previous [facility] hospitals to further elucidate the nature of patient's psychopathology and review once available. I will gather additional collateral information from friends, family and o/p treatment team to further elucidate the nature of patient's psychopathology and baselline level of psychiatric functioning.          I certify that this patient's inpatient psychiatric hospital services furnished since the previous certification were, and continue to be, required for treatment that could reasonably be expected to improve the patient's condition, or for diagnostic study, and that the patient continues to need, on a daily basis, active treatment furnished directly by or requiring the supervision of inpatient psychiatric facility personnel. In addition, the hospital records show that services furnished were intensive treatment services, admission or related services, or equivalent services.     EXPECTED DISCHARGE DATE/DAY: TBD     DISPOSITION: Home/long term hospitalization       Signed By:   Robbi Rowe MD  1/7/2018

## 2018-01-07 NOTE — BH NOTES
PSYCHIATRIC PROGRESS NOTE         Patient Name  Germania Slade   Date of Birth 1970   [facility] [id number]   Medical Record Number  [id number]      Age  49 y.o. PCP PROVIDER UNKNOWN   Admit date:  11/25/2017    Room Number  307/01  @ [facility]   Date of Service  1/6/2018           E & M PROGRESS NOTE:         HISTORY       CC:  \"selectively mute\"  HISTORY OF PRESENT ILLNESS/INTERVAL HISTORY:  (reviewed/updated 1/6/2018). per initial evaluation: The patient, Germania Slade, is a 49 y.o. WHITE OR  male with a past psychiatric history significant for schizophrenia, who presents at this time with complaints of (and/or evidence of) the following emotional symptoms: psychotic behavior. Additional symptomatology include paranoid delusion, sexually inappropriate behavior, pepisodes of yelling screaming followed by selectively mute, disorganized thought process, anxiety, concern about health problems, difficulty sleeping, fearfulness, hearing voices, increased irritability, poor concentration and problem with medication. The above symptoms have been present for many years. These symptoms are of severe severity. These symptoms are constant  in nature. The patient's condition has been precipitated by and psychosocial stressors (conflict with sister, non compliance ). Patient's condition made worse by treatment noncompliance. UDS: negative; BAL=0. Germania Slade presents/reports/evidences the following emotional symptoms today, 1/6/2018:psychotic behavior. The above symptoms have been present for many years. These symptoms are of severe severity. The symptoms are constant in nature. Additional symptomatology and features include     12/23/17: Patient was sitting on the floor,in a fetal position,appears disheveled,poor hygiene,mute ,did not engage in conversation,margaret johansen. 12/24/17:seen today in his room with staff. poor hygiene, unkempt, continues to sit in squatting position on the floor  with head down in to knees,did not engage in any conversation,internally preoccupied,As per staff accepting medications and meals. Received no prn.   12/25/17: Patient was seen in his room, he was laying in bed in a fetal position, did not engage in any conversation,poor hygiene, odd posture, eats his meals. Has not shown much improvement with current medications. Treatment team is working on to get ECT. 12/26/17: Not progressing, no significant change noted,laying on the floor, fetal position, half naked,mute ,catatonic, Poor hygiene,accepts medications and meals. treatment team has requested judicial consent for  ECT which is pending,he has received ECT treatment at [facility] in the past.   12/27/17: Seen today in his room, no significant change,sitting on the floor,in fetal position,mute catatonic,did not engage in any conversation. Staff reports that he eats his meals and takes his med,he resist to have  lab work and vital signs.  received fee back from [facility] reported that \"patient received 17 ECT treatments were given to pt in August 2016 and  pt did not improve\". 12/28- no sig change in presentation. Poor hygiene, odd and inappropriate posturing. 12/29- seen in the hallway, more alert today and talked rich  Soft voice, remains preoccupied, delusional with poor hygiene. Was co operative with labs today  12/30/17: seen in his room today with RN,staff reported accepting medications and meals, still remains selectively mute, only brief interaction with staff,odd and inappropriate posture. remains preoccupied. 12/31/17: Seen today in his room,inappropriate posturing continues,mute, only spoke one word to RN,acceptign medications and eating his meals. No significant change noted. 1/1/18- initially seen in odd (praying)  position but did respond to command and pt was more talkative.  He remains disorganized- talking of \" couple of nuns and daughters\" when asked about where he will be living. Poor hygiene. Accepting med  1/2/18- no sig change, ct to present with poor hygiene. Delusional and odd posturing. 1/3- remains preoccupied with odd posturing ( fetal position), he did change his position on request. Talked in soft voice, poor hygiene, refused his Depakote last night  1/4- no sig change- refusing labs, isolative, poor hygiene, lying in bed in a fetal position. Eating well  1/5- ct to refuse labs for clozapine, passive aggressive and tend to sit in praying position when upset. Poor insight . No agitation  1/6-Isolating, selectively mute, staying in his room in praying position. Minimal activity. PT consult ordered. Consider ECT. SIDE EFFECTS: (reviewed/updated 1/6/2018)  None reported or admitted to. No noted toxicity with use of current med   ALLERGIES:(reviewed/updated 1/6/2018)  Allergies   Allergen Reactions    Fluphenazine Unknown (comments)     Pt is unable to communicate properly.  Penicillins Unknown (comments)     Pt is unable to communicate properly. MEDICATIONS PRIOR TO ADMISSION:(reviewed/updated 1/6/2018)  Prescriptions Prior to Admission   Medication Sig    divalproex ER (DEPAKOTE ER) 500 mg ER tablet Take 1,500 mg by mouth nightly. Indications: Treatment-resistant schizophrenia    haloperidol decanoate (HALDOL DECANOATE) 100 mg/mL injection 2 mL by IntraMUSCular route every twenty-eight (28) days. Indications: SCHIZOPHRENIA    benztropine (COGENTIN) 2 mg tablet Take 1 Tab by mouth nightly. Indications: extrapyramidal disease    cloZAPine 200 mg tablet Take 600 mg by mouth nightly. Indications: TREATMENT-RESISTANT SCHIZOPHRENIA      PAST MEDICAL HISTORY: Past medical history from the initial psychiatric evaluation has been reviewed (reviewed/updated 1/6/2018) with no additional updates (I asked patient and no additional past medical history provided).    Past Medical History:   Diagnosis Date    Psychiatric disorder     Psychotic disorder     Withdrawal syndrome ([facility] [address]    No past surgical history on file. SOCIAL HISTORY: Social history from the initial psychiatric evaluation has been reviewed (reviewed/updated 1/6/2018) with no additional updates (I asked patient and no additional social history provided). Social History     Social History    Marital status: SINGLE     Spouse name: N/A    Number of children: N/A    Years of education: N/A     Occupational History    Not on file. 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 He lives with his sister. On disability    No pending legal charge reported      FAMILY HISTORY: Family history from the initial psychiatric evaluation has been reviewed (reviewed/updated 1/6/2018) with no additional updates (I asked patient and no additional family history provided). No family history on file. REVIEW OF SYSTEMS: (reviewed/updated 1/6/2018)  Appetite:no change from normal   Sleep: fitful   All other Review of Systems: Psychological ROS: positive for - behavioral disorder, concentration difficulties, hallucinations, irritability, mood swings and delusion  Respiratory ROS: no cough, shortness of breath, or wheezing  Cardiovascular ROS: no chest pain or dyspnea on exertion         [facility] (MSE):    MSE FINDINGS ARE WITHIN NORMAL LIMITS (WNL) UNLESS OTHERWISE STATED BELOW. ( ALL OF THE BELOW CATEGORIES OF THE MSE HAVE BEEN REVIEWED (reviewed 1/6/2018) AND UPDATED AS DEEMED APPROPRIATE )  General Presentation age appropriate and disheveled, uncooperative and unreliable   Orientation disorganized   Vital Signs  See below (reviewed 1/6/2018); Vital Signs (BP, Pulse, & Temp) are within normal limits if not listed below.    Gait and Station Stable/steady, no ataxia   Musculoskeletal System No extrapyramidal symptoms (EPS); no abnormal muscular movements or Tardive Dyskinesia (TD); muscle strength and tone are within normal limits   Language No aphasia or dysarthria   Speech:  mute   Thought Processes illogical; slow rate of thoughts; poor abstract reasoning/computation   Thought Associations blocked    Thought Content paranoid delusions, bizarre delusions, auditory hallucinations and internally preoccupied   Suicidal Ideations none   Homicidal Ideations none   Mood:  Labile mood   Affect:  Constricted, labile inappropriate and increased in intensity   Memory recent  impaired   Memory remote:  intact   Concentration/Attention:  distractable and hypervigilance   Fund of Knowledge average   Insight:  limited   Reliability poor   Judgment:  limited          VITALS:     No data found. Wt Readings from Last 3 Encounters:   12/23/17 84.9 kg (187 lb 1.6 oz)   03/25/17 95.6 kg (210 lb 11.2 oz)   02/09/15 97.5 kg (215 lb)     Temp Readings from Last 3 Encounters:   03/28/17 97.5 °F (36.4 °C)   02/07/15 98.7 °F (37.1 °C)     BP Readings from Last 3 Encounters:   03/28/17 100/68   02/12/15 105/73   02/07/15 148/79     Pulse Readings from Last 3 Encounters:   03/28/17 67   02/12/15 87   02/07/15 (!) 111            DATA     LABORATORY DATA:(reviewed/updated 1/6/2018)  No results found for this or any previous visit (from the past 24 hour(s)). Lab Results   Component Value Date/Time    Valproic acid 69 12/29/2017 04:43 AM     No results found for: LITHM   RADIOLOGY REPORTS:(reviewed/updated 1/6/2018)  No results found.        MEDICATIONS     ALL MEDICATIONS:      SCHEDULED MEDICATIONS:          ASSESSMENT & PLAN     DIAGNOSES REQUIRING ACTIVE TREATMENT AND MONITORING: (reviewed/updated 1/6/2018)  Patient Active Hospital Problem List:   Paranoid schizophrenia ([facility] [address] (3/15/2017)- tx resistant, chronic and with negative sx    Assessment: no sig change and now refusing the Clozapine lab-negative sx, psychotic and disorganized, poor baseline and reported to have poor response to ECT per sw report from [facility]. Plan: I will ct to adjust med- dc Clozapine and titrate Zydis again. Consider augmentation strategy. Depakote and Prozac.? Topamax  Consider long term hospitalization- sw to discuss with [facility] for possible transfer          Patient is on two antipsychotics now due to the relative refractoriness to treatment thus far. Prior to admission patient had THREE or more failed trails of monotherapy, including: Haldol, Zyprexa, Risperdal and Clozapine. The patient is a very slow responder to medications. Risks and benefits in the use of two antipsychotics have been weighed in full, including the risk of metabolic syndrome and the potential increased risk of QTc  Based on this analysis, it is considered favorable for the utilization of two antipsychotics. In the future, once stable on the two antipsychotics, patient can possibly be tapered to one of the chosen antipsychotics. Will check on EKG results today to monitor QTc.- refused EKG again        I will continue to monitor blood levels (Depakote,, clozapine---a drug with a narrow therapeutic index= NTI) and associated labs for drug therapy implemented that require intense monitoring for toxicity as deemed appropriate based on current medication side effects and pharmacodynamically determined drug 1/2 lives. -    In summary, Juliette Shoemaker, is a 49 y.o.  male who presents with a severe exacerbation of the principal diagnosis of Paranoid schizophrenia ([facility] [address]  Patient's condition is worsening/not improving/not stable . Patient requires continued inpatient hospitalization for further stabilization, safety monitoring and medication management.   I will continue to coordinate the provision of individual, milieu, occupational, group, and substance abuse therapies to address target symptoms/diagnoses as deemed appropriate for the individual patient. A coordinated, multidisplinary treatment team round was conducted with the patient (this team consists of the nurse, psychiatric unit pharmcist,  and writer). Complete current electronic health record for patient has been reviewed today including consultant notes, ancillary staff notes, nurses and psychiatric tech notes. Suicide risk assessment completed and patient deemed to be of low risk for suicide at this time. The following regarding medications was addressed during rounds with patient:   the risks and benefits of the proposed medication. The patient was given the opportunity to ask questions. Informed consent given to the use of the above medications. Will continue to adjust psychiatric and non-psychiatric medications (see above \"medication\" section and orders section for details) as deemed appropriate & based upon diagnoses and response to treatment. I will continue to order blood tests/labs and diagnostic tests as deemed appropriate and review results as they become available (see orders for details and above listed lab/test results). I will order psychiatric records from previous [facility] hospitals to further elucidate the nature of patient's psychopathology and review once available. I will gather additional collateral information from friends, family and o/p treatment team to further elucidate the nature of patient's psychopathology and baselline level of psychiatric functioning. I certify that this patient's inpatient psychiatric hospital services furnished since the previous certification were, and continue to be, required for treatment that could reasonably be expected to improve the patient's condition, or for diagnostic study, and that the patient continues to need, on a daily basis, active treatment furnished directly by or requiring the supervision of inpatient psychiatric facility personnel.  In addition, the hospital records show that services furnished were intensive treatment services, admission or related services, or equivalent services.     EXPECTED DISCHARGE DATE/DAY: TBD     DISPOSITION: Home/long term hospitalization       Signed By:   Nando Kamara MD  1/6/2018

## 2018-01-07 NOTE — PROGRESS NOTES
Diet as tolerated. Pt noted to be meal compliant. PMH unremarkable per nutrition  Ht: 5'7\"  Wt: 187 lb 1/6 oz  BMI: 29.3 kg/(m^2) c/w overweight. Est energy needs: 1820 kcal, 68 g protein, 1 mL/kcal fluids  Previous goal met.  Pt will consume > 75% of meals at follow up

## 2018-01-08 PROCEDURE — 74011250637 HC RX REV CODE- 250/637: Performed by: PSYCHIATRY & NEUROLOGY

## 2018-01-08 PROCEDURE — 65220000001 HC RM PRIVATE PSYCH

## 2018-01-08 RX ADMIN — FLUOXETINE 40 MG: 20 CAPSULE ORAL at 08:01

## 2018-01-08 RX ADMIN — OLANZAPINE 20 MG: 5 TABLET, ORALLY DISINTEGRATING ORAL at 21:02

## 2018-01-08 RX ADMIN — VALPROIC ACID 750 MG: 250 SOLUTION ORAL at 21:02

## 2018-01-08 RX ADMIN — VALPROIC ACID 750 MG: 250 SOLUTION ORAL at 08:01

## 2018-01-08 RX ADMIN — LORAZEPAM 0.5 MG: 0.5 TABLET ORAL at 08:01

## 2018-01-08 RX ADMIN — BENZTROPINE MESYLATE 2 MG: 2 TABLET ORAL at 21:02

## 2018-01-08 RX ADMIN — LORAZEPAM 0.5 MG: 0.5 TABLET ORAL at 17:38

## 2018-01-08 NOTE — BH NOTES
Patient is isolates to the room. Remains non verbal and stays in fetal position but responds to the prompts and lays in bed at times. Compliant with medication and meals. Denies any pain or discomfort. Continue to monitor the patient and assess needs.

## 2018-01-08 NOTE — BH NOTES
GROUP THERAPY PROGRESS NOTE    Paul Correa is participating in [employer].      Group time: 30 minutes    Personal goal for participation: daily orientation    Goal orientation: personal    Group therapy participation: active    Therapeutic interventions reviewed and discussed: yes    Impression of participation: ok

## 2018-01-08 NOTE — PROGRESS NOTES
Physical Therapy:     Physical therapy order received and chart reviewed. Patient in praying/bowing position on bed upon entering room. Patient did not acknowledge staff upon verbal and tactile cueing. Staff reports that patient is able to walk to dayroom for meals with overall steady gait. At this time, no further acute physical therapy services indicated due to lack of participation and steady gait as reported by staff.      Jordi Fletcher, PT

## 2018-01-08 NOTE — PROGRESS NOTES
Problem: Altered Thought Process (Adult/Pediatric)  Goal: *STG: Complies with medication therapy  Outcome: Progressing Towards Goal  Pt remains disorganized and disheveled. He spent most of the shift in his room in the praying position. He came out of the room for meals and had his meals in the dayroom. Pt is compliant with meds. He did not receive any prn meds on the shift. Staff will continue to encourage pt to focus on hygiene and making needs known to staff. Staff will monitor pt's safety and offer support as needed.

## 2018-01-08 NOTE — BH NOTES
PSYCHIATRIC PROGRESS NOTE         Patient Name  Conor Cabrera   Date of Birth 1970   [facility] [id number]   Medical Record Number  [id number]      Age  49 y.o. PCP PROVIDER UNKNOWN   Admit date:  11/25/2017    Room Number  307/01  @ [facility]   Date of Service  1/8/2018           E & M PROGRESS NOTE:         HISTORY       CC:  \"selectively mute\"  HISTORY OF PRESENT ILLNESS/INTERVAL HISTORY:  (reviewed/updated 1/8/2018). per initial evaluation: The patient, Conor Cabrera, is a 49 y.o. WHITE OR  male with a past psychiatric history significant for schizophrenia, who presents at this time with complaints of (and/or evidence of) the following emotional symptoms: psychotic behavior. Additional symptomatology include paranoid delusion, sexually inappropriate behavior, pepisodes of yelling screaming followed by selectively mute, disorganized thought process, anxiety, concern about health problems, difficulty sleeping, fearfulness, hearing voices, increased irritability, poor concentration and problem with medication. The above symptoms have been present for many years. These symptoms are of severe severity. These symptoms are constant  in nature. The patient's condition has been precipitated by and psychosocial stressors (conflict with sister, non compliance ). Patient's condition made worse by treatment noncompliance. UDS: negative; BAL=0. Conor Cabrera presents/reports/evidences the following emotional symptoms today, 1/8/2018:psychotic behavior. The above symptoms have been present for many years. These symptoms are of severe severity. The symptoms are constant in nature. Additional symptomatology and features include     12/23/17: Patient was sitting on the floor,in a fetal position,appears disheveled,poor hygiene,mute ,did not engage in conversation,margaret spencere. 12/24/17:seen today in his room with staff. poor hygiene, unkempt, continues to sit in squatting position on the floor  with head down in to knees,did not engage in any conversation,internally preoccupied,As per staff accepting medications and meals. Received no prn.   12/25/17: Patient was seen in his room, he was laying in bed in a fetal position, did not engage in any conversation,poor hygiene, odd posture, eats his meals. Has not shown much improvement with current medications. Treatment team is working on to get ECT. 12/26/17: Not progressing, no significant change noted,laying on the floor, fetal position, half naked,mute ,catatonic, Poor hygiene,accepts medications and meals. treatment team has requested judicial consent for  ECT which is pending,he has received ECT treatment at [facility] in the past.   12/27/17: Seen today in his room, no significant change,sitting on the floor,in fetal position,mute catatonic,did not engage in any conversation. Staff reports that he eats his meals and takes his med,he resist to have  lab work and vital signs.  received fee back from [facility] reported that \"patient received 17 ECT treatments were given to pt in August 2016 and  pt did not improve\". 12/28- no sig change in presentation. Poor hygiene, odd and inappropriate posturing. 12/29- seen in the hallway, more alert today and talked rich  Soft voice, remains preoccupied, delusional with poor hygiene. Was co operative with labs today  12/30/17: seen in his room today with RN,staff reported accepting medications and meals, still remains selectively mute, only brief interaction with staff,odd and inappropriate posture. remains preoccupied. 12/31/17: Seen today in his room,inappropriate posturing continues,mute, only spoke one word to RN,acceptign medications and eating his meals. No significant change noted. 1/1/18- initially seen in odd (praying)  position but did respond to command and pt was more talkative.  He remains disorganized- talking of \" couple of nuns and daughters\" when asked about where he will be living. Poor hygiene. Accepting med  1/2/18- no sig change, ct to present with poor hygiene. Delusional and odd posturing. 1/3- remains preoccupied with odd posturing ( fetal position), he did change his position on request. Talked in soft voice, poor hygiene, refused his Depakote last night  1/4- no sig change- refusing labs, isolative, poor hygiene, lying in bed in a fetal position. Eating well  1/5- ct to refuse labs for clozapine, passive aggressive and tend to sit in praying position when upset. Poor insight . No agitation  1/6-Isolating, selectively mute, staying in his room in praying position. Minimal activity. PT consult ordered. Consider ECT.  1/7- Found kneeling down on his bed with his pants down. Did not respond to any questions. Remains disorganized with poor hygiene. 1/8- poor hygiene, disheveled, was found praying but able to sit up and talk to the team- spoke in a soft voice. Still disorganized and delusional. Accepting med      SIDE EFFECTS: (reviewed/updated 1/8/2018)  None reported or admitted to. No noted toxicity with use of current med   ALLERGIES:(reviewed/updated 1/8/2018)  Allergies   Allergen Reactions    Fluphenazine Unknown (comments)     Pt is unable to communicate properly.  Penicillins Unknown (comments)     Pt is unable to communicate properly. MEDICATIONS PRIOR TO ADMISSION:(reviewed/updated 1/8/2018)  Prescriptions Prior to Admission   Medication Sig    divalproex ER (DEPAKOTE ER) 500 mg ER tablet Take 1,500 mg by mouth nightly. Indications: Treatment-resistant schizophrenia    haloperidol decanoate (HALDOL DECANOATE) 100 mg/mL injection 2 mL by IntraMUSCular route every twenty-eight (28) days. Indications: SCHIZOPHRENIA    benztropine (COGENTIN) 2 mg tablet Take 1 Tab by mouth nightly. Indications: extrapyramidal disease    cloZAPine 200 mg tablet Take 600 mg by mouth nightly.  Indications: TREATMENT-RESISTANT SCHIZOPHRENIA      PAST MEDICAL HISTORY: Past medical history from the initial psychiatric evaluation has been reviewed (reviewed/updated 1/8/2018) with no additional updates (I asked patient and no additional past medical history provided). Past Medical History:   Diagnosis Date    Psychiatric disorder     Psychotic disorder     Withdrawal syndrome ([facility] [address]    No past surgical history on file. SOCIAL HISTORY: Social history from the initial psychiatric evaluation has been reviewed (reviewed/updated 1/8/2018) with no additional updates (I asked patient and no additional social history provided). Social History     Social History    Marital status: SINGLE     Spouse name: N/A    Number of children: N/A    Years of education: N/A     Occupational History    Not on file. 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 He lives with his sister. On disability    No pending legal charge reported      FAMILY HISTORY: Family history from the initial psychiatric evaluation has been reviewed (reviewed/updated 1/8/2018) with no additional updates (I asked patient and no additional family history provided). No family history on file.     REVIEW OF SYSTEMS: (reviewed/updated 1/8/2018)  Appetite:no change from normal   Sleep: fitful   All other Review of Systems: Psychological ROS: positive for - behavioral disorder, concentration difficulties, hallucinations, irritability, mood swings and delusion  Respiratory ROS: no cough, shortness of breath, or wheezing  Cardiovascular ROS: no chest pain or dyspnea on exertion         [facility] (MSE):    MSE FINDINGS ARE WITHIN NORMAL LIMITS (WNL) UNLESS OTHERWISE STATED BELOW. ( ALL OF THE BELOW CATEGORIES OF THE MSE HAVE BEEN REVIEWED (reviewed 1/8/2018) AND UPDATED AS DEEMED APPROPRIATE )  General Presentation age appropriate and disheveled, uncooperative and unreliable   Orientation disorganized   Vital Signs  See below (reviewed 1/8/2018); Vital Signs (BP, Pulse, & Temp) are within normal limits if not listed below. Gait and Station Stable/steady, no ataxia   Musculoskeletal System No extrapyramidal symptoms (EPS); no abnormal muscular movements or Tardive Dyskinesia (TD); muscle strength and tone are within normal limits   Language No aphasia or dysarthria   Speech:  mute   Thought Processes illogical; slow rate of thoughts; poor abstract reasoning/computation   Thought Associations blocked    Thought Content paranoid delusions, bizarre delusions, auditory hallucinations and internally preoccupied   Suicidal Ideations none   Homicidal Ideations none   Mood:  Labile mood   Affect:  Constricted, labile inappropriate and increased in intensity   Memory recent  impaired   Memory remote:  intact   Concentration/Attention:  distractable and hypervigilance   Fund of Knowledge average   Insight:  limited   Reliability poor   Judgment:  limited          VITALS:     No data found. Wt Readings from Last 3 Encounters:   12/23/17 84.9 kg (187 lb 1.6 oz)   03/25/17 95.6 kg (210 lb 11.2 oz)   02/09/15 97.5 kg (215 lb)     Temp Readings from Last 3 Encounters:   01/07/18 97.7 °F (36.5 °C)   03/28/17 97.5 °F (36.4 °C)   02/07/15 98.7 °F (37.1 °C)     BP Readings from Last 3 Encounters:   01/07/18 118/84   03/28/17 100/68   02/12/15 105/73     Pulse Readings from Last 3 Encounters:   01/07/18 71   03/28/17 67   02/12/15 87            DATA     LABORATORY DATA:(reviewed/updated 1/8/2018)  No results found for this or any previous visit (from the past 24 hour(s)). Lab Results   Component Value Date/Time    Valproic acid 69 12/29/2017 04:43 AM     No results found for: LITHM   RADIOLOGY REPORTS:(reviewed/updated 1/8/2018)  No results found.        MEDICATIONS     ALL MEDICATIONS: SCHEDULED MEDICATIONS:          ASSESSMENT & PLAN     DIAGNOSES REQUIRING ACTIVE TREATMENT AND MONITORING: (reviewed/updated 1/8/2018)  Patient Active Hospital Problem List:   Paranoid schizophrenia ([facility] [address] (3/15/2017)- tx resistant, chronic and with negative sx    Assessment:minimal change- psychosis, negative sx+. Poor baseline per hx. Has trial of ECT at [facility] and report suggest he did not do well. Clozapine trial was stopped as pt has been refusing labs/vital. He is accepting po med       Plan: I will ct to adjust med- Zydis, Depakote and Prozac  Consider long term hospitalization- sw to discuss with [facility] for possible transfer. Report suggest pt has done well at [facility] in the past.          Patient is on two antipsychotics now due to the relative refractoriness to treatment thus far. Prior to admission patient had THREE or more failed trails of monotherapy, including: Haldol, Zyprexa, Risperdal and Clozapine. The patient is a very slow responder to medications. Risks and benefits in the use of two antipsychotics have been weighed in full, including the risk of metabolic syndrome and the potential increased risk of QTc  Based on this analysis, it is considered favorable for the utilization of two antipsychotics. In the future, once stable on the two antipsychotics, patient can possibly be tapered to one of the chosen antipsychotics. Will check on EKG results today to monitor QTc.- refused EKG again        I will continue to monitor blood levels (Depakote,, clozapine---a drug with a narrow therapeutic index= NTI) and associated labs for drug therapy implemented that require intense monitoring for toxicity as deemed appropriate based on current medication side effects and pharmacodynamically determined drug 1/2 lives. -    In summary, Amina Joe, is a 49 y.o.  male who presents with a severe exacerbation of the principal diagnosis of [facility]  Patient's condition is worsening/not improving/not stable . Patient requires continued inpatient hospitalization for further stabilization, safety monitoring and medication management. I will continue to coordinate the provision of individual, milieu, occupational, group, and substance abuse therapies to address target symptoms/diagnoses as deemed appropriate for the individual patient. A coordinated, multidisplinary treatment team round was conducted with the patient (this team consists of the nurse, psychiatric unit pharmcist,  and writer). Complete current electronic health record for patient has been reviewed today including consultant notes, ancillary staff notes, nurses and psychiatric tech notes. Suicide risk assessment completed and patient deemed to be of low risk for suicide at this time. The following regarding medications was addressed during rounds with patient:   the risks and benefits of the proposed medication. The patient was given the opportunity to ask questions. Informed consent given to the use of the above medications. Will continue to adjust psychiatric and non-psychiatric medications (see above \"medication\" section and orders section for details) as deemed appropriate & based upon diagnoses and response to treatment. I will continue to order blood tests/labs and diagnostic tests as deemed appropriate and review results as they become available (see orders for details and above listed lab/test results). I will order psychiatric records from previous [facility] hospitals to further elucidate the nature of patient's psychopathology and review once available. I will gather additional collateral information from friends, family and o/p treatment team to further elucidate the nature of patient's psychopathology and baselline level of psychiatric functioning.          I certify that this patient's inpatient psychiatric hospital services furnished since the previous certification were, and continue to be, required for treatment that could reasonably be expected to improve the patient's condition, or for diagnostic study, and that the patient continues to need, on a daily basis, active treatment furnished directly by or requiring the supervision of inpatient psychiatric facility personnel. In addition, the hospital records show that services furnished were intensive treatment services, admission or related services, or equivalent services.     EXPECTED DISCHARGE DATE/DAY: TBD     DISPOSITION: Home/long term hospitalization       Signed By:   Mariela Hayes MD  1/8/2018

## 2018-01-08 NOTE — PROGRESS NOTES
Problem: Altered Thought Process (Adult/Pediatric)  Goal: *STG: Remains safe in hospital  Outcome: Progressing Towards Goal  Pt slept 6 hours. Pt had uneventful night and required no PRN's. Pt had no complaints or signs of distress throughout night. Respirations were unlabored. Continuing to monitor with q15 min rounds for safety.

## 2018-01-09 PROCEDURE — 65220000001 HC RM PRIVATE PSYCH

## 2018-01-09 PROCEDURE — 74011250637 HC RX REV CODE- 250/637: Performed by: PSYCHIATRY & NEUROLOGY

## 2018-01-09 RX ADMIN — VALPROIC ACID 750 MG: 250 SOLUTION ORAL at 21:27

## 2018-01-09 RX ADMIN — LORAZEPAM 0.5 MG: 0.5 TABLET ORAL at 17:32

## 2018-01-09 RX ADMIN — VALPROIC ACID 750 MG: 250 SOLUTION ORAL at 09:19

## 2018-01-09 RX ADMIN — OLANZAPINE 20 MG: 5 TABLET, ORALLY DISINTEGRATING ORAL at 21:27

## 2018-01-09 RX ADMIN — FLUOXETINE 40 MG: 20 CAPSULE ORAL at 09:19

## 2018-01-09 RX ADMIN — BENZTROPINE MESYLATE 2 MG: 2 TABLET ORAL at 21:27

## 2018-01-09 RX ADMIN — LORAZEPAM 0.5 MG: 0.5 TABLET ORAL at 09:19

## 2018-01-09 NOTE — BH NOTES
Pt was seen in treatment team this morning in his room.  Pt was in bed with his eyes closed but conversed with Dr. Mitch Adams about plan this week.   Pt is accepting medications and leaving his room to eat meals. Pt's hygiene remains poor and plan is for him to shower today. Pt's insight is poor. This  spoke to [address] [place] [address] [phone] who will visit patient on Thursday afternoon. This  recommended that CM discuss with [facility] supervisor the possibility of more intense community services for patient to provide structure in his day.

## 2018-01-09 NOTE — BH NOTES
PSYCHIATRIC PROGRESS NOTE         Patient Name  Arpita Baumann   Date of Birth 1970   [facility] [id number]   Medical Record Number  [id number]      Age  49 y.o. PCP PROVIDER UNKNOWN   Admit date:  11/25/2017    Room Number  307/01  @ [facility]   Date of Service  1/9/2018           E & M PROGRESS NOTE:         HISTORY       CC:  \"selectively mute\"  HISTORY OF PRESENT ILLNESS/INTERVAL HISTORY:  (reviewed/updated 1/9/2018). per initial evaluation: The patient, Arpita Baumann, is a 49 y.o. WHITE OR  male with a past psychiatric history significant for schizophrenia, who presents at this time with complaints of (and/or evidence of) the following emotional symptoms: psychotic behavior. Additional symptomatology include paranoid delusion, sexually inappropriate behavior, pepisodes of yelling screaming followed by selectively mute, disorganized thought process, anxiety, concern about health problems, difficulty sleeping, fearfulness, hearing voices, increased irritability, poor concentration and problem with medication. The above symptoms have been present for many years. These symptoms are of severe severity. These symptoms are constant  in nature. The patient's condition has been precipitated by and psychosocial stressors (conflict with sister, non compliance ). Patient's condition made worse by treatment noncompliance. UDS: negative; BAL=0. Arpita Baumann presents/reports/evidences the following emotional symptoms today, 1/9/2018:psychotic behavior. The above symptoms have been present for many years. These symptoms are of severe severity. The symptoms are constant in nature. Additional symptomatology and features include     12/23/17: Patient was sitting on the floor,in a fetal position,appears disheveled,poor hygiene,mute ,did not engage in conversation,margaret johansen. 12/24/17:seen today in his room with staff. poor hygiene, unkempt, continues to sit in squatting position on the floor  with head down in to knees,did not engage in any conversation,internally preoccupied,As per staff accepting medications and meals. Received no prn.   12/25/17: Patient was seen in his room, he was laying in bed in a fetal position, did not engage in any conversation,poor hygiene, odd posture, eats his meals. Has not shown much improvement with current medications. Treatment team is working on to get ECT. 12/26/17: Not progressing, no significant change noted,laying on the floor, fetal position, half naked,mute ,catatonic, Poor hygiene,accepts medications and meals. treatment team has requested judicial consent for  ECT which is pending,he has received ECT treatment at [facility] in the past.   12/27/17: Seen today in his room, no significant change,sitting on the floor,in fetal position,mute catatonic,did not engage in any conversation. Staff reports that he eats his meals and takes his med,he resist to have  lab work and vital signs.  received fee back from [facility] reported that \"patient received 17 ECT treatments were given to pt in August 2016 and  pt did not improve\". 12/28- no sig change in presentation. Poor hygiene, odd and inappropriate posturing. 12/29- seen in the hallway, more alert today and talked rich  Soft voice, remains preoccupied, delusional with poor hygiene. Was co operative with labs today  12/30/17: seen in his room today with RN,staff reported accepting medications and meals, still remains selectively mute, only brief interaction with staff,odd and inappropriate posture. remains preoccupied. 12/31/17: Seen today in his room,inappropriate posturing continues,mute, only spoke one word to RN,acceptign medications and eating his meals. No significant change noted. 1/1/18- initially seen in odd (praying)  position but did respond to command and pt was more talkative.  He remains disorganized- talking of \" couple of nuns and daughters\" when asked about where he will be living. Poor hygiene. Accepting med  1/2/18- no sig change, ct to present with poor hygiene. Delusional and odd posturing. 1/3- remains preoccupied with odd posturing ( fetal position), he did change his position on request. Talked in soft voice, poor hygiene, refused his Depakote last night  1/4- no sig change- refusing labs, isolative, poor hygiene, lying in bed in a fetal position. Eating well  1/5- ct to refuse labs for clozapine, passive aggressive and tend to sit in praying position when upset. Poor insight . No agitation  1/6-Isolating, selectively mute, staying in his room in praying position. Minimal activity. PT consult ordered. Consider ECT.  1/7- Found kneeling down on his bed with his pants down. Did not respond to any questions. Remains disorganized with poor hygiene. 1/8- poor hygiene, disheveled, was found praying but able to sit up and talk to the team- spoke in a soft voice. Still disorganized and delusional. Accepting med  1/9- able to have conversation with pt- kept his eyes closed but no odd behavior. Responds in monotone. Accepting med. Poor insight, disheveled      SIDE EFFECTS: (reviewed/updated 1/9/2018)  None reported or admitted to. No noted toxicity with use of current med   ALLERGIES:(reviewed/updated 1/9/2018)  Allergies   Allergen Reactions    Fluphenazine Unknown (comments)     Pt is unable to communicate properly.  Penicillins Unknown (comments)     Pt is unable to communicate properly. MEDICATIONS PRIOR TO ADMISSION:(reviewed/updated 1/9/2018)  Prescriptions Prior to Admission   Medication Sig    divalproex ER (DEPAKOTE ER) 500 mg ER tablet Take 1,500 mg by mouth nightly. Indications: Treatment-resistant schizophrenia    haloperidol decanoate (HALDOL DECANOATE) 100 mg/mL injection 2 mL by IntraMUSCular route every twenty-eight (28) days. Indications: SCHIZOPHRENIA    benztropine (COGENTIN) 2 mg tablet Take 1 Tab by mouth nightly.  Indications: extrapyramidal disease    cloZAPine 200 mg tablet Take 600 mg by mouth nightly. Indications: TREATMENT-RESISTANT SCHIZOPHRENIA      PAST MEDICAL HISTORY: Past medical history from the initial psychiatric evaluation has been reviewed (reviewed/updated 1/9/2018) with no additional updates (I asked patient and no additional past medical history provided). Past Medical History:   Diagnosis Date    Psychiatric disorder     Psychotic disorder     Withdrawal syndrome ([facility] [address]    No past surgical history on file. SOCIAL HISTORY: Social history from the initial psychiatric evaluation has been reviewed (reviewed/updated 1/9/2018) with no additional updates (I asked patient and no additional social history provided). Social History     Social History    Marital status: SINGLE     Spouse name: N/A    Number of children: N/A    Years of education: N/A     Occupational History    Not on file. 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 He lives with his sister. On disability    No pending legal charge reported      FAMILY HISTORY: Family history from the initial psychiatric evaluation has been reviewed (reviewed/updated 1/9/2018) with no additional updates (I asked patient and no additional family history provided). No family history on file.     REVIEW OF SYSTEMS: (reviewed/updated 1/9/2018)  Appetite:no change from normal   Sleep: fitful   All other Review of Systems: Psychological ROS: positive for - behavioral disorder, concentration difficulties, hallucinations, irritability, mood swings and delusion  Respiratory ROS: no cough, shortness of breath, or wheezing  Cardiovascular ROS: no chest pain or dyspnea on exertion         [facility] ([facility]):    [facility] FINDINGS ARE WITHIN NORMAL LIMITS (WNL) UNLESS OTHERWISE STATED BELOW. ( ALL OF THE BELOW CATEGORIES OF THE MSE HAVE BEEN REVIEWED (reviewed 1/9/2018) AND UPDATED AS DEEMED APPROPRIATE )  General Presentation age appropriate and disheveled, uncooperative and unreliable   Orientation disorganized   Vital Signs  See below (reviewed 1/9/2018); Vital Signs (BP, Pulse, & Temp) are within normal limits if not listed below. Gait and Station Stable/steady, no ataxia   Musculoskeletal System No extrapyramidal symptoms (EPS); no abnormal muscular movements or Tardive Dyskinesia (TD); muscle strength and tone are within normal limits   Language No aphasia or dysarthria   Speech:  monotone   Thought Processes illogical; slow rate of thoughts; poor abstract reasoning/computation   Thought Associations blocked    Thought Content paranoid delusions, bizarre delusions, auditory hallucinations and internally preoccupied   Suicidal Ideations none   Homicidal Ideations none   Mood:  Labile mood   Affect:  Constricted, labile inappropriate and increased in intensity   Memory recent  impaired   Memory remote:  intact   Concentration/Attention:  distractable and hypervigilance   Fund of Knowledge average   Insight:  limited   Reliability poor   Judgment:  limited          VITALS:     No data found. Wt Readings from Last 3 Encounters:   12/23/17 84.9 kg (187 lb 1.6 oz)   03/25/17 95.6 kg (210 lb 11.2 oz)   02/09/15 97.5 kg (215 lb)     Temp Readings from Last 3 Encounters:   03/28/17 97.5 °F (36.4 °C)   02/07/15 98.7 °F (37.1 °C)     BP Readings from Last 3 Encounters:   03/28/17 100/68   02/12/15 105/73   02/07/15 148/79     Pulse Readings from Last 3 Encounters:   01/07/18 71   03/28/17 67   02/12/15 87            DATA     LABORATORY DATA:(reviewed/updated 1/9/2018)  No results found for this or any previous visit (from the past 24 hour(s)).   Lab Results   Component Value Date/Time    Valproic acid 69 12/29/2017 04:43 AM     No results found for: LITHM   RADIOLOGY REPORTS:(reviewed/updated 1/9/2018)  No results found. MEDICATIONS     ALL MEDICATIONS:      SCHEDULED MEDICATIONS:          ASSESSMENT & PLAN     DIAGNOSES REQUIRING ACTIVE TREATMENT AND MONITORING: (reviewed/updated 1/9/2018)  Patient Active Hospital Problem List:   Paranoid schizophrenia ([facility] [address] (3/15/2017)- tx resistant, chronic and with negative sx    Assessment:minimal change- psychosis, negative sx+. Poor baseline per hx. Has trial of ECT at [facility] and report suggest he did not do well. Clozapine trial was stopped as pt has been refusing labs/vital. He is accepting po med       Plan: I will ct to adjust med- Zydis, Depakote and Prozac  Consider long term hospitalization- sw to discuss with [facility] for possible transfer. Report suggest pt has done well at [facility] in the past.  1/9- sl better- ct with current tx        Patient is on two antipsychotics now due to the relative refractoriness to treatment thus far. Prior to admission patient had THREE or more failed trails of monotherapy, including: Haldol, Zyprexa, Risperdal and Clozapine. The patient is a very slow responder to medications. Risks and benefits in the use of two antipsychotics have been weighed in full, including the risk of metabolic syndrome and the potential increased risk of QTc  Based on this analysis, it is considered favorable for the utilization of two antipsychotics. In the future, once stable on the two antipsychotics, patient can possibly be tapered to one of the chosen antipsychotics. Will check on EKG results today to monitor QTc.- refused EKG again        I will continue to monitor blood levels (Depakote,, clozapine---a drug with a narrow therapeutic index= NTI) and associated labs for drug therapy implemented that require intense monitoring for toxicity as deemed appropriate based on current medication side effects and pharmacodynamically determined drug 1/2 lives. -    In summary, Diana Coronado, is a 49 y.o. male who presents with a severe exacerbation of the principal diagnosis of Paranoid schizophrenia ([facility] [address]  Patient's condition is worsening/not improving/not stable . Patient requires continued inpatient hospitalization for further stabilization, safety monitoring and medication management. I will continue to coordinate the provision of individual, milieu, occupational, group, and substance abuse therapies to address target symptoms/diagnoses as deemed appropriate for the individual patient. A coordinated, multidisplinary treatment team round was conducted with the patient (this team consists of the nurse, psychiatric unit pharmcist,  and writer). Complete current electronic health record for patient has been reviewed today including consultant notes, ancillary staff notes, nurses and psychiatric tech notes. Suicide risk assessment completed and patient deemed to be of low risk for suicide at this time. The following regarding medications was addressed during rounds with patient:   the risks and benefits of the proposed medication. The patient was given the opportunity to ask questions. Informed consent given to the use of the above medications. Will continue to adjust psychiatric and non-psychiatric medications (see above \"medication\" section and orders section for details) as deemed appropriate & based upon diagnoses and response to treatment. I will continue to order blood tests/labs and diagnostic tests as deemed appropriate and review results as they become available (see orders for details and above listed lab/test results). I will order psychiatric records from previous [facility] hospitals to further elucidate the nature of patient's psychopathology and review once available. I will gather additional collateral information from friends, family and o/p treatment team to further elucidate the nature of patient's psychopathology and baselline level of psychiatric functioning. I certify that this patient's inpatient psychiatric hospital services furnished since the previous certification were, and continue to be, required for treatment that could reasonably be expected to improve the patient's condition, or for diagnostic study, and that the patient continues to need, on a daily basis, active treatment furnished directly by or requiring the supervision of inpatient psychiatric facility personnel. In addition, the hospital records show that services furnished were intensive treatment services, admission or related services, or equivalent services.     EXPECTED DISCHARGE DATE/DAY: TBD     DISPOSITION: Home/long term hospitalization       Signed By:   Ana Mendez MD  1/9/2018

## 2018-01-09 NOTE — PROGRESS NOTES
Problem: Altered Thought Process (Adult/Pediatric)  Goal: *STG: Remains safe in hospital  Outcome: Progressing Towards Goal  Pt alert to self. Visible in the dayroom for meals. Compliant with medications. More verbal.Poor hygiene. Disheveled. Affect flat. Will continue to monitor pt q15 minutes and assess needs.

## 2018-01-09 NOTE — BH NOTES
GROUP THERAPY PROGRESS NOTE    Maynor Montemayor is participating in [place].      Group time: 30 minutes    Personal goal for participation: daily orientation    Goal orientation: personal    Group therapy participation: active    Therapeutic interventions reviewed and discussed: yes    Impression of participation: Attend    Rojas Berry  1/9/2018

## 2018-01-10 PROCEDURE — 74011250637 HC RX REV CODE- 250/637: Performed by: PSYCHIATRY & NEUROLOGY

## 2018-01-10 PROCEDURE — 65220000001 HC RM PRIVATE PSYCH

## 2018-01-10 RX ADMIN — VALPROIC ACID 750 MG: 250 SOLUTION ORAL at 08:06

## 2018-01-10 RX ADMIN — FLUOXETINE 40 MG: 20 CAPSULE ORAL at 08:06

## 2018-01-10 RX ADMIN — VALPROIC ACID 750 MG: 250 SOLUTION ORAL at 21:04

## 2018-01-10 RX ADMIN — LORAZEPAM 0.5 MG: 0.5 TABLET ORAL at 08:07

## 2018-01-10 RX ADMIN — OLANZAPINE 20 MG: 5 TABLET, ORALLY DISINTEGRATING ORAL at 21:04

## 2018-01-10 RX ADMIN — LORAZEPAM 0.5 MG: 0.5 TABLET ORAL at 17:14

## 2018-01-10 RX ADMIN — BENZTROPINE MESYLATE 2 MG: 2 TABLET ORAL at 21:04

## 2018-01-10 NOTE — BH NOTES
PSYCHIATRIC PROGRESS NOTE         Patient Name  Emil Cisneros   Date of Birth 1970   [facility] [id number]   Medical Record Number  [id number]      Age  49 y.o. PCP PROVIDER UNKNOWN   Admit date:  11/25/2017    Room Number  307/01  @ [facility]   Date of Service  1/10/2018           E & M PROGRESS NOTE:         HISTORY       CC:  \"selectively mute\"  HISTORY OF PRESENT ILLNESS/INTERVAL HISTORY:  (reviewed/updated 1/10/2018). per initial evaluation: The patient, Emil Cisneros, is a 49 y.o. WHITE OR  male with a past psychiatric history significant for schizophrenia, who presents at this time with complaints of (and/or evidence of) the following emotional symptoms: psychotic behavior. Additional symptomatology include paranoid delusion, sexually inappropriate behavior, pepisodes of yelling screaming followed by selectively mute, disorganized thought process, anxiety, concern about health problems, difficulty sleeping, fearfulness, hearing voices, increased irritability, poor concentration and problem with medication. The above symptoms have been present for many years. These symptoms are of severe severity. These symptoms are constant  in nature. The patient's condition has been precipitated by and psychosocial stressors (conflict with sister, non compliance ). Patient's condition made worse by treatment noncompliance. UDS: negative; BAL=0. Emil Cisneros presents/reports/evidences the following emotional symptoms today, 1/10/2018:psychotic behavior. The above symptoms have been present for many years. These symptoms are of severe severity. The symptoms are constant in nature. Additional symptomatology and features include   1/2/18- no sig change, ct to present with poor hygiene. Delusional and odd posturing.   1/3- remains preoccupied with odd posturing ( fetal position), he did change his position on request. Talked in soft voice, poor hygiene, refused his Depakote last night  1/4- no sig change- refusing labs, isolative, poor hygiene, lying in bed in a fetal position. Eating well  1/5- ct to refuse labs for clozapine, passive aggressive and tend to sit in praying position when upset. Poor insight . No agitation  1/6-Isolating, selectively mute, staying in his room in praying position. Minimal activity. PT consult ordered. Consider ECT.  1/7- Found kneeling down on his bed with his pants down. Did not respond to any questions. Remains disorganized with poor hygiene. 1/8- poor hygiene, disheveled, was found praying but able to sit up and talk to the team- spoke in a soft voice. Still disorganized and delusional. Accepting med  1/9- able to have conversation with pt- kept his eyes closed but no odd behavior. Responds in monotone. Accepting med. Poor insight, disheveled  1/10- accepting med, able to come out for his meals, still evidence of poor hygiene. Need lot of prompting. Disorganized thought but no agitation      SIDE EFFECTS: (reviewed/updated 1/10/2018)  None reported or admitted to. No noted toxicity with use of current med   ALLERGIES:(reviewed/updated 1/10/2018)  Allergies   Allergen Reactions    Fluphenazine Unknown (comments)     Pt is unable to communicate properly.  Penicillins Unknown (comments)     Pt is unable to communicate properly. MEDICATIONS PRIOR TO ADMISSION:(reviewed/updated 1/10/2018)  Prescriptions Prior to Admission   Medication Sig    divalproex ER (DEPAKOTE ER) 500 mg ER tablet Take 1,500 mg by mouth nightly. Indications: Treatment-resistant schizophrenia    haloperidol decanoate (HALDOL DECANOATE) 100 mg/mL injection 2 mL by IntraMUSCular route every twenty-eight (28) days. Indications: SCHIZOPHRENIA    benztropine (COGENTIN) 2 mg tablet Take 1 Tab by mouth nightly. Indications: extrapyramidal disease    cloZAPine 200 mg tablet Take 600 mg by mouth nightly.  Indications: TREATMENT-RESISTANT SCHIZOPHRENIA      PAST MEDICAL HISTORY: Past medical history from the initial psychiatric evaluation has been reviewed (reviewed/updated 1/10/2018) with no additional updates (I asked patient and no additional past medical history provided). Past Medical History:   Diagnosis Date    Psychiatric disorder     Psychotic disorder     Withdrawal syndrome ([facility] [address]    No past surgical history on file. SOCIAL HISTORY: Social history from the initial psychiatric evaluation has been reviewed (reviewed/updated 1/10/2018) with no additional updates (I asked patient and no additional social history provided). Social History     Social History    Marital status: SINGLE     Spouse name: N/A    Number of children: N/A    Years of education: N/A     Occupational History    Not on file. 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 He lives with his sister. On disability    No pending legal charge reported      FAMILY HISTORY: Family history from the initial psychiatric evaluation has been reviewed (reviewed/updated 1/10/2018) with no additional updates (I asked patient and no additional family history provided). No family history on file.     REVIEW OF SYSTEMS: (reviewed/updated 1/10/2018)  Appetite:no change from normal   Sleep: fitful   All other Review of Systems: Psychological ROS: positive for - behavioral disorder, concentration difficulties, hallucinations, irritability, mood swings and delusion  Respiratory ROS: no cough, shortness of breath, or wheezing  Cardiovascular ROS: no chest pain or dyspnea on exertion         [facility] (MSE):    MSE FINDINGS ARE WITHIN NORMAL LIMITS (WNL) UNLESS OTHERWISE STATED BELOW. ( ALL OF THE BELOW CATEGORIES OF THE MSE HAVE BEEN REVIEWED (reviewed 1/10/2018) AND UPDATED AS DEEMED APPROPRIATE )  General Presentation age appropriate and disheveled, uncooperative and unreliable   Orientation disorganized   Vital Signs  See below (reviewed 1/10/2018); Vital Signs (BP, Pulse, & Temp) are within normal limits if not listed below. Gait and Station Stable/steady, no ataxia   Musculoskeletal System No extrapyramidal symptoms (EPS); no abnormal muscular movements or Tardive Dyskinesia (TD); muscle strength and tone are within normal limits   Language No aphasia or dysarthria   Speech:  monotone   Thought Processes illogical; slow rate of thoughts; poor abstract reasoning/computation   Thought Associations blocked    Thought Content paranoid delusions, bizarre delusions, auditory hallucinations and internally preoccupied   Suicidal Ideations none   Homicidal Ideations none   Mood:  constricted   Affect:  Constricted, inappropriate and increased in intensity   Memory recent  impaired   Memory remote:  intact   Concentration/Attention:  distractable and hypervigilance   Fund of Knowledge average   Insight:  limited   Reliability poor   Judgment:  limited          VITALS:     No data found. Wt Readings from Last 3 Encounters:   12/23/17 84.9 kg (187 lb 1.6 oz)   03/25/17 95.6 kg (210 lb 11.2 oz)   02/09/15 97.5 kg (215 lb)     Temp Readings from Last 3 Encounters:   03/28/17 97.5 °F (36.4 °C)   02/07/15 98.7 °F (37.1 °C)     BP Readings from Last 3 Encounters:   03/28/17 100/68   02/12/15 105/73   02/07/15 148/79     Pulse Readings from Last 3 Encounters:   03/28/17 67   02/12/15 87   02/07/15 (!) 111            DATA     LABORATORY DATA:(reviewed/updated 1/10/2018)  No results found for this or any previous visit (from the past 24 hour(s)). Lab Results   Component Value Date/Time    Valproic acid 69 12/29/2017 04:43 AM     No results found for: LITHM   RADIOLOGY REPORTS:(reviewed/updated 1/10/2018)  No results found.        MEDICATIONS     ALL MEDICATIONS:      SCHEDULED MEDICATIONS:          ASSESSMENT & PLAN     DIAGNOSES REQUIRING ACTIVE TREATMENT AND MONITORING: (reviewed/updated 1/10/2018)  Patient Active Hospital Problem List:   Paranoid schizophrenia ([facility] [address] (3/15/2017)- tx resistant, chronic and with negative sx    Assessment:minimal change- psychosis, negative sx+. Poor baseline per hx. Has trial of ECT at [facility] and report suggest he did not do well. Clozapine trial was stopped as pt has been refusing labs/vital. He is accepting po med       Plan: I will ct to adjust med- Zydis, Depakote and Prozac  Consider long term hospitalization- sw to discuss with [facility] for possible transfer. Report suggest pt has done well at [facility] in the past.  1/9- sl better- ct with current tx  1/10- ct with current tx- encourage to take shower/ hygiene. Behavior plan        Patient is on two antipsychotics now due to the relative refractoriness to treatment thus far. Prior to admission patient had THREE or more failed trails of monotherapy, including: Haldol, Zyprexa, Risperdal and Clozapine. The patient is a very slow responder to medications. Risks and benefits in the use of two antipsychotics have been weighed in full, including the risk of metabolic syndrome and the potential increased risk of QTc  Based on this analysis, it is considered favorable for the utilization of two antipsychotics. In the future, once stable on the two antipsychotics, patient can possibly be tapered to one of the chosen antipsychotics. Will check on EKG results today to monitor QTc.- refused EKG again        I will continue to monitor blood levels (Depakote,, clozapine---a drug with a narrow therapeutic index= NTI) and associated labs for drug therapy implemented that require intense monitoring for toxicity as deemed appropriate based on current medication side effects and pharmacodynamically determined drug 1/2 lives. -    In summary, Arpita Baumann, is a 49 y.o.  male who presents with a severe exacerbation of the principal diagnosis of Paranoid schizophrenia ([facility] [address]  Patient's condition is worsening/not improving/not stable . Patient requires continued inpatient hospitalization for further stabilization, safety monitoring and medication management. I will continue to coordinate the provision of individual, milieu, occupational, group, and substance abuse therapies to address target symptoms/diagnoses as deemed appropriate for the individual patient. A coordinated, multidisplinary treatment team round was conducted with the patient (this team consists of the nurse, psychiatric unit pharmcist,  and writer). Complete current electronic health record for patient has been reviewed today including consultant notes, ancillary staff notes, nurses and psychiatric tech notes. Suicide risk assessment completed and patient deemed to be of low risk for suicide at this time. The following regarding medications was addressed during rounds with patient:   the risks and benefits of the proposed medication. The patient was given the opportunity to ask questions. Informed consent given to the use of the above medications. Will continue to adjust psychiatric and non-psychiatric medications (see above \"medication\" section and orders section for details) as deemed appropriate & based upon diagnoses and response to treatment. I will continue to order blood tests/labs and diagnostic tests as deemed appropriate and review results as they become available (see orders for details and above listed lab/test results). I will order psychiatric records from previous [facility] hospitals to further elucidate the nature of patient's psychopathology and review once available. I will gather additional collateral information from friends, family and o/p treatment team to further elucidate the nature of patient's psychopathology and baselline level of psychiatric functioning.          I certify that this patient's inpatient psychiatric hospital services furnished since the previous certification were, and continue to be, required for treatment that could reasonably be expected to improve the patient's condition, or for diagnostic study, and that the patient continues to need, on a daily basis, active treatment furnished directly by or requiring the supervision of inpatient psychiatric facility personnel. In addition, the hospital records show that services furnished were intensive treatment services, admission or related services, or equivalent services.     EXPECTED DISCHARGE DATE/DAY: TBD     DISPOSITION: Home/long term hospitalization       Signed By:   Saintclair Estelle, MD  1/10/2018

## 2018-01-10 NOTE — PROGRESS NOTES
Problem: Altered Thought Process (Adult/Pediatric)  Goal: *STG: Seeks staff when feelings of anxiety and fear arise  Outcome: Progressing Towards Goal  Patient remains more vocal today. Patient took medications. Meal complaint. Showered today. encouraged to attend groups. Will continue to monitor patient and assess needs.

## 2018-01-10 NOTE — PROGRESS NOTES
Problem: Altered Thought Process (Adult/Pediatric)  Goal: *STG: Complies with medication therapy  Outcome: Progressing Towards Goal  Pt remains w/d to room, currently kneeling on bed. He doesn't respond when approached by this staff. Ate dinner in room this evening per BHT. Pt makes no eye contact. Affect is flat, mood is guarded. Hygiene is poor, independent in poor ADLs and does not respond to encouragement. No evidence of intent to harm self or others. Will continue to monitor pt with q 15 min checks.

## 2018-01-11 PROCEDURE — 74011250637 HC RX REV CODE- 250/637: Performed by: PSYCHIATRY & NEUROLOGY

## 2018-01-11 PROCEDURE — 65220000001 HC RM PRIVATE PSYCH

## 2018-01-11 RX ADMIN — LORAZEPAM 0.5 MG: 0.5 TABLET ORAL at 08:20

## 2018-01-11 RX ADMIN — FLUOXETINE 40 MG: 20 CAPSULE ORAL at 08:20

## 2018-01-11 RX ADMIN — BENZTROPINE MESYLATE 2 MG: 2 TABLET ORAL at 21:02

## 2018-01-11 RX ADMIN — OLANZAPINE 20 MG: 5 TABLET, ORALLY DISINTEGRATING ORAL at 21:02

## 2018-01-11 RX ADMIN — VALPROIC ACID 750 MG: 250 SOLUTION ORAL at 08:20

## 2018-01-11 RX ADMIN — VALPROIC ACID 750 MG: 250 SOLUTION ORAL at 20:57

## 2018-01-11 RX ADMIN — LORAZEPAM 0.5 MG: 0.5 TABLET ORAL at 16:47

## 2018-01-11 NOTE — BH NOTES
PSYCHIATRIC PROGRESS NOTE         Patient Name  Fahad Nolasco   Date of Birth 1970   [facility] [id number]   Medical Record Number  [id number]      Age  49 y.o. PCP PROVIDER UNKNOWN   Admit date:  11/25/2017    Room Number  307/01  @ [facility]   Date of Service  1/11/2018           E & M PROGRESS NOTE:         HISTORY       CC:  \"selectively mute\"  HISTORY OF PRESENT ILLNESS/INTERVAL HISTORY:  (reviewed/updated 1/11/2018). per initial evaluation: The patient, Fahad Nolasco, is a 49 y.o. WHITE [place]  male with a past psychiatric history significant for schizophrenia, who presents at this time with complaints of (and/or evidence of) the following emotional symptoms: psychotic behavior. Additional symptomatology include paranoid delusion, sexually inappropriate behavior, pepisodes of yelling screaming followed by selectively mute, disorganized thought process, anxiety, concern about health problems, difficulty sleeping, fearfulness, hearing voices, increased irritability, poor concentration and problem with medication. The above symptoms have been present for many years. These symptoms are of severe severity. These symptoms are constant  in nature. The patient's condition has been precipitated by and psychosocial stressors (conflict with sister, non compliance ). Patient's condition made worse by treatment noncompliance. UDS: negative; BAL=0. Fahad Nolasco presents/reports/evidences the following emotional symptoms today, 1/11/2018:psychotic behavior. The above symptoms have been present for many years. These symptoms are of severe severity. The symptoms are constant in nature. Additional symptomatology and features include   1/2/18- no sig change, ct to present with poor hygiene. Delusional and odd posturing.   1/3- remains preoccupied with odd posturing ( fetal position), he did change his position on request. Talked in soft voice, poor hygiene, refused his Depakote last night  1/4- no sig change- refusing labs, isolative, poor hygiene, lying in bed in a fetal position. Eating well  1/5- ct to refuse labs for clozapine, passive aggressive and tend to sit in praying position when upset. Poor insight . No agitation  1/6-Isolating, selectively mute, staying in his room in praying position. Minimal activity. PT consult ordered. Consider ECT.  1/7- Found kneeling down on his bed with his pants down. Did not respond to any questions. Remains disorganized with poor hygiene. 1/8- poor hygiene, disheveled, was found praying but able to sit up and talk to the team- spoke in a soft voice. Still disorganized and delusional. Accepting med  1/9- able to have conversation with pt- kept his eyes closed but no odd behavior. Responds in monotone. Accepting med. Poor insight, disheveled  1/10- accepting med, able to come out for his meals, still evidence of poor hygiene. Need lot of prompting. Disorganized thought but no agitation  1/11- pt allowed to staff to shave and take shower, affect is sl better. Hygiene is improved. Accepting med. SIDE EFFECTS: (reviewed/updated 1/11/2018)  None reported or admitted to. No noted toxicity with use of current med   ALLERGIES:(reviewed/updated 1/11/2018)  Allergies   Allergen Reactions    Fluphenazine Unknown (comments)     Pt is unable to communicate properly.  Penicillins Unknown (comments)     Pt is unable to communicate properly. MEDICATIONS PRIOR TO ADMISSION:(reviewed/updated 1/11/2018)  Prescriptions Prior to Admission   Medication Sig    divalproex ER (DEPAKOTE ER) 500 mg ER tablet Take 1,500 mg by mouth nightly. Indications: Treatment-resistant schizophrenia    haloperidol decanoate (HALDOL DECANOATE) 100 mg/mL injection 2 mL by IntraMUSCular route every twenty-eight (28) days. Indications: SCHIZOPHRENIA    benztropine (COGENTIN) 2 mg tablet Take 1 Tab by mouth nightly.  Indications: extrapyramidal disease    cloZAPine 200 mg tablet Take 600 mg by mouth nightly. Indications: TREATMENT-RESISTANT SCHIZOPHRENIA      PAST MEDICAL HISTORY: Past medical history from the initial psychiatric evaluation has been reviewed (reviewed/updated 1/11/2018) with no additional updates (I asked patient and no additional past medical history provided). Past Medical History:   Diagnosis Date    Psychiatric disorder     Psychotic disorder     Withdrawal syndrome ([facility] [address]    No past surgical history on file. SOCIAL HISTORY: Social history from the initial psychiatric evaluation has been reviewed (reviewed/updated 1/11/2018) with no additional updates (I asked patient and no additional social history provided). Social History     Social History    Marital status: SINGLE     Spouse name: N/A    Number of children: N/A    Years of education: N/A     Occupational History    Not on file. 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 He lives with his sister. On disability    No pending legal charge reported      FAMILY HISTORY: Family history from the initial psychiatric evaluation has been reviewed (reviewed/updated 1/11/2018) with no additional updates (I asked patient and no additional family history provided). No family history on file.     REVIEW OF SYSTEMS: (reviewed/updated 1/11/2018)  Appetite:no change from normal   Sleep: fitful   All other Review of Systems: Psychological ROS: positive for - behavioral disorder, concentration difficulties, hallucinations, irritability, mood swings and delusion  Respiratory ROS: no cough, shortness of breath, or wheezing  Cardiovascular ROS: no chest pain or dyspnea on exertion         [address] [facility] ([facility]):    [facility] FINDINGS ARE WITHIN NORMAL LIMITS (WNL) UNLESS OTHERWISE STATED BELOW. ( ALL OF THE BELOW CATEGORIES OF THE MSE HAVE BEEN REVIEWED (reviewed 1/11/2018) AND UPDATED AS DEEMED APPROPRIATE )  General Presentation age appropriate , unreliable and vague   Orientation disorganized   Vital Signs  See below (reviewed 1/11/2018); Vital Signs (BP, Pulse, & Temp) are within normal limits if not listed below. Gait and Station Stable/steady, no ataxia   Musculoskeletal System No extrapyramidal symptoms (EPS); no abnormal muscular movements or Tardive Dyskinesia (TD); muscle strength and tone are within normal limits   Language No aphasia or dysarthria   Speech:  monotone   Thought Processes illogical; slow rate of thoughts; poor abstract reasoning/computation   Thought Associations blocked    Thought Content paranoid delusions, bizarre delusions, auditory hallucinations and internally preoccupied   Suicidal Ideations none   Homicidal Ideations none   Mood:  constricted   Affect:  Constricted, inappropriate and increased in intensity   Memory recent  impaired   Memory remote:  intact   Concentration/Attention:  distractable and hypervigilance   Fund of Knowledge average   Insight:  limited   Reliability poor   Judgment:  limited          VITALS:     No data found. Wt Readings from Last 3 Encounters:   12/23/17 84.9 kg (187 lb 1.6 oz)   03/25/17 95.6 kg (210 lb 11.2 oz)   02/09/15 97.5 kg (215 lb)     Temp Readings from Last 3 Encounters:   03/28/17 97.5 °F (36.4 °C)   02/07/15 98.7 °F (37.1 °C)     BP Readings from Last 3 Encounters:   03/28/17 100/68   02/12/15 105/73   02/07/15 148/79     Pulse Readings from Last 3 Encounters:   03/28/17 67   02/12/15 87   02/07/15 (!) 111            DATA     LABORATORY DATA:(reviewed/updated 1/11/2018)  No results found for this or any previous visit (from the past 24 hour(s)).   Lab Results   Component Value Date/Time    Valproic acid 69 12/29/2017 04:43 AM     No results found for: [address] [place] [address] Mikayla Sharma REPORTS:(reviewed/updated 1/11/2018)  No results found. MEDICATIONS     ALL MEDICATIONS:      SCHEDULED MEDICATIONS:          ASSESSMENT & PLAN     DIAGNOSES REQUIRING ACTIVE TREATMENT AND MONITORING: (reviewed/updated 1/11/2018)  Patient Active Hospital Problem List:   Paranoid schizophrenia ([facility] [address] (3/15/2017)- tx resistant, chronic and with negative sx    Assessment:minimal change- psychosis, negative sx+. Poor baseline per hx. Has trial of E[facility] at [facility] and report suggest he did not do well. Clozapine trial was stopped as pt has been refusing labs/vital. He is accepting po med       Plan: I will ct to adjust med- Zydis, Depakote and Prozac  Consider long term hospitalization- sw to discuss with [facility] for possible transfer. Report suggest pt has done well at [facility] in the past.  1/9- sl better- ct with current tx  1/10- ct with current tx- encourage to take shower/ hygiene. Behavior plan  1/11- affect and hygiene is improved. Ct with tx        Patient is on two antipsychotics now due to the relative refractoriness to treatment thus far. Prior to admission patient had THREE or more failed trails of monotherapy, including: Haldol, Zyprexa, Risperdal and Clozapine. The patient is a very slow responder to medications. Risks and benefits in the use of two antipsychotics have been weighed in full, including the risk of metabolic syndrome and the potential increased risk of QTc  Based on this analysis, it is considered favorable for the utilization of two antipsychotics. In the future, once stable on the two antipsychotics, patient can possibly be tapered to one of the chosen antipsychotics.   Will check on EKG results today to monitor QTc.- refused EKG again        I will continue to monitor blood levels (Depakote,, clozapine---a drug with a narrow therapeutic index= NTI) and associated labs for drug therapy implemented that require intense monitoring for toxicity as deemed appropriate based on current medication side effects and pharmacodynamically determined drug 1/2 lives. -    In summary, Marj Maria, is a 49 y.o.  male who presents with a severe exacerbation of the principal diagnosis of Paranoid schizophrenia ([address]  Patient's condition is worsening/not improving/not stable . Patient requires continued inpatient hospitalization for further stabilization, safety monitoring and medication management. I will continue to coordinate the provision of individual, milieu, occupational, group, and substance abuse therapies to address target symptoms/diagnoses as deemed appropriate for the individual patient. A coordinated, multidisplinary treatment team round was conducted with the patient (this team consists of the nurse, psychiatric unit pharmcist,  and writer). Complete current electronic health record for patient has been reviewed today including consultant notes, ancillary staff notes, nurses and psychiatric tech notes. Suicide risk assessment completed and patient deemed to be of low risk for suicide at this time. The following regarding medications was addressed during rounds with patient:   the risks and benefits of the proposed medication. The patient was given the opportunity to ask questions. Informed consent given to the use of the above medications. Will continue to adjust psychiatric and non-psychiatric medications (see above \"medication\" section and orders section for details) as deemed appropriate & based upon diagnoses and response to treatment. I will continue to order blood tests/labs and diagnostic tests as deemed appropriate and review results as they become available (see orders for details and above listed lab/test results). I will order psychiatric records from previous [facility] hospitals to further elucidate the nature of patient's psychopathology and review once available.     I will gather additional collateral information from friends, family and o/p treatment team to further elucidate the nature of patient's psychopathology and baselline level of psychiatric functioning. I certify that this patient's inpatient psychiatric hospital services furnished since the previous certification were, and continue to be, required for treatment that could reasonably be expected to improve the patient's condition, or for diagnostic study, and that the patient continues to need, on a daily basis, active treatment furnished directly by or requiring the supervision of inpatient psychiatric facility personnel. In addition, the hospital records show that services furnished were intensive treatment services, admission or related services, or equivalent services.     EXPECTED DISCHARGE DATE/DAY: TBD     DISPOSITION: Home       Signed By:   Perry Haynes MD  1/11/2018

## 2018-01-11 NOTE — PROGRESS NOTES
Problem: Altered Thought Process (Adult/Pediatric)  Goal: *STG: Remains safe in hospital  Outcome: Progressing Towards Goal  Patient remains isolated in room; however, is communicating more. Medication and meal compliant. Continuing to encourage patient to interact with peers and attend groups. Will continue to monitor patient and assess needs.

## 2018-01-11 NOTE — PROGRESS NOTES
Problem: Altered Thought Process (Adult/Pediatric)  Goal: *STG: Complies with medication therapy  Outcome: Progressing Towards Goal  Patient continues to isolate to the room. He did respond verbally upon approached by writer and made a brief eye contact. He came out of the room at dinner time. Compliant with medication and meals. Denies any pain or discomfort. Continue to monitor the patient and assess needs.

## 2018-01-11 NOTE — BH NOTES
Pt was seen in treatment team this morning in his room.  Pt was curled up in bed in prayer position and stated that he was \"okay\" and nodded head he is ready for discharge.  Pt is accepting medications and leaving his room to eat meals. Pt appears disheveled but showered and shaved with the encouragement and assistance of staff.   Pt's insight remains poor. [facility] CM Lutricia Aase [phone] cancelled visit for today due to illness and uncertain when she can reschedule at this time. Nicol Frey

## 2018-01-12 PROCEDURE — 74011250637 HC RX REV CODE- 250/637: Performed by: PSYCHIATRY & NEUROLOGY

## 2018-01-12 PROCEDURE — 65220000001 HC RM PRIVATE PSYCH

## 2018-01-12 RX ADMIN — FLUOXETINE 40 MG: 20 CAPSULE ORAL at 08:33

## 2018-01-12 RX ADMIN — VALPROIC ACID 750 MG: 250 SOLUTION ORAL at 20:32

## 2018-01-12 RX ADMIN — BENZTROPINE MESYLATE 2 MG: 2 TABLET ORAL at 21:43

## 2018-01-12 RX ADMIN — LORAZEPAM 0.5 MG: 0.5 TABLET ORAL at 17:03

## 2018-01-12 RX ADMIN — LORAZEPAM 0.5 MG: 0.5 TABLET ORAL at 08:33

## 2018-01-12 RX ADMIN — VALPROIC ACID 750 MG: 250 SOLUTION ORAL at 08:33

## 2018-01-12 RX ADMIN — OLANZAPINE 20 MG: 5 TABLET, ORALLY DISINTEGRATING ORAL at 21:41

## 2018-01-12 NOTE — BH NOTES
GROUP THERAPY PROGRESS NOTE    The patient Tish marie 49 y.o. male is participating in [employer]. Group time: 1 hour    Personal goal for participation:  To concentrate on selected task    Goal orientation: social    Group therapy participation: minimal    Therapeutic interventions reviewed and discussed: Crafts, games, music    Impression of participation: The patient was present-left early    Javier Syed  1/12/2018  4:42 PM

## 2018-01-12 NOTE — PROGRESS NOTES
Problem: Altered Thought Process (Adult/Pediatric)  Goal: *STG: Complies with medication therapy  Outcome: Progressing Towards Goal  Pt is alert but not oriented to situation and cannot tell me what day it is or where he is. Pt is selectively mute and has thought blocking. Pt does not leave his room and sits in a praying posture. Pt ate his meals and takes his meds. Pt has not attended groups. Encourage adl's. Assist to reality test, assess mood and behavior, encourage to verbalize thoughts and feeling, med and illness teaching, encourage participation in tx plan, observe on q15' checks.

## 2018-01-12 NOTE — PROGRESS NOTES
Problem: Altered Thought Process (Adult/Pediatric)  Goal: *STG: Complies with medication therapy  Outcome: Progressing Towards Goal  Pt is alert and oriented but not to situation. Pt has been isolative to room but did come out for meds and meals. Pt made eye contact and smiled but didn't speak. His hygiene is improved. Assist to reality test, assess mood and behavior, encourage to verbalize thoughts and feeling, med and illness teaching, encourage participation in tx plan, observe on q15' checks.

## 2018-01-12 NOTE — BH NOTES
PSYCHIATRIC PROGRESS NOTE         Patient Name  William Oviedo   Date of Birth 1970   [facility] [id number]   Medical Record Number  [id number]      Age  49 y.o. PCP PROVIDER UNKNOWN   Admit date:  11/25/2017    Room Number  307/01  @ [facility]   Date of Service  1/12/2018           E & M PROGRESS NOTE:         HISTORY       CC:  \"selectively mute\"  HISTORY OF PRESENT ILLNESS/INTERVAL HISTORY:  (reviewed/updated 1/12/2018). per initial evaluation: The patient, William Oviedo, is a 49 y.o. WHITE [place]  male with a past psychiatric history significant for schizophrenia, who presents at this time with complaints of (and/or evidence of) the following emotional symptoms: psychotic behavior. Additional symptomatology include paranoid delusion, sexually inappropriate behavior, pepisodes of yelling screaming followed by selectively mute, disorganized thought process, anxiety, concern about health problems, difficulty sleeping, fearfulness, hearing voices, increased irritability, poor concentration and problem with medication. The above symptoms have been present for many years. These symptoms are of severe severity. These symptoms are constant  in nature. The patient's condition has been precipitated by and psychosocial stressors (conflict with sister, non compliance ). Patient's condition made worse by treatment noncompliance. UDS: negative; BAL=0. William Oviedo presents/reports/evidences the following emotional symptoms today, 1/12/2018:psychotic behavior. The above symptoms have been present for many years. These symptoms are of severe severity. The symptoms are constant in nature. Additional symptomatology and features include   1/2/18- no sig change, ct to present with poor hygiene. Delusional and odd posturing.   1/3- remains preoccupied with odd posturing ( fetal position), he did change his position on request. Talked in soft voice, poor hygiene, refused his Depakote last night  1/4- no sig change- refusing labs, isolative, poor hygiene, lying in bed in a fetal position. Eating well  1/5- ct to refuse labs for clozapine, passive aggressive and tend to sit in praying position when upset. Poor insight . No agitation  1/6-Isolating, selectively mute, staying in his room in praying position. Minimal activity. PT consult ordered. Consider ECT.  1/7- Found kneeling down on his bed with his pants down. Did not respond to any questions. Remains disorganized with poor hygiene. 1/8- poor hygiene, disheveled, was found praying but able to sit up and talk to the team- spoke in a soft voice. Still disorganized and delusional. Accepting med  1/9- able to have conversation with pt- kept his eyes closed but no odd behavior. Responds in monotone. Accepting med. Poor insight, disheveled  1/10- accepting med, able to come out for his meals, still evidence of poor hygiene. Need lot of prompting. Disorganized thought but no agitation  1/11- pt allowed to staff to shave and take shower, affect is sl better. Hygiene is improved. Accepting med. 1/12- slowly progressing, affect is improving, sl better socially and with eye contact. Accepting med and allowed the staff to help with ADLs. SIDE EFFECTS: (reviewed/updated 1/12/2018)  None reported or admitted to. No noted toxicity with use of current med   ALLERGIES:(reviewed/updated 1/12/2018)  Allergies   Allergen Reactions    Fluphenazine Unknown (comments)     Pt is unable to communicate properly.  Penicillins Unknown (comments)     Pt is unable to communicate properly. MEDICATIONS PRIOR TO ADMISSION:(reviewed/updated 1/12/2018)  Prescriptions Prior to Admission   Medication Sig    divalproex ER (DEPAKOTE ER) 500 mg ER tablet Take 1,500 mg by mouth nightly. Indications: Treatment-resistant schizophrenia    haloperidol decanoate (HALDOL DECANOATE) 100 mg/mL injection 2 mL by IntraMUSCular route every twenty-eight (28) days.  Indications: SCHIZOPHRENIA    benztropine (COGENTIN) 2 mg tablet Take 1 Tab by mouth nightly. Indications: extrapyramidal disease    cloZAPine 200 mg tablet Take 600 mg by mouth nightly. Indications: TREATMENT-RESISTANT SCHIZOPHRENIA      PAST MEDICAL HISTORY: Past medical history from the initial psychiatric evaluation has been reviewed (reviewed/updated 1/12/2018) with no additional updates (I asked patient and no additional past medical history provided). Past Medical History:   Diagnosis Date    Psychiatric disorder     Psychotic disorder     Withdrawal syndrome ([facility] [address]    No past surgical history on file. SOCIAL HISTORY: Social history from the initial psychiatric evaluation has been reviewed (reviewed/updated 1/12/2018) with no additional updates (I asked patient and no additional social history provided). Social History     Social History    Marital status: SINGLE     Spouse name: N/A    Number of children: N/A    Years of education: N/A     Occupational History    Not on file. 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 He lives with his sister. On disability    No pending legal charge reported      FAMILY HISTORY: Family history from the initial psychiatric evaluation has been reviewed (reviewed/updated 1/12/2018) with no additional updates (I asked patient and no additional family history provided). No family history on file.     REVIEW OF SYSTEMS: (reviewed/updated 1/12/2018)  Appetite:no change from normal   Sleep: fitful   All other Review of Systems: Psychological ROS: positive for - behavioral disorder, concentration difficulties, hallucinations, irritability, mood swings and delusion  Respiratory ROS: no cough, shortness of breath, or wheezing  Cardiovascular ROS: no chest pain or dyspnea on exertion         [facility] (MSE):    MSE FINDINGS ARE WITHIN NORMAL LIMITS (WNL) UNLESS OTHERWISE STATED BELOW. ( ALL OF THE BELOW CATEGORIES OF THE MSE HAVE BEEN REVIEWED (reviewed 1/12/2018) AND UPDATED AS DEEMED APPROPRIATE )  General Presentation age appropriate , unreliable and vague   Orientation disorganized   Vital Signs  See below (reviewed 1/12/2018); Vital Signs (BP, Pulse, & Temp) are within normal limits if not listed below.    Gait and Station Stable/steady, no ataxia   Musculoskeletal System No extrapyramidal symptoms (EPS); no abnormal muscular movements or Tardive Dyskinesia (TD); muscle strength and tone are within normal limits   Language No aphasia or dysarthria   Speech:  Monotone, soft   Thought Processes illogical; slow rate of thoughts; poor abstract reasoning/computation   Thought Associations blocked   Thought Content paranoid delusions, bizarre delusions, auditory hallucinations and internally preoccupied   Suicidal Ideations none   Homicidal Ideations none   Mood:  constricted   Affect:  Constricted, inappropriate and increased in intensity   Memory recent  impaired   Memory remote:  intact   Concentration/Attention:  distractable and hypervigilance   Fund of Knowledge average   Insight:  limited   Reliability poor   Judgment:  limited          VITALS:     Patient Vitals for the past 24 hrs:   Temp Pulse Resp BP   01/11/18 1459 97.4 °F (36.3 °C) 72 16 92/62     Wt Readings from Last 3 Encounters:   12/23/17 84.9 kg (187 lb 1.6 oz)   03/25/17 95.6 kg (210 lb 11.2 oz)   02/09/15 97.5 kg (215 lb)     Temp Readings from Last 3 Encounters:   03/28/17 97.5 °F (36.4 °C)   02/07/15 98.7 °F (37.1 °C)     BP Readings from Last 3 Encounters:   01/11/18 92/62   03/28/17 100/68   02/12/15 105/73     Pulse Readings from Last 3 Encounters:   01/11/18 72   03/28/17 67   02/12/15 87            DATA     LABORATORY DATA:(reviewed/updated 1/12/2018)  No results found for this or any previous visit (from the past 24 hour(s)). Lab Results   Component Value Date/Time    Valproic acid 69 12/29/2017 04:43 AM     No results found for: KIKI   RADIOLOGY REPORTS:(reviewed/updated 1/12/2018)  No results found. MEDICATIONS     ALL MEDICATIONS:      SCHEDULED MEDICATIONS:          ASSESSMENT & PLAN     DIAGNOSES REQUIRING ACTIVE TREATMENT AND MONITORING: (reviewed/updated 1/12/2018)  Patient Active Hospital Problem List:   Paranoid schizophrenia ([facility] [address] (3/15/2017)- tx resistant, chronic and with negative sx    Assessment:minimal change- psychosis, negative sx+. Poor baseline per hx. Has trial of ECT at [facility] and report suggest he did not do well. Clozapine trial was stopped as pt has been refusing labs/vital. He is accepting po med       Plan: I will ct to adjust med- Zydis, Depakote and Prozac  Consider long term hospitalization- sw to discuss with [facility] for possible transfer. Report suggest pt has done well at [facility] in the past.  1/9- sl better- ct with current tx  1/10- ct with current tx- encourage to take shower/ hygiene. Behavior plan  1/11- affect and hygiene is improved. Ct with tx  1/12- improving slowly- affect is better, accepting med        Patient is on two antipsychotics now due to the relative refractoriness to treatment thus far. Prior to admission patient had THREE or more failed trails of monotherapy, including: Haldol, Zyprexa, Risperdal and Clozapine. The patient is a very slow responder to medications. Risks and benefits in the use of two antipsychotics have been weighed in full, including the risk of metabolic syndrome and the potential increased risk of QTc  Based on this analysis, it is considered favorable for the utilization of two antipsychotics. In the future, once stable on the two antipsychotics, patient can possibly be tapered to one of the chosen antipsychotics.   Will check on EKG results today to monitor QTc.- refused EKG again        I will continue to monitor blood levels (Depakote,, clozapine---a drug with a narrow therapeutic index= NTI) and associated labs for drug therapy implemented that require intense monitoring for toxicity as deemed appropriate based on current medication side effects and pharmacodynamically determined drug 1/2 lives. -    In summary, Tish See, is a 49 y.o.  male who presents with a severe exacerbation of the principal diagnosis of Paranoid schizophrenia ([facility] [address]  Patient's condition is worsening/not improving/not stable . Patient requires continued inpatient hospitalization for further stabilization, safety monitoring and medication management. I will continue to coordinate the provision of individual, milieu, occupational, group, and substance abuse therapies to address target symptoms/diagnoses as deemed appropriate for the individual patient. A coordinated, multidisplinary treatment team round was conducted with the patient (this team consists of the nurse, psychiatric unit pharmcist,  and writer). Complete current electronic health record for patient has been reviewed today including consultant notes, ancillary staff notes, nurses and psychiatric tech notes. Suicide risk assessment completed and patient deemed to be of low risk for suicide at this time. The following regarding medications was addressed during rounds with patient:   the risks and benefits of the proposed medication. The patient was given the opportunity to ask questions. Informed consent given to the use of the above medications. Will continue to adjust psychiatric and non-psychiatric medications (see above \"medication\" section and orders section for details) as deemed appropriate & based upon diagnoses and response to treatment.      I will continue to order blood tests/labs and diagnostic tests as deemed appropriate and review results as they become available (see orders for details and above listed lab/test results). I will order psychiatric records from previous [facility] hospitals to further elucidate the nature of patient's psychopathology and review once available. I will gather additional collateral information from friends, family and o/p treatment team to further elucidate the nature of patient's psychopathology and baselline level of psychiatric functioning. I certify that this patient's inpatient psychiatric hospital services furnished since the previous certification were, and continue to be, required for treatment that could reasonably be expected to improve the patient's condition, or for diagnostic study, and that the patient continues to need, on a daily basis, active treatment furnished directly by or requiring the supervision of inpatient psychiatric facility personnel. In addition, the hospital records show that services furnished were intensive treatment services, admission or related services, or equivalent services.     EXPECTED DISCHARGE DATE/DAY: TBD     DISPOSITION: Home       Signed By:   Shawn Russo MD  1/12/2018

## 2018-01-12 NOTE — PROGRESS NOTES
Problem: Altered Thought Process (Adult/Pediatric)  Goal: *STG: Remains safe in hospital  Outcome: Progressing Towards Goal  Patient continues to have minimal eye contact and conversation. Patient did come out of room this morning to eat breakfast in the day room, in addition patient came to medication window to receive morning medications. Will continue to monitor patient and assess needs.

## 2018-01-13 PROCEDURE — 65220000001 HC RM PRIVATE PSYCH

## 2018-01-13 PROCEDURE — 74011250637 HC RX REV CODE- 250/637: Performed by: PSYCHIATRY & NEUROLOGY

## 2018-01-13 RX ADMIN — VALPROIC ACID 750 MG: 250 SOLUTION ORAL at 21:24

## 2018-01-13 RX ADMIN — VALPROIC ACID 750 MG: 250 SOLUTION ORAL at 08:34

## 2018-01-13 RX ADMIN — LORAZEPAM 0.5 MG: 0.5 TABLET ORAL at 16:56

## 2018-01-13 RX ADMIN — FLUOXETINE 40 MG: 20 CAPSULE ORAL at 08:34

## 2018-01-13 RX ADMIN — BENZTROPINE MESYLATE 2 MG: 2 TABLET ORAL at 21:24

## 2018-01-13 RX ADMIN — OLANZAPINE 20 MG: 5 TABLET, ORALLY DISINTEGRATING ORAL at 21:24

## 2018-01-13 RX ADMIN — LORAZEPAM 0.5 MG: 0.5 TABLET ORAL at 08:34

## 2018-01-13 NOTE — BH NOTES
GROUP THERAPY PROGRESS NOTE    Afsaneh Shabazz is participating in medication education group.      Group time: 30 minutes    Personal goal for participation: medication indication and side effects    Goal orientation: personal    Group therapy participation: minimal    Therapeutic interventions reviewed and discussed: yes    Impression of participation: needs follow-up teaching

## 2018-01-13 NOTE — BH NOTES
GROUP THERAPY PROGRESS NOTE    The patient Raquel Laurent is participating in [employer]. Group time: 30 minutes    Personal goal for participation: to orient the patient to the unit.     Goal orientation: successful adoption of unit rules    Group therapy participation: minimal    Therapeutic interventions reviewed and discussed: Yes    Impression of participation: randy Ramsey  1/13/2018 1:41 PM

## 2018-01-13 NOTE — PROGRESS NOTES
Problem: Altered Thought Process (Adult/Pediatric)  Goal: *STG: Seeks staff when feelings of anxiety and fear arise  Outcome: Progressing Towards Goal  Pt continues to improve and is more receptive to staff's interactions. Pt at times remains isolative to his room. Pt has been lying in bed, however not tense and not in the fetal-like position. Pt did come of his room for meals and for his treatment team. He needs prompting to provide eye contact. . Less paranoid and suspicious. Hygiene is improving. Will continue to monitor closely.

## 2018-01-13 NOTE — BH NOTES
PSYCHIATRIC PROGRESS NOTE         Patient Name  Zahra Mo   Date of Birth 1970   [facility] [id number]   Medical Record Number  [id number]      Age  49 y.o. PCP PROVIDER UNKNOWN   Admit date:  11/25/2017    Room Number  307/01  @ [facility]   Date of Service  1/13/2018           E & M PROGRESS NOTE:         HISTORY       CC:  \"selectively mute\"  HISTORY OF PRESENT ILLNESS/INTERVAL HISTORY:  (reviewed/updated 1/13/2018). per initial evaluation: The patient, Zahra Mo, is a 49 y.o. WHITE OR  male with a past psychiatric history significant for schizophrenia, who presents at this time with complaints of (and/or evidence of) the following emotional symptoms: psychotic behavior. Additional symptomatology include paranoid delusion, sexually inappropriate behavior, pepisodes of yelling screaming followed by selectively mute, disorganized thought process, anxiety, concern about health problems, difficulty sleeping, fearfulness, hearing voices, increased irritability, poor concentration and problem with medication. The above symptoms have been present for many years. These symptoms are of severe severity. These symptoms are constant  in nature. The patient's condition has been precipitated by and psychosocial stressors (conflict with sister, non compliance ). Patient's condition made worse by treatment noncompliance. UDS: negative; BAL=0. Zahra Mo presents/reports/evidences the following emotional symptoms today, 1/13/2018:psychotic behavior. The above symptoms have been present for many years. These symptoms are of severe severity. The symptoms are constant in nature. Additional symptomatology and features include   1/2/18- no sig change, ct to present with poor hygiene. Delusional and odd posturing.   1/3- remains preoccupied with odd posturing ( fetal position), he did change his position on request. Talked in soft voice, poor hygiene, refused his Depakote last night  1/4- no sig change- refusing labs, isolative, poor hygiene, lying in bed in a fetal position. Eating well  1/5- ct to refuse labs for clozapine, passive aggressive and tend to sit in praying position when upset. Poor insight . No agitation  1/6-Isolating, selectively mute, staying in his room in praying position. Minimal activity. PT consult ordered. Consider ECT.  1/7- Found kneeling down on his bed with his pants down. Did not respond to any questions. Remains disorganized with poor hygiene. 1/8- poor hygiene, disheveled, was found praying but able to sit up and talk to the team- spoke in a soft voice. Still disorganized and delusional. Accepting med  1/9- able to have conversation with pt- kept his eyes closed but no odd behavior. Responds in monotone. Accepting med. Poor insight, disheveled  1/10- accepting med, able to come out for his meals, still evidence of poor hygiene. Need lot of prompting. Disorganized thought but no agitation  1/11- pt allowed to staff to shave and take shower, affect is sl better. Hygiene is improved. Accepting med. 1/12- slowly progressing, affect is improving, sl better socially and with eye contact. Accepting med and allowed the staff to help with ADLs. 1/13/18: Patient came in the conference room,still remains preoccupied,disheveled,affect improved, was pacing in the hallway ,engage in brief conversation which is a huge improvement compare to in the past when he was completely mute. over all slowly progressing. SIDE EFFECTS: (reviewed/updated 1/13/2018)  None reported or admitted to. No noted toxicity with use of current med   ALLERGIES:(reviewed/updated 1/13/2018)  Allergies   Allergen Reactions    Fluphenazine Unknown (comments)     Pt is unable to communicate properly.  Penicillins Unknown (comments)     Pt is unable to communicate properly.       MEDICATIONS PRIOR TO ADMISSION:(reviewed/updated 1/13/2018)  Prescriptions Prior to Admission   Medication Sig    divalproex ER (DEPAKOTE ER) 500 mg ER tablet Take 1,500 mg by mouth nightly. Indications: Treatment-resistant schizophrenia    haloperidol decanoate (HALDOL DECANOATE) 100 mg/mL injection 2 mL by IntraMUSCular route every twenty-eight (28) days. Indications: SCHIZOPHRENIA    benztropine (COGENTIN) 2 mg tablet Take 1 Tab by mouth nightly. Indications: extrapyramidal disease    cloZAPine 200 mg tablet Take 600 mg by mouth nightly. Indications: TREATMENT-RESISTANT SCHIZOPHRENIA      PAST MEDICAL HISTORY: Past medical history from the initial psychiatric evaluation has been reviewed (reviewed/updated 1/13/2018) with no additional updates (I asked patient and no additional past medical history provided). Past Medical History:   Diagnosis Date    Psychiatric disorder     Psychotic disorder     Withdrawal syndrome ([facility] [address]    No past surgical history on file. SOCIAL HISTORY: Social history from the initial psychiatric evaluation has been reviewed (reviewed/updated 1/13/2018) with no additional updates (I asked patient and no additional social history provided). Social History     Social History    Marital status: SINGLE     Spouse name: N/A    Number of children: N/A    Years of education: N/A     Occupational History    Not on file. 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 He lives with his sister. On disability    No pending legal charge reported      FAMILY HISTORY: Family history from the initial psychiatric evaluation has been reviewed (reviewed/updated 1/13/2018) with no additional updates (I asked patient and no additional family history provided). No family history on file.     REVIEW OF SYSTEMS: (reviewed/updated 1/13/2018)  Appetite:no change from normal   Sleep: fitful All other Review of Systems: Psychological ROS: positive for - behavioral disorder, concentration difficulties, hallucinations, irritability, mood swings and delusion  Respiratory ROS: no cough, shortness of breath, or wheezing  Cardiovascular ROS: no chest pain or dyspnea on exertion         [facility] (MSE):    MSE FINDINGS ARE WITHIN NORMAL LIMITS (WNL) UNLESS OTHERWISE STATED BELOW. ( ALL OF THE BELOW CATEGORIES OF THE MSE HAVE BEEN REVIEWED (reviewed 1/13/2018) AND UPDATED AS DEEMED APPROPRIATE )  General Presentation age appropriate , unreliable and vague   Orientation disorganized   Vital Signs  See below (reviewed 1/13/2018); Vital Signs (BP, Pulse, & Temp) are within normal limits if not listed below.    Gait and Station Stable/steady, no ataxia   Musculoskeletal System No extrapyramidal symptoms (EPS); no abnormal muscular movements or Tardive Dyskinesia (TD); muscle strength and tone are within normal limits   Language No aphasia or dysarthria   Speech:  Monotone, soft   Thought Processes illogical; slow rate of thoughts; poor abstract reasoning/computation   Thought Associations blocked   Thought Content paranoid delusions, bizarre delusions, auditory hallucinations and internally preoccupied   Suicidal Ideations none   Homicidal Ideations none   Mood:  constricted   Affect:  Constricted, inappropriate and increased in intensity   Memory recent  impaired   Memory remote:  intact   Concentration/Attention:  distractable and hypervigilance   Fund of Knowledge average   Insight:  limited   Reliability poor   Judgment:  limited          VITALS:     Patient Vitals for the past 24 hrs:   Pulse Resp BP   01/13/18 1200 82 18 (!) 138/91     Wt Readings from Last 3 Encounters:   12/23/17 84.9 kg (187 lb 1.6 oz)   03/25/17 95.6 kg (210 lb 11.2 oz)   02/09/15 97.5 kg (215 lb)     Temp Readings from Last 3 Encounters:   03/28/17 97.5 °F (36.4 °C)   02/07/15 98.7 °F (37.1 °C) BP Readings from Last 3 Encounters:   01/13/18 (!) 138/91   03/28/17 100/68   02/12/15 105/73     Pulse Readings from Last 3 Encounters:   01/13/18 82   03/28/17 67   02/12/15 87            DATA     LABORATORY DATA:(reviewed/updated 1/13/2018)  No results found for this or any previous visit (from the past 24 hour(s)). Lab Results   Component Value Date/Time    Valproic acid 69 12/29/2017 04:43 AM     No results found for: LITHM   RADIOLOGY REPORTS:(reviewed/updated 1/13/2018)  No results found. MEDICATIONS     ALL MEDICATIONS:      SCHEDULED MEDICATIONS:          ASSESSMENT & PLAN     DIAGNOSES REQUIRING ACTIVE TREATMENT AND MONITORING: (reviewed/updated 1/13/2018)  Patient Active Hospital Problem List:   Paranoid schizophrenia ([facility] [address] (3/15/2017)- tx resistant, chronic and with negative sx    Assessment:minimal change- psychosis, negative sx+. Poor baseline per hx. Has trial of ECT at [facility] and report suggest he did not do well. Clozapine trial was stopped as pt has been refusing labs/vital. He is accepting po med       Plan: I will ct to adjust med- Zydis, Depakote and Prozac  Consider long term hospitalization- sw to discuss with [facility] for possible transfer. Report suggest pt has done well at [facility] in the past.  1/9- sl better- ct with current tx  1/10- ct with current tx- encourage to take shower/ hygiene. Behavior plan  1/11- affect and hygiene is improved. Ct with tx  1/12- improving slowly- affect is better, accepting med  1/13/18: slowly progressing,affect is bright and engage in brief conversation. Patient is on two antipsychotics now due to the relative refractoriness to treatment thus far. Prior to admission patient had THREE or more failed trails of monotherapy, including: Haldol, Zyprexa, Risperdal and Clozapine. The patient is a very slow responder to medications.   Risks and benefits in the use of two antipsychotics have been weighed in full, including the risk of metabolic syndrome and the potential increased risk of QTc  Based on this analysis, it is considered favorable for the utilization of two antipsychotics. In the future, once stable on the two antipsychotics, patient can possibly be tapered to one of the chosen antipsychotics. Will check on EKG results today to monitor QTc.- refused EKG again        I will continue to monitor blood levels (Depakote,, clozapine---a drug with a narrow therapeutic index= NTI) and associated labs for drug therapy implemented that require intense monitoring for toxicity as deemed appropriate based on current medication side effects and pharmacodynamically determined drug 1/2 lives. -    In summary, Reed Mckeon, is a 49 y.o.  male who presents with a severe exacerbation of the principal diagnosis of Paranoid schizophrenia ([facility] [address]  Patient's condition is worsening/not improving/not stable . Patient requires continued inpatient hospitalization for further stabilization, safety monitoring and medication management. I will continue to coordinate the provision of individual, milieu, occupational, group, and substance abuse therapies to address target symptoms/diagnoses as deemed appropriate for the individual patient. A coordinated, multidisplinary treatment team round was conducted with the patient (this team consists of the nurse, psychiatric unit pharmcist,  and writer). Complete current electronic health record for patient has been reviewed today including consultant notes, ancillary staff notes, nurses and psychiatric tech notes. Suicide risk assessment completed and patient deemed to be of low risk for suicide at this time. The following regarding medications was addressed during rounds with patient:   the risks and benefits of the proposed medication. The patient was given the opportunity to ask questions. Informed consent given to the use of the above medications.  Will continue to adjust psychiatric and non-psychiatric medications (see above \"medication\" section and orders section for details) as deemed appropriate & based upon diagnoses and response to treatment. I will continue to order blood tests/labs and diagnostic tests as deemed appropriate and review results as they become available (see orders for details and above listed lab/test results). I will order psychiatric records from previous [facility] hospitals to further elucidate the nature of patient's psychopathology and review once available. I will gather additional collateral information from friends, family and o/p treatment team to further elucidate the nature of patient's psychopathology and baselline level of psychiatric functioning. I certify that this patient's inpatient psychiatric hospital services furnished since the previous certification were, and continue to be, required for treatment that could reasonably be expected to improve the patient's condition, or for diagnostic study, and that the patient continues to need, on a daily basis, active treatment furnished directly by or requiring the supervision of inpatient psychiatric facility personnel. In addition, the hospital records show that services furnished were intensive treatment services, admission or related services, or equivalent services.     EXPECTED DISCHARGE DATE/DAY: TBD     DISPOSITION: Home       Signed By:   Ester Coyne MD  1/13/2018

## 2018-01-14 PROCEDURE — 74011250637 HC RX REV CODE- 250/637: Performed by: PSYCHIATRY & NEUROLOGY

## 2018-01-14 PROCEDURE — 65220000001 HC RM PRIVATE PSYCH

## 2018-01-14 RX ADMIN — BENZTROPINE MESYLATE 2 MG: 2 TABLET ORAL at 21:36

## 2018-01-14 RX ADMIN — OLANZAPINE 20 MG: 5 TABLET, ORALLY DISINTEGRATING ORAL at 21:36

## 2018-01-14 RX ADMIN — VALPROIC ACID 750 MG: 250 SOLUTION ORAL at 21:36

## 2018-01-14 RX ADMIN — VALPROIC ACID 750 MG: 250 SOLUTION ORAL at 08:42

## 2018-01-14 RX ADMIN — FLUOXETINE 40 MG: 20 CAPSULE ORAL at 08:43

## 2018-01-14 RX ADMIN — LORAZEPAM 0.5 MG: 0.5 TABLET ORAL at 08:43

## 2018-01-14 RX ADMIN — LORAZEPAM 0.5 MG: 0.5 TABLET ORAL at 17:02

## 2018-01-14 NOTE — PROGRESS NOTES
Problem: Altered Thought Process (Adult/Pediatric)  Goal: *STG: Seeks staff when feelings of anxiety and fear arise  Outcome: Progressing Towards Goal  Patient is alert, continues to isolates self to the room but responds upon approach, He did smile and said feeling better when asked by Diallo Francisco. He was laying flat in bed and acknowledges person unlike before. Compliant with medication and meals. Denies any pain or discomfort. Continue to monitor the patient and assess needs.

## 2018-01-14 NOTE — BH NOTES
PSYCHIATRIC PROGRESS NOTE         Patient Name  Luiza Pablo   Date of Birth 1970   [facility] [id number]   Medical Record Number  [id number]      Age  49 y.o. PCP PROVIDER UNKNOWN   Admit date:  11/25/2017    Room Number  307/01  @ [facility]   Date of Service  1/14/2018           E & M PROGRESS NOTE:         HISTORY       CC:  \"selectively mute\"  HISTORY OF PRESENT ILLNESS/INTERVAL HISTORY:  (reviewed/updated 1/14/2018). per initial evaluation: The patient, Luiza Pablo, is a 49 y.o. WHITE OR  male with a past psychiatric history significant for schizophrenia, who presents at this time with complaints of (and/or evidence of) the following emotional symptoms: psychotic behavior. Additional symptomatology include paranoid delusion, sexually inappropriate behavior, pepisodes of yelling screaming followed by selectively mute, disorganized thought process, anxiety, concern about health problems, difficulty sleeping, fearfulness, hearing voices, increased irritability, poor concentration and problem with medication. The above symptoms have been present for many years. These symptoms are of severe severity. These symptoms are constant  in nature. The patient's condition has been precipitated by and psychosocial stressors (conflict with sister, non compliance ). Patient's condition made worse by treatment noncompliance. UDS: negative; BAL=0. Luiza Pablo presents/reports/evidences the following emotional symptoms today, 1/14/2018:psychotic behavior. The above symptoms have been present for many years. These symptoms are of severe severity. The symptoms are constant in nature. Additional symptomatology and features include   1/2/18- no sig change, ct to present with poor hygiene. Delusional and odd posturing.   1/3- remains preoccupied with odd posturing ( fetal position), he did change his position on request. Talked in soft voice, poor hygiene, refused his Depakote last night  1/4- no sig change- refusing labs, isolative, poor hygiene, lying in bed in a fetal position. Eating well  1/5- ct to refuse labs for clozapine, passive aggressive and tend to sit in praying position when upset. Poor insight . No agitation  1/6-Isolating, selectively mute, staying in his room in praying position. Minimal activity. PT consult ordered. Consider ECT.  1/7- Found kneeling down on his bed with his pants down. Did not respond to any questions. Remains disorganized with poor hygiene. 1/8- poor hygiene, disheveled, was found praying but able to sit up and talk to the team- spoke in a soft voice. Still disorganized and delusional. Accepting med  1/9- able to have conversation with pt- kept his eyes closed but no odd behavior. Responds in monotone. Accepting med. Poor insight, disheveled  1/10- accepting med, able to come out for his meals, still evidence of poor hygiene. Need lot of prompting. Disorganized thought but no agitation  1/11- pt allowed to staff to shave and take shower, affect is sl better. Hygiene is improved. Accepting med. 1/12- slowly progressing, affect is improving, sl better socially and with eye contact. Accepting med and allowed the staff to help with ADLs. 1/13/18: Patient came in the conference room,still remains preoccupied,disheveled,affect improved, was pacing in the hallway ,engage in brief conversation which is a huge improvement compare to in the past when he was completely mute. over all slowly progressing. 1/14/18: Patient was seen in his room,he was calm,cooperative,speech soft,low tone, affect bright, no catatonia noted. Accetping meals and med. No behaviorals problem, focused on discharge. Over all significant imrpovement with current regimen of medications. SIDE EFFECTS: (reviewed/updated 1/14/2018)  None reported or admitted to.   No noted toxicity with use of current med   ALLERGIES:(reviewed/updated 1/14/2018)  Allergies   Allergen Reactions    Fluphenazine Unknown (comments)     Pt is unable to communicate properly.  Penicillins Unknown (comments)     Pt is unable to communicate properly. MEDICATIONS PRIOR TO ADMISSION:(reviewed/updated 1/14/2018)  Prescriptions Prior to Admission   Medication Sig    divalproex ER (DEPAKOTE ER) 500 mg ER tablet Take 1,500 mg by mouth nightly. Indications: Treatment-resistant schizophrenia    haloperidol decanoate (HALDOL DECANOATE) 100 mg/mL injection 2 mL by IntraMUSCular route every twenty-eight (28) days. Indications: SCHIZOPHRENIA    benztropine (COGENTIN) 2 mg tablet Take 1 Tab by mouth nightly. Indications: extrapyramidal disease    cloZAPine 200 mg tablet Take 600 mg by mouth nightly. Indications: TREATMENT-RESISTANT SCHIZOPHRENIA      PAST MEDICAL HISTORY: Past medical history from the initial psychiatric evaluation has been reviewed (reviewed/updated 1/14/2018) with no additional updates (I asked patient and no additional past medical history provided). Past Medical History:   Diagnosis Date    Psychiatric disorder     Psychotic disorder     [facility]    No past surgical history on file. SOCIAL HISTORY: Social history from the initial psychiatric evaluation has been reviewed (reviewed/updated 1/14/2018) with no additional updates (I asked patient and no additional social history provided). Social History     Social History    Marital status: SINGLE     Spouse name: N/A    Number of children: N/A    Years of education: N/A     Occupational History    Not on file. 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 He lives with his sister.  On disability    No pending legal charge reported      FAMILY HISTORY: Family history from the initial psychiatric evaluation has been reviewed (reviewed/updated 1/14/2018) with no additional updates (I asked patient and no additional family history provided). No family history on file. REVIEW OF SYSTEMS: (reviewed/updated 1/14/2018)  Appetite:no change from normal   Sleep: fitful   All other Review of Systems: Psychological ROS: positive for - behavioral disorder, concentration difficulties, hallucinations, irritability, mood swings and delusion  Respiratory ROS: no cough, shortness of breath, or wheezing  Cardiovascular ROS: no chest pain or dyspnea on exertion         [facility] (MSE):    MSE FINDINGS ARE WITHIN NORMAL LIMITS (WNL) UNLESS OTHERWISE STATED BELOW. ( ALL OF THE BELOW CATEGORIES OF THE MSE HAVE BEEN REVIEWED (reviewed 1/14/2018) AND UPDATED AS DEEMED APPROPRIATE )  General Presentation age appropriate , unreliable and vague   Orientation disorganized   Vital Signs  See below (reviewed 1/14/2018); Vital Signs (BP, Pulse, & Temp) are within normal limits if not listed below.    Gait and Station Stable/steady, no ataxia   Musculoskeletal System No extrapyramidal symptoms (EPS); no abnormal muscular movements or Tardive Dyskinesia (TD); muscle strength and tone are within normal limits   Language No aphasia or dysarthria   Speech:  Monotone, soft   Thought Processes illogical; slow rate of thoughts; poor abstract reasoning/computation   Thought Associations blocked   Thought Content paranoid delusions, bizarre delusions, auditory hallucinations and internally preoccupied   Suicidal Ideations none   Homicidal Ideations none   Mood:  constricted   Affect:  Constricted, inappropriate and increased in intensity   Memory recent  impaired   Memory remote:  intact   Concentration/Attention:  distractable and hypervigilance   Fund of Knowledge average   Insight:  limited   Reliability poor   Judgment:  limited          VITALS:     Patient Vitals for the past 24 hrs:   Temp Pulse Resp BP   01/14/18 1637 - 70 16 92/58     Wt Readings from Last 3 Encounters:   12/23/17 84.9 kg (187 lb 1.6 oz)   03/25/17 95.6 kg (210 lb 11.2 oz)   02/09/15 97.5 kg (215 lb)     Temp Readings from Last 3 Encounters:   03/28/17 97.5 °F (36.4 °C)   02/07/15 98.7 °F (37.1 °C)     BP Readings from Last 3 Encounters:   01/14/18 92/58   03/28/17 100/68   02/12/15 105/73     Pulse Readings from Last 3 Encounters:   01/14/18 70   03/28/17 67   02/12/15 87            DATA     LABORATORY DATA:(reviewed/updated 1/14/2018)  No results found for this or any previous visit (from the past 24 hour(s)). Lab Results   Component Value Date/Time    Valproic acid 69 12/29/2017 04:43 AM     No results found for: LITHM   RADIOLOGY REPORTS:(reviewed/updated 1/14/2018)  No results found. MEDICATIONS     ALL MEDICATIONS:      SCHEDULED MEDICATIONS:          ASSESSMENT & PLAN     DIAGNOSES REQUIRING ACTIVE TREATMENT AND MONITORING: (reviewed/updated 1/14/2018)  Patient Active Hospital Problem List:   Paranoid schizophrenia ([facility] (3/15/2017)- tx resistant, chronic and with negative sx    Assessment:minimal change- psychosis, negative sx+. Poor baseline per hx. Has trial of ECT at [facility] and report suggest he did not do well. Clozapine trial was stopped as pt has been refusing labs/vital. He is accepting po med       Plan: I will ct to adjust med- Zydis, Depakote and Prozac  Consider long term hospitalization- sw to discuss with [facility] for possible transfer. Report suggest pt has done well at [facility] in the past.  1/9- sl better- ct with current tx  1/10- ct with current tx- encourage to take shower/ hygiene. Behavior plan  1/11- affect and hygiene is improved. Ct with tx  1/12- improving slowly- affect is better, accepting med  1/13/18: slowly progressing,affect is bright and engage in brief conversation. 1/14/17: Patient is progressing,affect bright,more visible on the unit.     Patient is on two antipsychotics now due to the relative refractoriness to treatment thus far. Prior to admission patient had THREE or more failed trails of monotherapy, including: Haldol, Zyprexa, Risperdal and Clozapine. The patient is a very slow responder to medications. Risks and benefits in the use of two antipsychotics have been weighed in full, including the risk of metabolic syndrome and the potential increased risk of QTc  Based on this analysis, it is considered favorable for the utilization of two antipsychotics. In the future, once stable on the two antipsychotics, patient can possibly be tapered to one of the chosen antipsychotics. Will check on EKG results today to monitor QTc.- refused EKG again        I will continue to monitor blood levels (Depakote,, clozapine---a drug with a narrow therapeutic index= NTI) and associated labs for drug therapy implemented that require intense monitoring for toxicity as deemed appropriate based on current medication side effects and pharmacodynamically determined drug 1/2 lives. -    In summary, Carina Yancey, is a [address] y.o.  male who presents with a severe exacerbation of the principal diagnosis of Paranoid schizophrenia ([facility]  Patient's condition is worsening/not improving/not stable . Patient requires continued inpatient hospitalization for further stabilization, safety monitoring and medication management. I will continue to coordinate the provision of individual, milieu, occupational, group, and substance abuse therapies to address target symptoms/diagnoses as deemed appropriate for the individual patient. A coordinated, multidisplinary treatment team round was conducted with the patient (this team consists of the nurse, psychiatric unit pharmcist,  and writer). Complete current electronic health record for patient has been reviewed today including consultant notes, ancillary staff notes, nurses and psychiatric tech notes.     Suicide risk assessment completed and patient deemed to be of low risk for suicide at this time. The following regarding medications was addressed during rounds with patient:   the risks and benefits of the proposed medication. The patient was given the opportunity to ask questions. Informed consent given to the use of the above medications. Will continue to adjust psychiatric and non-psychiatric medications (see above \"medication\" section and orders section for details) as deemed appropriate & based upon diagnoses and response to treatment. I will continue to order blood tests/labs and diagnostic tests as deemed appropriate and review results as they become available (see orders for details and above listed lab/test results). I will order psychiatric records from previous [facility] hospitals to further elucidate the nature of patient's psychopathology and review once available. I will gather additional collateral information from friends, family and o/p treatment team to further elucidate the nature of patient's psychopathology and baselline level of psychiatric functioning. I certify that this patient's inpatient psychiatric hospital services furnished since the previous certification were, and continue to be, required for treatment that could reasonably be expected to improve the patient's condition, or for diagnostic study, and that the patient continues to need, on a daily basis, active treatment furnished directly by or requiring the supervision of inpatient psychiatric facility personnel. In addition, the hospital records show that services furnished were intensive treatment services, admission or related services, or equivalent services.     EXPECTED DISCHARGE DATE/DAY: TBD     DISPOSITION: Home       Signed By:   Rhett Donohue MD  1/14/2018

## 2018-01-14 NOTE — BH NOTES
Pt has been isolative to his room for most of the shift. Pt did come out to eat breakfast and lunch. Pt was observed walking out of his room walking residential pass the day room, then returning to his room. Pt is still selectively mute. Pt has not been a behavioral problem and has needed no redirection. Pt will continue to be monitored by staff every 15 min  For safety.

## 2018-01-15 PROCEDURE — 65220000001 HC RM PRIVATE PSYCH

## 2018-01-15 PROCEDURE — 74011250637 HC RX REV CODE- 250/637: Performed by: PSYCHIATRY & NEUROLOGY

## 2018-01-15 PROCEDURE — 74011250636 HC RX REV CODE- 250/636: Performed by: PSYCHIATRY & NEUROLOGY

## 2018-01-15 RX ADMIN — FLUOXETINE 40 MG: 20 CAPSULE ORAL at 08:29

## 2018-01-15 RX ADMIN — VALPROIC ACID 750 MG: 250 SOLUTION ORAL at 21:05

## 2018-01-15 RX ADMIN — OLANZAPINE 20 MG: 5 TABLET, ORALLY DISINTEGRATING ORAL at 21:05

## 2018-01-15 RX ADMIN — BENZTROPINE MESYLATE 2 MG: 2 TABLET ORAL at 21:05

## 2018-01-15 RX ADMIN — HALOPERIDOL DECANOATE 200 MG: 100 INJECTION INTRAMUSCULAR at 11:07

## 2018-01-15 RX ADMIN — VALPROIC ACID 750 MG: 250 SOLUTION ORAL at 08:29

## 2018-01-15 RX ADMIN — LORAZEPAM 0.5 MG: 0.5 TABLET ORAL at 18:33

## 2018-01-15 RX ADMIN — LORAZEPAM 0.5 MG: 0.5 TABLET ORAL at 08:29

## 2018-01-15 NOTE — BH NOTES
Pt was seen in treatment team this morning.  Pt came out of room and met with the treatment team.  Pt spoke softly, made eye contact and affect was brighter.  Pt is accepting medications and leaving his room to eat meals.  Pt appears disheveled but has showered and shaved with the encouragement and assistance of staff.  Pt verbalized interest in discharge. Troy NED TORRES Osman Jaramillo [phone] is scheduled to visit Tuesday afternoon.  has reached out to pt's sister daily and unable to leave a voicemail.  will try calling sister during early evening hours.

## 2018-01-15 NOTE — BH NOTES
GROUP THERAPY PROGRESS NOTE    The patient Cecille marie 49 y.o. male is participating in [employer]. Group time: 1 hour    Personal goal for participation:  To concentrate on selected task    Goal orientation: social    Group therapy participation: minimal    Therapeutic interventions reviewed and discussed: Crafts, games, music    Impression of participation: The patient was present-left early    Romana Bowens  1/15/2018  4:57 PM

## 2018-01-15 NOTE — BH NOTES
PSYCHIATRIC PROGRESS NOTE         Patient Name  Rui Hyde   Date of Birth 1970   [facility] [id number]   Medical Record Number  [id number]      Age  49 y.o. PCP PROVIDER UNKNOWN   Admit date:  11/25/2017    Room Number  307/01  @ [facility]   Date of Service  1/15/2018           E & M PROGRESS NOTE:         HISTORY       CC:  \"selectively mute\"  HISTORY OF PRESENT ILLNESS/INTERVAL HISTORY:  (reviewed/updated 1/15/2018). per initial evaluation: The patient, Rui Hyde, is a 49 y.o. WHITE [place]  male with a past psychiatric history significant for schizophrenia, who presents at this time with complaints of (and/or evidence of) the following emotional symptoms: psychotic behavior. Additional symptomatology include paranoid delusion, sexually inappropriate behavior, pepisodes of yelling screaming followed by selectively mute, disorganized thought process, anxiety, concern about health problems, difficulty sleeping, fearfulness, hearing voices, increased irritability, poor concentration and problem with medication. The above symptoms have been present for many years. These symptoms are of severe severity. These symptoms are constant  in nature. The patient's condition has been precipitated by and psychosocial stressors (conflict with sister, non compliance ). Patient's condition made worse by treatment noncompliance. UDS: negative; BAL=0. Rui Hyde presents/reports/evidences the following emotional symptoms today, 1/15/2018:psychotic behavior. The above symptoms have been present for many years. These symptoms are of severe severity. The symptoms are constant in nature. Additional symptomatology and features include   1/2/18- no sig change, ct to present with poor hygiene. Delusional and odd posturing.   1/3- remains preoccupied with odd posturing ( fetal position), he did change his position on request. Talked in soft voice, poor hygiene, refused his Depakote last night  1/4- no sig change- refusing labs, isolative, poor hygiene, lying in bed in a fetal position. Eating well  1/5- ct to refuse labs for clozapine, passive aggressive and tend to sit in praying position when upset. Poor insight . No agitation  1/6-Isolating, selectively mute, staying in his room in praying position. Minimal activity. PT consult ordered. Consider ECT.  1/7- Found kneeling down on his bed with his pants down. Did not respond to any questions. Remains disorganized with poor hygiene. 1/8- poor hygiene, disheveled, was found praying but able to sit up and talk to the team- spoke in a soft voice. Still disorganized and delusional. Accepting med  1/9- able to have conversation with pt- kept his eyes closed but no odd behavior. Responds in monotone. Accepting med. Poor insight, disheveled  1/10- accepting med, able to come out for his meals, still evidence of poor hygiene. Need lot of prompting. Disorganized thought but no agitation  1/11- pt allowed to staff to shave and take shower, affect is sl better. Hygiene is improved. Accepting med. 1/12- slowly progressing, affect is improving, sl better socially and with eye contact. Accepting med and allowed the staff to help with ADLs. 1/13/18: Patient came in the conference room,still remains preoccupied,disheveled,affect improved, was pacing in the hallway ,engage in brief conversation which is a huge improvement compare to in the past when he was completely mute. over all slowly progressing. 1/14/18: Patient was seen in his room,he was calm,cooperative,speech soft,low tone, affect bright, no catatonia noted. Accetping meals and med. No behaviorals problem, focused on discharge. Over all significant imrpovement with current regimen of medications.     1/15/18 He is doing better and mood is better and he is compliant with medications and no anger issues and no self harm behavior and no aggression       SIDE EFFECTS: (reviewed/updated 1/15/2018)  None reported or admitted to. No noted toxicity with use of current med   ALLERGIES:(reviewed/updated 1/15/2018)  Allergies   Allergen Reactions    Fluphenazine Unknown (comments)     Pt is unable to communicate properly.  Penicillins Unknown (comments)     Pt is unable to communicate properly. MEDICATIONS PRIOR TO ADMISSION:(reviewed/updated 1/15/2018)  Prescriptions Prior to Admission   Medication Sig    divalproex ER (DEPAKOTE ER) 500 mg ER tablet Take 1,500 mg by mouth nightly. Indications: Treatment-resistant schizophrenia    haloperidol decanoate (HALDOL DECANOATE) 100 mg/mL injection 2 mL by IntraMUSCular route every twenty-eight (28) days. Indications: SCHIZOPHRENIA    benztropine (COGENTIN) 2 mg tablet Take 1 Tab by mouth nightly. Indications: extrapyramidal disease    cloZAPine 200 mg tablet Take 600 mg by mouth nightly. Indications: TREATMENT-RESISTANT SCHIZOPHRENIA      PAST MEDICAL HISTORY: Past medical history from the initial psychiatric evaluation has been reviewed (reviewed/updated 1/15/2018) with no additional updates (I asked patient and no additional past medical history provided). Past Medical History:   Diagnosis Date    Psychiatric disorder     Psychotic disorder     Withdrawal syndrome ([facility] [address]    No past surgical history on file. SOCIAL HISTORY: Social history from the initial psychiatric evaluation has been reviewed (reviewed/updated 1/15/2018) with no additional updates (I asked patient and no additional social history provided). Social History     Social History    Marital status: SINGLE     Spouse name: N/A    Number of children: N/A    Years of education: N/A     Occupational History    Not on file.      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 He lives with his sister. On disability    No pending legal charge reported      FAMILY HISTORY: Family history from the initial psychiatric evaluation has been reviewed (reviewed/updated 1/15/2018) with no additional updates (I asked patient and no additional family history provided). No family history on file. REVIEW OF SYSTEMS: (reviewed/updated 1/15/2018)  Appetite:no change from normal   Sleep: fitful   All other Review of Systems: Psychological ROS: positive for - behavioral disorder, concentration difficulties, hallucinations, irritability, mood swings and delusion  Respiratory ROS: no cough, shortness of breath, or wheezing  Cardiovascular ROS: no chest pain or dyspnea on exertion         [facility] (MSE):    MSE FINDINGS ARE WITHIN NORMAL LIMITS (WNL) UNLESS OTHERWISE STATED BELOW. ( ALL OF THE BELOW CATEGORIES OF THE MSE HAVE BEEN REVIEWED (reviewed 1/15/2018) AND UPDATED AS DEEMED APPROPRIATE )  General Presentation age appropriate , unreliable and vague   Orientation disorganized   Vital Signs  See below (reviewed 1/15/2018); Vital Signs (BP, Pulse, & Temp) are within normal limits if not listed below.    Gait and Station Stable/steady, no ataxia   Musculoskeletal System No extrapyramidal symptoms (EPS); no abnormal muscular movements or Tardive Dyskinesia (TD); muscle strength and tone are within normal limits   Language No aphasia or dysarthria   Speech:  Monotone, soft   Thought Processes illogical; slow rate of thoughts; poor abstract reasoning/computation   Thought Associations blocked   Thought Content paranoid delusions, bizarre delusions, auditory hallucinations and internally preoccupied   Suicidal Ideations none   Homicidal Ideations none   Mood:  constricted   Affect:  Constricted, inappropriate and increased in intensity   Memory recent  impaired   Memory remote:  intact   Concentration/Attention:  distractable and hypervigilance   Fund of Knowledge average   Insight:  limited   Reliability poor   Judgment:  limited          VITALS:     Patient Vitals for the past 24 hrs:   Temp Pulse Resp BP   01/14/18 1637 - 70 16 92/58     Wt Readings from Last 3 Encounters:   12/23/17 84.9 kg (187 lb 1.6 oz)   03/25/17 95.6 kg (210 lb 11.2 oz)   02/09/15 97.5 kg (215 lb)     Temp Readings from Last 3 Encounters:   03/28/17 97.5 °F (36.4 °C)   02/07/15 98.7 °F (37.1 °C)     BP Readings from Last 3 Encounters:   01/14/18 92/58   03/28/17 100/68   02/12/15 105/73     Pulse Readings from Last 3 Encounters:   01/14/18 70   03/28/17 67   02/12/15 87            DATA     LABORATORY DATA:(reviewed/updated 1/15/2018)  No results found for this or any previous visit (from the past 24 hour(s)). Lab Results   Component Value Date/Time    Valproic acid 69 12/29/2017 04:43 AM     No results found for: LITHM   RADIOLOGY REPORTS:(reviewed/updated 1/15/2018)  No results found. MEDICATIONS     ALL MEDICATIONS:      SCHEDULED MEDICATIONS:          ASSESSMENT & PLAN     DIAGNOSES REQUIRING ACTIVE TREATMENT AND MONITORING: (reviewed/updated 1/15/2018)  Patient Active Hospital Problem List:   Paranoid schizophrenia ([facility] [address] (3/15/2017)- tx resistant, chronic and with negative sx    Assessment:minimal change- psychosis, negative sx+. Poor baseline per hx. Has trial of ECT at [facility] and report suggest he did not do well. Clozapine trial was stopped as pt has been refusing labs/vital. He is accepting po med       Plan: I will ct to adjust med- Zydis, Depakote and Prozac  Consider long term hospitalization- sw to discuss with [facility] for possible transfer. Report suggest pt has done well at [facility] in the past.  1/9- sl better- ct with current tx  1/10- ct with current tx- encourage to take shower/ hygiene. Behavior plan  1/11- affect and hygiene is improved.  Ct with tx  1/12- improving slowly- affect is better, accepting med  1/13/18: slowly progressing,affect is bright and engage in brief conversation. 1/14/17: Patient is progressing,affect bright,more visible on the unit. Patient is on two antipsychotics now due to the relative refractoriness to treatment thus far. Prior to admission patient had THREE or more failed trails of monotherapy, including: Haldol, Zyprexa, Risperdal and Clozapine. The patient is a very slow responder to medications. Risks and benefits in the use of two antipsychotics have been weighed in full, including the risk of metabolic syndrome and the potential increased risk of QTc  Based on this analysis, it is considered favorable for the utilization of two antipsychotics. In the future, once stable on the two antipsychotics, patient can possibly be tapered to one of the chosen antipsychotics. Will check on EKG results today to monitor QTc.- refused EKG again  1/15/18 will continue same medications          I will continue to monitor blood levels (Depakote,, clozapine---a drug with a narrow therapeutic index= NTI) and associated labs for drug therapy implemented that require intense monitoring for toxicity as deemed appropriate based on current medication side effects and pharmacodynamically determined drug 1/2 lives. -    In summary, William Oviedo, is a 49 y.o.  male who presents with a severe exacerbation of the principal diagnosis of Paranoid schizophrenia ([facility] [address]  Patient's condition is worsening/not improving/not stable . Patient requires continued inpatient hospitalization for further stabilization, safety monitoring and medication management. I will continue to coordinate the provision of individual, milieu, occupational, group, and substance abuse therapies to address target symptoms/diagnoses as deemed appropriate for the individual patient. A coordinated, multidisplinary treatment team round was conducted with the patient (this team consists of the nurse, psychiatric unit pharmcist,  and writer).      Complete current electronic health record for patient has been reviewed today including consultant notes, ancillary staff notes, nurses and psychiatric tech notes. Suicide risk assessment completed and patient deemed to be of low risk for suicide at this time. The following regarding medications was addressed during rounds with patient:   the risks and benefits of the proposed medication. The patient was given the opportunity to ask questions. Informed consent given to the use of the above medications. Will continue to adjust psychiatric and non-psychiatric medications (see above \"medication\" section and orders section for details) as deemed appropriate & based upon diagnoses and response to treatment. I will continue to order blood tests/labs and diagnostic tests as deemed appropriate and review results as they become available (see orders for details and above listed lab/test results). I will order psychiatric records from previous [facility] hospitals to further elucidate the nature of patient's psychopathology and review once available. I will gather additional collateral information from friends, family and o/p treatment team to further elucidate the nature of patient's psychopathology and baselline level of psychiatric functioning. I certify that this patient's inpatient psychiatric hospital services furnished since the previous certification were, and continue to be, required for treatment that could reasonably be expected to improve the patient's condition, or for diagnostic study, and that the patient continues to need, on a daily basis, active treatment furnished directly by or requiring the supervision of inpatient psychiatric facility personnel. In addition, the hospital records show that services furnished were intensive treatment services, admission or related services, or equivalent services.     EXPECTED DISCHARGE DATE/DAY: TBD     DISPOSITION: Home       Signed By:   Bari Harada, MD  1/15/2018

## 2018-01-15 NOTE — BH NOTES
REFLECTIONS GROUP THERAPY PROGRESS NOTE    The patient Luiza Pablo is participating in Reflections Group Therapy. Group time: 30 minutes    Personal goal for participation: Share with group about feelings and concerns throughout the course of the day. Goal orientation: personal    Group therapy participation: active    Therapeutic interventions reviewed and discussed: Yes    Impression of participation:   Positive input.     Elvira Max  1/14/2018

## 2018-01-15 NOTE — PROGRESS NOTES
Problem: Altered Thought Process (Adult/Pediatric)  Goal: *STG: Seeks staff when feelings of anxiety and fear arise  Outcome: Progressing Towards Goal  Patient is calm and cooperative. Out of room for meals . Continues to be vocal. medication complaint. Patient did attend treatment team outside the room as well. Will continue to monitor patient and and assess needs.

## 2018-01-15 NOTE — PROGRESS NOTES
Problem: Altered Thought Process (Adult/Pediatric)  Goal: *STG: Complies with medication therapy  Outcome: Progressing Towards Goal  Patient is alert, stayed in his room for most of the shift, we was out at medication and meal times. He is selectively mute but did respond by nodding his head upon approach. Compliant with medication and meals. Denies any pain or discomfort. Continue to monitor the patient and assess needs.

## 2018-01-16 PROCEDURE — 65220000001 HC RM PRIVATE PSYCH

## 2018-01-16 PROCEDURE — 74011250637 HC RX REV CODE- 250/637: Performed by: PSYCHIATRY & NEUROLOGY

## 2018-01-16 RX ADMIN — OLANZAPINE 20 MG: 5 TABLET, ORALLY DISINTEGRATING ORAL at 21:31

## 2018-01-16 RX ADMIN — LORAZEPAM 0.5 MG: 0.5 TABLET ORAL at 08:58

## 2018-01-16 RX ADMIN — VALPROIC ACID 750 MG: 250 SOLUTION ORAL at 21:31

## 2018-01-16 RX ADMIN — BENZTROPINE MESYLATE 2 MG: 2 TABLET ORAL at 21:31

## 2018-01-16 RX ADMIN — FLUOXETINE 40 MG: 20 CAPSULE ORAL at 08:58

## 2018-01-16 RX ADMIN — LORAZEPAM 0.5 MG: 0.5 TABLET ORAL at 17:25

## 2018-01-16 RX ADMIN — VALPROIC ACID 750 MG: 250 SOLUTION ORAL at 08:58

## 2018-01-16 NOTE — PROGRESS NOTES
Problem: Altered Thought Process (Adult/Pediatric)  Goal: *STG: Remains safe in hospital  Outcome: Progressing Towards Goal  Pt remains withdrawn to room. Compliant with meals and medications. More verbal.Appearance and hygiene fair. Encouarged pt to participate in groups and be visible on the unit. Will continue to monitor pt and assess needs.

## 2018-01-16 NOTE — BH NOTES
GROUP THERAPY PROGRESS NOTE    Juaquin Chavez is participating in [employer]      Group time: 30 minutes    Personal goal for participation:  Unit orientation    Goal orientation: Community    Group therapy participation: Pt did not attend    Therapeutic interventions reviewed and discussed: Yes    Impression of participation: N/A

## 2018-01-16 NOTE — PROGRESS NOTES
Problem: Altered Thought Process (Adult/Pediatric)  Goal: *STG: Attends activities and groups      Outcome: Progressing Towards Goal  Pt is alert. He comes out of the room for meals and medications. Pt continues to isolate to his room most of the shift. Pt does not participate in groups and activities, encouraged by staff to participate and interact with peers and staff. Pt denies any needs at this time. He denies S/H ideations and denies A/V hallucinations. Pt is focused and discharged. Will continue to monitor pt q15 minutes for safety and support.

## 2018-01-16 NOTE — BH NOTES
GROUP THERAPY PROGRESS NOTE    The patient Petra Wray a 49 y.o. male is participating in [employer]. Group time: 1 hour    Personal goal for participation:  To concentrate on selected task    Goal orientation: social    Group therapy participation: minimal    Therapeutic interventions reviewed and discussed: Crafts, games, music    Impression of participation: The patient was present-left early    Mar Wright  1/16/2018  4:53 PM

## 2018-01-16 NOTE — BH NOTES
PSYCHIATRIC PROGRESS NOTE         Patient Name  Lilian Stanley   Date of Birth 1970   [facility] [id number]   Medical Record Number  [id number]      Age  49 y.o. PCP PROVIDER UNKNOWN   Admit date:  11/25/2017    Room Number  307/01  @ [facility]   Date of Service  1/16/2018           E & M PROGRESS NOTE:         HISTORY       CC:  \"selectively mute\"  HISTORY OF PRESENT ILLNESS/INTERVAL HISTORY:  (reviewed/updated 1/16/2018). per initial evaluation: The patient, Lilian Stanley, is a 49 y.o. WHITE [place]  male with a past psychiatric history significant for schizophrenia, who presents at this time with complaints of (and/or evidence of) the following emotional symptoms: psychotic behavior. Additional symptomatology include paranoid delusion, sexually inappropriate behavior, pepisodes of yelling screaming followed by selectively mute, disorganized thought process, anxiety, concern about health problems, difficulty sleeping, fearfulness, hearing voices, increased irritability, poor concentration and problem with medication. The above symptoms have been present for many years. These symptoms are of severe severity. These symptoms are constant  in nature. The patient's condition has been precipitated by and psychosocial stressors (conflict with sister, non compliance ). Patient's condition made worse by treatment noncompliance. UDS: negative; BAL=0. Lilian Stanley presents/reports/evidences the following emotional symptoms today, 1/16/2018:psychotic behavior. The above symptoms have been present for many years. These symptoms are of severe severity. The symptoms are constant in nature. Additional symptomatology and features include   1/2/18- no sig change, ct to present with poor hygiene. Delusional and odd posturing.   1/3- remains preoccupied with odd posturing ( fetal position), he did change his position on request. Talked in soft voice, poor hygiene, refused his Depakote last night  1/4- no sig change- refusing labs, isolative, poor hygiene, lying in bed in a fetal position. Eating well  1/5- ct to refuse labs for clozapine, passive aggressive and tend to sit in praying position when upset. Poor insight . No agitation  1/6-Isolating, selectively mute, staying in his room in praying position. Minimal activity. PT consult ordered. Consider ECT.  1/7- Found kneeling down on his bed with his pants down. Did not respond to any questions. Remains disorganized with poor hygiene. 1/8- poor hygiene, disheveled, was found praying but able to sit up and talk to the team- spoke in a soft voice. Still disorganized and delusional. Accepting med  1/9- able to have conversation with pt- kept his eyes closed but no odd behavior. Responds in monotone. Accepting med. Poor insight, disheveled  1/10- accepting med, able to come out for his meals, still evidence of poor hygiene. Need lot of prompting. Disorganized thought but no agitation  1/11- pt allowed to staff to shave and take shower, affect is sl better. Hygiene is improved. Accepting med. 1/12- slowly progressing, affect is improving, sl better socially and with eye contact. Accepting med and allowed the staff to help with ADLs. 1/13/18: Patient came in the conference room,still remains preoccupied,disheveled,affect improved, was pacing in the hallway ,engage in brief conversation which is a huge improvement compare to in the past when he was completely mute. over all slowly progressing. 1/14/18: Patient was seen in his room,he was calm,cooperative,speech soft,low tone, affect bright, no catatonia noted. Accetping meals and med. No behaviorals problem, focused on discharge. Over all significant imrpovement with current regimen of medications.     1/15/18 He is doing better and mood is better and he is compliant with medications and no anger issues and no self harm behavior and no aggression   1/16- slow progress- hygiene is improving, remains disorganized, delusional and with limited insight      SIDE EFFECTS: (reviewed/updated 1/16/2018)  None reported or admitted to. No noted toxicity with use of current med   ALLERGIES:(reviewed/updated 1/16/2018)  Allergies   Allergen Reactions    Fluphenazine Unknown (comments)     Pt is unable to communicate properly.  Penicillins Unknown (comments)     Pt is unable to communicate properly. MEDICATIONS PRIOR TO ADMISSION:(reviewed/updated 1/16/2018)  Prescriptions Prior to Admission   Medication Sig    divalproex ER (DEPAKOTE ER) 500 mg ER tablet Take 1,500 mg by mouth nightly. Indications: Treatment-resistant schizophrenia    haloperidol decanoate (HALDOL DECANOATE) 100 mg/mL injection 2 mL by IntraMUSCular route every twenty-eight (28) days. Indications: SCHIZOPHRENIA    benztropine (COGENTIN) 2 mg tablet Take 1 Tab by mouth nightly. Indications: extrapyramidal disease    cloZAPine 200 mg tablet Take 600 mg by mouth nightly. Indications: TREATMENT-RESISTANT SCHIZOPHRENIA      PAST MEDICAL HISTORY: Past medical history from the initial psychiatric evaluation has been reviewed (reviewed/updated 1/16/2018) with no additional updates (I asked patient and no additional past medical history provided). Past Medical History:   Diagnosis Date    Psychiatric disorder     Psychotic disorder     Withdrawal syndrome ([facility] [address]    No past surgical history on file. SOCIAL HISTORY: Social history from the initial psychiatric evaluation has been reviewed (reviewed/updated 1/16/2018) with no additional updates (I asked patient and no additional social history provided). Social History     Social History    Marital status: SINGLE     Spouse name: N/A    Number of children: N/A    Years of education: N/A     Occupational History    Not on file.      Social History Main Topics    Smoking status: Never Smoker    Smokeless tobacco: Never Used    Alcohol use No    Drug use: No    Sexual activity: Not on file Other Topics Concern    Not on file     Social History Narrative    Pt is a HS grad and is unemployed. He lives with his sister. On disability    No pending legal charge reported      FAMILY HISTORY: Family history from the initial psychiatric evaluation has been reviewed (reviewed/updated 1/16/2018) with no additional updates (I asked patient and no additional family history provided). No family history on file. REVIEW OF SYSTEMS: (reviewed/updated 1/16/2018)  Appetite:no change from normal   Sleep: fitful   All other Review of Systems: Psychological ROS: positive for - behavioral disorder, concentration difficulties, hallucinations, irritability, mood swings and delusion  Respiratory ROS: no cough, shortness of breath, or wheezing  Cardiovascular ROS: no chest pain or dyspnea on exertion         [facility] (MSE):    MSE FINDINGS ARE WITHIN NORMAL LIMITS (WNL) UNLESS OTHERWISE STATED BELOW. ( ALL OF THE BELOW CATEGORIES OF THE MSE HAVE BEEN REVIEWED (reviewed 1/16/2018) AND UPDATED AS DEEMED APPROPRIATE )  General Presentation age appropriate , unreliable and vague   Orientation disorganized   Vital Signs  See below (reviewed 1/16/2018); Vital Signs (BP, Pulse, & Temp) are within normal limits if not listed below.    Gait and Station Stable/steady, no ataxia   Musculoskeletal System No extrapyramidal symptoms (EPS); no abnormal muscular movements or Tardive Dyskinesia (TD); muscle strength and tone are within normal limits   Language No aphasia or dysarthria   Speech:  Monotone, soft   Thought Processes illogical; slow rate of thoughts; poor abstract reasoning/computation   Thought Associations blocked   Thought Content paranoid delusions, bizarre delusions, auditory hallucinations and internally preoccupied   Suicidal Ideations none   Homicidal Ideations none   Mood:  constricted   Affect:  Constricted, inappropriate and increased in intensity   Memory recent impaired   Memory remote:  intact   Concentration/Attention:  distractable and hypervigilance   Fund of Knowledge average   Insight:  limited   Reliability poor   Judgment:  limited          VITALS:     No data found. Wt Readings from Last 3 Encounters:   12/23/17 84.9 kg (187 lb 1.6 oz)   03/25/17 95.6 kg (210 lb 11.2 oz)   02/09/15 97.5 kg (215 lb)     Temp Readings from Last 3 Encounters:   03/28/17 97.5 °F (36.4 °C)   02/07/15 98.7 °F (37.1 °C)     BP Readings from Last 3 Encounters:   01/14/18 92/58   03/28/17 100/68   02/12/15 105/73     Pulse Readings from Last 3 Encounters:   01/14/18 70   03/28/17 67   02/12/15 87            DATA     LABORATORY DATA:(reviewed/updated 1/16/2018)  No results found for this or any previous visit (from the past 24 hour(s)). Lab Results   Component Value Date/Time    Valproic acid 69 12/29/2017 04:43 AM     No results found for: LITHM   RADIOLOGY REPORTS:(reviewed/updated 1/16/2018)  No results found. MEDICATIONS     ALL MEDICATIONS:      SCHEDULED MEDICATIONS:          ASSESSMENT & PLAN     DIAGNOSES REQUIRING ACTIVE TREATMENT AND MONITORING: (reviewed/updated 1/16/2018)  Patient Active Hospital Problem List:   Paranoid schizophrenia ([facility] [address] (3/15/2017)- tx resistant, chronic and with negative sx    Assessment:minimal change- psychosis, negative sx+. Poor baseline per hx. Has trial of ECT at [facility] and report suggest he did not do well. Clozapine trial was stopped as pt has been refusing labs/vital. He is accepting po med       Plan: I will ct to adjust med- Zydis, Depakote and Prozac  Consider long term hospitalization- sw to discuss with [facility] for possible transfer. Report suggest pt has done well at [facility] in the past.  1/9- sl better- ct with current tx  1/10- ct with current tx- encourage to take shower/ hygiene. Behavior plan  1/11- affect and hygiene is improved.  Ct with tx  1/12- improving slowly- affect is better, accepting med  1/13/18: slowly progressing,affect is bright and engage in brief conversation. 1/14/17: Patient is progressing,affect bright,more visible on the unit. 1/16- will ct with med    Patient is on two antipsychotics now due to the relative refractoriness to treatment thus far. Prior to admission patient had THREE or more failed trails of monotherapy, including: Haldol, Zyprexa, Risperdal and Clozapine. The patient is a very slow responder to medications. Risks and benefits in the use of two antipsychotics have been weighed in full, including the risk of metabolic syndrome and the potential increased risk of QTc  Based on this analysis, it is considered favorable for the utilization of two antipsychotics. In the future, once stable on the two antipsychotics, patient can possibly be tapered to one of the chosen antipsychotics. Will check on EKG results today to monitor QTc.- refused EKG again  1/15/18 will continue same medications          I will continue to monitor blood levels (Depakote,, clozapine---a drug with a narrow therapeutic index= NTI) and associated labs for drug therapy implemented that require intense monitoring for toxicity as deemed appropriate based on current medication side effects and pharmacodynamically determined drug 1/2 lives. -    In summary, Petra Wray, is a 49 y.o.  male who presents with a severe exacerbation of the principal diagnosis of Paranoid schizophrenia ([facility] [address]  Patient's condition is worsening/not improving/not stable . Patient requires continued inpatient hospitalization for further stabilization, safety monitoring and medication management. I will continue to coordinate the provision of individual, milieu, occupational, group, and substance abuse therapies to address target symptoms/diagnoses as deemed appropriate for the individual patient. A coordinated, multidisplinary treatment team round was conducted with the patient (this team consists of the nurse, psychiatric unit pharmcist,  and writer). Complete current electronic health record for patient has been reviewed today including consultant notes, ancillary staff notes, nurses and psychiatric tech notes. Suicide risk assessment completed and patient deemed to be of low risk for suicide at this time. The following regarding medications was addressed during rounds with patient:   the risks and benefits of the proposed medication. The patient was given the opportunity to ask questions. Informed consent given to the use of the above medications. Will continue to adjust psychiatric and non-psychiatric medications (see above \"medication\" section and orders section for details) as deemed appropriate & based upon diagnoses and response to treatment. I will continue to order blood tests/labs and diagnostic tests as deemed appropriate and review results as they become available (see orders for details and above listed lab/test results). I will order psychiatric records from previous [facility] hospitals to further elucidate the nature of patient's psychopathology and review once available. I will gather additional collateral information from friends, family and o/p treatment team to further elucidate the nature of patient's psychopathology and baselline level of psychiatric functioning. I certify that this patient's inpatient psychiatric hospital services furnished since the previous certification were, and continue to be, required for treatment that could reasonably be expected to improve the patient's condition, or for diagnostic study, and that the patient continues to need, on a daily basis, active treatment furnished directly by or requiring the supervision of inpatient psychiatric facility personnel. In addition, the hospital records show that services furnished were intensive treatment services, admission or related services, or equivalent services.     EXPECTED DISCHARGE DATE/DAY: TBD     DISPOSITION: Home       Signed By:   Lilian Meng JOAN Berry MD  1/16/2018

## 2018-01-17 PROCEDURE — 74011250637 HC RX REV CODE- 250/637: Performed by: PSYCHIATRY & NEUROLOGY

## 2018-01-17 PROCEDURE — 65220000001 HC RM PRIVATE PSYCH

## 2018-01-17 RX ADMIN — VALPROIC ACID 750 MG: 250 SOLUTION ORAL at 08:04

## 2018-01-17 RX ADMIN — VALPROIC ACID 750 MG: 250 SOLUTION ORAL at 21:29

## 2018-01-17 RX ADMIN — LORAZEPAM 0.5 MG: 0.5 TABLET ORAL at 08:05

## 2018-01-17 RX ADMIN — FLUOXETINE 40 MG: 20 CAPSULE ORAL at 08:05

## 2018-01-17 RX ADMIN — LORAZEPAM 0.5 MG: 0.5 TABLET ORAL at 17:25

## 2018-01-17 RX ADMIN — OLANZAPINE 20 MG: 5 TABLET, ORALLY DISINTEGRATING ORAL at 21:29

## 2018-01-17 RX ADMIN — BENZTROPINE MESYLATE 2 MG: 2 TABLET ORAL at 21:29

## 2018-01-17 NOTE — BH NOTES
PSYCHIATRIC PROGRESS NOTE         Patient Name  Betsy Hunter   Date of Birth 1970   [facility] [id number]   Medical Record Number  [id number]      Age  49 y.o. PCP PROVIDER UNKNOWN   Admit date:  11/25/2017    Room Number  307/01  @ [facility]   Date of Service  1/17/2018           E & M PROGRESS NOTE:         HISTORY       CC:  \"selectively mute\"  HISTORY OF PRESENT ILLNESS/INTERVAL HISTORY:  (reviewed/updated 1/17/2018). per initial evaluation: The patient, Betsy Hunter, is a 49 y.o. WHITE OR  male with a past psychiatric history significant for schizophrenia, who presents at this time with complaints of (and/or evidence of) the following emotional symptoms: psychotic behavior. Additional symptomatology include paranoid delusion, sexually inappropriate behavior, pepisodes of yelling screaming followed by selectively mute, disorganized thought process, anxiety, concern about health problems, difficulty sleeping, fearfulness, hearing voices, increased irritability, poor concentration and problem with medication. The above symptoms have been present for many years. These symptoms are of severe severity. These symptoms are constant  in nature. The patient's condition has been precipitated by and psychosocial stressors (conflict with sister, non compliance ). Patient's condition made worse by treatment noncompliance. UDS: negative; BAL=0. Betsy Hunter presents/reports/evidences the following emotional symptoms today, 1/17/2018:psychotic behavior. The above symptoms have been present for many years. These symptoms are of severe severity. The symptoms are constant in nature. Additional symptomatology and features include   1/2/18- no sig change, ct to present with poor hygiene. Delusional and odd posturing.   1/3- remains preoccupied with odd posturing ( fetal position), he did change his position on request. Talked in soft voice, poor hygiene, refused his Depakote last night  1/4- no sig change- refusing labs, isolative, poor hygiene, lying in bed in a fetal position. Eating well  1/5- ct to refuse labs for clozapine, passive aggressive and tend to sit in praying position when upset. Poor insight . No agitation  1/6-Isolating, selectively mute, staying in his room in praying position. Minimal activity. PT consult ordered. Consider ECT.  1/7- Found kneeling down on his bed with his pants down. Did not respond to any questions. Remains disorganized with poor hygiene. 1/8- poor hygiene, disheveled, was found praying but able to sit up and talk to the team- spoke in a soft voice. Still disorganized and delusional. Accepting med  1/9- able to have conversation with pt- kept his eyes closed but no odd behavior. Responds in monotone. Accepting med. Poor insight, disheveled  1/10- accepting med, able to come out for his meals, still evidence of poor hygiene. Need lot of prompting. Disorganized thought but no agitation  1/11- pt allowed to staff to shave and take shower, affect is sl better. Hygiene is improved. Accepting med. 1/12- slowly progressing, affect is improving, sl better socially and with eye contact. Accepting med and allowed the staff to help with ADLs. 1/13/18: Patient came in the conference room,still remains preoccupied,disheveled,affect improved, was pacing in the hallway ,engage in brief conversation which is a huge improvement compare to in the past when he was completely mute. over all slowly progressing. 1/14/18: Patient was seen in his room,he was calm,cooperative,speech soft,low tone, affect bright, no catatonia noted. Accetping meals and med. No behaviorals problem, focused on discharge. Over all significant imrpovement with current regimen of medications.     1/15/18 He is doing better and mood is better and he is compliant with medications and no anger issues and no self harm behavior and no aggression   1/16- slow progress- hygiene is improving, remains disorganized, delusional and with limited insight  1/17- ct to show slow progress- affect is better, reports AH - less intense. No behavior problem      SIDE EFFECTS: (reviewed/updated 1/17/2018)  None reported or admitted to. No noted toxicity with use of current med   ALLERGIES:(reviewed/updated 1/17/2018)  Allergies   Allergen Reactions    Fluphenazine Unknown (comments)     Pt is unable to communicate properly.  Penicillins Unknown (comments)     Pt is unable to communicate properly. MEDICATIONS PRIOR TO ADMISSION:(reviewed/updated 1/17/2018)  Prescriptions Prior to Admission   Medication Sig    divalproex ER (DEPAKOTE ER) 500 mg ER tablet Take 1,500 mg by mouth nightly. Indications: Treatment-resistant schizophrenia    haloperidol decanoate (HALDOL DECANOATE) 100 mg/mL injection 2 mL by IntraMUSCular route every twenty-eight (28) days. Indications: SCHIZOPHRENIA    benztropine (COGENTIN) 2 mg tablet Take 1 Tab by mouth nightly. Indications: extrapyramidal disease    cloZAPine 200 mg tablet Take 600 mg by mouth nightly. Indications: TREATMENT-RESISTANT SCHIZOPHRENIA      PAST MEDICAL HISTORY: Past medical history from the initial psychiatric evaluation has been reviewed (reviewed/updated 1/17/2018) with no additional updates (I asked patient and no additional past medical history provided). Past Medical History:   Diagnosis Date    Psychiatric disorder     Psychotic disorder     Withdrawal syndrome ([facility] [address]    No past surgical history on file. SOCIAL HISTORY: Social history from the initial psychiatric evaluation has been reviewed (reviewed/updated 1/17/2018) with no additional updates (I asked patient and no additional social history provided). Social History     Social History    Marital status: SINGLE     Spouse name: N/A    Number of children: N/A    Years of education: N/A     Occupational History    Not on file.      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 He lives with his sister. On disability    No pending legal charge reported      FAMILY HISTORY: Family history from the initial psychiatric evaluation has been reviewed (reviewed/updated 1/17/2018) with no additional updates (I asked patient and no additional family history provided). No family history on file. REVIEW OF SYSTEMS: (reviewed/updated 1/17/2018)  Appetite:no change from normal   Sleep: fitful   All other Review of Systems: Psychological ROS: positive for - behavioral disorder, concentration difficulties, hallucinations, irritability, mood swings and delusion  Respiratory ROS: no cough, shortness of breath, or wheezing  Cardiovascular ROS: no chest pain or dyspnea on exertion         [facility] [place]    [facility] FINDINGS ARE WITHIN NORMAL LIMITS (WNL) UNLESS OTHERWISE STATED BELOW. ( ALL OF THE BELOW CATEGORIES OF THE MSE HAVE BEEN REVIEWED (reviewed 1/17/2018) AND UPDATED AS DEEMED APPROPRIATE )  General Presentation age appropriate , unreliable and vague   Orientation disorganized   Vital Signs  See below (reviewed 1/17/2018); Vital Signs (BP, Pulse, & Temp) are within normal limits if not listed below.    Gait and Station Stable/steady, no ataxia   Musculoskeletal System No extrapyramidal symptoms (EPS); no abnormal muscular movements or Tardive Dyskinesia (TD); muscle strength and tone are within normal limits   Language No aphasia or dysarthria   Speech:  Monotone, soft   Thought Processes illogical; slow rate of thoughts; poor abstract reasoning/computation   Thought Associations blocked   Thought Content paranoid delusions, bizarre delusions, auditory hallucinations and internally preoccupied   Suicidal Ideations none   Homicidal Ideations none   Mood: constricted   Affect:  Constricted, inappropriate and increased in intensity   Memory recent  impaired   Memory remote:  intact   Concentration/Attention:  distractable and hypervigilance   Fund of Knowledge average   Insight:  limited   Reliability poor   Judgment:  limited          VITALS:     Patient Vitals for the past 24 hrs:   Temp Pulse Resp BP   01/16/18 2134 - - 16 -     Wt Readings from Last 3 Encounters:   12/23/17 84.9 kg (187 lb 1.6 oz)   03/25/17 95.6 kg (210 lb 11.2 oz)   02/09/15 97.5 kg (215 lb)     Temp Readings from Last 3 Encounters:   03/28/17 97.5 °F (36.4 °C)   02/07/15 98.7 °F (37.1 °C)     BP Readings from Last 3 Encounters:   01/14/18 92/58   03/28/17 100/68   02/12/15 105/73     Pulse Readings from Last 3 Encounters:   01/14/18 70   03/28/17 67   02/12/15 87            DATA     LABORATORY DATA:(reviewed/updated 1/17/2018)  No results found for this or any previous visit (from the past 24 hour(s)). Lab Results   Component Value Date/Time    Valproic acid 69 12/29/2017 04:43 AM     No results found for: LITHM   RADIOLOGY REPORTS:(reviewed/updated 1/17/2018)  No results found. MEDICATIONS     ALL MEDICATIONS:      SCHEDULED MEDICATIONS:          ASSESSMENT & PLAN     DIAGNOSES REQUIRING ACTIVE TREATMENT AND MONITORING: (reviewed/updated 1/17/2018)  Patient Active Hospital Problem List:   Paranoid schizophrenia ([facility] [address] (3/15/2017)- tx resistant, chronic and with negative sx    Assessment:minimal change- psychosis, negative sx+. Poor baseline per hx. Has trial of ECT at [facility] and report suggest he did not do well. Clozapine trial was stopped as pt has been refusing labs/vital. He is accepting po med       Plan: I will ct to adjust med- Zydis, Depakote and Prozac  Consider long term hospitalization- sw to discuss with [facility] for possible transfer. Report suggest pt has done well at [facility] in the past.  1/9- sl better- ct with current tx  1/10- ct with current tx- encourage to take shower/ hygiene. Behavior plan  1/11- affect and hygiene is improved. Ct with tx  1/12- improving slowly- affect is better, accepting med  1/13/18: slowly progressing,affect is bright and engage in brief conversation. 1/14/17: Patient is progressing,affect bright,more visible on the unit. 1/16- will ct with med  1/17- no change, dc planning- not seen by [facility] yet despite several request    Patient is on two antipsychotics now due to the relative refractoriness to treatment thus far. Prior to admission patient had THREE or more failed trails of monotherapy, including: Haldol, Zyprexa, Risperdal and Clozapine. The patient is a very slow responder to medications. Risks and benefits in the use of two antipsychotics have been weighed in full, including the risk of metabolic syndrome and the potential increased risk of QTc  Based on this analysis, it is considered favorable for the utilization of two antipsychotics. In the future, once stable on the two antipsychotics, patient can possibly be tapered to one of the chosen antipsychotics. Will check on EKG results today to monitor QTc.- refused EKG again  1/15/18 will continue same medications          I will continue to monitor blood levels (Depakote,, clozapine---a drug with a narrow therapeutic index= NTI) and associated labs for drug therapy implemented that require intense monitoring for toxicity as deemed appropriate based on current medication side effects and pharmacodynamically determined drug 1/2 lives. -    In summary, Mc Khan, is a 49 y.o.  male who presents with a severe exacerbation of the principal diagnosis of Paranoid schizophrenia ([facility] [address]  Patient's condition is worsening/not improving/not stable . Patient requires continued inpatient hospitalization for further stabilization, safety monitoring and medication management.   I will continue to coordinate the provision of individual, milieu, occupational, group, and substance abuse therapies to address target symptoms/diagnoses as deemed appropriate for the individual patient. A coordinated, multidisplinary treatment team round was conducted with the patient (this team consists of the nurse, psychiatric unit pharmcist,  and writer). Complete current electronic health record for patient has been reviewed today including consultant notes, ancillary staff notes, nurses and psychiatric tech notes. Suicide risk assessment completed and patient deemed to be of low risk for suicide at this time. The following regarding medications was addressed during rounds with patient:   the risks and benefits of the proposed medication. The patient was given the opportunity to ask questions. Informed consent given to the use of the above medications. Will continue to adjust psychiatric and non-psychiatric medications (see above \"medication\" section and orders section for details) as deemed appropriate & based upon diagnoses and response to treatment. I will continue to order blood tests/labs and diagnostic tests as deemed appropriate and review results as they become available (see orders for details and above listed lab/test results). I will order psychiatric records from previous [facility] hospitals to further elucidate the nature of patient's psychopathology and review once available. I will gather additional collateral information from friends, family and o/p treatment team to further elucidate the nature of patient's psychopathology and baselline level of psychiatric functioning.          I certify that this patient's inpatient psychiatric hospital services furnished since the previous certification were, and continue to be, required for treatment that could reasonably be expected to improve the patient's condition, or for diagnostic study, and that the patient continues to need, on a daily basis, active treatment furnished directly by or requiring the supervision of inpatient psychiatric facility personnel. In addition, the hospital records show that services furnished were intensive treatment services, admission or related services, or equivalent services.     EXPECTED DISCHARGE DATE/DAY: TBD     DISPOSITION: Home       Signed By:   Mago Wade MD  1/17/2018

## 2018-01-17 NOTE — BH NOTES
GROUP THERAPY PROGRESS NOTE    The patient [place] [employer] is participating in [employer]. Group time: 30 minutes    Personal goal for participation: to orient the patient to the unit.     Goal orientation: successful adoption of unit rules    Group therapy participation: minimal    Therapeutic interventions reviewed and discussed: Yes    Impression of participation: randy Fernandez  1/17/2018 11:52 AM

## 2018-01-17 NOTE — BH NOTES
The documentation for this period is being entered following the guidelines as defined in the [address] downtime policy by Jasmin Kim RN.

## 2018-01-17 NOTE — PROGRESS NOTES
Problem: Altered Thought Process (Adult/Pediatric)  Goal: *STG: Complies with medication therapy  Outcome: Progressing Towards Goal  Pt remains isolative and selectively mute. Pt remains disheveled and with poor hygiene. He spent most of the shift awake in bed. Pt is compliant with meals and meds. He did not receive any prn meds on the shift. Pt did not come out for groups. Pt was encouraged to be more visible on the unit and focus on hygiene. Staff will continue to monitor pt's safety and offer support as needed.

## 2018-01-17 NOTE — PROGRESS NOTES
Problem: Altered Thought Process (Adult/Pediatric)  Goal: *STG: Complies with medication therapy  Outcome: Progressing Towards Goal  Pt remains w/d to room out for meals more. Compliant with meds. Pt makes little eye contact. He is often observed kneeling or crouching on bed but does respond minimally upon approach. Affect is flat, mood is guarded. Hygiene is poor, does not acknowledge encouragement with hygiene. No evidence of intent to harm self or others. Pt encouraged to continue to participate in care. Will continue to monitor pt with q 15 min checks.

## 2018-01-18 PROCEDURE — 65220000001 HC RM PRIVATE PSYCH

## 2018-01-18 PROCEDURE — 74011250637 HC RX REV CODE- 250/637: Performed by: PSYCHIATRY & NEUROLOGY

## 2018-01-18 RX ADMIN — OLANZAPINE 20 MG: 5 TABLET, ORALLY DISINTEGRATING ORAL at 21:47

## 2018-01-18 RX ADMIN — LORAZEPAM 0.5 MG: 0.5 TABLET ORAL at 16:24

## 2018-01-18 RX ADMIN — BENZTROPINE MESYLATE 2 MG: 2 TABLET ORAL at 21:48

## 2018-01-18 RX ADMIN — FLUOXETINE 40 MG: 20 CAPSULE ORAL at 09:30

## 2018-01-18 RX ADMIN — VALPROIC ACID 750 MG: 250 SOLUTION ORAL at 09:30

## 2018-01-18 RX ADMIN — VALPROIC ACID 750 MG: 250 SOLUTION ORAL at 21:47

## 2018-01-18 RX ADMIN — LORAZEPAM 0.5 MG: 0.5 TABLET ORAL at 09:36

## 2018-01-18 NOTE — PROGRESS NOTES
Problem: Falls - Risk of  Goal: *Absence of Falls  Document Nahed Fall Risk and appropriate interventions in the flowsheet. Fall Risk Interventions:  Medication Interventions: Teach patient to arise slowly  History of Falls Interventions: Room close to nurse's station     Patient visualized ambulating safely. Patient was isolative this shift. Patient has no interactions with peers and limited interaction with staff. Patient did respond to some questions. Patient continues to refused VS. Patient will continue to be monitored and his needs assessed.

## 2018-01-18 NOTE — PROGRESS NOTES
Problem: Altered Thought Process (Adult/Pediatric)  Goal: *STG: Remains safe in hospital  Outcome: Progressing Towards Goal  Pt rested quietly in bed with eyes closed. No signs/symptoms of distress, agitation, or anxiety. Will monitor for changes. Q 15 minute checks continue.  Pt slept 7 hr

## 2018-01-18 NOTE — BH NOTES
PSYCHIATRIC PROGRESS NOTE         Patient Name  Fahad Nolasco   Date of Birth 1970   [facility] [id number]   Medical Record Number  [id number]      Age  49 y.o. PCP PROVIDER UNKNOWN   Admit date:  11/25/2017    Room Number  307/01  @ [facility]   Date of Service  1/18/2018           E & M PROGRESS NOTE:         HISTORY       CC:  \"selectively mute\"  HISTORY OF PRESENT ILLNESS/INTERVAL HISTORY:  (reviewed/updated 1/18/2018). per initial evaluation: The patient, Fahad Nolasco, is a 49 y.o. WHITE OR  male with a past psychiatric history significant for schizophrenia, who presents at this time with complaints of (and/or evidence of) the following emotional symptoms: psychotic behavior. Additional symptomatology include paranoid delusion, sexually inappropriate behavior, pepisodes of yelling screaming followed by selectively mute, disorganized thought process, anxiety, concern about health problems, difficulty sleeping, fearfulness, hearing voices, increased irritability, poor concentration and problem with medication. The above symptoms have been present for many years. These symptoms are of severe severity. These symptoms are constant  in nature. The patient's condition has been precipitated by and psychosocial stressors (conflict with sister, non compliance ). Patient's condition made worse by treatment noncompliance. UDS: negative; BAL=0. Fahad Nolasco presents/reports/evidences the following emotional symptoms today, 1/18/2018:psychotic behavior. The above symptoms have been present for many years. These symptoms are of severe severity. The symptoms are constant in nature. Additional symptomatology and features include   1/2/18- no sig change, ct to present with poor hygiene. Delusional and odd posturing.   1/3- remains preoccupied with odd posturing ( fetal position), he did change his position on request. Talked in soft voice, poor hygiene, refused his Depakote last night  1/4- no sig change- refusing labs, isolative, poor hygiene, lying in bed in a fetal position. Eating well  1/5- ct to refuse labs for clozapine, passive aggressive and tend to sit in praying position when upset. Poor insight . No agitation  1/6-Isolating, selectively mute, staying in his room in praying position. Minimal activity. PT consult ordered. Consider ECT.  1/7- Found kneeling down on his bed with his pants down. Did not respond to any questions. Remains disorganized with poor hygiene. 1/8- poor hygiene, disheveled, was found praying but able to sit up and talk to the team- spoke in a soft voice. Still disorganized and delusional. Accepting med  1/9- able to have conversation with pt- kept his eyes closed but no odd behavior. Responds in monotone. Accepting med. Poor insight, disheveled  1/10- accepting med, able to come out for his meals, still evidence of poor hygiene. Need lot of prompting. Disorganized thought but no agitation  1/11- pt allowed to staff to shave and take shower, affect is sl better. Hygiene is improved. Accepting med. 1/12- slowly progressing, affect is improving, sl better socially and with eye contact. Accepting med and allowed the staff to help with ADLs. 1/13/18: Patient came in the conference room,still remains preoccupied,disheveled,affect improved, was pacing in the hallway ,engage in brief conversation which is a huge improvement compare to in the past when he was completely mute. over all slowly progressing. 1/14/18: Patient was seen in his room,he was calm,cooperative,speech soft,low tone, affect bright, no catatonia noted. Accetping meals and med. No behaviorals problem, focused on discharge. Over all significant imrpovement with current regimen of medications.     1/15/18 He is doing better and mood is better and he is compliant with medications and no anger issues and no self harm behavior and no aggression   1/16- slow progress- hygiene is improving, remains disorganized, delusional and with limited insight  1/17- ct to show slow progress- affect is better, reports AH - less intense. No behavior problem  1/18- no sig change since last review. accepting med. SIDE EFFECTS: (reviewed/updated 1/18/2018)  None reported or admitted to. No noted toxicity with use of current med   ALLERGIES:(reviewed/updated 1/18/2018)  Allergies   Allergen Reactions    Fluphenazine Unknown (comments)     Pt is unable to communicate properly.  Penicillins Unknown (comments)     Pt is unable to communicate properly. MEDICATIONS PRIOR TO ADMISSION:(reviewed/updated 1/18/2018)  Prescriptions Prior to Admission   Medication Sig    divalproex ER (DEPAKOTE ER) 500 mg ER tablet Take 1,500 mg by mouth nightly. Indications: Treatment-resistant schizophrenia    haloperidol decanoate (HALDOL DECANOATE) 100 mg/mL injection 2 mL by IntraMUSCular route every twenty-eight (28) days. Indications: SCHIZOPHRENIA    benztropine (COGENTIN) 2 mg tablet Take 1 Tab by mouth nightly. Indications: extrapyramidal disease    cloZAPine 200 mg tablet Take 600 mg by mouth nightly. Indications: TREATMENT-RESISTANT SCHIZOPHRENIA      PAST MEDICAL HISTORY: Past medical history from the initial psychiatric evaluation has been reviewed (reviewed/updated 1/18/2018) with no additional updates (I asked patient and no additional past medical history provided). Past Medical History:   Diagnosis Date    Psychiatric disorder     Psychotic disorder     Withdrawal syndrome ([facility] [address]    No past surgical history on file. SOCIAL HISTORY: Social history from the initial psychiatric evaluation has been reviewed (reviewed/updated 1/18/2018) with no additional updates (I asked patient and no additional social history provided). Social History     Social History    Marital status: SINGLE     Spouse name: N/A    Number of children: N/A    Years of education: N/A     Occupational History    Not on file.      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 He lives with his sister. On disability    No pending legal charge reported      FAMILY HISTORY: Family history from the initial psychiatric evaluation has been reviewed (reviewed/updated 1/18/2018) with no additional updates (I asked patient and no additional family history provided). No family history on file. REVIEW OF SYSTEMS: (reviewed/updated 1/18/2018)  Appetite:no change from normal   Sleep: fitful   All other Review of Systems: Psychological ROS: positive for - behavioral disorder, concentration difficulties, hallucinations, irritability, mood swings and delusion  Respiratory ROS: no cough, shortness of breath, or wheezing  Cardiovascular ROS: no chest pain or dyspnea on exertion         [facility] (MSE):    [facility] FINDINGS ARE WITHIN NORMAL LIMITS (WNL) UNLESS OTHERWISE STATED BELOW. ( ALL OF THE BELOW CATEGORIES OF THE MSE HAVE BEEN REVIEWED (reviewed 1/18/2018) AND UPDATED AS DEEMED APPROPRIATE )  General Presentation age appropriate , unreliable and vague   Orientation disorganized   Vital Signs  See below (reviewed 1/18/2018); Vital Signs (BP, Pulse, & Temp) are within normal limits if not listed below.    Gait and Station Stable/steady, no ataxia   Musculoskeletal System No extrapyramidal symptoms (EPS); no abnormal muscular movements or Tardive Dyskinesia (TD); muscle strength and tone are within normal limits   Language No aphasia or dysarthria   Speech:  Monotone, soft   Thought Processes illogical; slow rate of thoughts; poor abstract reasoning/computation   Thought Associations blocked   Thought Content paranoid delusions, bizarre delusions, auditory hallucinations and internally preoccupied Suicidal Ideations none   Homicidal Ideations none   Mood:  constricted   Affect:  Constricted, inappropriate and increased in intensity   Memory recent  impaired   Memory remote:  intact   Concentration/Attention:  distractable and hypervigilance   Fund of Knowledge average   Insight:  limited   Reliability poor   Judgment:  limited          VITALS:     No data found. Wt Readings from Last 3 Encounters:   12/23/17 84.9 kg (187 lb 1.6 oz)   03/25/17 95.6 kg (210 lb 11.2 oz)   02/09/15 97.5 kg (215 lb)     Temp Readings from Last 3 Encounters:   03/28/17 97.5 °F (36.4 °C)   02/07/15 98.7 °F (37.1 °C)     BP Readings from Last 3 Encounters:   01/14/18 92/58   03/28/17 100/68   02/12/15 105/73     Pulse Readings from Last 3 Encounters:   01/14/18 70   03/28/17 67   02/12/15 87            DATA     LABORATORY DATA:(reviewed/updated 1/18/2018)  No results found for this or any previous visit (from the past 24 hour(s)). Lab Results   Component Value Date/Time    Valproic acid 69 12/29/2017 04:43 AM     No results found for: LITHM   RADIOLOGY REPORTS:(reviewed/updated 1/18/2018)  No results found. MEDICATIONS     ALL MEDICATIONS:      SCHEDULED MEDICATIONS:          ASSESSMENT & PLAN     DIAGNOSES REQUIRING ACTIVE TREATMENT AND MONITORING: (reviewed/updated 1/18/2018)  Patient Active Hospital Problem List:   Paranoid schizophrenia ([facility] [address] (3/15/2017)- tx resistant, chronic and with negative sx    Assessment:minimal change- psychosis, negative sx+. Poor baseline per hx. Has trial of ECT at [facility] and report suggest he did not do well. Clozapine trial was stopped as pt has been refusing labs/vital. He is accepting po med       Plan: I will ct to adjust med- Zydis, Depakote and Prozac  Consider long term hospitalization- sw to discuss with [facility] for possible transfer. Report suggest pt has done well at [facility] in the past.  1/9- sl better- ct with current tx  1/10- ct with current tx- encourage to take shower/ hygiene. Behavior plan  1/11- affect and hygiene is improved. Ct with tx  1/12- improving slowly- affect is better, accepting med  1/13/18: slowly progressing,affect is bright and engage in brief conversation. 1/14/17: Patient is progressing,affect bright,more visible on the unit. 1/16- will ct with med  1/17- no change, dc planning- not seen by [facility] yet despite several request  1/18- ct with tx, placement issue    Patient is on two antipsychotics now due to the relative refractoriness to treatment thus far. Prior to admission patient had THREE or more failed trails of monotherapy, including: Haldol, Zyprexa, Risperdal and Clozapine. The patient is a very slow responder to medications. Risks and benefits in the use of two antipsychotics have been weighed in full, including the risk of metabolic syndrome and the potential increased risk of QTc  Based on this analysis, it is considered favorable for the utilization of two antipsychotics. In the future, once stable on the two antipsychotics, patient can possibly be tapered to one of the chosen antipsychotics. Will check on EKG results today to monitor QTc.- refused EKG again  1/15/18 will continue same medications          I will continue to monitor blood levels (Depakote,, clozapine---a drug with a narrow therapeutic index= NTI) and associated labs for drug therapy implemented that require intense monitoring for toxicity as deemed appropriate based on current medication side effects and pharmacodynamically determined drug 1/2 lives. -    In summary, Calvin Byers, is a 49 y.o.  male who presents with a severe exacerbation of the principal diagnosis of Paranoid schizophrenia ([facility] [address]  Patient's condition is worsening/not improving/not stable . Patient requires continued inpatient hospitalization for further stabilization, safety monitoring and medication management.   I will continue to coordinate the provision of individual, milieu, occupational, group, and substance abuse therapies to address target symptoms/diagnoses as deemed appropriate for the individual patient. A coordinated, multidisplinary treatment team round was conducted with the patient (this team consists of the nurse, psychiatric unit pharmcist,  and writer). Complete current electronic health record for patient has been reviewed today including consultant notes, ancillary staff notes, nurses and psychiatric tech notes. Suicide risk assessment completed and patient deemed to be of low risk for suicide at this time. The following regarding medications was addressed during rounds with patient:   the risks and benefits of the proposed medication. The patient was given the opportunity to ask questions. Informed consent given to the use of the above medications. Will continue to adjust psychiatric and non-psychiatric medications (see above \"medication\" section and orders section for details) as deemed appropriate & based upon diagnoses and response to treatment. I will continue to order blood tests/labs and diagnostic tests as deemed appropriate and review results as they become available (see orders for details and above listed lab/test results). I will order psychiatric records from previous [facility] hospitals to further elucidate the nature of patient's psychopathology and review once available. I will gather additional collateral information from friends, family and o/p treatment team to further elucidate the nature of patient's psychopathology and baselline level of psychiatric functioning.          I certify that this patient's inpatient psychiatric hospital services furnished since the previous certification were, and continue to be, required for treatment that could reasonably be expected to improve the patient's condition, or for diagnostic study, and that the patient continues to need, on a daily basis, active treatment furnished directly by or requiring the supervision of inpatient psychiatric facility personnel. In addition, the hospital records show that services furnished were intensive treatment services, admission or related services, or equivalent services.     EXPECTED DISCHARGE DATE/DAY: TBD     DISPOSITION: Home       Signed By:   Teetee Clay MD  1/18/2018

## 2018-01-18 NOTE — PROGRESS NOTES
Problem: Altered Thought Process (Adult/Pediatric)  Goal: *STG: Remains safe in hospital  Outcome: Progressing Towards Goal  Patient continues to be isolative to self. Medication and meal complaint. Patient continue to talk in a soft tone. encouraged to attend groups. Will continue to monitor patient and assess needs.

## 2018-01-19 PROCEDURE — 65220000001 HC RM PRIVATE PSYCH

## 2018-01-19 PROCEDURE — 74011250637 HC RX REV CODE- 250/637: Performed by: PSYCHIATRY & NEUROLOGY

## 2018-01-19 RX ADMIN — VALPROIC ACID 750 MG: 250 SOLUTION ORAL at 09:00

## 2018-01-19 RX ADMIN — LORAZEPAM 0.5 MG: 0.5 TABLET ORAL at 09:00

## 2018-01-19 RX ADMIN — LORAZEPAM 0.5 MG: 0.5 TABLET ORAL at 16:35

## 2018-01-19 RX ADMIN — VALPROIC ACID 750 MG: 250 SOLUTION ORAL at 21:28

## 2018-01-19 RX ADMIN — BENZTROPINE MESYLATE 2 MG: 2 TABLET ORAL at 21:29

## 2018-01-19 RX ADMIN — OLANZAPINE 20 MG: 5 TABLET, ORALLY DISINTEGRATING ORAL at 21:29

## 2018-01-19 RX ADMIN — FLUOXETINE 40 MG: 20 CAPSULE ORAL at 09:00

## 2018-01-19 NOTE — BH NOTES
Problem: Altered Thought Process (Adult/Pediatric)  Goal: *STG: Complies with medication therapy  Pt is isolative from the milieu. Pt's affect is calmer, mood less labile. Pt is meds/meals compliant. Pt was present in his treatment team meeting today. Will continue to monitor q 15 for safety, mood and behavior changes.

## 2018-01-19 NOTE — BH NOTES
GROUP THERAPY PROGRESS NOTE    The patient Dwight Valdez is participating in [employer]. Group time: 30 minutes    Personal goal for participation: to orient the patient to the unit.     Goal orientation: successful adoption of unit rules    Group therapy participation: minimal    Therapeutic interventions reviewed and discussed: Yes    Impression of participation: randy Ortega  1/19/2018 8:19 AM

## 2018-01-19 NOTE — PROGRESS NOTES
Problem: Altered Thought Process (Adult/Pediatric)  Goal: *STG: Participates in treatment plan  [address] [facility] [address]  Master Treatment Plan for Miguelangel Lloyd Treatment Plan Initiated: 11/25/17    Treatment Plan Modalities:    Type of Modality Amount(x minutes) Frequency (x/week) Duration (x days) Name of Responsible Staff    [facility] meetings to encourage peer interactions   x60 minutes, 14x/week, x7 days                                                                                                     MEG Lopez       Group psychotherapy to assist in building coping skills and internal controls        Therapeutic activity groups to build coping skills  x60 minutes, 7x/week, x 7 days                                                                                Serena Xie        Psychoeducation in group setting to address:    Medication education        Coping skills        Relaxation techniques        Symptom management        Discharge planning        Spirituality         CHARLIE        Recovery/AA/NA        Physician medication management  7x/week          X 7days                                              Dr. Chicas Player        Family meeting/discharge planning                                     Outcome: Not Progressing Towards Goal  Pt continues to be isolated to his room awake and quiet in bed most of the time during the shift. Pt was food and med compliant with prompt. Pt denied any S/I and H/I at this time. Pt encouraged to attend all group activities and to discuss any issues or concerns with staff. Staff will also continue to monitor pt with Q -15 checks for safety.

## 2018-01-19 NOTE — BH NOTES
PSYCHIATRIC PROGRESS NOTE         Patient Name  Raquel Laurent   Date of Birth 1970   [facility] [id number]   Medical Record Number  [id number]      Age  49 y.o. PCP PROVIDER UNKNOWN   Admit date:  11/25/2017    Room Number  307/01  @ [facility]   Date of Service  1/19/2018           E & M PROGRESS NOTE:         HISTORY       CC:  \"selectively mute\"  HISTORY OF PRESENT ILLNESS/INTERVAL HISTORY:  (reviewed/updated 1/19/2018). per initial evaluation: The patient, Raquel Laurent, is a 49 y.o. WHITE OR  male with a past psychiatric history significant for schizophrenia, who presents at this time with complaints of (and/or evidence of) the following emotional symptoms: psychotic behavior. Additional symptomatology include paranoid delusion, sexually inappropriate behavior, pepisodes of yelling screaming followed by selectively mute, disorganized thought process, anxiety, concern about health problems, difficulty sleeping, fearfulness, hearing voices, increased irritability, poor concentration and problem with medication. The above symptoms have been present for many years. These symptoms are of severe severity. These symptoms are constant  in nature. The patient's condition has been precipitated by and psychosocial stressors (conflict with sister, non compliance ). Patient's condition made worse by treatment noncompliance. UDS: negative; BAL=0. Raquel Laurent presents/reports/evidences the following emotional symptoms today, 1/19/2018:psychotic behavior. The above symptoms have been present for many years. These symptoms are of severe severity. The symptoms are constant in nature. Additional symptomatology and features include   1/2/18- no sig change, ct to present with poor hygiene. Delusional and odd posturing.   1/3- remains preoccupied with odd posturing ( fetal position), he did change his position on request. Talked in soft voice, poor hygiene, refused his Depakote last night  1/4- no sig change- refusing labs, isolative, poor hygiene, lying in bed in a fetal position. Eating well  1/5- ct to refuse labs for clozapine, passive aggressive and tend to sit in praying position when upset. Poor insight . No agitation  1/6-Isolating, selectively mute, staying in his room in praying position. Minimal activity. PT consult ordered. Consider ECT.  1/7- Found kneeling down on his bed with his pants down. Did not respond to any questions. Remains disorganized with poor hygiene. 1/8- poor hygiene, disheveled, was found praying but able to sit up and talk to the team- spoke in a soft voice. Still disorganized and delusional. Accepting med  1/9- able to have conversation with pt- kept his eyes closed but no odd behavior. Responds in monotone. Accepting med. Poor insight, disheveled  1/10- accepting med, able to come out for his meals, still evidence of poor hygiene. Need lot of prompting. Disorganized thought but no agitation  1/11- pt allowed to staff to shave and take shower, affect is sl better. Hygiene is improved. Accepting med. 1/12- slowly progressing, affect is improving, sl better socially and with eye contact. Accepting med and allowed the staff to help with ADLs. 1/13/18: Patient came in the conference room,still remains preoccupied,disheveled,affect improved, was pacing in the hallway ,engage in brief conversation which is a huge improvement compare to in the past when he was completely mute. over all slowly progressing. 1/14/18: Patient was seen in his room,he was calm,cooperative,speech soft,low tone, affect bright, no catatonia noted. Accetping meals and med. No behaviorals problem, focused on discharge. Over all significant imrpovement with current regimen of medications.     1/15/18 He is doing better and mood is better and he is compliant with medications and no anger issues and no self harm behavior and no aggression   1/16- slow progress- hygiene is improving, remains disorganized, delusional and with limited insight  1/17- ct to show slow progress- affect is better, reports AH - less intense. No behavior problem  1/18- no sig change since last review. accepting med. 1/19- affect is improving, smiling appropriately, more social and accepting med. Did meet up with his  from [facility]. SIDE EFFECTS: (reviewed/updated 1/19/2018)  None reported or admitted to. No noted toxicity with use of current med   ALLERGIES:(reviewed/updated 1/19/2018)  Allergies   Allergen Reactions    Fluphenazine Unknown (comments)     Pt is unable to communicate properly.  Penicillins Unknown (comments)     Pt is unable to communicate properly. MEDICATIONS PRIOR TO ADMISSION:(reviewed/updated 1/19/2018)  Prescriptions Prior to Admission   Medication Sig    divalproex ER (DEPAKOTE ER) 500 mg ER tablet Take 1,500 mg by mouth nightly. Indications: Treatment-resistant schizophrenia    haloperidol decanoate (HALDOL DECANOATE) 100 mg/mL injection 2 mL by IntraMUSCular route every twenty-eight (28) days. Indications: SCHIZOPHRENIA    benztropine (COGENTIN) 2 mg tablet Take 1 Tab by mouth nightly. Indications: extrapyramidal disease    cloZAPine 200 mg tablet Take 600 mg by mouth nightly. Indications: TREATMENT-RESISTANT SCHIZOPHRENIA      PAST MEDICAL HISTORY: Past medical history from the initial psychiatric evaluation has been reviewed (reviewed/updated 1/19/2018) with no additional updates (I asked patient and no additional past medical history provided). Past Medical History:   Diagnosis Date    Psychiatric disorder     Psychotic disorder     Withdrawal syndrome ([facility] [address]    No past surgical history on file. SOCIAL HISTORY: Social history from the initial psychiatric evaluation has been reviewed (reviewed/updated 1/19/2018) with no additional updates (I asked patient and no additional social history provided).    Social History     Social History    Marital status: SINGLE     Spouse name: N/A    Number of children: N/A    Years of education: N/A     Occupational History    Not on file. 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 He lives with his sister. On disability    No pending legal charge reported      FAMILY HISTORY: Family history from the initial psychiatric evaluation has been reviewed (reviewed/updated 1/19/2018) with no additional updates (I asked patient and no additional family history provided). No family history on file. REVIEW OF SYSTEMS: (reviewed/updated 1/19/2018)  Appetite:no change from normal   Sleep: fitful   All other Review of Systems: Psychological ROS: positive for - behavioral disorder, concentration difficulties, hallucinations, irritability, mood swings and delusion  Respiratory ROS: no cough, shortness of breath, or wheezing  Cardiovascular ROS: no chest pain or dyspnea on exertion         [address] [facility] ([facility]SE):    [facility] FINDINGS ARE WITHIN NORMAL LIMITS (WNL) UNLESS OTHERWISE STATED BELOW. ( ALL OF THE BELOW CATEGORIES OF THE [facility] HAVE BEEN REVIEWED (reviewed 1/19/2018) AND UPDATED AS DEEMED APPROPRIATE )  General Presentation age appropriate , co operative   Orientation disorganized   Vital Signs  See below (reviewed 1/19/2018); Vital Signs (BP, Pulse, & Temp) are within normal limits if not listed below.    Gait and Station Stable/steady, no ataxia   Musculoskeletal System No extrapyramidal symptoms (EPS); no abnormal muscular movements or Tardive Dyskinesia (TD); muscle strength and tone are within normal limits   Language No aphasia or dysarthria   Speech:  Monotone, soft   Thought Processes illogical; slow rate of thoughts; poor abstract reasoning/computation   Thought Associations blocked Thought Content Less intense delusion, free of AH   Suicidal Ideations none   Homicidal Ideations none   Mood:  constricted   Affect:  Constricted, inappropriate and increased in intensity   Memory recent  impaired   Memory remote:  intact   Concentration/Attention:  distractable and hypervigilance   Fund of Knowledge average   Insight:  limited   Reliability poor   Judgment:  limited          VITALS:     No data found. Wt Readings from Last 3 Encounters:   12/23/17 84.9 kg (187 lb 1.6 oz)   03/25/17 95.6 kg (210 lb 11.2 oz)   02/09/15 97.5 kg (215 lb)     Temp Readings from Last 3 Encounters:   03/28/17 97.5 °F (36.4 °C)   02/07/15 98.7 °F (37.1 °C)     BP Readings from Last 3 Encounters:   03/28/17 100/68   02/12/15 105/73   02/07/15 148/79     Pulse Readings from Last 3 Encounters:   03/28/17 67   02/12/15 87   02/07/15 (!) 111            DATA     LABORATORY DATA:(reviewed/updated 1/19/2018)  No results found for this or any previous visit (from the past 24 hour(s)). Lab Results   Component Value Date/Time    Valproic acid 69 12/29/2017 04:43 AM     No results found for: LITHM   RADIOLOGY REPORTS:(reviewed/updated 1/19/2018)  No results found. MEDICATIONS     ALL MEDICATIONS:      SCHEDULED MEDICATIONS:          ASSESSMENT & PLAN     DIAGNOSES REQUIRING ACTIVE TREATMENT AND MONITORING: (reviewed/updated 1/19/2018)  Patient Active Hospital Problem List:   Paranoid schizophrenia ([facility] [address] (3/15/2017)- tx resistant, chronic and with negative sx    Assessment:minimal change- psychosis, negative sx+. Poor baseline per hx. Has trial of ECT at [facility] and report suggest he did not do well. Clozapine trial was stopped as pt has been refusing labs/vital. He is accepting po med       Plan: I will ct to adjust med- Zydis, Depakote and Prozac  Consider long term hospitalization- sw to discuss with [facility] for possible transfer.  Report suggest pt has done well at [facility] in the past.  1/9- sl better- ct with current tx  1/10- ct with current tx- encourage to take shower/ hygiene. Behavior plan  1/11- affect and hygiene is improved. Ct with tx  1/12- improving slowly- affect is better, accepting med  1/13/18: slowly progressing,affect is bright and engage in brief conversation. 1/14/17: Patient is progressing,affect bright,more visible on the unit. 1/16- will ct with med  1/17- no change, dc planning- not seen by [facility] yet despite several request  1/18- ct with tx, placement issue  1/19- improving- ct with tx    Patient is on two antipsychotics now due to the relative refractoriness to treatment thus far. Prior to admission patient had THREE or more failed trails of monotherapy, including: Haldol, Zyprexa, Risperdal and Clozapine. The patient is a very slow responder to medications. Risks and benefits in the use of two antipsychotics have been weighed in full, including the risk of metabolic syndrome and the potential increased risk of QTc  Based on this analysis, it is considered favorable for the utilization of two antipsychotics. In the future, once stable on the two antipsychotics, patient can possibly be tapered to one of the chosen antipsychotics. Will check on EKG results today to monitor QTc.- refused EKG again  1/15/18 will continue same medications          I will continue to monitor blood levels (Depakote,, clozapine---a drug with a narrow therapeutic index= NTI) and associated labs for drug therapy implemented that require intense monitoring for toxicity as deemed appropriate based on current medication side effects and pharmacodynamically determined drug 1/2 lives. -    In summary, Iuka Son, is a 49 y.o.  male who presents with a severe exacerbation of the principal diagnosis of Paranoid schizophrenia ([facility] [address]  Patient's condition is worsening/not improving/not stable . Patient requires continued inpatient hospitalization for further stabilization, safety monitoring and medication management.   I will continue to coordinate the provision of individual, milieu, occupational, group, and substance abuse therapies to address target symptoms/diagnoses as deemed appropriate for the individual patient. A coordinated, multidisplinary treatment team round was conducted with the patient (this team consists of the nurse, psychiatric unit pharmcist,  and writer). Complete current electronic health record for patient has been reviewed today including consultant notes, ancillary staff notes, nurses and psychiatric tech notes. Suicide risk assessment completed and patient deemed to be of low risk for suicide at this time. The following regarding medications was addressed during rounds with patient:   the risks and benefits of the proposed medication. The patient was given the opportunity to ask questions. Informed consent given to the use of the above medications. Will continue to adjust psychiatric and non-psychiatric medications (see above \"medication\" section and orders section for details) as deemed appropriate & based upon diagnoses and response to treatment. I will continue to order blood tests/labs and diagnostic tests as deemed appropriate and review results as they become available (see orders for details and above listed lab/test results). I will order psychiatric records from previous [facility] hospitals to further elucidate the nature of patient's psychopathology and review once available. I will gather additional collateral information from friends, family and o/p treatment team to further elucidate the nature of patient's psychopathology and baselline level of psychiatric functioning.          I certify that this patient's inpatient psychiatric hospital services furnished since the previous certification were, and continue to be, required for treatment that could reasonably be expected to improve the patient's condition, or for diagnostic study, and that the patient continues to need, on a daily basis, active treatment furnished directly by or requiring the supervision of inpatient psychiatric facility personnel. In addition, the hospital records show that services furnished were intensive treatment services, admission or related services, or equivalent services.     EXPECTED DISCHARGE DATE/DAY: next wednesday     DISPOSITION: Home       Signed By:   Ros Lechuga MD  1/19/2018

## 2018-01-19 NOTE — BH NOTES
Pt was seen in treatment team this morning in his room.  Pt was in bed, sat up, made eye contact and spoke with team.  Pt is smiling more.   Pt is accepting medications and leaving his room to eat meals. Pt's hygiene improving with encouragement from staff. Gómez MARINO CM Osman Jaramillo [phone] came to visit patient and states that this is pt's baseline. NED TORRES in agreement for planned discharge on Wednesday, 1/24/18.

## 2018-01-19 NOTE — PROGRESS NOTES
Problem: Altered Thought Process (Adult/Pediatric)  Goal: *STG: Remains safe in hospital  Outcome: Not Progressing Towards Goal  Pt rested quietly in bed with eyes closed. No signs/symptoms of distress, agitation, or anxiety. Will monitor for changes. Q 15 minute checks continue.  Pt slept 7 hrs

## 2018-01-20 PROCEDURE — 65220000001 HC RM PRIVATE PSYCH

## 2018-01-20 PROCEDURE — 74011250637 HC RX REV CODE- 250/637: Performed by: PSYCHIATRY & NEUROLOGY

## 2018-01-20 RX ORDER — OLANZAPINE 5 MG/1
15 TABLET, ORALLY DISINTEGRATING ORAL ONCE
Status: COMPLETED | OUTPATIENT
Start: 2018-01-20 | End: 2018-01-20

## 2018-01-20 RX ORDER — OLANZAPINE 5 MG/1
10 TABLET, ORALLY DISINTEGRATING ORAL ONCE
Status: DISCONTINUED | OUTPATIENT
Start: 2018-01-20 | End: 2018-01-20

## 2018-01-20 RX ADMIN — BENZTROPINE MESYLATE 2 MG: 2 TABLET ORAL at 22:14

## 2018-01-20 RX ADMIN — LORAZEPAM 0.5 MG: 0.5 TABLET ORAL at 17:00

## 2018-01-20 RX ADMIN — LORAZEPAM 0.5 MG: 0.5 TABLET ORAL at 08:15

## 2018-01-20 RX ADMIN — VALPROIC ACID 750 MG: 250 SOLUTION ORAL at 22:14

## 2018-01-20 RX ADMIN — OLANZAPINE 15 MG: 5 TABLET, ORALLY DISINTEGRATING ORAL at 22:14

## 2018-01-20 RX ADMIN — FLUOXETINE 40 MG: 20 CAPSULE ORAL at 08:15

## 2018-01-20 RX ADMIN — VALPROIC ACID 750 MG: 250 SOLUTION ORAL at 08:15

## 2018-01-20 NOTE — BH NOTES
GROUP THERAPY PROGRESS NOTE    The patient Maynor Montemayor is participating in [employer]. Group time: 30 minutes    Personal goal for participation: to orient the patient to the unit.     Goal orientation: successful adoption of unit rules    Group therapy participation: minimal    Therapeutic interventions reviewed and discussed: Yes    Impression of participation: minimal    Maggie Sapp  1/20/2018 3:53 PM

## 2018-01-20 NOTE — BH NOTES
PSYCHIATRIC PROGRESS NOTE         Patient Name  Miguelangel Faulkner   Date of Birth 1970   [facility] [id number]   Medical Record Number  [id number]      Age  49 y.o. PCP PROVIDER UNKNOWN   Admit date:  11/25/2017    Room Number  307/01  @ [facility]   Date of Service  1/20/2018           E & M PROGRESS NOTE:         HISTORY       CC:  \"selectively mute\"  HISTORY OF PRESENT ILLNESS/INTERVAL HISTORY:  (reviewed/updated 1/20/2018). per initial evaluation: The patient, Mgiuelangel Faulknre, is a 49 y.o. WHITE OR  male with a past psychiatric history significant for schizophrenia, who presents at this time with complaints of (and/or evidence of) the following emotional symptoms: psychotic behavior. Additional symptomatology include paranoid delusion, sexually inappropriate behavior, pepisodes of yelling screaming followed by selectively mute, disorganized thought process, anxiety, concern about health problems, difficulty sleeping, fearfulness, hearing voices, increased irritability, poor concentration and problem with medication. The above symptoms have been present for many years. These symptoms are of severe severity. These symptoms are constant  in nature. The patient's condition has been precipitated by and psychosocial stressors (conflict with sister, non compliance ). Patient's condition made worse by treatment noncompliance. UDS: negative; BAL=0. Miguelangel Faulkner presents/reports/evidences the following emotional symptoms today, 1/20/2018:psychotic behavior. The above symptoms have been present for many years. These symptoms are of severe severity. The symptoms are constant in nature. Additional symptomatology and features include   1/2/18- no sig change, ct to present with poor hygiene. Delusional and odd posturing.   1/3- remains preoccupied with odd posturing ( fetal position), he did change his position on request. Talked in soft voice, poor hygiene, refused his Depakote last night  1/4- no sig change- refusing labs, isolative, poor hygiene, lying in bed in a fetal position. Eating well  1/5- ct to refuse labs for clozapine, passive aggressive and tend to sit in praying position when upset. Poor insight . No agitation  1/6-Isolating, selectively mute, staying in his room in praying position. Minimal activity. PT consult ordered. Consider ECT.  1/7- Found kneeling down on his bed with his pants down. Did not respond to any questions. Remains disorganized with poor hygiene. 1/8- poor hygiene, disheveled, was found praying but able to sit up and talk to the team- spoke in a soft voice. Still disorganized and delusional. Accepting med  1/9- able to have conversation with pt- kept his eyes closed but no odd behavior. Responds in monotone. Accepting med. Poor insight, disheveled  1/10- accepting med, able to come out for his meals, still evidence of poor hygiene. Need lot of prompting. Disorganized thought but no agitation  1/11- pt allowed to staff to shave and take shower, affect is sl better. Hygiene is improved. Accepting med. 1/12- slowly progressing, affect is improving, sl better socially and with eye contact. Accepting med and allowed the staff to help with ADLs. 1/13/18: Patient came in the conference room,still remains preoccupied,disheveled,affect improved, was pacing in the hallway ,engage in brief conversation which is a huge improvement compare to in the past when he was completely mute. over all slowly progressing. 1/14/18: Patient was seen in his room,he was calm,cooperative,speech soft,low tone, affect bright, no catatonia noted. Accetping meals and med. No behaviorals problem, focused on discharge. Over all significant imrpovement with current regimen of medications.     1/15/18 He is doing better and mood is better and he is compliant with medications and no anger issues and no self harm behavior and no aggression   1/16- slow progress- hygiene is improving, remains disorganized, delusional and with limited insight  1/17- ct to show slow progress- affect is better, reports AH - less intense. No behavior problem  1/18- no sig change since last review. accepting med. 1/19- affect is improving, smiling appropriately, more social and accepting med. Did meet up with his  from [facility].  1/20- tend to be isolative, accepting med and no behavioral issue. insight iscimproving      SIDE EFFECTS: (reviewed/updated 1/20/2018)  None reported or admitted to. No noted toxicity with use of current med   ALLERGIES:(reviewed/updated 1/20/2018)  Allergies   Allergen Reactions    Fluphenazine Unknown (comments)     Pt is unable to communicate properly.  Penicillins Unknown (comments)     Pt is unable to communicate properly. MEDICATIONS PRIOR TO ADMISSION:(reviewed/updated 1/20/2018)  Prescriptions Prior to Admission   Medication Sig    divalproex ER (DEPAKOTE ER) 500 mg ER tablet Take 1,500 mg by mouth nightly. Indications: Treatment-resistant schizophrenia    haloperidol decanoate (HALDOL DECANOATE) 100 mg/mL injection 2 mL by IntraMUSCular route every twenty-eight (28) days. Indications: SCHIZOPHRENIA    benztropine (COGENTIN) 2 mg tablet Take 1 Tab by mouth nightly. Indications: extrapyramidal disease    cloZAPine 200 mg tablet Take 600 mg by mouth nightly. Indications: TREATMENT-RESISTANT SCHIZOPHRENIA      PAST MEDICAL HISTORY: Past medical history from the initial psychiatric evaluation has been reviewed (reviewed/updated 1/20/2018) with no additional updates (I asked patient and no additional past medical history provided). Past Medical History:   Diagnosis Date    Psychiatric disorder     Psychotic disorder     Withdrawal syndrome ([facility] [address]    No past surgical history on file.    SOCIAL HISTORY: Social history from the initial psychiatric evaluation has been reviewed (reviewed/updated 1/20/2018) with no additional updates (I asked patient and no additional social history provided). Social History     Social History    Marital status: SINGLE     Spouse name: N/A    Number of children: N/A    Years of education: N/A     Occupational History    Not on file. 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 He lives with his sister. On disability    No pending legal charge reported      FAMILY HISTORY: Family history from the initial psychiatric evaluation has been reviewed (reviewed/updated 1/20/2018) with no additional updates (I asked patient and no additional family history provided). No family history on file. REVIEW OF SYSTEMS: (reviewed/updated 1/20/2018)  Appetite:no change from normal   Sleep: fitful   All other Review of Systems: Psychological ROS: positive for - behavioral disorder, concentration difficulties, hallucinations, irritability, mood swings and delusion  Respiratory ROS: no cough, shortness of breath, or wheezing  Cardiovascular ROS: no chest pain or dyspnea on exertion         [facility] ([facility]):    [facility] FINDINGS ARE WITHIN NORMAL LIMITS (WNL) UNLESS OTHERWISE STATED BELOW. ( ALL OF THE BELOW CATEGORIES OF THE [facility] HAVE BEEN REVIEWED (reviewed 1/20/2018) AND UPDATED AS DEEMED APPROPRIATE )  General Presentation age appropriate , co operative   Orientation disorganized   Vital Signs  See below (reviewed 1/20/2018); Vital Signs (BP, Pulse, & Temp) are within normal limits if not listed below.    Gait and Station Stable/steady, no ataxia   Musculoskeletal System No extrapyramidal symptoms (EPS); no abnormal muscular movements or Tardive Dyskinesia (TD); muscle strength and tone are within normal limits   Language No aphasia or dysarthria   Speech:  Monotone, soft   Thought Processes illogical; slow rate of thoughts; poor abstract reasoning/computation   Thought Associations blocked   Thought Content Less intense delusion, free of AH   Suicidal Ideations none   Homicidal Ideations none   Mood:  constricted   Affect:  Constricted, inappropriate and increased in intensity   Memory recent  impaired   Memory remote:  intact   Concentration/Attention:  distractable and hypervigilance   Fund of Knowledge average   Insight:  limited   Reliability poor   Judgment:  limited          VITALS:     No data found. Wt Readings from Last 3 Encounters:   12/23/17 84.9 kg (187 lb 1.6 oz)   03/25/17 95.6 kg (210 lb 11.2 oz)   02/09/15 97.5 kg (215 lb)     Temp Readings from Last 3 Encounters:   03/28/17 97.5 °F (36.4 °C)   02/07/15 98.7 °F (37.1 °C)     BP Readings from Last 3 Encounters:   03/28/17 100/68   02/12/15 105/73   02/07/15 148/79     Pulse Readings from Last 3 Encounters:   03/28/17 67   02/12/15 87   02/07/15 (!) 111            DATA     LABORATORY DATA:(reviewed/updated 1/20/2018)  No results found for this or any previous visit (from the past 24 hour(s)). Lab Results   Component Value Date/Time    Valproic acid 69 12/29/2017 04:43 AM     No results found for: LITHM   RADIOLOGY REPORTS:(reviewed/updated 1/20/2018)  No results found. MEDICATIONS     ALL MEDICATIONS:      SCHEDULED MEDICATIONS:          ASSESSMENT & PLAN     DIAGNOSES REQUIRING ACTIVE TREATMENT AND MONITORING: (reviewed/updated 1/20/2018)  Patient C/Alaina Crespo [address] Problem List:   Paranoid schizophrenia ([facility] [address] (3/15/2017)- tx resistant, chronic and with negative sx    Assessment:minimal change- psychosis, negative sx+. Poor baseline per hx. Has trial of ECT at [facility] and report suggest he did not do well. Clozapine trial was stopped as pt has been refusing labs/vital. He is accepting po med       Plan: I will ct to adjust med- Zydis, Depakote and Prozac  Consider long term hospitalization- sw to discuss with [facility] for possible transfer.  Report suggest pt has done well at [facility] in the past.  1/9- sl better- ct with current tx  1/10- ct with current tx- encourage to take shower/ hygiene. Behavior plan  1/11- affect and hygiene is improved. Ct with tx  1/12- improving slowly- affect is better, accepting med  1/13/18: slowly progressing,affect is bright and engage in brief conversation. 1/14/17: Patient is progressing,affect bright,more visible on the unit. 1/16- will ct with med  1/17- no change, dc planning- not seen by [facility] yet despite several request  1/18- ct with tx, placement issue  1/19- improving- ct with tx  1/20- poor response- refusing med    Patient is on two antipsychotics now due to the relative refractoriness to treatment thus far. Prior to admission patient had THREE or more failed trails of monotherapy, including: Haldol, Zyprexa, Risperdal and Clozapine. The patient is a very slow responder to medications. Risks and benefits in the use of two antipsychotics have been weighed in full, including the risk of metabolic syndrome and the potential increased risk of QTc  Based on this analysis, it is considered favorable for the utilization of two antipsychotics. In the future, once stable on the two antipsychotics, patient can possibly be tapered to one of the chosen antipsychotics. Will check on EKG results today to monitor QTc.- refused EKG again  1/15/18 will continue same medications          I will continue to monitor blood levels (Depakote,, clozapine---a drug with a narrow therapeutic index= NTI) and associated labs for drug therapy implemented that require intense monitoring for toxicity as deemed appropriate based on current medication side effects and pharmacodynamically determined drug 1/2 lives. -    In summary, Obey Marc, is a 49 y.o.  male who presents with a severe exacerbation of the principal diagnosis of Paranoid schizophrenia ([address]  Patient's condition is worsening/not improving/not stable .   Patient requires continued inpatient hospitalization for further stabilization, safety monitoring and medication management. I will continue to coordinate the provision of individual, milieu, occupational, group, and substance abuse therapies to address target symptoms/diagnoses as deemed appropriate for the individual patient. A coordinated, multidisplinary treatment team round was conducted with the patient (this team consists of the nurse, psychiatric unit pharmcist,  and writer). Complete current electronic health record for patient has been reviewed today including consultant notes, ancillary staff notes, nurses and psychiatric tech notes. Suicide risk assessment completed and patient deemed to be of low risk for suicide at this time. The following regarding medications was addressed during rounds with patient:   the risks and benefits of the proposed medication. The patient was given the opportunity to ask questions. Informed consent given to the use of the above medications. Will continue to adjust psychiatric and non-psychiatric medications (see above \"medication\" section and orders section for details) as deemed appropriate & based upon diagnoses and response to treatment. I will continue to order blood tests/labs and diagnostic tests as deemed appropriate and review results as they become available (see orders for details and above listed lab/test results). I will order psychiatric records from previous [facility] hospitals to further elucidate the nature of patient's psychopathology and review once available. I will gather additional collateral information from friends, family and o/p treatment team to further elucidate the nature of patient's psychopathology and baselline level of psychiatric functioning.          I certify that this patient's inpatient psychiatric hospital services furnished since the previous certification were, and continue to be, required for treatment that could reasonably be expected to improve the patient's condition, or for diagnostic study, and that the patient continues to need, on a daily basis, active treatment furnished directly by or requiring the supervision of inpatient psychiatric facility personnel. In addition, the hospital records show that services furnished were intensive treatment services, admission or related services, or equivalent services.     EXPECTED DISCHARGE DATE/DAY: next wednesday     DISPOSITION: Home       Signed By:   Savannah Etienne MD  1/20/2018

## 2018-01-20 NOTE — PROGRESS NOTES
Problem: Altered Thought Process (Adult/Pediatric)  Goal: *STG: Remains safe in hospital  Outcome: Progressing Towards Goal  Pt is alert. He is in his room, isolating but did come out for meals and medications. Pt answers questions appropriately. He hygiene continues to be poor. Pt denies any needs at this time. Will continue to monitor pt q15 minutes for safety and support.

## 2018-01-20 NOTE — PROGRESS NOTES
Problem: Altered Thought Process (Adult/Pediatric)  Goal: *STG: Complies with medication therapy  Outcome: Progressing Towards Goal  Patient is alert, continues to isolates self to his room for most of the time. Compliant with  meals and medication. He did make an eye contact and responded verbally upon approach. Denies any pain or discomfort. Continue to monitor the patient and assess needs.

## 2018-01-21 PROCEDURE — 74011250637 HC RX REV CODE- 250/637: Performed by: PSYCHIATRY & NEUROLOGY

## 2018-01-21 PROCEDURE — 65220000001 HC RM PRIVATE PSYCH

## 2018-01-21 RX ADMIN — BENZTROPINE MESYLATE 2 MG: 2 TABLET ORAL at 21:28

## 2018-01-21 RX ADMIN — VALPROIC ACID 750 MG: 250 SOLUTION ORAL at 21:27

## 2018-01-21 RX ADMIN — OLANZAPINE 20 MG: 5 TABLET, ORALLY DISINTEGRATING ORAL at 21:28

## 2018-01-21 RX ADMIN — VALPROIC ACID 750 MG: 250 SOLUTION ORAL at 08:26

## 2018-01-21 RX ADMIN — LORAZEPAM 0.5 MG: 0.5 TABLET ORAL at 16:19

## 2018-01-21 RX ADMIN — LORAZEPAM 0.5 MG: 0.5 TABLET ORAL at 08:26

## 2018-01-21 RX ADMIN — FLUOXETINE 40 MG: 20 CAPSULE ORAL at 08:26

## 2018-01-21 NOTE — PROGRESS NOTES
Problem: Altered Thought Process (Adult/Pediatric)  Goal: *STG: Attends activities and groups      Pt is alert. He is compliant with medications and meals. Pt is mostly isolative to his room during this shift. He is out on the unit for meals and medications. Pt responds appropriately. Hygiene poor. Pt denies any needs at this time. Will continue to monitor pt q15 minutes for safety and support.

## 2018-01-22 PROCEDURE — 65220000001 HC RM PRIVATE PSYCH

## 2018-01-22 PROCEDURE — 74011250637 HC RX REV CODE- 250/637: Performed by: PSYCHIATRY & NEUROLOGY

## 2018-01-22 RX ADMIN — BENZTROPINE MESYLATE 2 MG: 2 TABLET ORAL at 21:38

## 2018-01-22 RX ADMIN — LORAZEPAM 0.5 MG: 0.5 TABLET ORAL at 17:07

## 2018-01-22 RX ADMIN — OLANZAPINE 20 MG: 5 TABLET, ORALLY DISINTEGRATING ORAL at 21:38

## 2018-01-22 RX ADMIN — VALPROIC ACID 750 MG: 250 SOLUTION ORAL at 21:38

## 2018-01-22 NOTE — PROGRESS NOTES
Problem: Altered Thought Process (Adult/Pediatric)  Goal: *STG: Attends activities and groups      Outcome: Progressing Towards Goal  Pt is alert. He is isolative to his room. Pt denies any needs at this time. Pt does not appear to be in any distress. Encouraged pt to do self care as he will not allow staff to assist him with his hygiene. Will continue to monitor pt q 15 minutes for safety and support.

## 2018-01-22 NOTE — BH NOTES
PSYCHIATRIC PROGRESS NOTE         Patient Name  Marj Maria   Date of Birth 1970   [facility] [id number]   Medical Record Number  [id number]      Age  49 y.o. PCP PROVIDER UNKNOWN   Admit date:  11/25/2017    Room Number  307/01  @ [facility]   Date of Service  1/22/2018           E & M PROGRESS NOTE:         HISTORY       CC:  \"selectively mute\"  HISTORY OF PRESENT ILLNESS/INTERVAL HISTORY:  (reviewed/updated 1/22/2018). per initial evaluation: The patient, Marj Maria, is a 49 y.o. WHITE OR  male with a past psychiatric history significant for schizophrenia, who presents at this time with complaints of (and/or evidence of) the following emotional symptoms: psychotic behavior. Additional symptomatology include paranoid delusion, sexually inappropriate behavior, pepisodes of yelling screaming followed by selectively mute, disorganized thought process, anxiety, concern about health problems, difficulty sleeping, fearfulness, hearing voices, increased irritability, poor concentration and problem with medication. The above symptoms have been present for many years. These symptoms are of severe severity. These symptoms are constant  in nature. The patient's condition has been precipitated by and psychosocial stressors (conflict with sister, non compliance ). Patient's condition made worse by treatment noncompliance. UDS: negative; BAL=0. Marj Maria presents/reports/evidences the following emotional symptoms today, 1/22/2018:psychotic behavior. The above symptoms have been present for many years. These symptoms are of severe severity. The symptoms are constant in nature. Additional symptomatology and features include   1/2/18- no sig change, ct to present with poor hygiene. Delusional and odd posturing.   1/3- remains preoccupied with odd posturing ( fetal position), he did change his position on request. Talked in soft voice, poor hygiene, refused his Depakote last night  1/4- no sig change- refusing labs, isolative, poor hygiene, lying in bed in a fetal position. Eating well  1/5- ct to refuse labs for clozapine, passive aggressive and tend to sit in praying position when upset. Poor insight . No agitation  1/6-Isolating, selectively mute, staying in his room in praying position. Minimal activity. PT consult ordered. Consider ECT.  1/7- Found kneeling down on his bed with his pants down. Did not respond to any questions. Remains disorganized with poor hygiene. 1/8- poor hygiene, disheveled, was found praying but able to sit up and talk to the team- spoke in a soft voice. Still disorganized and delusional. Accepting med  1/9- able to have conversation with pt- kept his eyes closed but no odd behavior. Responds in monotone. Accepting med. Poor insight, disheveled  1/10- accepting med, able to come out for his meals, still evidence of poor hygiene. Need lot of prompting. Disorganized thought but no agitation  1/11- pt allowed to staff to shave and take shower, affect is sl better. Hygiene is improved. Accepting med. 1/12- slowly progressing, affect is improving, sl better socially and with eye contact. Accepting med and allowed the staff to help with ADLs. 1/13/18: Patient came in the conference room,still remains preoccupied,disheveled,affect improved, was pacing in the hallway ,engage in brief conversation which is a huge improvement compare to in the past when he was completely mute. over all slowly progressing. 1/14/18: Patient was seen in his room,he was calm,cooperative,speech soft,low tone, affect bright, no catatonia noted. Accetping meals and med. No behaviorals problem, focused on discharge. Over all significant imrpovement with current regimen of medications.     1/15/18 He is doing better and mood is better and he is compliant with medications and no anger issues and no self harm behavior and no aggression   1/16- slow progress- hygiene is improving, remains disorganized, delusional and with limited insight  1/17- ct to show slow progress- affect is better, reports AH - less intense. No behavior problem  1/18- no sig change since last review. accepting med. 1/19- affect is improving, smiling appropriately, more social and accepting med. Did meet up with his  from [facility].  1/20- tend to be isolative, accepting med and no behavioral issue. insight is improving  1/21- stable, no behavior issue, accepting med, more social but need prompting to maintain hygiene  1/22- no aggressive behavior, accepting med, insight and affect is improving, hygiene is poor and need lot of prompting      SIDE EFFECTS: (reviewed/updated 1/22/2018)  None reported or admitted to. No noted toxicity with use of current med   ALLERGIES:(reviewed/updated 1/22/2018)  Allergies   Allergen Reactions    Fluphenazine Unknown (comments)     Pt is unable to communicate properly.  Penicillins Unknown (comments)     Pt is unable to communicate properly. MEDICATIONS PRIOR TO ADMISSION:(reviewed/updated 1/22/2018)  Prescriptions Prior to Admission   Medication Sig    divalproex ER (DEPAKOTE ER) 500 mg ER tablet Take 1,500 mg by mouth nightly. Indications: Treatment-resistant schizophrenia    haloperidol decanoate (HALDOL DECANOATE) 100 mg/mL injection 2 mL by IntraMUSCular route every twenty-eight (28) days. Indications: SCHIZOPHRENIA    benztropine (COGENTIN) 2 mg tablet Take 1 Tab by mouth nightly. Indications: extrapyramidal disease    cloZAPine 200 mg tablet Take 600 mg by mouth nightly. Indications: TREATMENT-RESISTANT SCHIZOPHRENIA      PAST MEDICAL HISTORY: Past medical history from the initial psychiatric evaluation has been reviewed (reviewed/updated 1/22/2018) with no additional updates (I asked patient and no additional past medical history provided).    Past Medical History:   Diagnosis Date    Psychiatric disorder     Psychotic disorder     Withdrawal syndrome (HCC)    No past surgical history on file. SOCIAL HISTORY: Social history from the initial psychiatric evaluation has been reviewed (reviewed/updated 1/22/2018) with no additional updates (I asked patient and no additional social history provided). Social History     Social History    Marital status: SINGLE     Spouse name: N/A    Number of children: N/A    Years of education: N/A     Occupational History    Not on file. 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 He lives with his sister. On disability    No pending legal charge reported      FAMILY HISTORY: Family history from the initial psychiatric evaluation has been reviewed (reviewed/updated 1/22/2018) with no additional updates (I asked patient and no additional family history provided). No family history on file. REVIEW OF SYSTEMS: (reviewed/updated 1/22/2018)  Appetite:no change from normal   Sleep: fitful   All other Review of Systems: Psychological ROS: positive for - behavioral disorder, concentration difficulties, hallucinations, irritability, mood swings and delusion  Respiratory ROS: no cough, shortness of breath, or wheezing  Cardiovascular ROS: no chest pain or dyspnea on exertion         [facility] (MSE):    MSE FINDINGS ARE WITHIN NORMAL LIMITS (WNL) UNLESS OTHERWISE STATED BELOW. ( ALL OF THE BELOW CATEGORIES OF THE MSE HAVE BEEN REVIEWED (reviewed 1/22/2018) AND UPDATED AS DEEMED APPROPRIATE )  General Presentation age appropriate , co operative   Orientation disorganized   Vital Signs  See below (reviewed 1/22/2018); Vital Signs (BP, Pulse, & Temp) are within normal limits if not listed below.    Gait and Station Stable/steady, no ataxia   Musculoskeletal System No extrapyramidal symptoms (EPS); no abnormal muscular movements or Tardive Dyskinesia (TD); muscle strength and tone are within normal limits   Language No aphasia or dysarthria   Speech:  Monotone, soft   Thought Processes illogical; slow rate of thoughts; poor abstract reasoning/computation   Thought Associations blocked   Thought Content Less intense delusion, free of AH   Suicidal Ideations none   Homicidal Ideations none   Mood:  constricted   Affect:  Constricted, inappropriate and increased in intensity   Memory recent  impaired   Memory remote:  intact   Concentration/Attention:  distractable and hypervigilance   Fund of Knowledge average   Insight:  limited   Reliability poor   Judgment:  limited          VITALS:     No data found. Wt Readings from Last 3 Encounters:   12/23/17 84.9 kg (187 lb 1.6 oz)   03/25/17 95.6 kg (210 lb 11.2 oz)   02/09/15 97.5 kg (215 lb)     Temp Readings from Last 3 Encounters:   03/28/17 97.5 °F (36.4 °C)   02/07/15 98.7 °F (37.1 °C)     BP Readings from Last 3 Encounters:   03/28/17 100/68   02/12/15 105/73   02/07/15 148/79     Pulse Readings from Last 3 Encounters:   03/28/17 67   02/12/15 87   02/07/15 (!) 111            DATA     LABORATORY DATA:(reviewed/updated 1/22/2018)  No results found for this or any previous visit (from the past 24 hour(s)). Lab Results   Component Value Date/Time    Valproic acid 69 12/29/2017 04:43 AM     No results found for: LITHM   RADIOLOGY REPORTS:(reviewed/updated 1/22/2018)  No results found. MEDICATIONS     ALL MEDICATIONS:      SCHEDULED MEDICATIONS:          ASSESSMENT & PLAN     DIAGNOSES REQUIRING ACTIVE TREATMENT AND MONITORING: (reviewed/updated 1/22/2018)  Patient C/Alaina Crespo [address] Problem List:   Paranoid schizophrenia ([facility] [address] (3/15/2017)- tx resistant, chronic and with negative sx    Assessment:slow progress- insight is improved, less delusional, accepting med and affect is improving. Denies SI/HI/AVH.  Baseline is poor and close to it Plan: I will ct to adjust med- Zydis, Depakote and Prozac. Dc planning - close to his baseline    Patient is on two antipsychotics now due to the relative refractoriness to treatment thus far. Prior to admission patient had THREE or more failed trails of monotherapy, including: Haldol, Zyprexa, Risperdal and Clozapine. The patient is a very slow responder to medications. Risks and benefits in the use of two antipsychotics have been weighed in full, including the risk of metabolic syndrome and the potential increased risk of QTc  Based on this analysis, it is considered favorable for the utilization of two antipsychotics. In the future, once stable on the two antipsychotics, patient can possibly be tapered to one of the chosen antipsychotics. Will check on EKG results today to monitor QTc.- refused EKG again  1/15/18 will continue same medications          I will continue to monitor blood levels (Depakote,, clozapine---a drug with a narrow therapeutic index= NTI) and associated labs for drug therapy implemented that require intense monitoring for toxicity as deemed appropriate based on current medication side effects and pharmacodynamically determined drug 1/2 lives. -    In summary, Conor Cabrera, is a 49 y.o.  male who presents with a severe exacerbation of the principal diagnosis of Paranoid schizophrenia ([facility] [address]  Patient's condition is worsening/not improving/not stable . Patient requires continued inpatient hospitalization for further stabilization, safety monitoring and medication management. I will continue to coordinate the provision of individual, milieu, occupational, group, and substance abuse therapies to address target symptoms/diagnoses as deemed appropriate for the individual patient. A coordinated, multidisplinary treatment team round was conducted with the patient (this team consists of the nurse, psychiatric unit pharmcist,  and writer).      Complete current electronic health record for patient has been reviewed today including consultant notes, ancillary staff notes, nurses and psychiatric tech notes. Suicide risk assessment completed and patient deemed to be of low risk for suicide at this time. The following regarding medications was addressed during rounds with patient:   the risks and benefits of the proposed medication. The patient was given the opportunity to ask questions. Informed consent given to the use of the above medications. Will continue to adjust psychiatric and non-psychiatric medications (see above \"medication\" section and orders section for details) as deemed appropriate & based upon diagnoses and response to treatment. I will continue to order blood tests/labs and diagnostic tests as deemed appropriate and review results as they become available (see orders for details and above listed lab/test results). I will order psychiatric records from previous [facility] hospitals to further elucidate the nature of patient's psychopathology and review once available. I will gather additional collateral information from friends, family and o/p treatment team to further elucidate the nature of patient's psychopathology and baselline level of psychiatric functioning. I certify that this patient's inpatient psychiatric hospital services furnished since the previous certification were, and continue to be, required for treatment that could reasonably be expected to improve the patient's condition, or for diagnostic study, and that the patient continues to need, on a daily basis, active treatment furnished directly by or requiring the supervision of inpatient psychiatric facility personnel. In addition, the hospital records show that services furnished were intensive treatment services, admission or related services, or equivalent services.     EXPECTED DISCHARGE DATE/DAY: Anabela Dixon     DISPOSITION: Home       Signed By:   Lara Pinto MD  1/22/2018

## 2018-01-22 NOTE — BH NOTES
PSYCHIATRIC PROGRESS NOTE         Patient Name  Trace Dailey   Date of Birth 1970   [facility] [id number]   Medical Record Number  [id number]      Age  49 y.o. PCP PROVIDER UNKNOWN   Admit date:  11/25/2017    Room Number  307/01  @ [facility]   Date of Service  1/21/2018           E & M PROGRESS NOTE:         HISTORY       CC:  \"selectively mute\"  HISTORY OF PRESENT ILLNESS/INTERVAL HISTORY:  (reviewed/updated 1/21/2018). per initial evaluation: The patient, Trace Dailey, is a 49 y.o. WHITE OR  male with a past psychiatric history significant for schizophrenia, who presents at this time with complaints of (and/or evidence of) the following emotional symptoms: psychotic behavior. Additional symptomatology include paranoid delusion, sexually inappropriate behavior, pepisodes of yelling screaming followed by selectively mute, disorganized thought process, anxiety, concern about health problems, difficulty sleeping, fearfulness, hearing voices, increased irritability, poor concentration and problem with medication. The above symptoms have been present for many years. These symptoms are of severe severity. These symptoms are constant  in nature. The patient's condition has been precipitated by and psychosocial stressors (conflict with sister, non compliance ). Patient's condition made worse by treatment noncompliance. UDS: negative; BAL=0. Trace Dailey presents/reports/evidences the following emotional symptoms today, 1/21/2018:psychotic behavior. The above symptoms have been present for many years. These symptoms are of severe severity. The symptoms are constant in nature. Additional symptomatology and features include   1/2/18- no sig change, ct to present with poor hygiene. Delusional and odd posturing.   1/3- remains preoccupied with odd posturing ( fetal position), he did change his position on request. Talked in soft voice, poor hygiene, refused his Depakote last night  1/4- no sig change- refusing labs, isolative, poor hygiene, lying in bed in a fetal position. Eating well  1/5- ct to refuse labs for clozapine, passive aggressive and tend to sit in praying position when upset. Poor insight . No agitation  1/6-Isolating, selectively mute, staying in his room in praying position. Minimal activity. PT consult ordered. Consider ECT.  1/7- Found kneeling down on his bed with his pants down. Did not respond to any questions. Remains disorganized with poor hygiene. 1/8- poor hygiene, disheveled, was found praying but able to sit up and talk to the team- spoke in a soft voice. Still disorganized and delusional. Accepting med  1/9- able to have conversation with pt- kept his eyes closed but no odd behavior. Responds in monotone. Accepting med. Poor insight, disheveled  1/10- accepting med, able to come out for his meals, still evidence of poor hygiene. Need lot of prompting. Disorganized thought but no agitation  1/11- pt allowed to staff to shave and take shower, affect is sl better. Hygiene is improved. Accepting med. 1/12- slowly progressing, affect is improving, sl better socially and with eye contact. Accepting med and allowed the staff to help with ADLs. 1/13/18: Patient came in the conference room,still remains preoccupied,disheveled,affect improved, was pacing in the hallway ,engage in brief conversation which is a huge improvement compare to in the past when he was completely mute. over all slowly progressing. 1/14/18: Patient was seen in his room,he was calm,cooperative,speech soft,low tone, affect bright, no catatonia noted. Accetping meals and med. No behaviorals problem, focused on discharge. Over all significant imrpovement with current regimen of medications.     1/15/18 He is doing better and mood is better and he is compliant with medications and no anger issues and no self harm behavior and no aggression   1/16- slow progress- hygiene is improving, remains disorganized, delusional and with limited insight  1/17- ct to show slow progress- affect is better, reports AH - less intense. No behavior problem  1/18- no sig change since last review. accepting med. 1/19- affect is improving, smiling appropriately, more social and accepting med. Did meet up with his  from [facility].  1/20- tend to be isolative, accepting med and no behavioral issue. insight is improving  1/21- stable, no behavior issue, accepting med, more social but need prompting to maintain hygiene      SIDE EFFECTS: (reviewed/updated 1/21/2018)  None reported or admitted to. No noted toxicity with use of current med   ALLERGIES:(reviewed/updated 1/21/2018)  Allergies   Allergen Reactions    Fluphenazine Unknown (comments)     Pt is unable to communicate properly.  Penicillins Unknown (comments)     Pt is unable to communicate properly. MEDICATIONS PRIOR TO ADMISSION:(reviewed/updated 1/21/2018)  Prescriptions Prior to Admission   Medication Sig    divalproex ER (DEPAKOTE ER) 500 mg ER tablet Take 1,500 mg by mouth nightly. Indications: Treatment-resistant schizophrenia    haloperidol decanoate (HALDOL DECANOATE) 100 mg/mL injection 2 mL by IntraMUSCular route every twenty-eight (28) days. Indications: SCHIZOPHRENIA    benztropine (COGENTIN) 2 mg tablet Take 1 Tab by mouth nightly. Indications: extrapyramidal disease    cloZAPine 200 mg tablet Take 600 mg by mouth nightly. Indications: TREATMENT-RESISTANT SCHIZOPHRENIA      PAST MEDICAL HISTORY: Past medical history from the initial psychiatric evaluation has been reviewed (reviewed/updated 1/21/2018) with no additional updates (I asked patient and no additional past medical history provided). Past Medical History:   Diagnosis Date    Psychiatric disorder     Psychotic disorder     Withdrawal syndrome ([facility] [address]    No past surgical history on file.    SOCIAL HISTORY: Social history from the initial psychiatric evaluation has been reviewed (reviewed/updated 1/21/2018) with no additional updates (I asked patient and no additional social history provided). Social History     Social History    Marital status: SINGLE     Spouse name: N/A    Number of children: N/A    Years of education: N/A     Occupational History    Not on file. 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 He lives with his sister. On disability    No pending legal charge reported      FAMILY HISTORY: Family history from the initial psychiatric evaluation has been reviewed (reviewed/updated 1/21/2018) with no additional updates (I asked patient and no additional family history provided). No family history on file. REVIEW OF SYSTEMS: (reviewed/updated 1/21/2018)  Appetite:no change from normal   Sleep: fitful   All other Review of Systems: Psychological ROS: positive for - behavioral disorder, concentration difficulties, hallucinations, irritability, mood swings and delusion  Respiratory ROS: no cough, shortness of breath, or wheezing  Cardiovascular ROS: no chest pain or dyspnea on exertion         [facility] (MSE):    MSE FINDINGS ARE WITHIN NORMAL LIMITS (WNL) UNLESS OTHERWISE STATED BELOW. ( ALL OF THE BELOW CATEGORIES OF THE MSE HAVE BEEN REVIEWED (reviewed 1/21/2018) AND UPDATED AS DEEMED APPROPRIATE )  General Presentation age appropriate , co operative   Orientation disorganized   Vital Signs  See below (reviewed 1/21/2018); Vital Signs (BP, Pulse, & Temp) are within normal limits if not listed below.    Gait and Station Stable/steady, no ataxia   Musculoskeletal System No extrapyramidal symptoms (EPS); no abnormal muscular movements or Tardive Dyskinesia (TD); muscle strength and tone are within normal limits   Language No aphasia or dysarthria   Speech:  Monotone, soft   Thought Processes illogical; slow rate of thoughts; poor abstract reasoning/computation   Thought Associations blocked   Thought Content Less intense delusion, free of AH   Suicidal Ideations none   Homicidal Ideations none   Mood:  constricted   Affect:  Constricted, inappropriate and increased in intensity   Memory recent  impaired   Memory remote:  intact   Concentration/Attention:  distractable and hypervigilance   Fund of Knowledge average   Insight:  limited   Reliability poor   Judgment:  limited          VITALS:     No data found. Wt Readings from Last 3 Encounters:   12/23/17 84.9 kg (187 lb 1.6 oz)   03/25/17 95.6 kg (210 lb 11.2 oz)   02/09/15 97.5 kg (215 lb)     Temp Readings from Last 3 Encounters:   03/28/17 97.5 °F (36.4 °C)   02/07/15 98.7 °F (37.1 °C)     BP Readings from Last 3 Encounters:   03/28/17 100/68   02/12/15 105/73   02/07/15 148/79     Pulse Readings from Last 3 Encounters:   03/28/17 67   02/12/15 87   02/07/15 (!) 111            DATA     LABORATORY DATA:(reviewed/updated 1/21/2018)  No results found for this or any previous visit (from the past 24 hour(s)). Lab Results   Component Value Date/Time    Valproic acid 69 12/29/2017 04:43 AM     No results found for: LITHM   RADIOLOGY REPORTS:(reviewed/updated 1/21/2018)  No results found. MEDICATIONS     ALL MEDICATIONS:      SCHEDULED MEDICATIONS:          ASSESSMENT & PLAN     DIAGNOSES REQUIRING ACTIVE TREATMENT AND MONITORING: (reviewed/updated 1/21/2018)  Patient Active Hospital Problem List:   Paranoid schizophrenia ([facility] [address] (3/15/2017)- tx resistant, chronic and with negative sx    Assessment:slow progress- insight is improved, less delusional, accepting med and affect is sl better. Plan: I will ct to adjust med- Zydis, Depakote and Prozac.     Dc planning - close to his baseline    Patient is on two antipsychotics now due to the relative refractoriness to treatment thus far. Prior to admission patient had THREE or more failed trails of monotherapy, including: Haldol, Zyprexa, Risperdal and Clozapine. The patient is a very slow responder to medications. Risks and benefits in the use of two antipsychotics have been weighed in full, including the risk of metabolic syndrome and the potential increased risk of QTc  Based on this analysis, it is considered favorable for the utilization of two antipsychotics. In the future, once stable on the two antipsychotics, patient can possibly be tapered to one of the chosen antipsychotics. Will check on EKG results today to monitor QTc.- refused EKG again  1/15/18 will continue same medications          I will continue to monitor blood levels (Depakote,, clozapine---a drug with a narrow therapeutic index= NTI) and associated labs for drug therapy implemented that require intense monitoring for toxicity as deemed appropriate based on current medication side effects and pharmacodynamically determined drug 1/2 lives. -    In summary, Afsaneh Shabazz, is a 49 y.o.  male who presents with a severe exacerbation of the principal diagnosis of Paranoid schizophrenia ([facility] [address]  Patient's condition is worsening/not improving/not stable . Patient requires continued inpatient hospitalization for further stabilization, safety monitoring and medication management. I will continue to coordinate the provision of individual, milieu, occupational, group, and substance abuse therapies to address target symptoms/diagnoses as deemed appropriate for the individual patient. A coordinated, multidisplinary treatment team round was conducted with the patient (this team consists of the nurse, psychiatric unit pharmcist,  and writer). Complete current electronic health record for patient has been reviewed today including consultant notes, ancillary staff notes, nurses and psychiatric tech notes.     Suicide risk assessment completed and patient deemed to be of low risk for suicide at this time. The following regarding medications was addressed during rounds with patient:   the risks and benefits of the proposed medication. The patient was given the opportunity to ask questions. Informed consent given to the use of the above medications. Will continue to adjust psychiatric and non-psychiatric medications (see above \"medication\" section and orders section for details) as deemed appropriate & based upon diagnoses and response to treatment. I will continue to order blood tests/labs and diagnostic tests as deemed appropriate and review results as they become available (see orders for details and above listed lab/test results). I will order psychiatric records from previous [facility] to further elucidate the nature of patient's psychopathology and review once available. I will gather additional collateral information from friends, family and o/p treatment team to further elucidate the nature of patient's psychopathology and baselline level of psychiatric functioning. I certify that this patient's inpatient psychiatric hospital services furnished since the previous certification were, and continue to be, required for treatment that could reasonably be expected to improve the patient's condition, or for diagnostic study, and that the patient continues to need, on a daily basis, active treatment furnished directly by or requiring the supervision of inpatient psychiatric facility personnel. In addition, the hospital records show that services furnished were intensive treatment services, admission or related services, or equivalent services.     EXPECTED DISCHARGE DATE/DAY: Mayela Ivory     DISPOSITION: Home       Signed By:   Savannah Etienne MD  1/21/2018

## 2018-01-23 VITALS
BODY MASS INDEX: 29.37 KG/M2 | HEART RATE: 70 BPM | HEIGHT: 67 IN | SYSTOLIC BLOOD PRESSURE: 92 MMHG | DIASTOLIC BLOOD PRESSURE: 58 MMHG | RESPIRATION RATE: 16 BRPM | WEIGHT: 187.1 LBS | TEMPERATURE: 97.4 F | OXYGEN SATURATION: 100 %

## 2018-01-23 PROCEDURE — 65220000001 HC RM PRIVATE PSYCH

## 2018-01-23 PROCEDURE — 74011250637 HC RX REV CODE- 250/637: Performed by: PSYCHIATRY & NEUROLOGY

## 2018-01-23 RX ADMIN — LORAZEPAM 0.5 MG: 0.5 TABLET ORAL at 17:37

## 2018-01-23 RX ADMIN — FLUOXETINE 40 MG: 20 CAPSULE ORAL at 09:24

## 2018-01-23 RX ADMIN — VALPROIC ACID 750 MG: 250 SOLUTION ORAL at 21:42

## 2018-01-23 RX ADMIN — BENZTROPINE MESYLATE 2 MG: 2 TABLET ORAL at 21:43

## 2018-01-23 RX ADMIN — VALPROIC ACID 750 MG: 250 SOLUTION ORAL at 09:24

## 2018-01-23 RX ADMIN — OLANZAPINE 20 MG: 5 TABLET, ORALLY DISINTEGRATING ORAL at 21:43

## 2018-01-23 RX ADMIN — LORAZEPAM 0.5 MG: 0.5 TABLET ORAL at 09:25

## 2018-01-23 NOTE — BH NOTES
During free time pt observed in room resting affect flat,sad staff did initiate 1:1 with no self disclosure noted he was encouraged to work his program by participating in all activities,continue to be  compliant with medications and hygiene, also he will be monitor q15min

## 2018-01-23 NOTE — PROGRESS NOTES
Problem: Altered Thought Process (Adult/Pediatric)  Goal: *STG: Seeks staff when feelings of anxiety and fear arise  Outcome: Progressing Towards Goal  Patient remains isolative. Patient is out of room for meals. encouraged to shower. Will continue to monitor patient and assess needs.

## 2018-01-23 NOTE — BH NOTES
PSYCHIATRIC PROGRESS NOTE         Patient Name  Martin Camara   Date of Birth 1970   [facility] [id number]   Medical Record Number  [id number]      Age  49 y.o. PCP PROVIDER UNKNOWN   Admit date:  11/25/2017    Room Number  307/01  @ [facility]   Date of Service  1/23/2018           E & M PROGRESS NOTE:         HISTORY       CC:  \"selectively mute\"  HISTORY OF PRESENT ILLNESS/INTERVAL HISTORY:  (reviewed/updated 1/23/2018). per initial evaluation: The patient, Martin Camara, is a 49 y.o. WHITE OR  male with a past psychiatric history significant for schizophrenia, who presents at this time with complaints of (and/or evidence of) the following emotional symptoms: psychotic behavior. Additional symptomatology include paranoid delusion, sexually inappropriate behavior, pepisodes of yelling screaming followed by selectively mute, disorganized thought process, anxiety, concern about health problems, difficulty sleeping, fearfulness, hearing voices, increased irritability, poor concentration and problem with medication. The above symptoms have been present for many years. These symptoms are of severe severity. These symptoms are constant  in nature. The patient's condition has been precipitated by and psychosocial stressors (conflict with sister, non compliance ). Patient's condition made worse by treatment noncompliance. UDS: negative; BAL=0. Martin Camara presents/reports/evidences the following emotional symptoms today, 1/23/2018:psychotic behavior. The above symptoms have been present for many years. These symptoms are of severe severity. The symptoms are constant in nature. Additional symptomatology and features include   1/2/18- no sig change, ct to present with poor hygiene. Delusional and odd posturing.   1/3- remains preoccupied with odd posturing ( fetal position), he did change his position on request. Talked in soft voice, poor hygiene, refused his Depakote last night  1/4- no sig change- refusing labs, isolative, poor hygiene, lying in bed in a fetal position. Eating well  1/5- ct to refuse labs for clozapine, passive aggressive and tend to sit in praying position when upset. Poor insight . No agitation  1/6-Isolating, selectively mute, staying in his room in praying position. Minimal activity. PT consult ordered. Consider ECT.  1/7- Found kneeling down on his bed with his pants down. Did not respond to any questions. Remains disorganized with poor hygiene. 1/8- poor hygiene, disheveled, was found praying but able to sit up and talk to the team- spoke in a soft voice. Still disorganized and delusional. Accepting med  1/9- able to have conversation with pt- kept his eyes closed but no odd behavior. Responds in monotone. Accepting med. Poor insight, disheveled  1/10- accepting med, able to come out for his meals, still evidence of poor hygiene. Need lot of prompting. Disorganized thought but no agitation  1/11- pt allowed to staff to shave and take shower, affect is sl better. Hygiene is improved. Accepting med. 1/12- slowly progressing, affect is improving, sl better socially and with eye contact. Accepting med and allowed the staff to help with ADLs. 1/13/18: Patient came in the conference room,still remains preoccupied,disheveled,affect improved, was pacing in the hallway ,engage in brief conversation which is a huge improvement compare to in the past when he was completely mute. over all slowly progressing. 1/14/18: Patient was seen in his room,he was calm,cooperative,speech soft,low tone, affect bright, no catatonia noted. Accetping meals and med. No behaviorals problem, focused on discharge. Over all significant imrpovement with current regimen of medications.     1/15/18 He is doing better and mood is better and he is compliant with medications and no anger issues and no self harm behavior and no aggression   1/16- slow progress- hygiene is improving, remains disorganized, delusional and with limited insight  1/17- ct to show slow progress- affect is better, reports AH - less intense. No behavior problem  1/18- no sig change since last review. accepting med. 1/19- affect is improving, smiling appropriately, more social and accepting med. Did meet up with his  from [facility].  1/20- tend to be isolative, accepting med and no behavioral issue. insight is improving  1/21- stable, no behavior issue, accepting med, more social but need prompting to maintain hygiene  1/22- no aggressive behavior, accepting med, insight and affect is improving, hygiene is poor and need lot of prompting  1/23- no behavior issue, need prompting to maintain personal hygiene. Affect is sl better      SIDE EFFECTS: (reviewed/updated 1/23/2018)  None reported or admitted to. No noted toxicity with use of current med   ALLERGIES:(reviewed/updated 1/23/2018)  Allergies   Allergen Reactions    Fluphenazine Unknown (comments)     Pt is unable to communicate properly.  Penicillins Unknown (comments)     Pt is unable to communicate properly. MEDICATIONS PRIOR TO ADMISSION:(reviewed/updated 1/23/2018)  Prescriptions Prior to Admission   Medication Sig    divalproex ER (DEPAKOTE ER) 500 mg ER tablet Take 1,500 mg by mouth nightly. Indications: Treatment-resistant schizophrenia    haloperidol decanoate (HALDOL DECANOATE) 100 mg/mL injection 2 mL by IntraMUSCular route every twenty-eight (28) days. Indications: SCHIZOPHRENIA    benztropine (COGENTIN) 2 mg tablet Take 1 Tab by mouth nightly. Indications: extrapyramidal disease    cloZAPine 200 mg tablet Take 600 mg by mouth nightly. Indications: TREATMENT-RESISTANT SCHIZOPHRENIA      PAST MEDICAL HISTORY: Past medical history from the initial psychiatric evaluation has been reviewed (reviewed/updated 1/23/2018) with no additional updates (I asked patient and no additional past medical history provided).    Past Medical History:   Diagnosis Date    Psychiatric disorder     Psychotic disorder     Withdrawal syndrome ([facility] [address]    No past surgical history on file. SOCIAL HISTORY: Social history from the initial psychiatric evaluation has been reviewed (reviewed/updated 1/23/2018) with no additional updates (I asked patient and no additional social history provided). Social History     Social History    Marital status: SINGLE     Spouse name: N/A    Number of children: N/A    Years of education: N/A     Occupational History    Not on file. 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 He lives with his sister. On disability    No pending legal charge reported      FAMILY HISTORY: Family history from the initial psychiatric evaluation has been reviewed (reviewed/updated 1/23/2018) with no additional updates (I asked patient and no additional family history provided). No family history on file. REVIEW OF SYSTEMS: (reviewed/updated 1/23/2018)  Appetite:no change from normal   Sleep: fitful   All other Review of Systems: Psychological ROS: positive for - behavioral disorder, concentration difficulties, hallucinations, irritability, mood swings and delusion  Respiratory ROS: no cough, shortness of breath, or wheezing  Cardiovascular ROS: no chest pain or dyspnea on exertion         [facility] (MSE):    MSE FINDINGS ARE WITHIN NORMAL LIMITS (WNL) UNLESS OTHERWISE STATED BELOW. ( ALL OF THE BELOW CATEGORIES OF THE MSE HAVE BEEN REVIEWED (reviewed 1/23/2018) AND UPDATED AS DEEMED APPROPRIATE )  General Presentation age appropriate , co operative   Orientation disorganized   Vital Signs  See below (reviewed 1/23/2018);  Vital Signs (BP, Pulse, & Temp) are within normal limits if not listed below.   Gait and Station Stable/steady, no ataxia   Musculoskeletal System No extrapyramidal symptoms (EPS); no abnormal muscular movements or Tardive Dyskinesia (TD); muscle strength and tone are within normal limits   Language No aphasia or dysarthria   Speech:  Monotone, soft   Thought Processes illogical; slow rate of thoughts; poor abstract reasoning/computation   Thought Associations blocked   Thought Content Less intense delusion, free of AH   Suicidal Ideations none   Homicidal Ideations none   Mood:  constricted   Affect:  Constricted, inappropriate and increased in intensity   Memory recent  impaired   Memory remote:  intact   Concentration/Attention:  distractable and hypervigilance   Fund of Knowledge average   Insight:  limited   Reliability poor   Judgment:  limited          VITALS:     No data found. Wt Readings from Last 3 Encounters:   12/23/17 84.9 kg (187 lb 1.6 oz)   03/25/17 95.6 kg (210 lb 11.2 oz)   02/09/15 97.5 kg (215 lb)     Temp Readings from Last 3 Encounters:   03/28/17 97.5 °F (36.4 °C)   02/07/15 98.7 °F (37.1 °C)     BP Readings from Last 3 Encounters:   03/28/17 100/68   02/12/15 105/73   02/07/15 148/79     Pulse Readings from Last 3 Encounters:   03/28/17 67   02/12/15 87   02/07/15 (!) 111            DATA     LABORATORY DATA:(reviewed/updated 1/23/2018)  No results found for this or any previous visit (from the past 24 hour(s)). Lab Results   Component Value Date/Time    Valproic acid 69 12/29/2017 04:43 AM     No results found for: LITHM   RADIOLOGY REPORTS:(reviewed/updated 1/23/2018)  No results found.        MEDICATIONS     ALL MEDICATIONS:      SCHEDULED MEDICATIONS:          ASSESSMENT & PLAN     DIAGNOSES REQUIRING ACTIVE TREATMENT AND MONITORING: (reviewed/updated 1/23/2018)  Patient C[facility]Alaina [address] Problem List:   Paranoid schizophrenia ([facility] [address] (3/15/2017)- tx resistant, chronic and with negative sx    Assessment:slow progress- insight is improved, less delusional, accepting med and affect is improving. Denies SI/HI/AVH. Baseline is poor and close to it       Plan: I will ct to adjust med- Zydis, Depakote and Prozac. Dc planning - close to his baseline, check VPA- dc to sister    Patient is on two antipsychotics now due to the relative refractoriness to treatment thus far. Prior to admission patient had THREE or more failed trails of monotherapy, including: Haldol, Zyprexa, Risperdal and Clozapine. The patient is a very slow responder to medications. Risks and benefits in the use of two antipsychotics have been weighed in full, including the risk of metabolic syndrome and the potential increased risk of QTc  Based on this analysis, it is considered favorable for the utilization of two antipsychotics. In the future, once stable on the two antipsychotics, patient can possibly be tapered to one of the chosen antipsychotics. Will check on EKG results today to monitor QTc.- refused EKG again  1/15/18 will continue same medications          I will continue to monitor blood levels (Depakote,, clozapine---a drug with a narrow therapeutic index= NTI) and associated labs for drug therapy implemented that require intense monitoring for toxicity as deemed appropriate based on current medication side effects and pharmacodynamically determined drug 1/2 lives. -    In summary, William Oviedo, is a 49 y.o.  male who presents with a severe exacerbation of the principal diagnosis of Paranoid schizophrenia ([facility] [address]  Patient's condition is worsening/not improving/not stable . Patient requires continued inpatient hospitalization for further stabilization, safety monitoring and medication management. I will continue to coordinate the provision of individual, milieu, occupational, group, and substance abuse therapies to address target symptoms/diagnoses as deemed appropriate for the individual patient.   A coordinated, multidisplinary treatment team round was conducted with the patient (this team consists of the nurse, psychiatric unit pharmcist,  and writer). Complete current electronic health record for patient has been reviewed today including consultant notes, ancillary staff notes, nurses and psychiatric tech notes. Suicide risk assessment completed and patient deemed to be of low risk for suicide at this time. The following regarding medications was addressed during rounds with patient:   the risks and benefits of the proposed medication. The patient was given the opportunity to ask questions. Informed consent given to the use of the above medications. Will continue to adjust psychiatric and non-psychiatric medications (see above \"medication\" section and orders section for details) as deemed appropriate & based upon diagnoses and response to treatment. I will continue to order blood tests/labs and diagnostic tests as deemed appropriate and review results as they become available (see orders for details and above listed lab/test results). I will order psychiatric records from previous [facility] hospitals to further elucidate the nature of patient's psychopathology and review once available. I will gather additional collateral information from friends, family and o/p treatment team to further elucidate the nature of patient's psychopathology and baselline level of psychiatric functioning. I certify that this patient's inpatient psychiatric hospital services furnished since the previous certification were, and continue to be, required for treatment that could reasonably be expected to improve the patient's condition, or for diagnostic study, and that the patient continues to need, on a daily basis, active treatment furnished directly by or requiring the supervision of inpatient psychiatric facility personnel.  In addition, the hospital records show that services furnished were intensive treatment services, admission or related services, or equivalent services.     EXPECTED DISCHARGE DATE/DAY: Italo Shukla     DISPOSITION: Home       Signed By:   Sherry Patten MD  1/23/2018

## 2018-01-23 NOTE — BH NOTES
Pt was seen in treatment team this morning in his room.  Pt was in bed, sat up, made eye contact and spoke with team.  Pt is smiling more.  Pt is accepting medications and leaving his room to eat meals. Hygiene remains poor and needs prompting and encouragement.  Pt has agreed to take shower and have labs done before discharging tomorrow.  left voicemail for sister to schedule time for discharge transportation. [facility] worker will meet with patient and his sister on Monday, January 29th to discuss skill building services and day treatment programs.

## 2018-01-23 NOTE — BH NOTES
GROUP THERAPY PROGRESS NOTE    Arpita Baumann is participating in [employer].      Group time: 30 minutes    Personal goal for participation:  Unit orientation    Goal orientation: Community    Group therapy participation: Pt left the group early    Therapeutic interventions reviewed and discussed: Yes    Impression of participation: N/A

## 2018-01-24 LAB
ALBUMIN SERPL-MCNC: 2.9 G/DL (ref 3.5–5)
ALBUMIN/GLOB SERPL: 0.8 {RATIO} (ref 1.1–2.2)
ALP SERPL-CCNC: 166 U/L (ref 45–117)
ALT SERPL-CCNC: 43 U/L (ref 12–78)
ANION GAP SERPL CALC-SCNC: 10 MMOL/L (ref 5–15)
AST SERPL-CCNC: 16 U/L (ref 15–37)
BILIRUB SERPL-MCNC: 0.4 MG/DL (ref 0.2–1)
BUN SERPL-MCNC: 16 MG/DL (ref 6–20)
BUN/CREAT SERPL: 18 (ref 12–20)
CALCIUM SERPL-MCNC: 8.6 MG/DL (ref 8.5–10.1)
CHLORIDE SERPL-SCNC: 104 MMOL/L (ref 97–108)
CO2 SERPL-SCNC: 27 MMOL/L (ref 21–32)
CREAT SERPL-MCNC: 0.87 MG/DL (ref 0.7–1.3)
GLOBULIN SER CALC-MCNC: 3.8 G/DL (ref 2–4)
GLUCOSE SERPL-MCNC: 78 MG/DL (ref 65–100)
POTASSIUM SERPL-SCNC: 4.1 MMOL/L (ref 3.5–5.1)
PROT SERPL-MCNC: 6.7 G/DL (ref 6.4–8.2)
SODIUM SERPL-SCNC: 141 MMOL/L (ref 136–145)
VALPROATE SERPL-MCNC: 56 UG/ML (ref 50–100)

## 2018-01-24 PROCEDURE — 80164 ASSAY DIPROPYLACETIC ACD TOT: CPT | Performed by: PSYCHIATRY & NEUROLOGY

## 2018-01-24 PROCEDURE — 74011250637 HC RX REV CODE- 250/637: Performed by: PSYCHIATRY & NEUROLOGY

## 2018-01-24 PROCEDURE — 36415 COLL VENOUS BLD VENIPUNCTURE: CPT | Performed by: PSYCHIATRY & NEUROLOGY

## 2018-01-24 PROCEDURE — 80053 COMPREHEN METABOLIC PANEL: CPT | Performed by: PSYCHIATRY & NEUROLOGY

## 2018-01-24 RX ORDER — LORAZEPAM 0.5 MG/1
0.5 TABLET ORAL 2 TIMES DAILY
Qty: 28 TAB | Refills: 0 | Status: SHIPPED | OUTPATIENT
Start: 2018-01-24 | End: 2018-02-07

## 2018-01-24 RX ORDER — VALPROIC ACID 250 MG/5ML
750 SOLUTION ORAL 2 TIMES DAILY
Qty: 420 ML | Refills: 0 | Status: SHIPPED | OUTPATIENT
Start: 2018-01-24 | End: 2018-02-07

## 2018-01-24 RX ORDER — HALOPERIDOL DECANOATE 100 MG/ML
200 INJECTION INTRAMUSCULAR
Qty: 2 ML | Refills: 0 | Status: SHIPPED | OUTPATIENT
Start: 2018-02-12 | End: 2018-03-12

## 2018-01-24 RX ORDER — FLUOXETINE HYDROCHLORIDE 40 MG/1
40 CAPSULE ORAL DAILY
Qty: 14 CAP | Refills: 0 | Status: SHIPPED | OUTPATIENT
Start: 2018-01-25 | End: 2018-02-08

## 2018-01-24 RX ORDER — BENZTROPINE MESYLATE 2 MG/1
2 TABLET ORAL
Qty: 14 TAB | Refills: 0 | Status: SHIPPED | OUTPATIENT
Start: 2018-01-24 | End: 2018-02-07

## 2018-01-24 RX ORDER — OLANZAPINE 20 MG/1
20 TABLET, ORALLY DISINTEGRATING ORAL
Qty: 14 TAB | Refills: 0 | Status: SHIPPED | OUTPATIENT
Start: 2018-01-24 | End: 2018-02-07

## 2018-01-24 RX ADMIN — VALPROIC ACID 750 MG: 250 SOLUTION ORAL at 08:06

## 2018-01-24 RX ADMIN — FLUOXETINE 40 MG: 20 CAPSULE ORAL at 08:06

## 2018-01-24 RX ADMIN — LORAZEPAM 0.5 MG: 0.5 TABLET ORAL at 08:06

## 2018-01-24 NOTE — BH NOTES
Behavioral Health Transition Record to Provider    Patient Name: Mc Khan  YOB: 1970  Medical Record Number: [id number]  Date of Admission: 11/25/2017  Date of Discharge: 1/24/2018    Attending Provider: Teetee Clay MD  Discharging Provider: Teetee Clay MD  To contact this individual call [phone] and ask the  to page. If unavailable, ask to be transferred to [facility] Provider on call. [facility] Provider will be available on call 24/7 and during holidays. Primary Care Provider: PROVIDER UNKNOWN    Allergies   Allergen Reactions    Fluphenazine Unknown (comments)     Pt is unable to communicate properly.  Penicillins Unknown (comments)     Pt is unable to communicate properly. Reason for Admission: Admitted under a TDO to [facility] 2 months ago. Pt was selectively mute, disorganized in thought process, delusional themes and refusing to take  his medications. Pt had catatonic features, very poor hygiene, poor insight and psychotic.      Admission Diagnosis: schizophrenia  Schizophrenia ([facility]  Schizophrenia ([facility]    * No surgery found *    Results for orders placed or performed during the hospital encounter of 11/25/17   LIPID PANEL   Result Value Ref Range    LIPID PROFILE          Cholesterol, total 184 <200 MG/DL    Triglyceride 227 (H) <150 MG/DL    HDL Cholesterol 48 MG/DL    LDL, calculated 90.6 0 - 100 MG/DL    VLDL, calculated 45.4 MG/DL    CHOL/HDL Ratio 3.8 0 - 5.0     TSH 3RD GENERATION   Result Value Ref Range    TSH 3.78 (H) 0.36 - 3.74 uIU/mL   VALPROIC ACID   Result Value Ref Range    Valproic acid 88 50 - 042 ug/ml   METABOLIC PANEL, COMPREHENSIVE   Result Value Ref Range    Sodium 140 136 - 145 mmol/L    Potassium 3.7 3.5 - 5.1 mmol/L    Chloride 101 97 - 108 mmol/L    CO2 28 21 - 32 mmol/L    Anion gap 11 5 - 15 mmol/L    Glucose 81 65 - 100 mg/dL    BUN 14 6 - 20 MG/DL    Creatinine 0.82 0.70 - 1.30 MG/DL    BUN/Creatinine ratio 17 12 - 20      GFR est AA >60 >60 ml/min/1.73m2    GFR est non-AA >60 >60 ml/min/1.73m2    Calcium 9.6 8.5 - 10.1 MG/DL    Bilirubin, total 0.5 0.2 - 1.0 MG/DL    ALT (SGPT) 26 12 - 78 U/L    AST (SGOT) 16 15 - 37 U/L    Alk. phosphatase 151 (H) 45 - 117 U/L    Protein, total 8.6 (H) 6.4 - 8.2 g/dL    Albumin 4.1 3.5 - 5.0 g/dL    Globulin 4.5 (H) 2.0 - 4.0 g/dL    A-G Ratio 0.9 (L) 1.1 - 2.2     PSA SCREENING (SCREENING)   Result Value Ref Range    Prostate Specific Ag 0.6 0.0 - 4.0 ng/mL   CBC WITH AUTOMATED DIFF   Result Value Ref Range    WBC 5.7 4.1 - 11.1 K/uL    RBC 4.51 4.10 - 5.70 M/uL    HGB 12.7 12.1 - 17.0 g/dL    HCT 40.5 36.6 - 50.3 %    MCV 89.8 80.0 - 99.0 FL    MCH 28.2 26.0 - 34.0 PG    MCHC 31.4 30.0 - 36.5 g/dL    RDW 13.7 11.5 - 14.5 %    PLATELET 292 379 - 012 K/uL    NEUTROPHILS 43 32 - 75 %    LYMPHOCYTES 38 12 - 49 %    MONOCYTES 12 5 - 13 %    EOSINOPHILS 6 0 - 7 %    BASOPHILS 1 0 - 1 %    ABS. NEUTROPHILS 2.5 1.8 - 8.0 K/UL    ABS. LYMPHOCYTES 2.2 0.8 - 3.5 K/UL    ABS. MONOCYTES 0.7 0.0 - 1.0 K/UL    ABS. EOSINOPHILS 0.4 0.0 - 0.4 K/UL    ABS. BASOPHILS 0.0 0.0 - 0.1 K/UL   VALPROIC ACID   Result Value Ref Range    Valproic acid 69 50 - 100 ug/ml   VALPROIC ACID   Result Value Ref Range    Valproic acid 56 50 - 326 ug/ml   METABOLIC PANEL, COMPREHENSIVE   Result Value Ref Range    Sodium 141 136 - 145 mmol/L    Potassium 4.1 3.5 - 5.1 mmol/L    Chloride 104 97 - 108 mmol/L    CO2 27 21 - 32 mmol/L    Anion gap 10 5 - 15 mmol/L    Glucose 78 65 - 100 mg/dL    BUN 16 6 - 20 MG/DL    Creatinine 0.87 0.70 - 1.30 MG/DL    BUN/Creatinine ratio 18 12 - 20      GFR est AA >60 >60 ml/min/1.73m2    GFR est non-AA >60 >60 ml/min/1.73m2    Calcium 8.6 8.5 - 10.1 MG/DL    Bilirubin, total 0.4 0.2 - 1.0 MG/DL    ALT (SGPT) 43 12 - 78 U/L    AST (SGOT) 16 15 - 37 U/L    Alk.  phosphatase 166 (H) 45 - 117 U/L    Protein, total 6.7 6.4 - 8.2 g/dL    Albumin 2.9 (L) 3.5 - 5.0 g/dL    Globulin 3.8 2.0 - 4.0 g/dL    A-G Ratio 0.8 (L) 1.1 - 2.2         Immunizations administered during this encounter: There is no immunization history for the selected administration types on file for this patient. Screening for Metabolic Disorders for Patients on Antipsychotic Medications  (Data obtained from the EMR)    Estimated Body Mass Index  Estimated body mass index is 29.3 kg/(m^2) as calculated from the following:    Height as of this encounter: 5' 7\" (1.702 m). Weight as of this encounter: 84.9 kg (187 lb 1.6 oz). Vital Signs/Blood Pressure  Visit Vitals    BP 92/58    Pulse 70    Temp 97.4 °F (36.3 °C)    Resp 16    Ht 5' 7\" (1.702 m)    Wt 84.9 kg (187 lb 1.6 oz)    SpO2 100%    BMI 29.3 kg/m2       Blood Glucose/Hemoglobin A1c  Lab Results   Component Value Date/Time    Glucose 78 01/24/2018 04:51 AM    Glucose 101 03/14/2017 11:49 PM    Glucose (POC) 112 01/19/2012 09:59 PM       Lab Results   Component Value Date/Time    Hemoglobin A1c 5.6 03/14/2017 11:49 PM        Lipid Panel  Lab Results   Component Value Date/Time    Cholesterol, total 184 12/09/2017 08:10 AM    HDL Cholesterol 48 12/09/2017 08:10 AM    LDL, calculated 90.6 12/09/2017 08:10 AM    Triglyceride 227 12/09/2017 08:10 AM    CHOL/HDL Ratio 3.8 12/09/2017 08:10 AM        Discharge Diagnosis: Please refer to physician's discharge summary. Discharge Plan: Pt will be discharging home to [facility] and will be transported by his sister around 1:00PM.     Discharge Medication List and Instructions:   Current Discharge Medication List      START taking these medications    Details   FLUoxetine (PROZAC) 40 mg capsule Take 1 Cap by mouth daily for 14 days. Indications: major depressive disorder  Qty: 14 Cap, Refills: 0      !! haloperidol decanoate (HALDOL DECANOATE) 100 mg/mL injection 2 mL by IntraMUSCular route every twenty-eight (28) days for 28 days.  Indications: Schizophrenia  Qty: 2 mL, Refills: 0      LORazepam (ATIVAN) 0.5 mg tablet Take 1 Tab by mouth two (2) times a day for 14 days. Max Daily Amount: 1 mg. Indications: Catatonia  Qty: 28 Tab, Refills: 0    Associated Diagnoses: Schizoaffective disorder, bipolar type (HCC)      OLANZapine (ZYPREXA ZYDIS) 20 mg disintegrating tablet Take 1 Tab by mouth nightly for 14 days. Indications: Schizophrenia  Qty: 14 Tab, Refills: 0      valproic acid, as sodium salt, (DEPAKENE) 250 mg/5 mL (5 mL) soln oral solution Take 15 mL by mouth two (2) times a day for 14 days. Indications: Bipolar Disorder  Qty: 420 mL, Refills: 0       !! - Potential duplicate medications found. Please discuss with provider. CONTINUE these medications which have CHANGED    Details   benztropine (COGENTIN) 2 mg tablet Take 1 Tab by mouth nightly for 14 days. Indications: extrapyramidal disease  Qty: 14 Tab, Refills: 0         CONTINUE these medications which have NOT CHANGED    Details   !! haloperidol decanoate (HALDOL DECANOATE) 100 mg/mL injection 2 mL by IntraMUSCular route every twenty-eight (28) days. Indications: SCHIZOPHRENIA  Qty: 2 mL, Refills: 0       !! - Potential duplicate medications found. Please discuss with provider. STOP taking these medications       divalproex ER (DEPAKOTE ER) 500 mg ER tablet Comments:   Reason for Stopping:         cloZAPine 200 mg tablet Comments:   Reason for Stopping:               Unresulted Labs     None        To obtain results of studies pending at discharge, please contact N/A  Follow-up Information     Follow up With Details Comments Contact Info    Dr. Carmen Dempsey on 2/13/2018 Medication management appointment on February 13th at 11:30 AM.  [address] [place]  [phone]   Fax: [phone]      Haloperidol decanoate IM injection On 2/12/2018 Haloperidol decanoate 200 mg IM injection was given on 12/18/17 and 1/15/18 during hospitalization. Next injection is due on 2/12/18.  [facility]    Heidy Bragg Case Management appointment on February 29th at 10:00 AM.  [facility]   Monalisa Meneses  [phone]   Fax: [phone]    Provider Unknown   Patient not available to ask            Advanced Directive:   Does the patient have an appointed surrogate decision maker? Unknown   Does the patient have a Medical Advance Directive? Unknown   Does the patient have a Psychiatric Advance Directive? Unknown   If the patient does not have a surrogate or Medical Advance Directive AND Psychiatric Advance Directive, the patient was offered information on these advance directives Unknown      Patient Instructions: Please continue all medications until otherwise directed by physician. Tobacco Cessation Discharge Plan:   Is the patient a smoker and needs referral for smoking cessation? No  Patient referred to the following for smoking cessation with an appointment? No   Patient was offered medication to assist with smoking cessation at discharge? No  Was education for smoking cessation added to the discharge instructions? No    Alcohol/Substance Abuse Discharge Plan:   Does the patient have a history of substance/alcohol abuse and requires a referral for treatment? No  Patient referred to the following for substance/alcohol abuse treatment with an appointment? No  Patient was offered medication to assist with alcohol cessation at discharge? No  Was education for substance/alcohol abuse added to discharge instructions? No    Patient discharged to Home; provided to the patient/caregiver either in hard copy or electronically. Continuum of Care Plan will be faxed electronically via [facility]. Pt is alert and oriented. Pt denies SI/HI/AVH. Pt's mood was euthymic and affect congruent to mood. Pt's thought process was logical.  Pt's insight and judgment are fair. Pt is in agreement with the plan.

## 2018-01-24 NOTE — DISCHARGE SUMMARY
PSYCHIATRIC DISCHARGE SUMMARY         IDENTIFICATION:    Patient Name  Juaquin Chavez   Date of Birth 1970   [facility] [id number]   Medical Record Number  [id number]      Age  49 y.o. PCP PROVIDER UNKNOWN   Admit date:  11/25/2017    Discharge date: 1/24/2018   Room Number  307/01  @ [address]   Date of Service  1/24/2018            TYPE OF DISCHARGE: REGULAR               CONDITION AT DISCHARGE: improved       PROVISIONAL & DISCHARGE DIAGNOSES:    Problem List  Date Reviewed: 3/15/2017          Codes Class    Schizophrenia ([facility] ICD-10-CM: F20.9  ICD-9-CM: 295.90         * (Principal)Paranoid schizophrenia ([facility] ICD-10-CM: F20.0  ICD-9-CM: 295.30               Active Hospital Problems    [facility] ([facility]      *Paranoid schizophrenia ([facility]        DISCHARGE DIAGNOSIS:   Axis I:  SEE ABOVE  Axis II: SEE ABOVE  Axis III: SEE ABOVE  Axis IV:  lack of structure  Axis V:  20 on admission, 50 on discharge 50(baseline)       CC & HISTORY OF PRESENT ILLNESS:  The patient, Juaquin Chavez, is a 49 y.o. WHITE OR  male with a past psychiatric history significant for schizophrenia, who presents at this time with complaints of (and/or evidence of) the following emotional symptoms: psychotic behavior. Additional symptomatology include paranoid delusion, sexually inappropriate behavior, pepisodes of yelling screaming followed by selectively mute, disorganized thought process, anxiety, concern about health problems, difficulty sleeping, fearfulness, hearing voices, increased irritability, poor concentration and problem with medication. The above symptoms have been present for many years. These symptoms are of severe severity. These symptoms are constant  in nature. The patient's condition has been precipitated by and psychosocial stressors (conflict with sister, non compliance ). Patient's condition made worse by treatment noncompliance.  UDS: negative; BAL=0.        SOCIAL HISTORY:    Social History     Social History    Marital status: SINGLE     Spouse name: N/A    Number of children: N/A    Years of education: N/A     Occupational History    Not on file. 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 He lives with his sister. On disability    No pending legal charge reported      FAMILY HISTORY:   No family history on file. HOSPITALIZATION COURSE:    Amina Joe was admitted to the inpatient psychiatric unit [facility] for acute psychiatric stabilization in regards to symptomatology as described in the HPI above. The differential diagnosis at time of admission included: bipolar dis vs. schizoaffective vs. Schizophrenia. While on the unit Amina Joe was involved in individual, group, occupational and milieu therapy. Psychiatric medications were adjusted during this hospitalization including Zyprexa, ativan, Depakote and Prozac. Pt has trial of Clozapine but refused to have labs done and it was abandoned. His hygiene, affect and overall psychosis improved on the current combination of med. Amina Joe demonstrated a slow, but progressive improvement in overall condition. Much of patient's depression appeared to be related to situational stressors,  and psychological factors. Please see individual progress notes for more specific details regarding patient's hospitalization course. At time of discharge, Amina Joe is without significant problems of depression psychosis  pretty. Patient free of suicidal and homicidal ideations (appears to be at very low risk of suicide or homicide) and reports many positive predictive factors in terms of not attempting suicide or homicide. Patient with request for discharge today.  There are no grounds to seek a TDO. Patient has maximized benefit to be derived from acute inpatient psychiatric treatment. All members of the treatment team concur with each other in regards to plans for discharge today per patient's request.  Patient and family are aware and in agreement with discharge and discharge plan. Per my last note: no aggressive behavior, accepting med, insight and affect is improving, hygiene is poor and need lot of prompting    Paranoid schizophrenia ([facility] [address] [facility] (3/15/2017)- tx resistant, chronic and with negative sx    Assessment:slow progress- insight is improved, less delusional, accepting med and affect is improving. Denies SI/HI/AVH. Baseline is poor and close to it       Plan: I will ct to adjust med- Zydis, Depakote and Prozac. Dc planning - close to his baseline, check VPA- dc to sister           LABS AND IMAGAING:    Labs Reviewed   LIPID PANEL - Abnormal; Notable for the following:        Result Value    Triglyceride 227 (*)     All other components within normal limits   TSH 3RD GENERATION - Abnormal; Notable for the following:     TSH 3.78 (*)     All other components within normal limits   METABOLIC PANEL, COMPREHENSIVE - Abnormal; Notable for the following:     Alk. phosphatase 151 (*)     Protein, total 8.6 (*)     Globulin 4.5 (*)     A-G Ratio 0.9 (*)     All other components within normal limits   METABOLIC PANEL, COMPREHENSIVE - Abnormal; Notable for the following:     Alk.  phosphatase 166 (*)     Albumin 2.9 (*)     A-G Ratio 0.8 (*)     All other components within normal limits   VALPROIC ACID   PSA SCREENING (SCREENING)   CBC WITH AUTOMATED DIFF   VALPROIC ACID   VALPROIC ACID     Lab Results   Component Value Date/Time    Valproic acid 56 01/24/2018 04:51 AM     Admission on 11/25/2017   Component Date Value Ref Range Status    LIPID PROFILE 12/09/2017        Final    Cholesterol, total 12/09/2017 184  <200 MG/DL Final    Triglyceride 12/09/2017 227* <150 MG/DL Final    HDL Cholesterol 12/09/2017 48  MG/DL Final    LDL, calculated 12/09/2017 90.6  0 - 100 MG/DL Final    VLDL, calculated 12/09/2017 45.4  MG/DL Final    CHOL/HDL Ratio 12/09/2017 3.8  0 - 5.0   Final    TSH 12/09/2017 3.78* 0.36 - 3.74 uIU/mL Final    Valproic acid 12/09/2017 88  50 - 100 ug/ml Final    Sodium 12/09/2017 140  136 - 145 mmol/L Final    Potassium 12/09/2017 3.7  3.5 - 5.1 mmol/L Final    Chloride 12/09/2017 101  97 - 108 mmol/L Final    CO2 12/09/2017 28  21 - 32 mmol/L Final    Anion gap 12/09/2017 11  5 - 15 mmol/L Final    Glucose 12/09/2017 81  65 - 100 mg/dL Final    BUN 12/09/2017 14  6 - 20 MG/DL Final    Creatinine 12/09/2017 0.82  0.70 - 1.30 MG/DL Final    BUN/Creatinine ratio 12/09/2017 17  12 - 20   Final    GFR est AA 12/09/2017 >60  >60 ml/min/1.73m2 Final    GFR est non-AA 12/09/2017 >60  >60 ml/min/1.73m2 Final    Calcium 12/09/2017 9.6  8.5 - 10.1 MG/DL Final    Bilirubin, total 12/09/2017 0.5  0.2 - 1.0 MG/DL Final    ALT (SGPT) 12/09/2017 26  12 - 78 U/L Final    AST (SGOT) 12/09/2017 16  15 - 37 U/L Final    Alk.  phosphatase 12/09/2017 151* 45 - 117 U/L Final    Protein, total 12/09/2017 8.6* 6.4 - 8.2 g/dL Final    Albumin 12/09/2017 4.1  3.5 - 5.0 g/dL Final    Globulin 12/09/2017 4.5* 2.0 - 4.0 g/dL Final    A-G Ratio 12/09/2017 0.9* 1.1 - 2.2   Final    Prostate Specific Ag 12/09/2017 0.6  0.0 - 4.0 ng/mL Final    WBC 12/29/2017 5.7  4.1 - 11.1 K/uL Final    RBC 12/29/2017 4.51  4.10 - 5.70 M/uL Final    HGB 12/29/2017 12.7  12.1 - 17.0 g/dL Final    HCT 12/29/2017 40.5  36.6 - 50.3 % Final    MCV 12/29/2017 89.8  80.0 - 99.0 FL Final    MCH 12/29/2017 28.2  26.0 - 34.0 PG Final    MCHC 12/29/2017 31.4  30.0 - 36.5 g/dL Final    RDW 12/29/2017 13.7  11.5 - 14.5 % Final    PLATELET 50/34/4495 703  150 - 400 K/uL Final    NEUTROPHILS 12/29/2017 43  32 - 75 % Final    LYMPHOCYTES 12/29/2017 38  12 - 49 % Final    MONOCYTES 12/29/2017 12  5 - 13 % Final    EOSINOPHILS 12/29/2017 6  0 - 7 % Final    BASOPHILS 12/29/2017 1  0 - 1 % Final    ABS. NEUTROPHILS 12/29/2017 2.5  1.8 - 8.0 K/UL Final    ABS. LYMPHOCYTES 12/29/2017 2.2  0.8 - 3.5 K/UL Final    ABS. MONOCYTES 12/29/2017 0.7  0.0 - 1.0 K/UL Final    ABS. EOSINOPHILS 12/29/2017 0.4  0.0 - 0.4 K/UL Final    ABS. BASOPHILS 12/29/2017 0.0  0.0 - 0.1 K/UL Final    Valproic acid 12/29/2017 69  50 - 100 ug/ml Final    Valproic acid 01/24/2018 56  50 - 100 ug/ml Final    Sodium 01/24/2018 141  136 - 145 mmol/L Final    Potassium 01/24/2018 4.1  3.5 - 5.1 mmol/L Final    Chloride 01/24/2018 104  97 - 108 mmol/L Final    CO2 01/24/2018 27  21 - 32 mmol/L Final    Anion gap 01/24/2018 10  5 - 15 mmol/L Final    Glucose 01/24/2018 78  65 - 100 mg/dL Final    BUN 01/24/2018 16  6 - 20 MG/DL Final    Creatinine 01/24/2018 0.87  0.70 - 1.30 MG/DL Final    BUN/Creatinine ratio 01/24/2018 18  12 - 20   Final    GFR est AA 01/24/2018 >60  >60 ml/min/1.73m2 Final    GFR est non-AA 01/24/2018 >60  >60 ml/min/1.73m2 Final    Calcium 01/24/2018 8.6  8.5 - 10.1 MG/DL Final    Bilirubin, total 01/24/2018 0.4  0.2 - 1.0 MG/DL Final    ALT (SGPT) 01/24/2018 43  12 - 78 U/L Final    AST (SGOT) 01/24/2018 16  15 - 37 U/L Final    Alk. phosphatase 01/24/2018 166* 45 - 117 U/L Final    Protein, total 01/24/2018 6.7  6.4 - 8.2 g/dL Final    Albumin 01/24/2018 2.9* 3.5 - 5.0 g/dL Final    Globulin 01/24/2018 3.8  2.0 - 4.0 g/dL Final    A-G Ratio 01/24/2018 0.8* 1.1 - 2.2   Final     No results found. DISPOSITION:    Home. Patient to f/u with psychiatric, and psychotherapy appointments. Patient is to f/u with internist as directed. Patient should have a depakote level and associated labs checked within the next 1-2 weeks by patient's o/p psychiatrist/internist.               FOLLOW-UP CARE:    Activity as tolerated  Low fat, Low cholesterol  Wound Care: none needed.   Follow-up Information Follow up With Details Comments Contact Info    Dr. Savannah Chin on 2/13/2018 Medication management appointment on February 13th at 11:30 AM.  [facility] [place] Smith[place]onalisa headley [place]  [phone]   Fax: [phone]      Haloperidol decanoate IM injection On 2/12/2018 Haloperidol decanoate 200 mg IM injection was given on 12/18/17 and 1/15/18 during hospitalization. Next injection is due on 2/12/18. [facility]    Irish Carcamo   Case Management appointment on February 29th at 10:00 AM.  [address] [facility]  [phone]   Fax: [phone]    Provider Unknown   Patient not available to ask                   PROGNOSIS:   Poor---- based on nature of patient's pathology/ies and treatment compliance issues. Prognosis is greatly dependent upon patient's ability to follow up with psychiatric/psychotherapy appointments as well as to comply with psychiatric medications as prescribed. DISCHARGE MEDICATIONS:     Informed consent given for the use of following psychotropic medications:  Current Discharge Medication List      START taking these medications    Details   FLUoxetine (PROZAC) 40 mg capsule Take 1 Cap by mouth daily for 14 days. Indications: major depressive disorder  Qty: 14 Cap, Refills: 0      !! haloperidol decanoate (HALDOL DECANOATE) 100 mg/mL injection 2 mL by IntraMUSCular route every twenty-eight (28) days for 28 days. Indications: Schizophrenia  Qty: 2 mL, Refills: 0      LORazepam (ATIVAN) 0.5 mg tablet Take 1 Tab by mouth two (2) times a day for 14 days. Max Daily Amount: 1 mg. Indications: Catatonia  Qty: 28 Tab, Refills: 0    Associated Diagnoses: Schizoaffective disorder, bipolar type (HCC)      OLANZapine (ZYPREXA ZYDIS) 20 mg disintegrating tablet Take 1 Tab by mouth nightly for 14 days.  Indications: Schizophrenia  Qty: 14 Tab, Refills: 0      valproic acid, as sodium salt, (DEPAKENE) 250 mg/5 mL (5 mL) soln oral solution Take 15 mL by mouth two (2) times a day for 14 days. Indications: Bipolar Disorder  Qty: 420 mL, Refills: 0       !! - Potential duplicate medications found. Please discuss with provider. CONTINUE these medications which have CHANGED    Details   benztropine (COGENTIN) 2 mg tablet Take 1 Tab by mouth nightly for 14 days. Indications: extrapyramidal disease  Qty: 14 Tab, Refills: 0         CONTINUE these medications which have NOT CHANGED    Details   !! haloperidol decanoate (HALDOL DECANOATE) 100 mg/mL injection 2 mL by IntraMUSCular route every twenty-eight (28) days. Indications: SCHIZOPHRENIA  Qty: 2 mL, Refills: 0       !! - Potential duplicate medications found. Please discuss with provider. STOP taking these medications       divalproex ER (DEPAKOTE ER) 500 mg ER tablet Comments:   Reason for Stopping:         cloZAPine 200 mg tablet Comments:   Reason for Stopping:                      A coordinated, multidisplinary treatment team round was conducted with Grace Castaneda is done daily here at [facility]. This team consists of the nurse, psychiatric unit pharmcist,  and writer. I have spent greater than 35 minutes on discharge work.     Signed:  Diann Khan MD  1/24/2018

## 2018-01-24 NOTE — PROGRESS NOTES
Laboratory Monitoring for Valproic Acid    This patient is currently prescribed the following medication(s):   Current Facility-Administered Medications   Medication Dose Route Frequency    OLANZapine (ZyPREXA zydis) disintegrating tablet 20 mg  20 mg Oral QHS    valproic acid (as sodium salt) (DEPAKENE) 250 mg/5 mL (5 mL) oral solution 750 mg  750 mg Oral BID    FLUoxetine (PROzac) capsule 40 mg  40 mg Oral DAILY    LORazepam (ATIVAN) tablet 0.5 mg  0.5 mg Oral BID    haloperidol decanoate (HALDOL DECANOATE) 100 mg/mL injection 200 mg  200 mg IntraMUSCular Q28D    benztropine (COGENTIN) tablet 2 mg  2 mg Oral QHS       The following labs have been completed for monitoring of valproic acid:    Valproic Acid Serum Concentration  Lab Results   Component Value Date/Time    Valproic acid 56 01/24/2018 04:51 AM         Hepatic Function  Lab Results   Component Value Date/Time    Bilirubin, total 0.4 01/24/2018 04:51 AM    Protein, total 6.7 01/24/2018 04:51 AM    Albumin 2.9 01/24/2018 04:51 AM    Globulin 3.8 01/24/2018 04:51 AM    A-G Ratio 0.8 01/24/2018 04:51 AM    ALT (SGPT) 43 01/24/2018 04:51 AM    Alk. phosphatase 166 01/24/2018 04:51 AM       Hematology  Lab Results   Component Value Date/Time    WBC 5.7 12/29/2017 04:43 AM    RBC 4.51 12/29/2017 04:43 AM    HGB 12.7 12/29/2017 04:43 AM    HCT 40.5 12/29/2017 04:43 AM    MCV 89.8 12/29/2017 04:43 AM    MCH 28.2 12/29/2017 04:43 AM    MCHC 31.4 12/29/2017 04:43 AM    RDW 13.7 12/29/2017 04:43 AM    PLATELET 622 86/09/1997 04:43 AM       Assessment/Plan:  Valproic acid level is within therapeutic limits, 56 mcg/mL. Level was drawn approximately 7 hours post dose. Of note, patient refused his morning dose on 1/22 therefore indicating that his steady-state level may be higher. Same dose continued on discharge.       Janel Field, PharmD, BCPS  [phone]

## 2018-01-24 NOTE — BH NOTES
Pt is alert and oriented x person, place and time. Pt denies any SI/HI or AV hallucinations. Pt denies any depression. Pt plans to return home with sister . Discharge information/Prescriptions reviewed with patient. Pt verbalizes understanding. Pt's belongings/valuables returned. Pt to be transported home by sister.

## 2018-01-24 NOTE — PROGRESS NOTES
Problem: Altered Thought Process (Adult/Pediatric)  Goal: *STG: Remains safe in hospital  Outcome: Progressing Towards Goal  Pt withdrawn to room often noted laying in bed. Pt makes no eye contact. Affect is flat, mood is guarded. Hygiene is poor, encouraged patient to shower but patient did not respond. Male BHT will reinforce and offer assist with shave. Independent in basic ADLs. Gait is steady. Patient out to accept his meals. No evidence of intent to harm self or others. Will continue to monitor pt with q 15 min checks and encourage hygiene in anticipation of possible discharge.

## 2018-01-24 NOTE — BH NOTES
GROUP THERAPY PROGRESS NOTE    Conor Cabrera is participating in [employer].      Group time: 30 minutes    Personal goal for participation:  Unit orientation    Goal orientation: Community    Group therapy participation: active    Therapeutic interventions reviewed and discussed: Yes    Impression of participation: good

## 2018-01-24 NOTE — DISCHARGE INSTRUCTIONS
DISCHARGE SUMMARY from Nurse    PATIENT INSTRUCTIONS:    What to do at Home:    Recommended activity: Activity as tolerated,       *  Please give a list of your current medications to your Primary Care Provider. *  Please update this list whenever your medications are discontinued, doses are      changed, or new medications (including over-the-counter products) are added. *  Please carry medication information at all times in case of emergency situations. These are general instructions for a healthy lifestyle:    No smoking/ No tobacco products/ Avoid exposure to second hand smoke  Surgeon General's Warning:  Quitting smoking now greatly reduces serious risk to your health. Obesity, smoking, and sedentary lifestyle greatly increases your risk for illness    A healthy diet, regular physical exercise & weight monitoring are important for maintaining a healthy lifestyle    You may be retaining fluid if you have a history of heart failure or if you experience any of the following symptoms:  Weight gain of 3 pounds or more overnight or 5 pounds in a week, increased swelling in our hands or feet or shortness of breath while lying flat in bed. Please call your doctor as soon as you notice any of these symptoms; do not wait until your next office visit. Recognize signs and symptoms of STROKE:    F-face looks uneven    A-arms unable to move or move unevenly    S-speech slurred or non-existent    T-time-call 911 as soon as signs and symptoms begin-DO NOT go       Back to bed or wait to see if you get better-TIME IS BRAIN. Warning Signs of HEART ATTACK     Call 911 if you have these symptoms:   Chest discomfort. Most heart attacks involve discomfort in the center of the chest that lasts more than a few minutes, or that goes away and comes back. It can feel like uncomfortable pressure, squeezing, fullness, or pain.  Discomfort in other areas of the upper body.  Symptoms can include pain or discomfort in one or both arms, the back, neck, jaw, or stomach.  Shortness of breath with or without chest discomfort.  Other signs may include breaking out in a cold sweat, nausea, or lightheadedness. Don't wait more than five minutes to call 911 - MINUTES MATTER! Fast action can save your life. Calling 911 is almost always the fastest way to get lifesaving treatment. Emergency Medical Services staff can begin treatment when they arrive -- up to an hour sooner than if someone gets to the hospital by car. The discharge information has been reviewed with the patient. The patient verbalized understanding. Discharge medications reviewed with the patient and appropriate educational materials and side effects teaching were provided. If I feel that I can not keep these promises and I am at risk of hurting myself or others,I will call the crisis office and speak with a crisis worker who will assist me during my crisis.     [address] [place] [address] [phone]  ___________________________________________________________________________________________________________________________________

## 2020-01-17 NOTE — BH NOTES
Patient:   FLORES LENTZ            MRN: [id number]            FIN: [id number]              Age:   69 years     Sex:  MALE     :  48   Associated Diagnoses:   None   Author:   LAXMI YEAGER     Chief Complaint   Cardiology consultation for shortness of breath and abnormal EKG.     History of Present Illness   69-year-old male with history of COPD and anxiety who was admitted to hospital with symptoms of shortness of breath and cough.  Patient was diagnosed with COPD has been seen by pulmonary.  We have been asked see the patient secondary to abnormal EKG.  Patient has no known history of CAD, CHF, or arrhythmias.  He denies chest pain or chest pressure.  He states he gets short of breath walking approximately 5 feet.  He has had no orthopnea or PND.  He has had a cough which is been appropriately nonproductive.  He denies fevers or chills.  Patient states that last evening he had an episode when he became dizzy while walking to the bathroom.  Symptoms lasted for 1 to 2 minutes and were associated with diaphoresis.  Symptoms resolved when he sat down.  He denies palpitations or syncope, or lower extremity swelling.  Past medical history significant for COPD, seasonal allergies, and anxiety.  Past surgical history is negative per  Social history is positive for tobacco use.  He works part-time at [employer] in the [place].  Family history is negative for CAD or CHF in his parents or siblings.     Review of Systems   Gen: No fevers or chills,  weight loss,  or weight gain   HEENT: No visual changes, hearing loss, epistaxis, or  sore throat  CV: No palpitations,  CP,  dizziness, or  syncope  Pulm: positive  SOB and cough  GI: No BRBPR , hematemesis,  nausea,  or emesis  : No dysuria or hematuria  Musculoskeletal: No  myalga  Neuro: No seizures, headaches, or weakness  Skin: No rashes or  easy bruising  Psychiatric: positive anxiety        Histories   Past Med History:  Pt refused to have vital signs and blood work done this am Anxiety  Asthma  Blood glucose elevated  COPD - Chronic obstructive pulmonary disease      Family History: No positive family history reported.      Procedure History: No qualifying data available.      Social History       Alcohol  Details: Use: Past.  Substance Abuse  Details: Use: None.  Tobacco  Details: Smoked/Smokeless Tobacco Last 30 Days: No.  Smoking Tobacco Use: Current every day smoker.  Smokeless Tobacco Use Never.  .       Health Status   Allergies: Allergies (ST)   Allergies (1) Active Reaction  amoxicillin None Documented    Current medications: Medications (17) Active  Scheduled: (7)  Albuterol-ipratropium 2.5-0.5 mg/3 mL nebulizer soln  3 mL, Nebulizer, QID  Budesonide-formoterol 160-4.5 mcg oral MDI 6 gm  2 puff, Inhaled, BID  Doxycycline hyclate 100 mg cap  100 mg 1 cap, Oral, BID  Enoxaparin 40 mg/0.4 mL syringe  40 mg 0.4 mL, Subcutaneous, Daily  Famotidine 20 mg tab  20 mg 1 tab, Oral, Q Bedtime  Fluticasone 50 mcg nasal spray 16 gm  100 mcg 2 spray, IntraNasal, BID  MethylPREDNISolone Na succ PF 40 mg/1 mL inj SDV  40 mg 1 mL, Slow IV Push, Q8H  Continuous: (2)  Lactated Ringers 1,000 mL  1,000 mL, IV, 100 mL/hr  Sodium Chloride 0.9% 500 mL  500 mL, IV, 1,000 mL/hr  PRN: (8)  Acetaminophen 325 mg tab  650 mg 2 tab, Oral, Q4H  Acetaminophen 325 mg tab  650 mg 2 tab, Oral, Q4H  Atropine 1 mg/10 mL syringe SDV  1 mg 10 mL, IV Push, As Directed PRN  DiphenhydrAMINE 25 mg cap  25 mg 1 cap, Oral, Q Bedtime  LORazepam 0.5 mg tab  0.5 mg 1 tab, Oral, TID  Melatonin 3 mg tab  3 mg 1 tab, Oral, Q Bedtime  Morphine PF 2 mg/1 mL inj SDV  2 mg 1 mL, IV Push, Q5 Minutes  NitroGLYCERIN 0.4 mg SL tab 25s  0.4 mg 1 tab, SL, As Directed PRN       Physical Examination   VS/Measurements     Vitals between:   16-JAN-2020 08:50:11   TO   17-JAN-2020 08:50:11                   LAST RESULT MINIMUM MAXIMUM  Temperature 36.5 36.3 36.7  Heart Rate 74 74 92  Respiratory Rate 24 14 24  NISBP           132 132 209  NIDBP            79 63 84  NIMBP           117 117 117  SpO2                    94 89 98    General - alert and oriented, no distress  HENT -normocephalic ,  mucosa  moist   Eyes - pupils equal, round, and reactive to light, normal conjunctiva  Neck - JVD not elevated, carotids normal upstroke with no bruit   Respiratory -good air exchange bilaterally with reduced breath sounds at bases  Cardiovascular - regular rate and rhythm, normal S1 and S2, no murmur, no edema  Gastrointestinal  - soft, nontender  Skin - warm, dry, no rash  Neurological  - alert, no focal deficits  Cognition & Speech - speech clear and coherent  Psychiatric - cooperative     Review / Management   Laboratory results:    Labs between:  16-JAN-2020 08:50 to 17-JAN-2020 08:50  CBC:                 WBC  HgB  Hct  Plt  MCV  RDW   17-JAN-2020 5.5  14.2  43.9  242  86.9  14.2   DIFF:                 Seg  Neutroph//ABS  Lymph//ABS  Mono//ABS  EOS/ABS  17-JAN-2020 NOT APPLICABLE  86 // 4.8 5 // (L) 0.3  8 // 0.4 0 // (L) 0.0  BMP:                 Na  Cl  BUN  Glu   17-JAN-2020 137  100  19  (H) 137                              K  CO2  Cr  Ca                              5.0  (H) 34  (L) 0.55  8.9   POC GLU:                 Latest Result  Latest Date  Minimum  Min Date  Maximum  Max Date                             (H) 124  17-JAN-2020 (H) 124  17-JAN-2020 (H) 155  16-JAN-2020                 Medications (17) Active  Scheduled: (7)  Albuterol-ipratropium 2.5-0.5 mg/3 mL nebulizer soln  3 mL, Nebulizer, QID  Budesonide-formoterol 160-4.5 mcg oral MDI 6 gm  2 puff, Inhaled, BID  Doxycycline hyclate 100 mg cap  100 mg 1 cap, Oral, BID  Enoxaparin 40 mg/0.4 mL syringe  40 mg 0.4 mL, Subcutaneous, Daily  Famotidine 20 mg tab  20 mg 1 tab, Oral, Q Bedtime  Fluticasone 50 mcg nasal spray 16 gm  100 mcg 2 spray, IntraNasal, BID  MethylPREDNISolone Na succ PF 40 mg/1 mL inj SDV  40 mg 1 mL, Slow IV Push, Q8H  Continuous: (2)  Lactated Ringers 1,000 mL  1,000 mL, IV, 100  mL/hr  Sodium Chloride 0.9% 500 mL  500 mL, IV, 1,000 mL/hr  PRN: (8)  Acetaminophen 325 mg tab  650 mg 2 tab, Oral, Q4H  Acetaminophen 325 mg tab  650 mg 2 tab, Oral, Q4H  Atropine 1 mg/10 mL syringe SDV  1 mg 10 mL, IV Push, As Directed PRN  DiphenhydrAMINE 25 mg cap  25 mg 1 cap, Oral, Q Bedtime  LORazepam 0.5 mg tab  0.5 mg 1 tab, Oral, TID  Melatonin 3 mg tab  3 mg 1 tab, Oral, Q Bedtime  Morphine PF 2 mg/1 mL inj SDV  2 mg 1 mL, IV Push, Q5 Minutes  NitroGLYCERIN 0.4 mg SL tab 25s  0.4 mg 1 tab, SL, As Directed PRN  .    Radiology results         Impression and Plan   1.  Abnormal EKG  2.  COPD exacerbation  3.  Shortness of breath  4.  Dizziness  5.  Anxiety  Patient admitted to hospital symptoms of shortness of breath and currently being treated for COPD exacerbation.  He has no chest pain or pressure.  He had a brief episode of dizziness last night while walking to the bathroom.  Electrocardiogram from last evening which I personally reviewed shows sinus rhythm with possible old anterior infarct and no significant ST or T wave abnormalities.  Compared with EKG from the emergency room, there is no significant changes.  Laboratory data significant for creatinine 0.7, potassium 4.6, hemoglobin 14, WBC 5.5.  Troponin was negative x2.  Recommend to continue monitor patient on telemetry.  Continue treatment for COPD exacerbation per pulmonary.  We will order an echocardiogram to assess LV function rule out regional wall motion abnormalities.  We will also order CT the chest to rule out PE  and check orthostatics.  Etiology of the patient's dizziness is likely his underlying dyspnea related to his COPD exacerbation.  If he has no further runs on cardiac monitoring and echo shows no significant abnormalities,, I would not recommend any further work-up  Will follow.   D/W nursing staff.

## 2020-04-16 NOTE — BH NOTES
Urinary Tract Infection, Adult  Introduction  A urinary tract infection (UTI) is an infection of any part of the urinary tract. The urinary tract includes the:  · Kidneys.  · Ureters.  · Bladder.  · Urethra.  These organs make, store, and get rid of pee (urine) in the body.  Follow these instructions at home:  · Take over-the-counter and prescription medicines only as told by your doctor.  · If you were prescribed an antibiotic medicine, take it as told by your doctor. Do not stop taking the antibiotic even if you start to feel better.  · Avoid the following drinks:  ¨ Alcohol.  ¨ Caffeine.  ¨ Tea.  ¨ Carbonated drinks.  · Drink enough fluid to keep your pee clear or pale yellow.  · Keep all follow-up visits as told by your doctor. This is important.  · Make sure to:  ¨ Empty your bladder often and completely. Do not to hold pee for long periods of time.  ¨ Empty your bladder before and after sex.  ¨ Wipe from front to back after a bowel movement if you are female. Use each tissue one time when you wipe.  Contact a doctor if:  · You have back pain.  · You have a fever.  · You feel sick to your stomach (nauseous).  · You throw up (vomit).  · Your symptoms do not get better after 3 days.  · Your symptoms go away and then come back.  Get help right away if:  · You have very bad back pain.  · You have very bad lower belly (abdominal) pain.  · You are throwing up and cannot keep down any medicines or water.  This information is not intended to replace advice given to you by your health care provider. Make sure you discuss any questions you have with your health care provider.  Document Released: 06/05/2009 Document Revised: 05/25/2017 Document Reviewed: 11/07/2016  © 2017 Elsevier     Pt did not attend coping skills group.

## 2020-09-18 ENCOUNTER — HOSPITAL ENCOUNTER (INPATIENT)
Age: 50
LOS: 46 days | Discharge: HOME | DRG: 885 | End: 2020-11-03
Attending: EMERGENCY MEDICINE | Admitting: PSYCHIATRY & NEUROLOGY
Payer: MEDICARE

## 2020-09-18 DIAGNOSIS — E66.3 OVERWEIGHT: ICD-10-CM

## 2020-09-18 DIAGNOSIS — F20.0 PARANOID SCHIZOPHRENIA (HCC): ICD-10-CM

## 2020-09-18 DIAGNOSIS — L02.91 ABSCESS: ICD-10-CM

## 2020-09-18 DIAGNOSIS — F20.9 SCHIZOPHRENIA, UNSPECIFIED TYPE (HCC): Primary | ICD-10-CM

## 2020-09-18 DIAGNOSIS — F25.0 SCHIZOAFFECTIVE DISORDER, BIPOLAR TYPE (HCC): ICD-10-CM

## 2020-09-18 PROBLEM — F25.9 SCHIZOAFFECTIVE DISORDER (HCC): Status: ACTIVE | Noted: 2020-09-18

## 2020-09-18 LAB
ALBUMIN SERPL-MCNC: 3.9 G/DL (ref 3.5–5)
ALBUMIN/GLOB SERPL: 0.9 {RATIO} (ref 1.1–2.2)
ALP SERPL-CCNC: 67 U/L (ref 45–117)
ALT SERPL-CCNC: 48 U/L (ref 12–78)
AMPHET UR QL SCN: NEGATIVE
ANION GAP SERPL CALC-SCNC: 12 MMOL/L (ref 5–15)
APPEARANCE UR: CLEAR
AST SERPL-CCNC: 30 U/L (ref 15–37)
BACTERIA URNS QL MICRO: NEGATIVE /HPF
BARBITURATES UR QL SCN: NEGATIVE
BASOPHILS # BLD: 0 K/UL (ref 0–0.1)
BASOPHILS NFR BLD: 1 % (ref 0–1)
BENZODIAZ UR QL: NEGATIVE
BILIRUB SERPL-MCNC: 0.4 MG/DL (ref 0.2–1)
BILIRUB UR QL CFM: NEGATIVE
BUN SERPL-MCNC: 14 MG/DL (ref 6–20)
BUN/CREAT SERPL: 13 (ref 12–20)
CALCIUM SERPL-MCNC: 9.7 MG/DL (ref 8.5–10.1)
CANNABINOIDS UR QL SCN: NEGATIVE
CHLORIDE SERPL-SCNC: 102 MMOL/L (ref 97–108)
CO2 SERPL-SCNC: 26 MMOL/L (ref 21–32)
COCAINE UR QL SCN: NEGATIVE
COLOR UR: ABNORMAL
COVID-19 RAPID TEST, COVR: NOT DETECTED
CREAT SERPL-MCNC: 1.07 MG/DL (ref 0.7–1.3)
DIFFERENTIAL METHOD BLD: ABNORMAL
DRUG SCRN COMMENT,DRGCM: NORMAL
EOSINOPHIL # BLD: 0.2 K/UL (ref 0–0.4)
EOSINOPHIL NFR BLD: 4 % (ref 0–7)
EPITH CASTS URNS QL MICRO: ABNORMAL /LPF
ERYTHROCYTE [DISTWIDTH] IN BLOOD BY AUTOMATED COUNT: 13.4 % (ref 11.5–14.5)
ETHANOL SERPL-MCNC: <10 MG/DL
GLOBULIN SER CALC-MCNC: 4.2 G/DL (ref 2–4)
GLUCOSE SERPL-MCNC: 91 MG/DL (ref 65–100)
GLUCOSE UR STRIP.AUTO-MCNC: NEGATIVE MG/DL
HCT VFR BLD AUTO: 42 % (ref 36.6–50.3)
HEALTH STATUS, XMCV2T: NORMAL
HGB BLD-MCNC: 13.9 G/DL (ref 12.1–17)
HGB UR QL STRIP: NEGATIVE
IMM GRANULOCYTES # BLD AUTO: 0.1 K/UL (ref 0–0.04)
IMM GRANULOCYTES NFR BLD AUTO: 1 % (ref 0–0.5)
KETONES UR QL STRIP.AUTO: ABNORMAL MG/DL
LEUKOCYTE ESTERASE UR QL STRIP.AUTO: NEGATIVE
LYMPHOCYTES # BLD: 1.8 K/UL (ref 0.8–3.5)
LYMPHOCYTES NFR BLD: 37 % (ref 12–49)
MCH RBC QN AUTO: 29.7 PG (ref 26–34)
MCHC RBC AUTO-ENTMCNC: 33.1 G/DL (ref 30–36.5)
MCV RBC AUTO: 89.7 FL (ref 80–99)
METHADONE UR QL: NEGATIVE
MONOCYTES # BLD: 0.6 K/UL (ref 0–1)
MONOCYTES NFR BLD: 12 % (ref 5–13)
NEUTS SEG # BLD: 2.2 K/UL (ref 1.8–8)
NEUTS SEG NFR BLD: 45 % (ref 32–75)
NITRITE UR QL STRIP.AUTO: NEGATIVE
NRBC # BLD: 0 K/UL (ref 0–0.01)
NRBC BLD-RTO: 0 PER 100 WBC
OPIATES UR QL: NEGATIVE
PCP UR QL: NEGATIVE
PH UR STRIP: 6 [PH] (ref 5–8)
PLATELET # BLD AUTO: 179 K/UL (ref 150–400)
PMV BLD AUTO: 10.3 FL (ref 8.9–12.9)
POTASSIUM SERPL-SCNC: 3.7 MMOL/L (ref 3.5–5.1)
PROT SERPL-MCNC: 8.1 G/DL (ref 6.4–8.2)
PROT UR STRIP-MCNC: NEGATIVE MG/DL
RBC # BLD AUTO: 4.68 M/UL (ref 4.1–5.7)
RBC #/AREA URNS HPF: ABNORMAL /HPF (ref 0–5)
SODIUM SERPL-SCNC: 140 MMOL/L (ref 136–145)
SOURCE, COVRS: NORMAL
SP GR UR REFRACTOMETRY: 1.02 (ref 1–1.03)
SPECIMEN SOURCE, FCOV2M: NORMAL
SPECIMEN TYPE, XMCV1T: NORMAL
UA: UC IF INDICATED,UAUC: ABNORMAL
UROBILINOGEN UR QL STRIP.AUTO: 2 EU/DL (ref 0.2–1)
VALPROATE SERPL-MCNC: 97 UG/ML (ref 50–100)
WBC # BLD AUTO: 4.9 K/UL (ref 4.1–11.1)
WBC URNS QL MICRO: ABNORMAL /HPF (ref 0–4)

## 2020-09-18 PROCEDURE — 80164 ASSAY DIPROPYLACETIC ACD TOT: CPT

## 2020-09-18 PROCEDURE — 85025 COMPLETE CBC W/AUTO DIFF WBC: CPT

## 2020-09-18 PROCEDURE — 65220000003 HC RM SEMIPRIVATE PSYCH

## 2020-09-18 PROCEDURE — 87635 SARS-COV-2 COVID-19 AMP PRB: CPT

## 2020-09-18 PROCEDURE — 36415 COLL VENOUS BLD VENIPUNCTURE: CPT

## 2020-09-18 PROCEDURE — 99284 EMERGENCY DEPT VISIT MOD MDM: CPT

## 2020-09-18 PROCEDURE — 80053 COMPREHEN METABOLIC PANEL: CPT

## 2020-09-18 PROCEDURE — 81001 URINALYSIS AUTO W/SCOPE: CPT

## 2020-09-18 PROCEDURE — 80307 DRUG TEST PRSMV CHEM ANLYZR: CPT

## 2020-09-18 RX ORDER — TRAZODONE HYDROCHLORIDE 50 MG/1
50 TABLET ORAL
Status: DISCONTINUED | OUTPATIENT
Start: 2020-09-18 | End: 2020-11-03 | Stop reason: HOSPADM

## 2020-09-18 RX ORDER — BENZTROPINE MESYLATE 1 MG/1
1 TABLET ORAL
Status: DISCONTINUED | OUTPATIENT
Start: 2020-09-18 | End: 2020-11-03 | Stop reason: HOSPADM

## 2020-09-18 RX ORDER — HYDROXYZINE 25 MG/1
50 TABLET, FILM COATED ORAL
Status: DISCONTINUED | OUTPATIENT
Start: 2020-09-18 | End: 2020-11-03 | Stop reason: HOSPADM

## 2020-09-18 RX ORDER — DIPHENHYDRAMINE HYDROCHLORIDE 50 MG/ML
50 INJECTION, SOLUTION INTRAMUSCULAR; INTRAVENOUS
Status: DISCONTINUED | OUTPATIENT
Start: 2020-09-18 | End: 2020-11-03 | Stop reason: HOSPADM

## 2020-09-18 RX ORDER — OLANZAPINE 5 MG/1
5 TABLET ORAL
Status: DISCONTINUED | OUTPATIENT
Start: 2020-09-18 | End: 2020-11-03 | Stop reason: HOSPADM

## 2020-09-18 RX ORDER — LORAZEPAM 2 MG/ML
1 INJECTION INTRAMUSCULAR
Status: DISCONTINUED | OUTPATIENT
Start: 2020-09-18 | End: 2020-10-01

## 2020-09-18 RX ORDER — ACETAMINOPHEN 325 MG/1
650 TABLET ORAL
Status: DISCONTINUED | OUTPATIENT
Start: 2020-09-18 | End: 2020-11-03 | Stop reason: HOSPADM

## 2020-09-18 RX ORDER — ADHESIVE BANDAGE
30 BANDAGE TOPICAL DAILY PRN
Status: DISCONTINUED | OUTPATIENT
Start: 2020-09-18 | End: 2020-11-03 | Stop reason: HOSPADM

## 2020-09-18 RX ORDER — HALOPERIDOL 5 MG/ML
5 INJECTION INTRAMUSCULAR
Status: DISCONTINUED | OUTPATIENT
Start: 2020-09-18 | End: 2020-11-03 | Stop reason: HOSPADM

## 2020-09-18 NOTE — ED NOTES
TRANSFER - OUT REPORT:    Verbal report given to Sheridan Wright RN on Daya Min  being transferred to [address] [place] [address] 313/01 for routine progression of care       Report consisted of patients Situation, Background, Assessment and   Recommendations(SBAR). Information from the following report(s) SBAR, ED Summary, Procedure Summary, MAR and Recent Results was reviewed with the receiving nurse. Lines:       Opportunity for questions and clarification was provided.       Patient transported with:  [address]

## 2020-09-18 NOTE — ED TRIAGE NOTES
Patient presents to ED via EMS with c/o Mental Health Problem. [place] police states that patient's sister petitioned for an ECO due to patient not being able to care for self. Police states that patient stay in a shed and does not want to come out. Hx of schizophrenia. Was discharged from [facility] in June. Patient non verbal and will look up for a moment then put his head down. Unable to assess for SI/HI/Hallucinations. Patient appears in no acute distress. Patient is alert and oriented x 4 and in no acute distress at this time. Respirations are at a regular rate, depth, and pattern. Patient updated on plan of care and has no questions or concerns at this time. Call bell within reach. Will continue to monitor. Please reference nursing assessment. Emergency Department Nursing Plan of Care       The Nursing Plan of Care is developed from the Nursing assessment and Emergency Department Attending provider initial evaluation. The plan of care may be reviewed in the ED Provider note.     The Plan of Care was developed with the following considerations:   Patient / Family readiness to learn indicated by:verbalized understanding and successful return demonstration  Persons(s) to be included in education: patient  Barriers to Learning/Limitations:Cognitive/physical impairment:mental health    Signed     Shellie Mars RN    9/18/2020   4:31 PM

## 2020-09-18 NOTE — ED PROVIDER NOTES
EMERGENCY DEPARTMENT HISTORY AND PHYSICAL EXAM      Date: 9/18/2020  Patient Name: Molly Toscano    History of Presenting Illness     Chief Complaint   Patient presents with    Mental Health Problem       History Provided By: Keegan Escalera Enforcement    HPI: Molly Toscano, 45 y.o. male with PMHx significant for schizophrenia who presents in police custody for mental health problem. Sister went to the [facility] and got an [address] issued as the patient was not taking care of himself, had not bathed, and was refusing to perform simple tasks. She reported that he had been minimally verbal for several days as well. Patient refuses to speak to me, limiting additional history review of systems. Per the paperwork the sister sent with police patient was hospitalized at [facility] from April until June 20      PCP: Unknown, Provider      Current Outpatient Medications   Medication Sig Dispense Refill    haloperidol decanoate (HALDOL DECANOATE) 100 mg/mL injection 2 mL by IntraMUSCular route every twenty-eight (28) days. Indications: SCHIZOPHRENIA 2 mL 0     Past History     Past Medical History:  Past Medical History:   Diagnosis Date    Psychiatric disorder     [facility]     Withdrawal syndrome ([facility]      Past Surgical History:  History reviewed. No pertinent surgical history. Family History:  History reviewed. No pertinent family history. Social History:  Social History     Tobacco Use    Smoking status: Never Smoker    Smokeless tobacco: Never Used   Substance Use Topics    Alcohol use: No    Drug use: No     Allergies: Allergies   Allergen Reactions    Fluphenazine Unknown (comments)     Pt is unable to communicate properly.  Penicillins Unknown (comments)     Pt is unable to communicate properly. Review of Systems   Review of Systems   Unable to perform ROS: Psychiatric disorder     Physical Exam   Physical Exam  Vitals signs and nursing note reviewed.    Constitutional:       General: He is not in acute distress. Appearance: He is well-developed. HENT:      Head: Normocephalic and atraumatic. Eyes:      Conjunctiva/sclera: Conjunctivae normal.      Pupils: Pupils are equal, round, and reactive to light. Neck:      Musculoskeletal: Normal range of motion. Cardiovascular:      Rate and Rhythm: Normal rate and regular rhythm. Pulmonary:      Effort: Pulmonary effort is normal. No respiratory distress. Breath sounds: Normal breath sounds. No stridor. Abdominal:      General: There is no distension. Palpations: Abdomen is soft. Tenderness: There is no abdominal tenderness. Musculoskeletal: Normal range of motion. Skin:     General: Skin is warm and dry. Neurological:      Mental Status: He is alert. Diagnostic Study Results   Labs -     Recent Results (from the past 12 hour(s))   CBC WITH AUTOMATED DIFF    Collection Time: 09/18/20  4:41 PM   Result Value Ref Range    WBC 4.9 4.1 - 11.1 K/uL    RBC 4.68 4.10 - 5.70 M/uL    HGB 13.9 12.1 - 17.0 g/dL    HCT 42.0 36.6 - 50.3 %    MCV 89.7 80.0 - 99.0 FL    MCH 29.7 26.0 - 34.0 PG    MCHC 33.1 30.0 - 36.5 g/dL    RDW 13.4 11.5 - 14.5 %    PLATELET 120 091 - 752 K/uL    MPV 10.3 8.9 - 12.9 FL    NRBC 0.0 0  WBC    ABSOLUTE NRBC 0.00 0.00 - 0.01 K/uL    NEUTROPHILS 45 32 - 75 %    LYMPHOCYTES 37 12 - 49 %    MONOCYTES 12 5 - 13 %    EOSINOPHILS 4 0 - 7 %    BASOPHILS 1 0 - 1 %    IMMATURE GRANULOCYTES 1 (H) 0.0 - 0.5 %    ABS. NEUTROPHILS 2.2 1.8 - 8.0 K/UL    ABS. LYMPHOCYTES 1.8 0.8 - 3.5 K/UL    ABS. MONOCYTES 0.6 0.0 - 1.0 K/UL    ABS. EOSINOPHILS 0.2 0.0 - 0.4 K/UL    ABS. BASOPHILS 0.0 0.0 - 0.1 K/UL    ABS. IMM.  GRANS. 0.1 (H) 0.00 - 0.04 K/UL    DF AUTOMATED     METABOLIC PANEL, COMPREHENSIVE    Collection Time: 09/18/20  4:41 PM   Result Value Ref Range    Sodium 140 136 - 145 mmol/L    Potassium 3.7 3.5 - 5.1 mmol/L    Chloride 102 97 - 108 mmol/L    CO2 26 21 - 32 mmol/L    Anion gap 12 5 - 15 mmol/L Glucose 91 65 - 100 mg/dL    BUN 14 6 - 20 MG/DL    Creatinine 1.07 0.70 - 1.30 MG/DL    BUN/Creatinine ratio 13 12 - 20      GFR est AA >60 >60 ml/min/1.73m2    GFR est non-AA >60 >60 ml/min/1.73m2    Calcium 9.7 8.5 - 10.1 MG/DL    Bilirubin, total 0.4 0.2 - 1.0 MG/DL    ALT (SGPT) 48 12 - 78 U/L    AST (SGOT) 30 15 - 37 U/L    Alk. phosphatase 67 45 - 117 U/L    Protein, total 8.1 6.4 - 8.2 g/dL    Albumin 3.9 3.5 - 5.0 g/dL    Globulin 4.2 (H) 2.0 - 4.0 g/dL    A-G Ratio 0.9 (L) 1.1 - 2.2     ETHYL ALCOHOL    Collection Time: 09/18/20  4:42 PM   Result Value Ref Range    ALCOHOL(ETHYL),SERUM <10 <10 MG/DL   VALPROIC ACID    Collection Time: 09/18/20  4:42 PM   Result Value Ref Range    Valproic acid 97 50 - 100 ug/ml   SARS-COV-2    Collection Time: 09/18/20  4:42 PM   Result Value Ref Range    Specimen source Nasopharyngeal      SARS-CoV-2 PENDING     SARS-CoV-2 PENDING     Specimen source Nasopharyngeal      COVID-19 rapid test PENDING     Specimen type NP Swab      Health status PENDING     COVID-19 PENDING        Radiologic Studies -   No orders to display     No results found. Medical Decision Making   I am the first provider for this patient. I reviewed the vital signs, available nursing notes, past medical history, past surgical history, family history and social history. Vital Signs-Reviewed the patient's vital signs. Patient Vitals for the past 12 hrs:   Temp Pulse Resp BP SpO2   09/18/20 1605 98.3 °F (36.8 °C) 95 20 (!) 146/117 100 %       Pulse Oximetry Analysis - 100% on ra    Records Reviewed: Nursing Notes and Old Medical Records    Provider Notes (Medical Decision Making):   Patient presents in police custody for mental health problem. On my evaluation he is not speaking or following any commands. Will send basic lab work, Depakote level, alcohol level, urinalysis, urine drug screen, COVID screening. Crisis to see the patient. ED Course:   Initial assessment performed.  The patients presenting problems have been discussed, and they are in agreement with the care plan formulated and outlined with them. I have encouraged them to ask questions as they arise throughout their visit. ED Course as of Sep 19 0703   Fri Sep 18, 2020   2015 Patient's COVID test is negative. He has been accepted by inpatient psychiatry here at [facility].    [MS]      ED Course User Index  [MS] Kevyn Hernandez MD       Procedures:  Procedures    Critical Care:  none    Disposition:  Admit to psych        Diagnosis     Clinical Impression:   1. Schizophrenia, unspecified type ([facility]        This note will not be viewable in [address] Please note that this dictation was completed with [employer], the computer voice recognition software. Quite often unanticipated grammatical, syntax, homophones, and other interpretive errors are inadvertently transcribed by the computer software. Please disregard these errors.   Please excuse any errors that have escaped final proofreading

## 2020-09-18 NOTE — ED NOTES
Requested for patient to get in to green gown ans patient refusing to answer questions and withdrawn and not interactive.

## 2020-09-18 NOTE — ED NOTES
[id number] [facility],[address], [facility] [place], called to report patient's [id number] is negative.

## 2020-09-18 NOTE — ED NOTES
Bedside and Verbal shift change report given to PRANEETH Hudson (oncoming nurse) by Shelby Reyes (offgoing nurse). Report included the following information SBAR, Kardex, ED Summary, [facility] and Recent Results.

## 2020-09-19 ENCOUNTER — HOSPITAL ENCOUNTER (EMERGENCY)
Age: 50
Discharge: STILL ADMITTED OR ADMINISTRATIVE | End: 2020-09-19

## 2020-09-19 PROCEDURE — 74011250637 HC RX REV CODE- 250/637: Performed by: NURSE PRACTITIONER

## 2020-09-19 PROCEDURE — 65220000003 HC RM SEMIPRIVATE PSYCH

## 2020-09-19 RX ORDER — HALOPERIDOL 5 MG/1
5 TABLET ORAL EVERY 12 HOURS
Status: DISCONTINUED | OUTPATIENT
Start: 2020-09-19 | End: 2020-09-21

## 2020-09-19 RX ADMIN — HALOPERIDOL 5 MG: 5 TABLET ORAL at 21:59

## 2020-09-19 NOTE — PROGRESS NOTES
Problem: Patient Education: Go to Patient Education Activity  Goal: Patient/Family Education  Outcome: Progressing Towards Goal     Problem: Altered Thought Process (Adult/Pediatric)  Goal: *STG: Remains safe in hospital  Outcome: Progressing Towards Goal

## 2020-09-19 NOTE — BH NOTES
TRANSFER - IN REPORT:    Verbal report received from Kevin Hope RN on Rudy Acosta  being received from facility ED for routine progression of care      Report consisted of patients Situation, Background, Assessment and   Recommendations(SBAR). Information from the following report(s) SBAR was reviewed with the receiving nurse. Opportunity for questions and clarification was provided.

## 2020-09-19 NOTE — PROGRESS NOTES
Problem: Falls - Risk of  Goal: *Absence of Falls  Description: Document Prabha Rebollar Fall Risk and appropriate interventions in the flowsheet. Outcome: Progressing Towards Goal  Note: Fall Risk Interventions:gripper socks, Q 15 min checks     Elimination Interventions:  Toileting schedule/hourly rounds  Problem: Altered Thought Process (Adult/Pediatric)  Goal: *STG: Remains safe in hospital  Outcome: Progressing Towards Goal  Goal: *STG: Absence of lethality  Outcome: Progressing Towards Goal

## 2020-09-19 NOTE — PROGRESS NOTES
Patient alert, cooperative with vital signs except for temp check (refused to open mouth for oral temp, refused to move arm for axillary temp. Patient does not respond to questions, tightly closes eyes when nursing staff enters room. Patient's meals brought to room and patient ate 100% of meal trays. Patient ambulatory in room, withdrawn.

## 2020-09-19 NOTE — ED NOTES
Marcel Mosquera asked ED to wait 20 minutes prior to sending patient as room needs cleaning. Charge nurse notified.

## 2020-09-20 LAB
COVID-19, XGCOVT: NOT DETECTED
HEALTH STATUS, XMCV2T: NORMAL
SPECIMEN TYPE, XMCV1T: NORMAL

## 2020-09-20 PROCEDURE — 74011250637 HC RX REV CODE- 250/637: Performed by: NURSE PRACTITIONER

## 2020-09-20 PROCEDURE — 65220000003 HC RM SEMIPRIVATE PSYCH

## 2020-09-20 RX ADMIN — HALOPERIDOL 5 MG: 5 TABLET ORAL at 21:26

## 2020-09-20 RX ADMIN — HALOPERIDOL 5 MG: 5 TABLET ORAL at 09:06

## 2020-09-20 NOTE — PROGRESS NOTES
Problem: Altered Thought Process (Adult/Pediatric)  Goal: *STG: Participates in treatment plan  Outcome: Not Progressing Towards Goal  Goal: *STG: Remains safe in hospital  Outcome: Progressing Towards Goal

## 2020-09-20 NOTE — H&P
Psychiatry History and Physical    Subjective:     Date of Evaluation:  9/19/2020    History of Presenting Problem: Dwayne Sebastian \"Corey\" Tracy Casanova is a 45year old male with a history of schizophrenia admitted to the [facility] for increased psychosis. He has not been bathing or otherwise caring for himself at home. He was reportedly hospitalized at [facility] from April - June 2020. He does not verbally answer any questions. He is well known to this writer from previous admissions in the [facility]. He has responded well to ECT in the past.    Patient Active Problem List    Diagnosis Date Noted    Schizoaffective disorder ([facility] 09/18/2020    Schizophrenia ([facility] 11/27/2017    [facility] 03/15/2017     Past Medical History:   Diagnosis Date    Psychiatric disorder     [facility]otic disorder [facility]     [facility]      History reviewed. No pertinent surgical history. History reviewed. No pertinent family history. Social History     Tobacco Use    Smoking status: Never Smoker    Smokeless tobacco: Never Used   Substance Use Topics    Alcohol use: No     Prior to Admission medications    Medication Sig Start Date End Date Taking? Authorizing Provider   haloperidol decanoate (HALDOL DECANOATE) 100 mg/mL injection 2 mL by IntraMUSCular route every twenty-eight (28) days. Indications: SCHIZOPHRENIA 4/17/17   Jovan Moreno MD     Allergies   Allergen Reactions    Fluphenazine Unknown (comments)     Pt is unable to communicate properly.  Penicillins Unknown (comments)     Pt is unable to communicate properly. Objective:     No data found.     Mental Status exam: WNL except for    Sensorium  Alert and Oriented x 1   Orientation person   Relations passive   Eye Contact poor   Appearance:  age appropriate and disheveled   Motor Behavior:  within normal limits   Speech:  mute   Vocabulary no vocalization   Thought Process: disorganized   Thought Content internally preoccupied    Suicidal ideations none   Homicidal ideations none   Mood:  depressed   Affect:  blunted   Memory recent  adequate   Memory remote:  adequate   Concentration:  adequate   Abstraction:  concrete   Insight:  limited   Reliability fair   Judgment:  limited         Impression:      Active Problems:    [facility] (9/18/2020)          Plan:     Admit to [facility] further information  Medication management  Disposition planning  Estimated length of stay 8-10 days

## 2020-09-20 NOTE — BH NOTES
Assumed care of the patient. Patient was isolative to his room. Patient appeared withdrawn and apatheic. He was mostly uncooperative with the assessments. Through out the shift patient spoke only one time when he was asked if he was hungry to which he said yes and asked about snack time. Patient  refused vitals assessments in the beginning but with some redirection and encouragement he agreed later. DBP was outside the defined limit. Patient was restless while monitoring , he refused reassessment. No distress noted. Patient was medicine compliant with some encouragement. Will continue to monitor. Patient slept for about 8 hours.

## 2020-09-20 NOTE — PROGRESS NOTES
Problem: Falls - Risk of  Goal: *Absence of Falls  Description: Document Sparkle Baker Fall Risk and appropriate interventions in the flowsheet. Outcome: Progressing Towards Goal  Note: Fall Risk Interventions: gripper socks, Q 15 min checks     Mentation Interventions: Room close to nurse's station, Toileting rounds, Reorient patient  Elimination Interventions:  Toileting schedule/hourly rounds    Problem: Altered Thought Process (Adult/Pediatric)  Goal: *STG: Remains safe in hospital  Outcome: Progressing Towards Goal  Goal: *STG: Complies with medication therapy  Outcome: Progressing Towards Goal  Goal: *STG: Absence of lethality  Outcome: Progressing Towards Goal

## 2020-09-20 NOTE — PROGRESS NOTES
Patient alert, cooperative with vital signs, compliant with medication. Patient responds to questions with one or two word answers, nods head. This is an improvement from yesterday during my day shift. Patient's meals brought to room and patient ate 50 - 75% of each meal tray. Patient showered and bed cleaned/linens changed and new paper scrubs and gripper socks provided. Patient required direction and encouragement to shower. Patient periodically incontinent of urine. Patient ambulatory in room with steady gait.

## 2020-09-21 PROCEDURE — 74011250637 HC RX REV CODE- 250/637: Performed by: NURSE PRACTITIONER

## 2020-09-21 PROCEDURE — 65220000003 HC RM SEMIPRIVATE PSYCH

## 2020-09-21 PROCEDURE — 74011250637 HC RX REV CODE- 250/637: Performed by: PSYCHIATRY & NEUROLOGY

## 2020-09-21 PROCEDURE — 99233 SBSQ HOSP IP/OBS HIGH 50: CPT | Performed by: PSYCHIATRY & NEUROLOGY

## 2020-09-21 RX ORDER — OLANZAPINE 20 MG/1
20 TABLET, ORALLY DISINTEGRATING ORAL
COMMUNITY
End: 2020-11-03

## 2020-09-21 RX ORDER — FLUOXETINE HYDROCHLORIDE 20 MG/1
20 CAPSULE ORAL DAILY
COMMUNITY
End: 2020-11-03

## 2020-09-21 RX ORDER — VALPROIC ACID 250 MG/5ML
1000 SOLUTION ORAL 2 TIMES DAILY
Status: DISCONTINUED | OUTPATIENT
Start: 2020-09-21 | End: 2020-10-14

## 2020-09-21 RX ORDER — CLONAZEPAM 0.5 MG/1
0.5 TABLET ORAL 3 TIMES DAILY
Status: DISCONTINUED | OUTPATIENT
Start: 2020-09-21 | End: 2020-09-22

## 2020-09-21 RX ORDER — OLANZAPINE 20 MG/1
20 TABLET ORAL
Status: ON HOLD | COMMUNITY
End: 2020-09-21

## 2020-09-21 RX ORDER — LORAZEPAM 1 MG/1
1 TABLET ORAL 2 TIMES DAILY
COMMUNITY
End: 2020-11-03

## 2020-09-21 RX ORDER — VALPROIC ACID 250 MG/5ML
1000 SOLUTION ORAL 2 TIMES DAILY
COMMUNITY
End: 2020-11-03

## 2020-09-21 RX ORDER — BENZTROPINE MESYLATE 0.5 MG/1
0.5 TABLET ORAL 2 TIMES DAILY
COMMUNITY
End: 2020-11-03

## 2020-09-21 RX ORDER — CLOZAPINE 25 MG/1
25 TABLET ORAL
Status: DISCONTINUED | OUTPATIENT
Start: 2020-09-21 | End: 2020-09-22

## 2020-09-21 RX ADMIN — CLOZAPINE 25 MG: 25 TABLET ORAL at 21:15

## 2020-09-21 RX ADMIN — CLONAZEPAM 0.5 MG: 0.5 TABLET ORAL at 16:41

## 2020-09-21 RX ADMIN — CLONAZEPAM 0.5 MG: 0.5 TABLET ORAL at 21:16

## 2020-09-21 RX ADMIN — HALOPERIDOL 5 MG: 5 TABLET ORAL at 08:51

## 2020-09-21 RX ADMIN — VALPROIC ACID 1000 MG: 250 SOLUTION ORAL at 16:41

## 2020-09-21 NOTE — BH NOTES
It appears as though patient experiences episodes of urinary incontinence sometimes. It is hard to tell as patient does not respond when asked to but there is a strong smell of urine in his room most of the time. Patient's room was cleaned this morning and he was provided some incontinence briefs and also instructed  to use them. Patient was non acceptance.

## 2020-09-21 NOTE — BH NOTES
PSYCHOSOCIAL ASSESSMENT  :Patient identifying info: Zhou Smith is a 45 y.o., male admitted 2020  3:55 PM     Presenting problem and precipitating factors: Admitted under a TDO to [address] [place] [address]. Pt isolating to bed, selectively mute. Pt is disorganized in thought process, delusional themes and experiencing A/VH. Poor hygiene, poor insight, psychotic and sexually inappropriate. Collateral - Sister/POA Sima Hager [phone] reports pt has not missed any doses on his medications but has been religiously preoccupied with A/VH of saints. Pt was last admitted to [facility] - April - 2020. His mother  in 2020 and pt began day program which was closed shortly after due to pandemic. Current [facility] provider would like to restart Clozaril and Abilify RODRIGUEZ due to positive history with this regimen. Sister requesting MRI/CT due to decrease in functioning with daily tasks - inability to follow 1 step directions - push in chair, clean a bowl, put on a shirt. Mental status assessment: Pt was selectively mute during this treatment team meeting/assessment. Pt appeared unkept, laying in bed with eyes closed. Current psychiatric providers and contact info: Pt is followed by [facility] and  is Kee[facility] Werner - psychiatrist Dr. Laura Harris. Pt is unable to attend his [place] [address] due to COVID-19. Previous psychiatric services/providers and response to treatment: Client is known to [facility] and [facility] for medication noncompliant behaviors. Pt was hospitalized at [address] [place] [address] in  and . Pt has numerous hospitalizations at [facility]. Family history of mental illness: Pt was adopted as an infant. DX with schizophrenia at age 15. Hx of alcohol and acid use since age 14. Pt has been [facility] out of many [facility] and some in [address] [place] [address]. Substance abuse history:  UDS negative; BAL=0.    Social History     Tobacco Use    Smoking status: Never Smoker    Smokeless tobacco: Never Used   Substance Use Topics    Alcohol use: No       History of biomedical complications associated with substance abuse : unknown but has struggled with substance abuse since age 14. Patient's current acceptance of treatment or motivation for change: poor at this time     Family constellation: Pt is single and lives with sister. Is significant other involved? N/A     Describe support system: Pt's sister/POA is supportive. Describe living arrangements and home environment: Pt lives with his sister. Health issues:   Hospital Problems  Date Reviewed: 3/15/2017          Codes Class Noted POA    Schiz[facility]ffective disorder ([facility] ICD-10-CM: F25.9  ICD-9-CM: 295.70  2020 Unknown            Trauma history: Unknown. Legal issues: None known at this time. History of  service: None. Financial status: [facility]     Holiness/cultural factors: Unknown at this time. Education/work history: Unknown at this time. Have you been licensed as a stephenie care professional (current or ): No.   Leisure and recreation preferences: Unknown at this time. Describe coping skills:  Limited.        Denys Argueta  2020

## 2020-09-21 NOTE — BH NOTES
PSYCHIATRIC PROGRESS NOTE         Patient Name  April Mackenzie   Date of Birth 1970   [facility] [id number]   Medical Record Number  [id number]      Age  45 y.o. PCP Unknown, Provider   Admit date:  9/18/2020    Room Number  487/66  @ [facility]   Date of Service  9/21/2020         E & M PROGRESS NOTE:         HISTORY       CC:  \"psychosis\"  HISTORY OF PRESENT ILLNESS/INTERVAL HISTORY:  (reviewed/updated 9/21/2020). per initial evaluation: Gray Holley \"Corey\" Ashok Erazo is a 45year old male with a history of schizophrenia admitted to the [facility] for increased psychosis. He has not been bathing or otherwise caring for himself at home. He was reportedly hospitalized at [facility] from April - June 2020. He does not verbally answer any questions. He is well known to this writer from previous admissions in the [place] area. He has responded well to ECT in the past.    09/21 - overnight, patient incontinent of urine, placed into diaper. Patient appeared catatonic at times, but was speaking, non-spontaneously, to the team this morning. Per nursing, the patient has significantly poor self care, grossly internally preoccucpied and with no ability to care for himself. SW reports that sister is patient's caretaker and that he has been decompensating from an already poor baseline recently, requiring prompting to eat meals and unable to perform basic ADLs. Family states he improved on Clozaril and they are requesting the team restart this medication. Of note, patient got [place] injection two weeks ago, VPA level suggests compliance with medication. SIDE EFFECTS: (reviewed/updated 9/21/2020)  None reported or admitted to. No noted toxicity with use of Depakote   ALLERGIES:(reviewed/updated 9/21/2020)  Allergies   Allergen Reactions    Fluphenazine Unknown (comments)     Pt is unable to communicate properly.  Penicillins Unknown (comments)     Pt is unable to communicate properly.       MEDICATIONS PRIOR TO ADMISSION:(reviewed/updated 9/21/2020)  Medications Prior to Admission   Medication Sig    haloperidol decanoate (HALDOL DECANOATE) 100 mg/mL injection 2 mL by IntraMUSCular route every twenty-eight (28) days. Indications: SCHIZOPHRENIA      PAST MEDICAL HISTORY: Past medical history from the initial psychiatric evaluation has been reviewed (reviewed/updated 9/21/2020) with no additional updates (I asked patient and no additional past medical history provided). Past Medical History:   Diagnosis Date    Psychiatric disorder     Psychotic disorder ([facility]     Withdrawal syndrome ([facility]    History reviewed. No pertinent surgical history. SOCIAL HISTORY: Social history from the initial psychiatric evaluation has been reviewed (reviewed/updated 9/21/2020) with no additional updates (I asked patient and no additional social history provided).    Social History     Socioeconomic History    Marital status: SINGLE     Spouse name: Not on file    Number of children: Not on file    Years of education: Not on file    Highest education level: Not on file   Occupational History    Not on file   Social Needs    Financial resource strain: Not on file    Food insecurity     Worry: Not on file     Inability: Not on file    Transportation needs     Medical: Not on file     Non-medical: Not on file   Tobacco Use    Smoking status: Never Smoker    Smokeless tobacco: Never Used   Substance and Sexual Activity    Alcohol use: No    Drug use: No    Sexual activity: Not on file   Lifestyle    Physical activity     Days per week: Not on file     Minutes per session: Not on file    Stress: Not on file   Relationships    Social connections     Talks on phone: Not on file     Gets together: Not on file     Attends Ep[employer] service: Not on file     Active member of club or organization: Not on file     Attends meetings of clubs or organizations: Not on file     Relationship status: Not on file    Intimate partner violence     Fear of current or ex partner: Not on file     Emotionally abused: Not on file     Physically abused: Not on file     Forced sexual activity: Not on file   Other Topics Concern    Not on file   Social History Narrative    Pt is a HS grad and is unemployed. He lives with his sister. On disability    No pending legal charge reported      FAMILY HISTORY: Family history from the initial psychiatric evaluation has been reviewed (reviewed/updated 9/21/2020) with no additional updates (I asked patient and no additional family history provided). History reviewed. No pertinent family history. REVIEW OF SYSTEMS: (reviewed/updated 9/21/2020)  Appetite:poor   Sleep: poor with DIMS (difficulty initiating & maintaining sleep)   All other Review of Systems: Negative except per HPI         [facility] (MSE):    MSE FINDINGS ARE WITHIN NORMAL LIMITS (WNL) UNLESS OTHERWISE STATED BELOW. ( ALL OF THE BELOW CATEGORIES OF THE MSE HAVE BEEN REVIEWED (reviewed 9/21/2020) AND UPDATED AS DEEMED APPROPRIATE )  General Presentation age appropriate, cooperative   Orientation disorganized   Vital Signs  See below (reviewed 9/21/2020); Vital Signs (BP, Pulse, & Temp) are within normal limits if not listed below.    Gait and Station Stable/steady, no ataxia   Musculoskeletal System No extrapyramidal symptoms (EPS); no abnormal muscular movements or Tardive Dyskinesia (TD); muscle strength and tone are within normal limits   Language No aphasia or dysarthria   Speech:  hypoverbal, increased latency of response, non-pressured and soft   Thought Processes illogical; slow rate of thoughts; poor abstract reasoning/computation   Thought Associations blocked    Thought Content poverty of content   Suicidal Ideations unable to assess   Homicidal Ideations unable to assess   Mood:  unable to asses   Affect:  flat   Memory recent  impaired   Memory remote:  impaired   Concentration/Attention:  impaired Fund of Knowledge significantly below average   Insight:  poor   Reliability fair   Judgment:  impaired due to active psychosis          VITALS:     Patient Vitals for the past 24 hrs:   Temp Pulse Resp BP SpO2   09/21/20 0803 97.7 °F (36.5 °C) 96 16 (!) 148/85 97 %   09/20/20 2021 98 °F (36.7 °C) 90 16 (!) 153/85 96 %     Wt Readings from Last 3 Encounters:   09/18/20 85.7 kg (189 lb)   12/23/17 84.9 kg (187 lb 1.6 oz)   03/25/17 95.6 kg (210 lb 11.2 oz)     Temp Readings from Last 3 Encounters:   09/21/20 97.7 °F (36.5 °C)   03/28/17 97.5 °F (36.4 °C)   02/07/15 98.7 °F (37.1 °C)     BP Readings from Last 3 Encounters:   09/21/20 (!) 148/85   03/28/17 100/68   02/12/15 105/73     Pulse Readings from Last 3 Encounters:   09/21/20 96   03/28/17 67   02/12/15 87            DATA     LABORATORY DATA:(reviewed/updated 9/21/2020)  No results found for this or any previous visit (from the past 24 hour(s)). Lab Results   Component Value Date/Time    Valproic acid 97 09/18/2020 04:42 PM     No results found for: LITHM   RADIOLOGY REPORTS:(reviewed/updated 9/21/2020)  No results found.        MEDICATIONS     ALL MEDICATIONS:   Current Facility-Administered Medications   Medication Dose Route Frequency    cloZAPine (CLOZARIL) tablet 25 mg  25 mg Oral QHS    clonazePAM (KlonoPIN) tablet 0.5 mg  0.5 mg Oral TID    OLANZapine (ZyPREXA) tablet 5 mg  5 mg Oral Q6H PRN    haloperidol lactate (HALDOL) injection 5 mg  5 mg IntraMUSCular Q6H PRN    benztropine (COGENTIN) tablet 1 mg  1 mg Oral BID PRN    diphenhydrAMINE (BENADRYL) injection 50 mg  50 mg IntraMUSCular BID PRN    hydrOXYzine HCL (ATARAX) tablet 50 mg  50 mg Oral TID PRN    LORazepam (ATIVAN) injection 1 mg  1 mg IntraMUSCular Q4H PRN    traZODone (DESYREL) tablet 50 mg  50 mg Oral QHS PRN    acetaminophen (TYLENOL) tablet 650 mg  650 mg Oral Q4H PRN    magnesium hydroxide (MILK OF MAGNESIA) 400 mg/5 mL oral suspension 30 mL  30 mL Oral DAILY PRN SCHEDULED MEDICATIONS:   Current Facility-Administered Medications   Medication Dose Route Frequency    cloZAPine (CLOZARIL) tablet 25 mg  25 mg Oral QHS    clonazePAM (KlonoPIN) tablet 0.5 mg  0.5 mg Oral TID          ASSESSMENT & PLAN     DIAGNOSES REQUIRING ACTIVE TREATMENT AND MONITORING: (reviewed/updated 9/21/2020)  Patient Active Hospital Problem List:   Schizoaffective disorder ([facility] (9/18/2020)    Assessment: patient with significant internal preoccupation, poor ADLs, history of catatonia. Appears to have been compliant with home medication, but is regressed and with a markedly disorganized thought process. Will start Clozaril, add benzodiazepine for protection against catatonia to be tapered as Clozaril dose increases given interaction. 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 In summary, Augusto Avilez, is a 45 y.o.  male who presents with a severe exacerbation of the principal diagnosis of Schizoaffective disorder ([facility]    Patient's condition is not improving. Patient requires continued inpatient hospitalization for further stabilization, safety monitoring and medication management. I will continue to coordinate the provision of individual, milieu, occupational, group, and substance abuse therapies to address target symptoms/diagnoses as deemed appropriate for the individual patient.   A coordinated, multidisplinary treatment team round was conducted with the patient (this team consists of the nurse, psychiatric unit pharmacist,  and writer). Complete current electronic health record for patient has been reviewed today including consultant notes, ancillary staff notes, nurses and psychiatric tech notes. Suicide risk assessment completed and patient deemed to be of low risk for suicide at this time. The following regarding medications was addressed during rounds with patient:   the risks and benefits of the proposed medication. The patient was given the opportunity to ask questions. Informed consent given to the use of the above medications. Will continue to adjust psychiatric and non-psychiatric medications (see above \"medication\" section and orders section for details) as deemed appropriate & based upon diagnoses and response to treatment. I will continue to order blood tests/labs and diagnostic tests as deemed appropriate and review results as they become available (see orders for details and above listed lab/test results). I will order psychiatric records from previous [facility] hospitals to further elucidate the nature of patient's psychopathology and review once available. I will gather additional collateral information from friends, family and o/p treatment team to further elucidate the nature of patient's psychopathology and baselline level of psychiatric functioning. I certify that this patient's inpatient psychiatric hospital services furnished since the previous certification were, and continue to be, required for treatment that could reasonably be expected to improve the patient's condition, or for diagnostic study, and that the patient continues to need, on a daily basis, active treatment furnished directly by or requiring the supervision of inpatient psychiatric facility personnel.  In addition, the hospital records show that services furnished were intensive treatment services, admission or related services, or equivalent services.     EXPECTED DISCHARGE DATE/DAY: TBD     DISPOSITION: Home       Signed By:   Enio Harris MD  9/21/2020

## 2020-09-21 NOTE — BH NOTES
GROUP THERAPY PROGRESS NOTE    Patient is participating in coping skills group. Group time: 45 minutes    Personal goal for participation: Learn coping skills to reduce stress and anxiety    Goal orientation: Personal    Group therapy participation: passive    Therapeutic interventions reviewed and discussed: Group discussion on ways patients are coping with anxiety and stress and ways they relax. Discussion regarding alternative coping skills using the letters of the alphabet so that each patient would have a list of at least 26 different coping skills. Impression of participation: Manav Del Toro did come to the day room for group, and did have a snack. He spent the rest of group at the side of the room, pacing back and forth, and did not participate or interact with the group, other than an occasional laugh. He did not contribute to the group discussion.       Jose Valle RN, [address] Student  with  [facility] as supervisor

## 2020-09-21 NOTE — PROGRESS NOTES
Problem: Altered Thought Process (Adult/Pediatric)  Goal: *STG: Participates in treatment plan  Outcome: Not Progressing Towards Goal  Goal: *STG: Complies with medication therapy  Outcome: Progressing Towards Goal

## 2020-09-21 NOTE — PROGRESS NOTES
0801: Vitals and assessment performed. Patient is selectively mute, refusing to answer assessment questions, only shaking head \"no\" to SI/HI questions. Patient is rocking back and forth, occasionally bending over in a prayer-like position. Patient also shook head no to pain. Will continue to monitor. 5208: Administered morning Haldol without incident. 1056: Patient exited room, looked around the hallways then returned to room. 1058: Patient exited the room again and began pacing the hallways. Movements are exaggerated and odd, swinging arms back and forth and shuffling.    1628: Patient has again come out of room and is pacing the hallways in an odd, shuffling manor. [phone]: Patient initially refused the valproic acid because of the number of containers it was in, however when this writer poured them into one cup the patient agreed to take it.    [phone]: Patient is crouched on floor in hallway on his knees, hands clasped together. This writer encouraged the patient to stand up off the floor, which the patient did a minute later. This writer asked if he was praying and patient responded \"yes. \"

## 2020-09-21 NOTE — BH NOTES
Assumed care of the patient. Patient was isolative to his room. He was uncooperative with the assessments. Patient seemed to be internally preoccupied, he did not respond when his name was called initially. When I went to his room for vitals, patient said, \"I am not taking any medications now. \" and when I told him that I was there for vitals and not for medications at that time, he smiled and said, \"Okay, thanks. \" With some encouragement he let me check his vitals. Patient was malodorous and he had taken off his pants. Patient was asked to put on  his pants, which he did. Patient was medication compliant with some encouragement. Patient came out one time from his room and looked confused . When asked, if he needed anything. He was unable to verbalize. When I asked if he needed something to eat he said , \"Yes. \". Patient was given some snacks and he went back to his room. No aggressive behavior noted. Will continue to monitor. Patient was up early this morning and asked if he could get something to drink. Patient was given some juice and water. Patient slept for about 6 hours.

## 2020-09-21 NOTE — BH NOTES
[facility] Interdisciplinary Rounds     Patient Name: Augusto Avilez  Age: 45 y.o. Room/Bed:  [facility]/  Primary Diagnosis: <principal problem not specified>   Admission Status: TDO    Readmission within 30 days: No   Power of  in place: Unknown  Patient requires a blocked bed: Yes  Reason for blocked bed: Responding to internal stimuli and poor self care. Sleep hours: About 6 hours.        Participation in Care/Groups: No  Medication Compliant?: Yes  PRNS (last 24 hours): None   Restraints (last 24 hours): No   Substance Abuse:  No   24 hour chart check complete:

## 2020-09-21 NOTE — PROGRESS NOTES
Laboratory monitoring for mood stabilizer and antipsychotics:    Recommended baseline monitoring has NOT been completed based on this patient's current medication regimen. The following labs have been ordered to complete baseline monitoring: Hgb A1c and lipid panel. The patient is currently taking the following medication(s):   Current Facility-Administered Medications   Medication Dose Route Frequency    haloperidoL (HALDOL) tablet 5 mg  5 mg Oral Q12H     Serum Drug Level(s)    VALPROIC ACID  Recent Labs     09/18/20  1642   VALP 97       Height, Weight, BMI Estimation  Estimated body mass index is 29.6 kg/m² as calculated from the following:    Height as of 12/23/17: 170.2 cm (67\"). Weight as of this encounter: 85.7 kg (189 lb). Renal Function, Hepatic Function and Chemistry  CrCl cannot be calculated (Unknown ideal weight.). Lab Results   Component Value Date/Time    Sodium 140 09/18/2020 04:41 PM    Potassium 3.7 09/18/2020 04:41 PM    Chloride 102 09/18/2020 04:41 PM    CO2 26 09/18/2020 04:41 PM    Anion gap 12 09/18/2020 04:41 PM    Glucose 91 09/18/2020 04:41 PM    Glucose 101 (H) 03/14/2017 11:49 PM    BUN 14 09/18/2020 04:41 PM    Creatinine 1.07 09/18/2020 04:41 PM    BUN/Creatinine ratio 13 09/18/2020 04:41 PM    GFR est AA >60 09/18/2020 04:41 PM    GFR est non-AA >60 09/18/2020 04:41 PM    Calcium 9.7 09/18/2020 04:41 PM    ALT (SGPT) 48 09/18/2020 04:41 PM    Alk.  phosphatase 67 09/18/2020 04:41 PM    Protein, total 8.1 09/18/2020 04:41 PM    Albumin 3.9 09/18/2020 04:41 PM    Globulin 4.2 (H) 09/18/2020 04:41 PM    A-G Ratio 0.9 (L) 09/18/2020 04:41 PM    Bilirubin, total 0.4 09/18/2020 04:41 PM       Lab Results   Component Value Date/Time    Glucose 91 09/18/2020 04:41 PM    Glucose 101 (H) 03/14/2017 11:49 PM    Glucose (POC) 112 (H) 01/19/2012 09:59 PM       Lab Results   Component Value Date/Time    Hemoglobin A1c 5.6 03/14/2017 11:49 PM       Hematology  Lab Results   Component Value Date/Time    WBC 4.9 09/18/2020 04:41 PM    Hemoglobin (POC) 15.6 01/19/2012 09:59 PM    HGB 13.9 09/18/2020 04:41 PM    Hematocrit (POC) 46 01/19/2012 09:59 PM    HCT 42.0 09/18/2020 04:41 PM    PLATELET 023 91/09/0461 04:41 PM    MCV 89.7 09/18/2020 04:41 PM       Lipids  Lab Results   Component Value Date/Time    Cholesterol, total 184 12/09/2017 08:10 AM    HDL Cholesterol 48 12/09/2017 08:10 AM    LDL, calculated 90.6 12/09/2017 08:10 AM    Triglyceride 227 (H) 12/09/2017 08:10 AM    CHOL/HDL Ratio 3.8 12/09/2017 08:10 AM       Thyroid Function    Lab Results   Component Value Date/Time    TSH 3.78 (H) 12/09/2017 08:10 AM    T4, Free 1.0 03/17/2017 05:15 AM     Vitals  Visit Vitals  BP (!) 148/85 (BP Patient Position: Sitting)   Pulse 96   Temp 97.7 °F (36.5 °C)   Resp 16   Wt 85.7 kg (189 lb)   SpO2 97%   BMI 29.60 kg/m²     Thank you,  Krystian Cueto, PharmD, BCPS  [phone]

## 2020-09-21 NOTE — BH NOTES
Chief Complaint:  . .. Length of Stay: 2 Days    Interval History:  Mr. Yumiko Galdamez is lying in his bed with eyes tightly closed. He does not answer any assessment questions. He was complaint with medication this morning. No agitation or aggression noted. Appears to be eating and sleeping adequately. Past Medical History:  Past Medical History:   Diagnosis Date    Psychiatric disorder     [facility]otic [facility] ([facility]     [facility]            Labs:  Lab Results   Component Value Date/Time    WBC 4.9 09/18/2020 04:41 PM    Hemoglobin (POC) 15.6 01/19/2012 09:59 PM    HGB 13.9 09/18/2020 04:41 PM    Hematocrit (POC) 46 01/19/2012 09:59 PM    HCT 42.0 09/18/2020 04:41 PM    PLATELET 367 60/79/8508 04:41 PM    MCV 89.7 09/18/2020 04:41 PM      Lab Results   Component Value Date/Time    Sodium 140 09/18/2020 04:41 PM    Potassium 3.7 09/18/2020 04:41 PM    Chloride 102 09/18/2020 04:41 PM    CO2 26 09/18/2020 04:41 PM    Anion gap 12 09/18/2020 04:41 PM    Glucose 91 09/18/2020 04:41 PM    Glucose 101 (H) 03/14/2017 11:49 PM    BUN 14 09/18/2020 04:41 PM    Creatinine 1.07 09/18/2020 04:41 PM    BUN/Creatinine ratio 13 09/18/2020 04:41 PM    GFR est AA >60 09/18/2020 04:41 PM    GFR est non-AA >60 09/18/2020 04:41 PM    Calcium 9.7 09/18/2020 04:41 PM    Bilirubin, total 0.4 09/18/2020 04:41 PM    Alk.  phosphatase 67 09/18/2020 04:41 PM    Protein, total 8.1 09/18/2020 04:41 PM    Albumin 3.9 09/18/2020 04:41 PM    Globulin 4.2 (H) 09/18/2020 04:41 PM    A-G Ratio 0.9 (L) 09/18/2020 04:41 PM    ALT (SGPT) 48 09/18/2020 04:41 PM      Vitals:    09/19/20 2000 09/19/20 2030 09/20/20 0756 09/20/20 2021   BP:  (!) 126/99 115/70 (!) 153/85   Pulse:  70 67 90   Resp: 17 18 16 16   Temp:  98.7 °F (37.1 °C) 97.9 °F (36.6 °C) 98 °F (36.7 °C)   SpO2:  100% 98% 96%   Weight:             Current Facility-Administered Medications   Medication Dose Route Frequency Provider Last Rate Last Dose    haloperidoL (HALDOL) tablet 5 mg  5 mg Oral Q12H Marion Rangel NP   5 mg at 09/20/20 0906    OLANZapine (ZyPREXA) tablet 5 mg  5 mg Oral Q6H PRN Catherne Kick, NP        haloperidol lactate (HALDOL) injection 5 mg  5 mg IntraMUSCular Q6H PRN Catherne Kick, NP        benztropine (COGENTIN) tablet 1 mg  1 mg Oral BID PRN Catherne Kick, NP        diphenhydrAMINE (BENADRYL) injection 50 mg  50 mg IntraMUSCular BID PRN Catherne Kick, NP        hydrOXYzine HCL (ATARAX) tablet 50 mg  50 mg Oral TID PRN Catherne Kick, NP        LORazepam (ATIVAN) injection 1 mg  1 mg IntraMUSCular Q4H PRN Catherne Kick, NP        traZODone (DESYREL) tablet 50 mg  50 mg Oral QHS PRN Catherne Kick, NP        acetaminophen (TYLENOL) tablet 650 mg  650 mg Oral Q4H PRN Catherne Kick, NP        magnesium hydroxide (MILK OF MAGNESIA) 400 mg/5 mL oral suspension 30 mL  30 mL Oral DAILY PRN Catherne Kick, NP             Mental Status Exam:  Eye contact: poor  Grooming: poor  Psychomotor activity: decreased  Speech - no vocalization  Mood is \". .. \"  Affect: blunted  Perception: responding to internal stimuli  Suicidal ideation: unable to assess  Cognition is grossly intact         Physical Exam:  Body habitus: Body mass index is 29.6 kg/m². Musculoskeletal system: normal gait  Tremor - neg  Cog wheeling - neg      Assessment and Plan:  Desiree Pro meets criteria for a diagnosis of schizophrenia     Continue the medication regimen as prescribed  Disposition planning to continue. I certify that this patients inpatient psychiatric hospital services furnished since the previous certification were, and continue to be, required for treatment that could reasonably be expected to improve the patient's condition, or for diagnostic study, and that the patient continues to need, on a daily basis, active treatment furnished directly by or requiring the supervision of inpatient psychiatric facility personnel.  In addition, the hospital records show that services furnished were intensive treatment services, admission or related services, or equivalent services.

## 2020-09-21 NOTE — PROGRESS NOTES
[facility]: MED RECONCILIATION    Comments/Recommendations:    Per patient's sister, patient previously \"did well\" on clozapine and is able to keep up with lab monitoring   Per sister, outside physician would like to switch patient to clozapine and Abilify Maintena   Sister states she administers medications before breakfast and before dinner and watches to make sure patient takes his medications   Last received Invega Sustenna 234 mg on 20. States receives every 21 days, so next appointment is scheduled for 20   Confirmed pharmacy with sister    Information obtained from: Patient's sister, Cecy Benitez    Prior to Admission Medications:   Prior to Admission Medications   Prescriptions Last Dose Informant Patient Reported? Taking? FLUoxetine (PROzac) 20 mg capsule 2020 at AM Family Member Yes Yes   Sig: Take 20 mg by mouth daily. LORazepam (Ativan) 1 mg tablet 2020 at AM Family Member Yes Yes   Sig: Take 1 mg by mouth two (2) times a day. OLANZapine (ZyPREXA zydis) 20 mg disintegrating tablet 2020 at PM Family Member Yes Yes   Sig: Take 20 mg by mouth Daily (before dinner). benztropine (COGENTIN) 0.5 mg tablet 2020 at AM Family Member Yes Yes   Sig: Take 0.5 mg by mouth two (2) times a day. paliperidone palmitate Nevarez Curet Gema Actis) 234 mg/1.5 mL injection 2020 Family Member Yes Yes   Si mg by IntraMUSCular route every twenty-one (21) days. valproate (Depakene) 250 mg/5 mL syrup 2020 at AM Family Member Yes Yes   Sig: Take 1,000 mg by mouth two (2) times a day.         Thank you,  Krystian Cueto, PharmD, BCPS  [phone]

## 2020-09-21 NOTE — BH NOTES
The [place] hearing team conducted a virtual TDO hearing this afternoon. The ending result of the hearing, patient, committed.

## 2020-09-22 PROCEDURE — 74011250637 HC RX REV CODE- 250/637: Performed by: PSYCHIATRY & NEUROLOGY

## 2020-09-22 PROCEDURE — 99233 SBSQ HOSP IP/OBS HIGH 50: CPT | Performed by: PSYCHIATRY & NEUROLOGY

## 2020-09-22 PROCEDURE — 65220000003 HC RM SEMIPRIVATE PSYCH

## 2020-09-22 RX ORDER — CLOZAPINE 25 MG/1
50 TABLET ORAL
Status: DISCONTINUED | OUTPATIENT
Start: 2020-09-22 | End: 2020-09-23

## 2020-09-22 RX ORDER — CLONAZEPAM 1 MG/1
1 TABLET ORAL 2 TIMES DAILY
Status: DISCONTINUED | OUTPATIENT
Start: 2020-09-22 | End: 2020-10-14

## 2020-09-22 RX ADMIN — VALPROIC ACID 1000 MG: 250 SOLUTION ORAL at 08:16

## 2020-09-22 RX ADMIN — CLONAZEPAM 0.5 MG: 0.5 TABLET ORAL at 08:16

## 2020-09-22 RX ADMIN — CLONAZEPAM 1 MG: 1 TABLET ORAL at 16:21

## 2020-09-22 RX ADMIN — VALPROIC ACID 1000 MG: 250 SOLUTION ORAL at 16:21

## 2020-09-22 NOTE — GROUP NOTE
JENELLE  GROUP DOCUMENTATION INDIVIDUAL Group Therapy Note Date: 9/22/2020 Group Start Time: 1500 Group End Time: 9977 Group Topic: Recreational/Music Therapy [facility] [place] [address] ACUTE BEHAV [facility], [address] GROUP DOCUMENTATION GROUP Group Therapy Note Attendees: 2 Attendance: Did not attend Patient's Goal: Interventions/techniques Merlinda Blush

## 2020-09-22 NOTE — GROUP NOTE
JENELLE  GROUP DOCUMENTATION INDIVIDUAL Group Therapy Note Date: 9/22/2020 Group Start Time: 1100 Group End Time: 1200 Group Topic: Topic Group [facility] [place] 3 ACUTE BEHAV [facility], [address] [facility] GROUP DOCUMENTATION GROUP Group Therapy Note Attendees: 4 Attendance: Did not attend Patient's Goal: Interventions/techniques: 
 
Brian Martínez

## 2020-09-22 NOTE — INTERDISCIPLINARY ROUNDS
Behavioral Health Interdisciplinary Rounds Patient Name: Pablo Weaver  Age: 45 y.o. Room/Bed:  313/01 Primary Diagnosis: Schizoaffective disorder ([facility] [address] Admission Status: TDO Readmission within 30 days: no 
Power of  in place: no 
Patient requires a blocked bed: no           
Reason for blocked bed:  
Order for blocked bed obtained: no    
 
Sleep hours: 11 Morning Labs completed per orders:  na      
Participation in Care/Groups:  no 
Medication Compliant?: Yes PRNS (last 24 hours): None Restraints (last 24 hours):  no 
Substance Abuse:  no   
24 hour chart check complete: yes Patient goal(s) for today: follow unit directions Treatment team focus/goals: stabilize - increase Clozaril Progress note: Pt remains isolative to room and curled up in his bed. Selectively mute. LOS:  4  Expected LOS: TBD Financial concerns/prescription coverage: [place] Medicare Part A&B and [employer] and TDO Family contact: Sister/POA Karis Aponte [phone]  9/21 Family requesting physician contact today: No 
Discharge plan: Home Access to weapons: None Outpatient provider(s): óGmez MARINO Patient's preferred phone number for follow up call: [phone] Participating treatment team members: Pablo Weaver, LAVINIA Martinez and Dr. Grey Lombard.

## 2020-09-22 NOTE — BH NOTES
PSYCHIATRIC PROGRESS NOTE         Patient Name  Dixon Huertas   Date of Birth 1970   [facility] [id number]   Medical Record Number  [id number]      Age  45 y.o. PCP Unknown, Provider   Admit date:  9/18/2020    Room Number  553/38  @ [facility]   Date of Service  9/22/2020         E & M PROGRESS NOTE:         HISTORY       CC:  \"psychosis\"  HISTORY OF PRESENT ILLNESS/INTERVAL HISTORY:  (reviewed/updated 9/22/2020). per initial evaluation: Duncan Lopez \"Corey\" Yumiko Galdamez is a 45year old male with a history of schizophrenia admitted to the [facility] for increased psychosis. He has not been bathing or otherwise caring for himself at home. He was reportedly hospitalized at [facility] from April - June 2020. He does not verbally answer any questions. He is well known to this writer from previous admissions in the [place] area. He has responded well to ECT in the past.    09/21 - overnight, patient incontinent of urine, placed into diaper. Patient appeared catatonic at times, but was speaking, non-spontaneously, to the team this morning. Per nursing, the patient has significantly poor self care, grossly internally preoccucpied and with no ability to care for himself. SW reports that sister is patient's caretaker and that he has been decompensating from an already poor baseline recently, requiring prompting to eat meals and unable to perform basic ADLs. Family states he improved on Clozaril and they are requesting the team restart this medication. Of note, patient got [place] injection two weeks ago, VPA level suggests compliance with medication. 09/22 - patient has been isolative to bed, up for meals, selectively mute. Shakes and nods his head to stimuli. Patient slept 11 hours overnight, and is in bed on assessment. He is mute, does not endorse any pain or discomfort but is largely unresponsive to questions. Vitals unremarkable, BP mildly elevated.         SIDE EFFECTS: (reviewed/updated 9/22/2020)  None reported or admitted to. No noted toxicity with use of Depakote   ALLERGIES:(reviewed/updated 9/22/2020)  Allergies   Allergen Reactions    Fluphenazine Unknown (comments)     Pt is unable to communicate properly.  Penicillins Unknown (comments)     Pt is unable to communicate properly. MEDICATIONS PRIOR TO ADMISSION:(reviewed/updated 9/22/2020)  Medications Prior to Admission   Medication Sig    FLUoxetine (PROzac) 20 mg capsule Take 20 mg by mouth daily.  benztropine (COGENTIN) 0.5 mg tablet Take 0.5 mg by mouth two (2) times a day. Indications: extrapyramidal symptoms as a result of taking the medication    LORazepam (Ativan) 1 mg tablet Take 1 mg by mouth two (2) times a day.  valproate (Depakene) 250 mg/5 mL syrup Take 1,000 mg by mouth two (2) times a day.  paliperidone palmitate (Invega Sustenna) 234 mg/1.5 mL injection 234 mg by IntraMUSCular route every twenty-one (21) days. Indications: schizophrenia    OLANZapine (ZyPREXA zydis) 20 mg disintegrating tablet Take 20 mg by mouth Daily (before dinner). Indications: schizophrenia      PAST MEDICAL HISTORY: Past medical history from the initial psychiatric evaluation has been reviewed (reviewed/updated 9/22/2020) with no additional updates (I asked patient and no additional past medical history provided). Past Medical History:   Diagnosis Date    Psychiatric disorder     Psychotic disorder ([facility] [address]     Withdrawal syndrome ([facility] [address]    History reviewed. No pertinent surgical history. SOCIAL HISTORY: Social history from the initial psychiatric evaluation has been reviewed (reviewed/updated 9/22/2020) with no additional updates (I asked patient and no additional social history provided).    Social History     Socioeconomic History    Marital status: SINGLE     Spouse name: Not on file    Number of children: Not on file    Years of education: Not on file    Highest education level: Not on file   Occupational History    Not on file   Social Needs    Financial resource strain: Not on file    Food insecurity     Worry: Not on file     Inability: Not on file    Transportation needs     Medical: Not on file     Non-medical: Not on file   Tobacco Use    Smoking status: Never Smoker    Smokeless tobacco: Never Used   Substance and Sexual Activity    Alcohol use: No    Drug use: No    Sexual activity: Not on file   Lifestyle    Physical activity     Days per week: Not on file     Minutes per session: Not on file    Stress: Not on file   Relationships    Social connections     Talks on phone: Not on file     Gets together: Not on file     Attends Zoroastrianism service: Not on file     Active member of club or organization: Not on file     Attends meetings of clubs or organizations: Not on file     Relationship status: Not on file    Intimate partner violence     Fear of current or ex partner: Not on file     Emotionally abused: Not on file     Physically abused: Not on file     Forced sexual activity: Not on file   Other Topics Concern    Not on file   Social History Narrative    Pt is a HS grad and is unemployed. He lives with his sister. On disability    No pending legal charge reported      FAMILY HISTORY: Family history from the initial psychiatric evaluation has been reviewed (reviewed/updated 9/22/2020) with no additional updates (I asked patient and no additional family history provided). History reviewed. No pertinent family history.     REVIEW OF SYSTEMS: (reviewed/updated 9/22/2020)  Appetite:poor   Sleep: poor with DIMS (difficulty initiating & maintaining sleep)   All other Review of Systems: Negative except per HPI         [facility] (MSE):    MSE FINDINGS ARE WITHIN NORMAL LIMITS (WNL) UNLESS OTHERWISE STATED BELOW. ( ALL OF THE BELOW CATEGORIES OF THE MSE HAVE BEEN REVIEWED (reviewed 9/22/2020) AND UPDATED AS DEEMED APPROPRIATE )  General Presentation age appropriate, cooperative   Orientation disorganized   Vital Signs  See below (reviewed 9/22/2020); Vital Signs (BP, Pulse, & Temp) are within normal limits if not listed below. Gait and Station Stable/steady, no ataxia   Musculoskeletal System No extrapyramidal symptoms (EPS); no abnormal muscular movements or Tardive Dyskinesia (TD); muscle strength and tone are within normal limits   Language No aphasia or dysarthria   Speech:  hypoverbal, increased latency of response, non-pressured and soft   Thought Processes illogical; slow rate of thoughts; poor abstract reasoning/computation   Thought Associations blocked    Thought Content poverty of content   Suicidal Ideations unable to assess   Homicidal Ideations unable to assess   Mood:  unable to asses   Affect:  flat   Memory recent  impaired   Memory remote:  impaired   Concentration/Attention:  impaired   Fund of Knowledge significantly below average   Insight:  poor   Reliability fair   Judgment:  impaired due to active psychosis          VITALS:     Patient Vitals for the past 24 hrs:   Temp Pulse Resp BP SpO2   09/22/20 0800   16       Wt Readings from Last 3 Encounters:   09/18/20 85.7 kg (189 lb)   12/23/17 84.9 kg (187 lb 1.6 oz)   03/25/17 95.6 kg (210 lb 11.2 oz)     Temp Readings from Last 3 Encounters:   03/28/17 97.5 °F (36.4 °C)   02/07/15 98.7 °F (37.1 °C)     BP Readings from Last 3 Encounters:   09/21/20 (!) 148/85   03/28/17 100/68   02/12/15 105/73     Pulse Readings from Last 3 Encounters:   09/21/20 96   03/28/17 67   02/12/15 87            DATA     LABORATORY DATA:(reviewed/updated 9/22/2020)  No results found for this or any previous visit (from the past 24 hour(s)). Lab Results   Component Value Date/Time    Valproic acid 97 09/18/2020 04:42 PM     No results found for: LITHM   RADIOLOGY REPORTS:(reviewed/updated 9/22/2020)  No results found.        MEDICATIONS     ALL MEDICATIONS:   Current Facility-Administered Medications   Medication Dose Route Frequency    cloZAPine (CLOZARIL) tablet 25 mg  25 mg Oral QHS    clonazePAM (KlonoPIN) tablet 0.5 mg  0.5 mg Oral TID    valproic acid (as sodium salt) (DEPAKENE) 250 mg/5 mL (5 mL) oral solution 1,000 mg  1,000 mg Oral BID    OLANZapine (ZyPREXA) tablet 5 mg  5 mg Oral Q6H PRN    haloperidol lactate (HALDOL) injection 5 mg  5 mg IntraMUSCular Q6H PRN    benztropine (COGENTIN) tablet 1 mg  1 mg Oral BID PRN    diphenhydrAMINE (BENADRYL) injection 50 mg  50 mg IntraMUSCular BID PRN    hydrOXYzine HCL (ATARAX) tablet 50 mg  50 mg Oral TID PRN    LORazepam (ATIVAN) injection 1 mg  1 mg IntraMUSCular Q4H PRN    traZODone (DESYREL) tablet 50 mg  50 mg Oral QHS PRN    acetaminophen (TYLENOL) tablet 650 mg  650 mg Oral Q4H PRN    magnesium hydroxide (MILK OF MAGNESIA) 400 mg/5 mL oral suspension 30 mL  30 mL Oral DAILY PRN      SCHEDULED MEDICATIONS:   Current Facility-Administered Medications   Medication Dose Route Frequency    cloZAPine (CLOZARIL) tablet 25 mg  25 mg Oral QHS    clonazePAM (KlonoPIN) tablet 0.5 mg  0.5 mg Oral TID    valproic acid (as sodium salt) (DEPAKENE) 250 mg/5 mL (5 mL) oral solution 1,000 mg  1,000 mg Oral BID          ASSESSMENT & PLAN     DIAGNOSES REQUIRING ACTIVE TREATMENT AND MONITORING: (reviewed/updated 9/22/2020)  Patient Active Hospital Problem List:   Schizoaffective disorder ([facility] [address] (9/18/2020)    Assessment: patient with significant internal preoccupation, poor ADLs, history of catatonia. Appears to have been compliant with home medication, but is regressed and with a markedly disorganized thought process. Will start Clozaril, add benzodiazepine for protection against catatonia to be tapered as Clozaril dose increases given interaction.     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 In summary, Elaina Dalal, is a 45 y.o.  male who presents with a severe exacerbation of the principal diagnosis of Schizoaffective disorder ([facility] [address]    Patient's condition is not improving. Patient requires continued inpatient hospitalization for further stabilization, safety monitoring and medication management. I will continue to coordinate the provision of individual, milieu, occupational, group, and substance abuse therapies to address target symptoms/diagnoses as deemed appropriate for the individual patient. A coordinated, multidisplinary treatment team round was conducted with the patient (this team consists of the nurse, psychiatric unit pharmacist,  and writer). Complete current electronic health record for patient has been reviewed today including consultant notes, ancillary staff notes, nurses and psychiatric tech notes. Suicide risk assessment completed and patient deemed to be of low risk for suicide at this time. The following regarding medications was addressed during rounds with patient:   the risks and benefits of the proposed medication. The patient was given the opportunity to ask questions. Informed consent given to the use of the above medications. Will continue to adjust psychiatric and non-psychiatric medications (see above \"medication\" section and orders section for details) as deemed appropriate & based upon diagnoses and response to treatment. I will continue to order blood tests/labs and diagnostic tests as deemed appropriate and review results as they become available (see orders for details and above listed lab/test results). I will order psychiatric records from previous [facility] hospitals to further elucidate the nature of patient's psychopathology and review once available. I will gather additional collateral information from friends, family and o/p treatment team to further elucidate the nature of patient's psychopathology and baselline level of psychiatric functioning. I certify that this patient's inpatient psychiatric hospital services furnished since the previous certification were, and continue to be, required for treatment that could reasonably be expected to improve the patient's condition, or for diagnostic study, and that the patient continues to need, on a daily basis, active treatment furnished directly by or requiring the supervision of inpatient psychiatric facility personnel. In addition, the hospital records show that services furnished were intensive treatment services, admission or related services, or equivalent services.     EXPECTED DISCHARGE DATE/DAY: TBD     DISPOSITION: Home       Signed By:   Freddy Lee MD  9/22/2020

## 2020-09-22 NOTE — PROGRESS NOTES
Problem: Altered Thought Process (Adult/Pediatric)  Goal: *STG: Decreased delusional thinking  Outcome: Not Progressing Towards Goal  Goal: *LTG: Returns to baseline functioning  Outcome: Not Progressing Towards Goal     Problem: Altered Thought Process (Adult/Pediatric)  Goal: *LTG: Returns to baseline functioning  Outcome: Not Progressing Towards Goal

## 2020-09-22 NOTE — BH NOTES
Patient is resting in bed and does not respond to assessment questions  2116 Patient is medication compliant

## 2020-09-22 NOTE — PROGRESS NOTES
0800: Patient refused vitals this AM. Patient is selectively mute, will not answer assessment questions. Patient shook his head \"no\" to pain and SI/HI. Patient is awake in bed with eyes open but not addressing staff or interacting at all. Encouraged patient to eat breakfast.    0816: Patient initially was uncooperative with taking medications. Will some encouragement, patient sat up in bed and medications were administered. Patient returned to bed and ate some breakfast. Patient is still nonverbal.    1230: Patient came out of room for a short time, wandered the jeff appearing confused, then returned to room. This writer asked if he was ok or if he needed anything but the patient did not respond. 1435: Patient approached nurse's station requesting juice. Patient was given some juice and patient returned to room. 1500: Gave report to Maricruz Echeverria RN on this patient.

## 2020-09-23 PROCEDURE — 99232 SBSQ HOSP IP/OBS MODERATE 35: CPT | Performed by: PSYCHIATRY & NEUROLOGY

## 2020-09-23 PROCEDURE — 74011250637 HC RX REV CODE- 250/637: Performed by: PSYCHIATRY & NEUROLOGY

## 2020-09-23 PROCEDURE — 65220000003 HC RM SEMIPRIVATE PSYCH

## 2020-09-23 RX ORDER — CLOZAPINE 25 MG/1
50 TABLET ORAL
Status: DISCONTINUED | OUTPATIENT
Start: 2020-09-23 | End: 2020-09-25

## 2020-09-23 RX ADMIN — CLOZAPINE 50 MG: 25 TABLET ORAL at 21:39

## 2020-09-23 RX ADMIN — CLONAZEPAM 1 MG: 1 TABLET ORAL at 09:03

## 2020-09-23 RX ADMIN — VALPROIC ACID 1000 MG: 250 SOLUTION ORAL at 09:03

## 2020-09-23 NOTE — PROGRESS NOTES
Problem: Falls - Risk of  Goal: *Absence of Falls  Description: Document Veatrice Marker Fall Risk and appropriate interventions in the flowsheet. Outcome: Progressing Towards Goal  Note: Fall Risk Interventions:       Mentation Interventions: Room close to nurse's station, Toileting rounds, Reorient patient    Medication Interventions: Teach patient to arise slowly    Elimination Interventions:  Toileting schedule/hourly rounds              Problem: Patient Education: Go to Patient Education Activity  Goal: Patient/Family Education  Outcome: Progressing Towards Goal     Problem: Altered Thought Process (Adult/Pediatric)  Goal: *STG: Participates in treatment plan  Outcome: Progressing Towards Goal  Goal: *STG: Remains safe in hospital  Outcome: Progressing Towards Goal  Goal: *STG: Seeks staff when feelings of anxiety and fear arise  Outcome: Progressing Towards Goal  Goal: *STG: Complies with medication therapy  Outcome: Progressing Towards Goal

## 2020-09-23 NOTE — BH NOTES
Behavioral Health Interdisciplinary Rounds      Patient Name: Familia Gill                      Age: 45 y.o. Room/Bed:  313/01  Primary Diagnosis: Schizoaffective disorder (HCC)         Admission Status: TDO                                    Readmission within 30 days: no  Power of  in place: no  Patient requires a blocked bed: no            Reason for blocked bed:   Order for blocked bed obtained: no     Sleep hours:     8        Morning Labs completed per orders:  na                                  Participation in Care/Groups:  no  Medication Compliant?: No  PRNS (last 24 hours): None                 Restraints (last 24 hours):  no  Substance Abuse:  no               24 hour chart check complete:     Patient goal(s) for today: follow unit directions   Treatment team focus/goals: stabilize - increase Clozaril   Progress note: Pt remains isolative to room and curled up in his bed. Selectively mute. Staff reports he showered last night.      LOS:  5                      Expected LOS: TBD     Financial concerns/prescription coverage: VA Medicare Part A&B and [facility] and TDO  Family contact: Sister/DENISE Ambrosio [phone]  9/21  Family requesting physician contact today: No  Discharge plan: Home  Access to weapons: None  Outpatient provider(s): Gómez MARINO  Patient's preferred phone number for follow up call: [phone]     Participating treatment team members: Kristan South MSW and Dr. Kriss Elizabeth.

## 2020-09-23 NOTE — GROUP NOTE
JENELLE  GROUP DOCUMENTATION INDIVIDUAL Group Therapy Note Date: 9/23/2020 Group Start Time: 1500 Group End Time: 3572 Group Topic: Recreational/Music Therapy [facility] [place] [address] ACUTE BEHAV [facility], [address] GROUP DOCUMENTATION GROUP Group Therapy Note Attendees: 5 Attendance: Did not attend Patient's Goal: Interventions/techniques Charanjit Molina

## 2020-09-23 NOTE — BH NOTES
PSYCHIATRIC PROGRESS NOTE         Patient Name  Jean Claude Cannon   Date of Birth 1970   [facility] [id number]   Medical Record Number  [id number]      Age  45 y.o. PCP Unknown, Provider   Admit date:  9/18/2020    Room Number  603/75  @ [facility]   Date of Service  9/23/2020         E & M PROGRESS NOTE:         HISTORY       CC:  \"psychosis\"  HISTORY OF PRESENT ILLNESS/INTERVAL HISTORY:  (reviewed/updated 9/23/2020). per initial evaluation: Ramon Michaels \"Corey\" Melquiades Balderas is a 45year old male with a history of schizophrenia admitted to the [facility] for increased psychosis. He has not been bathing or otherwise caring for himself at home. He was reportedly hospitalized at [facility] from April - June 2020. He does not verbally answer any questions. He is well known to this writer from previous admissions in the [place] area. He has responded well to ECT in the past.    09/21 - overnight, patient incontinent of urine, placed into diaper. Patient appeared catatonic at times, but was speaking, non-spontaneously, to the team this morning. Per nursing, the patient has significantly poor self care, grossly internally preoccucpied and with no ability to care for himself. SW reports that sister is patient's caretaker and that he has been decompensating from an already poor baseline recently, requiring prompting to eat meals and unable to perform basic ADLs. Family states he improved on Clozaril and they are requesting the team restart this medication. Of note, patient got [place] injection two weeks ago, VPA level suggests compliance with medication. 09/22 - patient has been isolative to bed, up for meals, selectively mute. Shakes and nods his head to stimuli. Patient slept 11 hours overnight, and is in bed on assessment. He is mute, does not endorse any pain or discomfort but is largely unresponsive to questions. Vitals unremarkable, BP mildly elevated.     09/23 - patient in bed much of the day, per nursing he got up and showered, and spoke briefly about wanting to go home. He is not compliant with medication; patient seen in his room, he is somnolent, shakes and nods his head, doesn't open his eyes for the duration of the interview. Patient slept 8 hours overnight. SIDE EFFECTS: (reviewed/updated 9/23/2020)  None reported or admitted to. No noted toxicity with use of Depakote   ALLERGIES:(reviewed/updated 9/23/2020)  Allergies   Allergen Reactions    Fluphenazine Unknown (comments)     Pt is unable to communicate properly.  Penicillins Unknown (comments)     Pt is unable to communicate properly. MEDICATIONS PRIOR TO ADMISSION:(reviewed/updated 9/23/2020)  Medications Prior to Admission   Medication Sig    FLUoxetine (PROzac) 20 mg capsule Take 20 mg by mouth daily.  benztropine (COGENTIN) 0.5 mg tablet Take 0.5 mg by mouth two (2) times a day. Indications: extrapyramidal symptoms as a result of taking the medication    LORazepam (Ativan) 1 mg tablet Take 1 mg by mouth two (2) times a day.  valproate (Depakene) 250 mg/5 mL syrup Take 1,000 mg by mouth two (2) times a day.  paliperidone palmitate (Invega Sustenna) 234 mg/1.5 mL injection 234 mg by IntraMUSCular route every twenty-one (21) days. Indications: schizophrenia    OLANZapine (ZyPREXA zydis) 20 mg disintegrating tablet Take 20 mg by mouth Daily (before dinner). Indications: schizophrenia      PAST MEDICAL HISTORY: Past medical history from the initial psychiatric evaluation has been reviewed (reviewed/updated 9/23/2020) with no additional updates (I asked patient and no additional past medical history provided). Past Medical History:   Diagnosis Date    Psychiatric disorder     Psychotic disorder ([facility] [address]     Withdrawal syndrome ([facility] [address]    History reviewed. No pertinent surgical history.    SOCIAL HISTORY: Social history from the initial psychiatric evaluation has been reviewed (reviewed/updated 9/23/2020) with no additional updates (I asked patient and no additional social history provided). Social History     Socioeconomic History    Marital status: SINGLE     Spouse name: Not on file    Number of children: Not on file    Years of education: Not on file    Highest education level: Not on file   Occupational History    Not on file   Social Needs    Financial resource strain: Not on file    Food insecurity     Worry: Not on file     Inability: Not on file    Transportation needs     Medical: Not on file     Non-medical: Not on file   Tobacco Use    Smoking status: Never Smoker    Smokeless tobacco: Never Used   Substance and Sexual Activity    Alcohol use: No    Drug use: No    Sexual activity: Not on file   Lifestyle    Physical activity     Days per week: Not on file     Minutes per session: Not on file    Stress: Not on file   Relationships    Social connections     Talks on phone: Not on file     Gets together: Not on file     Attends Restoration service: Not on file     Active member of club or organization: Not on file     Attends meetings of clubs or organizations: Not on file     Relationship status: Not on file    Intimate partner violence     Fear of current or ex partner: Not on file     Emotionally abused: Not on file     Physically abused: Not on file     Forced sexual activity: Not on file   Other Topics Concern    Not on file   Social History Narrative    Pt is a HS grad and is unemployed. He lives with his sister. On disability    No pending legal charge reported      FAMILY HISTORY: Family history from the initial psychiatric evaluation has been reviewed (reviewed/updated 9/23/2020) with no additional updates (I asked patient and no additional family history provided). History reviewed. No pertinent family history.     REVIEW OF SYSTEMS: (reviewed/updated 9/23/2020)  Appetite:poor   Sleep: poor with DIMS (difficulty initiating & maintaining sleep)   All other Review of Systems: Negative except per HPI MENTAL STATUS EXAM & VITALS     MENTAL STATUS EXAM (MSE):    MSE FINDINGS ARE WITHIN NORMAL LIMITS (WNL) UNLESS OTHERWISE STATED BELOW. ( ALL OF THE BELOW CATEGORIES OF THE MSE HAVE BEEN REVIEWED (reviewed 9/23/2020) AND UPDATED AS DEEMED APPROPRIATE )  General Presentation age appropriate, cooperative   Orientation disorganized   Vital Signs  See below (reviewed 9/23/2020); Vital Signs (BP, Pulse, & Temp) are within normal limits if not listed below.    Gait and Station Stable/steady, no ataxia   Musculoskeletal System No extrapyramidal symptoms (EPS); no abnormal muscular movements or Tardive Dyskinesia (TD); muscle strength and tone are within normal limits   Language No aphasia or dysarthria   Speech:  hypoverbal, increased latency of response, non-pressured and soft   Thought Processes illogical; slow rate of thoughts; poor abstract reasoning/computation   Thought Associations blocked    Thought Content poverty of content   Suicidal Ideations unable to assess   Homicidal Ideations unable to assess   Mood:  unable to asses   Affect:  flat   Memory recent  impaired   Memory remote:  impaired   Concentration/Attention:  impaired   Fund of Knowledge significantly below average   Insight:  poor   Reliability fair   Judgment:  impaired due to active psychosis          VITALS:     Patient Vitals for the past 24 hrs:   Temp Pulse Resp BP SpO2   09/23/20 0830 98 °F (36.7 °C) 73 16 123/76 100 %   09/22/20 2016 97.7 °F (36.5 °C) 68 20 125/82 99 %     Wt Readings from Last 3 Encounters:   09/18/20 85.7 kg (189 lb)   12/23/17 84.9 kg (187 lb 1.6 oz)   03/25/17 95.6 kg (210 lb 11.2 oz)     Temp Readings from Last 3 Encounters:   09/23/20 98 °F (36.7 °C)   03/28/17 97.5 °F (36.4 °C)   02/07/15 98.7 °F (37.1 °C)     BP Readings from Last 3 Encounters:   09/23/20 123/76   03/28/17 100/68   02/12/15 105/73     Pulse Readings from Last 3 Encounters:   09/23/20 73   03/28/17 67   02/12/15 87            DATA     LABORATORY DATA:(reviewed/updated 9/23/2020)  No results found for this or any previous visit (from the past 24 hour(s)). Lab Results   Component Value Date/Time    Valproic acid 97 09/18/2020 04:42 PM     No results found for: LITHM   RADIOLOGY REPORTS:(reviewed/updated 9/23/2020)  No results found. MEDICATIONS     ALL MEDICATIONS:   Current Facility-Administered Medications   Medication Dose Route Frequency    cloZAPine (CLOZARIL) tablet 50 mg  50 mg Oral QHS    clonazePAM (KlonoPIN) tablet 1 mg  1 mg Oral BID    valproic acid (as sodium salt) (DEPAKENE) 250 mg/5 mL (5 mL) oral solution 1,000 mg  1,000 mg Oral BID    OLANZapine (ZyPREXA) tablet 5 mg  5 mg Oral Q6H PRN    haloperidol lactate (HALDOL) injection 5 mg  5 mg IntraMUSCular Q6H PRN    benztropine (COGENTIN) tablet 1 mg  1 mg Oral BID PRN    diphenhydrAMINE (BENADRYL) injection 50 mg  50 mg IntraMUSCular BID PRN    hydrOXYzine HCL (ATARAX) tablet 50 mg  50 mg Oral TID PRN    LORazepam (ATIVAN) injection 1 mg  1 mg IntraMUSCular Q4H PRN    traZODone (DESYREL) tablet 50 mg  50 mg Oral QHS PRN    acetaminophen (TYLENOL) tablet 650 mg  650 mg Oral Q4H PRN    magnesium hydroxide (MILK OF MAGNESIA) 400 mg/5 mL oral suspension 30 mL  30 mL Oral DAILY PRN      SCHEDULED MEDICATIONS:   Current Facility-Administered Medications   Medication Dose Route Frequency    cloZAPine (CLOZARIL) tablet 50 mg  50 mg Oral QHS    clonazePAM (KlonoPIN) tablet 1 mg  1 mg Oral BID    valproic acid (as sodium salt) (DEPAKENE) 250 mg/5 mL (5 mL) oral solution 1,000 mg  1,000 mg Oral BID          ASSESSMENT & PLAN     DIAGNOSES REQUIRING ACTIVE TREATMENT AND MONITORING: (reviewed/updated 9/23/2020)  Patient Active Hospital Problem List:   Schizoaffective disorder ([facility] [address] (9/18/2020)    Assessment: patient with significant internal preoccupation, poor ADLs, history of catatonia.  Appears to have been compliant with home medication, but is regressed and with a markedly disorganized thought process. Will start Clozaril, add benzodiazepine for protection against catatonia to be tapered as Clozaril dose increases given interaction. 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 In summary, Ramesh Mendieta, is a 45 y.o.  male who presents with a severe exacerbation of the principal diagnosis of Schizoaffective disorder ([facility] [address]    Patient's condition is not improving. Patient requires continued inpatient hospitalization for further stabilization, safety monitoring and medication management. I will continue to coordinate the provision of individual, milieu, occupational, group, and substance abuse therapies to address target symptoms/diagnoses as deemed appropriate for the individual patient. A coordinated, multidisplinary treatment team round was conducted with the patient (this team consists of the nurse, psychiatric unit pharmacist,  and writer). Complete current electronic health record for patient has been reviewed today including consultant notes, ancillary staff notes, nurses and psychiatric tech notes. Suicide risk assessment completed and patient deemed to be of low risk for suicide at this time.      The following regarding medications was addressed during rounds with patient:   the risks and benefits of the proposed medication. The patient was given the opportunity to ask questions. Informed consent given to the use of the above medications. Will continue to adjust psychiatric and non-psychiatric medications (see above \"medication\" section and orders section for details) as deemed appropriate & based upon diagnoses and response to treatment. I will continue to order blood tests/labs and diagnostic tests as deemed appropriate and review results as they become available (see orders for details and above listed lab/test results). I will order psychiatric records from previous [facility] hospitals to further elucidate the nature of patient's psychopathology and review once available. I will gather additional collateral information from friends, family and o/p treatment team to further elucidate the nature of patient's psychopathology and baselline level of psychiatric functioning. I certify that this patient's inpatient psychiatric hospital services furnished since the previous certification were, and continue to be, required for treatment that could reasonably be expected to improve the patient's condition, or for diagnostic study, and that the patient continues to need, on a daily basis, active treatment furnished directly by or requiring the supervision of inpatient psychiatric facility personnel. In addition, the hospital records show that services furnished were intensive treatment services, admission or related services, or equivalent services.     EXPECTED DISCHARGE DATE/DAY: TBD     DISPOSITION: Home       Signed By:   Fadi Lynn MD  9/23/2020

## 2020-09-23 NOTE — PROGRESS NOTES
Problem: Discharge Planning  Goal: *Discharge to safe environment  Outcome: Progressing Towards Goal  Note: Patient identifies home as a safe environment. Patient will return home upon discharge. Goal: *Knowledge of medication management  Outcome: Not Progressing Towards Goal  Note: Patient is selectively taking medications. Goal: *Knowledge of discharge instructions  Outcome: Progressing Towards Goal  Note: Patient verbalizes understanding of goals for treatment and safe discharge.

## 2020-09-23 NOTE — PROGRESS NOTES
Diet as tolerated. Pt meal compliant. Hx unremarkable per nutrition.   Ht: 5'7\"  Wt: 189 lb  BMI: 29.6 kg/(m^2) c/w overweight  Est energy needs: 1815 kcal 70 g protein, 1 mL/kcal fluids  Pt will consume > 75% of meals at follow up 7-10 days  LOS

## 2020-09-23 NOTE — GROUP NOTE
JENELLE  GROUP DOCUMENTATION INDIVIDUAL Group Therapy Note Date: 9/23/2020 Group Start Time: 1100 Group End Time: 1200 Group Topic: Topic Group [facility] 3 ACUTE BEHAV [facility], [facility] [place] GROUP DOCUMENTATION GROUP Group Therapy Note Attendees: 5 Attendance: Did not attend Patient's Goal: Interventions/techniques:  
Vinita Marrero

## 2020-09-23 NOTE — BH NOTES
Assumed care for the patient following day shift at [address] [place] [address]. Patient presents as preoccupied and isolative. Patient was able to take a shower and change their clothes with encouragement from staff. Patient did refuse their bedtime medication and stated \"I don't like the way that makes me feel. \" Patient spent a majority of the shift in their room with the lights off. Patient did not voice any concerns at this time. Patient denies any S/I, H/I, and AVH. Patient slept a total of 8 hours.

## 2020-09-23 NOTE — BH NOTES
0700- Verbal shift change report given to [employer] Prabhu. (oncoming nurse) by Garo Holm. (offgoing nurse). Report included the following information SBAR, Kardex, MAR and Recent Results. [id number] Patient reports sleeping ok. Patient denies SI/HI/AVH. Patient denies anxiety and depression. Patient stated that his goal for today was to pray. Patient cooperative upon assessment with short sentence answers. Patients conversation was at the volume of a whisper throughout assessment. Patient was educated on medications that were scheduled and agreed to take them. Upon administration, Patient looked at [address], laughed, and said, \"is this LSD? \" Writer informed patient that it was his klonidine. No further issues noted at this point. Will continue to monitor patient for safety. [id number] Patient ambulated to day room and tolerated lunch without issue. Patient then ambulated back to his room and is resting. No issues noted at this time. Will continue to monitor patient for safety. [id number] Patient ambulated in hallway pacing. Patient attended groups and left shortly after it began. Patient visible in milieu and appears less isolative. Will continue to provide support to patient. [phone] Patient intermittently in room and in day room. Patient whispering while pacing, appearing to be responding to internal stimuli. Will monitor for safety. [id number] Patient refused medication Patient isolative to room. Ambulated to nurses station and asked for juice, in a whispering tone. Will continue to monitor patient for safety.

## 2020-09-24 PROCEDURE — 65220000003 HC RM SEMIPRIVATE PSYCH

## 2020-09-24 PROCEDURE — 74011250637 HC RX REV CODE- 250/637: Performed by: PSYCHIATRY & NEUROLOGY

## 2020-09-24 PROCEDURE — 99232 SBSQ HOSP IP/OBS MODERATE 35: CPT | Performed by: PSYCHIATRY & NEUROLOGY

## 2020-09-24 RX ADMIN — CLOZAPINE 50 MG: 25 TABLET ORAL at 21:57

## 2020-09-24 RX ADMIN — VALPROIC ACID 1000 MG: 250 SOLUTION ORAL at 17:59

## 2020-09-24 RX ADMIN — CLONAZEPAM 1 MG: 1 TABLET ORAL at 18:00

## 2020-09-24 NOTE — BH NOTES
Patient laying in the bed resting would not respond back verbally,also refused vital signs  2139 Patient is medication compliant and denied SI at this time  Slept 9.5 hours

## 2020-09-24 NOTE — BH NOTES
0700- Verbal shift change report given to Artemio Pelletier.  (oncoming nurse) by Adrianne Floyd. (offgoing nurse). Report included the following information SBAR, Kardex, MAR, Recent Results and Med Rec Status. [id number] Patient uncooperative upon assessment. Patient denies SI/HI. Ignored writer when asking about AVH and other assessment questions. Patient also refused vitals and morning medications. Will continue to monitor patient and provide support. Patient isolative to room. Did not attend groups. Will continue to monitor patient. [id number] Patient isolative to room. Will continue to monitor patient. [phone] Patient walking in hallway pacing. Staff noticed stain on patients pants from behind. Patient was incontinent. Writer assisted patient in cleaning showering and re-dressing. Patient tolerated lunch without issue. Will continue to monitor patient for support. [phone] Patient continues to be isolative to room. Will continue to monitor for support. [phone] Patient resting quietly in room. Medication compliant without issue. Will continue to monitor and provide support to patient.

## 2020-09-24 NOTE — GROUP NOTE
JENELLE  GROUP DOCUMENTATION INDIVIDUAL Group Therapy Note Date: 9/24/2020 Group Start Time: 1100 Group End Time: 1200 Group Topic: Topic Group [facility] 3 ACUTE BEHAV [facility] [place], [address] [facility] GROUP DOCUMENTATION GROUP Group Therapy Note Attendees: 5 Attendance: Did not attend Patient's Goal: Interventions/techniques Renato Diamond

## 2020-09-24 NOTE — INTERDISCIPLINARY ROUNDS
Behavioral Health Interdisciplinary Rounds Patient Name: Pilo Michael  Age: 45 y.o. Room/Bed:  313/01 Primary Diagnosis: Schizoaffective disorder ([facility] [address] Admission Status: TDO Readmission within 30 days: no 
Power of  in place: no 
Patient requires a blocked bed: no           
Reason for blocked bed: na 
Order for blocked bed obtained: no    
 
Sleep hours:  9.5 Morning Labs completed per orders:  na      
Participation in Care/Groups:  no 
Medication Compliant?: Yes PRNS (last 24 hours): None Restraints (last 24 hours):  no 
Substance Abuse:  no   
24 hour chart check complete: yes Patient goal(s) for today: follow unit directions  
Treatment team focus/goals: stabilize - increase Clozaril  
Progress note: Pt remains isolative to room and curled up in his bed. Selectively mute.  
  
LOS:  6                     Expected LOS: TBD 
  
Financial concerns/prescription coverage: [employer] Medicare Part A&B and [address] Medicaid and TDO Family contact: Sister/POANJU [place] [phone]  8/36 Family requesting physician contact today: No 
Discharge plan: Home Access to weapons: None Outpatient provider(s): Gómez MARINO Patient's preferred phone number for follow up 23-14-20-09 
  
Participating treatment team members: LAVINIA Ortiz and Dr. Leesa Aguirre

## 2020-09-24 NOTE — PROGRESS NOTES
Problem: Falls - Risk of  Goal: *Absence of Falls  Description: Document Vanessa Acosta Fall Risk and appropriate interventions in the flowsheet. Outcome: Progressing Towards Goal  Note: Fall Risk Interventions:       Mentation Interventions: Room close to nurse's station, Toileting rounds, Reorient patient    Medication Interventions: Teach patient to arise slowly    Elimination Interventions:  Toileting schedule/hourly rounds              Problem: Altered Thought Process (Adult/Pediatric)  Goal: *STG: Participates in treatment plan  Outcome: Progressing Towards Goal  Goal: *STG: Remains safe in hospital  Outcome: Progressing Towards Goal  Goal: *STG: Seeks staff when feelings of anxiety and fear arise  Outcome: Progressing Towards Goal

## 2020-09-24 NOTE — BH NOTES
PSYCHIATRIC PROGRESS NOTE         Patient Name  April Mackenzie   Date of Birth 1970   [facility] [id number]   Medical Record Number  [id number]      Age  45 y.o. PCP Unknown, Provider   Admit date:  9/18/2020    Room Number  975/70  @ [facility]   Date of Service  9/24/2020         E & M PROGRESS NOTE:         HISTORY       CC:  \"psychosis\"  HISTORY OF PRESENT ILLNESS/INTERVAL HISTORY:  (reviewed/updated 9/24/2020). per initial evaluation: Gray Holley \"Corey\" Ashok Erazo is a 45year old male with a history of schizophrenia admitted to the [facility] for increased psychosis. He has not been bathing or otherwise caring for himself at home. He was reportedly hospitalized at [facility] from April - June 2020. He does not verbally answer any questions. He is well known to this writer from previous admissions in the [place] area. He has responded well to ECT in the past.    09/21 - overnight, patient incontinent of urine, placed into diaper. Patient appeared catatonic at times, but was speaking, non-spontaneously, to the team this morning. Per nursing, the patient has significantly poor self care, grossly internally preoccucpied and with no ability to care for himself. SW reports that sister is patient's caretaker and that he has been decompensating from an already poor baseline recently, requiring prompting to eat meals and unable to perform basic ADLs. Family states he improved on Clozaril and they are requesting the team restart this medication. Of note, patient got [place] injection two weeks ago, VPA level suggests compliance with medication. 09/22 - patient has been isolative to bed, up for meals, selectively mute. Shakes and nods his head to stimuli. Patient slept 11 hours overnight, and is in bed on assessment. He is mute, does not endorse any pain or discomfort but is largely unresponsive to questions. Vitals unremarkable, BP mildly elevated.     09/23 - patient in bed much of the day, per nursing he got up and showered, and spoke briefly about wanting to go home. He is not compliant with medication; patient seen in his room, he is somnolent, shakes and nods his head, doesn't open his eyes for the duration of the interview. Patient slept 8 hours overnight. 09/24 - patient refused vital signs and medications this morning. He remains isolative to bed for much of the day. Per nursing assessment, the patient has been internally preoccupied, selectively mute, slept 9.5 hours overnight. Patient briefed on the importance of medication compliance, does not voice understanding of the situation. SIDE EFFECTS: (reviewed/updated 9/24/2020)  None reported or admitted to. No noted toxicity with use of Depakote   ALLERGIES:(reviewed/updated 9/24/2020)  Allergies   Allergen Reactions    Fluphenazine Unknown (comments)     Pt is unable to communicate properly.  Penicillins Unknown (comments)     Pt is unable to communicate properly. MEDICATIONS PRIOR TO ADMISSION:(reviewed/updated 9/24/2020)  Medications Prior to Admission   Medication Sig    FLUoxetine (PROzac) 20 mg capsule Take 20 mg by mouth daily.  benztropine (COGENTIN) 0.5 mg tablet Take 0.5 mg by mouth two (2) times a day. Indications: extrapyramidal symptoms as a result of taking the medication    LORazepam (Ativan) 1 mg tablet Take 1 mg by mouth two (2) times a day.  valproate (Depakene) 250 mg/5 mL syrup Take 1,000 mg by mouth two (2) times a day.  paliperidone palmitate (Invega Sustenna) 234 mg/1.5 mL injection 234 mg by IntraMUSCular route every twenty-one (21) days. Indications: schizophrenia    OLANZapine (ZyPREXA zydis) 20 mg disintegrating tablet Take 20 mg by mouth Daily (before dinner).  Indications: schizophrenia      PAST MEDICAL HISTORY: Past medical history from the initial psychiatric evaluation has been reviewed (reviewed/updated 9/24/2020) with no additional updates (I asked patient and no additional past medical history provided). Past Medical History:   Diagnosis Date    Psychiatric disorder     Psychotic disorder ([facility] [address]     Withdrawal syndrome ([facility] [address]    History reviewed. No pertinent surgical history. SOCIAL HISTORY: Social history from the initial psychiatric evaluation has been reviewed (reviewed/updated 9/24/2020) with no additional updates (I asked patient and no additional social history provided). Social History     Socioeconomic History    Marital status: SINGLE     Spouse name: Not on file    Number of children: Not on file    Years of education: Not on file    Highest education level: Not on file   Occupational History    Not on file   Social Needs    Financial resource strain: Not on file    Food insecurity     Worry: Not on file     Inability: Not on file    Transportation needs     Medical: Not on file     Non-medical: Not on file   Tobacco Use    Smoking status: Never Smoker    Smokeless tobacco: Never Used   Substance and Sexual Activity    Alcohol use: No    Drug use: No    Sexual activity: Not on file   Lifestyle    Physical activity     Days per week: Not on file     Minutes per session: Not on file    Stress: Not on file   Relationships    Social connections     Talks on phone: Not on file     Gets together: Not on file     Attends Synagogue service: Not on file     Active member of club or organization: Not on file     Attends meetings of clubs or organizations: Not on file     Relationship status: Not on file    Intimate partner violence     Fear of current or ex partner: Not on file     Emotionally abused: Not on file     Physically abused: Not on file     Forced sexual activity: Not on file   Other Topics Concern    Not on file   Social History Narrative    Pt is a HS grad and is unemployed. He lives with his sister.  On disability    No pending legal charge reported      FAMILY HISTORY: Family history from the initial psychiatric evaluation has been reviewed (reviewed/updated 9/24/2020) with no additional updates (I asked patient and no additional family history provided). History reviewed. No pertinent family history. REVIEW OF SYSTEMS: (reviewed/updated 9/24/2020)  Appetite:poor   Sleep: poor with DIMS (difficulty initiating & maintaining sleep)   All other Review of Systems: Negative except per HPI         [facility] (MSE):    MSE FINDINGS ARE WITHIN NORMAL LIMITS (WNL) UNLESS OTHERWISE STATED BELOW. ( ALL OF THE BELOW CATEGORIES OF THE MSE HAVE BEEN REVIEWED (reviewed 9/24/2020) AND UPDATED AS DEEMED APPROPRIATE )  General Presentation age appropriate, cooperative   Orientation disorganized   Vital Signs  See below (reviewed 9/24/2020); Vital Signs (BP, Pulse, & Temp) are within normal limits if not listed below. Gait and Station Stable/steady, no ataxia   Musculoskeletal System No extrapyramidal symptoms (EPS); no abnormal muscular movements or Tardive Dyskinesia (TD); muscle strength and tone are within normal limits   Language No aphasia or dysarthria   Speech:  hypoverbal, increased latency of response, non-pressured and soft   Thought Processes illogical; slow rate of thoughts; poor abstract reasoning/computation   Thought Associations blocked    Thought Content poverty of content   Suicidal Ideations unable to assess   Homicidal Ideations unable to assess   Mood:  unable to asses   Affect:  flat   Memory recent  impaired   Memory remote:  impaired   Concentration/Attention:  impaired   Fund of Knowledge significantly below average   Insight:  poor   Reliability fair   Judgment:  impaired due to active psychosis          VITALS:     No data found.   Wt Readings from Last 3 Encounters:   09/18/20 85.7 kg (189 lb)   12/23/17 84.9 kg (187 lb 1.6 oz)   03/25/17 95.6 kg (210 lb 11.2 oz)     Temp Readings from Last 3 Encounters:   03/28/17 97.5 °F (36.4 °C)   02/07/15 98.7 °F (37.1 °C)     BP Readings from Last 3 Encounters:   09/23/20 123/76   03/28/17 100/68   02/12/15 105/73     Pulse Readings from Last 3 Encounters:   09/23/20 73   03/28/17 67   02/12/15 87            DATA     LABORATORY DATA:(reviewed/updated 9/24/2020)  No results found for this or any previous visit (from the past 24 hour(s)). Lab Results   Component Value Date/Time    Valproic acid 97 09/18/2020 04:42 PM     No results found for: LITHM   RADIOLOGY REPORTS:(reviewed/updated 9/24/2020)  No results found.        MEDICATIONS     ALL MEDICATIONS:   Current Facility-Administered Medications   Medication Dose Route Frequency    cloZAPine (CLOZARIL) tablet 50 mg  50 mg Oral QHS    clonazePAM (KlonoPIN) tablet 1 mg  1 mg Oral BID    valproic acid (as sodium salt) (DEPAKENE) 250 mg/5 mL (5 mL) oral solution 1,000 mg  1,000 mg Oral BID    OLANZapine (ZyPREXA) tablet 5 mg  5 mg Oral Q6H PRN    haloperidol lactate (HALDOL) injection 5 mg  5 mg IntraMUSCular Q6H PRN    benztropine (COGENTIN) tablet 1 mg  1 mg Oral BID PRN    diphenhydrAMINE (BENADRYL) injection 50 mg  50 mg IntraMUSCular BID PRN    hydrOXYzine HCL (ATARAX) tablet 50 mg  50 mg Oral TID PRN    LORazepam (ATIVAN) injection 1 mg  1 mg IntraMUSCular Q4H PRN    traZODone (DESYREL) tablet 50 mg  50 mg Oral QHS PRN    acetaminophen (TYLENOL) tablet 650 mg  650 mg Oral Q4H PRN    magnesium hydroxide (MILK OF MAGNESIA) 400 mg/5 mL oral suspension 30 mL  30 mL Oral DAILY PRN      SCHEDULED MEDICATIONS:   Current Facility-Administered Medications   Medication Dose Route Frequency    cloZAPine (CLOZARIL) tablet 50 mg  50 mg Oral QHS    clonazePAM (KlonoPIN) tablet 1 mg  1 mg Oral BID    valproic acid (as sodium salt) (DEPAKENE) 250 mg/5 mL (5 mL) oral solution 1,000 mg  1,000 mg Oral BID          ASSESSMENT & PLAN     DIAGNOSES REQUIRING ACTIVE TREATMENT AND MONITORING: (reviewed/updated 9/24/2020)  Patient Active Hospital Problem List:   Schizoaffective disorder ([facility] [address] (9/18/2020)    Assessment: patient with significant internal preoccupation, poor ADLs, history of catatonia. Appears to have been compliant with home medication, but is regressed and with a markedly disorganized thought process. Will start Clozaril, add benzodiazepine for protection against catatonia to be tapered as Clozaril dose increases given interaction. 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  - CONTINUE Klonopin 1 mg BID for catatonia  - Consider Court order  - CONTINUE VPA oral soln 1000 mg BID for mood lability, seizure prophylaxis  - RESCHEDULE VPA level  - DISCONTINUE Prozac / Cogentin / Zydis due to polypharamacy  - Consider antihypertensive  - IGM therapy as tolerated    I will continue to monitor blood levels (Depakote, clozapine---a drug with a narrow therapeutic index= NTI) and associated labs for drug therapy implemented that require intense monitoring for toxicity as deemed appropriate based on current medication side effects and pharmacodynamically determined drug 1/2 lives. In summary, Roger Armijo, is a 45 y.o.  male who presents with a severe exacerbation of the principal diagnosis of Schizoaffective disorder ([facility] [address]    Patient's condition is not improving. Patient requires continued inpatient hospitalization for further stabilization, safety monitoring and medication management. I will continue to coordinate the provision of individual, milieu, occupational, group, and substance abuse therapies to address target symptoms/diagnoses as deemed appropriate for the individual patient. A coordinated, multidisplinary treatment team round was conducted with the patient (this team consists of the nurse, psychiatric unit pharmacist,  and writer).      Complete current electronic health record for patient has been reviewed today including consultant notes, ancillary staff notes, nurses and psychiatric tech notes. Suicide risk assessment completed and patient deemed to be of low risk for suicide at this time. The following regarding medications was addressed during rounds with patient:   the risks and benefits of the proposed medication. The patient was given the opportunity to ask questions. Informed consent given to the use of the above medications. Will continue to adjust psychiatric and non-psychiatric medications (see above \"medication\" section and orders section for details) as deemed appropriate & based upon diagnoses and response to treatment. I will continue to order blood tests/labs and diagnostic tests as deemed appropriate and review results as they become available (see orders for details and above listed lab/test results). I will order psychiatric records from previous [facility] hospitals to further elucidate the nature of patient's psychopathology and review once available. I will gather additional collateral information from friends, family and o/p treatment team to further elucidate the nature of patient's psychopathology and baselline level of psychiatric functioning. I certify that this patient's inpatient psychiatric hospital services furnished since the previous certification were, and continue to be, required for treatment that could reasonably be expected to improve the patient's condition, or for diagnostic study, and that the patient continues to need, on a daily basis, active treatment furnished directly by or requiring the supervision of inpatient psychiatric facility personnel. In addition, the hospital records show that services furnished were intensive treatment services, admission or related services, or equivalent services.     EXPECTED DISCHARGE DATE/DAY: TBD     DISPOSITION: Home       Signed By:   Vanita Ramirez MD  9/24/2020

## 2020-09-24 NOTE — GROUP NOTE
JENELLE  GROUP DOCUMENTATION INDIVIDUAL Group Therapy Note Date: 9/24/2020 Group Start Time: 1500 Group End Time: 1932 Group Topic: Recreational/Music Therapy [facility] [place] [id number] ACUTE BEHAV [facility] [place], [address] GROUP DOCUMENTATION GROUP Group Therapy Note Attendees: 5 Attendance: Did not attend Patient's Goal: Interventions/techniques: 
Ethan Clifford

## 2020-09-24 NOTE — PROGRESS NOTES
Problem: Altered Thought Process (Adult/Pediatric)  Goal: *STG: Remains safe in hospital  Outcome: Progressing Towards Goal  Goal: *STG: Decreased delusional thinking  Outcome: Not Progressing Towards Goal  Goal: *STG: Decreased hallucinations  Outcome: Not Progressing Towards Goal

## 2020-09-25 PROCEDURE — 74011250637 HC RX REV CODE- 250/637: Performed by: PSYCHIATRY & NEUROLOGY

## 2020-09-25 PROCEDURE — 99232 SBSQ HOSP IP/OBS MODERATE 35: CPT | Performed by: PSYCHIATRY & NEUROLOGY

## 2020-09-25 PROCEDURE — 65220000003 HC RM SEMIPRIVATE PSYCH

## 2020-09-25 RX ORDER — CLOZAPINE 25 MG/1
75 TABLET ORAL
Status: DISCONTINUED | OUTPATIENT
Start: 2020-09-25 | End: 2020-10-01

## 2020-09-25 RX ADMIN — CLOZAPINE 75 MG: 25 TABLET ORAL at 22:22

## 2020-09-25 NOTE — PROGRESS NOTES
Problem: Falls - Risk of  Goal: *Absence of Falls  Description: Document Chi Going Fall Risk and appropriate interventions in the flowsheet. Outcome: Progressing Towards Goal  Note: Fall Risk Interventions:       Mentation Interventions: Room close to nurse's station, Toileting rounds, Reorient patient    Medication Interventions: Teach patient to arise slowly    Elimination Interventions:  Toileting schedule/hourly rounds              Problem: Altered Thought Process (Adult/Pediatric)  Goal: *STG: Remains safe in hospital  Outcome: Progressing Towards Goal

## 2020-09-25 NOTE — GROUP NOTE
[facility] GROUP DOCUMENTATION INDIVIDUAL Group Therapy Note Date: 9/25/2020 Group Start Time: 1100 Group End Time: 1200 Group Topic: Topic Group [facility] [address] [facility], [address] [facility] GROUP DOCUMENTATION GROUP Group Therapy Note Attendees: 6 Attendance: Did not attend Patient's Goal: Interventions/techniques: 
 
Elpidio Morelos

## 2020-09-25 NOTE — BH NOTES
PSYCHIATRIC PROGRESS NOTE         Patient Name  Prashanth Ballard   Date of Birth 1970   [facility] [id number]   Medical Record Number  [id number]      Age  45 y.o. PCP Unknown, Provider   Admit date:  9/18/2020    Room Number  880/81  @ [facility]   Date of Service  9/25/2020         E & M PROGRESS NOTE:         HISTORY       CC:  \"psychosis\"  HISTORY OF PRESENT ILLNESS/INTERVAL HISTORY:  (reviewed/updated 9/25/2020). per initial evaluation: Sadia Craig \"Corey\" Lalit Woody is a 45year old male with a history of schizophrenia admitted to the [facility] for increased psychosis. He has not been bathing or otherwise caring for himself at home. He was reportedly hospitalized at [facility] from April - June 2020. He does not verbally answer any questions. He is well known to this writer from previous admissions in the [place] area. He has responded well to ECT in the past.    09/21 - overnight, patient incontinent of urine, placed into diaper. Patient appeared catatonic at times, but was speaking, non-spontaneously, to the team this morning. Per nursing, the patient has significantly poor self care, grossly internally preoccucpied and with no ability to care for himself. SW reports that sister is patient's caretaker and that he has been decompensating from an already poor baseline recently, requiring prompting to eat meals and unable to perform basic ADLs. Family states he improved on Clozaril and they are requesting the team restart this medication. Of note, patient got [place] injection two weeks ago, VPA level suggests compliance with medication. 09/22 - patient has been isolative to bed, up for meals, selectively mute. Shakes and nods his head to stimuli. Patient slept 11 hours overnight, and is in bed on assessment. He is mute, does not endorse any pain or discomfort but is largely unresponsive to questions. Vitals unremarkable, BP mildly elevated.     09/23 - patient in bed much of the day, per nursing he got up and showered, and spoke briefly about wanting to go home. He is not compliant with medication; patient seen in his room, he is somnolent, shakes and nods his head, doesn't open his eyes for the duration of the interview. Patient slept 8 hours overnight. 09/24 - patient refused vital signs and medications this morning. He remains isolative to bed for much of the day. Per nursing assessment, the patient has been internally preoccupied, selectively mute, slept 9.5 hours overnight. Patient briefed on the importance of medication compliance, does not voice understanding of the situation. 09/25 - overnight, patient was visible, pacing, largely incoherent but with no aggression noted. No PRNs were given to the patient but he is refusing several doses of medication (refused VPA, Klonopin AM doses); he is taking Clozaril. Patient noted to have repetitive movements, selectively mute on interview, won't get out of bed. Family asking for neurologic workup, MRI as he has not had this before. SIDE EFFECTS: (reviewed/updated 9/25/2020)  None reported or admitted to. No noted toxicity with use of Depakote   ALLERGIES:(reviewed/updated 9/25/2020)  Allergies   Allergen Reactions    Fluphenazine Unknown (comments)     Pt is unable to communicate properly.  Penicillins Unknown (comments)     Pt is unable to communicate properly. MEDICATIONS PRIOR TO ADMISSION:(reviewed/updated 9/25/2020)  Medications Prior to Admission   Medication Sig    FLUoxetine (PROzac) 20 mg capsule Take 20 mg by mouth daily.  benztropine (COGENTIN) 0.5 mg tablet Take 0.5 mg by mouth two (2) times a day. Indications: extrapyramidal symptoms as a result of taking the medication    LORazepam (Ativan) 1 mg tablet Take 1 mg by mouth two (2) times a day.  valproate (Depakene) 250 mg/5 mL syrup Take 1,000 mg by mouth two (2) times a day.     paliperidone palmitate (Invega Sustenna) 234 mg/1.5 mL injection 234 mg by IntraMUSCular route every twenty-one (21) days. Indications: schizophrenia    OLANZapine (ZyPREXA zydis) 20 mg disintegrating tablet Take 20 mg by mouth Daily (before dinner). Indications: schizophrenia      PAST MEDICAL HISTORY: Past medical history from the initial psychiatric evaluation has been reviewed (reviewed/updated 9/25/2020) with no additional updates (I asked patient and no additional past medical history provided). Past Medical History:   Diagnosis Date    Psychiatric disorder     Psychotic disorder ([facility]     Withdrawal syndrome ([facility]    History reviewed. No pertinent surgical history. SOCIAL HISTORY: Social history from the initial psychiatric evaluation has been reviewed (reviewed/updated 9/25/2020) with no additional updates (I asked patient and no additional social history provided).    Social History     Socioeconomic History    Marital status: SINGLE     Spouse name: Not on file    Number of children: Not on file    Years of education: Not on file    Highest education level: Not on file   Occupational History    Not on file   Social Needs    Financial resource strain: Not on file    Food insecurity     Worry: Not on file     Inability: Not on file    Transportation needs     Medical: Not on file     Non-medical: Not on file   Tobacco Use    Smoking status: Never Smoker    Smokeless tobacco: Never Used   Substance and Sexual Activity    Alcohol use: No    Drug use: No    Sexual activity: Not on file   Lifestyle    Physical activity     Days per week: Not on file     Minutes per session: Not on file    Stress: Not on file   Relationships    Social connections     Talks on phone: Not on file     Gets together: Not on file     Attends Congregational service: Not on file     Active member of club or organization: Not on file     Attends meetings of clubs or organizations: Not on file     Relationship status: Not on file    Intimate partner violence     Fear of current or ex partner: Not on file Emotionally abused: Not on file     Physically abused: Not on file     Forced sexual activity: Not on file   Other Topics Concern    Not on file   Social History Narrative    Pt is a HS grad and is unemployed. He lives with his sister. On disability    No pending legal charge reported      FAMILY HISTORY: Family history from the initial psychiatric evaluation has been reviewed (reviewed/updated 9/25/2020) with no additional updates (I asked patient and no additional family history provided). History reviewed. No pertinent family history. REVIEW OF SYSTEMS: (reviewed/updated 9/25/2020)  Appetite:poor   Sleep: poor with DIMS (difficulty initiating & maintaining sleep)   All other Review of Systems: Negative except per HPI         [facility] (MSE):    MSE FINDINGS ARE WITHIN NORMAL LIMITS (WNL) UNLESS OTHERWISE STATED BELOW. ( ALL OF THE BELOW CATEGORIES OF THE MSE HAVE BEEN REVIEWED (reviewed 9/25/2020) AND UPDATED AS DEEMED APPROPRIATE )  General Presentation age appropriate, cooperative   Orientation disorganized   Vital Signs  See below (reviewed 9/25/2020); Vital Signs (BP, Pulse, & Temp) are within normal limits if not listed below.    Gait and Station Stable/steady, no ataxia   Musculoskeletal System No extrapyramidal symptoms (EPS); no abnormal muscular movements or Tardive Dyskinesia (TD); muscle strength and tone are within normal limits   Language No aphasia or dysarthria   Speech:  hypoverbal, increased latency of response, non-pressured and soft   Thought Processes illogical; slow rate of thoughts; poor abstract reasoning/computation   Thought Associations blocked    Thought Content poverty of content   Suicidal Ideations unable to assess   Homicidal Ideations unable to assess   Mood:  unable to asses   Affect:  flat   Memory recent  impaired   Memory remote:  impaired   Concentration/Attention:  impaired   Fund of Knowledge significantly below average   Insight: poor   Reliability fair   Judgment:  impaired due to active psychosis          VITALS:     Patient Vitals for the past 24 hrs:   Temp Pulse Resp BP SpO2   09/24/20 1930 98.5 °F (36.9 °C) 80 17 122/64 97 %     Wt Readings from Last 3 Encounters:   09/18/20 85.7 kg (189 lb)   12/23/17 84.9 kg (187 lb 1.6 oz)   03/25/17 95.6 kg (210 lb 11.2 oz)     Temp Readings from Last 3 Encounters:   09/24/20 98.5 °F (36.9 °C)   03/28/17 97.5 °F (36.4 °C)   02/07/15 98.7 °F (37.1 °C)     BP Readings from Last 3 Encounters:   09/24/20 122/64   03/28/17 100/68   02/12/15 105/73     Pulse Readings from Last 3 Encounters:   09/24/20 80   03/28/17 67   02/12/15 87            DATA     LABORATORY DATA:(reviewed/updated 9/25/2020)  No results found for this or any previous visit (from the past 24 hour(s)). Lab Results   Component Value Date/Time    Valproic acid 97 09/18/2020 04:42 PM     No results found for: LITHM   RADIOLOGY REPORTS:(reviewed/updated 9/25/2020)  No results found.        MEDICATIONS     ALL MEDICATIONS:   Current Facility-Administered Medications   Medication Dose Route Frequency    cloZAPine (CLOZARIL) tablet 50 mg  50 mg Oral QHS    clonazePAM (KlonoPIN) tablet 1 mg  1 mg Oral BID    valproic acid (as sodium salt) (DEPAKENE) 250 mg/5 mL (5 mL) oral solution 1,000 mg  1,000 mg Oral BID    OLANZapine (ZyPREXA) tablet 5 mg  5 mg Oral Q6H PRN    haloperidol lactate (HALDOL) injection 5 mg  5 mg IntraMUSCular Q6H PRN    benztropine (COGENTIN) tablet 1 mg  1 mg Oral BID PRN    diphenhydrAMINE (BENADRYL) injection 50 mg  50 mg IntraMUSCular BID PRN    hydrOXYzine HCL (ATARAX) tablet 50 mg  50 mg Oral TID PRN    LORazepam (ATIVAN) injection 1 mg  1 mg IntraMUSCular Q4H PRN    traZODone (DESYREL) tablet 50 mg  50 mg Oral QHS PRN    acetaminophen (TYLENOL) tablet 650 mg  650 mg Oral Q4H PRN    magnesium hydroxide (MILK OF MAGNESIA) 400 mg/5 mL oral suspension 30 mL  30 mL Oral DAILY PRN      SCHEDULED MEDICATIONS: Current Facility-Administered Medications   Medication Dose Route Frequency    cloZAPine (CLOZARIL) tablet 50 mg  50 mg Oral QHS    clonazePAM (KlonoPIN) tablet 1 mg  1 mg Oral BID    valproic acid (as sodium salt) (DEPAKENE) 250 mg/5 mL (5 mL) oral solution 1,000 mg  1,000 mg Oral BID          ASSESSMENT & PLAN     DIAGNOSES REQUIRING ACTIVE TREATMENT AND MONITORING: (reviewed/updated 9/25/2020)  Patient C/Alaina Crespo [address] Problem List:   [facility] (9/18/2020)    Assessment: patient with significant internal preoccupation, poor ADLs, history of catatonia. Appears to have been compliant with home medication, but is regressed and with a markedly disorganized thought process. Will start Clozaril, add benzodiazepine for protection against catatonia to be tapered as Clozaril dose increases given interaction. 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  - CONTINUE Klonopin 1 mg BID for catatonia  - Consider Court order  - CONTINUE VPA oral soln 1000 mg BID for mood lability, seizure prophylaxis  - RESCHEDULE VPA level  - DISCONTINUE Prozac / Cogentin / Zydis due to polypharamacy  - Consider antihypertensive  - IGM therapy as tolerated    I will continue to monitor blood levels (Depakote, clozapine---a drug with a narrow therapeutic index= NTI) and associated labs for drug therapy implemented that require intense monitoring for toxicity as deemed appropriate based on current medication side effects and pharmacodynamically determined drug 1/2 lives. In summary, Bianca Fisher, is a 45 y.o.  male who presents with a severe exacerbation of the principal diagnosis of Schizoaffective disorder ([facility]    Patient's condition is not improving.     Patient requires continued inpatient hospitalization for further stabilization, safety monitoring and medication management. I will continue to coordinate the provision of individual, milieu, occupational, group, and substance abuse therapies to address target symptoms/diagnoses as deemed appropriate for the individual patient. A coordinated, multidisplinary treatment team round was conducted with the patient (this team consists of the nurse, psychiatric unit pharmacist,  and writer). Complete current electronic health record for patient has been reviewed today including consultant notes, ancillary staff notes, nurses and psychiatric tech notes. Suicide risk assessment completed and patient deemed to be of low risk for suicide at this time. The following regarding medications was addressed during rounds with patient:   the risks and benefits of the proposed medication. The patient was given the opportunity to ask questions. Informed consent given to the use of the above medications. Will continue to adjust psychiatric and non-psychiatric medications (see above \"medication\" section and orders section for details) as deemed appropriate & based upon diagnoses and response to treatment. I will continue to order blood tests/labs and diagnostic tests as deemed appropriate and review results as they become available (see orders for details and above listed lab/test results). I will order psychiatric records from previous [facility] hospitals to further elucidate the nature of patient's psychopathology and review once available. I will gather additional collateral information from friends, family and o/p treatment team to further elucidate the nature of patient's psychopathology and baselline level of psychiatric functioning.          I certify that this patient's inpatient psychiatric hospital services furnished since the previous certification were, and continue to be, required for treatment that could reasonably be expected to improve the patient's condition, or for diagnostic study, and that the patient continues to need, on a daily basis, active treatment furnished directly by or requiring the supervision of inpatient psychiatric facility personnel. In addition, the hospital records show that services furnished were intensive treatment services, admission or related services, or equivalent services.     EXPECTED DISCHARGE DATE/DAY: TBD     DISPOSITION: Home       Signed By:   Tarun Resendiz MD  9/25/2020

## 2020-09-25 NOTE — PROGRESS NOTES
Problem: Falls - Risk of  Goal: *Absence of Falls  Description: Document Cezar Royal Fall Risk and appropriate interventions in the flowsheet. Outcome: Progressing Towards Goal  Note: Fall Risk Interventions:       Mentation Interventions: Room close to nurse's station, Toileting rounds, Reorient patient    Medication Interventions: Teach patient to arise slowly    Elimination Interventions:  Toileting schedule/hourly rounds              Problem: Altered Thought Process (Adult/Pediatric)  Goal: *STG: Participates in treatment plan  Outcome: Progressing Towards Goal  Goal: *STG: Remains safe in hospital  Outcome: Progressing Towards Goal

## 2020-09-25 NOTE — INTERDISCIPLINARY ROUNDS
[facility] Interdisciplinary Rounds Patient Name: Rachel Raymond  Age: 45 y.o. Room/Bed:  313/01 Primary Diagnosis: Schizoaffective disorder ([facility] Admission Status: Involuntary Commitment Readmission within 30 days: no 
Power of  in place: no 
Patient requires a blocked bed: no           
Reason for blocked bed: Yes Order for blocked bed obtained: no    
 
Sleep hours: 6 Morning Labs completed per orders:  na    
Participation in Care/Groups:  no 
Medication Compliant?: Yes PRNS (last 24 hours): None Restraints (last 24 hours):  no 
Substance Abuse:  no   
24 hour chart check complete:  
 
 
Patient goal(s) for today: follow unit directions  
Treatment team focus/goals: stabilize - increase Clozaril  
Progress note: Pt remains isolative to room and curled up in his bed. Selectively mute. SW left VM with pt's sister - awaiting return call.  
  
LOS:  7             Expected LOS: TBD 
  
Financial concerns/prescription coverage: [employer] Medicare Part A&B and [facility] and TDO Family contact: Sister/POANJU Holden Fey [phone]  5/87 Family requesting physician contact today: No 
Discharge plan: Home Access to weapons: None Outpatient provider(s): Gómez MRAINO Patient's preferred phone number for follow up [phone] 
  
Participating treatment team members: LAVINIA Jacobsen and Dr. Derrick Trujillo

## 2020-09-25 NOTE — GROUP NOTE
JENELLE  GROUP DOCUMENTATION INDIVIDUAL Group Therapy Note Date: 9/25/2020 Group Start Time: 1000 Group End Time: 0929 Group Topic: Discharge Planning [facility] Anita ALMANZAR  GROUP DOCUMENTATION GROUP Group Therapy Note Attendees: 1 Attendance: Did not attend Patient's Goal: Interventions/techniques: Follows Directions:  
 
Interactions:  
 
Mental Status:  
 
Behavior/appearance:  
 
Goals Achieved: Additional Notes:   
 
Kolton Valentin

## 2020-09-25 NOTE — BH NOTES
Assumed nursing of care of Leigh Simeon after receiving the 1900 change of shift report. Leigh Simeon has been in the bed since the beginning of the shift. During the initial nursing assessment, at bedside, pt was not wanting to talk but did deny all problems except for anxiety  Monitoring for mood chgs, behavior and for safeyt. Addendum at [facility] 5965:  Pt has slept around 6 hrs. He is resting quietly in bed at this time.

## 2020-09-25 NOTE — GROUP NOTE
JENELLE  GROUP DOCUMENTATION INDIVIDUAL Group Therapy Note Date: 9/25/2020 Group Start Time: 1500 Group End Time: 6018 Group Topic: Recreational/Music Therapy [facility] [place] [address] ACUTE BEHAV [facility], [address] GROUP DOCUMENTATION GROUP Group Therapy Note Attendees: 4 Attendance: Did not attend Patient's Goal: Interventions/techniques: 
 
Enrique Ibarra

## 2020-09-26 PROCEDURE — 65220000003 HC RM SEMIPRIVATE PSYCH

## 2020-09-26 PROCEDURE — 74011250637 HC RX REV CODE- 250/637: Performed by: PSYCHIATRY & NEUROLOGY

## 2020-09-26 RX ADMIN — VALPROIC ACID 1000 MG: 250 SOLUTION ORAL at 16:56

## 2020-09-26 RX ADMIN — CLOZAPINE 75 MG: 25 TABLET ORAL at 21:56

## 2020-09-26 RX ADMIN — CLONAZEPAM 1 MG: 1 TABLET ORAL at 16:56

## 2020-09-26 NOTE — PROGRESS NOTES
6150: Assessment attempted. Patient is lying in bed with eyes closed. This writer introduced self and attempted to interact with the patient. The patient briefly opened eyes but did not acknowledge this writer and closed eyes again. The patient did not speak at all and would not answer assessment questions, only staying in bed with eyes closed. 0845: Attempted to administer morning medications. Patient again would not acknowledge this writer and refused to sit up and take medications. 1146: Patient approached the nurse's station asking for ginger ale. Patient promptly returned to room. 1353: Patient has been isolative in room. 1449: Patient is walking in a [place] in the hallway, looking down at the floor. Asked patient if he needed anything and he did not respond. Patient began walking down the hallway. Patient approached this writer and attempted to whisper something, but it was not comprehensible. 1624: Patient again pacing hallways, feet shuffling. Asked patient if he was ok and patient did not respond. Encouraged patient to wear slip-resistant socks but patient refused. Provided patient with socks and again encouraged him to wear them to prevent falls. 1656: Administered evening medications. Patient initially refused medications but when this writer presented the medications to him, he took them without incident. Patient whispered \"you people are giving me a hard time\" and expressed a desire to leave. This writer informed the patient that it would be up to the MD to put in a discharge order, and taking medications as prescribed helps. Patient returned to bed.

## 2020-09-26 NOTE — BH NOTES
Assumed care of the patient. Patient was mostly isolative to his room. He appeared disheveled and unkempt. Patient was selectively mute and maintained poor eye contact. Patient was medicine and meal compliant. Patient did not answer assessment questions but he was cooperative with vitals with some encouragemnt. One time patient said, \"My legs keep bothering me. \" And next time he said, \"It is worry. \" pointing towards the medicine. DBP was elevated, patient was restless and somewhat uncooperative. He refused reassessment, will try again. No distress noted. Will continue to monitor. Patient slept for about 8.5 hours.

## 2020-09-27 PROCEDURE — 74011250637 HC RX REV CODE- 250/637: Performed by: PSYCHIATRY & NEUROLOGY

## 2020-09-27 PROCEDURE — 65220000003 HC RM SEMIPRIVATE PSYCH

## 2020-09-27 RX ADMIN — CLOZAPINE 75 MG: 25 TABLET ORAL at 21:23

## 2020-09-27 NOTE — PROGRESS NOTES
Problem: Falls - Risk of  Goal: *Absence of Falls  Description: Document Irina Leodan Fall Risk and appropriate interventions in the flowsheet. Outcome: Progressing Towards Goal  Note: Fall Risk Interventions:       Mentation Interventions: Room close to nurse's station, Toileting rounds, Reorient patient    Medication Interventions: Teach patient to arise slowly    Elimination Interventions:  Toileting schedule/hourly rounds

## 2020-09-27 NOTE — BH NOTES
Hourly rounds have been completed. Resident has refused all treatment today. He would not complete vital signs, medications, or get up for breakfast without prompting. There were no behavioral issues to note at this time. Will continue to monitor.

## 2020-09-27 NOTE — BH NOTES
Weekend Coverage:  CC: \". .. Sreeia Jose Luis Perkinsalayna Jose Luis \"    Taylor Vigil refuses to participate in interview. He is observed lying on his bed. He is alert and looks at NP, though, he does not verbally respond. Patient refused his medications.  No behaviors were reported    Plan: consider MOO, continue other treatment

## 2020-09-27 NOTE — BH NOTES
Weekend Coverage:  CC:. .. Narinder Lei \"  Mima Kate is observed lying in bed with his eyes closed. He does open eyes when prompted but he is selectively mute. He did not participate in interview. Though, pt has been noted to ask for different things from the nurses station. He will then return to his room and remain isolative. No incidents reported.     Plan: continue current treatment plan

## 2020-09-27 NOTE — BH NOTES
Assumed care of the patient. Patient was observed sitting outside more frequently than the other days . He was out sitting in the dayroom or in the hallway but he kept to himself. Patient was uncooperative with the vitals initially but later he agreed for it with some encouragement. He was observed smiling appropriately sometimes during the conversation. Patient would not speak up himself but when questions were asked he would give very short responses, sometimes. He talked about his daughter. And he denied feeling depressed or anxious but did not continue with other questions related to the assessments. When it was time for medication patient told me, Kartik Rodriguez already gave me medicine. \" and smiled. Informed patient that these were his bedtime medications and provided education. Patient also said, \"I would rather go home. \"     Patient  appeared unkempt but he refused  assistance. Sometimes, he appeared to be confused but was redirectable. Will continue to monitor. Patient slept for about 8 hours. Patient refused labs . Will try again later in an hour. Patient refused labs again.

## 2020-09-28 PROCEDURE — 99232 SBSQ HOSP IP/OBS MODERATE 35: CPT | Performed by: PSYCHIATRY & NEUROLOGY

## 2020-09-28 PROCEDURE — 65220000003 HC RM SEMIPRIVATE PSYCH

## 2020-09-28 PROCEDURE — 74011250637 HC RX REV CODE- 250/637: Performed by: PSYCHIATRY & NEUROLOGY

## 2020-09-28 RX ADMIN — VALPROIC ACID 1000 MG: 250 SOLUTION ORAL at 08:32

## 2020-09-28 RX ADMIN — CLONAZEPAM 1 MG: 1 TABLET ORAL at 16:32

## 2020-09-28 RX ADMIN — VALPROIC ACID 1000 MG: 250 SOLUTION ORAL at 16:32

## 2020-09-28 RX ADMIN — CLOZAPINE 75 MG: 25 TABLET ORAL at 21:40

## 2020-09-28 RX ADMIN — CLONAZEPAM 1 MG: 1 TABLET ORAL at 08:34

## 2020-09-28 NOTE — BH NOTES
Patient has flat affect and is very apathetic. Patient is A&Ox1, isolative, and selectively mute. Patient refused a shower and requires assistance with ADLs. He also refused breakfast, but ate lunch and dinner. However, was compliant with his medications today and cooperative with staff. He denied SI/HI/AVH during the entire dayshift. He had no observed or reported behavior suggestive of suicide, homicide, or elopement thus far.

## 2020-09-28 NOTE — INTERDISCIPLINARY ROUNDS
[facility] Interdisciplinary Rounds  
  
Patient Name: Eve Vinson                      Age: 45 y.o. Room/Bed:  313/01 Primary Diagnosis: <principal problem not specified> Admission Status: Committed Readmission within 30 days: No  
Power of  in place: Unknown Patient requires a blocked bed: Yes Reason for blocked bed: poor self care and preoccupied behavior. 
  
Sleep hours: About 6 hours. Participation in Care/Groups: No 
Medication Compliant?: Selectively PRNS (last 24 hours): None Restraints (last 24 hours): No  
Substance Abuse: No             
24 hour chart check complete:  
   
  
 
Patient goal(s) for today: follow unit directions  
Treatment team focus/goals: stabilize - increase Clozaril  
Progress note: Pt remains isolative to room and curled up in his bed. Selectively mute.  
  
LOS:  10             Expected LOS: TBD 
  
Financial concerns/prescription coverage: [place] Medicare Part A&B and [address] Medicaid and TDO Family contact: Sister/DENISE Valerio [phone]  1/19 Family requesting physician contact today: No 
Discharge plan: Home Access to weapons: None Outpatient provider(s): Gómez MARINO Patient's preferred phone number for follow up [phone]. 
  
Participating treatment team members: LAVINIA Santos and Dr. Maryse Petersen

## 2020-09-28 NOTE — BH NOTES
PSYCHIATRIC PROGRESS NOTE         Patient Name  Dixon Huertas   Date of Birth 1970   [facility] [id number]   Medical Record Number  [id number]      Age  45 y.o. PCP Unknown, Provider   Admit date:  9/18/2020    Room Number  094/36  @ [facility]   Date of Service  9/28/2020         E & M PROGRESS NOTE:         HISTORY       CC:  \"psychosis\"  HISTORY OF PRESENT ILLNESS/INTERVAL HISTORY:  (reviewed/updated 9/28/2020). per initial evaluation: Duncan Lopez \"Corey\" Yumiko Galdamez is a 45year old male with a history of schizophrenia admitted to the [facility] for increased psychosis. He has not been bathing or otherwise caring for himself at home. He was reportedly hospitalized at [facility] from April - June 2020. He does not verbally answer any questions. He is well known to this writer from previous admissions in the [facility] area. He has responded well to ECT in the past.    09/21 - overnight, patient incontinent of urine, placed into diaper. Patient appeared catatonic at times, but was speaking, non-spontaneously, to the team this morning. Per nursing, the patient has significantly poor self care, grossly internally preoccucpied and with no ability to care for himself. SW reports that sister is patient's caretaker and that he has been decompensating from an already poor baseline recently, requiring prompting to eat meals and unable to perform basic ADLs. Family states he improved on Clozaril and they are requesting the team restart this medication. Of note, patient got [place] injection two weeks ago, VPA level suggests compliance with medication. 09/22 - patient has been isolative to bed, up for meals, selectively mute. Shakes and nods his head to stimuli. Patient slept 11 hours overnight, and is in bed on assessment. He is mute, does not endorse any pain or discomfort but is largely unresponsive to questions. Vitals unremarkable, BP mildly elevated.     09/23 - patient in bed much of the day, per nursing he got up and showered, and spoke briefly about wanting to go home. He is not compliant with medication; patient seen in his room, he is somnolent, shakes and nods his head, doesn't open his eyes for the duration of the interview. Patient slept 8 hours overnight. 09/24 - patient refused vital signs and medications this morning. He remains isolative to bed for much of the day. Per nursing assessment, the patient has been internally preoccupied, selectively mute, slept 9.5 hours overnight. Patient briefed on the importance of medication compliance, does not voice understanding of the situation. 09/25 - overnight, patient was visible, pacing, largely incoherent but with no aggression noted. No PRNs were given to the patient but he is refusing several doses of medication (refused VPA, Klonopin AM doses); he is taking Clozaril. Patient noted to have repetitive movements, selectively mute on interview, won't get out of bed. Family asking for neurologic workup, MRI as he has not had this before. 09/28 - patient isolative, still selectively mute, took medication and allowed vitals this morning but refused labwork (CBC for Clozaril is overdue). Patient refused breakfast this morning, slept 6 hours overnight. He remains internally preoccupied, unable to assess his mental state. Patient continues to refuse about half of medication administrations. Court order pending for labwork. SIDE EFFECTS: (reviewed/updated 9/28/2020)  None reported or admitted to. No noted toxicity with use of Depakote   ALLERGIES:(reviewed/updated 9/28/2020)  Allergies   Allergen Reactions    Fluphenazine Unknown (comments)     Pt is unable to communicate properly.  Penicillins Unknown (comments)     Pt is unable to communicate properly. MEDICATIONS PRIOR TO ADMISSION:(reviewed/updated 9/28/2020)  Medications Prior to Admission   Medication Sig    FLUoxetine (PROzac) 20 mg capsule Take 20 mg by mouth daily.     benztropine (COGENTIN) 0.5 mg tablet Take 0.5 mg by mouth two (2) times a day. Indications: extrapyramidal symptoms as a result of taking the medication    LORazepam (Ativan) 1 mg tablet Take 1 mg by mouth two (2) times a day.  valproate (Depakene) 250 mg/5 mL syrup Take 1,000 mg by mouth two (2) times a day.  paliperidone palmitate (Invega Sustenna) 234 mg/1.5 mL injection 234 mg by IntraMUSCular route every twenty-one (21) days. Indications: schizophrenia    OLANZapine (ZyPREXA zydis) 20 mg disintegrating tablet Take 20 mg by mouth Daily (before dinner). Indications: schizophrenia      PAST MEDICAL HISTORY: Past medical history from the initial psychiatric evaluation has been reviewed (reviewed/updated 9/28/2020) with no additional updates (I asked patient and no additional past medical history provided). Past Medical History:   Diagnosis Date    Psychiatric disorder     [facility] disorder ([facility] [address]     Withdrawal syndrome ([facility] [address]    History reviewed. No pertinent surgical history. SOCIAL HISTORY: Social history from the initial psychiatric evaluation has been reviewed (reviewed/updated 9/28/2020) with no additional updates (I asked patient and no additional social history provided).    Social History     Socioeconomic History    Marital status: SINGLE     Spouse name: Not on file    Number of children: Not on file    Years of education: Not on file    Highest education level: Not on file   Occupational History    Not on file   Social Needs    Financial resource strain: Not on file    Food insecurity     Worry: Not on file     Inability: Not on file    Transportation needs     Medical: Not on file     Non-medical: Not on file   Tobacco Use    Smoking status: Never Smoker    Smokeless tobacco: Never Used   Substance and Sexual Activity    Alcohol use: No    Drug use: No    Sexual activity: Not on file   Lifestyle    Physical activity     Days per week: Not on file     Minutes per session: Not on file    Stress: Not on file   Relationships    Social connections     Talks on phone: Not on file     Gets together: Not on file     Attends Anglican service: Not on file     Active member of club or organization: Not on file     Attends meetings of clubs or organizations: Not on file     Relationship status: Not on file    Intimate partner violence     Fear of current or ex partner: Not on file     Emotionally abused: Not on file     Physically abused: Not on file     Forced sexual activity: Not on file   Other Topics Concern    Not on file   Social History Narrative    Pt is a HS grad and is unemployed. He lives with his sister. On disability    No pending legal charge reported      FAMILY HISTORY: Family history from the initial psychiatric evaluation has been reviewed (reviewed/updated 9/28/2020) with no additional updates (I asked patient and no additional family history provided). History reviewed. No pertinent family history. REVIEW OF SYSTEMS: (reviewed/updated 9/28/2020)  Appetite:poor   Sleep: poor with DIMS (difficulty initiating & maintaining sleep)   All other Review of Systems: Negative except per HPI         [facility] (MSE):    MSE FINDINGS ARE WITHIN NORMAL LIMITS (WNL) UNLESS OTHERWISE STATED BELOW. ( ALL OF THE BELOW CATEGORIES OF THE MSE HAVE BEEN REVIEWED (reviewed 9/28/2020) AND UPDATED AS DEEMED APPROPRIATE )  General Presentation age appropriate, cooperative   Orientation disorganized   Vital Signs  See below (reviewed 9/28/2020); Vital Signs (BP, Pulse, & Temp) are within normal limits if not listed below.    Gait and Station Stable/steady, no ataxia   Musculoskeletal System No extrapyramidal symptoms (EPS); no abnormal muscular movements or Tardive Dyskinesia (TD); muscle strength and tone are within normal limits   Language No aphasia or dysarthria   Speech:  hypoverbal, increased latency of response, non-pressured and soft   Thought Processes illogical; slow rate of thoughts; poor abstract reasoning/computation   Thought Associations blocked    Thought Content poverty of content   Suicidal Ideations unable to assess   Homicidal Ideations unable to assess   Mood:  unable to asses   Affect:  flat   Memory recent  impaired   Memory remote:  impaired   Concentration/Attention:  impaired   Fund of Knowledge significantly below average   Insight:  poor   Reliability fair   Judgment:  impaired due to active psychosis          VITALS:     Patient Vitals for the past 24 hrs:   Temp Pulse Resp BP SpO2   09/28/20 0747 97.8 °F (36.6 °C) 72 18 114/69 97 %   09/27/20 2102 98.7 °F (37.1 °C) 69 18 125/72 99 %     Wt Readings from Last 3 Encounters:   09/18/20 85.7 kg (189 lb)   12/23/17 84.9 kg (187 lb 1.6 oz)   03/25/17 95.6 kg (210 lb 11.2 oz)     Temp Readings from Last 3 Encounters:   09/28/20 97.8 °F (36.6 °C)   03/28/17 97.5 °F (36.4 °C)   02/07/15 98.7 °F (37.1 °C)     BP Readings from Last 3 Encounters:   09/28/20 114/69   03/28/17 100/68   02/12/15 105/73     Pulse Readings from Last 3 Encounters:   09/28/20 72   03/28/17 67   02/12/15 87            DATA     LABORATORY DATA:(reviewed/updated 9/28/2020)  No results found for this or any previous visit (from the past 24 hour(s)). Lab Results   Component Value Date/Time    Valproic acid 97 09/18/2020 04:42 PM     No results found for: LITH   RADIOLOGY REPORTS:(reviewed/updated 9/28/2020)  No results found.        MEDICATIONS     ALL MEDICATIONS:   Current Facility-Administered Medications   Medication Dose Route Frequency    cloZAPine (CLOZARIL) tablet 75 mg  75 mg Oral QHS    clonazePAM (KlonoPIN) tablet 1 mg  1 mg Oral BID    valproic acid (as sodium salt) (DEPAKENE) 250 mg/5 mL (5 mL) oral solution 1,000 mg  1,000 mg Oral BID    OLANZapine (ZyPREXA) tablet 5 mg  5 mg Oral Q6H PRN    haloperidol lactate (HALDOL) injection 5 mg  5 mg IntraMUSCular Q6H PRN    benztropine (COGENTIN) tablet 1 mg  1 mg Oral BID PRN    diphenhydrAMINE (BENADRYL) injection 50 mg  50 mg IntraMUSCular BID PRN    hydrOXYzine HCL (ATARAX) tablet 50 mg  50 mg Oral TID PRN    LORazepam (ATIVAN) injection 1 mg  1 mg IntraMUSCular Q4H PRN    traZODone (DESYREL) tablet 50 mg  50 mg Oral QHS PRN    acetaminophen (TYLENOL) tablet 650 mg  650 mg Oral Q4H PRN    magnesium hydroxide (MILK OF MAGNESIA) 400 mg/5 mL oral suspension 30 mL  30 mL Oral DAILY PRN      SCHEDULED MEDICATIONS:   Current Facility-Administered Medications   Medication Dose Route Frequency    cloZAPine (CLOZARIL) tablet 75 mg  75 mg Oral QHS    clonazePAM (KlonoPIN) tablet 1 mg  1 mg Oral BID    valproic acid (as sodium salt) (DEPAKENE) 250 mg/5 mL (5 mL) oral solution 1,000 mg  1,000 mg Oral BID          ASSESSMENT & PLAN     DIAGNOSES REQUIRING ACTIVE TREATMENT AND MONITORING: (reviewed/updated 9/28/2020)  Patient Active Hospital Problem List:   Schizoaffective disorder ([facility] [address] (9/18/2020)    Assessment: patient with significant internal preoccupation, poor ADLs, history of catatonia. Appears to have been compliant with home medication, but is regressed and with a markedly disorganized thought process. Will start Clozaril, add benzodiazepine for protection against catatonia to be tapered as Clozaril dose increases given interaction.     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  - CONTINUE Klonopin 1 mg BID for catatonia  - Court order pending for labwork, Clozaril  - CONTINUE VPA oral soln 1000 mg BID for mood lability, seizure prophylaxis  - RESCHEDULE VPA level  - DISCONTINUE Prozac / Cogentin / Zydis due to polypharamacy  - Consider antihypertensive  - IGM therapy as tolerated    I will continue to monitor blood levels (Depakote, clozapine---a drug with a narrow therapeutic index= NTI) and associated labs for drug therapy implemented that require intense monitoring for toxicity as deemed appropriate based on current medication side effects and pharmacodynamically determined drug 1/2 lives. In summary, Bianca Fisher, is a 45 y.o.  male who presents with a severe exacerbation of the principal diagnosis of Schizoaffective disorder ([facility] [address]    Patient's condition is not improving. Patient requires continued inpatient hospitalization for further stabilization, safety monitoring and medication management. I will continue to coordinate the provision of individual, milieu, occupational, group, and substance abuse therapies to address target symptoms/diagnoses as deemed appropriate for the individual patient. A coordinated, multidisplinary treatment team round was conducted with the patient (this team consists of the nurse, psychiatric unit pharmacist,  and writer). Complete current electronic health record for patient has been reviewed today including consultant notes, ancillary staff notes, nurses and psychiatric tech notes. Suicide risk assessment completed and patient deemed to be of low risk for suicide at this time. The following regarding medications was addressed during rounds with patient:   the risks and benefits of the proposed medication. The patient was given the opportunity to ask questions. Informed consent given to the use of the above medications. Will continue to adjust psychiatric and non-psychiatric medications (see above \"medication\" section and orders section for details) as deemed appropriate & based upon diagnoses and response to treatment. I will continue to order blood tests/labs and diagnostic tests as deemed appropriate and review results as they become available (see orders for details and above listed lab/test results).     I will order psychiatric records from previous [facility] hospitals to further elucidate the nature of patient's psychopathology and review once available. I will gather additional collateral information from friends, family and o/p treatment team to further elucidate the nature of patient's psychopathology and baselline level of psychiatric functioning. I certify that this patient's inpatient psychiatric hospital services furnished since the previous certification were, and continue to be, required for treatment that could reasonably be expected to improve the patient's condition, or for diagnostic study, and that the patient continues to need, on a daily basis, active treatment furnished directly by or requiring the supervision of inpatient psychiatric facility personnel. In addition, the hospital records show that services furnished were intensive treatment services, admission or related services, or equivalent services.     EXPECTED DISCHARGE DATE/DAY: TBD     DISPOSITION: Home       Signed By:   Vanita Ramirez MD  9/28/2020

## 2020-09-28 NOTE — PROGRESS NOTES
Problem: Altered Thought Process (Adult/Pediatric)  Goal: *STG: Complies with medication therapy  Outcome: Progressing Towards Goal  Goal: *STG: Demonstrates ability to understand and use improved judgment in daily activities and relationships  Outcome: Progressing Towards Goal

## 2020-09-28 NOTE — GROUP NOTE
JENELLE  GROUP DOCUMENTATION INDIVIDUAL Group Therapy Note Date: 9/28/2020 Group Start Time: 1500 Group End Time: 2401 Group Topic: Recreational/Music Therapy [facility] [address] ACUTE [address] [facility], [address] GROUP DOCUMENTATION GROUP Group Therapy Note Attendees: 2 Attendance: Did not attend Patient's Goal: Interventions/techniques Brooklynn Cain

## 2020-09-28 NOTE — GROUP NOTE
JENELLE  GROUP DOCUMENTATION INDIVIDUAL Group Therapy Note Date: 9/28/2020 Group Start Time: 1100 Group End Time: 1200 Group Topic: Topic Group [address] ACUTE BEHAV [facility], [address] [facility] GROUP DOCUMENTATION GROUP Group Therapy Note Attendees: 4 Attendance: Did not attend Patient's Goal: Interventions/techniques Thomas Sweet

## 2020-09-28 NOTE — BH NOTES
Assumed care of the patient. Patient was mostly isolative to his room. Patient was mostly uncooperative with the assessments and appeared to be internally preoccupied. Patient was malodorous , appeared unkempt and disheveled. He agreed for vital assessments after few attempts only. He answered a few questions by nodding his head and spoke a few times if he needed something. Patient was compliant with bed time medications. I asked patient if he would like to take a shower, patient refused to take a shower and said that he would use the wipes instead. Patient was provided wipes and encouraged to put on clean gown, which he did with assistance. Around 11 pm when I went to the patient's room   there was some  serosanguineous drainage on his bed sheet on the part where his buttocks usually rests while sleeping. Asked patient if he has wound and if it is bleeding. Patient said he has an abrasion . When I tried to assess him with another nursing staff, patient had reddened area in between the thighs other than that nothing was visible on inspection , patient was standing at this time. Had patient bent and checked his buttocks area there was nothing remarkable. Patient was a bit uncooperative at this time but with encouragement he was following commands. When patient was asked to lay down facing ceiling for assessment, patient was uncooperative. He sat on the floor instead and positioned himself as though he was praying, with forehead touching the floor. Patient would not respond, after sometime he got up himself. He did this every time he was asked to lay down for assessment, up to 3 times. Patient also refused  vitals at this time. Patient was asymptomatic and was ambulatory with no signs of distress. Will continue to monitor. Patient has been sleeping quietly with no signs of distress through out the shift. Patient refused lab this morning. Patient slept for about 6 hours.

## 2020-09-28 NOTE — BH NOTES
The documentation for this period is being entered following the guidelines as defined in the [address] downtime policy by Remington Handing

## 2020-09-28 NOTE — PROGRESS NOTES
Problem: Falls - Risk of  Goal: *Absence of Falls  Description: Document Irina Alcocer Fall Risk and appropriate interventions in the flowsheet. Outcome: Progressing Towards Goal  Note: Fall Risk Interventions:       Mentation Interventions: Room close to nurse's station, Toileting rounds, Reorient patient    Medication Interventions: Teach patient to arise slowly    Elimination Interventions:  Toileting schedule/hourly rounds              Problem: Patient Education: Go to Patient Education Activity  Goal: Patient/Family Education  Outcome: Progressing Towards Goal     Problem: Altered Thought Process (Adult/Pediatric)  Goal: *STG: Participates in treatment plan  Outcome: Not Progressing Towards Goal  Goal: *STG: Remains safe in hospital  Outcome: Progressing Towards Goal  Goal: *STG: Seeks staff when feelings of anxiety and fear arise  Outcome: Not Progressing Towards Goal  Goal: *STG: Complies with medication therapy  Outcome: Progressing Towards Goal  Goal: *STG: Attends activities and groups  Outcome: Not Progressing Towards Goal  Goal: *STG: Decreased delusional thinking  Outcome: Progressing Towards Goal  Goal: *STG: Decreased hallucinations  Outcome: Progressing Towards Goal  Goal: *STG: Absence of lethality  Outcome: Progressing Towards Goal  Goal: *STG: Demonstrates ability to understand and use improved judgment in daily activities and relationships  Outcome: Not Progressing Towards Goal

## 2020-09-29 PROCEDURE — 65220000003 HC RM SEMIPRIVATE PSYCH

## 2020-09-29 PROCEDURE — 99231 SBSQ HOSP IP/OBS SF/LOW 25: CPT | Performed by: PSYCHIATRY & NEUROLOGY

## 2020-09-29 PROCEDURE — 74011250637 HC RX REV CODE- 250/637: Performed by: PSYCHIATRY & NEUROLOGY

## 2020-09-29 RX ADMIN — CLONAZEPAM 1 MG: 1 TABLET ORAL at 16:23

## 2020-09-29 RX ADMIN — VALPROIC ACID 1000 MG: 250 SOLUTION ORAL at 16:22

## 2020-09-29 RX ADMIN — VALPROIC ACID 1000 MG: 250 SOLUTION ORAL at 08:48

## 2020-09-29 RX ADMIN — CLOZAPINE 75 MG: 25 TABLET ORAL at 21:29

## 2020-09-29 RX ADMIN — CLONAZEPAM 1 MG: 1 TABLET ORAL at 08:47

## 2020-09-29 NOTE — BH NOTES
Patient is sitting on his bed praying , Patient would not acknowledge my presence and would not answer any assessment questions. 2140 Patient was medication compliant  0535 Patient refused lab draw.   Slept 9.5 hours

## 2020-09-29 NOTE — BH NOTES
PSYCHIATRIC PROGRESS NOTE         Patient Name  Elaina Dalal   Date of Birth 1970   [facility] [id number]   Medical Record Number  [id number]      Age  45 y.o. PCP Unknown, Provider   Admit date:  9/18/2020    Room Number  950/11  @ [facility]   Date of Service  9/29/2020         E & M PROGRESS NOTE:         HISTORY       CC:  \"psychosis\"  HISTORY OF PRESENT ILLNESS/INTERVAL HISTORY:  (reviewed/updated 9/29/2020). per initial evaluation: Zamzam Askew \"Corey\" Karie Guillory is a 45year old male with a history of schizophrenia admitted to the [facility] for increased psychosis. He has not been bathing or otherwise caring for himself at home. He was reportedly hospitalized at [facility] from April - June 2020. He does not verbally answer any questions. He is well known to this writer from previous admissions in the [place] area. He has responded well to ECT in the past.    09/21 - overnight, patient incontinent of urine, placed into diaper. Patient appeared catatonic at times, but was speaking, non-spontaneously, to the team this morning. Per nursing, the patient has significantly poor self care, grossly internally preoccucpied and with no ability to care for himself. SW reports that sister is patient's caretaker and that he has been decompensating from an already poor baseline recently, requiring prompting to eat meals and unable to perform basic ADLs. Family states he improved on Clozaril and they are requesting the team restart this medication. Of note, patient got [place] injection two weeks ago, VPA level suggests compliance with medication. 09/22 - patient has been isolative to bed, up for meals, selectively mute. Shakes and nods his head to stimuli. Patient slept 11 hours overnight, and is in bed on assessment. He is mute, does not endorse any pain or discomfort but is largely unresponsive to questions. Vitals unremarkable, BP mildly elevated.     09/23 - patient in bed much of the day, per nursing he got up and showered, and spoke briefly about wanting to go home. He is not compliant with medication; patient seen in his room, he is somnolent, shakes and nods his head, doesn't open his eyes for the duration of the interview. Patient slept 8 hours overnight. 09/24 - patient refused vital signs and medications this morning. He remains isolative to bed for much of the day. Per nursing assessment, the patient has been internally preoccupied, selectively mute, slept 9.5 hours overnight. Patient briefed on the importance of medication compliance, does not voice understanding of the situation. 09/25 - overnight, patient was visible, pacing, largely incoherent but with no aggression noted. No PRNs were given to the patient but he is refusing several doses of medication (refused VPA, Klonopin AM doses); he is taking Clozaril. Patient noted to have repetitive movements, selectively mute on interview, won't get out of bed. Family asking for neurologic workup, MRI as he has not had this before. 09/28 - patient isolative, still selectively mute, took medication and allowed vitals this morning but refused labwork (CBC for Clozaril is overdue). Patient refused breakfast this morning, slept 6 hours overnight. He remains internally preoccupied, unable to assess his mental state. Patient continues to refuse about half of medication administrations. Court order pending for labwork. 09/29 - no acute overnight events. Patient medication compliant, isolative, selectively mute and generally asleep for much of the day. Not voicing any complaints but with ongoing internal preoccupation; he has not had any meaningful interaction with MD since last week. Court order for tomorrow's labwork pending. SIDE EFFECTS: (reviewed/updated 9/29/2020)  None reported or admitted to.   No noted toxicity with use of Depakote   ALLERGIES:(reviewed/updated 9/29/2020)  Allergies   Allergen Reactions    Fluphenazine Unknown (comments) Pt is unable to communicate properly.  Penicillins Unknown (comments)     Pt is unable to communicate properly. MEDICATIONS PRIOR TO ADMISSION:(reviewed/updated 9/29/2020)  Medications Prior to Admission   Medication Sig    FLUoxetine (PROzac) 20 mg capsule Take 20 mg by mouth daily.  benztropine (COGENTIN) 0.5 mg tablet Take 0.5 mg by mouth two (2) times a day. Indications: extrapyramidal symptoms as a result of taking the medication    LORazepam (Ativan) 1 mg tablet Take 1 mg by mouth two (2) times a day.  valproate (Depakene) 250 mg/5 mL syrup Take 1,000 mg by mouth two (2) times a day.  paliperidone palmitate (Invega Sustenna) 234 mg/1.5 mL injection 234 mg by IntraMUSCular route every twenty-one (21) days. Indications: schizophrenia    OLANZapine (ZyPREXA zydis) 20 mg disintegrating tablet Take 20 mg by mouth Daily (before dinner). Indications: schizophrenia      PAST MEDICAL HISTORY: Past medical history from the initial psychiatric evaluation has been reviewed (reviewed/updated 9/29/2020) with no additional updates (I asked patient and no additional past medical history provided). Past Medical History:   Diagnosis Date    Psychiatric disorder     Psychotic disorder ([facility]     Withdrawal syndrome ([facility] [address]    History reviewed. No pertinent surgical history. SOCIAL HISTORY: Social history from the initial psychiatric evaluation has been reviewed (reviewed/updated 9/29/2020) with no additional updates (I asked patient and no additional social history provided).    Social History     Socioeconomic History    Marital status: SINGLE     Spouse name: Not on file    Number of children: Not on file    Years of education: Not on file    Highest education level: Not on file   Occupational History    Not on file   Social Needs    Financial resource strain: Not on file    Food insecurity     Worry: Not on file     Inability: Not on file    Transportation needs     Medical: Not on file Non-medical: Not on file   Tobacco Use    Smoking status: Never Smoker    Smokeless tobacco: Never Used   Substance and Sexual Activity    Alcohol use: No    Drug use: No    Sexual activity: Not on file   Lifestyle    Physical activity     Days per week: Not on file     Minutes per session: Not on file    Stress: Not on file   Relationships    Social connections     Talks on phone: Not on file     Gets together: Not on file     Attends Sikh service: Not on file     Active member of club or organization: Not on file     Attends meetings of clubs or organizations: Not on file     Relationship status: Not on file    Intimate partner violence     Fear of current or ex partner: Not on file     Emotionally abused: Not on file     Physically abused: Not on file     Forced sexual activity: Not on file   Other Topics Concern    Not on file   Social History Narrative    Pt is a HS grad and is unemployed. He lives with his sister. On disability    No pending legal charge reported      FAMILY HISTORY: Family history from the initial psychiatric evaluation has been reviewed (reviewed/updated 9/29/2020) with no additional updates (I asked patient and no additional family history provided). History reviewed. No pertinent family history. REVIEW OF SYSTEMS: (reviewed/updated 9/29/2020)  Appetite:poor   Sleep: poor with DIMS (difficulty initiating & maintaining sleep)   All other Review of Systems: Negative except per HPI         [facility] (MSE):    MSE FINDINGS ARE WITHIN NORMAL LIMITS (WNL) UNLESS OTHERWISE STATED BELOW. ( ALL OF THE BELOW CATEGORIES OF THE MSE HAVE BEEN REVIEWED (reviewed 9/29/2020) AND UPDATED AS DEEMED APPROPRIATE )  General Presentation age appropriate, cooperative   Orientation disorganized   Vital Signs  See below (reviewed 9/29/2020); Vital Signs (BP, Pulse, & Temp) are within normal limits if not listed below.    Gait and Station Stable/steady, no ataxia   Musculoskeletal System No extrapyramidal symptoms (EPS); no abnormal muscular movements or Tardive Dyskinesia (TD); muscle strength and tone are within normal limits   Language No aphasia or dysarthria   Speech:  hypoverbal, increased latency of response, non-pressured and soft   Thought Processes illogical; slow rate of thoughts; poor abstract reasoning/computation   Thought Associations blocked    Thought Content poverty of content   Suicidal Ideations unable to assess   Homicidal Ideations unable to assess   Mood:  unable to asses   Affect:  flat   Memory recent  impaired   Memory remote:  impaired   Concentration/Attention:  impaired   Fund of Knowledge significantly below average   Insight:  poor   Reliability fair   Judgment:  impaired due to active psychosis          VITALS:     Patient Vitals for the past 24 hrs:   Temp Pulse Resp BP SpO2   09/29/20 0744 98.1 °F (36.7 °C) 65 17 109/66 98 %     Wt Readings from Last 3 Encounters:   09/18/20 85.7 kg (189 lb)   12/23/17 84.9 kg (187 lb 1.6 oz)   03/25/17 95.6 kg (210 lb 11.2 oz)     Temp Readings from Last 3 Encounters:   09/29/20 98.1 °F (36.7 °C)   03/28/17 97.5 °F (36.4 °C)   02/07/15 98.7 °F (37.1 °C)     BP Readings from Last 3 Encounters:   09/29/20 109/66   03/28/17 100/68   02/12/15 105/73     Pulse Readings from Last 3 Encounters:   09/29/20 65   03/28/17 67   02/12/15 87            DATA     LABORATORY DATA:(reviewed/updated 9/29/2020)  No results found for this or any previous visit (from the past 24 hour(s)). Lab Results   Component Value Date/Time    Valproic acid 97 09/18/2020 04:42 PM     No results found for: LITHM   RADIOLOGY REPORTS:(reviewed/updated 9/29/2020)  No results found.        MEDICATIONS     ALL MEDICATIONS:   Current Facility-Administered Medications   Medication Dose Route Frequency    cloZAPine (CLOZARIL) tablet 75 mg  75 mg Oral QHS    clonazePAM (KlonoPIN) tablet 1 mg  1 mg Oral BID    valproic acid (as sodium salt) (DEPAKENE) 250 mg/5 mL (5 mL) oral solution 1,000 mg  1,000 mg Oral BID    OLANZapine (ZyPREXA) tablet 5 mg  5 mg Oral Q6H PRN    haloperidol lactate (HALDOL) injection 5 mg  5 mg IntraMUSCular Q6H PRN    benztropine (COGENTIN) tablet 1 mg  1 mg Oral BID PRN    diphenhydrAMINE (BENADRYL) injection 50 mg  50 mg IntraMUSCular BID PRN    hydrOXYzine HCL (ATARAX) tablet 50 mg  50 mg Oral TID PRN    LORazepam (ATIVAN) injection 1 mg  1 mg IntraMUSCular Q4H PRN    traZODone (DESYREL) tablet 50 mg  50 mg Oral QHS PRN    acetaminophen (TYLENOL) tablet 650 mg  650 mg Oral Q4H PRN    magnesium hydroxide (MILK OF MAGNESIA) 400 mg/5 mL oral suspension 30 mL  30 mL Oral DAILY PRN      SCHEDULED MEDICATIONS:   Current Facility-Administered Medications   Medication Dose Route Frequency    cloZAPine (CLOZARIL) tablet 75 mg  75 mg Oral QHS    clonazePAM (KlonoPIN) tablet 1 mg  1 mg Oral BID    valproic acid (as sodium salt) (DEPAKENE) 250 mg/5 mL (5 mL) oral solution 1,000 mg  1,000 mg Oral BID          ASSESSMENT & PLAN     DIAGNOSES REQUIRING ACTIVE TREATMENT AND MONITORING: (reviewed/updated 9/29/2020)  Patient Active Hospital Problem List:   Schizoaffective disorder ([facility] [address] (9/18/2020)    Assessment: patient with significant internal preoccupation, poor ADLs, history of catatonia. Appears to have been compliant with home medication, but is regressed and with a markedly disorganized thought process. Will start Clozaril, add benzodiazepine for protection against catatonia to be tapered as Clozaril dose increases given interaction.     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  - CONTINUE Klonopin 1 mg BID for catatonia  - Court order pending for labwork, Clozaril  - CONTINUE VPA oral soln 1000 mg BID for mood lability, seizure prophylaxis  - RESCHEDULE VPA level  - DISCONTINUE Prozac / Cogentin / Zydis due to polypharamacy  - Consider antihypertensive  - IGM therapy as tolerated    I will continue to monitor blood levels (Depakote, clozapine---a drug with a narrow therapeutic index= NTI) and associated labs for drug therapy implemented that require intense monitoring for toxicity as deemed appropriate based on current medication side effects and pharmacodynamically determined drug 1/2 lives. In summary, Wanda Galarza, is a 45 y.o.  male who presents with a severe exacerbation of the principal diagnosis of Schizoaffective disorder ([facility] [address]    Patient's condition is not improving. Patient requires continued inpatient hospitalization for further stabilization, safety monitoring and medication management. I will continue to coordinate the provision of individual, milieu, occupational, group, and substance abuse therapies to address target symptoms/diagnoses as deemed appropriate for the individual patient. A coordinated, multidisplinary treatment team round was conducted with the patient (this team consists of the nurse, psychiatric unit pharmacist,  and writer). Complete current electronic health record for patient has been reviewed today including consultant notes, ancillary staff notes, nurses and psychiatric tech notes. Suicide risk assessment completed and patient deemed to be of low risk for suicide at this time. The following regarding medications was addressed during rounds with patient:   the risks and benefits of the proposed medication. The patient was given the opportunity to ask questions. Informed consent given to the use of the above medications. Will continue to adjust psychiatric and non-psychiatric medications (see above \"medication\" section and orders section for details) as deemed appropriate & based upon diagnoses and response to treatment.      I will continue to order blood tests/labs and diagnostic tests as deemed appropriate and review results as they become available (see orders for details and above listed lab/test results). I will order psychiatric records from previous [facility] hospitals to further elucidate the nature of patient's psychopathology and review once available. I will gather additional collateral information from friends, family and o/p treatment team to further elucidate the nature of patient's psychopathology and baselline level of psychiatric functioning. I certify that this patient's inpatient psychiatric hospital services furnished since the previous certification were, and continue to be, required for treatment that could reasonably be expected to improve the patient's condition, or for diagnostic study, and that the patient continues to need, on a daily basis, active treatment furnished directly by or requiring the supervision of inpatient psychiatric facility personnel. In addition, the hospital records show that services furnished were intensive treatment services, admission or related services, or equivalent services.     EXPECTED DISCHARGE DATE/DAY: TBD     DISPOSITION: Home       Signed By:   Tao Garcia MD  9/29/2020

## 2020-09-29 NOTE — INTERDISCIPLINARY ROUNDS
Behavioral Health Interdisciplinary Rounds Patient Name: Roger Armijo  Age: 45 y.o. Room/Bed:  313/01 [facility]: [facility] Admission Status: Involuntary Commitment Readmission within 30 days: no 
Power of  in place: no 
Patient requires a blocked bed: no           
Reason for blocked bed: na 
Order for blocked bed obtained: no    
 
Sleep hours: 9.5 Morning Labs completed per orders:  na      
Participation in Care/Groups:  no 
Medication Compliant?: Yes PRNS (last 24 hours): None Restraints (last 24 hours):  no 
Substance Abuse:  no   
24 hour chart check complete: yes Patient goal(s) for today: Follow unit directions  
Treatment team focus/goals: Stabilize - increase Clozaril  
Progress note: Pt remains isolative to room and curled up in his bed. Selectively mute. SW updated pt's sister with progress.  
  
LOS:  11             Expected LOS: TBD 
  
Financial concerns/prescription coverage: VA Medicare Part A&B and [facility] and O Family contact: Sister/DENISE Abarca Aicha [phone]  9/09 Family requesting physician contact today: No 
Discharge plan: Home Access to weapons: None Outpatient provider(s): Gómez MARINO Patient's preferred phone number for follow up 23-14-20-09 
  
Participating treatment team members: LAVINIA Bardales and Dr. Jojo Velázquez

## 2020-09-29 NOTE — GROUP NOTE
JENELLE  GROUP DOCUMENTATION INDIVIDUAL Group Therapy Note Date: 9/29/2020 Group Start Time: 1100 Group End Time: 1200 Group Topic: Topic Group [facility] 3 ACUTE BEHAV [facility] [place], [address] [facility] [place] GROUP DOCUMENTATION GROUP Group Therapy Note Attendees: 5 Attendance: Did not attend Patient's Goal: Interventions/techniques: 
Merlinda Blush

## 2020-09-29 NOTE — GROUP NOTE
JENELLE  GROUP DOCUMENTATION INDIVIDUAL Group Therapy Note Date: 9/29/2020 Group Start Time: 1500 Group End Time: 3724 Group Topic: Recreational/Music Therapy [facility] 3 ACUTE BEHAV [facility] [place], [address] GROUP DOCUMENTATION GROUP Group Therapy Note Attendees: 4 Attendance: Did not attend Patient's Goal: Interventions/techniques: 
 
Ish Every

## 2020-09-29 NOTE — PROGRESS NOTES
Problem: Altered Thought Process (Adult/Pediatric)  Goal: *STG: Participates in treatment plan  Outcome: Progressing Towards Goal  Goal: *STG: Remains safe in hospital  Outcome: Progressing Towards Goal  Goal: *STG: Complies with medication therapy  Outcome: Progressing Towards Goal  Goal: Interventions  Outcome: Progressing Towards Goal     0720 This writer received report from the outgoing nurse. 3726 Patient laying in bed. Patient is alert and oriented to self. Patient refused to answer some assessment questions. Patient is slow to respond. Patient with poor hygiene. Patient denied SI.     0915 Patient ate some of his breakfast.     1228 Patient did not eat lunch. 1300 Patient with orders for labs tomorrow. Patient with orders for cbc, hemoglobin a1c, lipid panel. Patient with court order for lab work. 1725 Patient showered linen changed. 1800 Patient refused EEG. EEG on unit to perform EEG. Patient refused to position himself properly in the bed. This writer turned the patient's bed around. Patient refused to follow directives given to him by this writer and the EEG tech.

## 2020-09-29 NOTE — PROGRESS NOTES
Problem: Altered Thought Process (Adult/Pediatric)  Goal: *STG: Seeks staff when feelings of anxiety and fear arise  Outcome: Not Progressing Towards Goal  Goal: *LTG: Returns to baseline functioning  Outcome: Not Progressing Towards Goal

## 2020-09-30 PROCEDURE — 74011250637 HC RX REV CODE- 250/637: Performed by: PSYCHIATRY & NEUROLOGY

## 2020-09-30 PROCEDURE — 65220000003 HC RM SEMIPRIVATE PSYCH

## 2020-09-30 RX ADMIN — VALPROIC ACID 1000 MG: 250 SOLUTION ORAL at 09:22

## 2020-09-30 RX ADMIN — CLOZAPINE 75 MG: 25 TABLET ORAL at 21:11

## 2020-09-30 RX ADMIN — VALPROIC ACID 1000 MG: 250 SOLUTION ORAL at 17:02

## 2020-09-30 RX ADMIN — CLONAZEPAM 1 MG: 1 TABLET ORAL at 09:22

## 2020-09-30 RX ADMIN — CLONAZEPAM 1 MG: 1 TABLET ORAL at 17:03

## 2020-09-30 NOTE — BH NOTES
Behavioral Health Interdisciplinary Rounds      Patient Name: Kely Ramirez                      Age: 45 y.o. Room/Bed:  313/01  Primary [facility]: [facility]         Admission Status: Involuntary Commitment                            Readmission within 30 days: no  Power of  in place: no  Patient requires a blocked bed: no            Reason for blocked bed: na  Order for blocked bed obtained: no     Sleep hours:  8        Morning Labs completed per orders: No                                 Participation in Care/Groups:  no  Medication Compliant?: Yes  PRNS (last 24 hours): None                 Restraints (last 24 hours):  no  Substance Abuse:  no               24 hour chart check complete: yes    Patient goal(s) for today: Follow unit directions   Treatment team focus/goals: Stabilize - increase Clozaril   Progress note: Pt came into treatment team room and stated that he would like to leave.  Pt was pleasant and team encouraged him to reach out to sister.      LOS:  12             Expected LOS: TBD     Financial concerns/prescription coverage: VA Medicare Part A&B and [facility] and TDO  Family contact: Sister/DENISE Chambers [phone]  9/13  Family requesting physician contact today: No  Discharge plan: Home  Access to weapons: None  Outpatient provider(s): Gómez MARINO  Patient's preferred phone number for follow up 23-14-20-09     Participating treatment team members: LAVINIA Allen and Dr. Lashawn Townsend

## 2020-09-30 NOTE — GROUP NOTE
JENELLE  GROUP DOCUMENTATION INDIVIDUAL Group Therapy Note Date: 9/30/2020 Group Start Time: 1100 Group End Time: 1200 Group Topic: Topic Group [facility] 3 ACUTE BEHAV [facility] [place], [address] [facility] GROUP DOCUMENTATION GROUP Group Therapy Note Attendees: 6 Attendance: Did not attend Patient's Goal: Interventions/techniques: 
Renato Diamond

## 2020-09-30 NOTE — BH NOTES
Assumed care for the patient following day shift at [address] [place] [address]. Patient presents as isolative and withdrawn. Patient spent a majority of the shift in their room and was cooperative with staff. Patient was very minimal during conversation. Patient has no expressed any concerns at this time. Patient denies any S/I, H/I, and AVH. Patient slept a total of 8 hours.

## 2020-09-30 NOTE — GROUP NOTE
JENELLE  GROUP DOCUMENTATION INDIVIDUAL Group Therapy Note Date: 9/30/2020 Group Start Time: 1500 Group End Time: 0974 Group Topic: Recreational/Music Therapy [facility] 3 ACUTE BEHAV [facility] [place], [address] GROUP DOCUMENTATION GROUP Group Therapy Note Attendees: 4 Attendance: Did not attend Patient's Goal: Interventions/techniques: 
 
Obey Stone

## 2020-09-30 NOTE — PROGRESS NOTES
Problem: Altered Thought Process (Adult/Pediatric)  Goal: *STG: Participates in treatment plan  Outcome: Progressing Towards Goal  Goal: *STG: Remains safe in hospital  Outcome: Progressing Towards Goal  Goal: *STG: Seeks staff when feelings of anxiety and fear arise  Outcome: Not Progressing Towards Goal  Goal: *STG: Complies with medication therapy  Outcome: Progressing Towards Goal  Goal: *STG: Decreased hallucinations  Outcome: Not Progressing Towards Goal     0913 Patient in room resting. Patient is alert and oriented to person. Patient allowed this writer to obtain vitals except for his BP. Patient refused to answer all assessment questions. Patient with orders for labs tomorrow CBC, hemoglobin, and lipid. Patient denied SI, HI. Patient did not respond to questions asked regarding AVH.     1502 Patient is medication compliant. Patient is slow to respond when questioned. Patient continues to struggle with hygiene, refusing assistance. 1240 Patient ate lunch. Patient with court orders for labs, vitals, and abilify, clozaril, zyprexa. 1344 Patient in room resting quietly. 1617 Patient in room resting quietly.

## 2020-09-30 NOTE — BH NOTES
PSYCHIATRIC PROGRESS NOTE         Patient Name  Adam Leonardo   Date of Birth 1970   [facility] [id number]   Medical Record Number  [id number]      Age  45 y.o. PCP Unknown, Provider   Admit date:  9/18/2020    Room Number  024/99  @ [facility]   Date of Service  9/30/2020         E & M PROGRESS NOTE:         HISTORY       CC:  \"psychosis\"  HISTORY OF PRESENT ILLNESS/INTERVAL HISTORY:  (reviewed/updated 9/30/2020). per initial evaluation: Ruby Reyes \"Corey\" Abi Tena is a 45year old male with a history of schizophrenia admitted to the [facility] for increased psychosis. He has not been bathing or otherwise caring for himself at home. He was reportedly hospitalized at [facility] from April - June 2020. He does not verbally answer any questions. He is well known to this writer from previous admissions in the [place] area. He has responded well to ECT in the past.    09/21 - overnight, patient incontinent of urine, placed into diaper. Patient appeared catatonic at times, but was speaking, non-spontaneously, to the team this morning. Per nursing, the patient has significantly poor self care, grossly internally preoccucpied and with no ability to care for himself. SW reports that sister is patient's caretaker and that he has been decompensating from an already poor baseline recently, requiring prompting to eat meals and unable to perform basic ADLs. Family states he improved on Clozaril and they are requesting the team restart this medication. Of note, patient got Jerrlyn Chao injection two weeks ago, VPA level suggests compliance with medication. 09/22 - patient has been isolative to bed, up for meals, selectively mute. Shakes and nods his head to stimuli. Patient slept 11 hours overnight, and is in bed on assessment. He is mute, does not endorse any pain or discomfort but is largely unresponsive to questions. Vitals unremarkable, BP mildly elevated.     09/23 - patient in bed much of the day, per nursing he got up and showered, and spoke briefly about wanting to go home. He is not compliant with medication; patient seen in his room, he is somnolent, shakes and nods his head, doesn't open his eyes for the duration of the interview. Patient slept 8 hours overnight. 09/24 - patient refused vital signs and medications this morning. He remains isolative to bed for much of the day. Per nursing assessment, the patient has been internally preoccupied, selectively mute, slept 9.5 hours overnight. Patient briefed on the importance of medication compliance, does not voice understanding of the situation. 09/25 - overnight, patient was visible, pacing, largely incoherent but with no aggression noted. No PRNs were given to the patient but he is refusing several doses of medication (refused VPA, Klonopin AM doses); he is taking Clozaril. Patient noted to have repetitive movements, selectively mute on interview, won't get out of bed. Family asking for neurologic workup, MRI as he has not had this before. 09/28 - patient isolative, still selectively mute, took medication and allowed vitals this morning but refused labwork (CBC for Clozaril is overdue). Patient refused breakfast this morning, slept 6 hours overnight. He remains internally preoccupied, unable to assess his mental state. Patient continues to refuse about half of medication administrations. Court order pending for labwork. 09/29 - no acute overnight events. Patient medication compliant, isolative, selectively mute and generally asleep for much of the day. Not voicing any complaints but with ongoing internal preoccupation; he has not had any meaningful interaction with MD since last week. Court order for tomorrow's labwork pending. 09/30 - no acute overnight events. Patient was out of his room today and participated in treatment team discussion. He continues to speak minimally but asked to go home.  Discussed medication and the need for labwork, the patient agrees but has been refusing at [address] each morning. Patient guarded about symptoms, concrete and with no understanding of the events surrounding admission. SIDE EFFECTS: (reviewed/updated 9/30/2020)  None reported or admitted to. No noted toxicity with use of Depakote   ALLERGIES:(reviewed/updated 9/30/2020)  Allergies   Allergen Reactions    Fluphenazine Unknown (comments)     Pt is unable to communicate properly.  Penicillins Unknown (comments)     Pt is unable to communicate properly. MEDICATIONS PRIOR TO ADMISSION:(reviewed/updated 9/30/2020)  Medications Prior to Admission   Medication Sig    FLUoxetine (PROzac) 20 mg capsule Take 20 mg by mouth daily.  benztropine (COGENTIN) 0.5 mg tablet Take 0.5 mg by mouth two (2) times a day. Indications: extrapyramidal symptoms as a result of taking the medication    LORazepam (Ativan) 1 mg tablet Take 1 mg by mouth two (2) times a day.  valproate (Depakene) 250 mg/5 mL syrup Take 1,000 mg by mouth two (2) times a day.  paliperidone palmitate (Invega Sustenna) 234 mg/1.5 mL injection 234 mg by IntraMUSCular route every twenty-one (21) days. Indications: schizophrenia    OLANZapine (ZyPREXA zydis) 20 mg disintegrating tablet Take 20 mg by mouth Daily (before dinner). Indications: schizophrenia      PAST MEDICAL HISTORY: Past medical history from the initial psychiatric evaluation has been reviewed (reviewed/updated 9/30/2020) with no additional updates (I asked patient and no additional past medical history provided). Past Medical History:   Diagnosis Date    Psychiatric disorder     Psychotic disorder ([facility] [address]     Withdrawal syndrome ([facility] [address]    History reviewed. No pertinent surgical history. SOCIAL HISTORY: Social history from the initial psychiatric evaluation has been reviewed (reviewed/updated 9/30/2020) with no additional updates (I asked patient and no additional social history provided).    Social History     Socioeconomic History    Marital status: SINGLE     Spouse name: Not on file    Number of children: Not on file    Years of education: Not on file    Highest education level: Not on file   Occupational History    Not on file   Social Needs    Financial resource strain: Not on file    Food insecurity     Worry: Not on file     Inability: Not on file    Transportation needs     Medical: Not on file     Non-medical: Not on file   Tobacco Use    Smoking status: Never Smoker    Smokeless tobacco: Never Used   Substance and Sexual Activity    Alcohol use: No    Drug use: No    Sexual activity: Not on file   Lifestyle    Physical activity     Days per week: Not on file     Minutes per session: Not on file    Stress: Not on file   Relationships    Social connections     Talks on phone: Not on file     Gets together: Not on file     Attends Mu-ism service: Not on file     Active member of club or organization: Not on file     Attends meetings of clubs or organizations: Not on file     Relationship status: Not on file    Intimate partner violence     Fear of current or ex partner: Not on file     Emotionally abused: Not on file     Physically abused: Not on file     Forced sexual activity: Not on file   Other Topics Concern    Not on file   Social History Narrative    Pt is a HS grad and is unemployed. He lives with his sister. On disability    No pending legal charge reported      FAMILY HISTORY: Family history from the initial psychiatric evaluation has been reviewed (reviewed/updated 9/30/2020) with no additional updates (I asked patient and no additional family history provided). History reviewed. No pertinent family history.     REVIEW OF SYSTEMS: (reviewed/updated 9/30/2020)  Appetite:poor   Sleep: poor with DIMS (difficulty initiating & maintaining sleep)   All other Review of Systems: Negative except per HPI         [address] (MSE):    MSE FINDINGS ARE WITHIN NORMAL LIMITS (WNL) UNLESS OTHERWISE STATED BELOW. ( ALL OF THE BELOW CATEGORIES OF THE MSE HAVE BEEN REVIEWED (reviewed 9/30/2020) AND UPDATED AS DEEMED APPROPRIATE )  General Presentation age appropriate, cooperative   Orientation disorganized   Vital Signs  See below (reviewed 9/30/2020); Vital Signs (BP, Pulse, & Temp) are within normal limits if not listed below.    Gait and Station Stable/steady, no ataxia   Musculoskeletal System No extrapyramidal symptoms (EPS); no abnormal muscular movements or Tardive Dyskinesia (TD); muscle strength and tone are within normal limits   Language No aphasia or dysarthria   Speech:  hypoverbal, increased latency of response, non-pressured and soft   Thought Processes illogical; slow rate of thoughts; poor abstract reasoning/computation   Thought Associations blocked    Thought Content poverty of content   Suicidal Ideations unable to assess   Homicidal Ideations unable to assess   Mood:  unable to asses   Affect:  flat   Memory recent  impaired   Memory remote:  impaired   Concentration/Attention:  impaired   Fund of Knowledge significantly below average   Insight:  poor   Reliability fair   Judgment:  impaired due to active psychosis          VITALS:     Patient Vitals for the past 24 hrs:   Temp Pulse Resp BP SpO2   09/30/20 0913 98 °F (36.7 °C) 77 17  97 %   09/30/20 0019 97.8 °F (36.6 °C) 77 18 (!) 138/92 100 %     Wt Readings from Last 3 Encounters:   09/18/20 85.7 kg (189 lb)   12/23/17 84.9 kg (187 lb 1.6 oz)   03/25/17 95.6 kg (210 lb 11.2 oz)     Temp Readings from Last 3 Encounters:   09/30/20 98 °F (36.7 °C)   03/28/17 97.5 °F (36.4 °C)   02/07/15 98.7 °F (37.1 °C)     BP Readings from Last 3 Encounters:   09/30/20 (!) 138/92   03/28/17 100/68   02/12/15 105/73     Pulse Readings from Last 3 Encounters:   09/30/20 77   03/28/17 67   02/12/15 87            DATA     LABORATORY DATA:(reviewed/updated 9/30/2020)  No results found for this or any previous visit (from the past 24 hour(s)). Lab Results   Component Value Date/Time    Valproic acid 97 09/18/2020 04:42 PM     No results found for: LITHM   RADIOLOGY REPORTS:(reviewed/updated 9/30/2020)  No results found. MEDICATIONS     ALL MEDICATIONS:   Current Facility-Administered Medications   Medication Dose Route Frequency    cloZAPine (CLOZARIL) tablet 75 mg  75 mg Oral QHS    clonazePAM (KlonoPIN) tablet 1 mg  1 mg Oral BID    valproic acid (as sodium salt) (DEPAKENE) 250 mg/5 mL (5 mL) oral solution 1,000 mg  1,000 mg Oral BID    OLANZapine (ZyPREXA) tablet 5 mg  5 mg Oral Q6H PRN    haloperidol lactate (HALDOL) injection 5 mg  5 mg IntraMUSCular Q6H PRN    benztropine (COGENTIN) tablet 1 mg  1 mg Oral BID PRN    diphenhydrAMINE (BENADRYL) injection 50 mg  50 mg IntraMUSCular BID PRN    hydrOXYzine HCL (ATARAX) tablet 50 mg  50 mg Oral TID PRN    LORazepam (ATIVAN) injection 1 mg  1 mg IntraMUSCular Q4H PRN    traZODone (DESYREL) tablet 50 mg  50 mg Oral QHS PRN    acetaminophen (TYLENOL) tablet 650 mg  650 mg Oral Q4H PRN    magnesium hydroxide (MILK OF MAGNESIA) 400 mg/5 mL oral suspension 30 mL  30 mL Oral DAILY PRN      SCHEDULED MEDICATIONS:   Current Facility-Administered Medications   Medication Dose Route Frequency    cloZAPine (CLOZARIL) tablet 75 mg  75 mg Oral QHS    clonazePAM (KlonoPIN) tablet 1 mg  1 mg Oral BID    valproic acid (as sodium salt) (DEPAKENE) 250 mg/5 mL (5 mL) oral solution 1,000 mg  1,000 mg Oral BID          ASSESSMENT & PLAN     DIAGNOSES REQUIRING ACTIVE TREATMENT AND MONITORING: (reviewed/updated 9/30/2020)  Patient Active Hospital Problem List:   Schizoaffective disorder ([facility] [address] (9/18/2020)    Assessment: patient with significant internal preoccupation, poor ADLs, history of catatonia. Appears to have been compliant with home medication, but is regressed and with a markedly disorganized thought process.  Will start Clozaril, add benzodiazepine for protection against catatonia to be tapered as Clozaril dose increases given interaction. 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  - CONTINUE Klonopin 1 mg BID for catatonia  - Court order pending for labwork, Clozaril  - CONTINUE VPA oral soln 1000 mg BID for mood lability, seizure prophylaxis  - RESCHEDULE VPA level  - DISCONTINUE Prozac / Cogentin / Zydis due to polypharamacy  - Consider antihypertensive  - IGM therapy as tolerated    I will continue to monitor blood levels (Depakote, clozapine---a drug with a narrow therapeutic index= NTI) and associated labs for drug therapy implemented that require intense monitoring for toxicity as deemed appropriate based on current medication side effects and pharmacodynamically determined drug 1/2 lives. In summary, Renuka Batista, is a 45 y.o.  male who presents with a severe exacerbation of the principal diagnosis of Schizoaffective disorder ([facility] [address]    Patient's condition is not improving. Patient requires continued inpatient hospitalization for further stabilization, safety monitoring and medication management. I will continue to coordinate the provision of individual, milieu, occupational, group, and substance abuse therapies to address target symptoms/diagnoses as deemed appropriate for the individual patient. A coordinated, multidisplinary treatment team round was conducted with the patient (this team consists of the nurse, psychiatric unit pharmacist,  and writer). Complete current electronic health record for patient has been reviewed today including consultant notes, ancillary staff notes, nurses and psychiatric tech notes. Suicide risk assessment completed and patient deemed to be of low risk for suicide at this time.      The following regarding medications was addressed during rounds with patient:   the risks and benefits of the proposed medication. The patient was given the opportunity to ask questions. Informed consent given to the use of the above medications. Will continue to adjust psychiatric and non-psychiatric medications (see above \"medication\" section and orders section for details) as deemed appropriate & based upon diagnoses and response to treatment. I will continue to order blood tests/labs and diagnostic tests as deemed appropriate and review results as they become available (see orders for details and above listed lab/test results). I will order psychiatric records from previous [facility] hospitals to further elucidate the nature of patient's psychopathology and review once available. I will gather additional collateral information from friends, family and o/p treatment team to further elucidate the nature of patient's psychopathology and baselline level of psychiatric functioning. I certify that this patient's inpatient psychiatric hospital services furnished since the previous certification were, and continue to be, required for treatment that could reasonably be expected to improve the patient's condition, or for diagnostic study, and that the patient continues to need, on a daily basis, active treatment furnished directly by or requiring the supervision of inpatient psychiatric facility personnel. In addition, the hospital records show that services furnished were intensive treatment services, admission or related services, or equivalent services.     EXPECTED DISCHARGE DATE/DAY: TBD     DISPOSITION: Home       Signed By:   Zeenat Melendez MD  9/30/2020

## 2020-09-30 NOTE — PROGRESS NOTES
Problem: Falls - Risk of  Goal: *Absence of Falls  Description: Document Erica Hart Fall Risk and appropriate interventions in the flowsheet. Outcome: Progressing Towards Goal  Note: Fall Risk Interventions:       Mentation Interventions: Room close to nurse's station, Toileting rounds, Reorient patient    Medication Interventions: Teach patient to arise slowly    Elimination Interventions:  Toileting schedule/hourly rounds              Problem: Patient Education: Go to Patient Education Activity  Goal: Patient/Family Education  Outcome: Not Progressing Towards Goal

## 2020-09-30 NOTE — BH NOTES
[employer] conducted a virtual TDO Hearing this afternoon to [employer] Medications.

## 2020-10-01 LAB
BASOPHILS # BLD: 0 K/UL (ref 0–0.1)
BASOPHILS NFR BLD: 1 % (ref 0–1)
CHOLEST SERPL-MCNC: 166 MG/DL
DIFFERENTIAL METHOD BLD: ABNORMAL
EOSINOPHIL # BLD: 0.3 K/UL (ref 0–0.4)
EOSINOPHIL NFR BLD: 4 % (ref 0–7)
ERYTHROCYTE [DISTWIDTH] IN BLOOD BY AUTOMATED COUNT: 13 % (ref 11.5–14.5)
EST. AVERAGE GLUCOSE BLD GHB EST-MCNC: 103 MG/DL
HBA1C MFR BLD: 5.2 % (ref 4–5.6)
HCT VFR BLD AUTO: 40.4 % (ref 36.6–50.3)
HDLC SERPL-MCNC: 46 MG/DL
HDLC SERPL: 3.6 {RATIO} (ref 0–5)
HGB BLD-MCNC: 13.2 G/DL (ref 12.1–17)
IMM GRANULOCYTES # BLD AUTO: 0 K/UL (ref 0–0.04)
IMM GRANULOCYTES NFR BLD AUTO: 1 % (ref 0–0.5)
LDLC SERPL CALC-MCNC: 91.4 MG/DL (ref 0–100)
LIPID PROFILE,FLP: NORMAL
LYMPHOCYTES # BLD: 2.4 K/UL (ref 0.8–3.5)
LYMPHOCYTES NFR BLD: 41 % (ref 12–49)
MCH RBC QN AUTO: 29.3 PG (ref 26–34)
MCHC RBC AUTO-ENTMCNC: 32.7 G/DL (ref 30–36.5)
MCV RBC AUTO: 89.6 FL (ref 80–99)
MONOCYTES # BLD: 0.6 K/UL (ref 0–1)
MONOCYTES NFR BLD: 10 % (ref 5–13)
NEUTS SEG # BLD: 2.6 K/UL (ref 1.8–8)
NEUTS SEG NFR BLD: 43 % (ref 32–75)
NRBC # BLD: 0 K/UL (ref 0–0.01)
NRBC BLD-RTO: 0 PER 100 WBC
PLATELET # BLD AUTO: 201 K/UL (ref 150–400)
PMV BLD AUTO: 10.7 FL (ref 8.9–12.9)
RBC # BLD AUTO: 4.51 M/UL (ref 4.1–5.7)
TRIGL SERPL-MCNC: 143 MG/DL (ref ?–150)
VLDLC SERPL CALC-MCNC: 28.6 MG/DL
WBC # BLD AUTO: 5.8 K/UL (ref 4.1–11.1)

## 2020-10-01 PROCEDURE — 80061 LIPID PANEL: CPT

## 2020-10-01 PROCEDURE — 83036 HEMOGLOBIN GLYCOSYLATED A1C: CPT

## 2020-10-01 PROCEDURE — 74011250637 HC RX REV CODE- 250/637: Performed by: PSYCHIATRY & NEUROLOGY

## 2020-10-01 PROCEDURE — 65220000003 HC RM SEMIPRIVATE PSYCH

## 2020-10-01 PROCEDURE — 85025 COMPLETE CBC W/AUTO DIFF WBC: CPT

## 2020-10-01 PROCEDURE — 99232 SBSQ HOSP IP/OBS MODERATE 35: CPT | Performed by: PSYCHIATRY & NEUROLOGY

## 2020-10-01 PROCEDURE — 36415 COLL VENOUS BLD VENIPUNCTURE: CPT

## 2020-10-01 RX ORDER — CLOZAPINE 100 MG/1
100 TABLET ORAL
Status: DISCONTINUED | OUTPATIENT
Start: 2020-10-01 | End: 2020-10-03

## 2020-10-01 RX ORDER — CLOZAPINE 100 MG/1
100 TABLET ORAL
Status: DISCONTINUED | OUTPATIENT
Start: 2020-10-01 | End: 2020-10-01

## 2020-10-01 RX ADMIN — CLONAZEPAM 1 MG: 1 TABLET ORAL at 17:34

## 2020-10-01 RX ADMIN — CLOZAPINE 100 MG: 100 TABLET ORAL at 21:41

## 2020-10-01 RX ADMIN — VALPROIC ACID 1000 MG: 250 SOLUTION ORAL at 17:34

## 2020-10-01 NOTE — GROUP NOTE
JENELLE  GROUP DOCUMENTATION INDIVIDUAL Group Therapy Note Date: 10/1/2020 Group Start Time: 1500 Group End Time: 0943 Group Topic: Recreational/Music Therapy [address] 3 ACUTE BEHAV [facility] [place], [address] [facility] GROUP DOCUMENTATION GROUP Group Therapy Note Attendees: 4 Attendance: Did not attend Patient's Goal: Interventions/techniques: 
Ish Every

## 2020-10-01 NOTE — PROGRESS NOTES
Problem: Falls - Risk of  Goal: *Absence of Falls  Description: Document Augie Box Fall Risk and appropriate interventions in the flowsheet. Outcome: Progressing Towards Goal  Note: Fall Risk Interventions:       Mentation Interventions: Room close to nurse's station, Toileting rounds, Reorient patient    Medication Interventions: Assess postural VS orthostatic hypotension, Teach patient to arise slowly    Elimination Interventions:  Toileting schedule/hourly rounds              Problem: Altered Thought Process (Adult/Pediatric)  Goal: *STG: Participates in treatment plan  Outcome: Not Progressing Towards Goal  Goal: *STG: Remains safe in hospital  Outcome: Progressing Towards Goal  Goal: *STG: Seeks staff when feelings of anxiety and fear arise  Outcome: Not Progressing Towards Goal  Goal: *STG: Complies with medication therapy  Outcome: Not Progressing Towards Goal  Goal: *STG: Attends activities and groups  Outcome: Not Progressing Towards Goal

## 2020-10-01 NOTE — BH NOTES
Assumed care for the patient following day shift at [address] [place] [address]. Patient presents as bizarre and guarded. Patient has been more visible on the unit but has minimal interaction with staff. Patient was found multiple times in their room on their knees on the floor in the praying position and not responding to staff. Patient also ran out of their room twice towards the end of the jeff and was praying at the double doors on the acute hallway. Patient was cooperative with their medication and court ordered labs. Patient denies any S/I, H/I, and AVH but patient was observed responding to auditory hallucinations during rounds. Patient slept a total of 6 hours.

## 2020-10-01 NOTE — GROUP NOTE
JENELLE  GROUP DOCUMENTATION INDIVIDUAL Group Therapy Note Date: 10/1/2020 Group Start Time: 1100 Group End Time: 1200 Group Topic: Topic Group [facility] 3 ACUTE BEHAV [facility] [place], [address], Susan. \Hospital Has a New Name and Outlook.\"" GROUP DOCUMENTATION GROUP Group Therapy Note Attendees: 5 Attendance: Did not attend Patient's Goal: Interventions/techniques Obey Stone

## 2020-10-01 NOTE — BH NOTES
PSYCHIATRIC PROGRESS NOTE         Patient Name  Pilo Michael   Date of Birth 1970   [facility] [id number]   Medical Record Number  [id number]      Age  45 y.o. PCP Unknown, Provider   Admit date:  9/18/2020    Room Number  624/87  @ [facility]   Date of Service  10/1/2020         E & M PROGRESS NOTE:         HISTORY       CC:  \"psychosis\"  HISTORY OF PRESENT ILLNESS/INTERVAL HISTORY:  (reviewed/updated 10/1/2020). per initial evaluation: Vinny Nash \"Corey\" Jud Conway is a 45year old male with a history of schizophrenia admitted to the [facility] for increased psychosis. He has not been bathing or otherwise caring for himself at home. He was reportedly hospitalized at [facility] from April - June 2020. He does not verbally answer any questions. He is well known to this writer from previous admissions in the [place] area. He has responded well to ECT in the past.    09/21 - overnight, patient incontinent of urine, placed into diaper. Patient appeared catatonic at times, but was speaking, non-spontaneously, to the team this morning. Per nursing, the patient has significantly poor self care, grossly internally preoccucpied and with no ability to care for himself. SW reports that sister is patient's caretaker and that he has been decompensating from an already poor baseline recently, requiring prompting to eat meals and unable to perform basic ADLs. Family states he improved on Clozaril and they are requesting the team restart this medication. Of note, patient got [place] injection two weeks ago, VPA level suggests compliance with medication. 09/22 - patient has been isolative to bed, up for meals, selectively mute. Shakes and nods his head to stimuli. Patient slept 11 hours overnight, and is in bed on assessment. He is mute, does not endorse any pain or discomfort but is largely unresponsive to questions. Vitals unremarkable, BP mildly elevated.     09/23 - patient in bed much of the day, per nursing he got up and showered, and spoke briefly about wanting to go home. He is not compliant with medication; patient seen in his room, he is somnolent, shakes and nods his head, doesn't open his eyes for the duration of the interview. Patient slept 8 hours overnight. 09/24 - patient refused vital signs and medications this morning. He remains isolative to bed for much of the day. Per nursing assessment, the patient has been internally preoccupied, selectively mute, slept 9.5 hours overnight. Patient briefed on the importance of medication compliance, does not voice understanding of the situation. 09/25 - overnight, patient was visible, pacing, largely incoherent but with no aggression noted. No PRNs were given to the patient but he is refusing several doses of medication (refused VPA, Klonopin AM doses); he is taking Clozaril. Patient noted to have repetitive movements, selectively mute on interview, won't get out of bed. Family asking for neurologic workup, MRI as he has not had this before. 09/28 - patient isolative, still selectively mute, took medication and allowed vitals this morning but refused labwork (CBC for Clozaril is overdue). Patient refused breakfast this morning, slept 6 hours overnight. He remains internally preoccupied, unable to assess his mental state. Patient continues to refuse about half of medication administrations. Court order pending for labwork. 09/29 - no acute overnight events. Patient medication compliant, isolative, selectively mute and generally asleep for much of the day. Not voicing any complaints but with ongoing internal preoccupation; he has not had any meaningful interaction with MD since last week. Court order for tomorrow's labwork pending. 09/30 - no acute overnight events. Patient was out of his room today and participated in treatment team discussion. He continues to speak minimally but asked to go home.  Discussed medication and the need for labwork, the patient agrees but has been refusing at [address] each morning. Patient guarded about symptoms, concrete and with no understanding of the events surrounding admission. 10/01 - court ordered medication / blood draw approved. Patient refused Klonopin and VPA this morning. Patient got bloodwork this morning, ANC WNL. Patient briefed on his treatment progress, he says nothing and does not acknowledge the treatment team. He made a comment at the onset of the interview but then went back to bed. Unable to provide any meaningful information. Per nursing, he remains mostly mute but will ask for some items. They are concerned he may be cheeking his Clozaril. SIDE EFFECTS: (reviewed/updated 10/1/2020)  None reported or admitted to. No noted toxicity with use of Depakote   ALLERGIES:(reviewed/updated 10/1/2020)  Allergies   Allergen Reactions    Fluphenazine Unknown (comments)     Pt is unable to communicate properly.  Penicillins Unknown (comments)     Pt is unable to communicate properly. MEDICATIONS PRIOR TO ADMISSION:(reviewed/updated 10/1/2020)  Medications Prior to Admission   Medication Sig    FLUoxetine (PROzac) 20 mg capsule Take 20 mg by mouth daily.  benztropine (COGENTIN) 0.5 mg tablet Take 0.5 mg by mouth two (2) times a day. Indications: extrapyramidal symptoms as a result of taking the medication    LORazepam (Ativan) 1 mg tablet Take 1 mg by mouth two (2) times a day.  valproate (Depakene) 250 mg/5 mL syrup Take 1,000 mg by mouth two (2) times a day.  paliperidone palmitate (Invega Sustenna) 234 mg/1.5 mL injection 234 mg by IntraMUSCular route every twenty-one (21) days. Indications: schizophrenia    OLANZapine (ZyPREXA zydis) 20 mg disintegrating tablet Take 20 mg by mouth Daily (before dinner).  Indications: schizophrenia      PAST MEDICAL HISTORY: Past medical history from the initial psychiatric evaluation has been reviewed (reviewed/updated 10/1/2020) with no additional updates (I asked patient and no additional past medical history provided). Past Medical History:   Diagnosis Date    Psychiatric disorder     Psychotic disorder ([facility] [address]     Withdrawal syndrome ([facility] [address]    History reviewed. No pertinent surgical history. SOCIAL HISTORY: Social history from the initial psychiatric evaluation has been reviewed (reviewed/updated 10/1/2020) with no additional updates (I asked patient and no additional social history provided). Social History     Socioeconomic History    Marital status: SINGLE     Spouse name: Not on file    Number of children: Not on file    Years of education: Not on file    Highest education level: Not on file   Occupational History    Not on file   Social Needs    Financial resource strain: Not on file    Food insecurity     Worry: Not on file     Inability: Not on file    Transportation needs     Medical: Not on file     Non-medical: Not on file   Tobacco Use    Smoking status: Never Smoker    Smokeless tobacco: Never Used   Substance and Sexual Activity    Alcohol use: No    Drug use: No    Sexual activity: Not on file   Lifestyle    Physical activity     Days per week: Not on file     Minutes per session: Not on file    Stress: Not on file   Relationships    Social connections     Talks on phone: Not on file     Gets together: Not on file     Attends Sabianist service: Not on file     Active member of club or organization: Not on file     Attends meetings of clubs or organizations: Not on file     Relationship status: Not on file    Intimate partner violence     Fear of current or ex partner: Not on file     Emotionally abused: Not on file     Physically abused: Not on file     Forced sexual activity: Not on file   Other Topics Concern    Not on file   Social History Narrative    Pt is a HS grad and is unemployed. He lives with his sister.  On disability    No pending legal charge reported      FAMILY HISTORY: Family history from the initial psychiatric evaluation has been reviewed (reviewed/updated 10/1/2020) with no additional updates (I asked patient and no additional family history provided). History reviewed. No pertinent family history. REVIEW OF SYSTEMS: (reviewed/updated 10/1/2020)  Appetite:poor   Sleep: poor with DIMS (difficulty initiating & maintaining sleep)   All other Review of Systems: Negative except per HPI         [facility] (MSE):    MSE FINDINGS ARE WITHIN NORMAL LIMITS (WNL) UNLESS OTHERWISE STATED BELOW. ( ALL OF THE BELOW CATEGORIES OF THE MSE HAVE BEEN REVIEWED (reviewed 10/1/2020) AND UPDATED AS DEEMED APPROPRIATE )  General Presentation age appropriate, cooperative   Orientation disorganized   Vital Signs  See below (reviewed 10/1/2020); Vital Signs (BP, Pulse, & Temp) are within normal limits if not listed below.    Gait and Station Stable/steady, no ataxia   Musculoskeletal System No extrapyramidal symptoms (EPS); no abnormal muscular movements or Tardive Dyskinesia (TD); muscle strength and tone are within normal limits   Language No aphasia or dysarthria   Speech:  hypoverbal, increased latency of response, non-pressured and soft   Thought Processes illogical; slow rate of thoughts; poor abstract reasoning/computation   Thought Associations blocked    Thought Content poverty of content   Suicidal Ideations unable to assess   Homicidal Ideations unable to assess   Mood:  unable to asses   Affect:  flat   Memory recent  impaired   Memory remote:  impaired   Concentration/Attention:  impaired   Fund of Knowledge significantly below average   Insight:  poor   Reliability fair   Judgment:  impaired due to active psychosis          VITALS:     Patient Vitals for the past 24 hrs:   Temp Pulse Resp BP SpO2   09/30/20 2020 98.1 °F (36.7 °C) 63 16 125/80 97 %     Wt Readings from Last 3 Encounters:   09/18/20 85.7 kg (189 lb)   12/23/17 84.9 kg (187 lb 1.6 oz)   03/25/17 95.6 kg (210 lb 11.2 oz) Temp Readings from Last 3 Encounters:   09/30/20 98.1 °F (36.7 °C)   03/28/17 97.5 °F (36.4 °C)   02/07/15 98.7 °F (37.1 °C)     BP Readings from Last 3 Encounters:   09/30/20 125/80   03/28/17 100/68   02/12/15 105/73     Pulse Readings from Last 3 Encounters:   09/30/20 63   03/28/17 67   02/12/15 87            DATA     LABORATORY DATA:(reviewed/updated 10/1/2020)  Recent Results (from the past 24 hour(s))   CBC WITH AUTOMATED DIFF    Collection Time: 10/01/20  4:21 AM   Result Value Ref Range    WBC 5.8 4.1 - 11.1 K/uL    RBC 4.51 4.10 - 5.70 M/uL    HGB 13.2 12.1 - 17.0 g/dL    HCT 40.4 36.6 - 50.3 %    MCV 89.6 80.0 - 99.0 FL    MCH 29.3 26.0 - 34.0 PG    MCHC 32.7 30.0 - 36.5 g/dL    RDW 13.0 11.5 - 14.5 %    PLATELET 172 072 - 579 K/uL    MPV 10.7 8.9 - 12.9 FL    NRBC 0.0 0  WBC    ABSOLUTE NRBC 0.00 0.00 - 0.01 K/uL    NEUTROPHILS 43 32 - 75 %    LYMPHOCYTES 41 12 - 49 %    MONOCYTES 10 5 - 13 %    EOSINOPHILS 4 0 - 7 %    BASOPHILS 1 0 - 1 %    IMMATURE GRANULOCYTES 1 (H) 0.0 - 0.5 %    ABS. NEUTROPHILS 2.6 1.8 - 8.0 K/UL    ABS. LYMPHOCYTES 2.4 0.8 - 3.5 K/UL    ABS. MONOCYTES 0.6 0.0 - 1.0 K/UL    ABS. EOSINOPHILS 0.3 0.0 - 0.4 K/UL    ABS. BASOPHILS 0.0 0.0 - 0.1 K/UL    ABS. IMM. GRANS. 0.0 0.00 - 0.04 K/UL    DF AUTOMATED     HEMOGLOBIN A1C WITH EAG    Collection Time: 10/01/20  4:21 AM   Result Value Ref Range    Hemoglobin A1c 5.2 4.0 - 5.6 %    Est. average glucose 103 mg/dL   LIPID PANEL    Collection Time: 10/01/20  4:21 AM   Result Value Ref Range    LIPID PROFILE          Cholesterol, total 166 <200 MG/DL    Triglyceride 143 <150 MG/DL    HDL Cholesterol 46 MG/DL    LDL, calculated 91.4 0 - 100 MG/DL    VLDL, calculated 28.6 MG/DL    CHOL/HDL Ratio 3.6 0.0 - 5.0       Lab Results   Component Value Date/Time    Valproic acid 97 09/18/2020 04:42 PM     No results found for: LITHM   RADIOLOGY REPORTS:(reviewed/updated 10/1/2020)  No results found.        MEDICATIONS     ALL MEDICATIONS: Current Facility-Administered Medications   Medication Dose Route Frequency    cloZAPine (CLOZARIL) tablet 100 mg  100 mg Oral QHS    Or    OLANZapine (ZyPREXA) 5 mg in sterile water (preservative free) 1 mL injection  5 mg IntraMUSCular QHS    clonazePAM (KlonoPIN) tablet 1 mg  1 mg Oral BID    valproic acid (as sodium salt) (DEPAKENE) 250 mg/5 mL (5 mL) oral solution 1,000 mg  1,000 mg Oral BID    OLANZapine (ZyPREXA) tablet 5 mg  5 mg Oral Q6H PRN    haloperidol lactate (HALDOL) injection 5 mg  5 mg IntraMUSCular Q6H PRN    benztropine (COGENTIN) tablet 1 mg  1 mg Oral BID PRN    diphenhydrAMINE (BENADRYL) injection 50 mg  50 mg IntraMUSCular BID PRN    hydrOXYzine HCL (ATARAX) tablet 50 mg  50 mg Oral TID PRN    traZODone (DESYREL) tablet 50 mg  50 mg Oral QHS PRN    acetaminophen (TYLENOL) tablet 650 mg  650 mg Oral Q4H PRN    magnesium hydroxide (MILK OF MAGNESIA) 400 mg/5 mL oral suspension 30 mL  30 mL Oral DAILY PRN      SCHEDULED MEDICATIONS:   Current Facility-Administered Medications   Medication Dose Route Frequency    cloZAPine (CLOZARIL) tablet 100 mg  100 mg Oral QHS    Or    OLANZapine (ZyPREXA) 5 mg in sterile water (preservative free) 1 mL injection  5 mg IntraMUSCular QHS    clonazePAM (KlonoPIN) tablet 1 mg  1 mg Oral BID    valproic acid (as sodium salt) (DEPAKENE) 250 mg/5 mL (5 mL) oral solution 1,000 mg  1,000 mg Oral BID          ASSESSMENT & PLAN     DIAGNOSES REQUIRING ACTIVE TREATMENT AND MONITORING: (reviewed/updated 10/1/2020)  Patient Active Hospital Problem List:   Schizoaffective disorder ([facility] [address] (9/18/2020)    Assessment: patient with significant internal preoccupation, poor ADLs, history of catatonia. Appears to have been compliant with home medication, but is regressed and with a markedly disorganized thought process. Will start Clozaril, add benzodiazepine for protection against catatonia to be tapered as Clozaril dose increases given interaction.     Plan:  COURT ORDERED MEDICATION:  - Clozaril 100 mg PO *OR* Zyprexa 5 mg IM QHS for psychosis    - DISCONTINUE Haldol due to poor efficacy  - DISCONTINUE [place] due to poor efficacy  - EEG for seizure rule out  - CONTINUE Klonopin 1 mg BID for catatonia  - Court order pending for labwork, Clozaril  - CONTINUE VPA oral soln 1000 mg BID for mood lability, seizure prophylaxis  - RESCHEDULE VPA level  - DISCONTINUE Prozac / Cogentin / Zydis due to polypharamacy  - Consider antihypertensive  - IGM therapy as tolerated    I will continue to monitor blood levels (Depakote, clozapine---a drug with a narrow therapeutic index= NTI) and associated labs for drug therapy implemented that require intense monitoring for toxicity as deemed appropriate based on current medication side effects and pharmacodynamically determined drug 1/2 lives. In summary, April Mackenzie, is a 45 y.o.  male who presents with a severe exacerbation of the principal diagnosis of Schizoaffective disorder ([facility] [address]    Patient's condition is not improving. Patient requires continued inpatient hospitalization for further stabilization, safety monitoring and medication management. I will continue to coordinate the provision of individual, milieu, occupational, group, and substance abuse therapies to address target symptoms/diagnoses as deemed appropriate for the individual patient. A coordinated, multidisplinary treatment team round was conducted with the patient (this team consists of the nurse, psychiatric unit pharmacist,  and writer). Complete current electronic health record for patient has been reviewed today including consultant notes, ancillary staff notes, nurses and psychiatric tech notes. Suicide risk assessment completed and patient deemed to be of low risk for suicide at this time. The following regarding medications was addressed during rounds with patient:   the risks and benefits of the proposed medication.  The patient was given the opportunity to ask questions. Informed consent given to the use of the above medications. Will continue to adjust psychiatric and non-psychiatric medications (see above \"medication\" section and orders section for details) as deemed appropriate & based upon diagnoses and response to treatment. I will continue to order blood tests/labs and diagnostic tests as deemed appropriate and review results as they become available (see orders for details and above listed lab/test results). I will order psychiatric records from previous [facility] hospitals to further elucidate the nature of patient's psychopathology and review once available. I will gather additional collateral information from friends, family and o/p treatment team to further elucidate the nature of patient's psychopathology and baselline level of psychiatric functioning. I certify that this patient's inpatient psychiatric hospital services furnished since the previous certification were, and continue to be, required for treatment that could reasonably be expected to improve the patient's condition, or for diagnostic study, and that the patient continues to need, on a daily basis, active treatment furnished directly by or requiring the supervision of inpatient psychiatric facility personnel. In addition, the hospital records show that services furnished were intensive treatment services, admission or related services, or equivalent services.     EXPECTED DISCHARGE DATE/DAY: TBD     DISPOSITION: Home       Signed By:   Roel Shankar MD  10/1/2020

## 2020-10-01 NOTE — BH NOTES
Behavioral Health Treatment Team Note     Patient goal(s) for today: attend groups, take medications  Treatment team focus/goals: titrate medication, coordinate follow up. Progress note: Patient was seen laying in bed. He did not acknowledge treatment team today and is unable to give valuable information. After saying a word, he went back to sleep. Refused Klonopin today. LOS:  13  Expected LOS: TBD     Financial concerns/prescription coverage: VA Medicare Part A&B and [facility] and O  Family contact: Sister/DENISE Mendoza [phone]  5/48  Family requesting physician contact today: No  Discharge plan: Home  Access to weapons: None  Outpatient provider(s): [facility]  Patient's preferred phone number for follow up 23-14-20-09    Participating treatment team members:  Agnieszka Gibson MSW; Dr. Mary Ann Flowers; LAVINIA Ricks

## 2020-10-01 NOTE — BH NOTES
Behavioral Health Interdisciplinary Rounds       Patient Name: Edwin Anderson                      Age: 45 y. o.                 Room/Bed:  [facility]/  Primary Diagnosis: Schizoaffective disorder (HCC)         Admission Status: Involuntary Commitment                            Readmission within 27 days: no  Power of  in place: no  Patient requires a blocked bed: no            Reason for blocked bed: na  Order for blocked bed obtained: no     Sleep hours:  6      Morning Labs completed per orders:  Yes                               Participation in Care/Groups:  no  Medication Compliant?: Yes  PRNS (last 24 hours): None                 Restraints (last 24 hours):  no  Substance Abuse:  no               24 hour chart check complete:

## 2020-10-01 NOTE — PROGRESS NOTES
Laboratory monitoring for mood stabilizer and antipsychotics:    Recommended baseline monitoring has been completed based on this patient's current medication regimen. The patient is currently taking the following medication(s):   Current Facility-Administered Medications   Medication Dose Route Frequency    cloZAPine (CLOZARIL) tablet 100 mg  100 mg Oral QHS    Or    OLANZapine (ZyPREXA) 5 mg in sterile water (preservative free) 1 mL injection ++COURT ORDERED MEDICATION FOR REFUSAL OF CLOZAPINE++  5 mg IntraMUSCular QHS    clonazePAM (KlonoPIN) tablet 1 mg  1 mg Oral BID    valproic acid (as sodium salt) (DEPAKENE) 250 mg/5 mL (5 mL) oral solution 1,000 mg  1,000 mg Oral BID       Height, Weight, BMI Estimation  Estimated body mass index is 29.6 kg/m² as calculated from the following:    Height as of this encounter: 170.2 cm (67\"). Weight as of this encounter: 85.7 kg (189 lb). Renal Function, Hepatic Function and Chemistry  Estimated Creatinine Clearance: 85.4 mL/min (by C-G formula based on SCr of 1.07 mg/dL). Lab Results   Component Value Date/Time    Sodium 140 09/18/2020 04:41 PM    Potassium 3.7 09/18/2020 04:41 PM    Chloride 102 09/18/2020 04:41 PM    CO2 26 09/18/2020 04:41 PM    Anion gap 12 09/18/2020 04:41 PM    Glucose 91 09/18/2020 04:41 PM    Glucose 101 (H) 03/14/2017 11:49 PM    BUN 14 09/18/2020 04:41 PM    Creatinine 1.07 09/18/2020 04:41 PM    BUN/Creatinine ratio 13 09/18/2020 04:41 PM    GFR est AA >60 09/18/2020 04:41 PM    GFR est non-AA >60 09/18/2020 04:41 PM    Calcium 9.7 09/18/2020 04:41 PM    ALT (SGPT) 48 09/18/2020 04:41 PM    Alk.  phosphatase 67 09/18/2020 04:41 PM    Protein, total 8.1 09/18/2020 04:41 PM    Albumin 3.9 09/18/2020 04:41 PM    Globulin 4.2 (H) 09/18/2020 04:41 PM    A-G Ratio 0.9 (L) 09/18/2020 04:41 PM    Bilirubin, total 0.4 09/18/2020 04:41 PM       Lab Results   Component Value Date/Time    Glucose 91 09/18/2020 04:41 PM    Glucose 101 (H) 03/14/2017 11:49 PM    Glucose (POC) 112 (H) 01/19/2012 09:59 PM       Lab Results   Component Value Date/Time    Hemoglobin A1c 5.2 10/01/2020 04:21 AM       Hematology  Lab Results   Component Value Date/Time    WBC 5.8 10/01/2020 04:21 AM    Hemoglobin (POC) 15.6 01/19/2012 09:59 PM    HGB 13.2 10/01/2020 04:21 AM    Hematocrit (POC) 46 01/19/2012 09:59 PM    HCT 40.4 10/01/2020 04:21 AM    PLATELET 574 94/59/2604 04:21 AM    MCV 89.6 10/01/2020 04:21 AM       Lipids  Lab Results   Component Value Date/Time    Cholesterol, total 166 10/01/2020 04:21 AM    HDL Cholesterol 46 10/01/2020 04:21 AM    LDL, calculated 91.4 10/01/2020 04:21 AM    Triglyceride 143 10/01/2020 04:21 AM    CHOL/HDL Ratio 3.6 10/01/2020 04:21 AM       Thyroid Function    Lab Results   Component Value Date/Time    TSH 3.78 (H) 12/09/2017 08:10 AM    T4, Free 1.0 03/17/2017 05:15 AM     Vitals  Visit Vitals  /80 (BP 1 Location: Right arm, BP Patient Position: Sitting)   Pulse 63   Temp 98.1 °F (36.7 °C)   Resp 16   Ht 170.2 cm (67\")   Wt 85.7 kg (189 lb)   SpO2 97%   BMI 29.60 kg/m²     Jorden Nichols, PharmD Contact: [phone]

## 2020-10-02 PROCEDURE — 74011250637 HC RX REV CODE- 250/637: Performed by: PSYCHIATRY & NEUROLOGY

## 2020-10-02 PROCEDURE — 99232 SBSQ HOSP IP/OBS MODERATE 35: CPT | Performed by: PSYCHIATRY & NEUROLOGY

## 2020-10-02 PROCEDURE — 65220000003 HC RM SEMIPRIVATE PSYCH

## 2020-10-02 RX ADMIN — VALPROIC ACID 1000 MG: 250 SOLUTION ORAL at 09:04

## 2020-10-02 RX ADMIN — VALPROIC ACID 1000 MG: 250 SOLUTION ORAL at 17:27

## 2020-10-02 RX ADMIN — CLOZAPINE 100 MG: 100 TABLET ORAL at 22:04

## 2020-10-02 RX ADMIN — CLONAZEPAM 1 MG: 1 TABLET ORAL at 17:27

## 2020-10-02 RX ADMIN — CLONAZEPAM 1 MG: 1 TABLET ORAL at 09:03

## 2020-10-02 NOTE — BH NOTES
Behavioral Health Treatment Team Note     Patient goal(s) for today: attend groups, take medications  Treatment team focus/goals: titrate medication, coordinate follow up; Continue to monitor Clozaril level. Progress note: Patient was able to come to treatment team today. Quiet and calm, slurred and soft speech. Appears alert today. LOS:  14  Expected LOS: TBD    Financial concerns/prescription coverage: VA Medicare Part A&B and [facility] and TDO  Family contact: Sister/DENISE Sanchez [phone]  5/10; [facility] called 10/2 for updates, MSW [facility]) gave updates. Family requesting physician contact today: No  Discharge plan: Home  Access to weapons: None  Outpatient provider(s): [facility]  Patient's preferred phone number for follow up 23-14-20-09     Participating treatment team members:  Agnieszka De Souza MSW; Dr. Kaykay Shaikh; LAVINIA Iqbal

## 2020-10-02 NOTE — BH NOTES
0700- Verbal shift change report given to Artemio Pelletier. (oncoming nurse) by Mathieu Rivas. (offgoing nurse). Report included the following information SBAR, Kardex, MAR and Recent Results. [id number] Patient refused vitals, assessment, medications, and breakfast. Patient exhibiting selective muteness. Will contiinue to follow up with patient and attempt to open up dialogue. Patient currently resting in bed. Withdrawn. [id number] Patient continues to be isolative to his room. Resting quietly. Patient still remains quiet and does not speak to writer when spoken to. Will continue to monitor patient for safety and provide support. [id number] Patient tolerated lunch without issue. Remains isolative to his room. Will continue to monitor for support. [id number] Patient ambulated to nurses station and asked for juice in a whispered voice. Patient then ambulated directly back to his room. Will continue to monitor safety. [id number] Patient remains isolative to his room. Patient appears more withdrawn than usual. Will inform oncoming nurse of patients behavior and court ordered medication.

## 2020-10-02 NOTE — BH NOTES
PSYCHIATRIC PROGRESS NOTE         Patient Name  Prashanth Ballard   Date of Birth 1970   [facility] [id number]   Medical Record Number  [id number]      Age  45 y.o. PCP Unknown, Provider   Admit date:  9/18/2020    Room Number  680/94  @ [facility]   Date of Service  10/2/2020         E & M PROGRESS NOTE:         HISTORY       CC:  \"psychosis\"  HISTORY OF PRESENT ILLNESS/INTERVAL HISTORY:  (reviewed/updated 10/2/2020). per initial evaluation: Sadia Craig \"Corey\" Lalit Woody is a 45year old male with a history of schizophrenia admitted to the [facility] for increased psychosis. He has not been bathing or otherwise caring for himself at home. He was reportedly hospitalized at [facility] from April - June 2020. He does not verbally answer any questions. He is well known to this writer from previous admissions in the [place] area. He has responded well to ECT in the past.    09/21 - overnight, patient incontinent of urine, placed into diaper. Patient appeared catatonic at times, but was speaking, non-spontaneously, to the team this morning. Per nursing, the patient has significantly poor self care, grossly internally preoccucpied and with no ability to care for himself. SW reports that sister is patient's caretaker and that he has been decompensating from an already poor baseline recently, requiring prompting to eat meals and unable to perform basic ADLs. Family states he improved on Clozaril and they are requesting the team restart this medication. Of note, patient got Pearly Barer injection two weeks ago, VPA level suggests compliance with medication. 09/22 - patient has been isolative to bed, up for meals, selectively mute. Shakes and nods his head to stimuli. Patient slept 11 hours overnight, and is in bed on assessment. He is mute, does not endorse any pain or discomfort but is largely unresponsive to questions. Vitals unremarkable, BP mildly elevated.     09/23 - patient in bed much of the day, per nursing he got up and showered, and spoke briefly about wanting to go home. He is not compliant with medication; patient seen in his room, he is somnolent, shakes and nods his head, doesn't open his eyes for the duration of the interview. Patient slept 8 hours overnight. 09/24 - patient refused vital signs and medications this morning. He remains isolative to bed for much of the day. Per nursing assessment, the patient has been internally preoccupied, selectively mute, slept 9.5 hours overnight. Patient briefed on the importance of medication compliance, does not voice understanding of the situation. 09/25 - overnight, patient was visible, pacing, largely incoherent but with no aggression noted. No PRNs were given to the patient but he is refusing several doses of medication (refused VPA, Klonopin AM doses); he is taking Clozaril. Patient noted to have repetitive movements, selectively mute on interview, won't get out of bed. Family asking for neurologic workup, MRI as he has not had this before. 09/28 - patient isolative, still selectively mute, took medication and allowed vitals this morning but refused labwork (CBC for Clozaril is overdue). Patient refused breakfast this morning, slept 6 hours overnight. He remains internally preoccupied, unable to assess his mental state. Patient continues to refuse about half of medication administrations. Court order pending for labwork. 09/29 - no acute overnight events. Patient medication compliant, isolative, selectively mute and generally asleep for much of the day. Not voicing any complaints but with ongoing internal preoccupation; he has not had any meaningful interaction with MD since last week. Court order for tomorrow's labwork pending. 09/30 - no acute overnight events. Patient was out of his room today and participated in treatment team discussion. He continues to speak minimally but asked to go home.  Discussed medication and the need for labwork, the patient agrees but has been refusing at [address] each morning. Patient guarded about symptoms, concrete and with no understanding of the events surrounding admission. 10/01 - court ordered medication / blood draw approved. Patient refused Klonopin and VPA this morning. Patient got bloodwork this morning, ANC WNL. Patient briefed on his treatment progress, he says nothing and does not acknowledge the treatment team. He made a comment at the onset of the interview but then went back to bed. Unable to provide any meaningful information. Per nursing, he remains mostly mute but will ask for some items. They are concerned he may be cheeking his Clozaril. 10/02 - the patient slept 10 hours overnight, self care is poor and patient remains internally preoccupied. He is out of his room for meals and comes into treatment team room for morning interview. He is unable to answer when asked his shoe size. Patient compliant with Clozaril with mouth checks to date, no PRNs given for agitation but patient still unable to adhere to ADLs without much prompting. SIDE EFFECTS: (reviewed/updated 10/2/2020)  None reported or admitted to. No noted toxicity with use of Depakote   ALLERGIES:(reviewed/updated 10/2/2020)  Allergies   Allergen Reactions    Fluphenazine Unknown (comments)     Pt is unable to communicate properly.  Penicillins Unknown (comments)     Pt is unable to communicate properly. MEDICATIONS PRIOR TO ADMISSION:(reviewed/updated 10/2/2020)  Medications Prior to Admission   Medication Sig    FLUoxetine (PROzac) 20 mg capsule Take 20 mg by mouth daily.  benztropine (COGENTIN) 0.5 mg tablet Take 0.5 mg by mouth two (2) times a day. Indications: extrapyramidal symptoms as a result of taking the medication    LORazepam (Ativan) 1 mg tablet Take 1 mg by mouth two (2) times a day.  valproate (Depakene) 250 mg/5 mL syrup Take 1,000 mg by mouth two (2) times a day.     paliperidone palmitate Amarilis schuster 234 mg/1.5 mL injection 234 mg by IntraMUSCular route every twenty-one (21) days. Indications: schizophrenia    OLANZapine (ZyPREXA zydis) 20 mg disintegrating tablet Take 20 mg by mouth Daily (before dinner). Indications: schizophrenia      PAST MEDICAL HISTORY: Past medical history from the initial psychiatric evaluation has been reviewed (reviewed/updated 10/2/2020) with no additional updates (I asked patient and no additional past medical history provided). Past Medical History:   Diagnosis Date    Psychiatric disorder     Psych[facility] ([facility] [address]     Withdrawal syndrome ([facility] [address]    History reviewed. No pertinent surgical history. SOCIAL HISTORY: Social history from the initial psychiatric evaluation has been reviewed (reviewed/updated 10/2/2020) with no additional updates (I asked patient and no additional social history provided).    Social History     Socioeconomic History    Marital status: SINGLE     Spouse name: Not on file    Number of children: Not on file    Years of education: Not on file    Highest education level: Not on file   Occupational History    Not on file   Social Needs    Financial resource strain: Not on file    Food insecurity     Worry: Not on file     Inability: Not on file    Transportation needs     Medical: Not on file     Non-medical: Not on file   Tobacco Use    Smoking status: Never Smoker    Smokeless tobacco: Never Used   Substance and Sexual Activity    Alcohol use: No    Drug use: No    Sexual activity: Not on file   Lifestyle    Physical activity     Days per week: Not on file     Minutes per session: Not on file    Stress: Not on file   Relationships    Social connections     Talks on phone: Not on file     Gets together: Not on file     Attends Zoroastrianism service: Not on file     Active member of club or organization: Not on file     Attends meetings of clubs or organizations: Not on file     Relationship status: Not on file    Intimate partner violence Fear of current or ex partner: Not on file     Emotionally abused: Not on file     Physically abused: Not on file     Forced sexual activity: Not on file   Other Topics Concern    Not on file   Social History Narrative    Pt is a HS grad and is unemployed. He lives with his sister. On disability    No pending legal charge reported      FAMILY HISTORY: Family history from the initial psychiatric evaluation has been reviewed (reviewed/updated 10/2/2020) with no additional updates (I asked patient and no additional family history provided). History reviewed. No pertinent family history. REVIEW OF SYSTEMS: (reviewed/updated 10/2/2020)  Appetite:poor   Sleep: poor with DIMS (difficulty initiating & maintaining sleep)   All other Review of Systems: Negative except per HPI         [facility] (MSE):    MSE FINDINGS ARE WITHIN NORMAL LIMITS (WNL) UNLESS OTHERWISE STATED BELOW. ( ALL OF THE BELOW CATEGORIES OF THE MSE HAVE BEEN REVIEWED (reviewed 10/2/2020) AND UPDATED AS DEEMED APPROPRIATE )  General Presentation age appropriate, cooperative   Orientation disorganized   Vital Signs  See below (reviewed 10/2/2020); Vital Signs (BP, Pulse, & Temp) are within normal limits if not listed below.    Gait and Station Stable/steady, no ataxia   Musculoskeletal System No extrapyramidal symptoms (EPS); no abnormal muscular movements or Tardive Dyskinesia (TD); muscle strength and tone are within normal limits   Language No aphasia or dysarthria   Speech:  hypoverbal, increased latency of response, non-pressured and soft   Thought Processes illogical; slow rate of thoughts; poor abstract reasoning/computation   Thought Associations blocked    Thought Content poverty of content   Suicidal Ideations unable to assess   Homicidal Ideations unable to assess   Mood:  unable to asses   Affect:  flat   Memory recent  impaired   Memory remote:  impaired   Concentration/Attention:  impaired   Fund of Knowledge significantly below average   Insight:  poor   Reliability fair   Judgment:  impaired due to active psychosis          VITALS:     Patient Vitals for the past 24 hrs:   Temp Pulse Resp BP SpO2   10/02/20 0859 98.2 °F (36.8 °C) 68 16 112/81 97 %     Wt Readings from Last 3 Encounters:   09/18/20 85.7 kg (189 lb)   12/23/17 84.9 kg (187 lb 1.6 oz)   03/25/17 95.6 kg (210 lb 11.2 oz)     Temp Readings from Last 3 Encounters:   10/02/20 98.2 °F (36.8 °C)   03/28/17 97.5 °F (36.4 °C)   02/07/15 98.7 °F (37.1 °C)     BP Readings from Last 3 Encounters:   10/02/20 112/81   03/28/17 100/68   02/12/15 105/73     Pulse Readings from Last 3 Encounters:   10/02/20 68   03/28/17 67   02/12/15 87            DATA     LABORATORY DATA:(reviewed/updated 10/2/2020)  No results found for this or any previous visit (from the past 24 hour(s)). Lab Results   Component Value Date/Time    Valproic acid 97 09/18/2020 04:42 PM     No results found for: LITH   RADIOLOGY REPORTS:(reviewed/updated 10/2/2020)  No results found.        MEDICATIONS     ALL MEDICATIONS:   Current Facility-Administered Medications   Medication Dose Route Frequency    cloZAPine (CLOZARIL) tablet 100 mg  100 mg Oral QHS    Or    OLANZapine (ZyPREXA) 5 mg in sterile water (preservative free) 1 mL injection ++COURT ORDERED MEDICATION FOR REFUSAL OF CLOZAPINE++  5 mg IntraMUSCular QHS    clonazePAM (KlonoPIN) tablet 1 mg  1 mg Oral BID    valproic acid (as sodium salt) (DEPAKENE) 250 mg/5 mL (5 mL) oral solution 1,000 mg  1,000 mg Oral BID    OLANZapine (ZyPREXA) tablet 5 mg  5 mg Oral Q6H PRN    haloperidol lactate (HALDOL) injection 5 mg  5 mg IntraMUSCular Q6H PRN    benztropine (COGENTIN) tablet 1 mg  1 mg Oral BID PRN    diphenhydrAMINE (BENADRYL) injection 50 mg  50 mg IntraMUSCular BID PRN    hydrOXYzine HCL (ATARAX) tablet 50 mg  50 mg Oral TID PRN    traZODone (DESYREL) tablet 50 mg  50 mg Oral QHS PRN    acetaminophen (TYLENOL) tablet 650 mg  650 mg Oral Q4H PRN    magnesium hydroxide (MILK OF MAGNESIA) 400 mg/5 mL oral suspension 30 mL  30 mL Oral DAILY PRN      SCHEDULED MEDICATIONS:   Current Facility-Administered Medications   Medication Dose Route Frequency    cloZAPine (CLOZARIL) tablet 100 mg  100 mg Oral QHS    Or    OLANZapine (ZyPREXA) 5 mg in sterile water (preservative free) 1 mL injection ++COURT ORDERED MEDICATION FOR REFUSAL OF CLOZAPINE++  5 mg IntraMUSCular QHS    clonazePAM (KlonoPIN) tablet 1 mg  1 mg Oral BID    valproic acid (as sodium salt) (DEPAKENE) 250 mg/5 mL (5 mL) oral solution 1,000 mg  1,000 mg Oral BID          ASSESSMENT & PLAN     DIAGNOSES REQUIRING ACTIVE TREATMENT AND MONITORING: (reviewed/updated 10/2/2020)  Patient Active Hospital Problem List:   Schizoaffective disorder ([facility] [address] (9/18/2020)    Assessment: patient with significant internal preoccupation, poor ADLs, history of catatonia. Appears to have been compliant with home medication, but is regressed and with a markedly disorganized thought process. Will start Clozaril, add benzodiazepine for protection against catatonia to be tapered as Clozaril dose increases given interaction.     Plan:  COURT ORDERED MEDICATION:  - INCREASE Clozaril to 150 mg PO *OR* Zyprexa 5 mg IM QHS for psychosis    - DISCONTINUE Haldol due to poor efficacy  - DISCONTINUE [place] due to poor efficacy  - EEG for seizure rule out  - CONTINUE Klonopin 1 mg BID for catatonia  - Court order pending for labwork, Clozaril  - CONTINUE VPA oral soln 1000 mg BID for mood lability, seizure prophylaxis  - RESCHEDULE VPA level  - DISCONTINUE Prozac / Cogentin / Zydis due to polypharamacy  - Consider antihypertensive  - IGM therapy as tolerated    I will continue to monitor blood levels (Depakote, clozapine---a drug with a narrow therapeutic index= NTI) and associated labs for drug therapy implemented that require intense monitoring for toxicity as deemed appropriate based on current medication side effects and pharmacodynamically determined drug 1/2 lives. In summary, Gregorio Newton, is a 45 y.o.  male who presents with a severe exacerbation of the principal diagnosis of Schizoaffective disorder ([facility] [address]    Patient's condition is not improving. Patient requires continued inpatient hospitalization for further stabilization, safety monitoring and medication management. I will continue to coordinate the provision of individual, milieu, occupational, group, and substance abuse therapies to address target symptoms/diagnoses as deemed appropriate for the individual patient. A coordinated, multidisplinary treatment team round was conducted with the patient (this team consists of the nurse, psychiatric unit pharmacist,  and writer). Complete current electronic health record for patient has been reviewed today including consultant notes, ancillary staff notes, nurses and psychiatric tech notes. Suicide risk assessment completed and patient deemed to be of low risk for suicide at this time. The following regarding medications was addressed during rounds with patient:   the risks and benefits of the proposed medication. The patient was given the opportunity to ask questions. Informed consent given to the use of the above medications. Will continue to adjust psychiatric and non-psychiatric medications (see above \"medication\" section and orders section for details) as deemed appropriate & based upon diagnoses and response to treatment. I will continue to order blood tests/labs and diagnostic tests as deemed appropriate and review results as they become available (see orders for details and above listed lab/test results). I will order psychiatric records from previous [facility] hospitals to further elucidate the nature of patient's psychopathology and review once available.     I will gather additional collateral information from friends, family and o/p treatment team to further elucidate the nature of patient's psychopathology and baselline level of psychiatric functioning. I certify that this patient's inpatient psychiatric hospital services furnished since the previous certification were, and continue to be, required for treatment that could reasonably be expected to improve the patient's condition, or for diagnostic study, and that the patient continues to need, on a daily basis, active treatment furnished directly by or requiring the supervision of inpatient psychiatric facility personnel. In addition, the hospital records show that services furnished were intensive treatment services, admission or related services, or equivalent services.     EXPECTED DISCHARGE DATE/DAY: TBD     DISPOSITION: Home       Signed By:   Zeenat Melendez MD  10/2/2020

## 2020-10-02 NOTE — GROUP NOTE
JENELLE  GROUP DOCUMENTATION INDIVIDUAL Group Therapy Note Date: 10/2/2020 Group Start Time: 1100 Group End Time: 1200 Group Topic: Topic Group [facility] 3 ACUTE BEHAV [facility] [place], [address] [facility] GROUP DOCUMENTATION GROUP Group Therapy Note Attendees: 3 Attendance: Did not attend Patient's Goal: Interventions/techniques: 
 
Niko Nj

## 2020-10-02 NOTE — PROGRESS NOTES
Problem: Altered Thought Process (Adult/Pediatric)  Goal: *LTG: Returns to baseline functioning  Outcome: Not Progressing Towards Goal

## 2020-10-02 NOTE — PROGRESS NOTES
Problem: Altered Thought Process (Adult/Pediatric)  Goal: *STG: Participates in treatment plan  Outcome: Progressing Towards Goal  Goal: *STG: Remains safe in hospital  Outcome: Progressing Towards Goal  Goal: *STG: Complies with medication therapy  Outcome: Progressing Towards Goal  Goal: *STG: Attends activities and groups  Outcome: Not Progressing Towards Goal  Goal: Interventions  Outcome: Progressing Towards Goal     0722 This writer received report from the outgoing nurse. 0900 Patient in room resting. Patient with poor hygiene. Patient is slow to respond; speech is excessively soft. Patient is alert and oriented to self and place. Patient denied SI, HI, and AVH. Patient avoids eye contact when spoken to. Patient presents with a guarded attitude, constricted affect, and depressed mood. Patient is preoccupied and thought blocking as evidenced by the patient's hesitation to respond. Patient did not eat breakfast. Patient has court ordered vitals and labs. Patient's vitals WNL. 1008 Patient in room resting. 1206 Patient in room resting. This writer encouraged the patient to come out of the room to eat his lunch. Patient later came out of the room to get his lunch. 1539 This writer encouraged the patient to take a shower. Patient took a shower on his own.

## 2020-10-02 NOTE — GROUP NOTE
JENELLE  GROUP DOCUMENTATION INDIVIDUAL Group Therapy Note Date: 10/2/2020 Group Start Time: 1500 Group End Time: 1697 Group Topic: Recreational/Music Therapy [facility] [place] [id number] ACUTE BEHAV [facility], [address] GROUP DOCUMENTATION GROUP Group Therapy Note Attendees: 4 Attendance: Did not attend Patient's Goal: Interventions/techniques: 
 
Elpidio Morelos

## 2020-10-02 NOTE — BH NOTES
Patient is resting in bed with eyes closed  2141 Patient is medication compliant, Intitally pretends to be asleep to avoid taking medication.  Patient is malodorous  Slept 10 hours

## 2020-10-03 PROCEDURE — 65220000003 HC RM SEMIPRIVATE PSYCH

## 2020-10-03 PROCEDURE — 74011250637 HC RX REV CODE- 250/637: Performed by: PSYCHIATRY & NEUROLOGY

## 2020-10-03 PROCEDURE — 99231 SBSQ HOSP IP/OBS SF/LOW 25: CPT | Performed by: PSYCHIATRY & NEUROLOGY

## 2020-10-03 RX ADMIN — CLOZAPINE 150 MG: 100 TABLET ORAL at 22:32

## 2020-10-03 RX ADMIN — VALPROIC ACID 1000 MG: 250 SOLUTION ORAL at 09:30

## 2020-10-03 RX ADMIN — CLONAZEPAM 1 MG: 1 TABLET ORAL at 16:44

## 2020-10-03 RX ADMIN — CLONAZEPAM 1 MG: 1 TABLET ORAL at 09:30

## 2020-10-03 RX ADMIN — VALPROIC ACID 1000 MG: 250 SOLUTION ORAL at 16:45

## 2020-10-03 NOTE — BH NOTES
0700  Received report from HUDSON RN. Assumed care of the patient. Pt lying in bed upon assessment; pt refusing v/s and to answer assessment questions at this time. 0815  Pt refusing breakfast    0930    Pt presents with a flat affect and appears depressed. Pt compliant with am meds and allowed this writer to complete v/s at this time after initially refusing, \"it hurts my arm\"; pt denies anxiety/depression/SI/HI/AVH/pain. Pt did not answer when asked about last bm; pt remains isolative and withdrawn, appears slightly more coherent than previously.     1245  Pt in day room for lunch    1645  Pt compliant with evening meds but made a comment, \"that's too much medicine\" when given depakene; explained to the pt he is improving so the medication is helping; gave pt a brief and told him to change his brief; pt is again malodorous and smells of urine    1715  Pt in day room for dinner

## 2020-10-03 NOTE — PROGRESS NOTES
Problem: Falls - Risk of  Goal: *Absence of Falls  Description: Document Errol Vega Fall Risk and appropriate interventions in the flowsheet. Outcome: Progressing Towards Goal  Note: Fall Risk Interventions:       Mentation Interventions: Room close to nurse's station, Toileting rounds, Reorient patient    Medication Interventions: Teach patient to arise slowly    Elimination Interventions:  Toileting schedule/hourly rounds              Problem: Altered Thought Process (Adult/Pediatric)  Goal: *STG: Remains safe in hospital  Outcome: Progressing Towards Goal  Goal: *STG: Complies with medication therapy  Outcome: Progressing Towards Goal

## 2020-10-03 NOTE — BH NOTES
PSYCHIATRIC PROGRESS NOTE         Patient Name  Gregorio Newton   Date of Birth 1970   [facility] [id number]   Medical Record Number  [id number]      Age  45 y.o. PCP Unknown, Provider   Admit date:  9/18/2020    Room Number  640/77  @ [facility], [place]   Date of Service  10/3/2020         E & M PROGRESS NOTE:         HISTORY       CC:  \"psychosis\"  HISTORY OF PRESENT ILLNESS/INTERVAL HISTORY:  (reviewed/updated 10/3/2020). per initial evaluation: Manav Del Toro \"Corey\" Kevin Alan is a 45year old male with a history of schizophrenia admitted to the [facility] for increased psychosis. He has not been bathing or otherwise caring for himself at home. He was reportedly hospitalized at [facility] from April - June 2020. He does not verbally answer any questions. He is well known to this writer from previous admissions in the [place] area. He has responded well to ECT in the past.    09/21 - overnight, patient incontinent of urine, placed into diaper. Patient appeared catatonic at times, but was speaking, non-spontaneously, to the team this morning. Per nursing, the patient has significantly poor self care, grossly internally preoccucpied and with no ability to care for himself. SW reports that sister is patient's caretaker and that he has been decompensating from an already poor baseline recently, requiring prompting to eat meals and unable to perform basic ADLs. Family states he improved on Clozaril and they are requesting the team restart this medication. Of note, patient got [place] injection two weeks ago, VPA level suggests compliance with medication. 09/22 - patient has been isolative to bed, up for meals, selectively mute. Shakes and nods his head to stimuli. Patient slept 11 hours overnight, and is in bed on assessment. He is mute, does not endorse any pain or discomfort but is largely unresponsive to questions. Vitals unremarkable, BP mildly elevated.     09/23 - patient in bed much of the day, per nursing he got up and showered, and spoke briefly about wanting to go home. He is not compliant with medication; patient seen in his room, he is somnolent, shakes and nods his head, doesn't open his eyes for the duration of the interview. Patient slept 8 hours overnight. 09/24 - patient refused vital signs and medications this morning. He remains isolative to bed for much of the day. Per nursing assessment, the patient has been internally preoccupied, selectively mute, slept 9.5 hours overnight. Patient briefed on the importance of medication compliance, does not voice understanding of the situation. 09/25 - overnight, patient was visible, pacing, largely incoherent but with no aggression noted. No PRNs were given to the patient but he is refusing several doses of medication (refused VPA, Klonopin AM doses); he is taking Clozaril. Patient noted to have repetitive movements, selectively mute on interview, won't get out of bed. Family asking for neurologic workup, MRI as he has not had this before. 09/28 - patient isolative, still selectively mute, took medication and allowed vitals this morning but refused labwork (CBC for Clozaril is overdue). Patient refused breakfast this morning, slept 6 hours overnight. He remains internally preoccupied, unable to assess his mental state. Patient continues to refuse about half of medication administrations. Court order pending for labwork. 09/29 - no acute overnight events. Patient medication compliant, isolative, selectively mute and generally asleep for much of the day. Not voicing any complaints but with ongoing internal preoccupation; he has not had any meaningful interaction with MD since last week. Court order for tomorrow's labwork pending. 09/30 - no acute overnight events. Patient was out of his room today and participated in treatment team discussion. He continues to speak minimally but asked to go home.  Discussed medication and the need for labwork, the patient agrees but has been refusing at [address] each morning. Patient guarded about symptoms, concrete and with no understanding of the events surrounding admission. 10/01 - court ordered medication / blood draw approved. Patient refused Klonopin and VPA this morning. Patient got bloodwork this morning, ANC WNL. Patient briefed on his treatment progress, he says nothing and does not acknowledge the treatment team. He made a comment at the onset of the interview but then went back to bed. Unable to provide any meaningful information. Per nursing, he remains mostly mute but will ask for some items. They are concerned he may be cheeking his Clozaril. 10/02 - the patient slept 10 hours overnight, self care is poor and patient remains internally preoccupied. He is out of his room for meals and comes into treatment team room for morning interview. He is unable to answer when asked his shoe size. Patient compliant with Clozaril with mouth checks to date, no PRNs given for agitation but patient still unable to adhere to ADLs without much prompting. 10/03 - patient slept 10.75 hours, he remains blunted with poor self care, but medication compliant. Patient out of room to meet with MD, denies SI/HI/AVH; he remains grossly internally preoccupied. Patient discharge focused, still speech impoverished and with poor ADLs. SIDE EFFECTS: (reviewed/updated 10/3/2020)  None reported or admitted to. No noted toxicity with use of Depakote   ALLERGIES:(reviewed/updated 10/3/2020)  Allergies   Allergen Reactions    Fluphenazine Unknown (comments)     Pt is unable to communicate properly.  Penicillins Unknown (comments)     Pt is unable to communicate properly. MEDICATIONS PRIOR TO ADMISSION:(reviewed/updated 10/3/2020)  Medications Prior to Admission   Medication Sig    FLUoxetine (PROzac) 20 mg capsule Take 20 mg by mouth daily.  benztropine (COGENTIN) 0.5 mg tablet Take 0.5 mg by mouth two (2) times a day. Indications: extrapyramidal symptoms as a result of taking the medication    LORazepam (Ativan) 1 mg tablet Take 1 mg by mouth two (2) times a day.  valproate (Depakene) 250 mg/5 mL syrup Take 1,000 mg by mouth two (2) times a day.  paliperidone palmitate (Invega Sustenna) 234 mg/1.5 mL injection 234 mg by IntraMUSCular route every twenty-one (21) days. Indications: schizophrenia    OLANZapine (ZyPREXA zydis) 20 mg disintegrating tablet Take 20 mg by mouth Daily (before dinner). Indications: schizophrenia      PAST MEDICAL HISTORY: Past medical history from the initial psychiatric evaluation has been reviewed (reviewed/updated 10/3/2020) with no additional updates (I asked patient and no additional past medical history provided). Past Medical History:   Diagnosis Date    Psychiatric disorder     Psychotic disorder ([facility] [address]     [facility]    History reviewed. No pertinent surgical history. SOCIAL HISTORY: Social history from the initial psychiatric evaluation has been reviewed (reviewed/updated 10/3/2020) with no additional updates (I asked patient and no additional social history provided).    Social History     Socioeconomic History    Marital status: SINGLE     Spouse name: Not on file    Number of children: Not on file    Years of education: Not on file    Highest education level: Not on file   Occupational History    Not on file   Social Needs    Financial resource strain: Not on file    Food insecurity     Worry: Not on file     Inability: Not on file    Transportation needs     Medical: Not on file     Non-medical: Not on file   Tobacco Use    Smoking status: Never Smoker    Smokeless tobacco: Never Used   Substance and Sexual Activity    Alcohol use: No    Drug use: No    Sexual activity: Not on file   Lifestyle    Physical activity     Days per week: Not on file     Minutes per session: Not on file    Stress: Not on file   Relationships    Social connections Talks on phone: Not on file     Gets together: Not on file     Attends Bahai service: Not on file     Active member of club or organization: Not on file     Attends meetings of clubs or organizations: Not on file     Relationship status: Not on file    Intimate partner violence     Fear of current or ex partner: Not on file     Emotionally abused: Not on file     Physically abused: Not on file     Forced sexual activity: Not on file   Other Topics Concern    Not on file   Social History Narrative    Pt is a HS grad and is unemployed. He lives with his sister. On disability    No pending legal charge reported      FAMILY HISTORY: Family history from the initial psychiatric evaluation has been reviewed (reviewed/updated 10/3/2020) with no additional updates (I asked patient and no additional family history provided). History reviewed. No pertinent family history. REVIEW OF SYSTEMS: (reviewed/updated 10/3/2020)  Appetite:poor   Sleep: poor with DIMS (difficulty initiating & maintaining sleep)   All other Review of Systems: Negative except per HPI         [facility] (MSE):    MSE FINDINGS ARE WITHIN NORMAL LIMITS (WNL) UNLESS OTHERWISE STATED BELOW. ( ALL OF THE BELOW CATEGORIES OF THE MSE HAVE BEEN REVIEWED (reviewed 10/3/2020) AND UPDATED AS DEEMED APPROPRIATE )  General Presentation age appropriate, cooperative   Orientation disorganized   Vital Signs  See below (reviewed 10/3/2020); Vital Signs (BP, Pulse, & Temp) are within normal limits if not listed below.    Gait and Station Stable/steady, no ataxia   Musculoskeletal System No extrapyramidal symptoms (EPS); no abnormal muscular movements or Tardive Dyskinesia (TD); muscle strength and tone are within normal limits   Language No aphasia or dysarthria   Speech:  hypoverbal, increased latency of response, non-pressured and soft   Thought Processes illogical; slow rate of thoughts; poor abstract reasoning/computation Thought Associations blocked    Thought Content poverty of content   Suicidal Ideations unable to assess   Homicidal Ideations unable to assess   Mood:  unable to asses   Affect:  flat   Memory recent  impaired   Memory remote:  impaired   Concentration/Attention:  impaired   Fund of Knowledge significantly below average   Insight:  poor   Reliability fair   Judgment:  impaired due to active psychosis          VITALS:     Patient Vitals for the past 24 hrs:   Temp Pulse Resp BP SpO2   10/03/20 0930 98.5 °F (36.9 °C) 74 16 125/88 98 %   10/02/20 1930 98.4 °F (36.9 °C) 89 17 118/76 99 %     Wt Readings from Last 3 Encounters:   09/18/20 85.7 kg (189 lb)   12/23/17 84.9 kg (187 lb 1.6 oz)   03/25/17 95.6 kg (210 lb 11.2 oz)     Temp Readings from Last 3 Encounters:   10/03/20 98.5 °F (36.9 °C)   03/28/17 97.5 °F (36.4 °C)   02/07/15 98.7 °F (37.1 °C)     BP Readings from Last 3 Encounters:   10/03/20 125/88   03/28/17 100/68   02/12/15 105/73     Pulse Readings from Last 3 Encounters:   10/03/20 74   03/28/17 67   02/12/15 87            DATA     LABORATORY DATA:(reviewed/updated 10/3/2020)  No results found for this or any previous visit (from the past 24 hour(s)). Lab Results   Component Value Date/Time    Valproic acid 97 09/18/2020 04:42 PM     No results found for: [facility]   RADIOLOGY REPORTS:(reviewed/updated 10/3/2020)  No results found.        MEDICATIONS     ALL MEDICATIONS:   Current Facility-Administered Medications   Medication Dose Route Frequency    cloZAPine (CLOZARIL) tablet 150 mg  150 mg Oral QHS    Or    OLANZapine (ZyPREXA) 5 mg in sterile water (preservative free) 1 mL injection  5 mg IntraMUSCular QHS    clonazePAM (KlonoPIN) tablet 1 mg  1 mg Oral BID    valproic acid (as sodium salt) (DEPAKENE) 250 mg/5 mL (5 mL) oral solution 1,000 mg  1,000 mg Oral BID    OLANZapine (ZyPREXA) tablet 5 mg  5 mg Oral Q6H PRN    haloperidol lactate (HALDOL) injection 5 mg  5 mg IntraMUSCular Q6H PRN    benztropine (COGENTIN) tablet 1 mg  1 mg Oral BID PRN    diphenhydrAMINE (BENADRYL) injection 50 mg  50 mg IntraMUSCular BID PRN    hydrOXYzine HCL (ATARAX) tablet 50 mg  50 mg Oral TID PRN    traZODone (DESYREL) tablet 50 mg  50 mg Oral QHS PRN    acetaminophen (TYLENOL) tablet 650 mg  650 mg Oral Q4H PRN    magnesium hydroxide (MILK OF MAGNESIA) 400 mg/5 mL oral suspension 30 mL  30 mL Oral DAILY PRN      SCHEDULED MEDICATIONS:   Current Facility-Administered Medications   Medication Dose Route Frequency    cloZAPine (CLOZARIL) tablet 150 mg  150 mg Oral QHS    Or    OLANZapine (ZyPREXA) 5 mg in sterile water (preservative free) 1 mL injection  5 mg IntraMUSCular QHS    clonazePAM (KlonoPIN) tablet 1 mg  1 mg Oral BID    valproic acid (as sodium salt) (DEPAKENE) 250 mg/5 mL (5 mL) oral solution 1,000 mg  1,000 mg Oral BID          ASSESSMENT & PLAN     DIAGNOSES REQUIRING ACTIVE TREATMENT AND MONITORING: (reviewed/updated 10/3/2020)  Patient Active Hospital Problem List:   Schizoaffective disorder ([facility] [address] [facility] (9/18/2020)    Assessment: patient with significant internal preoccupation, poor ADLs, history of catatonia. Appears to have been compliant with home medication, but is regressed and with a markedly disorganized thought process. Will start Clozaril, add benzodiazepine for protection against catatonia to be tapered as Clozaril dose increases given interaction.     Plan:  COURT ORDERED MEDICATION:  - INCREASE Clozaril to 150 mg PO *OR* Zyprexa 5 mg IM QHS for psychosis    - DISCONTINUE Haldol due to poor efficacy  - DISCONTINUE [place] due to poor efficacy  - EEG for seizure rule out  - CONTINUE Klonopin 1 mg BID for catatonia  - Court order pending for labwork, Clozaril  - CONTINUE VPA oral soln 1000 mg BID for mood lability, seizure prophylaxis  - RESCHEDULE VPA level  - DISCONTINUE Prozac / Cogentin / Zydis due to polypharamacy  - Consider antihypertensive  - IGM therapy as tolerated    I will continue to monitor blood levels (Depakote, clozapine---a drug with a narrow therapeutic index= NTI) and associated labs for drug therapy implemented that require intense monitoring for toxicity as deemed appropriate based on current medication side effects and pharmacodynamically determined drug 1/2 lives. In summary, Daya Min, is a 45 y.o.  male who presents with a severe exacerbation of the principal diagnosis of Schizoaffective disorder ([facility] [address]    Patient's condition is not improving. Patient requires continued inpatient hospitalization for further stabilization, safety monitoring and medication management. I will continue to coordinate the provision of individual, milieu, occupational, group, and substance abuse therapies to address target symptoms/diagnoses as deemed appropriate for the individual patient. A coordinated, multidisplinary treatment team round was conducted with the patient (this team consists of the nurse, psychiatric unit pharmacist,  and writer). Complete current electronic health record for patient has been reviewed today including consultant notes, ancillary staff notes, nurses and psychiatric tech notes. Suicide risk assessment completed and patient deemed to be of low risk for suicide at this time. The following regarding medications was addressed during rounds with patient:   the risks and benefits of the proposed medication. The patient was given the opportunity to ask questions. Informed consent given to the use of the above medications. Will continue to adjust psychiatric and non-psychiatric medications (see above \"medication\" section and orders section for details) as deemed appropriate & based upon diagnoses and response to treatment. I will continue to order blood tests/labs and diagnostic tests as deemed appropriate and review results as they become available (see orders for details and above listed lab/test results).     I will order psychiatric records from previous [facility] hospitals to further elucidate the nature of patient's psychopathology and review once available. I will gather additional collateral information from friends, family and o/p treatment team to further elucidate the nature of patient's psychopathology and baselline level of psychiatric functioning. I certify that this patient's inpatient psychiatric hospital services furnished since the previous certification were, and continue to be, required for treatment that could reasonably be expected to improve the patient's condition, or for diagnostic study, and that the patient continues to need, on a daily basis, active treatment furnished directly by or requiring the supervision of inpatient psychiatric facility personnel. In addition, the hospital records show that services furnished were intensive treatment services, admission or related services, or equivalent services.     EXPECTED DISCHARGE DATE/DAY: TBD     DISPOSITION: Home       Signed By:   Nat Lozano MD  10/3/2020

## 2020-10-03 NOTE — INTERDISCIPLINARY ROUNDS
Behavioral Health Interdisciplinary Rounds  
  
  Patient Name: Edwin Anderson             Age: 45 y. o.                 Room/Bed:  [facility]/ Primary Diagnosis: Schizoaffective disorder (HCC)        
Admission Status: Involuntary Commitment                           
Readmission within 30 days: no Power of  in place: no Patient requires a blocked bed: yes           
Reason for blocked bed: disruptive Order for blocked bed obtained: yes    
Sleep hours:  10.75    
Morning Labs completed per orders: None ordered                              
Participation in Care/Groups:  no 
Medication Compliant?: Yes; needs much prompting PRNS (last 24 hours): None                
Restraints (last 24 hours):  no Substance Abuse:  no              
24 hour chart check complete:

## 2020-10-03 NOTE — BH NOTES
Assumed care of patient and given report by off going shift. Pt was reported to now be court ordered for VS, Labs and medications. Pt continues to have poor hygiene, sits around naked with robe barely covering himself; but staff stated they did get him to shower today but he still will not brush his teeth and hair looks unkempt. He is slow to respond if he responds at all. He continues to have poor eye contact, constricted affect and is internally preoccupied. Pt was reports that he may have been cheeking Clozaril. He has been either skipping meals or picking at them and has food all over in his room, some half eaten sandwiches, juice containers etc.  He denied all psych symptoms tonight and just shook his head no when asking him questions, he refused to elaborate. His VSS. He complied with meds and VS tonight with little prompting  g. Pt advised he is to comply and did so when asked. Pt had evening snack. Pt continues on blocked room. He had no prn's. Pt refusing to attend groups. He remains on 15 min safety checks. Will continue to monitor and treat.

## 2020-10-03 NOTE — PROGRESS NOTES
Problem: Altered Thought Process (Adult/Pediatric)  Goal: *STG: Seeks staff when feelings of anxiety and fear arise  Outcome: Not Progressing Towards Goal  Goal: *STG: Attends activities and groups  Outcome: Not Progressing Towards Goal

## 2020-10-04 PROCEDURE — 74011250637 HC RX REV CODE- 250/637: Performed by: PSYCHIATRY & NEUROLOGY

## 2020-10-04 PROCEDURE — 99232 SBSQ HOSP IP/OBS MODERATE 35: CPT | Performed by: PSYCHIATRY & NEUROLOGY

## 2020-10-04 PROCEDURE — 65220000003 HC RM SEMIPRIVATE PSYCH

## 2020-10-04 RX ORDER — CLOZAPINE 100 MG/1
200 TABLET ORAL
Status: DISCONTINUED | OUTPATIENT
Start: 2020-10-04 | End: 2020-10-05

## 2020-10-04 RX ADMIN — CLONAZEPAM 1 MG: 1 TABLET ORAL at 08:48

## 2020-10-04 RX ADMIN — VALPROIC ACID 1000 MG: 250 SOLUTION ORAL at 17:18

## 2020-10-04 RX ADMIN — VALPROIC ACID 1000 MG: 250 SOLUTION ORAL at 08:48

## 2020-10-04 RX ADMIN — CLOZAPINE 200 MG: 100 TABLET ORAL at 21:44

## 2020-10-04 RX ADMIN — CLONAZEPAM 1 MG: 1 TABLET ORAL at 17:18

## 2020-10-04 NOTE — BH NOTES
Assumed care of patient and given report by off going shift. Pt continues to be isolative, has flat affect and even pained looked when asking him to comply with VS or to shower. Pt was reported to be court ordered for VS, Meds and Labs. He is unkempt and smells of urine and refused to shower after asking several times. Per AM nurse Kristal Sahh RN she gave him clean brief to change into and it was found on the floor by the bed. Pt stated he didn't smell and didn't need it; also refusing to change. Pt has been compliant with court ordered meds though needs much prompting. He does not communicate with peers and most times he is mute with staff refusing to answer questions and is very brief if he does at all. He denies SI/HI and AVH as well as pain, anxiety or depression. Pt is reported to be eating and did come out for night snack and went right back to his room. He had half eaten sandwiches in his room, empty cups and juice containers on the floor; this writer picked up room and threw items away. Pt has been sleeping. He had no prn's this shift. Pt continues to have a blocked room and is on 15 min safety checks. Will continue to monitor and treat.

## 2020-10-04 NOTE — INTERDISCIPLINARY ROUNDS
Behavioral Health Interdisciplinary Rounds  
  
  Patient Name: Edwin Anderson             Age: 45 y. o.                 Room/Bed:  [facility]/ Primary Diagnosis: Schizoaffective disorder (HCC)        
Admission Status: CI - Medication/Blood Draw/VS                           
Readmission within 30 days: no Power of  in place: no Patient requires a blocked bed: yes           
Reason for blocked bed: disruptive Order for blocked bed obtained: yes    
Sleep hours: 10.75    
Morning Labs completed per orders: None ordered                              
Participation in Care/Groups:  no 
Medication Compliant?: Yes - CI still w/prompting PRNS (last 24 hours): None                
Restraints (last 24 hours):  no Substance Abuse:  no              
24 hour chart check complete:

## 2020-10-04 NOTE — PROGRESS NOTES
Patient was lying in bed during morning assessment. Patient continues being selectively mute and has excessively soft speech when he chooses to respond. Patient denies SI, HI, AVH, anxiety and depression. Patient remains isolative to room except for during meals. Patient informed that his goal for today would be to care of his hygiene. Patient agreed. Patient reminded several times throughout the day to take a shower but does not follow through. Patient given 2 fresh gowns and a brief but has yet to take a shower. Patient is med compliant and allowed this RN to take his vitals during morning assessment. Q15 minute checks continued for safety. Problem: Altered Thought Process (Adult/Pediatric) Goal: *STG: Remains safe in hospital 
Outcome: Progressing Towards Goal 
Goal: *STG: Complies with medication therapy Outcome: Progressing Towards Goal

## 2020-10-04 NOTE — BH NOTES
PSYCHIATRIC PROGRESS NOTE         Patient Name  Bryan Menchaca   Date of Birth 1970   [facility] [id number]   Medical Record Number  [id number]      Age  45 y.o. PCP Unknown, Provider   Admit date:  9/18/2020    Room Number  824/53  @ [facility]   Date of Service  10/4/2020         E & M PROGRESS NOTE:         HISTORY       CC:  \"psychosis\"  HISTORY OF PRESENT ILLNESS/INTERVAL HISTORY:  (reviewed/updated 10/4/2020). per initial evaluation: Dwayne Sebastian \"Corey\" Tracy Casanova is a 45year old male with a history of schizophrenia admitted to the [facility] for increased psychosis. He has not been bathing or otherwise caring for himself at home. He was reportedly hospitalized at [facility] from April - June 2020. He does not verbally answer any questions. He is well known to this writer from previous admissions in the [facility] area. He has responded well to ECT in the past.    09/21 - overnight, patient incontinent of urine, placed into diaper. Patient appeared catatonic at times, but was speaking, non-spontaneously, to the team this morning. Per nursing, the patient has significantly poor self care, grossly internally preoccucpied and with no ability to care for himself. SW reports that sister is patient's caretaker and that he has been decompensating from an already poor baseline recently, requiring prompting to eat meals and unable to perform basic ADLs. Family states he improved on Clozaril and they are requesting the team restart this medication. Of note, patient got Zoya Tyrell injection two weeks ago, VPA level suggests compliance with medication. 09/22 - patient has been isolative to bed, up for meals, selectively mute. Shakes and nods his head to stimuli. Patient slept 11 hours overnight, and is in bed on assessment. He is mute, does not endorse any pain or discomfort but is largely unresponsive to questions. Vitals unremarkable, BP mildly elevated.     09/23 - patient in bed much of the day, per nursing he got up and showered, and spoke briefly about wanting to go home. He is not compliant with medication; patient seen in his room, he is somnolent, shakes and nods his head, doesn't open his eyes for the duration of the interview. Patient slept 8 hours overnight. 09/24 - patient refused vital signs and medications this morning. He remains isolative to bed for much of the day. Per nursing assessment, the patient has been internally preoccupied, selectively mute, slept 9.5 hours overnight. Patient briefed on the importance of medication compliance, does not voice understanding of the situation. 09/25 - overnight, patient was visible, pacing, largely incoherent but with no aggression noted. No PRNs were given to the patient but he is refusing several doses of medication (refused VPA, Klonopin AM doses); he is taking Clozaril. Patient noted to have repetitive movements, selectively mute on interview, won't get out of bed. Family asking for neurologic workup, MRI as he has not had this before. 09/28 - patient isolative, still selectively mute, took medication and allowed vitals this morning but refused labwork (CBC for Clozaril is overdue). Patient refused breakfast this morning, slept 6 hours overnight. He remains internally preoccupied, unable to assess his mental state. Patient continues to refuse about half of medication administrations. Court order pending for labwork. 09/29 - no acute overnight events. Patient medication compliant, isolative, selectively mute and generally asleep for much of the day. Not voicing any complaints but with ongoing internal preoccupation; he has not had any meaningful interaction with MD since last week. Court order for tomorrow's labwork pending. 09/30 - no acute overnight events. Patient was out of his room today and participated in treatment team discussion. He continues to speak minimally but asked to go home.  Discussed medication and the need for labwork, the patient agrees but has been refusing at [address] each morning. Patient guarded about symptoms, concrete and with no understanding of the events surrounding admission. 10/01 - court ordered medication / blood draw approved. Patient refused Klonopin and VPA this morning. Patient got bloodwork this morning, ANC WNL. Patient briefed on his treatment progress, he says nothing and does not acknowledge the treatment team. He made a comment at the onset of the interview but then went back to bed. Unable to provide any meaningful information. Per nursing, he remains mostly mute but will ask for some items. They are concerned he may be cheeking his Clozaril. 10/02 - the patient slept 10 hours overnight, self care is poor and patient remains internally preoccupied. He is out of his room for meals and comes into treatment team room for morning interview. He is unable to answer when asked his shoe size. Patient compliant with Clozaril with mouth checks to date, no PRNs given for agitation but patient still unable to adhere to ADLs without much prompting. 10/03 - patient slept 10.75 hours, he remains blunted with poor self care, but medication compliant. Patient out of room to meet with MD, denies SI/HI/AVH; he remains grossly internally preoccupied. Patient discharge focused, still speech impoverished and with poor ADLs. 10/04 - patient impoverished, in bed for much of the day. He is out for meals, requires much prompting to engage in ADLs. Goal for today is taking a shower per nursing. Patient compliant with Clozaril, vitals unremarkable thus far. Patient selectively mute, does not answer assessment questions. SIDE EFFECTS: (reviewed/updated 10/4/2020)  None reported or admitted to. No noted toxicity with use of Depakote   ALLERGIES:(reviewed/updated 10/4/2020)  Allergies   Allergen Reactions    Fluphenazine Unknown (comments)     Pt is unable to communicate properly.     Penicillins Unknown (comments)     Pt is unable to communicate properly. MEDICATIONS PRIOR TO ADMISSION:(reviewed/updated 10/4/2020)  Medications Prior to Admission   Medication Sig    FLUoxetine (PROzac) 20 mg capsule Take 20 mg by mouth daily.  benztropine (COGENTIN) 0.5 mg tablet Take 0.5 mg by mouth two (2) times a day. Indications: extrapyramidal symptoms as a result of taking the medication    LORazepam (Ativan) 1 mg tablet Take 1 mg by mouth two (2) times a day.  valproate (Depakene) 250 mg/5 mL syrup Take 1,000 mg by mouth two (2) times a day.  paliperidone palmitate (Invega Sustenna) 234 mg/1.5 mL injection 234 mg by IntraMUSCular route every twenty-one (21) days. Indications: schizophrenia    OLANZapine (ZyPREXA zydis) 20 mg disintegrating tablet Take 20 mg by mouth Daily (before dinner). Indications: schizophrenia      PAST MEDICAL HISTORY: Past medical history from the initial psychiatric evaluation has been reviewed (reviewed/updated 10/4/2020) with no additional updates (I asked patient and no additional past medical history provided). Past Medical History:   Diagnosis Date    Psychiatric disorder     Psychotic disorder ([facility] [address]     Withdrawal syndrome ([facility] [address]    History reviewed. No pertinent surgical history. SOCIAL HISTORY: Social history from the initial psychiatric evaluation has been reviewed (reviewed/updated 10/4/2020) with no additional updates (I asked patient and no additional social history provided).    Social History     Socioeconomic History    Marital status: SINGLE     Spouse name: Not on file    Number of children: Not on file    Years of education: Not on file    Highest education level: Not on file   Occupational History    Not on file   Social Needs    Financial resource strain: Not on file    Food insecurity     Worry: Not on file     Inability: Not on file    Transportation needs     Medical: Not on file     Non-medical: Not on file   Tobacco Use    Smoking status: Never Smoker    Smokeless tobacco: Never Used   Substance and Sexual Activity    Alcohol use: No    Drug use: No    Sexual activity: Not on file   Lifestyle    Physical activity     Days per week: Not on file     Minutes per session: Not on file    Stress: Not on file   Relationships    Social connections     Talks on phone: Not on file     Gets together: Not on file     Attends Worship service: Not on file     Active member of club or organization: Not on file     Attends meetings of clubs or organizations: Not on file     Relationship status: Not on file    Intimate partner violence     Fear of current or ex partner: Not on file     Emotionally abused: Not on file     Physically abused: Not on file     Forced sexual activity: Not on file   Other Topics Concern    Not on file   Social History Narrative    Pt is a HS grad and is unemployed. He lives with his sister. On disability    No pending legal charge reported      FAMILY HISTORY: Family history from the initial psychiatric evaluation has been reviewed (reviewed/updated 10/4/2020) with no additional updates (I asked patient and no additional family history provided). History reviewed. No pertinent family history. REVIEW OF SYSTEMS: (reviewed/updated 10/4/2020)  Appetite:poor   Sleep: poor with DIMS (difficulty initiating & maintaining sleep)   All other Review of Systems: Negative except per [facility]         [facility] (MSE):    MSE FINDINGS ARE WITHIN NORMAL LIMITS (WNL) UNLESS OTHERWISE STATED BELOW. ( ALL OF THE BELOW CATEGORIES OF THE MSE HAVE BEEN REVIEWED (reviewed 10/4/2020) AND UPDATED AS DEEMED APPROPRIATE )  General Presentation age appropriate, cooperative   Orientation disorganized   Vital Signs  See below (reviewed 10/4/2020); Vital Signs (BP, Pulse, & Temp) are within normal limits if not listed below.    Gait and Station Stable/steady, no ataxia   Musculoskeletal System No extrapyramidal symptoms (EPS); no abnormal muscular movements or Tardive Dyskinesia (TD); muscle strength and tone are within normal limits   Language No aphasia or dysarthria   Speech:  hypoverbal, increased latency of response, non-pressured and soft   Thought Processes illogical; slow rate of thoughts; poor abstract reasoning/computation   Thought Associations blocked    Thought Content poverty of content   Suicidal Ideations unable to assess   Homicidal Ideations unable to assess   Mood:  unable to asses   Affect:  flat   Memory recent  impaired   Memory remote:  impaired   Concentration/Attention:  impaired   Fund of Knowledge significantly below average   Insight:  poor   Reliability fair   Judgment:  impaired due to active psychosis          VITALS:     Patient Vitals for the past 24 hrs:   Temp Pulse Resp BP SpO2   10/04/20 0755 97.2 °F (36.2 °C) 61 16 114/71 96 %   10/03/20 1945 98.2 °F (36.8 °C) 76 17 120/81 98 %     Wt Readings from Last 3 Encounters:   09/18/20 85.7 kg (189 lb)   12/23/17 84.9 kg (187 lb 1.6 oz)   03/25/17 95.6 kg (210 lb 11.2 oz)     Temp Readings from Last 3 Encounters:   10/04/20 97.2 °F (36.2 °C)   03/28/17 97.5 °F (36.4 °C)   02/07/15 98.7 °F (37.1 °C)     BP Readings from Last 3 Encounters:   10/04/20 114/71   03/28/17 100/68   02/12/15 105/73     Pulse Readings from Last 3 Encounters:   10/04/20 61   03/28/17 67   02/12/15 87            DATA     LABORATORY DATA:(reviewed/updated 10/4/2020)  No results found for this or any previous visit (from the past 24 hour(s)). Lab Results   Component Value Date/Time    Valproic acid 97 09/18/2020 04:42 PM     No results found for: LITHM   RADIOLOGY REPORTS:(reviewed/updated 10/4/2020)  No results found.        MEDICATIONS     ALL MEDICATIONS:   Current Facility-Administered Medications   Medication Dose Route Frequency    cloZAPine (CLOZARIL) tablet 200 mg  200 mg Oral QHS    Or    OLANZapine (ZyPREXA) 5 mg in sterile water (preservative free) 1 mL injection++COURT ORDERED MEDICATION FOR REFUSAL OF CLOZAPINE++  5 mg IntraMUSCular QHS    clonazePAM (KlonoPIN) tablet 1 mg  1 mg Oral BID    valproic acid (as sodium salt) (DEPAKENE) 250 mg/5 mL (5 mL) oral solution 1,000 mg  1,000 mg Oral BID    OLANZapine (ZyPREXA) tablet 5 mg  5 mg Oral Q6H PRN    haloperidol lactate (HALDOL) injection 5 mg  5 mg IntraMUSCular Q6H PRN    benztropine (COGENTIN) tablet 1 mg  1 mg Oral BID PRN    diphenhydrAMINE (BENADRYL) injection 50 mg  50 mg IntraMUSCular BID PRN    hydrOXYzine HCL (ATARAX) tablet 50 mg  50 mg Oral TID PRN    traZODone (DESYREL) tablet 50 mg  50 mg Oral QHS PRN    acetaminophen (TYLENOL) tablet 650 mg  650 mg Oral Q4H PRN    magnesium hydroxide (MILK OF MAGNESIA) 400 mg/5 mL oral suspension 30 mL  30 mL Oral DAILY PRN      SCHEDULED MEDICATIONS:   Current Facility-Administered Medications   Medication Dose Route Frequency    cloZAPine (CLOZARIL) tablet 200 mg  200 mg Oral QHS    Or    OLANZapine (ZyPREXA) 5 mg in sterile water (preservative free) 1 mL injection++COURT ORDERED MEDICATION FOR REFUSAL OF CLOZAPINE++  5 mg IntraMUSCular QHS    clonazePAM (KlonoPIN) tablet 1 mg  1 mg Oral BID    valproic acid (as sodium salt) (DEPAKENE) 250 mg/5 mL (5 mL) oral solution 1,000 mg  1,000 mg Oral BID          ASSESSMENT & PLAN     DIAGNOSES REQUIRING ACTIVE TREATMENT AND MONITORING: (reviewed/updated 10/4/2020)  Patient Active Hospital Problem List:   Schizoaffective disorder ([facility] [address] (9/18/2020)    Assessment: patient with significant internal preoccupation, poor ADLs, history of catatonia. Appears to have been compliant with home medication, but is regressed and with a markedly disorganized thought process. Will start Clozaril, add benzodiazepine for protection against catatonia to be tapered as Clozaril dose increases given interaction.     Plan:  COURT ORDERED MEDICATION:  - INCREASE Clozaril to 200 mg PO *OR* Zyprexa 5 mg IM QHS for psychosis    - DISCONTINUE Haldol due to poor efficacy  - DISCONTINUE [place] due to poor efficacy  - EEG for seizure rule out  - CONTINUE Klonopin 1 mg BID for catatonia  - Court order pending for labwork, Clozaril  - CONTINUE VPA oral soln 1000 mg BID for mood lability, seizure prophylaxis  - RESCHEDULE VPA level  - DISCONTINUE Prozac / Cogentin / Zydis due to polypharamacy  - Consider antihypertensive  - IGM therapy as tolerated    I will continue to monitor blood levels (Depakote, clozapine---a drug with a narrow therapeutic index= NTI) and associated labs for drug therapy implemented that require intense monitoring for toxicity as deemed appropriate based on current medication side effects and pharmacodynamically determined drug 1/2 lives. In summary, Gregorio Newton, is a 45 y.o.  male who presents with a severe exacerbation of the principal diagnosis of Schizoaffective disorder ([facility] [address]    Patient's condition is not improving. Patient requires continued inpatient hospitalization for further stabilization, safety monitoring and medication management. I will continue to coordinate the provision of individual, milieu, occupational, group, and substance abuse therapies to address target symptoms/diagnoses as deemed appropriate for the individual patient. A coordinated, multidisplinary treatment team round was conducted with the patient (this team consists of the nurse, psychiatric unit pharmacist,  and writer). Complete current electronic health record for patient has been reviewed today including consultant notes, ancillary staff notes, nurses and psychiatric tech notes. Suicide risk assessment completed and patient deemed to be of low risk for suicide at this time. The following regarding medications was addressed during rounds with patient:   the risks and benefits of the proposed medication. The patient was given the opportunity to ask questions. Informed consent given to the use of the above medications.  Will continue to adjust psychiatric and non-psychiatric medications (see above \"medication\" section and orders section for details) as deemed appropriate & based upon diagnoses and response to treatment. I will continue to order blood tests/labs and diagnostic tests as deemed appropriate and review results as they become available (see orders for details and above listed lab/test results). I will order psychiatric records from previous [facility] hospitals to further elucidate the nature of patient's psychopathology and review once available. I will gather additional collateral information from friends, family and o/p treatment team to further elucidate the nature of patient's psychopathology and baselline level of psychiatric functioning. I certify that this patient's inpatient psychiatric hospital services furnished since the previous certification were, and continue to be, required for treatment that could reasonably be expected to improve the patient's condition, or for diagnostic study, and that the patient continues to need, on a daily basis, active treatment furnished directly by or requiring the supervision of inpatient psychiatric facility personnel. In addition, the hospital records show that services furnished were intensive treatment services, admission or related services, or equivalent services.     EXPECTED DISCHARGE DATE/DAY: TBD     DISPOSITION: Home       Signed By:   Cathryn Toscano MD  10/4/2020

## 2020-10-05 PROCEDURE — 65220000003 HC RM SEMIPRIVATE PSYCH

## 2020-10-05 PROCEDURE — 99232 SBSQ HOSP IP/OBS MODERATE 35: CPT | Performed by: PSYCHIATRY & NEUROLOGY

## 2020-10-05 PROCEDURE — 74011250637 HC RX REV CODE- 250/637: Performed by: PSYCHIATRY & NEUROLOGY

## 2020-10-05 RX ADMIN — CLOZAPINE 225 MG: 100 TABLET ORAL at 21:22

## 2020-10-05 NOTE — GROUP NOTE
JENELLE  GROUP DOCUMENTATION INDIVIDUAL Group Therapy Note Date: 10/5/2020 Group Start Time: 1500 Group End Time: 0066 Group Topic: Recreational/Music Therapy [facility] [place] [address] ACUTE BEHAV [facility], [address] GROUP DOCUMENTATION GROUP Group Therapy Note Attendees: 3 Attendance: Did not attend Patient's Goal: Interventions/techniques Max Pappas

## 2020-10-05 NOTE — BH NOTES
GROUP THERAPY PROGRESS NOTE    Patient did not participate in Substance abuse/Coping Skill group.      Jacklyn Alas LPC [facility]

## 2020-10-05 NOTE — PROGRESS NOTES
Problem: Falls - Risk of  Goal: *Absence of Falls  Description: Document Tawana Kiran Fall Risk and appropriate interventions in the flowsheet. Outcome: Progressing Towards Goal  Note: Fall Risk Interventions:       Mentation Interventions: Room close to nurse's station, Toileting rounds, Reorient patient    Medication Interventions: Teach patient to arise slowly    Elimination Interventions:  Toileting schedule/hourly rounds              Problem: Altered Thought Process (Adult/Pediatric)  Goal: *STG: Remains safe in hospital  Outcome: Progressing Towards Goal  Goal: *STG: Seeks staff when feelings of anxiety and fear arise  Outcome: Not Progressing Towards Goal  Goal: *STG: Attends activities and groups  Outcome: Not Progressing Towards Goal     Problem: Discharge Planning  Goal: *Knowledge of medication management  Outcome: Progressing Towards Goal

## 2020-10-05 NOTE — BH NOTES
PSYCHIATRIC PROGRESS NOTE         Patient Name  Bryan Menchaca   Date of Birth 1970   [facility] [id number]   Medical Record Number  [id number]      Age  45 y.o. PCP Unknown, Provider   Admit date:  9/18/2020    Room Number  941/15  @ [facility]   Date of Service  10/5/2020         E & M PROGRESS NOTE:         HISTORY       CC:  \"psychosis\"  HISTORY OF PRESENT ILLNESS/INTERVAL HISTORY:  (reviewed/updated 10/5/2020). per initial evaluation: Dwayne Sebastian \"Corey\" Tracy Casanova is a 45year old male with a history of schizophrenia admitted to the [facility] for increased psychosis. He has not been bathing or otherwise caring for himself at home. He was reportedly hospitalized at [facility] from April - June 2020. He does not verbally answer any questions. He is well known to this writer from previous admissions in the [place] area. He has responded well to ECT in the past.    09/21 - overnight, patient incontinent of urine, placed into diaper. Patient appeared catatonic at times, but was speaking, non-spontaneously, to the team this morning. Per nursing, the patient has significantly poor self care, grossly internally preoccucpied and with no ability to care for himself. SW reports that sister is patient's caretaker and that he has been decompensating from an already poor baseline recently, requiring prompting to eat meals and unable to perform basic ADLs. Family states he improved on Clozaril and they are requesting the team restart this medication. Of note, patient got [place] injection two weeks ago, VPA level suggests compliance with medication. 09/22 - patient has been isolative to bed, up for meals, selectively mute. Shakes and nods his head to stimuli. Patient slept 11 hours overnight, and is in bed on assessment. He is mute, does not endorse any pain or discomfort but is largely unresponsive to questions. Vitals unremarkable, BP mildly elevated.     09/23 - patient in bed much of the day, per nursing he got up and showered, and spoke briefly about wanting to go home. He is not compliant with medication; patient seen in his room, he is somnolent, shakes and nods his head, doesn't open his eyes for the duration of the interview. Patient slept 8 hours overnight. 09/24 - patient refused vital signs and medications this morning. He remains isolative to bed for much of the day. Per nursing assessment, the patient has been internally preoccupied, selectively mute, slept 9.5 hours overnight. Patient briefed on the importance of medication compliance, does not voice understanding of the situation. 09/25 - overnight, patient was visible, pacing, largely incoherent but with no aggression noted. No PRNs were given to the patient but he is refusing several doses of medication (refused VPA, Klonopin AM doses); he is taking Clozaril. Patient noted to have repetitive movements, selectively mute on interview, won't get out of bed. Family asking for neurologic workup, MRI as he has not had this before. 09/28 - patient isolative, still selectively mute, took medication and allowed vitals this morning but refused labwork (CBC for Clozaril is overdue). Patient refused breakfast this morning, slept 6 hours overnight. He remains internally preoccupied, unable to assess his mental state. Patient continues to refuse about half of medication administrations. Court order pending for labwork. 09/29 - no acute overnight events. Patient medication compliant, isolative, selectively mute and generally asleep for much of the day. Not voicing any complaints but with ongoing internal preoccupation; he has not had any meaningful interaction with MD since last week. Court order for tomorrow's labwork pending. 09/30 - no acute overnight events. Patient was out of his room today and participated in treatment team discussion. He continues to speak minimally but asked to go home.  Discussed medication and the need for labwork, the patient agrees but has been refusing at [address] each morning. Patient guarded about symptoms, concrete and with no understanding of the events surrounding admission. 10/01 - court ordered medication / blood draw approved. Patient refused Klonopin and VPA this morning. Patient got bloodwork this morning, ANC WNL. Patient briefed on his treatment progress, he says nothing and does not acknowledge the treatment team. He made a comment at the onset of the interview but then went back to bed. Unable to provide any meaningful information. Per nursing, he remains mostly mute but will ask for some items. They are concerned he may be cheeking his Clozaril. 10/02 - the patient slept 10 hours overnight, self care is poor and patient remains internally preoccupied. He is out of his room for meals and comes into treatment team room for morning interview. He is unable to answer when asked his shoe size. Patient compliant with Clozaril with mouth checks to date, no PRNs given for agitation but patient still unable to adhere to ADLs without much prompting. 10/03 - patient slept 10.75 hours, he remains blunted with poor self care, but medication compliant. Patient out of room to meet with MD, denies SI/HI/AVH; he remains grossly internally preoccupied. Patient discharge focused, still speech impoverished and with poor ADLs. 10/04 - patient impoverished, in bed for much of the day. He is out for meals, requires much prompting to engage in ADLs. Goal for today is taking a shower per nursing. Patient compliant with Clozaril, vitals unremarkable thus far. Patient selectively mute, does not answer assessment questions. 10/05 - no acute overnight events; the patient slept 10 hours overnight. Patient refused AM medictions this AM, but took Clozaril last night. He remains in poor behavioral control, requiring much prompting for ADLs. Took shower last night with much encouragement.  He remains withdrawn, isolative, mental status remains impoverished. SIDE EFFECTS: (reviewed/updated 10/5/2020)  None reported or admitted to. No noted toxicity with use of Depakote   ALLERGIES:(reviewed/updated 10/5/2020)  Allergies   Allergen Reactions    Fluphenazine Unknown (comments)     Pt is unable to communicate properly.  Penicillins Unknown (comments)     Pt is unable to communicate properly. MEDICATIONS PRIOR TO ADMISSION:(reviewed/updated 10/5/2020)  Medications Prior to Admission   Medication Sig    FLUoxetine (PROzac) 20 mg capsule Take 20 mg by mouth daily.  benztropine (COGENTIN) 0.5 mg tablet Take 0.5 mg by mouth two (2) times a day. Indications: extrapyramidal symptoms as a result of taking the medication    LORazepam (Ativan) 1 mg tablet Take 1 mg by mouth two (2) times a day.  valproate (Depakene) 250 mg/5 mL syrup Take 1,000 mg by mouth two (2) times a day.  paliperidone palmitate (Invega Sustenna) 234 mg/1.5 mL injection 234 mg by IntraMUSCular route every twenty-one (21) days. Indications: schizophrenia    OLANZapine (ZyPREXA zydis) 20 mg disintegrating tablet Take 20 mg by mouth Daily (before dinner). Indications: schizophrenia      PAST MEDICAL HISTORY: Past medical history from the initial psychiatric evaluation has been reviewed (reviewed/updated 10/5/2020) with no additional updates (I asked patient and no additional past medical history provided). Past Medical History:   Diagnosis Date    Psychiatric disorder     Psychotic disorder ([facility]     Withdrawal syndrome ([facility]    History reviewed. No pertinent surgical history. SOCIAL HISTORY: Social history from the initial psychiatric evaluation has been reviewed (reviewed/updated 10/5/2020) with no additional updates (I asked patient and no additional social history provided).    Social History     Socioeconomic History    Marital status: SINGLE     Spouse name: Not on file    Number of children: Not on file    Years of education: Not on file    Highest education level: Not on file   Occupational History    Not on file   Social Needs    Financial resource strain: Not on file    Food insecurity     Worry: Not on file     Inability: Not on file    Transportation needs     Medical: Not on file     Non-medical: Not on file   Tobacco Use    Smoking status: Never Smoker    Smokeless tobacco: Never Used   Substance and Sexual Activity    Alcohol use: No    Drug use: No    Sexual activity: Not on file   Lifestyle    Physical activity     Days per week: Not on file     Minutes per session: Not on file    Stress: Not on file   Relationships    Social connections     Talks on phone: Not on file     Gets together: Not on file     Attends Mosque service: Not on file     Active member of club or organization: Not on file     Attends meetings of clubs or organizations: Not on file     Relationship status: Not on file    Intimate partner violence     Fear of current or ex partner: Not on file     Emotionally abused: Not on file     Physically abused: Not on file     Forced sexual activity: Not on file   Other Topics Concern    Not on file   Social History Narrative    Pt is a HS grad and is unemployed. He lives with his sister. On disability    No pending legal charge reported      FAMILY HISTORY: Family history from the initial psychiatric evaluation has been reviewed (reviewed/updated 10/5/2020) with no additional updates (I asked patient and no additional family history provided). History reviewed. No pertinent family history.     REVIEW OF SYSTEMS: (reviewed/updated 10/5/2020)  Appetite:poor   Sleep: poor with DIMS (difficulty initiating & maintaining sleep)   All other Review of Systems: Negative except per HPI         [address] (MSE):    MSE FINDINGS ARE WITHIN NORMAL LIMITS (WNL) UNLESS OTHERWISE STATED BELOW. ( ALL OF THE BELOW CATEGORIES OF THE MSE HAVE BEEN REVIEWED (reviewed 10/5/2020) AND UPDATED AS DEEMED APPROPRIATE )  General Presentation age appropriate, cooperative   Orientation disorganized   Vital Signs  See below (reviewed 10/5/2020); Vital Signs (BP, Pulse, & Temp) are within normal limits if not listed below. Gait and Station Stable/steady, no ataxia   Musculoskeletal System No extrapyramidal symptoms (EPS); no abnormal muscular movements or Tardive Dyskinesia (TD); muscle strength and tone are within normal limits   Language No aphasia or dysarthria   Speech:  hypoverbal, increased latency of response, non-pressured and soft   Thought Processes illogical; slow rate of thoughts; poor abstract reasoning/computation   Thought Associations blocked    Thought Content poverty of content   Suicidal Ideations unable to assess   Homicidal Ideations unable to assess   Mood:  unable to asses   Affect:  flat   Memory recent  impaired   Memory remote:  impaired   Concentration/Attention:  impaired   Fund of Knowledge significantly below average   Insight:  poor   Reliability fair   Judgment:  impaired due to active psychosis          VITALS:     Patient Vitals for the past 24 hrs:   Temp Pulse Resp BP SpO2   10/05/20 0830 98.3 °F (36.8 °C) 69 16 122/76 97 %   10/04/20 1955 98.1 °F (36.7 °C) 72 18 122/70 99 %     Wt Readings from Last 3 Encounters:   09/18/20 85.7 kg (189 lb)   12/23/17 84.9 kg (187 lb 1.6 oz)   03/25/17 95.6 kg (210 lb 11.2 oz)     Temp Readings from Last 3 Encounters:   10/05/20 98.3 °F (36.8 °C)   03/28/17 97.5 °F (36.4 °C)   02/07/15 98.7 °F (37.1 °C)     BP Readings from Last 3 Encounters:   10/05/20 122/76   03/28/17 100/68   02/12/15 105/73     Pulse Readings from Last 3 Encounters:   10/05/20 69   03/28/17 67   02/12/15 87            DATA     LABORATORY DATA:(reviewed/updated 10/5/2020)  No results found for this or any previous visit (from the past 24 hour(s)).   Lab Results   Component Value Date/Time    Valproic acid 97 09/18/2020 04:42 PM     No results found for: [address] [place] [address] [address] REPORTS:(reviewed/updated 10/5/2020)  No results found. MEDICATIONS     ALL MEDICATIONS:   Current Facility-Administered Medications   Medication Dose Route Frequency    cloZAPine (CLOZARIL) tablet 225 mg  225 mg Oral QHS    Or    OLANZapine (ZyPREXA) 5 mg in sterile water (preservative free) 1 mL injection  5 mg IntraMUSCular QHS    clonazePAM (KlonoPIN) tablet 1 mg  1 mg Oral BID    valproic acid (as sodium salt) (DEPAKENE) 250 mg/5 mL (5 mL) oral solution 1,000 mg  1,000 mg Oral BID    OLANZapine (ZyPREXA) tablet 5 mg  5 mg Oral Q6H PRN    haloperidol lactate (HALDOL) injection 5 mg  5 mg IntraMUSCular Q6H PRN    benztropine (COGENTIN) tablet 1 mg  1 mg Oral BID PRN    diphenhydrAMINE (BENADRYL) injection 50 mg  50 mg IntraMUSCular BID PRN    hydrOXYzine HCL (ATARAX) tablet 50 mg  50 mg Oral TID PRN    traZODone (DESYREL) tablet 50 mg  50 mg Oral QHS PRN    acetaminophen (TYLENOL) tablet 650 mg  650 mg Oral Q4H PRN    magnesium hydroxide (MILK OF MAGNESIA) 400 mg/5 mL oral suspension 30 mL  30 mL Oral DAILY PRN      SCHEDULED MEDICATIONS:   Current Facility-Administered Medications   Medication Dose Route Frequency    cloZAPine (CLOZARIL) tablet 225 mg  225 mg Oral QHS    Or    OLANZapine (ZyPREXA) 5 mg in sterile water (preservative free) 1 mL injection  5 mg IntraMUSCular QHS    clonazePAM (KlonoPIN) tablet 1 mg  1 mg Oral BID    valproic acid (as sodium salt) (DEPAKENE) 250 mg/5 mL (5 mL) oral solution 1,000 mg  1,000 mg Oral BID          ASSESSMENT & PLAN     DIAGNOSES REQUIRING ACTIVE TREATMENT AND MONITORING: (reviewed/updated 10/5/2020)  Patient Active Hospital Problem List:   Schizoaffective disorder ([facility] (9/18/2020)    Assessment: patient with significant internal preoccupation, poor ADLs, history of catatonia. Appears to have been compliant with home medication, but is regressed and with a markedly disorganized thought process.  Will start Clozaril, add benzodiazepine for protection against catatonia to be tapered as Clozaril dose increases given interaction. Plan:  COURT ORDERED MEDICATION:  - INCREASE Clozaril to 225 mg PO *OR* Zyprexa 5 mg IM QHS for psychosis    - DISCONTINUE Haldol due to poor efficacy  - DISCONTINUE [place] due to poor efficacy  - EEG for seizure rule out  - CONTINUE Klonopin 1 mg BID for catatonia  - Court order pending for labwork, Clozaril  - CONTINUE VPA oral soln 1000 mg BID for mood lability, seizure prophylaxis  - RESCHEDULE VPA level  - DISCONTINUE Prozac / Cogentin / Zydis due to polypharamacy  - Consider antihypertensive  - IGM therapy as tolerated    I will continue to monitor blood levels (Depakote, clozapine---a drug with a narrow therapeutic index= NTI) and associated labs for drug therapy implemented that require intense monitoring for toxicity as deemed appropriate based on current medication side effects and pharmacodynamically determined drug 1/2 lives. In summary, Pilo Michael, is a 45 y.o.  male who presents with a severe exacerbation of the principal diagnosis of Schizoaffective disorder ([facility]    Patient's condition is not improving. Patient requires continued inpatient hospitalization for further stabilization, safety monitoring and medication management. I will continue to coordinate the provision of individual, milieu, occupational, group, and substance abuse therapies to address target symptoms/diagnoses as deemed appropriate for the individual patient. A coordinated, multidisplinary treatment team round was conducted with the patient (this team consists of the nurse, psychiatric unit pharmacist,  and writer). Complete current electronic health record for patient has been reviewed today including consultant notes, ancillary staff notes, nurses and psychiatric tech notes. Suicide risk assessment completed and patient deemed to be of low risk for suicide at this time.      The following regarding medications was addressed during rounds with patient:   the risks and benefits of the proposed medication. The patient was given the opportunity to ask questions. Informed consent given to the use of the above medications. Will continue to adjust psychiatric and non-psychiatric medications (see above \"medication\" section and orders section for details) as deemed appropriate & based upon diagnoses and response to treatment. I will continue to order blood tests/labs and diagnostic tests as deemed appropriate and review results as they become available (see orders for details and above listed lab/test results). I will order psychiatric records from previous [facility] hospitals to further elucidate the nature of patient's psychopathology and review once available. I will gather additional collateral information from friends, family and o/p treatment team to further elucidate the nature of patient's psychopathology and baselline level of psychiatric functioning. I certify that this patient's inpatient psychiatric hospital services furnished since the previous certification were, and continue to be, required for treatment that could reasonably be expected to improve the patient's condition, or for diagnostic study, and that the patient continues to need, on a daily basis, active treatment furnished directly by or requiring the supervision of inpatient psychiatric facility personnel. In addition, the hospital records show that services furnished were intensive treatment services, admission or related services, or equivalent services.     EXPECTED DISCHARGE DATE/DAY: TBD     DISPOSITION: Home       Signed By:   Shante Cortez MD  10/5/2020

## 2020-10-05 NOTE — INTERDISCIPLINARY ROUNDS
Behavioral Health Interdisciplinary Rounds  
  
  Patient Name: Edwin Anderson             Age: 45 y. o.                 Room/Bed:  313/01 Primary Diagnosis: Schizoaffective disorder (HCC)        
Admission Status: CI - Medication/Blood Draw/VS                           
Readmission within 30 days: no Power of  in place: no Patient requires a blocked bed: yes           
Reason for blocked bed: disruptive Order for blocked bed obtained: yes    
Sleep hours:  10.50   
Morning Labs completed per orders: None ordered                              
Participation in Care/Groups:  no 
Medication Compliant?: Yes - CI still w/prompting PRNS (last 24 hours): None                
Restraints (last 24 hours):  no Substance Abuse:  no              
24 hour chart check complete:  
 
 
Patient goal(s) for today: Follow unit directions  
Treatment team focus/goals: Stabilize - increase Clozaril  
Progress note: Pt remains isolative to room and curled up in his bed. Selectively mute. 
  
LOS:  17             Expected LOS: TBD 
  
Financial concerns/prescription coverage: [employer] Medicare Part A&B and [address] Medicaid and TDO Family contact: Sister/POANJU Kan [phone]  2/72 Family requesting physician contact today: No 
Discharge plan: Home Access to weapons: None Outpatient provider(s): [facility] - updated 10/2 Patient's preferred phone number for follow up [phone] 
  
Participating treatment team members: LAVINIA Baldwin and Dr. Bruce Toney

## 2020-10-05 NOTE — GROUP NOTE
JENELLE  GROUP DOCUMENTATION INDIVIDUAL Group Therapy Note Date: 10/5/2020 Group Start Time: 1100 Group End Time: 1200 Group Topic: Topic Group [address] ACUTE BEHAV [facility], [address] [facility] GROUP DOCUMENTATION GROUP Group Therapy Note Attendees: 4 Attendance: Did not attend Patient's Goal: Interventions/techniques Kavon Olivia

## 2020-10-05 NOTE — PROGRESS NOTES
Problem: Falls - Risk of  Goal: *Absence of Falls  Description: Document Errol Vega Fall Risk and appropriate interventions in the flowsheet. Outcome: Progressing Towards Goal  Note: Fall Risk Interventions:       Mentation Interventions: Room close to nurse's station, Toileting rounds, Reorient patient    Medication Interventions: Teach patient to arise slowly    Elimination Interventions:  Toileting schedule/hourly rounds              Problem: Altered Thought Process (Adult/Pediatric)  Goal: *STG: Complies with medication therapy  Outcome: Progressing Towards Goal     Problem: Altered Thought Process (Adult/Pediatric)  Goal: *STG: Complies with medication therapy  Outcome: Progressing Towards Goal     Problem: Altered Thought Process (Adult/Pediatric)  Goal: *STG: Attends activities and groups  Outcome: Not Progressing Towards Goal

## 2020-10-05 NOTE — BH NOTES
Assumed care of patient and given report by off going shift. Pt was in his room the entire shift and continues to isolate. He is selectively mute; will interact briefly as he did tonight. Pt advised he did not do as he promised me yesterday and that was to shower today/wash his hair and get his linens change. After two prompts; pt did get up to take a shower and let me change all his linens. He states \"your asking too much. \" and I told him showering daily was not asking too much and he needed to cooperate and start caring for himself if he wanted to go home. Pt did change his gown and put on a new brief but refused to put on gripper socks. When I told him to brush his teeth, he stated he already had today. He had snack in his room. He refused then after to answer any assessment questions and appeared irritated. He continues to eat meals, snacks as he did tonight. When going to give him his meds, he pretended to be asleep after multiple times to get him up and he eventually did when I told him this was what was going to get him home. He agreed then to sit up and take his pills. He denied pain. Pt continues to refused groups. He is isolative and does not interact with peers. Pt remains on court ordered meds/vs and blood draws. He sleeps well. Will continue to monitor and treat.

## 2020-10-06 PROCEDURE — 74011250637 HC RX REV CODE- 250/637: Performed by: PSYCHIATRY & NEUROLOGY

## 2020-10-06 PROCEDURE — 99232 SBSQ HOSP IP/OBS MODERATE 35: CPT | Performed by: PSYCHIATRY & NEUROLOGY

## 2020-10-06 PROCEDURE — 65220000003 HC RM SEMIPRIVATE PSYCH

## 2020-10-06 RX ADMIN — CLOZAPINE 250 MG: 100 TABLET ORAL at 21:42

## 2020-10-06 NOTE — GROUP NOTE
JENELLE  GROUP DOCUMENTATION INDIVIDUAL Group Therapy Note Date: 10/6/2020 Group Start Time: 1500 Group End Time: 9207 Group Topic: Recreational/Music Therapy [address] ACUTE BEHAV [facility], [address] [facility] GROUP DOCUMENTATION GROUP Group Therapy Note Attendees: 4 Attendance: Did not attend Patient's Goal: Interventions/techniques Jax Guzman

## 2020-10-06 NOTE — PROGRESS NOTES
Problem: Falls - Risk of  Goal: *Absence of Falls  Description: Document Errol Vega Fall Risk and appropriate interventions in the flowsheet. Outcome: Progressing Towards Goal  Note: Fall Risk Interventions:       Mentation Interventions: Room close to nurse's station, Toileting rounds, Reorient patient    Medication Interventions: Teach patient to arise slowly    Elimination Interventions:  Toileting schedule/hourly rounds              Problem: Altered Thought Process (Adult/Pediatric)  Goal: *STG: Remains safe in hospital  Outcome: Progressing Towards Goal  Goal: *STG: Seeks staff when feelings of anxiety and fear arise  Outcome: Not Progressing Towards Goal  Goal: *STG: Attends activities and groups  Outcome: Not Progressing Towards Goal     Problem: Discharge Planning  Goal: *Discharge to safe environment  Outcome: Not Progressing Towards Goal  Goal: *Knowledge of medication management  Outcome: Not Progressing Towards Goal

## 2020-10-06 NOTE — BH NOTES
GROUP THERAPY PROGRESS NOTE    Patient did not participate in Coping Skills group.      Alverto Contreras LPC LSATP [facility]

## 2020-10-06 NOTE — BH NOTES
0700- Verbal shift change report given to Artemio Pelletier.  (oncoming nurse) by Niharika Guy  (offgoing nurse). Report included the following information SBAR, Kardex, MAR and Recent Results. [id number] Patient refused assessment. Resting in bed quietly. Patient uncooperative upon assessment. Patient diet compliant. Patient refused vitals and medications. Will continue to monitor patient for safety. [phone] Patient resting in room at this time. Patient did not participate in groups. Isolative to his room. Will continue to monitor for safety. [id number] Patient did not come out for lunch. Lunch offered but patient refused. Will continue to monitor patient for safety and provide support. [phone] Patient ambulated to hallway and went to groups briefly. Patient then ambulated back to room. Will continue to monitor for safety. [id number] Patient resting quietly in room. Patient refused medications. Will continue to monitor for safety.

## 2020-10-06 NOTE — BH NOTES
GROUP THERAPY PROGRESS NOTE    Patient did not participate in Coping Skills group.      LAVINIA Guevara

## 2020-10-06 NOTE — BH NOTES
PSYCHIATRIC PROGRESS NOTE         Patient Name  Ramesh Mendieta   Date of Birth 1970   [facility] [id number]   Medical Record Number  [id number]      Age  45 y.o. PCP Unknown, Provider   Admit date:  9/18/2020    Room Number  319/58  @ [facility]   Date of Service  10/6/2020         E & M PROGRESS NOTE:         HISTORY       CC:  \"psychosis\"  HISTORY OF PRESENT ILLNESS/INTERVAL HISTORY:  (reviewed/updated 10/6/2020). per initial evaluation: Richar Angulo \"Corey\" Precious Duke is a 45year old male with a history of schizophrenia admitted to the [facility] for increased psychosis. He has not been bathing or otherwise caring for himself at home. He was reportedly hospitalized at [facility] from April - June 2020. He does not verbally answer any questions. He is well known to this writer from previous admissions in the [place] area. He has responded well to ECT in the past.    09/21 - overnight, patient incontinent of urine, placed into diaper. Patient appeared catatonic at times, but was speaking, non-spontaneously, to the team this morning. Per nursing, the patient has significantly poor self care, grossly internally preoccucpied and with no ability to care for himself. SW reports that sister is patient's caretaker and that he has been decompensating from an already poor baseline recently, requiring prompting to eat meals and unable to perform basic ADLs. Family states he improved on Clozaril and they are requesting the team restart this medication. Of note, patient got [place] injection two weeks ago, VPA level suggests compliance with medication. 09/22 - patient has been isolative to bed, up for meals, selectively mute. Shakes and nods his head to stimuli. Patient slept 11 hours overnight, and is in bed on assessment. He is mute, does not endorse any pain or discomfort but is largely unresponsive to questions. Vitals unremarkable, BP mildly elevated.     09/23 - patient in bed much of the day, per nursing he got up and showered, and spoke briefly about wanting to go home. He is not compliant with medication; patient seen in his room, he is somnolent, shakes and nods his head, doesn't open his eyes for the duration of the interview. Patient slept 8 hours overnight. 09/24 - patient refused vital signs and medications this morning. He remains isolative to bed for much of the day. Per nursing assessment, the patient has been internally preoccupied, selectively mute, slept 9.5 hours overnight. Patient briefed on the importance of medication compliance, does not voice understanding of the situation. 09/25 - overnight, patient was visible, pacing, largely incoherent but with no aggression noted. No PRNs were given to the patient but he is refusing several doses of medication (refused VPA, Klonopin AM doses); he is taking Clozaril. Patient noted to have repetitive movements, selectively mute on interview, won't get out of bed. Family asking for neurologic workup, MRI as he has not had this before. 09/28 - patient isolative, still selectively mute, took medication and allowed vitals this morning but refused labwork (CBC for Clozaril is overdue). Patient refused breakfast this morning, slept 6 hours overnight. He remains internally preoccupied, unable to assess his mental state. Patient continues to refuse about half of medication administrations. Court order pending for labwork. 09/29 - no acute overnight events. Patient medication compliant, isolative, selectively mute and generally asleep for much of the day. Not voicing any complaints but with ongoing internal preoccupation; he has not had any meaningful interaction with MD since last week. Court order for tomorrow's labwork pending. 09/30 - no acute overnight events. Patient was out of his room today and participated in treatment team discussion. He continues to speak minimally but asked to go home.  Discussed medication and the need for labwork, the patient agrees but has been refusing at [facility] each morning. Patient guarded about symptoms, concrete and with no understanding of the events surrounding admission. 10/01 - court ordered medication / blood draw approved. Patient refused Klonopin and VPA this morning. Patient got bloodwork this morning, ANC WNL. Patient briefed on his treatment progress, he says nothing and does not acknowledge the treatment team. He made a comment at the onset of the interview but then went back to bed. Unable to provide any meaningful information. Per nursing, he remains mostly mute but will ask for some items. They are concerned he may be cheeking his Clozaril. 10/02 - the patient slept 10 hours overnight, self care is poor and patient remains internally preoccupied. He is out of his room for meals and comes into treatment team room for morning interview. He is unable to answer when asked his shoe size. Patient compliant with Clozaril with mouth checks to date, no PRNs given for agitation but patient still unable to adhere to ADLs without much prompting. 10/03 - patient slept 10.75 hours, he remains blunted with poor self care, but medication compliant. Patient out of room to meet with MD, denies SI/HI/AVH; he remains grossly internally preoccupied. Patient discharge focused, still speech impoverished and with poor ADLs. 10/04 - patient impoverished, in bed for much of the day. He is out for meals, requires much prompting to engage in ADLs. Goal for today is taking a shower per nursing. Patient compliant with Clozaril, vitals unremarkable thus far. Patient selectively mute, does not answer assessment questions. 10/05 - no acute overnight events; the patient slept 10 hours overnight. Patient refused AM medictions this AM, but took Clozaril last night. He remains in poor behavioral control, requiring much prompting for ADLs. Took shower last night with much encouragement.  He remains withdrawn, isolative, mental status remains impoverished. 10/06 - patient slept 9 hours overnight, he remains isolative to room. Compliant with Clozaril but refusing VPA and benzo. Patient out of bed for meals otherwise with no improvement in thought process. Discussed holding all agents except Clozaril as patient is intermittently non-compliant and had been taking these agents prior to admission, patient agrees to this. He c/o sialorrhea. Patient received no PRNs for agitation, ADLs remain poor and he needs prompting for most activities. SIDE EFFECTS: (reviewed/updated 10/6/2020)  +Drooling  No noted toxicity with use of Depakote   ALLERGIES:(reviewed/updated 10/6/2020)  Allergies   Allergen Reactions    Fluphenazine Unknown (comments)     Pt is unable to communicate properly.  Penicillins Unknown (comments)     Pt is unable to communicate properly. MEDICATIONS PRIOR TO ADMISSION:(reviewed/updated 10/6/2020)  Medications Prior to Admission   Medication Sig    FLUoxetine (PROzac) 20 mg capsule Take 20 mg by mouth daily.  benztropine (COGENTIN) 0.5 mg tablet Take 0.5 mg by mouth two (2) times a day. Indications: extrapyramidal symptoms as a result of taking the medication    LORazepam (Ativan) 1 mg tablet Take 1 mg by mouth two (2) times a day.  valproate (Depakene) 250 mg/5 mL syrup Take 1,000 mg by mouth two (2) times a day.  paliperidone palmitate (Invega Sustenna) 234 mg/1.5 mL injection 234 mg by IntraMUSCular route every twenty-one (21) days. Indications: schizophrenia    OLANZapine (ZyPREXA zydis) 20 mg disintegrating tablet Take 20 mg by mouth Daily (before dinner). Indications: schizophrenia      PAST MEDICAL HISTORY: Past medical history from the initial psychiatric evaluation has been reviewed (reviewed/updated 10/6/2020) with no additional updates (I asked patient and no additional past medical history provided).    Past Medical History:   Diagnosis Date    Psychiatric disorder     Psychotic disorder ([facility] [address]     Withdrawal syndrome ([facility] [address]    History reviewed. No pertinent surgical history. SOCIAL HISTORY: Social history from the initial psychiatric evaluation has been reviewed (reviewed/updated 10/6/2020) with no additional updates (I asked patient and no additional social history provided). Social History     Socioeconomic History    Marital status: SINGLE     Spouse name: Not on file    Number of children: Not on file    Years of education: Not on file    Highest education level: Not on file   Occupational History    Not on file   Social Needs    Financial resource strain: Not on file    Food insecurity     Worry: Not on file     Inability: Not on file    Transportation needs     Medical: Not on file     Non-medical: Not on file   Tobacco Use    Smoking status: Never Smoker    Smokeless tobacco: Never Used   Substance and Sexual Activity    Alcohol use: No    Drug use: No    Sexual activity: Not on file   Lifestyle    Physical activity     Days per week: Not on file     Minutes per session: Not on file    Stress: Not on file   Relationships    Social connections     Talks on phone: Not on file     Gets together: Not on file     Attends [employer]al service: Not on file     Active member of club or organization: Not on file     Attends meetings of clubs or organizations: Not on file     Relationship status: Not on file    Intimate partner violence     Fear of current or ex partner: Not on file     Emotionally abused: Not on file     Physically abused: Not on file     Forced sexual activity: Not on file   Other Topics Concern    Not on file   Social History Narrative    Pt is a HS grad and is unemployed. He lives with his sister. On disability    No pending legal charge reported      FAMILY HISTORY: Family history from the initial psychiatric evaluation has been reviewed (reviewed/updated 10/6/2020) with no additional updates (I asked patient and no additional family history provided). History reviewed.  No pertinent family history. REVIEW OF SYSTEMS: (reviewed/updated 10/6/2020)  Appetite:poor   Sleep: poor with DIMS (difficulty initiating & maintaining sleep)   All other Review of Systems: Negative except per HPI         [address] (MSE):    MSE FINDINGS ARE WITHIN NORMAL LIMITS (WNL) UNLESS OTHERWISE STATED BELOW. ( ALL OF THE BELOW CATEGORIES OF THE MSE HAVE BEEN REVIEWED (reviewed 10/6/2020) AND UPDATED AS DEEMED APPROPRIATE )  General Presentation age appropriate, cooperative   Orientation disorganized   Vital Signs  See below (reviewed 10/6/2020); Vital Signs (BP, Pulse, & Temp) are within normal limits if not listed below. Gait and Station Stable/steady, no ataxia   Musculoskeletal System No extrapyramidal symptoms (EPS); no abnormal muscular movements or Tardive Dyskinesia (TD); muscle strength and tone are within normal limits   Language No aphasia or dysarthria   Speech:  hypoverbal, increased latency of response, non-pressured and soft   Thought Processes illogical; slow rate of thoughts; poor abstract reasoning/computation   Thought Associations blocked    Thought Content poverty of content   Suicidal Ideations unable to assess   Homicidal Ideations unable to assess   Mood:  unable to asses   Affect:  flat   Memory recent  impaired   Memory remote:  impaired   Concentration/Attention:  impaired   Fund of Knowledge significantly below average   Insight:  poor   Reliability fair   Judgment:  impaired due to active psychosis          VITALS:     No data found.   Wt Readings from Last 3 Encounters:   09/18/20 85.7 kg (189 lb)   12/23/17 84.9 kg (187 lb 1.6 oz)   03/25/17 95.6 kg (210 lb 11.2 oz)     Temp Readings from Last 3 Encounters:   10/05/20 98.3 °F (36.8 °C)   03/28/17 97.5 °F (36.4 °C)   02/07/15 98.7 °F (37.1 °C)     BP Readings from Last 3 Encounters:   10/05/20 122/76   03/28/17 100/68   02/12/15 105/73     Pulse Readings from Last 3 Encounters:   10/05/20 69   03/28/17 67   02/12/15 87            DATA     LABORATORY DATA:(reviewed/updated 10/6/2020)  No results found for this or any previous visit (from the past 24 hour(s)). Lab Results   Component Value Date/Time    Valproic acid 97 09/18/2020 04:42 PM     No results found for: LITHM   RADIOLOGY REPORTS:(reviewed/updated 10/6/2020)  No results found.        MEDICATIONS     ALL MEDICATIONS:   Current Facility-Administered Medications   Medication Dose Route Frequency    cloZAPine (CLOZARIL) tablet 250 mg  250 mg Oral QHS    Or    OLANZapine (ZyPREXA) 5 mg in sterile water (preservative free) 1 mL injection  5 mg IntraMUSCular QHS    [Held by provider] clonazePAM (KlonoPIN) tablet 1 mg  1 mg Oral BID    [Held by provider] valproic acid (as sodium salt) (DEPAKENE) 250 mg/5 mL (5 mL) oral solution 1,000 mg  1,000 mg Oral BID    OLANZapine (ZyPREXA) tablet 5 mg  5 mg Oral Q6H PRN    haloperidol lactate (HALDOL) injection 5 mg  5 mg IntraMUSCular Q6H PRN    benztropine (COGENTIN) tablet 1 mg  1 mg Oral BID PRN    diphenhydrAMINE (BENADRYL) injection 50 mg  50 mg IntraMUSCular BID PRN    hydrOXYzine HCL (ATARAX) tablet 50 mg  50 mg Oral TID PRN    traZODone (DESYREL) tablet 50 mg  50 mg Oral QHS PRN    acetaminophen (TYLENOL) tablet 650 mg  650 mg Oral Q4H PRN    magnesium hydroxide (MILK OF MAGNESIA) 400 mg/5 mL oral suspension 30 mL  30 mL Oral DAILY PRN      SCHEDULED MEDICATIONS:   Current Facility-Administered Medications   Medication Dose Route Frequency    cloZAPine (CLOZARIL) tablet 250 mg  250 mg Oral QHS    Or    OLANZapine (ZyPREXA) 5 mg in sterile water (preservative free) 1 mL injection  5 mg IntraMUSCular QHS    [Held by provider] clonazePAM (KlonoPIN) tablet 1 mg  1 mg Oral BID    [Held by provider] valproic acid (as sodium salt) (DEPAKENE) 250 mg/5 mL (5 mL) oral solution 1,000 mg  1,000 mg Oral BID          ASSESSMENT & PLAN     DIAGNOSES REQUIRING ACTIVE TREATMENT AND MONITORING: (reviewed/updated 10/6/2020)  Patient Active Hospital Problem List:   Schizoaffective disorder [facility]) (9/18/2020)    Assessment: patient with significant internal preoccupation, poor ADLs, history of catatonia. Appears to have been compliant with home medication, but is regressed and with a markedly disorganized thought process. Will start Clozaril, add benzodiazepine for protection against catatonia to be tapered as Clozaril dose increases given interaction. Plan:  COURT ORDERED MEDICATION:  - INCREASE Clozaril to 250 mg PO *OR* Zyprexa 5 mg IM QHS for psychosis    - DISCONTINUE Haldol due to poor efficacy  - DISCONTINUE [place] due to poor efficacy  - EEG for seizure rule out  - HOLD Klonopin 1 mg BID for catatonia due to poor efficacy  - Court order pending for labwork, Clozaril  - HOLD VPA oral soln 1000 mg BID for mood lability, seizure prophylaxis due to poor efficacy, non compliance  - RESCHEDULE VPA level  - DISCONTINUE Prozac / Cogentin / Zydis due to polypharamacy  - Consider antihypertensive  - IGM therapy as tolerated    I will continue to monitor blood levels (Depakote, clozapine---a drug with a narrow therapeutic index= NTI) and associated labs for drug therapy implemented that require intense monitoring for toxicity as deemed appropriate based on current medication side effects and pharmacodynamically determined drug 1/2 lives. In summary, Jean Claude Cannon, is a 45 y.o.  male who presents with a severe exacerbation of the principal diagnosis of Schizoaffective disorder ([facility] [address]    Patient's condition is not improving. Patient requires continued inpatient hospitalization for further stabilization, safety monitoring and medication management. I will continue to coordinate the provision of individual, milieu, occupational, group, and substance abuse therapies to address target symptoms/diagnoses as deemed appropriate for the individual patient.   A coordinated, multidisplinary treatment team round was conducted with the patient (this team consists of the nurse, psychiatric unit pharmacist,  and writer). Complete current electronic health record for patient has been reviewed today including consultant notes, ancillary staff notes, nurses and psychiatric tech notes. Suicide risk assessment completed and patient deemed to be of low risk for suicide at this time. The following regarding medications was addressed during rounds with patient:   the risks and benefits of the proposed medication. The patient was given the opportunity to ask questions. Informed consent given to the use of the above medications. Will continue to adjust psychiatric and non-psychiatric medications (see above \"medication\" section and orders section for details) as deemed appropriate & based upon diagnoses and response to treatment. I will continue to order blood tests/labs and diagnostic tests as deemed appropriate and review results as they become available (see orders for details and above listed lab/test results). I will order psychiatric records from previous [facility] hospitals to further elucidate the nature of patient's psychopathology and review once available. I will gather additional collateral information from friends, family and o/p treatment team to further elucidate the nature of patient's psychopathology and baselline level of psychiatric functioning. I certify that this patient's inpatient psychiatric hospital services furnished since the previous certification were, and continue to be, required for treatment that could reasonably be expected to improve the patient's condition, or for diagnostic study, and that the patient continues to need, on a daily basis, active treatment furnished directly by or requiring the supervision of inpatient psychiatric facility personnel.  In addition, the hospital records show that services furnished were intensive treatment services, admission or related services, or equivalent services.     EXPECTED DISCHARGE DATE/DAY: TBD     DISPOSITION: Home       Signed By:   Blake Gunn MD  10/6/2020

## 2020-10-06 NOTE — GROUP NOTE
JENELLE  GROUP DOCUMENTATION INDIVIDUAL Group Therapy Note Date: 10/6/2020 Group Start Time: 1100 Group End Time: 1200 Group Topic: Topic Group [facility] [place] 3 ACUTE BEHAV [facility], [address] [facility] GROUP DOCUMENTATION GROUP Group Therapy Note Attendees: 3 Attendance: Did not attend Patient's Goal: Interventions/techniques: 
 
Ethan Clifford

## 2020-10-06 NOTE — PROGRESS NOTES
Diet as tolerated. Pt meal compliant. Hx unremarkable per nutrition. Ht: 5'7\"  Wt: 189 lb  BMI: 29.6 kg/(m^2) c/w overweight  Est energy needs: 1815 kcal 70 g protein, 1 mL/kcal fluids  Previous goal met.   Pt will consume > 75% of meals at follow up 7-10 days  LOS

## 2020-10-06 NOTE — BH NOTES
0700-  Verbal shift change report given to Artemio Pelletier. (oncoming nurse) by Remy Menchaca  (offgoing nurse). Report included the following information SBAR, Kardex, MAR and Recent Results. [id number] Patient uncooperative with most assessment questions. Patient isoltive to his room. Patient denies SI/HI. Cooperative with vitals and diet compliant. Patient politely refused morning medications, stating, \"I'd rather not\". Patient remains isolative to his room. Will continue to monitor for safety. [id number] Patient did not attend groups. Resting in room quietly. Will continue to monitor for safety and provide support. [id number] Patient ambulated to day room to obtain lunch and tolerated it in his room. Patient remains withdrawn. Will continue to provide support to patient. 3930-0477 Patient ambulating around room. Patient ambulated to nurses station and whispered to writer, Rafaela Dumont I have a juice\". Patient is polite. No issues noted at this time. [phone] Patient resting in room. No adverse behaviors noted at this time. Patient tolerated dinner without issue. Will continue to monitor patient for safety.

## 2020-10-06 NOTE — BH NOTES
Assumed care for the patient following day shift at [address] [place] [address]. Patient presents as isolative and restless. Patient spent a majority of the shift in their room and was minimal during conversation. Patient was compliant with their medication after some encouragement. Patient is focused on discharge and kept asking \"When can I go? \" Napoleon King denies any S/I, H/I, and AVH. Patient slept a total of 8.5 hours.

## 2020-10-06 NOTE — BH NOTES
Behavioral Health Interdisciplinary Rounds        Patient Name: Edwin Anderson             Age: 45 y. o.                 Room/Bed:  313/01  Primary Diagnosis: Schizoaffective disorder (HCC)         Admission Status: CI                          Readmission within 27 days: no  Power of  in place: no  Patient requires a blocked bed: yes            Reason for blocked bed: disruptive  Order for blocked bed obtained: yes     Sleep hours: 8.5    Morning Labs completed per orders: None ordered                               Participation in Care/Groups:  no  Medication Compliant?: Yes - CI still w/prompting  PRNS (last 24 hours): None                 Restraints (last 24 hours):  no  Substance Abuse:  no               24 hour chart check complete    Patient goal(s) for today: Follow unit directions   Treatment team focus/goals: Stabilize - increase Clozaril   Progress note: Pt remains isolative to room and curled up in his bed. Poor hygiene. Malodorous.  Selectively mute.     LOS:  18             Expected LOS: TBD     Financial concerns/prescription coverage: [place] Medicare Part A&B and [facility] and O  Family contact: Sister/POA Judah Mccrary [phone]  2/56  Family requesting physician contact today: No  Discharge plan: Home  Access to weapons: None  Outpatient provider(s): [facility] - updated 10/2  Patient's preferred phone number for follow up 23-14-20-09     Participating treatment team members: LAVINIA Garcia and Dr. Tiburcio Mullins

## 2020-10-07 PROCEDURE — 99232 SBSQ HOSP IP/OBS MODERATE 35: CPT | Performed by: PSYCHIATRY & NEUROLOGY

## 2020-10-07 PROCEDURE — 74011250637 HC RX REV CODE- 250/637: Performed by: PSYCHIATRY & NEUROLOGY

## 2020-10-07 PROCEDURE — 65220000003 HC RM SEMIPRIVATE PSYCH

## 2020-10-07 RX ORDER — CLOZAPINE 100 MG/1
300 TABLET ORAL
Status: DISCONTINUED | OUTPATIENT
Start: 2020-10-07 | End: 2020-10-13

## 2020-10-07 RX ADMIN — CLOZAPINE 300 MG: 100 TABLET ORAL at 22:12

## 2020-10-07 NOTE — BH NOTES
Assumed care for the patient following day shift at [address] [place] [address]. Patient presents as isolative and withdrawn. Patient was refusing of most care but was compliant with their bedtime medication. Patient does require encouragement to take their medication but understands its ordered by the court. Patient was minimal during the conversation and remains discharge focused. Patient denies any S/I, H/I, and AVH. Patient slept a total of 8 hours.

## 2020-10-07 NOTE — PROGRESS NOTES
Problem: Falls - Risk of  Goal: *Absence of Falls  Description: Document Oliver Pino Fall Risk and appropriate interventions in the flowsheet. Outcome: Progressing Towards Goal  Note: Fall Risk Interventions:       Mentation Interventions: Room close to nurse's station, Toileting rounds, Reorient patient    Medication Interventions: Teach patient to arise slowly    Elimination Interventions:  Toileting schedule/hourly rounds              Problem: Patient Education: Go to Patient Education Activity  Goal: Patient/Family Education  Outcome: Not Progressing Towards Goal     Problem: Altered Thought Process (Adult/Pediatric)  Goal: *STG: Participates in treatment plan  Outcome: Not Progressing Towards Goal  Goal: *STG: Remains safe in hospital  Outcome: Progressing Towards Goal  Goal: *STG: Seeks staff when feelings of anxiety and fear arise  Outcome: Not Progressing Towards Goal  Goal: *STG: Complies with medication therapy  Outcome: Not Progressing Towards Goal  Goal: *STG: Attends activities and groups  Outcome: Not Progressing Towards Goal  Goal: *STG: Absence of lethality  Outcome: Progressing Towards Goal  Goal: *STG: Demonstrates ability to understand and use improved judgment in daily activities and relationships  Outcome: Not Progressing Towards Goal  Goal: *LTG: Returns to baseline functioning  Outcome: Not Progressing Towards Goal

## 2020-10-07 NOTE — GROUP NOTE
JENELLE  GROUP DOCUMENTATION INDIVIDUAL Group Therapy Note Date: 10/7/2020 Group Start Time: 1400 Group End Time: 1500 Group Topic: Recreational/Music Therapy [facility] 3 ACUTE BEHAV [facility] [place], [address] GROUP DOCUMENTATION GROUP Group Therapy Note Attendees: 4 Attendance: Did not attend Patient's Goal: Interventions/techniques Tim Tanner

## 2020-10-07 NOTE — GROUP NOTE
JENELLE  GROUP DOCUMENTATION INDIVIDUAL Group Therapy Note Date: 10/7/2020 Group Start Time: 1000 Group End Time: 1100 Group Topic: Topic Group [facility] 3 ACUTE BEHAV [facility], [address] [facility] GROUP DOCUMENTATION GROUP Group Therapy Note Attendees: 4 Attendance: Did not attend Patient's Goal: Interventions/techniques: 
 
Johnie Alvarado

## 2020-10-07 NOTE — BH NOTES
GROUP THERAPY PROGRESS NOTE    Patient did not participate in Coping Skills group.      Jagruti Noyola LPC [facility]

## 2020-10-07 NOTE — BH NOTES
GROUP THERAPY PROGRESS NOTE    Patient did not participate in Process group.      Milton Underwood LPC [facility]

## 2020-10-07 NOTE — BH NOTES
GROUP THERAPY PROGRESS NOTE    Patient did not participate in Discharge Group.      Milton Underwood LPC [facility]

## 2020-10-07 NOTE — BH NOTES
PSYCHIATRIC PROGRESS NOTE         Patient Name  Pablo Weaver   Date of Birth 1970   [facility] [id number]   Medical Record Number  [id number]      Age  45 y.o. PCP Unknown, Provider   Admit date:  9/18/2020    Room Number  643/95  @ [facility]   Date of Service  10/7/2020         E & M PROGRESS NOTE:         HISTORY       CC:  \"psychosis\"  HISTORY OF PRESENT ILLNESS/INTERVAL HISTORY:  (reviewed/updated 10/7/2020). per initial evaluation: Maria Fernanda Jean \"Corey\" Rachel Rivera is a 45year old male with a history of schizophrenia admitted to the [facility] for increased psychosis. He has not been bathing or otherwise caring for himself at home. He was reportedly hospitalized at [facility] from April - June 2020. He does not verbally answer any questions. He is well known to this writer from previous admissions in the [place] area. He has responded well to ECT in the past.    09/21 - overnight, patient incontinent of urine, placed into diaper. Patient appeared catatonic at times, but was speaking, non-spontaneously, to the team this morning. Per nursing, the patient has significantly poor self care, grossly internally preoccucpied and with no ability to care for himself. SW reports that sister is patient's caretaker and that he has been decompensating from an already poor baseline recently, requiring prompting to eat meals and unable to perform basic ADLs. Family states he improved on Clozaril and they are requesting the team restart this medication. Of note, patient got [place] injection two weeks ago, VPA level suggests compliance with medication. 09/22 - patient has been isolative to bed, up for meals, selectively mute. Shakes and nods his head to stimuli. Patient slept 11 hours overnight, and is in bed on assessment. He is mute, does not endorse any pain or discomfort but is largely unresponsive to questions. Vitals unremarkable, BP mildly elevated.     09/23 - patient in bed much of the day, per nursing he got up and showered, and spoke briefly about wanting to go home. He is not compliant with medication; patient seen in his room, he is somnolent, shakes and nods his head, doesn't open his eyes for the duration of the interview. Patient slept 8 hours overnight. 09/24 - patient refused vital signs and medications this morning. He remains isolative to bed for much of the day. Per nursing assessment, the patient has been internally preoccupied, selectively mute, slept 9.5 hours overnight. Patient briefed on the importance of medication compliance, does not voice understanding of the situation. 09/25 - overnight, patient was visible, pacing, largely incoherent but with no aggression noted. No PRNs were given to the patient but he is refusing several doses of medication (refused VPA, Klonopin AM doses); he is taking Clozaril. Patient noted to have repetitive movements, selectively mute on interview, won't get out of bed. Family asking for neurologic workup, MRI as he has not had this before. 09/28 - patient isolative, still selectively mute, took medication and allowed vitals this morning but refused labwork (CBC for Clozaril is overdue). Patient refused breakfast this morning, slept 6 hours overnight. He remains internally preoccupied, unable to assess his mental state. Patient continues to refuse about half of medication administrations. Court order pending for labwork. 09/29 - no acute overnight events. Patient medication compliant, isolative, selectively mute and generally asleep for much of the day. Not voicing any complaints but with ongoing internal preoccupation; he has not had any meaningful interaction with MD since last week. Court order for tomorrow's labwork pending. 09/30 - no acute overnight events. Patient was out of his room today and participated in treatment team discussion. He continues to speak minimally but asked to go home.  Discussed medication and the need for labwork, the patient agrees but has been refusing at [facility] each morning. Patient guarded about symptoms, concrete and with no understanding of the events surrounding admission. 10/01 - court ordered medication / blood draw approved. Patient refused Klonopin and VPA this morning. Patient got bloodwork this morning, ANC WNL. Patient briefed on his treatment progress, he says nothing and does not acknowledge the treatment team. He made a comment at the onset of the interview but then went back to bed. Unable to provide any meaningful information. Per nursing, he remains mostly mute but will ask for some items. They are concerned he may be cheeking his Clozaril. 10/02 - the patient slept 10 hours overnight, self care is poor and patient remains internally preoccupied. He is out of his room for meals and comes into treatment team room for morning interview. He is unable to answer when asked his shoe size. Patient compliant with Clozaril with mouth checks to date, no PRNs given for agitation but patient still unable to adhere to ADLs without much prompting. 10/03 - patient slept 10.75 hours, he remains blunted with poor self care, but medication compliant. Patient out of room to meet with MD, denies SI/HI/AVH; he remains grossly internally preoccupied. Patient discharge focused, still speech impoverished and with poor ADLs. 10/04 - patient impoverished, in bed for much of the day. He is out for meals, requires much prompting to engage in ADLs. Goal for today is taking a shower per nursing. Patient compliant with Clozaril, vitals unremarkable thus far. Patient selectively mute, does not answer assessment questions. 10/05 - no acute overnight events; the patient slept 10 hours overnight. Patient refused AM medictions this AM, but took Clozaril last night. He remains in poor behavioral control, requiring much prompting for ADLs. Took shower last night with much encouragement.  He remains withdrawn, isolative, mental status remains impoverished. 10/06 - patient slept 9 hours overnight, he remains isolative to room. Compliant with Clozaril but refusing VPA and benzo. Patient out of bed for meals otherwise with no improvement in thought process. Discussed holding all agents except Clozaril as patient is intermittently non-compliant and had been taking these agents prior to admission, patient agrees to this. He c/o sialorrhea. Patient received no PRNs for agitation, ADLs remain poor and he needs prompting for most activities. 10/07 - no acute overnight events. Patient has been isolative, compliant with court ordered medication. Patient otherwise with no specific concerns, denies SI/HI/AVH. Patient requires prompting for most ADLs, minimally engaged in the milieu, but with no episodes of agitation or aggression. No PRNs given x24 hours. Patient's benzo and VPA held. SIDE EFFECTS: (reviewed/updated 10/7/2020)  +Drooling  No noted toxicity with use of Depakote   ALLERGIES:(reviewed/updated 10/7/2020)  Allergies   Allergen Reactions    Fluphenazine Unknown (comments)     Pt is unable to communicate properly.  Penicillins Unknown (comments)     Pt is unable to communicate properly. MEDICATIONS PRIOR TO ADMISSION:(reviewed/updated 10/7/2020)  Medications Prior to Admission   Medication Sig    FLUoxetine (PROzac) 20 mg capsule Take 20 mg by mouth daily.  benztropine (COGENTIN) 0.5 mg tablet Take 0.5 mg by mouth two (2) times a day. Indications: extrapyramidal symptoms as a result of taking the medication    LORazepam (Ativan) 1 mg tablet Take 1 mg by mouth two (2) times a day.  valproate (Depakene) 250 mg/5 mL syrup Take 1,000 mg by mouth two (2) times a day.  paliperidone palmitate (Invega Sustenna) 234 mg/1.5 mL injection 234 mg by IntraMUSCular route every twenty-one (21) days. Indications: schizophrenia    OLANZapine (ZyPREXA zydis) 20 mg disintegrating tablet Take 20 mg by mouth Daily (before dinner).  Indications: schizophrenia      PAST MEDICAL HISTORY: Past medical history from the initial psychiatric evaluation has been reviewed (reviewed/updated 10/7/2020) with no additional updates (I asked patient and no additional past medical history provided). Past Medical History:   Diagnosis Date    Psychiatric disorder     Psychotic disorder ([facility]     Withdrawal syndrome ([facility]    History reviewed. No pertinent surgical history. SOCIAL HISTORY: Social history from the initial psychiatric evaluation has been reviewed (reviewed/updated 10/7/2020) with no additional updates (I asked patient and no additional social history provided).    Social History     Socioeconomic History    Marital status: SINGLE     Spouse name: Not on file    Number of children: Not on file    Years of education: Not on file    Highest education level: Not on file   Occupational History    Not on file   Social Needs    Financial resource strain: Not on file    Food insecurity     Worry: Not on file     Inability: Not on file    Transportation needs     Medical: Not on file     Non-medical: Not on file   Tobacco Use    Smoking status: Never Smoker    Smokeless tobacco: Never Used   Substance and Sexual Activity    Alcohol use: No    Drug use: No    Sexual activity: Not on file   Lifestyle    Physical activity     Days per week: Not on file     Minutes per session: Not on file    Stress: Not on file   Relationships    Social connections     Talks on phone: Not on file     Gets together: Not on file     Attends Islam service: Not on file     Active member of club or organization: Not on file     Attends meetings of clubs or organizations: Not on file     Relationship status: Not on file    Intimate partner violence     Fear of current or ex partner: Not on file     Emotionally abused: Not on file     Physically abused: Not on file     Forced sexual activity: Not on file   Other Topics Concern    Not on file   Social History Narrative Pt is a HS grad and is unemployed. He lives with his sister. On disability    No pending legal charge reported      FAMILY HISTORY: Family history from the initial psychiatric evaluation has been reviewed (reviewed/updated 10/7/2020) with no additional updates (I asked patient and no additional family history provided). History reviewed. No pertinent family history. REVIEW OF SYSTEMS: (reviewed/updated 10/7/2020)  Appetite:poor   Sleep: poor with DIMS (difficulty initiating & maintaining sleep)   All other Review of Systems: Negative except per [facility]         [facility] (MSE):    MSE FINDINGS ARE WITHIN NORMAL LIMITS (WNL) UNLESS OTHERWISE STATED BELOW. ( ALL OF THE BELOW CATEGORIES OF THE MSE HAVE BEEN REVIEWED (reviewed 10/7/2020) AND UPDATED AS DEEMED APPROPRIATE )  General Presentation age appropriate, cooperative   Orientation disorganized   Vital Signs  See below (reviewed 10/7/2020); Vital Signs (BP, Pulse, & Temp) are within normal limits if not listed below. Gait and Station Stable/steady, no ataxia   Musculoskeletal System No extrapyramidal symptoms (EPS); no abnormal muscular movements or Tardive Dyskinesia (TD); muscle strength and tone are within normal limits   Language No aphasia or dysarthria   Speech:  hypoverbal, increased latency of response, non-pressured and soft   Thought Processes illogical; slow rate of thoughts; poor abstract reasoning/computation   Thought Associations blocked    Thought Content poverty of content   Suicidal Ideations unable to assess   Homicidal Ideations unable to assess   Mood:  unable to asses   Affect:  flat   Memory recent  impaired   Memory remote:  impaired   Concentration/Attention:  impaired   Fund of Knowledge significantly below average   Insight:  poor   Reliability fair   Judgment:  impaired due to active psychosis          VITALS:     No data found.   Wt Readings from Last 3 Encounters:   09/18/20 85.7 kg (189 lb) 12/23/17 84.9 kg (187 lb 1.6 oz)   03/25/17 95.6 kg (210 lb 11.2 oz)     Temp Readings from Last 3 Encounters:   03/28/17 97.5 °F (36.4 °C)   02/07/15 98.7 °F (37.1 °C)     BP Readings from Last 3 Encounters:   10/05/20 122/76   03/28/17 100/68   02/12/15 105/73     Pulse Readings from Last 3 Encounters:   10/05/20 69   03/28/17 67   02/12/15 87            DATA     LABORATORY DATA:(reviewed/updated 10/7/2020)  No results found for this or any previous visit (from the past 24 hour(s)). Lab Results   Component Value Date/Time    Valproic acid 97 09/18/2020 04:42 PM     No results found for: LITHM   RADIOLOGY REPORTS:(reviewed/updated 10/7/2020)  No results found.        MEDICATIONS     ALL MEDICATIONS:   Current Facility-Administered Medications   Medication Dose Route Frequency    cloZAPine (CLOZARIL) tablet 300 mg  300 mg Oral QHS    Or    OLANZapine (ZyPREXA) 5 mg in sterile water (preservative free) 1 mL injection  5 mg IntraMUSCular QHS    [Held by provider] clonazePAM (KlonoPIN) tablet 1 mg  1 mg Oral BID    [Held by provider] valproic acid (as sodium salt) (DEPAKENE) 250 mg/5 mL (5 mL) oral solution 1,000 mg  1,000 mg Oral BID    OLANZapine (ZyPREXA) tablet 5 mg  5 mg Oral Q6H PRN    haloperidol lactate (HALDOL) injection 5 mg  5 mg IntraMUSCular Q6H PRN    benztropine (COGENTIN) tablet 1 mg  1 mg Oral BID PRN    diphenhydrAMINE (BENADRYL) injection 50 mg  50 mg IntraMUSCular BID PRN    hydrOXYzine HCL (ATARAX) tablet 50 mg  50 mg Oral TID PRN    traZODone (DESYREL) tablet 50 mg  50 mg Oral QHS PRN    acetaminophen (TYLENOL) tablet 650 mg  650 mg Oral Q4H PRN    magnesium hydroxide (MILK OF MAGNESIA) 400 mg/5 mL oral suspension 30 mL  30 mL Oral DAILY PRN      SCHEDULED MEDICATIONS:   Current Facility-Administered Medications   Medication Dose Route Frequency    cloZAPine (CLOZARIL) tablet 300 mg  300 mg Oral QHS    Or    OLANZapine (ZyPREXA) 5 mg in sterile water (preservative free) 1 mL injection  5 mg IntraMUSCular QHS    [Held by provider] clonazePAM (KlonoPIN) tablet 1 mg  1 mg Oral BID    [Held by provider] valproic acid (as sodium salt) (DEPAKENE) 250 mg/5 mL (5 mL) oral solution 1,000 mg  1,000 mg Oral BID          ASSESSMENT & PLAN     DIAGNOSES REQUIRING ACTIVE TREATMENT AND MONITORING: (reviewed/updated 10/7/2020)  Patient Active Hospital Problem List:   [facility] (9/18/2020)    Assessment: patient with significant internal preoccupation, poor ADLs, history of catatonia. Appears to have been compliant with home medication, but is regressed and with a markedly disorganized thought process. Will start Clozaril, add benzodiazepine for protection against catatonia to be tapered as Clozaril dose increases given interaction. Plan:  COURT ORDERED MEDICATION:  - INCREASE Clozaril to 300 mg PO *OR* Zyprexa 5 mg IM QHS for psychosis    - DISCONTINUE Haldol due to poor efficacy  - DISCONTINUE [place] due to poor efficacy  - EEG for seizure rule out  - HOLD Klonopin 1 mg BID for catatonia due to poor efficacy  - Court order pending for labwork, Clozaril  - HOLD VPA oral soln 1000 mg BID for mood lability, seizure prophylaxis due to poor efficacy, non compliance  - RESCHEDULE VPA level  - DISCONTINUE Prozac / Cogentin / Zydis due to polypharamacy  - Consider antihypertensive  - IGM therapy as tolerated    I will continue to monitor blood levels (Depakote, clozapine---a drug with a narrow therapeutic index= NTI) and associated labs for drug therapy implemented that require intense monitoring for toxicity as deemed appropriate based on current medication side effects and pharmacodynamically determined drug 1/2 lives. In summary, April Mackenzie, is a 45 y.o.  male who presents with a severe exacerbation of the principal diagnosis of Schizoaffective disorder ([facility]    Patient's condition is not improving.     Patient requires continued inpatient hospitalization for further stabilization, safety monitoring and medication management. I will continue to coordinate the provision of individual, milieu, occupational, group, and substance abuse therapies to address target symptoms/diagnoses as deemed appropriate for the individual patient. A coordinated, multidisplinary treatment team round was conducted with the patient (this team consists of the nurse, psychiatric unit pharmacist,  and writer). Complete current electronic health record for patient has been reviewed today including consultant notes, ancillary staff notes, nurses and psychiatric tech notes. Suicide risk assessment completed and patient deemed to be of low risk for suicide at this time. The following regarding medications was addressed during rounds with patient:   the risks and benefits of the proposed medication. The patient was given the opportunity to ask questions. Informed consent given to the use of the above medications. Will continue to adjust psychiatric and non-psychiatric medications (see above \"medication\" section and orders section for details) as deemed appropriate & based upon diagnoses and response to treatment. I will continue to order blood tests/labs and diagnostic tests as deemed appropriate and review results as they become available (see orders for details and above listed lab/test results). I will order psychiatric records from previous [facility] hospitals to further elucidate the nature of patient's psychopathology and review once available. I will gather additional collateral information from friends, family and o/p treatment team to further elucidate the nature of patient's psychopathology and baselline level of psychiatric functioning.          I certify that this patient's inpatient psychiatric hospital services furnished since the previous certification were, and continue to be, required for treatment that could reasonably be expected to improve the patient's condition, or for diagnostic study, and that the patient continues to need, on a daily basis, active treatment furnished directly by or requiring the supervision of inpatient psychiatric facility personnel. In addition, the hospital records show that services furnished were intensive treatment services, admission or related services, or equivalent services.     EXPECTED DISCHARGE DATE/DAY: TBD     DISPOSITION: Home       Signed By:   Latrice Palacio MD  10/7/2020

## 2020-10-07 NOTE — BH NOTES
Behavioral Health Interdisciplinary Rounds         Patient Name: Edwin Anderson             Age: 45 y. o.                 Room/Bed:  313/01  Primary Diagnosis: Schizoaffective disorder (HCC)         Admission Status: CI                          Readmission within 27 days: no  Power of  in place: no  Patient requires a blocked bed: yes            Reason for blocked bed: disruptive  Order for blocked bed obtained: yes     Sleep hours: 8   Morning Labs completed per orders: None ordered                               Participation in Care/Groups:  no  Medication Compliant?: Yes - CI still w/prompting  PRNS (last 24 hours): None                 Restraints (last 24 hours):  no  Substance Abuse:  no               24 hour chart check complete    Patient goal(s) for today: Follow unit directions   Treatment team focus/goals: Stabilize - increase Clozaril   Progress note: Pt remains isolative to room and curled up in his bed. Poor hygiene. Malodorous. Selectively mute.     LOS:  19             Expected LOS: TBD     Financial concerns/prescription coverage: [place] Medicare Part A&B and [facility] and O  Family contact: Sister/POANJU Vaughan [phone] 17/9  Family requesting physician contact today: No  Discharge plan: Home  Access to weapons: None  Outpatient provider(s): [facility] - updated 10/2  Patient's preferred phone number for follow up 23-14-20-09     Participating treatment team members: LAVINIA Olson and Dr. Albert Dietrich.

## 2020-10-08 LAB
BASOPHILS # BLD: 0.1 K/UL (ref 0–0.1)
BASOPHILS NFR BLD: 1 % (ref 0–1)
DIFFERENTIAL METHOD BLD: ABNORMAL
EOSINOPHIL # BLD: 0.3 K/UL (ref 0–0.4)
EOSINOPHIL NFR BLD: 4 % (ref 0–7)
ERYTHROCYTE [DISTWIDTH] IN BLOOD BY AUTOMATED COUNT: 13 % (ref 11.5–14.5)
HCT VFR BLD AUTO: 40 % (ref 36.6–50.3)
HGB BLD-MCNC: 13.4 G/DL (ref 12.1–17)
IMM GRANULOCYTES # BLD AUTO: 0 K/UL (ref 0–0.04)
IMM GRANULOCYTES NFR BLD AUTO: 1 % (ref 0–0.5)
LYMPHOCYTES # BLD: 2.5 K/UL (ref 0.8–3.5)
LYMPHOCYTES NFR BLD: 37 % (ref 12–49)
MCH RBC QN AUTO: 30 PG (ref 26–34)
MCHC RBC AUTO-ENTMCNC: 33.5 G/DL (ref 30–36.5)
MCV RBC AUTO: 89.5 FL (ref 80–99)
MONOCYTES # BLD: 0.6 K/UL (ref 0–1)
MONOCYTES NFR BLD: 9 % (ref 5–13)
NEUTS SEG # BLD: 3.3 K/UL (ref 1.8–8)
NEUTS SEG NFR BLD: 48 % (ref 32–75)
NRBC # BLD: 0 K/UL (ref 0–0.01)
NRBC BLD-RTO: 0 PER 100 WBC
PLATELET # BLD AUTO: 174 K/UL (ref 150–400)
PMV BLD AUTO: 10.3 FL (ref 8.9–12.9)
RBC # BLD AUTO: 4.47 M/UL (ref 4.1–5.7)
WBC # BLD AUTO: 6.8 K/UL (ref 4.1–11.1)

## 2020-10-08 PROCEDURE — 99232 SBSQ HOSP IP/OBS MODERATE 35: CPT | Performed by: PSYCHIATRY & NEUROLOGY

## 2020-10-08 PROCEDURE — 65220000003 HC RM SEMIPRIVATE PSYCH

## 2020-10-08 PROCEDURE — 74011250637 HC RX REV CODE- 250/637: Performed by: PSYCHIATRY & NEUROLOGY

## 2020-10-08 PROCEDURE — 36415 COLL VENOUS BLD VENIPUNCTURE: CPT

## 2020-10-08 PROCEDURE — 85025 COMPLETE CBC W/AUTO DIFF WBC: CPT

## 2020-10-08 PROCEDURE — 74011250637 HC RX REV CODE- 250/637: Performed by: NURSE PRACTITIONER

## 2020-10-08 RX ADMIN — CLOZAPINE 300 MG: 100 TABLET ORAL at 21:56

## 2020-10-08 RX ADMIN — OLANZAPINE 5 MG: 5 TABLET, FILM COATED ORAL at 16:38

## 2020-10-08 RX ADMIN — HYDROXYZINE HYDROCHLORIDE 50 MG: 25 TABLET, FILM COATED ORAL at 16:38

## 2020-10-08 NOTE — BH NOTES
Patient is laying bed resting but is awake he refused vital signs to be taken and is selectively mute  2212 Patient took his prescribed medications  0600 red warm to the touch celeste on Patients right deltoid area was seen during a lab draw.  Patient would not answer any questions about it  Slept 8 hours

## 2020-10-08 NOTE — BH NOTES
Behavioral Health Treatment Team Note     Patient goal(s) for today: Follow unit directions   Treatment team focus/goals: Stabilize - increase Clozaril   Progress note: Pt met in treatment room with team and is in agreement with calling sister. Pt observed spending time in milieu.      LOS:  20             Expected LOS: TBD     Financial concerns/prescription coverage: [employer] Medicare Part A&B and [facility] and TDO  Family contact: Sister/DENISE Hager [phone] 92/1  Family requesting physician contact today: No  Discharge plan: Home  Access to weapons: None  Outpatient provider(s): [facility] - updated 10/2  Patient's preferred phone number for follow up 23-14-20-09     Participating treatment team members: LAVINIA Herr and Dr. Rodger Acevedo.

## 2020-10-08 NOTE — BH NOTES
GROUP THERAPY PROGRESS NOTE    Patient did not participate in Process group.      Milena Diehl, RN, PMHNP Student

## 2020-10-08 NOTE — GROUP NOTE
JENELLE  GROUP DOCUMENTATION INDIVIDUAL Group Therapy Note Date: 10/8/2020 Group Start Time: 1400 Group End Time: 1500 Group Topic: Recreational/Music Therapy [facility] [place] [address] ACUTE BEHAV [facility] [place], [address] GROUP DOCUMENTATION GROUP Group Therapy Note Attendees: 7 Attendance: Did not attend Patient's Goal: Interventions/techniques: 
 
Virgilio [place]

## 2020-10-08 NOTE — PROGRESS NOTES
Clozapine Daily Monitoring  Current regimen:  clozapine 300 mg PO QHS  Last CBC done AND reported to the registry:  10/8/20  Next CBC due:  10/15/20    DATE ANC (K/uL)   9/18/20 2.2   10/1/20 2.6   10/8/20 3.3     ANC must be >1 to dispense clozapine. If patient experiences ANC <1.5, refer to the Clozapine [facility] ANC monitoring algorithm for frequency of ANC monitoring. Visit Vitals  /74 (BP 1 Location: Left arm, BP Patient Position: Sitting)   Pulse 83   Temp 98.6 °F (37 °C)   Resp 18   Ht 170.2 cm (67\")   Wt 85.7 kg (189 lb)   SpO2 96%   BMI 29.60 kg/m²       Recommendations/comments:   ANC level drawn on 10/8 is within therapeutic limits, 3.3 K/uL and is in acceptable range to continue with clozapine dispensing. Next ANC is due in 1 week on 10/15/20.      Thank you,  Sarah Bocanegra, PHARMD, [facility], [address] [place] [address] Nw: [phone]  Pharmacy: [phone] ([phone])

## 2020-10-08 NOTE — PROGRESS NOTES
Problem: Altered Thought Process (Adult/Pediatric)  Goal: *STG: Participates in treatment plan  Outcome: Not Progressing Towards Goal  Goal: *STG: Decreased delusional thinking  Outcome: Not Progressing Towards Goal  Goal: *STG: Decreased hallucinations  Outcome: Not Progressing Towards Goal

## 2020-10-08 NOTE — BH NOTES
Mr. Virginia Hayes is apathetic, depressed, and selectively mute. He remained isolative in his room the majority of dayshift and only came out of his room for dinner this evening. He is not aggressive or violent, and denies any SI/HI/AVH thus far.

## 2020-10-08 NOTE — BH NOTES
GROUP THERAPY PROGRESS NOTE    Patient did not participate in Process  group.      LAVINIA Winston Statement Selected

## 2020-10-08 NOTE — BH NOTES
PSYCHIATRIC PROGRESS NOTE         Patient Name  Michell Perez   Date of Birth 1970   [facility] [id number]   Medical Record Number  [id number]      Age  45 y.o. PCP Unknown, Provider   Admit date:  9/18/2020    Room Number  962/90  @ [facility]   Date of Service  10/8/2020         E & M PROGRESS NOTE:         HISTORY       CC:  \"psychosis\"  HISTORY OF PRESENT ILLNESS/INTERVAL HISTORY:  (reviewed/updated 10/8/2020). per initial evaluation: Leigh Simeon \"Corey\" Josh Murray is a 45year old male with a history of schizophrenia admitted to the [facility] for increased psychosis. He has not been bathing or otherwise caring for himself at home. He was reportedly hospitalized at [facility] from April - June 2020. He does not verbally answer any questions. He is well known to this writer from previous admissions in the [place] area. He has responded well to ECT in the past.    09/21 - overnight, patient incontinent of urine, placed into diaper. Patient appeared catatonic at times, but was speaking, non-spontaneously, to the team this morning. Per nursing, the patient has significantly poor self care, grossly internally preoccucpied and with no ability to care for himself. SW reports that sister is patient's caretaker and that he has been decompensating from an already poor baseline recently, requiring prompting to eat meals and unable to perform basic ADLs. Family states he improved on Clozaril and they are requesting the team restart this medication. Of note, patient got [place] injection two weeks ago, VPA level suggests compliance with medication. 09/22 - patient has been isolative to bed, up for meals, selectively mute. Shakes and nods his head to stimuli. Patient slept 11 hours overnight, and is in bed on assessment. He is mute, does not endorse any pain or discomfort but is largely unresponsive to questions. Vitals unremarkable, BP mildly elevated.     09/23 - patient in bed much of the day, per nursing he got up and showered, and spoke briefly about wanting to go home. He is not compliant with medication; patient seen in his room, he is somnolent, shakes and nods his head, doesn't open his eyes for the duration of the interview. Patient slept 8 hours overnight. 09/24 - patient refused vital signs and medications this morning. He remains isolative to bed for much of the day. Per nursing assessment, the patient has been internally preoccupied, selectively mute, slept 9.5 hours overnight. Patient briefed on the importance of medication compliance, does not voice understanding of the situation. 09/25 - overnight, patient was visible, pacing, largely incoherent but with no aggression noted. No PRNs were given to the patient but he is refusing several doses of medication (refused VPA, Klonopin AM doses); he is taking Clozaril. Patient noted to have repetitive movements, selectively mute on interview, won't get out of bed. Family asking for neurologic workup, MRI as he has not had this before. 09/28 - patient isolative, still selectively mute, took medication and allowed vitals this morning but refused labwork (CBC for Clozaril is overdue). Patient refused breakfast this morning, slept 6 hours overnight. He remains internally preoccupied, unable to assess his mental state. Patient continues to refuse about half of medication administrations. Court order pending for labwork. 09/29 - no acute overnight events. Patient medication compliant, isolative, selectively mute and generally asleep for much of the day. Not voicing any complaints but with ongoing internal preoccupation; he has not had any meaningful interaction with MD since last week. Court order for tomorrow's labwork pending. 09/30 - no acute overnight events. Patient was out of his room today and participated in treatment team discussion. He continues to speak minimally but asked to go home.  Discussed medication and the need for labwork, the patient agrees but has been refusing at [address] each morning. Patient guarded about symptoms, concrete and with no understanding of the events surrounding admission. 10/01 - court ordered medication / blood draw approved. Patient refused Klonopin and VPA this morning. Patient got bloodwork this morning, ANC WNL. Patient briefed on his treatment progress, he says nothing and does not acknowledge the treatment team. He made a comment at the onset of the interview but then went back to bed. Unable to provide any meaningful information. Per nursing, he remains mostly mute but will ask for some items. They are concerned he may be cheeking his Clozaril. 10/02 - the patient slept 10 hours overnight, self care is poor and patient remains internally preoccupied. He is out of his room for meals and comes into treatment team room for morning interview. He is unable to answer when asked his shoe size. Patient compliant with Clozaril with mouth checks to date, no PRNs given for agitation but patient still unable to adhere to ADLs without much prompting. 10/03 - patient slept 10.75 hours, he remains blunted with poor self care, but medication compliant. Patient out of room to meet with MD, denies SI/HI/AVH; he remains grossly internally preoccupied. Patient discharge focused, still speech impoverished and with poor ADLs. 10/04 - patient impoverished, in bed for much of the day. He is out for meals, requires much prompting to engage in ADLs. Goal for today is taking a shower per nursing. Patient compliant with Clozaril, vitals unremarkable thus far. Patient selectively mute, does not answer assessment questions. 10/05 - no acute overnight events; the patient slept 10 hours overnight. Patient refused AM medictions this AM, but took Clozaril last night. He remains in poor behavioral control, requiring much prompting for ADLs. Took shower last night with much encouragement.  He remains withdrawn, isolative, mental status remains impoverished. 10/06 - patient slept 9 hours overnight, he remains isolative to room. Compliant with Clozaril but refusing VPA and benzo. Patient out of bed for meals otherwise with no improvement in thought process. Discussed holding all agents except Clozaril as patient is intermittently non-compliant and had been taking these agents prior to admission, patient agrees to this. He c/o sialorrhea. Patient received no PRNs for agitation, ADLs remain poor and he needs prompting for most activities. 10/07 - no acute overnight events. Patient has been isolative, compliant with court ordered medication. Patient otherwise with no specific concerns, denies SI/HI/AVH. Patient requires prompting for most ADLs, minimally engaged in the milieu, but with no episodes of agitation or aggression. No PRNs given x24 hours. Patient's benzo and VPA held. 10/08 - patient slept 8 hours, has been isolative but medication compliant. Patient with mild tremor L > R; benzo and VPA still held due to patient refusal, patient appears to be improving on Clozaril monotherapy. Patient still grossly hypo-verbal, has been otherwise pleasant. SIDE EFFECTS: (reviewed/updated 10/8/2020)  +Drooling  No noted toxicity with use of Depakote   ALLERGIES:(reviewed/updated 10/8/2020)  Allergies   Allergen Reactions    Fluphenazine Unknown (comments)     Pt is unable to communicate properly.  Penicillins Unknown (comments)     Pt is unable to communicate properly. MEDICATIONS PRIOR TO ADMISSION:(reviewed/updated 10/8/2020)  Medications Prior to Admission   Medication Sig    FLUoxetine (PROzac) 20 mg capsule Take 20 mg by mouth daily.  benztropine (COGENTIN) 0.5 mg tablet Take 0.5 mg by mouth two (2) times a day. Indications: extrapyramidal symptoms as a result of taking the medication    LORazepam (Ativan) 1 mg tablet Take 1 mg by mouth two (2) times a day.     valproate (Depakene) 250 mg/5 mL syrup Take 1,000 mg by mouth two (2) times a day.  paliperidone palmitate (Invega Sustenna) 234 mg/1.5 mL injection 234 mg by IntraMUSCular route every twenty-one (21) days. Indications: schizophrenia    OLANZapine (ZyPREXA zydis) 20 mg disintegrating tablet Take 20 mg by mouth Daily (before dinner). Indications: schizophrenia      PAST MEDICAL HISTORY: Past medical history from the initial psychiatric evaluation has been reviewed (reviewed/updated 10/8/2020) with no additional updates (I asked patient and no additional past medical history provided). Past Medical History:   Diagnosis Date    Psychiatric disorder     Psychotic disorder [facility] [address]     Withdrawal syndrome ([facility] [address]    History reviewed. No pertinent surgical history. SOCIAL HISTORY: Social history from the initial psychiatric evaluation has been reviewed (reviewed/updated 10/8/2020) with no additional updates (I asked patient and no additional social history provided).    Social History     Socioeconomic History    Marital status: SINGLE     Spouse name: Not on file    Number of children: Not on file    Years of education: Not on file    Highest education level: Not on file   Occupational History    Not on file   Social Needs    Financial resource strain: Not on file    Food insecurity     Worry: Not on file     Inability: Not on file    Transportation needs     Medical: Not on file     Non-medical: Not on file   Tobacco Use    Smoking status: Never Smoker    Smokeless tobacco: Never Used   Substance and Sexual Activity    Alcohol use: No    Drug use: No    Sexual activity: Not on file   Lifestyle    Physical activity     Days per week: Not on file     Minutes per session: Not on file    Stress: Not on file   Relationships    Social connections     Talks on phone: Not on file     Gets together: Not on file     Attends Restorationist service: Not on file     Active member of club or organization: Not on file     Attends meetings of clubs or organizations: Not on file Relationship status: Not on file    Intimate partner violence     Fear of current or ex partner: Not on file     Emotionally abused: Not on file     Physically abused: Not on file     Forced sexual activity: Not on file   Other Topics Concern    Not on file   Social History Narrative    Pt is a HS grad and is unemployed. He lives with his sister. On disability    No pending legal charge reported      FAMILY HISTORY: Family history from the initial psychiatric evaluation has been reviewed (reviewed/updated 10/8/2020) with no additional updates (I asked patient and no additional family history provided). History reviewed. No pertinent family history. REVIEW OF SYSTEMS: (reviewed/updated 10/8/2020)  Appetite:poor   Sleep: poor with DIMS (difficulty initiating & maintaining sleep)   All other Review of Systems: Negative except per [facility]         [facility] (MSE):    MSE FINDINGS ARE WITHIN NORMAL LIMITS (WNL) UNLESS OTHERWISE STATED BELOW. ( ALL OF THE BELOW CATEGORIES OF THE MSE HAVE BEEN REVIEWED (reviewed 10/8/2020) AND UPDATED AS DEEMED APPROPRIATE )  General Presentation age appropriate, cooperative   Orientation disorganized   Vital Signs  See below (reviewed 10/8/2020); Vital Signs (BP, Pulse, & Temp) are within normal limits if not listed below.    Gait and Station Stable/steady, no ataxia   Musculoskeletal System No extrapyramidal symptoms (EPS); no abnormal muscular movements or Tardive Dyskinesia (TD); muscle strength and tone are within normal limits   Language No aphasia or dysarthria   Speech:  hypoverbal, increased latency of response, non-pressured and soft   Thought Processes illogical; slow rate of thoughts; poor abstract reasoning/computation   Thought Associations blocked    Thought Content poverty of content   Suicidal Ideations unable to assess   Homicidal Ideations unable to assess   Mood:  unable to asses   Affect:  flat   Memory recent  impaired   Memory remote:  impaired   Concentration/Attention:  impaired   Fund of Knowledge significantly below average   Insight:  poor   Reliability fair   Judgment:  impaired due to active psychosis          VITALS:     Patient Vitals for the past 24 hrs:   Temp Pulse Resp BP SpO2   10/08/20 0914 97.9 °F (36.6 °C) 93 17 115/70 97 %   10/07/20 1445 98.6 °F (37 °C) 83 18 125/74 96 %     Wt Readings from Last 3 Encounters:   09/18/20 85.7 kg (189 lb)   12/23/17 84.9 kg (187 lb 1.6 oz)   03/25/17 95.6 kg (210 lb 11.2 oz)     Temp Readings from Last 3 Encounters:   10/08/20 97.9 °F (36.6 °C)   03/28/17 97.5 °F (36.4 °C)   02/07/15 98.7 °F (37.1 °C)     BP Readings from Last 3 Encounters:   10/08/20 115/70   03/28/17 100/68   02/12/15 105/73     Pulse Readings from Last 3 Encounters:   10/08/20 93   03/28/17 67   02/12/15 87            DATA     LABORATORY DATA:(reviewed/updated 10/8/2020)  Recent Results (from the past 24 hour(s))   CBC WITH AUTOMATED DIFF    Collection Time: 10/08/20  6:00 AM   Result Value Ref Range    WBC 6.8 4.1 - 11.1 K/uL    RBC 4.47 4.10 - 5.70 M/uL    HGB 13.4 12.1 - 17.0 g/dL    HCT 40.0 36.6 - 50.3 %    MCV 89.5 80.0 - 99.0 FL    MCH 30.0 26.0 - 34.0 PG    MCHC 33.5 30.0 - 36.5 g/dL    RDW 13.0 11.5 - 14.5 %    PLATELET 723 357 - 734 K/uL    MPV 10.3 8.9 - 12.9 FL    NRBC 0.0 0  WBC    ABSOLUTE NRBC 0.00 0.00 - 0.01 K/uL    NEUTROPHILS 48 32 - 75 %    LYMPHOCYTES 37 12 - 49 %    MONOCYTES 9 5 - 13 %    EOSINOPHILS 4 0 - 7 %    BASOPHILS 1 0 - 1 %    IMMATURE GRANULOCYTES 1 (H) 0.0 - 0.5 %    ABS. NEUTROPHILS 3.3 1.8 - 8.0 K/UL    ABS. LYMPHOCYTES 2.5 0.8 - 3.5 K/UL    ABS. MONOCYTES 0.6 0.0 - 1.0 K/UL    ABS. EOSINOPHILS 0.3 0.0 - 0.4 K/UL    ABS. BASOPHILS 0.1 0.0 - 0.1 K/UL    ABS. IMM.  GRANS. 0.0 0.00 - 0.04 K/UL    DF AUTOMATED       Lab Results   Component Value Date/Time    Valproic acid 97 09/18/2020 04:42 PM     No results found for: LITHM   RADIOLOGY REPORTS:(reviewed/updated 10/8/2020)  No results found. MEDICATIONS     ALL MEDICATIONS:   Current Facility-Administered Medications   Medication Dose Route Frequency    cloZAPine (CLOZARIL) tablet 300 mg  300 mg Oral QHS    Or    OLANZapine (ZyPREXA) 5 mg in sterile water (preservative free) 1 mL injection +++COURT ORDERED MEDICATION FOR REFUSAL OF CLOZAPINE+++  5 mg IntraMUSCular QHS    [Held by provider] clonazePAM (KlonoPIN) tablet 1 mg  1 mg Oral BID    [Held by provider] valproic acid (as sodium salt) (DEPAKENE) 250 mg/5 mL (5 mL) oral solution 1,000 mg  1,000 mg Oral BID    OLANZapine (ZyPREXA) tablet 5 mg  5 mg Oral Q6H PRN    haloperidol lactate (HALDOL) injection 5 mg  5 mg IntraMUSCular Q6H PRN    benztropine (COGENTIN) tablet 1 mg  1 mg Oral BID PRN    diphenhydrAMINE (BENADRYL) injection 50 mg  50 mg IntraMUSCular BID PRN    hydrOXYzine HCL (ATARAX) tablet 50 mg  50 mg Oral TID PRN    traZODone (DESYREL) tablet 50 mg  50 mg Oral QHS PRN    acetaminophen (TYLENOL) tablet 650 mg  650 mg Oral Q4H PRN    magnesium hydroxide (MILK OF MAGNESIA) 400 mg/5 mL oral suspension 30 mL  30 mL Oral DAILY PRN      SCHEDULED MEDICATIONS:   Current Facility-Administered Medications   Medication Dose Route Frequency    cloZAPine (CLOZARIL) tablet 300 mg  300 mg Oral QHS    Or    OLANZapine (ZyPREXA) 5 mg in sterile water (preservative free) 1 mL injection +++COURT ORDERED MEDICATION FOR REFUSAL OF CLOZAPINE+++  5 mg IntraMUSCular QHS    [Held by provider] clonazePAM (KlonoPIN) tablet 1 mg  1 mg Oral BID    [Held by provider] valproic acid (as sodium salt) (DEPAKENE) 250 mg/5 mL (5 mL) oral solution 1,000 mg  1,000 mg Oral BID          ASSESSMENT & PLAN     DIAGNOSES REQUIRING ACTIVE TREATMENT AND MONITORING: (reviewed/updated 10/8/2020)  Patient Active Hospital Problem List:   Schizoaffective disorder ([facility] [address] (9/18/2020)    Assessment: patient with significant internal preoccupation, poor ADLs, history of catatonia.  Appears to have been compliant with home medication, but is regressed and with a markedly disorganized thought process. Will start Clozaril, add benzodiazepine for protection against catatonia to be tapered as Clozaril dose increases given interaction. Plan:  COURT ORDERED MEDICATION:  - INCREASE Clozaril to 300 mg PO *OR* Zyprexa 5 mg IM QHS for psychosis    - DISCONTINUE Haldol due to poor efficacy  - DISCONTINUE [place] due to poor efficacy  - EEG for seizure rule out  - HOLD Klonopin 1 mg BID for catatonia due to poor efficacy  - Court order pending for labwork, Clozaril  - HOLD VPA oral soln 1000 mg BID for mood lability, seizure prophylaxis due to poor efficacy, non compliance  - RESCHEDULE VPA level  - DISCONTINUE Prozac / Cogentin / Zydis due to polypharamacy  - Consider antihypertensive  - IGM therapy as tolerated    I will continue to monitor blood levels (Depakote, clozapine---a drug with a narrow therapeutic index= NTI) and associated labs for drug therapy implemented that require intense monitoring for toxicity as deemed appropriate based on current medication side effects and pharmacodynamically determined drug 1/2 lives. In summary, Alexandre Bai, is a 45 y.o.  male who presents with a severe exacerbation of the principal diagnosis of Schizoaffective disorder ([facility] [address]    Patient's condition is not improving. Patient requires continued inpatient hospitalization for further stabilization, safety monitoring and medication management. I will continue to coordinate the provision of individual, milieu, occupational, group, and substance abuse therapies to address target symptoms/diagnoses as deemed appropriate for the individual patient. A coordinated, multidisplinary treatment team round was conducted with the patient (this team consists of the nurse, psychiatric unit pharmacist,  and writer).      Complete current electronic health record for patient has been reviewed today including consultant notes, ancillary staff notes, nurses and psychiatric tech notes. Suicide risk assessment completed and patient deemed to be of low risk for suicide at this time. The following regarding medications was addressed during rounds with patient:   the risks and benefits of the proposed medication. The patient was given the opportunity to ask questions. Informed consent given to the use of the above medications. Will continue to adjust psychiatric and non-psychiatric medications (see above \"medication\" section and orders section for details) as deemed appropriate & based upon diagnoses and response to treatment. I will continue to order blood tests/labs and diagnostic tests as deemed appropriate and review results as they become available (see orders for details and above listed lab/test results). I will order psychiatric records from previous [facility] hospitals to further elucidate the nature of patient's psychopathology and review once available. I will gather additional collateral information from friends, family and o/p treatment team to further elucidate the nature of patient's psychopathology and baselline level of psychiatric functioning. I certify that this patient's inpatient psychiatric hospital services furnished since the previous certification were, and continue to be, required for treatment that could reasonably be expected to improve the patient's condition, or for diagnostic study, and that the patient continues to need, on a daily basis, active treatment furnished directly by or requiring the supervision of inpatient psychiatric facility personnel. In addition, the hospital records show that services furnished were intensive treatment services, admission or related services, or equivalent services.     EXPECTED DISCHARGE DATE/DAY: TBD     DISPOSITION: Home       Signed By:   Burt Monahan MD  10/8/2020

## 2020-10-08 NOTE — PROGRESS NOTES
Problem: Altered Thought Process (Adult/Pediatric)  Goal: *STG: Participates in treatment plan  Outcome: Progressing Towards Goal  Goal: *STG: Remains safe in hospital  Outcome: Progressing Towards Goal  Goal: *STG: Seeks staff when feelings of anxiety and fear arise  Outcome: Not Progressing Towards Goal  Goal: *STG: Complies with medication therapy  Outcome: Progressing Towards Goal  Goal: *STG: Decreased delusional thinking  Outcome: Not Progressing Towards Goal  Goal: Interventions  Outcome: Progressing Towards Goal     0720 This writer received report from the outgoing nurse. 9838 Patient in room resting. Patient is slow to respond. Patient denied SI, HI, and AVH. Patient is alert and oriented x 3. Patient's vitals completed. Patient's vitals WNL. Patient did not eat breakfast.    1228 Patient ate lunch. Patient on unit keeping to self.     1600 Patient presenting with psychosis and anxiety as evidenced by pacing the unit, blinking constantly, whispering to self. Patient fails to respond to this writer when questioned. 1630 Patient presenting with increase anxiety as evidenced by pacing. [id number] Patient provided with 50 mg atarax for anxiety and 5 mg zyprexa for psychosis. 1705 This writer encouraged the patient to take a shower. Patient took a shower; using soap and water. Patient changed his gown. [id number] Patient presenting with less anxiety and psychosis. Prn atarax 50 mg effective. Prn 5 mg zyprexa effective.

## 2020-10-08 NOTE — GROUP NOTE
JENELLE  GROUP DOCUMENTATION INDIVIDUAL Group Therapy Note Date: 10/8/2020 Group Start Time: 1000 Group End Time: 1100 Group Topic: Topic Group [address] ACUTE BEHAV [facility], [address] [facility] GROUP DOCUMENTATION GROUP Group Therapy Note Attendees: 4 Attendance: Did not attend Patient's Goal: Interventions/techniques: 
 
Iesha Montero

## 2020-10-08 NOTE — BH NOTES
GROUP THERAPY PROGRESS NOTE    Patient did not participate in Coping Skills group.      Kirstin Dockery, RN, PMHNP Student

## 2020-10-09 PROCEDURE — 99231 SBSQ HOSP IP/OBS SF/LOW 25: CPT | Performed by: PSYCHIATRY & NEUROLOGY

## 2020-10-09 PROCEDURE — 74011250637 HC RX REV CODE- 250/637: Performed by: PSYCHIATRY & NEUROLOGY

## 2020-10-09 PROCEDURE — 65220000003 HC RM SEMIPRIVATE PSYCH

## 2020-10-09 RX ADMIN — CLOZAPINE 300 MG: 100 TABLET ORAL at 22:39

## 2020-10-09 NOTE — BH NOTES
0700  Received report from [facility], RN. Assumed care of the patient. Pt continues to be isolative and withdrawn. Pt refusing v/s, when asked the reason, pt states, \"because I feel like I'm going to die. \"  Pt shakes head no when asked about anxiety/depression/SI/HI/AVH/pain.   Pt does not answer when asked about last bm.    0800  Medication held per order    0830  Pt in day room for breakfast    1220  Pt eating lunch in room at this time    1700  Pt in day room for dinner

## 2020-10-09 NOTE — INTERDISCIPLINARY ROUNDS
[facility] Interdisciplinary Rounds Patient Name: Marla Cosby  Age: 45 y.o. Room/Bed:  313/01 Primary Diagnosis: Schizoaffective disorder ([facility] [address] Admission Status: Involuntary Commitment Readmission within 30 days: no 
Power of  in place: unk Patient requires a blocked bed: yes Reason for blocked bed: behavior Order for blocked bed obtained: no    
 
Sleep hours: 8 Morning Labs completed per orders:  na    
Participation in Care/Groups:  no 
Medication Compliant?: Yes PRNS (last 24 hours): None Restraints (last 24 hours):  no 
Substance Abuse:  no   
24 hour chart check complete:   
 
Behavioral Health Treatment Team Note  
  
Patient goal(s) for today: Follow unit directions  
Treatment team focus/goals: Stabilize - increase Clozaril  
Progress note: Pt met in treatment room with team and has been encouraged to call his sister.  
  
LOS:  21             Expected LOS: TBD 
  
Financial concerns/prescription coverage: [employer] Medicare Part A&B and [facility] and O Family contact: Sister/DENISE Mendoza [phone] 30/1 Family requesting physician contact today: No 
Discharge plan: Home Access to weapons: None Outpatient provider(s): [facility] - updated 10/2 Patient's preferred phone number for follow up 23-14-20-09 
  
Participating treatment team members: LAVINIA Vargas and Dr. Mary Ann Flowers.

## 2020-10-09 NOTE — PROGRESS NOTES
Problem: Falls - Risk of  Goal: *Absence of Falls  Description: Document Bryn Click Fall Risk and appropriate interventions in the flowsheet. Outcome: Progressing Towards Goal  Note: Fall Risk Interventions:       Mentation Interventions: Room close to nurse's station, Toileting rounds, Reorient patient    Medication Interventions: Teach patient to arise slowly    Elimination Interventions:  Toileting schedule/hourly rounds              Problem: Altered Thought Process (Adult/Pediatric)  Goal: *STG: Participates in treatment plan  Outcome: Not Progressing Towards Goal  Goal: *STG: Remains safe in hospital  Outcome: Progressing Towards Goal  Goal: *STG: Complies with medication therapy  Outcome: Progressing Towards Goal

## 2020-10-09 NOTE — BH NOTES
GROUP THERAPY PROGRESS NOTE    Patient did not participate in Discharge Group.      Jacklyn Alas LPC [facility]

## 2020-10-09 NOTE — GROUP NOTE
JENELLE  GROUP DOCUMENTATION INDIVIDUAL Group Therapy Note Date: 10/9/2020 Group Start Time: 1500 Group End Time: 0921 Group Topic: Recreational/Music Therapy [facility] [place] [address] ACUTE BEHAV [facility], [address] GROUP DOCUMENTATION GROUP Group Therapy Note Attendees: 4 Attendance: Did not attend Patient's Goal: Interventions/techniques: 
 
Brooklynn Cain

## 2020-10-09 NOTE — GROUP NOTE
JENELLE  GROUP DOCUMENTATION INDIVIDUAL Group Therapy Note Date: 10/9/2020 Group Start Time: 0900 Group End Time: 1000 Group Topic: Topic Group [facility] 3 ACUTE BEHAV [facility] [place], [address] GROUP DOCUMENTATION GROUP Group Therapy Note Attendees: 3 Attendance: Did not attend Patient's Goal: Interventions/techniques Obey Stone

## 2020-10-09 NOTE — BH NOTES
GROUP THERAPY PROGRESS NOTE    Patient did not participate in Coping Skills group.      Scottie Keen, RN, PMHNP Student

## 2020-10-09 NOTE — BH NOTES
Assumed nursing care of Sadia Craig after receiving the 1900 change of shift report. Sadia Craig has been in bed and when approached, at bedside, for a nursing assessment he would not acknowledge that he even heard, kept his eyes closed and would not verbally respond. When his HS med was taken to his bedside he ignored writer at first, then when asked if he wanted the med by mouth or by shot, he sat up and quickly took the po med without saying anything and layed back down. Observed to be sure the med was swallowed. Monitoring closely for mood chgs, behavior and for safety. Addendum at 9656:  Pt has slept 8 hrs. Quiet shift w/o problems. He is resting quietly in bed at this time.

## 2020-10-09 NOTE — GROUP NOTE
JENELLE  GROUP DOCUMENTATION INDIVIDUAL Group Therapy Note Date: 10/9/2020 Group Start Time: 1100 Group End Time: 1200 Group Topic: Topic Group [address] 3 ACUTE BEHAV [facility], [address] [facility] GROUP DOCUMENTATION GROUP Group Therapy Note Attendees: 5 Attendance: Did not attend Patient's Goal: Interventions/techniques Euna Search

## 2020-10-10 PROCEDURE — 74011250637 HC RX REV CODE- 250/637: Performed by: PSYCHIATRY & NEUROLOGY

## 2020-10-10 PROCEDURE — 65220000003 HC RM SEMIPRIVATE PSYCH

## 2020-10-10 RX ADMIN — CLOZAPINE 300 MG: 100 TABLET ORAL at 21:09

## 2020-10-10 NOTE — PROGRESS NOTES
Problem: Falls - Risk of  Goal: *Absence of Falls  Description: Document Rivas Bradshaw Fall Risk and appropriate interventions in the flowsheet. Outcome: Progressing Towards Goal  Note: Fall Risk Interventions:       Mentation Interventions: Room close to nurse's station, Toileting rounds, Reorient patient    Medication Interventions: Teach patient to arise slowly    Elimination Interventions:  Toileting schedule/hourly rounds              Problem: Patient Education: Go to Patient Education Activity  Goal: Patient/Family Education  Outcome: Progressing Towards Goal     Problem: Altered Thought Process (Adult/Pediatric)  Goal: *STG: Remains safe in hospital  Outcome: Progressing Towards Goal     Problem: Altered Thought Process (Adult/Pediatric)  Goal: *STG: Complies with medication therapy  Outcome: Progressing Towards Goal

## 2020-10-10 NOTE — BH NOTES
0700  Received report from [facility], RN. Assumed care of the patient. Pt lying in bed upon assessment. Pt continues to be isolative and withdrawn. Pt presents with a flat affect and depressed mood. When asked about anxiety/depression/SI/HI/AVH, pt does not verbalize a response but shakes his head \"no\". Pt continues to refuse v/s to be completed. Pt continues to have poor hygiene and appears disheveled. Empty food containers and cups all over the floor in his room. Pt does not answer when asked about last bm. Pt selectively mute.     0815  Pt in day room for breakfast    [id number]  Pt remains isolative to room    1215  Pt eating lunch in the day room    1225  Pt dropped cake container on floor in the day room and was going to just leave it there, this writer redirected pt about leaving his trash on the floor; pt then turned back around, apologized, and picked it up and threw it away    1230  This writer turned on the shower and got pt new linens and a clean gown and told pt that he needed to shower; while pt was in the shower, this writer picked up all the trash around his bed and changed out his linens; pt's room smells of urine because pt does not flush the toilet after he goes to the bathroom; tried to educate the pt to flush his toilet, no evidence of learning , however; pt continues to require prompts to completed ADLs     1700  Pt in day room for dinner

## 2020-10-10 NOTE — PROGRESS NOTES
Problem: Falls - Risk of  Goal: *Absence of Falls  Description: Document Kvng Goldsmith Fall Risk and appropriate interventions in the flowsheet. Outcome: Progressing Towards Goal  Note: Fall Risk Interventions:       Mentation Interventions: Room close to nurse's station, Toileting rounds, Reorient patient    Medication Interventions: Teach patient to arise slowly    Elimination Interventions:  Toileting schedule/hourly rounds              Problem: Altered Thought Process (Adult/Pediatric)  Goal: *STG: Participates in treatment plan  Outcome: Not Progressing Towards Goal  Goal: *STG: Remains safe in hospital  Outcome: Progressing Towards Goal  Goal: *STG: Complies with medication therapy  Outcome: Not Progressing Towards Goal  Goal: *STG: Attends activities and groups  Outcome: Not Progressing Towards Goal  Goal: *STG: Absence of lethality  Outcome: Progressing Towards Goal

## 2020-10-10 NOTE — PROGRESS NOTES
Received pt at 1930 - attempted to complete assessment but pt kept grunting and turning head and body away from this writer when questions were asked. Approached pt very gently and offered him juice or snack. He did ask for orange juice which was given to him. Asked pt if his VS could at least be taken and he then turned his body away and clearly said, \"No.\" Did state, \"No\" when asked if he was suicidal or had any pain but would not answer any further questions. Pt sat down on his bed with his head down and would not even make eye contact. Will attempt to complete assessment later this evening. 2130 - Asked pt again if his VS can be taken. He shook his head in a 'no' directions and had very soft voice with garbled speech - he responded, stating something, but it couldn't be deciphered. Pt would not answer any further questions. Mannerisms were bizarre although this writer has witnessed them before. .. such as: moving his fingers back and forth and in odd directions, in an anxious manner, pacing, sitting on bed and folding arms, etc.    2239 - pt initially would not speak with this writer when he was asked to take his PM scheduled meds. He was sitting on his bed, staring down at the floor. Reminded pt that his meds are court ordered and IM would have to be given. Explained to pt that PO is so much easier and that the medication he's on is likely to help him get better. Pt then looked up, put his hand out, stared at the Clozaril pills in his hand for a few seconds and then put them all in his mouth and swallowed them. 0700 - slept a total of 10 hrs, getting up once during the night at midnight, but was able to fall back to sleep.

## 2020-10-10 NOTE — BH NOTES
Weekend Coverage:  CC:\"im fine\"    Zane Koenig is observed resting in bed. He is calm with a flat affect. He limits answers to one word. He is observed unkempt and his room is malodorous with trash from snacks around his bed. Zane Koenig refused vital signs today. He reports that he is feeling fine, slept well and has a good appetite. He denies SI/HI/AVH.     Plan:continue current treatment plan

## 2020-10-10 NOTE — BH NOTES
PSYCHIATRIC PROGRESS NOTE         Patient Name  Bryan Menchaca   Date of Birth 1970   [facility] [id number]   Medical Record Number  [id number]      Age  45 y.o. PCP Unknown, Provider   Admit date:  9/18/2020    Room Number  839/61  @ [facility]   Date of Service  10/9/2020         E & M PROGRESS NOTE:         HISTORY       CC:  \"psychosis\"  HISTORY OF PRESENT ILLNESS/INTERVAL HISTORY:  (reviewed/updated 10/9/2020). per initial evaluation: Dwayne Sebastian \"Corey\" Tracy Casanova is a 45year old male with a history of schizophrenia admitted to the [facility] for increased psychosis. He has not been bathing or otherwise caring for himself at home. He was reportedly hospitalized at [facility] from April - June 2020. He does not verbally answer any questions. He is well known to this writer from previous admissions in the [facility] area. He has responded well to ECT in the past.    09/21 - overnight, patient incontinent of urine, placed into diaper. Patient appeared catatonic at times, but was speaking, non-spontaneously, to the team this morning. Per nursing, the patient has significantly poor self care, grossly internally preoccucpied and with no ability to care for himself. SW reports that sister is patient's caretaker and that he has been decompensating from an already poor baseline recently, requiring prompting to eat meals and unable to perform basic ADLs. Family states he improved on Clozaril and they are requesting the team restart this medication. Of note, patient got [place] injection two weeks ago, VPA level suggests compliance with medication. 09/22 - patient has been isolative to bed, up for meals, selectively mute. Shakes and nods his head to stimuli. Patient slept 11 hours overnight, and is in bed on assessment. He is mute, does not endorse any pain or discomfort but is largely unresponsive to questions. Vitals unremarkable, BP mildly elevated.     09/23 - patient in bed much of the day, per nursing he got up and showered, and spoke briefly about wanting to go home. He is not compliant with medication; patient seen in his room, he is somnolent, shakes and nods his head, doesn't open his eyes for the duration of the interview. Patient slept 8 hours overnight. 09/24 - patient refused vital signs and medications this morning. He remains isolative to bed for much of the day. Per nursing assessment, the patient has been internally preoccupied, selectively mute, slept 9.5 hours overnight. Patient briefed on the importance of medication compliance, does not voice understanding of the situation. 09/25 - overnight, patient was visible, pacing, largely incoherent but with no aggression noted. No PRNs were given to the patient but he is refusing several doses of medication (refused VPA, Klonopin AM doses); he is taking Clozaril. Patient noted to have repetitive movements, selectively mute on interview, won't get out of bed. Family asking for neurologic workup, MRI as he has not had this before. 09/28 - patient isolative, still selectively mute, took medication and allowed vitals this morning but refused labwork (CBC for Clozaril is overdue). Patient refused breakfast this morning, slept 6 hours overnight. He remains internally preoccupied, unable to assess his mental state. Patient continues to refuse about half of medication administrations. Court order pending for labwork. 09/29 - no acute overnight events. Patient medication compliant, isolative, selectively mute and generally asleep for much of the day. Not voicing any complaints but with ongoing internal preoccupation; he has not had any meaningful interaction with MD since last week. Court order for tomorrow's labwork pending. 09/30 - no acute overnight events. Patient was out of his room today and participated in treatment team discussion. He continues to speak minimally but asked to go home.  Discussed medication and the need for labwork, the patient agrees but has been refusing at [facility][note 3] each morning. Patient guarded about symptoms, concrete and with no understanding of the events surrounding admission. 10/01 - court ordered medication / blood draw approved. Patient refused Klonopin and VPA this morning. Patient got bloodwork this morning, ANC WNL. Patient briefed on his treatment progress, he says nothing and does not acknowledge the treatment team. He made a comment at the onset of the interview but then went back to bed. Unable to provide any meaningful information. Per nursing, he remains mostly mute but will ask for some items. They are concerned he may be cheeking his Clozaril. 10/02 - the patient slept 10 hours overnight, self care is poor and patient remains internally preoccupied. He is out of his room for meals and comes into treatment team room for morning interview. He is unable to answer when asked his shoe size. Patient compliant with Clozaril with mouth checks to date, no PRNs given for agitation but patient still unable to adhere to ADLs without much prompting. 10/03 - patient slept 10.75 hours, he remains blunted with poor self care, but medication compliant. Patient out of room to meet with MD, denies SI/HI/AVH; he remains grossly internally preoccupied. Patient discharge focused, still speech impoverished and with poor ADLs. 10/04 - patient impoverished, in bed for much of the day. He is out for meals, requires much prompting to engage in ADLs. Goal for today is taking a shower per nursing. Patient compliant with Clozaril, vitals unremarkable thus far. Patient selectively mute, does not answer assessment questions. 10/05 - no acute overnight events; the patient slept 10 hours overnight. Patient refused AM medictions this AM, but took Clozaril last night. He remains in poor behavioral control, requiring much prompting for ADLs. Took shower last night with much encouragement.  He remains withdrawn, isolative, mental status remains impoverished. 10/06 - patient slept 9 hours overnight, he remains isolative to room. Compliant with Clozaril but refusing VPA and benzo. Patient out of bed for meals otherwise with no improvement in thought process. Discussed holding all agents except Clozaril as patient is intermittently non-compliant and had been taking these agents prior to admission, patient agrees to this. He c/o sialorrhea. Patient received no PRNs for agitation, ADLs remain poor and he needs prompting for most activities. 10/07 - no acute overnight events. Patient has been isolative, compliant with court ordered medication. Patient otherwise with no specific concerns, denies SI/HI/AVH. Patient requires prompting for most ADLs, minimally engaged in the milieu, but with no episodes of agitation or aggression. No PRNs given x24 hours. Patient's benzo and VPA held. 10/08 - patient slept 8 hours, has been isolative but medication compliant. Patient with mild tremor L > R; benzo and VPA still held due to patient refusal, patient appears to be improving on Clozaril monotherapy. Patient still grossly hypo-verbal, has been otherwise pleasant. 10/09 - overnight, patient has remained isolative; he speaks sparingly and has difficulty with his ADLs. Patient has been in fair behavioral control, per sister he is still off from his baseline. Clozaril trial continues, patient tolerating medication and thus far with no instances of significant side effects (sialorrhea). Patient discharge focused, with marked poverty of thought on interview. SIDE EFFECTS: (reviewed/updated 10/9/2020)  +Drooling  No noted toxicity with use of Depakote   ALLERGIES:(reviewed/updated 10/9/2020)  Allergies   Allergen Reactions    Fluphenazine Unknown (comments)     Pt is unable to communicate properly.  Penicillins Unknown (comments)     Pt is unable to communicate properly.       MEDICATIONS PRIOR TO ADMISSION:(reviewed/updated 10/9/2020)  Medications Prior to Admission   Medication Sig    FLUoxetine (PROzac) 20 mg capsule Take 20 mg by mouth daily.  benztropine (COGENTIN) 0.5 mg tablet Take 0.5 mg by mouth two (2) times a day. Indications: extrapyramidal symptoms as a result of taking the medication    LORazepam (Ativan) 1 mg tablet Take 1 mg by mouth two (2) times a day.  valproate (Depakene) 250 mg/5 mL syrup Take 1,000 mg by mouth two (2) times a day.  paliperidone palmitate (Invega Sustenna) 234 mg/1.5 mL injection 234 mg by IntraMUSCular route every twenty-one (21) days. Indications: schizophrenia    OLANZapine (ZyPREXA zydis) 20 mg disintegrating tablet Take 20 mg by mouth Daily (before dinner). Indications: schizophrenia      PAST MEDICAL HISTORY: Past medical history from the initial psychiatric evaluation has been reviewed (reviewed/updated 10/9/2020) with no additional updates (I asked patient and no additional past medical history provided). Past Medical History:   Diagnosis Date    Psychiatric disorder     Psychotic disorder ([facility]     Withdrawal syndrome ([facility]    History reviewed. No pertinent surgical history. SOCIAL HISTORY: Social history from the initial psychiatric evaluation has been reviewed (reviewed/updated 10/9/2020) with no additional updates (I asked patient and no additional social history provided).    Social History     Socioeconomic History    Marital status: SINGLE     Spouse name: Not on file    Number of children: Not on file    Years of education: Not on file    Highest education level: Not on file   Occupational History    Not on file   Social Needs    Financial resource strain: Not on file    Food insecurity     Worry: Not on file     Inability: Not on file    Transportation needs     Medical: Not on file     Non-medical: Not on file   Tobacco Use    Smoking status: Never Smoker    Smokeless tobacco: Never Used   Substance and Sexual Activity    Alcohol use: No    Drug use: No    Sexual activity: Not on file   Lifestyle    Physical activity     Days per week: Not on file     Minutes per session: Not on file    Stress: Not on file   Relationships    Social connections     Talks on phone: Not on file     Gets together: Not on file     Attends Gnosticism service: Not on file     Active member of club or organization: Not on file     Attends meetings of clubs or organizations: Not on file     Relationship status: Not on file    Intimate partner violence     Fear of current or ex partner: Not on file     Emotionally abused: Not on file     Physically abused: Not on file     Forced sexual activity: Not on file   Other Topics Concern    Not on file   Social History Narrative    Pt is a HS grad and is unemployed. He lives with his sister. On disability    No pending legal charge reported      FAMILY HISTORY: Family history from the initial psychiatric evaluation has been reviewed (reviewed/updated 10/9/2020) with no additional updates (I asked patient and no additional family history provided). History reviewed. No pertinent family history. REVIEW OF SYSTEMS: (reviewed/updated 10/9/2020)  Appetite:poor   Sleep: poor with DIMS (difficulty initiating & maintaining sleep)   All other Review of Systems: Negative except per [facility]         [facility] (MSE):    MSE FINDINGS ARE WITHIN NORMAL LIMITS (WNL) UNLESS OTHERWISE STATED BELOW. ( ALL OF THE BELOW CATEGORIES OF THE MSE HAVE BEEN REVIEWED (reviewed 10/9/2020) AND UPDATED AS DEEMED APPROPRIATE )  General Presentation age appropriate, cooperative   Orientation disorganized   Vital Signs  See below (reviewed 10/9/2020); Vital Signs (BP, Pulse, & Temp) are within normal limits if not listed below.    Gait and Station Stable/steady, no ataxia   Musculoskeletal System No extrapyramidal symptoms (EPS); no abnormal muscular movements or Tardive Dyskinesia (TD); muscle strength and tone are within normal limits   Language No aphasia or dysarthria   Speech:  hypoverbal, increased latency of response, non-pressured and soft   Thought Processes illogical; slow rate of thoughts; poor abstract reasoning/computation   Thought Associations blocked    Thought Content poverty of content   Suicidal Ideations unable to assess   Homicidal Ideations unable to assess   Mood:  unable to asses   Affect:  flat   Memory recent  impaired   Memory remote:  impaired   Concentration/Attention:  impaired   Fund of Knowledge significantly below average   Insight:  poor   Reliability fair   Judgment:  impaired due to active psychosis          VITALS:     Patient Vitals for the past 24 hrs:   Temp Resp   10/09/20 0815  18     Wt Readings from Last 3 Encounters:   09/18/20 85.7 kg (189 lb)   12/23/17 84.9 kg (187 lb 1.6 oz)   03/25/17 95.6 kg (210 lb 11.2 oz)     Temp Readings from Last 3 Encounters:   03/28/17 97.5 °F (36.4 °C)   02/07/15 98.7 °F (37.1 °C)     BP Readings from Last 3 Encounters:   10/08/20 115/70   03/28/17 100/68   02/12/15 105/73     Pulse Readings from Last 3 Encounters:   10/08/20 93   03/28/17 67   02/12/15 87            DATA     LABORATORY DATA:(reviewed/updated 10/9/2020)  No results found for this or any previous visit (from the past 24 hour(s)). Lab Results   Component Value Date/Time    Valproic acid 97 09/18/2020 04:42 PM     No results found for: LITHM   RADIOLOGY REPORTS:(reviewed/updated 10/9/2020)  No results found.        MEDICATIONS     ALL MEDICATIONS:   Current Facility-Administered Medications   Medication Dose Route Frequency    cloZAPine (CLOZARIL) tablet 300 mg  300 mg Oral QHS    Or    OLANZapine (ZyPREXA) 5 mg in sterile water (preservative free) 1 mL injection +++COURT ORDERED MEDICATION FOR REFUSAL OF CLOZAPINE+++  5 mg IntraMUSCular QHS    [Held by provider] clonazePAM (KlonoPIN) tablet 1 mg  1 mg Oral BID    [Held by provider] valproic acid (as sodium salt) (DEPAKENE) 250 mg/5 mL (5 mL) oral solution 1,000 mg  1,000 mg Oral BID    OLANZapine (ZyPREXA) tablet 5 mg  5 mg Oral Q6H PRN    haloperidol lactate (HALDOL) injection 5 mg  5 mg IntraMUSCular Q6H PRN    benztropine (COGENTIN) tablet 1 mg  1 mg Oral BID PRN    diphenhydrAMINE (BENADRYL) injection 50 mg  50 mg IntraMUSCular BID PRN    hydrOXYzine HCL (ATARAX) tablet 50 mg  50 mg Oral TID PRN    traZODone (DESYREL) tablet 50 mg  50 mg Oral QHS PRN    acetaminophen (TYLENOL) tablet 650 mg  650 mg Oral Q4H PRN    magnesium hydroxide (MILK OF MAGNESIA) 400 mg/5 mL oral suspension 30 mL  30 mL Oral DAILY PRN      SCHEDULED MEDICATIONS:   Current Facility-Administered Medications   Medication Dose Route Frequency    cloZAPine (CLOZARIL) tablet 300 mg  300 mg Oral QHS    Or    OLANZapine (ZyPREXA) 5 mg in sterile water (preservative free) 1 mL injection +++COURT ORDERED MEDICATION FOR REFUSAL OF CLOZAPINE+++  5 mg IntraMUSCular QHS    [Held by provider] clonazePAM (KlonoPIN) tablet 1 mg  1 mg Oral BID    [Held by provider] valproic acid (as sodium salt) (DEPAKENE) 250 mg/5 mL (5 mL) oral solution 1,000 mg  1,000 mg Oral BID          ASSESSMENT & PLAN     DIAGNOSES REQUIRING ACTIVE TREATMENT AND MONITORING: (reviewed/updated 10/9/2020)  Patient Active Hospital Problem List:   Schizoaffective disorder ([facility] [address] (9/18/2020)    Assessment: patient with significant internal preoccupation, poor ADLs, history of catatonia. Appears to have been compliant with home medication, but is regressed and with a markedly disorganized thought process. Will start Clozaril, add benzodiazepine for protection against catatonia to be tapered as Clozaril dose increases given interaction.     Plan:  COURT ORDERED MEDICATION:  - CONTINUE Clozaril 300 mg PO *OR* Zyprexa 5 mg IM QHS for psychosis    - DISCONTINUE Haldol due to poor efficacy  - DISCONTINUE Radonna Colla due to poor efficacy  - EEG for seizure rule out  - HOLD Klonopin 1 mg BID for catatonia due to poor efficacy  - CBC and Clozaril level by court order  - HOLD VPA oral soln 1000 mg BID for mood lability, seizure prophylaxis due to poor efficacy, non compliance  - DISCONTINUE Prozac / Cogentin / Zydis due to polypharamacy  - Consider antihypertensive  - IGM therapy as tolerated    I will continue to monitor blood levels (Depakote, clozapine---a drug with a narrow therapeutic index= NTI) and associated labs for drug therapy implemented that require intense monitoring for toxicity as deemed appropriate based on current medication side effects and pharmacodynamically determined drug 1/2 lives. In summary, Adam Leonardo, is a 45 y.o.  male who presents with a severe exacerbation of the principal diagnosis of Schizoaffective disorder ([facility] [address]    Patient's condition is not improving. Patient requires continued inpatient hospitalization for further stabilization, safety monitoring and medication management. I will continue to coordinate the provision of individual, milieu, occupational, group, and substance abuse therapies to address target symptoms/diagnoses as deemed appropriate for the individual patient. A coordinated, multidisplinary treatment team round was conducted with the patient (this team consists of the nurse, psychiatric unit pharmacist,  and writer). Complete current electronic health record for patient has been reviewed today including consultant notes, ancillary staff notes, nurses and psychiatric tech notes. Suicide risk assessment completed and patient deemed to be of low risk for suicide at this time. The following regarding medications was addressed during rounds with patient:   the risks and benefits of the proposed medication. The patient was given the opportunity to ask questions. Informed consent given to the use of the above medications.  Will continue to adjust psychiatric and non-psychiatric medications (see above \"medication\" section and orders section for details) as deemed appropriate & based upon diagnoses and response to treatment. I will continue to order blood tests/labs and diagnostic tests as deemed appropriate and review results as they become available (see orders for details and above listed lab/test results). I will order psychiatric records from previous [facility] hospitals to further elucidate the nature of patient's psychopathology and review once available. I will gather additional collateral information from friends, family and o/p treatment team to further elucidate the nature of patient's psychopathology and baselline level of psychiatric functioning. I certify that this patient's inpatient psychiatric hospital services furnished since the previous certification were, and continue to be, required for treatment that could reasonably be expected to improve the patient's condition, or for diagnostic study, and that the patient continues to need, on a daily basis, active treatment furnished directly by or requiring the supervision of inpatient psychiatric facility personnel. In addition, the hospital records show that services furnished were intensive treatment services, admission or related services, or equivalent services.     EXPECTED DISCHARGE DATE/DAY: TBD     DISPOSITION: Home       Signed By:   My Valles MD  10/9/2020

## 2020-10-11 PROCEDURE — 74011250637 HC RX REV CODE- 250/637: Performed by: PSYCHIATRY & NEUROLOGY

## 2020-10-11 PROCEDURE — 65220000003 HC RM SEMIPRIVATE PSYCH

## 2020-10-11 RX ADMIN — CLOZAPINE 300 MG: 100 TABLET ORAL at 21:07

## 2020-10-11 NOTE — BH NOTES
0700- Verbal shift change report given to Artemio Pelletier.  (oncoming nurse) by Bianca Reyes (offgoing nurse). Report included the following information SBAR, Kardex, MAR, Recent Results and Med Rec Status. [id number] Patient non-compliant with assessment, vitals and medications. Patient withdrawn in his room, se;ectively mute in response to writer. Will continue to monitor patient and provide support and encouragement. [id number] Patient in room sitting on bed rubbing hands against his legs. Writer asked patient if he was alright. Patient continued behavior without response. [id number] Patient on bed covering ears and running the shower. Writer asked patient if he was ok and he said yes. He stated, \"I'm using the shower for noise. \" Approximately 15 minutes later, patient turned shower off and laid down to rest. Will continue to monitor patient for safety. [id number] Patient ambulated to the day room to obtain lunch and tolerated it in his room. Patient remains isolative and quiet. Will continue to monitor for safety. [id number] Patient resting quietly in bed. No issues noted at this time. [id number] Patient poked his head out of his room and whispered to writer, Ankit Comer you bring my dinner? \" Patient then tolerated dinner in his room. Will continue to provide support to patient.

## 2020-10-11 NOTE — INTERDISCIPLINARY ROUNDS
[facility] Interdisciplinary Rounds Patient Name: April Mackenzie  Age: 45 y.o. Room/Bed:  [facility]/01 Primary Diagnosis: Schizoaffective disorder ([facility] [address] Admission Status: Forced Medication Order Readmission within 30 days: no 
Power of  in place: unk Patient requires a blocked bed: yes Reason for blocked bed: Behavior Order for blocked bed obtained: yes Sleep hours: 7.5 Morning Labs completed per orders:  na     
Participation in Care/Groups:  yes Medication Compliant?: Yes PRNS (last 24 hours): None Restraints (last 24 hours):  no 
Substance Abuse:  no   
24 hour chart check complete:

## 2020-10-11 NOTE — BH NOTES
Assumed nursing care of Teresa Hopkins after receiving the 1900 change of shift report. Teresa Hopkins presented with unkept, disheveled appearance, ungroomed hair and facial hair. He has been in his room and withdrawn since the beginning of the shift. When approached at bedside for a nursing assessment he was selectively mute and would not answer any questions and no verbal response when asked if he wanted to talk about anything. He did allow his VSs to be taken but staff reported that he would not be still and his arms were moving. His BP was 87/59 and he would not let writer retake it. Pt was med compliant and observed to be sure it was swallowed. Temp 98.4. Pt did come out of his room several times and approach the nursing station. Writer could not understand what he said but when asked if he wanted something to drink, he shook his head yes. After receiving a drink, he went back to bed. Monitoring closely for mood chgs, behavior and for safety. Addendum at 7993: Teresa Hopkins has slept around 7.5 hrous. He is resting quietly in bed at this time.

## 2020-10-11 NOTE — PROGRESS NOTES
Problem: Falls - Risk of  Goal: *Absence of Falls  Description: Document Richard Merlin Fall Risk and appropriate interventions in the flowsheet. Outcome: Progressing Towards Goal  Note: Fall Risk Interventions:       Mentation Interventions: Room close to nurse's station, Toileting rounds, Reorient patient    Medication Interventions: Teach patient to arise slowly    Elimination Interventions:  Toileting schedule/hourly rounds              Problem: Altered Thought Process (Adult/Pediatric)  Goal: *STG: Remains safe in hospital  Outcome: Progressing Towards Goal  Goal: *STG: Complies with medication therapy  Outcome: Progressing Towards Goal  Goal: *STG: Attends activities and groups  Outcome: Progressing Towards Goal

## 2020-10-12 PROCEDURE — 65220000003 HC RM SEMIPRIVATE PSYCH

## 2020-10-12 PROCEDURE — 74011250637 HC RX REV CODE- 250/637: Performed by: PSYCHIATRY & NEUROLOGY

## 2020-10-12 PROCEDURE — 99231 SBSQ HOSP IP/OBS SF/LOW 25: CPT | Performed by: PSYCHIATRY & NEUROLOGY

## 2020-10-12 RX ADMIN — CLOZAPINE 300 MG: 100 TABLET ORAL at 21:03

## 2020-10-12 NOTE — BH NOTES
GROUP THERAPY PROGRESS NOTE    Patient did not participate in Discharge Group.      Aide Parisi [facility]PC [facility]

## 2020-10-12 NOTE — GROUP NOTE
JENELLE  GROUP DOCUMENTATION INDIVIDUAL Group Therapy Note Date: 10/12/2020 Group Start Time: 0900 Group End Time: 1000 Group Topic: Topic Group [facility] 3 ACUTE BEHAV [facility], [address] GROUP DOCUMENTATION GROUP Group Therapy Note Attendees: 2 Attendance: Did not attend Patient's Goal: Interventions/techniques: 
Brian Martínez

## 2020-10-12 NOTE — BH NOTES
0700- Verbal shift change report given to Artemio Pelletier.  (oncoming nurse) by Ismael Mascorro (offgoing nurse). Report included the following information SBAR, Kardex, MAR and Recent Results. 0800-100 Patient denies SI/HI/AVH. Patient denies anxiety and depression. Patient refused vital signs, medications, but tolerated breakfast without issue. Will continue to monitor patient,  provide support, and notify doctor. [phone] Patient withdrawn to room. Awake in bed. No other issues noted at this time. Will continue to monitor for safety and provide support. [id number] Patient withdrawn to room. Writer asked patient if he was feeling alright to which he responded, \"yes\". [id number] Patient tolerated dinner without issue. Patient did not verbally respond to writer when writer asked how he was. [id number] Patient resting in room in bed awake. Will continue to provide support.

## 2020-10-12 NOTE — PROGRESS NOTES
Problem: Falls - Risk of  Goal: *Absence of Falls  Description: Document Sparkle Baker Fall Risk and appropriate interventions in the flowsheet. Outcome: Progressing Towards Goal  Note: Fall Risk Interventions:       Mentation Interventions: Room close to nurse's station, Toileting rounds, Reorient patient    Medication Interventions: Teach patient to arise slowly    Elimination Interventions:  Toileting schedule/hourly rounds              Problem: Altered Thought Process (Adult/Pediatric)  Goal: *STG: Participates in treatment plan  Outcome: Not Progressing Towards Goal  Goal: *STG: Remains safe in hospital  Outcome: Progressing Towards Goal  Goal: *STG: Seeks staff when feelings of anxiety and fear arise  Outcome: Not Progressing Towards Goal  Goal: *STG: Attends activities and groups  Outcome: Not Progressing Towards Goal

## 2020-10-12 NOTE — BH NOTES
Assumed nursing care of Itzel Sifuentes after receiving the 1900 change of shift report. Itzel Sifuentes presented with disheveled appearance with hair and facial hair ungroomed. He is withdrawn, staying in his room , not participating with activities or interacting with peers. When approached for a nursing assessment, at bedside, he avoided eye contact and was unresponsive with verbal communication. Some shaking noted to legs, feet and hands. He first ignored writer when offered his HS medication but eventually took it with some encouragement and when reminded of the court order r/t his medication. Encouraging participation with treatment and monitoring for mood chgs, behavior and for safety. Addendum at [phone]:  Pt has slept around 5 1/2 hrs. Quiet shift w/o problems. He is resting quietly in bed at this time.

## 2020-10-12 NOTE — INTERDISCIPLINARY ROUNDS
[facility] Interdisciplinary Rounds Patient Name: Marla Cosby  Age: 45 y.o. Room/Bed:  313/01 Primary Diagnosis: Schizoaffective disorder ([facility] [address] Admission Status: Forced Medication Order Readmission within 30 days: no 
Power of  in place: unk Patient requires a blocked bed:   Yes, keeping blocked due to behavior Reason for blocked bed: Behavior Order for blocked bed obtained: no    
 
Sleep hours: 5.5 Morning Labs completed per orders:  na   
Participation in Care/Groups:  no 
Medication Compliant?: Yes PRNS (last 24 hours): None Restraints (last 24 hours):  no 
Substance Abuse:  no   
24 hour chart check complete:   
 
  
Patient goal(s) for today: Follow unit directions  
Treatment team focus/goals: Stabilize - increase Clozaril  
Progress note: Pt met in treatment room with team and has been encouraged to call his sister.  
  
LOS:  24             Expected LOS: TBD 
  
Financial concerns/prescription coverage: [employer] Medicare Part A&B and [facility] and O Family contact: Sister/DENISE Mendoza [phone] 28/1 Family requesting physician contact today: No 
Discharge plan: Home Access to weapons: None Outpatient provider(s): [facility] - updated 10/2 Patient's preferred phone number for follow up [phone] 
  
Participating treatment team members: LAVINIA Vargas and Dr. Mary Ann Flowers.

## 2020-10-12 NOTE — GROUP NOTE
JENELLE  GROUP DOCUMENTATION INDIVIDUAL Group Therapy Note Date: 10/12/2020 Group Start Time: 1100 Group End Time: 1200 Group Topic: Topic Group [facility] 3 ACUTE BEHAV [facility], [address] [facility] GROUP DOCUMENTATION GROUP Group Therapy Note Attendees: 5 Attendance: Did not attend Patient's Goal: Interventions/techniques: 
 
Humberto Murray

## 2020-10-12 NOTE — PROGRESS NOTES
Problem: Falls - Risk of  Goal: *Absence of Falls  Description: Document Anel Padillaann Fall Risk and appropriate interventions in the flowsheet. Outcome: Progressing Towards Goal  Note: Fall Risk Interventions:       Mentation Interventions: Room close to nurse's station, Toileting rounds, Reorient patient    Medication Interventions: Teach patient to arise slowly    Elimination Interventions:  Toileting schedule/hourly rounds              Problem: Altered Thought Process (Adult/Pediatric)  Goal: *STG: Remains safe in hospital  Outcome: Progressing Towards Goal  Goal: *LTG: Returns to baseline functioning  Outcome: Progressing Towards Goal

## 2020-10-12 NOTE — GROUP NOTE
JENELLE  GROUP DOCUMENTATION INDIVIDUAL Group Therapy Note Date: 10/12/2020 Group Start Time: 1500 Group End Time: 4101 Group Topic: Recreational/Music Therapy [address] 3 ACUTE BEHAV [facility], 300 [facility] GROUP DOCUMENTATION GROUP Group Therapy Note Attendees: 2 Attendance: Did not attend Patient's Goal: Interventions/techniques Maria D Goodwin

## 2020-10-12 NOTE — BH NOTES
GROUP THERAPY PROGRESS NOTE    Patient did not participate in Process Group.      Jean Sands LPC [facility]

## 2020-10-12 NOTE — BH NOTES
PSYCHIATRIC PROGRESS NOTE         Patient Name  Calvin Sheridan   Date of Birth 1970   [facility] [id number]   Medical Record Number  [id number]      Age  45 y.o. PCP Unknown, Provider   Admit date:  9/18/2020    Room Number  821/80  @ [facility]   Date of Service  10/12/2020         E & M PROGRESS NOTE:         HISTORY       CC:  \"psychosis\"  HISTORY OF PRESENT ILLNESS/INTERVAL HISTORY:  (reviewed/updated 10/12/2020). per initial evaluation: Nikky Lozoya \"Corey\" Roxy Brown is a 45year old male with a history of schizophrenia admitted to the [facility] for increased psychosis. He has not been bathing or otherwise caring for himself at home. He was reportedly hospitalized at [facility] from April - June 2020. He does not verbally answer any questions. He is well known to this writer from previous admissions in the [place] area. He has responded well to ECT in the past.    09/21 - overnight, patient incontinent of urine, placed into diaper. Patient appeared catatonic at times, but was speaking, non-spontaneously, to the team this morning. Per nursing, the patient has significantly poor self care, grossly internally preoccucpied and with no ability to care for himself. SW reports that sister is patient's caretaker and that he has been decompensating from an already poor baseline recently, requiring prompting to eat meals and unable to perform basic ADLs. Family states he improved on Clozaril and they are requesting the team restart this medication. Of note, patient got [place] injection two weeks ago, VPA level suggests compliance with medication. 09/22 - patient has been isolative to bed, up for meals, selectively mute. Shakes and nods his head to stimuli. Patient slept 11 hours overnight, and is in bed on assessment. He is mute, does not endorse any pain or discomfort but is largely unresponsive to questions. Vitals unremarkable, BP mildly elevated.     09/23 - patient in bed much of the day, per nursing he got up and showered, and spoke briefly about wanting to go home. He is not compliant with medication; patient seen in his room, he is somnolent, shakes and nods his head, doesn't open his eyes for the duration of the interview. Patient slept 8 hours overnight. 09/24 - patient refused vital signs and medications this morning. He remains isolative to bed for much of the day. Per nursing assessment, the patient has been internally preoccupied, selectively mute, slept 9.5 hours overnight. Patient briefed on the importance of medication compliance, does not voice understanding of the situation. 09/25 - overnight, patient was visible, pacing, largely incoherent but with no aggression noted. No PRNs were given to the patient but he is refusing several doses of medication (refused VPA, Klonopin AM doses); he is taking Clozaril. Patient noted to have repetitive movements, selectively mute on interview, won't get out of bed. Family asking for neurologic workup, MRI as he has not had this before. 09/28 - patient isolative, still selectively mute, took medication and allowed vitals this morning but refused labwork (CBC for Clozaril is overdue). Patient refused breakfast this morning, slept 6 hours overnight. He remains internally preoccupied, unable to assess his mental state. Patient continues to refuse about half of medication administrations. Court order pending for labwork. 09/29 - no acute overnight events. Patient medication compliant, isolative, selectively mute and generally asleep for much of the day. Not voicing any complaints but with ongoing internal preoccupation; he has not had any meaningful interaction with MD since last week. Court order for tomorrow's labwork pending. 09/30 - no acute overnight events. Patient was out of his room today and participated in treatment team discussion. He continues to speak minimally but asked to go home.  Discussed medication and the need for labwork, the patient agrees but has been refusing at [address] each morning. Patient guarded about symptoms, concrete and with no understanding of the events surrounding admission. 10/01 - court ordered medication / blood draw approved. Patient refused Klonopin and VPA this morning. Patient got bloodwork this morning, ANC WNL. Patient briefed on his treatment progress, he says nothing and does not acknowledge the treatment team. He made a comment at the onset of the interview but then went back to bed. Unable to provide any meaningful information. Per nursing, he remains mostly mute but will ask for some items. They are concerned he may be cheeking his Clozaril. 10/02 - the patient slept 10 hours overnight, self care is poor and patient remains internally preoccupied. He is out of his room for meals and comes into treatment team room for morning interview. He is unable to answer when asked his shoe size. Patient compliant with Clozaril with mouth checks to date, no PRNs given for agitation but patient still unable to adhere to ADLs without much prompting. 10/03 - patient slept 10.75 hours, he remains blunted with poor self care, but medication compliant. Patient out of room to meet with MD, denies SI/HI/AVH; he remains grossly internally preoccupied. Patient discharge focused, still speech impoverished and with poor ADLs. 10/04 - patient impoverished, in bed for much of the day. He is out for meals, requires much prompting to engage in ADLs. Goal for today is taking a shower per nursing. Patient compliant with Clozaril, vitals unremarkable thus far. Patient selectively mute, does not answer assessment questions. 10/05 - no acute overnight events; the patient slept 10 hours overnight. Patient refused AM medictions this AM, but took Clozaril last night. He remains in poor behavioral control, requiring much prompting for ADLs. Took shower last night with much encouragement.  He remains withdrawn, isolative, mental status remains impoverished. 10/06 - patient slept 9 hours overnight, he remains isolative to room. Compliant with Clozaril but refusing VPA and benzo. Patient out of bed for meals otherwise with no improvement in thought process. Discussed holding all agents except Clozaril as patient is intermittently non-compliant and had been taking these agents prior to admission, patient agrees to this. He c/o sialorrhea. Patient received no PRNs for agitation, ADLs remain poor and he needs prompting for most activities. 10/07 - no acute overnight events. Patient has been isolative, compliant with court ordered medication. Patient otherwise with no specific concerns, denies SI/HI/AVH. Patient requires prompting for most ADLs, minimally engaged in the milieu, but with no episodes of agitation or aggression. No PRNs given x24 hours. Patient's benzo and VPA held. 10/08 - patient slept 8 hours, has been isolative but medication compliant. Patient with mild tremor L > R; benzo and VPA still held due to patient refusal, patient appears to be improving on Clozaril monotherapy. Patient still grossly hypo-verbal, has been otherwise pleasant. 10/09 - overnight, patient has remained isolative; he speaks sparingly and has difficulty with his ADLs. Patient has been in fair behavioral control, per sister he is still off from his baseline. Clozaril trial continues, patient tolerating medication and thus far with no instances of significant side effects (sialorrhea). Patient discharge focused, with marked poverty of thought on interview. 10/12 - patient isolative, remains in bed for much of the day. Patient refused vitals over the weekend. Denies all symptoms, ate breakfast this morning medication compliant. Patient slept 5.5 hours overnight, he remains fairly non-responsive but is not in any distress. Patient with poor hygiene, poor self care, requires significant prompting and motivation to partake in ADLs.         SIDE EFFECTS: (reviewed/updated 10/12/2020)  +Drooling  No noted toxicity with use of Depakote   ALLERGIES:(reviewed/updated 10/12/2020)  Allergies   Allergen Reactions    Fluphenazine Unknown (comments)     Pt is unable to communicate properly.  Penicillins Unknown (comments)     Pt is unable to communicate properly. MEDICATIONS PRIOR TO ADMISSION:(reviewed/updated 10/12/2020)  Medications Prior to Admission   Medication Sig    FLUoxetine (PROzac) 20 mg capsule Take 20 mg by mouth daily.  benztropine (COGENTIN) 0.5 mg tablet Take 0.5 mg by mouth two (2) times a day. Indications: extrapyramidal symptoms as a result of taking the medication    LORazepam (Ativan) 1 mg tablet Take 1 mg by mouth two (2) times a day.  valproate (Depakene) 250 mg/5 mL syrup Take 1,000 mg by mouth two (2) times a day.  paliperidone palmitate (Invega Sustenna) 234 mg/1.5 mL injection 234 mg by IntraMUSCular route every twenty-one (21) days. Indications: schizophrenia    OLANZapine (ZyPREXA zydis) 20 mg disintegrating tablet Take 20 mg by mouth Daily (before dinner). Indications: schizophrenia      PAST MEDICAL HISTORY: Past medical history from the initial psychiatric evaluation has been reviewed (reviewed/updated 10/12/2020) with no additional updates (I asked patient and no additional past medical history provided). Past Medical History:   Diagnosis Date    Psychiatric disorder     Psychotic disorder ([facility] [address]     Withdrawal syndrome ([facility] [address]    History reviewed. No pertinent surgical history. SOCIAL HISTORY: Social history from the initial psychiatric evaluation has been reviewed (reviewed/updated 10/12/2020) with no additional updates (I asked patient and no additional social history provided).    Social History     Socioeconomic History    Marital status: SINGLE     Spouse name: Not on file    Number of children: Not on file    Years of education: Not on file    Highest education level: Not on file   Occupational History    Not on file   Social Needs    Financial resource strain: Not on file    Food insecurity     Worry: Not on file     Inability: Not on file    Transportation needs     Medical: Not on file     Non-medical: Not on file   Tobacco Use    Smoking status: Never Smoker    Smokeless tobacco: Never Used   Substance and Sexual Activity    Alcohol use: No    Drug use: No    Sexual activity: Not on file   Lifestyle    Physical activity     Days per week: Not on file     Minutes per session: Not on file    Stress: Not on file   Relationships    Social connections     Talks on phone: Not on file     Gets together: Not on file     Attends Taoist service: Not on file     Active member of club or organization: Not on file     Attends meetings of clubs or organizations: Not on file     Relationship status: Not on file    Intimate partner violence     Fear of current or ex partner: Not on file     Emotionally abused: Not on file     Physically abused: Not on file     Forced sexual activity: Not on file   Other Topics Concern    Not on file   Social History Narrative    Pt is a HS grad and is unemployed. He lives with his sister. On disability    No pending legal charge reported      FAMILY HISTORY: Family history from the initial psychiatric evaluation has been reviewed (reviewed/updated 10/12/2020) with no additional updates (I asked patient and no additional family history provided). History reviewed. No pertinent family history.     REVIEW OF SYSTEMS: (reviewed/updated 10/12/2020)  Appetite:poor   Sleep: poor with DIMS (difficulty initiating & maintaining sleep)   All other Review of Systems: Negative except per HPI         [address] (MSE):    MSE FINDINGS ARE WITHIN NORMAL LIMITS (WNL) UNLESS OTHERWISE STATED BELOW. ( ALL OF THE BELOW CATEGORIES OF THE MSE HAVE BEEN REVIEWED (reviewed 10/12/2020) AND UPDATED AS DEEMED APPROPRIATE )  General Presentation age appropriate, cooperative   Orientation disorganized   Vital Signs  See below (reviewed 10/12/2020); Vital Signs (BP, Pulse, & Temp) are within normal limits if not listed below. Gait and Station Stable/steady, no ataxia   Musculoskeletal System No extrapyramidal symptoms (EPS); no abnormal muscular movements or Tardive Dyskinesia (TD); muscle strength and tone are within normal limits   Language No aphasia or dysarthria   Speech:  hypoverbal, increased latency of response, non-pressured and soft   Thought Processes illogical; slow rate of thoughts; poor abstract reasoning/computation   Thought Associations blocked    Thought Content poverty of content   Suicidal Ideations unable to assess   Homicidal Ideations unable to assess   Mood:  unable to asses   Affect:  flat   Memory recent  impaired   Memory remote:  impaired   Concentration/Attention:  impaired   Fund of Knowledge significantly below average   Insight:  poor   Reliability fair   Judgment:  impaired due to active psychosis          VITALS:     Patient Vitals for the past 24 hrs:   Temp Resp   10/12/20 0830  12     Wt Readings from Last 3 Encounters:   09/18/20 85.7 kg (189 lb)   12/23/17 84.9 kg (187 lb 1.6 oz)   03/25/17 95.6 kg (210 lb 11.2 oz)     Temp Readings from Last 3 Encounters:   03/28/17 97.5 °F (36.4 °C)   02/07/15 98.7 °F (37.1 °C)     BP Readings from Last 3 Encounters:   10/10/20 (!) 87/59   03/28/17 100/68   02/12/15 105/73     Pulse Readings from Last 3 Encounters:   10/10/20 92   03/28/17 67   02/12/15 87            DATA     LABORATORY DATA:(reviewed/updated 10/12/2020)  No results found for this or any previous visit (from the past 24 hour(s)). Lab Results   Component Value Date/Time    Valproic acid 97 09/18/2020 04:42 PM     No results found for: LITHM   RADIOLOGY REPORTS:(reviewed/updated 10/12/2020)  No results found.        MEDICATIONS     ALL MEDICATIONS:   Current Facility-Administered Medications   Medication Dose Route Frequency    cloZAPine (CLOZARIL) tablet 300 mg  300 mg Oral QHS    Or    OLANZapine (ZyPREXA) 5 mg in sterile water (preservative free) 1 mL injection +++COURT ORDERED MEDICATION FOR REFUSAL OF CLOZAPINE+++  5 mg IntraMUSCular QHS    [Held by provider] clonazePAM (KlonoPIN) tablet 1 mg  1 mg Oral BID    [Held by provider] valproic acid (as sodium salt) (DEPAKENE) 250 mg/5 mL (5 mL) oral solution 1,000 mg  1,000 mg Oral BID    OLANZapine (ZyPREXA) tablet 5 mg  5 mg Oral Q6H PRN    haloperidol lactate (HALDOL) injection 5 mg  5 mg IntraMUSCular Q6H PRN    benztropine (COGENTIN) tablet 1 mg  1 mg Oral BID PRN    diphenhydrAMINE (BENADRYL) injection 50 mg  50 mg IntraMUSCular BID PRN    hydrOXYzine HCL (ATARAX) tablet 50 mg  50 mg Oral TID PRN    traZODone (DESYREL) tablet 50 mg  50 mg Oral QHS PRN    acetaminophen (TYLENOL) tablet 650 mg  650 mg Oral Q4H PRN    magnesium hydroxide (MILK OF MAGNESIA) 400 mg/5 mL oral suspension 30 mL  30 mL Oral DAILY PRN      SCHEDULED MEDICATIONS:   Current Facility-Administered Medications   Medication Dose Route Frequency    cloZAPine (CLOZARIL) tablet 300 mg  300 mg Oral QHS    Or    OLANZapine (ZyPREXA) 5 mg in sterile water (preservative free) 1 mL injection +++COURT ORDERED MEDICATION FOR REFUSAL OF CLOZAPINE+++  5 mg IntraMUSCular QHS    [Held by provider] clonazePAM (KlonoPIN) tablet 1 mg  1 mg Oral BID    [Held by provider] valproic acid (as sodium salt) (DEPAKENE) 250 mg/5 mL (5 mL) oral solution 1,000 mg  1,000 mg Oral BID          ASSESSMENT & PLAN     DIAGNOSES REQUIRING ACTIVE TREATMENT AND MONITORING: (reviewed/updated 10/12/2020)  Patient Active Hospital Problem List:   Schizoaffective disorder ([facility] [address] (9/18/2020)    Assessment: patient with significant internal preoccupation, poor ADLs, history of catatonia. Appears to have been compliant with home medication, but is regressed and with a markedly disorganized thought process.  Will start Clozaril, add benzodiazepine for protection against catatonia to be tapered as Clozaril dose increases given interaction. Plan:  COURT ORDERED MEDICATION:  - CONTINUE Clozaril 300 mg PO *OR* Zyprexa 5 mg IM QHS for psychosis    - DISCONTINUE Haldol due to poor efficacy  - DISCONTINUE [place] due to poor efficacy  - EEG for seizure rule out  - HOLD Klonopin 1 mg BID for catatonia due to poor efficacy  - CBC and Clozaril level by court order  - HOLD VPA oral soln 1000 mg BID for mood lability, seizure prophylaxis due to poor efficacy, non compliance  - DISCONTINUE Prozac / Cogentin / Zydis due to polypharamacy  - Consider antihypertensive  - IGM therapy as tolerated    I will continue to monitor blood levels (Depakote, clozapine---a drug with a narrow therapeutic index= NTI) and associated labs for drug therapy implemented that require intense monitoring for toxicity as deemed appropriate based on current medication side effects and pharmacodynamically determined drug 1/2 lives. In summary, Elaina Dalal, is a 45 y.o.  male who presents with a severe exacerbation of the principal diagnosis of Schizoaffective disorder ([facility] [address]    Patient's condition is not improving. Patient requires continued inpatient hospitalization for further stabilization, safety monitoring and medication management. I will continue to coordinate the provision of individual, milieu, occupational, group, and substance abuse therapies to address target symptoms/diagnoses as deemed appropriate for the individual patient. A coordinated, multidisplinary treatment team round was conducted with the patient (this team consists of the nurse, psychiatric unit pharmacist,  and writer). Complete current electronic health record for patient has been reviewed today including consultant notes, ancillary staff notes, nurses and psychiatric tech notes. Suicide risk assessment completed and patient deemed to be of low risk for suicide at this time. The following regarding medications was addressed during rounds with patient:   the risks and benefits of the proposed medication. The patient was given the opportunity to ask questions. Informed consent given to the use of the above medications. Will continue to adjust psychiatric and non-psychiatric medications (see above \"medication\" section and orders section for details) as deemed appropriate & based upon diagnoses and response to treatment. I will continue to order blood tests/labs and diagnostic tests as deemed appropriate and review results as they become available (see orders for details and above listed lab/test results). I will order psychiatric records from previous [facility] hospitals to further elucidate the nature of patient's psychopathology and review once available. I will gather additional collateral information from friends, family and o/p treatment team to further elucidate the nature of patient's psychopathology and baselline level of psychiatric functioning. I certify that this patient's inpatient psychiatric hospital services furnished since the previous certification were, and continue to be, required for treatment that could reasonably be expected to improve the patient's condition, or for diagnostic study, and that the patient continues to need, on a daily basis, active treatment furnished directly by or requiring the supervision of inpatient psychiatric facility personnel. In addition, the hospital records show that services furnished were intensive treatment services, admission or related services, or equivalent services.     EXPECTED DISCHARGE DATE/DAY: TBD     DISPOSITION: Home       Signed By:   Zeyad Pierre MD  10/12/2020

## 2020-10-13 LAB
ALBUMIN SERPL-MCNC: 3.4 G/DL (ref 3.5–5)
ALBUMIN/GLOB SERPL: 0.8 {RATIO} (ref 1.1–2.2)
ALP SERPL-CCNC: 218 U/L (ref 45–117)
ALT SERPL-CCNC: 209 U/L (ref 12–78)
ANION GAP SERPL CALC-SCNC: 9 MMOL/L (ref 5–15)
AST SERPL-CCNC: 94 U/L (ref 15–37)
BASOPHILS # BLD: 0 K/UL (ref 0–0.1)
BASOPHILS NFR BLD: 0 % (ref 0–1)
BILIRUB SERPL-MCNC: 0.8 MG/DL (ref 0.2–1)
BUN SERPL-MCNC: 13 MG/DL (ref 6–20)
BUN/CREAT SERPL: 13 (ref 12–20)
CALCIUM SERPL-MCNC: 8.9 MG/DL (ref 8.5–10.1)
CHLORIDE SERPL-SCNC: 100 MMOL/L (ref 97–108)
CO2 SERPL-SCNC: 26 MMOL/L (ref 21–32)
CREAT SERPL-MCNC: 0.98 MG/DL (ref 0.7–1.3)
DIFFERENTIAL METHOD BLD: NORMAL
EOSINOPHIL # BLD: 0.2 K/UL (ref 0–0.4)
EOSINOPHIL NFR BLD: 2 % (ref 0–7)
ERYTHROCYTE [DISTWIDTH] IN BLOOD BY AUTOMATED COUNT: 13.2 % (ref 11.5–14.5)
GLOBULIN SER CALC-MCNC: 4.5 G/DL (ref 2–4)
GLUCOSE SERPL-MCNC: 112 MG/DL (ref 65–100)
HCT VFR BLD AUTO: 38 % (ref 36.6–50.3)
HGB BLD-MCNC: 12.8 G/DL (ref 12.1–17)
IMM GRANULOCYTES # BLD AUTO: 0 K/UL (ref 0–0.04)
IMM GRANULOCYTES NFR BLD AUTO: 0 % (ref 0–0.5)
LYMPHOCYTES # BLD: 2.3 K/UL (ref 0.8–3.5)
LYMPHOCYTES NFR BLD: 26 % (ref 12–49)
MAGNESIUM SERPL-MCNC: 2 MG/DL (ref 1.6–2.4)
MCH RBC QN AUTO: 29.8 PG (ref 26–34)
MCHC RBC AUTO-ENTMCNC: 33.7 G/DL (ref 30–36.5)
MCV RBC AUTO: 88.6 FL (ref 80–99)
MONOCYTES # BLD: 1 K/UL (ref 0–1)
MONOCYTES NFR BLD: 12 % (ref 5–13)
NEUTS SEG # BLD: 5.4 K/UL (ref 1.8–8)
NEUTS SEG NFR BLD: 60 % (ref 32–75)
NRBC # BLD: 0 K/UL (ref 0–0.01)
NRBC BLD-RTO: 0 PER 100 WBC
PHOSPHATE SERPL-MCNC: 4.3 MG/DL (ref 2.6–4.7)
PLATELET # BLD AUTO: 218 K/UL (ref 150–400)
PMV BLD AUTO: 10.3 FL (ref 8.9–12.9)
POTASSIUM SERPL-SCNC: 3.7 MMOL/L (ref 3.5–5.1)
PROT SERPL-MCNC: 7.9 G/DL (ref 6.4–8.2)
RBC # BLD AUTO: 4.29 M/UL (ref 4.1–5.7)
SODIUM SERPL-SCNC: 135 MMOL/L (ref 136–145)
WBC # BLD AUTO: 9 K/UL (ref 4.1–11.1)

## 2020-10-13 PROCEDURE — 74011250636 HC RX REV CODE- 250/636: Performed by: NURSE PRACTITIONER

## 2020-10-13 PROCEDURE — 65220000003 HC RM SEMIPRIVATE PSYCH

## 2020-10-13 PROCEDURE — 36415 COLL VENOUS BLD VENIPUNCTURE: CPT

## 2020-10-13 PROCEDURE — 84100 ASSAY OF PHOSPHORUS: CPT

## 2020-10-13 PROCEDURE — 85025 COMPLETE CBC W/AUTO DIFF WBC: CPT

## 2020-10-13 PROCEDURE — 83735 ASSAY OF MAGNESIUM: CPT

## 2020-10-13 PROCEDURE — 80053 COMPREHEN METABOLIC PANEL: CPT

## 2020-10-13 PROCEDURE — 99232 SBSQ HOSP IP/OBS MODERATE 35: CPT | Performed by: PSYCHIATRY & NEUROLOGY

## 2020-10-13 PROCEDURE — 74011250637 HC RX REV CODE- 250/637: Performed by: STUDENT IN AN ORGANIZED HEALTH CARE EDUCATION/TRAINING PROGRAM

## 2020-10-13 PROCEDURE — 74011250637 HC RX REV CODE- 250/637: Performed by: PSYCHIATRY & NEUROLOGY

## 2020-10-13 RX ORDER — SULFAMETHOXAZOLE AND TRIMETHOPRIM 200; 40 MG/5ML; MG/5ML
20 SUSPENSION ORAL EVERY 12 HOURS
Status: DISCONTINUED | OUTPATIENT
Start: 2020-10-13 | End: 2020-10-15

## 2020-10-13 RX ORDER — CLOZAPINE 100 MG/1
350 TABLET ORAL
Status: DISCONTINUED | OUTPATIENT
Start: 2020-10-13 | End: 2020-10-14

## 2020-10-13 RX ADMIN — CLOZAPINE 350 MG: 100 TABLET ORAL at 21:28

## 2020-10-13 RX ADMIN — HALOPERIDOL LACTATE 5 MG: 5 INJECTION, SOLUTION INTRAMUSCULAR at 09:57

## 2020-10-13 RX ADMIN — SULFAMETHOXAZOLE AND TRIMETHOPRIM 20 ML: 200; 40 SUSPENSION ORAL at 14:35

## 2020-10-13 RX ADMIN — SULFAMETHOXAZOLE AND TRIMETHOPRIM 20 ML: 200; 40 SUSPENSION ORAL at 21:28

## 2020-10-13 RX ADMIN — DIPHENHYDRAMINE HYDROCHLORIDE 50 MG: 50 INJECTION, SOLUTION INTRAMUSCULAR; INTRAVENOUS at 09:57

## 2020-10-13 NOTE — PROGRESS NOTES
Patient alert, not compliant with vital signs, only able to obtain axillary temp. Patient declines to answer assessment questions, occasionally responds with one word or garbled answers. Patient in gown and to dayroom for breakfast then back to room and removed all clothing, sitting on bed without clothing, declines to get dressed, declines to take shower. Patient's linens changed when patient moved from bed to floor. Patient given haldol 5 mg IM and benadryl 50 mg IM for agitation and psychosis. Patient noted to have area or redness, induration, draining thick brownish, purulent discharge on Right shoulder area. Dr. Los Bravo aware to and bedside to evaluate. Wound cleansed and dressing placed, patient then removed dressing. Patient remains on bed without clothing, declines sheet/blanket. Call placed to Dr. Marcene Gottron to inquire about possible need for IM antibiotic if patient refuses oral antibiotic. Also asked about need for wound culture. Per Dr. Los Bravo, order placed for surgical consult. Blood drawn, labeled, to lab.

## 2020-10-13 NOTE — GROUP NOTE
JENELLE  GROUP DOCUMENTATION INDIVIDUAL Group Therapy Note Date: 10/13/2020 Group Start Time: 1100 Group End Time: 1200 Group Topic: Topic Group [facility] 3 ACUTE BEHAV [facility], [facility] GROUP DOCUMENTATION GROUP Group Therapy Note Attendees: 4 Attendance: Did not attend Patient's Goal: Interventions/techniques Juarez Lewis

## 2020-10-13 NOTE — PROGRESS NOTES
Problem: Falls - Risk of  Goal: *Absence of Falls  Description: Document Prabha Beers Fall Risk and appropriate interventions in the flowsheet. Outcome: Progressing Towards Goal  Note: Fall Risk Interventions:       Mentation Interventions: Room close to nurse's station, Toileting rounds, Reorient patient    Medication Interventions: Teach patient to arise slowly    Elimination Interventions:  Toileting schedule/hourly rounds

## 2020-10-13 NOTE — BH NOTES
PSYCHIATRIC PROGRESS NOTE         Patient Name  Prashanth Ballard   Date of Birth 1970   [facility] [id number]   Medical Record Number  [id number]      Age  45 y.o. PCP Unknown, Provider   Admit date:  9/18/2020    Room Number  990/17  @ [facility]   Date of Service  10/13/2020         E & M PROGRESS NOTE:         HISTORY       CC:  \"psychosis\"  HISTORY OF PRESENT ILLNESS/INTERVAL HISTORY:  (reviewed/updated 10/13/2020). per initial evaluation: Sadia Craig \"Corey\" Lalit Woody is a 45year old male with a history of schizophrenia admitted to the [facility] for increased psychosis. He has not been bathing or otherwise caring for himself at home. He was reportedly hospitalized at [facility] from April - June 2020. He does not verbally answer any questions. He is well known to this writer from previous admissions in the [place] area. He has responded well to ECT in the past.    09/21 - overnight, patient incontinent of urine, placed into diaper. Patient appeared catatonic at times, but was speaking, non-spontaneously, to the team this morning. Per nursing, the patient has significantly poor self care, grossly internally preoccucpied and with no ability to care for himself. SW reports that sister is patient's caretaker and that he has been decompensating from an already poor baseline recently, requiring prompting to eat meals and unable to perform basic ADLs. Family states he improved on Clozaril and they are requesting the team restart this medication. Of note, patient got [place] injection two weeks ago, VPA level suggests compliance with medication. 09/22 - patient has been isolative to bed, up for meals, selectively mute. Shakes and nods his head to stimuli. Patient slept 11 hours overnight, and is in bed on assessment. He is mute, does not endorse any pain or discomfort but is largely unresponsive to questions. Vitals unremarkable, BP mildly elevated.     09/23 - patient in bed much of the day, per nursing he got up and showered, and spoke briefly about wanting to go home. He is not compliant with medication; patient seen in his room, he is somnolent, shakes and nods his head, doesn't open his eyes for the duration of the interview. Patient slept 8 hours overnight. 09/24 - patient refused vital signs and medications this morning. He remains isolative to bed for much of the day. Per nursing assessment, the patient has been internally preoccupied, selectively mute, slept 9.5 hours overnight. Patient briefed on the importance of medication compliance, does not voice understanding of the situation. 09/25 - overnight, patient was visible, pacing, largely incoherent but with no aggression noted. No PRNs were given to the patient but he is refusing several doses of medication (refused VPA, Klonopin AM doses); he is taking Clozaril. Patient noted to have repetitive movements, selectively mute on interview, won't get out of bed. Family asking for neurologic workup, MRI as he has not had this before. 09/28 - patient isolative, still selectively mute, took medication and allowed vitals this morning but refused labwork (CBC for Clozaril is overdue). Patient refused breakfast this morning, slept 6 hours overnight. He remains internally preoccupied, unable to assess his mental state. Patient continues to refuse about half of medication administrations. Court order pending for labwork. 09/29 - no acute overnight events. Patient medication compliant, isolative, selectively mute and generally asleep for much of the day. Not voicing any complaints but with ongoing internal preoccupation; he has not had any meaningful interaction with MD since last week. Court order for tomorrow's labwork pending. 09/30 - no acute overnight events. Patient was out of his room today and participated in treatment team discussion. He continues to speak minimally but asked to go home.  Discussed medication and the need for labwork, the patient agrees but has been refusing at [facility] each morning. Patient guarded about symptoms, concrete and with no understanding of the events surrounding admission. 10/01 - court ordered medication / blood draw approved. Patient refused Klonopin and VPA this morning. Patient got bloodwork this morning, ANC WNL. Patient briefed on his treatment progress, he says nothing and does not acknowledge the treatment team. He made a comment at the onset of the interview but then went back to bed. Unable to provide any meaningful information. Per nursing, he remains mostly mute but will ask for some items. They are concerned he may be cheeking his Clozaril. 10/02 - the patient slept 10 hours overnight, self care is poor and patient remains internally preoccupied. He is out of his room for meals and comes into treatment team room for morning interview. He is unable to answer when asked his shoe size. Patient compliant with Clozaril with mouth checks to date, no PRNs given for agitation but patient still unable to adhere to ADLs without much prompting. 10/03 - patient slept 10.75 hours, he remains blunted with poor self care, but medication compliant. Patient out of room to meet with MD, denies SI/HI/AVH; he remains grossly internally preoccupied. Patient discharge focused, still speech impoverished and with poor ADLs. 10/04 - patient impoverished, in bed for much of the day. He is out for meals, requires much prompting to engage in ADLs. Goal for today is taking a shower per nursing. Patient compliant with Clozaril, vitals unremarkable thus far. Patient selectively mute, does not answer assessment questions. 10/05 - no acute overnight events; the patient slept 10 hours overnight. Patient refused AM medictions this AM, but took Clozaril last night. He remains in poor behavioral control, requiring much prompting for ADLs. Took shower last night with much encouragement.  He remains withdrawn, isolative, mental status remains impoverished. 10/06 - patient slept 9 hours overnight, he remains isolative to room. Compliant with Clozaril but refusing VPA and benzo. Patient out of bed for meals otherwise with no improvement in thought process. Discussed holding all agents except Clozaril as patient is intermittently non-compliant and had been taking these agents prior to admission, patient agrees to this. He c/o sialorrhea. Patient received no PRNs for agitation, ADLs remain poor and he needs prompting for most activities. 10/07 - no acute overnight events. Patient has been isolative, compliant with court ordered medication. Patient otherwise with no specific concerns, denies SI/HI/AVH. Patient requires prompting for most ADLs, minimally engaged in the milieu, but with no episodes of agitation or aggression. No PRNs given x24 hours. Patient's benzo and VPA held. 10/08 - patient slept 8 hours, has been isolative but medication compliant. Patient with mild tremor L > R; benzo and VPA still held due to patient refusal, patient appears to be improving on Clozaril monotherapy. Patient still grossly hypo-verbal, has been otherwise pleasant. 10/09 - overnight, patient has remained isolative; he speaks sparingly and has difficulty with his ADLs. Patient has been in fair behavioral control, per sister he is still off from his baseline. Clozaril trial continues, patient tolerating medication and thus far with no instances of significant side effects (sialorrhea). Patient discharge focused, with marked poverty of thought on interview. 10/12 - patient isolative, remains in bed for much of the day. Patient refused vitals over the weekend. Denies all symptoms, ate breakfast this morning medication compliant. Patient slept 5.5 hours overnight, he remains fairly non-responsive but is not in any distress. Patient with poor hygiene, poor self care, requires significant prompting and motivation to partake in ADLs.     10/13 - overnight, patient noted to have worsening thought process disorganization. He has disrobed completely and despite prompting will not put clothes on engage in treatment. He is compliant with court ordered medication, but RNs have had difficulty getting vital signs as he generally does not cooperate. This morning, patient noted to have an 8 cm abscess on his R shoulder, draining purulent fluid. He is unable to provide any information regarding history on the abscess as he is selectively mute. IM consulted for I&D, ABx recs. SIDE EFFECTS: (reviewed/updated 10/13/2020)  +Drooling  No noted toxicity with use of Depakote   ALLERGIES:(reviewed/updated 10/13/2020)  Allergies   Allergen Reactions    Fluphenazine Unknown (comments)     Pt is unable to communicate properly.  Penicillins Unknown (comments)     Pt is unable to communicate properly. MEDICATIONS PRIOR TO ADMISSION:(reviewed/updated 10/13/2020)  Medications Prior to Admission   Medication Sig    FLUoxetine (PROzac) 20 mg capsule Take 20 mg by mouth daily.  benztropine (COGENTIN) 0.5 mg tablet Take 0.5 mg by mouth two (2) times a day. Indications: extrapyramidal symptoms as a result of taking the medication    LORazepam (Ativan) 1 mg tablet Take 1 mg by mouth two (2) times a day.  valproate (Depakene) 250 mg/5 mL syrup Take 1,000 mg by mouth two (2) times a day.  paliperidone palmitate (Invega Sustenna) 234 mg/1.5 mL injection 234 mg by IntraMUSCular route every twenty-one (21) days. Indications: schizophrenia    OLANZapine (ZyPREXA zydis) 20 mg disintegrating tablet Take 20 mg by mouth Daily (before dinner). Indications: schizophrenia      PAST MEDICAL HISTORY: Past medical history from the initial psychiatric evaluation has been reviewed (reviewed/updated 10/13/2020) with no additional updates (I asked patient and no additional past medical history provided).    Past Medical History:   Diagnosis Date    Psychiatric disorder     Psychotic disorder ([facility]  Withdrawal syndrome ([facility]    History reviewed. No pertinent surgical history. SOCIAL HISTORY: Social history from the initial psychiatric evaluation has been reviewed (reviewed/updated 10/13/2020) with no additional updates (I asked patient and no additional social history provided). Social History     Socioeconomic History    Marital status: SINGLE     Spouse name: Not on file    Number of children: Not on file    Years of education: Not on file    Highest education level: Not on file   Occupational History    Not on file   Social Needs    Financial resource strain: Not on file    Food insecurity     Worry: Not on file     Inability: Not on file    Transportation needs     Medical: Not on file     Non-medical: Not on file   Tobacco Use    Smoking status: Never Smoker    Smokeless tobacco: Never Used   Substance and Sexual Activity    Alcohol use: No    Drug use: No    Sexual activity: Not on file   Lifestyle    Physical activity     Days per week: Not on file     Minutes per session: Not on file    Stress: Not on file   Relationships    Social connections     Talks on phone: Not on file     Gets together: Not on file     Attends Worship service: Not on file     Active member of club or organization: Not on file     Attends meetings of clubs or organizations: Not on file     Relationship status: Not on file    Intimate partner violence     Fear of current or ex partner: Not on file     Emotionally abused: Not on file     Physically abused: Not on file     Forced sexual activity: Not on file   Other Topics Concern    Not on file   Social History Narrative    Pt is a HS grad and is unemployed. He lives with his sister. On disability    No pending legal charge reported      FAMILY HISTORY: Family history from the initial psychiatric evaluation has been reviewed (reviewed/updated 10/13/2020) with no additional updates (I asked patient and no additional family history provided).  History reviewed. No pertinent family history. REVIEW OF SYSTEMS: (reviewed/updated 10/13/2020)  Appetite:poor   Sleep: poor with DIMS (difficulty initiating & maintaining sleep)   All other Review of Systems: Negative except per HPI         [facility] (MSE):    MSE FINDINGS ARE WITHIN NORMAL LIMITS (WNL) UNLESS OTHERWISE STATED BELOW. ( ALL OF THE BELOW CATEGORIES OF THE MSE HAVE BEEN REVIEWED (reviewed 10/13/2020) AND UPDATED AS DEEMED APPROPRIATE )  General Presentation age appropriate, cooperative   Orientation disorganized   Vital Signs  See below (reviewed 10/13/2020); Vital Signs (BP, Pulse, & Temp) are within normal limits if not listed below.    Gait and Station Stable/steady, no ataxia   Musculoskeletal System No extrapyramidal symptoms (EPS); no abnormal muscular movements or Tardive Dyskinesia (TD); muscle strength and tone are within normal limits   Language No aphasia or dysarthria   Speech:  hypoverbal, increased latency of response, non-pressured and soft   Thought Processes illogical; slow rate of thoughts; poor abstract reasoning/computation   Thought Associations blocked    Thought Content poverty of content   Suicidal Ideations unable to assess   Homicidal Ideations unable to assess   Mood:  unable to asses   Affect:  flat   Memory recent  impaired   Memory remote:  impaired   Concentration/Attention:  impaired   Fund of Knowledge significantly below average   Insight:  poor   Reliability fair   Judgment:  impaired due to active psychosis          VITALS:     Patient Vitals for the past 24 hrs:   Temp Resp   10/12/20 2000  16     Wt Readings from Last 3 Encounters:   09/18/20 85.7 kg (189 lb)   12/23/17 84.9 kg (187 lb 1.6 oz)   03/25/17 95.6 kg (210 lb 11.2 oz)     Temp Readings from Last 3 Encounters:   03/28/17 97.5 °F (36.4 °C)   02/07/15 98.7 °F (37.1 °C)     BP Readings from Last 3 Encounters:   10/10/20 (!) 87/59   03/28/17 100/68   02/12/15 105/73 Pulse Readings from Last 3 Encounters:   10/10/20 92   03/28/17 67   02/12/15 87            DATA     LABORATORY DATA:(reviewed/updated 10/13/2020)  No results found for this or any previous visit (from the past 24 hour(s)). Lab Results   Component Value Date/Time    Valproic acid 97 09/18/2020 04:42 PM     No results found for: LITHM   RADIOLOGY REPORTS:(reviewed/updated 10/13/2020)  No results found.        MEDICATIONS     ALL MEDICATIONS:   Current Facility-Administered Medications   Medication Dose Route Frequency    cloZAPine (CLOZARIL) tablet 300 mg  300 mg Oral QHS    Or    OLANZapine (ZyPREXA) 5 mg in sterile water (preservative free) 1 mL injection +++COURT ORDERED MEDICATION FOR REFUSAL OF CLOZAPINE+++  5 mg IntraMUSCular QHS    [Held by provider] clonazePAM (KlonoPIN) tablet 1 mg  1 mg Oral BID    [Held by provider] valproic acid (as sodium salt) (DEPAKENE) 250 mg/5 mL (5 mL) oral solution 1,000 mg  1,000 mg Oral BID    OLANZapine (ZyPREXA) tablet 5 mg  5 mg Oral Q6H PRN    haloperidol lactate (HALDOL) injection 5 mg  5 mg IntraMUSCular Q6H PRN    benztropine (COGENTIN) tablet 1 mg  1 mg Oral BID PRN    diphenhydrAMINE (BENADRYL) injection 50 mg  50 mg IntraMUSCular BID PRN    hydrOXYzine HCL (ATARAX) tablet 50 mg  50 mg Oral TID PRN    traZODone (DESYREL) tablet 50 mg  50 mg Oral QHS PRN    acetaminophen (TYLENOL) tablet 650 mg  650 mg Oral Q4H PRN    magnesium hydroxide (MILK OF MAGNESIA) 400 mg/5 mL oral suspension 30 mL  30 mL Oral DAILY PRN      SCHEDULED MEDICATIONS:   Current Facility-Administered Medications   Medication Dose Route Frequency    cloZAPine (CLOZARIL) tablet 300 mg  300 mg Oral QHS    Or    OLANZapine (ZyPREXA) 5 mg in sterile water (preservative free) 1 mL injection +++COURT ORDERED MEDICATION FOR REFUSAL OF CLOZAPINE+++  5 mg IntraMUSCular QHS    [Held by provider] clonazePAM (KlonoPIN) tablet 1 mg  1 mg Oral BID    [Held by provider] valproic acid (as sodium salt) (DEPAKENE) 250 mg/5 mL (5 mL) oral solution 1,000 mg  1,000 mg Oral BID          ASSESSMENT & PLAN     DIAGNOSES REQUIRING ACTIVE TREATMENT AND MONITORING: (reviewed/updated 10/13/2020)  Patient C/Alaina Crespo [address] Problem List:   Schizoaffective disorder ([facility] (9/18/2020)    Assessment: patient with significant internal preoccupation, poor ADLs, history of catatonia. Appears to have been compliant with home medication, but is regressed and with a markedly disorganized thought process. Will start Clozaril, add benzodiazepine for protection against catatonia to be tapered as Clozaril dose increases given interaction. Plan:  COURT ORDERED MEDICATION:  - INCREASE Clozaril to 350 mg PO *OR* Zyprexa 5 mg IM QHS for psychosis    - DISCONTINUE Haldol due to poor efficacy  - DISCONTINUE [place] due to poor efficacy  - EEG for seizure rule out  - IM consult re: shoulder abscess  - HOLD Klonopin 1 mg BID for catatonia due to poor efficacy  - CBC and Clozaril level by court order  - HOLD VPA oral soln 1000 mg BID for mood lability, seizure prophylaxis due to poor efficacy, non compliance  - DISCONTINUE Prozac / Cogentin / Zydis due to polypharamacy  - Consider antihypertensive  - IGM therapy as tolerated    I will continue to monitor blood levels (Depakote, clozapine---a drug with a narrow therapeutic index= NTI) and associated labs for drug therapy implemented that require intense monitoring for toxicity as deemed appropriate based on current medication side effects and pharmacodynamically determined drug 1/2 lives. In summary, Familia Gill, is a 45 y.o.  male who presents with a severe exacerbation of the principal diagnosis of Schizoaffective disorder ([facility]    Patient's condition is not improving. Patient requires continued inpatient hospitalization for further stabilization, safety monitoring and medication management.   I will continue to coordinate the provision of individual, milieu, occupational, group, and substance abuse therapies to address target symptoms/diagnoses as deemed appropriate for the individual patient. A coordinated, multidisplinary treatment team round was conducted with the patient (this team consists of the nurse, psychiatric unit pharmacist,  and writer). Complete current electronic health record for patient has been reviewed today including consultant notes, ancillary staff notes, nurses and psychiatric tech notes. Suicide risk assessment completed and patient deemed to be of low risk for suicide at this time. The following regarding medications was addressed during rounds with patient:   the risks and benefits of the proposed medication. The patient was given the opportunity to ask questions. Informed consent given to the use of the above medications. Will continue to adjust psychiatric and non-psychiatric medications (see above \"medication\" section and orders section for details) as deemed appropriate & based upon diagnoses and response to treatment. I will continue to order blood tests/labs and diagnostic tests as deemed appropriate and review results as they become available (see orders for details and above listed lab/test results). I will order psychiatric records from previous [facility] hospitals to further elucidate the nature of patient's psychopathology and review once available. I will gather additional collateral information from friends, family and o/p treatment team to further elucidate the nature of patient's psychopathology and baselline level of psychiatric functioning.          I certify that this patient's inpatient psychiatric hospital services furnished since the previous certification were, and continue to be, required for treatment that could reasonably be expected to improve the patient's condition, or for diagnostic study, and that the patient continues to need, on a daily basis, active treatment furnished directly by or requiring the supervision of inpatient psychiatric facility personnel. In addition, the hospital records show that services furnished were intensive treatment services, admission or related services, or equivalent services.     EXPECTED DISCHARGE DATE/DAY: TBD     DISPOSITION: Home       Signed By:   Shay Eldridge MD  10/13/2020

## 2020-10-13 NOTE — INTERDISCIPLINARY ROUNDS
Behavioral Health Interdisciplinary Rounds Patient Name: Dixon Huertas  Age: 45 y.o. Room/Bed:  313/01 Primary Diagnosis: [facility]ffect[facility] ([facility] Admission Status: Involuntary Commitment Readmission within 30 days: no 
Power of  in place: no 
Patient requires a blocked bed: yes Reason for blocked bed: behavior Order for blocked bed obtained: no    
 
Sleep hours: 9 Morning Labs completed per orders:  Na Participation in Care/Groups:  no 
Medication Compliant?: Yes PRNS (last 24 hours): None Restraints (last 24 hours):  no 
Substance Abuse:  no   
24 hour chart check complete: yes Patient goal(s) for today: Follow unit directions  
Treatment team focus/goals: Stabilize - increase Clozaril  
Progress note: Pt met in treatment room with team and has been encouraged to call his sister. SW will update sister on 10/14.  
  
LOS:  25             Expected LOS: TBD 
  
Financial concerns/prescription coverage: [employer] Medicare Part A&B and [facility] and O Family contact: Sister/DENISE Archibald [phone] 59/8 Family requesting physician contact today: No 
Discharge plan: Home Access to weapons: None Outpatient provider(s): Gómez MARINO - updated 10/2 Patient's preferred phone number for follow up 23-14-20-09 
  
Participating treatment team members: LAVINIA Greenfield and Dr. Genia Leach.

## 2020-10-13 NOTE — GROUP NOTE
JENELLE  GROUP DOCUMENTATION INDIVIDUAL Group Therapy Note Date: 10/13/2020 Group Start Time: 0900 Group End Time: 1000 Group Topic: Topic Group [facility] [place] [facility] ACUTE BEHAV [facility] [place], [address] [facility] GROUP DOCUMENTATION GROUP Group Therapy Note Attendees: 4 Attendance: Did not attend Patient's Goal: Interventions/techniques Max Pappas 2

## 2020-10-13 NOTE — GROUP NOTE
JENELLE  GROUP DOCUMENTATION INDIVIDUAL Group Therapy Note Date: 10/13/2020 Group Start Time: 1500 Group End Time: 8232 Group Topic: Recreational/Music Therapy [facility] [place] [address] ACUTE BEHAV [facility] [place], [address] GROUP DOCUMENTATION GROUP Group Therapy Note Attendees: 3 Attendance: Did not attend Patient's Goal: Interventions/techniques Ish Every

## 2020-10-13 NOTE — PROGRESS NOTES
Problem: Altered Thought Process (Adult/Pediatric)  Goal: *STG: Remains safe in hospital  Outcome: Progressing Towards Goal  Goal: *STG: Complies with medication therapy  Outcome: Progressing Towards Goal  Goal: *STG: Absence of lethality  Outcome: Progressing Towards Goal

## 2020-10-13 NOTE — BH NOTES
GROUP THERAPY PROGRESS NOTE    Patient did not participate in Substance abuse/Coping Skill group. Patient arrived to group wearing only a blanket and was informed he could take the snack to his room or would need to go put on clothes to eat with the group and participate in group discussion. He chose to go to his room.     Catalino Heard LPC LSATP [facility]

## 2020-10-13 NOTE — BH NOTES
Patient is in bed resting with eyes closed would not answer assessment questions at this time  Slept 9 hours

## 2020-10-14 PROCEDURE — 65220000003 HC RM SEMIPRIVATE PSYCH

## 2020-10-14 PROCEDURE — 74011250637 HC RX REV CODE- 250/637: Performed by: PSYCHIATRY & NEUROLOGY

## 2020-10-14 PROCEDURE — 74011250637 HC RX REV CODE- 250/637: Performed by: STUDENT IN AN ORGANIZED HEALTH CARE EDUCATION/TRAINING PROGRAM

## 2020-10-14 PROCEDURE — 99231 SBSQ HOSP IP/OBS SF/LOW 25: CPT | Performed by: PSYCHIATRY & NEUROLOGY

## 2020-10-14 RX ORDER — CLOZAPINE 100 MG/1
400 TABLET ORAL
Status: DISCONTINUED | OUTPATIENT
Start: 2020-10-14 | End: 2020-10-16

## 2020-10-14 RX ADMIN — SULFAMETHOXAZOLE AND TRIMETHOPRIM 20 ML: 200; 40 SUSPENSION ORAL at 09:00

## 2020-10-14 RX ADMIN — CLOZAPINE 400 MG: 100 TABLET ORAL at 21:23

## 2020-10-14 RX ADMIN — SULFAMETHOXAZOLE AND TRIMETHOPRIM 20 ML: 200; 40 SUSPENSION ORAL at 21:23

## 2020-10-14 NOTE — CONSULTS
Hospitalist Consultation Note    NAME:  Terra Meehan   :   1970   MRN:   [id number]   Room Number: 254/91  @ [facility]     ATTENDING: Kerrie Leija MD  PCP:  Unknown, Provider    Date/Time:  10/14/2020 1:06 PM      Recommendations/Plan:     Schizoaffective disorder  - Psychiatric treatment and management of health issues are deferred to psychiatrist for further management. R Shoulder Abscess  - Area of redness, induration, draining thick brownish, purulent discharge on Right shoulder area. - Consult General Surgery pending  - Continue with Bactrim as ordered. - Wound care RN consult. Dry dressing to wound pending consult recommendations. - Medically stable at this time. We will follow up on a PRN basis. Code Status: Full  DVT Prophylaxis: No VTE prophylaxis indicated or warranted at this time. Subjective:   REQUESTING PHYSICIAN: Dr. Christiano Andrade: Cellulitis    Diann Sagastume is a 45 y.o.  male who I was asked to see for cellulitis of the right shoulder. Patient is a has a history of schizophrenia admitted to the [address] [place] [address] on 2020 for increased psychosis. Reportedly, he has not been bathing or otherwise caring for himself at home and was reportedly hospitalized at [facility] from April - 2020. Upon evaluation in [facility], patient was found to have an 8 cm draining abscess on on the posterior aspect of his right shoulder. Surrounding skin is erythematous and indurated which is consistent with an abscess. Patient is afebrile without no leukocytosis or complaints of pain. Patient does not verbally answer any questions. No Hx DM, HTN. No current medical concerns at this time. Past Medical History:   Diagnosis Date    Psychiatric disorder     Psychotic disorder ([facility]     Withdrawal syndrome ([facility]       History reviewed. No pertinent surgical history.   Social History     Tobacco Use    Smoking status: Never Smoker    Smokeless tobacco: Never Used   Substance Use Topics    Alcohol use: No      History reviewed. No pertinent family history. Allergies   Allergen Reactions    Fluphenazine Unknown (comments)     Pt is unable to communicate properly.  Penicillins Unknown (comments)     Pt is unable to communicate properly. Prior to Admission medications    Medication Sig Start Date End Date Taking? Authorizing Provider   FLUoxetine (PROzac) 20 mg capsule Take 20 mg by mouth daily. Yes Provider, Historical   benztropine (COGENTIN) 0.5 mg tablet Take 0.5 mg by mouth two (2) times a day. Indications: extrapyramidal symptoms as a result of taking the medication   Yes Provider, Historical   LORazepam (Ativan) 1 mg tablet Take 1 mg by mouth two (2) times a day. Yes Provider, Historical   valproate (Depakene) 250 mg/5 mL syrup Take 1,000 mg by mouth two (2) times a day. Yes Provider, Historical   paliperidone palmitate Marlene Levo Sustenna) 234 mg/1.5 mL injection 234 mg by IntraMUSCular route every twenty-one (21) days. Indications: schizophrenia   Yes Provider, Historical   OLANZapine (ZyPREXA zydis) 20 mg disintegrating tablet Take 20 mg by mouth Daily (before dinner). Indications: schizophrenia   Yes Provider, Historical       REVIEW OF SYSTEMS:     Total of 12 systems reviewed as follows:   I am not able to complete the review of systems because:    The patient is intubated and sedated         X The patient has altered mental status due to his acute medical problems    The patient has baseline aphasia from prior stroke(s)    The patient has baseline dementia and is not reliable historian                 POSITIVE= underlined text  Negative = text not underlined  General:  fever, chills, sweats, generalized weakness, weight loss/gain,      loss of appetite   Eyes:    blurred vision, eye pain, loss of vision, double vision  ENT:    rhinorrhea, pharyngitis   Respiratory:   cough, sputum production, SOB, wheezing, COLMENARES, pleuritic pain Cardiology:   chest pain, palpitations, orthopnea, PND, edema, syncope   Gastrointestinal:  abdominal pain , N/V, dysphagia, diarrhea, constipation, bleeding   Genitourinary:  frequency, urgency, dysuria, hematuria, incontinence   Muskuloskeletal :  arthralgia, myalgia   Hematology:  easy bruising, nose or gum bleeding, lymphadenopathy   Dermatological: rash, ulceration, pruritis   Endocrine:   hot flashes or polydipsia   Neurological:  headache, dizziness, confusion, focal weakness, paresthesia,     Speech difficulties, memory loss, gait disturbance  Psychological: Feelings of anxiety, depression, agitation    Objective:   VITALS:    Visit Vitals  BP (!) 106/51 (BP 1 Location: Left arm, BP Patient Position: At rest)   Pulse (!) 57   Temp 97.7 °F (36.5 °C)   Resp 18   Ht 5' 7\" (1.702 m)   Wt 85.7 kg (189 lb)   SpO2 95%   BMI 29.60 kg/m²     Temp (24hrs), Av.7 °F (36.5 °C), Min:97.7 °F (36.5 °C), Max:97.7 °F (36.5 °C)      PHYSICAL EXAM:   General:    Alert, uncooperative, no distress, appears stated age. HEENT: Atraumatic, anicteric sclerae, pink conjunctivae     No oral ulcers, mucosa moist, throat clear  Neck:  Supple, symmetrical,  thyroid: non tender  Lungs:   Clear to auscultation bilaterally. No Wheezing or Rhonchi. No rales. Chest wall:  No tenderness  No Accessory muscle use. Heart:   Regular  rhythm,  No  murmur   No edema  Abdomen:   Soft, non-tender. Not distended. Bowel sounds normal  Extremities: No cyanosis. No clubbing  Skin:     8 cm abscess R shoulder. Not pale. Not Jaundiced  No rashes   Psych:  Poor insight. Not depressed. Not anxious or agitated. Neurologic: EOMs intact. No facial asymmetry. No aphasia or slurred speech.  Symmetrical strength, Alert and oriented X 4.     _______________________________________________________________________  Care Plan discussed with:    Comments   Patient X    Family      RN X    Care Manager                    Consultant: ____________________________________________________________________  TOTAL TIME:     30 mins    Comments    X Reviewed previous records   >50% of visit spent in counseling and coordination of care X Discussion with patient and/or family and questions answered       Critical Care Provided     Minutes non procedure based  ________________________________________________________________________  Signed: Kimberly Flores NP      Procedures: see electronic medical records for all procedures/Xrays and details which were not copied into this note but were reviewed prior to creation of Plan. LAB DATA REVIEWED:    Recent Results (from the past 24 hour(s))   CBC WITH AUTOMATED DIFF    Collection Time: 10/13/20  2:00 PM   Result Value Ref Range    WBC 9.0 4.1 - 11.1 K/uL    RBC 4.29 4. 10 - 5.70 M/uL    HGB 12.8 12.1 - 17.0 g/dL    HCT 38.0 36.6 - 50.3 %    MCV 88.6 80.0 - 99.0 FL    MCH 29.8 26.0 - 34.0 PG    MCHC 33.7 30.0 - 36.5 g/dL    RDW 13.2 11.5 - 14.5 %    PLATELET 342 289 - 041 K/uL    MPV 10.3 8.9 - 12.9 FL    NRBC 0.0 0  WBC    ABSOLUTE NRBC 0.00 0.00 - 0.01 K/uL    NEUTROPHILS 60 32 - 75 %    LYMPHOCYTES 26 12 - 49 %    MONOCYTES 12 5 - 13 %    EOSINOPHILS 2 0 - 7 %    BASOPHILS 0 0 - 1 %    IMMATURE GRANULOCYTES 0 0.0 - 0.5 %    ABS. NEUTROPHILS 5.4 1.8 - 8.0 K/UL    ABS. LYMPHOCYTES 2.3 0.8 - 3.5 K/UL    ABS. MONOCYTES 1.0 0.0 - 1.0 K/UL    ABS. EOSINOPHILS 0.2 0.0 - 0.4 K/UL    ABS. BASOPHILS 0.0 0.0 - 0.1 K/UL    ABS. IMM.  GRANS. 0.0 0.00 - 0.04 K/UL    DF AUTOMATED     METABOLIC PANEL, COMPREHENSIVE    Collection Time: 10/13/20  2:00 PM   Result Value Ref Range    Sodium 135 (L) 136 - 145 mmol/L    Potassium 3.7 3.5 - 5.1 mmol/L    Chloride 100 97 - 108 mmol/L    CO2 26 21 - 32 mmol/L    Anion gap 9 5 - 15 mmol/L    Glucose 112 (H) 65 - 100 mg/dL    BUN 13 6 - 20 MG/DL    Creatinine 0.98 0.70 - 1.30 MG/DL    BUN/Creatinine ratio 13 12 - 20      GFR est AA >60 >60 ml/min/1.73m2    GFR est non-AA >60 >60 ml/min/1.73m2    Calcium 8.9 8.5 - 10.1 MG/DL    Bilirubin, total 0.8 0.2 - 1.0 MG/DL    ALT (SGPT) 209 (H) 12 - 78 U/L    AST (SGOT) 94 (H) 15 - 37 U/L    Alk.  phosphatase 218 (H) 45 - 117 U/L    Protein, total 7.9 6.4 - 8.2 g/dL    Albumin 3.4 (L) 3.5 - 5.0 g/dL    Globulin 4.5 (H) 2.0 - 4.0 g/dL    A-G Ratio 0.8 (L) 1.1 - 2.2     MAGNESIUM    Collection Time: 10/13/20  2:00 PM   Result Value Ref Range    Magnesium 2.0 1.6 - 2.4 mg/dL   PHOSPHORUS    Collection Time: 10/13/20  2:00 PM   Result Value Ref Range    Phosphorus 4.3 2.6 - 4.7 MG/DL       _____________________________  Hospitalist: Joselyn Felty, NP

## 2020-10-14 NOTE — GROUP NOTE
JENELLE  GROUP DOCUMENTATION INDIVIDUAL Group Therapy Note Date: 10/14/2020 Group Start Time: 1000 Group End Time: 1100 Group Topic: Topic Group [facility] 3 ACUTE BEHAV [facility], [address] [facility] GROUP DOCUMENTATION GROUP Group Therapy Note Attendees: 3 Attendance: Did not attend Patient's Goal: Interventions/techniques Elpidio Morelos

## 2020-10-14 NOTE — BH NOTES
Assumed care for the patient following day shift at [address] [place] [address]. Patient presents as isolative and guarded. Patient refused all care this shift besides their court ordered bedtime medication. During shift assessment patient refused to have this writer examine their upper right back where they have an abcess. Patient has came out of their a couple times for snack but that was it. Patient has had poor hygiene even with prompting. Patient denies any S/I, H/I, and AVH at this time. Patient slept a total of 8 hours.

## 2020-10-14 NOTE — GROUP NOTE
JENELLE  GROUP DOCUMENTATION INDIVIDUAL Group Therapy Note Date: 10/14/2020 Group Start Time: 1400 Group End Time: 1500 Group Topic: Recreational/Music Therapy [facility] 3 ACUTE BEHAV [facility], [address] GROUP DOCUMENTATION GROUP Group Therapy Note Attendees: 5 Attendance: Did not attend Patient's Goal: Interventions/techniques Virgilio Fairbanks

## 2020-10-14 NOTE — BH NOTES
[address] [facility] Interdisciplinary Rounds      Patient Name: Elaina Dalal                      Age: 45 y.o. Room/Bed:  313/01  Primary Diagnosis: Schizoaffective disorder (HCC)         Admission Status: Involuntary Commitment                            Readmission within 30 days: no  Power of  in place: no  Patient requires a blocked bed: yes            Reason for blocked bed: behavior  Order for blocked bed obtained: no     Sleep hours: 8  Morning Labs completed per orders:  Na                                  Participation in Care/Groups:  no  Medication Compliant?: Yes  PRNS (last 24 hours): None                 Restraints (last 24 hours):  no  Substance Abuse:  no               24 hour chart check complete:     Patient goal(s) for today: Follow unit directions   Treatment team focus/goals: Stabilize - increase Clozaril   Progress note: Pt met in treatment room with team and has been encouraged to call his sister.  POA updated by MD 10/14     LOS:  26            Expected LOS: TBD     Financial concerns/prescription coverage: [place] Medicare Part A&B and [facility] and O  Family contact: Sister/POA Colletta Mody [phone] 57/5  Family requesting physician contact today: spoke 10/14  Discharge plan: Home  Access to weapons: None  Outpatient provider(s): Gómez MARINO - updated 10/2  Patient's preferred phone number for follow up 23-14-20-09     Participating treatment team members: LAVINIA Luciano and Dr. Emily Marcelo.

## 2020-10-14 NOTE — PROGRESS NOTES
Problem: Falls - Risk of  Goal: *Absence of Falls  Description: Document Ale Choudhury Fall Risk and appropriate interventions in the flowsheet. Outcome: Progressing Towards Goal  Note: Fall Risk Interventions:       Mentation Interventions: Reorient patient, Room close to nurse's station    Medication Interventions: Teach patient to arise slowly    Elimination Interventions:  Toileting schedule/hourly rounds              Problem: Altered Thought Process (Adult/Pediatric)  Goal: *STG: Remains safe in hospital  Outcome: Progressing Towards Goal

## 2020-10-14 NOTE — BH NOTES
GROUP THERAPY PROGRESS NOTE    Patient did not participate in Substance abuse group.      Mamadou Sigala, RN, PMHNP Student

## 2020-10-14 NOTE — BH NOTES
GROUP THERAPY PROGRESS NOTE    Patient did not participate in Coping Skills group.      Jerry Garcia, RN, PMHNP Student

## 2020-10-14 NOTE — PROGRESS NOTES
Clozapine Daily Monitoring  Current regimen:  clozapine 350 mg PO QHS  Last CBC done AND reported to the registry:  10/1320, reported on 10/14/20  Next CBC due:  10/20/20    DATE ANC   9/18/20 2.2   10/1/20 2.6   10/8/20 3.3   10/13/20 5.4     ANC must be >1 to dispense clozapine. If patient experiences ANC <1.5, refer to the Clozapine REMS ANC monitoring algorithm for frequency of ANC monitoring. Visit Vitals  BP (!) 87/59   Pulse 92   Temp 98.1 °F (36.7 °C)   Resp 18   Ht 170.2 cm (67\")   Wt 85.7 kg (189 lb)   SpO2 97%   BMI 29.60 kg/m²       Recommendations/comments:  ANC is within normal limits, 5.4 K/uL, and is acceptable to continue with clozapine dispensing. ANC was reported to the Clozapine REMS program.   Next ANC is due on 10/20/20.      Thank you,  Stevie Palmer, PHARMD, [facility], [address] [place] [address] Nw: [phone]  Pharmacy: [phone] ([phone])

## 2020-10-14 NOTE — PROGRESS NOTES
0700: Verbal shift change report given to Ailyn Chaves RN (oncoming nurse) by Tamica Green RN (offgoing nurse). Report included the following information SBAR, Kardex, MAR and Recent Results. 7173: Pt non-compliant with vitals. Selectively mute during AM assessment, gesturing with hands when asked questions. Unable to answer when last BM was. Denies SI/HI/AVH. Pt denied pain, did not allow this writer to look at abscess on right shoulder. Up to dayroom for breakfast.     0902: Pt returned to bedroom to rest. Compliant with AM Bactrim order. 1000: While providing report in rounds, Dr. Grace Rodriguez verbally informed this writer that pt's vitals were court ordered. Writer informed pt of same. V/S obtained. 1049: Pt resting in room. Did not attend AM activity group. Did not attend AM discharge group. 1200: Remains lying in bed. Staff attempted to offer pt his lunch tray, pt did not respond. 1534: Pt to dayroom for snack, did not attend group. Lying in bed at this time. Pt seen by infection control for abscess on shoulder. 1812: Pt has remained resting in room. Report given to aric coleman.

## 2020-10-14 NOTE — BH NOTES
GROUP THERAPY PROGRESS NOTE    Patient did not participate in Discharge Group.      Aime Mcneil, RN, PMHNP Student

## 2020-10-14 NOTE — BH NOTES
PSYCHIATRIC PROGRESS NOTE         Patient Name  Dixon Huertas   Date of Birth 1970   [facility] [id number]   Medical Record Number  [id number]      Age  45 y.o. PCP Unknown, Provider   Admit date:  9/18/2020    Room Number  065/35  @ [facility]   Date of Service  10/14/2020         E & M PROGRESS NOTE:         HISTORY       CC:  \"psychosis\"  HISTORY OF PRESENT ILLNESS/INTERVAL HISTORY:  (reviewed/updated 10/14/2020). per initial evaluation: Duncan Lopez \"Corey\" Yumiko Galdamez is a 45year old male with a history of schizophrenia admitted to the [facility] for increased psychosis. He has not been bathing or otherwise caring for himself at home. He was reportedly hospitalized at [facility] from April - June 2020. He does not verbally answer any questions. He is well known to this writer from previous admissions in the [place] area. He has responded well to ECT in the past.    09/21 - overnight, patient incontinent of urine, placed into diaper. Patient appeared catatonic at times, but was speaking, non-spontaneously, to the team this morning. Per nursing, the patient has significantly poor self care, grossly internally preoccucpied and with no ability to care for himself. SW reports that sister is patient's caretaker and that he has been decompensating from an already poor baseline recently, requiring prompting to eat meals and unable to perform basic ADLs. Family states he improved on Clozaril and they are requesting the team restart this medication. Of note, patient got [place] injection two weeks ago, VPA level suggests compliance with medication. 09/22 - patient has been isolative to bed, up for meals, selectively mute. Shakes and nods his head to stimuli. Patient slept 11 hours overnight, and is in bed on assessment. He is mute, does not endorse any pain or discomfort but is largely unresponsive to questions. Vitals unremarkable, BP mildly elevated.     09/23 - patient in bed much of the day, per nursing he got up and showered, and spoke briefly about wanting to go home. He is not compliant with medication; patient seen in his room, he is somnolent, shakes and nods his head, doesn't open his eyes for the duration of the interview. Patient slept 8 hours overnight. 09/24 - patient refused vital signs and medications this morning. He remains isolative to bed for much of the day. Per nursing assessment, the patient has been internally preoccupied, selectively mute, slept 9.5 hours overnight. Patient briefed on the importance of medication compliance, does not voice understanding of the situation. 09/25 - overnight, patient was visible, pacing, largely incoherent but with no aggression noted. No PRNs were given to the patient but he is refusing several doses of medication (refused VPA, Klonopin AM doses); he is taking Clozaril. Patient noted to have repetitive movements, selectively mute on interview, won't get out of bed. Family asking for neurologic workup, MRI as he has not had this before. 09/28 - patient isolative, still selectively mute, took medication and allowed vitals this morning but refused labwork (CBC for Clozaril is overdue). Patient refused breakfast this morning, slept 6 hours overnight. He remains internally preoccupied, unable to assess his mental state. Patient continues to refuse about half of medication administrations. Court order pending for labwork. 09/29 - no acute overnight events. Patient medication compliant, isolative, selectively mute and generally asleep for much of the day. Not voicing any complaints but with ongoing internal preoccupation; he has not had any meaningful interaction with MD since last week. Court order for tomorrow's labwork pending. 09/30 - no acute overnight events. Patient was out of his room today and participated in treatment team discussion. He continues to speak minimally but asked to go home.  Discussed medication and the need for labwork, the patient agrees but has been refusing at [address] [facility] [place] [facility] [place]. Patient guarded about symptoms, concrete and with no understanding of the events surrounding admission. 10/01 - court ordered medication / blood draw approved. Patient refused Klonopin and VPA this morning. Patient got bloodwork this morning, ANC WNL. Patient briefed on his treatment progress, he says nothing and does not acknowledge the treatment team. He made a comment at the onset of the interview but then went back to bed. Unable to provide any meaningful information. Per nursing, he remains mostly mute but will ask for some items. They are concerned he may be cheeking his Clozaril. 10/02 - the patient slept 10 hours overnight, self care is poor and patient remains internally preoccupied. He is out of his room for meals and comes into treatment team room for morning interview. He is unable to answer when asked his shoe size. Patient compliant with Clozaril with mouth checks to date, no PRNs given for agitation but patient still unable to adhere to ADLs without much prompting. 10/03 - patient slept 10.75 hours, he remains blunted with poor self care, but medication compliant. Patient out of room to meet with MD, denies SI/HI/AVH; he remains grossly internally preoccupied. Patient discharge focused, still speech impoverished and with poor ADLs. 10/04 - patient impoverished, in bed for much of the day. He is out for meals, requires much prompting to engage in ADLs. Goal for today is taking a shower per nursing. Patient compliant with Clozaril, vitals unremarkable thus far. Patient selectively mute, does not answer assessment questions. 10/05 - no acute overnight events; the patient slept 10 hours overnight. Patient refused AM medictions this AM, but took Clozaril last night. He remains in poor behavioral control, requiring much prompting for ADLs. Took shower last night with much encouragement.  He remains withdrawn, isolative, mental status remains impoverished. 10/06 - patient slept 9 hours overnight, he remains isolative to room. Compliant with Clozaril but refusing VPA and benzo. Patient out of bed for meals otherwise with no improvement in thought process. Discussed holding all agents except Clozaril as patient is intermittently non-compliant and had been taking these agents prior to admission, patient agrees to this. He c/o sialorrhea. Patient received no PRNs for agitation, ADLs remain poor and he needs prompting for most activities. 10/07 - no acute overnight events. Patient has been isolative, compliant with court ordered medication. Patient otherwise with no specific concerns, denies SI/HI/AVH. Patient requires prompting for most ADLs, minimally engaged in the milieu, but with no episodes of agitation or aggression. No PRNs given x24 hours. Patient's benzo and VPA held. 10/08 - patient slept 8 hours, has been isolative but medication compliant. Patient with mild tremor L > R; benzo and VPA still held due to patient refusal, patient appears to be improving on Clozaril monotherapy. Patient still grossly hypo-verbal, has been otherwise pleasant. 10/09 - overnight, patient has remained isolative; he speaks sparingly and has difficulty with his ADLs. Patient has been in fair behavioral control, per sister he is still off from his baseline. Clozaril trial continues, patient tolerating medication and thus far with no instances of significant side effects (sialorrhea). Patient discharge focused, with marked poverty of thought on interview. 10/12 - patient isolative, remains in bed for much of the day. Patient refused vitals over the weekend. Denies all symptoms, ate breakfast this morning medication compliant. Patient slept 5.5 hours overnight, he remains fairly non-responsive but is not in any distress. Patient with poor hygiene, poor self care, requires significant prompting and motivation to partake in ADLs.     10/13 - overnight, patient noted to have worsening thought process disorganization. He has disrobed completely and despite prompting will not put clothes on engage in treatment. He is compliant with court ordered medication, but RNs have had difficulty getting vital signs as he generally does not cooperate. This morning, patient noted to have an 8 cm abscess on his R shoulder, draining purulent fluid. He is unable to provide any information regarding history on the abscess as he is selectively mute. IM consulted for I&D, ABx recs. 10/14 - no acute overnight events. Patient with markedly poor hygiene, requires prompting for ADLs which he refuses intermittently (showering, eating). He is compliant with medication and otherwise with no specific concerns or distress. Patient started on Abx for cellulitis, surgical consult pending. Patient does not answer assessment questions. Of note, patient's LFTs elevated, he remains asymptomatic. SIDE EFFECTS: (reviewed/updated 10/14/2020)  +Drooling  No noted toxicity with use of Depakote   ALLERGIES:(reviewed/updated 10/14/2020)  Allergies   Allergen Reactions    Fluphenazine Unknown (comments)     Pt is unable to communicate properly.  Penicillins Unknown (comments)     Pt is unable to communicate properly. MEDICATIONS PRIOR TO ADMISSION:(reviewed/updated 10/14/2020)  Medications Prior to Admission   Medication Sig    FLUoxetine (PROzac) 20 mg capsule Take 20 mg by mouth daily.  benztropine (COGENTIN) 0.5 mg tablet Take 0.5 mg by mouth two (2) times a day. Indications: extrapyramidal symptoms as a result of taking the medication    LORazepam (Ativan) 1 mg tablet Take 1 mg by mouth two (2) times a day.  valproate (Depakene) 250 mg/5 mL syrup Take 1,000 mg by mouth two (2) times a day.  paliperidone palmitate (Invega Sustenna) 234 mg/1.5 mL injection 234 mg by IntraMUSCular route every twenty-one (21) days.  Indications: schizophrenia    OLANZapine (ZyPREXA zydis) 20 mg disintegrating tablet Take 20 mg by mouth Daily (before dinner). Indications: schizophrenia      PAST MEDICAL HISTORY: Past medical history from the initial psychiatric evaluation has been reviewed (reviewed/updated 10/14/2020) with no additional updates (I asked patient and no additional past medical history provided). Past Medical History:   Diagnosis Date    Psychiatric disorder     Psychotic disorder ([facility] [address]     Withdrawal syndrome ([facility] [address]    History reviewed. No pertinent surgical history. SOCIAL HISTORY: Social history from the initial psychiatric evaluation has been reviewed (reviewed/updated 10/14/2020) with no additional updates (I asked patient and no additional social history provided).    Social History     Socioeconomic History    Marital status: SINGLE     Spouse name: Not on file    Number of children: Not on file    Years of education: Not on file    Highest education level: Not on file   Occupational History    Not on file   Social Needs    Financial resource strain: Not on file    Food insecurity     Worry: Not on file     Inability: Not on file    Transportation needs     Medical: Not on file     Non-medical: Not on file   Tobacco Use    Smoking status: Never Smoker    Smokeless tobacco: Never Used   Substance and Sexual Activity    Alcohol use: No    Drug use: No    Sexual activity: Not on file   Lifestyle    Physical activity     Days per week: Not on file     Minutes per session: Not on file    Stress: Not on file   Relationships    Social connections     Talks on phone: Not on file     Gets together: Not on file     Attends [employer] service: Not on file     Active member of club or organization: Not on file     Attends meetings of clubs or organizations: Not on file     Relationship status: Not on file    Intimate partner violence     Fear of current or ex partner: Not on file     Emotionally abused: Not on file     Physically abused: Not on file     Forced sexual activity: Not on file   Other Topics Concern    Not on file   Social History Narrative    Pt is a HS grad and is unemployed. He lives with his sister. On disability    No pending legal charge reported      FAMILY HISTORY: Family history from the initial psychiatric evaluation has been reviewed (reviewed/updated 10/14/2020) with no additional updates (I asked patient and no additional family history provided). History reviewed. No pertinent family history. REVIEW OF SYSTEMS: (reviewed/updated 10/14/2020)  Appetite:poor   Sleep: poor with DIMS (difficulty initiating & maintaining sleep)   All other Review of Systems: Negative except per HPI         [facility] (MSE):    MSE FINDINGS ARE WITHIN NORMAL LIMITS (WNL) UNLESS OTHERWISE STATED BELOW. ( ALL OF THE BELOW CATEGORIES OF THE MSE HAVE BEEN REVIEWED (reviewed 10/14/2020) AND UPDATED AS DEEMED APPROPRIATE )  General Presentation age appropriate, cooperative   Orientation disorganized   Vital Signs  See below (reviewed 10/14/2020); Vital Signs (BP, Pulse, & Temp) are within normal limits if not listed below.    Gait and Station Stable/steady, no ataxia   Musculoskeletal System No extrapyramidal symptoms (EPS); no abnormal muscular movements or Tardive Dyskinesia (TD); muscle strength and tone are within normal limits   Language No aphasia or dysarthria   Speech:  hypoverbal, increased latency of response, non-pressured and soft   Thought Processes illogical; slow rate of thoughts; poor abstract reasoning/computation   Thought Associations blocked    Thought Content poverty of content   Suicidal Ideations unable to assess   Homicidal Ideations unable to assess   Mood:  unable to asses   Affect:  flat   Memory recent  impaired   Memory remote:  impaired   Concentration/Attention:  impaired   Fund of Knowledge significantly below average   Insight:  poor   Reliability fair   Judgment:  impaired due to active psychosis          VITALS: Patient Vitals for the past 24 hrs:   Temp Pulse Resp BP SpO2   10/14/20 1000 97.7 °F (36.5 °C) (!) 57 18 (!) 106/51 95 %     Wt Readings from Last 3 Encounters:   09/18/20 85.7 kg (189 lb)   12/23/17 84.9 kg (187 lb 1.6 oz)   03/25/17 95.6 kg (210 lb 11.2 oz)     Temp Readings from Last 3 Encounters:   10/14/20 97.7 °F (36.5 °C)   03/28/17 97.5 °F (36.4 °C)   02/07/15 98.7 °F (37.1 °C)     BP Readings from Last 3 Encounters:   10/14/20 (!) 106/51   03/28/17 100/68   02/12/15 105/73     Pulse Readings from Last 3 Encounters:   10/14/20 (!) 57   03/28/17 67   02/12/15 87            DATA     LABORATORY DATA:(reviewed/updated 10/14/2020)  Recent Results (from the past 24 hour(s))   CBC WITH AUTOMATED DIFF    Collection Time: 10/13/20  2:00 PM   Result Value Ref Range    WBC 9.0 4.1 - 11.1 K/uL    RBC 4.29 4. 10 - 5.70 M/uL    HGB 12.8 12.1 - 17.0 g/dL    HCT 38.0 36.6 - 50.3 %    MCV 88.6 80.0 - 99.0 FL    MCH 29.8 26.0 - 34.0 PG    MCHC 33.7 30.0 - 36.5 g/dL    RDW 13.2 11.5 - 14.5 %    PLATELET 668 559 - 859 K/uL    MPV 10.3 8.9 - 12.9 FL    NRBC 0.0 0  WBC    ABSOLUTE NRBC 0.00 0.00 - 0.01 K/uL    NEUTROPHILS 60 32 - 75 %    LYMPHOCYTES 26 12 - 49 %    MONOCYTES 12 5 - 13 %    EOSINOPHILS 2 0 - 7 %    BASOPHILS 0 0 - 1 %    IMMATURE GRANULOCYTES 0 0.0 - 0.5 %    ABS. NEUTROPHILS 5.4 1.8 - 8.0 K/UL    ABS. LYMPHOCYTES 2.3 0.8 - 3.5 K/UL    ABS. MONOCYTES 1.0 0.0 - 1.0 K/UL    ABS. EOSINOPHILS 0.2 0.0 - 0.4 K/UL    ABS. BASOPHILS 0.0 0.0 - 0.1 K/UL    ABS. IMM.  GRANS. 0.0 0.00 - 0.04 K/UL    DF AUTOMATED     METABOLIC PANEL, COMPREHENSIVE    Collection Time: 10/13/20  2:00 PM   Result Value Ref Range    Sodium 135 (L) 136 - 145 mmol/L    Potassium 3.7 3.5 - 5.1 mmol/L    Chloride 100 97 - 108 mmol/L    CO2 26 21 - 32 mmol/L    Anion gap 9 5 - 15 mmol/L    Glucose 112 (H) 65 - 100 mg/dL    BUN 13 6 - 20 MG/DL    Creatinine 0.98 0.70 - 1.30 MG/DL    BUN/Creatinine ratio 13 12 - 20      GFR est AA >60 >60 ml/min/1.73m2    GFR est non-AA >60 >60 ml/min/1.73m2    Calcium 8.9 8.5 - 10.1 MG/DL    Bilirubin, total 0.8 0.2 - 1.0 MG/DL    ALT (SGPT) 209 (H) 12 - 78 U/L    AST (SGOT) 94 (H) 15 - 37 U/L    Alk. phosphatase 218 (H) 45 - 117 U/L    Protein, total 7.9 6.4 - 8.2 g/dL    Albumin 3.4 (L) 3.5 - 5.0 g/dL    Globulin 4.5 (H) 2.0 - 4.0 g/dL    A-G Ratio 0.8 (L) 1.1 - 2.2     MAGNESIUM    Collection Time: 10/13/20  2:00 PM   Result Value Ref Range    Magnesium 2.0 1.6 - 2.4 mg/dL   PHOSPHORUS    Collection Time: 10/13/20  2:00 PM   Result Value Ref Range    Phosphorus 4.3 2.6 - 4.7 MG/DL     Lab Results   Component Value Date/Time    Valproic acid 97 09/18/2020 04:42 PM     No results found for: LITHM   RADIOLOGY REPORTS:(reviewed/updated 10/14/2020)  No results found.        MEDICATIONS     ALL MEDICATIONS:   Current Facility-Administered Medications   Medication Dose Route Frequency    sulfamethoxazole-trimethoprim (BACTRIM;SEPTRA) 200-40 mg/5 mL oral suspension 20 mL  20 mL Oral Q12H    cloZAPine (CLOZARIL) tablet 350 mg  350 mg Oral QHS    Or    OLANZapine (ZyPREXA) 5 mg in sterile water (preservative free) 1 mL injection +++COURT ORDERED MEDICATION FOR REFUSAL OF CLOZAPINE+++  5 mg IntraMUSCular QHS    [Held by provider] clonazePAM (KlonoPIN) tablet 1 mg  1 mg Oral BID    [Held by provider] valproic acid (as sodium salt) (DEPAKENE) 250 mg/5 mL (5 mL) oral solution 1,000 mg  1,000 mg Oral BID    OLANZapine (ZyPREXA) tablet 5 mg  5 mg Oral Q6H PRN    haloperidol lactate (HALDOL) injection 5 mg  5 mg IntraMUSCular Q6H PRN    benztropine (COGENTIN) tablet 1 mg  1 mg Oral BID PRN    diphenhydrAMINE (BENADRYL) injection 50 mg  50 mg IntraMUSCular BID PRN    hydrOXYzine HCL (ATARAX) tablet 50 mg  50 mg Oral TID PRN    traZODone (DESYREL) tablet 50 mg  50 mg Oral QHS PRN    acetaminophen (TYLENOL) tablet 650 mg  650 mg Oral Q4H PRN    magnesium hydroxide (MILK OF MAGNESIA) 400 mg/5 mL oral suspension 30 mL  30 mL Oral DAILY PRN      SCHEDULED MEDICATIONS:   Current Facility-Administered Medications   Medication Dose Route Frequency    sulfamethoxazole-trimethoprim (BACTRIM;SEPTRA) 200-40 mg/5 mL oral suspension 20 mL  20 mL Oral Q12H    cloZAPine (CLOZARIL) tablet 350 mg  350 mg Oral QHS    Or    OLANZapine (ZyPREXA) 5 mg in sterile water (preservative free) 1 mL injection +++COURT ORDERED MEDICATION FOR REFUSAL OF CLOZAPINE+++  5 mg IntraMUSCular QHS    [Held by provider] clonazePAM (KlonoPIN) tablet 1 mg  1 mg Oral BID    [Held by provider] valproic acid (as sodium salt) (DEPAKENE) 250 mg/5 mL (5 mL) oral solution 1,000 mg  1,000 mg Oral BID          ASSESSMENT & PLAN     DIAGNOSES REQUIRING ACTIVE TREATMENT AND MONITORING: (reviewed/updated 10/14/2020)  Patient Active Hospital Problem List:   Schizoaffective disorder ([facility] [address] (9/18/2020)    Assessment: patient with significant internal preoccupation, poor ADLs, history of catatonia. Appears to have been compliant with home medication, but is regressed and with a markedly disorganized thought process. Will start Clozaril, add benzodiazepine for protection against catatonia to be tapered as Clozaril dose increases given interaction.     Plan:  COURT ORDERED MEDICATION:  - INCREASE Clozaril to 400 mg PO *OR* Zyprexa 5 mg IM QHS for psychosis    - DISCONTINUE Haldol due to poor efficacy  - DISCONTINUE [place] due to poor efficacy  - EEG for seizure rule out  - Appreciate recs from IM and gen surg re: cellulitis  - f/u LFTs, Clozaril level at next CBC draw  - HOLD Klonopin 1 mg BID for catatonia due to poor efficacy  - CBC and Clozaril level by court order  - HOLD VPA oral soln 1000 mg BID for mood lability, seizure prophylaxis due to poor efficacy, non compliance  - DISCONTINUE Prozac / Cogentin / Zydis due to polypharamacy  - Consider antihypertensive  - IGM therapy as tolerated    I will continue to monitor blood levels (Depakote, clozapine---a drug with a narrow therapeutic index= NTI) and associated labs for drug therapy implemented that require intense monitoring for toxicity as deemed appropriate based on current medication side effects and pharmacodynamically determined drug 1/2 lives. In summary, Arely Montalvo, is a 45 y.o.  male who presents with a severe exacerbation of the principal diagnosis of Schizoaffective disorder ([facility] [address]    Patient's condition is not improving. Patient requires continued inpatient hospitalization for further stabilization, safety monitoring and medication management. I will continue to coordinate the provision of individual, milieu, occupational, group, and substance abuse therapies to address target symptoms/diagnoses as deemed appropriate for the individual patient. A coordinated, multidisplinary treatment team round was conducted with the patient (this team consists of the nurse, psychiatric unit pharmacist,  and writer). Complete current electronic health record for patient has been reviewed today including consultant notes, ancillary staff notes, nurses and psychiatric tech notes. Suicide risk assessment completed and patient deemed to be of low risk for suicide at this time. The following regarding medications was addressed during rounds with patient:   the risks and benefits of the proposed medication. The patient was given the opportunity to ask questions. Informed consent given to the use of the above medications. Will continue to adjust psychiatric and non-psychiatric medications (see above \"medication\" section and orders section for details) as deemed appropriate & based upon diagnoses and response to treatment. I will continue to order blood tests/labs and diagnostic tests as deemed appropriate and review results as they become available (see orders for details and above listed lab/test results).     I will order psychiatric records from previous [facility] hospitals to further elucidate the nature of patient's psychopathology and review once available. I will gather additional collateral information from friends, family and o/p treatment team to further elucidate the nature of patient's psychopathology and baselline level of psychiatric functioning. I certify that this patient's inpatient psychiatric hospital services furnished since the previous certification were, and continue to be, required for treatment that could reasonably be expected to improve the patient's condition, or for diagnostic study, and that the patient continues to need, on a daily basis, active treatment furnished directly by or requiring the supervision of inpatient psychiatric facility personnel. In addition, the hospital records show that services furnished were intensive treatment services, admission or related services, or equivalent services.     EXPECTED DISCHARGE DATE/DAY: TBD     DISPOSITION: Home       Signed By:   Pankaj Dietrich MD  10/14/2020

## 2020-10-15 ENCOUNTER — APPOINTMENT (OUTPATIENT)
Dept: MRI IMAGING | Age: 50
DRG: 885 | End: 2020-10-15
Attending: PSYCHIATRY & NEUROLOGY
Payer: MEDICARE

## 2020-10-15 PROBLEM — L02.91 ABSCESS: Status: ACTIVE | Noted: 2020-10-15

## 2020-10-15 PROCEDURE — 74011250637 HC RX REV CODE- 250/637: Performed by: SURGERY

## 2020-10-15 PROCEDURE — 65220000003 HC RM SEMIPRIVATE PSYCH

## 2020-10-15 PROCEDURE — 74011250637 HC RX REV CODE- 250/637: Performed by: NURSE PRACTITIONER

## 2020-10-15 PROCEDURE — 74011250637 HC RX REV CODE- 250/637: Performed by: PSYCHIATRY & NEUROLOGY

## 2020-10-15 PROCEDURE — 70551 MRI BRAIN STEM W/O DYE: CPT

## 2020-10-15 PROCEDURE — 74011250637 HC RX REV CODE- 250/637: Performed by: STUDENT IN AN ORGANIZED HEALTH CARE EDUCATION/TRAINING PROGRAM

## 2020-10-15 PROCEDURE — 99232 SBSQ HOSP IP/OBS MODERATE 35: CPT | Performed by: PSYCHIATRY & NEUROLOGY

## 2020-10-15 RX ORDER — SULFAMETHOXAZOLE AND TRIMETHOPRIM 800; 160 MG/1; MG/1
1 TABLET ORAL EVERY 12 HOURS
Status: COMPLETED | OUTPATIENT
Start: 2020-10-15 | End: 2020-10-19

## 2020-10-15 RX ADMIN — SULFAMETHOXAZOLE AND TRIMETHOPRIM 1 TABLET: 800; 160 TABLET ORAL at 21:14

## 2020-10-15 RX ADMIN — OLANZAPINE 5 MG: 5 TABLET, FILM COATED ORAL at 13:05

## 2020-10-15 RX ADMIN — CLOZAPINE 400 MG: 100 TABLET ORAL at 21:13

## 2020-10-15 RX ADMIN — SULFAMETHOXAZOLE AND TRIMETHOPRIM 20 ML: 200; 40 SUSPENSION ORAL at 08:18

## 2020-10-15 NOTE — CONSULTS
Patient seen at request of Dr. Marcia Desai. Information obtained from nurses and review of chart. Michell Perez is an 45 y.o. male with a h/o schizophrenia who was admitted on September 19, 2020 with increased psychosis. Recently found to have an abscess on the posterior aspect of his right shoulder which has been draining. No fevers. Started on Bactrim. He has otherwise been in his usual state of health. Allergies - Fluphenazine and Penicillins    Meds - Reviewed. PMH -   Past Medical History:   Diagnosis Date    Abscess 10/15/2020    Psychiatric disorder     Psychotic disorder ([address]     Withdrawal syndrome (HCC)      PSH - History reviewed. No pertinent surgical history. Fam Hx - History reviewed. No pertinent family history. Soc Hx -   Social History     Tobacco Use    Smoking status: Never Smoker    Smokeless tobacco: Never Used   Substance Use Topics    Alcohol use: No     Patient is a well developed, well nourished man in no acute distress. Visit Vitals  BP (!) 119/90 (BP 1 Location: Left arm)   Pulse 96   Temp 98.4 °F (36.9 °C)   Resp 18   Ht 5' 7\" (1.702 m)   Wt 189 lb (85.7 kg)   SpO2 97%   BMI 29.60 kg/m²     Cor: RRR. Lungs: Bilateral breath sounds. Clear to auscultation. Abd: Soft. Non tender. No guarding or rebound. Non distended. Ext: No edema. Neuro: Grossly Non focal.   Skin: On the posterior aspect of the right shoulder, there is an open wound with purulent appearing drainage. Surrounding skin is erythematous and indurated. Clinically, this is c/w an abscess. Labs - No results found for this or any previous visit (from the past 24 hour(s)). Imp: Mr. Josh Murray is a 45 y.o. male with an abscess on his posterior right shoulder which is draining. Plan: 1. At this point in time, do not believe that there is an indication for surgical intervention. 2. Dry dressing over wound. 3. Would continue Bactrim as ordered.     4.  Roxy Brown may require improved drainage of the abscess if no improvement. 5. Diet as tolerated. 6. Plans per Dr. Hannah Carlton. Following.

## 2020-10-15 NOTE — BH NOTES
Behavioral Health Interdisciplinary Rounds      Patient Name: Edwin Anderson                      Age: 45 y. o.                 Room/Bed:  [facility]/  Primary Diagnosis: Schizoaffective disorder (HCC)         Admission Status: Involuntary Commitment                            Readmission within 30 days: no  Power of  in place: no  Patient requires a blocked bed: yes            Reason for blocked bed: behavior  Order for blocked bed obtained: no     Sleep hours: 6  Morning Labs completed per orders:  Na                                  Participation in Care/Groups:  no  Medication Compliant?: Yes  PRNS (last 24 hours): None                 Restraints (last 24 hours):  no  Substance Abuse:  no               24 hour chart check complete:

## 2020-10-15 NOTE — PROGRESS NOTES
Problem: Falls - Risk of  Goal: *Absence of Falls  Description: Document Kaden Oh Fall Risk and appropriate interventions in the flowsheet. Outcome: Progressing Towards Goal  Note: Fall Risk Interventions:       Mentation Interventions: Reorient patient, Room close to nurse's station    Medication Interventions: Teach patient to arise slowly    Elimination Interventions:  Toileting schedule/hourly rounds              Problem: Patient Education: Go to Patient Education Activity  Goal: Patient/Family Education  Outcome: Progressing Towards Goal     Problem: Altered Thought Process (Adult/Pediatric)  Goal: *STG: Participates in treatment plan  Outcome: Progressing Towards Goal  Goal: *STG: Remains safe in hospital  Outcome: Progressing Towards Goal  Goal: *STG: Seeks staff when feelings of anxiety and fear arise  Outcome: Not Progressing Towards Goal  Goal: *STG: Complies with medication therapy  Outcome: Progressing Towards Goal  Goal: *STG: Attends activities and groups  Outcome: Not Progressing Towards Goal  Goal: *STG: Absence of lethality  Outcome: Progressing Towards Goal  Goal: *STG: Demonstrates ability to understand and use improved judgment in daily activities and relationships  Outcome: Not Progressing Towards Goal  Goal: Interventions  Outcome: Progressing Towards Goal

## 2020-10-15 NOTE — GROUP NOTE
JENELLE  GROUP DOCUMENTATION INDIVIDUAL Group Therapy Note Date: 10/15/2020 Group Start Time: 1000 Group End Time: 1100 Group Topic: Topic Group [address] ACUTE BEHAV [facility], [address] [facility] GROUP DOCUMENTATION GROUP Group Therapy Note Attendees: 2 Attendance: Did not attend Patient's Goal: Interventions/techniques [place]

## 2020-10-15 NOTE — GROUP NOTE
JENELLE  GROUP DOCUMENTATION INDIVIDUAL Group Therapy Note Date: 10/15/2020 Group Start Time: 1500 Group End Time: 3637 Group Topic: Recreational/Music Therapy [facility] [place] [id number] ACUTE BEHAV [facility], [address] GROUP DOCUMENTATION GROUP Group Therapy Note Attendees: 5 Attendance: Did not attend Patient's Goal: Interventions/techniques: 
 
Ish Every

## 2020-10-15 NOTE — BH NOTES
[facility] crisis staff - Og Nievesrad [phone] to complete screening on 10/16 for recommitment hearing on 10/19.

## 2020-10-15 NOTE — BH NOTES
PSYCHIATRIC PROGRESS NOTE         Patient Name  Calvin Sheridan   Date of Birth 1970   [facility] [id number]   Medical Record Number  [id number]      Age  45 y.o. PCP Unknown, Provider   Admit date:  9/18/2020    Room Number  705/73  @ [facility]   Date of Service  10/15/2020         E & M PROGRESS NOTE:         HISTORY       CC:  \"psychosis\"  HISTORY OF PRESENT ILLNESS/INTERVAL HISTORY:  (reviewed/updated 10/15/2020). per initial evaluation: Nikky Lozoya \"Corey\" Roxy Brown is a 45year old male with a history of schizophrenia admitted to the [facility] for increased psychosis. He has not been bathing or otherwise caring for himself at home. He was reportedly hospitalized at [facility] from April - June 2020. He does not verbally answer any questions. He is well known to this writer from previous admissions in the [place] area. He has responded well to ECT in the past.    09/21 - overnight, patient incontinent of urine, placed into diaper. Patient appeared catatonic at times, but was speaking, non-spontaneously, to the team this morning. Per nursing, the patient has significantly poor self care, grossly internally preoccucpied and with no ability to care for himself. SW reports that sister is patient's caretaker and that he has been decompensating from an already poor baseline recently, requiring prompting to eat meals and unable to perform basic ADLs. Family states he improved on Clozaril and they are requesting the team restart this medication. Of note, patient got Cyprus injection two weeks ago, VPA level suggests compliance with medication. 09/22 - patient has been isolative to bed, up for meals, selectively mute. Shakes and nods his head to stimuli. Patient slept 11 hours overnight, and is in bed on assessment. He is mute, does not endorse any pain or discomfort but is largely unresponsive to questions. Vitals unremarkable, BP mildly elevated.     09/23 - patient in bed much of the day, per nursing he got up and showered, and spoke briefly about wanting to go home. He is not compliant with medication; patient seen in his room, he is somnolent, shakes and nods his head, doesn't open his eyes for the duration of the interview. Patient slept 8 hours overnight. 09/24 - patient refused vital signs and medications this morning. He remains isolative to bed for much of the day. Per nursing assessment, the patient has been internally preoccupied, selectively mute, slept 9.5 hours overnight. Patient briefed on the importance of medication compliance, does not voice understanding of the situation. 09/25 - overnight, patient was visible, pacing, largely incoherent but with no aggression noted. No PRNs were given to the patient but he is refusing several doses of medication (refused VPA, Klonopin AM doses); he is taking Clozaril. Patient noted to have repetitive movements, selectively mute on interview, won't get out of bed. Family asking for neurologic workup, MRI as he has not had this before. 09/28 - patient isolative, still selectively mute, took medication and allowed vitals this morning but refused labwork (CBC for Clozaril is overdue). Patient refused breakfast this morning, slept 6 hours overnight. He remains internally preoccupied, unable to assess his mental state. Patient continues to refuse about half of medication administrations. Court order pending for labwork. 09/29 - no acute overnight events. Patient medication compliant, isolative, selectively mute and generally asleep for much of the day. Not voicing any complaints but with ongoing internal preoccupation; he has not had any meaningful interaction with MD since last week. Court order for tomorrow's labwork pending. 09/30 - no acute overnight events. Patient was out of his room today and participated in treatment team discussion. He continues to speak minimally but asked to go home.  Discussed medication and the need for labwork, the patient agrees but has been refusing at [address] [place]note 2] each morning. Patient guarded about symptoms, concrete and with no understanding of the events surrounding admission. 10/01 - court ordered medication / blood draw approved. Patient refused Klonopin and VPA this morning. Patient got bloodwork this morning, ANC WNL. Patient briefed on his treatment progress, he says nothing and does not acknowledge the treatment team. He made a comment at the onset of the interview but then went back to bed. Unable to provide any meaningful information. Per nursing, he remains mostly mute but will ask for some items. They are concerned he may be cheeking his Clozaril. 10/02 - the patient slept 10 hours overnight, self care is poor and patient remains internally preoccupied. He is out of his room for meals and comes into treatment team room for morning interview. He is unable to answer when asked his shoe size. Patient compliant with Clozaril with mouth checks to date, no PRNs given for agitation but patient still unable to adhere to ADLs without much prompting. 10/03 - patient slept 10.75 hours, he remains blunted with poor self care, but medication compliant. Patient out of room to meet with MD, denies SI/HI/AVH; he remains grossly internally preoccupied. Patient discharge focused, still speech impoverished and with poor ADLs. 10/04 - patient impoverished, in bed for much of the day. He is out for meals, requires much prompting to engage in ADLs. Goal for today is taking a shower per nursing. Patient compliant with Clozaril, vitals unremarkable thus far. Patient selectively mute, does not answer assessment questions. 10/05 - no acute overnight events; the patient slept 10 hours overnight. Patient refused AM medictions this AM, but took Clozaril last night. He remains in poor behavioral control, requiring much prompting for ADLs. Took shower last night with much encouragement.  He remains withdrawn, isolative, mental status remains impoverished. 10/06 - patient slept 9 hours overnight, he remains isolative to room. Compliant with Clozaril but refusing VPA and benzo. Patient out of bed for meals otherwise with no improvement in thought process. Discussed holding all agents except Clozaril as patient is intermittently non-compliant and had been taking these agents prior to admission, patient agrees to this. He c/o sialorrhea. Patient received no PRNs for agitation, ADLs remain poor and he needs prompting for most activities. 10/07 - no acute overnight events. Patient has been isolative, compliant with court ordered medication. Patient otherwise with no specific concerns, denies SI/HI/AVH. Patient requires prompting for most ADLs, minimally engaged in the milieu, but with no episodes of agitation or aggression. No PRNs given x24 hours. Patient's benzo and VPA held. 10/08 - patient slept 8 hours, has been isolative but medication compliant. Patient with mild tremor L > R; benzo and VPA still held due to patient refusal, patient appears to be improving on Clozaril monotherapy. Patient still grossly hypo-verbal, has been otherwise pleasant. 10/09 - overnight, patient has remained isolative; he speaks sparingly and has difficulty with his ADLs. Patient has been in fair behavioral control, per sister he is still off from his baseline. Clozaril trial continues, patient tolerating medication and thus far with no instances of significant side effects (sialorrhea). Patient discharge focused, with marked poverty of thought on interview. 10/12 - patient isolative, remains in bed for much of the day. Patient refused vitals over the weekend. Denies all symptoms, ate breakfast this morning medication compliant. Patient slept 5.5 hours overnight, he remains fairly non-responsive but is not in any distress. Patient with poor hygiene, poor self care, requires significant prompting and motivation to partake in ADLs.     10/13 - overnight, patient noted to have worsening thought process disorganization. He has disrobed completely and despite prompting will not put clothes on engage in treatment. He is compliant with court ordered medication, but RNs have had difficulty getting vital signs as he generally does not cooperate. This morning, patient noted to have an 8 cm abscess on his R shoulder, draining purulent fluid. He is unable to provide any information regarding history on the abscess as he is selectively mute. IM consulted for I&D, ABx recs. 10/14 - no acute overnight events. Patient with markedly poor hygiene, requires prompting for ADLs which he refuses intermittently (showering, eating). He is compliant with medication and otherwise with no specific concerns or distress. Patient started on Abx for cellulitis, surgical consult pending. Patient does not answer assessment questions. Of note, patient's LFTs elevated, he remains asymptomatic.    10/15 - patient has been isolative, compliant with medication, still internally preoccupied. Patient has been otherwise in fair behavioral control, markedly poor ADLs, poor hygiene. Patient seen by general surgery, awaiting recs. Patient remains selectively mute, unable to care for self. He slept 6 hours overnight, got no PRNs for agitation. MRI pending for AMS. SIDE EFFECTS: (reviewed/updated 10/15/2020)  +Drooling  No noted toxicity with use of Depakote   ALLERGIES:(reviewed/updated 10/15/2020)  Allergies   Allergen Reactions    Fluphenazine Unknown (comments)     Pt is unable to communicate properly.  Penicillins Unknown (comments)     Pt is unable to communicate properly. MEDICATIONS PRIOR TO ADMISSION:(reviewed/updated 10/15/2020)  Medications Prior to Admission   Medication Sig    FLUoxetine (PROzac) 20 mg capsule Take 20 mg by mouth daily.  benztropine (COGENTIN) 0.5 mg tablet Take 0.5 mg by mouth two (2) times a day.  Indications: extrapyramidal symptoms as a result of taking the medication    LORazepam (Ativan) 1 mg tablet Take 1 mg by mouth two (2) times a day.  valproate (Depakene) 250 mg/5 mL syrup Take 1,000 mg by mouth two (2) times a day.  paliperidone palmitate (Invega Sustenna) 234 mg/1.5 mL injection 234 mg by IntraMUSCular route every twenty-one (21) days. Indications: schizophrenia    OLANZapine (ZyPREXA zydis) 20 mg disintegrating tablet Take 20 mg by mouth Daily (before dinner). Indications: schizophrenia      PAST MEDICAL HISTORY: Past medical history from the initial psychiatric evaluation has been reviewed (reviewed/updated 10/15/2020) with no additional updates (I asked patient and no additional past medical history provided). Past Medical History:   Diagnosis Date    Psychiatric disorder     Psychotic disorder ([facility] [address]     Withdrawal syndrome ([facility] [address]    History reviewed. No pertinent surgical history. SOCIAL HISTORY: Social history from the initial psychiatric evaluation has been reviewed (reviewed/updated 10/15/2020) with no additional updates (I asked patient and no additional social history provided).    Social History     Socioeconomic History    Marital status: SINGLE     Spouse name: Not on file    Number of children: Not on file    Years of education: Not on file    Highest education level: Not on file   Occupational History    Not on file   Social Needs    Financial resource strain: Not on file    Food insecurity     Worry: Not on file     Inability: Not on file    Transportation needs     Medical: Not on file     Non-medical: Not on file   Tobacco Use    Smoking status: Never Smoker    Smokeless tobacco: Never Used   Substance and Sexual Activity    Alcohol use: No    Drug use: No    Sexual activity: Not on file   Lifestyle    Physical activity     Days per week: Not on file     Minutes per session: Not on file    Stress: Not on file   Relationships    Social connections     Talks on phone: Not on file     Gets together: Not on file Attends [employer] service: Not on file     Active member of club or organization: Not on file     Attends meetings of clubs or organizations: Not on file     Relationship status: Not on file    Intimate partner violence     Fear of current or ex partner: Not on file     Emotionally abused: Not on file     Physically abused: Not on file     Forced sexual activity: Not on file   Other Topics Concern    Not on file   Social History Narrative    Pt is a HS grad and is unemployed. He lives with his sister. On disability    No pending legal charge reported      FAMILY HISTORY: Family history from the initial psychiatric evaluation has been reviewed (reviewed/updated 10/15/2020) with no additional updates (I asked patient and no additional family history provided). History reviewed. No pertinent family history. REVIEW OF SYSTEMS: (reviewed/updated 10/15/2020)  Appetite:poor   Sleep: poor with DIMS (difficulty initiating & maintaining sleep)   All other Review of Systems: Negative except per HPI         [facility] (MSE):    MSE FINDINGS ARE WITHIN NORMAL LIMITS (WNL) UNLESS OTHERWISE STATED BELOW. ( ALL OF THE BELOW CATEGORIES OF THE MSE HAVE BEEN REVIEWED (reviewed 10/15/2020) AND UPDATED AS DEEMED APPROPRIATE )  General Presentation age appropriate, cooperative   Orientation disorganized   Vital Signs  See below (reviewed 10/15/2020); Vital Signs (BP, Pulse, & Temp) are within normal limits if not listed below.    Gait and Station Stable/steady, no ataxia   Musculoskeletal System No extrapyramidal symptoms (EPS); no abnormal muscular movements or Tardive Dyskinesia (TD); muscle strength and tone are within normal limits   Language No aphasia or dysarthria   Speech:  hypoverbal, increased latency of response, non-pressured and soft   Thought Processes illogical; slow rate of thoughts; poor abstract reasoning/computation   Thought Associations blocked    Thought Content poverty of content   Suicidal Ideations unable to assess   Homicidal Ideations unable to assess   Mood:  unable to asses   Affect:  flat   Memory recent  impaired   Memory remote:  impaired   Concentration/Attention:  impaired   Fund of Knowledge significantly below average   Insight:  poor   Reliability fair   Judgment:  impaired due to active psychosis          VITALS:     Patient Vitals for the past 24 hrs:   Temp Pulse Resp BP SpO2   10/14/20 1000 97.7 °F (36.5 °C) (!) 57 18 (!) 106/51 95 %     Wt Readings from Last 3 Encounters:   09/18/20 85.7 kg (189 lb)   12/23/17 84.9 kg (187 lb 1.6 oz)   03/25/17 95.6 kg (210 lb 11.2 oz)     Temp Readings from Last 3 Encounters:   10/14/20 97.7 °F (36.5 °C)   03/28/17 97.5 °F (36.4 °C)   02/07/15 98.7 °F (37.1 °C)     BP Readings from Last 3 Encounters:   10/14/20 (!) 106/51   03/28/17 100/68   02/12/15 105/73     Pulse Readings from Last 3 Encounters:   10/14/20 (!) 57   03/28/17 67   02/12/15 87            DATA     LABORATORY DATA:(reviewed/updated 10/15/2020)  No results found for this or any previous visit (from the past 24 hour(s)). Lab Results   Component Value Date/Time    Valproic acid 97 09/18/2020 04:42 PM     No results found for: LITHM   RADIOLOGY REPORTS:(reviewed/updated 10/15/2020)  No results found.        MEDICATIONS     ALL MEDICATIONS:   Current Facility-Administered Medications   Medication Dose Route Frequency    cloZAPine (CLOZARIL) tablet 400 mg  400 mg Oral QHS    Or    OLANZapine (ZyPREXA) 5 mg in sterile water (preservative free) 1 mL injection+++COURT ORDERED MEDICATION FOR REFUSAL OF CLOZAPINE+++  5 mg IntraMUSCular QHS    sulfamethoxazole-trimethoprim (BACTRIM;SEPTRA) 200-40 mg/5 mL oral suspension 20 mL  20 mL Oral Q12H    OLANZapine (ZyPREXA) tablet 5 mg  5 mg Oral Q6H PRN    haloperidol lactate (HALDOL) injection 5 mg  5 mg IntraMUSCular Q6H PRN    benztropine (COGENTIN) tablet 1 mg  1 mg Oral BID PRN    diphenhydrAMINE (BENADRYL) injection 50 mg  50 mg IntraMUSCular BID PRN    hydrOXYzine HCL (ATARAX) tablet 50 mg  50 mg Oral TID PRN    traZODone (DESYREL) tablet 50 mg  50 mg Oral QHS PRN    acetaminophen (TYLENOL) tablet 650 mg  650 mg Oral Q4H PRN    magnesium hydroxide (MILK OF MAGNESIA) 400 mg/5 mL oral suspension 30 mL  30 mL Oral DAILY PRN      SCHEDULED MEDICATIONS:   Current Facility-Administered Medications   Medication Dose Route Frequency    cloZAPine (CLOZARIL) tablet 400 mg  400 mg Oral QHS    Or    OLANZapine (ZyPREXA) 5 mg in sterile water (preservative free) 1 mL injection+++COURT ORDERED MEDICATION FOR REFUSAL OF CLOZAPINE+++  5 mg IntraMUSCular QHS    sulfamethoxazole-trimethoprim (BACTRIM;SEPTRA) 200-40 mg/5 mL oral suspension 20 mL  20 mL Oral Q12H          ASSESSMENT & PLAN     DIAGNOSES REQUIRING ACTIVE TREATMENT AND MONITORING: (reviewed/updated 10/15/2020)  Patient Active Hospital Problem List:   Schizoaffective disorder ([facility] [address] (9/18/2020)    Assessment: patient with significant internal preoccupation, poor ADLs, history of catatonia. Appears to have been compliant with home medication, but is regressed and with a markedly disorganized thought process. Will start Clozaril, add benzodiazepine for protection against catatonia to be tapered as Clozaril dose increases given interaction.     Plan:  COURT ORDERED MEDICATION:  - CONTINUE Clozaril 400 mg PO *OR* Zyprexa 5 mg IM QHS for psychosis    - DISCONTINUE Haldol due to poor efficacy  - DISCONTINUE [place] due to poor efficacy  - EEG for seizure rule out  - Appreciate recs from IM and gen surg re: cellulitis  - f/u LFTs, Clozaril level at next CBC draw  - HOLD Klonopin 1 mg BID for catatonia due to poor efficacy  - CBC and Clozaril level by court order  - HOLD VPA oral soln 1000 mg BID for mood lability, seizure prophylaxis due to poor efficacy, non compliance  - DISCONTINUE Prozac / Cogentin / Zydis due to polypharamacy  - Consider antihypertensive  - IGM therapy as tolerated    I will continue to monitor blood levels (Depakote, clozapine---a drug with a narrow therapeutic index= NTI) and associated labs for drug therapy implemented that require intense monitoring for toxicity as deemed appropriate based on current medication side effects and pharmacodynamically determined drug 1/2 lives. In summary, Daya Min, is a 45 y.o.  male who presents with a severe exacerbation of the principal diagnosis of Schizoaffective disorder ([facility] [address]    Patient's condition is not improving. Patient requires continued inpatient hospitalization for further stabilization, safety monitoring and medication management. I will continue to coordinate the provision of individual, milieu, occupational, group, and substance abuse therapies to address target symptoms/diagnoses as deemed appropriate for the individual patient. A coordinated, multidisplinary treatment team round was conducted with the patient (this team consists of the nurse, psychiatric unit pharmacist,  and writer). Complete current electronic health record for patient has been reviewed today including consultant notes, ancillary staff notes, nurses and psychiatric tech notes. Suicide risk assessment completed and patient deemed to be of low risk for suicide at this time. The following regarding medications was addressed during rounds with patient:   the risks and benefits of the proposed medication. The patient was given the opportunity to ask questions. Informed consent given to the use of the above medications. Will continue to adjust psychiatric and non-psychiatric medications (see above \"medication\" section and orders section for details) as deemed appropriate & based upon diagnoses and response to treatment.      I will continue to order blood tests/labs and diagnostic tests as deemed appropriate and review results as they become available (see orders for details and above listed lab/test results). I will order psychiatric records from previous [facility] to further elucidate the nature of patient's psychopathology and review once available. I will gather additional collateral information from friends, family and o/p treatment team to further elucidate the nature of patient's psychopathology and baselline level of psychiatric functioning. I certify that this patient's inpatient psychiatric hospital services furnished since the previous certification were, and continue to be, required for treatment that could reasonably be expected to improve the patient's condition, or for diagnostic study, and that the patient continues to need, on a daily basis, active treatment furnished directly by or requiring the supervision of inpatient psychiatric facility personnel. In addition, the hospital records show that services furnished were intensive treatment services, admission or related services, or equivalent services.     EXPECTED DISCHARGE DATE/DAY: TBD     DISPOSITION: Home       Signed By:   Corine Mahajan MD  10/15/2020

## 2020-10-15 NOTE — BH NOTES
Patient given Zyprexa 5 mg PO for agitation and psychosis prior to going to radiology. Medication was not effective--as evidenced by early termination of MRI due to the patient not following commands and moving around inside the machine. Technician stated that she would submit the incomplete images for the radiologist to review.

## 2020-10-15 NOTE — BH NOTES
Right shoulder abscess evaluated by Dr. Xochilt Metzger (surgeon) and covered with dry dressing as ordered. Patient is afebrile (T=36.9) with no signs and symptoms of wound infection noted.

## 2020-10-15 NOTE — BH NOTES
Assumed care for the patient following day shift at [address] [place] [address]. Patient presents as altered and isolative. Patient spent a majority of the shift in their room but when out in the hallway appeared confused and preoccupied. Patient was compliant with their medications but refused all other care during this shift. Patient was guarded during conversation and did not voice any concerns. Patient denies any S/I, H/I, and AVH. Patient slept a total of 6 hours.

## 2020-10-16 PROCEDURE — 74011250637 HC RX REV CODE- 250/637: Performed by: SURGERY

## 2020-10-16 PROCEDURE — 65220000003 HC RM SEMIPRIVATE PSYCH

## 2020-10-16 PROCEDURE — 99232 SBSQ HOSP IP/OBS MODERATE 35: CPT | Performed by: PSYCHIATRY & NEUROLOGY

## 2020-10-16 PROCEDURE — 74011250637 HC RX REV CODE- 250/637: Performed by: PSYCHIATRY & NEUROLOGY

## 2020-10-16 RX ADMIN — SULFAMETHOXAZOLE AND TRIMETHOPRIM 1 TABLET: 800; 160 TABLET ORAL at 21:16

## 2020-10-16 RX ADMIN — SULFAMETHOXAZOLE AND TRIMETHOPRIM 1 TABLET: 800; 160 TABLET ORAL at 08:55

## 2020-10-16 RX ADMIN — CLOZAPINE 450 MG: 100 TABLET ORAL at 21:16

## 2020-10-16 NOTE — INTERDISCIPLINARY ROUNDS
[facility] Interdisciplinary Rounds Patient Name: Rachel Raymond  Age: 45 y.o. Room/Bed:  313/01 Primary Diagnosis: Schizoaffective disorder ([facility] [address] Admission Status: Forced Medication Order Readmission within 30 days: no 
Power of  in place: unk Patient requires a blocked bed: no, in a room alone Reason for blocked bed: n/a Order for blocked bed obtained: no    
 
Sleep hours: 7.5 Morning Labs completed per orders:  na   
Participation in Care/Groups:  no 
Medication Compliant?: Yes PRNS (last 24 hours): None Restraints (last 24 hours):  no 
Substance Abuse:  no   
24 hour chart check complete:   
 
Patient goal(s) for today: Follow unit directions  
Treatment team focus/goals: Stabilize - increase Clozaril  
 
Progress note: Treatment team visited pt in his room. Pt has unkempt appearance and was malodorous. Pt not responding to treatment team questions. Pt had assessment for recommitment hearing with Gómez MARINO at 9 am 10/16/2020 
  
LOS:  28           Expected LOS: TBD 
  
Financial concerns/prescription coverage: [employer] Medicare Part A&B and [facility] and TDO Family contact: Sister/DENISE Holden Fey [phone] 50/59 to update on MRI. Sister did not  phone. Family requesting physician contact today: spoke 10/14 Discharge plan: Home Access to weapons: None Outpatient provider(s): Gómez MARINO - updated 10/2 Patient's preferred phone number for follow up [id number] 
  
Participating treatment team members: Edwin Golden MSRYAN and Dr. Cammie Galvan.

## 2020-10-16 NOTE — GROUP NOTE
JENELLE  GROUP DOCUMENTATION INDIVIDUAL Group Therapy Note Date: 10/16/2020 Group Start Time: 1500 Group End Time: 6498 Group Topic: Recreational/Music Therapy [facility] [place] [facility] [address] ACUTE BEHAV [facility] [place], [address] GROUP DOCUMENTATION GROUP Group Therapy Note Attendees: 8 Attendance: Did not attend Patient's Goal: Interventions/techniques: 
 
Jax Guzman

## 2020-10-16 NOTE — BH NOTES
Assumed nursing care of Fran Land after receiving the 1900 change of shift report. Fran Land presented with ungroomed, disheveled appearance and presented with flat affect and depressed mood, staying mostly in his room and isolating. Pt did come out into the hallway close to his room and paced some, walked some in circles at the beginning of the shift. He would sometimes flap his feet or hands. He did not interact with peers. He was also mute when writer attempted to perform a nursing assessment. Monitoring closely for mood chgs, behavior and for safety. Pt was med compliant but made no comment when instructed on the meds. Addendum at 1962: Fran Land has slept 7.5 hrous. He has remained in bed and is resting quietly in bed at this time.

## 2020-10-16 NOTE — BH NOTES
0700  Received report from [facility], RN. Assumed care of the patient. Pt lying in bed upon assessment. Pt continues to be selectively mute and refusing to answer assessment questions at this time. Pt refused v/s as well.     0855  Pt compliant with scheduled medication    1205  Pt in day room for lunch    1230  Got pt to take a shower but pt refused to have a bandage placed on wound to his R axillary/shoulder area    1700  Pt in day room for dinner

## 2020-10-16 NOTE — PROGRESS NOTES
Problem: Falls - Risk of  Goal: *Absence of Falls  Description: Document Ale Choudhury Fall Risk and appropriate interventions in the flowsheet. Outcome: Progressing Towards Goal  Note: Fall Risk Interventions:       Mentation Interventions: Reorient patient, Room close to nurse's station    Medication Interventions: Teach patient to arise slowly    Elimination Interventions:  Toileting schedule/hourly rounds              Problem: Altered Thought Process (Adult/Pediatric)  Goal: *STG: Remains safe in hospital  Outcome: Progressing Towards Goal  Goal: *STG: Complies with medication therapy  Outcome: Progressing Towards Goal  Goal: *STG: Attends activities and groups  Outcome: Not Progressing Towards Goal

## 2020-10-16 NOTE — BH NOTES
GROUP THERAPY PROGRESS NOTE    Patient did not participate in Discharge Group.      Roseann Bellamy, PRANEETH, PMHNP Student

## 2020-10-16 NOTE — GROUP NOTE
JENELLE  GROUP DOCUMENTATION INDIVIDUAL Group Therapy Note Date: 10/16/2020 Group Start Time: 0900 Group End Time: 1000 Group Topic: Topic Group [facility] [place] 3 ACUTE BEH[address] [facility], [address] GROUP DOCUMENTATION GROUP Group Therapy Note Attendees: 2 Attendance: Did not attend Patient's Goal: Interventions/techniques: 
 
Ish Every

## 2020-10-16 NOTE — PROGRESS NOTES
Problem: Falls - Risk of  Goal: *Absence of Falls  Description: Document Diya Wesley Fall Risk and appropriate interventions in the flowsheet. Outcome: Progressing Towards Goal  Note: Fall Risk Interventions:       Mentation Interventions: Reorient patient, Room close to nurse's station    Medication Interventions: Teach patient to arise slowly    Elimination Interventions:  Toileting schedule/hourly rounds              Problem: Altered Thought Process (Adult/Pediatric)  Goal: *STG: Participates in treatment plan  Outcome: Progressing Towards Goal  Goal: *STG: Remains safe in hospital  Outcome: Progressing Towards Goal  Goal: *STG: Complies with medication therapy  Outcome: Progressing Towards Goal

## 2020-10-17 PROCEDURE — 74011250637 HC RX REV CODE- 250/637: Performed by: SURGERY

## 2020-10-17 PROCEDURE — 74011250637 HC RX REV CODE- 250/637: Performed by: PSYCHIATRY & NEUROLOGY

## 2020-10-17 PROCEDURE — 65220000003 HC RM SEMIPRIVATE PSYCH

## 2020-10-17 PROCEDURE — 74011250636 HC RX REV CODE- 250/636: Performed by: NURSE PRACTITIONER

## 2020-10-17 RX ADMIN — DIPHENHYDRAMINE HYDROCHLORIDE 50 MG: 50 INJECTION, SOLUTION INTRAMUSCULAR; INTRAVENOUS at 20:30

## 2020-10-17 RX ADMIN — SULFAMETHOXAZOLE AND TRIMETHOPRIM 1 TABLET: 800; 160 TABLET ORAL at 08:54

## 2020-10-17 RX ADMIN — SULFAMETHOXAZOLE AND TRIMETHOPRIM 1 TABLET: 800; 160 TABLET ORAL at 21:22

## 2020-10-17 RX ADMIN — CLOZAPINE 450 MG: 100 TABLET ORAL at 21:22

## 2020-10-17 RX ADMIN — HALOPERIDOL LACTATE 5 MG: 5 INJECTION, SOLUTION INTRAMUSCULAR at 20:30

## 2020-10-17 NOTE — BH NOTES
Mr. Zeynep Lott is A&Ox3, but he has thought blocking during conversation and  bizarre behavior suggestive of intrusive thoughts. A couple of times today, he would  kneel down on his room floor, in a praying position, while holding his head and rocking back and forth. During this time, he would not answer any assessment questions and would remain in that position for apprx 10 minutes before lying down in his bed. However, Mr. Zeynep Lott is not violent or aggressive, and denies any SI/HI/AVH thus far.

## 2020-10-17 NOTE — BH NOTES
PSYCHIATRIC PROGRESS NOTE         Patient Name  Rudy Acosta   Date of Birth 1970   [facility] [id number]   Medical Record Number  [id number]      Age  45 y.o. PCP Unknown, Provider   Admit date:  9/18/2020    Room Number  481/83  @ [facility]   Date of Service  10/17/2020         E & M PROGRESS NOTE:         HISTORY       CC:  \"psychosis\"  HISTORY OF PRESENT ILLNESS/INTERVAL HISTORY:  (reviewed/updated 10/17/2020). per initial evaluation: Itzel Sifuentes \"Corey\" Danni Rouse is a 45year old male with a history of schizophrenia admitted to the [facility] for increased psychosis. He has not been bathing or otherwise caring for himself at home. He was reportedly hospitalized at [facility] from April - June 2020. He does not verbally answer any questions. He is well known to this writer from previous admissions in the [address] [place] [address] area. He has responded well to ECT in the past.    09/21 - overnight, patient incontinent of urine, placed into diaper. Patient appeared catatonic at times, but was speaking, non-spontaneously, to the team this morning. Per nursing, the patient has significantly poor self care, grossly internally preoccucpied and with no ability to care for himself. SW reports that sister is patient's caretaker and that he has been decompensating from an already poor baseline recently, requiring prompting to eat meals and unable to perform basic ADLs. Family states he improved on Clozaril and they are requesting the team restart this medication. Of note, patient got [place] injection two weeks ago, VPA level suggests compliance with medication. 09/22 - patient has been isolative to bed, up for meals, selectively mute. Shakes and nods his head to stimuli. Patient slept 11 hours overnight, and is in bed on assessment. He is mute, does not endorse any pain or discomfort but is largely unresponsive to questions. Vitals unremarkable, BP mildly elevated.     09/23 - patient in bed much of the day, per nursing he got up and showered, and spoke briefly about wanting to go home. He is not compliant with medication; patient seen in his room, he is somnolent, shakes and nods his head, doesn't open his eyes for the duration of the interview. Patient slept 8 hours overnight. 09/24 - patient refused vital signs and medications this morning. He remains isolative to bed for much of the day. Per nursing assessment, the patient has been internally preoccupied, selectively mute, slept 9.5 hours overnight. Patient briefed on the importance of medication compliance, does not voice understanding of the situation. 09/25 - overnight, patient was visible, pacing, largely incoherent but with no aggression noted. No PRNs were given to the patient but he is refusing several doses of medication (refused VPA, Klonopin AM doses); he is taking Clozaril. Patient noted to have repetitive movements, selectively mute on interview, won't get out of bed. Family asking for neurologic workup, MRI as he has not had this before. 09/28 - patient isolative, still selectively mute, took medication and allowed vitals this morning but refused labwork (CBC for Clozaril is overdue). Patient refused breakfast this morning, slept 6 hours overnight. He remains internally preoccupied, unable to assess his mental state. Patient continues to refuse about half of medication administrations. Court order pending for labwork. 09/29 - no acute overnight events. Patient medication compliant, isolative, selectively mute and generally asleep for much of the day. Not voicing any complaints but with ongoing internal preoccupation; he has not had any meaningful interaction with MD since last week. Court order for tomorrow's labwork pending. 09/30 - no acute overnight events. Patient was out of his room today and participated in treatment team discussion. He continues to speak minimally but asked to go home.  Discussed medication and the need for labwork, the patient agrees but has been refusing at [facility] each morning. Patient guarded about symptoms, concrete and with no understanding of the events surrounding admission. 10/01 - court ordered medication / blood draw approved. Patient refused Klonopin and VPA this morning. Patient got bloodwork this morning, ANC WNL. Patient briefed on his treatment progress, he says nothing and does not acknowledge the treatment team. He made a comment at the onset of the interview but then went back to bed. Unable to provide any meaningful information. Per nursing, he remains mostly mute but will ask for some items. They are concerned he may be cheeking his Clozaril. 10/02 - the patient slept 10 hours overnight, self care is poor and patient remains internally preoccupied. He is out of his room for meals and comes into treatment team room for morning interview. He is unable to answer when asked his shoe size. Patient compliant with Clozaril with mouth checks to date, no PRNs given for agitation but patient still unable to adhere to ADLs without much prompting. 10/03 - patient slept 10.75 hours, he remains blunted with poor self care, but medication compliant. Patient out of room to meet with MD, denies SI/HI/AVH; he remains grossly internally preoccupied. Patient discharge focused, still speech impoverished and with poor ADLs. 10/04 - patient impoverished, in bed for much of the day. He is out for meals, requires much prompting to engage in ADLs. Goal for today is taking a shower per nursing. Patient compliant with Clozaril, vitals unremarkable thus far. Patient selectively mute, does not answer assessment questions. 10/05 - no acute overnight events; the patient slept 10 hours overnight. Patient refused AM medictions this AM, but took Clozaril last night. He remains in poor behavioral control, requiring much prompting for ADLs. Took shower last night with much encouragement.  He remains withdrawn, isolative, mental status remains impoverished. 10/06 - patient slept 9 hours overnight, he remains isolative to room. Compliant with Clozaril but refusing VPA and benzo. Patient out of bed for meals otherwise with no improvement in thought process. Discussed holding all agents except Clozaril as patient is intermittently non-compliant and had been taking these agents prior to admission, patient agrees to this. He c/o sialorrhea. Patient received no PRNs for agitation, ADLs remain poor and he needs prompting for most activities. 10/07 - no acute overnight events. Patient has been isolative, compliant with court ordered medication. Patient otherwise with no specific concerns, denies SI/HI/AVH. Patient requires prompting for most ADLs, minimally engaged in the milieu, but with no episodes of agitation or aggression. No PRNs given x24 hours. Patient's benzo and VPA held. 10/08 - patient slept 8 hours, has been isolative but medication compliant. Patient with mild tremor L > R; benzo and VPA still held due to patient refusal, patient appears to be improving on Clozaril monotherapy. Patient still grossly hypo-verbal, has been otherwise pleasant. 10/09 - overnight, patient has remained isolative; he speaks sparingly and has difficulty with his ADLs. Patient has been in fair behavioral control, per sister he is still off from his baseline. Clozaril trial continues, patient tolerating medication and thus far with no instances of significant side effects (sialorrhea). Patient discharge focused, with marked poverty of thought on interview. 10/12 - patient isolative, remains in bed for much of the day. Patient refused vitals over the weekend. Denies all symptoms, ate breakfast this morning medication compliant. Patient slept 5.5 hours overnight, he remains fairly non-responsive but is not in any distress. Patient with poor hygiene, poor self care, requires significant prompting and motivation to partake in ADLs.     10/13 - overnight, patient noted to have worsening thought process disorganization. He has disrobed completely and despite prompting will not put clothes on engage in treatment. He is compliant with court ordered medication, but RNs have had difficulty getting vital signs as he generally does not cooperate. This morning, patient noted to have an 8 cm abscess on his R shoulder, draining purulent fluid. He is unable to provide any information regarding history on the abscess as he is selectively mute. IM consulted for I&D, ABx recs. 10/14 - no acute overnight events. Patient with markedly poor hygiene, requires prompting for ADLs which he refuses intermittently (showering, eating). He is compliant with medication and otherwise with no specific concerns or distress. Patient started on Abx for cellulitis, surgical consult pending. Patient does not answer assessment questions. Of note, patient's LFTs elevated, he remains asymptomatic.    10/15 - patient has been isolative, compliant with medication, still internally preoccupied. Patient has been otherwise in fair behavioral control, markedly poor ADLs, poor hygiene. Patient seen by general surgery, awaiting recs. Patient remains selectively mute, unable to care for self. He slept 6 hours overnight, got no PRNs for agitation. MRI pending for AMS. 10/17- patient remains isolative to room, compliant with medication, still internally preoccupied. Patient has been otherwise in fair behavioral control, markedly poor ADLs, poor hygiene. Patient remains selectively mute, would not answer any of my questions. SIDE EFFECTS: (reviewed/updated 10/17/2020)  +Drooling  No noted toxicity with use of Depakote   ALLERGIES:(reviewed/updated 10/17/2020)  Allergies   Allergen Reactions    Fluphenazine Unknown (comments)     Pt is unable to communicate properly.  Penicillins Unknown (comments)     Pt is unable to communicate properly.       MEDICATIONS PRIOR TO ADMISSION:(reviewed/updated 10/17/2020)  Medications Prior to Admission   Medication Sig    FLUoxetine (PROzac) 20 mg capsule Take 20 mg by mouth daily.  benztropine (COGENTIN) 0.5 mg tablet Take 0.5 mg by mouth two (2) times a day. Indications: extrapyramidal symptoms as a result of taking the medication    LORazepam (Ativan) 1 mg tablet Take 1 mg by mouth two (2) times a day.  valproate (Depakene) 250 mg/5 mL syrup Take 1,000 mg by mouth two (2) times a day.  paliperidone palmitate (Invega Sustenna) 234 mg/1.5 mL injection 234 mg by IntraMUSCular route every twenty-one (21) days. Indications: schizophrenia    OLANZapine (ZyPREXA zydis) 20 mg disintegrating tablet Take 20 mg by mouth Daily (before dinner). Indications: schizophrenia      PAST MEDICAL HISTORY: Past medical history from the initial psychiatric evaluation has been reviewed (reviewed/updated 10/17/2020) with no additional updates (I asked patient and no additional past medical history provided). Past Medical History:   Diagnosis Date    Abscess 10/15/2020    Psychiatric disorder     Psychotic disorder ([facility]     Withdrawal syndrome ([facility]    History reviewed. No pertinent surgical history. SOCIAL HISTORY: Social history from the initial psychiatric evaluation has been reviewed (reviewed/updated 10/17/2020) with no additional updates (I asked patient and no additional social history provided).    Social History     Socioeconomic History    Marital status: SINGLE     Spouse name: Not on file    Number of children: Not on file    Years of education: Not on file    Highest education level: Not on file   Occupational History    Not on file   Social Needs    Financial resource strain: Not on file    Food insecurity     Worry: Not on file     Inability: Not on file    Transportation needs     Medical: Not on file     Non-medical: Not on file   Tobacco Use    Smoking status: Never Smoker    Smokeless tobacco: Never Used   Substance and Sexual Activity    Alcohol use: No    Drug use: No    Sexual activity: Not on file   Lifestyle    Physical activity     Days per week: Not on file     Minutes per session: Not on file    Stress: Not on file   Relationships    Social connections     Talks on phone: Not on file     Gets together: Not on file     Attends Baptism service: Not on file     Active member of club or organization: Not on file     Attends meetings of clubs or organizations: Not on file     Relationship status: Not on file    Intimate partner violence     Fear of current or ex partner: Not on file     Emotionally abused: Not on file     Physically abused: Not on file     Forced sexual activity: Not on file   Other Topics Concern    Not on file   Social History Narrative    Pt is a HS grad and is unemployed. He lives with his sister. On disability    No pending legal charge reported      FAMILY HISTORY: Family history from the initial psychiatric evaluation has been reviewed (reviewed/updated 10/17/2020) with no additional updates (I asked patient and no additional family history provided). History reviewed. No pertinent family history. REVIEW OF SYSTEMS: (reviewed/updated 10/17/2020)  Appetite:poor   Sleep: poor with DIMS (difficulty initiating & maintaining sleep)   All other Review of Systems: Negative except per HPI         [facility] (MSE):    MSE FINDINGS ARE WITHIN NORMAL LIMITS (WNL) UNLESS OTHERWISE STATED BELOW. ( ALL OF THE BELOW CATEGORIES OF THE MSE HAVE BEEN REVIEWED (reviewed 10/17/2020) AND UPDATED AS DEEMED APPROPRIATE )  General Presentation age appropriate, cooperative   Orientation disorganized   Vital Signs  See below (reviewed 10/17/2020); Vital Signs (BP, Pulse, & Temp) are within normal limits if not listed below.    Gait and Station Stable/steady, no ataxia   Musculoskeletal System No extrapyramidal symptoms (EPS); no abnormal muscular movements or Tardive Dyskinesia (TD); muscle strength and tone are within normal limits   Language No aphasia or dysarthria   Speech:  hypoverbal, increased latency of response, non-pressured and soft   Thought Processes illogical; slow rate of thoughts; poor abstract reasoning/computation   Thought Associations blocked    Thought Content poverty of content   Suicidal Ideations unable to assess   Homicidal Ideations unable to assess   Mood:  unable to asses   Affect:  flat   Memory recent  impaired   Memory remote:  impaired   Concentration/Attention:  impaired   Fund of Knowledge significantly below average   Insight:  poor   Reliability fair   Judgment:  impaired due to active psychosis          VITALS:     Patient Vitals for the past 24 hrs:   Temp Pulse Resp BP SpO2   10/17/20 0942 98.1 °F (36.7 °C) (!) 107 18 116/77 94 %   10/16/20 2100 97.6 °F (36.4 °C) 78 17 (!) 117/99 98 %     Wt Readings from Last 3 Encounters:   09/18/20 85.7 kg (189 lb)   10/15/20 85.7 kg (189 lb)   12/23/17 84.9 kg (187 lb 1.6 oz)     Temp Readings from Last 3 Encounters:   10/17/20 98.1 °F (36.7 °C)   03/28/17 97.5 °F (36.4 °C)   02/07/15 98.7 °F (37.1 °C)     BP Readings from Last 3 Encounters:   10/17/20 116/77   03/28/17 100/68   02/12/15 105/73     Pulse Readings from Last 3 Encounters:   10/17/20 (!) 107   03/28/17 67   02/12/15 87            DATA     LABORATORY DATA:(reviewed/updated 10/17/2020)  No results found for this or any previous visit (from the past 24 hour(s)). Lab Results   Component Value Date/Time    Valproic acid 97 09/18/2020 04:42 PM     No results found for: LITHM   RADIOLOGY REPORTS:(reviewed/updated 10/17/2020)  No results found.        MEDICATIONS     ALL MEDICATIONS:   Current Facility-Administered Medications   Medication Dose Route Frequency    cloZAPine (CLOZARIL) tablet 450 mg  450 mg Oral QHS    Or    OLANZapine (ZyPREXA) 5 mg in sterile water (preservative free) 1 mL injection +++COURT ORDERED MEDICATION FOR REFUSAL OF CLOZAPINE+++  5 mg IntraMUSCular QHS  trimethoprim-sulfamethoxazole (BACTRIM DS, SEPTRA DS) 160-800 mg per tablet 1 Tab  1 Tab Oral Q12H    OLANZapine (ZyPREXA) tablet 5 mg  5 mg Oral Q6H PRN    haloperidol lactate (HALDOL) injection 5 mg  5 mg IntraMUSCular Q6H PRN    benztropine (COGENTIN) tablet 1 mg  1 mg Oral BID PRN    diphenhydrAMINE (BENADRYL) injection 50 mg  50 mg IntraMUSCular BID PRN    hydrOXYzine HCL (ATARAX) tablet 50 mg  50 mg Oral TID PRN    traZODone (DESYREL) tablet 50 mg  50 mg Oral QHS PRN    acetaminophen (TYLENOL) tablet 650 mg  650 mg Oral Q4H PRN    magnesium hydroxide (MILK OF MAGNESIA) 400 mg/5 mL oral suspension 30 mL  30 mL Oral DAILY PRN      SCHEDULED MEDICATIONS:   Current Facility-Administered Medications   Medication Dose Route Frequency    cloZAPine (CLOZARIL) tablet 450 mg  450 mg Oral QHS    Or    OLANZapine (ZyPREXA) 5 mg in sterile water (preservative free) 1 mL injection +++COURT ORDERED MEDICATION FOR REFUSAL OF CLOZAPINE+++  5 mg IntraMUSCular QHS    trimethoprim-sulfamethoxazole (BACTRIM DS, SEPTRA DS) 160-800 mg per tablet 1 Tab  1 Tab Oral Q12H          ASSESSMENT & PLAN     DIAGNOSES REQUIRING ACTIVE TREATMENT AND MONITORING: (reviewed/updated 10/17/2020)  Patient Active Hospital Problem List:   Schizoaffective disorder ([facility] (9/18/2020)    Assessment: patient with significant internal preoccupation, poor ADLs, history of catatonia. Appears to have been compliant with home medication, but is regressed and with a markedly disorganized thought process. Will start Clozaril, add benzodiazepine for protection against catatonia to be tapered as Clozaril dose increases given interaction.     Plan:  COURT ORDERED MEDICATION:  - CONTINUE Clozaril 400 mg PO *OR* Zyprexa 5 mg IM QHS for psychosis    - DISCONTINUE Haldol due to poor efficacy  - DISCONTINUE Radonna Colla due to poor efficacy  - EEG for seizure rule out  - Appreciate recs from IM and gen surg re: cellulitis  - f/u LFTs, Clozaril level at next CBC draw  - HOLD Klonopin 1 mg BID for catatonia due to poor efficacy  - CBC and Clozaril level by court order  - HOLD VPA oral soln 1000 mg BID for mood lability, seizure prophylaxis due to poor efficacy, non compliance  - DISCONTINUE Prozac / Cogentin / Zydis due to polypharamacy  - Consider antihypertensive  - IGM therapy as tolerated    I will continue to monitor blood levels (Depakote, clozapine---a drug with a narrow therapeutic index= NTI) and associated labs for drug therapy implemented that require intense monitoring for toxicity as deemed appropriate based on current medication side effects and pharmacodynamically determined drug 1/2 lives. In summary, Daya Min, is a 45 y.o.  male who presents with a severe exacerbation of the principal diagnosis of Schizoaffective disorder ([facility]    Patient's condition is not improving. Patient requires continued inpatient hospitalization for further stabilization, safety monitoring and medication management. I will continue to coordinate the provision of individual, milieu, occupational, group, and substance abuse therapies to address target symptoms/diagnoses as deemed appropriate for the individual patient. A coordinated, multidisplinary treatment team round was conducted with the patient (this team consists of the nurse, psychiatric unit pharmacist,  and writer). Complete current electronic health record for patient has been reviewed today including consultant notes, ancillary staff notes, nurses and psychiatric tech notes. Suicide risk assessment completed and patient deemed to be of low risk for suicide at this time. The following regarding medications was addressed during rounds with patient:   the risks and benefits of the proposed medication. The patient was given the opportunity to ask questions. Informed consent given to the use of the above medications.  Will continue to adjust psychiatric and non-psychiatric medications (see above \"medication\" section and orders section for details) as deemed appropriate & based upon diagnoses and response to treatment. I will continue to order blood tests/labs and diagnostic tests as deemed appropriate and review results as they become available (see orders for details and above listed lab/test results). I will order psychiatric records from previous [facility] to further elucidate the nature of patient's psychopathology and review once available. I will gather additional collateral information from friends, family and o/p treatment team to further elucidate the nature of patient's psychopathology and baselline level of psychiatric functioning. I certify that this patient's inpatient psychiatric hospital services furnished since the previous certification were, and continue to be, required for treatment that could reasonably be expected to improve the patient's condition, or for diagnostic study, and that the patient continues to need, on a daily basis, active treatment furnished directly by or requiring the supervision of inpatient psychiatric facility personnel. In addition, the hospital records show that services furnished were intensive treatment services, admission or related services, or equivalent services.     EXPECTED DISCHARGE DATE/DAY: TBD     DISPOSITION: Home       Signed By:   Sandra Guerra NP  10/17/2020

## 2020-10-17 NOTE — PROGRESS NOTES
Problem: Falls - Risk of  Goal: *Absence of Falls  Description: Document Vanessa Acosta Fall Risk and appropriate interventions in the flowsheet. Outcome: Progressing Towards Goal  Note: Fall Risk Interventions:       Mentation Interventions: Reorient patient, Room close to nurse's station    Medication Interventions: Teach patient to arise slowly    Elimination Interventions:  Toileting schedule/hourly rounds              Problem: Patient Education: Go to Patient Education Activity  Goal: Patient/Family Education  Outcome: Not Progressing Towards Goal     Problem: Altered Thought Process (Adult/Pediatric)  Goal: *STG: Remains safe in hospital  Outcome: Progressing Towards Goal  Goal: *STG: Seeks staff when feelings of anxiety and fear arise  Outcome: Not Progressing Towards Goal  Goal: *STG: Complies with medication therapy  Outcome: Progressing Towards Goal  Goal: *STG: Absence of lethality  Outcome: Progressing Towards Goal  Goal: *STG: Demonstrates ability to understand and use improved judgment in daily activities and relationships  Outcome: Not Progressing Towards Goal  Goal: *LTG: Returns to baseline functioning  Outcome: Not Progressing Towards Goal     Problem: Discharge Planning  Goal: *Knowledge of medication management  Outcome: Not Progressing Towards Goal

## 2020-10-17 NOTE — INTERDISCIPLINARY ROUNDS
Behavioral Health Interdisciplinary Rounds Patient Name: Eve Vinson  Age: 45 y.o. Room/Bed:  [facility]/01 Primary Diagnosis: [facility] Admission Status: Involuntary Commitment and Forced Medication Order Readmission within 30 days: no 
Power of  in place: unknown Patient requires a blocked bed: no, in room alone Reason for blocked bed: n/a Order for blocked bed obtained: no    
 
Sleep hours: 6 Morning Labs completed per orders:  n/a Participation in Care/Groups:  no 
Medication Compliant?: Yes PRNS (last 24 hours): None Restraints (last 24 hours):  no 
Substance Abuse:  no   
24 hour chart check complete: yes

## 2020-10-17 NOTE — PROGRESS NOTES
2000: Assumed care of patient after receiving shift report from outgoing nurse. Naga Barr is kneeling on floor with his buttocks uncovered moving forward and backward in repetitive motion. He did not respond when approached by staff. No s/s of distress. 2130: Naga Barr has court ordered meds and vitals. He initially does not respond when approached but complies and allows vitals and takes meds when offered ginger ale. He has been nonverbal until he stated, \"ginger ale. \" He showered today but remains unkempt. Makes no eye contact. Affect is flat, mood is depressed and withdrawn and preoccupied. Naga Barr appears hypervigilant and possibly religiously preoccupied. No evidence of intent to harm self or others. BP is possibly inacurate due to his unwillingness to comply or allow 2nd assessment. Will continue to monitor. 2330: Naga Barr presented to hallway and stated, Ajit Scott coming to get me. \" Was reassured that he would remain on unit tonight. He requested juice and returned to room. 0220: Patient came out and walked in hallway in circles at times making rhythmic eye movements and flapping his hands in a distinctive way. Requested juice and returned to room. 0350: Sleeping in bed with even respirations. Will continue to monitor. 0510: Remains sleeping in bed with even respirations. Total hours slept 6 at present.              Problem: Altered Thought Process (Adult/Pediatric)  Goal: *STG: Demonstrates ability to understand and use improved judgment in daily activities and relationships  Outcome: Progressing Towards Goal  Goal: *LTG: Returns to baseline functioning  Outcome: Progressing Towards Goal

## 2020-10-18 PROCEDURE — 65220000003 HC RM SEMIPRIVATE PSYCH

## 2020-10-18 PROCEDURE — 74011250637 HC RX REV CODE- 250/637: Performed by: SURGERY

## 2020-10-18 PROCEDURE — 74011250637 HC RX REV CODE- 250/637: Performed by: PSYCHIATRY & NEUROLOGY

## 2020-10-18 RX ADMIN — SULFAMETHOXAZOLE AND TRIMETHOPRIM 1 TABLET: 800; 160 TABLET ORAL at 21:28

## 2020-10-18 RX ADMIN — SULFAMETHOXAZOLE AND TRIMETHOPRIM 1 TABLET: 800; 160 TABLET ORAL at 09:21

## 2020-10-18 RX ADMIN — CLOZAPINE 450 MG: 100 TABLET ORAL at 21:28

## 2020-10-18 NOTE — PROGRESS NOTES
Spiritual Care Assessment/Progress Note  [facility]      NAME: Pablo Weaver      MRN: [id number]  AGE: 45 y.o.  SEX: male  Mormon Affiliation: No Rastafari   Language: English     10/18/2020     Total Time (in minutes): 8     Spiritual Assessment begun in [place] [id number] [address] through conversation with:         [x]Patient        [] Family    [] Friend(s)        Reason for Consult: Initial/Spiritual assessment, patient floor, Request by patient     Spiritual beliefs: (Please include comment if needed)     [x] Identifies with a latoya tradition:         [] Supported by a latoya community:            [] Claims no spiritual orientation:           [] Seeking spiritual identity:                [] Adheres to an individual form of spirituality:           [] Not able to assess:                           Identified resources for coping:      [x] Prayer                               [] Music                  [] Guided Imagery     [] Family/friends                 [] Pet visits     [] Devotional reading                         [] Unknown     [] Other:                                              Interventions offered during this visit: (See comments for more details)    Patient Interventions: Affirmation of emotions/emotional suffering, Affirmation of latoya, Initial visit, Normalization of emotional/spiritual concerns           Plan of Care:     [] Support spiritual and/or cultural needs    [] Support AMD and/or advance care planning process      [] Support grieving process   [] Coordinate Rites and/or Rituals    [] Coordination with community clergy   [] No spiritual needs identified at this time   [] Detailed Plan of Care below (See Comments)  [] Make referral to Music Therapy  [] Make referral to Pet Therapy     [] Make referral to Addiction services  [] Make referral to [facility]  [] Make referral to Spiritual Care Partner  [] No future visits requested        [] Follow up visits as needed Comments: Provided an initial spiritual assessment and pastoral presence for patient [facility] per patient request. Evern Lawman patient desire to share his God-image and desire for prayer. Processed the gris of being in community. Provided Ginger-Anisa and ice for pt upon request. Assured of prayers. Chaplains will follow as able and/or needed.     [place] 1 KERRI Desai 1 Provider   Paging Service [phone])

## 2020-10-18 NOTE — BH NOTES
PSYCHIATRIC PROGRESS NOTE         Patient Name  Nafisa Santana   Date of Birth 1970   [facility] [id number]   Medical Record Number  [id number]      Age  45 y.o. PCP Unknown, Provider   Admit date:  9/18/2020    Room Number  414/32  @ [facility]   Date of Service  10/18/2020         E & M PROGRESS NOTE:         HISTORY       CC:  \"psychosis\"  HISTORY OF PRESENT ILLNESS/INTERVAL HISTORY:  (reviewed/updated 10/18/2020). per initial evaluation: Se Carmona \"Corey\" Ruben Maradiaga is a 45year old male with a history of schizophrenia admitted to the [facility] for increased psychosis. He has not been bathing or otherwise caring for himself at home. He was reportedly hospitalized at [facility] from April - June 2020. He does not verbally answer any questions. He is well known to this writer from previous admissions in the [place] area. He has responded well to ECT in the past.    09/21 - overnight, patient incontinent of urine, placed into diaper. Patient appeared catatonic at times, but was speaking, non-spontaneously, to the team this morning. Per nursing, the patient has significantly poor self care, grossly internally preoccucpied and with no ability to care for himself. SW reports that sister is patient's caretaker and that he has been decompensating from an already poor baseline recently, requiring prompting to eat meals and unable to perform basic ADLs. Family states he improved on Clozaril and they are requesting the team restart this medication. Of note, patient got [place] injection two weeks ago, VPA level suggests compliance with medication. 09/22 - patient has been isolative to bed, up for meals, selectively mute. Shakes and nods his head to stimuli. Patient slept 11 hours overnight, and is in bed on assessment. He is mute, does not endorse any pain or discomfort but is largely unresponsive to questions. Vitals unremarkable, BP mildly elevated.     09/23 - patient in bed much of the day, per nursing he got up and showered, and spoke briefly about wanting to go home. He is not compliant with medication; patient seen in his room, he is somnolent, shakes and nods his head, doesn't open his eyes for the duration of the interview. Patient slept 8 hours overnight. 09/24 - patient refused vital signs and medications this morning. He remains isolative to bed for much of the day. Per nursing assessment, the patient has been internally preoccupied, selectively mute, slept 9.5 hours overnight. Patient briefed on the importance of medication compliance, does not voice understanding of the situation. 09/25 - overnight, patient was visible, pacing, largely incoherent but with no aggression noted. No PRNs were given to the patient but he is refusing several doses of medication (refused VPA, Klonopin AM doses); he is taking Clozaril. Patient noted to have repetitive movements, selectively mute on interview, won't get out of bed. Family asking for neurologic workup, MRI as he has not had this before. 09/28 - patient isolative, still selectively mute, took medication and allowed vitals this morning but refused labwork (CBC for Clozaril is overdue). Patient refused breakfast this morning, slept 6 hours overnight. He remains internally preoccupied, unable to assess his mental state. Patient continues to refuse about half of medication administrations. Court order pending for labwork. 09/29 - no acute overnight events. Patient medication compliant, isolative, selectively mute and generally asleep for much of the day. Not voicing any complaints but with ongoing internal preoccupation; he has not had any meaningful interaction with MD since last week. Court order for tomorrow's labwork pending. 09/30 - no acute overnight events. Patient was out of his room today and participated in treatment team discussion. He continues to speak minimally but asked to go home.  Discussed medication and the need for labwork, the patient agrees but has been refusing at [address] each morning. Patient guarded about symptoms, concrete and with no understanding of the events surrounding admission. 10/01 - court ordered medication / blood draw approved. Patient refused Klonopin and VPA this morning. Patient got bloodwork this morning, ANC WNL. Patient briefed on his treatment progress, he says nothing and does not acknowledge the treatment team. He made a comment at the onset of the interview but then went back to bed. Unable to provide any meaningful information. Per nursing, he remains mostly mute but will ask for some items. They are concerned he may be cheeking his Clozaril. 10/02 - the patient slept 10 hours overnight, self care is poor and patient remains internally preoccupied. He is out of his room for meals and comes into treatment team room for morning interview. He is unable to answer when asked his shoe size. Patient compliant with Clozaril with mouth checks to date, no PRNs given for agitation but patient still unable to adhere to ADLs without much prompting. 10/03 - patient slept 10.75 hours, he remains blunted with poor self care, but medication compliant. Patient out of room to meet with MD, denies SI/HI/AVH; he remains grossly internally preoccupied. Patient discharge focused, still speech impoverished and with poor ADLs. 10/04 - patient impoverished, in bed for much of the day. He is out for meals, requires much prompting to engage in ADLs. Goal for today is taking a shower per nursing. Patient compliant with Clozaril, vitals unremarkable thus far. Patient selectively mute, does not answer assessment questions. 10/05 - no acute overnight events; the patient slept 10 hours overnight. Patient refused AM medictions this AM, but took Clozaril last night. He remains in poor behavioral control, requiring much prompting for ADLs. Took shower last night with much encouragement.  He remains withdrawn, isolative, mental status remains impoverished. 10/06 - patient slept 9 hours overnight, he remains isolative to room. Compliant with Clozaril but refusing VPA and benzo. Patient out of bed for meals otherwise with no improvement in thought process. Discussed holding all agents except Clozaril as patient is intermittently non-compliant and had been taking these agents prior to admission, patient agrees to this. He c/o sialorrhea. Patient received no PRNs for agitation, ADLs remain poor and he needs prompting for most activities. 10/07 - no acute overnight events. Patient has been isolative, compliant with court ordered medication. Patient otherwise with no specific concerns, denies SI/HI/AVH. Patient requires prompting for most ADLs, minimally engaged in the milieu, but with no episodes of agitation or aggression. No PRNs given x24 hours. Patient's benzo and VPA held. 10/08 - patient slept 8 hours, has been isolative but medication compliant. Patient with mild tremor L > R; benzo and VPA still held due to patient refusal, patient appears to be improving on Clozaril monotherapy. Patient still grossly hypo-verbal, has been otherwise pleasant. 10/09 - overnight, patient has remained isolative; he speaks sparingly and has difficulty with his ADLs. Patient has been in fair behavioral control, per sister he is still off from his baseline. Clozaril trial continues, patient tolerating medication and thus far with no instances of significant side effects (sialorrhea). Patient discharge focused, with marked poverty of thought on interview. 10/12 - patient isolative, remains in bed for much of the day. Patient refused vitals over the weekend. Denies all symptoms, ate breakfast this morning medication compliant. Patient slept 5.5 hours overnight, he remains fairly non-responsive but is not in any distress. Patient with poor hygiene, poor self care, requires significant prompting and motivation to partake in ADLs.     10/13 - overnight, patient noted to have worsening thought process disorganization. He has disrobed completely and despite prompting will not put clothes on engage in treatment. He is compliant with court ordered medication, but RNs have had difficulty getting vital signs as he generally does not cooperate. This morning, patient noted to have an 8 cm abscess on his R shoulder, draining purulent fluid. He is unable to provide any information regarding history on the abscess as he is selectively mute. IM consulted for I&D, ABx recs. 10/14 - no acute overnight events. Patient with markedly poor hygiene, requires prompting for ADLs which he refuses intermittently (showering, eating). He is compliant with medication and otherwise with no specific concerns or distress. Patient started on Abx for cellulitis, surgical consult pending. Patient does not answer assessment questions. Of note, patient's LFTs elevated, he remains asymptomatic.    10/15 - patient has been isolative, compliant with medication, still internally preoccupied. Patient has been otherwise in fair behavioral control, markedly poor ADLs, poor hygiene. Patient seen by general surgery, awaiting recs. Patient remains selectively mute, unable to care for self. He slept 6 hours overnight, got no PRNs for agitation. MRI pending for AMS. 10/17- patient remains isolative to room, compliant with medication, still internally preoccupied. Patient has been otherwise in fair behavioral control, markedly poor ADLs, poor hygiene. Patient remains selectively mute, would not answer any of my questions. 10/18- patient remains isolative to room, compliant with medication, still internally preoccupied. Patient was given PRN medications last night after he was on the floor rocking back and forth and pulling his hair and then getting in the shower with his clothes on. He continues to have markedly poor ADLs, poor hygiene. Patient remains selectively mute, would not answer any of my questions. SIDE EFFECTS: (reviewed/updated 10/18/2020)  +Drooling  No noted toxicity with use of Depakote   ALLERGIES:(reviewed/updated 10/18/2020)  Allergies   Allergen Reactions    Fluphenazine Unknown (comments)     Pt is unable to communicate properly.  Penicillins Unknown (comments)     Pt is unable to communicate properly. MEDICATIONS PRIOR TO ADMISSION:(reviewed/updated 10/18/2020)  Medications Prior to Admission   Medication Sig    FLUoxetine (PROzac) 20 mg capsule Take 20 mg by mouth daily.  benztropine (COGENTIN) 0.5 mg tablet Take 0.5 mg by mouth two (2) times a day. Indications: extrapyramidal symptoms as a result of taking the medication    LORazepam (Ativan) 1 mg tablet Take 1 mg by mouth two (2) times a day.  valproate (Depakene) 250 mg/5 mL syrup Take 1,000 mg by mouth two (2) times a day.  paliperidone palmitate (Invega Sustenna) 234 mg/1.5 mL injection 234 mg by IntraMUSCular route every twenty-one (21) days. Indications: schizophrenia    OLANZapine (ZyPREXA zydis) 20 mg disintegrating tablet Take 20 mg by mouth Daily (before dinner). Indications: schizophrenia      PAST MEDICAL HISTORY: Past medical history from the initial psychiatric evaluation has been reviewed (reviewed/updated 10/18/2020) with no additional updates (I asked patient and no additional past medical history provided). Past Medical History:   Diagnosis Date    Abscess 10/15/2020    Psychiatric disorder     Psychotic disorder ([facility] [address]     Withdrawal syndrome ([facility] [address]    History reviewed. No pertinent surgical history. SOCIAL HISTORY: Social history from the initial psychiatric evaluation has been reviewed (reviewed/updated 10/18/2020) with no additional updates (I asked patient and no additional social history provided).    Social History     Socioeconomic History    Marital status: SINGLE     Spouse name: Not on file    Number of children: Not on file    Years of education: Not on file    Highest education level: Not on file   Occupational History    Not on file   Social Needs    Financial resource strain: Not on file    Food insecurity     Worry: Not on file     Inability: Not on file    Transportation needs     Medical: Not on file     Non-medical: Not on file   Tobacco Use    Smoking status: Never Smoker    Smokeless tobacco: Never Used   Substance and Sexual Activity    Alcohol use: No    Drug use: No    Sexual activity: Not on file   Lifestyle    Physical activity     Days per week: Not on file     Minutes per session: Not on file    Stress: Not on file   Relationships    Social connections     Talks on phone: Not on file     Gets together: Not on file     Attends [employer] service: Not on file     Active member of club or organization: Not on file     Attends meetings of clubs or organizations: Not on file     Relationship status: Not on file    Intimate partner violence     Fear of current or ex partner: Not on file     Emotionally abused: Not on file     Physically abused: Not on file     Forced sexual activity: Not on file   Other Topics Concern    Not on file   Social History Narrative    Pt is a HS grad and is unemployed. He lives with his sister. On disability    No pending legal charge reported      FAMILY HISTORY: Family history from the initial psychiatric evaluation has been reviewed (reviewed/updated 10/18/2020) with no additional updates (I asked patient and no additional family history provided). History reviewed. No pertinent family history.     REVIEW OF SYSTEMS: (reviewed/updated 10/18/2020)  Appetite:poor   Sleep: poor with DIMS (difficulty initiating & maintaining sleep)   All other Review of Systems: Negative except per HPI         [facility] (MSE):    MSE FINDINGS ARE WITHIN NORMAL LIMITS (WNL) UNLESS OTHERWISE STATED BELOW. ( ALL OF THE BELOW CATEGORIES OF THE MSE HAVE BEEN REVIEWED (reviewed 10/18/2020) AND UPDATED AS DEEMED APPROPRIATE )  General Presentation age appropriate, cooperative   Orientation disorganized   Vital Signs  See below (reviewed 10/18/2020); Vital Signs (BP, Pulse, & Temp) are within normal limits if not listed below. Gait and Station Stable/steady, no ataxia   Musculoskeletal System No extrapyramidal symptoms (EPS); no abnormal muscular movements or Tardive Dyskinesia (TD); muscle strength and tone are within normal limits   Language No aphasia or dysarthria   Speech:  hypoverbal, increased latency of response, non-pressured and soft   Thought Processes illogical; slow rate of thoughts; poor abstract reasoning/computation   Thought Associations blocked    Thought Content poverty of content   Suicidal Ideations unable to assess   Homicidal Ideations unable to assess   Mood:  unable to asses   Affect:  flat   Memory recent  impaired   Memory remote:  impaired   Concentration/Attention:  impaired   Fund of Knowledge significantly below average   Insight:  poor   Reliability fair   Judgment:  impaired due to active psychosis          VITALS:     Patient Vitals for the past 24 hrs:   Temp Pulse Resp BP SpO2   10/17/20 1958 98.4 °F (36.9 °C) 89 17 121/78 99 %   10/17/20 1252  96        Wt Readings from Last 3 Encounters:   09/18/20 85.7 kg (189 lb)   10/15/20 85.7 kg (189 lb)   12/23/17 84.9 kg (187 lb 1.6 oz)     Temp Readings from Last 3 Encounters:   10/17/20 98.4 °F (36.9 °C)   03/28/17 97.5 °F (36.4 °C)   02/07/15 98.7 °F (37.1 °C)     BP Readings from Last 3 Encounters:   10/17/20 121/78   03/28/17 100/68   02/12/15 105/73     Pulse Readings from Last 3 Encounters:   10/17/20 89   03/28/17 67   02/12/15 87            DATA     LABORATORY DATA:(reviewed/updated 10/18/2020)  No results found for this or any previous visit (from the past 24 hour(s)).   Lab Results   Component Value Date/Time    Valproic acid 97 09/18/2020 04:42 PM     No results found for: LITHM   RADIOLOGY REPORTS:(reviewed/updated 10/18/2020)  No results found.       MEDICATIONS     ALL MEDICATIONS:   Current Facility-Administered Medications   Medication Dose Route Frequency    cloZAPine (CLOZARIL) tablet 450 mg  450 mg Oral QHS    Or    OLANZapine (ZyPREXA) 5 mg in sterile water (preservative free) 1 mL injection +++COURT ORDERED MEDICATION FOR REFUSAL OF CLOZAPINE+++  5 mg IntraMUSCular QHS    trimethoprim-sulfamethoxazole (BACTRIM DS, SEPTRA DS) 160-800 mg per tablet 1 Tab  1 Tab Oral Q12H    OLANZapine (ZyPREXA) tablet 5 mg  5 mg Oral Q6H PRN    haloperidol lactate (HALDOL) injection 5 mg  5 mg IntraMUSCular Q6H PRN    benztropine (COGENTIN) tablet 1 mg  1 mg Oral BID PRN    diphenhydrAMINE (BENADRYL) injection 50 mg  50 mg IntraMUSCular BID PRN    hydrOXYzine HCL (ATARAX) tablet 50 mg  50 mg Oral TID PRN    traZODone (DESYREL) tablet 50 mg  50 mg Oral QHS PRN    acetaminophen (TYLENOL) tablet 650 mg  650 mg Oral Q4H PRN    magnesium hydroxide (MILK OF MAGNESIA) 400 mg/5 mL oral suspension 30 mL  30 mL Oral DAILY PRN      SCHEDULED MEDICATIONS:   Current Facility-Administered Medications   Medication Dose Route Frequency    cloZAPine (CLOZARIL) tablet 450 mg  450 mg Oral QHS    Or    OLANZapine (ZyPREXA) 5 mg in sterile water (preservative free) 1 mL injection +++COURT ORDERED MEDICATION FOR REFUSAL OF CLOZAPINE+++  5 mg IntraMUSCular QHS    trimethoprim-sulfamethoxazole (BACTRIM DS, SEPTRA DS) 160-800 mg per tablet 1 Tab  1 Tab Oral Q12H          ASSESSMENT & PLAN     DIAGNOSES REQUIRING ACTIVE TREATMENT AND MONITORING: (reviewed/updated 10/18/2020)  Patient Active Hospital Problem List:   Schizoaffective disorder ([facility] [address] (9/18/2020)    Assessment: patient with significant internal preoccupation, poor ADLs, history of catatonia. Appears to have been compliant with home medication, but is regressed and with a markedly disorganized thought process.  Will start Clozaril, add benzodiazepine for protection against catatonia to be tapered as Clozaril dose increases given interaction. Plan:  COURT ORDERED MEDICATION:  - CONTINUE Clozaril 400 mg PO *OR* Zyprexa 5 mg IM QHS for psychosis    - DISCONTINUE Haldol due to poor efficacy  - DISCONTINUE [place] due to poor efficacy  - EEG for seizure rule out  - Appreciate recs from IM and gen surg re: cellulitis  - f/u LFTs, Clozaril level at next CBC draw  - HOLD Klonopin 1 mg BID for catatonia due to poor efficacy  - CBC and Clozaril level by court order  - HOLD VPA oral soln 1000 mg BID for mood lability, seizure prophylaxis due to poor efficacy, non compliance  - DISCONTINUE Prozac / Cogentin / Zydis due to polypharamacy  - Consider antihypertensive  - IGM therapy as tolerated    I will continue to monitor blood levels (Depakote, clozapine---a drug with a narrow therapeutic index= NTI) and associated labs for drug therapy implemented that require intense monitoring for toxicity as deemed appropriate based on current medication side effects and pharmacodynamically determined drug 1/2 lives. In summary, Calvin Sheridan, is a 45 y.o.  male who presents with a severe exacerbation of the principal diagnosis of Schizoaffective disorder ([facility] [address]    Patient's condition is not improving. Patient requires continued inpatient hospitalization for further stabilization, safety monitoring and medication management. I will continue to coordinate the provision of individual, milieu, occupational, group, and substance abuse therapies to address target symptoms/diagnoses as deemed appropriate for the individual patient. A coordinated, multidisplinary treatment team round was conducted with the patient (this team consists of the nurse, psychiatric unit pharmacist,  and writer). Complete current electronic health record for patient has been reviewed today including consultant notes, ancillary staff notes, nurses and psychiatric tech notes.     Suicide risk assessment completed and patient deemed to be of low risk for suicide at this time. The following regarding medications was addressed during rounds with patient:   the risks and benefits of the proposed medication. The patient was given the opportunity to ask questions. Informed consent given to the use of the above medications. Will continue to adjust psychiatric and non-psychiatric medications (see above \"medication\" section and orders section for details) as deemed appropriate & based upon diagnoses and response to treatment. I will continue to order blood tests/labs and diagnostic tests as deemed appropriate and review results as they become available (see orders for details and above listed lab/test results). I will order psychiatric records from previous [facility] hospitals to further elucidate the nature of patient's psychopathology and review once available. I will gather additional collateral information from friends, family and o/p treatment team to further elucidate the nature of patient's psychopathology and baselline level of psychiatric functioning. I certify that this patient's inpatient psychiatric hospital services furnished since the previous certification were, and continue to be, required for treatment that could reasonably be expected to improve the patient's condition, or for diagnostic study, and that the patient continues to need, on a daily basis, active treatment furnished directly by or requiring the supervision of inpatient psychiatric facility personnel. In addition, the hospital records show that services furnished were intensive treatment services, admission or related services, or equivalent services.     EXPECTED DISCHARGE DATE/DAY: TBD     DISPOSITION: Home       Signed By:   Rosendo Elias NP  10/18/2020

## 2020-10-18 NOTE — PROGRESS NOTES
Problem: Altered Thought Process (Adult/Pediatric)  Goal: *STG: Complies with medication therapy  Outcome: Progressing Towards Goal     Problem: Altered Thought Process (Adult/Pediatric)  Goal: *STG: Participates in treatment plan  Outcome: Not Progressing Towards Goal  Goal: *STG: Attends activities and groups  Outcome: Not Progressing Towards Goal

## 2020-10-18 NOTE — BH NOTES
Assumed care of patient and given report by off going shift Annmarie Dooley RN. Pt was in his room when entering and was on the floor in a kneeling position folded over (as if praying) with his hands on either side of his head pulling at his hair and rocking. He was reported to have been doing this several times today as well. He was making a soft whining sound and would not respond after several attempts at getting him to tell me what was wrong. He continued to rock and appeared internally preoccupied and may have been hearing voices. Pt continues to have bizarre behavior and will have minimal interaction with staff on assessment. Pt then got up and went in his bathroom and went in shower with his gown on, game back out and left the shower running and had water all over in the bathroom and then put another gown on. He was walking in circles pulling at his head and then was back on the floor again holding his head and rocking back and forth. Pt was given prn IM benadryl/haldol and then shortly after was out in the hallway walking in a [place] and not responding and at one point looked like he was rolling his eyes up into his head but when I called his name, they straightened out and he looked at me. Pt went in dayroom and ate his snack and then came right back to his room but then laid on his bed. He was given his court ordered medication and was compliant but irritable after several attempts to get him to take them. He laid on the bed like he was sleeping and was ignoring this writer, which he has done several time before. Pt continues on ATB for abscess/cellulitis to his R shoulder. He refuses to answer any questions regarding SI/HI or AVH as well as anxiety and depression; but pt did appear anxious earlier when visibly seeing his body reaction. Pt refused evening group. He has been medication and meal compliant. He continues to isolate and has no interaction with peers and limited with staff.   He did have shower today per staff but needs prompting when in the shower or he will not wash at all. Pt remains unkempt and is incontinent at times and needs reminding to change his briefs. He continues on 15 min safety checks. Will continue to monitor and treat.

## 2020-10-18 NOTE — PROGRESS NOTES
Problem: Altered Thought Process (Adult/Pediatric)  Goal: *STG: Participates in treatment plan  Outcome: Progressing Towards Goal  Goal: *STG: Complies with medication therapy  Outcome: Progressing Towards Goal  Goal: *STG: Attends activities and groups  Outcome: Not Progressing Towards Goal  Goal: *STG: Decreased hallucinations  Outcome: Not Progressing Towards Goal  Goal: *LTG: Returns to baseline functioning  Outcome: Not Progressing Towards Goal

## 2020-10-18 NOTE — INTERDISCIPLINARY ROUNDS
Behavioral Health Interdisciplinary Rounds  
  
 Patient Name: Edwin Anderson                      Age: 45 y. o.            Room/Bed:  180/34 Primary Diagnosis: Schizoaffective disorder (HCC)        
Admission Status: Involuntary Commitment                           
Readmission within 30 days: no Power of  in place: no Patient requires a blocked bed: yes           
Reason for blocked bed: behavior Order for blocked bed obtained: no 
    
Sleep hours: 10.50 Morning Labs completed per orders: None ordered                                 
Participation in Care/Groups:  no 
Medication Compliant?: Yes PRNS (last 24 hours): IM Benadryl, IM Haldol               
Restraints (last 24 hours):  no Substance Abuse:  no              
24 hour chart check complete:

## 2020-10-19 PROCEDURE — 65220000003 HC RM SEMIPRIVATE PSYCH

## 2020-10-19 PROCEDURE — 99232 SBSQ HOSP IP/OBS MODERATE 35: CPT | Performed by: PSYCHIATRY & NEUROLOGY

## 2020-10-19 PROCEDURE — 74011250637 HC RX REV CODE- 250/637: Performed by: PSYCHIATRY & NEUROLOGY

## 2020-10-19 PROCEDURE — 74011250637 HC RX REV CODE- 250/637: Performed by: SURGERY

## 2020-10-19 RX ORDER — CLOZAPINE 25 MG/1
50 TABLET ORAL DAILY
Status: DISCONTINUED | OUTPATIENT
Start: 2020-10-20 | End: 2020-10-28

## 2020-10-19 RX ADMIN — CLOZAPINE 450 MG: 100 TABLET ORAL at 21:21

## 2020-10-19 RX ADMIN — SULFAMETHOXAZOLE AND TRIMETHOPRIM 1 TABLET: 800; 160 TABLET ORAL at 21:21

## 2020-10-19 RX ADMIN — SULFAMETHOXAZOLE AND TRIMETHOPRIM 1 TABLET: 800; 160 TABLET ORAL at 09:27

## 2020-10-19 NOTE — PROGRESS NOTES
Problem: Falls - Risk of  Goal: *Absence of Falls  Description: Document Rivas Bradshaw Fall Risk and appropriate interventions in the flowsheet. Outcome: Progressing Towards Goal  Note: Fall Risk Interventions:       Mentation Interventions: Reorient patient, Room close to nurse's station    Medication Interventions: Teach patient to arise slowly    Elimination Interventions:  Toileting schedule/hourly rounds              Problem: Altered Thought Process (Adult/Pediatric)  Goal: *STG: Participates in treatment plan  Outcome: Progressing Towards Goal  Goal: *STG: Remains safe in hospital  Outcome: Progressing Towards Goal  Goal: *STG: Complies with medication therapy  Outcome: Progressing Towards Goal

## 2020-10-19 NOTE — BH NOTES
PSYCHIATRIC PROGRESS NOTE         Patient Name  Eve Vinson   Date of Birth 1970   [facility] [id number]   Medical Record Number  [id number]      Age  45 y.o. PCP Unknown, Provider   Admit date:  9/18/2020    Room Number  254/96  @ [facility]   Date of Service  10/19/2020         E & M PROGRESS NOTE:         HISTORY       CC:  \"psychosis\"  HISTORY OF PRESENT ILLNESS/INTERVAL HISTORY:  (reviewed/updated 10/19/2020). per initial evaluation: Vincent Mosley \"Corey\" Mallory Arriaza is a 45year old male with a history of schizophrenia admitted to the [facility] for increased psychosis. He has not been bathing or otherwise caring for himself at home. He was reportedly hospitalized at [facility] from April - June 2020. He does not verbally answer any questions. He is well known to this writer from previous admissions in the [place] area. He has responded well to ECT in the past.    09/21 - overnight, patient incontinent of urine, placed into diaper. Patient appeared catatonic at times, but was speaking, non-spontaneously, to the team this morning. Per nursing, the patient has significantly poor self care, grossly internally preoccucpied and with no ability to care for himself. SW reports that sister is patient's caretaker and that he has been decompensating from an already poor baseline recently, requiring prompting to eat meals and unable to perform basic ADLs. Family states he improved on Clozaril and they are requesting the team restart this medication. Of note, patient got [place] injection two weeks ago, VPA level suggests compliance with medication. 09/22 - patient has been isolative to bed, up for meals, selectively mute. Shakes and nods his head to stimuli. Patient slept 11 hours overnight, and is in bed on assessment. He is mute, does not endorse any pain or discomfort but is largely unresponsive to questions. Vitals unremarkable, BP mildly elevated.     09/23 - patient in bed much of the day, per nursing he got up and showered, and spoke briefly about wanting to go home. He is not compliant with medication; patient seen in his room, he is somnolent, shakes and nods his head, doesn't open his eyes for the duration of the interview. Patient slept 8 hours overnight. 09/24 - patient refused vital signs and medications this morning. He remains isolative to bed for much of the day. Per nursing assessment, the patient has been internally preoccupied, selectively mute, slept 9.5 hours overnight. Patient briefed on the importance of medication compliance, does not voice understanding of the situation. 09/25 - overnight, patient was visible, pacing, largely incoherent but with no aggression noted. No PRNs were given to the patient but he is refusing several doses of medication (refused VPA, Klonopin AM doses); he is taking Clozaril. Patient noted to have repetitive movements, selectively mute on interview, won't get out of bed. Family asking for neurologic workup, MRI as he has not had this before. 09/28 - patient isolative, still selectively mute, took medication and allowed vitals this morning but refused labwork (CBC for Clozaril is overdue). Patient refused breakfast this morning, slept 6 hours overnight. He remains internally preoccupied, unable to assess his mental state. Patient continues to refuse about half of medication administrations. Court order pending for labwork. 09/29 - no acute overnight events. Patient medication compliant, isolative, selectively mute and generally asleep for much of the day. Not voicing any complaints but with ongoing internal preoccupation; he has not had any meaningful interaction with MD since last week. Court order for tomorrow's labwork pending. 09/30 - no acute overnight events. Patient was out of his room today and participated in treatment team discussion. He continues to speak minimally but asked to go home.  Discussed medication and the need for labwork, the patient agrees but has been refusing at [facility] each morning. Patient guarded about symptoms, concrete and with no understanding of the events surrounding admission. 10/01 - court ordered medication / blood draw approved. Patient refused Klonopin and VPA this morning. Patient got bloodwork this morning, ANC WNL. Patient briefed on his treatment progress, he says nothing and does not acknowledge the treatment team. He made a comment at the onset of the interview but then went back to bed. Unable to provide any meaningful information. Per nursing, he remains mostly mute but will ask for some items. They are concerned he may be cheeking his Clozaril. 10/02 - the patient slept 10 hours overnight, self care is poor and patient remains internally preoccupied. He is out of his room for meals and comes into treatment team room for morning interview. He is unable to answer when asked his shoe size. Patient compliant with Clozaril with mouth checks to date, no PRNs given for agitation but patient still unable to adhere to ADLs without much prompting. 10/03 - patient slept 10.75 hours, he remains blunted with poor self care, but medication compliant. Patient out of room to meet with MD, denies SI/HI/AVH; he remains grossly internally preoccupied. Patient discharge focused, still speech impoverished and with poor ADLs. 10/04 - patient impoverished, in bed for much of the day. He is out for meals, requires much prompting to engage in ADLs. Goal for today is taking a shower per nursing. Patient compliant with Clozaril, vitals unremarkable thus far. Patient selectively mute, does not answer assessment questions. 10/05 - no acute overnight events; the patient slept 10 hours overnight. Patient refused AM medictions this AM, but took Clozaril last night. He remains in poor behavioral control, requiring much prompting for ADLs. Took shower last night with much encouragement.  He remains withdrawn, isolative, mental status remains impoverished. 10/06 - patient slept 9 hours overnight, he remains isolative to room. Compliant with Clozaril but refusing VPA and benzo. Patient out of bed for meals otherwise with no improvement in thought process. Discussed holding all agents except Clozaril as patient is intermittently non-compliant and had been taking these agents prior to admission, patient agrees to this. He c/o sialorrhea. Patient received no PRNs for agitation, ADLs remain poor and he needs prompting for most activities. 10/07 - no acute overnight events. Patient has been isolative, compliant with court ordered medication. Patient otherwise with no specific concerns, denies SI/HI/AVH. Patient requires prompting for most ADLs, minimally engaged in the milieu, but with no episodes of agitation or aggression. No PRNs given x24 hours. Patient's benzo and VPA held. 10/08 - patient slept 8 hours, has been isolative but medication compliant. Patient with mild tremor L > R; benzo and VPA still held due to patient refusal, patient appears to be improving on Clozaril monotherapy. Patient still grossly hypo-verbal, has been otherwise pleasant. 10/09 - overnight, patient has remained isolative; he speaks sparingly and has difficulty with his ADLs. Patient has been in fair behavioral control, per sister he is still off from his baseline. Clozaril trial continues, patient tolerating medication and thus far with no instances of significant side effects (sialorrhea). Patient discharge focused, with marked poverty of thought on interview. 10/12 - patient isolative, remains in bed for much of the day. Patient refused vitals over the weekend. Denies all symptoms, ate breakfast this morning medication compliant. Patient slept 5.5 hours overnight, he remains fairly non-responsive but is not in any distress. Patient with poor hygiene, poor self care, requires significant prompting and motivation to partake in ADLs.     10/13 - overnight, patient noted to have worsening thought process disorganization. He has disrobed completely and despite prompting will not put clothes on engage in treatment. He is compliant with court ordered medication, but RNs have had difficulty getting vital signs as he generally does not cooperate. This morning, patient noted to have an 8 cm abscess on his R shoulder, draining purulent fluid. He is unable to provide any information regarding history on the abscess as he is selectively mute. IM consulted for I&D, ABx recs. 10/14 - no acute overnight events. Patient with markedly poor hygiene, requires prompting for ADLs which he refuses intermittently (showering, eating). He is compliant with medication and otherwise with no specific concerns or distress. Patient started on Abx for cellulitis, surgical consult pending. Patient does not answer assessment questions. Of note, patient's LFTs elevated, he remains asymptomatic.    10/15 - patient has been isolative, compliant with medication, still internally preoccupied. Patient has been otherwise in fair behavioral control, markedly poor ADLs, poor hygiene. Patient seen by general surgery, awaiting recs. Patient remains selectively mute, unable to care for self. He slept 6 hours overnight, got no PRNs for agitation. MRI pending for AMS. 10/17- patient remains isolative to room, compliant with medication, still internally preoccupied. Patient has been otherwise in fair behavioral control, markedly poor ADLs, poor hygiene. Patient remains selectively mute, would not answer any of my questions. 10/18- patient remains isolative to room, compliant with medication, still internally preoccupied. Patient was given PRN medications last night after he was on the floor rocking back and forth and pulling his hair and then getting in the shower with his clothes on. He continues to have markedly poor ADLs, poor hygiene. Patient remains selectively mute, would not answer any of my questions. 10/19 - patient seen in his room this morning, he is naked and sitting cross legged on his bed rocking and rubbing his legs. He does not appear to be in any distress but does not answer questions appropriately. The patient allows MD to assess his shoulder, which is healing with minimal drainage today. Patient requests to go home, is advised to put on some clothes and call his sister, which he states he will do. Per chart review, patient later told SW he will go to his daughter's home upon discharge (he does not have a daughter)        SIDE EFFECTS: (reviewed/updated 10/19/2020)  +Drooling  No noted toxicity with use of Depakote   ALLERGIES:(reviewed/updated 10/19/2020)  Allergies   Allergen Reactions    Fluphenazine Unknown (comments)     Pt is unable to communicate properly.  Penicillins Unknown (comments)     Pt is unable to communicate properly. MEDICATIONS PRIOR TO ADMISSION:(reviewed/updated 10/19/2020)  Medications Prior to Admission   Medication Sig    FLUoxetine (PROzac) 20 mg capsule Take 20 mg by mouth daily.  benztropine (COGENTIN) 0.5 mg tablet Take 0.5 mg by mouth two (2) times a day. Indications: extrapyramidal symptoms as a result of taking the medication    LORazepam (Ativan) 1 mg tablet Take 1 mg by mouth two (2) times a day.  valproate (Depakene) 250 mg/5 mL syrup Take 1,000 mg by mouth two (2) times a day.  paliperidone palmitate (Invega Sustenna) 234 mg/1.5 mL injection 234 mg by IntraMUSCular route every twenty-one (21) days. Indications: schizophrenia    OLANZapine (ZyPREXA zydis) 20 mg disintegrating tablet Take 20 mg by mouth Daily (before dinner). Indications: schizophrenia      PAST MEDICAL HISTORY: Past medical history from the initial psychiatric evaluation has been reviewed (reviewed/updated 10/19/2020) with no additional updates (I asked patient and no additional past medical history provided).    Past Medical History:   Diagnosis Date    Abscess 10/15/2020    Psychiatric disorder     Psychotic disorder ([facility] [address]     Withdrawal syndrome ([facility] [address]    History reviewed. No pertinent surgical history. SOCIAL HISTORY: Social history from the initial psychiatric evaluation has been reviewed (reviewed/updated 10/19/2020) with no additional updates (I asked patient and no additional social history provided). Social History     Socioeconomic History    Marital status: SINGLE     Spouse name: Not on file    Number of children: Not on file    Years of education: Not on file    Highest education level: Not on file   Occupational History    Not on file   Social Needs    Financial resource strain: Not on file    Food insecurity     Worry: Not on file     Inability: Not on file    Transportation needs     Medical: Not on file     Non-medical: Not on file   Tobacco Use    Smoking status: Never Smoker    Smokeless tobacco: Never Used   Substance and Sexual Activity    Alcohol use: No    Drug use: No    Sexual activity: Not on file   Lifestyle    Physical activity     Days per week: Not on file     Minutes per session: Not on file    Stress: Not on file   Relationships    Social connections     Talks on phone: Not on file     Gets together: Not on file     Attends Oriental orthodox service: Not on file     Active member of club or organization: Not on file     Attends meetings of clubs or organizations: Not on file     Relationship status: Not on file    Intimate partner violence     Fear of current or ex partner: Not on file     Emotionally abused: Not on file     Physically abused: Not on file     Forced sexual activity: Not on file   Other Topics Concern    Not on file   Social History Narrative    Pt is a HS grad and is unemployed. He lives with his sister.  On disability    No pending legal charge reported      FAMILY HISTORY: Family history from the initial psychiatric evaluation has been reviewed (reviewed/updated 10/19/2020) with no additional updates (I asked patient and no additional family history provided). History reviewed. No pertinent family history. REVIEW OF SYSTEMS: (reviewed/updated 10/19/2020)  Appetite:poor   Sleep: poor with DIMS (difficulty initiating & maintaining sleep)   All other Review of Systems: Negative except per HPI         [facility] (MSE):    MSE FINDINGS ARE WITHIN NORMAL LIMITS (WNL) UNLESS OTHERWISE STATED BELOW. ( ALL OF THE BELOW CATEGORIES OF THE MSE HAVE BEEN REVIEWED (reviewed 10/19/2020) AND UPDATED AS DEEMED APPROPRIATE )  General Presentation age appropriate, cooperative   Orientation disorganized   Vital Signs  See below (reviewed 10/19/2020); Vital Signs (BP, Pulse, & Temp) are within normal limits if not listed below. Gait and Station Stable/steady, no ataxia   Musculoskeletal System No extrapyramidal symptoms (EPS); no abnormal muscular movements or Tardive Dyskinesia (TD); muscle strength and tone are within normal limits   Language No aphasia or dysarthria   Speech:  hypoverbal, increased latency of response, non-pressured and soft   Thought Processes illogical; slow rate of thoughts; poor abstract reasoning/computation   Thought Associations blocked    Thought Content poverty of content   Suicidal Ideations unable to assess   Homicidal Ideations unable to assess   Mood:  unable to asses   Affect:  flat   Memory recent  impaired   Memory remote:  impaired   Concentration/Attention:  impaired   Fund of Knowledge significantly below average   Insight:  poor   Reliability fair   Judgment:  impaired due to active psychosis          VITALS:     No data found.   Wt Readings from Last 3 Encounters:   09/18/20 85.7 kg (189 lb)   10/15/20 85.7 kg (189 lb)   12/23/17 84.9 kg (187 lb 1.6 oz)     Temp Readings from Last 3 Encounters:   10/18/20 98.3 °F (36.8 °C)   03/28/17 97.5 °F (36.4 °C)   02/07/15 98.7 °F (37.1 °C)     BP Readings from Last 3 Encounters:   10/18/20 117/84   03/28/17 100/68   02/12/15 105/73 Pulse Readings from Last 3 Encounters:   10/18/20 98   03/28/17 67   02/12/15 87            DATA     LABORATORY DATA:(reviewed/updated 10/19/2020)  No results found for this or any previous visit (from the past 24 hour(s)). Lab Results   Component Value Date/Time    Valproic acid 97 09/18/2020 04:42 PM     No results found for: LITHM   RADIOLOGY REPORTS:(reviewed/updated 10/19/2020)  No results found.        MEDICATIONS     ALL MEDICATIONS:   Current Facility-Administered Medications   Medication Dose Route Frequency    cloZAPine (CLOZARIL) tablet 450 mg  450 mg Oral QHS    Or    OLANZapine (ZyPREXA) 5 mg in sterile water (preservative free) 1 mL injection +++COURT ORDERED MEDICATION FOR REFUSAL OF CLOZAPINE+++  5 mg IntraMUSCular QHS    trimethoprim-sulfamethoxazole (BACTRIM DS, SEPTRA DS) 160-800 mg per tablet 1 Tab  1 Tab Oral Q12H    OLANZapine (ZyPREXA) tablet 5 mg  5 mg Oral Q6H PRN    haloperidol lactate (HALDOL) injection 5 mg  5 mg IntraMUSCular Q6H PRN    benztropine (COGENTIN) tablet 1 mg  1 mg Oral BID PRN    diphenhydrAMINE (BENADRYL) injection 50 mg  50 mg IntraMUSCular BID PRN    hydrOXYzine HCL (ATARAX) tablet 50 mg  50 mg Oral TID PRN    traZODone (DESYREL) tablet 50 mg  50 mg Oral QHS PRN    acetaminophen (TYLENOL) tablet 650 mg  650 mg Oral Q4H PRN    magnesium hydroxide (MILK OF MAGNESIA) 400 mg/5 mL oral suspension 30 mL  30 mL Oral DAILY PRN      SCHEDULED MEDICATIONS:   Current Facility-Administered Medications   Medication Dose Route Frequency    cloZAPine (CLOZARIL) tablet 450 mg  450 mg Oral QHS    Or    OLANZapine (ZyPREXA) 5 mg in sterile water (preservative free) 1 mL injection +++COURT ORDERED MEDICATION FOR REFUSAL OF CLOZAPINE+++  5 mg IntraMUSCular QHS    trimethoprim-sulfamethoxazole (BACTRIM DS, SEPTRA DS) 160-800 mg per tablet 1 Tab  1 Tab Oral Q12H          ASSESSMENT & PLAN     DIAGNOSES REQUIRING ACTIVE TREATMENT AND MONITORING: (reviewed/updated 10/19/2020)  Patient C/Alaina [facility]resp[facility] [address] Problem List:   Schizoaffective disorder ([facility] [address] (9/18/2020)    Assessment: patient with significant internal preoccupation, poor ADLs, history of catatonia. Appears to have been compliant with home medication, but is regressed and with a markedly disorganized thought process. Will start Clozaril, add benzodiazepine for protection against catatonia to be tapered as Clozaril dose increases given interaction. Plan:  COURT ORDERED MEDICATION:  - INCREASE Clozaril to 500 mg PO *OR* Zyprexa 5 mg IM QHS for psychosis    - DISCONTINUE Haldol due to poor efficacy  - DISCONTINUE [place] due to poor efficacy  - EEG for seizure rule out  - Appreciate recs from IM and gen surg re: cellulitis  - f/u LFTs, Clozaril level at next CBC draw  - HOLD Klonopin 1 mg BID for catatonia due to poor efficacy  - CBC and Clozaril level by court order  - HOLD VPA oral soln 1000 mg BID for mood lability, seizure prophylaxis due to poor efficacy, non compliance  - DISCONTINUE Prozac / Cogentin / Zydis due to polypharamacy  - Consider antihypertensive  - IGM therapy as tolerated    I will continue to monitor blood levels (Depakote, clozapine---a drug with a narrow therapeutic index= NTI) and associated labs for drug therapy implemented that require intense monitoring for toxicity as deemed appropriate based on current medication side effects and pharmacodynamically determined drug 1/2 lives. In summary, Smith Virk, is a 45 y.o.  male who presents with a severe exacerbation of the principal diagnosis of Schizoaffective disorder ([facility] [address]    Patient's condition is not improving. Patient requires continued inpatient hospitalization for further stabilization, safety monitoring and medication management.   I will continue to coordinate the provision of individual, milieu, occupational, group, and substance abuse therapies to address target symptoms/diagnoses as deemed appropriate for the individual patient. A coordinated, multidisplinary treatment team round was conducted with the patient (this team consists of the nurse, psychiatric unit pharmacist,  and writer). Complete current electronic health record for patient has been reviewed today including consultant notes, ancillary staff notes, nurses and psychiatric tech notes. Suicide risk assessment completed and patient deemed to be of low risk for suicide at this time. The following regarding medications was addressed during rounds with patient:   the risks and benefits of the proposed medication. The patient was given the opportunity to ask questions. Informed consent given to the use of the above medications. Will continue to adjust psychiatric and non-psychiatric medications (see above \"medication\" section and orders section for details) as deemed appropriate & based upon diagnoses and response to treatment. I will continue to order blood tests/labs and diagnostic tests as deemed appropriate and review results as they become available (see orders for details and above listed lab/test results). I will order psychiatric records from previous [facility] hospitals to further elucidate the nature of patient's psychopathology and review once available. I will gather additional collateral information from friends, family and o/p treatment team to further elucidate the nature of patient's psychopathology and baselline level of psychiatric functioning. I certify that this patient's inpatient psychiatric hospital services furnished since the previous certification were, and continue to be, required for treatment that could reasonably be expected to improve the patient's condition, or for diagnostic study, and that the patient continues to need, on a daily basis, active treatment furnished directly by or requiring the supervision of inpatient psychiatric facility personnel.  In addition, the hospital records show that services furnished were intensive treatment services, admission or related services, or equivalent services.     EXPECTED DISCHARGE DATE/DAY: TBD     DISPOSITION: Home       Signed By:   Enio Harris MD  10/19/2020

## 2020-10-19 NOTE — BH NOTES
PSYCHIATRIC PROGRESS NOTE         Patient Name  [place]   Date of Birth 1970   [facility] [id number]   Medical Record Number  [id number]      Age  45 y.o. PCP Unknown, Provider   Admit date:  9/18/2020    Room Number  764/74  @ [facility]   Date of Service  10/19/2020         E & M PROGRESS NOTE:         HISTORY       CC:  \"psychosis\"  HISTORY OF PRESENT ILLNESS/INTERVAL HISTORY:  (reviewed/updated 10/19/2020). per initial evaluation: Andrew Stephensonen \"Corey\" Melbourne Spurling is a 45year old male with a history of schizophrenia admitted to the [facility] for increased psychosis. He has not been bathing or otherwise caring for himself at home. He was reportedly hospitalized at [facility] from April - June 2020. He does not verbally answer any questions. He is well known to this writer from previous admissions in the [place] area. He has responded well to ECT in the past.    09/21 - overnight, patient incontinent of urine, placed into diaper. Patient appeared catatonic at times, but was speaking, non-spontaneously, to the team this morning. Per nursing, the patient has significantly poor self care, grossly internally preoccucpied and with no ability to care for himself. SW reports that sister is patient's caretaker and that he has been decompensating from an already poor baseline recently, requiring prompting to eat meals and unable to perform basic ADLs. Family states he improved on Clozaril and they are requesting the team restart this medication. Of note, patient got Radu Poser injection two weeks ago, VPA level suggests compliance with medication. 09/22 - patient has been isolative to bed, up for meals, selectively mute. Shakes and nods his head to stimuli. Patient slept 11 hours overnight, and is in bed on assessment. He is mute, does not endorse any pain or discomfort but is largely unresponsive to questions. Vitals unremarkable, BP mildly elevated.     09/23 - patient in bed much of the day, per nursing he got up and showered, and spoke briefly about wanting to go home. He is not compliant with medication; patient seen in his room, he is somnolent, shakes and nods his head, doesn't open his eyes for the duration of the interview. Patient slept 8 hours overnight. 09/24 - patient refused vital signs and medications this morning. He remains isolative to bed for much of the day. Per nursing assessment, the patient has been internally preoccupied, selectively mute, slept 9.5 hours overnight. Patient briefed on the importance of medication compliance, does not voice understanding of the situation. 09/25 - overnight, patient was visible, pacing, largely incoherent but with no aggression noted. No PRNs were given to the patient but he is refusing several doses of medication (refused VPA, Klonopin AM doses); he is taking Clozaril. Patient noted to have repetitive movements, selectively mute on interview, won't get out of bed. Family asking for neurologic workup, MRI as he has not had this before. 09/28 - patient isolative, still selectively mute, took medication and allowed vitals this morning but refused labwork (CBC for Clozaril is overdue). Patient refused breakfast this morning, slept 6 hours overnight. He remains internally preoccupied, unable to assess his mental state. Patient continues to refuse about half of medication administrations. Court order pending for labwork. 09/29 - no acute overnight events. Patient medication compliant, isolative, selectively mute and generally asleep for much of the day. Not voicing any complaints but with ongoing internal preoccupation; he has not had any meaningful interaction with MD since last week. Court order for tomorrow's labwork pending. 09/30 - no acute overnight events. Patient was out of his room today and participated in treatment team discussion. He continues to speak minimally but asked to go home.  Discussed medication and the need for labwork, the patient agrees but has been refusing at [facility] each morning. Patient guarded about symptoms, concrete and with no understanding of the events surrounding admission. 10/01 - court ordered medication / blood draw approved. Patient refused Klonopin and VPA this morning. Patient got bloodwork this morning, ANC WNL. Patient briefed on his treatment progress, he says nothing and does not acknowledge the treatment team. He made a comment at the onset of the interview but then went back to bed. Unable to provide any meaningful information. Per nursing, he remains mostly mute but will ask for some items. They are concerned he may be cheeking his Clozaril. 10/02 - the patient slept 10 hours overnight, self care is poor and patient remains internally preoccupied. He is out of his room for meals and comes into treatment team room for morning interview. He is unable to answer when asked his shoe size. Patient compliant with Clozaril with mouth checks to date, no PRNs given for agitation but patient still unable to adhere to ADLs without much prompting. 10/03 - patient slept 10.75 hours, he remains blunted with poor self care, but medication compliant. Patient out of room to meet with MD, denies SI/HI/AVH; he remains grossly internally preoccupied. Patient discharge focused, still speech impoverished and with poor ADLs. 10/04 - patient impoverished, in bed for much of the day. He is out for meals, requires much prompting to engage in ADLs. Goal for today is taking a shower per nursing. Patient compliant with Clozaril, vitals unremarkable thus far. Patient selectively mute, does not answer assessment questions. 10/05 - no acute overnight events; the patient slept 10 hours overnight. Patient refused AM medictions this AM, but took Clozaril last night. He remains in poor behavioral control, requiring much prompting for ADLs. Took shower last night with much encouragement.  He remains withdrawn, isolative, mental status remains impoverished. 10/06 - patient slept 9 hours overnight, he remains isolative to room. Compliant with Clozaril but refusing VPA and benzo. Patient out of bed for meals otherwise with no improvement in thought process. Discussed holding all agents except Clozaril as patient is intermittently non-compliant and had been taking these agents prior to admission, patient agrees to this. He c/o sialorrhea. Patient received no PRNs for agitation, ADLs remain poor and he needs prompting for most activities. 10/07 - no acute overnight events. Patient has been isolative, compliant with court ordered medication. Patient otherwise with no specific concerns, denies SI/HI/AVH. Patient requires prompting for most ADLs, minimally engaged in the milieu, but with no episodes of agitation or aggression. No PRNs given x24 hours. Patient's benzo and VPA held. 10/08 - patient slept 8 hours, has been isolative but medication compliant. Patient with mild tremor L > R; benzo and VPA still held due to patient refusal, patient appears to be improving on Clozaril monotherapy. Patient still grossly hypo-verbal, has been otherwise pleasant. 10/09 - overnight, patient has remained isolative; he speaks sparingly and has difficulty with his ADLs. Patient has been in fair behavioral control, per sister he is still off from his baseline. Clozaril trial continues, patient tolerating medication and thus far with no instances of significant side effects (sialorrhea). Patient discharge focused, with marked poverty of thought on interview. 10/12 - patient isolative, remains in bed for much of the day. Patient refused vitals over the weekend. Denies all symptoms, ate breakfast this morning medication compliant. Patient slept 5.5 hours overnight, he remains fairly non-responsive but is not in any distress. Patient with poor hygiene, poor self care, requires significant prompting and motivation to partake in ADLs.     10/13 - overnight, patient noted to have worsening thought process disorganization. He has disrobed completely and despite prompting will not put clothes on engage in treatment. He is compliant with court ordered medication, but RNs have had difficulty getting vital signs as he generally does not cooperate. This morning, patient noted to have an 8 cm abscess on his R shoulder, draining purulent fluid. He is unable to provide any information regarding history on the abscess as he is selectively mute. IM consulted for I&D, ABx recs. 10/14 - no acute overnight events. Patient with markedly poor hygiene, requires prompting for ADLs which he refuses intermittently (showering, eating). He is compliant with medication and otherwise with no specific concerns or distress. Patient started on Abx for cellulitis, surgical consult pending. Patient does not answer assessment questions. Of note, patient's LFTs elevated, he remains asymptomatic.    10/15 - patient has been isolative, compliant with medication, still internally preoccupied. Patient has been otherwise in fair behavioral control, markedly poor ADLs, poor hygiene. Patient seen by general surgery, awaiting recs. Patient remains selectively mute, unable to care for self. He slept 6 hours overnight, got no PRNs for agitation. MRI pending for AMS. 10/16 - no significant improvement today. Patient went for MRI which revealed cysts in the posterior fossa and sinus, appears incidental. His thought process has not appeared to change with Clozaril titration to date, and patient remains unable to voice any concerns or progress; he only asks to go home and is told to call his primary caretaker, his sister, as she will be coordinating his disposition. He is unable to do so at this time, though he voices understanding of the situation.         SIDE EFFECTS: (reviewed/updated 10/19/2020)  +Drooling  No noted toxicity with use of Depakote   ALLERGIES:(reviewed/updated 10/19/2020)  Allergies   Allergen Reactions    Fluphenazine Unknown (comments)     Pt is unable to communicate properly.  Penicillins Unknown (comments)     Pt is unable to communicate properly. MEDICATIONS PRIOR TO ADMISSION:(reviewed/updated 10/19/2020)  Medications Prior to Admission   Medication Sig    FLUoxetine (PROzac) 20 mg capsule Take 20 mg by mouth daily.  benztropine (COGENTIN) 0.5 mg tablet Take 0.5 mg by mouth two (2) times a day. Indications: extrapyramidal symptoms as a result of taking the medication    LORazepam (Ativan) 1 mg tablet Take 1 mg by mouth two (2) times a day.  valproate (Depakene) 250 mg/5 mL syrup Take 1,000 mg by mouth two (2) times a day.  paliperidone palmitate (Invega Sustenna) 234 mg/1.5 mL injection 234 mg by IntraMUSCular route every twenty-one (21) days. Indications: schizophrenia    OLANZapine (ZyPREXA zydis) 20 mg disintegrating tablet Take 20 mg by mouth Daily (before dinner). Indications: schizophrenia      PAST MEDICAL HISTORY: Past medical history from the initial psychiatric evaluation has been reviewed (reviewed/updated 10/19/2020) with no additional updates (I asked patient and no additional past medical history provided). Past Medical History:   Diagnosis Date    Abscess 10/15/2020    Psychiatric disorder     Psych[facility] disorder [facility] [address]     Withdrawal syndrome ([facility] [address]    History reviewed. No pertinent surgical history. SOCIAL HISTORY: Social history from the initial psychiatric evaluation has been reviewed (reviewed/updated 10/19/2020) with no additional updates (I asked patient and no additional social history provided).    Social History     Socioeconomic History    Marital status: SINGLE     Spouse name: Not on file    Number of children: Not on file    Years of education: Not on file    Highest education level: Not on file   Occupational History    Not on file   Social Needs    Financial resource strain: Not on file    Food insecurity     Worry: Not on file Inability: Not on file    Transportation needs     Medical: Not on file     Non-medical: Not on file   Tobacco Use    Smoking status: Never Smoker    Smokeless tobacco: Never Used   Substance and Sexual Activity    Alcohol use: No    Drug use: No    Sexual activity: Not on file   Lifestyle    Physical activity     Days per week: Not on file     Minutes per session: Not on file    Stress: Not on file   Relationships    Social connections     Talks on phone: Not on file     Gets together: Not on file     Attends Orthodoxy service: Not on file     Active member of club or organization: Not on file     Attends meetings of clubs or organizations: Not on file     Relationship status: Not on file    Intimate partner violence     Fear of current or ex partner: Not on file     Emotionally abused: Not on file     Physically abused: Not on file     Forced sexual activity: Not on file   Other Topics Concern    Not on file   Social History Narrative    Pt is a HS grad and is unemployed. He lives with his sister. On disability    No pending legal charge reported      FAMILY HISTORY: Family history from the initial psychiatric evaluation has been reviewed (reviewed/updated 10/19/2020) with no additional updates (I asked patient and no additional family history provided). History reviewed. No pertinent family history. REVIEW OF SYSTEMS: (reviewed/updated 10/19/2020)  Appetite:poor   Sleep: poor with DIMS (difficulty initiating & maintaining sleep)   All other Review of Systems: Negative except per HPI         [facility] (MSE):    MSE FINDINGS ARE WITHIN NORMAL LIMITS (WNL) UNLESS OTHERWISE STATED BELOW. ( ALL OF THE BELOW CATEGORIES OF THE MSE HAVE BEEN REVIEWED (reviewed 10/19/2020) AND UPDATED AS DEEMED APPROPRIATE )  General Presentation age appropriate, cooperative   Orientation disorganized   Vital Signs  See below (reviewed 10/19/2020);  Vital Signs (BP, Pulse, & Temp) are within normal limits if not listed below. Gait and Station Stable/steady, no ataxia   Musculoskeletal System No extrapyramidal symptoms (EPS); no abnormal muscular movements or Tardive Dyskinesia (TD); muscle strength and tone are within normal limits   Language No aphasia or dysarthria   Speech:  hypoverbal, increased latency of response, non-pressured and soft   Thought Processes illogical; slow rate of thoughts; poor abstract reasoning/computation   Thought Associations blocked    Thought Content poverty of content   Suicidal Ideations unable to assess   Homicidal Ideations unable to assess   Mood:  unable to asses   Affect:  flat   Memory recent  impaired   Memory remote:  impaired   Concentration/Attention:  impaired   Fund of Knowledge significantly below average   Insight:  poor   Reliability fair   Judgment:  impaired due to active psychosis          VITALS:     No data found. Wt Readings from Last 3 Encounters:   09/18/20 85.7 kg (189 lb)   10/15/20 85.7 kg (189 lb)   12/23/17 84.9 kg (187 lb 1.6 oz)     Temp Readings from Last 3 Encounters:   10/18/20 98.3 °F (36.8 °C)   03/28/17 97.5 °F (36.4 °C)   02/07/15 98.7 °F (37.1 °C)     BP Readings from Last 3 Encounters:   10/18/20 117/84   03/28/17 100/68   02/12/15 105/73     Pulse Readings from Last 3 Encounters:   10/18/20 98   03/28/17 67   02/12/15 87            DATA     LABORATORY DATA:(reviewed/updated 10/19/2020)  No results found for this or any previous visit (from the past 24 hour(s)). Lab Results   Component Value Date/Time    Valproic acid 97 09/18/2020 04:42 PM     No results found for: LITHM   RADIOLOGY REPORTS:(reviewed/updated 10/19/2020)  No results found.        MEDICATIONS     ALL MEDICATIONS:   Current Facility-Administered Medications   Medication Dose Route Frequency    cloZAPine (CLOZARIL) tablet 450 mg  450 mg Oral QHS    Or    OLANZapine (ZyPREXA) 5 mg in sterile water (preservative free) 1 mL injection +++COURT ORDERED MEDICATION FOR REFUSAL OF CLOZAPINE+++  5 mg IntraMUSCular QHS    trimethoprim-sulfamethoxazole (BACTRIM DS, SEPTRA DS) 160-800 mg per tablet 1 Tab  1 Tab Oral Q12H    OLANZapine (ZyPREXA) tablet 5 mg  5 mg Oral Q6H PRN    haloperidol lactate (HALDOL) injection 5 mg  5 mg IntraMUSCular Q6H PRN    benztropine (COGENTIN) tablet 1 mg  1 mg Oral BID PRN    diphenhydrAMINE (BENADRYL) injection 50 mg  50 mg IntraMUSCular BID PRN    hydrOXYzine HCL (ATARAX) tablet 50 mg  50 mg Oral TID PRN    traZODone (DESYREL) tablet 50 mg  50 mg Oral QHS PRN    acetaminophen (TYLENOL) tablet 650 mg  650 mg Oral Q4H PRN    magnesium hydroxide (MILK OF MAGNESIA) 400 mg/5 mL oral suspension 30 mL  30 mL Oral DAILY PRN      SCHEDULED MEDICATIONS:   Current Facility-Administered Medications   Medication Dose Route Frequency    cloZAPine (CLOZARIL) tablet 450 mg  450 mg Oral QHS    Or    OLANZapine (ZyPREXA) 5 mg in sterile water (preservative free) 1 mL injection +++COURT ORDERED MEDICATION FOR REFUSAL OF CLOZAPINE+++  5 mg IntraMUSCular QHS    trimethoprim-sulfamethoxazole (BACTRIM DS, SEPTRA DS) 160-800 mg per tablet 1 Tab  1 Tab Oral Q12H          ASSESSMENT & PLAN     DIAGNOSES REQUIRING ACTIVE TREATMENT AND MONITORING: (reviewed/updated 10/19/2020)  Patient Active Hospital Problem List:   Schizoaffective disorder ([facility] [address] (9/18/2020)    Assessment: patient with significant internal preoccupation, poor ADLs, history of catatonia. Appears to have been compliant with home medication, but is regressed and with a markedly disorganized thought process. Will start Clozaril, add benzodiazepine for protection against catatonia to be tapered as Clozaril dose increases given interaction.     Plan:  COURT ORDERED MEDICATION:  - INCREASE Clozaril to 450 mg PO *OR* Zyprexa 5 mg IM QHS for psychosis    - DISCONTINUE Haldol due to poor efficacy  - DISCONTINUE Kari Raider due to poor efficacy  - EEG for seizure rule out  - Appreciate recs from IM and gen surg re: cellulitis  - f/u LFTs, Clozaril level at next CBC draw  - HOLD Klonopin 1 mg BID for catatonia due to poor efficacy  - CBC and Clozaril level by court order  - HOLD VPA oral soln 1000 mg BID for mood lability, seizure prophylaxis due to poor efficacy, non compliance  - DISCONTINUE Prozac / Cogentin / Zydis due to polypharamacy  - Consider antihypertensive  - IGM therapy as tolerated    I will continue to monitor blood levels (Depakote, clozapine---a drug with a narrow therapeutic index= NTI) and associated labs for drug therapy implemented that require intense monitoring for toxicity as deemed appropriate based on current medication side effects and pharmacodynamically determined drug 1/2 lives. In summary, Bianca Fisher, is a 45 y.o.  male who presents with a severe exacerbation of the principal diagnosis of Schizoaffective disorder ([facility] [address]    Patient's condition is not improving. Patient requires continued inpatient hospitalization for further stabilization, safety monitoring and medication management. I will continue to coordinate the provision of individual, milieu, occupational, group, and substance abuse therapies to address target symptoms/diagnoses as deemed appropriate for the individual patient. A coordinated, multidisplinary treatment team round was conducted with the patient (this team consists of the nurse, psychiatric unit pharmacist,  and writer). Complete current electronic health record for patient has been reviewed today including consultant notes, ancillary staff notes, nurses and psychiatric tech notes. Suicide risk assessment completed and patient deemed to be of low risk for suicide at this time. The following regarding medications was addressed during rounds with patient:   the risks and benefits of the proposed medication. The patient was given the opportunity to ask questions. Informed consent given to the use of the above medications.  Will continue to adjust psychiatric and non-psychiatric medications (see above \"medication\" section and orders section for details) as deemed appropriate & based upon diagnoses and response to treatment. I will continue to order blood tests/labs and diagnostic tests as deemed appropriate and review results as they become available (see orders for details and above listed lab/test results). I will order psychiatric records from previous [facility] hospitals to further elucidate the nature of patient's psychopathology and review once available. I will gather additional collateral information from friends, family and o/p treatment team to further elucidate the nature of patient's psychopathology and baselline level of psychiatric functioning. I certify that this patient's inpatient psychiatric hospital services furnished since the previous certification were, and continue to be, required for treatment that could reasonably be expected to improve the patient's condition, or for diagnostic study, and that the patient continues to need, on a daily basis, active treatment furnished directly by or requiring the supervision of inpatient psychiatric facility personnel. In addition, the hospital records show that services furnished were intensive treatment services, admission or related services, or equivalent services.     EXPECTED DISCHARGE DATE/DAY: TBD     DISPOSITION: Home       Signed By:   Keyla Cohen MD  10/19/2020

## 2020-10-19 NOTE — GROUP NOTE
JENELLE  GROUP DOCUMENTATION INDIVIDUAL Group Therapy Note Date: 10/19/2020 Group Start Time: 1100 Group End Time: 1200 Group Topic: Topic Group [address] 3 ACUTE BEHAV [facility], [address] [facility] GROUP DOCUMENTATION GROUP Group Therapy Note Attendees: 4 Attendance: Did not attend Patient's Goal: Interventions/techniques: 
Niko Nj

## 2020-10-19 NOTE — GROUP NOTE
JENELLE  GROUP DOCUMENTATION INDIVIDUAL Group Therapy Note Date: 10/19/2020 Group Start Time: 0900 Group End Time: 1000 Group Topic: Topic Group [facility] 3 ACUTE BEHAV [facility] [place], [address] GROUP DOCUMENTATION GROUP Group Therapy Note Attendees: 4 Attendance: Did not attend Patient's Goal: Interventions/techniques: Additional Notes:   
 
Kavon Olivia

## 2020-10-19 NOTE — GROUP NOTE
JENELLE  GROUP DOCUMENTATION INDIVIDUAL Group Therapy Note Date: 10/19/2020 Group Start Time: 1500 Group End Time: 6061 Group Topic: Recreational/Music Therapy [facility] [address] ACUTE BEHAV [facility] [place], [address] GROUP DOCUMENTATION GROUP Group Therapy Note Attendees: 6 Attendance: Did not attend Patient's Goal: Interventions/techniques Tamela King

## 2020-10-19 NOTE — BH NOTES
0700- Verbal shift change report given to Artemio Lynn  (oncoming nurse) by Francisco Hastings  (offgoing nurse). Report included the following information SBAR, Kardex, MAR and Recent Results. [id number] Patient is resting in bed at this time. Patient initially refused vitals. Will continue to try to obtain vitals from patient. Patient tolerated breakfast without issue Patient was medication compliant. Patient ignored writer for most of assessment, only nodding or shaking his head at times. Will continue to monitor for safety. [id number] Patient resting in bed at this time. No issues noted. Will continue to monitor for safety. [id number] Patient ambulated to day room for lunch then back to his room to eat. Patient turned on shower and sat on his bed. Patient appears to be responding to auditory hallucinations. Patient seems to be using shower as a means to mitigate auditory stimulation. [id number] Patient resting in bed at this time. No adverse behaviors noted. Will continue to monitor for safety. [phone] Patient tolerated dinner without issue. Will continue to provide support.

## 2020-10-19 NOTE — INTERDISCIPLINARY ROUNDS
[facility] Interdisciplinary Rounds Patient Name: Daya Min  Age: 45 y.o. Room/Bed:  313/01 Primary Diagnosis: Schizoaffective disorder ([facility] [address] Admission Status: Forced Medication Order Readmission within 30 days: no 
Power of  in place: unk Patient requires a blocked bed: yes Reason for blocked bed: Behavior Order for blocked bed obtained: no    
 
Sleep hours: 6.5 Morning Labs completed per orders:  na    
Participation in Care/Groups:  no 
Medication Compliant?: Yes PRNS (last 24 hours): None Restraints (last 24 hours):  no 
Substance Abuse:  no   
24 hour chart check complete:   
 
Patient goal(s) for today: Follow unit directions  
Treatment team focus/goals: Stabilize - increase Clozaril  
Progress note: Pt pacing in jeff and whispering to staff. Pt was encouraged to call sister and provided with her number. Pt states he will be staying with his daughter upon discharge. Pt does not have a daughter or any children per sister.  
  
LOS:  31            Expected LOS: TBD 
  
Financial concerns/prescription coverage: [employer] Medicare Part A&B and [address] Medicaid and TDO Family contact: Sister/DENISE Strong [phone] 95/3 Family requesting physician contact today: spoke 10/14 Discharge plan: Home Access to weapons: None Outpatient provider(s): Gómez MARINO - updated 10/2 Patient's preferred phone number for follow up 23-14-20-09 
  
Participating treatment team members: LAVNIIA Smith and Dr. Jimy Woods.

## 2020-10-20 LAB
ALBUMIN SERPL-MCNC: 3.6 G/DL (ref 3.5–5)
ALBUMIN/GLOB SERPL: 0.9 {RATIO} (ref 1.1–2.2)
ALP SERPL-CCNC: 131 U/L (ref 45–117)
ALT SERPL-CCNC: 44 U/L (ref 12–78)
AST SERPL-CCNC: 15 U/L (ref 15–37)
BASOPHILS # BLD: 0.1 K/UL (ref 0–0.1)
BASOPHILS NFR BLD: 1 % (ref 0–1)
BILIRUB DIRECT SERPL-MCNC: 0.2 MG/DL (ref 0–0.2)
BILIRUB SERPL-MCNC: 0.4 MG/DL (ref 0.2–1)
DIFFERENTIAL METHOD BLD: ABNORMAL
EOSINOPHIL # BLD: 0.2 K/UL (ref 0–0.4)
EOSINOPHIL NFR BLD: 3 % (ref 0–7)
ERYTHROCYTE [DISTWIDTH] IN BLOOD BY AUTOMATED COUNT: 13.4 % (ref 11.5–14.5)
GLOBULIN SER CALC-MCNC: 4 G/DL (ref 2–4)
HCT VFR BLD AUTO: 41 % (ref 36.6–50.3)
HGB BLD-MCNC: 13.5 G/DL (ref 12.1–17)
IMM GRANULOCYTES # BLD AUTO: 0.1 K/UL (ref 0–0.04)
IMM GRANULOCYTES NFR BLD AUTO: 1 % (ref 0–0.5)
LYMPHOCYTES # BLD: 2.2 K/UL (ref 0.8–3.5)
LYMPHOCYTES NFR BLD: 35 % (ref 12–49)
MCH RBC QN AUTO: 29.3 PG (ref 26–34)
MCHC RBC AUTO-ENTMCNC: 32.9 G/DL (ref 30–36.5)
MCV RBC AUTO: 88.9 FL (ref 80–99)
MONOCYTES # BLD: 0.5 K/UL (ref 0–1)
MONOCYTES NFR BLD: 8 % (ref 5–13)
NEUTS SEG # BLD: 3.4 K/UL (ref 1.8–8)
NEUTS SEG NFR BLD: 52 % (ref 32–75)
NRBC # BLD: 0 K/UL (ref 0–0.01)
NRBC BLD-RTO: 0 PER 100 WBC
PLATELET # BLD AUTO: 293 K/UL (ref 150–400)
PMV BLD AUTO: 9.7 FL (ref 8.9–12.9)
PROT SERPL-MCNC: 7.6 G/DL (ref 6.4–8.2)
RBC # BLD AUTO: 4.61 M/UL (ref 4.1–5.7)
WBC # BLD AUTO: 6.4 K/UL (ref 4.1–11.1)

## 2020-10-20 PROCEDURE — 65220000003 HC RM SEMIPRIVATE PSYCH

## 2020-10-20 PROCEDURE — 99232 SBSQ HOSP IP/OBS MODERATE 35: CPT | Performed by: PSYCHIATRY & NEUROLOGY

## 2020-10-20 PROCEDURE — 85025 COMPLETE CBC W/AUTO DIFF WBC: CPT

## 2020-10-20 PROCEDURE — 93005 ELECTROCARDIOGRAM TRACING: CPT

## 2020-10-20 PROCEDURE — 80159 DRUG ASSAY CLOZAPINE: CPT

## 2020-10-20 PROCEDURE — 74011250637 HC RX REV CODE- 250/637: Performed by: PSYCHIATRY & NEUROLOGY

## 2020-10-20 PROCEDURE — 80076 HEPATIC FUNCTION PANEL: CPT

## 2020-10-20 PROCEDURE — 36415 COLL VENOUS BLD VENIPUNCTURE: CPT

## 2020-10-20 RX ADMIN — CLOZAPINE 450 MG: 100 TABLET ORAL at 21:15

## 2020-10-20 RX ADMIN — CLOZAPINE 50 MG: 25 TABLET ORAL at 08:56

## 2020-10-20 NOTE — PROGRESS NOTES
Clozapine Daily Monitoring  Current regimen:  Clozapine 500 mg/day (450 mg QHS + 50 mg daily)  Last CBC done AND reported to the registry:  10/20/20  Next CBC due:  10/27/20     DATE ANC   9/18/20 2.2   10/1/20 2.6   10/8/20 3.3   10/13/20 5.4   10/20/20 3.4     ANC must be >1 to dispense clozapine. If patient experiences ANC <1.5, [facility] to the [facility] for frequency of ANC monitoring. Visit Vitals  /84 (BP 1 Location: Right arm, BP Patient Position: At rest)   Pulse 98   Temp 98.3 °F (36.8 °C)   Resp 16   Ht 170.2 cm (67\")   Wt 85.7 kg (189 lb)   SpO2 98%   BMI 29.60 kg/m²       Recommendations/comments: ANC is within therapeutic limits, 3.4 K/uL, and is within acceptable range to continue with clozapine dispensing. Next CBC is due in 1 week on 10/27/20.      Thank you,  Carol Mart, PHARMD, [facility], [address] [place] [address] [address]: [phone]  Pharmacy: [phone])

## 2020-10-20 NOTE — BH NOTES
Mr. Yumiko Galdamez is has been agitated, anxious, apathetic, and withdrawn throughout the shift. He does not interact appropriately with staff or his peers. He is not aggressive or violent and denies any SI/HI/AVH thus far.

## 2020-10-20 NOTE — GROUP NOTE
[facility] GROUP DOCUMENTATION INDIVIDUAL Group Therapy Note Date: 10/20/2020 Group Start Time: 1100 Group End Time: 1200 Group Topic: Topic Group [address] ACUTE BEHAV [facility], [facility] GROUP DOCUMENTATION GROUP Group Therapy Note Attendees: 6 Attendance: Did not attend Patient's Goal: Interventions/techniques: 
 
Elpidio Morelos

## 2020-10-20 NOTE — PROGRESS NOTES
Problem: Falls - Risk of  Goal: *Absence of Falls  Description: Document Reny Hoang Fall Risk and appropriate interventions in the flowsheet. Outcome: Progressing Towards Goal  Note: Fall Risk Interventions:       Mentation Interventions: Reorient patient, Room close to nurse's station    Medication Interventions: Teach patient to arise slowly    Elimination Interventions:  Toileting schedule/hourly rounds              Problem: Patient Education: Go to Patient Education Activity  Goal: Patient/Family Education  Outcome: Not Progressing Towards Goal     Problem: Altered Thought Process (Adult/Pediatric)  Goal: *STG: Participates in treatment plan  Outcome: Progressing Towards Goal  Goal: *STG: Remains safe in hospital  Outcome: Progressing Towards Goal  Goal: *STG: Seeks staff when feelings of anxiety and fear arise  Outcome: Not Progressing Towards Goal  Goal: *STG: Complies with medication therapy  Outcome: Progressing Towards Goal  Goal: *STG: Attends activities and groups  Outcome: Not Progressing Towards Goal  Goal: *STG: Absence of lethality  Outcome: Progressing Towards Goal  Goal: *STG: Demonstrates ability to understand and use improved judgment in daily activities and relationships  Outcome: Not Progressing Towards Goal  Goal: *LTG: Returns to baseline functioning  Outcome: Not Progressing Towards Goal     Problem: Discharge Planning  Goal: *Knowledge of medication management  Outcome: Not Progressing Towards Goal

## 2020-10-20 NOTE — BH NOTES
PSYCHIATRIC PROGRESS NOTE         Patient Name  Marla Cosby   Date of Birth 1970   [facility] [id number]   Medical Record Number  [id number]      Age  45 y.o. PCP Unknown, Provider   Admit date:  9/18/2020    Room Number  042/24  @ [facility]   Date of Service  10/20/2020         E & M PROGRESS NOTE:         HISTORY       CC:  \"psychosis\"  HISTORY OF PRESENT ILLNESS/INTERVAL HISTORY:  (reviewed/updated 10/20/2020). per initial evaluation: Edel Griffin \"Corey\" Zeynep Lott is a 45year old male with a history of schizophrenia admitted to the [facility] for increased psychosis. He has not been bathing or otherwise caring for himself at home. He was reportedly hospitalized at [facility] from April - June 2020. He does not verbally answer any questions. He is well known to this writer from previous admissions in the [place] area. He has responded well to ECT in the past.    09/21 - overnight, patient incontinent of urine, placed into diaper. Patient appeared catatonic at times, but was speaking, non-spontaneously, to the team this morning. Per nursing, the patient has significantly poor self care, grossly internally preoccucpied and with no ability to care for himself. SW reports that sister is patient's caretaker and that he has been decompensating from an already poor baseline recently, requiring prompting to eat meals and unable to perform basic ADLs. Family states he improved on Clozaril and they are requesting the team restart this medication. Of note, patient got [place] injection two weeks ago, VPA level suggests compliance with medication. 09/22 - patient has been isolative to bed, up for meals, selectively mute. Shakes and nods his head to stimuli. Patient slept 11 hours overnight, and is in bed on assessment. He is mute, does not endorse any pain or discomfort but is largely unresponsive to questions. Vitals unremarkable, BP mildly elevated.     09/23 - patient in bed much of the day, per nursing he got up and showered, and spoke briefly about wanting to go home. He is not compliant with medication; patient seen in his room, he is somnolent, shakes and nods his head, doesn't open his eyes for the duration of the interview. Patient slept 8 hours overnight. 09/24 - patient refused vital signs and medications this morning. He remains isolative to bed for much of the day. Per nursing assessment, the patient has been internally preoccupied, selectively mute, slept 9.5 hours overnight. Patient briefed on the importance of medication compliance, does not voice understanding of the situation. 09/25 - overnight, patient was visible, pacing, largely incoherent but with no aggression noted. No PRNs were given to the patient but he is refusing several doses of medication (refused VPA, Klonopin AM doses); he is taking Clozaril. Patient noted to have repetitive movements, selectively mute on interview, won't get out of bed. Family asking for neurologic workup, MRI as he has not had this before. 09/28 - patient isolative, still selectively mute, took medication and allowed vitals this morning but refused labwork (CBC for Clozaril is overdue). Patient refused breakfast this morning, slept 6 hours overnight. He remains internally preoccupied, unable to assess his mental state. Patient continues to refuse about half of medication administrations. Court order pending for labwork. 09/29 - no acute overnight events. Patient medication compliant, isolative, selectively mute and generally asleep for much of the day. Not voicing any complaints but with ongoing internal preoccupation; he has not had any meaningful interaction with MD since last week. Court order for tomorrow's labwork pending. 09/30 - no acute overnight events. Patient was out of his room today and participated in treatment team discussion. He continues to speak minimally but asked to go home.  Discussed medication and the need for labwork, the patient agrees but has been refusing at [address] [facility] each morning. Patient guarded about symptoms, concrete and with no understanding of the events surrounding admission. 10/01 - court ordered medication / blood draw approved. Patient refused Klonopin and VPA this morning. Patient got bloodwork this morning, ANC WNL. Patient briefed on his treatment progress, he says nothing and does not acknowledge the treatment team. He made a comment at the onset of the interview but then went back to bed. Unable to provide any meaningful information. Per nursing, he remains mostly mute but will ask for some items. They are concerned he may be cheeking his Clozaril. 10/02 - the patient slept 10 hours overnight, self care is poor and patient remains internally preoccupied. He is out of his room for meals and comes into treatment team room for morning interview. He is unable to answer when asked his shoe size. Patient compliant with Clozaril with mouth checks to date, no PRNs given for agitation but patient still unable to adhere to ADLs without much prompting. 10/03 - patient slept 10.75 hours, he remains blunted with poor self care, but medication compliant. Patient out of room to meet with MD, denies SI/HI/AVH; he remains grossly internally preoccupied. Patient discharge focused, still speech impoverished and with poor ADLs. 10/04 - patient impoverished, in bed for much of the day. He is out for meals, requires much prompting to engage in ADLs. Goal for today is taking a shower per nursing. Patient compliant with Clozaril, vitals unremarkable thus far. Patient selectively mute, does not answer assessment questions. 10/05 - no acute overnight events; the patient slept 10 hours overnight. Patient refused AM medictions this AM, but took Clozaril last night. He remains in poor behavioral control, requiring much prompting for ADLs. Took shower last night with much encouragement.  He remains withdrawn, isolative, mental status remains impoverished. 10/06 - patient slept 9 hours overnight, he remains isolative to room. Compliant with Clozaril but refusing VPA and benzo. Patient out of bed for meals otherwise with no improvement in thought process. Discussed holding all agents except Clozaril as patient is intermittently non-compliant and had been taking these agents prior to admission, patient agrees to this. He c/o sialorrhea. Patient received no PRNs for agitation, ADLs remain poor and he needs prompting for most activities. 10/07 - no acute overnight events. Patient has been isolative, compliant with court ordered medication. Patient otherwise with no specific concerns, denies SI/HI/AVH. Patient requires prompting for most ADLs, minimally engaged in the milieu, but with no episodes of agitation or aggression. No PRNs given x24 hours. Patient's benzo and VPA held. 10/08 - patient slept 8 hours, has been isolative but medication compliant. Patient with mild tremor L > R; benzo and VPA still held due to patient refusal, patient appears to be improving on Clozaril monotherapy. Patient still grossly hypo-verbal, has been otherwise pleasant. 10/09 - overnight, patient has remained isolative; he speaks sparingly and has difficulty with his ADLs. Patient has been in fair behavioral control, per sister he is still off from his baseline. Clozaril trial continues, patient tolerating medication and thus far with no instances of significant side effects (sialorrhea). Patient discharge focused, with marked poverty of thought on interview. 10/12 - patient isolative, remains in bed for much of the day. Patient refused vitals over the weekend. Denies all symptoms, ate breakfast this morning medication compliant. Patient slept 5.5 hours overnight, he remains fairly non-responsive but is not in any distress. Patient with poor hygiene, poor self care, requires significant prompting and motivation to partake in ADLs.     10/13 - overnight, patient noted to have worsening thought process disorganization. He has disrobed completely and despite prompting will not put clothes on engage in treatment. He is compliant with court ordered medication, but RNs have had difficulty getting vital signs as he generally does not cooperate. This morning, patient noted to have an 8 cm abscess on his R shoulder, draining purulent fluid. He is unable to provide any information regarding history on the abscess as he is selectively mute. IM consulted for I&D, ABx recs. 10/14 - no acute overnight events. Patient with markedly poor hygiene, requires prompting for ADLs which he refuses intermittently (showering, eating). He is compliant with medication and otherwise with no specific concerns or distress. Patient started on Abx for cellulitis, surgical consult pending. Patient does not answer assessment questions. Of note, patient's LFTs elevated, he remains asymptomatic.    10/15 - patient has been isolative, compliant with medication, still internally preoccupied. Patient has been otherwise in fair behavioral control, markedly poor ADLs, poor hygiene. Patient seen by general surgery, awaiting recs. Patient remains selectively mute, unable to care for self. He slept 6 hours overnight, got no PRNs for agitation. MRI pending for AMS. 10/17- patient remains isolative to room, compliant with medication, still internally preoccupied. Patient has been otherwise in fair behavioral control, markedly poor ADLs, poor hygiene. Patient remains selectively mute, would not answer any of my questions. 10/18- patient remains isolative to room, compliant with medication, still internally preoccupied. Patient was given PRN medications last night after he was on the floor rocking back and forth and pulling his hair and then getting in the shower with his clothes on. He continues to have markedly poor ADLs, poor hygiene. Patient remains selectively mute, would not answer any of my questions. 10/19 - patient seen in his room this morning, he is naked and sitting cross legged on his bed rocking and rubbing his legs. He does not appear to be in any distress but does not answer questions appropriately. The patient allows MD to assess his shoulder, which is healing with minimal drainage today. Patient requests to go home, is advised to put on some clothes and call his sister, which he states he will do. Per chart review, patient later told SW he will go to his daughter's home upon discharge (he does not have a daughter). 10/20 - no acute overnight events. Patient isolative, 'apathetic' per nursing. He is out briefly into the hallways to pace but interacts very limited with staff or peers. Patient medication compliant, grossly internally preoccupied, no significant improvement thus far. Patient got no PRNs, still drooling. Patient noted to have HR to 100s. Patient noted to have no specific concerns, he barely audibly asked to go home this morning. Transaminitis is resolving. SIDE EFFECTS: (reviewed/updated 10/20/2020)  +Drooling, +tachycardia  No noted toxicity with use of Depakote   ALLERGIES:(reviewed/updated 10/20/2020)  Allergies   Allergen Reactions    Fluphenazine Unknown (comments)     Pt is unable to communicate properly.  Penicillins Unknown (comments)     Pt is unable to communicate properly. MEDICATIONS PRIOR TO ADMISSION:(reviewed/updated 10/20/2020)  Medications Prior to Admission   Medication Sig    FLUoxetine (PROzac) 20 mg capsule Take 20 mg by mouth daily.  benztropine (COGENTIN) 0.5 mg tablet Take 0.5 mg by mouth two (2) times a day. Indications: extrapyramidal symptoms as a result of taking the medication    LORazepam (Ativan) 1 mg tablet Take 1 mg by mouth two (2) times a day.  valproate (Depakene) 250 mg/5 mL syrup Take 1,000 mg by mouth two (2) times a day.     paliperidone palmitate (Invega Sustenna) 234 mg/1.5 mL injection 234 mg by IntraMUSCular route every twenty-one (21) days. Indications: schizophrenia    OLANZapine (ZyPREXA zydis) 20 mg disintegrating tablet Take 20 mg by mouth Daily (before dinner). Indications: schizophrenia      PAST MEDICAL HISTORY: Past medical history from the initial psychiatric evaluation has been reviewed (reviewed/updated 10/20/2020) with no additional updates (I asked patient and no additional past medical history provided). Past Medical History:   Diagnosis Date    Abscess 10/15/2020    Psychiatric disorder     Psychotic disorder ([facility]     Withdrawal syndrome ([facility]    History reviewed. No pertinent surgical history. SOCIAL HISTORY: Social history from the initial psychiatric evaluation has been reviewed (reviewed/updated 10/20/2020) with no additional updates (I asked patient and no additional social history provided).    Social History     Socioeconomic History    Marital status: SINGLE     Spouse name: Not on file    Number of children: Not on file    Years of education: Not on file    Highest education level: Not on file   Occupational History    Not on file   Social Needs    Financial resource strain: Not on file    Food insecurity     Worry: Not on file     Inability: Not on file    Transportation needs     Medical: Not on file     Non-medical: Not on file   Tobacco Use    Smoking status: Never Smoker    Smokeless tobacco: Never Used   Substance and Sexual Activity    Alcohol use: No    Drug use: No    Sexual activity: Not on file   Lifestyle    Physical activity     Days per week: Not on file     Minutes per session: Not on file    Stress: Not on file   Relationships    Social connections     Talks on phone: Not on file     Gets together: Not on file     Attends Scientologist service: Not on file     Active member of club or organization: Not on file     Attends meetings of clubs or organizations: Not on file     Relationship status: Not on file    Intimate partner violence     Fear of current or ex partner: Not on file     Emotionally abused: Not on file     Physically abused: Not on file     Forced sexual activity: Not on file   Other Topics Concern    Not on file   Social History Narrative    Pt is a HS grad and is unemployed. He lives with his sister. On disability    No pending legal charge reported      FAMILY HISTORY: Family history from the initial psychiatric evaluation has been reviewed (reviewed/updated 10/20/2020) with no additional updates (I asked patient and no additional family history provided). History reviewed. No pertinent family history. REVIEW OF SYSTEMS: (reviewed/updated 10/20/2020)  Appetite:poor   Sleep: poor with DIMS (difficulty initiating & maintaining sleep)   All other Review of Systems: Negative except per HPI         [facility] (MSE):    MSE FINDINGS ARE WITHIN NORMAL LIMITS (WNL) UNLESS OTHERWISE STATED BELOW. ( ALL OF THE BELOW CATEGORIES OF THE MSE HAVE BEEN REVIEWED (reviewed 10/20/2020) AND UPDATED AS DEEMED APPROPRIATE )  General Presentation age appropriate, cooperative   Orientation disorganized   Vital Signs  See below (reviewed 10/20/2020); Vital Signs (BP, Pulse, & Temp) are within normal limits if not listed below.    Gait and Station Stable/steady, no ataxia   Musculoskeletal System No extrapyramidal symptoms (EPS); no abnormal muscular movements or Tardive Dyskinesia (TD); muscle strength and tone are within normal limits   Language No aphasia or dysarthria   Speech:  hypoverbal, increased latency of response, non-pressured and soft   Thought Processes illogical; slow rate of thoughts; poor abstract reasoning/computation   Thought Associations blocked    Thought Content poverty of content   Suicidal Ideations unable to assess   Homicidal Ideations unable to assess   Mood:  unable to asses   Affect:  flat   Memory recent  impaired   Memory remote:  impaired   Concentration/Attention:  impaired   Fund of Knowledge significantly below average   Insight:  poor   Reliability fair   Judgment:  impaired due to active psychosis          VITALS:     Patient Vitals for the past 24 hrs:   Temp Pulse Resp BP SpO2   10/20/20 1230 98.4 °F (36.9 °C) (!) 104 18 111/69 97 %     Wt Readings from Last 3 Encounters:   09/18/20 85.7 kg (189 lb)   10/15/20 85.7 kg (189 lb)   12/23/17 84.9 kg (187 lb 1.6 oz)     Temp Readings from Last 3 Encounters:   10/20/20 98.4 °F (36.9 °C)   03/28/17 97.5 °F (36.4 °C)   02/07/15 98.7 °F (37.1 °C)     BP Readings from Last 3 Encounters:   10/20/20 111/69   03/28/17 100/68   02/12/15 105/73     Pulse Readings from Last 3 Encounters:   10/20/20 (!) 104   03/28/17 67   02/12/15 87            DATA     LABORATORY DATA:(reviewed/updated 10/20/2020)  Recent Results (from the past 24 hour(s))   CBC WITH AUTOMATED DIFF    Collection Time: 10/20/20  5:09 AM   Result Value Ref Range    WBC 6.4 4.1 - 11.1 K/uL    RBC 4.61 4.10 - 5.70 M/uL    HGB 13.5 12.1 - 17.0 g/dL    HCT 41.0 36.6 - 50.3 %    MCV 88.9 80.0 - 99.0 FL    MCH 29.3 26.0 - 34.0 PG    MCHC 32.9 30.0 - 36.5 g/dL    RDW 13.4 11.5 - 14.5 %    PLATELET 717 852 - 695 K/uL    MPV 9.7 8.9 - 12.9 FL    NRBC 0.0 0  WBC    ABSOLUTE NRBC 0.00 0.00 - 0.01 K/uL    NEUTROPHILS 52 32 - 75 %    LYMPHOCYTES 35 12 - 49 %    MONOCYTES 8 5 - 13 %    EOSINOPHILS 3 0 - 7 %    BASOPHILS 1 0 - 1 %    IMMATURE GRANULOCYTES 1 (H) 0.0 - 0.5 %    ABS. NEUTROPHILS 3.4 1.8 - 8.0 K/UL    ABS. LYMPHOCYTES 2.2 0.8 - 3.5 K/UL    ABS. MONOCYTES 0.5 0.0 - 1.0 K/UL    ABS. EOSINOPHILS 0.2 0.0 - 0.4 K/UL    ABS. BASOPHILS 0.1 0.0 - 0.1 K/UL    ABS. IMM.  GRANS. 0.1 (H) 0.00 - 0.04 K/UL    DF AUTOMATED     HEPATIC FUNCTION PANEL    Collection Time: 10/20/20  5:09 AM   Result Value Ref Range    Protein, total 7.6 6.4 - 8.2 g/dL    Albumin 3.6 3.5 - 5.0 g/dL    Globulin 4.0 2.0 - 4.0 g/dL    A-G Ratio 0.9 (L) 1.1 - 2.2      Bilirubin, total 0.4 0.2 - 1.0 MG/DL    Bilirubin, direct 0.2 0.0 - 0.2 MG/DL    Alk. phosphatase 131 (H) 45 - 117 U/L    AST (SGOT) 15 15 - 37 U/L    ALT (SGPT) 44 12 - 78 U/L     Lab Results   Component Value Date/Time    Valproic acid 97 09/18/2020 04:42 PM     No results found for: LITHMEL   RADIOLOGY REPORTS:(reviewed/updated 10/20/2020)  No results found.        MEDICATIONS     ALL MEDICATIONS:   Current Facility-Administered Medications   Medication Dose Route Frequency    cloZAPine (CLOZARIL) tablet 50 mg  50 mg Oral DAILY    Or    OLANZapine (ZyPREXA) 5 mg in sterile water (preservative free) 1 mL injection +++COURT ORDERED MEDICATION FOR REFUSAL OF CLOZAPINE+++  5 mg IntraMUSCular DAILY    cloZAPine (CLOZARIL) tablet 450 mg  450 mg Oral QHS    Or    OLANZapine (ZyPREXA) 5 mg in sterile water (preservative free) 1 mL injection +++COURT ORDERED MEDICATION FOR REFUSAL OF CLOZAPINE+++  5 mg IntraMUSCular QHS    OLANZapine (ZyPREXA) tablet 5 mg  5 mg Oral Q6H PRN    haloperidol lactate (HALDOL) injection 5 mg  5 mg IntraMUSCular Q6H PRN    benztropine (COGENTIN) tablet 1 mg  1 mg Oral BID PRN    diphenhydrAMINE (BENADRYL) injection 50 mg  50 mg IntraMUSCular BID PRN    hydrOXYzine HCL (ATARAX) tablet 50 mg  50 mg Oral TID PRN    traZODone (DESYREL) tablet 50 mg  50 mg Oral QHS PRN    acetaminophen (TYLENOL) tablet 650 mg  650 mg Oral Q4H PRN    magnesium hydroxide (MILK OF MAGNESIA) 400 mg/5 mL oral suspension 30 mL  30 mL Oral DAILY PRN      SCHEDULED MEDICATIONS:   Current Facility-Administered Medications   Medication Dose Route Frequency    cloZAPine (CLOZARIL) tablet 50 mg  50 mg Oral DAILY    Or    OLANZapine (ZyPREXA) 5 mg in sterile water (preservative free) 1 mL injection +++COURT ORDERED MEDICATION FOR REFUSAL OF CLOZAPINE+++  5 mg IntraMUSCular DAILY    cloZAPine (CLOZARIL) tablet 450 mg  450 mg Oral QHS    Or    OLANZapine (ZyPREXA) 5 mg in sterile water (preservative free) 1 mL injection +++COURT ORDERED MEDICATION FOR REFUSAL OF CLOZAPINE+++  5 mg IntraMUSCular QHS          ASSESSMENT & PLAN     DIAGNOSES REQUIRING ACTIVE TREATMENT AND MONITORING: (reviewed/updated 10/20/2020)  Patient Active Hospital Problem List:   [facility] ([facility] (9/18/2020)    Assessment: patient with significant internal preoccupation, poor ADLs, history of catatonia. Appears to have been compliant with home medication, but is regressed and with a markedly disorganized thought process. Will start Clozaril, add benzodiazepine for protection against catatonia to be tapered as Clozaril dose increases given interaction. Plan:  COURT ORDERED MEDICATION:  - INCREASE Clozaril to 50 mg QDAY mg and 450 mg QHS *OR* Zyprexa 5 mg IM for psychosis    - DISCONTINUE Haldol due to poor efficacy  - DISCONTINUE [place] due to poor efficacy  - EEG for seizure rule out  - Appreciate recs from IM and gen surg re: cellulitis  - f/u LFTs, Clozaril level at next CBC draw  - HOLD Klonopin 1 mg BID for catatonia due to poor efficacy  - CBC and Clozaril level by court order  - HOLD VPA oral soln 1000 mg BID for mood lability, seizure prophylaxis due to poor efficacy, non compliance  - DISCONTINUE Prozac / Cogentin / Zydis due to polypharamacy  - Consider antihypertensive  - IGM therapy as tolerated    I will continue to monitor blood levels (Depakote, clozapine---a drug with a narrow therapeutic index= NTI) and associated labs for drug therapy implemented that require intense monitoring for toxicity as deemed appropriate based on current medication side effects and pharmacodynamically determined drug 1/2 lives. In summary, Bryan Menchaca, is a 45 y.o.  male who presents with a severe exacerbation of the principal diagnosis of Schizoaffective disorder ([facility]    Patient's condition is not improving. Patient requires continued inpatient hospitalization for further stabilization, safety monitoring and medication management.   I will continue to coordinate the provision of individual, milieu, occupational, group, and substance abuse therapies to address target symptoms/diagnoses as deemed appropriate for the individual patient. A coordinated, multidisplinary treatment team round was conducted with the patient (this team consists of the nurse, psychiatric unit pharmacist,  and writer). Complete current electronic health record for patient has been reviewed today including consultant notes, ancillary staff notes, nurses and psychiatric tech notes. Suicide risk assessment completed and patient deemed to be of low risk for suicide at this time. The following regarding medications was addressed during rounds with patient:   the risks and benefits of the proposed medication. The patient was given the opportunity to ask questions. Informed consent given to the use of the above medications. Will continue to adjust psychiatric and non-psychiatric medications (see above \"medication\" section and orders section for details) as deemed appropriate & based upon diagnoses and response to treatment. I will continue to order blood tests/labs and diagnostic tests as deemed appropriate and review results as they become available (see orders for details and above listed lab/test results). I will order psychiatric records from previous [facility] hospitals to further elucidate the nature of patient's psychopathology and review once available. I will gather additional collateral information from friends, family and o/p treatment team to further elucidate the nature of patient's psychopathology and baselline level of psychiatric functioning.          I certify that this patient's inpatient psychiatric hospital services furnished since the previous certification were, and continue to be, required for treatment that could reasonably be expected to improve the patient's condition, or for diagnostic study, and that the patient continues to need, on a daily basis, active treatment furnished directly by or requiring the supervision of inpatient psychiatric facility personnel. In addition, the hospital records show that services furnished were intensive treatment services, admission or related services, or equivalent services.     EXPECTED DISCHARGE DATE/DAY: TBD     DISPOSITION: Home       Signed By:   Freddy Lee MD  10/20/2020

## 2020-10-20 NOTE — BH NOTES
EKG obtained denoting sinus tachycardia (OL=999). EKG reviewed and signed by Dr. Ike Seth. No new orders written or verbally given at his time.

## 2020-10-20 NOTE — BH NOTES
GROUP THERAPY PROGRESS NOTE    Patient did not participate in Substance abuse/Coping Skill group.      LAVINIA Brian

## 2020-10-20 NOTE — BH NOTES
Behavioral Health Interdisciplinary Rounds     Patient Name: Terra Meehan  Age: 45 y.o. Room/Bed:  313/01  Primary Diagnosis: Schizoaffective disorder (HCC)   Admission Status: CI     Readmission within 30 days: No  Power of  in place: Unknown  Patient requires a blocked bed: No  Reason for blocked bed: None  Order for blocked bed obtained:   N/A  Sleep hours: 6    Morning Labs completed per orders:      Participation in Care/Groups:  No  Medication Compliant?: Yes  PRNS (last 24 hours): No    Restraints (last 24 hours):  No  Substance Abuse:  No    24 hour chart check complete:     Patient goal(s) for today: Follow unit directions   Treatment team focus/goals: Stabilize - increase Clozaril   Progress note: Pt continues to isolate to room and has not spoken with sister. QUAN:  43            DSUBUCHC LOS: TBD     Financial concerns/prescription coverage: [place] Medicare Part A&B and [facility] and TDO  Family contact: Sister/DENISE Mendoza [phone] 89/3  Family requesting physician contact today: spoke 10/14  Discharge plan: Home  Access to weapons: None  Outpatient provider(s): [facility] - updated 10/2  Patient's preferred phone number for follow up [phone]     Participating treatment team members: LAVINIA Gonzalez and Dr. Afua Martin.

## 2020-10-20 NOTE — BH NOTES
Assumed care for the patient following day shift at 1. Patient presents as preoccupied and restless. Patient would be guarded and refuse to talk about their treatment during this shift. Patient has came out multiple times for snacks and that is the only time patient comes out of their room. Patient denies any S/I, H/I, and AVH. Patient slept a total of 6 hours.

## 2020-10-20 NOTE — PROGRESS NOTES
0700- Bedside shift change report given to Artemio Pelletier.  (oncoming nurse) by Bianca Reyes (offgoing nurse). Report included the following information SBAR, Kardex, MAR and Recent Results. Patient remains isolative in his room. Upon assessment, patient sitting naked on his bed staring at wall. Patient re-directable to wear a gown. Patient was not compliant with assessment aside from denying any SI/HI. Patient appears to be responding to internal stimuli. Remains selectively mute and isolative. Patient tolerated meals without issue. Will continue to provide support to patient and monitor for safety. Problem: Falls - Risk of  Goal: *Absence of Falls  Description: Document Tawana Kiran Fall Risk and appropriate interventions in the flowsheet. Outcome: Progressing Towards Goal  Note: Fall Risk Interventions:       Mentation Interventions: Reorient patient, Room close to nurse's station    Medication Interventions: Teach patient to arise slowly    Elimination Interventions:  Toileting schedule/hourly rounds              Problem: Altered Thought Process (Adult/Pediatric)  Goal: *STG: Participates in treatment plan  Outcome: Progressing Towards Goal  Goal: *STG: Remains safe in hospital  Outcome: Progressing Towards Goal  Goal: *STG: Seeks staff when feelings of anxiety and fear arise  Outcome: Progressing Towards Goal

## 2020-10-20 NOTE — BH NOTES
GROUP THERAPY PROGRESS NOTE    Patient did not participate in coping skills group. Patient did come into the day room to get a snack and sat down for one minute.     LAVINIA Guevara

## 2020-10-20 NOTE — GROUP NOTE
JENELLE  GROUP DOCUMENTATION INDIVIDUAL Group Therapy Note Date: 10/20/2020 Group Start Time: 0900 Group End Time: 1000 Group Topic: Topic Group [facility] 3 ACUTE BEHAV [facility] [place], [address] GROUP DOCUMENTATION GROUP Group Therapy Note Attendees: 4 Attendance: Did not attend Patient's Goal: Interventions/techniques [place]

## 2020-10-21 LAB
ATRIAL RATE: 119 BPM
CALCULATED P AXIS, ECG09: 41 DEGREES
CALCULATED R AXIS, ECG10: 20 DEGREES
CALCULATED T AXIS, ECG11: 31 DEGREES
DIAGNOSIS, 93000: NORMAL
P-R INTERVAL, ECG05: 154 MS
Q-T INTERVAL, ECG07: 336 MS
QRS DURATION, ECG06: 80 MS
QTC CALCULATION (BEZET), ECG08: 472 MS
VENTRICULAR RATE, ECG03: 119 BPM

## 2020-10-21 PROCEDURE — 74011250637 HC RX REV CODE- 250/637: Performed by: PSYCHIATRY & NEUROLOGY

## 2020-10-21 PROCEDURE — 65220000003 HC RM SEMIPRIVATE PSYCH

## 2020-10-21 PROCEDURE — 99231 SBSQ HOSP IP/OBS SF/LOW 25: CPT | Performed by: PSYCHIATRY & NEUROLOGY

## 2020-10-21 RX ORDER — ATROPINE SULFATE 10 MG/ML
1 SOLUTION/ DROPS OPHTHALMIC DAILY
Status: DISCONTINUED | OUTPATIENT
Start: 2020-10-22 | End: 2020-10-29

## 2020-10-21 RX ADMIN — CLOZAPINE 450 MG: 100 TABLET ORAL at 21:05

## 2020-10-21 RX ADMIN — CLOZAPINE 50 MG: 25 TABLET ORAL at 08:50

## 2020-10-21 NOTE — BH NOTES
Behavioral Health Treatment Team Note     Patient goal(s) for today: Follow unit directions   Treatment team focus/goals: Stabilize - increase Clozaril   Progress note: Pt continues to isolate to room and has not spoken with sister. Pt opens eyes when discharge is mentioned but wont complete tasks of showering and calling sister. Hearing postponed until Friday.      LOS:  33            Expected LOS: TBD     Financial concerns/prescription coverage: [employer] Medicare Part A&B and [facility] and O  Family contact: Sister/DENISE Goss [phone] 87/0  Family requesting physician contact today: spoke 10/14  Discharge plan: Home  Access to weapons: None  Outpatient provider(s): [facility] - updated 10/21  Patient's preferred phone number for follow up [phone]     Participating treatment team members: Edwin Guardado MSW and Dr. Liam Pittman.

## 2020-10-21 NOTE — BH NOTES
Patient is in his bed with eyes closed ,he would not acknowledge that I was there to ask him some questions. 2115 Patient is in bed sitting up and was medication compliant but was selectively mute.   Slept 9 hours

## 2020-10-21 NOTE — BH NOTES
GROUP THERAPY PROGRESS NOTE    Patient did not participate in Discharge Group.      Diane Jackson, RN, PMHNP Student

## 2020-10-21 NOTE — BH NOTES
Patient alert, selectively mute but oriented. Patient response to name and is med and meal compliant. Patient allowed this RN to take his vitals. Patient remains isolative to his room. Patient did not attend groups. Patient participated during treatment team. Q15 minute checks continued for safety.      Problem: Altered Thought Process (Adult/Pediatric)  Goal: *STG: Remains safe in hospital  Outcome: Progressing Towards Goal  Goal: *STG: Complies with medication therapy  Outcome: Progressing Towards Goal     Problem: Altered Thought Process (Adult/Pediatric)  Goal: *STG: Seeks staff when feelings of anxiety and fear arise  Outcome: Not Progressing Towards Goal

## 2020-10-21 NOTE — GROUP NOTE
JENELLE  GROUP DOCUMENTATION INDIVIDUAL Group Therapy Note Date: 10/21/2020 Group Start Time: 1400 Group End Time: 1500 Group Topic: Recreational/Music Therapy [address] 3 ACUTE BEHAV [facility], [address] [facility] GROUP DOCUMENTATION GROUP Group Therapy Note Attendees: 6 Attendance: Did not attend Patient's Goal: Interventions/techniques Maria D Goodwin

## 2020-10-21 NOTE — PROGRESS NOTES
Problem: Falls - Risk of  Goal: *Absence of Falls  Description: Document Rochele Xena Fall Risk and appropriate interventions in the flowsheet. Outcome: Progressing Towards Goal  Note: Fall Risk Interventions:       Mentation Interventions: Reorient patient, Room close to nurse's station    Medication Interventions: Teach patient to arise slowly    Elimination Interventions:  Toileting schedule/hourly rounds

## 2020-10-21 NOTE — BH NOTES
PSYCHIATRIC PROGRESS NOTE         Patient Name  Shaniqau Rodrigues   Date of Birth 1970   [facility] [id number]   Medical Record Number  [id number]      Age  45 y.o. PCP Unknown, Provider   Admit date:  9/18/2020    Room Number  757/96  @ [facility]   Date of Service  10/21/2020         E & M PROGRESS NOTE:         HISTORY       CC:  \"psychosis\"  HISTORY OF PRESENT ILLNESS/INTERVAL HISTORY:  (reviewed/updated 10/21/2020). per initial evaluation: Christoph Foster \"Corey\" Leonor Sellers is a 45year old male with a history of schizophrenia admitted to the [facility] for increased psychosis. He has not been bathing or otherwise caring for himself at home. He was reportedly hospitalized at [facility] from April - June 2020. He does not verbally answer any questions. He is well known to this writer from previous admissions in the [facility] area. He has responded well to ECT in the past.    09/21 - overnight, patient incontinent of urine, placed into diaper. Patient appeared catatonic at times, but was speaking, non-spontaneously, to the team this morning. Per nursing, the patient has significantly poor self care, grossly internally preoccucpied and with no ability to care for himself. SW reports that sister is patient's caretaker and that he has been decompensating from an already poor baseline recently, requiring prompting to eat meals and unable to perform basic ADLs. Family states he improved on Clozaril and they are requesting the team restart this medication. Of note, patient got [place] injection two weeks ago, VPA level suggests compliance with medication. 09/22 - patient has been isolative to bed, up for meals, selectively mute. Shakes and nods his head to stimuli. Patient slept 11 hours overnight, and is in bed on assessment. He is mute, does not endorse any pain or discomfort but is largely unresponsive to questions. Vitals unremarkable, BP mildly elevated.     09/23 - patient in bed much of the day, per nursing he got up and showered, and spoke briefly about wanting to go home. He is not compliant with medication; patient seen in his room, he is somnolent, shakes and nods his head, doesn't open his eyes for the duration of the interview. Patient slept 8 hours overnight. 09/24 - patient refused vital signs and medications this morning. He remains isolative to bed for much of the day. Per nursing assessment, the patient has been internally preoccupied, selectively mute, slept 9.5 hours overnight. Patient briefed on the importance of medication compliance, does not voice understanding of the situation. 09/25 - overnight, patient was visible, pacing, largely incoherent but with no aggression noted. No PRNs were given to the patient but he is refusing several doses of medication (refused VPA, Klonopin AM doses); he is taking Clozaril. Patient noted to have repetitive movements, selectively mute on interview, won't get out of bed. Family asking for neurologic workup, MRI as he has not had this before. 09/28 - patient isolative, still selectively mute, took medication and allowed vitals this morning but refused labwork (CBC for Clozaril is overdue). Patient refused breakfast this morning, slept 6 hours overnight. He remains internally preoccupied, unable to assess his mental state. Patient continues to refuse about half of medication administrations. Court order pending for labwork. 09/29 - no acute overnight events. Patient medication compliant, isolative, selectively mute and generally asleep for much of the day. Not voicing any complaints but with ongoing internal preoccupation; he has not had any meaningful interaction with MD since last week. Court order for tomorrow's labwork pending. 09/30 - no acute overnight events. Patient was out of his room today and participated in treatment team discussion. He continues to speak minimally but asked to go home.  Discussed medication and the need for labwork, the patient agrees but has been refusing at [facility] each morning. Patient guarded about symptoms, concrete and with no understanding of the events surrounding admission. 10/01 - court ordered medication / blood draw approved. Patient refused Klonopin and VPA this morning. Patient got bloodwork this morning, ANC WNL. Patient briefed on his treatment progress, he says nothing and does not acknowledge the treatment team. He made a comment at the onset of the interview but then went back to bed. Unable to provide any meaningful information. Per nursing, he remains mostly mute but will ask for some items. They are concerned he may be cheeking his Clozaril. 10/02 - the patient slept 10 hours overnight, self care is poor and patient remains internally preoccupied. He is out of his room for meals and comes into treatment team room for morning interview. He is unable to answer when asked his shoe size. Patient compliant with Clozaril with mouth checks to date, no PRNs given for agitation but patient still unable to adhere to ADLs without much prompting. 10/03 - patient slept 10.75 hours, he remains blunted with poor self care, but medication compliant. Patient out of room to meet with MD, denies SI/HI/AVH; he remains grossly internally preoccupied. Patient discharge focused, still speech impoverished and with poor ADLs. 10/04 - patient impoverished, in bed for much of the day. He is out for meals, requires much prompting to engage in ADLs. Goal for today is taking a shower per nursing. Patient compliant with Clozaril, vitals unremarkable thus far. Patient selectively mute, does not answer assessment questions. 10/05 - no acute overnight events; the patient slept 10 hours overnight. Patient refused AM medictions this AM, but took Clozaril last night. He remains in poor behavioral control, requiring much prompting for ADLs. Took shower last night with much encouragement.  He remains withdrawn, isolative, mental status remains impoverished. 10/06 - patient slept 9 hours overnight, he remains isolative to room. Compliant with Clozaril but refusing VPA and benzo. Patient out of bed for meals otherwise with no improvement in thought process. Discussed holding all agents except Clozaril as patient is intermittently non-compliant and had been taking these agents prior to admission, patient agrees to this. He c/o sialorrhea. Patient received no PRNs for agitation, ADLs remain poor and he needs prompting for most activities. 10/07 - no acute overnight events. Patient has been isolative, compliant with court ordered medication. Patient otherwise with no specific concerns, denies SI/HI/AVH. Patient requires prompting for most ADLs, minimally engaged in the milieu, but with no episodes of agitation or aggression. No PRNs given x24 hours. Patient's benzo and VPA held. 10/08 - patient slept 8 hours, has been isolative but medication compliant. Patient with mild tremor L > R; benzo and VPA still held due to patient refusal, patient appears to be improving on Clozaril monotherapy. Patient still grossly hypo-verbal, has been otherwise pleasant. 10/09 - overnight, patient has remained isolative; he speaks sparingly and has difficulty with his ADLs. Patient has been in fair behavioral control, per sister he is still off from his baseline. Clozaril trial continues, patient tolerating medication and thus far with no instances of significant side effects (sialorrhea). Patient discharge focused, with marked poverty of thought on interview. 10/12 - patient isolative, remains in bed for much of the day. Patient refused vitals over the weekend. Denies all symptoms, ate breakfast this morning medication compliant. Patient slept 5.5 hours overnight, he remains fairly non-responsive but is not in any distress. Patient with poor hygiene, poor self care, requires significant prompting and motivation to partake in ADLs.     10/13 - overnight, patient noted to have worsening thought process disorganization. He has disrobed completely and despite prompting will not put clothes on engage in treatment. He is compliant with court ordered medication, but RNs have had difficulty getting vital signs as he generally does not cooperate. This morning, patient noted to have an 8 cm abscess on his R shoulder, draining purulent fluid. He is unable to provide any information regarding history on the abscess as he is selectively mute. IM consulted for I&D, ABx recs. 10/14 - no acute overnight events. Patient with markedly poor hygiene, requires prompting for ADLs which he refuses intermittently (showering, eating). He is compliant with medication and otherwise with no specific concerns or distress. Patient started on Abx for cellulitis, surgical consult pending. Patient does not answer assessment questions. Of note, patient's LFTs elevated, he remains asymptomatic.    10/15 - patient has been isolative, compliant with medication, still internally preoccupied. Patient has been otherwise in fair behavioral control, markedly poor ADLs, poor hygiene. Patient seen by general surgery, awaiting recs. Patient remains selectively mute, unable to care for self. He slept 6 hours overnight, got no PRNs for agitation. MRI pending for AMS. 10/17- patient remains isolative to room, compliant with medication, still internally preoccupied. Patient has been otherwise in fair behavioral control, markedly poor ADLs, poor hygiene. Patient remains selectively mute, would not answer any of my questions. 10/18- patient remains isolative to room, compliant with medication, still internally preoccupied. Patient was given PRN medications last night after he was on the floor rocking back and forth and pulling his hair and then getting in the shower with his clothes on. He continues to have markedly poor ADLs, poor hygiene. Patient remains selectively mute, would not answer any of my questions. 10/19 - patient seen in his room this morning, he is naked and sitting cross legged on his bed rocking and rubbing his legs. He does not appear to be in any distress but does not answer questions appropriately. The patient allows MD to assess his shoulder, which is healing with minimal drainage today. Patient requests to go home, is advised to put on some clothes and call his sister, which he states he will do. Per chart review, patient later told SW he will go to his daughter's home upon discharge (he does not have a daughter). 10/20 - no acute overnight events. Patient isolative, 'apathetic' per nursing. He is out briefly into the hallways to pace but interacts very limited with staff or peers. Patient medication compliant, grossly internally preoccupied, no significant improvement thus far. Patient got no PRNs, still drooling. Patient noted to have HR to 100s. Patient noted to have no specific concerns, he barely audibly asked to go home this morning. Transaminitis is resolving. 10/21 - the patient remains isolative, he has not interacted with team members in 24 hours. He does appear to understand when SW asks him to call his sister to start discharge planning but makes no attempts to do so. Patient's Clozaril titration halted due to worsening tachycardia, sialorrhea persists. Patient with no specific symptoms but grossly impoverished of thought. Per history, patient has received ECT in the past with little success. SIDE EFFECTS: (reviewed/updated 10/21/2020)  +Drooling, +tachycardia  No noted toxicity with use of Depakote   ALLERGIES:(reviewed/updated 10/21/2020)  Allergies   Allergen Reactions    Fluphenazine Unknown (comments)     Pt is unable to communicate properly.  Penicillins Unknown (comments)     Pt is unable to communicate properly.       MEDICATIONS PRIOR TO ADMISSION:(reviewed/updated 10/21/2020)  Medications Prior to Admission   Medication Sig    FLUoxetine (PROzac) 20 mg capsule Take 20 mg by mouth daily.  benztropine (COGENTIN) 0.5 mg tablet Take 0.5 mg by mouth two (2) times a day. Indications: extrapyramidal symptoms as a result of taking the medication    LORazepam (Ativan) 1 mg tablet Take 1 mg by mouth two (2) times a day.  valproate (Depakene) 250 mg/5 mL syrup Take 1,000 mg by mouth two (2) times a day.  paliperidone palmitate (Invega Sustenna) 234 mg/1.5 mL injection 234 mg by IntraMUSCular route every twenty-one (21) days. Indications: schizophrenia    OLANZapine (ZyPREXA zydis) 20 mg disintegrating tablet Take 20 mg by mouth Daily (before dinner). Indications: schizophrenia      PAST MEDICAL HISTORY: Past medical history from the initial psychiatric evaluation has been reviewed (reviewed/updated 10/21/2020) with no additional updates (I asked patient and no additional past medical history provided). Past Medical History:   Diagnosis Date    Abscess 10/15/2020    Psychiatric disorder     Psychotic disorder ([facility] [address]     Withdrawal syndrome ([facility] [address]    History reviewed. No pertinent surgical history. SOCIAL HISTORY: Social history from the initial psychiatric evaluation has been reviewed (reviewed/updated 10/21/2020) with no additional updates (I asked patient and no additional social history provided).    Social History     Socioeconomic History    Marital status: SINGLE     Spouse name: Not on file    Number of children: Not on file    Years of education: Not on file    Highest education level: Not on file   Occupational History    Not on file   Social Needs    Financial resource strain: Not on file    Food insecurity     Worry: Not on file     Inability: Not on file    Transportation needs     Medical: Not on file     Non-medical: Not on file   Tobacco Use    Smoking status: Never Smoker    Smokeless tobacco: Never Used   Substance and Sexual Activity    Alcohol use: No    Drug use: No    Sexual activity: Not on file   Lifestyle    Physical activity     Days per week: Not on file     Minutes per session: Not on file    Stress: Not on file   Relationships    Social connections     Talks on phone: Not on file     Gets together: Not on file     Attends Confucianism service: Not on file     Active member of club or organization: Not on file     Attends meetings of clubs or organizations: Not on file     Relationship status: Not on file    Intimate partner violence     Fear of current or ex partner: Not on file     Emotionally abused: Not on file     Physically abused: Not on file     Forced sexual activity: Not on file   Other Topics Concern    Not on file   Social History Narrative    Pt is a HS grad and is unemployed. He lives with his sister. On disability    No pending legal charge reported      FAMILY HISTORY: Family history from the initial psychiatric evaluation has been reviewed (reviewed/updated 10/21/2020) with no additional updates (I asked patient and no additional family history provided). History reviewed. No pertinent family history. REVIEW OF SYSTEMS: (reviewed/updated 10/21/2020)  Appetite:poor   Sleep: poor with DIMS (difficulty initiating & maintaining sleep)   All other Review of Systems: Negative except per HPI         [facility] (MSE):    MSE FINDINGS ARE WITHIN NORMAL LIMITS (WNL) UNLESS OTHERWISE STATED BELOW. ( ALL OF THE BELOW CATEGORIES OF THE MSE HAVE BEEN REVIEWED (reviewed 10/21/2020) AND UPDATED AS DEEMED APPROPRIATE )  General Presentation age appropriate, cooperative   Orientation disorganized   Vital Signs  See below (reviewed 10/21/2020); Vital Signs (BP, Pulse, & Temp) are within normal limits if not listed below.    Gait and Station Stable/steady, no ataxia   Musculoskeletal System No extrapyramidal symptoms (EPS); no abnormal muscular movements or Tardive Dyskinesia (TD); muscle strength and tone are within normal limits   Language No aphasia or dysarthria   Speech: hypoverbal, increased latency of response, non-pressured and soft   Thought Processes illogical; slow rate of thoughts; poor abstract reasoning/computation   Thought Associations blocked    Thought Content poverty of content   Suicidal Ideations unable to assess   Homicidal Ideations unable to assess   Mood:  unable to asses   Affect:  flat   Memory recent  impaired   Memory remote:  impaired   Concentration/Attention:  impaired   Fund of Knowledge significantly below average   Insight:  poor   Reliability fair   Judgment:  impaired due to active psychosis          VITALS:     Patient Vitals for the past 24 hrs:   Temp Pulse Resp BP SpO2   10/21/20 0858 97.7 °F (36.5 °C) (!) 107 18 102/72 96 %     Wt Readings from Last 3 Encounters:   09/18/20 85.7 kg (189 lb)   10/15/20 85.7 kg (189 lb)   12/23/17 84.9 kg (187 lb 1.6 oz)     Temp Readings from Last 3 Encounters:   10/21/20 97.7 °F (36.5 °C)   03/28/17 97.5 °F (36.4 °C)   02/07/15 98.7 °F (37.1 °C)     BP Readings from Last 3 Encounters:   10/21/20 102/72   03/28/17 100/68   02/12/15 105/73     Pulse Readings from Last 3 Encounters:   10/21/20 (!) 107   03/28/17 67   02/12/15 87            DATA     LABORATORY DATA:(reviewed/updated 10/21/2020)  Recent Results (from the past 24 hour(s))   EKG, 12 LEAD, INITIAL    Collection Time: 10/20/20  3:23 PM   Result Value Ref Range    Ventricular Rate 119 BPM    Atrial Rate 119 BPM    P-R Interval 154 ms    QRS Duration 80 ms    Q-T Interval 336 ms    QTC Calculation (Bezet) 472 ms    Calculated P Axis 41 degrees    Calculated R Axis 20 degrees    Calculated T Axis 31 degrees    Diagnosis       ** Age and gender specific ECG analysis **  Sinus tachycardia  No previous ECGs available  Confirmed by Emerita Zepeda M.D., [place] ([id number]) on 10/21/2020 7:26:01 AM       Lab Results   Component Value Date/Time    Valproic acid 97 09/18/2020 04:42 PM     No results found for: LITHM   RADIOLOGY REPORTS:(reviewed/updated 10/21/2020)  No results found.       MEDICATIONS     ALL MEDICATIONS:   Current Facility-Administered Medications   Medication Dose Route Frequency    [START ON 10/22/2020] atropine 1 % ophthalmic solution 1 Drop  1 Drop SubLINGual DAILY    cloZAPine (CLOZARIL) tablet 50 mg  50 mg Oral DAILY    Or    OLANZapine (ZyPREXA) 5 mg in sterile water (preservative free) 1 mL injection +++COURT ORDERED MEDICATION FOR REFUSAL OF CLOZAPINE+++  5 mg IntraMUSCular DAILY    cloZAPine (CLOZARIL) tablet 450 mg  450 mg Oral QHS    Or    OLANZapine (ZyPREXA) 5 mg in sterile water (preservative free) 1 mL injection +++COURT ORDERED MEDICATION FOR REFUSAL OF CLOZAPINE+++  5 mg IntraMUSCular QHS    OLANZapine (ZyPREXA) tablet 5 mg  5 mg Oral Q6H PRN    haloperidol lactate (HALDOL) injection 5 mg  5 mg IntraMUSCular Q6H PRN    benztropine (COGENTIN) tablet 1 mg  1 mg Oral BID PRN    diphenhydrAMINE (BENADRYL) injection 50 mg  50 mg IntraMUSCular BID PRN    hydrOXYzine HCL (ATARAX) tablet 50 mg  50 mg Oral TID PRN    traZODone (DESYREL) tablet 50 mg  50 mg Oral QHS PRN    acetaminophen (TYLENOL) tablet 650 mg  650 mg Oral Q4H PRN    magnesium hydroxide (MILK OF MAGNESIA) 400 mg/5 mL oral suspension 30 mL  30 mL Oral DAILY PRN      SCHEDULED MEDICATIONS:   Current Facility-Administered Medications   Medication Dose Route Frequency    [START ON 10/22/2020] atropine 1 % ophthalmic solution 1 Drop  1 Drop SubLINGual DAILY    cloZAPine (CLOZARIL) tablet 50 mg  50 mg Oral DAILY    Or    OLANZapine (ZyPREXA) 5 mg in sterile water (preservative free) 1 mL injection +++COURT ORDERED MEDICATION FOR REFUSAL OF CLOZAPINE+++  5 mg IntraMUSCular DAILY    cloZAPine (CLOZARIL) tablet 450 mg  450 mg Oral QHS    Or    OLANZapine (ZyPREXA) 5 mg in sterile water (preservative free) 1 mL injection +++COURT ORDERED MEDICATION FOR REFUSAL OF CLOZAPINE+++  5 mg IntraMUSCular QHS          ASSESSMENT & PLAN     DIAGNOSES REQUIRING ACTIVE TREATMENT AND MONITORING: (reviewed/updated 10/21/2020)  Patient Active Hospital Problem List:   Schizoaffective disorder [facility]) (9/18/2020)    Assessment: patient with significant internal preoccupation, poor ADLs, history of catatonia. Appears to have been compliant with home medication, but is regressed and with a markedly disorganized thought process. Will start Clozaril, add benzodiazepine for protection against catatonia to be tapered as Clozaril dose increases given interaction. Plan:  COURT ORDERED MEDICATION:  - Clozaril 50 mg QDAY mg and 450 mg QHS *OR* Zyprexa 5 mg IM for psychosis    -  START Atropine 0.1% SL QDAY for sialorrhea  - EEG for seizure rule out  - Appreciate recs from IM and gen surg re: cellulitis  - LFTs WNL  - HOLD Klonopin 1 mg BID for catatonia due to poor efficacy  - CBC and Clozaril level by court order  - HOLD VPA oral soln 1000 mg BID for mood lability, seizure prophylaxis due to poor efficacy, non compliance  - DISCONTINUE Prozac / Cogentin / Zydis due to polypharamacy  - Consider antihypertensive  - IGM therapy as tolerated    I will continue to monitor blood levels (Depakote, clozapine---a drug with a narrow therapeutic index= NTI) and associated labs for drug therapy implemented that require intense monitoring for toxicity as deemed appropriate based on current medication side effects and pharmacodynamically determined drug 1/2 lives. In summary, Marla Cosby, is a 45 y.o.  male who presents with a severe exacerbation of the principal diagnosis of Schizoaffective disorder ([facility] [address]    Patient's condition is not improving. Patient requires continued inpatient hospitalization for further stabilization, safety monitoring and medication management. I will continue to coordinate the provision of individual, milieu, occupational, group, and substance abuse therapies to address target symptoms/diagnoses as deemed appropriate for the individual patient.   A coordinated, multidisplinary treatment team round was conducted with the patient (this team consists of the nurse, psychiatric unit pharmacist,  and writer). Complete current electronic health record for patient has been reviewed today including consultant notes, ancillary staff notes, nurses and psychiatric tech notes. Suicide risk assessment completed and patient deemed to be of low risk for suicide at this time. The following regarding medications was addressed during rounds with patient:   the risks and benefits of the proposed medication. The patient was given the opportunity to ask questions. Informed consent given to the use of the above medications. Will continue to adjust psychiatric and non-psychiatric medications (see above \"medication\" section and orders section for details) as deemed appropriate & based upon diagnoses and response to treatment. I will continue to order blood tests/labs and diagnostic tests as deemed appropriate and review results as they become available (see orders for details and above listed lab/test results). I will order psychiatric records from previous [facility] to further elucidate the nature of patient's psychopathology and review once available. I will gather additional collateral information from friends, family and o/p treatment team to further elucidate the nature of patient's psychopathology and baselline level of psychiatric functioning. I certify that this patient's inpatient psychiatric hospital services furnished since the previous certification were, and continue to be, required for treatment that could reasonably be expected to improve the patient's condition, or for diagnostic study, and that the patient continues to need, on a daily basis, active treatment furnished directly by or requiring the supervision of inpatient psychiatric facility personnel.  In addition, the hospital records show that services furnished were intensive treatment services, admission or related services, or equivalent services.     EXPECTED DISCHARGE DATE/DAY: TBD     DISPOSITION: Home       Signed By:   Salima Cohen MD  10/21/2020

## 2020-10-21 NOTE — GROUP NOTE
JENELLE  GROUP DOCUMENTATION INDIVIDUAL Group Therapy Note Date: 10/21/2020 Group Start Time: 1000 Group End Time: 1100 Group Topic: Topic Group [facility] 3 ACUTE BEHAV [facility], [address] [facility] [place] GROUP DOCUMENTATION GROUP Group Therapy Note Attendees: 4 Attendance: Did not attend Patient's Goal: Interventions/techniques Tamela King

## 2020-10-22 LAB
CLOZAPINE SERPL-MCNC: 1586 NG/ML (ref 350–650)
CLOZAPINE+NOR SERPL-MCNC: 2234 NG/ML
NORCLOZAPINE SERPL-MCNC: 648 NG/ML

## 2020-10-22 PROCEDURE — 99231 SBSQ HOSP IP/OBS SF/LOW 25: CPT | Performed by: PSYCHIATRY & NEUROLOGY

## 2020-10-22 PROCEDURE — 74011250637 HC RX REV CODE- 250/637: Performed by: PSYCHIATRY & NEUROLOGY

## 2020-10-22 PROCEDURE — 65220000003 HC RM SEMIPRIVATE PSYCH

## 2020-10-22 RX ADMIN — CLOZAPINE 450 MG: 100 TABLET ORAL at 21:19

## 2020-10-22 RX ADMIN — CLOZAPINE 50 MG: 25 TABLET ORAL at 08:58

## 2020-10-22 NOTE — BH NOTES
PSYCHIATRIC PROGRESS NOTE         Patient Name  Jean Claude Cannon   Date of Birth 1970   [facility] [id number]   Medical Record Number  [id number]      Age  45 y.o. PCP Unknown, Provider   Admit date:  9/18/2020    Room Number  373/51  @ [facility]   Date of Service  10/22/2020         E & M PROGRESS NOTE:         HISTORY       CC:  \"psychosis\"  HISTORY OF PRESENT ILLNESS/INTERVAL HISTORY:  (reviewed/updated 10/22/2020). per initial evaluation: Ramon Michaels \"Corey\" Melquiades Balderas is a 45year old male with a history of schizophrenia admitted to the [facility] for increased psychosis. He has not been bathing or otherwise caring for himself at home. He was reportedly hospitalized at [facility] from April - June 2020. He does not verbally answer any questions. He is well known to this writer from previous admissions in the [place] area. He has responded well to ECT in the past.    09/21 - overnight, patient incontinent of urine, placed into diaper. Patient appeared catatonic at times, but was speaking, non-spontaneously, to the team this morning. Per nursing, the patient has significantly poor self care, grossly internally preoccucpied and with no ability to care for himself. SW reports that sister is patient's caretaker and that he has been decompensating from an already poor baseline recently, requiring prompting to eat meals and unable to perform basic ADLs. Family states he improved on Clozaril and they are requesting the team restart this medication. Of note, patient got [place] injection two weeks ago, VPA level suggests compliance with medication. 09/22 - patient has been isolative to bed, up for meals, selectively mute. Shakes and nods his head to stimuli. Patient slept 11 hours overnight, and is in bed on assessment. He is mute, does not endorse any pain or discomfort but is largely unresponsive to questions. Vitals unremarkable, BP mildly elevated.     09/23 - patient in bed much of the day, per nursing he got up and showered, and spoke briefly about wanting to go home. He is not compliant with medication; patient seen in his room, he is somnolent, shakes and nods his head, doesn't open his eyes for the duration of the interview. Patient slept 8 hours overnight. 09/24 - patient refused vital signs and medications this morning. He remains isolative to bed for much of the day. Per nursing assessment, the patient has been internally preoccupied, selectively mute, slept 9.5 hours overnight. Patient briefed on the importance of medication compliance, does not voice understanding of the situation. 09/25 - overnight, patient was visible, pacing, largely incoherent but with no aggression noted. No PRNs were given to the patient but he is refusing several doses of medication (refused VPA, Klonopin AM doses); he is taking Clozaril. Patient noted to have repetitive movements, selectively mute on interview, won't get out of bed. Family asking for neurologic workup, MRI as he has not had this before. 09/28 - patient isolative, still selectively mute, took medication and allowed vitals this morning but refused labwork (CBC for Clozaril is overdue). Patient refused breakfast this morning, slept 6 hours overnight. He remains internally preoccupied, unable to assess his mental state. Patient continues to refuse about half of medication administrations. Court order pending for labwork. 09/29 - no acute overnight events. Patient medication compliant, isolative, selectively mute and generally asleep for much of the day. Not voicing any complaints but with ongoing internal preoccupation; he has not had any meaningful interaction with MD since last week. Court order for tomorrow's labwork pending. 09/30 - no acute overnight events. Patient was out of his room today and participated in treatment team discussion. He continues to speak minimally but asked to go home.  Discussed medication and the need for labwork, the patient agrees but has been refusing at [facility] each morning. Patient guarded about symptoms, concrete and with no understanding of the events surrounding admission. 10/01 - court ordered medication / blood draw approved. Patient refused Klonopin and VPA this morning. Patient got bloodwork this morning, ANC WNL. Patient briefed on his treatment progress, he says nothing and does not acknowledge the treatment team. He made a comment at the onset of the interview but then went back to bed. Unable to provide any meaningful information. Per nursing, he remains mostly mute but will ask for some items. They are concerned he may be cheeking his Clozaril. 10/02 - the patient slept 10 hours overnight, self care is poor and patient remains internally preoccupied. He is out of his room for meals and comes into treatment team room for morning interview. He is unable to answer when asked his shoe size. Patient compliant with Clozaril with mouth checks to date, no PRNs given for agitation but patient still unable to adhere to ADLs without much prompting. 10/03 - patient slept 10.75 hours, he remains blunted with poor self care, but medication compliant. Patient out of room to meet with MD, denies SI/HI/AVH; he remains grossly internally preoccupied. Patient discharge focused, still speech impoverished and with poor ADLs. 10/04 - patient impoverished, in bed for much of the day. He is out for meals, requires much prompting to engage in ADLs. Goal for today is taking a shower per nursing. Patient compliant with Clozaril, vitals unremarkable thus far. Patient selectively mute, does not answer assessment questions. 10/05 - no acute overnight events; the patient slept 10 hours overnight. Patient refused AM medictions this AM, but took Clozaril last night. He remains in poor behavioral control, requiring much prompting for ADLs. Took shower last night with much encouragement.  He remains withdrawn, isolative, mental status remains impoverished. 10/06 - patient slept 9 hours overnight, he remains isolative to room. Compliant with Clozaril but refusing VPA and benzo. Patient out of bed for meals otherwise with no improvement in thought process. Discussed holding all agents except Clozaril as patient is intermittently non-compliant and had been taking these agents prior to admission, patient agrees to this. He c/o sialorrhea. Patient received no PRNs for agitation, ADLs remain poor and he needs prompting for most activities. 10/07 - no acute overnight events. Patient has been isolative, compliant with court ordered medication. Patient otherwise with no specific concerns, denies SI/HI/AVH. Patient requires prompting for most ADLs, minimally engaged in the milieu, but with no episodes of agitation or aggression. No PRNs given x24 hours. Patient's benzo and VPA held. 10/08 - patient slept 8 hours, has been isolative but medication compliant. Patient with mild tremor L > R; benzo and VPA still held due to patient refusal, patient appears to be improving on Clozaril monotherapy. Patient still grossly hypo-verbal, has been otherwise pleasant. 10/09 - overnight, patient has remained isolative; he speaks sparingly and has difficulty with his ADLs. Patient has been in fair behavioral control, per sister he is still off from his baseline. Clozaril trial continues, patient tolerating medication and thus far with no instances of significant side effects (sialorrhea). Patient discharge focused, with marked poverty of thought on interview. 10/12 - patient isolative, remains in bed for much of the day. Patient refused vitals over the weekend. Denies all symptoms, ate breakfast this morning medication compliant. Patient slept 5.5 hours overnight, he remains fairly non-responsive but is not in any distress. Patient with poor hygiene, poor self care, requires significant prompting and motivation to partake in ADLs.     10/13 - overnight, patient noted to have worsening thought process disorganization. He has disrobed completely and despite prompting will not put clothes on engage in treatment. He is compliant with court ordered medication, but RNs have had difficulty getting vital signs as he generally does not cooperate. This morning, patient noted to have an 8 cm abscess on his R shoulder, draining purulent fluid. He is unable to provide any information regarding history on the abscess as he is selectively mute. IM consulted for I&D, ABx recs. 10/14 - no acute overnight events. Patient with markedly poor hygiene, requires prompting for ADLs which he refuses intermittently (showering, eating). He is compliant with medication and otherwise with no specific concerns or distress. Patient started on Abx for cellulitis, surgical consult pending. Patient does not answer assessment questions. Of note, patient's LFTs elevated, he remains asymptomatic.    10/15 - patient has been isolative, compliant with medication, still internally preoccupied. Patient has been otherwise in fair behavioral control, markedly poor ADLs, poor hygiene. Patient seen by general surgery, awaiting recs. Patient remains selectively mute, unable to care for self. He slept 6 hours overnight, got no PRNs for agitation. MRI pending for AMS. 10/17- patient remains isolative to room, compliant with medication, still internally preoccupied. Patient has been otherwise in fair behavioral control, markedly poor ADLs, poor hygiene. Patient remains selectively mute, would not answer any of my questions. 10/18- patient remains isolative to room, compliant with medication, still internally preoccupied. Patient was given PRN medications last night after he was on the floor rocking back and forth and pulling his hair and then getting in the shower with his clothes on. He continues to have markedly poor ADLs, poor hygiene. Patient remains selectively mute, would not answer any of my questions. 10/19 - patient seen in his room this morning, he is naked and sitting cross legged on his bed rocking and rubbing his legs. He does not appear to be in any distress but does not answer questions appropriately. The patient allows MD to assess his shoulder, which is healing with minimal drainage today. Patient requests to go home, is advised to put on some clothes and call his sister, which he states he will do. Per chart review, patient later told SW he will go to his daughter's home upon discharge (he does not have a daughter). 10/20 - no acute overnight events. Patient isolative, 'apathetic' per nursing. He is out briefly into the hallways to pace but interacts very limited with staff or peers. Patient medication compliant, grossly internally preoccupied, no significant improvement thus far. Patient got no PRNs, still drooling. Patient noted to have HR to 100s. Patient noted to have no specific concerns, he barely audibly asked to go home this morning. Transaminitis is resolving. 10/21 - the patient remains isolative, he has not interacted with team members in 24 hours. He does appear to understand when SW asks him to call his sister to start discharge planning but makes no attempts to do so. Patient's Clozaril titration halted due to worsening tachycardia, sialorrhea persists. Patient with no specific symptoms but grossly impoverished of thought. Per history, patient has received ECT in the past with little success. 10/22 - no acute overnight events. Patient has been out of the room more often, he remains internally preoccupied and with ongoing selective mutism but discharge focused; he is seen at the nurses' station, encouraged to attempt to speak with his sister today. He agrees but then walks away. Patient denies SI/HI, he continues to respond to internal stimuli. HR still high but under 100 x12 hours.         SIDE EFFECTS: (reviewed/updated 10/22/2020)  +Drooling, +tachycardia  No noted toxicity with use of Depakote   ALLERGIES:(reviewed/updated 10/22/2020)  Allergies   Allergen Reactions    Fluphenazine Unknown (comments)     Pt is unable to communicate properly.  Penicillins Unknown (comments)     Pt is unable to communicate properly. MEDICATIONS PRIOR TO ADMISSION:(reviewed/updated 10/22/2020)  Medications Prior to Admission   Medication Sig    FLUoxetine (PROzac) 20 mg capsule Take 20 mg by mouth daily.  benztropine (COGENTIN) 0.5 mg tablet Take 0.5 mg by mouth two (2) times a day. Indications: extrapyramidal symptoms as a result of taking the medication    LORazepam (Ativan) 1 mg tablet Take 1 mg by mouth two (2) times a day.  valproate (Depakene) 250 mg/5 mL syrup Take 1,000 mg by mouth two (2) times a day.  paliperidone palmitate (Invega Sustenna) 234 mg/1.5 mL injection 234 mg by IntraMUSCular route every twenty-one (21) days. Indications: schizophrenia    OLANZapine (ZyPREXA zydis) 20 mg disintegrating tablet Take 20 mg by mouth Daily (before dinner). Indications: schizophrenia      PAST MEDICAL HISTORY: Past medical history from the initial psychiatric evaluation has been reviewed (reviewed/updated 10/22/2020) with no additional updates (I asked patient and no additional past medical history provided). Past Medical History:   Diagnosis Date    Abscess 10/15/2020    Psychiatric disorder     Psychotic disorder ([facility] [address]     Withdrawal syndrome ([facility] [address]    History reviewed. No pertinent surgical history. SOCIAL HISTORY: Social history from the initial psychiatric evaluation has been reviewed (reviewed/updated 10/22/2020) with no additional updates (I asked patient and no additional social history provided).    Social History     Socioeconomic History    Marital status: SINGLE     Spouse name: Not on file    Number of children: Not on file    Years of education: Not on file    Highest education level: Not on file   Occupational History    Not on file   Social Needs    Financial resource strain: Not on file    Food insecurity     Worry: Not on file     Inability: Not on file    Transportation needs     Medical: Not on file     Non-medical: Not on file   Tobacco Use    Smoking status: Never Smoker    Smokeless tobacco: Never Used   Substance and Sexual Activity    Alcohol use: No    Drug use: No    Sexual activity: Not on file   Lifestyle    Physical activity     Days per week: Not on file     Minutes per session: Not on file    Stress: Not on file   Relationships    Social connections     Talks on phone: Not on file     Gets together: Not on file     Attends Zoroastrian service: Not on file     Active member of club or organization: Not on file     Attends meetings of clubs or organizations: Not on file     Relationship status: Not on file    Intimate partner violence     Fear of current or ex partner: Not on file     Emotionally abused: Not on file     Physically abused: Not on file     Forced sexual activity: Not on file   Other Topics Concern    Not on file   Social History Narrative    Pt is a HS grad and is unemployed. He lives with his sister. On disability    No pending legal charge reported      FAMILY HISTORY: Family history from the initial psychiatric evaluation has been reviewed (reviewed/updated 10/22/2020) with no additional updates (I asked patient and no additional family history provided). History reviewed. No pertinent family history.     REVIEW OF SYSTEMS: (reviewed/updated 10/22/2020)  Appetite:poor   Sleep: poor with DIMS (difficulty initiating & maintaining sleep)   All other Review of Systems: Negative except per HPI         [facility] (MSE):    MSE FINDINGS ARE WITHIN NORMAL LIMITS (WNL) UNLESS OTHERWISE STATED BELOW. ( ALL OF THE BELOW CATEGORIES OF THE MSE HAVE BEEN REVIEWED (reviewed 10/22/2020) AND UPDATED AS DEEMED APPROPRIATE )  General Presentation age appropriate, cooperative   Orientation disorganized   Vital Signs  See below (reviewed 10/22/2020); Vital Signs (BP, Pulse, & Temp) are within normal limits if not listed below. Gait and Station Stable/steady, no ataxia   Musculoskeletal System No extrapyramidal symptoms (EPS); no abnormal muscular movements or Tardive Dyskinesia (TD); muscle strength and tone are within normal limits   Language No aphasia or dysarthria   Speech:  hypoverbal, increased latency of response, non-pressured and soft   Thought Processes illogical; slow rate of thoughts; poor abstract reasoning/computation   Thought Associations blocked    Thought Content poverty of content   Suicidal Ideations unable to assess   Homicidal Ideations unable to assess   Mood:  unable to asses   Affect:  flat   Memory recent  impaired   Memory remote:  impaired   Concentration/Attention:  impaired   Fund of Knowledge significantly below average   Insight:  poor   Reliability fair   Judgment:  impaired due to active psychosis          VITALS:     Patient Vitals for the past 24 hrs:   Temp Pulse Resp BP SpO2   10/22/20 0800 97 °F (36.1 °C) 92 18 108/62 98 %     Wt Readings from Last 3 Encounters:   09/18/20 85.7 kg (189 lb)   10/15/20 85.7 kg (189 lb)   12/23/17 84.9 kg (187 lb 1.6 oz)     Temp Readings from Last 3 Encounters:   10/22/20 97 °F (36.1 °C)   03/28/17 97.5 °F (36.4 °C)   02/07/15 98.7 °F (37.1 °C)     BP Readings from Last 3 Encounters:   10/22/20 108/62   03/28/17 100/68   02/12/15 105/73     Pulse Readings from Last 3 Encounters:   10/22/20 92   03/28/17 67   02/12/15 87            DATA     LABORATORY DATA:(reviewed/updated 10/22/2020)  No results found for this or any previous visit (from the past 24 hour(s)). Lab Results   Component Value Date/Time    Valproic acid 97 09/18/2020 04:42 PM     No results found for: LITHM   RADIOLOGY REPORTS:(reviewed/updated 10/22/2020)  No results found.        MEDICATIONS     ALL MEDICATIONS:   Current Facility-Administered Medications Medication Dose Route Frequency    atropine 1 % ophthalmic solution 1 Drop  1 Drop SubLINGual DAILY    cloZAPine (CLOZARIL) tablet 50 mg  50 mg Oral DAILY    Or    OLANZapine (ZyPREXA) 5 mg in sterile water (preservative free) 1 mL injection +++COURT ORDERED MEDICATION FOR REFUSAL OF CLOZAPINE+++  5 mg IntraMUSCular DAILY    cloZAPine (CLOZARIL) tablet 450 mg  450 mg Oral QHS    Or    OLANZapine (ZyPREXA) 5 mg in sterile water (preservative free) 1 mL injection +++COURT ORDERED MEDICATION FOR REFUSAL OF CLOZAPINE+++  5 mg IntraMUSCular QHS    OLANZapine (ZyPREXA) tablet 5 mg  5 mg Oral Q6H PRN    haloperidol lactate (HALDOL) injection 5 mg  5 mg IntraMUSCular Q6H PRN    benztropine (COGENTIN) tablet 1 mg  1 mg Oral BID PRN    diphenhydrAMINE (BENADRYL) injection 50 mg  50 mg IntraMUSCular BID PRN    hydrOXYzine HCL (ATARAX) tablet 50 mg  50 mg Oral TID PRN    traZODone (DESYREL) tablet 50 mg  50 mg Oral QHS PRN    acetaminophen (TYLENOL) tablet 650 mg  650 mg Oral Q4H PRN    magnesium hydroxide (MILK OF MAGNESIA) 400 mg/5 mL oral suspension 30 mL  30 mL Oral DAILY PRN      SCHEDULED MEDICATIONS:   Current Facility-Administered Medications   Medication Dose Route Frequency    atropine 1 % ophthalmic solution 1 Drop  1 Drop SubLINGual DAILY    cloZAPine (CLOZARIL) tablet 50 mg  50 mg Oral DAILY    Or    OLANZapine (ZyPREXA) 5 mg in sterile water (preservative free) 1 mL injection +++COURT ORDERED MEDICATION FOR REFUSAL OF CLOZAPINE+++  5 mg IntraMUSCular DAILY    cloZAPine (CLOZARIL) tablet 450 mg  450 mg Oral QHS    Or    OLANZapine (ZyPREXA) 5 mg in sterile water (preservative free) 1 mL injection +++COURT ORDERED MEDICATION FOR REFUSAL OF CLOZAPINE+++  5 mg IntraMUSCular QHS          ASSESSMENT & PLAN     DIAGNOSES REQUIRING ACTIVE TREATMENT AND MONITORING: (reviewed/updated 10/22/2020)  Patient Active Hospital Problem List:   Schizoaffective disorder ([facility] [address] (9/18/2020)    Assessment: patient with significant internal preoccupation, poor ADLs, history of catatonia. Appears to have been compliant with home medication, but is regressed and with a markedly disorganized thought process. Will start Clozaril, add benzodiazepine for protection against catatonia to be tapered as Clozaril dose increases given interaction. Plan:  COURT ORDERED MEDICATION:  - Clozaril 50 mg QDAY mg and 450 mg QHS *OR* Zyprexa 5 mg IM for psychosis    - CONTINUE Atropine 0.1% SL QDAY for sialorrhea  - EEG for seizure rule out  - Appreciate recs from IM and gen surg re: cellulitis  - LFTs WNL  - HOLD Klonopin 1 mg BID for catatonia due to poor efficacy  - CBC and Clozaril level by court order  - HOLD VPA oral soln 1000 mg BID for mood lability, seizure prophylaxis due to poor efficacy, non compliance  - DISCONTINUE Prozac / Cogentin / Zydis due to polypharamacy  - Consider antihypertensive  - IGM therapy as tolerated    I will continue to monitor blood levels (Depakote, clozapine---a drug with a narrow therapeutic index= NTI) and associated labs for drug therapy implemented that require intense monitoring for toxicity as deemed appropriate based on current medication side effects and pharmacodynamically determined drug 1/2 lives. In summary, Elaina Dalal, is a 45 y.o.  male who presents with a severe exacerbation of the principal diagnosis of Schizoaffective disorder ([facility] [address]    Patient's condition is not improving. Patient requires continued inpatient hospitalization for further stabilization, safety monitoring and medication management. I will continue to coordinate the provision of individual, milieu, occupational, group, and substance abuse therapies to address target symptoms/diagnoses as deemed appropriate for the individual patient. A coordinated, multidisplinary treatment team round was conducted with the patient (this team consists of the nurse, psychiatric unit pharmacist,  and writer).      Complete current electronic health record for patient has been reviewed today including consultant notes, ancillary staff notes, nurses and psychiatric tech notes. Suicide risk assessment completed and patient deemed to be of low risk for suicide at this time. The following regarding medications was addressed during rounds with patient:   the risks and benefits of the proposed medication. The patient was given the opportunity to ask questions. Informed consent given to the use of the above medications. Will continue to adjust psychiatric and non-psychiatric medications (see above \"medication\" section and orders section for details) as deemed appropriate & based upon diagnoses and response to treatment. I will continue to order blood tests/labs and diagnostic tests as deemed appropriate and review results as they become available (see orders for details and above listed lab/test results). I will order psychiatric records from previous [facility] hospitals to further elucidate the nature of patient's psychopathology and review once available. I will gather additional collateral information from friends, family and o/p treatment team to further elucidate the nature of patient's psychopathology and baselline level of psychiatric functioning. I certify that this patient's inpatient psychiatric hospital services furnished since the previous certification were, and continue to be, required for treatment that could reasonably be expected to improve the patient's condition, or for diagnostic study, and that the patient continues to need, on a daily basis, active treatment furnished directly by or requiring the supervision of inpatient psychiatric facility personnel. In addition, the hospital records show that services furnished were intensive treatment services, admission or related services, or equivalent services.     EXPECTED DISCHARGE DATE/DAY: TBD     DISPOSITION: Home       Signed By:   Corine Mahajan MD  10/22/2020

## 2020-10-22 NOTE — BH NOTES
GROUP THERAPY PROGRESS NOTE    Patient did not participate in Process group.      Tanvi Jones, RN, PMHNP Student

## 2020-10-22 NOTE — PROGRESS NOTES
Problem: Falls - Risk of  Goal: *Absence of Falls  Description: Document Erica Hart Fall Risk and appropriate interventions in the flowsheet. Outcome: Progressing Towards Goal  Note: Fall Risk Interventions:       Mentation Interventions: Reorient patient, Room close to nurse's station    Medication Interventions: Teach patient to arise slowly    Elimination Interventions:  Toileting schedule/hourly rounds              Problem: Altered Thought Process (Adult/Pediatric)  Goal: *STG: Participates in treatment plan  Outcome: Progressing Towards Goal  Goal: *STG: Remains safe in hospital  Outcome: Progressing Towards Goal  Goal: *STG: Complies with medication therapy  Outcome: Progressing Towards Goal

## 2020-10-22 NOTE — GROUP NOTE
JENELLE  GROUP DOCUMENTATION INDIVIDUAL Group Therapy Note Date: 10/22/2020 Group Start Time: 1400 Group End Time: 1500 Group Topic: Recreational/Music Therapy [facility] [place] [address] ACUTE BEHAV [facility] [place], [address] GROUP DOCUMENTATION GROUP Group Therapy Note Attendees: 3 Attendance: Did not attend Patient's Goal: Interventions/techniques: 
Aubrey Barraza

## 2020-10-22 NOTE — INTERDISCIPLINARY ROUNDS
[facility] Interdisciplinary Rounds Patient Name: Pablo Weaver  Age: 45 y.o. Room/Bed:  313/01 Primary Diagnosis: Schizoaffective disorder ([address] Admission Status: Forced Medication Order Readmission within 30 days: no 
Power of  in place: unk Patient requires a blocked bed: yes Reason for blocked bed: Behavior Order for blocked bed obtained: no    
 
Sleep hours: 4 Morning Labs completed per orders:  na    
Participation in Care/Groups:  no 
Medication Compliant?: Yes PRNS (last 24 hours): None Restraints (last 24 hours):  no 
Substance Abuse:  no   
24 hour chart check complete:   
 
Patient goal(s) for today: Follow unit directions  
Treatment team focus/goals: Stabilize - increase Clozaril  
Progress note: Pt continues to isolate to room and has not spoken with sister. Pt opens eyes when discharge is mentioned but wont complete tasks of showering and calling sister. Reevaluated by Gómez MARINO. 
  
GONZALO:  36            RFCPOQRS LOS: TBD 
  
Financial concerns/prescription coverage: [employer] Medicare Part A&B and [facility] and O Family contact: Sister/DENISE Aponte [phone] Family requesting physician contact today: spoke 10/14 Discharge plan: Home Access to weapons: None Outpatient provider(s): Gómez MARINO - updated 10/21 Patient's preferred phone number for follow up [phone] 
  
Participating treatment team members: LAVINIA Box and Dr. Grey Lombard.

## 2020-10-22 NOTE — BH NOTES
0700-Report received from [facility]. RN  0800-1000-Patient has remain  quiet and self isolating in his room he inially refused to talk but the began to answer question , he denies SI/HI ant this time, he  Refused to take a shower this shift after much encouragement. 1000-1200-Patient in room , no change in behavior, selectly mute appetite is good and compliant with medication. 1200-1400-Patient  In no acute distress at this time. 1400-1600-patient has remained in his room most of the shift but has been in the hallway for a short time. 1600-1800-patient in the room most of the shift, denies pain or discomfort  No issues noted or voiced at this time staff will continue to monitor.

## 2020-10-22 NOTE — BH NOTES
GROUP THERAPY PROGRESS NOTE    Patient did not participate in Process group.      Cj Wilkerson RN, PMHNP Student

## 2020-10-22 NOTE — GROUP NOTE
JENELLE  GROUP DOCUMENTATION INDIVIDUAL Group Therapy Note Date: 10/22/2020 Group Start Time: 1000 Group End Time: 1100 Group Topic: Topic Group [facility] [place] [address] ACUTE BEHAV [facility], [address] [facility] GROUP DOCUMENTATION GROUP Group Therapy Note Attendees: 2 Attendance: Did not attend Patient's Goal: Interventions/techniques Yue Gonzales

## 2020-10-22 NOTE — BH NOTES
Assumed nursing care of Fran Land after receiving the 1900 change of shift report. Fran Land presented with unkept appearance, long ungroomed mustache and beard. He has been withdrawn and isolative to his bed, Not participating or interacting with peers. Unable to perform a complete nursing assessment due to him being mute and unwilling to verbally communicate. Once pt came out to the nursing station, was walking slumped over, moving hands in a flapping motion and in a barely audible voice, said, \"Juice. \"  He received juice and went right back to bed. Flat affect, poor eye contact. Another time he came out and just stared into the nursing station. Could not get pt to say anything before he went back to bed again. Pt refused vital signs, was compliant with his HS Clozaril. Will be monitoring closely for adverse side effects, mood chgs, behavior and for safety. Addendum at 2150: Fran Land has slept around four hrs. He has been at the nurses station several times for juice or for a snack. Once pt stated, \"Can I have an apple? \"   He is sleeping in bed at this time.

## 2020-10-23 PROCEDURE — 74011000250 HC RX REV CODE- 250: Performed by: PSYCHIATRY & NEUROLOGY

## 2020-10-23 PROCEDURE — 99232 SBSQ HOSP IP/OBS MODERATE 35: CPT | Performed by: PSYCHIATRY & NEUROLOGY

## 2020-10-23 PROCEDURE — 65220000003 HC RM SEMIPRIVATE PSYCH

## 2020-10-23 PROCEDURE — 74011250637 HC RX REV CODE- 250/637: Performed by: PSYCHIATRY & NEUROLOGY

## 2020-10-23 RX ORDER — CLOZAPINE 100 MG/1
350 TABLET ORAL
Status: DISCONTINUED | OUTPATIENT
Start: 2020-10-23 | End: 2020-10-26

## 2020-10-23 RX ADMIN — ATROPINE SULFATE 1 DROP: 10 SOLUTION/ DROPS OPHTHALMIC at 08:25

## 2020-10-23 RX ADMIN — CLOZAPINE 50 MG: 25 TABLET ORAL at 08:25

## 2020-10-23 RX ADMIN — CLOZAPINE 350 MG: 100 TABLET ORAL at 22:12

## 2020-10-23 NOTE — BH NOTES
[employer] conducted a TDO hearing this morning. The ending result of hearing, patient, committed.

## 2020-10-23 NOTE — BH NOTES
PSYCHIATRIC PROGRESS NOTE         Patient Name  Desiree Pro   Date of Birth 1970   [facility] [id number]   Medical Record Number  [id number]      Age  45 y.o. PCP Unknown, Provider   Admit date:  9/18/2020    Room Number  094/94  @ [facility]   Date of Service  10/23/2020         E & M PROGRESS NOTE:         HISTORY       CC:  \"psychosis\"  HISTORY OF PRESENT ILLNESS/INTERVAL HISTORY:  (reviewed/updated 10/23/2020). per initial evaluation: Mendel Miss \"Corey\" Ene Mckenna is a 45year old male with a history of schizophrenia admitted to the [facility] for increased psychosis. He has not been bathing or otherwise caring for himself at home. He was reportedly hospitalized at [facility] from April - June 2020. He does not verbally answer any questions. He is well known to this writer from previous admissions in the [facility] area. He has responded well to ECT in the past.    09/21 - overnight, patient incontinent of urine, placed into diaper. Patient appeared catatonic at times, but was speaking, non-spontaneously, to the team this morning. Per nursing, the patient has significantly poor self care, grossly internally preoccucpied and with no ability to care for himself. SW reports that sister is patient's caretaker and that he has been decompensating from an already poor baseline recently, requiring prompting to eat meals and unable to perform basic ADLs. Family states he improved on Clozaril and they are requesting the team restart this medication. Of note, patient got [place] injection two weeks ago, VPA level suggests compliance with medication. 09/22 - patient has been isolative to bed, up for meals, selectively mute. Shakes and nods his head to stimuli. Patient slept 11 hours overnight, and is in bed on assessment. He is mute, does not endorse any pain or discomfort but is largely unresponsive to questions. Vitals unremarkable, BP mildly elevated.     09/23 - patient in bed much of the day, per nursing he got up and showered, and spoke briefly about wanting to go home. He is not compliant with medication; patient seen in his room, he is somnolent, shakes and nods his head, doesn't open his eyes for the duration of the interview. Patient slept 8 hours overnight. 09/24 - patient refused vital signs and medications this morning. He remains isolative to bed for much of the day. Per nursing assessment, the patient has been internally preoccupied, selectively mute, slept 9.5 hours overnight. Patient briefed on the importance of medication compliance, does not voice understanding of the situation. 09/25 - overnight, patient was visible, pacing, largely incoherent but with no aggression noted. No PRNs were given to the patient but he is refusing several doses of medication (refused VPA, Klonopin AM doses); he is taking Clozaril. Patient noted to have repetitive movements, selectively mute on interview, won't get out of bed. Family asking for neurologic workup, MRI as he has not had this before. 09/28 - patient isolative, still selectively mute, took medication and allowed vitals this morning but refused labwork (CBC for Clozaril is overdue). Patient refused breakfast this morning, slept 6 hours overnight. He remains internally preoccupied, unable to assess his mental state. Patient continues to refuse about half of medication administrations. Court order pending for labwork. 09/29 - no acute overnight events. Patient medication compliant, isolative, selectively mute and generally asleep for much of the day. Not voicing any complaints but with ongoing internal preoccupation; he has not had any meaningful interaction with MD since last week. Court order for tomorrow's labwork pending. 09/30 - no acute overnight events. Patient was out of his room today and participated in treatment team discussion. He continues to speak minimally but asked to go home.  Discussed medication and the need for labwork, the patient agrees but has been refusing at [address] [facility] each morning. Patient guarded about symptoms, concrete and with no understanding of the events surrounding admission. 10/01 - court ordered medication / blood draw approved. Patient refused Klonopin and VPA this morning. Patient got bloodwork this morning, ANC WNL. Patient briefed on his treatment progress, he says nothing and does not acknowledge the treatment team. He made a comment at the onset of the interview but then went back to bed. Unable to provide any meaningful information. Per nursing, he remains mostly mute but will ask for some items. They are concerned he may be cheeking his Clozaril. 10/02 - the patient slept 10 hours overnight, self care is poor and patient remains internally preoccupied. He is out of his room for meals and comes into treatment team room for morning interview. He is unable to answer when asked his shoe size. Patient compliant with Clozaril with mouth checks to date, no PRNs given for agitation but patient still unable to adhere to ADLs without much prompting. 10/03 - patient slept 10.75 hours, he remains blunted with poor self care, but medication compliant. Patient out of room to meet with MD, denies SI/HI/AVH; he remains grossly internally preoccupied. Patient discharge focused, still speech impoverished and with poor ADLs. 10/04 - patient impoverished, in bed for much of the day. He is out for meals, requires much prompting to engage in ADLs. Goal for today is taking a shower per nursing. Patient compliant with Clozaril, vitals unremarkable thus far. Patient selectively mute, does not answer assessment questions. 10/05 - no acute overnight events; the patient slept 10 hours overnight. Patient refused AM medictions this AM, but took Clozaril last night. He remains in poor behavioral control, requiring much prompting for ADLs. Took shower last night with much encouragement.  He remains withdrawn, isolative, mental status remains impoverished. 10/06 - patient slept 9 hours overnight, he remains isolative to room. Compliant with Clozaril but refusing VPA and benzo. Patient out of bed for meals otherwise with no improvement in thought process. Discussed holding all agents except Clozaril as patient is intermittently non-compliant and had been taking these agents prior to admission, patient agrees to this. He c/o sialorrhea. Patient received no PRNs for agitation, ADLs remain poor and he needs prompting for most activities. 10/07 - no acute overnight events. Patient has been isolative, compliant with court ordered medication. Patient otherwise with no specific concerns, denies SI/HI/AVH. Patient requires prompting for most ADLs, minimally engaged in the milieu, but with no episodes of agitation or aggression. No PRNs given x24 hours. Patient's benzo and VPA held. 10/08 - patient slept 8 hours, has been isolative but medication compliant. Patient with mild tremor L > R; benzo and VPA still held due to patient refusal, patient appears to be improving on Clozaril monotherapy. Patient still grossly hypo-verbal, has been otherwise pleasant. 10/09 - overnight, patient has remained isolative; he speaks sparingly and has difficulty with his ADLs. Patient has been in fair behavioral control, per sister he is still off from his baseline. Clozaril trial continues, patient tolerating medication and thus far with no instances of significant side effects (sialorrhea). Patient discharge focused, with marked poverty of thought on interview. 10/12 - patient isolative, remains in bed for much of the day. Patient refused vitals over the weekend. Denies all symptoms, ate breakfast this morning medication compliant. Patient slept 5.5 hours overnight, he remains fairly non-responsive but is not in any distress. Patient with poor hygiene, poor self care, requires significant prompting and motivation to partake in ADLs.     10/13 - overnight, patient noted to have worsening thought process disorganization. He has disrobed completely and despite prompting will not put clothes on engage in treatment. He is compliant with court ordered medication, but RNs have had difficulty getting vital signs as he generally does not cooperate. This morning, patient noted to have an 8 cm abscess on his R shoulder, draining purulent fluid. He is unable to provide any information regarding history on the abscess as he is selectively mute. IM consulted for I&D, ABx recs. 10/14 - no acute overnight events. Patient with markedly poor hygiene, requires prompting for ADLs which he refuses intermittently (showering, eating). He is compliant with medication and otherwise with no specific concerns or distress. Patient started on Abx for cellulitis, surgical consult pending. Patient does not answer assessment questions. Of note, patient's LFTs elevated, he remains asymptomatic.    10/15 - patient has been isolative, compliant with medication, still internally preoccupied. Patient has been otherwise in fair behavioral control, markedly poor ADLs, poor hygiene. Patient seen by general surgery, awaiting recs. Patient remains selectively mute, unable to care for self. He slept 6 hours overnight, got no PRNs for agitation. MRI pending for AMS. 10/17- patient remains isolative to room, compliant with medication, still internally preoccupied. Patient has been otherwise in fair behavioral control, markedly poor ADLs, poor hygiene. Patient remains selectively mute, would not answer any of my questions. 10/18- patient remains isolative to room, compliant with medication, still internally preoccupied. Patient was given PRN medications last night after he was on the floor rocking back and forth and pulling his hair and then getting in the shower with his clothes on. He continues to have markedly poor ADLs, poor hygiene. Patient remains selectively mute, would not answer any of my questions. 10/19 - patient seen in his room this morning, he is naked and sitting cross legged on his bed rocking and rubbing his legs. He does not appear to be in any distress but does not answer questions appropriately. The patient allows MD to assess his shoulder, which is healing with minimal drainage today. Patient requests to go home, is advised to put on some clothes and call his sister, which he states he will do. Per chart review, patient later told SW he will go to his daughter's home upon discharge (he does not have a daughter). 10/20 - no acute overnight events. Patient isolative, 'apathetic' per nursing. He is out briefly into the hallways to pace but interacts very limited with staff or peers. Patient medication compliant, grossly internally preoccupied, no significant improvement thus far. Patient got no PRNs, still drooling. Patient noted to have HR to 100s. Patient noted to have no specific concerns, he barely audibly asked to go home this morning. Transaminitis is resolving. 10/21 - the patient remains isolative, he has not interacted with team members in 24 hours. He does appear to understand when SW asks him to call his sister to start discharge planning but makes no attempts to do so. Patient's Clozaril titration halted due to worsening tachycardia, sialorrhea persists. Patient with no specific symptoms but grossly impoverished of thought. Per history, patient has received ECT in the past with little success. 10/22 - no acute overnight events. Patient has been out of the room more often, he remains internally preoccupied and with ongoing selective mutism but discharge focused; he is seen at the nurses' station, encouraged to attempt to speak with his sister today. He agrees but then walks away. Patient denies SI/HI, he continues to respond to internal stimuli. HR still high but under 100 x12 hours. 10/23 - patient slept 7.5 hours overnight.  He is medication compliant, disorganized and with poor ADLs. Patient grossly internally preoccupied, requires prompting for most activities and has not made any attempt to speak with his sister. Clozaril level supratherapeutic at 1586, metabolite + Clozaril at 2,234. Patient with no appreciable change in his thought process. His behaviors are more appropriate, thought content grossly impoverished. MD to reach out to sister to discuss care home placement. SIDE EFFECTS: (reviewed/updated 10/23/2020)  +Drooling, +tachycardia  No noted toxicity with use of Depakote   ALLERGIES:(reviewed/updated 10/23/2020)  Allergies   Allergen Reactions    Fluphenazine Unknown (comments)     Pt is unable to communicate properly.  Penicillins Unknown (comments)     Pt is unable to communicate properly. MEDICATIONS PRIOR TO ADMISSION:(reviewed/updated 10/23/2020)  Medications Prior to Admission   Medication Sig    FLUoxetine (PROzac) 20 mg capsule Take 20 mg by mouth daily.  benztropine (COGENTIN) 0.5 mg tablet Take 0.5 mg by mouth two (2) times a day. Indications: extrapyramidal symptoms as a result of taking the medication    LORazepam (Ativan) 1 mg tablet Take 1 mg by mouth two (2) times a day.  valproate (Depakene) 250 mg/5 mL syrup Take 1,000 mg by mouth two (2) times a day.  paliperidone palmitate (Invega Sustenna) 234 mg/1.5 mL injection 234 mg by IntraMUSCular route every twenty-one (21) days. Indications: schizophrenia    OLANZapine (ZyPREXA zydis) 20 mg disintegrating tablet Take 20 mg by mouth Daily (before dinner). Indications: schizophrenia      PAST MEDICAL HISTORY: Past medical history from the initial psychiatric evaluation has been reviewed (reviewed/updated 10/23/2020) with no additional updates (I asked patient and no additional past medical history provided). Past Medical History:   Diagnosis Date    Abscess 10/15/2020    Psychiatric disorder     Psychotic disorder ([address]     Withdrawal syndrome ([address]    History reviewed.  No pertinent surgical history. SOCIAL HISTORY: Social history from the initial psychiatric evaluation has been reviewed (reviewed/updated 10/23/2020) with no additional updates (I asked patient and no additional social history provided). Social History     Socioeconomic History    Marital status: SINGLE     Spouse name: Not on file    Number of children: Not on file    Years of education: Not on file    Highest education level: Not on file   Occupational History    Not on file   Social Needs    Financial resource strain: Not on file    Food insecurity     Worry: Not on file     Inability: Not on file    Transportation needs     Medical: Not on file     Non-medical: Not on file   Tobacco Use    Smoking status: Never Smoker    Smokeless tobacco: Never Used   Substance and Sexual Activity    Alcohol use: No    Drug use: No    Sexual activity: Not on file   Lifestyle    Physical activity     Days per week: Not on file     Minutes per session: Not on file    Stress: Not on file   Relationships    Social connections     Talks on phone: Not on file     Gets together: Not on file     Attends Synagogue service: Not on file     Active member of club or organization: Not on file     Attends meetings of clubs or organizations: Not on file     Relationship status: Not on file    Intimate partner violence     Fear of current or ex partner: Not on file     Emotionally abused: Not on file     Physically abused: Not on file     Forced sexual activity: Not on file   Other Topics Concern    Not on file   Social History Narrative    Pt is a HS grad and is unemployed. He lives with his sister. On disability    No pending legal charge reported      FAMILY HISTORY: Family history from the initial psychiatric evaluation has been reviewed (reviewed/updated 10/23/2020) with no additional updates (I asked patient and no additional family history provided). History reviewed. No pertinent family history.     REVIEW OF SYSTEMS: (reviewed/updated 10/23/2020)  Appetite:poor   Sleep: poor with DIMS (difficulty initiating & maintaining sleep)   All other Review of Systems: Negative except per HPI         [address] [place] (MSE):    MSE FINDINGS ARE WITHIN NORMAL LIMITS (WNL) UNLESS OTHERWISE STATED BELOW. ( ALL OF THE BELOW CATEGORIES OF THE MSE HAVE BEEN REVIEWED (reviewed 10/23/2020) AND UPDATED AS DEEMED APPROPRIATE )  General Presentation age appropriate, cooperative   Orientation disorganized   Vital Signs  See below (reviewed 10/23/2020); Vital Signs (BP, Pulse, & Temp) are within normal limits if not listed below. Gait and Station Stable/steady, no ataxia   Musculoskeletal System No extrapyramidal symptoms (EPS); no abnormal muscular movements or Tardive Dyskinesia (TD); muscle strength and tone are within normal limits   Language No aphasia or dysarthria   Speech:  hypoverbal, increased latency of response, non-pressured and soft   Thought Processes illogical; slow rate of thoughts; poor abstract reasoning/computation   Thought Associations blocked    Thought Content poverty of content   Suicidal Ideations unable to assess   Homicidal Ideations unable to assess   Mood:  unable to asses   Affect:  flat   Memory recent  impaired   Memory remote:  impaired   Concentration/Attention:  impaired   Fund of Knowledge significantly below average   Insight:  poor   Reliability fair   Judgment:  impaired due to active psychosis          VITALS:     No data found.   Wt Readings from Last 3 Encounters:   09/18/20 85.7 kg (189 lb)   10/15/20 85.7 kg (189 lb)   12/23/17 84.9 kg (187 lb 1.6 oz)     Temp Readings from Last 3 Encounters:   10/22/20 97 °F (36.1 °C)   03/28/17 97.5 °F (36.4 °C)   02/07/15 98.7 °F (37.1 °C)     BP Readings from Last 3 Encounters:   10/22/20 108/62   03/28/17 100/68   02/12/15 105/73     Pulse Readings from Last 3 Encounters:   10/22/20 92   03/28/17 67   02/12/15 87            DATA LABORATORY DATA:(reviewed/updated 10/23/2020)  No results found for this or any previous visit (from the past 24 hour(s)). Lab Results   Component Value Date/Time    Valproic acid 97 09/18/2020 04:42 PM     No results found for: LITHM   RADIOLOGY REPORTS:(reviewed/updated 10/23/2020)  No results found.        MEDICATIONS     ALL MEDICATIONS:   Current Facility-Administered Medications   Medication Dose Route Frequency    atropine 1 % ophthalmic solution 1 Drop  1 Drop SubLINGual DAILY    cloZAPine (CLOZARIL) tablet 50 mg  50 mg Oral DAILY    Or    OLANZapine (ZyPREXA) 5 mg in sterile water (preservative free) 1 mL injection +++COURT ORDERED MEDICATION FOR REFUSAL OF CLOZAPINE+++  5 mg IntraMUSCular DAILY    cloZAPine (CLOZARIL) tablet 450 mg  450 mg Oral QHS    Or    OLANZapine (ZyPREXA) 5 mg in sterile water (preservative free) 1 mL injection +++COURT ORDERED MEDICATION FOR REFUSAL OF CLOZAPINE+++  5 mg IntraMUSCular QHS    OLANZapine (ZyPREXA) tablet 5 mg  5 mg Oral Q6H PRN    haloperidol lactate (HALDOL) injection 5 mg  5 mg IntraMUSCular Q6H PRN    benztropine (COGENTIN) tablet 1 mg  1 mg Oral BID PRN    diphenhydrAMINE (BENADRYL) injection 50 mg  50 mg IntraMUSCular BID PRN    hydrOXYzine HCL (ATARAX) tablet 50 mg  50 mg Oral TID PRN    traZODone (DESYREL) tablet 50 mg  50 mg Oral QHS PRN    acetaminophen (TYLENOL) tablet 650 mg  650 mg Oral Q4H PRN    magnesium hydroxide (MILK OF MAGNESIA) 400 mg/5 mL oral suspension 30 mL  30 mL Oral DAILY PRN      SCHEDULED MEDICATIONS:   Current Facility-Administered Medications   Medication Dose Route Frequency    atropine 1 % ophthalmic solution 1 Drop  1 Drop SubLINGual DAILY    cloZAPine (CLOZARIL) tablet 50 mg  50 mg Oral DAILY    Or    OLANZapine (ZyPREXA) 5 mg in sterile water (preservative free) 1 mL injection +++COURT ORDERED MEDICATION FOR REFUSAL OF CLOZAPINE+++  5 mg IntraMUSCular DAILY    cloZAPine (CLOZARIL) tablet 450 mg  450 mg Oral QHS Or    OLANZapine (ZyPREXA) 5 mg in sterile water (preservative free) 1 mL injection +++COURT ORDERED MEDICATION FOR REFUSAL OF CLOZAPINE+++  5 mg IntraMUSCular QHS          ASSESSMENT & PLAN     DIAGNOSES REQUIRING ACTIVE TREATMENT AND MONITORING: (reviewed/updated 10/23/2020)  Patient [facility] [address] Problem List:   Schizoaffective disorder ([facility] (9/18/2020)    Assessment: patient with significant internal preoccupation, poor ADLs, history of catatonia. Appears to have been compliant with home medication, but is regressed and with a markedly disorganized thought process. Will start Clozaril, add benzodiazepine for protection against catatonia to be tapered as Clozaril dose increases given interaction. Plan:  COURT ORDERED MEDICATION:  - TAPER Clozaril 50 mg QDAY mg and 350 mg QHS *OR* Zyprexa 5 mg IM for psychosis    - CONTINUE Atropine 0.1% SL QDAY for sialorrhea  - EEG for seizure rule out  - LFTs WNL  - HOLD Klonopin 1 mg BID for catatonia due to poor efficacy  - CBC and Clozaril level by court order  - HOLD VPA oral soln 1000 mg BID for mood lability, seizure prophylaxis due to poor efficacy, non compliance  - DISCONTINUE Prozac / Cogentin / Zydis due to polypharamacy  - Consider antihypertensive  - IGM therapy as tolerated    I will continue to monitor blood levels (Depakote, clozapine---a drug with a narrow therapeutic index= NTI) and associated labs for drug therapy implemented that require intense monitoring for toxicity as deemed appropriate based on current medication side effects and pharmacodynamically determined drug 1/2 lives. In summary, Zhou Smith, is a 45 y.o.  male who presents with a severe exacerbation of the principal diagnosis of Schizoaffective disorder ([facility] [address]    Patient's condition is not improving. Patient requires continued inpatient hospitalization for further stabilization, safety monitoring and medication management.   I will continue to coordinate the provision of individual, milieu, occupational, group, and substance abuse therapies to address target symptoms/diagnoses as deemed appropriate for the individual patient. A coordinated, multidisplinary treatment team round was conducted with the patient (this team consists of the nurse, psychiatric unit pharmacist,  and writer). Complete current electronic health record for patient has been reviewed today including consultant notes, ancillary staff notes, nurses and psychiatric tech notes. Suicide risk assessment completed and patient deemed to be of low risk for suicide at this time. The following regarding medications was addressed during rounds with patient:   the risks and benefits of the proposed medication. The patient was given the opportunity to ask questions. Informed consent given to the use of the above medications. Will continue to adjust psychiatric and non-psychiatric medications (see above \"medication\" section and orders section for details) as deemed appropriate & based upon diagnoses and response to treatment. I will continue to order blood tests/labs and diagnostic tests as deemed appropriate and review results as they become available (see orders for details and above listed lab/test results). I will order psychiatric records from previous [facility] hospitals to further elucidate the nature of patient's psychopathology and review once available. I will gather additional collateral information from friends, family and o/p treatment team to further elucidate the nature of patient's psychopathology and baselline level of psychiatric functioning.          I certify that this patient's inpatient psychiatric hospital services furnished since the previous certification were, and continue to be, required for treatment that could reasonably be expected to improve the patient's condition, or for diagnostic study, and that the patient continues to need, on a daily basis, active treatment furnished directly by or requiring the supervision of inpatient psychiatric facility personnel. In addition, the hospital records show that services furnished were intensive treatment services, admission or related services, or equivalent services.     EXPECTED DISCHARGE DATE/DAY: TBD     DISPOSITION: Home       Signed By:   Blaze Wright MD  10/23/2020

## 2020-10-23 NOTE — PROGRESS NOTES
Problem: Falls - Risk of  Goal: *Absence of Falls  Description: Document Diya Wesley Fall Risk and appropriate interventions in the flowsheet. 10/23/2020 1236 by Elma Josue RN  Outcome: Progressing Towards Goal  Note: Fall Risk Interventions:       Mentation Interventions: Reorient patient, Room close to nurse's station    Medication Interventions: Teach patient to arise slowly    Elimination Interventions: Toileting schedule/hourly rounds           10/23/2020 1006 by Elma Josue RN  Outcome: Progressing Towards Goal  Note: Fall Risk Interventions:       Mentation Interventions: Reorient patient, Room close to nurse's station    Medication Interventions: Teach patient to arise slowly    Elimination Interventions:  Toileting schedule/hourly rounds              Problem: Altered Thought Process (Adult/Pediatric)  Goal: *STG: Remains safe in hospital  Outcome: Progressing Towards Goal  Goal: *STG: Complies with medication therapy  Outcome: Progressing Towards Goal

## 2020-10-23 NOTE — PROGRESS NOTES
Problem: Falls - Risk of  Goal: *Absence of Falls  Description: Document Veatrice Marker Fall Risk and appropriate interventions in the flowsheet. Outcome: Progressing Towards Goal  Note: Fall Risk Interventions:       Mentation Interventions: Reorient patient, Room close to nurse's station    Medication Interventions: Teach patient to arise slowly    Elimination Interventions:  Toileting schedule/hourly rounds              Problem: Altered Thought Process (Adult/Pediatric)  Goal: *STG: Remains safe in hospital  Outcome: Progressing Towards Goal

## 2020-10-23 NOTE — BH NOTES
GROUP THERAPY PROGRESS NOTE    Patient did not participate in Discharge Group.      Ector Etienne, RN, PMHNP Student

## 2020-10-23 NOTE — GROUP NOTE
JENELLE  GROUP DOCUMENTATION INDIVIDUAL Group Therapy Note Date: 10/23/2020 Group Start Time: 1500 Group End Time: 2978 Group Topic: Recreational/Music Therapy [address] [facility] 3 ACUTE BEHAV [facility] [address], [address], Susan. \Hospital Has a New Name and Outlook.\"" GROUP DOCUMENTATION GROUP Group Therapy Note Attendees: 3 Attendance: Did not attend Patient's Goal: Interventions/techniques Elpidio Morelos

## 2020-10-23 NOTE — GROUP NOTE
JENELLE  GROUP DOCUMENTATION INDIVIDUAL Group Therapy Note Date: 10/23/2020 Group Start Time: 1100 Group End Time: 1200 Group Topic: Topic Group [facility] 3 ACUTE BEHAV [facility] [address], [address] [facility] GROUP DOCUMENTATION GROUP Group Therapy Note Attendees: 2 Attendance: Did not attend Patient's Goal: Interventions/techniques Renato Diamond

## 2020-10-23 NOTE — GROUP NOTE
[facility] GROUP DOCUMENTATION INDIVIDUAL Group Therapy Note Date: 10/23/2020 Group Start Time: 0900 Group End Time: 1000 Group Topic: Topic Group [facility] 3 ACUTE BEHAV [facility] [place], [address] GROUP DOCUMENTATION GROUP Group Therapy Note Attendees: 4 Attendance: Did not attend Patient's Goal: Interventions/techniques Elpidio Morelos

## 2020-10-23 NOTE — PROGRESS NOTES
Problem: Falls - Risk of  Goal: *Absence of Falls  Description: Document Bridget Zuñiga Fall Risk and appropriate interventions in the flowsheet. 10/23/2020 1236 by Lico Gaming RN  Outcome: Progressing Towards Goal  Note: Fall Risk Interventions:       Mentation Interventions: Reorient patient, Room close to nurse's station    Medication Interventions: Teach patient to arise slowly    Elimination Interventions: Toileting schedule/hourly rounds           10/23/2020 1006 by Lico Gaming RN  Outcome: Progressing Towards Goal  Note: Fall Risk Interventions:       Mentation Interventions: Reorient patient, Room close to nurse's station    Medication Interventions: Teach patient to arise slowly    Elimination Interventions:  Toileting schedule/hourly rounds              Problem: Altered Thought Process (Adult/Pediatric)  Goal: *STG: Remains safe in hospital  Outcome: Progressing Towards Goal  Goal: *STG: Complies with medication therapy  10/23/2020 1239 by Lico Gaming RN  Outcome: Progressing Towards Goal  10/23/2020 1236 by Lico Gaming RN  Outcome: Progressing Towards Goal

## 2020-10-23 NOTE — BH NOTES
Behavioral Health Treatment Team Note     Patient goal(s) for today: Follow unit directions   Treatment team focus/goals: Stabilize - increase Clozaril   Progress note: Pt continues to isolate to room and has not spoken with sister. Recommitted for 30 days. SW and MD to discuss nursing facility option with sister due to lack of progress in treatment.      LOS:  35            Expected LOS: TBD     Financial concerns/prescription coverage: [employer] Medicare Part A&B and [facility] and O  Family contact: Sister/DENISE Goss [phone]   Family requesting physician contact today: spoke 10/14  Discharge plan: Home  Access to weapons: None  Outpatient provider(s): Gómez MARINO - updated 10/21  Patient's preferred phone number for follow up [phone]     Participating treatment team members: LAVINIA Sun and Dr. Liam Pittman.

## 2020-10-23 NOTE — PROGRESS NOTES
Problem: Falls - Risk of  Goal: *Absence of Falls  Description: Document Diya Wesley Fall Risk and appropriate interventions in the flowsheet. Outcome: Progressing Towards Goal  Note: Fall Risk Interventions:       Mentation Interventions: Reorient patient, Room close to nurse's station    Medication Interventions: Teach patient to arise slowly    Elimination Interventions:  Toileting schedule/hourly rounds

## 2020-10-23 NOTE — PROGRESS NOTES
Problem: Discharge Planning  Goal: *Discharge to safe environment  Outcome: Progressing Towards Goal  Note: MD and SW to discuss NF placement options with pt's sister/POA. Goal: *Knowledge of medication management  Outcome: Progressing Towards Goal  Note: Patient verbalizes understanding of medication regimen. Patient is taking medications as prescribed. Goal: *Knowledge of discharge instructions  Outcome: Progressing Towards Goal  Note: Patient verbalizes understanding of goals for treatment and safe discharge.

## 2020-10-23 NOTE — BH NOTES
Patient has been seen out walking in the hallways still will not respond to most questions  Slept 7.5 hours

## 2020-10-23 NOTE — BH NOTES
Pt is withdrawn and quiet. He will respond when you ask if he wants to go home. He is medication compliant, but refuses vital signs. Resident is meal compliant as well but ignores encouragement to come out into the dayroom. Resident would not answer questions concerning depression or anxiety. There are no behavioral issues to note.

## 2020-10-24 PROCEDURE — 74011250637 HC RX REV CODE- 250/637: Performed by: NURSE PRACTITIONER

## 2020-10-24 PROCEDURE — 65220000003 HC RM SEMIPRIVATE PSYCH

## 2020-10-24 PROCEDURE — 74011250637 HC RX REV CODE- 250/637: Performed by: PSYCHIATRY & NEUROLOGY

## 2020-10-24 RX ADMIN — CLOZAPINE 350 MG: 100 TABLET ORAL at 21:25

## 2020-10-24 RX ADMIN — ATROPINE SULFATE 1 DROP: 10 SOLUTION/ DROPS OPHTHALMIC at 08:46

## 2020-10-24 RX ADMIN — CLOZAPINE 50 MG: 25 TABLET ORAL at 08:47

## 2020-10-24 RX ADMIN — MAGNESIUM HYDROXIDE 30 ML: 400 SUSPENSION ORAL at 10:22

## 2020-10-24 NOTE — BH NOTES
Assumed cafe of patient and given report by off going shift, Mike Ramirez RN. Pt was in his room when coming to speak to him. He still remains isolative, dischelved and malodorous and neglects his ADL's. He needs prompting during entire shower to even wash himself appropriately. He was committed today for another 30 days and does not appear to be improving. He has been medication compliant but still is refusing VS again though he is court ordered and refused them again this evening (also for labs, and psychiatric meds). He continues to have disorganized thoughts, is preoccupied and needs much prompting to talk with staff at any length and is selectively mute at times. He will speak when he wants to come out for juice and snacks, which he did often tonight. Pt appears anxious and will lay in his bed and rock or move his feet back and forth constantly and appears sad at times. Pt has been resting well. He denies all symptoms but will just shake his head or say \"no\" at best which he did tonight. Pt has been med and meal complaint but has been eating in his room. He does not interact with peers when he does come out and will sit alone for snack in the dayroom and will return to his room right after eating. Pt noted to have shower running and when this writer went in his room, pt had it running but was sitting on the toilet. He was noted to have turned it off again after leaving the restroom. Pt make no comment when asking what he was doing. Pt did not attend groups and has not been participating even thought staff continues to encourage and do much prompting. Pt continues on 15 min safety checks. Will continue to monitor and treat.

## 2020-10-24 NOTE — INTERDISCIPLINARY ROUNDS
Behavioral Health Interdisciplinary Rounds  
  
Patient Name: Michell Perez                      Age: 45 y.o. Room/Bed:  [facility]/01 Primary Diagnosis: [facility] Admission Status: Forced Medication Order - CI Readmission within 30 days: no 
Power of  in place: unk Patient requires a blocked bed: yes Reason for blocked bed: Behavior Order for blocked bed obtained: no    
  
Sleep hours: 9 Morning Labs completed per orders:  none ordered Participation in Care/Groups:  no 
Medication Compliant?: Yes PRNS (last 24 hours): None Restraints (last 24 hours):  no 
Substance Abuse:  no              
24 hour chart check complete:

## 2020-10-24 NOTE — BH NOTES
Hourly rounds have been completed pt. has been calm and cooperative. Noted that patient has refused all vital signs and needs a great deal of encouragement to take mediation. Resident is alert and oriented to person. Resident does not talk unless expressing needs. Otherwise he only nods his head to answer yes or no. Pt has been encouraged to come out into dayroom and interact with peers and staff. There are no current complaints to note. Monitoring will continue for safety.

## 2020-10-24 NOTE — PROGRESS NOTES
Problem: Falls - Risk of  Goal: *Absence of Falls  Description: Document Gudelia Parmar Fall Risk and appropriate interventions in the flowsheet. Outcome: Progressing Towards Goal  Note: Fall Risk Interventions:       Mentation Interventions: Reorient patient, Room close to nurse's station    Medication Interventions: Teach patient to arise slowly    Elimination Interventions:  Toileting schedule/hourly rounds              Problem: Altered Thought Process (Adult/Pediatric)  Goal: *STG: Complies with medication therapy  Outcome: Progressing Towards Goal     Problem: Altered Thought Process (Adult/Pediatric)  Goal: *STG: Attends activities and groups  Outcome: Not Progressing Towards Goal  Goal: *LTG: Returns to baseline functioning  Outcome: Not Progressing Towards Goal

## 2020-10-24 NOTE — PROGRESS NOTES
Problem: Falls - Risk of  Goal: *Absence of Falls  Description: Document Reny Hoang Fall Risk and appropriate interventions in the flowsheet. Outcome: Progressing Towards Goal  Note: Fall Risk Interventions:       Mentation Interventions: Reorient patient, Room close to nurse's station    Medication Interventions: Teach patient to arise slowly    Elimination Interventions:  Toileting schedule/hourly rounds              Problem: Altered Thought Process (Adult/Pediatric)  Goal: *STG: Remains safe in hospital  Outcome: Progressing Towards Goal

## 2020-10-25 PROCEDURE — 65220000003 HC RM SEMIPRIVATE PSYCH

## 2020-10-25 PROCEDURE — 74011250637 HC RX REV CODE- 250/637: Performed by: PSYCHIATRY & NEUROLOGY

## 2020-10-25 RX ADMIN — CLOZAPINE 50 MG: 25 TABLET ORAL at 07:47

## 2020-10-25 RX ADMIN — ATROPINE SULFATE 1 DROP: 10 SOLUTION/ DROPS OPHTHALMIC at 07:49

## 2020-10-25 RX ADMIN — CLOZAPINE 350 MG: 100 TABLET ORAL at 21:57

## 2020-10-25 NOTE — PROGRESS NOTES
Problem: Falls - Risk of  Goal: *Absence of Falls  Description: Document Cezar Royal Fall Risk and appropriate interventions in the flowsheet. Outcome: Progressing Towards Goal  Note: Fall Risk Interventions:       Mentation Interventions: Reorient patient, Room close to nurse's station    Medication Interventions: Teach patient to arise slowly    Elimination Interventions:  Toileting schedule/hourly rounds              Problem: Altered Thought Process (Adult/Pediatric)  Goal: *STG: Participates in treatment plan  Outcome: Not Progressing Towards Goal  Goal: *STG: Remains safe in hospital  Outcome: Progressing Towards Goal

## 2020-10-25 NOTE — INTERDISCIPLINARY ROUNDS
Behavioral Health Interdisciplinary Rounds  
  
Patient Name: Edwin Anderson                      Age: 45 y. o.                 Room/Bed:  [facility]/ Primary Diagnosis: Schizoaffective disorder (HCC)        
Admission Status: Forced Medication Order - CI                          
Readmission within 30 days: no Power of  in place: unk Patient requires a blocked bed: yes           
Reason for blocked bed: Behavior Order for blocked bed obtained: no    
  
Sleep hours: 8.5 Morning Labs completed per orders:  none ordered                                 
Participation in Care/Groups:  no 
Medication Compliant?: Yes PRNS (last 24 hours): IAR                
Restraints (last 24 hours):  no Substance Abuse:  no              
24 hour chart check complete:

## 2020-10-25 NOTE — BH NOTES
PSYCHIATRIC PROGRESS NOTE         Patient Name  April Mackenzie   Date of Birth 1970   [facility] [id number]   Medical Record Number  [id number]      Age  45 y.o. PCP Unknown, Provider   Admit date:  9/18/2020    Room Number  673/99  @ [facility]   Date of Service  10/24/2020         E & M PROGRESS NOTE:         HISTORY       CC:  \"psychosis\"  HISTORY OF PRESENT ILLNESS/INTERVAL HISTORY:  (reviewed/updated 10/24/2020). per initial evaluation: Gray Holley \"Corey\" Ashok Erazo is a 45year old male with a history of schizophrenia admitted to the [facility] for increased psychosis. He has not been bathing or otherwise caring for himself at home. He was reportedly hospitalized at [facility] from April - June 2020. He does not verbally answer any questions. He is well known to this writer from previous admissions in the [place] area. He has responded well to ECT in the past.    09/21 - overnight, patient incontinent of urine, placed into diaper. Patient appeared catatonic at times, but was speaking, non-spontaneously, to the team this morning. Per nursing, the patient has significantly poor self care, grossly internally preoccucpied and with no ability to care for himself. SW reports that sister is patient's caretaker and that he has been decompensating from an already poor baseline recently, requiring prompting to eat meals and unable to perform basic ADLs. Family states he improved on Clozaril and they are requesting the team restart this medication. Of note, patient got [place] injection two weeks ago, VPA level suggests compliance with medication. 09/22 - patient has been isolative to bed, up for meals, selectively mute. Shakes and nods his head to stimuli. Patient slept 11 hours overnight, and is in bed on assessment. He is mute, does not endorse any pain or discomfort but is largely unresponsive to questions. Vitals unremarkable, BP mildly elevated.     09/23 - patient in bed much of the day, per nursing he got up and showered, and spoke briefly about wanting to go home. He is not compliant with medication; patient seen in his room, he is somnolent, shakes and nods his head, doesn't open his eyes for the duration of the interview. Patient slept 8 hours overnight. 09/24 - patient refused vital signs and medications this morning. He remains isolative to bed for much of the day. Per nursing assessment, the patient has been internally preoccupied, selectively mute, slept 9.5 hours overnight. Patient briefed on the importance of medication compliance, does not voice understanding of the situation. 09/25 - overnight, patient was visible, pacing, largely incoherent but with no aggression noted. No PRNs were given to the patient but he is refusing several doses of medication (refused VPA, Klonopin AM doses); he is taking Clozaril. Patient noted to have repetitive movements, selectively mute on interview, won't get out of bed. Family asking for neurologic workup, MRI as he has not had this before. 09/28 - patient isolative, still selectively mute, took medication and allowed vitals this morning but refused labwork (CBC for Clozaril is overdue). Patient refused breakfast this morning, slept 6 hours overnight. He remains internally preoccupied, unable to assess his mental state. Patient continues to refuse about half of medication administrations. Court order pending for labwork. 09/29 - no acute overnight events. Patient medication compliant, isolative, selectively mute and generally asleep for much of the day. Not voicing any complaints but with ongoing internal preoccupation; he has not had any meaningful interaction with MD since last week. Court order for tomorrow's labwork pending. 09/30 - no acute overnight events. Patient was out of his room today and participated in treatment team discussion. He continues to speak minimally but asked to go home.  Discussed medication and the need for labwork, the patient agrees but has been refusing at [facility] each morning. Patient guarded about symptoms, concrete and with no understanding of the events surrounding admission. 10/01 - court ordered medication / blood draw approved. Patient refused Klonopin and VPA this morning. Patient got bloodwork this morning, ANC WNL. Patient briefed on his treatment progress, he says nothing and does not acknowledge the treatment team. He made a comment at the onset of the interview but then went back to bed. Unable to provide any meaningful information. Per nursing, he remains mostly mute but will ask for some items. They are concerned he may be cheeking his Clozaril. 10/02 - the patient slept 10 hours overnight, self care is poor and patient remains internally preoccupied. He is out of his room for meals and comes into treatment team room for morning interview. He is unable to answer when asked his shoe size. Patient compliant with Clozaril with mouth checks to date, no PRNs given for agitation but patient still unable to adhere to ADLs without much prompting. 10/03 - patient slept 10.75 hours, he remains blunted with poor self care, but medication compliant. Patient out of room to meet with MD, denies SI/HI/AVH; he remains grossly internally preoccupied. Patient discharge focused, still speech impoverished and with poor ADLs. 10/04 - patient impoverished, in bed for much of the day. He is out for meals, requires much prompting to engage in ADLs. Goal for today is taking a shower per nursing. Patient compliant with Clozaril, vitals unremarkable thus far. Patient selectively mute, does not answer assessment questions. 10/05 - no acute overnight events; the patient slept 10 hours overnight. Patient refused AM medictions this AM, but took Clozaril last night. He remains in poor behavioral control, requiring much prompting for ADLs. Took shower last night with much encouragement.  He remains withdrawn, isolative, mental status remains impoverished. 10/06 - patient slept 9 hours overnight, he remains isolative to room. Compliant with Clozaril but refusing VPA and benzo. Patient out of bed for meals otherwise with no improvement in thought process. Discussed holding all agents except Clozaril as patient is intermittently non-compliant and had been taking these agents prior to admission, patient agrees to this. He c/o sialorrhea. Patient received no PRNs for agitation, ADLs remain poor and he needs prompting for most activities. 10/07 - no acute overnight events. Patient has been isolative, compliant with court ordered medication. Patient otherwise with no specific concerns, denies SI/HI/AVH. Patient requires prompting for most ADLs, minimally engaged in the milieu, but with no episodes of agitation or aggression. No PRNs given x24 hours. Patient's benzo and VPA held. 10/08 - patient slept 8 hours, has been isolative but medication compliant. Patient with mild tremor L > R; benzo and VPA still held due to patient refusal, patient appears to be improving on Clozaril monotherapy. Patient still grossly hypo-verbal, has been otherwise pleasant. 10/09 - overnight, patient has remained isolative; he speaks sparingly and has difficulty with his ADLs. Patient has been in fair behavioral control, per sister he is still off from his baseline. Clozaril trial continues, patient tolerating medication and thus far with no instances of significant side effects (sialorrhea). Patient discharge focused, with marked poverty of thought on interview. 10/12 - patient isolative, remains in bed for much of the day. Patient refused vitals over the weekend. Denies all symptoms, ate breakfast this morning medication compliant. Patient slept 5.5 hours overnight, he remains fairly non-responsive but is not in any distress. Patient with poor hygiene, poor self care, requires significant prompting and motivation to partake in ADLs.     10/13 - overnight, patient noted to have worsening thought process disorganization. He has disrobed completely and despite prompting will not put clothes on engage in treatment. He is compliant with court ordered medication, but RNs have had difficulty getting vital signs as he generally does not cooperate. This morning, patient noted to have an 8 cm abscess on his R shoulder, draining purulent fluid. He is unable to provide any information regarding history on the abscess as he is selectively mute. IM consulted for I&D, ABx recs. 10/14 - no acute overnight events. Patient with markedly poor hygiene, requires prompting for ADLs which he refuses intermittently (showering, eating). He is compliant with medication and otherwise with no specific concerns or distress. Patient started on Abx for cellulitis, surgical consult pending. Patient does not answer assessment questions. Of note, patient's LFTs elevated, he remains asymptomatic.    10/15 - patient has been isolative, compliant with medication, still internally preoccupied. Patient has been otherwise in fair behavioral control, markedly poor ADLs, poor hygiene. Patient seen by general surgery, awaiting recs. Patient remains selectively mute, unable to care for self. He slept 6 hours overnight, got no PRNs for agitation. MRI pending for AMS. 10/17- patient remains isolative to room, compliant with medication, still internally preoccupied. Patient has been otherwise in fair behavioral control, markedly poor ADLs, poor hygiene. Patient remains selectively mute, would not answer any of my questions. 10/18- patient remains isolative to room, compliant with medication, still internally preoccupied. Patient was given PRN medications last night after he was on the floor rocking back and forth and pulling his hair and then getting in the shower with his clothes on. He continues to have markedly poor ADLs, poor hygiene. Patient remains selectively mute, would not answer any of my questions. 10/19 - patient seen in his room this morning, he is naked and sitting cross legged on his bed rocking and rubbing his legs. He does not appear to be in any distress but does not answer questions appropriately. The patient allows MD to assess his shoulder, which is healing with minimal drainage today. Patient requests to go home, is advised to put on some clothes and call his sister, which he states he will do. Per chart review, patient later told SW he will go to his daughter's home upon discharge (he does not have a daughter). 10/20 - no acute overnight events. Patient isolative, 'apathetic' per nursing. He is out briefly into the hallways to pace but interacts very limited with staff or peers. Patient medication compliant, grossly internally preoccupied, no significant improvement thus far. Patient got no PRNs, still drooling. Patient noted to have HR to 100s. Patient noted to have no specific concerns, he barely audibly asked to go home this morning. Transaminitis is resolving. 10/21 - the patient remains isolative, he has not interacted with team members in 24 hours. He does appear to understand when SW asks him to call his sister to start discharge planning but makes no attempts to do so. Patient's Clozaril titration halted due to worsening tachycardia, sialorrhea persists. Patient with no specific symptoms but grossly impoverished of thought. Per history, patient has received ECT in the past with little success. 10/22 - no acute overnight events. Patient has been out of the room more often, he remains internally preoccupied and with ongoing selective mutism but discharge focused; he is seen at the nurses' station, encouraged to attempt to speak with his sister today. He agrees but then walks away. Patient denies SI/HI, he continues to respond to internal stimuli. HR still high but under 100 x12 hours. 10/23 - patient slept 7.5 hours overnight.  He is medication compliant, disorganized and with poor ADLs. Patient grossly internally preoccupied, requires prompting for most activities and has not made any attempt to speak with his sister. Clozaril level supratherapeutic at 1586, metabolite + Clozaril at 2,234. Patient with no appreciable change in his thought process. His behaviors are more appropriate, thought content grossly impoverished. MD to reach out to sister to discuss [facility] placement. 10/24 - April Mackenzie remains psychotic with bizarre behaviors and left arm involuntary motion. Moods are fair. Denies SI/HI/AH/VH. No aggression or violence. Interactive and unaware. Tolerating medications well. Eating and sleeping fairly at 9hrs. SIDE EFFECTS: (reviewed/updated 10/24/2020)  +Drooling, +tachycardia  No noted toxicity with use of Depakote   ALLERGIES:(reviewed/updated 10/24/2020)  Allergies   Allergen Reactions    Fluphenazine Unknown (comments)     Pt is unable to communicate properly.  Penicillins Unknown (comments)     Pt is unable to communicate properly. MEDICATIONS PRIOR TO ADMISSION:(reviewed/updated 10/24/2020)  Medications Prior to Admission   Medication Sig    FLUoxetine (PROzac) 20 mg capsule Take 20 mg by mouth daily.  benztropine (COGENTIN) 0.5 mg tablet Take 0.5 mg by mouth two (2) times a day. Indications: extrapyramidal symptoms as a result of taking the medication    LORazepam (Ativan) 1 mg tablet Take 1 mg by mouth two (2) times a day.  valproate (Depakene) 250 mg/5 mL syrup Take 1,000 mg by mouth two (2) times a day.  paliperidone palmitate (Invega Sustenna) 234 mg/1.5 mL injection 234 mg by IntraMUSCular route every twenty-one (21) days. Indications: schizophrenia    OLANZapine (ZyPREXA zydis) 20 mg disintegrating tablet Take 20 mg by mouth Daily (before dinner).  Indications: schizophrenia      PAST MEDICAL HISTORY: Past medical history from the initial psychiatric evaluation has been reviewed (reviewed/updated 10/24/2020) with no additional updates (I asked patient and no additional past medical history provided). Past Medical History:   Diagnosis Date    Abscess 10/15/2020    Psychiatric disorder     [facility] ([facility] [address]     Withdrawal syndrome ([facility] [address]    History reviewed. No pertinent surgical history. SOCIAL HISTORY: Social history from the initial psychiatric evaluation has been reviewed (reviewed/updated 10/24/2020) with no additional updates (I asked patient and no additional social history provided). Social History     Socioeconomic History    Marital status: SINGLE     Spouse name: Not on file    Number of children: Not on file    Years of education: Not on file    Highest education level: Not on file   Occupational History    Not on file   Social Needs    Financial resource strain: Not on file    Food insecurity     Worry: Not on file     Inability: Not on file    Transportation needs     Medical: Not on file     Non-medical: Not on file   Tobacco Use    Smoking status: Never Smoker    Smokeless tobacco: Never Used   Substance and Sexual Activity    Alcohol use: No    Drug use: No    Sexual activity: Not on file   Lifestyle    Physical activity     Days per week: Not on file     Minutes per session: Not on file    Stress: Not on file   Relationships    Social connections     Talks on phone: Not on file     Gets together: Not on file     Attends Yarsani service: Not on file     Active member of club or organization: Not on file     Attends meetings of clubs or organizations: Not on file     Relationship status: Not on file    Intimate partner violence     Fear of current or ex partner: Not on file     Emotionally abused: Not on file     Physically abused: Not on file     Forced sexual activity: Not on file   Other Topics Concern    Not on file   Social History Narrative    Pt is a HS grad and is unemployed. He lives with his sister.  On disability    No pending legal charge reported      FAMILY HISTORY: Family history from the initial psychiatric evaluation has been reviewed (reviewed/updated 10/24/2020) with no additional updates (I asked patient and no additional family history provided). History reviewed. No pertinent family history. REVIEW OF SYSTEMS: (reviewed/updated 10/24/2020)  Appetite:poor   Sleep: poor with DIMS (difficulty initiating & maintaining sleep)   All other Review of Systems: Negative except per HPI         [facility] (MSE):    MSE FINDINGS ARE WITHIN NORMAL LIMITS (WNL) UNLESS OTHERWISE STATED BELOW. ( ALL OF THE BELOW CATEGORIES OF THE MSE HAVE BEEN REVIEWED (reviewed 10/24/2020) AND UPDATED AS DEEMED APPROPRIATE )  General Presentation age appropriate, cooperative   Orientation disorganized   Vital Signs  See below (reviewed 10/24/2020); Vital Signs (BP, Pulse, & Temp) are within normal limits if not listed below. Gait and Station Stable/steady, no ataxia   Musculoskeletal System No extrapyramidal symptoms (EPS); no abnormal muscular movements or Tardive Dyskinesia (TD); muscle strength and tone are within normal limits   Language No aphasia or dysarthria   Speech:  hypoverbal, increased latency of response, non-pressured and soft   Thought Processes illogical; slow rate of thoughts; poor abstract reasoning/computation   Thought Associations blocked    Thought Content poverty of content   Suicidal Ideations unable to assess   Homicidal Ideations unable to assess   Mood:  unable to asses   Affect:  flat   Memory recent  impaired   Memory remote:  impaired   Concentration/Attention:  impaired   Fund of Knowledge significantly below average   Insight:  poor   Reliability fair   Judgment:  impaired due to active psychosis          VITALS:     No data found.   Wt Readings from Last 3 Encounters:   09/18/20 85.7 kg (189 lb)   10/15/20 85.7 kg (189 lb)   12/23/17 84.9 kg (187 lb 1.6 oz)     Temp Readings from Last 3 Encounters:   03/28/17 97.5 °F (36.4 °C) 02/07/15 98.7 °F (37.1 °C)     BP Readings from Last 3 Encounters:   10/22/20 108/62   03/28/17 100/68   02/12/15 105/73     Pulse Readings from Last 3 Encounters:   10/22/20 92   03/28/17 67   02/12/15 87            DATA     LABORATORY DATA:(reviewed/updated 10/24/2020)  No results found for this or any previous visit (from the past 24 hour(s)). Lab Results   Component Value Date/Time    Valproic acid 97 09/18/2020 04:42 PM     No results found for: LITHM   RADIOLOGY REPORTS:(reviewed/updated 10/24/2020)  No results found.        MEDICATIONS     ALL MEDICATIONS:   Current Facility-Administered Medications   Medication Dose Route Frequency    cloZAPine (CLOZARIL) tablet 350 mg  350 mg Oral QHS    Or    OLANZapine (ZyPREXA) 5 mg in sterile water (preservative free) 1 mL injection +++COURT ORDERED MEDICATION FOR REFUSAL OF CLOZAPINE+++  5 mg IntraMUSCular QHS    atropine 1 % ophthalmic solution 1 Drop  1 Drop SubLINGual DAILY    cloZAPine (CLOZARIL) tablet 50 mg  50 mg Oral DAILY    Or    OLANZapine (ZyPREXA) 5 mg in sterile water (preservative free) 1 mL injection +++COURT ORDERED MEDICATION FOR REFUSAL OF CLOZAPINE+++  5 mg IntraMUSCular DAILY    OLANZapine (ZyPREXA) tablet 5 mg  5 mg Oral Q6H PRN    haloperidol lactate (HALDOL) injection 5 mg  5 mg IntraMUSCular Q6H PRN    benztropine (COGENTIN) tablet 1 mg  1 mg Oral BID PRN    diphenhydrAMINE (BENADRYL) injection 50 mg  50 mg IntraMUSCular BID PRN    hydrOXYzine HCL (ATARAX) tablet 50 mg  50 mg Oral TID PRN    traZODone (DESYREL) tablet 50 mg  50 mg Oral QHS PRN    acetaminophen (TYLENOL) tablet 650 mg  650 mg Oral Q4H PRN    magnesium hydroxide (MILK OF MAGNESIA) 400 mg/5 mL oral suspension 30 mL  30 mL Oral DAILY PRN      SCHEDULED MEDICATIONS:   Current Facility-Administered Medications   Medication Dose Route Frequency    cloZAPine (CLOZARIL) tablet 350 mg  350 mg Oral QHS    Or    OLANZapine (ZyPREXA) 5 mg in sterile water (preservative free) 1 mL injection +++COURT ORDERED MEDICATION FOR REFUSAL OF CLOZAPINE+++  5 mg IntraMUSCular QHS    atropine 1 % ophthalmic solution 1 Drop  1 Drop SubLINGual DAILY    cloZAPine (CLOZARIL) tablet 50 mg  50 mg Oral DAILY    Or    OLANZapine (ZyPREXA) 5 mg in sterile water (preservative free) 1 mL injection +++COURT ORDERED MEDICATION FOR REFUSAL OF CLOZAPINE+++  5 mg IntraMUSCular DAILY          ASSESSMENT & PLAN     DIAGNOSES REQUIRING ACTIVE TREATMENT AND MONITORING: (reviewed/updated 10/24/2020)  Patient Active Hospital Problem List:   Schizoaffective disorder ([facility] [address] (9/18/2020)    Assessment: patient with significant internal preoccupation, poor ADLs, history of catatonia. Appears to have been compliant with home medication, but is regressed and with a markedly disorganized thought process. Will start Clozaril, add benzodiazepine for protection against catatonia to be tapered as Clozaril dose increases given interaction. Plan:  COURT ORDERED MEDICATION:  - TAPER Clozaril 50 mg QDAY mg and 350 mg QHS *OR* Zyprexa 5 mg IM for psychosis    - CONTINUE Atropine 0.1% SL QDAY for sialorrhea  - EEG for seizure rule out  - LFTs WNL  - HOLD Klonopin 1 mg BID for catatonia due to poor efficacy  - CBC and Clozaril level by court order  - HOLD VPA oral soln 1000 mg BID for mood lability, seizure prophylaxis due to poor efficacy, non compliance  - DISCONTINUE Prozac / Cogentin / Zydis due to polypharamacy  - Consider antihypertensive  - IGM therapy as tolerated    I will continue to monitor blood levels (Depakote, clozapine---a drug with a narrow therapeutic index= NTI) and associated labs for drug therapy implemented that require intense monitoring for toxicity as deemed appropriate based on current medication side effects and pharmacodynamically determined drug 1/2 lives.     In summary, Bryan Menchaca, is a 45 y.o.  male who presents with a severe exacerbation of the principal diagnosis of Schizoaffective disorder [facility])    Patient's condition is not improving. Patient requires continued inpatient hospitalization for further stabilization, safety monitoring and medication management. I will continue to coordinate the provision of individual, milieu, occupational, group, and substance abuse therapies to address target symptoms/diagnoses as deemed appropriate for the individual patient. A coordinated, multidisplinary treatment team round was conducted with the patient (this team consists of the nurse, psychiatric unit pharmacist,  and writer). Complete current electronic health record for patient has been reviewed today including consultant notes, ancillary staff notes, nurses and psychiatric tech notes. Suicide risk assessment completed and patient deemed to be of low risk for suicide at this time. The following regarding medications was addressed during rounds with patient:   the risks and benefits of the proposed medication. The patient was given the opportunity to ask questions. Informed consent given to the use of the above medications. Will continue to adjust psychiatric and non-psychiatric medications (see above \"medication\" section and orders section for details) as deemed appropriate & based upon diagnoses and response to treatment. I will continue to order blood tests/labs and diagnostic tests as deemed appropriate and review results as they become available (see orders for details and above listed lab/test results). I will order psychiatric records from previous [facility] hospitals to further elucidate the nature of patient's psychopathology and review once available. I will gather additional collateral information from friends, family and o/p treatment team to further elucidate the nature of patient's psychopathology and baselline level of psychiatric functioning.          I certify that this patient's inpatient psychiatric hospital services furnished since the previous certification were, and continue to be, required for treatment that could reasonably be expected to improve the patient's condition, or for diagnostic study, and that the patient continues to need, on a daily basis, active treatment furnished directly by or requiring the supervision of inpatient psychiatric facility personnel. In addition, the hospital records show that services furnished were intensive treatment services, admission or related services, or equivalent services.     EXPECTED DISCHARGE DATE/DAY: TBD     DISPOSITION: Home       Signed By:   Kayley Bermudez MD  10/24/2020

## 2020-10-25 NOTE — BH NOTES
Assumed care of patient and given report by off going shift, VIDA Gerene RN. Pt continues to isolate to his room. Pt will only come out for snacks or drinks and will come to the desk and then return to his room. Pt reminded again that he is to be complaint w/VS as he is court ordered; elevated 150/110 on recheck 130/98. Pt asked for MOM again tonight and he had already had it prn earlier today, also noted that he had a small BM in toilet; pt advised it was too early and he could get it again tomorrow morning. Pt denies SI/HI and AVH as well as anxiety/depression. He denies pain. Pt continues to remain selectively mute; and will speak soft and quietly and will only answer when he wants to. Pt has been meal and med compliant. He was found to have snacks all over his floor by his bed, with half eaten sandwiches laying on the floor. He has been resting well. Pt still continues to be psychotic and odd and internally preoccupied. He does not speak to his peers nor does he attend groups. He continues to need prompting to shower and has not again, remains unkempt and malodorous. Pt continues on court ordered VS , Meds and Labs. Pt continues on 15 min safety checks. Will continue to monitor and treat.

## 2020-10-25 NOTE — BH NOTES
Report received from off going nurse, assumed care of resident. There are no noted signs of distress. Resident is calm and cooperative but will not verbally answer questions he nods yes or no. He denies SI/HI, AVH. Feelings of anxiety and depression were not confirmed. Patient would not answer this question. Medication and meal compliant. Pt was given encouragement to get into shower and complete ADL's, he refused. There are no complaints of pain or discomfort at this time. Q 15 mi safety rounds will continue and hourly nursing rounds to assure pt safety and comfort.

## 2020-10-26 PROCEDURE — 74011250637 HC RX REV CODE- 250/637: Performed by: PSYCHIATRY & NEUROLOGY

## 2020-10-26 PROCEDURE — 99232 SBSQ HOSP IP/OBS MODERATE 35: CPT | Performed by: PSYCHIATRY & NEUROLOGY

## 2020-10-26 PROCEDURE — 65220000003 HC RM SEMIPRIVATE PSYCH

## 2020-10-26 RX ADMIN — CLOZAPINE 250 MG: 100 TABLET ORAL at 22:00

## 2020-10-26 RX ADMIN — ATROPINE SULFATE 1 DROP: 10 SOLUTION/ DROPS OPHTHALMIC at 08:28

## 2020-10-26 RX ADMIN — CLOZAPINE 50 MG: 25 TABLET ORAL at 08:27

## 2020-10-26 NOTE — GROUP NOTE
JENELLE  GROUP DOCUMENTATION INDIVIDUAL Group Therapy Note Date: 10/26/2020 Group Start Time: 1100 Group End Time: 1200 Group Topic: Topic Group [facility] [place] 3 ACUTE BEHAV [place], [address] [facility] GROUP DOCUMENTATION GROUP Group Therapy Note Attendees: 3 Attendance: Did not attend Patient's Goal: Interventions/techniques Bee Watson

## 2020-10-26 NOTE — BH NOTES
Patient has been in his room in bed for this shift. He has been medication compliant. Patient mute. Observed in bed resting quietly with eyes closed. No distress noted. Patient slept approx 8 hours.

## 2020-10-26 NOTE — BH NOTES
Mr. Joselin Danielson is withdrawn, isolative, and apathetic. He sleeps in his room the majority of the shift and only comes out of his room for meals. He does not interact with any of his peers. However, he is not violent or aggressive and denies any SI/HI/AVH thus far.

## 2020-10-26 NOTE — GROUP NOTE
JENELLE  GROUP DOCUMENTATION INDIVIDUAL Group Therapy Note Date: 10/26/2020 Group Start Time: 1500 Group End Time: 2632 Group Topic: Recreational/Music Therapy [facility] [place] [address] ACUTE BEHAV [facility] [place], [address] GROUP DOCUMENTATION GROUP Group Therapy Note Attendees: 7 Attendance: Did not attend Patient's Goal: Interventions/techniques: 
 
Oksana Lewis

## 2020-10-26 NOTE — BH NOTES
PSYCHIATRIC PROGRESS NOTE         Patient Name  Carol Cardona   Date of Birth 1970   [facility] [id number]   Medical Record Number  [id number]      Age  45 y.o. PCP Unknown, Provider   Admit date:  9/18/2020    Room Number  504/12  @ [facility]   Date of Service  10/25/2020         E & M PROGRESS NOTE:         HISTORY       CC:  \"psychosis\"  HISTORY OF PRESENT ILLNESS/INTERVAL HISTORY:  (reviewed/updated 10/25/2020). per initial evaluation: Ameya Steiner \"Corey\" Kitty uGzmán is a 45year old male with a history of schizophrenia admitted to the [facility] for increased psychosis. He has not been bathing or otherwise caring for himself at home. He was reportedly hospitalized at [facility] from April - June 2020. He does not verbally answer any questions. He is well known to this writer from previous admissions in the [place] area. He has responded well to ECT in the past.    09/21 - overnight, patient incontinent of urine, placed into diaper. Patient appeared catatonic at times, but was speaking, non-spontaneously, to the team this morning. Per nursing, the patient has significantly poor self care, grossly internally preoccucpied and with no ability to care for himself. SW reports that sister is patient's caretaker and that he has been decompensating from an already poor baseline recently, requiring prompting to eat meals and unable to perform basic ADLs. Family states he improved on Clozaril and they are requesting the team restart this medication. Of note, patient got [place] injection two weeks ago, VPA level suggests compliance with medication. 09/22 - patient has been isolative to bed, up for meals, selectively mute. Shakes and nods his head to stimuli. Patient slept 11 hours overnight, and is in bed on assessment. He is mute, does not endorse any pain or discomfort but is largely unresponsive to questions. Vitals unremarkable, BP mildly elevated.     09/23 - patient in bed much of the day, per nursing he got up and showered, and spoke briefly about wanting to go home. He is not compliant with medication; patient seen in his room, he is somnolent, shakes and nods his head, doesn't open his eyes for the duration of the interview. Patient slept 8 hours overnight. 09/24 - patient refused vital signs and medications this morning. He remains isolative to bed for much of the day. Per nursing assessment, the patient has been internally preoccupied, selectively mute, slept 9.5 hours overnight. Patient briefed on the importance of medication compliance, does not voice understanding of the situation. 09/25 - overnight, patient was visible, pacing, largely incoherent but with no aggression noted. No PRNs were given to the patient but he is refusing several doses of medication (refused VPA, Klonopin AM doses); he is taking Clozaril. Patient noted to have repetitive movements, selectively mute on interview, won't get out of bed. Family asking for neurologic workup, MRI as he has not had this before. 09/28 - patient isolative, still selectively mute, took medication and allowed vitals this morning but refused labwork (CBC for Clozaril is overdue). Patient refused breakfast this morning, slept 6 hours overnight. He remains internally preoccupied, unable to assess his mental state. Patient continues to refuse about half of medication administrations. Court order pending for labwork. 09/29 - no acute overnight events. Patient medication compliant, isolative, selectively mute and generally asleep for much of the day. Not voicing any complaints but with ongoing internal preoccupation; he has not had any meaningful interaction with MD since last week. Court order for tomorrow's labwork pending. 09/30 - no acute overnight events. Patient was out of his room today and participated in treatment team discussion. He continues to speak minimally but asked to go home.  Discussed medication and the need for labwork, the patient agrees but has been refusing at [facility] each morning. Patient guarded about symptoms, concrete and with no understanding of the events surrounding admission. 10/01 - court ordered medication / blood draw approved. Patient refused Klonopin and VPA this morning. Patient got bloodwork this morning, ANC WNL. Patient briefed on his treatment progress, he says nothing and does not acknowledge the treatment team. He made a comment at the onset of the interview but then went back to bed. Unable to provide any meaningful information. Per nursing, he remains mostly mute but will ask for some items. They are concerned he may be cheeking his Clozaril. 10/02 - the patient slept 10 hours overnight, self care is poor and patient remains internally preoccupied. He is out of his room for meals and comes into treatment team room for morning interview. He is unable to answer when asked his shoe size. Patient compliant with Clozaril with mouth checks to date, no PRNs given for agitation but patient still unable to adhere to ADLs without much prompting. 10/03 - patient slept 10.75 hours, he remains blunted with poor self care, but medication compliant. Patient out of room to meet with MD, denies SI/HI/AVH; he remains grossly internally preoccupied. Patient discharge focused, still speech impoverished and with poor ADLs. 10/04 - patient impoverished, in bed for much of the day. He is out for meals, requires much prompting to engage in ADLs. Goal for today is taking a shower per nursing. Patient compliant with Clozaril, vitals unremarkable thus far. Patient selectively mute, does not answer assessment questions. 10/05 - no acute overnight events; the patient slept 10 hours overnight. Patient refused AM medictions this AM, but took Clozaril last night. He remains in poor behavioral control, requiring much prompting for ADLs. Took shower last night with much encouragement.  He remains withdrawn, isolative, mental status remains impoverished. 10/06 - patient slept 9 hours overnight, he remains isolative to room. Compliant with Clozaril but refusing VPA and benzo. Patient out of bed for meals otherwise with no improvement in thought process. Discussed holding all agents except Clozaril as patient is intermittently non-compliant and had been taking these agents prior to admission, patient agrees to this. He c/o sialorrhea. Patient received no PRNs for agitation, ADLs remain poor and he needs prompting for most activities. 10/07 - no acute overnight events. Patient has been isolative, compliant with court ordered medication. Patient otherwise with no specific concerns, denies SI/HI/AVH. Patient requires prompting for most ADLs, minimally engaged in the milieu, but with no episodes of agitation or aggression. No PRNs given x24 hours. Patient's benzo and VPA held. 10/08 - patient slept 8 hours, has been isolative but medication compliant. Patient with mild tremor L > R; benzo and VPA still held due to patient refusal, patient appears to be improving on Clozaril monotherapy. Patient still grossly hypo-verbal, has been otherwise pleasant. 10/09 - overnight, patient has remained isolative; he speaks sparingly and has difficulty with his ADLs. Patient has been in fair behavioral control, per sister he is still off from his baseline. Clozaril trial continues, patient tolerating medication and thus far with no instances of significant side effects (sialorrhea). Patient discharge focused, with marked poverty of thought on interview. 10/12 - patient isolative, remains in bed for much of the day. Patient refused vitals over the weekend. Denies all symptoms, ate breakfast this morning medication compliant. Patient slept 5.5 hours overnight, he remains fairly non-responsive but is not in any distress. Patient with poor hygiene, poor self care, requires significant prompting and motivation to partake in ADLs.     10/13 - overnight, patient noted to have worsening thought process disorganization. He has disrobed completely and despite prompting will not put clothes on engage in treatment. He is compliant with court ordered medication, but RNs have had difficulty getting vital signs as he generally does not cooperate. This morning, patient noted to have an 8 cm abscess on his R shoulder, draining purulent fluid. He is unable to provide any information regarding history on the abscess as he is selectively mute. IM consulted for I&D, ABx recs. 10/14 - no acute overnight events. Patient with markedly poor hygiene, requires prompting for ADLs which he refuses intermittently (showering, eating). He is compliant with medication and otherwise with no specific concerns or distress. Patient started on Abx for cellulitis, surgical consult pending. Patient does not answer assessment questions. Of note, patient's LFTs elevated, he remains asymptomatic.    10/15 - patient has been isolative, compliant with medication, still internally preoccupied. Patient has been otherwise in fair behavioral control, markedly poor ADLs, poor hygiene. Patient seen by general surgery, awaiting recs. Patient remains selectively mute, unable to care for self. He slept 6 hours overnight, got no PRNs for agitation. MRI pending for AMS. 10/17- patient remains isolative to room, compliant with medication, still internally preoccupied. Patient has been otherwise in fair behavioral control, markedly poor ADLs, poor hygiene. Patient remains selectively mute, would not answer any of my questions. 10/18- patient remains isolative to room, compliant with medication, still internally preoccupied. Patient was given PRN medications last night after he was on the floor rocking back and forth and pulling his hair and then getting in the shower with his clothes on. He continues to have markedly poor ADLs, poor hygiene. Patient remains selectively mute, would not answer any of my questions. 10/19 - patient seen in his room this morning, he is naked and sitting cross legged on his bed rocking and rubbing his legs. He does not appear to be in any distress but does not answer questions appropriately. The patient allows MD to assess his shoulder, which is healing with minimal drainage today. Patient requests to go home, is advised to put on some clothes and call his sister, which he states he will do. Per chart review, patient later told SW he will go to his daughter's home upon discharge (he does not have a daughter). 10/20 - no acute overnight events. Patient isolative, 'apathetic' per nursing. He is out briefly into the hallways to pace but interacts very limited with staff or peers. Patient medication compliant, grossly internally preoccupied, no significant improvement thus far. Patient got no PRNs, still drooling. Patient noted to have HR to 100s. Patient noted to have no specific concerns, he barely audibly asked to go home this morning. Transaminitis is resolving. 10/21 - the patient remains isolative, he has not interacted with team members in 24 hours. He does appear to understand when SW asks him to call his sister to start discharge planning but makes no attempts to do so. Patient's Clozaril titration halted due to worsening tachycardia, sialorrhea persists. Patient with no specific symptoms but grossly impoverished of thought. Per history, patient has received ECT in the past with little success. 10/22 - no acute overnight events. Patient has been out of the room more often, he remains internally preoccupied and with ongoing selective mutism but discharge focused; he is seen at the nurses' station, encouraged to attempt to speak with his sister today. He agrees but then walks away. Patient denies SI/HI, he continues to respond to internal stimuli. HR still high but under 100 x12 hours. 10/23 - patient slept 7.5 hours overnight.  He is medication compliant, disorganized and with poor ADLs. Patient grossly internally preoccupied, requires prompting for most activities and has not made any attempt to speak with his sister. Clozaril level supratherapeutic at 1586, metabolite + Clozaril at 2,234. Patient with no appreciable change in his thought process. His behaviors are more appropriate, thought content grossly impoverished. MD to reach out to sister to discuss detention placement. 10/24 - Bryan Menchaca remains psychotic with bizarre behaviors and left arm involuntary motion. Moods are fair. Denies SI/HI/AH/VH. No aggression or violence. Interactive and unaware. Tolerating medications well. Eating and sleeping fairly at 9hrs. 10/25 - Bryan Menchaca did not report anything today, just remains bizarre and balls himself up on the floor or sits on the side of the bed staring at the floor. Looked up once. No aggression or violence. Appropriately interactive and aware. Tolerating medications well. Eating and sleeping fairly. SIDE EFFECTS: (reviewed/updated 10/25/2020)  +Drooling, +tachycardia  No noted toxicity with use of Depakote   ALLERGIES:(reviewed/updated 10/25/2020)  Allergies   Allergen Reactions    Fluphenazine Unknown (comments)     Pt is unable to communicate properly.  Penicillins Unknown (comments)     Pt is unable to communicate properly. MEDICATIONS PRIOR TO ADMISSION:(reviewed/updated 10/25/2020)  Medications Prior to Admission   Medication Sig    FLUoxetine (PROzac) 20 mg capsule Take 20 mg by mouth daily.  benztropine (COGENTIN) 0.5 mg tablet Take 0.5 mg by mouth two (2) times a day. Indications: extrapyramidal symptoms as a result of taking the medication    LORazepam (Ativan) 1 mg tablet Take 1 mg by mouth two (2) times a day.  valproate (Depakene) 250 mg/5 mL syrup Take 1,000 mg by mouth two (2) times a day.  paliperidone palmitate (Invega Sustenna) 234 mg/1.5 mL injection 234 mg by IntraMUSCular route every twenty-one (21) days. Indications: schizophrenia    OLANZapine (ZyPREXA zydis) 20 mg disintegrating tablet Take 20 mg by mouth Daily (before dinner). Indications: schizophrenia      PAST MEDICAL HISTORY: Past medical history from the initial psychiatric evaluation has been reviewed (reviewed/updated 10/25/2020) with no additional updates (I asked patient and no additional past medical history provided). Past Medical History:   Diagnosis Date    Abscess 10/15/2020    Psychiatric disorder     Psychotic disorder ([facility] [address]     Withdrawal syndrome ([facility] [address]    History reviewed. No pertinent surgical history. SOCIAL HISTORY: Social history from the initial psychiatric evaluation has been reviewed (reviewed/updated 10/25/2020) with no additional updates (I asked patient and no additional social history provided).    Social History     Socioeconomic History    Marital status: SINGLE     Spouse name: Not on file    Number of children: Not on file    Years of education: Not on file    Highest education level: Not on file   Occupational History    Not on file   Social Needs    Financial resource strain: Not on file    Food insecurity     Worry: Not on file     Inability: Not on file    Transportation needs     Medical: Not on file     Non-medical: Not on file   Tobacco Use    Smoking status: Never Smoker    Smokeless tobacco: Never Used   Substance and Sexual Activity    Alcohol use: No    Drug use: No    Sexual activity: Not on file   Lifestyle    Physical activity     Days per week: Not on file     Minutes per session: Not on file    Stress: Not on file   Relationships    Social connections     Talks on phone: Not on file     Gets together: Not on file     Attends [employer] service: Not on file     Active member of club or organization: Not on file     Attends meetings of clubs or organizations: Not on file     Relationship status: Not on file    Intimate partner violence     Fear of current or ex partner: Not on file Emotionally abused: Not on file     Physically abused: Not on file     Forced sexual activity: Not on file   Other Topics Concern    Not on file   Social History Narrative    Pt is a HS grad and is unemployed. He lives with his sister. On disability    No pending legal charge reported      FAMILY HISTORY: Family history from the initial psychiatric evaluation has been reviewed (reviewed/updated 10/25/2020) with no additional updates (I asked patient and no additional family history provided). History reviewed. No pertinent family history. REVIEW OF SYSTEMS: (reviewed/updated 10/25/2020)  Appetite:poor   Sleep: poor with DIMS (difficulty initiating & maintaining sleep)   All other Review of Systems: Negative except per HPI         [facility] (MSE):    MSE FINDINGS ARE WITHIN NORMAL LIMITS (WNL) UNLESS OTHERWISE STATED BELOW. ( ALL OF THE BELOW CATEGORIES OF THE MSE HAVE BEEN REVIEWED (reviewed 10/25/2020) AND UPDATED AS DEEMED APPROPRIATE )  General Presentation age appropriate, cooperative   Orientation disorganized   Vital Signs  See below (reviewed 10/25/2020); Vital Signs (BP, Pulse, & Temp) are within normal limits if not listed below.    Gait and Station Stable/steady, no ataxia   Musculoskeletal System No extrapyramidal symptoms (EPS); no abnormal muscular movements or Tardive Dyskinesia (TD); muscle strength and tone are within normal limits   Language No aphasia or dysarthria   Speech:  hypoverbal, increased latency of response, non-pressured and soft   Thought Processes illogical; slow rate of thoughts; poor abstract reasoning/computation   Thought Associations blocked    Thought Content poverty of content   Suicidal Ideations unable to assess   Homicidal Ideations unable to assess   Mood:  unable to asses   Affect:  flat   Memory recent  impaired   Memory remote:  impaired   Concentration/Attention:  impaired   Fund of Knowledge significantly below average Insight:  poor   Reliability fair   Judgment:  impaired due to active psychosis          VITALS:     Patient Vitals for the past 24 hrs:   Temp Pulse Resp BP SpO2   10/25/20 0800 97.7 °F (36.5 °C) 88 18 101/74 98 %     Wt Readings from Last 3 Encounters:   09/18/20 85.7 kg (189 lb)   10/15/20 85.7 kg (189 lb)   12/23/17 84.9 kg (187 lb 1.6 oz)     Temp Readings from Last 3 Encounters:   10/25/20 97.7 °F (36.5 °C)   03/28/17 97.5 °F (36.4 °C)   02/07/15 98.7 °F (37.1 °C)     BP Readings from Last 3 Encounters:   10/25/20 101/74   03/28/17 100/68   02/12/15 105/73     Pulse Readings from Last 3 Encounters:   10/25/20 88   03/28/17 67   02/12/15 87            DATA     LABORATORY DATA:(reviewed/updated 10/25/2020)  No results found for this or any previous visit (from the past 24 hour(s)). Lab Results   Component Value Date/Time    Valproic acid 97 09/18/2020 04:42 PM     No results found for: LITHM   RADIOLOGY REPORTS:(reviewed/updated 10/25/2020)  No results found.        MEDICATIONS     ALL MEDICATIONS:   Current Facility-Administered Medications   Medication Dose Route Frequency    cloZAPine (CLOZARIL) tablet 350 mg  350 mg Oral QHS    Or    OLANZapine (ZyPREXA) 5 mg in sterile water (preservative free) 1 mL injection +++COURT ORDERED MEDICATION FOR REFUSAL OF CLOZAPINE+++  5 mg IntraMUSCular QHS    atropine 1 % ophthalmic solution 1 Drop  1 Drop SubLINGual DAILY    cloZAPine (CLOZARIL) tablet 50 mg  50 mg Oral DAILY    Or    OLANZapine (ZyPREXA) 5 mg in sterile water (preservative free) 1 mL injection +++COURT ORDERED MEDICATION FOR REFUSAL OF CLOZAPINE+++  5 mg IntraMUSCular DAILY    OLANZapine (ZyPREXA) tablet 5 mg  5 mg Oral Q6H PRN    haloperidol lactate (HALDOL) injection 5 mg  5 mg IntraMUSCular Q6H PRN    benztropine (COGENTIN) tablet 1 mg  1 mg Oral BID PRN    diphenhydrAMINE (BENADRYL) injection 50 mg  50 mg IntraMUSCular BID PRN    hydrOXYzine HCL (ATARAX) tablet 50 mg  50 mg Oral TID PRN    traZODone (DESYREL) tablet 50 mg  50 mg Oral QHS PRN    acetaminophen (TYLENOL) tablet 650 mg  650 mg Oral Q4H PRN    magnesium hydroxide (MILK OF MAGNESIA) 400 mg/5 mL oral suspension 30 mL  30 mL Oral DAILY PRN      SCHEDULED MEDICATIONS:   Current Facility-Administered Medications   Medication Dose Route Frequency    cloZAPine (CLOZARIL) tablet 350 mg  350 mg Oral QHS    Or    OLANZapine (ZyPREXA) 5 mg in sterile water (preservative free) 1 mL injection +++COURT ORDERED MEDICATION FOR REFUSAL OF CLOZAPINE+++  5 mg IntraMUSCular QHS    atropine 1 % ophthalmic solution 1 Drop  1 Drop SubLINGual DAILY    cloZAPine (CLOZARIL) tablet 50 mg  50 mg Oral DAILY    Or    OLANZapine (ZyPREXA) 5 mg in sterile water (preservative free) 1 mL injection +++COURT ORDERED MEDICATION FOR REFUSAL OF CLOZAPINE+++  5 mg IntraMUSCular DAILY          ASSESSMENT & PLAN     DIAGNOSES REQUIRING ACTIVE TREATMENT AND MONITORING: (reviewed/updated 10/25/2020)  Patient Active Hospital Problem List:   Schizoaffective disorder ([facility] [address] (9/18/2020)    Assessment: patient with significant internal preoccupation, poor ADLs, history of catatonia. Appears to have been compliant with home medication, but is regressed and with a markedly disorganized thought process. Will start Clozaril, add benzodiazepine for protection against catatonia to be tapered as Clozaril dose increases given interaction.     Plan:  COURT ORDERED MEDICATION:  - TAPER Clozaril 50 mg QDAY mg and 350 mg QHS *OR* Zyprexa 5 mg IM for psychosis    - CONTINUE Atropine 0.1% SL QDAY for sialorrhea  - EEG for seizure rule out  - LFTs WNL  - HOLD Klonopin 1 mg BID for catatonia due to poor efficacy  - CBC and Clozaril level by court order  - HOLD VPA oral soln 1000 mg BID for mood lability, seizure prophylaxis due to poor efficacy, non compliance  - Consider antihypertensive  - IGM therapy as tolerated    I will continue to monitor blood levels (Depakote, clozapine---a drug with a narrow therapeutic index= NTI) and associated labs for drug therapy implemented that require intense monitoring for toxicity as deemed appropriate based on current medication side effects and pharmacodynamically determined drug 1/2 lives. In summary, April Mackenzie, is a 45 y.o.  male who presents with a severe exacerbation of the principal diagnosis of Schizoaffective disorder ([facility] [address]    Patient's condition is not improving. Patient requires continued inpatient hospitalization for further stabilization, safety monitoring and medication management. I will continue to coordinate the provision of individual, milieu, occupational, group, and substance abuse therapies to address target symptoms/diagnoses as deemed appropriate for the individual patient. A coordinated, multidisplinary treatment team round was conducted with the patient (this team consists of the nurse, psychiatric unit pharmacist,  and writer). Complete current electronic health record for patient has been reviewed today including consultant notes, ancillary staff notes, nurses and psychiatric tech notes. Suicide risk assessment completed and patient deemed to be of low risk for suicide at this time. The following regarding medications was addressed during rounds with patient:   the risks and benefits of the proposed medication. The patient was given the opportunity to ask questions. Informed consent given to the use of the above medications. Will continue to adjust psychiatric and non-psychiatric medications (see above \"medication\" section and orders section for details) as deemed appropriate & based upon diagnoses and response to treatment. I will continue to order blood tests/labs and diagnostic tests as deemed appropriate and review results as they become available (see orders for details and above listed lab/test results).     I will order psychiatric records from previous [facility] hospitals to further elucidate the nature of patient's psychopathology and review once available. I will gather additional collateral information from friends, family and o/p treatment team to further elucidate the nature of patient's psychopathology and baselline level of psychiatric functioning. I certify that this patient's inpatient psychiatric hospital services furnished since the previous certification were, and continue to be, required for treatment that could reasonably be expected to improve the patient's condition, or for diagnostic study, and that the patient continues to need, on a daily basis, active treatment furnished directly by or requiring the supervision of inpatient psychiatric facility personnel. In addition, the hospital records show that services furnished were intensive treatment services, admission or related services, or equivalent services.     EXPECTED DISCHARGE DATE/DAY: TBD     DISPOSITION: Home       Signed By:   Jevon Harris MD  10/25/2020

## 2020-10-26 NOTE — PROGRESS NOTES
Problem: Falls - Risk of  Goal: *Absence of Falls  Description: Document Kvng Goldsmith Fall Risk and appropriate interventions in the flowsheet. Outcome: Progressing Towards Goal  Note: Fall Risk Interventions:       Mentation Interventions: Reorient patient, Room close to nurse's station    Medication Interventions: Teach patient to arise slowly    Elimination Interventions:  Toileting schedule/hourly rounds              Problem: Patient Education: Go to Patient Education Activity  Goal: Patient/Family Education  Outcome: Not Progressing Towards Goal     Problem: Altered Thought Process (Adult/Pediatric)  Goal: *STG: Participates in treatment plan  Outcome: Not Progressing Towards Goal  Goal: *STG: Remains safe in hospital  Outcome: Progressing Towards Goal  Goal: *STG: Seeks staff when feelings of anxiety and fear arise  Outcome: Not Progressing Towards Goal  Goal: *STG: Complies with medication therapy  Outcome: Progressing Towards Goal  Goal: *STG: Attends activities and groups  Outcome: Not Progressing Towards Goal  Goal: *STG: Absence of lethality  Outcome: Progressing Towards Goal  Goal: *STG: Demonstrates ability to understand and use improved judgment in daily activities and relationships  Outcome: Not Progressing Towards Goal  Goal: *LTG: Returns to baseline functioning  Outcome: Not Progressing Towards Goal     Problem: Discharge Planning  Goal: *Knowledge of medication management  Outcome: Not Progressing Towards Goal

## 2020-10-26 NOTE — BH NOTES
Behavioral Health Treatment Team Note  Patient goal(s) for today: Follow unit directions   Treatment team focus/goals: Stabilize - increase Clozaril   Progress note: Pt went to group today but has not yet spoken with sister. Recommitted for 180 days on 10/23/2020. Awaiting return call from Lluvia Lester regarding dispo plan.      [facility]:  01            [id number] LOS: TBD     Financial concerns/prescription coverage: [employer] Medicare Part A&B and [facility] and TDO  Family contact: Lluvia Lester [place] [phone]   Family requesting physician contact today: spoke 10/14  Discharge plan: Home vs NF  Access to weapons: None  Outpatient provider(s): G[facility]mez MARINO - updated 10/21  Patient's preferred phone number for follow up [phone]     Participating treatment team members: LAVINIA Gates and Dr. Julian Closs.

## 2020-10-26 NOTE — GROUP NOTE
JENELLE  GROUP DOCUMENTATION INDIVIDUAL Group Therapy Note Date: 10/26/2020 Group Start Time: 0900 Group End Time: 1000 Group Topic: Topic Group [facility] ACUTE [facility], [address], [address]. \Hospital Has a New Name and Outlook.\"" GROUP DOCUMENTATION GROUP Group Therapy Note Attendees: 2 Attendance: Did not attend Patient's Goal: Interventions/techniques Maria D Goodwin

## 2020-10-27 LAB
BASOPHILS # BLD: 0.1 K/UL (ref 0–0.1)
BASOPHILS NFR BLD: 1 % (ref 0–1)
DIFFERENTIAL METHOD BLD: NORMAL
EOSINOPHIL # BLD: 0.1 K/UL (ref 0–0.4)
EOSINOPHIL NFR BLD: 2 % (ref 0–7)
ERYTHROCYTE [DISTWIDTH] IN BLOOD BY AUTOMATED COUNT: 13.4 % (ref 11.5–14.5)
HCT VFR BLD AUTO: 39.5 % (ref 36.6–50.3)
HGB BLD-MCNC: 13.2 G/DL (ref 12.1–17)
IMM GRANULOCYTES # BLD AUTO: 0 K/UL (ref 0–0.04)
IMM GRANULOCYTES NFR BLD AUTO: 0 % (ref 0–0.5)
LYMPHOCYTES # BLD: 2.7 K/UL (ref 0.8–3.5)
LYMPHOCYTES NFR BLD: 37 % (ref 12–49)
MCH RBC QN AUTO: 29.7 PG (ref 26–34)
MCHC RBC AUTO-ENTMCNC: 33.4 G/DL (ref 30–36.5)
MCV RBC AUTO: 89 FL (ref 80–99)
MONOCYTES # BLD: 0.5 K/UL (ref 0–1)
MONOCYTES NFR BLD: 7 % (ref 5–13)
NEUTS SEG # BLD: 3.9 K/UL (ref 1.8–8)
NEUTS SEG NFR BLD: 53 % (ref 32–75)
NRBC # BLD: 0 K/UL (ref 0–0.01)
NRBC BLD-RTO: 0 PER 100 WBC
PLATELET # BLD AUTO: 228 K/UL (ref 150–400)
PMV BLD AUTO: 10.6 FL (ref 8.9–12.9)
RBC # BLD AUTO: 4.44 M/UL (ref 4.1–5.7)
WBC # BLD AUTO: 7.2 K/UL (ref 4.1–11.1)

## 2020-10-27 PROCEDURE — 99231 SBSQ HOSP IP/OBS SF/LOW 25: CPT | Performed by: PSYCHIATRY & NEUROLOGY

## 2020-10-27 PROCEDURE — 85025 COMPLETE CBC W/AUTO DIFF WBC: CPT

## 2020-10-27 PROCEDURE — 65220000003 HC RM SEMIPRIVATE PSYCH

## 2020-10-27 PROCEDURE — 74011250637 HC RX REV CODE- 250/637: Performed by: PSYCHIATRY & NEUROLOGY

## 2020-10-27 PROCEDURE — 36415 COLL VENOUS BLD VENIPUNCTURE: CPT

## 2020-10-27 RX ADMIN — CLOZAPINE 250 MG: 100 TABLET ORAL at 21:29

## 2020-10-27 RX ADMIN — ATROPINE SULFATE 1 DROP: 10 SOLUTION/ DROPS OPHTHALMIC at 08:49

## 2020-10-27 RX ADMIN — CLOZAPINE 50 MG: 25 TABLET ORAL at 08:49

## 2020-10-27 NOTE — BH NOTES
PSYCHIATRIC PROGRESS NOTE         Patient Name  Molly Toscano   Date of Birth 1970   [facility] [id number]   Medical Record Number  [id number]      Age  45 y.o. PCP Unknown, Provider   Admit date:  9/18/2020    Room Number  877/51  @ [facility]   Date of Service  10/26/2020         E & M PROGRESS NOTE:         HISTORY       CC:  \"psychosis\"  HISTORY OF PRESENT ILLNESS/INTERVAL HISTORY:  (reviewed/updated 10/26/2020). per initial evaluation: Wen Wilson \"Corey\" Bita Garcia is a 45year old male with a history of schizophrenia admitted to the [facility] for increased psychosis. He has not been bathing or otherwise caring for himself at home. He was reportedly hospitalized at [facility] from April - June 2020. He does not verbally answer any questions. He is well known to this writer from previous admissions in the [place] area. He has responded well to ECT in the past.    09/21 - overnight, patient incontinent of urine, placed into diaper. Patient appeared catatonic at times, but was speaking, non-spontaneously, to the team this morning. Per nursing, the patient has significantly poor self care, grossly internally preoccucpied and with no ability to care for himself. SW reports that sister is patient's caretaker and that he has been decompensating from an already poor baseline recently, requiring prompting to eat meals and unable to perform basic ADLs. Family states he improved on Clozaril and they are requesting the team restart this medication. Of note, patient got [place] injection two weeks ago, VPA level suggests compliance with medication. 09/22 - patient has been isolative to bed, up for meals, selectively mute. Shakes and nods his head to stimuli. Patient slept 11 hours overnight, and is in bed on assessment. He is mute, does not endorse any pain or discomfort but is largely unresponsive to questions. Vitals unremarkable, BP mildly elevated.     09/23 - patient in bed much of the day, per nursing he got up and showered, and spoke briefly about wanting to go home. He is not compliant with medication; patient seen in his room, he is somnolent, shakes and nods his head, doesn't open his eyes for the duration of the interview. Patient slept 8 hours overnight. 09/24 - patient refused vital signs and medications this morning. He remains isolative to bed for much of the day. Per nursing assessment, the patient has been internally preoccupied, selectively mute, slept 9.5 hours overnight. Patient briefed on the importance of medication compliance, does not voice understanding of the situation. 09/25 - overnight, patient was visible, pacing, largely incoherent but with no aggression noted. No PRNs were given to the patient but he is refusing several doses of medication (refused VPA, Klonopin AM doses); he is taking Clozaril. Patient noted to have repetitive movements, selectively mute on interview, won't get out of bed. Family asking for neurologic workup, MRI as he has not had this before. 09/28 - patient isolative, still selectively mute, took medication and allowed vitals this morning but refused labwork (CBC for Clozaril is overdue). Patient refused breakfast this morning, slept 6 hours overnight. He remains internally preoccupied, unable to assess his mental state. Patient continues to refuse about half of medication administrations. Court order pending for labwork. 09/29 - no acute overnight events. Patient medication compliant, isolative, selectively mute and generally asleep for much of the day. Not voicing any complaints but with ongoing internal preoccupation; he has not had any meaningful interaction with MD since last week. Court order for tomorrow's labwork pending. 09/30 - no acute overnight events. Patient was out of his room today and participated in treatment team discussion. He continues to speak minimally but asked to go home.  Discussed medication and the need for labwork, the patient agrees but has been refusing at [address] [facility] each morning. Patient guarded about symptoms, concrete and with no understanding of the events surrounding admission. 10/01 - court ordered medication / blood draw approved. Patient refused Klonopin and VPA this morning. Patient got bloodwork this morning, ANC WNL. Patient briefed on his treatment progress, he says nothing and does not acknowledge the treatment team. He made a comment at the onset of the interview but then went back to bed. Unable to provide any meaningful information. Per nursing, he remains mostly mute but will ask for some items. They are concerned he may be cheeking his Clozaril. 10/02 - the patient slept 10 hours overnight, self care is poor and patient remains internally preoccupied. He is out of his room for meals and comes into treatment team room for morning interview. He is unable to answer when asked his shoe size. Patient compliant with Clozaril with mouth checks to date, no PRNs given for agitation but patient still unable to adhere to ADLs without much prompting. 10/03 - patient slept 10.75 hours, he remains blunted with poor self care, but medication compliant. Patient out of room to meet with MD, denies SI/HI/AVH; he remains grossly internally preoccupied. Patient discharge focused, still speech impoverished and with poor ADLs. 10/04 - patient impoverished, in bed for much of the day. He is out for meals, requires much prompting to engage in ADLs. Goal for today is taking a shower per nursing. Patient compliant with Clozaril, vitals unremarkable thus far. Patient selectively mute, does not answer assessment questions. 10/05 - no acute overnight events; the patient slept 10 hours overnight. Patient refused AM medictions this AM, but took Clozaril last night. He remains in poor behavioral control, requiring much prompting for ADLs. Took shower last night with much encouragement.  He remains withdrawn, isolative, mental status remains impoverished. 10/06 - patient slept 9 hours overnight, he remains isolative to room. Compliant with Clozaril but refusing VPA and benzo. Patient out of bed for meals otherwise with no improvement in thought process. Discussed holding all agents except Clozaril as patient is intermittently non-compliant and had been taking these agents prior to admission, patient agrees to this. He c/o sialorrhea. Patient received no PRNs for agitation, ADLs remain poor and he needs prompting for most activities. 10/07 - no acute overnight events. Patient has been isolative, compliant with court ordered medication. Patient otherwise with no specific concerns, denies SI/HI/AVH. Patient requires prompting for most ADLs, minimally engaged in the milieu, but with no episodes of agitation or aggression. No PRNs given x24 hours. Patient's benzo and VPA held. 10/08 - patient slept 8 hours, has been isolative but medication compliant. Patient with mild tremor L > R; benzo and VPA still held due to patient refusal, patient appears to be improving on Clozaril monotherapy. Patient still grossly hypo-verbal, has been otherwise pleasant. 10/09 - overnight, patient has remained isolative; he speaks sparingly and has difficulty with his ADLs. Patient has been in fair behavioral control, per sister he is still off from his baseline. Clozaril trial continues, patient tolerating medication and thus far with no instances of significant side effects (sialorrhea). Patient discharge focused, with marked poverty of thought on interview. 10/12 - patient isolative, remains in bed for much of the day. Patient refused vitals over the weekend. Denies all symptoms, ate breakfast this morning medication compliant. Patient slept 5.5 hours overnight, he remains fairly non-responsive but is not in any distress. Patient with poor hygiene, poor self care, requires significant prompting and motivation to partake in ADLs.     10/13 - overnight, patient noted to have worsening thought process disorganization. He has disrobed completely and despite prompting will not put clothes on engage in treatment. He is compliant with court ordered medication, but RNs have had difficulty getting vital signs as he generally does not cooperate. This morning, patient noted to have an 8 cm abscess on his R shoulder, draining purulent fluid. He is unable to provide any information regarding history on the abscess as he is selectively mute. IM consulted for I&D, ABx recs. 10/14 - no acute overnight events. Patient with markedly poor hygiene, requires prompting for ADLs which he refuses intermittently (showering, eating). He is compliant with medication and otherwise with no specific concerns or distress. Patient started on Abx for cellulitis, surgical consult pending. Patient does not answer assessment questions. Of note, patient's LFTs elevated, he remains asymptomatic.    10/15 - patient has been isolative, compliant with medication, still internally preoccupied. Patient has been otherwise in fair behavioral control, markedly poor ADLs, poor hygiene. Patient seen by general surgery, awaiting recs. Patient remains selectively mute, unable to care for self. He slept 6 hours overnight, got no PRNs for agitation. MRI pending for AMS. 10/17- patient remains isolative to room, compliant with medication, still internally preoccupied. Patient has been otherwise in fair behavioral control, markedly poor ADLs, poor hygiene. Patient remains selectively mute, would not answer any of my questions. 10/18- patient remains isolative to room, compliant with medication, still internally preoccupied. Patient was given PRN medications last night after he was on the floor rocking back and forth and pulling his hair and then getting in the shower with his clothes on. He continues to have markedly poor ADLs, poor hygiene. Patient remains selectively mute, would not answer any of my questions. 10/19 - patient seen in his room this morning, he is naked and sitting cross legged on his bed rocking and rubbing his legs. He does not appear to be in any distress but does not answer questions appropriately. The patient allows MD to assess his shoulder, which is healing with minimal drainage today. Patient requests to go home, is advised to put on some clothes and call his sister, which he states he will do. Per chart review, patient later told SW he will go to his daughter's home upon discharge (he does not have a daughter). 10/20 - no acute overnight events. Patient isolative, 'apathetic' per nursing. He is out briefly into the hallways to pace but interacts very limited with staff or peers. Patient medication compliant, grossly internally preoccupied, no significant improvement thus far. Patient got no PRNs, still drooling. Patient noted to have HR to 100s. Patient noted to have no specific concerns, he barely audibly asked to go home this morning. Transaminitis is resolving. 10/21 - the patient remains isolative, he has not interacted with team members in 24 hours. He does appear to understand when SW asks him to call his sister to start discharge planning but makes no attempts to do so. Patient's Clozaril titration halted due to worsening tachycardia, sialorrhea persists. Patient with no specific symptoms but grossly impoverished of thought. Per history, patient has received ECT in the past with little success. 10/22 - no acute overnight events. Patient has been out of the room more often, he remains internally preoccupied and with ongoing selective mutism but discharge focused; he is seen at the nurses' station, encouraged to attempt to speak with his sister today. He agrees but then walks away. Patient denies SI/HI, he continues to respond to internal stimuli. HR still high. but under 100 x12 hours. 10/23 - patient slept 7.5 hours overnight.  He is medication compliant, disorganized and with poor ADLs. Patient grossly internally preoccupied, requires prompting for most activities and has not made any attempt to speak with his sister. Clozaril level supratherapeutic at 1586, metabolite + Clozaril at 2,234. Patient with no appreciable change in his thought process. His behaviors are more appropriate, thought content grossly impoverished. MD to reach out to sister to discuss correction placement. 10/24 - Ramesh Mendieta remains psychotic with bizarre behaviors and left arm involuntary motion. Moods are fair. Denies SI/HI/AH/VH. No aggression or violence. Interactive and unaware. Tolerating medications well. Eating and sleeping fairly at 9hrs. 10/25 - Ramesh Mendieta did not report anything today, just remains bizarre and balls himself up on the floor or sits on the side of the bed staring at the floor. Looked up once. No aggression or violence. Appropriately interactive and aware. Tolerating medications well. Eating and sleeping fairly. 10/26 - patient slept 8 hours overnight, he got no PRNs; HR remains elevated, he remains selectively mute. He is A&Ox2, medication compliant, there is no change in internal preoccupation. Patient continues to request discharge to his sister's, advised to call his sister which he agrees with but does not follow up on for the rest of the day. SIDE EFFECTS: (reviewed/updated 10/26/2020)  +Drooling, +tachycardia  No noted toxicity with use of Depakote   ALLERGIES:(reviewed/updated 10/26/2020)  Allergies   Allergen Reactions    Fluphenazine Unknown (comments)     Pt is unable to communicate properly.  Penicillins Unknown (comments)     Pt is unable to communicate properly. MEDICATIONS PRIOR TO ADMISSION:(reviewed/updated 10/26/2020)  Medications Prior to Admission   Medication Sig    FLUoxetine (PROzac) 20 mg capsule Take 20 mg by mouth daily.  benztropine (COGENTIN) 0.5 mg tablet Take 0.5 mg by mouth two (2) times a day.  Indications: extrapyramidal symptoms as a result of taking the medication    LORazepam (Ativan) 1 mg tablet Take 1 mg by mouth two (2) times a day.  valproate (Depakene) 250 mg/5 mL syrup Take 1,000 mg by mouth two (2) times a day.  paliperidone palmitate (Invega Sustenna) 234 mg/1.5 mL injection 234 mg by IntraMUSCular route every twenty-one (21) days. Indications: schizophrenia    OLANZapine (ZyPREXA zydis) 20 mg disintegrating tablet Take 20 mg by mouth Daily (before dinner). Indications: schizophrenia      PAST MEDICAL HISTORY: Past medical history from the initial psychiatric evaluation has been reviewed (reviewed/updated 10/26/2020) with no additional updates (I asked patient and no additional past medical history provided). Past Medical History:   Diagnosis Date    Abscess 10/15/2020    Psychiatric disorder     [facility] [address]     [facility]    History reviewed. No pertinent surgical history. SOCIAL HISTORY: Social history from the initial psychiatric evaluation has been reviewed (reviewed/updated 10/26/2020) with no additional updates (I asked patient and no additional social history provided).    Social History     Socioeconomic History    Marital status: SINGLE     Spouse name: Not on file    Number of children: Not on file    Years of education: Not on file    Highest education level: Not on file   Occupational History    Not on file   Social Needs    Financial resource strain: Not on file    Food insecurity     Worry: Not on file     Inability: Not on file    Transportation needs     Medical: Not on file     Non-medical: Not on file   Tobacco Use    Smoking status: Never Smoker    Smokeless tobacco: Never Used   Substance and Sexual Activity    Alcohol use: No    Drug use: No    Sexual activity: Not on file   Lifestyle    Physical activity     Days per week: Not on file     Minutes per session: Not on file    Stress: Not on file   Relationships    Social connections     Talks on phone: Not on file     Gets together: Not on file     Attends Islam service: Not on file     Active member of club or organization: Not on file     Attends meetings of clubs or organizations: Not on file     Relationship status: Not on file    Intimate partner violence     Fear of current or ex partner: Not on file     Emotionally abused: Not on file     Physically abused: Not on file     Forced sexual activity: Not on file   Other Topics Concern    Not on file   Social History Narrative    Pt is a HS grad and is unemployed. He lives with his sister. On disability    No pending legal charge reported      FAMILY HISTORY: Family history from the initial psychiatric evaluation has been reviewed (reviewed/updated 10/26/2020) with no additional updates (I asked patient and no additional family history provided). History reviewed. No pertinent family history. REVIEW OF SYSTEMS: (reviewed/updated 10/26/2020)  Appetite:poor   Sleep: poor with DIMS (difficulty initiating & maintaining sleep)   All other Review of Systems: Negative except per HPI         [facility] (MSE):    MSE FINDINGS ARE WITHIN NORMAL LIMITS (WNL) UNLESS OTHERWISE STATED BELOW. ( ALL OF THE BELOW CATEGORIES OF THE MSE HAVE BEEN REVIEWED (reviewed 10/26/2020) AND UPDATED AS DEEMED APPROPRIATE )  General Presentation age appropriate, cooperative   Orientation disorganized   Vital Signs  See below (reviewed 10/26/2020); Vital Signs (BP, Pulse, & Temp) are within normal limits if not listed below.    Gait and Station Stable/steady, no ataxia   Musculoskeletal System No extrapyramidal symptoms (EPS); no abnormal muscular movements or Tardive Dyskinesia (TD); muscle strength and tone are within normal limits   Language No aphasia or dysarthria   Speech:  hypoverbal, increased latency of response, non-pressured and soft   Thought Processes illogical; slow rate of thoughts; poor abstract reasoning/computation   Thought Associations blocked    Thought Content poverty of content   Suicidal Ideations unable to assess   Homicidal Ideations unable to assess   Mood:  unable to asses   Affect:  flat   Memory recent  impaired   Memory remote:  impaired   Concentration/Attention:  impaired   Fund of Knowledge significantly below average   Insight:  poor   Reliability fair   Judgment:  impaired due to active psychosis          VITALS:     Patient Vitals for the past 24 hrs:   Temp Pulse Resp BP SpO2   10/26/20 0806 98.1 °F (36.7 °C) (!) 114 18 108/66 97 %     Wt Readings from Last 3 Encounters:   09/18/20 85.7 kg (189 lb)   10/15/20 85.7 kg (189 lb)   12/23/17 84.9 kg (187 lb 1.6 oz)     Temp Readings from Last 3 Encounters:   10/26/20 98.1 °F (36.7 °C)   03/28/17 97.5 °F (36.4 °C)   02/07/15 98.7 °F (37.1 °C)     BP Readings from Last 3 Encounters:   10/26/20 108/66   03/28/17 100/68   02/12/15 105/73     Pulse Readings from Last 3 Encounters:   10/26/20 (!) 114   03/28/17 67   02/12/15 87            DATA     LABORATORY DATA:(reviewed/updated 10/26/2020)  No results found for this or any previous visit (from the past 24 hour(s)). Lab Results   Component Value Date/Time    Valproic acid 97 09/18/2020 04:42 PM     No results found for: LITHM   RADIOLOGY REPORTS:(reviewed/updated 10/26/2020)  No results found.        MEDICATIONS     ALL MEDICATIONS:   Current Facility-Administered Medications   Medication Dose Route Frequency    cloZAPine (CLOZARIL) tablet 250 mg  250 mg Oral QHS    Or    OLANZapine (ZyPREXA) 5 mg in sterile water (preservative free) 1 mL injection +++COURT ORDERED MEDICATION FOR REFUSAL OF CLOZAPINE+++  5 mg IntraMUSCular QHS    atropine 1 % ophthalmic solution 1 Drop  1 Drop SubLINGual DAILY    cloZAPine (CLOZARIL) tablet 50 mg  50 mg Oral DAILY    Or    OLANZapine (ZyPREXA) 5 mg in sterile water (preservative free) 1 mL injection +++COURT ORDERED MEDICATION FOR REFUSAL OF CLOZAPINE+++ 5 mg IntraMUSCular DAILY    OLANZapine (ZyPREXA) tablet 5 mg  5 mg Oral Q6H PRN    haloperidol lactate (HALDOL) injection 5 mg  5 mg IntraMUSCular Q6H PRN    benztropine (COGENTIN) tablet 1 mg  1 mg Oral BID PRN    diphenhydrAMINE (BENADRYL) injection 50 mg  50 mg IntraMUSCular BID PRN    hydrOXYzine HCL (ATARAX) tablet 50 mg  50 mg Oral TID PRN    traZODone (DESYREL) tablet 50 mg  50 mg Oral QHS PRN    acetaminophen (TYLENOL) tablet 650 mg  650 mg Oral Q4H PRN    magnesium hydroxide (MILK OF MAGNESIA) 400 mg/5 mL oral suspension 30 mL  30 mL Oral DAILY PRN      SCHEDULED MEDICATIONS:   Current Facility-Administered Medications   Medication Dose Route Frequency    cloZAPine (CLOZARIL) tablet 250 mg  250 mg Oral QHS    Or    OLANZapine (ZyPREXA) 5 mg in sterile water (preservative free) 1 mL injection +++COURT ORDERED MEDICATION FOR REFUSAL OF CLOZAPINE+++  5 mg IntraMUSCular QHS    atropine 1 % ophthalmic solution 1 Drop  1 Drop SubLINGual DAILY    cloZAPine (CLOZARIL) tablet 50 mg  50 mg Oral DAILY    Or    OLANZapine (ZyPREXA) 5 mg in sterile water (preservative free) 1 mL injection +++COURT ORDERED MEDICATION FOR REFUSAL OF CLOZAPINE+++  5 mg IntraMUSCular DAILY          ASSESSMENT & PLAN     DIAGNOSES REQUIRING ACTIVE TREATMENT AND MONITORING: (reviewed/updated 10/26/2020)  Patient Active Hospital Problem List:   Schizoaffective disorder ([facility] [address] (9/18/2020)    Assessment: patient with significant internal preoccupation, poor ADLs, history of catatonia. Appears to have been compliant with home medication, but is regressed and with a markedly disorganized thought process. Will start Clozaril, add benzodiazepine for protection against catatonia to be tapered as Clozaril dose increases given interaction.     Plan:  COURT ORDERED MEDICATION:  - TAPER Clozaril 50 mg QDAY mg and 250 mg QHS *OR* Zyprexa 5 mg IM for psychosis    - CONTINUE Atropine 0.1% SL QDAY for sialorrhea  - EEG for seizure rule out  - LFTs WNL  - HOLD Klonopin 1 mg BID for catatonia due to poor efficacy  - CBC and Clozaril level by court order  - HOLD VPA oral soln 1000 mg BID for mood lability, seizure prophylaxis due to poor efficacy, non compliance  - Consider antihypertensive  - IGM therapy as tolerated    I will continue to monitor blood levels (Depakote, clozapine---a drug with a narrow therapeutic index= NTI) and associated labs for drug therapy implemented that require intense monitoring for toxicity as deemed appropriate based on current medication side effects and pharmacodynamically determined drug 1/2 lives. In summary, Gregorio Newton, is a 45 y.o.  male who presents with a severe exacerbation of the principal diagnosis of Schizoaffective disorder ([facility] [address]    Patient's condition is not improving. Patient requires continued inpatient hospitalization for further stabilization, safety monitoring and medication management. I will continue to coordinate the provision of individual, milieu, occupational, group, and substance abuse therapies to address target symptoms/diagnoses as deemed appropriate for the individual patient. A coordinated, multidisplinary treatment team round was conducted with the patient (this team consists of the nurse, psychiatric unit pharmacist,  and writer). Complete current electronic health record for patient has been reviewed today including consultant notes, ancillary staff notes, nurses and psychiatric tech notes. Suicide risk assessment completed and patient deemed to be of low risk for suicide at this time. The following regarding medications was addressed during rounds with patient:   the risks and benefits of the proposed medication. The patient was given the opportunity to ask questions. Informed consent given to the use of the above medications.  Will continue to adjust psychiatric and non-psychiatric medications (see above \"medication\" section and orders section for details) as deemed appropriate & based upon diagnoses and response to treatment. I will continue to order blood tests/labs and diagnostic tests as deemed appropriate and review results as they become available (see orders for details and above listed lab/test results). I will order psychiatric records from previous [facility] hospitals to further elucidate the nature of patient's psychopathology and review once available. I will gather additional collateral information from friends, family and o/p treatment team to further elucidate the nature of patient's psychopathology and baselline level of psychiatric functioning. I certify that this patient's inpatient psychiatric hospital services furnished since the previous certification were, and continue to be, required for treatment that could reasonably be expected to improve the patient's condition, or for diagnostic study, and that the patient continues to need, on a daily basis, active treatment furnished directly by or requiring the supervision of inpatient psychiatric facility personnel. In addition, the hospital records show that services furnished were intensive treatment services, admission or related services, or equivalent services.     EXPECTED DISCHARGE DATE/DAY: TBD     DISPOSITION: Home       Signed By:   Pankaj Dietrich MD  10/26/2020

## 2020-10-27 NOTE — BH NOTES
GROUP THERAPY PROGRESS NOTE    Patient did not participate in Coping Skills group.      Angy Byrd LPC LSATP CSAC

## 2020-10-27 NOTE — BH NOTES
Behavioral Health Treatment Team Note    Patient goal(s) for today: Follow unit directions   Treatment team focus/goals: Stabilize - increase Clozaril   Progress note: Pt spending more time in milieu. SW gave pt his sister's number and pt got out of bed and requested to use phone to discuss upcoming discharge with his sister.      C            JJLUYYMM LOS:10/30     Financial concerns/prescription coverage: [employer] Medicare Part A&B and [facility] and TDO  Family contact: Sister/DENISE De Leon [place] [phone]   Family requesting physician contact today: spoke 10/14  Discharge plan: Home vs NF  Access to weapons: None  Outpatient provider(s): [facility] - updated 10/21  Patient's preferred phone number for follow up [phone]     Participating treatment team members: LAVINIA Ugarte and Dr. Kyleigh Chin.

## 2020-10-27 NOTE — PROGRESS NOTES
Problem: Falls - Risk of  Goal: *Absence of Falls  Description: Document Irina Leodan Fall Risk and appropriate interventions in the flowsheet. Outcome: Progressing Towards Goal  Note: Fall Risk Interventions:       Mentation Interventions: Reorient patient, Room close to nurse's station    Medication Interventions: Teach patient to arise slowly    Elimination Interventions:  Toileting schedule/hourly rounds

## 2020-10-27 NOTE — BH NOTES
GROUP THERAPY PROGRESS NOTE    Patient did not participate in Substance abuse/Coping Skill group.      Cuauhtemoc Galdamez LPC LSATP CSAC

## 2020-10-27 NOTE — PROGRESS NOTES
Problem: Falls - Risk of  Goal: *Absence of Falls  Description: Document Errol Vega Fall Risk and appropriate interventions in the flowsheet. Outcome: Progressing Towards Goal  Note: Fall Risk Interventions: Q 15 min checks     Mentation Interventions: Reorient patient  Elimination Interventions:  Toileting schedule/hourly rounds     Problem: Altered Thought Process (Adult/Pediatric)  Goal: *STG: Remains safe in hospital  Outcome: Progressing Towards Goal  Goal: *STG: Complies with medication therapy  Outcome: Progressing Towards Goal  Goal: *STG: Absence of lethality  Outcome: Progressing Towards Goal

## 2020-10-27 NOTE — BH NOTES
Patient is  laying in bed with eyes closed not responding to my questions  2200 patient is medication compliant  Slept  8 hours

## 2020-10-27 NOTE — PROGRESS NOTES
Clozapine Daily Monitoring  Current regimen:  clozapine 300 mg/day  Last CBC done AND reported to the registry:  10/27/20  Next CBC due:  11/3/20     DATE ANC (K/uL)   9/18/20 2.2   10/1/20 2.6   10/8/20 3.3   10/13/20 5.4   10/20/20 3.4   10/27/20 3.9     ANC must be >1 to dispense clozapine. If patient experiences ANC <1.5, refer to the Clozapine REMS ANC monitoring algorithm for frequency of ANC monitoring. Visit Vitals  /66 (BP 1 Location: Left arm, BP Patient Position: Sitting)   Pulse (!) 114   Temp 98.1 °F (36.7 °C)   Resp 18   Ht 170.2 cm (67\")   Wt 85.7 kg (189 lb)   SpO2 97%   BMI 29.60 kg/m²       Recommendations/comments:  ANC collected on 10/27 @ 05:00 is within therapeutic limits, 3.9 K/uL, and is appropriate to continue with clozapine dispensing. ANC was reported to the clozapine REMS program. Next ANC is due in 1 week on 11/3/20.      Thank you,  Jesse Medrano, PHARMD, [facility], [address] [place] [address] [address]: [phone] ([phone])  Pharmacy: [phone] ([phone])

## 2020-10-27 NOTE — BH NOTES
PSYCHIATRIC PROGRESS NOTE         Patient Name  Wanda Galarza   Date of Birth 1970   [facility] [id number]   Medical Record Number  [id number]      Age  45 y.o. PCP Unknown, Provider   Admit date:  9/18/2020    Room Number  258/43  @ [facility]   Date of Service  10/27/2020         E & M PROGRESS NOTE:         HISTORY       CC:  \"psychosis\"  HISTORY OF PRESENT ILLNESS/INTERVAL HISTORY:  (reviewed/updated 10/27/2020). per initial evaluation: Sepideh Todd \"Corey\" Antonino Clark is a 45year old male with a history of schizophrenia admitted to the [facility] for increased psychosis. He has not been bathing or otherwise caring for himself at home. He was reportedly hospitalized at [facility] from April - June 2020. He does not verbally answer any questions. He is well known to this writer from previous admissions in the [facility]. He has responded well to ECT in the past.    Hospital course 9/21 - 10/27: patient admitted for gross disorganization, appeared catatonic at times and with markedly poor self care, poor ADLs and incontinence. Patient started on Clozaril via court order as he refused much of the medication when offered. Once on court ordered treatment, the patient was agreeable with PO medication, Clozaril titrated to a high dose of 500 mg daily; his self care improved throughout the admission but he still required prompting for most ADLs. Due to persistent tachycardia and sialorrhea, patient's Clozaril was assessed, and the level noted to be supra-therapeutic at 500 mg -- the medication was subsequently tapered to 300 mg daily. No appreciable change in his mental status was observed at the higher dose relative to the tapered dose and patient remained calm and cooperative with treatment. Medications were held and discontinued due to poor efficacy including mood stabilizer, benzodiazepine and antidepressant. Patient generally internally preoccupied, but will engage with the milieu if prompted.  The patient was noted to have a shoulder abscess draining purulent fluid, which improved with antibiotics and did not require surgical intervention. 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 The patient voices a desire to be discharged, but is minimally conversant otherwise. He attends groups and eats meals out of his room but spends much of the day in bed, unchanged since admision, though he will pace the unit at times. RONNIE and MD coordinating disposition options as patient appears to be approaching his psychiatric baseline. SIDE EFFECTS: (reviewed/updated 10/27/2020)  +Drooling, +tachycardia  No noted toxicity with use of Depakote   ALLERGIES:(reviewed/updated 10/27/2020)  Allergies   Allergen Reactions    Fluphenazine Unknown (comments)     Pt is unable to communicate properly.  Penicillins Unknown (comments)     Pt is unable to communicate properly. MEDICATIONS PRIOR TO ADMISSION:(reviewed/updated 10/27/2020)  Medications Prior to Admission   Medication Sig    FLUoxetine (PROzac) 20 mg capsule Take 20 mg by mouth daily.  benztropine (COGENTIN) 0.5 mg tablet Take 0.5 mg by mouth two (2) times a day. Indications: extrapyramidal symptoms as a result of taking the medication    LORazepam (Ativan) 1 mg tablet Take 1 mg by mouth two (2) times a day.  valproate (Depakene) 250 mg/5 mL syrup Take 1,000 mg by mouth two (2) times a day.  paliperidone palmitate (Invega Sustenna) 234 mg/1.5 mL injection 234 mg by IntraMUSCular route every twenty-one (21) days. Indications: schizophrenia    OLANZapine (ZyPREXA zydis) 20 mg disintegrating tablet Take 20 mg by mouth Daily (before dinner).  Indications: schizophrenia      PAST MEDICAL HISTORY: Past medical history from the initial psychiatric evaluation has been reviewed (reviewed/updated 10/27/2020) with no additional updates (I asked patient and no additional past medical history provided). Past Medical History:   Diagnosis Date    Abscess 10/15/2020    Psychiatric disorder     [facility] [address]     Withdrawal syndrome ([facility] [address]    History reviewed. No pertinent surgical history. SOCIAL HISTORY: Social history from the initial psychiatric evaluation has been reviewed (reviewed/updated 10/27/2020) with no additional updates (I asked patient and no additional social history provided). Social History     Socioeconomic History    Marital status: SINGLE     Spouse name: Not on file    Number of children: Not on file    Years of education: Not on file    Highest education level: Not on file   Occupational History    Not on file   Social Needs    Financial resource strain: Not on file    Food insecurity     Worry: Not on file     Inability: Not on file    Transportation needs     Medical: Not on file     Non-medical: Not on file   Tobacco Use    Smoking status: Never Smoker    Smokeless tobacco: Never Used   Substance and Sexual Activity    Alcohol use: No    Drug use: No    Sexual activity: Not on file   Lifestyle    Physical activity     Days per week: Not on file     Minutes per session: Not on file    Stress: Not on file   Relationships    Social connections     Talks on phone: Not on file     Gets together: Not on file     Attends [employer] service: Not on file     Active member of club or organization: Not on file     Attends meetings of clubs or organizations: Not on file     Relationship status: Not on file    Intimate partner violence     Fear of current or ex partner: Not on file     Emotionally abused: Not on file     Physically abused: Not on file     Forced sexual activity: Not on file   Other Topics Concern    Not on file   Social History Narrative    Pt is a HS grad and is unemployed. He lives with his sister.  On disability    No pending legal charge reported      FAMILY HISTORY: Family history from the initial psychiatric evaluation has been reviewed (reviewed/updated 10/27/2020) with no additional updates (I asked patient and no additional family history provided). History reviewed. No pertinent family history. REVIEW OF SYSTEMS: (reviewed/updated 10/27/2020)  Appetite:poor   Sleep: poor with DIMS (difficulty initiating & maintaining sleep)   All other Review of Systems: Negative except per HPI         [facility] (MSE):    MSE FINDINGS ARE WITHIN NORMAL LIMITS (WNL) UNLESS OTHERWISE STATED BELOW. ( ALL OF THE BELOW CATEGORIES OF THE MSE HAVE BEEN REVIEWED (reviewed 10/27/2020) AND UPDATED AS DEEMED APPROPRIATE )  General Presentation age appropriate, cooperative   Orientation disorganized   Vital Signs  See below (reviewed 10/27/2020); Vital Signs (BP, Pulse, & Temp) are within normal limits if not listed below.    Gait and Station Stable/steady, no ataxia   Musculoskeletal System No extrapyramidal symptoms (EPS); no abnormal muscular movements or Tardive Dyskinesia (TD); muscle strength and tone are within normal limits   Language No aphasia or dysarthria   Speech:  hypoverbal, increased latency of response, non-pressured and soft   Thought Processes illogical; slow rate of thoughts; poor abstract reasoning/computation   Thought Associations blocked    Thought Content poverty of content   Suicidal Ideations none   Homicidal Ideations none   Mood:  euthymic   Affect:  flat   Memory recent  impaired   Memory remote:  impaired   Concentration/Attention:  impaired   Fund of Knowledge significantly below average   Insight:  poor   Reliability fair   Judgment:  fair          VITALS:     Patient Vitals for the past 24 hrs:   Temp Pulse Resp BP SpO2   10/27/20 1024 97.6 °F (36.4 °C) 96 16 121/75 99 %     Wt Readings from Last 3 Encounters:   09/18/20 85.7 kg (189 lb)   10/15/20 85.7 kg (189 lb)   12/23/17 84.9 kg (187 lb 1.6 oz)     Temp Readings from Last 3 Encounters:   10/27/20 97.6 °F (36.4 °C)   03/28/17 97.5 °F (36.4 °C)   02/07/15 98.7 °F (37.1 °C)     BP Readings from Last 3 Encounters:   10/27/20 121/75   03/28/17 100/68   02/12/15 105/73     Pulse Readings from Last 3 Encounters:   10/27/20 96   03/28/17 67   02/12/15 87            DATA     LABORATORY DATA:(reviewed/updated 10/27/2020)  Recent Results (from the past 24 hour(s))   CBC WITH AUTOMATED DIFF    Collection Time: 10/27/20  5:00 AM   Result Value Ref Range    WBC 7.2 4.1 - 11.1 K/uL    RBC 4.44 4.10 - 5.70 M/uL    HGB 13.2 12.1 - 17.0 g/dL    HCT 39.5 36.6 - 50.3 %    MCV 89.0 80.0 - 99.0 FL    MCH 29.7 26.0 - 34.0 PG    MCHC 33.4 30.0 - 36.5 g/dL    RDW 13.4 11.5 - 14.5 %    PLATELET 580 089 - 845 K/uL    MPV 10.6 8.9 - 12.9 FL    NRBC 0.0 0  WBC    ABSOLUTE NRBC 0.00 0.00 - 0.01 K/uL    NEUTROPHILS 53 32 - 75 %    LYMPHOCYTES 37 12 - 49 %    MONOCYTES 7 5 - 13 %    EOSINOPHILS 2 0 - 7 %    BASOPHILS 1 0 - 1 %    IMMATURE GRANULOCYTES 0 0.0 - 0.5 %    ABS. NEUTROPHILS 3.9 1.8 - 8.0 K/UL    ABS. LYMPHOCYTES 2.7 0.8 - 3.5 K/UL    ABS. MONOCYTES 0.5 0.0 - 1.0 K/UL    ABS. EOSINOPHILS 0.1 0.0 - 0.4 K/UL    ABS. BASOPHILS 0.1 0.0 - 0.1 K/UL    ABS. IMM. GRANS. 0.0 0.00 - 0.04 K/UL    DF AUTOMATED       Lab Results   Component Value Date/Time    Valproic acid 97 09/18/2020 04:42 PM     No results found for: LITHM   RADIOLOGY REPORTS:(reviewed/updated 10/27/2020)  No results found.        MEDICATIONS     ALL MEDICATIONS:   Current Facility-Administered Medications   Medication Dose Route Frequency    cloZAPine (CLOZARIL) tablet 250 mg  250 mg Oral QHS    Or    OLANZapine (ZyPREXA) 5 mg in sterile water (preservative free) 1 mL injection +++COURT ORDERED MEDICATION FOR REFUSAL OF CLOZAPINE+++  5 mg IntraMUSCular QHS    atropine 1 % ophthalmic solution 1 Drop  1 Drop SubLINGual DAILY    cloZAPine (CLOZARIL) tablet 50 mg  50 mg Oral DAILY    Or    OLANZapine (ZyPREXA) 5 mg in sterile water (preservative free) 1 mL injection +++COURT ORDERED MEDICATION FOR REFUSAL OF CLOZAPINE+++  5 mg IntraMUSCular DAILY    OLANZapine (ZyPREXA) tablet 5 mg  5 mg Oral Q6H PRN    haloperidol lactate (HALDOL) injection 5 mg  5 mg IntraMUSCular Q6H PRN    benztropine (COGENTIN) tablet 1 mg  1 mg Oral BID PRN    diphenhydrAMINE (BENADRYL) injection 50 mg  50 mg IntraMUSCular BID PRN    hydrOXYzine HCL (ATARAX) tablet 50 mg  50 mg Oral TID PRN    traZODone (DESYREL) tablet 50 mg  50 mg Oral QHS PRN    acetaminophen (TYLENOL) tablet 650 mg  650 mg Oral Q4H PRN    magnesium hydroxide (MILK OF MAGNESIA) 400 mg/5 mL oral suspension 30 mL  30 mL Oral DAILY PRN      SCHEDULED MEDICATIONS:   Current Facility-Administered Medications   Medication Dose Route Frequency    cloZAPine (CLOZARIL) tablet 250 mg  250 mg Oral QHS    Or    OLANZapine (ZyPREXA) 5 mg in sterile water (preservative free) 1 mL injection +++COURT ORDERED MEDICATION FOR REFUSAL OF CLOZAPINE+++  5 mg IntraMUSCular QHS    atropine 1 % ophthalmic solution 1 Drop  1 Drop SubLINGual DAILY    cloZAPine (CLOZARIL) tablet 50 mg  50 mg Oral DAILY    Or    OLANZapine (ZyPREXA) 5 mg in sterile water (preservative free) 1 mL injection +++COURT ORDERED MEDICATION FOR REFUSAL OF CLOZAPINE+++  5 mg IntraMUSCular DAILY          ASSESSMENT & PLAN     DIAGNOSES REQUIRING ACTIVE TREATMENT AND MONITORING: (reviewed/updated 10/27/2020)  Patient Active Hospital Problem List:   Schizoaffective disorder ([facility] [address] (9/18/2020)    Assessment: patient with significant internal preoccupation, poor ADLs, history of catatonia. Appears to have been compliant with home medication, but is regressed and with a markedly disorganized thought process. Will start Clozaril, add benzodiazepine for protection against catatonia to be tapered as Clozaril dose increases given interaction.     Plan:  COURT ORDERED MEDICATION:  - CONTINUE Clozaril 50 mg QDAY mg and 250 mg QHS *OR* Zyprexa 5 mg IM for psychosis    - CONTINUE Atropine 0.1% SL QDAY for sialorrhea  - EEG unremarkable  - LFTs WNL  - DISCONTINUE Klonopin 1 mg BID for catatonia due to poor efficacy  - CBC and Clozaril level by court order  - DISCONTINUE VPA oral soln 1000 mg BID for mood lability, seizure prophylaxis due to poor efficacy, non compliance  - Consider antihypertensive  - IGM therapy as tolerated    I will continue to monitor blood levels (Depakote, clozapine---a drug with a narrow therapeutic index= NTI) and associated labs for drug therapy implemented that require intense monitoring for toxicity as deemed appropriate based on current medication side effects and pharmacodynamically determined drug 1/2 lives. In summary, Daya Min, is a 45 y.o.  male who presents with a severe exacerbation of the principal diagnosis of Schizoaffective disorder ([facility] [address]    Patient's condition is not improving. Patient requires continued inpatient hospitalization for further stabilization, safety monitoring and medication management. I will continue to coordinate the provision of individual, milieu, occupational, group, and substance abuse therapies to address target symptoms/diagnoses as deemed appropriate for the individual patient. A coordinated, multidisplinary treatment team round was conducted with the patient (this team consists of the nurse, psychiatric unit pharmacist,  and writer). Complete current electronic health record for patient has been reviewed today including consultant notes, ancillary staff notes, nurses and psychiatric tech notes. Suicide risk assessment completed and patient deemed to be of low risk for suicide at this time. The following regarding medications was addressed during rounds with patient:   the risks and benefits of the proposed medication. The patient was given the opportunity to ask questions. Informed consent given to the use of the above medications.  Will continue to adjust psychiatric and non-psychiatric medications (see above \"medication\" section and orders section for details) as deemed appropriate & based upon diagnoses and response to treatment. I will continue to order blood tests/labs and diagnostic tests as deemed appropriate and review results as they become available (see orders for details and above listed lab/test results). I will order psychiatric records from previous [facility] hospitals to further elucidate the nature of patient's psychopathology and review once available. I will gather additional collateral information from friends, family and o/p treatment team to further elucidate the nature of patient's psychopathology and baselline level of psychiatric functioning. I certify that this patient's inpatient psychiatric hospital services furnished since the previous certification were, and continue to be, required for treatment that could reasonably be expected to improve the patient's condition, or for diagnostic study, and that the patient continues to need, on a daily basis, active treatment furnished directly by or requiring the supervision of inpatient psychiatric facility personnel. In addition, the hospital records show that services furnished were intensive treatment services, admission or related services, or equivalent services.     EXPECTED DISCHARGE DATE/DAY: 11/02/20     DISPOSITION: [place] vs home       Signed By:   Zeyad Pierre MD  10/27/2020

## 2020-10-27 NOTE — GROUP NOTE
JENELLE  GROUP DOCUMENTATION INDIVIDUAL Group Therapy Note Date: 10/27/2020 Group Start Time: 1500 Group End Time: 2391 Group Topic: Recreational/Music Therapy [facility] [place] [address] ACUTE BEHAV [facility] [place], [address] GROUP DOCUMENTATION GROUP Group Therapy Note Attendees: 4 Attendance: Did not attend Patient's Goal: Interventions/techniques Chirag Smith

## 2020-10-27 NOTE — GROUP NOTE
JENELLE  GROUP DOCUMENTATION INDIVIDUAL Group Therapy Note Date: 10/27/2020 Group Start Time: 1100 Group End Time: 1200 Group Topic: Topic Group [facility] [place] 3 ACUTE BEHAV [facility] [place], [address] [facility] GROUP DOCUMENTATION GROUP Group Therapy Note Attendees: 5 Attendance: Did not attend Patient's Goal: Interventions/techniques [place]

## 2020-10-27 NOTE — GROUP NOTE
JENELLE  GROUP DOCUMENTATION INDIVIDUAL Group Therapy Note Date: 10/27/2020 Group Start Time: 0900 Group End Time: 1000 Group Topic: Topic Group [address] 3 ACUTE BEHAV [facility], [address] GROUP DOCUMENTATION GROUP Group Therapy Note Attendees: 4 Attendance: Did not attend Patient's Goal: Interventions/techniques Stan Zaman

## 2020-10-27 NOTE — PROGRESS NOTES
Patient alert, compliant with vital signs (after several attempts) and medication. Patient declines to answer assessment questions, selectively mute. Patient with poor hygiene, withdrawn. Able to redirect when patient attempts to leave room without clothing, patient will go back into room and cover himself so that he can get meal tray. Patient will shower with firm, repeated direction. Patient approached nurse in afternoon for assistance calling sister. Call placed and message left for sister to return call.

## 2020-10-28 PROCEDURE — 99232 SBSQ HOSP IP/OBS MODERATE 35: CPT | Performed by: PSYCHIATRY & NEUROLOGY

## 2020-10-28 PROCEDURE — 65220000003 HC RM SEMIPRIVATE PSYCH

## 2020-10-28 PROCEDURE — 74011250637 HC RX REV CODE- 250/637: Performed by: PSYCHIATRY & NEUROLOGY

## 2020-10-28 RX ORDER — GLYCOPYRROLATE 1 MG/1
1 TABLET ORAL EVERY 12 HOURS
Status: DISCONTINUED | OUTPATIENT
Start: 2020-10-28 | End: 2020-10-30

## 2020-10-28 RX ORDER — CLOZAPINE 100 MG/1
300 TABLET ORAL
Status: DISCONTINUED | OUTPATIENT
Start: 2020-10-29 | End: 2020-11-03 | Stop reason: HOSPADM

## 2020-10-28 RX ADMIN — GLYCOPYRROLATE 1 MG: 1 TABLET ORAL at 21:19

## 2020-10-28 RX ADMIN — CLOZAPINE 50 MG: 25 TABLET ORAL at 08:36

## 2020-10-28 RX ADMIN — ATROPINE SULFATE 1 DROP: 10 SOLUTION/ DROPS OPHTHALMIC at 08:41

## 2020-10-28 RX ADMIN — CLOZAPINE 250 MG: 100 TABLET ORAL at 21:20

## 2020-10-28 NOTE — PROGRESS NOTES
Problem: Discharge Planning  Goal: *Discharge to safe environment  Outcome: Progressing Towards Goal  Note: Patient identifies home as a safe environment. Patient will return home upon discharge. Sister/POA aware that MDs recommendation would be for pt to discharge to a nursing facility as he needs 24/7 care. Sister requesting pt return home at discharge. Goal: *Knowledge of medication management  Note: Patient verbalizes understanding of medication regimen. Patient is taking medications as prescribed. Goal: *Knowledge of discharge instructions  Outcome: Progressing Towards Goal  Note: Patient verbalizes understanding of goals for treatment and safe discharge.

## 2020-10-28 NOTE — BH NOTES
Behavioral Health Treatment Team Note     Patient goal(s) for today: Follow unit directions   Treatment team focus/goals: Stabilize and dispo plan  Progress note: Pt spending more time in milieu. Pt in agreement with plan to discharge Monday 11/2     LOS:  40            Expected LOS:10/30     Financial concerns/prescription coverage: [employer] Medicare Part A&B and [facility] and TDO  Family contact: Sister/DENISE Bassett Valders [phone]   Family requesting physician contact today: spoke 10/27  Discharge plan: Home   Access to weapons: None  Outpatient provider(s): [facility] - updated 10/21  Patient's preferred phone number for follow up [phone]     Participating treatment team members: LAVINIA Murillo and Dr. Kriss Elizabeth.

## 2020-10-28 NOTE — PROGRESS NOTES
Problem: Altered Thought Process (Adult/Pediatric)  Goal: *STG: Complies with medication therapy  Outcome: Progressing Towards Goal  Goal: *LTG: Returns to baseline functioning  Outcome: Not Progressing Towards Goal

## 2020-10-28 NOTE — GROUP NOTE
IP  GROUP DOCUMENTATION INDIVIDUAL Group Therapy Note Date: 10/28/2020 Group Start Time: 1000 Group End Time: 1100 Group Topic: Topic Group [facility] GROUP DOCUMENTATION GROUP Group Therapy Note Attendees: 2 Attendance: Did not attend Rima Bradford

## 2020-10-28 NOTE — BH NOTES
Patient is laying bed not responding to questions and is disheveled needing a bath  2129 Patient took scheduled /court ordered medication  0150 Patient up for the second time for juice and spoke a couple of words  Slept 4 hours

## 2020-10-28 NOTE — BH NOTES
PSYCHIATRIC PROGRESS NOTE         Patient Name  Dixon Huertas   Date of Birth 1970   [facility] [id number]   Medical Record Number  [id number]      Age  45 y.o. PCP Unknown, Provider   Admit date:  9/18/2020    Room Number  816/94  @ [facility]   Date of Service  10/28/2020         E & M PROGRESS NOTE:         HISTORY       CC:  \"psychosis\"  HISTORY OF PRESENT ILLNESS/INTERVAL HISTORY:  (reviewed/updated 10/28/2020). per initial evaluation: Duncan Lopez \"Corey\" Yumiko Galdamez is a 45year old male with a history of schizophrenia admitted to the [facility] for increased psychosis. He has not been bathing or otherwise caring for himself at home. He was reportedly hospitalized at [facility] from April - June 2020. He does not verbally answer any questions. He is well known to this writer from previous admissions in the [place] area. He has responded well to ECT in the past.    Hospital course 9/21 - 10/27: patient admitted for gross disorganization, appeared catatonic at times and with markedly poor self care, poor ADLs and incontinence. Patient started on Clozaril via court order as he refused much of the medication when offered. Once on court ordered treatment, the patient was agreeable with PO medication, Clozaril titrated to a high dose of 500 mg daily; his self care improved throughout the admission but he still required prompting for most ADLs. Due to persistent tachycardia and sialorrhea, patient's Clozaril was assessed, and the level noted to be supra-therapeutic at 500 mg -- the medication was subsequently tapered to 300 mg daily. No appreciable change in his mental status was observed at the higher dose relative to the tapered dose and patient remained calm and cooperative with treatment. Medications were held and discontinued due to poor efficacy including mood stabilizer, benzodiazepine and antidepressant. Patient generally internally preoccupied, but will engage with the milieu if prompted.  The patient was noted to have a shoulder abscess draining purulent fluid, which improved with antibiotics and did not require surgical intervention. 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 The patient voices a desire to be discharged, but is minimally conversant otherwise. He attends groups and eats meals out of his room but spends much of the day in bed, unchanged since admision, though he will pace the unit at times. RONNIE and MD coordinating disposition options as patient appears to be approaching his psychiatric baseline. 10/28 - no acute overnight events. Patient isolative to him room, out for meals, internally preoccupied with a flat affect. He is generally non-spontaneous in his responses but pleasant. Patient still requires frequent assessment and redirection as his avolition persists. Patient noted to have worsening sialorrhea this morning, voices his concerns and denies any specific concerns. Patient discharge focused, will be able to return home pending preparation by his sister Ale Jensen. SIDE EFFECTS: (reviewed/updated 10/28/2020)  +Drooling, +tachycardia  No noted toxicity with use of Depakote   ALLERGIES:(reviewed/updated 10/28/2020)  Allergies   Allergen Reactions    Fluphenazine Unknown (comments)     Pt is unable to communicate properly.  Penicillins Unknown (comments)     Pt is unable to communicate properly. MEDICATIONS PRIOR TO ADMISSION:(reviewed/updated 10/28/2020)  Medications Prior to Admission   Medication Sig    FLUoxetine (PROzac) 20 mg capsule Take 20 mg by mouth daily.  benztropine (COGENTIN) 0.5 mg tablet Take 0.5 mg by mouth two (2) times a day. Indications: extrapyramidal symptoms as a result of taking the medication    LORazepam (Ativan) 1 mg tablet Take 1 mg by mouth two (2) times a day.     valproate (Depakene) 250 mg/5 mL syrup Take 1,000 mg by mouth two (2) times a day.  paliperidone palmitate (Invega Sustenna) 234 mg/1.5 mL injection 234 mg by IntraMUSCular route every twenty-one (21) days. Indications: schizophrenia    OLANZapine (ZyPREXA zydis) 20 mg disintegrating tablet Take 20 mg by mouth Daily (before dinner). Indications: schizophrenia      PAST MEDICAL HISTORY: Past medical history from the initial psychiatric evaluation has been reviewed (reviewed/updated 10/28/2020) with no additional updates (I asked patient and no additional past medical history provided). Past Medical History:   Diagnosis Date    Abscess 10/15/2020    Psychiatric disorder     Psychotic disorder ([facility]     Withdrawal syndrome ([facility]    History reviewed. No pertinent surgical history. SOCIAL HISTORY: Social history from the initial psychiatric evaluation has been reviewed (reviewed/updated 10/28/2020) with no additional updates (I asked patient and no additional social history provided).    Social History     Socioeconomic History    Marital status: SINGLE     Spouse name: Not on file    Number of children: Not on file    Years of education: Not on file    Highest education level: Not on file   Occupational History    Not on file   Social Needs    Financial resource strain: Not on file    Food insecurity     Worry: Not on file     Inability: Not on file    Transportation needs     Medical: Not on file     Non-medical: Not on file   Tobacco Use    Smoking status: Never Smoker    Smokeless tobacco: Never Used   Substance and Sexual Activity    Alcohol use: No    Drug use: No    Sexual activity: Not on file   Lifestyle    Physical activity     Days per week: Not on file     Minutes per session: Not on file    Stress: Not on file   Relationships    Social connections     Talks on phone: Not on file     Gets together: Not on file     Attends [employer] service: Not on file     Active member of club or organization: Not on file     Attends meetings of clubs or organizations: Not on file     Relationship status: Not on file    Intimate partner violence     Fear of current or ex partner: Not on file     Emotionally abused: Not on file     Physically abused: Not on file     Forced sexual activity: Not on file   Other Topics Concern    Not on file   Social History Narrative    Pt is a HS grad and is unemployed. He lives with his sister. On disability    No pending legal charge reported      FAMILY HISTORY: Family history from the initial psychiatric evaluation has been reviewed (reviewed/updated 10/28/2020) with no additional updates (I asked patient and no additional family history provided). History reviewed. No pertinent family history. REVIEW OF SYSTEMS: (reviewed/updated 10/28/2020)  Appetite:poor   Sleep: poor with DIMS (difficulty initiating & maintaining sleep)   All other Review of Systems: Negative except per [facility]         [facility] (MSE):    MSE FINDINGS ARE WITHIN NORMAL LIMITS (WNL) UNLESS OTHERWISE STATED BELOW. ( ALL OF THE BELOW CATEGORIES OF THE MSE HAVE BEEN REVIEWED (reviewed 10/28/2020) AND UPDATED AS DEEMED APPROPRIATE )  General Presentation age appropriate, cooperative   Orientation disorganized   Vital Signs  See below (reviewed 10/28/2020); Vital Signs (BP, Pulse, & Temp) are within normal limits if not listed below.    Gait and Station Stable/steady, no ataxia   Musculoskeletal System No extrapyramidal symptoms (EPS); no abnormal muscular movements or Tardive Dyskinesia (TD); muscle strength and tone are within normal limits   Language No aphasia or dysarthria   Speech:  hypoverbal, increased latency of response, non-pressured and soft   Thought Processes illogical; slow rate of thoughts; poor abstract reasoning/computation   Thought Associations blocked    Thought Content poverty of content   Suicidal Ideations none   Homicidal Ideations none   Mood:  euthymic   Affect:  flat   Memory recent  impaired   Memory remote:  impaired   Concentration/Attention:  impaired   Fund of Knowledge significantly below average   Insight:  poor   Reliability fair   Judgment:  fair          VITALS:     No data found. Wt Readings from Last 3 Encounters:   09/18/20 85.7 kg (189 lb)   10/15/20 85.7 kg (189 lb)   12/23/17 84.9 kg (187 lb 1.6 oz)     Temp Readings from Last 3 Encounters:   10/27/20 97.6 °F (36.4 °C)   03/28/17 97.5 °F (36.4 °C)   02/07/15 98.7 °F (37.1 °C)     BP Readings from Last 3 Encounters:   10/27/20 121/75   03/28/17 100/68   02/12/15 105/73     Pulse Readings from Last 3 Encounters:   10/27/20 96   03/28/17 67   02/12/15 87            DATA     LABORATORY DATA:(reviewed/updated 10/28/2020)  No results found for this or any previous visit (from the past 24 hour(s)). Lab Results   Component Value Date/Time    Valproic acid 97 09/18/2020 04:42 PM     No results found for: LITHM   RADIOLOGY REPORTS:(reviewed/updated 10/28/2020)  No results found.        MEDICATIONS     ALL MEDICATIONS:   Current Facility-Administered Medications   Medication Dose Route Frequency    cloZAPine (CLOZARIL) tablet 250 mg  250 mg Oral QHS    atropine 1 % ophthalmic solution 1 Drop  1 Drop SubLINGual DAILY    cloZAPine (CLOZARIL) tablet 50 mg  50 mg Oral DAILY    OLANZapine (ZyPREXA) tablet 5 mg  5 mg Oral Q6H PRN    haloperidol lactate (HALDOL) injection 5 mg  5 mg IntraMUSCular Q6H PRN    benztropine (COGENTIN) tablet 1 mg  1 mg Oral BID PRN    diphenhydrAMINE (BENADRYL) injection 50 mg  50 mg IntraMUSCular BID PRN    hydrOXYzine HCL (ATARAX) tablet 50 mg  50 mg Oral TID PRN    traZODone (DESYREL) tablet 50 mg  50 mg Oral QHS PRN    acetaminophen (TYLENOL) tablet 650 mg  650 mg Oral Q4H PRN    magnesium hydroxide (MILK OF MAGNESIA) 400 mg/5 mL oral suspension 30 mL  30 mL Oral DAILY PRN      SCHEDULED MEDICATIONS:   Current Facility-Administered Medications   Medication Dose Route Frequency    cloZAPine (CLOZARIL) tablet 250 mg  250 mg Oral QHS    atropine 1 % ophthalmic solution 1 Drop  1 Drop SubLINGual DAILY    cloZAPine (CLOZARIL) tablet 50 mg  50 mg Oral DAILY          ASSESSMENT & PLAN     DIAGNOSES REQUIRING ACTIVE TREATMENT AND MONITORING: (reviewed/updated 10/28/2020)  Patient Active Hospital Problem List:   [facility]iz[facility] ([facility] (9/18/2020)    Assessment: patient with significant internal preoccupation, poor ADLs, history of catatonia. Appears to have been compliant with home medication, but is regressed and with a markedly disorganized thought process. Will start Clozaril, add benzodiazepine for protection against catatonia to be tapered as Clozaril dose increases given interaction. 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 In summary, Augusto Avilez, is a 45 y.o.  male who presents with a severe exacerbation of the principal diagnosis of Schizoaffective disorder ([facility]    Patient's is approaching his psychiatric baseline    Patient requires continued inpatient hospitalization for further stabilization, safety monitoring and medication management. I will continue to coordinate the provision of individual, milieu, occupational, group, and substance abuse therapies to address target symptoms/diagnoses as deemed appropriate for the individual patient.   A coordinated, multidisplinary treatment team round was conducted with the patient (this team consists of the nurse, psychiatric unit pharmacist,  and writer). Complete current electronic health record for patient has been reviewed today including consultant notes, ancillary staff notes, nurses and psychiatric tech notes. Suicide risk assessment completed and patient deemed to be of low risk for suicide at this time. The following regarding medications was addressed during rounds with patient:   the risks and benefits of the proposed medication. The patient was given the opportunity to ask questions. Informed consent given to the use of the above medications. Will continue to adjust psychiatric and non-psychiatric medications (see above \"medication\" section and orders section for details) as deemed appropriate & based upon diagnoses and response to treatment. I will continue to order blood tests/labs and diagnostic tests as deemed appropriate and review results as they become available (see orders for details and above listed lab/test results). I will order psychiatric records from previous [facility] hospitals to further elucidate the nature of patient's psychopathology and review once available. I will gather additional collateral information from friends, family and o/p treatment team to further elucidate the nature of patient's psychopathology and baselline level of psychiatric functioning. I certify that this patient's inpatient psychiatric hospital services furnished since the previous certification were, and continue to be, required for treatment that could reasonably be expected to improve the patient's condition, or for diagnostic study, and that the patient continues to need, on a daily basis, active treatment furnished directly by or requiring the supervision of inpatient psychiatric facility personnel. In addition, the hospital records show that services furnished were intensive treatment services, admission or related services, or equivalent services.     EXPECTED DISCHARGE DATE/DAY: 11/02/20     DISPOSITION: [facility] vs home       Signed By:   Rayo Guaman MD  10/28/2020

## 2020-10-28 NOTE — BH NOTES
Patient is alert, selectively mute and oriented to self. Patient denies feelings of SI, HI and AVH. Patient denies feelings anxiety or depression. Patient remains isolative except for during meals. Patient participated during treatment team. Patient did not attend groups. No aggressive behaviors noted. Patient was prompted to take a shower and provided all materials needed to care for self. Patient med and meal compliant. Q15 minute checks continued for safety.       Problem: Altered Thought Process (Adult/Pediatric)  Goal: *STG: Remains safe in hospital  Outcome: Progressing Towards Goal  Goal: *STG: Complies with medication therapy  Outcome: Progressing Towards Goal

## 2020-10-29 PROCEDURE — 65220000003 HC RM SEMIPRIVATE PSYCH

## 2020-10-29 PROCEDURE — 74011250637 HC RX REV CODE- 250/637: Performed by: PSYCHIATRY & NEUROLOGY

## 2020-10-29 PROCEDURE — 99231 SBSQ HOSP IP/OBS SF/LOW 25: CPT | Performed by: PSYCHIATRY & NEUROLOGY

## 2020-10-29 RX ADMIN — CLOZAPINE 300 MG: 100 TABLET ORAL at 21:39

## 2020-10-29 RX ADMIN — GLYCOPYRROLATE 1 MG: 1 TABLET ORAL at 21:38

## 2020-10-29 RX ADMIN — ATROPINE SULFATE 1 DROP: 10 SOLUTION/ DROPS OPHTHALMIC at 10:18

## 2020-10-29 RX ADMIN — GLYCOPYRROLATE 1 MG: 1 TABLET ORAL at 10:18

## 2020-10-29 NOTE — BH NOTES
GROUP THERAPY PROGRESS NOTE    Patient did not participate in Coping Skills group.      Diane Jackson, RN, PMHNP Student

## 2020-10-29 NOTE — BH NOTES
GROUP THERAPY PROGRESS NOTE    Patient did not participate in Process group.      Medina Flair LPC LSATP [facility]

## 2020-10-29 NOTE — BH NOTES
GROUP THERAPY PROGRESS NOTE    Patient did not participate in Substance abuse/Coping Skills group.      Christel Villatoro LPC LSATP CSAC

## 2020-10-29 NOTE — GROUP NOTE
JENELLE  GROUP DOCUMENTATION INDIVIDUAL Group Therapy Note Date: 10/29/2020 Group Start Time: 0900 Group End Time: 1000 Group Topic: Topic Group [address] 3 ACUTE BEHAV [facility], [address] [facility] GROUP DOCUMENTATION GROUP Group Therapy Note Attendees: 7 Attendance: Did not attend Patient's Goal: Interventions/techniques: 
Brian Martínez

## 2020-10-29 NOTE — GROUP NOTE
JENELLE  GROUP DOCUMENTATION INDIVIDUAL Group Therapy Note Date: 10/29/2020 Group Start Time: 1100 Group End Time: 1200 Group Topic: Topic Group [facility] 3 ACUTE BEHAV [facility] [place], [address] [facility] GROUP DOCUMENTATION GROUP Group Therapy Note Attendees: 7 Attendance: Did not attend Patient's Goal: Interventions/techniques Max Pappas

## 2020-10-29 NOTE — BH NOTES
PSYCHIATRIC PROGRESS NOTE         Patient Name  Adam Leonardo   Date of Birth 1970   [facility] [id number]   Medical Record Number  [id number]      Age  45 y.o. PCP Unknown, Provider   Admit date:  9/18/2020    Room Number  321/01  @ [facility]   Date of Service  10/29/2020         E & M PROGRESS NOTE:         HISTORY       CC:  \"psychosis\"  HISTORY OF PRESENT ILLNESS/INTERVAL HISTORY:  (reviewed/updated 10/29/2020). per initial evaluation: Ruby Reyes \"Corey\" Abi Tena is a 45year old male with a history of schizophrenia admitted to the [facility] for increased psychosis. He has not been bathing or otherwise caring for himself at home. He was reportedly hospitalized at [facility] from April - June 2020. He does not verbally answer any questions. He is well known to this writer from previous admissions in the [place] area. He has responded well to ECT in the past.    Hospital course 9/21 - 10/27: patient admitted for gross disorganization, appeared catatonic at times and with markedly poor self care, poor ADLs and incontinence. Patient started on Clozaril via court order as he refused much of the medication when offered. Once on court ordered treatment, the patient was agreeable with PO medication, Clozaril titrated to a high dose of 500 mg daily; his self care improved throughout the admission but he still required prompting for most ADLs. Due to persistent tachycardia and sialorrhea, patient's Clozaril was assessed, and the level noted to be supra-therapeutic at 500 mg -- the medication was subsequently tapered to 300 mg daily. No appreciable change in his mental status was observed at the higher dose relative to the tapered dose and patient remained calm and cooperative with treatment. Medications were held and discontinued due to poor efficacy including mood stabilizer, benzodiazepine and antidepressant. Patient generally internally preoccupied, but will engage with the milieu if prompted.  The patient was noted to have a shoulder abscess draining purulent fluid, which improved with antibiotics and did not require surgical intervention. 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 The patient voices a desire to be discharged, but is minimally conversant otherwise. He attends groups and eats meals out of his room but spends much of the day in bed, unchanged since admision, though he will pace the unit at times. RONNIE and MD coordinating disposition options as patient appears to be approaching his psychiatric baseline. 10/28 - no acute overnight events. Patient isolative to him room, out for meals, internally preoccupied with a flat affect. He is generally non-spontaneous in his responses but pleasant. Patient still requires frequent assessment and redirection as his avolition persists. Patient noted to have worsening sialorrhea this morning, voices his concerns and denies any specific concerns. Patient discharge focused, will be able to return home pending preparation by his sister Harper Ahmadi. 10/29 - patient isolative, refused vitals this morning but was medication compliant. He remains internally preoccupied but will respond appropriately to direct questions. He denies SI/HI, states he is thinking about 'Cristhian' when asked about his thought content. Patient discharge focused, team planning on discharge Monday following Cloz level and CBC. SIDE EFFECTS: (reviewed/updated 10/29/2020)  +Drooling, +tachycardia  No noted toxicity with use of Depakote   ALLERGIES:(reviewed/updated 10/29/2020)  Allergies   Allergen Reactions    Fluphenazine Unknown (comments)     Pt is unable to communicate properly.  Penicillins Unknown (comments)     Pt is unable to communicate properly.       MEDICATIONS PRIOR TO ADMISSION:(reviewed/updated 10/29/2020)  Medications Prior to Admission Medication Sig    FLUoxetine (PROzac) 20 mg capsule Take 20 mg by mouth daily.  benztropine (COGENTIN) 0.5 mg tablet Take 0.5 mg by mouth two (2) times a day. Indications: extrapyramidal symptoms as a result of taking the medication    LORazepam (Ativan) 1 mg tablet Take 1 mg by mouth two (2) times a day.  valproate (Depakene) 250 mg/5 mL syrup Take 1,000 mg by mouth two (2) times a day.  paliperidone palmitate (Invega Sustenna) 234 mg/1.5 mL injection 234 mg by IntraMUSCular route every twenty-one (21) days. Indications: schizophrenia    OLANZapine (ZyPREXA zydis) 20 mg disintegrating tablet Take 20 mg by mouth Daily (before dinner). Indications: schizophrenia      PAST MEDICAL HISTORY: Past medical history from the initial psychiatric evaluation has been reviewed (reviewed/updated 10/29/2020) with no additional updates (I asked patient and no additional past medical history provided). Past Medical History:   Diagnosis Date    Abscess 10/15/2020    Psychiatric disorder     Psychotic disorder ([facility]     Withdrawal syndrome ([facility]    History reviewed. No pertinent surgical history. SOCIAL HISTORY: Social history from the initial psychiatric evaluation has been reviewed (reviewed/updated 10/29/2020) with no additional updates (I asked patient and no additional social history provided).    Social History     Socioeconomic History    Marital status: SINGLE     Spouse name: Not on file    Number of children: Not on file    Years of education: Not on file    Highest education level: Not on file   Occupational History    Not on file   Social Needs    Financial resource strain: Not on file    Food insecurity     Worry: Not on file     Inability: Not on file    Transportation needs     Medical: Not on file     Non-medical: Not on file   Tobacco Use    Smoking status: Never Smoker    Smokeless tobacco: Never Used   Substance and Sexual Activity    Alcohol use: No    Drug use: No    Sexual activity: Not on file   Lifestyle    Physical activity     Days per week: Not on file     Minutes per session: Not on file    Stress: Not on file   Relationships    Social connections     Talks on phone: Not on file     Gets together: Not on file     Attends [employer] service: Not on file     Active member of club or organization: Not on file     Attends meetings of clubs or organizations: Not on file     Relationship status: Not on file    Intimate partner violence     Fear of current or ex partner: Not on file     Emotionally abused: Not on file     Physically abused: Not on file     Forced sexual activity: Not on file   Other Topics Concern    Not on file   Social History Narrative    Pt is a HS grad and is unemployed. He lives with his sister. On disability    No pending legal charge reported      FAMILY HISTORY: Family history from the initial psychiatric evaluation has been reviewed (reviewed/updated 10/29/2020) with no additional updates (I asked patient and no additional family history provided). History reviewed. No pertinent family history. REVIEW OF SYSTEMS: (reviewed/updated 10/29/2020)  Appetite:poor   Sleep: poor with DIMS (difficulty initiating & maintaining sleep)   All other Review of Systems: Negative except per [facility]         [facility] (MSE):    MSE FINDINGS ARE WITHIN NORMAL LIMITS (WNL) UNLESS OTHERWISE STATED BELOW. ( ALL OF THE BELOW CATEGORIES OF THE MSE HAVE BEEN REVIEWED (reviewed 10/29/2020) AND UPDATED AS DEEMED APPROPRIATE )  General Presentation age appropriate, cooperative   Orientation disorganized   Vital Signs  See below (reviewed 10/29/2020); Vital Signs (BP, Pulse, & Temp) are within normal limits if not listed below.    Gait and Station Stable/steady, no ataxia   Musculoskeletal System No extrapyramidal symptoms (EPS); no abnormal muscular movements or Tardive Dyskinesia (TD); muscle strength and tone are within normal limits Language No aphasia or dysarthria   Speech:  hypoverbal, increased latency of response, non-pressured and soft   Thought Processes illogical; slow rate of thoughts; poor abstract reasoning/computation   Thought Associations blocked    Thought Content poverty of content   Suicidal Ideations none   Homicidal Ideations none   Mood:  euthymic   Affect:  flat   Memory recent  impaired   Memory remote:  impaired   Concentration/Attention:  impaired   Fund of Knowledge significantly below average   Insight:  poor   Reliability fair   Judgment:  fair          VITALS:     No data found. Wt Readings from Last 3 Encounters:   09/18/20 85.7 kg (189 lb)   10/15/20 85.7 kg (189 lb)   12/23/17 84.9 kg (187 lb 1.6 oz)     Temp Readings from Last 3 Encounters:   03/28/17 97.5 °F (36.4 °C)   02/07/15 98.7 °F (37.1 °C)     BP Readings from Last 3 Encounters:   10/27/20 121/75   03/28/17 100/68   02/12/15 105/73     Pulse Readings from Last 3 Encounters:   10/27/20 96   03/28/17 67   02/12/15 87            DATA     LABORATORY DATA:(reviewed/updated 10/29/2020)  No results found for this or any previous visit (from the past 24 hour(s)). Lab Results   Component Value Date/Time    Valproic acid 97 09/18/2020 04:42 PM     No results found for: LITHM   RADIOLOGY REPORTS:(reviewed/updated 10/29/2020)  No results found.        MEDICATIONS     ALL MEDICATIONS:   Current Facility-Administered Medications   Medication Dose Route Frequency    glycopyrrolate (ROBINUL) tablet 1 mg  1 mg Oral Q12H    cloZAPine (CLOZARIL) tablet 300 mg  300 mg Oral QHS    OLANZapine (ZyPREXA) tablet 5 mg  5 mg Oral Q6H PRN    haloperidol lactate (HALDOL) injection 5 mg  5 mg IntraMUSCular Q6H PRN    benztropine (COGENTIN) tablet 1 mg  1 mg Oral BID PRN    diphenhydrAMINE (BENADRYL) injection 50 mg  50 mg IntraMUSCular BID PRN    hydrOXYzine HCL (ATARAX) tablet 50 mg  50 mg Oral TID PRN    traZODone (DESYREL) tablet 50 mg  50 mg Oral QHS PRN    acetaminophen (TYLENOL) tablet 650 mg  650 mg Oral Q4H PRN    magnesium hydroxide (MILK OF MAGNESIA) 400 mg/5 mL oral suspension 30 mL  30 mL Oral DAILY PRN      SCHEDULED MEDICATIONS:   Current Facility-Administered Medications   Medication Dose Route Frequency    glycopyrrolate (ROBINUL) tablet 1 mg  1 mg Oral Q12H    cloZAPine (CLOZARIL) tablet 300 mg  300 mg Oral QHS          ASSESSMENT & PLAN     DIAGNOSES REQUIRING ACTIVE TREATMENT AND MONITORING: (reviewed/updated 10/29/2020)  Patient Active Hospital Problem List:   Schizoaffective disorder ([facility] (9/18/2020)    Assessment: patient with significant internal preoccupation, poor ADLs, history of catatonia. Appears to have been compliant with home medication, but is regressed and with a markedly disorganized thought process. Will start Clozaril, add benzodiazepine for protection against catatonia to be tapered as Clozaril dose increases given interaction. 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 In summary, Carol Cardona, is a 45 y.o.  male who presents with a severe exacerbation of the principal diagnosis of Schizoaffective disorder ([facility]    Patient's is approaching his psychiatric baseline    Patient requires continued inpatient hospitalization for further stabilization, safety monitoring and medication management.   I will continue to coordinate the provision of individual, milieu, occupational, group, and substance abuse therapies to address target symptoms/diagnoses as deemed appropriate for the individual patient. A coordinated, multidisplinary treatment team round was conducted with the patient (this team consists of the nurse, psychiatric unit pharmacist,  and writer). Complete current electronic health record for patient has been reviewed today including consultant notes, ancillary staff notes, nurses and psychiatric tech notes. Suicide risk assessment completed and patient deemed to be of low risk for suicide at this time. The following regarding medications was addressed during rounds with patient:   the risks and benefits of the proposed medication. The patient was given the opportunity to ask questions. Informed consent given to the use of the above medications. Will continue to adjust psychiatric and non-psychiatric medications (see above \"medication\" section and orders section for details) as deemed appropriate & based upon diagnoses and response to treatment. I will continue to order blood tests/labs and diagnostic tests as deemed appropriate and review results as they become available (see orders for details and above listed lab/test results). I will order psychiatric records from previous [facility] hospitals to further elucidate the nature of patient's psychopathology and review once available. I will gather additional collateral information from friends, family and o/p treatment team to further elucidate the nature of patient's psychopathology and baselline level of psychiatric functioning. I certify that this patient's inpatient psychiatric hospital services furnished since the previous certification were, and continue to be, required for treatment that could reasonably be expected to improve the patient's condition, or for diagnostic study, and that the patient continues to need, on a daily basis, active treatment furnished directly by or requiring the supervision of inpatient psychiatric facility personnel.  In addition, the hospital records show that services furnished were intensive treatment services, admission or related services, or equivalent services.     EXPECTED DISCHARGE DATE/DAY: 11/02/20     DISPOSITION: [facility] vs home       Signed By:   Magno Riojas MD  10/29/2020

## 2020-10-29 NOTE — GROUP NOTE
JENELLE  GROUP DOCUMENTATION INDIVIDUAL Group Therapy Note Date: 10/29/2020 Group Start Time: 1500 Group End Time: 3330 Group Topic: Recreational/Music Therapy [address] [facility] 3 ACUTE BEHAV [facility], [facility] GROUP DOCUMENTATION GROUP Group Therapy Note Attendees: 7 Attendance: Did not attend Patient's Goal: Interventions/techniques: 
 
Violet Hillman

## 2020-10-29 NOTE — GROUP NOTE
JENELLE  GROUP DOCUMENTATION INDIVIDUAL Group Therapy Note Date: 10/28/2020 Group Start Time: 1358 Group End Time: 0300 Group Topic: Recreational/Music Therapy [facility] Jenna ALMANZAR  GROUP DOCUMENTATION GROUP Group Therapy Note Attendees: 2 Attendance: Did not attend Phuc Flores

## 2020-10-29 NOTE — PROGRESS NOTES
GROUP THERAPY PROGRESS NOTE      Zhou Smith was not present for medication group.     Cat Randle, PHARMD, BCPS

## 2020-10-29 NOTE — BH NOTES
Behavioral Health Treatment Team Note     Patient goal(s) for today: Follow unit directions   Treatment team focus/goals: Stabilize and dispo plan  Progress note: Pt met with treatment team, remains calm and cooperative. Pt in agreement with plan to discharge Monday 11/2.     FRB:  58           UMJRGQLM LOS:10/30     Financial concerns/prescription coverage: [place] Medicare Part A&B and [facility] and TDO  Family contact: Sister/POA Johnnye Lanes [place] [phone]   Family requesting physician contact today: spoke 10/27  Discharge plan: Home   Access to weapons: None  Outpatient provider(s): [facility] - updated 10/29 - chelo carpenter [phone]  Patient's preferred phone number for follow up [phone]     Participating treatment team members: LAVINIA Abbasi and Dr. Yfn Salinas.

## 2020-10-29 NOTE — PROGRESS NOTES
1940: Pt refused to talk with writer. Pt refused vitals    2119: Pt refused vitals. Pt accepted medications. Pt shook his head no when writer asked if he was having SI/HI/AH/VH. Pt  shook his head no when writer asked if pt was in pain. Pt shook his head no when writer asked if he was having anxiety or depression. Pt accepted medications. No issues observed. Pt refused vitals again. 2215: Pt laying quietly in his room. 0700: Gave off shift report to oncoming nurse.            Problem: Altered Thought Process (Adult/Pediatric)  Goal: *STG: Remains safe in hospital  Outcome: Progressing Towards Goal  Goal: *STG: Complies with medication therapy  Outcome: Progressing Towards Goal  Goal: *STG: Attends activities and groups  Outcome: Not Progressing Towards Goal  Goal: *STG: Decreased delusional thinking  Outcome: Not Progressing Towards Goal  Goal: *STG: Absence of lethality  Outcome: Progressing Towards Goal  Goal: *STG: Demonstrates ability to understand and use improved judgment in daily activities and relationships  Outcome: Not Progressing Towards Goal

## 2020-10-29 NOTE — GROUP NOTE
IP  GROUP DOCUMENTATION INDIVIDUAL Group Therapy Note Date: 10/29/2020 Group Start Time: 4349 Group End Time: 0300 Group Topic: Recreational/Music Therapy [facility] GROUP DOCUMENTATION GROUP Group Therapy Note Attendees: 2 Attendance: {[facility] GROUP DOC ATTENDANCE:26256} Patient's Goal:  *** Interventions/techniques: {[facility] GROUP DOC INTERVENTIONS/TECHNIQUES:90219} Follows Directions: {[facility] GROUP DOC FOLLOWS DIRECTION:56471} Interactions: {[facility] GROUP DOC INTERACTIONS:38632} Mental Status: {[facility] MENTAL AFRPXM:26030} Behavior/appearance: {[facility] GROUP DOC BEHAVIOR/APPEARANCE:98056} Goals Achieved: {[facility] GROUP DOC GOALS ACHIEVED:08439} Additional Notes:  *** Brigida Esposito

## 2020-10-29 NOTE — PROGRESS NOTES
Verbal shift change report given to Artemio Pelletier.  (oncoming nurse) by Car Dawson (offgoing nurse). Report included the following information SBAR, Kardex, MAR and Recent Results. Patient isolative to his room. Medication and diet compliant after encouragement. Patient isolative to his room. Patient more verbal with writer. Patient transferred over to general side without issue. Personal hygiene remains somewhat of an issue. Will pass on to oncoming nurse. Will continue to monitor for safety. Problem: Falls - Risk of  Goal: *Absence of Falls  Description: Document Bridget Mccallum Fall Risk and appropriate interventions in the flowsheet. Outcome: Progressing Towards Goal  Note: Fall Risk Interventions:       Mentation Interventions: Reorient patient, Room close to nurse's station    Medication Interventions: Teach patient to arise slowly    Elimination Interventions:  Toileting schedule/hourly rounds              Problem: Patient Education: Go to Patient Education Activity  Goal: Patient/Family Education  Outcome: Progressing Towards Goal     Problem: Altered Thought Process (Adult/Pediatric)  Goal: *STG: Participates in treatment plan  Outcome: Progressing Towards Goal  Goal: *STG: Remains safe in hospital  Outcome: Progressing Towards Goal

## 2020-10-30 PROCEDURE — 99232 SBSQ HOSP IP/OBS MODERATE 35: CPT | Performed by: PSYCHIATRY & NEUROLOGY

## 2020-10-30 PROCEDURE — 74011250637 HC RX REV CODE- 250/637: Performed by: PSYCHIATRY & NEUROLOGY

## 2020-10-30 PROCEDURE — 65220000003 HC RM SEMIPRIVATE PSYCH

## 2020-10-30 RX ORDER — METOPROLOL SUCCINATE 25 MG/1
25 TABLET, EXTENDED RELEASE ORAL DAILY
Status: DISCONTINUED | OUTPATIENT
Start: 2020-10-30 | End: 2020-11-03 | Stop reason: HOSPADM

## 2020-10-30 RX ORDER — GLYCOPYRROLATE 1 MG/1
1 TABLET ORAL DAILY
Status: DISCONTINUED | OUTPATIENT
Start: 2020-10-31 | End: 2020-11-03 | Stop reason: HOSPADM

## 2020-10-30 RX ADMIN — METOPROLOL SUCCINATE 25 MG: 25 TABLET, EXTENDED RELEASE ORAL at 13:58

## 2020-10-30 RX ADMIN — CLOZAPINE 300 MG: 100 TABLET ORAL at 22:16

## 2020-10-30 RX ADMIN — GLYCOPYRROLATE 1 MG: 1 TABLET ORAL at 08:34

## 2020-10-30 NOTE — PROGRESS NOTES
Problem: Altered Thought Process (Adult/Pediatric)  Goal: *STG: Remains safe in hospital  Outcome: Progressing Towards Goal  Goal: Interventions  Outcome: Progressing Towards Goal     0729 This writer received report from the outgoing nurse. 6691 Patient in room resting. Patient is naked. Patient is slow to respond to questions asked of him from this writer. Patient denied SI, HI, and AVH. Yet this writer noted the patient to be internally preoccupied and experiencing AH as evident by the patient starring off and nodding his head when no one is talking to him. Patient is tachycardic at 107. Patient's attitude is cooperative, affect is constricted and mood is depressed. 1209 Patient continues to be tachycardic at 116. This writer notified the patient's attending physician. This writer informed that the physician would prescribe a beta blocker for the patient's elevated BP.     1308 Patient in room resting quietly.

## 2020-10-30 NOTE — PROGRESS NOTES
Problem: Falls - Risk of  Goal: *Absence of Falls  Description: Document Tawana Magno Fall Risk and appropriate interventions in the flowsheet. Outcome: Progressing Towards Goal  Note: Fall Risk Interventions:       Mentation Interventions: Reorient patient, Room close to nurse's station    Medication Interventions: Teach patient to arise slowly    Elimination Interventions:  Toileting schedule/hourly rounds              Problem: Altered Thought Process (Adult/Pediatric)  Goal: *STG: Complies with medication therapy  Outcome: Progressing Towards Goal     Problem: Altered Thought Process (Adult/Pediatric)  Goal: *STG: Attends activities and groups  Outcome: Not Progressing Towards Goal

## 2020-10-30 NOTE — BH NOTES
GROUP THERAPY PROGRESS NOTE    Patient did not participate in Process group.      Judith Clark LPC [facility]

## 2020-10-30 NOTE — BH NOTES
GROUP THERAPY PROGRESS NOTE    Patient did not participate in Discharge Group.      Flaquita Bae LPC [facility]

## 2020-10-30 NOTE — GROUP NOTE
JENELLE  GROUP DOCUMENTATION INDIVIDUAL Group Therapy Note Date: 10/30/2020 Group Start Time: 1000 Group End Time: 1100 Group Topic: Topic Group [facility] 3 ACUTE BEHAV [facility] [place], [address] [facility] GROUP DOCUMENTATION GROUP Group Therapy Note Attendees: 5 Attendance: Did not attend Patient's Goal: Interventions/techniques Iesha Montero

## 2020-10-30 NOTE — BH NOTES
GROUP THERAPY PROGRESS NOTE    Patient did not participate in Coping Skills group.      Ulysses Bliss LPC LS[facility]

## 2020-10-30 NOTE — BH NOTES
PSYCHIATRIC PROGRESS NOTE         Patient Name  Shaniqua Rodrigues   Date of Birth 1970   [facility] [id number]   Medical Record Number  [id number]      Age  45 y.o. PCP Unknown, Provider   Admit date:  9/18/2020    Room Number  954/68  @ [facility]   Date of Service  10/30/2020         E & M PROGRESS NOTE:         HISTORY       CC:  \"psychosis\"  HISTORY OF PRESENT ILLNESS/INTERVAL HISTORY:  (reviewed/updated 10/30/2020). per initial evaluation: Prince galo \"Corey\" Leonor Sellers is a 45year old male with a history of schizophrenia admitted to the [facility] for increased psychosis. He has not been bathing or otherwise caring for himself at home. He was reportedly hospitalized at [facility] from April - June 2020. He does not verbally answer any questions. He is well known to this writer from previous admissions in the [facility]. He has responded well to ECT in the past.    Hospital course 9/21 - 10/27: patient admitted for gross disorganization, appeared catatonic at times and with markedly poor self care, poor ADLs and incontinence. Patient started on Clozaril via court order as he refused much of the medication when offered. Once on court ordered treatment, the patient was agreeable with PO medication, Clozaril titrated to a high dose of 500 mg daily; his self care improved throughout the admission but he still required prompting for most ADLs. Due to persistent tachycardia and sialorrhea, patient's Clozaril was assessed, and the level noted to be supra-therapeutic at 500 mg -- the medication was subsequently tapered to 300 mg daily. No appreciable change in his mental status was observed at the higher dose relative to the tapered dose and patient remained calm and cooperative with treatment. Medications were held and discontinued due to poor efficacy including mood stabilizer, benzodiazepine and antidepressant. Patient generally internally preoccupied, but will engage with the milieu if prompted.  The patient was noted to have a shoulder abscess draining purulent fluid, which improved with antibiotics and did not require surgical intervention. 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 The patient voices a desire to be discharged, but is minimally conversant otherwise. He attends groups and eats meals out of his room but spends much of the day in bed, unchanged since admision, though he will pace the unit at times. SW and MD coordinating disposition options as patient appears to be approaching his psychiatric baseline. 10/28 - no acute overnight events. Patient isolative to him room, out for meals, internally preoccupied with a flat affect. He is generally non-spontaneous in his responses but pleasant. Patient still requires frequent assessment and redirection as his avolition persists. Patient noted to have worsening sialorrhea this morning, voices his concerns and denies any specific concerns. Patient discharge focused, will be able to return home pending preparation by his sister Yeimy Sanchez. 10/29 - patient isolative, refused vitals this morning but was medication compliant. He remains internally preoccupied but will respond appropriately to direct questions. He denies SI/HI, states he is thinking about 'Cristhian' when asked about his thought content. Patient discharge focused, team planning on discharge Monday following Cloz level and CBC. 10/30 - no acute overnight events. Patient seen this AM he is naked in his room lying in bed not speaking. Patient states he wants to go home and does not like taking medication, no other meaningful interaction is possible.  Per nurse, patient still tachy to 110s, discussed BB - patient BP relatively normal.        SIDE EFFECTS: (reviewed/updated 10/30/2020)  +Drooling, +tachycardia  No noted toxicity with use of Depakote ALLERGIES:(reviewed/updated 10/30/2020)  Allergies   Allergen Reactions    Fluphenazine Unknown (comments)     Pt is unable to communicate properly.  Penicillins Unknown (comments)     Pt is unable to communicate properly. MEDICATIONS PRIOR TO ADMISSION:(reviewed/updated 10/30/2020)  Medications Prior to Admission   Medication Sig    FLUoxetine (PROzac) 20 mg capsule Take 20 mg by mouth daily.  benztropine (COGENTIN) 0.5 mg tablet Take 0.5 mg by mouth two (2) times a day. Indications: extrapyramidal symptoms as a result of taking the medication    LORazepam (Ativan) 1 mg tablet Take 1 mg by mouth two (2) times a day.  valproate (Depakene) 250 mg/5 mL syrup Take 1,000 mg by mouth two (2) times a day.  paliperidone palmitate (Invega Sustenna) 234 mg/1.5 mL injection 234 mg by IntraMUSCular route every twenty-one (21) days. Indications: schizophrenia    OLANZapine (ZyPREXA zydis) 20 mg disintegrating tablet Take 20 mg by mouth Daily (before dinner). Indications: schizophrenia      PAST MEDICAL HISTORY: Past medical history from the initial psychiatric evaluation has been reviewed (reviewed/updated 10/30/2020) with no additional updates (I asked patient and no additional past medical history provided). Past Medical History:   Diagnosis Date    Abscess 10/15/2020    Psychiatric disorder     Psychotic disorder [facility] [address]     Withdrawal syndrome ([facility] [address]    History reviewed. No pertinent surgical history. SOCIAL HISTORY: Social history from the initial psychiatric evaluation has been reviewed (reviewed/updated 10/30/2020) with no additional updates (I asked patient and no additional social history provided).    Social History     Socioeconomic History    Marital status: SINGLE     Spouse name: Not on file    Number of children: Not on file    Years of education: Not on file    Highest education level: Not on file   Occupational History    Not on file   Social Needs    Financial resource strain: Not on file    Food insecurity     Worry: Not on file     Inability: Not on file    Transportation needs     Medical: Not on file     Non-medical: Not on file   Tobacco Use    Smoking status: Never Smoker    Smokeless tobacco: Never Used   Substance and Sexual Activity    Alcohol use: No    Drug use: No    Sexual activity: Not on file   Lifestyle    Physical activity     Days per week: Not on file     Minutes per session: Not on file    Stress: Not on file   Relationships    Social connections     Talks on phone: Not on file     Gets together: Not on file     Attends Synagogue service: Not on file     Active member of club or organization: Not on file     Attends meetings of clubs or organizations: Not on file     Relationship status: Not on file    Intimate partner violence     Fear of current or ex partner: Not on file     Emotionally abused: Not on file     Physically abused: Not on file     Forced sexual activity: Not on file   Other Topics Concern    Not on file   Social History Narrative    Pt is a HS grad and is unemployed. He lives with his sister. On disability    No pending legal charge reported      FAMILY HISTORY: Family history from the initial psychiatric evaluation has been reviewed (reviewed/updated 10/30/2020) with no additional updates (I asked patient and no additional family history provided). History reviewed. No pertinent family history.     REVIEW OF SYSTEMS: (reviewed/updated 10/30/2020)  Appetite:poor   Sleep: poor with DIMS (difficulty initiating & maintaining sleep)   All other Review of Systems: Negative except per HPI         [facility] (MSE):    MSE FINDINGS ARE WITHIN NORMAL LIMITS (WNL) UNLESS OTHERWISE STATED BELOW. ( ALL OF THE BELOW CATEGORIES OF THE MSE HAVE BEEN REVIEWED (reviewed 10/30/2020) AND UPDATED AS DEEMED APPROPRIATE )  General Presentation age appropriate, cooperative   Orientation disorganized   Vital Signs  See below (reviewed 10/30/2020); Vital Signs (BP, Pulse, & Temp) are within normal limits if not listed below. Gait and Station Stable/steady, no ataxia   Musculoskeletal System No extrapyramidal symptoms (EPS); no abnormal muscular movements or Tardive Dyskinesia (TD); muscle strength and tone are within normal limits   Language No aphasia or dysarthria   Speech:  hypoverbal, increased latency of response, non-pressured and soft   Thought Processes illogical; slow rate of thoughts; poor abstract reasoning/computation   Thought Associations blocked    Thought Content poverty of content   Suicidal Ideations none   Homicidal Ideations none   Mood:  euthymic   Affect:  flat   Memory recent  impaired   Memory remote:  impaired   Concentration/Attention:  impaired   Fund of Knowledge significantly below average   Insight:  poor   Reliability fair   Judgment:  fair          VITALS:     Patient Vitals for the past 24 hrs:   Temp Pulse Resp BP   10/30/20 1209  (!) 116     10/30/20 0739 98.3 °F (36.8 °C) (!) 107 17 115/85   10/29/20 2000   18      Wt Readings from Last 3 Encounters:   09/18/20 85.7 kg (189 lb)   10/15/20 85.7 kg (189 lb)   12/23/17 84.9 kg (187 lb 1.6 oz)     Temp Readings from Last 3 Encounters:   10/30/20 98.3 °F (36.8 °C)   03/28/17 97.5 °F (36.4 °C)   02/07/15 98.7 °F (37.1 °C)     BP Readings from Last 3 Encounters:   10/30/20 115/85   03/28/17 100/68   02/12/15 105/73     Pulse Readings from Last 3 Encounters:   10/30/20 (!) 116   03/28/17 67   02/12/15 87            DATA     LABORATORY DATA:(reviewed/updated 10/30/2020)  No results found for this or any previous visit (from the past 24 hour(s)). Lab Results   Component Value Date/Time    Valproic acid 97 09/18/2020 04:42 PM     No results found for: LITHM   RADIOLOGY REPORTS:(reviewed/updated 10/30/2020)  No results found.        MEDICATIONS     ALL MEDICATIONS:   Current Facility-Administered Medications   Medication Dose Route Frequency    glycopyrrolate (ROBINUL) tablet 1 mg  1 mg Oral Q12H    cloZAPine (CLOZARIL) tablet 300 mg  300 mg Oral QHS    OLANZapine (ZyPREXA) tablet 5 mg  5 mg Oral Q6H PRN    haloperidol lactate (HALDOL) injection 5 mg  5 mg IntraMUSCular Q6H PRN    benztropine (COGENTIN) tablet 1 mg  1 mg Oral BID PRN    diphenhydrAMINE (BENADRYL) injection 50 mg  50 mg IntraMUSCular BID PRN    hydrOXYzine HCL (ATARAX) tablet 50 mg  50 mg Oral TID PRN    traZODone (DESYREL) tablet 50 mg  50 mg Oral QHS PRN    acetaminophen (TYLENOL) tablet 650 mg  650 mg Oral Q4H PRN    magnesium hydroxide (MILK OF MAGNESIA) 400 mg/5 mL oral suspension 30 mL  30 mL Oral DAILY PRN      SCHEDULED MEDICATIONS:   Current Facility-Administered Medications   Medication Dose Route Frequency    glycopyrrolate (ROBINUL) tablet 1 mg  1 mg Oral Q12H    cloZAPine (CLOZARIL) tablet 300 mg  300 mg Oral QHS          ASSESSMENT & PLAN     DIAGNOSES REQUIRING ACTIVE TREATMENT AND MONITORING: (reviewed/updated 10/30/2020)  Patient Active Hospital Problem List:   Schizoaffective disorder ([facility] [address] (9/18/2020)    Assessment: patient with significant internal preoccupation, poor ADLs, history of catatonia. Appears to have been compliant with home medication, but is regressed and with a markedly disorganized thought process. Will start Clozaril, add benzodiazepine for protection against catatonia to be tapered as Clozaril dose increases given interaction.     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 In summary, April Mackenzie, is a 45 y.o.  male who presents with a severe exacerbation of the principal diagnosis of Schizoaffective disorder ([address]    Patient's is approaching his psychiatric baseline    Patient requires continued inpatient hospitalization for further stabilization, safety monitoring and medication management. I will continue to coordinate the provision of individual, milieu, occupational, group, and substance abuse therapies to address target symptoms/diagnoses as deemed appropriate for the individual patient. A coordinated, multidisplinary treatment team round was conducted with the patient (this team consists of the nurse, psychiatric unit pharmacist,  and writer). Complete current electronic health record for patient has been reviewed today including consultant notes, ancillary staff notes, nurses and psychiatric tech notes. Suicide risk assessment completed and patient deemed to be of low risk for suicide at this time. The following regarding medications was addressed during rounds with patient:   the risks and benefits of the proposed medication. The patient was given the opportunity to ask questions. Informed consent given to the use of the above medications. Will continue to adjust psychiatric and non-psychiatric medications (see above \"medication\" section and orders section for details) as deemed appropriate & based upon diagnoses and response to treatment. I will continue to order blood tests/labs and diagnostic tests as deemed appropriate and review results as they become available (see orders for details and above listed lab/test results). I will order psychiatric records from previous [facility] hospitals to further elucidate the nature of patient's psychopathology and review once available.     I will gather additional collateral information from friends, family and o/p treatment team to further elucidate the nature of patient's psychopathology and baselline level of psychiatric functioning. I certify that this patient's inpatient psychiatric hospital services furnished since the previous certification were, and continue to be, required for treatment that could reasonably be expected to improve the patient's condition, or for diagnostic study, and that the patient continues to need, on a daily basis, active treatment furnished directly by or requiring the supervision of inpatient psychiatric facility personnel. In addition, the hospital records show that services furnished were intensive treatment services, admission or related services, or equivalent services.     EXPECTED DISCHARGE DATE/DAY: 11/02/20     DISPOSITION: [facility] vs home       Signed By:   Zeenat Melendez MD  10/30/2020

## 2020-10-30 NOTE — INTERDISCIPLINARY ROUNDS
Behavioral Health Interdisciplinary Rounds  
  
Patient Name: Edwin Anderson                      Age: 45 y. o.              Room/Bed:  313/01 Primary Diagnosis: Schizoaffective disorder (HCC)        
Admission Status: Forced Medication Order - CI                          
Readmission within 30 days: no Power of  in place: unk Patient requires a blocked bed: yes           
Reason for blocked bed: Behavior Order for blocked bed obtained: no    
  
Sleep hours: 9 Morning Labs completed per orders:  none ordered                                 
Participation in Care/Groups:  no 
Medication Compliant?: Yes PRNS (last 24 hours): None               
Restraints (last 24 hours):  no Substance Abuse:  no              
24 hour chart check complete:   
  
Patient goal(s) for today: Follow unit directions  
Treatment team focus/goals: Stabilize and dispo plan Progress note: Pt met with treatment team, remains calm and cooperative. Pt is responding to internal stimuli and slow to respond. Pt in agreement with plan to discharge Monday 11/2. 
  
LOS:  42           Expected LOS:11/2 
  
Financial concerns/prescription coverage: [employer] Medicare Part A&B and [facility] and O Family contact: Sister/POANJU Linn BTVKKNGLG [phone]  Family requesting physician contact today: spoke 10/27 Discharge plan: Home  
Access to weapons: None Outpatient provider(s): [facility] - updated 10/29 - chelo carpenter [phone] Patient's preferred phone number for follow up [phone] 
  
Participating treatment team members: LAVINIA Leone and Dr. Leonidas Bhakta.

## 2020-10-30 NOTE — BH NOTES
Assumed care of patient and given report by off going nurse. Pt was in his room off and on but was seen pacing up by the nurse station a few times. Pt has a very strong body odor; hair is very unkempt and per staff, he continues to refused ADL's even after much prompting. Pt needs monitoring when he does get in shower as he will not wash at all or use soap but stands under water and then get's right out. Pt was prompted to shower and this staff had to stand by and put the soap on his washcloth and had to continually prompt him to wash with soap/wash his hair. Pt got irritable and then refused to dry off and came out of the bathroom soaked and then wanted to put his dirty scrubs back on and then refused to dress and put anything clean on after multiple prompts. Pt then got down on the floor as he has previously and started rocking and holding his head while naked. Pt bedding what changed, though he insisted on climbing back on bed that was visibly soiled. Pt did allow staff to start to do VS but then kept rocking and moving legs so I was unable to get a reliable set of VS.  Pt continues to be internally preoccupied, appears flat and will only respond when prompted multiple times. He did refused snack and later came out and said \"food\" and was given sandwich/goldfish/cookies/gingerale. Pt has been meal and med compliant. He still continues to refuse to attend groups. He is slotted to be discharged back to his sisters next week on Monday per report. Pt denied all symptoms though it hard to really assess when he will either not answer or shakes his head no. Pt cyst to his R shoulder is closed and no longer draining and remains open to air. Pt continued to come out with only a sheet wrapped around him. Pt continues to be discharge focused. He continues on 15 min checks. Will continue to monitor and treat.

## 2020-10-31 PROCEDURE — 65220000003 HC RM SEMIPRIVATE PSYCH

## 2020-10-31 PROCEDURE — 74011250637 HC RX REV CODE- 250/637: Performed by: PSYCHIATRY & NEUROLOGY

## 2020-10-31 RX ADMIN — CLOZAPINE 300 MG: 100 TABLET ORAL at 22:25

## 2020-10-31 RX ADMIN — METOPROLOL SUCCINATE 25 MG: 25 TABLET, EXTENDED RELEASE ORAL at 09:06

## 2020-10-31 RX ADMIN — GLYCOPYRROLATE 1 MG: 1 TABLET ORAL at 09:05

## 2020-10-31 NOTE — INTERDISCIPLINARY ROUNDS
Behavioral Health Interdisciplinary Rounds  
  
Patient Name: Edwin Anderson                      Age: 45 y. o.              Room/Bed:  [facility]/ Primary Diagnosis: Schizoaffective disorder (HCC)        
Admission Status: Forced Medication Order - CI                          
Readmission within 30 days: no Power of  in place: unk Patient requires a blocked bed: yes           
Reason for blocked bed: Behavior Order for blocked bed obtained: no    
  
Sleep hours: 9.75 Morning Labs completed per orders:  none ordered                                 
Participation in Care/Groups:  no 
Medication Compliant?: Yes PRNS (last 24 hours): None               
Restraints (last 24 hours):  no Substance Abuse:  no              
24 hour chart check complete

## 2020-10-31 NOTE — PROGRESS NOTES
Problem: Altered Thought Process (Adult/Pediatric)  Goal: *STG: Complies with medication therapy  Outcome: Progressing Towards Goal     Problem: Altered Thought Process (Adult/Pediatric)  Goal: *STG: Attends activities and groups  Outcome: Not Progressing Towards Goal  Goal: *STG: Decreased delusional thinking  Outcome: Not Progressing Towards Goal

## 2020-10-31 NOTE — BH NOTES
PSYCHIATRIC PROGRESS NOTE         Patient Name  Arely Montalvo   Date of Birth 1970   [facility] [id number]   Medical Record Number  [id number]      Age  45 y.o. PCP Unknown, Provider   Admit date:  9/18/2020    Room Number  380/65  @ [facility]   Date of Service  10/31/2020         E & M PROGRESS NOTE:         HISTORY       CC:  \"psychosis\"  HISTORY OF PRESENT ILLNESS/INTERVAL HISTORY:  (reviewed/updated 10/31/2020). per initial evaluation: Eladia Bile \"Corey\" Angel Olivas is a 45year old male with a history of schizophrenia admitted to the [facility] for increased psychosis. He has not been bathing or otherwise caring for himself at home. He was reportedly hospitalized at [facility] from April - June 2020. He does not verbally answer any questions. He is well known to this writer from previous admissions in the [facility]. He has responded well to ECT in the past.    Hospital course 9/21 - 10/27: patient admitted for gross disorganization, appeared catatonic at times and with markedly poor self care, poor ADLs and incontinence. Patient started on Clozaril via court order as he refused much of the medication when offered. Once on court ordered treatment, the patient was agreeable with PO medication, Clozaril titrated to a high dose of 500 mg daily; his self care improved throughout the admission but he still required prompting for most ADLs. Due to persistent tachycardia and sialorrhea, patient's Clozaril was assessed, and the level noted to be supra-therapeutic at 500 mg -- the medication was subsequently tapered to 300 mg daily. No appreciable change in his mental status was observed at the higher dose relative to the tapered dose and patient remained calm and cooperative with treatment. Medications were held and discontinued due to poor efficacy including mood stabilizer, benzodiazepine and antidepressant. Patient generally internally preoccupied, but will engage with the milieu if prompted.  The patient was noted to have a shoulder abscess draining purulent fluid, which improved with antibiotics and did not require surgical intervention. 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 The patient voices a desire to be discharged, but is minimally conversant otherwise. He attends groups and eats meals out of his room but spends much of the day in bed, unchanged since admision, though he will pace the unit at times. RONNIE and MD coordinating disposition options as patient appears to be approaching his psychiatric baseline. 10/28 - no acute overnight events. Patient isolative to him room, out for meals, internally preoccupied with a flat affect. He is generally non-spontaneous in his responses but pleasant. Patient still requires frequent assessment and redirection as his avolition persists. Patient noted to have worsening sialorrhea this morning, voices his concerns and denies any specific concerns. Patient discharge focused, will be able to return home pending preparation by his sister Cecy Benitez. 10/29 - patient isolative, refused vitals this morning but was medication compliant. He remains internally preoccupied but will respond appropriately to direct questions. He denies SI/HI, states he is thinking about 'Cristhian' when asked about his thought content. Patient discharge focused, team planning on discharge Monday following Cloz level and CBC. 10/30 - no acute overnight events. Patient seen this AM he is naked in his room lying in bed not speaking. Patient states he wants to go home and does not like taking medication, no other meaningful interaction is possible. Per nurse, patient still tachy to 110s, discussed BB - patient BP relatively normal.    10/31-no acute overnight events. Patient seen this AM, lying in his bed, stating he just wants to sleep.   Patient states he wants to go home and does not like taking medication, no other meaningful interaction is possible. He reports that he is going to be discharged on Monday and he is excited that he is going to be moving in with his sister. SIDE EFFECTS: (reviewed/updated 10/31/2020)  +Drooling, +tachycardia  No noted toxicity with use of Depakote   ALLERGIES:(reviewed/updated 10/31/2020)  Allergies   Allergen Reactions    Fluphenazine Unknown (comments)     Pt is unable to communicate properly.  Penicillins Unknown (comments)     Pt is unable to communicate properly. MEDICATIONS PRIOR TO ADMISSION:(reviewed/updated 10/31/2020)  Medications Prior to Admission   Medication Sig    FLUoxetine (PROzac) 20 mg capsule Take 20 mg by mouth daily.  benztropine (COGENTIN) 0.5 mg tablet Take 0.5 mg by mouth two (2) times a day. Indications: extrapyramidal symptoms as a result of taking the medication    LORazepam (Ativan) 1 mg tablet Take 1 mg by mouth two (2) times a day.  valproate (Depakene) 250 mg/5 mL syrup Take 1,000 mg by mouth two (2) times a day.  paliperidone palmitate (Invega Sustenna) 234 mg/1.5 mL injection 234 mg by IntraMUSCular route every twenty-one (21) days. Indications: schizophrenia    OLANZapine (ZyPREXA zydis) 20 mg disintegrating tablet Take 20 mg by mouth Daily (before dinner). Indications: schizophrenia      PAST MEDICAL HISTORY: Past medical history from the initial psychiatric evaluation has been reviewed (reviewed/updated 10/31/2020) with no additional updates (I asked patient and no additional past medical history provided). Past Medical History:   Diagnosis Date    Abscess 10/15/2020    Psychiatric disorder     Psychotic disorder ([facility] [address]     Withdrawal syndrome ([facility] [address]    History reviewed. No pertinent surgical history.    SOCIAL HISTORY: Social history from the initial psychiatric evaluation has been reviewed (reviewed/updated 10/31/2020) with no additional updates (I asked patient and no additional social history provided). Social History     Socioeconomic History    Marital status: SINGLE     Spouse name: Not on file    Number of children: Not on file    Years of education: Not on file    Highest education level: Not on file   Occupational History    Not on file   Social Needs    Financial resource strain: Not on file    Food insecurity     Worry: Not on file     Inability: Not on file    Transportation needs     Medical: Not on file     Non-medical: Not on file   Tobacco Use    Smoking status: Never Smoker    Smokeless tobacco: Never Used   Substance and Sexual Activity    Alcohol use: No    Drug use: No    Sexual activity: Not on file   Lifestyle    Physical activity     Days per week: Not on file     Minutes per session: Not on file    Stress: Not on file   Relationships    Social connections     Talks on phone: Not on file     Gets together: Not on file     Attends Yarsanism service: Not on file     Active member of club or organization: Not on file     Attends meetings of clubs or organizations: Not on file     Relationship status: Not on file    Intimate partner violence     Fear of current or ex partner: Not on file     Emotionally abused: Not on file     Physically abused: Not on file     Forced sexual activity: Not on file   Other Topics Concern    Not on file   Social History Narrative    Pt is a HS grad and is unemployed. He lives with his sister. On disability    No pending legal charge reported      FAMILY HISTORY: Family history from the initial psychiatric evaluation has been reviewed (reviewed/updated 10/31/2020) with no additional updates (I asked patient and no additional family history provided). History reviewed. No pertinent family history.     REVIEW OF SYSTEMS: (reviewed/updated 10/31/2020)  Appetite:poor   Sleep: poor with DIMS (difficulty initiating & maintaining sleep)   All other Review of Systems: Negative except per HPI         [address] & VITALS     MENTAL STATUS EXAM (MSE):    MSE FINDINGS ARE WITHIN NORMAL LIMITS (WNL) UNLESS OTHERWISE STATED BELOW. ( ALL OF THE BELOW CATEGORIES OF THE MSE HAVE BEEN REVIEWED (reviewed 10/31/2020) AND UPDATED AS DEEMED APPROPRIATE )  General Presentation age appropriate, cooperative   Orientation disorganized   Vital Signs  See below (reviewed 10/31/2020); Vital Signs (BP, Pulse, & Temp) are within normal limits if not listed below.    Gait and Station Stable/steady, no ataxia   Musculoskeletal System No extrapyramidal symptoms (EPS); no abnormal muscular movements or Tardive Dyskinesia (TD); muscle strength and tone are within normal limits   Language No aphasia or dysarthria   Speech:  hypoverbal, increased latency of response, non-pressured and soft   Thought Processes illogical; slow rate of thoughts; poor abstract reasoning/computation   Thought Associations blocked    Thought Content poverty of content   Suicidal Ideations none   Homicidal Ideations none   Mood:  euthymic   Affect:  flat   Memory recent  impaired   Memory remote:  impaired   Concentration/Attention:  impaired   Fund of Knowledge significantly below average   Insight:  poor   Reliability fair   Judgment:  fair          VITALS:     Patient Vitals for the past 24 hrs:   Temp Pulse Resp BP SpO2   10/31/20 0905 98 °F (36.7 °C) 81 19 111/72 97 %   10/30/20 1930 98.8 °F (37.1 °C) (!) 101 18 110/68 99 %   10/30/20 1356  (!) 108      10/30/20 1209  (!) 116        Wt Readings from Last 3 Encounters:   09/18/20 85.7 kg (189 lb)   10/15/20 85.7 kg (189 lb)   12/23/17 84.9 kg (187 lb 1.6 oz)     Temp Readings from Last 3 Encounters:   10/31/20 98 °F (36.7 °C)   03/28/17 97.5 °F (36.4 °C)   02/07/15 98.7 °F (37.1 °C)     BP Readings from Last 3 Encounters:   10/31/20 111/72   03/28/17 100/68   02/12/15 105/73     Pulse Readings from Last 3 Encounters:   10/31/20 81   03/28/17 67   02/12/15 87            DATA     LABORATORY DATA:(reviewed/updated 10/31/2020)  No results found for this or any previous visit (from the past 24 hour(s)). Lab Results   Component Value Date/Time    Valproic acid 97 09/18/2020 04:42 PM     No results found for: LITHM   RADIOLOGY REPORTS:(reviewed/updated 10/31/2020)  No results found. MEDICATIONS     ALL MEDICATIONS:   Current Facility-Administered Medications   Medication Dose Route Frequency    metoprolol succinate (TOPROL-XL) XL tablet 25 mg  25 mg Oral DAILY    glycopyrrolate (ROBINUL) tablet 1 mg  1 mg Oral DAILY    cloZAPine (CLOZARIL) tablet 300 mg  300 mg Oral QHS    OLANZapine (ZyPREXA) tablet 5 mg  5 mg Oral Q6H PRN    haloperidol lactate (HALDOL) injection 5 mg  5 mg IntraMUSCular Q6H PRN    benztropine (COGENTIN) tablet 1 mg  1 mg Oral BID PRN    diphenhydrAMINE (BENADRYL) injection 50 mg  50 mg IntraMUSCular BID PRN    hydrOXYzine HCL (ATARAX) tablet 50 mg  50 mg Oral TID PRN    traZODone (DESYREL) tablet 50 mg  50 mg Oral QHS PRN    acetaminophen (TYLENOL) tablet 650 mg  650 mg Oral Q4H PRN    magnesium hydroxide (MILK OF MAGNESIA) 400 mg/5 mL oral suspension 30 mL  30 mL Oral DAILY PRN      SCHEDULED MEDICATIONS:   Current Facility-Administered Medications   Medication Dose Route Frequency    metoprolol succinate (TOPROL-XL) XL tablet 25 mg  25 mg Oral DAILY    glycopyrrolate (ROBINUL) tablet 1 mg  1 mg Oral DAILY    cloZAPine (CLOZARIL) tablet 300 mg  300 mg Oral QHS          ASSESSMENT & PLAN     DIAGNOSES REQUIRING ACTIVE TREATMENT AND MONITORING: (reviewed/updated 10/31/2020)  Patient Active Hospital Problem List:   Schizoaffective disorder ([facility] [address] (9/18/2020)    Assessment: patient with significant internal preoccupation, poor ADLs, history of catatonia. Appears to have been compliant with home medication, but is regressed and with a markedly disorganized thought process.  Will start Clozaril, add benzodiazepine for protection against catatonia to be tapered as Clozaril dose increases given interaction. 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 In summary, Carol Cardona, is a 45 y.o.  male who presents with a severe exacerbation of the principal diagnosis of Schizoaffective disorder ([facility] [address]    Patient's is approaching his psychiatric baseline    Patient requires continued inpatient hospitalization for further stabilization, safety monitoring and medication management. I will continue to coordinate the provision of individual, milieu, occupational, group, and substance abuse therapies to address target symptoms/diagnoses as deemed appropriate for the individual patient. A coordinated, multidisplinary treatment team round was conducted with the patient (this team consists of the nurse, psychiatric unit pharmacist,  and writer). Complete current electronic health record for patient has been reviewed today including consultant notes, ancillary staff notes, nurses and psychiatric tech notes. Suicide risk assessment completed and patient deemed to be of low risk for suicide at this time. The following regarding medications was addressed during rounds with patient:   the risks and benefits of the proposed medication. The patient was given the opportunity to ask questions. Informed consent given to the use of the above medications.  Will continue to adjust psychiatric and non-psychiatric medications (see above \"medication\" section and orders section for details) as deemed appropriate & based upon diagnoses and response to treatment. I will continue to order blood tests/labs and diagnostic tests as deemed appropriate and review results as they become available (see orders for details and above listed lab/test results). I will order psychiatric records from previous [facility] hospitals to further elucidate the nature of patient's psychopathology and review once available. I will gather additional collateral information from friends, family and o/p treatment team to further elucidate the nature of patient's psychopathology and baselline level of psychiatric functioning. I certify that this patient's inpatient psychiatric hospital services furnished since the previous certification were, and continue to be, required for treatment that could reasonably be expected to improve the patient's condition, or for diagnostic study, and that the patient continues to need, on a daily basis, active treatment furnished directly by or requiring the supervision of inpatient psychiatric facility personnel. In addition, the hospital records show that services furnished were intensive treatment services, admission or related services, or equivalent services.     EXPECTED DISCHARGE DATE/DAY: 11/02/20     DISPOSITION: [facility] vs home       Signed By:   Rosendo Elias NP  10/31/2020

## 2020-10-31 NOTE — BH NOTES
Assumed care of patient and given report by off going shift. Pt was in his room most of shift and remains naked, even attempting to come out for evening with his sheet wrapped around him and was redirected back by staff and assisted to put on gown on. Later he came out with his whole gown open in the back naked and had to be redirected back in is room again. Pt spoke briefly and then would lay curled up on is bed rocking and talking low like he was speaking to someone. Pt did finally allow me to get his VS; noted to be improved with medication 110/68 p101. Pt had the addition of a beta blocker today which has been effective. He continues to be unkempt though he had a shower yesterday evening; has strong body odor again. Pt has been meal and med compliant and needs to eat his meals/snacks out in dayroom. Pt denied everything but still appears depression/anxious and has been seen rocking on his bed over and over tonight and refused any prn but did take his scheduled meds. Pt did not attend groups today. He refused to have COVID test tomorrow though offered d/t another peer that was COVID positive. Pt continues on 15 min safety checks. Will continue to monitor and treat.

## 2020-11-01 PROCEDURE — 74011250637 HC RX REV CODE- 250/637: Performed by: PSYCHIATRY & NEUROLOGY

## 2020-11-01 PROCEDURE — 65220000003 HC RM SEMIPRIVATE PSYCH

## 2020-11-01 RX ADMIN — CLOZAPINE 300 MG: 100 TABLET ORAL at 21:41

## 2020-11-01 RX ADMIN — GLYCOPYRROLATE 1 MG: 1 TABLET ORAL at 09:53

## 2020-11-01 RX ADMIN — METOPROLOL SUCCINATE 25 MG: 25 TABLET, EXTENDED RELEASE ORAL at 09:53

## 2020-11-01 NOTE — PROGRESS NOTES
Problem: Falls - Risk of  Goal: *Absence of Falls  Description: Document Sparkle Baker Fall Risk and appropriate interventions in the flowsheet. Outcome: Progressing Towards Goal  Note: Fall Risk Interventions:       Mentation Interventions: Reorient patient    Medication Interventions: Teach patient to arise slowly    Elimination Interventions:  Toileting schedule/hourly rounds              Problem: Altered Thought Process (Adult/Pediatric)  Goal: *STG: Remains safe in hospital  Outcome: Progressing Towards Goal  Goal: *STG: Seeks staff when feelings of anxiety and fear arise  Outcome: Not Progressing Towards Goal  Goal: *STG: Complies with medication therapy  Outcome: Progressing Towards Goal  Goal: *STG: Absence of lethality  Outcome: Progressing Towards Goal  Goal: *STG: Demonstrates ability to understand and use improved judgment in daily activities and relationships  Outcome: Not Progressing Towards Goal     Problem: Discharge Planning  Goal: *Knowledge of medication management  Outcome: Not Progressing Towards Goal

## 2020-11-01 NOTE — INTERDISCIPLINARY ROUNDS
Behavioral Health Interdisciplinary Rounds  
  
Patient Name: Edwin Anderson                      Age: 45 y. o.              Room/Bed:  [facility]/ Primary Diagnosis: Schizoaffective disorder (HCC)        
Admission Status: Forced Medication Order - CI                          
Readmission within 30 days: no Power of  in place: unk Patient requires a blocked bed: yes           
Reason for blocked bed: Behavior Order for blocked bed obtained: no    
  
Sleep hours: 7 Morning Labs completed per orders:  none ordered                                 
Participation in Care/Groups:  no 
Medication Compliant?: Yes PRNS (last 24 hours): None               
Restraints (last 24 hours):  no Substance Abuse:  no              
24 hour chart check complete

## 2020-11-01 NOTE — PROGRESS NOTES
Problem: Altered Thought Process (Adult/Pediatric)  Goal: *STG: Remains safe in hospital  Outcome: Progressing Towards Goal  Goal: *STG: Complies with medication therapy  Outcome: Progressing Towards Goal  Goal: Interventions  Outcome: Progressing Towards Goal     0735 This writer received report from the outgoing nurse. 0103 Patient in room resting quietly. Patient is alert and oriented x 4. Patient presents with a distracted attitude, constricted affect and anxious mood. Patient's thought content is preoccupied. Patient denied SI, HI, and AVH. This writer noted the patient to be experiencing AH as the patient stares off and nodes his head in response to internal stimuli. Patient's thought process is thought blocking as the patient is slow to respond to questions asked. 1225 Patient ate lunch in the dayroom.  Patient keeping to el

## 2020-11-01 NOTE — BH NOTES
PSYCHIATRIC PROGRESS NOTE         Patient Name  Glynn Torrez   Date of Birth 1970   [facility] [id number]   Medical Record Number  [id number]      Age  45 y.o. PCP Unknown, Provider   Admit date:  9/18/2020    Room Number  321/01  @ [facility]   Date of Service  11/1/2020         E & M PROGRESS NOTE:         HISTORY       CC:  \"psychosis\"  HISTORY OF PRESENT ILLNESS/INTERVAL HISTORY:  (reviewed/updated 11/1/2020). per initial evaluation: Lydia Hayokomelany \"Corey\" Wanda Diaz is a 45year old male with a history of schizophrenia admitted to the [facility] for increased psychosis. He has not been bathing or otherwise caring for himself at home. He was reportedly hospitalized at [facility] from April - June 2020. He does not verbally answer any questions. He is well known to this writer from previous admissions in the [facility]. He has responded well to ECT in the past.    Hospital course 9/21 - 10/27: patient admitted for gross disorganization, appeared catatonic at times and with markedly poor self care, poor ADLs and incontinence. Patient started on Clozaril via court order as he refused much of the medication when offered. Once on court ordered treatment, the patient was agreeable with PO medication, Clozaril titrated to a high dose of 500 mg daily; his self care improved throughout the admission but he still required prompting for most ADLs. Due to persistent tachycardia and sialorrhea, patient's Clozaril was assessed, and the level noted to be supra-therapeutic at 500 mg -- the medication was subsequently tapered to 300 mg daily. No appreciable change in his mental status was observed at the higher dose relative to the tapered dose and patient remained calm and cooperative with treatment. Medications were held and discontinued due to poor efficacy including mood stabilizer, benzodiazepine and antidepressant. Patient generally internally preoccupied, but will engage with the milieu if prompted.  The patient was noted to have a shoulder abscess draining purulent fluid, which improved with antibiotics and did not require surgical intervention. 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 The patient voices a desire to be discharged, but is minimally conversant otherwise. He attends groups and eats meals out of his room but spends much of the day in bed, unchanged since admision, though he will pace the unit at times. RONNIE and MD coordinating disposition options as patient appears to be approaching his psychiatric baseline. 10/28 - no acute overnight events. Patient isolative to him room, out for meals, internally preoccupied with a flat affect. He is generally non-spontaneous in his responses but pleasant. Patient still requires frequent assessment and redirection as his avolition persists. Patient noted to have worsening sialorrhea this morning, voices his concerns and denies any specific concerns. Patient discharge focused, will be able to return home pending preparation by his sister Natalie Segovia. 10/29 - patient isolative, refused vitals this morning but was medication compliant. He remains internally preoccupied but will respond appropriately to direct questions. He denies SI/HI, states he is thinking about 'Cristhian' when asked about his thought content. Patient discharge focused, team planning on discharge Monday following Cloz level and CBC. 10/30 - no acute overnight events. Patient seen this AM he is naked in his room lying in bed not speaking. Patient states he wants to go home and does not like taking medication, no other meaningful interaction is possible. Per nurse, patient still tachy to 110s, discussed BB - patient BP relatively normal.    10/31-no acute overnight events. Patient seen this AM, lying in his bed, stating he just wants to sleep.   Patient states he wants to go home and does not like taking medication, no other meaningful interaction is possible. He reports that he is going to be discharged on Monday and he is excited that he is going to be moving in with his sister. 11/1- NO acute overnight events. Patients seen this morning, lying in his bed with his buttock showing out of his gown and no clothes on , which is typical behavior for him. He attempted to pretend that he could not hear me talking to him but eventually sat up on the side of the bed. He states that he is ready to be discharged. When I asked him what he is going to do when he goes home he states that he is going to pray. SIDE EFFECTS: (reviewed/updated 11/1/2020)  +Drooling, +tachycardia  No noted toxicity with use of Depakote   ALLERGIES:(reviewed/updated 11/1/2020)  Allergies   Allergen Reactions    Fluphenazine Unknown (comments)     Pt is unable to communicate properly.  Penicillins Unknown (comments)     Pt is unable to communicate properly. MEDICATIONS PRIOR TO ADMISSION:(reviewed/updated 11/1/2020)  Medications Prior to Admission   Medication Sig    FLUoxetine (PROzac) 20 mg capsule Take 20 mg by mouth daily.  benztropine (COGENTIN) 0.5 mg tablet Take 0.5 mg by mouth two (2) times a day. Indications: extrapyramidal symptoms as a result of taking the medication    LORazepam (Ativan) 1 mg tablet Take 1 mg by mouth two (2) times a day.  valproate (Depakene) 250 mg/5 mL syrup Take 1,000 mg by mouth two (2) times a day.  paliperidone palmitate (Invega Sustenna) 234 mg/1.5 mL injection 234 mg by IntraMUSCular route every twenty-one (21) days. Indications: schizophrenia    OLANZapine (ZyPREXA zydis) 20 mg disintegrating tablet Take 20 mg by mouth Daily (before dinner).  Indications: schizophrenia      PAST MEDICAL HISTORY: Past medical history from the initial psychiatric evaluation has been reviewed (reviewed/updated 11/1/2020) with no additional updates (I asked patient and no additional past medical history provided). Past Medical History:   Diagnosis Date    Abscess 10/15/2020    Psychiatric disorder     Psychotic disorder ([facility]     Withdrawal syndrome ([facility]    History reviewed. No pertinent surgical history. SOCIAL HISTORY: Social history from the initial psychiatric evaluation has been reviewed (reviewed/updated 11/1/2020) with no additional updates (I asked patient and no additional social history provided). Social History     Socioeconomic History    Marital status: SINGLE     Spouse name: Not on file    Number of children: Not on file    Years of education: Not on file    Highest education level: Not on file   Occupational History    Not on file   Social Needs    Financial resource strain: Not on file    Food insecurity     Worry: Not on file     Inability: Not on file    Transportation needs     Medical: Not on file     Non-medical: Not on file   Tobacco Use    Smoking status: Never Smoker    Smokeless tobacco: Never Used   Substance and Sexual Activity    Alcohol use: No    Drug use: No    Sexual activity: Not on file   Lifestyle    Physical activity     Days per week: Not on file     Minutes per session: Not on file    Stress: Not on file   Relationships    Social connections     Talks on phone: Not on file     Gets together: Not on file     Attends Faith service: Not on file     Active member of club or organization: Not on file     Attends meetings of clubs or organizations: Not on file     Relationship status: Not on file    Intimate partner violence     Fear of current or ex partner: Not on file     Emotionally abused: Not on file     Physically abused: Not on file     Forced sexual activity: Not on file   Other Topics Concern    Not on file   Social History Narrative    Pt is a HS grad and is unemployed. He lives with his sister.  On disability    No pending legal charge reported      FAMILY HISTORY: Family history from the initial psychiatric evaluation has been reviewed (reviewed/updated 11/1/2020) with no additional updates (I asked patient and no additional family history provided). History reviewed. No pertinent family history. REVIEW OF SYSTEMS: (reviewed/updated 11/1/2020)  Appetite:poor   Sleep: poor with DIMS (difficulty initiating & maintaining sleep)   All other Review of Systems: Negative except per HPI         [facility] (MSE):    MSE FINDINGS ARE WITHIN NORMAL LIMITS (WNL) UNLESS OTHERWISE STATED BELOW. ( ALL OF THE BELOW CATEGORIES OF THE MSE HAVE BEEN REVIEWED (reviewed 11/1/2020) AND UPDATED AS DEEMED APPROPRIATE )  General Presentation age appropriate, cooperative   Orientation disorganized   Vital Signs  See below (reviewed 11/1/2020); Vital Signs (BP, Pulse, & Temp) are within normal limits if not listed below.    Gait and Station Stable/steady, no ataxia   Musculoskeletal System No extrapyramidal symptoms (EPS); no abnormal muscular movements or Tardive Dyskinesia (TD); muscle strength and tone are within normal limits   Language No aphasia or dysarthria   Speech:  hypoverbal, increased latency of response, non-pressured and soft   Thought Processes illogical; slow rate of thoughts; poor abstract reasoning/computation   Thought Associations blocked    Thought Content poverty of content   Suicidal Ideations none   Homicidal Ideations none   Mood:  euthymic   Affect:  flat   Memory recent  impaired   Memory remote:  impaired   Concentration/Attention:  impaired   Fund of Knowledge significantly below average   Insight:  poor   Reliability fair   Judgment:  fair          VITALS:     Patient Vitals for the past 24 hrs:   Temp Pulse Resp BP SpO2   11/01/20 0800 97.7 °F (36.5 °C) 78 18 (!) 106/99 99 %   10/31/20 1930 98.1 °F (36.7 °C) 88 18 94/64 96 %     Wt Readings from Last 3 Encounters:   09/18/20 85.7 kg (189 lb)   10/15/20 85.7 kg (189 lb)   12/23/17 84.9 kg (187 lb 1.6 oz) Temp Readings from Last 3 Encounters:   11/01/20 97.7 °F (36.5 °C)   03/28/17 97.5 °F (36.4 °C)   02/07/15 98.7 °F (37.1 °C)     BP Readings from Last 3 Encounters:   11/01/20 (!) 106/99   03/28/17 100/68   02/12/15 105/73     Pulse Readings from Last 3 Encounters:   11/01/20 78   03/28/17 67   02/12/15 87            DATA     LABORATORY DATA:(reviewed/updated 11/1/2020)  No results found for this or any previous visit (from the past 24 hour(s)). Lab Results   Component Value Date/Time    Valproic acid 97 09/18/2020 04:42 PM     No results found for: KIKI   RADIOLOGY REPORTS:(reviewed/updated 11/1/2020)  No results found.        MEDICATIONS     ALL MEDICATIONS:   Current Facility-Administered Medications   Medication Dose Route Frequency    metoprolol succinate (TOPROL-XL) XL tablet 25 mg  25 mg Oral DAILY    glycopyrrolate (ROBINUL) tablet 1 mg  1 mg Oral DAILY    cloZAPine (CLOZARIL) tablet 300 mg  300 mg Oral QHS    OLANZapine (ZyPREXA) tablet 5 mg  5 mg Oral Q6H PRN    haloperidol lactate (HALDOL) injection 5 mg  5 mg IntraMUSCular Q6H PRN    benztropine (COGENTIN) tablet 1 mg  1 mg Oral BID PRN    diphenhydrAMINE (BENADRYL) injection 50 mg  50 mg IntraMUSCular BID PRN    hydrOXYzine HCL (ATARAX) tablet 50 mg  50 mg Oral TID PRN    traZODone (DESYREL) tablet 50 mg  50 mg Oral QHS PRN    acetaminophen (TYLENOL) tablet 650 mg  650 mg Oral Q4H PRN    magnesium hydroxide (MILK OF MAGNESIA) 400 mg/5 mL oral suspension 30 mL  30 mL Oral DAILY PRN      SCHEDULED MEDICATIONS:   Current Facility-Administered Medications   Medication Dose Route Frequency    metoprolol succinate (TOPROL-XL) XL tablet 25 mg  25 mg Oral DAILY    glycopyrrolate (ROBINUL) tablet 1 mg  1 mg Oral DAILY    cloZAPine (CLOZARIL) tablet 300 mg  300 mg Oral QHS          ASSESSMENT & PLAN     DIAGNOSES REQUIRING ACTIVE TREATMENT AND MONITORING: (reviewed/updated 11/1/2020)  Patient Active Hospital Problem List:   Schizoaffective disorder ([facility] (9/18/2020)    Assessment: patient with significant internal preoccupation, poor ADLs, history of catatonia. Appears to have been compliant with home medication, but is regressed and with a markedly disorganized thought process. Will start Clozaril, add benzodiazepine for protection against catatonia to be tapered as Clozaril dose increases given interaction. 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 In summary, Rachel Raymond, is a 45 y.o.  male who presents with a severe exacerbation of the principal diagnosis of Schizoaffective disorder ([facility]    Patient's is approaching his psychiatric baseline    Patient requires continued inpatient hospitalization for further stabilization, safety monitoring and medication management. I will continue to coordinate the provision of individual, milieu, occupational, group, and substance abuse therapies to address target symptoms/diagnoses as deemed appropriate for the individual patient. A coordinated, multidisplinary treatment team round was conducted with the patient (this team consists of the nurse, psychiatric unit pharmacist,  and writer). Complete current electronic health record for patient has been reviewed today including consultant notes, ancillary staff notes, nurses and psychiatric tech notes. Suicide risk assessment completed and patient deemed to be of low risk for suicide at this time.      The following regarding medications was addressed during rounds with patient:   the risks and benefits of the proposed medication. The patient was given the opportunity to ask questions. Informed consent given to the use of the above medications. Will continue to adjust psychiatric and non-psychiatric medications (see above \"medication\" section and orders section for details) as deemed appropriate & based upon diagnoses and response to treatment. I will continue to order blood tests/labs and diagnostic tests as deemed appropriate and review results as they become available (see orders for details and above listed lab/test results). I will order psychiatric records from previous [facility] hospitals to further elucidate the nature of patient's psychopathology and review once available. I will gather additional collateral information from friends, family and o/p treatment team to further elucidate the nature of patient's psychopathology and baselline level of psychiatric functioning. I certify that this patient's inpatient psychiatric hospital services furnished since the previous certification were, and continue to be, required for treatment that could reasonably be expected to improve the patient's condition, or for diagnostic study, and that the patient continues to need, on a daily basis, active treatment furnished directly by or requiring the supervision of inpatient psychiatric facility personnel. In addition, the hospital records show that services furnished were intensive treatment services, admission or related services, or equivalent services.     EXPECTED DISCHARGE DATE/DAY: 11/02/20     DISPOSITION: penitentiary vs home       Signed By:   Asia Good NP  11/1/2020

## 2020-11-01 NOTE — PROGRESS NOTES
Problem: Altered Thought Process (Adult/Pediatric)  Goal: *STG: Absence of lethality  Outcome: Progressing Towards Goal     Problem: Altered Thought Process (Adult/Pediatric)  Goal: *STG: Attends activities and groups  Outcome: Not Progressing Towards Goal  Goal: *STG: Demonstrates ability to understand and use improved judgment in daily activities and relationships  Outcome: Not Progressing Towards Goal  Goal: *LTG: Returns to baseline functioning  Outcome: Not Progressing Towards Goal

## 2020-11-01 NOTE — BH NOTES
Assumed care of patient and given report by off going shift. Pt was in his room and was isolative through the night other than to come out a few times asking for drink/snack. Pt advised that he can no longer eat food in his room as there were more half eaten sandwiches on the floor, along with food wrapper and ope juice containers spilled on the floor and staff noted a bug in his room. Pt had bm in toilet that had not been flushed. He also continues to refuse to wear gripper socks though staff continues to encourage him. He also continues to take off all his clothes and is wanting to walk out naked or in a sheet with half his body exposed or if he does put a gown on he is walking out with his back side exposed and needs constant reminding that it isn't appropriate. Pt remains internally preoccupied and was noted to be rocking on his bed again, moving his legs back and forth and appears to be speaking under his breath and will put his hands over his ears. He appears to have some thought blocking and needs multiple prompts to take his meds or do VS and will lay there and pretend to close his eyes like he is sleeping but will eventually get up. Pt remains malordorus and was showered two days ago and hasn't since. Pt VS are considerably improved with addition of BB the other day. He did comply tonight with VS.  Pt did have evening snack and has been meal and med compliant with no prn's. Pt does little else on the unit other than isolate. He does not socialize with peers and barely communicates with staff other than if he wants food. He has not attend any groups and continues to isolate other than coming out to pace the halls briefly some days. Pt denies all psych symptoms other than he still appears to be responding. Pt continues on 15 min safety checks. Will continue to monitor and treat.

## 2020-11-01 NOTE — BH NOTES
Patient had no violent, aggressive, or asaultive behavior requiring PRN medication treatment. Patient denied any SI/HI/AVH during dayshift.

## 2020-11-02 LAB
BASOPHILS # BLD: 0.1 K/UL (ref 0–0.1)
BASOPHILS NFR BLD: 1 % (ref 0–1)
DIFFERENTIAL METHOD BLD: NORMAL
EOSINOPHIL # BLD: 0.1 K/UL (ref 0–0.4)
EOSINOPHIL NFR BLD: 2 % (ref 0–7)
ERYTHROCYTE [DISTWIDTH] IN BLOOD BY AUTOMATED COUNT: 13.3 % (ref 11.5–14.5)
HCT VFR BLD AUTO: 40.3 % (ref 36.6–50.3)
HGB BLD-MCNC: 13.2 G/DL (ref 12.1–17)
IMM GRANULOCYTES # BLD AUTO: 0 K/UL (ref 0–0.04)
IMM GRANULOCYTES NFR BLD AUTO: 0 % (ref 0–0.5)
LYMPHOCYTES # BLD: 2.5 K/UL (ref 0.8–3.5)
LYMPHOCYTES NFR BLD: 36 % (ref 12–49)
MCH RBC QN AUTO: 29.5 PG (ref 26–34)
MCHC RBC AUTO-ENTMCNC: 32.8 G/DL (ref 30–36.5)
MCV RBC AUTO: 90 FL (ref 80–99)
MONOCYTES # BLD: 0.5 K/UL (ref 0–1)
MONOCYTES NFR BLD: 8 % (ref 5–13)
NEUTS SEG # BLD: 3.8 K/UL (ref 1.8–8)
NEUTS SEG NFR BLD: 53 % (ref 32–75)
NRBC # BLD: 0 K/UL (ref 0–0.01)
NRBC BLD-RTO: 0 PER 100 WBC
PLATELET # BLD AUTO: 196 K/UL (ref 150–400)
PMV BLD AUTO: 11.2 FL (ref 8.9–12.9)
RBC # BLD AUTO: 4.48 M/UL (ref 4.1–5.7)
WBC # BLD AUTO: 7.1 K/UL (ref 4.1–11.1)

## 2020-11-02 PROCEDURE — 80159 DRUG ASSAY CLOZAPINE: CPT

## 2020-11-02 PROCEDURE — 74011250637 HC RX REV CODE- 250/637: Performed by: PSYCHIATRY & NEUROLOGY

## 2020-11-02 PROCEDURE — 85025 COMPLETE CBC W/AUTO DIFF WBC: CPT

## 2020-11-02 PROCEDURE — 36415 COLL VENOUS BLD VENIPUNCTURE: CPT

## 2020-11-02 PROCEDURE — 65220000003 HC RM SEMIPRIVATE PSYCH

## 2020-11-02 PROCEDURE — 99231 SBSQ HOSP IP/OBS SF/LOW 25: CPT | Performed by: PSYCHIATRY & NEUROLOGY

## 2020-11-02 RX ORDER — CLOZAPINE 100 MG/1
100 TABLET ORAL
Qty: 7 TAB | Refills: 0 | Status: SHIPPED | OUTPATIENT
Start: 2020-11-02

## 2020-11-02 RX ORDER — METOPROLOL SUCCINATE 25 MG/1
25 TABLET, EXTENDED RELEASE ORAL DAILY
Qty: 30 TAB | Refills: 1 | Status: SHIPPED | OUTPATIENT
Start: 2020-11-03

## 2020-11-02 RX ORDER — GLYCOPYRROLATE 1 MG/1
1 TABLET ORAL DAILY
Qty: 30 TAB | Refills: 1 | Status: SHIPPED | OUTPATIENT
Start: 2020-11-03

## 2020-11-02 RX ORDER — CLOZAPINE 200 MG/1
200 TABLET ORAL DAILY
Qty: 7 TAB | Refills: 0 | Status: SHIPPED | OUTPATIENT
Start: 2020-11-02

## 2020-11-02 RX ADMIN — METOPROLOL SUCCINATE 25 MG: 25 TABLET, EXTENDED RELEASE ORAL at 09:24

## 2020-11-02 RX ADMIN — GLYCOPYRROLATE 1 MG: 1 TABLET ORAL at 09:24

## 2020-11-02 RX ADMIN — CLOZAPINE 300 MG: 100 TABLET ORAL at 21:44

## 2020-11-02 NOTE — PROGRESS NOTES
Clozapine Daily Monitoring  Current regimen:  clozapine 300 mg/day  Last CBC done AND reported to the registry:  11/2/20  Next CBC due:  11/9/20    DATE ANC (K/uL)   9/18/20 2.2   10/1/20 2.6   10/8/20 3.3   10/13/20 5.4   10/20/20 3.4   10/27/20 3.9   11/2/20 3.8     ANC must be >1 to dispense clozapine. If patient experiences ANC <1.5, refer to the Clozapine REMS ANC monitoring algorithm for frequency of ANC monitoring. Visit Vitals  BP 99/66 (BP 1 Location: Left arm, BP Patient Position: Lying right side)   Pulse 99   Temp 98.9 °F (37.2 °C)   Resp 16   Ht 170.2 cm (67\")   Wt 85.7 kg (189 lb)   SpO2 97%   BMI 29.60 kg/m²       Recommendations/comments: ANC drawn on 11/2/20 is within therapeutic limits, 3.8 K/uL, and is appropriate to continue with clozapine dispensing. ANC was reported to the Clozapine REMS Program, per policy. Next ANC is due in 1 week on 11/9/20.      Thank you,  Karen Corral, PHARMD, [facility], [address]: [phone])  Pharmacy: [phone] ([phone])

## 2020-11-02 NOTE — GROUP NOTE
JENELLE  GROUP DOCUMENTATION INDIVIDUAL Group Therapy Note Date: 11/2/2020 Group Start Time: 1400 Group End Time: 1500 Group Topic: Recreational/Music Therapy [facility] [address] ACUTE BEHAV [facility] [place], [address] GROUP DOCUMENTATION GROUP Group Therapy Note Attendees: 8 Attendance: Did not attend Patient's Goal: Interventions/techniques: 
Max Pappas

## 2020-11-02 NOTE — BH NOTES
Behavioral Health Treatment Team Note     Patient goal(s) for today: Follow unit directions   Treatment team focus/goals: Stabilize and dispo plan  Progress note: Pt met with treatment team, remains calm and cooperative. Pt and POA/Sister in agreement with discharge plan.      LOS:  45           Expected LOS: 11/3     Financial concerns/prescription coverage: [employer] Medicare Part A&B and [facility] and TDO  Family contact: Sister/POA Jonathon Fuchs [place] [phone]  11/2  Family requesting physician contact today: spoke 10/27  Discharge plan: Home   Access to weapons: None  Outpatient provider(s): [phone]  Patient's preferred phone number for follow up [phone]     Participating treatment team members: Edwin Estrella MSW and Dr. Grace Rodriguez.

## 2020-11-02 NOTE — BH NOTES
GROUP THERAPY PROGRESS NOTE    Patient did not participate in Discharge Group.      Judith Clark [facility]

## 2020-11-02 NOTE — PROGRESS NOTES
Problem: Falls - Risk of  Goal: *Absence of Falls  Description: Document Chi Going Fall Risk and appropriate interventions in the flowsheet.   Outcome: Progressing Towards Goal  Note: Fall Risk Interventions: gripper socks, Q 15 min checks   Mentation Interventions: Reorient patient  Medication Interventions: Teach patient to arise slowly  Problem: Altered Thought Process (Adult/Pediatric)  Goal: *STG: Remains safe in hospital  Outcome: Progressing Towards Goal  Goal: *STG: Complies with medication therapy  Outcome: Progressing Towards Goal

## 2020-11-02 NOTE — GROUP NOTE
JENELLE  GROUP DOCUMENTATION INDIVIDUAL Group Therapy Note Date: 11/2/2020 Group Start Time: 1000 Group End Time: 1100 Group Topic: Topic Group [address] ACUTE BEHAV [facility], [address] [facility] GROUP DOCUMENTATION GROUP Group Therapy Note Attendees: 10 Attendance: Did not attend Patient's Goal: Interventions/techniques Brian Martínez

## 2020-11-02 NOTE — PROGRESS NOTES
2000: Assumed care of patient after receiving shift report from outgoing nurse. 2200: Patient remains isolated in room, intermittently undressed. Poor hygiene. He has complied with eating and drinking juices outside of room. He requests to go home and was encouraged to follow medication regimen and allow lab draw so that he can be discharged soon. He appears oriented to person, place and situation but does not respond to date. A&O x 4. Affect is flat with odd facial movements at times. Hygiene is poor, needs encouragement in ADLs. Gait is steady. No evidence of intent to harm self or others. Will continue to monitor pt with q 15 min checks. 0600: Corey slept 5 hours with even respirations. Blood drawn without incident this morning.      Problem: Altered Thought Process (Adult/Pediatric)  Goal: *STG: Complies with medication therapy  Outcome: Progressing Towards Goal  Goal: *STG: Demonstrates ability to understand and use improved judgment in daily activities and relationships  Outcome: Progressing Towards Goal  Goal: *LTG: Returns to baseline functioning  Outcome: Progressing Towards Goal

## 2020-11-02 NOTE — INTERDISCIPLINARY ROUNDS
Behavioral Health Interdisciplinary Rounds Patient Name: Augusto Avilez  Age: 45 y.o. Room/Bed:  321/01 Primary Diagnosis: Schizoaffective disorder ([facility] Admission Status: Involuntary Commitment and Forced Medication Order Readmission within 30 days: no 
Power of  in place: unknown Patient requires a blocked bed: yes Reason for blocked bed: behavior Order for blocked bed obtained: yes Sleep hours: 5 Morning Labs completed per orders:  yes Participation in Care/Groups:  no 
Medication Compliant?: Yes PRNS (last 24 hours): None Restraints (last 24 hours):  no 
Substance Abuse:  no   
24 hour chart check complete: yes

## 2020-11-02 NOTE — BH NOTES
PSYCHIATRIC PROGRESS NOTE         Patient Name  Daya Min   Date of Birth 1970   [facility] [id number]   Medical Record Number  [id number]      Age  45 y.o. PCP Unknown, Provider   Admit date:  9/18/2020    Room Number  055/01  @ [facility]   Date of Service  11/2/2020         E & M PROGRESS NOTE:         HISTORY       CC:  \"psychosis\"  HISTORY OF PRESENT ILLNESS/INTERVAL HISTORY:  (reviewed/updated 11/2/2020). per initial evaluation: Fran Land \"Corey\" August Hedrick is a 45year old male with a history of schizophrenia admitted to the [facility] for increased psychosis. He has not been bathing or otherwise caring for himself at home. He was reportedly hospitalized at [facility] from April - June 2020. He does not verbally answer any questions. He is well known to this writer from previous admissions in the [facility]. He has responded well to ECT in the past.    Hospital course 9/21 - 10/27: patient admitted for gross disorganization, appeared catatonic at times and with markedly poor self care, poor ADLs and incontinence. Patient started on Clozaril via court order as he refused much of the medication when offered. Once on court ordered treatment, the patient was agreeable with PO medication, Clozaril titrated to a high dose of 500 mg daily; his self care improved throughout the admission but he still required prompting for most ADLs. Due to persistent tachycardia and sialorrhea, patient's Clozaril was assessed, and the level noted to be supra-therapeutic at 500 mg -- the medication was subsequently tapered to 300 mg daily. No appreciable change in his mental status was observed at the higher dose relative to the tapered dose and patient remained calm and cooperative with treatment. Medications were held and discontinued due to poor efficacy including mood stabilizer, benzodiazepine and antidepressant. Patient generally internally preoccupied, but will engage with the milieu if prompted.  The patient was noted to have a shoulder abscess draining purulent fluid, which improved with antibiotics and did not require surgical intervention. 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 The patient voices a desire to be discharged, but is minimally conversant otherwise. He attends groups and eats meals out of his room but spends much of the day in bed, unchanged since admision, though he will pace the unit at times. SW and MD coordinating disposition options as patient appears to be approaching his psychiatric baseline. 10/28 - no acute overnight events. Patient isolative to him room, out for meals, internally preoccupied with a flat affect. He is generally non-spontaneous in his responses but pleasant. Patient still requires frequent assessment and redirection as his avolition persists. Patient noted to have worsening sialorrhea this morning, voices his concerns and denies any specific concerns. Patient discharge focused, will be able to return home pending preparation by his sister Tarik Armstrong. 10/29 - patient isolative, refused vitals this morning but was medication compliant. He remains internally preoccupied but will respond appropriately to direct questions. He denies SI/HI, states he is thinking about 'Cristhian' when asked about his thought content. Patient discharge focused, team planning on discharge Monday following Cloz level and CBC. 10/30 - no acute overnight events. Patient seen this AM he is naked in his room lying in bed not speaking. Patient states he wants to go home and does not like taking medication, no other meaningful interaction is possible. Per nurse, patient still tachy to 110s, discussed BB - patient BP relatively normal.    10/31-no acute overnight events. Patient seen this AM, lying in his bed, stating he just wants to sleep.   Patient states he wants to go home and does not like taking medication, no other meaningful interaction is possible. He reports that he is going to be discharged on Monday and he is excited that he is going to be moving in with his sister. 11/1- NO acute overnight events. Patients seen this morning, lying in his bed with his buttock showing out of his gown and no clothes on , which is typical behavior for him. He attempted to pretend that he could not hear me talking to him but eventually sat up on the side of the bed. He states that he is ready to be discharged. When I asked him what he is going to do when he goes home he states that he is going to pray. 11/02 - patient in bed much of the morning, cooperative with assessments. Still selectively mute, but follows direction. Patient out of bed for meals. He discharge focused, HR elevated but under 100s. Patient slept 5 hours overnight, plan to discharge tomorrow to sister, medication sent to pharmacy. SIDE EFFECTS: (reviewed/updated 11/2/2020)  +Drooling, +tachycardia  No noted toxicity with use of Depakote   ALLERGIES:(reviewed/updated 11/2/2020)  Allergies   Allergen Reactions    Fluphenazine Unknown (comments)     Pt is unable to communicate properly.  Penicillins Unknown (comments)     Pt is unable to communicate properly. MEDICATIONS PRIOR TO ADMISSION:(reviewed/updated 11/2/2020)  Medications Prior to Admission   Medication Sig    FLUoxetine (PROzac) 20 mg capsule Take 20 mg by mouth daily.  benztropine (COGENTIN) 0.5 mg tablet Take 0.5 mg by mouth two (2) times a day. Indications: extrapyramidal symptoms as a result of taking the medication    LORazepam (Ativan) 1 mg tablet Take 1 mg by mouth two (2) times a day.  valproate (Depakene) 250 mg/5 mL syrup Take 1,000 mg by mouth two (2) times a day.  paliperidone palmitate (Invega Sustenna) 234 mg/1.5 mL injection 234 mg by IntraMUSCular route every twenty-one (21) days.  Indications: schizophrenia  OLANZapine (ZyPREXA zydis) 20 mg disintegrating tablet Take 20 mg by mouth Daily (before dinner). Indications: schizophrenia      PAST MEDICAL HISTORY: Past medical history from the initial psychiatric evaluation has been reviewed (reviewed/updated 11/2/2020) with no additional updates (I asked patient and no additional past medical history provided). Past Medical History:   Diagnosis Date    Abscess 10/15/2020    Psychiatric disorder     [facility] [facility] [address] [address]     Withdrawal syndrome ([facility] [address]    History reviewed. No pertinent surgical history. SOCIAL HISTORY: Social history from the initial psychiatric evaluation has been reviewed (reviewed/updated 11/2/2020) with no additional updates (I asked patient and no additional social history provided).    Social History     Socioeconomic History    Marital status: SINGLE     Spouse name: Not on file    Number of children: Not on file    Years of education: Not on file    Highest education level: Not on file   Occupational History    Not on file   Social Needs    Financial resource strain: Not on file    Food insecurity     Worry: Not on file     Inability: Not on file    Transportation needs     Medical: Not on file     Non-medical: Not on file   Tobacco Use    Smoking status: Never Smoker    Smokeless tobacco: Never Used   Substance and Sexual Activity    Alcohol use: No    Drug use: No    Sexual activity: Not on file   Lifestyle    Physical activity     Days per week: Not on file     Minutes per session: Not on file    Stress: Not on file   Relationships    Social connections     Talks on phone: Not on file     Gets together: Not on file     Attends Church service: Not on file     Active member of club or organization: Not on file     Attends meetings of clubs or organizations: Not on file     Relationship status: Not on file    Intimate partner violence     Fear of current or ex partner: Not on file     Emotionally abused: Not on file Physically abused: Not on file     Forced sexual activity: Not on file   Other Topics Concern    Not on file   Social History Narrative    Pt is a HS grad and is unemployed. He lives with his sister. On disability    No pending legal charge reported      FAMILY HISTORY: Family history from the initial psychiatric evaluation has been reviewed (reviewed/updated 11/2/2020) with no additional updates (I asked patient and no additional family history provided). History reviewed. No pertinent family history. REVIEW OF SYSTEMS: (reviewed/updated 11/2/2020)  Appetite:poor   Sleep: poor with DIMS (difficulty initiating & maintaining sleep)   All other Review of Systems: Negative except per HPI         [facility] (MSE):    MSE FINDINGS ARE WITHIN NORMAL LIMITS (WNL) UNLESS OTHERWISE STATED BELOW. ( ALL OF THE BELOW CATEGORIES OF THE MSE HAVE BEEN REVIEWED (reviewed 11/2/2020) AND UPDATED AS DEEMED APPROPRIATE )  General Presentation age appropriate, cooperative   Orientation disorganized   Vital Signs  See below (reviewed 11/2/2020); Vital Signs (BP, Pulse, & Temp) are within normal limits if not listed below.    Gait and Station Stable/steady, no ataxia   Musculoskeletal System No extrapyramidal symptoms (EPS); no abnormal muscular movements or Tardive Dyskinesia (TD); muscle strength and tone are within normal limits   Language No aphasia or dysarthria   Speech:  hypoverbal, increased latency of response, non-pressured and soft   Thought Processes illogical; slow rate of thoughts; poor abstract reasoning/computation   Thought Associations blocked    Thought Content poverty of content   Suicidal Ideations none   Homicidal Ideations none   Mood:  euthymic   Affect:  flat   Memory recent  impaired   Memory remote:  impaired   Concentration/Attention:  impaired   Fund of Knowledge significantly below average   Insight:  poor   Reliability fair   Judgment:  fair          VITALS: Patient Vitals for the past 24 hrs:   Temp Pulse Resp BP SpO2   11/02/20 0921  94  116/82    11/02/20 0812 98 °F (36.7 °C) 99 16 99/66 97 %   11/01/20 2016 98.9 °F (37.2 °C) 97 18 105/73 97 %     Wt Readings from Last 3 Encounters:   09/18/20 85.7 kg (189 lb)   10/15/20 85.7 kg (189 lb)   12/23/17 84.9 kg (187 lb 1.6 oz)     Temp Readings from Last 3 Encounters:   11/02/20 98 °F (36.7 °C)   03/28/17 97.5 °F (36.4 °C)   02/07/15 98.7 °F (37.1 °C)     BP Readings from Last 3 Encounters:   11/02/20 116/82   03/28/17 100/68   02/12/15 105/73     Pulse Readings from Last 3 Encounters:   11/02/20 94   03/28/17 67   02/12/15 87            DATA     LABORATORY DATA:(reviewed/updated 11/2/2020)  Recent Results (from the past 24 hour(s))   CBC WITH AUTOMATED DIFF    Collection Time: 11/02/20  5:30 AM   Result Value Ref Range    WBC 7.1 4.1 - 11.1 K/uL    RBC 4.48 4.10 - 5.70 M/uL    HGB 13.2 12.1 - 17.0 g/dL    HCT 40.3 36.6 - 50.3 %    MCV 90.0 80.0 - 99.0 FL    MCH 29.5 26.0 - 34.0 PG    MCHC 32.8 30.0 - 36.5 g/dL    RDW 13.3 11.5 - 14.5 %    PLATELET 745 122 - 308 K/uL    MPV 11.2 8.9 - 12.9 FL    NRBC 0.0 0  WBC    ABSOLUTE NRBC 0.00 0.00 - 0.01 K/uL    NEUTROPHILS 53 32 - 75 %    LYMPHOCYTES 36 12 - 49 %    MONOCYTES 8 5 - 13 %    EOSINOPHILS 2 0 - 7 %    BASOPHILS 1 0 - 1 %    IMMATURE GRANULOCYTES 0 0.0 - 0.5 %    ABS. NEUTROPHILS 3.8 1.8 - 8.0 K/UL    ABS. LYMPHOCYTES 2.5 0.8 - 3.5 K/UL    ABS. MONOCYTES 0.5 0.0 - 1.0 K/UL    ABS. EOSINOPHILS 0.1 0.0 - 0.4 K/UL    ABS. BASOPHILS 0.1 0.0 - 0.1 K/UL    ABS. IMM. GRANS. 0.0 0.00 - 0.04 K/UL    DF AUTOMATED       Lab Results   Component Value Date/Time    Valproic acid 97 09/18/2020 04:42 PM     No results found for: LITHM   RADIOLOGY REPORTS:(reviewed/updated 11/2/2020)  No results found.        MEDICATIONS     ALL MEDICATIONS:   Current Facility-Administered Medications   Medication Dose Route Frequency    metoprolol succinate (TOPROL-XL) XL tablet 25 mg  25 mg Oral DAILY    glycopyrrolate (ROBINUL) tablet 1 mg  1 mg Oral DAILY    cloZAPine (CLOZARIL) tablet 300 mg  300 mg Oral QHS    OLANZapine (ZyPREXA) tablet 5 mg  5 mg Oral Q6H PRN    haloperidol lactate (HALDOL) injection 5 mg  5 mg IntraMUSCular Q6H PRN    benztropine (COGENTIN) tablet 1 mg  1 mg Oral BID PRN    diphenhydrAMINE (BENADRYL) injection 50 mg  50 mg IntraMUSCular BID PRN    hydrOXYzine HCL (ATARAX) tablet 50 mg  50 mg Oral TID PRN    traZODone (DESYREL) tablet 50 mg  50 mg Oral QHS PRN    acetaminophen (TYLENOL) tablet 650 mg  650 mg Oral Q4H PRN    magnesium hydroxide (MILK OF MAGNESIA) 400 mg/5 mL oral suspension 30 mL  30 mL Oral DAILY PRN      SCHEDULED MEDICATIONS:   Current Facility-Administered Medications   Medication Dose Route Frequency    metoprolol succinate (TOPROL-XL) XL tablet 25 mg  25 mg Oral DAILY    glycopyrrolate (ROBINUL) tablet 1 mg  1 mg Oral DAILY    cloZAPine (CLOZARIL) tablet 300 mg  300 mg Oral QHS          ASSESSMENT & PLAN     DIAGNOSES REQUIRING ACTIVE TREATMENT AND MONITORING: (reviewed/updated 11/2/2020)  Patient Active Hospital Problem List:   Schizoaffective disorder ([facility] [address] (9/18/2020)    Assessment: patient with significant internal preoccupation, poor ADLs, history of catatonia. Appears to have been compliant with home medication, but is regressed and with a markedly disorganized thought process. Will start Clozaril, add benzodiazepine for protection against catatonia to be tapered as Clozaril dose increases given interaction.     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 In summary, Zhou Smith, is a 45 y.o.  male who presents with a severe exacerbation of the principal diagnosis of Schizoaffective disorder ([facility] [address]    Patient's is approaching his psychiatric baseline    Patient requires continued inpatient hospitalization for further stabilization, safety monitoring and medication management. I will continue to coordinate the provision of individual, milieu, occupational, group, and substance abuse therapies to address target symptoms/diagnoses as deemed appropriate for the individual patient. A coordinated, multidisplinary treatment team round was conducted with the patient (this team consists of the nurse, psychiatric unit pharmacist,  and writer). Complete current electronic health record for patient has been reviewed today including consultant notes, ancillary staff notes, nurses and psychiatric tech notes. Suicide risk assessment completed and patient deemed to be of low risk for suicide at this time. The following regarding medications was addressed during rounds with patient:   the risks and benefits of the proposed medication. The patient was given the opportunity to ask questions. Informed consent given to the use of the above medications. Will continue to adjust psychiatric and non-psychiatric medications (see above \"medication\" section and orders section for details) as deemed appropriate & based upon diagnoses and response to treatment. I will continue to order blood tests/labs and diagnostic tests as deemed appropriate and review results as they become available (see orders for details and above listed lab/test results). I will order psychiatric records from previous [facility] to further elucidate the nature of patient's psychopathology and review once available.     I will gather additional collateral information from friends, family and o/p treatment team to further elucidate the nature of patient's psychopathology and baselline level of psychiatric functioning. I certify that this patient's inpatient psychiatric hospital services furnished since the previous certification were, and continue to be, required for treatment that could reasonably be expected to improve the patient's condition, or for diagnostic study, and that the patient continues to need, on a daily basis, active treatment furnished directly by or requiring the supervision of inpatient psychiatric facility personnel. In addition, the hospital records show that services furnished were intensive treatment services, admission or related services, or equivalent services.     EXPECTED DISCHARGE DATE/DAY: 11/03/20     DISPOSITION:home       Signed By:   Vishal Ayno MD  11/2/2020

## 2020-11-02 NOTE — BH NOTES
GROUP THERAPY PROGRESS NOTE    Patient did not participate in Coping Skills group.      Efren Black LPC LSATP CSAC (2) very limited

## 2020-11-03 VITALS
WEIGHT: 189 LBS | SYSTOLIC BLOOD PRESSURE: 124 MMHG | TEMPERATURE: 97.1 F | OXYGEN SATURATION: 97 % | BODY MASS INDEX: 29.66 KG/M2 | HEART RATE: 91 BPM | HEIGHT: 67 IN | DIASTOLIC BLOOD PRESSURE: 69 MMHG | RESPIRATION RATE: 16 BRPM

## 2020-11-03 PROCEDURE — 74011250637 HC RX REV CODE- 250/637: Performed by: PSYCHIATRY & NEUROLOGY

## 2020-11-03 PROCEDURE — 99239 HOSP IP/OBS DSCHRG MGMT >30: CPT | Performed by: PSYCHIATRY & NEUROLOGY

## 2020-11-03 RX ADMIN — GLYCOPYRROLATE 1 MG: 1 TABLET ORAL at 08:25

## 2020-11-03 RX ADMIN — METOPROLOL SUCCINATE 25 MG: 25 TABLET, EXTENDED RELEASE ORAL at 08:25

## 2020-11-03 NOTE — SUICIDE SAFETY PLAN
SAFETY PLAN    A suicide Safety Plan is a document that supports someone when they are having thoughts of suicide. Warning Signs that indicate a suicidal crisis may be developing: What (situations, thoughts, feelings, body sensations, behaviors, etc.) do you experience that lets you know you are beginning to think about suicide? 1. Stops talking   2. Poor hygiene     Internal Coping Strategies:  What things can I do (relaxation techniques, hobbies, physical activities, etc.) to take my mind off my problems without contacting another person? 1. Walking  2. Sleeping    People and social settings that provide distraction: Who can I call or where can I go to distract me? 1. Name: Colletta Mody sister/POA     People whom I can ask for help: Who can I call when I need help - for example, friends, family, clergy, someone else? 1. Name:  - [address] [place] [address] or [address] [place] [address] I can contact during a crisis: Who can I call for help - for example, my doctor, my psychiatrist, my psychologist, a mental health provider, a suicide hotline? Dr. Flakita Smith [address] [place] [address]  [phone]  Fax: [phone]  Crisis Line: [phone]    3. Suicide Prevention Lifeline: 2-031-348-WHGU (9615)    4. [address] [place] [address] Emergency Services -  for example, [address] [facility] suicide hotline, [facility] Hotline: [facility] Service Board  [address] [address]  [phone]  Fax: [phone]  Crisis Line: [phone]    Making the environment safe: How can I make my environment (house/apartment/living space) safer? For example, can I remove guns, medications, and other items? 1. Take medications as prescribed.

## 2020-11-03 NOTE — PROGRESS NOTES
Pharmacist Discharge Medication Reconciliation    Discharging Provider: Dr. Anayeli Washington PMH:   Past Medical History:   Diagnosis Date    Abscess 10/15/2020    Psychiatric disorder     Psychotic disorder ([address]     Withdrawal syndrome [facility])      Chief Complaint for this Admission:   Chief Complaint   Patient presents with    Mental Health Problem     Allergies: Fluphenazine and Penicillins    Discharge Medications:   Current Discharge Medication List        START taking these medications    Details   glycopyrrolate (ROBINUL) 1 mg tablet Take 1 Tab by mouth daily. Indications: excessive saliva production  Qty: 30 Tab, Refills: 1      !! cloZAPine (ClozariL) 200 mg tablet Take 1 Tab by mouth daily. Total dose 300 mg QHS ANC 3800 on 11/2/2020  Indications: treatment-resistant schizophrenia  Qty: 7 Tab, Refills: 0      !! cloZAPine (CLOZARIL) 100 mg tablet Take 1 Tab by mouth nightly. Total dose 300 mg QHS ANC 3800 on 11/2/2020  Indications: treatment-resistant schizophrenia  Qty: 7 Tab, Refills: 0      metoprolol succinate (TOPROL-XL) 25 mg XL tablet Take 1 Tab by mouth daily. Indications: tachycardia due to medication side effect  Qty: 30 Tab, Refills: 1       !! - Potential duplicate medications found. Please discuss with provider.         STOP taking these medications       FLUoxetine (PROzac) 20 mg capsule Comments:   Reason for Stopping:         benztropine (COGENTIN) 0.5 mg tablet Comments:   Reason for Stopping:         LORazepam (Ativan) 1 mg tablet Comments:   Reason for Stopping:         valproate (Depakene) 250 mg/5 mL syrup Comments:   Reason for Stopping:         paliperidone palmitate (Invega Sustenna) 234 mg/1.5 mL injection Comments:   Reason for Stopping:         OLANZapine (ZyPREXA zydis) 20 mg disintegrating tablet Comments:   Reason for Stopping:               The patient's chart, MAR and AVS were reviewed by José Miguel Duckworth, PHARMD, BCPS

## 2020-11-03 NOTE — BH NOTES
Assumed nursing care of Leigh Simeon after receiving the 1900 change of shift report. Leigh Simeon presented with disheveled, unkept appearance. He is mostly withdrawn, isolative and doesn't socialize with peers. Pt will move his hands and feet in a flapping motion. He refused to have vital signs taken but was med compliant. Attemped to speak with pt but he was selectively mute and would not verbally respond. Encouraging participation with treatment and monitoring for mood chgs, behavior and for safety. Addendum at 1562: Leigh Simeon has slept around 8 hrs.   Quiet shift w/o problems

## 2020-11-03 NOTE — PROGRESS NOTES
Patient is calm and cooperative during morning assessment. Patient continues maintaining an unkept appearance with poor hygiene. Patient is med and meal compliant. Patient is withdrawn to room. Patient is selectively mute. Q15 minute checks continued for safety. 1400-Patient alert and verbal. Patient being discharged home to continue recommended level of care. Discharge instructions read to patient. Patient acknowledged understanding and denied having any questions. Patient's belongings returned to him. Patient being transported via [place].      Problem: Altered Thought Process (Adult/Pediatric)  Goal: *STG: Remains safe in hospital  Outcome: Progressing Towards Goal  Goal: *STG: Complies with medication therapy  Outcome: Progressing Towards Goal

## 2020-11-03 NOTE — BH NOTES
Behavioral Health Transition Record to Provider    Patient Name: Rudy Acosta  YOB: 1970  Medical Record Number: [id number]  Date of Admission: 9/18/2020  Date of Discharge: 11/3/2020    Attending Provider: Mallika Renee, *  Discharging Provider: Pankaj Dietrich MD  To contact this individual call [phone] and ask the  to page. If unavailable, ask to be transferred to [facility] Provider on call. [facility] Provider will be available on call 24/7 and during holidays. Primary Care Provider: Unknown, Provider    Allergies   Allergen Reactions    Fluphenazine Unknown (comments)     Pt is unable to communicate properly.  Penicillins Unknown (comments)     Pt is unable to communicate properly. Reason for Admission: Psychosis    Admission Diagnosis: Schizoaffective disorder ([facility] [address] [F25.9]    * No surgery found *    Results for orders placed or performed during the hospital encounter of 09/18/20   CBC WITH AUTOMATED DIFF   Result Value Ref Range    WBC 4.9 4.1 - 11.1 K/uL    RBC 4.68 4.10 - 5.70 M/uL    HGB 13.9 12.1 - 17.0 g/dL    HCT 42.0 36.6 - 50.3 %    MCV 89.7 80.0 - 99.0 FL    MCH 29.7 26.0 - 34.0 PG    MCHC 33.1 30.0 - 36.5 g/dL    RDW 13.4 11.5 - 14.5 %    PLATELET 992 860 - 409 K/uL    MPV 10.3 8.9 - 12.9 FL    NRBC 0.0 0  WBC    ABSOLUTE NRBC 0.00 0.00 - 0.01 K/uL    NEUTROPHILS 45 32 - 75 %    LYMPHOCYTES 37 12 - 49 %    MONOCYTES 12 5 - 13 %    EOSINOPHILS 4 0 - 7 %    BASOPHILS 1 0 - 1 %    IMMATURE GRANULOCYTES 1 (H) 0.0 - 0.5 %    ABS. NEUTROPHILS 2.2 1.8 - 8.0 K/UL    ABS. LYMPHOCYTES 1.8 0.8 - 3.5 K/UL    ABS. MONOCYTES 0.6 0.0 - 1.0 K/UL    ABS. EOSINOPHILS 0.2 0.0 - 0.4 K/UL    ABS. BASOPHILS 0.0 0.0 - 0.1 K/UL    ABS. IMM.  GRANS. 0.1 (H) 0.00 - 0.04 K/UL    DF AUTOMATED     METABOLIC PANEL, COMPREHENSIVE   Result Value Ref Range    Sodium 140 136 - 145 mmol/L    Potassium 3.7 3.5 - 5.1 mmol/L    Chloride 102 97 - 108 mmol/L CO2 26 21 - 32 mmol/L    Anion gap 12 5 - 15 mmol/L    Glucose 91 65 - 100 mg/dL    BUN 14 6 - 20 MG/DL    Creatinine 1.07 0.70 - 1.30 MG/DL    BUN/Creatinine ratio 13 12 - 20      GFR est AA >60 >60 ml/min/1.73m2    GFR est non-AA >60 >60 ml/min/1.73m2    Calcium 9.7 8.5 - 10.1 MG/DL    Bilirubin, total 0.4 0.2 - 1.0 MG/DL    ALT (SGPT) 48 12 - 78 U/L    AST (SGOT) 30 15 - 37 U/L    Alk.  phosphatase 67 45 - 117 U/L    Protein, total 8.1 6.4 - 8.2 g/dL    Albumin 3.9 3.5 - 5.0 g/dL    Globulin 4.2 (H) 2.0 - 4.0 g/dL    A-G Ratio 0.9 (L) 1.1 - 2.2     ETHYL ALCOHOL   Result Value Ref Range    ALCOHOL(ETHYL),SERUM <10 <10 MG/DL   VALPROIC ACID   Result Value Ref Range    Valproic acid 97 50 - 100 ug/ml   URINALYSIS W/ REFLEX CULTURE    Specimen: Miscellaneous sample; Urine    Urine specimen   Result Value Ref Range    Color DARK YELLOW      Appearance CLEAR CLEAR      Specific gravity 1.025 1.003 - 1.030      pH (UA) 6.0 5.0 - 8.0      Protein Negative NEG mg/dL    Glucose Negative NEG mg/dL    Ketone TRACE (A) NEG mg/dL    Blood Negative NEG      Urobilinogen 2.0 (H) 0.2 - 1.0 EU/dL    Nitrites Negative NEG      Leukocyte Esterase Negative NEG      WBC 0-4 0 - 4 /hpf    RBC 0-5 0 - 5 /hpf    Epithelial cells FEW FEW /lpf    Bacteria Negative NEG /hpf    UA:UC IF INDICATED CULTURE NOT INDICATED BY UA RESULT CNI     DRUG SCREEN, URINE   Result Value Ref Range    AMPHETAMINES Negative NEG      BARBITURATES Negative NEG      BENZODIAZEPINES Negative NEG      COCAINE Negative NEG      METHADONE Negative NEG      OPIATES Negative NEG      PCP(PHENCYCLIDINE) Negative NEG      THC (TH-CANNABINOL) Negative NEG      Drug screen comment (NOTE)    SARS-COV-2   Result Value Ref Range    Specimen source Nasopharyngeal      Specimen source Nasopharyngeal      COVID-19 rapid test Not detected NOTD      Specimen type NP Swab      Health status Symptomatic Testing     BILIRUBIN, CONFIRM   Result Value Ref Range    Bilirubin UA, confirm Negative NEG     SARS-COV-2 ANTIGEN DETECTION   Result Value Ref Range    Specimen type NP Swab      Health status Symptomatic Testing      COVID-19 Not Detected Not Detected     CBC WITH AUTOMATED DIFF   Result Value Ref Range    WBC 5.8 4.1 - 11.1 K/uL    RBC 4.51 4.10 - 5.70 M/uL    HGB 13.2 12.1 - 17.0 g/dL    HCT 40.4 36.6 - 50.3 %    MCV 89.6 80.0 - 99.0 FL    MCH 29.3 26.0 - 34.0 PG    MCHC 32.7 30.0 - 36.5 g/dL    RDW 13.0 11.5 - 14.5 %    PLATELET 508 401 - 274 K/uL    MPV 10.7 8.9 - 12.9 FL    NRBC 0.0 0  WBC    ABSOLUTE NRBC 0.00 0.00 - 0.01 K/uL    NEUTROPHILS 43 32 - 75 %    LYMPHOCYTES 41 12 - 49 %    MONOCYTES 10 5 - 13 %    EOSINOPHILS 4 0 - 7 %    BASOPHILS 1 0 - 1 %    IMMATURE GRANULOCYTES 1 (H) 0.0 - 0.5 %    ABS. NEUTROPHILS 2.6 1.8 - 8.0 K/UL    ABS. LYMPHOCYTES 2.4 0.8 - 3.5 K/UL    ABS. MONOCYTES 0.6 0.0 - 1.0 K/UL    ABS. EOSINOPHILS 0.3 0.0 - 0.4 K/UL    ABS. BASOPHILS 0.0 0.0 - 0.1 K/UL    ABS. IMM.  GRANS. 0.0 0.00 - 0.04 K/UL    DF AUTOMATED     HEMOGLOBIN A1C WITH EAG   Result Value Ref Range    Hemoglobin A1c 5.2 4.0 - 5.6 %    Est. average glucose 103 mg/dL   LIPID PANEL   Result Value Ref Range    LIPID PROFILE          Cholesterol, total 166 <200 MG/DL    Triglyceride 143 <150 MG/DL    HDL Cholesterol 46 MG/DL    LDL, calculated 91.4 0 - 100 MG/DL    VLDL, calculated 28.6 MG/DL    CHOL/HDL Ratio 3.6 0.0 - 5.0     CBC WITH AUTOMATED DIFF   Result Value Ref Range    WBC 6.8 4.1 - 11.1 K/uL    RBC 4.47 4.10 - 5.70 M/uL    HGB 13.4 12.1 - 17.0 g/dL    HCT 40.0 36.6 - 50.3 %    MCV 89.5 80.0 - 99.0 FL    MCH 30.0 26.0 - 34.0 PG    MCHC 33.5 30.0 - 36.5 g/dL    RDW 13.0 11.5 - 14.5 %    PLATELET 555 844 - 129 K/uL    MPV 10.3 8.9 - 12.9 FL    NRBC 0.0 0  WBC    ABSOLUTE NRBC 0.00 0.00 - 0.01 K/uL    NEUTROPHILS 48 32 - 75 %    LYMPHOCYTES 37 12 - 49 %    MONOCYTES 9 5 - 13 %    EOSINOPHILS 4 0 - 7 %    BASOPHILS 1 0 - 1 %    IMMATURE GRANULOCYTES 1 (H) 0.0 - 0.5 % ABS. NEUTROPHILS 3.3 1.8 - 8.0 K/UL    ABS. LYMPHOCYTES 2.5 0.8 - 3.5 K/UL    ABS. MONOCYTES 0.6 0.0 - 1.0 K/UL    ABS. EOSINOPHILS 0.3 0.0 - 0.4 K/UL    ABS. BASOPHILS 0.1 0.0 - 0.1 K/UL    ABS. IMM. GRANS. 0.0 0.00 - 0.04 K/UL    DF AUTOMATED     CBC WITH AUTOMATED DIFF   Result Value Ref Range    WBC 9.0 4.1 - 11.1 K/uL    RBC 4.29 4. 10 - 5.70 M/uL    HGB 12.8 12.1 - 17.0 g/dL    HCT 38.0 36.6 - 50.3 %    MCV 88.6 80.0 - 99.0 FL    MCH 29.8 26.0 - 34.0 PG    MCHC 33.7 30.0 - 36.5 g/dL    RDW 13.2 11.5 - 14.5 %    PLATELET 949 751 - 494 K/uL    MPV 10.3 8.9 - 12.9 FL    NRBC 0.0 0  WBC    ABSOLUTE NRBC 0.00 0.00 - 0.01 K/uL    NEUTROPHILS 60 32 - 75 %    LYMPHOCYTES 26 12 - 49 %    MONOCYTES 12 5 - 13 %    EOSINOPHILS 2 0 - 7 %    BASOPHILS 0 0 - 1 %    IMMATURE GRANULOCYTES 0 0.0 - 0.5 %    ABS. NEUTROPHILS 5.4 1.8 - 8.0 K/UL    ABS. LYMPHOCYTES 2.3 0.8 - 3.5 K/UL    ABS. MONOCYTES 1.0 0.0 - 1.0 K/UL    ABS. EOSINOPHILS 0.2 0.0 - 0.4 K/UL    ABS. BASOPHILS 0.0 0.0 - 0.1 K/UL    ABS. IMM. GRANS. 0.0 0.00 - 0.04 K/UL    DF AUTOMATED     METABOLIC PANEL, COMPREHENSIVE   Result Value Ref Range    Sodium 135 (L) 136 - 145 mmol/L    Potassium 3.7 3.5 - 5.1 mmol/L    Chloride 100 97 - 108 mmol/L    CO2 26 21 - 32 mmol/L    Anion gap 9 5 - 15 mmol/L    Glucose 112 (H) 65 - 100 mg/dL    BUN 13 6 - 20 MG/DL    Creatinine 0.98 0.70 - 1.30 MG/DL    BUN/Creatinine ratio 13 12 - 20      GFR est AA >60 >60 ml/min/1.73m2    GFR est non-AA >60 >60 ml/min/1.73m2    Calcium 8.9 8.5 - 10.1 MG/DL    Bilirubin, total 0.8 0.2 - 1.0 MG/DL    ALT (SGPT) 209 (H) 12 - 78 U/L    AST (SGOT) 94 (H) 15 - 37 U/L    Alk.  phosphatase 218 (H) 45 - 117 U/L    Protein, total 7.9 6.4 - 8.2 g/dL    Albumin 3.4 (L) 3.5 - 5.0 g/dL    Globulin 4.5 (H) 2.0 - 4.0 g/dL    A-G Ratio 0.8 (L) 1.1 - 2.2     MAGNESIUM   Result Value Ref Range    Magnesium 2.0 1.6 - 2.4 mg/dL   PHOSPHORUS   Result Value Ref Range    Phosphorus 4.3 2.6 - 4.7 MG/DL   CBC WITH AUTOMATED DIFF   Result Value Ref Range    WBC 6.4 4.1 - 11.1 K/uL    RBC 4.61 4.10 - 5.70 M/uL    HGB 13.5 12.1 - 17.0 g/dL    HCT 41.0 36.6 - 50.3 %    MCV 88.9 80.0 - 99.0 FL    MCH 29.3 26.0 - 34.0 PG    MCHC 32.9 30.0 - 36.5 g/dL    RDW 13.4 11.5 - 14.5 %    PLATELET 041 670 - 768 K/uL    MPV 9.7 8.9 - 12.9 FL    NRBC 0.0 0  WBC    ABSOLUTE NRBC 0.00 0.00 - 0.01 K/uL    NEUTROPHILS 52 32 - 75 %    LYMPHOCYTES 35 12 - 49 %    MONOCYTES 8 5 - 13 %    EOSINOPHILS 3 0 - 7 %    BASOPHILS 1 0 - 1 %    IMMATURE GRANULOCYTES 1 (H) 0.0 - 0.5 %    ABS. NEUTROPHILS 3.4 1.8 - 8.0 K/UL    ABS. LYMPHOCYTES 2.2 0.8 - 3.5 K/UL    ABS. MONOCYTES 0.5 0.0 - 1.0 K/UL    ABS. EOSINOPHILS 0.2 0.0 - 0.4 K/UL    ABS. BASOPHILS 0.1 0.0 - 0.1 K/UL    ABS. IMM. GRANS. 0.1 (H) 0.00 - 0.04 K/UL    DF AUTOMATED     CLOZAPINE   Result Value Ref Range    Clozapine 1,586 (H) 350 - 650 ng/mL    Norclozapine, Serum 648 Not Estab. ng/mL    Total(Cloz+Norcloz) 2,234 ng/mL   HEPATIC FUNCTION PANEL   Result Value Ref Range    Protein, total 7.6 6.4 - 8.2 g/dL    Albumin 3.6 3.5 - 5.0 g/dL    Globulin 4.0 2.0 - 4.0 g/dL    A-G Ratio 0.9 (L) 1.1 - 2.2      Bilirubin, total 0.4 0.2 - 1.0 MG/DL    Bilirubin, direct 0.2 0.0 - 0.2 MG/DL    Alk. phosphatase 131 (H) 45 - 117 U/L    AST (SGOT) 15 15 - 37 U/L    ALT (SGPT) 44 12 - 78 U/L   CBC WITH AUTOMATED DIFF   Result Value Ref Range    WBC 7.2 4.1 - 11.1 K/uL    RBC 4.44 4.10 - 5.70 M/uL    HGB 13.2 12.1 - 17.0 g/dL    HCT 39.5 36.6 - 50.3 %    MCV 89.0 80.0 - 99.0 FL    MCH 29.7 26.0 - 34.0 PG    MCHC 33.4 30.0 - 36.5 g/dL    RDW 13.4 11.5 - 14.5 %    PLATELET 650 979 - 951 K/uL    MPV 10.6 8.9 - 12.9 FL    NRBC 0.0 0  WBC    ABSOLUTE NRBC 0.00 0.00 - 0.01 K/uL    NEUTROPHILS 53 32 - 75 %    LYMPHOCYTES 37 12 - 49 %    MONOCYTES 7 5 - 13 %    EOSINOPHILS 2 0 - 7 %    BASOPHILS 1 0 - 1 %    IMMATURE GRANULOCYTES 0 0.0 - 0.5 %    ABS. NEUTROPHILS 3.9 1.8 - 8.0 K/UL    ABS.  LYMPHOCYTES 2.7 0.8 - 3.5 K/UL    ABS. MONOCYTES 0.5 0.0 - 1.0 K/UL    ABS. EOSINOPHILS 0.1 0.0 - 0.4 K/UL    ABS. BASOPHILS 0.1 0.0 - 0.1 K/UL    ABS. IMM. GRANS. 0.0 0.00 - 0.04 K/UL    DF AUTOMATED     CBC WITH AUTOMATED DIFF   Result Value Ref Range    WBC 7.1 4.1 - 11.1 K/uL    RBC 4.48 4.10 - 5.70 M/uL    HGB 13.2 12.1 - 17.0 g/dL    HCT 40.3 36.6 - 50.3 %    MCV 90.0 80.0 - 99.0 FL    MCH 29.5 26.0 - 34.0 PG    MCHC 32.8 30.0 - 36.5 g/dL    RDW 13.3 11.5 - 14.5 %    PLATELET 867 929 - 491 K/uL    MPV 11.2 8.9 - 12.9 FL    NRBC 0.0 0  WBC    ABSOLUTE NRBC 0.00 0.00 - 0.01 K/uL    NEUTROPHILS 53 32 - 75 %    LYMPHOCYTES 36 12 - 49 %    MONOCYTES 8 5 - 13 %    EOSINOPHILS 2 0 - 7 %    BASOPHILS 1 0 - 1 %    IMMATURE GRANULOCYTES 0 0.0 - 0.5 %    ABS. NEUTROPHILS 3.8 1.8 - 8.0 K/UL    ABS. LYMPHOCYTES 2.5 0.8 - 3.5 K/UL    ABS. MONOCYTES 0.5 0.0 - 1.0 K/UL    ABS. EOSINOPHILS 0.1 0.0 - 0.4 K/UL    ABS. BASOPHILS 0.1 0.0 - 0.1 K/UL    ABS. IMM. GRANS. 0.0 0.00 - 0.04 K/UL    DF AUTOMATED     EKG, 12 LEAD, INITIAL   Result Value Ref Range    Ventricular Rate 119 BPM    Atrial Rate 119 BPM    P-R Interval 154 ms    QRS Duration 80 ms    Q-T Interval 336 ms    QTC Calculation (Bezet) 472 ms    Calculated P Axis 41 degrees    Calculated R Axis 20 degrees    Calculated T Axis 31 degrees    Diagnosis       ** Age and gender specific ECG analysis **  Sinus tachycardia  No previous ECGs available  Confirmed by Sandoval Eugene M.D., Jennifer Chaparro ([id number]) on 10/21/2020 7:26:01 AM         Immunizations administered during this encounter: There is no immunization history for the selected administration types on file for this patient. Screening for Metabolic Disorders for Patients on Antipsychotic Medications  (Data obtained from the EMR)    Estimated Body Mass Index  Estimated body mass index is 29.6 kg/m² as calculated from the following:    Height as of this encounter: 5' 7\" (1.702 m). Weight as of this encounter: 85.7 kg (189 lb). Vital Signs/Blood Pressure  Visit Vitals  /69 (BP 1 Location: Right arm, BP Patient Position: Lying left side)   Pulse 91   Temp 97.1 °F (36.2 °C)   Resp 16   Ht 5' 7\" (1.702 m)   Wt 85.7 kg (189 lb)   SpO2 97%   BMI 29.60 kg/m²       Blood Glucose/Hemoglobin A1c  Lab Results   Component Value Date/Time    Glucose 112 (H) 10/13/2020 02:00 PM    Glucose 101 (H) 2017 11:49 PM    Glucose (POC) 112 (H) 2012 09:59 PM       Lab Results   Component Value Date/Time    Hemoglobin A1c 5.2 10/01/2020 04:21 AM        Lipid Panel  Lab Results   Component Value Date/Time    Cholesterol, total 166 10/01/2020 04:21 AM    HDL Cholesterol 46 10/01/2020 04:21 AM    LDL, calculated 91.4 10/01/2020 04:21 AM    Triglyceride 143 10/01/2020 04:21 AM    CHOL/HDL Ratio 3.6 10/01/2020 04:21 AM        Discharge Diagnosis: Please refer to physician's discharge summary. Discharge Plan: DISCHARGE SUMMARY    NAME:Edwin Anderson  : 1970  MRN: [id number]    The patient Gregorio Newton exhibits the ability to control behavior in a less restrictive environment. Patient's level of functioning is improving. No assaultive/destructive behavior has been observed for the past 24 hours. No suicidal/homicidal threat or behavior has been observed for the past 24 hours. There is no evidence of serious medication side effects. Patient has not been in physical or protective restraints for at least the past 24 hours. If weapons involved, how are they secured? None involved. Is patient aware of and in agreement with discharge plan? Yes and POA    Arrangements for medication:  Prescriptions sent to local pharmacy    Copy of discharge instructions to provider?:  [facility]    Arrangements for transportation home:  Cab    Keep all follow up appointments as scheduled, continue to take prescribed medications per physician instructions.   Mental health crisis number:  [phone] or your local mental health crisis line number at [phone]          Discharge Medication List and Instructions:   Current Discharge Medication List      START taking these medications    Details   glycopyrrolate (ROBINUL) 1 mg tablet Take 1 Tab by mouth daily. Indications: excessive saliva production  Qty: 30 Tab, Refills: 1      !! cloZAPine (ClozariL) 200 mg tablet Take 1 Tab by mouth daily. Total dose 300 mg QHS ANC 3800 on 11/2/2020  Indications: treatment-resistant schizophrenia  Qty: 7 Tab, Refills: 0      !! cloZAPine (CLOZARIL) 100 mg tablet Take 1 Tab by mouth nightly. Total dose 300 mg QHS ANC 3800 on 11/2/2020  Indications: treatment-resistant schizophrenia  Qty: 7 Tab, Refills: 0      metoprolol succinate (TOPROL-XL) 25 mg XL tablet Take 1 Tab by mouth daily. Indications: tachycardia due to medication side effect  Qty: 30 Tab, Refills: 1       !! - Potential duplicate medications found. Please discuss with provider. STOP taking these medications       FLUoxetine (PROzac) 20 mg capsule Comments:   Reason for Stopping:         benztropine (COGENTIN) 0.5 mg tablet Comments:   Reason for Stopping:         LORazepam (Ativan) 1 mg tablet Comments:   Reason for Stopping:         valproate (Depakene) 250 mg/5 mL syrup Comments:   Reason for Stopping:         paliperidone palmitate (Invega Sustenna) 234 mg/1.5 mL injection Comments:   Reason for Stopping:         OLANZapine (ZyPREXA zydis) 20 mg disintegrating tablet Comments:   Reason for Stopping:               Unresulted Labs (24h ago, onward)    None        To obtain results of studies pending at discharge, please contact N/A. Follow-up Information     Follow up With Specialties Details Why Contact Info    Labwork for clozapine  In 1 week Weekly labwork for clozapine is due on 11/9/20. Last ANC was 3800 on 11/2/20.      Dr. Ankita Burr  On 11/5/2020 Medication management appointment on Thursday, November 5th at 4:00PM.  [facility]  [address] [facility]  [phone]  Fax: [phone]  Crisis Line: [phone]      Sohail Mccormick  On 11/4/2020 Case management appointment on Wednesday, November 4th at 11:00AM.  [address]  [phone]  Fax: [phone]  Crisis Line: [phone]            Advanced Directive:   Does the patient have an appointed surrogate decision maker? Unknown   Does the patient have a Medical Advance Directive? Unknown   Does the patient have a Psychiatric Advance Directive? Unknown   If the patient does not have a surrogate or Medical Advance Directive AND Psychiatric Advance Directive, the patient was offered information on these advance directives. Unknown     Patient Instructions: Please continue all medications until otherwise directed by physician. Tobacco Cessation Discharge Plan:   Is the patient a smoker and needs referral for smoking cessation? No  Patient referred to the following for smoking cessation with an appointment? No   Patient was offered medication to assist with smoking cessation at discharge? No  Was education for smoking cessation added to the discharge instructions? No     Alcohol/Substance Abuse Discharge Plan:   Does the patient have a history of substance/alcohol abuse and requires a referral for treatment? No  Patient referred to the following for substance/alcohol abuse treatment with an appointment? No  Patient was offered medication to assist with alcohol cessation at discharge? No  Was education for substance/alcohol abuse added to discharge instructions? No     Patient discharged to Home; provided to the patient/caregiver either in hard copy or electronically. Continuing care paperwork was faxed to community mental health providers.

## 2020-11-03 NOTE — PROGRESS NOTES
Problem: Falls - Risk of  Goal: *Absence of Falls  Description: Document Reny Hoang Fall Risk and appropriate interventions in the flowsheet. Outcome: Progressing Towards Goal  Note: Fall Risk Interventions:       Mentation Interventions: Reorient patient    Medication Interventions: Teach patient to arise slowly    Elimination Interventions:  Toileting schedule/hourly rounds              Problem: Altered Thought Process (Adult/Pediatric)  Goal: *STG: Participates in treatment plan  Outcome: Progressing Towards Goal  Goal: *STG: Remains safe in hospital  Outcome: Progressing Towards Goal  Goal: *STG: Complies with medication therapy  Outcome: Progressing Towards Goal

## 2020-11-03 NOTE — DISCHARGE INSTRUCTIONS
Osmanakua Tovar DISCHARGE SUMMARY    NAME:Edwin Anderson  : 1970  MRN: [id number]    The patient Calvin Sheridan exhibits the ability to control behavior in a less restrictive environment. Patient's level of functioning is improving. No assaultive/destructive behavior has been observed for the past 24 hours. No suicidal/homicidal threat or behavior has been observed for the past 24 hours. There is no evidence of serious medication side effects. Patient has not been in physical or protective restraints for at least the past 24 hours. If weapons involved, how are they secured? None involved. Is patient aware of and in agreement with discharge plan? Yes and POA    Arrangements for medication:  Prescriptions sent to local pharmacy    Copy of discharge instructions to provider?:  [facility]    Arrangements for transportation home:  Cab    Keep all follow up appointments as scheduled, continue to take prescribed medications per physician instructions. Mental health crisis number:  [phone] or your local mental health crisis line number at [facility] from Nurse    PATIENT INSTRUCTIONS:      What to do at Home:  Recommended activity: Activity as tolerated    *  Please give a list of your current medications to your Primary Care Provider. *  Please update this list whenever your medications are discontinued, doses are      changed, or new medications (including over-the-counter products) are added. *  Please carry medication information at all times in case of emergency situations. These are general instructions for a healthy lifestyle:    No smoking/ No tobacco products/ Avoid exposure to second hand smoke  Surgeon General's Warning:  Quitting smoking now greatly reduces serious risk to your health.     Obesity, smoking, and sedentary lifestyle greatly increases your risk for illness    A healthy diet, regular physical exercise & weight monitoring are important for maintaining a healthy lifestyle    The discharge information has been reviewed with the patient. The patient verbalized understanding. Discharge medications reviewed with the patient and appropriate educational materials and side effects teaching were provided. If I feel I am at risk of hurting myself or others, I will call the crisis office and speak with a crisis worker who will assist me during my crisis.   [address]  [phone]  [address] [place] [address] [phone]  [address] [place] [address] 19-   [address] [facility] Jarvis Self [facility]  [phone]      ___________________________________________________________________________________________________________________________________

## 2020-11-03 NOTE — GROUP NOTE
JENELLE  GROUP DOCUMENTATION INDIVIDUAL Group Therapy Note Date: 11/3/2020 Group Start Time: 1000 Group End Time: 1100 Group Topic: Topic Group [address] ACUTE BEHAV [facility], [address] [facility] GROUP DOCUMENTATION GROUP Group Therapy Note Attendees: 5 Attendance: Did not attend Patient's Goal: Interventions/techniques Bee Watson

## 2020-11-03 NOTE — BH NOTES
GROUP THERAPY PROGRESS NOTE    Patient did not participate in Substance abuse group.      Alverto Contreras LPC [facility]

## 2020-11-03 NOTE — PROGRESS NOTES
Patient alert, compliant with vital signs and medication. 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 Patient to dayroom for meals, out to nurses statino to request juice, otherwise isolative to room. Patient with poor hygiene, requires direction and encouragement to bathe.

## 2020-11-03 NOTE — BH NOTES
GROUP THERAPY PROGRESS NOTE    Patient did not participate in Process  group.      Taj Levin LPC [facility]ATP [facility]

## 2020-11-04 NOTE — DISCHARGE SUMMARY
PSYCHIATRIC DISCHARGE SUMMARY         IDENTIFICATION:    Patient Name  Arely Montalvo   Date of Birth 1970   [facility] [id number]   Medical Record Number  [id number]      Age  45 y.o. PCP Unknown, Provider   Admit date:  9/18/2020    Discharge date: 11/3/2020   Room Number  321/01  @ [facility]   Date of Service  11/3/2020            TYPE OF DISCHARGE: REGULAR               CONDITION AT DISCHARGE: fair and stable       PROVISIONAL & DISCHARGE DIAGNOSES:    Problem List  Date Reviewed: 10/15/2020          Codes Class    Abscess ICD-10-CM: L02.91  ICD-9-CM: 682.9         * (Principal) Schizoaffective disorder ([facility] ICD-10-CM: F25.9  ICD-9-CM: 295.70         Schizophrenia ([facility] ICD-10-CM: F20.9  ICD-9-CM: 295.90         Paranoid schizophrenia ([facility] ICD-10-CM: F20.0  ICD-9-CM: 295.30               Active Hospital Problems    Abscess      *Schizoaffective disorder ([facility]        DISCHARGE DIAGNOSIS:   Axis I:  SEE ABOVE  Axis II: SEE ABOVE  Axis III: SEE ABOVE  Axis IV:  lack of structure  Axis V:  1 on admission, 21 on discharge     CC & HISTORY OF PRESENT ILLNESS:  \"psychosis / catatonia\"    Eladia Bile \"Corey\" Angel Olivas is a 45year old male with a history of schizophrenia admitted to the [facility] for increased psychosis. He has not been bathing or otherwise caring for himself at home. He was reportedly hospitalized at [facility] from April - June 2020. He does not verbally answer any questions. He is well known to this writer from previous admissions in the [facility]. He has responded well to ECT in the past.  [facility] patient admitted for gross disorganization, appeared catatonic at times and with markedly poor self care, poor ADLs and incontinence. Patient started on Clozaril via court order as he refused much of the medication when offered.  Once on court ordered treatment, the patient was agreeable with PO medication, Clozaril titrated to a high dose of 500 mg daily; his self care improved throughout the admission but he still required prompting for most ADLs. Due to persistent tachycardia and sialorrhea, patient's Clozaril was assessed, and the level noted to be supra-therapeutic at 500 mg -- the medication was subsequently tapered to 300 mg daily. No appreciable change in his mental status was observed at the higher dose relative to the tapered dose and patient remained calm and cooperative with treatment. Medications were held and discontinued due to poor efficacy including mood stabilizer, benzodiazepine and antidepressant. Patient generally internally preoccupied, but will engage with the milieu if prompted. The patient was noted to have a shoulder abscess draining purulent fluid, which improved with antibiotics and did not require surgical intervention. 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 The patient voices a desire to be discharged, but is minimally conversant otherwise. He attends groups and eats meals out of his room but spends much of the day in bed, unchanged since admision, though he will pace the unit at times. SW and MD coordinating disposition options as patient appears to be approaching his psychiatric baseline.     10/28 - no acute overnight events. Patient isolative to him room, out for meals, internally preoccupied with a flat affect. He is generally non-spontaneous in his responses but pleasant. Patient still requires frequent assessment and redirection as his avolition persists. Patient noted to have worsening sialorrhea this morning, voices his concerns and denies any specific concerns. Patient discharge focused, will be able to return home pending preparation by his sister Taina Ortiz.     10/29 - patient isolative, refused vitals this morning but was medication compliant.  He remains internally preoccupied but will respond appropriately to direct questions. He denies SI/HI, states he is thinking about 'Cristhian' when asked about his thought content. Patient discharge focused, team planning on discharge Monday following Cloz level and CBC.      10/30 - no acute overnight events. Patient seen this AM he is naked in his room lying in bed not speaking. Patient states he wants to go home and does not like taking medication, no other meaningful interaction is possible. Per nurse, patient still tachy to 110s, discussed BB - patient BP relatively normal.     10/31-no acute overnight events. Patient seen this AM, lying in his bed, stating he just wants to sleep. Patient states he wants to go home and does not like taking medication, no other meaningful interaction is possible. He reports that he is going to be discharged on Monday and he is excited that he is going to be moving in with his sister.      11/1- NO acute overnight events. Patients seen this morning, lying in his bed with his buttock showing out of his gown and no clothes on , which is typical behavior for him. He attempted to pretend that he could not hear me talking to him but eventually sat up on the side of the bed. He states that he is ready to be discharged. When I asked him what he is going to do when he goes home he states that he is going to pray.       11/02 - patient in bed much of the morning, cooperative with assessments. Still selectively mute, but follows direction. Patient out of bed for meals. He discharge focused, HR elevated but under 100s. Patient slept 5 hours overnight, plan to discharge tomorrow to sister, medication sent to pharmacy.        SOCIAL HISTORY:    Social History     Socioeconomic History    Marital status: SINGLE     Spouse name: Not on file    Number of children: Not on file    Years of education: Not on file    Highest education level: Not on file   Occupational History    Not on file   Social Needs    Financial resource strain: Not on file    Food insecurity     Worry: Not on file     Inability: Not on file    Transportation needs     Medical: Not on file     Non-medical: Not on file   Tobacco Use    Smoking status: Never Smoker    Smokeless tobacco: Never Used   Substance and Sexual Activity    Alcohol use: No    Drug use: No    Sexual activity: Not on file   Lifestyle    Physical activity     Days per week: Not on file     Minutes per session: Not on file    Stress: Not on file   Relationships    Social connections     Talks on phone: Not on file     Gets together: Not on file     Attends Christian service: Not on file     Active member of club or organization: Not on file     Attends meetings of clubs or organizations: Not on file     Relationship status: Not on file    Intimate partner violence     Fear of current or ex partner: Not on file     Emotionally abused: Not on file     Physically abused: Not on file     Forced sexual activity: Not on file   Other Topics Concern    Not on file   Social History Narrative    Pt is a HS grad and is unemployed. He lives with his sister. On disability    No pending legal charge reported      FAMILY HISTORY:   History reviewed. No pertinent family history. HOSPITALIZATION COURSE:    Shaniqua Rodrigues was admitted to the inpatient psychiatric unit [facility] for acute psychiatric stabilization in regards to symptomatology as described in the HPI above. The differential diagnosis at time of admission included: schizophrenia vs schizoaffective disorder. While on the unit Shaniqua Rodrigues was involved in individual, group, occupational and milieu therapy. Psychiatric medications were adjusted during this hospitalization including Clozaril. Shaniqua Rodrigues demonstrated a slow, but progressive improvement in overall condition.   Much of patient's initial presentation appeared to be related to situational stressors, effects of medication non-compliance and psychological factors. Please see individual progress notes for more specific details regarding patient's hospitalization course. Patient improved to the level of eating meals, bathing when prompted and toileting properly. Thought process was thought to be at baseline as patient gained these abilities but did not progress further despite several additional interventions. Clozaril was titrated to 500 before being tapered down to 300 due to supra-therapeutic drug level, tachycardia and no significant clinical change at this higher dose. At the time of discharge, a Clozaril level is still pending for current dose of 300 mg QHS. 41 PPI information faxed to pharmacy for REMS monitoring    At time of discharge, Gregorio Newton is without significant problems of depression, psychosis, or pretty. Patient free of suicidal and homicidal ideations (appears to be at very low risk of suicide or homicide) and reports many positive predictive factors in terms of not attempting suicide or homicide. Overall presentation at time of discharge is most consistent with the diagnosis of schizophrenia. Patient has maximized benefit to be derived from acute inpatient psychiatric treatment. All members of the treatment team concur with each other in regards to plans for discharge today. Patient and family are aware and in agreement with discharge and discharge plan. LABS AND IMAGAING:    Labs Reviewed   CBC WITH AUTOMATED DIFF - Abnormal; Notable for the following components:       Result Value    IMMATURE GRANULOCYTES 1 (*)     ABS. IMM.  GRANS. 0.1 (*)     All other components within normal limits   METABOLIC PANEL, COMPREHENSIVE - Abnormal; Notable for the following components:    Globulin 4.2 (*)     A-G Ratio 0.9 (*)     All other components within normal limits   URINALYSIS W/ REFLEX CULTURE - Abnormal; Notable for the following components:    Ketone TRACE (*)     Urobilinogen 2.0 (*)     All other components within normal limits CBC WITH AUTOMATED DIFF - Abnormal; Notable for the following components:    IMMATURE GRANULOCYTES 1 (*)     All other components within normal limits   CBC WITH AUTOMATED DIFF - Abnormal; Notable for the following components:    IMMATURE GRANULOCYTES 1 (*)     All other components within normal limits   METABOLIC PANEL, COMPREHENSIVE - Abnormal; Notable for the following components:    Sodium 135 (*)     Glucose 112 (*)     ALT (SGPT) 209 (*)     AST (SGOT) 94 (*)     Alk. phosphatase 218 (*)     Albumin 3.4 (*)     Globulin 4.5 (*)     A-G Ratio 0.8 (*)     All other components within normal limits   CBC WITH AUTOMATED DIFF - Abnormal; Notable for the following components:    IMMATURE GRANULOCYTES 1 (*)     ABS. IMM. GRANS. 0.1 (*)     All other components within normal limits   CLOZAPINE - Abnormal; Notable for the following components:    Clozapine 1,586 (*)     All other components within normal limits   HEPATIC FUNCTION PANEL - Abnormal; Notable for the following components:    A-G Ratio 0.9 (*)     Alk. phosphatase 131 (*)     All other components within normal limits   CLOZAPINE - Abnormal; Notable for the following components:    Clozapine 940 (*)     All other components within normal limits   ETHYL ALCOHOL   VALPROIC ACID   DRUG SCREEN, URINE   SARS-COV-2   BILIRUBIN, CONFIRM   SARS-COV-2 ANTIGEN DETECTION   HEMOGLOBIN A1C WITH EAG   LIPID PANEL   CBC WITH AUTOMATED DIFF   MAGNESIUM   PHOSPHORUS   CBC WITH AUTOMATED DIFF   CBC WITH AUTOMATED DIFF     Lab Results   Component Value Date/Time    Valproic acid 97 09/18/2020 04:42 PM     No results displayed because visit has over 200 results. Mri Brain Wo Cont    Addendum Date: 10/16/2020    Addendum: Additional review of the images was requested. This is a limited examination consisting only of sagittal and axial T1-weighted images. There are also sagittal T1-weighted images which are compromised by artifact.  The lateral ventricles and cortical sulci are somewhat prominent for the patient's chronological age of 45. No significant focal parenchymal lesions are demonstrated. There is a 3.6 cm transverse, 2.4 cm AP, and 2.9 cm craniocaudad left posterior fossa arachnoid cyst which has remodeled the calvarium. This is long-standing and likely incidental. No masses are demonstrated. Incidental retention cysts are seen in the maxillary sinus, right larger than left. Result Date: 10/16/2020  EXAM: MRI BRAIN WO CONT INDICATION: AMS COMPARISON: None. CONTRAST: None. TECHNIQUE:  Multiplanar multisequence acquisition without contrast of the brain. FINDINGS: Study is extremely limited secondary to patient cooperation. Only limited images were obtained. These images demonstrate normal ventricular size without midline shift or mass effect. No definite hemorrhage is noted given limitations of the study. There is probable arachnoid cyst in the left posterior fossa. IMPRESSION: Limited study. Given the limitations, there is no evidence of mass effect or midline shift. No acute pathology is identified. DISPOSITION:    Home. Patient to f/u with psychiatric appointments. Patient is to f/u with internist as directed. Patient should have an [address] level and associated labs checked within the next 7 days by patient's o/p psychiatrist/internist.               FOLLOW-UP CARE:    Activity as tolerated  Regular diet  Wound Care: none needed. Follow-up Information     Follow up With Specialties Details Why Contact Info    Labwork for clozapine  In 1 week Weekly labwork for clozapine is due on 11/9/20. Last ANC was 3800 on 11/2/20.      Dr. Jair Welch  On 11/5/2020 Medication management appointment on Thursday, November 5th at 4:00PM.  [address]  [phone]  Fax: [phone]  Crisis Line: [phone]      Lavonne Milner  On 11/4/2020 Case management appointment on Wednesday, November 4th at 11:00AM.  [address] [address]  [phone]  Fax: [phone]  Crisis Line: [phone]      Unknown, Provider    Patient not available to ask                   PROGNOSIS:   Poor---- based on nature of patient's pathology/ies and treatment compliance issues. Prognosis is greatly dependent upon patient's ability to remain sober and to follow up with scheduled appointments as well as to comply with psychiatric medications as prescribed. DISCHARGE MEDICATIONS:     Informed consent given for the use of following psychotropic medications:  Discharge Medication List as of 11/3/2020  1:18 PM      START taking these medications    Details   glycopyrrolate (ROBINUL) 1 mg tablet Take 1 Tab by mouth daily. Indications: excessive saliva production, Normal, Disp-30 Tab,R-1      !! cloZAPine (ClozariL) 200 mg tablet Take 1 Tab by mouth daily. Total dose 300 mg QHS ANC 3800 on 11/2/2020  Indications: treatment-resistant schizophrenia, Normal, Disp-7 Tab,R-0      !! cloZAPine (CLOZARIL) 100 mg tablet Take 1 Tab by mouth nightly. Total dose 300 mg QHS ANC 3800 on 11/2/2020  Indications: treatment-resistant schizophrenia, Normal, Disp-7 Tab,R-0      metoprolol succinate (TOPROL-XL) 25 mg XL tablet Take 1 Tab by mouth daily. Indications: tachycardia due to medication side effect, Normal, Disp-30 Tab,R-1       !! - Potential duplicate medications found. Please discuss with provider.       STOP taking these medications       FLUoxetine (PROzac) 20 mg capsule Comments:   Reason for Stopping:         benztropine (COGENTIN) 0.5 mg tablet Comments:   Reason for Stopping:         LORazepam (Ativan) 1 mg tablet Comments:   Reason for Stopping:         valproate (Depakene) 250 mg/5 mL syrup Comments:   Reason for Stopping:         paliperidone palmitate (Invega Sustenna) 234 mg/1.5 mL injection Comments:   Reason for Stopping:         OLANZapine (ZyPREXA zydis) 20 mg disintegrating tablet Comments:   Reason for Stopping:                      A coordinated, multidisplinary treatment team round was conducted with Alexandre Alamo is done daily here at [facility]. This team consists of the nurse, psychiatric unit pharmacist,  and Newton Soto. I have spent greater than 35 minutes on discharge work.     Signed:  Latrice Palacio MD  11/3/2020

## 2020-11-12 LAB
CLOZAPINE SERPL-MCNC: 1146 NG/ML (ref 350–650)
CLOZAPINE+NOR SERPL-MCNC: 1670 NG/ML
NORCLOZAPINE SERPL-MCNC: 524 NG/ML

## 2020-12-14 ENCOUNTER — HOSPITAL ENCOUNTER (EMERGENCY)
Age: 50
Discharge: PSYCHIATRIC OR SUBSTANCE USE FACILITY | End: 2020-12-15
Attending: EMERGENCY MEDICINE | Admitting: EMERGENCY MEDICINE
Payer: MEDICARE

## 2020-12-14 DIAGNOSIS — F25.0 SCHIZOAFFECTIVE DISORDER, BIPOLAR TYPE (HCC): Primary | ICD-10-CM

## 2020-12-14 LAB
ALBUMIN SERPL-MCNC: 4 G/DL (ref 3.5–5)
ALBUMIN/GLOB SERPL: 1 {RATIO} (ref 1.1–2.2)
ALP SERPL-CCNC: 175 U/L (ref 45–117)
ALT SERPL-CCNC: 57 U/L (ref 12–78)
AMPHET UR QL SCN: NEGATIVE
ANION GAP SERPL CALC-SCNC: 12 MMOL/L (ref 5–15)
APPEARANCE UR: ABNORMAL
AST SERPL-CCNC: 24 U/L (ref 15–37)
BACTERIA URNS QL MICRO: NEGATIVE /HPF
BARBITURATES UR QL SCN: NEGATIVE
BASOPHILS # BLD: 0.1 K/UL (ref 0–0.1)
BASOPHILS NFR BLD: 1 % (ref 0–1)
BENZODIAZ UR QL: NEGATIVE
BILIRUB SERPL-MCNC: 0.6 MG/DL (ref 0.2–1)
BILIRUB UR QL CFM: NEGATIVE
BUN SERPL-MCNC: 13 MG/DL (ref 6–20)
BUN/CREAT SERPL: 13 (ref 12–20)
CALCIUM SERPL-MCNC: 9.5 MG/DL (ref 8.5–10.1)
CANNABINOIDS UR QL SCN: NEGATIVE
CHLORIDE SERPL-SCNC: 104 MMOL/L (ref 97–108)
CO2 SERPL-SCNC: 26 MMOL/L (ref 21–32)
COCAINE UR QL SCN: NEGATIVE
COLOR UR: ABNORMAL
COVID-19 RAPID TEST, COVR: NOT DETECTED
CREAT SERPL-MCNC: 1 MG/DL (ref 0.7–1.3)
DIFFERENTIAL METHOD BLD: NORMAL
DRUG SCRN COMMENT,DRGCM: NORMAL
EOSINOPHIL # BLD: 0.2 K/UL (ref 0–0.4)
EOSINOPHIL NFR BLD: 2 % (ref 0–7)
EPITH CASTS URNS QL MICRO: ABNORMAL /LPF
ERYTHROCYTE [DISTWIDTH] IN BLOOD BY AUTOMATED COUNT: 12.9 % (ref 11.5–14.5)
ETHANOL SERPL-MCNC: <10 MG/DL
GLOBULIN SER CALC-MCNC: 4 G/DL (ref 2–4)
GLUCOSE SERPL-MCNC: 102 MG/DL (ref 65–100)
GLUCOSE UR STRIP.AUTO-MCNC: NEGATIVE MG/DL
HCT VFR BLD AUTO: 44.5 % (ref 36.6–50.3)
HEALTH STATUS, XMCV2T: NORMAL
HGB BLD-MCNC: 14.6 G/DL (ref 12.1–17)
HGB UR QL STRIP: NEGATIVE
IMM GRANULOCYTES # BLD AUTO: 0 K/UL (ref 0–0.04)
IMM GRANULOCYTES NFR BLD AUTO: 0 % (ref 0–0.5)
KETONES UR QL STRIP.AUTO: ABNORMAL MG/DL
LEUKOCYTE ESTERASE UR QL STRIP.AUTO: NEGATIVE
LYMPHOCYTES # BLD: 1.5 K/UL (ref 0.8–3.5)
LYMPHOCYTES NFR BLD: 17 % (ref 12–49)
MCH RBC QN AUTO: 29 PG (ref 26–34)
MCHC RBC AUTO-ENTMCNC: 32.8 G/DL (ref 30–36.5)
MCV RBC AUTO: 88.5 FL (ref 80–99)
METHADONE UR QL: NEGATIVE
MONOCYTES # BLD: 0.6 K/UL (ref 0–1)
MONOCYTES NFR BLD: 6 % (ref 5–13)
NEUTS SEG # BLD: 6.9 K/UL (ref 1.8–8)
NEUTS SEG NFR BLD: 74 % (ref 32–75)
NITRITE UR QL STRIP.AUTO: NEGATIVE
NRBC # BLD: 0 K/UL (ref 0–0.01)
NRBC BLD-RTO: 0 PER 100 WBC
OPIATES UR QL: NEGATIVE
PCP UR QL: NEGATIVE
PH UR STRIP: 6 [PH] (ref 5–8)
PLATELET # BLD AUTO: 237 K/UL (ref 150–400)
PMV BLD AUTO: 10.8 FL (ref 8.9–12.9)
POTASSIUM SERPL-SCNC: 3.6 MMOL/L (ref 3.5–5.1)
PROT SERPL-MCNC: 8 G/DL (ref 6.4–8.2)
PROT UR STRIP-MCNC: ABNORMAL MG/DL
RBC # BLD AUTO: 5.03 M/UL (ref 4.1–5.7)
RBC #/AREA URNS HPF: ABNORMAL /HPF (ref 0–5)
SARS-COV-2, COV2: NOT DETECTED
SODIUM SERPL-SCNC: 142 MMOL/L (ref 136–145)
SOURCE, COVRS: NORMAL
SP GR UR REFRACTOMETRY: 1.02 (ref 1–1.03)
SPECIMEN SOURCE, FCOV2M: NORMAL
SPECIMEN SOURCE, FCOV2M: NORMAL
SPECIMEN TYPE, XMCV1T: NORMAL
UA: UC IF INDICATED,UAUC: ABNORMAL
UROBILINOGEN UR QL STRIP.AUTO: 1 EU/DL (ref 0.2–1)
WBC # BLD AUTO: 9.3 K/UL (ref 4.1–11.1)
WBC URNS QL MICRO: ABNORMAL /HPF (ref 0–4)

## 2020-12-14 PROCEDURE — 74011250636 HC RX REV CODE- 250/636: Performed by: NURSE PRACTITIONER

## 2020-12-14 PROCEDURE — 81001 URINALYSIS AUTO W/SCOPE: CPT

## 2020-12-14 PROCEDURE — 85025 COMPLETE CBC W/AUTO DIFF WBC: CPT

## 2020-12-14 PROCEDURE — 80307 DRUG TEST PRSMV CHEM ANLYZR: CPT

## 2020-12-14 PROCEDURE — 87635 SARS-COV-2 COVID-19 AMP PRB: CPT

## 2020-12-14 PROCEDURE — 80053 COMPREHEN METABOLIC PANEL: CPT

## 2020-12-14 PROCEDURE — 93005 ELECTROCARDIOGRAM TRACING: CPT

## 2020-12-14 PROCEDURE — 36415 COLL VENOUS BLD VENIPUNCTURE: CPT

## 2020-12-14 PROCEDURE — 80159 DRUG ASSAY CLOZAPINE: CPT

## 2020-12-14 PROCEDURE — 99285 EMERGENCY DEPT VISIT HI MDM: CPT

## 2020-12-14 PROCEDURE — 96374 THER/PROPH/DIAG INJ IV PUSH: CPT

## 2020-12-14 RX ORDER — ARIPIPRAZOLE 5 MG/1
5 TABLET ORAL DAILY
COMMUNITY

## 2020-12-14 RX ORDER — METOPROLOL TARTRATE 5 MG/5ML
5 INJECTION INTRAVENOUS
Status: DISCONTINUED | OUTPATIENT
Start: 2020-12-14 | End: 2020-12-14

## 2020-12-14 RX ORDER — LORAZEPAM 2 MG/ML
1 INJECTION INTRAMUSCULAR
Status: COMPLETED | OUTPATIENT
Start: 2020-12-14 | End: 2020-12-14

## 2020-12-14 RX ORDER — LORAZEPAM 2 MG/ML
2 INJECTION INTRAMUSCULAR
Status: DISCONTINUED | OUTPATIENT
Start: 2020-12-14 | End: 2020-12-15 | Stop reason: HOSPADM

## 2020-12-14 RX ADMIN — SODIUM CHLORIDE 1000 ML: 900 INJECTION, SOLUTION INTRAVENOUS at 14:07

## 2020-12-14 RX ADMIN — LORAZEPAM 1 MG: 2 INJECTION INTRAMUSCULAR; INTRAVENOUS at 16:07

## 2020-12-14 NOTE — ED NOTES
Attempted to do ordered labs and EKG. Pt is non-cooperative and noted paranoid when approached. Urinal left at bedside in attempts to collect urine sample ordered. ED NP and Evy from [address] [place] [address] notified. Will continue to monitor and attempt at a later time. Lunch tray called in.     13:35 - Pt agreed to ordered labs, COVID swab, and to provide urine sample. [place]  removed wrist Forensic restraints at this time. Evy from [facility] and ED NP notified. Lunch tray called in once again. 13:51 - Pt eating lunch at this time. Pt noted stable, will continue to monitor. 17:15 - Pt eating dinner at this time. Pt noted stable, will continue to monitor. 17:20 - Spoke with Colin from [address] [place] [address] who states pt is now a TDO and bed search is in process. Charge RN notified. Samantha Quijanotingham asked if pt is now medically cleared. ED NP notified, she states she will speak with [facility]. 19:00 - Pt is now medically clear. Bed search continues in process. Bedside and Verbal shift change report given to Rhoda (oncoming nurse) by Dmitry Goodwin (offgoing nurse). Report included the following information SBAR, Kardex, ED Summary, Intake/Output, MAR and Recent Results.

## 2020-12-14 NOTE — ED NOTES
Pt brought to the ED by [facility] as an [address]. As per Evy from [facility], pt's sister called police due to pt's aggression today. Pt lives with his sister was walking around her house with no pants and when sister requested him to get dressed, pt attempted to swing at her. Evy also states pt takes Clozaril and last Thursday when he got the values checked, those were noted abnormal. Since Thursday pt has been tapering down the Clozaril and started on Abilify 5 mg daily. As per Daya Brown pt's sister stated pt has been coughing and eating poorly this past week, she is unsure if pt's behavior is a result of the meds being modified. Pt is cooperative in Triage but not verbal. Pt unable to verbalize SI/HI or hallucinations. Pt is noted tachycardic but in stable condition. Pt was changed into paper scrubs and searched for contraband. Pt's belongings placed into 2 bags and stored as er EDs protocol. Pt has wrist Forensic restraints to the front, Mikey  at bedside. Pt is now in ED room with side rail up, and bed to lowest position. Will continue to monitor and wait for EDP evaluation. Emergency Department Nursing Plan of Care       The Nursing Plan of Care is developed from the Nursing assessment and Emergency Department Attending provider initial evaluation. The plan of care may be reviewed in the ED Provider note.     The Plan of Care was developed with the following considerations:   Patient / Family readiness to learn indicated by:No evidence of learning  Persons(s) to be included in education: patient  Barriers to Learning/Limitations:Cognitive/physical impairment:    Signed     Gulshan Round    12/14/2020   11:39 AM

## 2020-12-14 NOTE — ED PROVIDER NOTES
EMERGENCY DEPARTMENT HISTORY AND PHYSICAL EXAM      Date: 12/14/2020  Patient Name: Glynn Torrez    History of Presenting Illness     Chief Complaint   Patient presents with    Mental Health Problem       History Provided By: Patient's Sister and Law Enforcement    Additional History (Context): Glynn Torrez is a 45 y.o. male with Schizophrenia,  who presents with mental health problem. Pt arrives under police custody. Per nursing staff, pt lives with his sister and became aggressive with her after he was found ambulating in the house undressed. Sister states her clozaril level was abnormal 5 days ago and switched to abilify. Pt has been unverbal and not cooperative at times. Sister reports he has poor intake and coughing x 1 week      PCP: Unknown, Provider    Current Outpatient Medications   Medication Sig Dispense Refill    ARIPiprazole (Abilify) 5 mg tablet Take 5 mg by mouth daily.  glycopyrrolate (ROBINUL) 1 mg tablet Take 1 Tab by mouth daily. Indications: excessive saliva production 30 Tab 1    cloZAPine (ClozariL) 200 mg tablet Take 1 Tab by mouth daily. Total dose 300 mg QHS ANC 3800 on 11/2/2020  Indications: treatment-resistant schizophrenia 7 Tab 0    cloZAPine (CLOZARIL) 100 mg tablet Take 1 Tab by mouth nightly. Total dose 300 mg QHS ANC 3800 on 11/2/2020  Indications: treatment-resistant schizophrenia 7 Tab 0    metoprolol succinate (TOPROL-XL) 25 mg XL tablet Take 1 Tab by mouth daily. Indications: tachycardia due to medication side effect 30 Tab 1       Past History     Past Medical History:  Past Medical History:   Diagnosis Date    Abscess 10/15/2020    Psychiatric disorder     Psychotic disorder ([address]     Withdrawal syndrome ([address]        Past Surgical History:  History reviewed. No pertinent surgical history. Family History:  History reviewed. No pertinent family history.     Social History:  Social History     Tobacco Use    Smoking status: Never Smoker    Smokeless tobacco: Never Used   Substance Use Topics    Alcohol use: No    Drug use: No       Allergies: Allergies   Allergen Reactions    Fluphenazine Unknown (comments)     Pt is unable to communicate properly.  Penicillins Unknown (comments)     Pt is unable to communicate properly. Review of Systems   Review of Systems   Unable to perform ROS: Psychiatric disorder       Physical Exam     Vitals:    12/14/20 1945 12/14/20 2008 12/14/20 2340 12/15/20 0014   BP: 133/82  138/83 (!) 132/90   Pulse: (!) 110 (!) 101 (!) 103 98   Resp: 18  18 18   Temp: 98.9 °F (37.2 °C)  98.3 °F (36.8 °C) 98.3 °F (36.8 °C)   SpO2: 97%  100% 97%   Weight:       Height:         Physical Exam  Vitals signs and nursing note reviewed. Constitutional:       General: He is not in acute distress. Appearance: He is well-developed. HENT:      Head: Normocephalic and atraumatic. Right Ear: External ear normal.      Left Ear: External ear normal.      Nose: Nose normal.      Mouth/Throat:      Pharynx: No oropharyngeal exudate. Eyes:      Conjunctiva/sclera: Conjunctivae normal.      Pupils: Pupils are equal, round, and reactive to light. Neck:      Musculoskeletal: Normal range of motion and neck supple. Cardiovascular:      Rate and Rhythm: Normal rate and regular rhythm. Pulses: Normal pulses. Heart sounds: Normal heart sounds. Pulmonary:      Effort: Pulmonary effort is normal.      Breath sounds: Normal breath sounds. Abdominal:      General: Bowel sounds are normal. There is no distension. Palpations: Abdomen is soft. Tenderness: There is no abdominal tenderness. Musculoskeletal: Normal range of motion. Lymphadenopathy:      Cervical: No cervical adenopathy. Skin:     General: Skin is warm and dry. Neurological:      Mental Status: He is alert and oriented to person, place, and time. GCS: GCS eye subscore is 4. GCS verbal subscore is 5. GCS motor subscore is 6.       Cranial Nerves: No cranial nerve deficit. Psychiatric:         Attention and Perception: He is inattentive. Mood and Affect: Affect is flat. Behavior: Behavior is withdrawn. Diagnostic Study Results     Labs -     No results found for this or any previous visit (from the past 12 hour(s)). Radiologic Studies -   No orders to display     CT Results  (Last 48 hours)    None        CXR Results  (Last 48 hours)    None            Medical Decision Making   I am the first provider for this patient. I reviewed the vital signs, available nursing notes, past medical history, past surgical history, family history and social history. Vital Signs-Reviewed the patient's vital signs. Records Reviewed: Nursing Notes, Old Medical Records, Previous Radiology Studies and Previous Laboratory Studies    44 yo M present for psychiatric disorder revealing withdrawn behavior and uncooperative at times. Patient does have a psychiatric history with recent admission on 9/18/2020 for paranoid schizophrenia. Plan to obtain labs for medical clearance. Patient arrives tachycardic in the 130s. Will attempt EKG. Of note, patient was tachycardic on previous visits. ED Course:   ED Course as of Dec 15 1458   Mon Dec 14, 2020   1219 Informed by nurse Champion Wartrace that pt is not cooperative with labs or EKG. Updated attendidng Dr aKte Haskins and crisis counselor Anthonette Gottron. Pt is not verbally or physically aggressive with staff at this time. Will allow pt to rest and attempt again. Offered medication but pt declined. [NA]   1424 Patient remains tachycardic at 128 but BP within normal limits. Will give 1 L of fluid and reassess. Awaiting EKG at this time. [NA]   1531  after IVF. Pt appears slightly agitated with officer which I suspect may also be the cause of his tachycardia. Chart reveal shows pt has hx of tachycardia on previous admission. Will give IV ativan.      Crisis counselor is searching for bed placement at outside facility. [NA]   1832 Pt is medically cleared. Pt tachycardia is chronic and EKG is unremarkable for SVT or arrythmias. [NA]   2032 Informed by nurse that patient heart rate was 101 prior to Ativan administration. States that patient is resting at this time. Advised to hold Ativan and DC metoprolol. As I suspected that patient may be tachycardic due to agitation. We will continue to monitor. [NA]   2032 Case signout given to JASON Enamorado for further evaluation. Patient is awaiting bed placement at this time. [NA]   56 Pt accepted to [facility], Dr. Jean Claude Leija MD  [NA] Alisha Lion NP         Disposition:  Admit    Follow-up Information    None         Discharge Medication List as of 12/15/2020 12:22 AM          Provider Notes (Medical Decision Making):   DDX: Schizophrenia, anxiety, UTI, psychosis, aggressive outbursts          Diagnosis     Clinical Impression: No diagnosis found.

## 2020-12-15 VITALS
TEMPERATURE: 98.3 F | WEIGHT: 189 LBS | SYSTOLIC BLOOD PRESSURE: 132 MMHG | RESPIRATION RATE: 18 BRPM | BODY MASS INDEX: 27.99 KG/M2 | HEIGHT: 69 IN | DIASTOLIC BLOOD PRESSURE: 90 MMHG | HEART RATE: 98 BPM | OXYGEN SATURATION: 97 %

## 2020-12-15 LAB
ATRIAL RATE: 114 BPM
CALCULATED P AXIS, ECG09: 48 DEGREES
CALCULATED R AXIS, ECG10: 19 DEGREES
CALCULATED T AXIS, ECG11: 44 DEGREES
DIAGNOSIS, 93000: NORMAL
P-R INTERVAL, ECG05: 186 MS
Q-T INTERVAL, ECG07: 318 MS
QRS DURATION, ECG06: 84 MS
QTC CALCULATION (BEZET), ECG08: 438 MS
VENTRICULAR RATE, ECG03: 114 BPM

## 2020-12-15 NOTE — ED NOTES
TRANSFER - OUT REPORT:    Verbal report given to Malcom Heller RN (name) on April Lunch  [facility] of care       Report consisted of patients Situation, Background, Assessment and   Recommendations(SBAR). Information from the following report(s) SBAR, Kardex, ED Summary, Intake/Output, MAR and Recent Results was reviewed with the receiving nurse. Lines:       Opportunity for questions and clarification was provided.       Patient transported with:  CPD

## 2020-12-15 NOTE — ED NOTES
Pt provided with dinner tray and ate about half. RN went to evaluate pt. Pt was moved to [facility]. Pt was on the floor praying and shaking. RN asked if she could take patient's vitals and he said, \"Can we not? \" Pt went back on bed and has been laying there.

## 2020-12-15 NOTE — ED NOTES
Gave report to [employer]. Gave pt some water and pt leaving with them, chart, face sheet, EMTALA, prescreen, and two belonging bags. Pt in no distress.

## 2020-12-15 NOTE — ED NOTES
Pt resting and pulse is . NP notified. She would like to hold ativan for now and discontinue metoprolol.

## 2020-12-15 NOTE — ED NOTES
Accept[facility] Kevin Richardson [facility]. CPD to go to [address]; report to be called to [phone].

## 2020-12-15 NOTE — ED NOTES
Bedside and Verbal shift change report given to Kelsea Cotton (oncoming nurse) by Alvarado Mccord RN (offgoing nurse). Report included the following information SBAR, Kardex, ED Summary, Intake/Output, MAR and Recent Results.

## 2020-12-17 LAB
CLOZAPINE SERPL-MCNC: 1191 NG/ML (ref 350–650)
CLOZAPINE+NOR SERPL-MCNC: 1860 NG/ML
NORCLOZAPINE SERPL-MCNC: 669 NG/ML

## 2022-03-18 PROBLEM — L02.91 ABSCESS: Status: ACTIVE | Noted: 2020-10-15

## 2022-03-18 PROBLEM — F25.9 SCHIZOAFFECTIVE DISORDER (HCC): Status: ACTIVE | Noted: 2020-09-18

## 2022-03-19 PROBLEM — F20.9 SCHIZOPHRENIA (HCC): Status: ACTIVE | Noted: 2017-11-27

## 2022-03-19 PROBLEM — F20.0 PARANOID SCHIZOPHRENIA (HCC): Status: ACTIVE | Noted: 2017-03-15

## 2022-08-13 NOTE — PROGRESS NOTES
Problem: Altered Thought Process (Adult/Pediatric)  Goal: *STG: Complies with medication therapy  Outcome: Progressing Towards Goal  Patient has been isolative to room this shift. Patient has been mute in his interactions with this writer. Patient refused his EKG this shift but accepted his medications PO. He remains on court ordered medications. Patient continues to refuse VS and height and weight. Moderna dose 1 and 2

## 2023-04-06 ENCOUNTER — OFFICE VISIT (OUTPATIENT)
Dept: FAMILY MEDICINE CLINIC | Age: 53
End: 2023-04-06

## 2023-04-06 NOTE — PROGRESS NOTES
Griffin Onofre is a 46 y.o. male    Chief Complaint   Patient presents with    Establish Care     His sister feels that he is constipated per . Sister states when he eats he hold his right wrist. Sister would like you to check his wrist that nothing is wrong with it. 1. Have you been to the ER, urgent care clinic since your last visit? Hospitalized since your last visit? No  2. Have you seen or consulted any other health care providers outside of the [address] [place] [address] since your last visit? Include any pap smears or colon screening. No    Visit Vitals  Resp 20   Ht 5' 9\" (1.753 m)   BMI 27.91 kg/m²     3 most recent PHQ Screens 4/6/2023   PHQ Not Done Patient refuses     Health Maintenance Due   Topic Date Due    Hepatitis C Screening  Never done    Depression Screen  Never done    COVID-19 Vaccine (1) Never done    DTaP/Tdap/Td series (1 - Tdap) Never done    Colorectal Cancer Screening Combo  Never done    Medicare Yearly Exam  Never done    Shingles Vaccine (1 of 2) Never done       Patient refused to have vital signs checked.
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
## 2023-04-09 PROBLEM — L02.91 ABSCESS: Status: RESOLVED | Noted: 2020-10-15 | Resolved: 2023-04-09

## 2023-04-09 PROBLEM — F20.9 SCHIZOPHRENIA (HCC): Status: RESOLVED | Noted: 2017-11-27 | Resolved: 2023-04-09

## 2023-04-09 PROBLEM — F25.9 SCHIZOAFFECTIVE DISORDER (HCC): Status: RESOLVED | Noted: 2020-09-18 | Resolved: 2023-04-09

## 2023-10-06 NOTE — BH NOTES
Acute Illness Visit Note    Impression:   · Dysuria, frequent UTIs in the past  · No fever, back pain, vomiting, or systemic illness that would concern me for kidney infection (has had that in the past as well)    Plan:   · Obtain UA and UCx  · Plan to call family with results  · Recommend pyridium for pain with urination  · UA concerning for UTI- start Cephalexin 1000 mg BID for 7 days, will follow culture    Follow-up:   Return if symptoms worsen or fail to improve.    Order Details:  UA  Urine culture    Associated diagnoses for this encounter:  1. Dysuria    _____________________________________________________________    Chief Complaint   Patient presents with   • Office Visit     Painful Urination   History of UTI , took ibuprofen 600mg yesterday     Had diarrhea on Monday, no fever/vomit/nausea    .    History provided by:  Father and The patient    HPI: Jaquan Kyle is a 15 year old female who presents with dysuria. Started 2 days ago. No fevers, no vomiting. Does have urinary urgency and frequency as well. Otherwise feeling ok. Denies rash, lesions, ulcers, cuts, or trauma to vaginal area. Has had multiple UTIs in the past, one of which progressed to a kidney infection.     The problem list was reviewed and updated as follows:  Patient Active Problem List    Diagnosis Date Noted   • Perennial allergic rhinitis with seasonal variation 08/22/2019     Priority: Low     8/22/2019 SPT + tree, grass, weed, ragweed     • PFAS (pollen-food allergy syndrome) 08/22/2019     Priority: Low     Birch tree cross reactivity     • Routine child health maintenance 01/08/2015     Priority: Low   • Recurrent UTI (urinary tract infection) 06/17/2013     Priority: Low   • Vesicoureteral reflux, unilateral 06/17/2013     Priority: Low     Left sided, after Deflux x 2. On Bactrim prophylaxis.         ALLERGIES:  No Known Allergies  Medications were reviewed at the time of the encounter.    Physical Exam  Vitals:  Visit  Weekend Coverage:  CC:\". .. [employer] \"      Arnaud Ibrahim is observed in room. As NP entered his room Arnaud Ibrahim got up and went into his bathroom. NP returned at end of rounding and refused to answer questions. He is observed to be selectively mute. Arnaud Ibrahim is observed eating his meals and sleeping through the night.     Plan: continue current treatment plan Vitals  /72   Pulse 88   Temp 98.7 °F (37.1 °C) (Temporal)   Resp (!) 24   Wt 62.3 kg (137 lb 5.6 oz)   LMP 09/22/2023     General: alert, interactive  CV: normal HR  Pulm: good chest rise bilaterally, normal wob  Abd: soft, nontender, no CVA tenderness

## 2023-12-07 ENCOUNTER — HOSPITAL ENCOUNTER (INPATIENT)
Facility: HOSPITAL | Age: 53
LOS: 31 days | Discharge: STILL ADMITTED OR ADMINISTRATIVE | DRG: 885 | End: 2024-01-08
Attending: EMERGENCY MEDICINE | Admitting: PSYCHIATRY & NEUROLOGY
Payer: MEDICARE

## 2023-12-07 DIAGNOSIS — F33.1 MAJOR DEPRESSIVE DISORDER, RECURRENT EPISODE, MODERATE (HCC): ICD-10-CM

## 2023-12-07 DIAGNOSIS — F20.9 SCHIZOPHRENIA, UNSPECIFIED TYPE (HCC): ICD-10-CM

## 2023-12-07 DIAGNOSIS — Z74.09 IMMOBILITY: Primary | ICD-10-CM

## 2023-12-07 LAB
ALBUMIN SERPL-MCNC: 4.1 G/DL (ref 3.5–5)
ALBUMIN/GLOB SERPL: 1.1 (ref 1.1–2.2)
ALP SERPL-CCNC: 125 U/L (ref 45–117)
ALT SERPL-CCNC: 23 U/L (ref 12–78)
AMORPH CRY URNS QL MICRO: ABNORMAL
AMPHET UR QL SCN: NEGATIVE
ANION GAP SERPL CALC-SCNC: 11 MMOL/L (ref 5–15)
APPEARANCE UR: ABNORMAL
AST SERPL-CCNC: 15 U/L (ref 15–37)
BACTERIA URNS QL MICRO: NEGATIVE /HPF
BARBITURATES UR QL SCN: NEGATIVE
BASOPHILS # BLD: 0.1 K/UL (ref 0–0.1)
BASOPHILS NFR BLD: 1 % (ref 0–1)
BENZODIAZ UR QL: NEGATIVE
BILIRUB SERPL-MCNC: 0.6 MG/DL (ref 0.2–1)
BILIRUB UR QL: NEGATIVE
BUN SERPL-MCNC: 15 MG/DL (ref 6–20)
BUN/CREAT SERPL: 21 (ref 12–20)
CALCIUM SERPL-MCNC: 9.4 MG/DL (ref 8.5–10.1)
CANNABINOIDS UR QL SCN: NEGATIVE
CHLORIDE SERPL-SCNC: 105 MMOL/L (ref 97–108)
CO2 SERPL-SCNC: 26 MMOL/L (ref 21–32)
COCAINE UR QL SCN: NEGATIVE
COLOR UR: ABNORMAL
CREAT SERPL-MCNC: 0.73 MG/DL (ref 0.7–1.3)
DIFFERENTIAL METHOD BLD: NORMAL
EOSINOPHIL # BLD: 0.3 K/UL (ref 0–0.4)
EOSINOPHIL NFR BLD: 5 % (ref 0–7)
EPITH CASTS URNS QL MICRO: ABNORMAL /LPF
ERYTHROCYTE [DISTWIDTH] IN BLOOD BY AUTOMATED COUNT: 13.2 % (ref 11.5–14.5)
ETHANOL SERPL-MCNC: <10 MG/DL (ref 0–0.08)
FLUAV RNA SPEC QL NAA+PROBE: NOT DETECTED
FLUBV RNA SPEC QL NAA+PROBE: NOT DETECTED
GLOBULIN SER CALC-MCNC: 3.6 G/DL (ref 2–4)
GLUCOSE SERPL-MCNC: 99 MG/DL (ref 65–100)
GLUCOSE UR STRIP.AUTO-MCNC: NEGATIVE MG/DL
HCT VFR BLD AUTO: 44.4 % (ref 36.6–50.3)
HGB BLD-MCNC: 14.8 G/DL (ref 12.1–17)
HGB UR QL STRIP: NEGATIVE
IMM GRANULOCYTES # BLD AUTO: 0 K/UL (ref 0–0.04)
IMM GRANULOCYTES NFR BLD AUTO: 0 % (ref 0–0.5)
KETONES UR QL STRIP.AUTO: 15 MG/DL
LEUKOCYTE ESTERASE UR QL STRIP.AUTO: NEGATIVE
LYMPHOCYTES # BLD: 1.9 K/UL (ref 0.8–3.5)
LYMPHOCYTES NFR BLD: 28 % (ref 12–49)
Lab: NORMAL
MCH RBC QN AUTO: 29.7 PG (ref 26–34)
MCHC RBC AUTO-ENTMCNC: 33.3 G/DL (ref 30–36.5)
MCV RBC AUTO: 89.2 FL (ref 80–99)
METHADONE UR QL: NEGATIVE
MONOCYTES # BLD: 0.5 K/UL (ref 0–1)
MONOCYTES NFR BLD: 7 % (ref 5–13)
NEUTS SEG # BLD: 4 K/UL (ref 1.8–8)
NEUTS SEG NFR BLD: 59 % (ref 32–75)
NITRITE UR QL STRIP.AUTO: NEGATIVE
NRBC # BLD: 0 K/UL (ref 0–0.01)
NRBC BLD-RTO: 0 PER 100 WBC
OPIATES UR QL: NEGATIVE
PCP UR QL: NEGATIVE
PH UR STRIP: 7.5 (ref 5–8)
PLATELET # BLD AUTO: 188 K/UL (ref 150–400)
PMV BLD AUTO: 11.4 FL (ref 8.9–12.9)
POTASSIUM SERPL-SCNC: 4.2 MMOL/L (ref 3.5–5.1)
PROT SERPL-MCNC: 7.7 G/DL (ref 6.4–8.2)
PROT UR STRIP-MCNC: NEGATIVE MG/DL
RBC # BLD AUTO: 4.98 M/UL (ref 4.1–5.7)
RBC #/AREA URNS HPF: ABNORMAL /HPF (ref 0–5)
SARS-COV-2 RNA RESP QL NAA+PROBE: NOT DETECTED
SODIUM SERPL-SCNC: 142 MMOL/L (ref 136–145)
SP GR UR REFRACTOMETRY: 1.02
URINE CULTURE IF INDICATED: ABNORMAL
UROBILINOGEN UR QL STRIP.AUTO: 1 EU/DL (ref 0.2–1)
WBC # BLD AUTO: 6.8 K/UL (ref 4.1–11.1)
WBC URNS QL MICRO: ABNORMAL /HPF (ref 0–4)

## 2023-12-07 PROCEDURE — 36415 COLL VENOUS BLD VENIPUNCTURE: CPT

## 2023-12-07 PROCEDURE — 81001 URINALYSIS AUTO W/SCOPE: CPT

## 2023-12-07 PROCEDURE — 87636 SARSCOV2 & INF A&B AMP PRB: CPT

## 2023-12-07 PROCEDURE — 80307 DRUG TEST PRSMV CHEM ANLYZR: CPT

## 2023-12-07 PROCEDURE — 80053 COMPREHEN METABOLIC PANEL: CPT

## 2023-12-07 PROCEDURE — 85025 COMPLETE CBC W/AUTO DIFF WBC: CPT

## 2023-12-07 PROCEDURE — 82077 ASSAY SPEC XCP UR&BREATH IA: CPT

## 2023-12-07 PROCEDURE — 99285 EMERGENCY DEPT VISIT HI MDM: CPT

## 2023-12-07 RX ORDER — LORAZEPAM 2 MG/ML
1 INJECTION INTRAMUSCULAR ONCE
Status: DISCONTINUED | OUTPATIENT
Start: 2023-12-07 | End: 2023-12-08

## 2023-12-07 ASSESSMENT — PAIN - FUNCTIONAL ASSESSMENT: PAIN_FUNCTIONAL_ASSESSMENT: NONE - DENIES PAIN

## 2023-12-07 NOTE — ED NOTES
Pt presents to ED by [place] [employer] as an ECO. He is not answering any questions and sitting in a ball since the police brought him in. Police states counseling service and sister states he has not been eating, drinking, or taking care of himself.  Pt is alert and oriented x 4, RR even and unlabored, skin is warm and dry. Assesment completed and pt updated on plan of care.       Emergency Department Nursing Plan of Care       The Nursing Plan of Care is developed from the Nursing assessment and Emergency Department Attending provider initial evaluation.  The plan of care may be reviewed in the “ED Provider note”.    The Plan of Care was developed with the following considerations:   Patient / Family readiness to learn indicated by:verbalized understanding  Persons(s) to be included in education: patient  Barriers to Learning/Limitations:None    Signed     Jaquan Fox RN    12/7/2023   3:22 PM       Jaquan Fox RN  12/07/23 4023
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
## 2023-12-07 NOTE — ED PROVIDER NOTES
[facility] EMERGENCY DEPT  EMERGENCY DEPARTMENT ENCOUNTER       Pt Name: Roge Cowan  MRN: [id number]  Birthdate 1970  Date of evaluation: 12/7/2023  Provider: Rosario Van MD   PCP: Cole Boyd MD  Note Started: 3:12 PM EST 12/7/23     CHIEF COMPLAINT       Chief Complaint   Patient presents with    Mental Health Problem        HISTORY OF PRESENT ILLNESS: 1 or more elements      History From: [facility], police, History limited by: psychiatric disorder     Roge Cowan is a 53 y.o. male who presents in police custody for a mental health problem       Please See MDM for Additional Details of the HPI/PMH  Nursing Notes were all reviewed and agreed with or any disagreements were addressed in the HPI.     REVIEW OF SYSTEMS        Positives and Pertinent negatives as per HPI.    PAST HISTORY     Past Medical History:  Past Medical History:   Diagnosis Date    Psychiatric disorder     Psychotic disorder (HCC)     Withdrawal syndrome (HCC)        Past Surgical History:  No past surgical history on file.    Family History:  Family History   Adopted: Yes       Social History:  Social History     Tobacco Use    Smoking status: Some Days    Smokeless tobacco: Never   Substance Use Topics    Alcohol use: Never    Drug use: No       Allergies:  Not on File    CURRENT MEDICATIONS      Previous Medications    No medications on file       SCREENINGS               No data recorded         PHYSICAL EXAM      ED Triage Vitals [12/07/23 1448]   Enc Vitals Group      /85      Pulse 95      Respirations 12      Temp       Temp src       SpO2 100 %      Weight       Height       Head Circumference       Peak Flow       Pain Score       Pain Loc       Pain Edu?       Excl. in GC?         Physical Exam  Constitutional:       General: He is not in acute distress.     Appearance: Normal appearance. He is not ill-appearing.      Comments: Patient sitting on bed with legs and knees tucked under body, face down on bed, refusing to  answer questions, refusing to make eye contact, not speaking   HENT:      Head: Normocephalic and atraumatic.   Eyes:      Extraocular Movements: Extraocular movements intact.      Pupils: Pupils are equal, round, and reactive to light.   Cardiovascular:      Rate and Rhythm: Normal rate.   Pulmonary:      Effort: Pulmonary effort is normal. No respiratory distress.   Musculoskeletal:         General: Normal range of motion.      Cervical back: Normal range of motion.   Skin:     General: Skin is warm and dry.   Neurological:      Mental Status: He is alert.          DIAGNOSTIC RESULTS   LABS:     Recent Results (from the past 24 hour(s))   CBC with Auto Differential    Collection Time: 12/07/23  3:20 PM   Result Value Ref Range    WBC 6.8 4.1 - 11.1 K/uL    RBC 4.98 4.10 - 5.70 M/uL    Hemoglobin 14.8 12.1 - 17.0 g/dL    Hematocrit 44.4 36.6 - 50.3 %    MCV 89.2 80.0 - 99.0 FL    MCH 29.7 26.0 - 34.0 PG    MCHC 33.3 30.0 - 36.5 g/dL    RDW 13.2 11.5 - 14.5 %    Platelets 188 150 - 400 K/uL    MPV 11.4 8.9 - 12.9 FL    Nucleated RBCs 0.0 0  WBC    nRBC 0.00 0.00 - 0.01 K/uL    Neutrophils % 59 32 - 75 %    Lymphocytes % 28 12 - 49 %    Monocytes % 7 5 - 13 %    Eosinophils % 5 0 - 7 %    Basophils % 1 0 - 1 %    Immature Granulocytes 0 0.0 - 0.5 %    Neutrophils Absolute 4.0 1.8 - 8.0 K/UL    Lymphocytes Absolute 1.9 0.8 - 3.5 K/UL    Monocytes Absolute 0.5 0.0 - 1.0 K/UL    Eosinophils Absolute 0.3 0.0 - 0.4 K/UL    Basophils Absolute 0.1 0.0 - 0.1 K/UL    Absolute Immature Granulocyte 0.0 0.00 - 0.04 K/UL    Differential Type AUTOMATED     Ethanol    Collection Time: 12/07/23  3:37 PM   Result Value Ref Range    Ethanol Lvl <10 <10 MG/DL   COVID-19 & Influenza Combo    Collection Time: 12/07/23  3:37 PM    Specimen: Nasopharyngeal   Result Value Ref Range    SARS-CoV-2, PCR Not detected NOTD      Rapid Influenza A By PCR Not detected      Rapid Influenza B By PCR Not detected     Comprehensive Metabolic

## 2023-12-08 PROBLEM — F25.9 SCHIZOAFFECTIVE DISORDER (HCC): Status: ACTIVE | Noted: 2023-12-08

## 2023-12-08 PROCEDURE — 90791 PSYCH DIAGNOSTIC EVALUATION: CPT

## 2023-12-08 PROCEDURE — 1240000000 HC EMOTIONAL WELLNESS R&B

## 2023-12-08 PROCEDURE — 6360000002 HC RX W HCPCS: Performed by: PSYCHIATRY & NEUROLOGY

## 2023-12-08 RX ORDER — HALOPERIDOL 5 MG/ML
5 INJECTION INTRAMUSCULAR EVERY 4 HOURS PRN
Status: DISCONTINUED | OUTPATIENT
Start: 2023-12-08 | End: 2024-01-08 | Stop reason: HOSPADM

## 2023-12-08 RX ORDER — POLYETHYLENE GLYCOL 3350 17 G/17G
17 POWDER, FOR SOLUTION ORAL DAILY PRN
Status: DISCONTINUED | OUTPATIENT
Start: 2023-12-08 | End: 2024-01-08 | Stop reason: HOSPADM

## 2023-12-08 RX ORDER — MAGNESIUM HYDROXIDE/ALUMINUM HYDROXICE/SIMETHICONE 120; 1200; 1200 MG/30ML; MG/30ML; MG/30ML
30 SUSPENSION ORAL EVERY 6 HOURS PRN
Status: DISCONTINUED | OUTPATIENT
Start: 2023-12-08 | End: 2024-01-08 | Stop reason: HOSPADM

## 2023-12-08 RX ORDER — LORAZEPAM 2 MG/ML
2 INJECTION INTRAMUSCULAR EVERY 6 HOURS PRN
Status: DISCONTINUED | OUTPATIENT
Start: 2023-12-08 | End: 2023-12-08

## 2023-12-08 RX ORDER — TRAZODONE HYDROCHLORIDE 50 MG/1
50 TABLET ORAL NIGHTLY PRN
Status: DISCONTINUED | OUTPATIENT
Start: 2023-12-08 | End: 2024-01-08 | Stop reason: HOSPADM

## 2023-12-08 RX ORDER — DIPHENHYDRAMINE HYDROCHLORIDE 50 MG/ML
50 INJECTION INTRAMUSCULAR; INTRAVENOUS EVERY 4 HOURS PRN
Status: DISCONTINUED | OUTPATIENT
Start: 2023-12-08 | End: 2024-01-08 | Stop reason: HOSPADM

## 2023-12-08 RX ORDER — HYDROXYZINE HYDROCHLORIDE 25 MG/1
50 TABLET, FILM COATED ORAL 3 TIMES DAILY PRN
Status: DISCONTINUED | OUTPATIENT
Start: 2023-12-08 | End: 2024-01-08 | Stop reason: HOSPADM

## 2023-12-08 RX ORDER — LORAZEPAM 2 MG/ML
2 INJECTION INTRAMUSCULAR EVERY 6 HOURS PRN
Status: DISCONTINUED | OUTPATIENT
Start: 2023-12-08 | End: 2024-01-04

## 2023-12-08 RX ORDER — HALOPERIDOL 5 MG/1
5 TABLET ORAL EVERY 4 HOURS PRN
Status: DISCONTINUED | OUTPATIENT
Start: 2023-12-08 | End: 2024-01-08 | Stop reason: HOSPADM

## 2023-12-08 RX ORDER — ACETAMINOPHEN 325 MG/1
650 TABLET ORAL EVERY 4 HOURS PRN
Status: DISCONTINUED | OUTPATIENT
Start: 2023-12-08 | End: 2024-01-08 | Stop reason: HOSPADM

## 2023-12-08 RX ADMIN — LORAZEPAM 2 MG: 2 INJECTION INTRAMUSCULAR; INTRAVENOUS at 16:24

## 2023-12-08 RX ADMIN — LORAZEPAM 2 MG: 2 INJECTION INTRAMUSCULAR; INTRAVENOUS at 22:11

## 2023-12-08 NOTE — ED NOTES
Can not access any history from pt. Not answering any questions.     Jaquan Fox, RN  12/07/23 1987

## 2023-12-08 NOTE — ED NOTES
Verbal shift change report given to Domonique (oncoming nurse) by Johnnie SOTO (offgoing nurse). Report included the following information Nurse Handoff Report, Index, ED Encounter Summary, ED SBAR, Neuro Assessment, and Event Log.        Johnnie Edward RN  12/08/23 0778

## 2023-12-08 NOTE — CARE COORDINATION
12/08/23 1615   ITP   Date of Plan 12/08/23   Date of Next Review 12/13/23   Primary Diagnosis Code Schizophrenia   Barriers to Treatment Psychiatric symptom (comment)  (Mutism)   Strengths Incorporated in Plan Family supports;Community supports   Plan of Care   Long Term Goal (LTG) Stated in patient/guardian terms To maintain safety and stability in the community   Short Term Goal 1   Short Term Goal 1 Client will be oriented to program and staff, and participate in assessment process   Baseline Functioning Client presents mute   Target Client will participate in treatment as much as he is able to   Objectives Other (comment)  (Client will engage with treatment team)   Intervention 1 Acknowledge client strengths   Frequency To build rapport   Measured by Staff observation;Self report   Staff Responsible Clinical staff;[facility] staff   Intervention 2 Milieu therapy and support   Frequency Daily   Measured by Staff observation   Staff Responsible Clinical staff;[facility] staff   STG Goal 1 Status: Patient Appears to be  Progressing toward treatment plan goal   Short Term Goal 2   Short Term Goal 2 Client will learn and demonstrate improved self management skills   Baseline Functioning Client reportedly not taking care of self at home   Target Client will be able to take care of self   Objectives Other (comment)  (Client will attend to ADLs while in hospital)   Intervention 1 Other (comment)  (Encourage client to attend to ADLs)   Frequency Daily   Measured by Staff observation;Self report   Staff Responsible Clinical staff;[facility] staff   Intervention 2 Monitor medications   Frequency Daily   Measured by Staff observation;Self report   Staff Responsible Clinical staff;[facility] staff   STG Goal 2 Status: Patient Appears to be  Progressing toward treatment plan goal   Crisis/Safety/Discharge Plan   Crisis/Safety Plan Standard program interventions and protocol   Comprehensive Assessment Completion Date 12/08/23   Discharge Plan Home  with [facility]

## 2023-12-08 NOTE — CONSULTS
PSYCHIATRY CONSULT NOTE:    REASON FOR CONSULT:  psychotic behavior  Bizarre,catatonic like behaviors    HISTORY OF PRESENTING COMPLAINT:  Roge Cowan is a 53 y.o. male per ED who presents in police custody for a mental health problem.  Arrives to ER under ECO with [place] [employer]. Patient not speaking or acknowledging this RN during triage. Patient in feral position no talking. Per  police this is how he has been in the past. Per PD patient was seen by a crisis counselor and ECO was issued. Patient lives with his sister who reports patient has not been eating, drinking or caring for himself, only lays in bed in the fetal position.     Poor historian due to mutism.  Lifted his head for  and uses the urinal appropriately.  Per officer on duty he eats when food is placed near him.      PAST PSYCHIATRIC HISTORY:  In Patient Schizophrenia, Catatonia    SUBSTANCE ABUSE HISTORY:  Social History     Substance and Sexual Activity   Drug Use No     Social History     Substance and Sexual Activity   Alcohol Use Never     Social History     Tobacco Use   Smoking Status Some Days   Smokeless Tobacco Never       PAST MEDICAL HISTORY:  Past Medical History:   Diagnosis Date    Psychiatric disorder     Psychotic disorder (HCC)     Withdrawal syndrome (HCC)        SOCIAL HISTORY:  Lives with his sister.  Denies drugs ETOH, Cigs    VITALS:  Vitals:    12/08/23 0452   BP: 119/88   Pulse: 86   Resp: 14   SpO2: 90%       MEDICATIONS:    Current Facility-Administered Medications:     LORazepam (ATIVAN) injection 1 mg, 1 mg, IntraMUSCular, Once, Rosario Van MD  No current outpatient medications on file.    MENTAL STATUS EXAM:  Roge Cowan' did not report anything just curled in a ball on the floor.  He raised his had for the  so that she may pt a blanket on the floor like a pillow to cushion his forehead.  No aggression or violence.  Interactive and limited awareness. Tolerating medications

## 2023-12-08 NOTE — ED NOTES
Pt provided with blue scrubs to change in to but is not responding to verbal cues. Charge aware.     Becky David, GAIL  12/08/23 3117

## 2023-12-08 NOTE — PLAN OF CARE
Problem: Coping  Goal: Pt/Family able to verbalize concerns and demonstrate effective coping strategies  Description: INTERVENTIONS:  1. Assist patient/family to identify coping skills, available support systems and cultural and spiritual values  2. Provide emotional support, including active listening and acknowledgement of concerns of patient and caregivers  3. Reduce environmental stimuli, as able  4. Instruct patient/family in relaxation techniques, as appropriate  5. Assess for spiritual pain/suffering and initiate Spiritual Care, Psychosocial Clinical Specialist consults as needed  Outcome: Not Progressing     Problem: Confusion  Goal: Confusion, delirium, dementia, or psychosis is improved or at baseline  Description: INTERVENTIONS:  1. Assess for possible contributors to thought disturbance, including medications, impaired vision or hearing, underlying metabolic abnormalities, dehydration, psychiatric diagnoses, and notify attending LIP  2. [place] high risk fall precautions, as indicated  3. Provide frequent short contacts to provide reality reorientation, refocusing and direction  4. Decrease environmental stimuli, including noise as appropriate  5. Monitor and intervene to maintain adequate nutrition, hydration, elimination, sleep and activity  6. If unable to ensure safety without constant attention obtain sitter and review sitter guidelines with assigned personnel  7. Initiate Psychosocial CNS and Spiritual Care consult, as indicated  Outcome: Not Progressing     Problem: Involuntary Admit  Goal: Will cooperate with staff recommendations and doctor's orders and will demonstrate appropriate behavior  Description: INTERVENTIONS:  1. Treat underlying conditions and offer medication as ordered  2. Educate regarding involuntary admission procedures and rules  3. Contain excessive/inappropriate behavior per unit and hospital policies  Outcome: Not Progressing

## 2023-12-08 NOTE — ED NOTES
Patient remains lying on the floor, balled up.  Patient provided with a blanket, a pair of socks also placed in patient room.  Patient remains non-interactive at this time.                   Domonique Alberto RN  12/08/23 0972

## 2023-12-08 NOTE — BH NOTE
[facility]  Admission Note     Admission Type:    Involuntary    Reason for admission:     53 year old male admitted with a primary diagnosis of Catatonia. Patient was brought to the ED after sister took out an ECO on patient due to not being able to care for himself. Upon arrival to the unit, patient presented as catatonic, unable to follow commands or speak. Patient got out of wheelchair and walked to room with assistance, then got on the floor curled up in a fetal position. Unable to participate in admission process. Per report, patient has been non compliant with medications, has not bathed in 6 months, not eating food or drinking. Eating toothpaste and urinating in the floor and in his bed.   Placed on Q15 minute rounds for safety.                         Medical Problems:   Past Medical History:   Diagnosis Date    Psychiatric disorder     Psychotic disorder (HCC)     Withdrawal syndrome (HCC)        Status EXAM:  Mental Status and Behavioral Exam  Normal: No  Level of Assistance: Partial assist  Facial Expression: Flat  Affect: Constricted  Level of Consciousness: Other (see comment) (Catatonia)  Frequency of Checks: 4 times per hour, close  Mood:Normal: No  Mood: Other (comment) (catatonia)  Motor Activity:Normal: No  Motor Activity: Unusual posture/gait  Eye Contact: Poor  Sexual Misconduct History:  (YOEL)  Preception:  (YOEL)  Attention:Normal: No  Attention: Others (comment)  Thought Processes: Other (comment) (YOEL)  Thought Content:Normal: No  Thought Content:  (YOEL)  Depression Symptoms: Other (comment) (YOEL)  Anxiety Symptoms:  (YOEL)  Cecilia Symptoms:  (YOEL)  Hallucinations: Unable to assess  Delusions: No  Memory:Normal: No  Memory:  (YOEL)  Insight and Judgment: No  Insight and Judgment:  (YOEL)    Pt admitted with followings belongings:  Dental Appliances: None  Vision - Corrective Lenses: None  Hearing Aid: None  Jewelry: None  Body Piercings Removed: N/A  Clothing: Shirt, Pants,  Undergarments  Other Valuables: Other (Comment)                     Stacey Tolliver, RN

## 2023-12-08 NOTE — ED NOTES
Was able to get PT to eat a whole sandwich and drink a sip of water.      Jaquan Fox, RN  12/07/23 1918

## 2023-12-08 NOTE — BH NOTE
PSYCHOSOCIAL ASSESSMENT  :Patient identifying info:   Choco Basilio is a 53 y.o., male admitted 12/7/2023  2:44 PM     Presenting problem and precipitating factors: Pt presents under TDO petitioned for by sister due to pt not eating, bathing, or taking care or self. Pt presents mute on unit. Pt followed by [facility].    Mental status assessment: Pt presents mute and nonresponsive.    Strengths/Recreation/Coping Skills:  MEDICARE    Plan: MEDICARE PART A AND B Member: [id number] Effective from: 4/1/1990   Subscriber: CHOCO BASILIO Subscriber ID: [id number] Guarantor: CHOCO BASILIO     [facility]    Plan: [facility] PLAN OF [place] Member: [id number] Effective from: 6/1/2023   Subscriber: HCOCO BASILIO Subscriber ID: [id number] Guarantor: CHOCO BASILIO       Collateral information: Will need HUMBLE for sister.    Current psychiatric /substance abuse providers and contact info: [facility]    Previous psychiatric/substance abuse providers and response to treatment: YOEL pt presents mute. Pt appears to have been at [facility] last in 2020 where he was prescribed Clozaril.    Family history of mental illness or substance abuse: YOEL, need collateral pt presents mute    Substance abuse history:    Social History     Tobacco Use    Smoking status: Some Days    Smokeless tobacco: Never   Substance Use Topics    Alcohol use: Never       History of biomedical complications associated with substance abuse: None reported    Patient's current acceptance of treatment or motivation for change: Minimal, pt mute and under TDO    Family constellation: Pt single with no children    Is significant other involved? No    Describe support system: Sister    Describe living arrangements and home environment: Pt lives with sister    GUARDIAN/POA: No    Guardian Name: None    Guardian Contact: None    Health issues:     Trauma history: Unable to assess, pt presents mute    Legal issues: None

## 2023-12-08 NOTE — BSMART NOTE
BSMART Liaison Team Note     LOS:  17 hours     Patient goal(s) for today: take medications as prescribed, make needs known in an appropriate manner  BSMART Liaison team focus/goals: assess needs, provide support and education, coordinate care with [facility], [facility], and B[facility]    Progress note: Attempted to meet with patient with Dr Rivero present. Patient was on his knees on the floor with a blanket on him curled up in a fetal position. Patient unable to engage in assessment. He does move and shifts position. He was on his knees with his face facing down and forehead lightly touching floor. This writer went a grabbed a blanket and asked the patient ot lift his head some to place the blanket on the floor under his forehead for comfort. Initially her did not raise it more than the approximately .5 inch that his head was already off the ground but this writer placed it as far under his forehead as possible. After he felt the blanket, patient raised his forehead further and this writer was able to place the blanket fully under his forehead for comfort. Patient was observed sitting up and shifting at times but did not speak or stand. Urinal on counter of the room and patient appears to have used it though officer currently at bedside did not witness patient standing or using the urinal. Per officer, when food was placed within reach of patient he did eat. Unable to assess SI, HI, and hallucinations as patient does not engage.    Dr Rivero reviewed chart and reports that he would like to admit the patient to the BHU. He will discuss with unit to see if they can make an appropriate bed. [facility] crisis clinician informed of potential placement and Access will call with an update.    Barriers to Discharge: TDO, mute, not willing or able to engage  Guns in the home: No     Outpatient provider(s):  [facility]  Insurance info/prescription coverage:  Medicare Part A & B, CCC[facility] [place]     Diagnosis:

## 2023-12-08 NOTE — ED NOTES
TRANSFER - OUT REPORT:    Verbal report given to Justus on Roge Cowan  being transferred to U for routine progression of patient care       Report consisted of patient's Situation, Background, Assessment and   Recommendations(SBAR).     Information from the following report(s) ED Encounter Summary and ED SBAR was reviewed with the receiving nurse.    Kinder Fall Assessment:                           Lines:   Peripheral IV 12/07/23 Proximal;Right Forearm (Active)   Site Assessment Clean, dry & intact 12/07/23 1538   Line Status Blood return noted 12/07/23 1538   Phlebitis Assessment No symptoms 12/07/23 1538   Infiltration Assessment 0 12/07/23 1538   Dressing Status New dressing applied 12/07/23 1538   Dressing Type Transparent 12/07/23 1538   Dressing Intervention New 12/07/23 1538        Opportunity for questions and clarification was provided.      Patient transported with:  Becky Short, RN  12/08/23 0388

## 2023-12-08 NOTE — ED NOTES
Received report from Primary RN.  Rounded on pt, Pt laying in bed in fetal position, pt RR even and unlabored, pt chest rise and fall symmetrical, Pt in NAD.  Updated pt on plan of care.       Johnnie Edward, RN  12/07/23 1934

## 2023-12-08 NOTE — ED NOTES
Bedside and Verbal shift change report given to DOMONIQUE STAPLES RN (oncoming nurse) by PIPER DAWSON RN  (offgoing nurse). Report included the following information Nurse Handoff Report.   Patient reportedly lying in bed and lying on floor, curled up in \"fetal position\" throughout most of the night.  Patient reportedly not speaking with any staff overnight, not interactive.               Domonique Alberto, GAIL  12/08/23 3807

## 2023-12-08 NOTE — ED NOTES
Psychiatrist Dr Rivero at bedside at this time for consult.  Patient remains on the floor.             Domonique Alberto RN  12/08/23 0948

## 2023-12-08 NOTE — ED NOTES
Rounded on pt, pt resting in bed quietly.  PD at bedside.  Offered any assistance for needs pt not responding.  Pt chest rise and fall symmetrical, pt in NAD      Johnnie Edward, RN  12/07/23 9584

## 2023-12-08 NOTE — GROUP NOTE
Group Therapy Note    Date: 12/8/2023    Group Start Time: 1500  Group End Time: 1600  Group Topic: Recreational    [facility]    Janessa Beverly        Group Therapy Note           Patient's Goal:  To concentrate on selected task    Notes:  Pt did not attend session    Signature:  JANESSA BEVERLY

## 2023-12-09 PROCEDURE — 6360000002 HC RX W HCPCS: Performed by: PSYCHIATRY & NEUROLOGY

## 2023-12-09 PROCEDURE — 6370000000 HC RX 637 (ALT 250 FOR IP): Performed by: PSYCHIATRY & NEUROLOGY

## 2023-12-09 PROCEDURE — 1240000000 HC EMOTIONAL WELLNESS R&B

## 2023-12-09 RX ORDER — RISPERIDONE 1 MG/1
1 TABLET ORAL NIGHTLY
Status: DISCONTINUED | OUTPATIENT
Start: 2023-12-09 | End: 2023-12-13

## 2023-12-09 RX ADMIN — LORAZEPAM 2 MG: 2 INJECTION INTRAMUSCULAR; INTRAVENOUS at 14:35

## 2023-12-09 RX ADMIN — LORAZEPAM 2 MG: 2 INJECTION INTRAMUSCULAR; INTRAVENOUS at 04:46

## 2023-12-09 RX ADMIN — RISPERIDONE 1 MG: 1 TABLET ORAL at 20:34

## 2023-12-09 NOTE — BH NOTE
Met with patient lying down in a fetal position tapping his feet. When writer greeted patient and attempted to take vital signs, patient rolled over and curled up with his face on the mattress. Patient is mute and unable to engage in assessment. Patient ate breakfast and lunch and has been observed getting up and going to the restroom. There have been no episodes of incontinence. Remains on Q15 minute rounds for safety.

## 2023-12-09 NOTE — BH NOTE
Patient was observed curled up in a fetal position face down on the bathroom floor. Staff attempted to redirect patient with no success. Staff offered to assist patient to move to his bed so  medication could be given. Patient then spoke and asked if he could take the injection in the bathroom, then proceeded to crawl to the bed. PRN Ativan 2mg IM given. Staff directed patient to wipe visible feces off of his buttocks and was given hygiene items. Patient stated \" there is no poop on my butt and I will just cover up with a blanket\". Patient wiped himself off and rolled over and covered up. No further interaction with staff.

## 2023-12-09 NOTE — PLAN OF CARE
Problem: Coping  Goal: Pt/Family able to verbalize concerns and demonstrate effective coping strategies  Description: INTERVENTIONS:  1. Assist patient/family to identify coping skills, available support systems and cultural and spiritual values  2. Provide emotional support, including active listening and acknowledgement of concerns of patient and caregivers  3. Reduce environmental stimuli, as able  4. Instruct patient/family in relaxation techniques, as appropriate  5. Assess for spiritual pain/suffering and initiate Spiritual Care, Psychosocial Clinical Specialist consults as needed  Outcome: Not Progressing     Problem: Confusion  Goal: Confusion, delirium, dementia, or psychosis is improved or at baseline  Description: INTERVENTIONS:  1. Assess for possible contributors to thought disturbance, including medications, impaired vision or hearing, underlying metabolic abnormalities, dehydration, psychiatric diagnoses, and notify attending LIP  2. [place] high risk fall precautions, as indicated  3. Provide frequent short contacts to provide reality reorientation, refocusing and direction  4. Decrease environmental stimuli, including noise as appropriate  5. Monitor and intervene to maintain adequate nutrition, hydration, elimination, sleep and activity  6. If unable to ensure safety without constant attention obtain sitter and review sitter guidelines with assigned personnel  7. Initiate Psychosocial CNS and Spiritual Care consult, as indicated  Outcome: Not Progressing     Problem: Involuntary Admit  Goal: Will cooperate with staff recommendations and doctor's orders and will demonstrate appropriate behavior  Description: INTERVENTIONS:  1. Treat underlying conditions and offer medication as ordered  2. Educate regarding involuntary admission procedures and rules  3. Contain excessive/inappropriate behavior per unit and hospital policies  Outcome: Not Progressing

## 2023-12-09 NOTE — BH NOTE
Writer met patient in  his room in a fetal position. He presents as catatonic, mute, and difficult to assess. Affect is constricted. Patient is not compliant with vital signs check, unable to attain to ADL. Snacks and drinks offered to patient , same tolerated.  PRN ativan administered x 2 during the shift. No aggressive behavior noted. Emotional support and encouragement provided. Q 15 minutes and hourly rounds in progress. Pt slept for the total of 4.30 hours.

## 2023-12-09 NOTE — PLAN OF CARE
Problem: Coping  Goal: Pt/Family able to verbalize concerns and demonstrate effective coping strategies  Description: INTERVENTIONS:  1. Assist patient/family to identify coping skills, available support systems and cultural and spiritual values  2. Provide emotional support, including active listening and acknowledgement of concerns of patient and caregivers  3. Reduce environmental stimuli, as able  4. Instruct patient/family in relaxation techniques, as appropriate  5. Assess for spiritual pain/suffering and initiate Spiritual Care, Psychosocial Clinical Specialist consults as needed  12/8/2023 2233 by Stefany Bergeron RN  Outcome: Progressing  12/8/2023 1720 by Satcey Tolliver, RN  Outcome: Not Progressing     Problem: Confusion  Goal: Confusion, delirium, dementia, or psychosis is improved or at baseline  Description: INTERVENTIONS:  1. Assess for possible contributors to thought disturbance, including medications, impaired vision or hearing, underlying metabolic abnormalities, dehydration, psychiatric diagnoses, and notify attending LIP  2. [place] high risk fall precautions, as indicated  3. Provide frequent short contacts to provide reality reorientation, refocusing and direction  4. Decrease environmental stimuli, including noise as appropriate  5. Monitor and intervene to maintain adequate nutrition, hydration, elimination, sleep and activity  6. If unable to ensure safety without constant attention obtain sitter and review sitter guidelines with assigned personnel  7. Initiate Psychosocial CNS and Spiritual Care consult, as indicated  12/8/2023 2233 by Stefany Bergeron RN  Outcome: Progressing  12/8/2023 1720 by Stacey Tolliver, RN  Outcome: Not Progressing     Problem: Involuntary Admit  Goal: Will cooperate with staff recommendations and doctor's orders and will demonstrate appropriate behavior  Description: INTERVENTIONS:  1. Treat underlying conditions and offer medication as ordered  2.  Educate regarding involuntary admission procedures and rules  3. Contain excessive/inappropriate behavior per unit and hospital policies  12/8/2023 1720 by Stacey Tolliver, RN  Outcome: Not Progressing

## 2023-12-09 NOTE — PROGRESS NOTES
[facility] Admission Pharmacy Medication Reconciliation    Information obtained from:  Unable to interview patient due to patient's condition, chart review  RxQuery data available1: yes    Comments/recommendations:    1) Unable to interview patient due to patient's condition.    2) These were filled recently per RxQuery by [facility] but per chart review, patient has been noted to be noncompliant.    12/4/23 - Clozapine  mg #112 (28-day supply)  12/4/23 - Clozapine ODT 25 mg #56 (28-day supply)  12/1/23 - Lorazepam 1 mg #62 (31-day supply)  12/1/23 - Topiramate 100  mg #31 (31-day supply)  11/17/23 - Abilify Maintena  mg injection  9/15/23 - Trazodone 50 mg #30    3) The [place] Prescription Monitoring Program () was accessed to determine fill history of any controlled medications:  Recurring lorazepam 1 mg - last filled 12/1/23 #62 (31-day supply)     1RxQuery pharmacy benefit data reflects medications filled and processed through the patient's insurance, however                this data does NOT capture whether the medication was picked up or is currently being taken by the patient.       Past Medical History/Disease States:  Past Medical History:   Diagnosis Date    Psychiatric disorder     Psychotic disorder (HCC)     Withdrawal syndrome (HCC)          Patient allergies:   Allergies as of 12/07/2023    (Not on File)       Prior to admission medications  None          Thank you,  TAHIR HAWTHORNE [facility]

## 2023-12-09 NOTE — BH NOTE
INITIAL PSYCHIATRIC INTERVIEW:      CHIEF COMPLAINT:        HISTORY OF PRESENTING COMPLAINT:  Roge Cowan is a 53 y.o. White (non-) male who is currently admitted to the psychiatric floor at [facility].     He was brought to hospital in police custody for a mental health problem.  He reportedly has not been engaging in self care and has not been eating or drinking.  Patient unable to participate in the interview.  Maintains the fetal position and moves feet when spoken to.  Unable to obtain any information from him.      PAST PSYCHIATRIC HISTORY:    Schizophrenia    PAST MEDICAL HISTORY:  Please see H&P for details.   Past Medical History:   Diagnosis Date    Psychiatric disorder     Psychotic disorder (HCC)     Withdrawal syndrome (HCC)        Prior to Admission medications    Not on File         Lab Results   Component Value Date/Time    WBC 6.8 12/07/2023 03:20 PM    HGB 14.8 12/07/2023 03:20 PM    HCT 44.4 12/07/2023 03:20 PM     12/07/2023 03:20 PM    MCV 89.2 12/07/2023 03:20 PM      Lab Results   Component Value Date/Time     12/07/2023 03:37 PM    K 4.2 12/07/2023 03:37 PM     12/07/2023 03:37 PM    CO2 26 12/07/2023 03:37 PM    BUN 15 12/07/2023 03:37 PM    GFRAA >60 12/14/2020 01:24 PM    GLOB 3.6 12/07/2023 03:37 PM    ALT 23 12/07/2023 03:37 PM      [unfilled]      PSYCHOSOCIAL HISTORY:        MENTAL STATUS EXAM:  General appearance:  Poorly  groomed; in fetal position  Eye contact: Absent eye contact  Speech: Mumbling  Mood: \"Unable to assess\"  Affect : Flat  Thought Process: Disorganized  Perception: Unable to assess   Thought Content: Unable to assess  Insight: Impaired  Judgment: Impaired  Cognition: Unable to assess.       ASSESSMENT AND PLAN:  Roge Cowan meets criteria for a diagnosis of   Schizophrenia, with catatonia.      Continue inpatient stay for the safety and optimum care of the patient   Routine labs to be ordered as needed.   Psychotropic

## 2023-12-10 PROCEDURE — 6360000002 HC RX W HCPCS: Performed by: PSYCHIATRY & NEUROLOGY

## 2023-12-10 PROCEDURE — 1240000000 HC EMOTIONAL WELLNESS R&B

## 2023-12-10 PROCEDURE — 6370000000 HC RX 637 (ALT 250 FOR IP): Performed by: PSYCHIATRY & NEUROLOGY

## 2023-12-10 RX ADMIN — LORAZEPAM 2 MG: 2 INJECTION INTRAMUSCULAR; INTRAVENOUS at 13:27

## 2023-12-10 RX ADMIN — LORAZEPAM 2 MG: 2 INJECTION INTRAMUSCULAR; INTRAVENOUS at 19:45

## 2023-12-10 NOTE — PROGRESS NOTES
Writer met patient resting  in his room. Patient is mute, refused to answer assessment questions. Affect is constricted. Patient complied with med, v/signs check, unable to attain to ADL. Snacks and drinks offered to patient , same tolerated.  No PRN administered  during the shift. No aggressive behavior noted. Emotional support and encouragement provided. Q 15 minutes and hourly rounds in progress. Pt slept for the total of  5:45 hours.

## 2023-12-10 NOTE — BH NOTE
PSYCHIATRIC PROGRESS NOTE       Patient: Roge Cowan MRN: [id number] SSN: [id number]   YOB: 1970 Age: 53 y.o. Sex: male     Admit Date: 12/7/2023    LOS: 2 days       Chief Complaint:      Interval History:   - Roge Cowan remains mute. Not responding to conversations.  Maintains the fetal position.  Per staff, he responded to conversations in a monosyllabic fashion earlier.    Past Medical History:  Past Medical History:   Diagnosis Date    Psychiatric disorder     Psychotic disorder (HCC)     Withdrawal syndrome (HCC)          ALLERGIES:(reviewed/updated 12/10/2023)  Allergies   Allergen Reactions    Fluphenazine Other (See Comments)    Penicillins Other (See Comments)       Laboratory report:  Lab Results   Component Value Date/Time    WBC 6.8 12/07/2023 03:20 PM    HGB 14.8 12/07/2023 03:20 PM    HCT 44.4 12/07/2023 03:20 PM     12/07/2023 03:20 PM    MCV 89.2 12/07/2023 03:20 PM      Lab Results   Component Value Date/Time     12/07/2023 03:37 PM    K 4.2 12/07/2023 03:37 PM     12/07/2023 03:37 PM    CO2 26 12/07/2023 03:37 PM    BUN 15 12/07/2023 03:37 PM    GFRAA >60 12/14/2020 01:24 PM    GLOB 3.6 12/07/2023 03:37 PM    ALT 23 12/07/2023 03:37 PM      [unfilled]    Lab Results   Component Value Date/Time    VALP 97 09/18/2020 04:42 PM     No results found for: \"LITHM\"    Vital Signs  Patient Vitals for the past 24 hrs:   Temp Pulse Resp BP   12/10/23 0900 -- 93 18 (!) 106/53   12/09/23 2215 97.4 °F (36.3 °C) 70 16 110/62     Wt Readings from Last 3 Encounters:   No data found for Wt     Temp Readings from Last 3 Encounters:     BP Readings from Last 3 Encounters:   12/10/23 (!) 106/53     Pulse Readings from Last 3 Encounters:   12/10/23 93       Radiology (reviewed/updated 12/10/2023)  [unfilled]    Current Facility-Administered Medications   Medication Dose Route Frequency Provider Last Rate Last Admin    risperiDONE (RISPERDAL) tablet 1 mg  1 mg Oral Nightly Igbide,

## 2023-12-10 NOTE — PLAN OF CARE
Problem: Coping  Goal: Pt/Family able to verbalize concerns and demonstrate effective coping strategies  Description: INTERVENTIONS:  1. Assist patient/family to identify coping skills, available support systems and cultural and spiritual values  2. Provide emotional support, including active listening and acknowledgement of concerns of patient and caregivers  3. Reduce environmental stimuli, as able  4. Instruct patient/family in relaxation techniques, as appropriate  5. Assess for spiritual pain/suffering and initiate Spiritual Care, Psychosocial Clinical Specialist consults as needed  Outcome: Not Progressing     Problem: Confusion  Goal: Confusion, delirium, dementia, or psychosis is improved or at baseline  Description: INTERVENTIONS:  1. Assess for possible contributors to thought disturbance, including medications, impaired vision or hearing, underlying metabolic abnormalities, dehydration, psychiatric diagnoses, and notify attending LIP  2. [place] high risk fall precautions, as indicated  3. Provide frequent short contacts to provide reality reorientation, refocusing and direction  4. Decrease environmental stimuli, including noise as appropriate  5. Monitor and intervene to maintain adequate nutrition, hydration, elimination, sleep and activity  6. If unable to ensure safety without constant attention obtain sitter and review sitter guidelines with assigned personnel  7. Initiate Psychosocial CNS and Spiritual Care consult, as indicated  Outcome: Not Progressing     Problem: Involuntary Admit  Goal: Will cooperate with staff recommendations and doctor's orders and will demonstrate appropriate behavior  Description: INTERVENTIONS:  1. Treat underlying conditions and offer medication as ordered  2. Educate regarding involuntary admission procedures and rules  3. Contain excessive/inappropriate behavior per unit and hospital policies  Outcome: Not Progressing

## 2023-12-10 NOTE — PLAN OF CARE
Problem: Coping  Goal: Pt/Family able to verbalize concerns and demonstrate effective coping strategies  Description: INTERVENTIONS:  1. Assist patient/family to identify coping skills, available support systems and cultural and spiritual values  2. Provide emotional support, including active listening and acknowledgement of concerns of patient and caregivers  3. Reduce environmental stimuli, as able  4. Instruct patient/family in relaxation techniques, as appropriate  5. Assess for spiritual pain/suffering and initiate Spiritual Care, Psychosocial Clinical Specialist consults as needed  12/9/2023 2226 by Stefany Bergeron RN  Outcome: Progressing  12/9/2023 1325 by Stacey Tolliver, RN  Outcome: Not Progressing     Problem: Confusion  Goal: Confusion, delirium, dementia, or psychosis is improved or at baseline  Description: INTERVENTIONS:  1. Assess for possible contributors to thought disturbance, including medications, impaired vision or hearing, underlying metabolic abnormalities, dehydration, psychiatric diagnoses, and notify attending LIP  2. [place] high risk fall precautions, as indicated  3. Provide frequent short contacts to provide reality reorientation, refocusing and direction  4. Decrease environmental stimuli, including noise as appropriate  5. Monitor and intervene to maintain adequate nutrition, hydration, elimination, sleep and activity  6. If unable to ensure safety without constant attention obtain sitter and review sitter guidelines with assigned personnel  7. Initiate Psychosocial CNS and Spiritual Care consult, as indicated  12/9/2023 2226 by Stefany Bergeron RN  Outcome: Progressing  12/9/2023 1325 by Stacey Tolliver, RN  Outcome: Not Progressing     Problem: Involuntary Admit  Goal: Will cooperate with staff recommendations and doctor's orders and will demonstrate appropriate behavior  Description: INTERVENTIONS:  1. Treat underlying conditions and offer medication as ordered  2.

## 2023-12-10 NOTE — BH NOTE
Met with patient lying down in a fetal position on the floor.  Constricted affect. Unable to answer  assessment questions. Remains mute. No eye contact. Writer attempted to speak to patient  and patient curled up and would not engage. No scheduled AM medications. PRN Ativan 2mg IM given for catatonia. Unable to get a complete set of vital signs. Patient ate all meals and snacks today and was observed getting up to go to the restroom. Remains on Q15 minute rounds for safety.

## 2023-12-11 PROCEDURE — 6360000002 HC RX W HCPCS: Performed by: PSYCHIATRY & NEUROLOGY

## 2023-12-11 PROCEDURE — 1240000000 HC EMOTIONAL WELLNESS R&B

## 2023-12-11 RX ORDER — ARIPIPRAZOLE 400 MG
400 KIT INTRAMUSCULAR
Status: ON HOLD | COMMUNITY

## 2023-12-11 RX ORDER — LORAZEPAM 2 MG/ML
1 CONCENTRATE ORAL 3 TIMES DAILY
Status: DISCONTINUED | OUTPATIENT
Start: 2023-12-11 | End: 2023-12-19

## 2023-12-11 RX ORDER — LORAZEPAM 1 MG/1
1 TABLET ORAL 2 TIMES DAILY
Status: ON HOLD | COMMUNITY

## 2023-12-11 RX ORDER — TOPIRAMATE 100 MG/1
100 TABLET, FILM COATED ORAL 2 TIMES DAILY
Status: ON HOLD | COMMUNITY

## 2023-12-11 RX ORDER — CLOZAPINE 200 MG/1
200 TABLET, ORALLY DISINTEGRATING ORAL 2 TIMES DAILY
Status: ON HOLD | COMMUNITY

## 2023-12-11 RX ORDER — CLOZAPINE 25 MG/1
25 TABLET, ORALLY DISINTEGRATING ORAL 2 TIMES DAILY
Status: ON HOLD | COMMUNITY

## 2023-12-11 RX ORDER — LORAZEPAM 1 MG/1
1 TABLET ORAL 3 TIMES DAILY
Status: DISCONTINUED | OUTPATIENT
Start: 2023-12-11 | End: 2023-12-11

## 2023-12-11 RX ORDER — TRAZODONE HYDROCHLORIDE 50 MG/1
50 TABLET ORAL NIGHTLY PRN
Status: ON HOLD | COMMUNITY

## 2023-12-11 RX ORDER — ATROPINE SULFATE 10 MG/ML
1-2 SOLUTION/ DROPS OPHTHALMIC NIGHTLY PRN
Status: ON HOLD | COMMUNITY

## 2023-12-11 RX ADMIN — LORAZEPAM 2 MG: 2 INJECTION INTRAMUSCULAR; INTRAVENOUS at 01:26

## 2023-12-11 RX ADMIN — LORAZEPAM 2 MG: 2 INJECTION INTRAMUSCULAR; INTRAVENOUS at 07:47

## 2023-12-11 ASSESSMENT — PAIN SCALES - GENERAL
PAINLEVEL_OUTOF10: 0
PAINLEVEL_OUTOF10: 0

## 2023-12-11 NOTE — PLAN OF CARE
Problem: Coping  Goal: Pt/Family able to verbalize concerns and demonstrate effective coping strategies  Description: INTERVENTIONS:  1. Assist patient/family to identify coping skills, available support systems and cultural and spiritual values  2. Provide emotional support, including active listening and acknowledgement of concerns of patient and caregivers  3. Reduce environmental stimuli, as able  4. Instruct patient/family in relaxation techniques, as appropriate  5. Assess for spiritual pain/suffering and initiate Spiritual Care, Psychosocial Clinical Specialist consults as needed  Outcome: Not Progressing     Problem: Confusion  Goal: Confusion, delirium, dementia, or psychosis is improved or at baseline  Description: INTERVENTIONS:  1. Assess for possible contributors to thought disturbance, including medications, impaired vision or hearing, underlying metabolic abnormalities, dehydration, psychiatric diagnoses, and notify attending LIP  2. [place] high risk fall precautions, as indicated  3. Provide frequent short contacts to provide reality reorientation, refocusing and direction  4. Decrease environmental stimuli, including noise as appropriate  5. Monitor and intervene to maintain adequate nutrition, hydration, elimination, sleep and activity  6. If unable to ensure safety without constant attention obtain sitter and review sitter guidelines with assigned personnel  7. Initiate Psychosocial CNS and Spiritual Care consult, as indicated  Outcome: Not Progressing

## 2023-12-11 NOTE — CARE COORDINATION
12/11/23 0737   Suicidal Ideation   Wish to be Dead   (Unable to obtain d/t mental status, pt presents mute and refusing assessments)   Non-Specific Active Suicidal Thoughts   (Unable to obtain d/t mental status, pt presents mute and refusing assessments)

## 2023-12-11 NOTE — PROGRESS NOTES
Writer met patient in fetal position, facing the mattress. Patient is mute, refused to answer  assessment questions and  vital signs check. He is unable to attain to ADL. Affect is constricted. Snacks and drinks offered to patient , same tolerated. PRN  Ativan administered  Q 6 hours.Patient refused the scheduled meds. No aggressive behavior noted. Emotional support and encouragement provided. Q 15 minutes and hourly rounds in progress. Pt slept for the total of  2:30 hours.

## 2023-12-11 NOTE — PROGRESS NOTES
[facility] Admission Pharmacy Medication Reconciliation    Information obtained from: [facility] ([phone]), RxQuery  RxQuery data available1: Yes    Comments/recommendations:  Per [facility], patient's sister manages his medications.  Patient has been reportedly been cheeking medications and also throwing up after taking medications.  Clozapine will need to be re-titrated due to suspected non-compliance at home.    Patient is compliant with his long-acting injection, Abilify Maintena, with last injection on 12/5/23.     Medication changes (since last review):  Added  All medications listed below     1RxQuery pharmacy benefit data reflects medications filled and processed through the patient's insurance, however                this data does NOT capture whether the medication was picked up or is currently being taken by the patient.         Patient allergies:   Allergies as of 12/07/2023    (Not on File)         Prior to Admission Medications   Prescriptions Last Dose Informant   ARIPiprazole ER (ABILIFY MAINTENA) 400 MG PRSY 12/5/2023    Sig: Inject 400 mg into the muscle every 28 days   LORazepam (ATIVAN) 1 MG tablet Unknown    Sig: Take 1 tablet by mouth in the morning and at bedtime.   atropine 1 % ophthalmic solution Unknown    Sig: Place 1-2 drops under the tongue nightly as needed (drooling)   cloZAPine (FAZACLO) 200 MG TBDP disintegrating tablet Unknown    Sig: Take 1 tablet by mouth 2 times daily Take with 25 mg tablet for total dose of 225 mg   cloZAPine (FAZACLO) 25 MG disintegrating tablet Unknown    Sig: Take 1 tablet by mouth 2 times daily Take with 200 mg tablet for total dose of 225 mg   topiramate (TOPAMAX) 100 MG tablet Unknown    Sig: Take 1 tablet by mouth 2 times daily   traZODone (DESYREL) 50 MG tablet Unknown    Sig: Take 1 tablet by mouth nightly as needed for Sleep      Facility-Administered Medications: None          Thank you,  Ángela Shelley, PharmD, BCPS, BCPP  Clinical Pharmacy

## 2023-12-11 NOTE — PLAN OF CARE
Problem: Coping  Goal: Pt/Family able to verbalize concerns and demonstrate effective coping strategies  Description: INTERVENTIONS:  1. Assist patient/family to identify coping skills, available support systems and cultural and spiritual values  2. Provide emotional support, including active listening and acknowledgement of concerns of patient and caregivers  3. Reduce environmental stimuli, as able  4. Instruct patient/family in relaxation techniques, as appropriate  5. Assess for spiritual pain/suffering and initiate Spiritual Care, Psychosocial Clinical Specialist consults as needed  12/10/2023 2223 by Stefany Bergeron RN  Outcome: Progressing  12/10/2023 1810 by Stacey Tolliver, RN  Outcome: Not Progressing     Problem: Confusion  Goal: Confusion, delirium, dementia, or psychosis is improved or at baseline  Description: INTERVENTIONS:  1. Assess for possible contributors to thought disturbance, including medications, impaired vision or hearing, underlying metabolic abnormalities, dehydration, psychiatric diagnoses, and notify attending LIP  2. [place] high risk fall precautions, as indicated  3. Provide frequent short contacts to provide reality reorientation, refocusing and direction  4. Decrease environmental stimuli, including noise as appropriate  5. Monitor and intervene to maintain adequate nutrition, hydration, elimination, sleep and activity  6. If unable to ensure safety without constant attention obtain sitter and review sitter guidelines with assigned personnel  7. Initiate Psychosocial CNS and Spiritual Care consult, as indicated  12/10/2023 1810 by Stacey Tolliver, RN  Outcome: Not Progressing

## 2023-12-11 NOTE — GROUP NOTE
Group Therapy Note    Date: 12/11/2023    Group Start Time: 1100  Group End Time: 1200  Group Topic: Topic Group    [facility] 3 ACUTE BEHAV [facility]    Janessa Beverly        Group Therapy Note         Patient's Goal:  To participate in relaxation activity    Notes:  Pt did not attend session    Discipline Responsible: Recreational Therapist      Signature:  JANESSA BEVERLY

## 2023-12-11 NOTE — GROUP NOTE
Group Therapy Note    Date: 12/11/2023    Group Start Time: 1500  Group End Time: 1600  Group Topic: Recreational    [facility] ACUTE [facility]    Janessa Beverly        Group Therapy Note           Patient's Goal:  To concentrate on selected task    Notes:  Pt did not attend session    Discipline Responsible: Recreational Therapist      Signature:  JANESSA BEVERLY

## 2023-12-11 NOTE — BH NOTE
Behavioral Health Treatment Team Note     Patient goal(s) for today: pt does not identify a goal  Treatment team focus/goals: court order medications/ECT petition    Progress note: Pt presents mute and nonresponsive. Treatment team petitioning for court ordered medications and ECT today. If court order for treatment is granted, pt will begin ECT tomorrow.    LOS:  3  Expected LOS: 20-30    Insurance info/prescription coverage:  [facility]  Date of last family contact:  None  Family requesting physician contact today:  No  Discharge plan:  Home with sister  Guns in the home:  No  Outpatient provider(s):  Mavis VALE    Participating treatment team members: Nba Wilson MSW

## 2023-12-11 NOTE — BH NOTE
Writer received call from [facility] worker [facility]. She reports pt is quiet at baseline but is responsive to questions and is able to forma  sentence. Geeta reports pt has possibly been cheeking clozaril. Geeta reports concern for pt care at home, pt sister is reportedly caregiver however their home has not had heat for an unknown amount of time. There are also concerns for sister's ability to care for pt due to sister's own physical health issues and her reportedly getting frustrated with pt. Pt unable to return home at this time. APS worker may be reaching out to writer to discuss concerns. Geeta reports [facility] has been trying to get pt into ECT for some time. Geeta reports ACT is hoping for nursing home level of care. Writer informed Geeta that it is not a guarantee that pt will discharge to a nursing home but that writer would do what is needed to help [facility] with their search. Geeta reports she will send writer assessment needed for nursing home placement.    Eden Veronica (sister) [phone]    EMERY Garcia

## 2023-12-11 NOTE — BH NOTE
Pt met with the court today for TDO hearing, pt was committed with ECT treatment and meds orders approved by the court.

## 2023-12-11 NOTE — BH NOTE
from Last 3 Encounters:     BP Readings from Last 3 Encounters:   12/10/23 (!) 106/53     Pulse Readings from Last 3 Encounters:   12/10/23 93       Radiology (reviewed/updated 12/11/2023)  [unfilled]    Current Facility-Administered Medications   Medication Dose Route Frequency Provider Last Rate Last Admin    risperiDONE (RISPERDAL) tablet 1 mg  1 mg Oral Nightly Jalyn Gordon MD   1 mg at 12/09/23 2034    acetaminophen (TYLENOL) tablet 650 mg  650 mg Oral Q4H PRN Rustam Rivero MD        polyethylene glycol (GLYCOLAX) packet 17 g  17 g Oral Daily PRN Rustam Rivero MD        aluminum & magnesium hydroxide-simethicone (MAALOX) 200-200-20 MG/5ML suspension 30 mL  30 mL Oral Q6H PRN Rustam Rivero MD        hydrOXYzine HCl (ATARAX) tablet 50 mg  50 mg Oral TID PRN Rustam Rivero MD        haloperidol (HALDOL) tablet 5 mg  5 mg Oral Q4H PRN Rustam Rivero MD        Or    haloperidol lactate (HALDOL) injection 5 mg  5 mg IntraMUSCular Q4H PRN Rustam Rivero MD        diphenhydrAMINE (BENADRYL) injection 50 mg  50 mg IntraMUSCular Q4H PRN Rustam Rivero MD        traZODone (DESYREL) tablet 50 mg  50 mg Oral Nightly PRN Rustam Rivero MD        LORazepam (ATIVAN) injection 2 mg  2 mg IntraMUSCular Q6H PRN Rustam Rivero MD   2 mg at 12/11/23 0747       Side Effects: (reviewed/updated 12/11/2023)  None reported or admitted to.    Review of Systems: (reviewed/updated 12/11/2023)  Appetite: good  Sleep: good   All other Review of Systems: negative    Mental Status Exam:  Eye contact: Absent eye contact  Psychomotor activity:  Passive  Mood is \"ok\"  Affect: Blunted  Speech: Mute  Thought process: Grossly impaired  Thought content: Unable to assess  Perception: Unable to assess  Suicidal ideation: Unable to assess  Homicidal ideation: Unable to assess  Insight/judgment: Partial Poor  Cognition is grossly intact      Physical Exam:  Musculoskeletal system: steady

## 2023-12-11 NOTE — BH NOTE
Assumed care of patient after receiving shift report from outgoing nurse.  No apparent distress.  Pt awake in bed with his hand in his pants first thing this a.m. Pt curls into a ball to avoid any contact.  Pt did not allow vitals.  Pt did not answer any questions, pt has been nonverbal all day except when asked if he was a criminal when we had to get an EKG which required 4 people to do so, two to hold while one for EKG machine and another for leads.   Pt to have court ordered ECT in a.m. Pt refused oral ativan.  Pt ambulates independently to bathroom.  Last BM today.  Q15 minute safety continued for safety by techs and separate hourly checks done by myself.

## 2023-12-11 NOTE — BH NOTE
Writer called [facility] to obtain collateral. Writer had to leave voicemail and will follow up.    EMERY Garcia

## 2023-12-11 NOTE — PROGRESS NOTES
Laboratory Monitoring for Mood Stabilizers and Antipsychotics    Recommended baseline monitoring has been completed based on this patient's current medication regimen.       This patient is currently prescribed the following medication(s):   Current Facility-Administered Medications: cloZAPine (FAZACLO) disintegrating tablet 25 mg, 25 mg, Oral, Nightly  LORazepam (ATIVAN) 2 MG/ML concentrated solution 1 mg ++HOLD EVENING DOSE PRIOR TO AM ECT++, 1 mg, Oral, TID  risperiDONE (RISPERDAL) tablet 1 mg, 1 mg, Oral, Nightly  acetaminophen (TYLENOL) tablet 650 mg, 650 mg, Oral, Q4H PRN  polyethylene glycol (GLYCOLAX) packet 17 g, 17 g, Oral, Daily PRN  aluminum & magnesium hydroxide-simethicone (MAALOX) 200-200-20 MG/5ML suspension 30 mL, 30 mL, Oral, Q6H PRN  hydrOXYzine HCl (ATARAX) tablet 50 mg, 50 mg, Oral, TID PRN  haloperidol (HALDOL) tablet 5 mg, 5 mg, Oral, Q4H PRN **OR** haloperidol lactate (HALDOL) injection 5 mg, 5 mg, IntraMUSCular, Q4H PRN  diphenhydrAMINE (BENADRYL) injection 50 mg, 50 mg, IntraMUSCular, Q4H PRN  traZODone (DESYREL) tablet 50 mg, 50 mg, Oral, Nightly PRN  LORazepam (ATIVAN) injection 2 mg, 2 mg, IntraMUSCular, Q6H PRN    The following labs have been completed for monitoring of antipsychotics and/or mood stabilizers:    Height, Weight, BMI Estimation  There is no height or weight on file to calculate BMI.     Vital Signs/Blood Pressure  /74   Pulse 83   Temp 97.6 °F (36.4 °C) (Oral)   Resp 18   Ht 1.829 m (6')   SpO2 99%     Renal Function, Hepatic Function and Chemistry  CrCl cannot be calculated (Unknown ideal weight.).    Lab Results   Component Value Date/Time     12/07/2023 03:37 PM    K 4.2 12/07/2023 03:37 PM     12/07/2023 03:37 PM    CO2 26 12/07/2023 03:37 PM    ANIONGAP 11 12/07/2023 03:37 PM    GLUCOSE 99 12/07/2023 03:37 PM    BUN 15 12/07/2023 03:37 PM    CREATININE 0.73 12/07/2023 03:37 PM    BUNCRER 21 12/07/2023 03:37 PM    BILITOT 0.6 12/07/2023 03:37 PM

## 2023-12-12 ENCOUNTER — ANESTHESIA (OUTPATIENT)
Facility: HOSPITAL | Age: 53
End: 2023-12-12
Payer: MEDICARE

## 2023-12-12 ENCOUNTER — ANESTHESIA EVENT (OUTPATIENT)
Facility: HOSPITAL | Age: 53
End: 2023-12-12
Payer: MEDICARE

## 2023-12-12 PROCEDURE — 6370000000 HC RX 637 (ALT 250 FOR IP): Performed by: PSYCHIATRY & NEUROLOGY

## 2023-12-12 PROCEDURE — 3700000000 HC ANESTHESIA ATTENDED CARE: Performed by: PSYCHIATRY & NEUROLOGY

## 2023-12-12 PROCEDURE — 7100000000 HC PACU RECOVERY - FIRST 15 MIN: Performed by: PSYCHIATRY & NEUROLOGY

## 2023-12-12 PROCEDURE — 6370000000 HC RX 637 (ALT 250 FOR IP)

## 2023-12-12 PROCEDURE — 7100000001 HC PACU RECOVERY - ADDTL 15 MIN: Performed by: PSYCHIATRY & NEUROLOGY

## 2023-12-12 PROCEDURE — 3700000001 HC ADD 15 MINUTES (ANESTHESIA): Performed by: PSYCHIATRY & NEUROLOGY

## 2023-12-12 PROCEDURE — 1240000000 HC EMOTIONAL WELLNESS R&B

## 2023-12-12 PROCEDURE — 2709999900 HC NON-CHARGEABLE SUPPLY: Performed by: PSYCHIATRY & NEUROLOGY

## 2023-12-12 PROCEDURE — 90870 ELECTROCONVULSIVE THERAPY: CPT | Performed by: PSYCHIATRY & NEUROLOGY

## 2023-12-12 PROCEDURE — 6360000002 HC RX W HCPCS: Performed by: ANESTHESIOLOGY

## 2023-12-12 RX ORDER — SODIUM CHLORIDE 9 MG/ML
INJECTION, SOLUTION INTRAVENOUS PRN
Status: DISCONTINUED | OUTPATIENT
Start: 2023-12-12 | End: 2023-12-12 | Stop reason: HOSPADM

## 2023-12-12 RX ORDER — SUCCINYLCHOLINE CHLORIDE 20 MG/ML
INJECTION INTRAMUSCULAR; INTRAVENOUS PRN
Status: DISCONTINUED | OUTPATIENT
Start: 2023-12-12 | End: 2023-12-12 | Stop reason: SDUPTHER

## 2023-12-12 RX ORDER — SODIUM CHLORIDE 0.9 % (FLUSH) 0.9 %
5-40 SYRINGE (ML) INJECTION EVERY 12 HOURS SCHEDULED
Status: DISCONTINUED | OUTPATIENT
Start: 2023-12-12 | End: 2023-12-12 | Stop reason: HOSPADM

## 2023-12-12 RX ORDER — SODIUM CHLORIDE 0.9 % (FLUSH) 0.9 %
5-40 SYRINGE (ML) INJECTION PRN
Status: DISCONTINUED | OUTPATIENT
Start: 2023-12-12 | End: 2023-12-12 | Stop reason: HOSPADM

## 2023-12-12 RX ORDER — CLOZAPINE 25 MG/1
TABLET ORAL
Status: COMPLETED
Start: 2023-12-12 | End: 2023-12-12

## 2023-12-12 RX ORDER — CLOZAPINE 25 MG/1
25 TABLET, ORALLY DISINTEGRATING ORAL NIGHTLY
Status: DISCONTINUED | OUTPATIENT
Start: 2023-12-12 | End: 2023-12-13

## 2023-12-12 RX ORDER — SODIUM CHLORIDE 9 MG/ML
INJECTION, SOLUTION INTRAVENOUS CONTINUOUS
Status: DISCONTINUED | OUTPATIENT
Start: 2023-12-12 | End: 2023-12-12 | Stop reason: HOSPADM

## 2023-12-12 RX ORDER — SODIUM CHLORIDE 9 MG/ML
INJECTION, SOLUTION INTRAVENOUS CONTINUOUS
Status: DISCONTINUED | OUTPATIENT
Start: 2023-12-12 | End: 2023-12-13

## 2023-12-12 RX ADMIN — RISPERIDONE 1 MG: 1 TABLET ORAL at 22:35

## 2023-12-12 RX ADMIN — PROPOFOL 70 MG: 10 INJECTION, EMULSION INTRAVENOUS at 08:07

## 2023-12-12 RX ADMIN — CLOZAPINE 25 MG: 25 TABLET ORAL at 22:30

## 2023-12-12 RX ADMIN — SUCCINYLCHOLINE CHLORIDE 50 MG: 20 INJECTION, SOLUTION INTRAMUSCULAR; INTRAVENOUS at 08:07

## 2023-12-12 ASSESSMENT — PAIN - FUNCTIONAL ASSESSMENT
PAIN_FUNCTIONAL_ASSESSMENT: ADULT NONVERBAL PAIN SCALE (NPVS)

## 2023-12-12 ASSESSMENT — PAIN SCALES - GENERAL: PAINLEVEL_OUTOF10: 0

## 2023-12-12 NOTE — BH NOTE
Pt remained isolative in his room, laying on his mattress under the covers throughout shift. Declined all PO medications. Moderate PO intake at meals when tray left at bedside.

## 2023-12-12 NOTE — PERIOP NOTE
Patient arrived PACU with U staff via wheelchair.  Patient transferred to PACU stretcher from wheelchair by U staff.  Patient immediately lying in fetal position on right side.  Patient not cooperative with procedures, withdrawing arms and hands with attempt to obtain blood pressure and place IV.  Patient not responding verbally to questions. [facility] katelyn Navarro at bedside observing patient.

## 2023-12-12 NOTE — PERIOP NOTE
Roge Cowan  1970  [id number]    Situation:  Verbal report given from: Dr. Butler and Janice Martinez RN  Procedure: Procedure(s):  ELECTROCONVULSIVE THERAPY    Background:    Preoperative diagnosis: Major depressive disorder, recurrent episode, moderate (HCC) [F33.1]    Postoperative diagnosis: * No post-op diagnosis entered *    :  Dr. Larson    Assistant(s): Circulator: Janice Martinez RN  Scrub Person First: Lis Ba RN    Specimens: [unfilled]    Assessment:  Intra-procedure medications         Anesthesia gave intra-procedure sedation and medications, see anesthesia flow sheet     Intravenous fluids: LR@ KVO     Vital signs stable

## 2023-12-12 NOTE — BH NOTE
2300-Assumed care of patient, patient in bed, appears sleep eyes closed, respirations even and unlabored. RN rounds in place, plan of care to continue.

## 2023-12-12 NOTE — ANESTHESIA POSTPROCEDURE EVALUATION
Department of Anesthesiology  Postprocedure Note    Patient: Simeon Eng  MRN: [id number]  YOB: 1970  Date of evaluation: 12/12/2023      Procedure Summary       Date: 12/12/23 Room / Location: [address] MAIN OR R2 / [address] MAIN OR    Anesthesia Start: 9673 Anesthesia Stop: 3925    Procedure: ELECTROCONVULSIVE THERAPY (Head) Diagnosis:       Major depressive disorder, recurrent episode, moderate (HCC)      (Major depressive disorder, recurrent episode, moderate ([address]) [F33.1])    Surgeons: Sowmya Serrano MD Responsible Provider: Jose Miguel Retana MD    Anesthesia Type: general ASA Status: 2            Anesthesia Type: No value filed.     Cesar Phase I: Cesar Score: 10    Cesar Phase II:        Anesthesia Post Evaluation    Patient location during evaluation: PACU  Patient participation: complete - patient cannot participate  Level of consciousness: awake and agitated  Airway patency: patent  Nausea & Vomiting: no vomiting and no nausea  Complications: no  Cardiovascular status: hemodynamically stable  Respiratory status: acceptable  Hydration status: stable

## 2023-12-12 NOTE — ANESTHESIA PRE PROCEDURE
is no height or weight on file to calculate BMI.    CBC:   Lab Results   Component Value Date/Time    WBC 6.8 12/07/2023 03:20 PM    RBC 4.98 12/07/2023 03:20 PM    HGB 14.8 12/07/2023 03:20 PM    HCT 44.4 12/07/2023 03:20 PM    MCV 89.2 12/07/2023 03:20 PM    RDW 13.2 12/07/2023 03:20 PM     12/07/2023 03:20 PM       CMP:   Lab Results   Component Value Date/Time     12/07/2023 03:37 PM    K 4.2 12/07/2023 03:37 PM     12/07/2023 03:37 PM    CO2 26 12/07/2023 03:37 PM    BUN 15 12/07/2023 03:37 PM    CREATININE 0.73 12/07/2023 03:37 PM    GFRAA >60 12/14/2020 01:24 PM    AGRATIO 1.0 12/14/2020 01:24 PM    LABGLOM >60 12/07/2023 03:37 PM    GLUCOSE 99 12/07/2023 03:37 PM    PROT 7.7 12/07/2023 03:37 PM    CALCIUM 9.4 12/07/2023 03:37 PM    BILITOT 0.6 12/07/2023 03:37 PM    ALKPHOS 125 12/07/2023 03:37 PM    ALKPHOS 175 12/14/2020 01:24 PM    AST 15 12/07/2023 03:37 PM    ALT 23 12/07/2023 03:37 PM       POC Tests: No results for input(s): \"POCGLU\", \"POCNA\", \"POCK\", \"POCCL\", \"POCBUN\", \"POCHEMO\", \"POCHCT\" in the last 72 hours.     Coags: No results found for: \"PROTIME\", \"INR\", \"APTT\"    HCG (If Applicable): No results found for: \"PREGTESTUR\", \"PREGSERUM\", \"HCG\", \"HCGQUANT\"     ABGs: No results found for: \"PHART\", \"PO2ART\", \"LJR7STC\", \"HEJ1SEC\", \"BEART\", \"H3NGENJV\"     Type & Screen (If Applicable):  No results found for: \"LABABO\", \"LABRH\"    Drug/Infectious Status (If Applicable):  No results found for: \"HIV\", \"HEPCAB\"    COVID-19 Screening (If Applicable):   Lab Results   Component Value Date/Time    COVID19 Not detected 12/07/2023 03:37 PM           Anesthesia Evaluation  Patient summary reviewed and Nursing notes reviewed  Airway: Mallampati: Unable to assess / NA          Dental:    (+) other      Pulmonary:Negative Pulmonary ROS and normal exam                               Cardiovascular:Negative CV ROS  Exercise tolerance: good (>4 METS)          Rhythm: regular  Rate: normal           Beta

## 2023-12-12 NOTE — GROUP NOTE
Group Therapy Note    Date: 12/12/2023    Group Start Time: 1100  Group End Time: 1200  Group Topic: Topic Group    [facility] 3 ACUTE BEHAV [facility]    Janessa Beverly        Group Therapy Note           Patient's Goal:  To participate in relaxation activity    Notes:  Pt did not attend session      Signature:  JANESSA BEVERLY

## 2023-12-12 NOTE — BH NOTE
Pt received upon return from ECT. Pt was brought to unit from PACU on a stretcher as he refused to move off of the bed after ECT. With encouragement, pt was able to get off the bed and immediately curled in a ball on his mattress, knees to chest, face down. Mattress is currently on the floor per pt preference. Pt would not engage with staff and did not answer assessment questions. Affect is flat, eye contact is poor. Pt is withdrawn to self and isolative to his bedroom. Pt is able to eat when trays are brought to him, lays in bed and eats with his hands. Pt laid in bed quietly throughout shift. Refused all PO medications, has approved order for forced medications from the court but no linked IM alternatives, physician aware. Q 15 minute rounds maintained for safety.

## 2023-12-12 NOTE — PERIOP NOTE
TRANSFER - OUT REPORT:    Verbal and bedside report given to GAIL Robert on Roge Cowan  being transferred to U for routine post-op       Report consisted of patient's Situation, Background, Assessment and   Recommendations(SBAR).     Information from the following report(s) Nurse Handoff Report was reviewed with the receiving nurse.           Lines:       Opportunity for questions and clarification was provided.      Patient transported with:  Registered Nurse

## 2023-12-12 NOTE — PERIOP NOTE
TRANSFER - IN REPORT:    Verbal report received from GAIL Gonzales on Roge Cowan  being received from [facility] for ordered procedure      Report consisted of patient's Situation, Background, Assessment and   Recommendations(SBAR).     Information from the following report(s) Nurse Handoff Report was reviewed with the receiving nurse.    Opportunity for questions and clarification was provided.      Assessment completed upon patient's arrival to unit and care assumed.

## 2023-12-12 NOTE — PROGRESS NOTES
[id number]    Roge is observed lying down on mattress. Even rise and fall of chest noted. Refusing to participate in assessment, refused to allow staff to check his VS, and take medication. Patient is on court ordered medication. No orders in patient's MAR at this time. Patient showing no acute signs or symptoms of distress. Staff will continue to monitor for safety, location, and behavior q 15minuts.

## 2023-12-12 NOTE — PROCEDURES
IDENTIFICATION:    Patient Name  Roge Cowan   Date of Birth 1970   [facility] [id number]   Medical Record Number  [id number]   Age  53 y.o.   PCP Cole Boyd MD   Admit date:  12/7/2023   Room Number  OR/PL  @ [facility]   Date of Service  12/12/23          Electro Convulsive Therapy:  Treatment number 1       Maintenance ECT NO   Roge Cowan  was admitted to the PACU for ECT. Patient was medically cleared and NPO for procedure.  Patient IS COURT MANDATED , gave informed consent for ECT treatment.      Patient received     Bilateral ECT YES Right Unilateral ECT NO  Administered using the Thymatron System IV - [employer].     at 25 % Energy, under general anesthesia.   Roge Cowan   with a good, well generalized seizure of 27 seconds on observation of the motor manifestations of the seizure. EEG duration was NA secs.   Post-Ictal Suppression Index:  12.3 %  Recovery was unremarkable and with no complications.     Change in Energy: %            Anesthesia medications administered during procedure:    Today using propofol: 70mg  Change for next treament:       Succinylcholine: 50 mg  Change for next treatment:      Propofol: mg  Glycopyrrolate:  mg  Labetalol:  mg  Esmolol:  mg  Lorazepam:  mg  Ketamine:  mg  Zofran:  mg    Toradol:  mg  Lidocaine:  mg  Caffeine Benzoate: mg       No flowsheet data found.    SIGNED:    Lety Larson MD  Psychiatry

## 2023-12-12 NOTE — PERIOP NOTE
Spoke W/ Tisha RN on B[facility] regarding a time for Mr. Cowan to be down in the pre-op/PACU area for his ECT.  Relayed to her that he needs to be here by 0630, reinforced NPO status prior to procedure.

## 2023-12-12 NOTE — BH NOTE
Chief Complaint: mute    Length of Stay: 4 Days    Interval History:  Patient was seen today for rounds in his room. Mr. Cowan was laying on the floor. He received ECT this morning and was sleeping. He did not participate in the interview and did not speak or open his eyes. He appears disheveled. Per staff, he has been taking medications and no side effects have been observed or reported. He is also ambulating to the bathroom and using the facilities appropriately. No acute events reported over night.      Past Medical History:  Past Medical History:   Diagnosis Date    Psychiatric disorder     Psychotic disorder (HCC)     Withdrawal syndrome (HCC)            Labs:  Lab Results   Component Value Date/Time    WBC 6.8 12/07/2023 03:20 PM    HGB 14.8 12/07/2023 03:20 PM    HCT 44.4 12/07/2023 03:20 PM     12/07/2023 03:20 PM    MCV 89.2 12/07/2023 03:20 PM      Lab Results   Component Value Date/Time     12/07/2023 03:37 PM    K 4.2 12/07/2023 03:37 PM     12/07/2023 03:37 PM    CO2 26 12/07/2023 03:37 PM    BUN 15 12/07/2023 03:37 PM    GFRAA >60 12/14/2020 01:24 PM    GLOB 3.6 12/07/2023 03:37 PM    ALT 23 12/07/2023 03:37 PM      [unfilled]      Current Facility-Administered Medications   Medication Dose Route Frequency Provider Last Rate Last Admin    0.9 % sodium chloride infusion   IntraVENous Continuous Migel Butler MD        cloZAPine (FAZACLO) disintegrating tablet 25 mg  25 mg Oral Nightly Rustam Rivero MD        LORazepam (ATIVAN) 2 MG/ML concentrated solution 1 mg ++HOLD EVENING DOSE PRIOR TO AM ECT++  1 mg Oral TID Rustam Rivero MD        risperiDONE (RISPERDAL) tablet 1 mg  1 mg Oral Nightly Jalyn Gordon MD   1 mg at 12/09/23 2034    acetaminophen (TYLENOL) tablet 650 mg  650 mg Oral Q4H PRN Rustam Rivero MD        polyethylene glycol (GLYCOLAX) packet 17 g  17 g Oral Daily PRN Rustam Rivero MD        aluminum & magnesium hydroxide-simethicone

## 2023-12-12 NOTE — H&P
INITIAL PSYCHIATRIC INTERVIEW:      CHIEF COMPLAINT: Mute patient        HISTORY OF PRESENTING COMPLAINT:  Roge Cowan is a 53 y.o. White (non-) male who is currently admitted to the psychiatric floor at [facility].     He was brought to hospital in police custody for a mental health problem.  He reportedly has not been engaging in self care and has not been eating or drinking.  Patient unable to participate in the interview.  Maintains the fetal position and moves feet when spoken to.  Unable to obtain any information from him.      PAST PSYCHIATRIC HISTORY:    Schizophrenia    PAST MEDICAL HISTORY:  Please see H&P for details.   Past Medical History:   Diagnosis Date    Psychiatric disorder     Psychotic disorder (HCC)     Withdrawal syndrome (HCC)        Prior to Admission medications    Medication Sig Start Date End Date Taking? Authorizing Provider   cloZAPine (FAZACLO) 25 MG disintegrating tablet Take 1 tablet by mouth 2 times daily Take with 200 mg tablet for total dose of 225 mg   Yes Jaya Donald MD   cloZAPine (FAZACLO) 200 MG TBDP disintegrating tablet Take 1 tablet by mouth 2 times daily Take with 25 mg tablet for total dose of 225 mg   Yes Jaya Donald MD   LORazepam (ATIVAN) 1 MG tablet Take 1 tablet by mouth in the morning and at bedtime.   Yes Jaya Donald MD   topiramate (TOPAMAX) 100 MG tablet Take 1 tablet by mouth 2 times daily   Yes Jaya Donald MD   traZODone (DESYREL) 50 MG tablet Take 1 tablet by mouth nightly as needed for Sleep   Yes Jaya Donald MD   ARIPiprazole ER (ABILIFY MAINTENA) 400 MG PRSY Inject 400 mg into the muscle every 28 days   Yes Jaya Donald MD   atropine 1 % ophthalmic solution Place 1-2 drops under the tongue nightly as needed (drooling)   Yes Jaya Donald MD         Lab Results   Component Value Date/Time    WBC 6.8 12/07/2023 03:20 PM    HGB 14.8 12/07/2023 03:20 PM    HCT 44.4

## 2023-12-13 ENCOUNTER — ANESTHESIA (OUTPATIENT)
Facility: HOSPITAL | Age: 53
End: 2023-12-13
Payer: MEDICARE

## 2023-12-13 ENCOUNTER — ANESTHESIA EVENT (OUTPATIENT)
Facility: HOSPITAL | Age: 53
End: 2023-12-13
Payer: MEDICARE

## 2023-12-13 LAB
CHOLEST SERPL-MCNC: 157 MG/DL
EST. AVERAGE GLUCOSE BLD GHB EST-MCNC: 103 MG/DL
HBA1C MFR BLD: 5.2 % (ref 4–5.6)
HDLC SERPL-MCNC: 55 MG/DL
HDLC SERPL: 2.9 (ref 0–5)
LDLC SERPL CALC-MCNC: 76.8 MG/DL (ref 0–100)
TRIGL SERPL-MCNC: 126 MG/DL
TSH SERPL DL<=0.05 MIU/L-ACNC: 1.36 UIU/ML (ref 0.36–3.74)
VLDLC SERPL CALC-MCNC: 25.2 MG/DL

## 2023-12-13 PROCEDURE — 1240000000 HC EMOTIONAL WELLNESS R&B

## 2023-12-13 PROCEDURE — 80061 LIPID PANEL: CPT

## 2023-12-13 PROCEDURE — 36415 COLL VENOUS BLD VENIPUNCTURE: CPT

## 2023-12-13 PROCEDURE — 7100000001 HC PACU RECOVERY - ADDTL 15 MIN: Performed by: PSYCHIATRY & NEUROLOGY

## 2023-12-13 PROCEDURE — 3700000000 HC ANESTHESIA ATTENDED CARE: Performed by: PSYCHIATRY & NEUROLOGY

## 2023-12-13 PROCEDURE — 3700000001 HC ADD 15 MINUTES (ANESTHESIA): Performed by: PSYCHIATRY & NEUROLOGY

## 2023-12-13 PROCEDURE — 84443 ASSAY THYROID STIM HORMONE: CPT

## 2023-12-13 PROCEDURE — 2709999900 HC NON-CHARGEABLE SUPPLY: Performed by: PSYCHIATRY & NEUROLOGY

## 2023-12-13 PROCEDURE — 90870 ELECTROCONVULSIVE THERAPY: CPT | Performed by: PSYCHIATRY & NEUROLOGY

## 2023-12-13 PROCEDURE — 7100000000 HC PACU RECOVERY - FIRST 15 MIN: Performed by: PSYCHIATRY & NEUROLOGY

## 2023-12-13 PROCEDURE — 6360000002 HC RX W HCPCS: Performed by: NURSE PRACTITIONER

## 2023-12-13 PROCEDURE — 83036 HEMOGLOBIN GLYCOSYLATED A1C: CPT

## 2023-12-13 RX ORDER — SODIUM CHLORIDE 0.9 % (FLUSH) 0.9 %
5-40 SYRINGE (ML) INJECTION EVERY 12 HOURS SCHEDULED
Status: DISCONTINUED | OUTPATIENT
Start: 2023-12-13 | End: 2023-12-13 | Stop reason: HOSPADM

## 2023-12-13 RX ORDER — LORAZEPAM 2 MG/ML
1 INJECTION INTRAMUSCULAR EVERY 8 HOURS
Status: COMPLETED | OUTPATIENT
Start: 2023-12-13 | End: 2023-12-14

## 2023-12-13 RX ORDER — SODIUM CHLORIDE 9 MG/ML
INJECTION, SOLUTION INTRAVENOUS PRN
Status: DISCONTINUED | OUTPATIENT
Start: 2023-12-13 | End: 2023-12-13 | Stop reason: HOSPADM

## 2023-12-13 RX ORDER — CLOZAPINE 25 MG/1
25 TABLET, ORALLY DISINTEGRATING ORAL NIGHTLY
Status: DISCONTINUED | OUTPATIENT
Start: 2023-12-13 | End: 2023-12-21

## 2023-12-13 RX ORDER — HALOPERIDOL 5 MG/ML
2 INJECTION INTRAMUSCULAR NIGHTLY
Status: DISCONTINUED | OUTPATIENT
Start: 2023-12-13 | End: 2023-12-21

## 2023-12-13 RX ORDER — SODIUM CHLORIDE 0.9 % (FLUSH) 0.9 %
5-40 SYRINGE (ML) INJECTION PRN
Status: DISCONTINUED | OUTPATIENT
Start: 2023-12-13 | End: 2023-12-13 | Stop reason: HOSPADM

## 2023-12-13 RX ADMIN — LORAZEPAM 1 MG: 2 INJECTION INTRAMUSCULAR; INTRAVENOUS at 23:35

## 2023-12-13 RX ADMIN — HALOPERIDOL LACTATE 2 MG: 5 INJECTION, SOLUTION INTRAMUSCULAR at 21:51

## 2023-12-13 RX ADMIN — LORAZEPAM 1 MG: 2 INJECTION INTRAMUSCULAR; INTRAVENOUS at 16:23

## 2023-12-13 ASSESSMENT — PAIN - FUNCTIONAL ASSESSMENT
PAIN_FUNCTIONAL_ASSESSMENT: ADULT NONVERBAL PAIN SCALE (NPVS)

## 2023-12-13 ASSESSMENT — PAIN SCALES - GENERAL
PAINLEVEL_OUTOF10: 0
PAINLEVEL_OUTOF10: 0

## 2023-12-13 NOTE — PROCEDURES
IDENTIFICATION:    Patient Name  Roge Cowan   Date of Birth 1970   [facility] [id number]   Medical Record Number  [id number]   Age  53 y.o.   PCP Cole Boyd MD   Admit date:  12/7/2023   Room Number  OR/PL  @ [facility]   Date of Service  12/13/23          Electro Convulsive Therapy:  Treatment number 2       Maintenance ECT NO   Roge Cowan  was admitted to the PACU for ECT. Patient was medically cleared and NPO for procedure.  Patient IS COURT MANDATED , gave informed consent for ECT treatment.      Patient received     Bilateral ECT YES Right Unilateral ECT NO  Administered using the Thymatron System IV - [employer].     at 30 % Energy, under general anesthesia.  (Changed from 25% last time)  Roge Cowan   with a good, well generalized seizure of 62 seconds on observation of the motor manifestations of the seizure. EEG duration was 93 secs.   Post-Ictal Suppression Index:  58.8 %  Recovery was unremarkable and with no complications.     Change in Energy: %            Anesthesia medications administered during procedure:    Today using propofol: 70mg  Change for next treament:       Succinylcholine: 50 mg  Change for next treatment:      Propofol: mg  Glycopyrrolate:  mg  Labetalol:  mg  Esmolol:  mg  Lorazepam:  mg  Ketamine:  mg  Zofran:  mg    Toradol:  mg  Lidocaine:  mg  Caffeine Benzoate: mg       No flowsheet data found.    SIGNED:    Lety Larson MD  Psychiatry

## 2023-12-13 NOTE — PERIOP NOTE
TRANSFER - OUT REPORT:    Verbal report given to GAIL Estrella on Roge Cowan  being transferred to U for routine post-op       Report consisted of patient's Situation, Background, Assessment and   Recommendations(SBAR).     Information from the following report(s) Nurse Handoff Report was reviewed with the receiving nurse.           Lines:       Opportunity for questions and clarification was provided.      Patient transported with:  RN

## 2023-12-13 NOTE — ANESTHESIA POSTPROCEDURE EVALUATION
Department of Anesthesiology  Postprocedure Note    Patient: Mike Vazquez  MRN: [id number]  YOB: 1970  Date of evaluation: 12/13/2023    Procedure Summary       Date: 12/13/23 Room / Location: [address] MAIN OR R2 / [address] MAIN OR    Anesthesia Start: 8411 Anesthesia Stop: 3364    Procedure: ELECTROCONVULSIVE THERAPY (Head) Diagnosis:       Major depressive disorder, recurrent episode, moderate (HCC)      (Major depressive disorder, recurrent episode, moderate ([address]) [F33.1])    Surgeons: Nathaniel Pagan MD Responsible Provider: Sara Monzon MD    Anesthesia Type: general ASA Status: 2            Anesthesia Type: No value filed. Cesar Phase I: Cesar Score: 9    Cesar Phase II:      Anesthesia Post Evaluation    Patient location during evaluation: PACU  Patient participation: complete - patient cannot participate  Level of consciousness: awake  Airway patency: patent  Nausea & Vomiting: no vomiting and no nausea  Cardiovascular status: hemodynamically stable  Respiratory status: acceptable  Hydration status: stable  There was medical reason for not using a multimodal analgesia pain management approach. Pain management: adequate    No notable events documented.

## 2023-12-13 NOTE — BH NOTE
Chief Complaint: mute    Length of Stay: 5 Days    Interval History:  Patient was seen today for rounds in his room. Mr. Cowan was laying on the floor. He received ECT this morning and was sleeping. He did not participate in the interview and did not speak or open his eyes. He appears disheveled. Per staff, he has been taking medications and no side effects have been observed or reported. He is also ambulating to the bathroom and using the facilities appropriately. No acute events reported over night.    12/13: Roge Cowan is doing poorly. Catatonic. He is mute, rigid, no eye contact, unable to follow commands. He is isolative to his room, has not been eating. Poor hygiene. No interaction is possible with him. He had his second ect today. Staff report he's been mute. He has been non adherent with his meds. Court order meds were approved Monday.     Past Medical History:  Past Medical History:   Diagnosis Date    Psychiatric disorder     Psychotic disorder (HCC)     Withdrawal syndrome (HCC)            Labs:  Lab Results   Component Value Date/Time    WBC 6.8 12/07/2023 03:20 PM    HGB 14.8 12/07/2023 03:20 PM    HCT 44.4 12/07/2023 03:20 PM     12/07/2023 03:20 PM    MCV 89.2 12/07/2023 03:20 PM      Lab Results   Component Value Date/Time     12/07/2023 03:37 PM    K 4.2 12/07/2023 03:37 PM     12/07/2023 03:37 PM    CO2 26 12/07/2023 03:37 PM    BUN 15 12/07/2023 03:37 PM    GFRAA >60 12/14/2020 01:24 PM    GLOB 3.6 12/07/2023 03:37 PM    ALT 23 12/07/2023 03:37 PM      [unfilled]      Current Facility-Administered Medications   Medication Dose Route Frequency Provider Last Rate Last Admin    cloZAPine (FAZACLO) disintegrating tablet 25 mg  25 mg Oral Nightly Rustam Rivero MD        LORazepam (ATIVAN) 2 MG/ML concentrated solution 1 mg ++HOLD EVENING DOSE PRIOR TO AM ECT++  1 mg Oral TID Rustam Rivero MD        risperiDONE (RISPERDAL) tablet 1 mg  1 mg Oral Nightly Heidi

## 2023-12-13 NOTE — BH NOTE
Morning assessment complete. Patient was at ECT this morning. Returned to unit about 10am.  He is calm. Selectively mute. Guarded. Flat affect. Eye contact is noted to be poor. Patient has been isolative to self. Observed crawling around room.   VS are stable.Patient currently denies SI/HI, AVH.   Patient is eating meals. There are no untoward side effects from medications to note.IM ativan given. C-SSRS level is low.Hourly rounds are being completed to assure patient safety and attend to care needs. Monitoring will continue for changes in patient condition

## 2023-12-13 NOTE — PERIOP NOTE
TRANSFER - IN REPORT:    Verbal report received from GAIL Yun on Roge Cowan  being received from [facility] for ordered procedure      Report consisted of patient's Situation, Background, Assessment and   Recommendations(SBAR).     Information from the following report(s) Nurse Handoff Report was reviewed with the receiving nurse.    Opportunity for questions and clarification was provided.      Assessment completed upon patient's arrival to unit and care assumed.      Patient transported to PACU via stretcher by PACU RN's accompanied by DU Navarro

## 2023-12-13 NOTE — BH NOTE
Assumed care of the patient. Patient was isolative to his room. He seemed withdrawn. Appearance was unkempt. Patient was compliant with blood pressure check after some encouragement . He did not answer assessments questions. He did request to, \"Turn the light off\" and nodded his head once when he was asked a question other than than patient did not engage in any interaction. Patient was compliant with medications after some encouragement.     Patient on NPO since midnight for ECT in the morning. Patient appeared to be sleeping for about 8 hours.

## 2023-12-13 NOTE — BH NOTE
Patient was administered Clozapine (Clozaril) 25 mg at bedtime. Reached out and notified to the pharmacist on call. Pharmacist recommended to monitor patient at this time , no other intervention required.      This RN at bed side , monitoring patient closely. Patient not in any kind of distress . RR regular.

## 2023-12-13 NOTE — GROUP NOTE
Group Therapy Note    Date: 12/13/2023    Group Start Time: 1100  Group End Time: 1200  Group Topic: Topic Group    [facility] 3 ACUTE BEHAV [facility]    Janessa Beverly        Group Therapy Note           Patient's Goal:  To participate in mental health [employer] game    Notes:  Pt did not attend session    Discipline Responsible: Recreational Therapist      Signature:  JANESSA BEVERLY

## 2023-12-13 NOTE — BH NOTE
Behavioral Health Treatment Team Note     Patient goal(s) for today: pt does not identify a goal  Treatment team focus/goals: continue monitor medication, continue ECT    Progress note: Unable to assess orientation. Pt continues to present selectively mute. Pt presents unkempt, appears to not be attending to ADLs. Pt received ECT today.    Writer had voicemail from pt's APS worker, Geno. Writer attempted to call back and had to leave a voicemail.    LOS:  5  Expected LOS: 20-30    Insurance info/prescription coverage:  MEDICARE  Date of last family contact:  Called [facility] 12/11  Family requesting physician contact today:  No  Discharge plan:  TBD  Guns in the home:  No  Outpatient provider(s):  [facility]     Participating treatment team members: Nba Wilson MSW

## 2023-12-13 NOTE — ANESTHESIA PRE PROCEDURE
Department of Anesthesiology  Preprocedure Note       Name:  Dee Persaud   Age:  46 y.o.  :  1970                                          MRN:  [id number]         Date:  2023      Surgeon: Melonie Staley):  Leandro Loomis MD    Procedure: Procedure(s):  ELECTROCONVULSIVE THERAPY    Medications prior to admission:   Prior to Admission medications    Medication Sig Start Date End Date Taking? Authorizing Provider   cloZAPine (FAZACLO) 25 MG disintegrating tablet Take 1 tablet by mouth 2 times daily Take with 200 mg tablet for total dose of 225 mg   Yes Jaya Donald MD   cloZAPine (FAZACLO) 200 MG TBDP disintegrating tablet Take 1 tablet by mouth 2 times daily Take with 25 mg tablet for total dose of 225 mg   Yes Jaya Donald MD   LORazepam (ATIVAN) 1 MG tablet Take 1 tablet by mouth in the morning and at bedtime.    Yes Jaya Donald MD   topiramate (TOPAMAX) 100 MG tablet Take 1 tablet by mouth 2 times daily   Yes Jaya Donald MD   traZODone (DESYREL) 50 MG tablet Take 1 tablet by mouth nightly as needed for Sleep   Yes Jaya Donald MD   ARIPiprazole ER (ABILIFY MAINTENA) 400 MG PRSY Inject 400 mg into the muscle every 28 days   Yes Jaya Donald MD   atropine 1 % ophthalmic solution Place 1-2 drops under the tongue nightly as needed (drooling)   Yes Jaya Donald MD       Current medications:    Current Facility-Administered Medications   Medication Dose Route Frequency Provider Last Rate Last Admin    0.9 % sodium chloride infusion   IntraVENous Continuous Ignacio Butler MD        cloZAPine (FAZACLO) disintegrating tablet 25 mg  25 mg Oral Nightly Rick Laguna MD        LORazepam (ATIVAN) 2 MG/ML concentrated solution 1 mg ++HOLD EVENING DOSE PRIOR TO AM ECT++  1 mg Oral TID Rick Laguna MD        risperiDONE (RISPERDAL) tablet 1 mg  1 mg Oral Nightly Jalyn Gordon MD   1 mg at 23 2131    acetaminophen (TYLENOL)

## 2023-12-13 NOTE — BH NOTE
Behavioral Health Treatment Team Note     Patient goal(s) for today: pt does not identify a goal  Treatment team focus/goals: continue monitor medication, adjust as needed    Progress note: Pt does not engage with treatment team. Pt receiving ECT. Writer received call from pt APS worker, writer will contact to discuss pt care.    LOS:  5  Expected LOS: 20-30    Insurance info/prescription coverage:  MEDICARE  Date of last family contact:  Called [facility] 12/11  Family requesting physician contact today:  No  Discharge plan:  TBD  Guns in the home:  No  Outpatient provider(s):  [facility]    Participating treatment team members: Nba Wilson MSW

## 2023-12-13 NOTE — PERIOP NOTE
Roge Cowan  1970  [id number]    Situation:  Verbal report given from: Dr. Butler and Janice Martinez RN  Procedure: Procedure(s):  ELECTROCONVULSIVE THERAPY    Background:    Preoperative diagnosis: Major depressive disorder, recurrent episode, moderate (HCC) [F33.1]    Postoperative diagnosis: * No post-op diagnosis entered *    :  Dr. Fink    Assistant(s): Circulator: Janice Martinez RN  Relief Scrub: Jyothi Arambula  Scrub Person First: Arpan Morel RN    Specimens: [unfilled]    Assessment:  Intra-procedure medications         Anesthesia gave intra-procedure sedation and medications, see anesthesia flow sheet     Intravenous fluids: LR@ KVO     Vital signs stable

## 2023-12-13 NOTE — ANESTHESIA PRE PROCEDURE
Department of Anesthesiology  Preprocedure Note       Name:  Dee Persaud   Age:  46 y.o.  :  1970                                          MRN:  [id number]         Date:  2023      Surgeon: Melonie Staley):  Leandro Loomis MD    Procedure: Procedure(s):  ELECTROCONVULSIVE THERAPY    Medications prior to admission:   Prior to Admission medications    Medication Sig Start Date End Date Taking? Authorizing Provider   cloZAPine (FAZACLO) 25 MG disintegrating tablet Take 1 tablet by mouth 2 times daily Take with 200 mg tablet for total dose of 225 mg   Yes Jaya Donald MD   cloZAPine (FAZACLO) 200 MG TBDP disintegrating tablet Take 1 tablet by mouth 2 times daily Take with 25 mg tablet for total dose of 225 mg   Yes Jaya Donald MD   LORazepam (ATIVAN) 1 MG tablet Take 1 tablet by mouth in the morning and at bedtime.    Yes Jaya Donald MD   topiramate (TOPAMAX) 100 MG tablet Take 1 tablet by mouth 2 times daily   Yes Jaya Donald MD   traZODone (DESYREL) 50 MG tablet Take 1 tablet by mouth nightly as needed for Sleep   Yes Jaya Donald MD   ARIPiprazole ER (ABILIFY MAINTENA) 400 MG PRSY Inject 400 mg into the muscle every 28 days   Yes Jaya Donald MD   atropine 1 % ophthalmic solution Place 1-2 drops under the tongue nightly as needed (drooling)   Yes Jaya Donald MD       Current medications:    Current Facility-Administered Medications   Medication Dose Route Frequency Provider Last Rate Last Admin    0.9 % sodium chloride infusion   IntraVENous Continuous Ignacio Butler MD        cloZAPine (FAZACLO) disintegrating tablet 25 mg  25 mg Oral Nightly Rick Laguna MD        LORazepam (ATIVAN) 2 MG/ML concentrated solution 1 mg ++HOLD EVENING DOSE PRIOR TO AM ECT++  1 mg Oral TID Rick Laguna MD        risperiDONE (RISPERDAL) tablet 1 mg  1 mg Oral Nightly Jalyn Gordon MD   1 mg at [id number]    acetaminophen (TYLENOL)

## 2023-12-13 NOTE — PLAN OF CARE
Problem: Coping  Goal: Pt/Family able to verbalize concerns and demonstrate effective coping strategies  Description: INTERVENTIONS:  1. Assist patient/family to identify coping skills, available support systems and cultural and spiritual values  2. Provide emotional support, including active listening and acknowledgement of concerns of patient and caregivers  3. Reduce environmental stimuli, as able  4. Instruct patient/family in relaxation techniques, as appropriate  5. Assess for spiritual pain/suffering and initiate Spiritual Care, Psychosocial Clinical Specialist consults as needed  Outcome: Progressing  Flowsheets (Taken 12/12/2023 2200 by Regina Barba RN)  Patient/family able to verbalize anxieties, fears, and concerns, and demonstrate effective coping: Reduce environmental stimuli, as able     Problem: Safety - Adult  Goal: Free from fall injury  Outcome: Progressing

## 2023-12-13 NOTE — BH NOTE
Writer received call back from Geno at [facility]. She reports they are working towards pt sister no longer having guardianship over him. She reports that pt and his sister are adopted and are not biological siblings. She reports that pt sister appears to have undiagnosed mental health issues and so does her son who lives with the pt. She reports that pt needs are exceeding the care pt sister is able to give. She reports that pt sister talks poorly about pt in front of him, talks about his mental health on facebook publicly (Reportedly she posted \"This weather is more schizophrenic than [patient name]). Geno reports pt sister appears manic at times. Geno reports that APS knows pt well as there has been ongoing concerns, and that currently pt did not have heat in the home and there appears to be structural issues with the home pt shared with his sister. Geno reports that pt sister stopped him from going to [place] [employer] because they allowed him to smoke cigarettes there and that she never allowed him to go again after that, and keeps pt isolated in the home then complains that pt is \"always home\". APS pursuing terminating guardianship over pt from his sister. They report pt is not permitted to return to his sister's care at this time.    EMERY Garcia

## 2023-12-14 PROCEDURE — 1240000000 HC EMOTIONAL WELLNESS R&B

## 2023-12-14 PROCEDURE — 6360000002 HC RX W HCPCS: Performed by: NURSE PRACTITIONER

## 2023-12-14 RX ADMIN — LORAZEPAM 1 MG: 2 INJECTION INTRAMUSCULAR; INTRAVENOUS at 10:07

## 2023-12-14 RX ADMIN — HALOPERIDOL LACTATE 2 MG: 5 INJECTION, SOLUTION INTRAMUSCULAR at 21:48

## 2023-12-14 ASSESSMENT — PAIN SCALES - GENERAL
PAINLEVEL_OUTOF10: 0
PAINLEVEL_OUTOF10: 0

## 2023-12-14 NOTE — CARE COORDINATION
12/14/23 1103   ITP   Date of Next Review 12/20/23   Short Term Goal 1   STG Goal 1 Status: Patient Appears to be  Progressing toward treatment plan goal   Short Term Goal 2   STG Goal 2 Status: Patient Appears to be  Progressing toward treatment plan goal

## 2023-12-14 NOTE — PROGRESS NOTES
Patient present in his room with his shirt off and laying on the floor next to his bed. Patient refused to answer assessment question and only responded with \"I feel fine\". Patient was cooperative with court ordered Ativan IM injection. Patient was directed and cooperative to have his V/S taken. Hourly safety checks conducted. Will continue to monitor for safety checks.

## 2023-12-14 NOTE — BH NOTE
Chief Complaint: mute    Length of Stay: 6 Days    Interval History:  Patient was seen today for rounds in his room. Mr. Cowan was laying on the floor. He received ECT this morning and was sleeping. He did not participate in the interview and did not speak or open his eyes. He appears disheveled. Per staff, he has been taking medications and no side effects have been observed or reported. He is also ambulating to the bathroom and using the facilities appropriately. No acute events reported over night.    12/13: Roge Cowan is doing poorly. Catatonic. He is mute, rigid, no eye contact, unable to follow commands. He is isolative to his room, has not been eating. Poor hygiene. No interaction is possible with him. He had his second ect today. Staff report he's been mute. He has been non adherent with his meds. Court order meds were approved Monday.     12/14 - Roge Cowan reports that he is not paddling and does not need a blanket although it is cool in the room and he was lying on his bed without a shirt on.  Poverty of content of speech with selective responses.  Emaciated and turns away when asked about suicide, homicide, or perceptual changes.  No aggression or violence.  Selectively  interactive and partially aware.  Refuses PO medications and received IM Ativan the past few days with positive effect. Eating very little and sleeping fairly.      Past Medical History:  Past Medical History:   Diagnosis Date    Psychiatric disorder     Psychotic disorder (HCC)     Withdrawal syndrome (HCC)            Labs:  Lab Results   Component Value Date/Time    WBC 6.8 12/07/2023 03:20 PM    HGB 14.8 12/07/2023 03:20 PM    HCT 44.4 12/07/2023 03:20 PM     12/07/2023 03:20 PM    MCV 89.2 12/07/2023 03:20 PM      Lab Results   Component Value Date/Time     12/07/2023 03:37 PM    K 4.2 12/07/2023 03:37 PM     12/07/2023 03:37 PM    CO2 26 12/07/2023 03:37 PM    BUN 15 12/07/2023 03:37 PM    GFRAA >60  12/14/2020 01:24 PM    GLOB 3.6 12/07/2023 03:37 PM    ALT 23 12/07/2023 03:37 PM          Current Facility-Administered Medications   Medication Dose Route Frequency Provider Last Rate Last Admin    cloZAPine (FAZACLO) disintegrating tablet 25 mg  25 mg Oral Nightly Ludivina Tavarez APRN - NP        Or    haloperidol lactate (HALDOL) injection 2 mg +++COURT ORDERED MEDICATION FOR REFUSAL OF CLOZAPINE+++  2 mg IntraMUSCular Nightly Ludivina Tavarez APRN - NP   2 mg at 12/13/23 2151    [Held by provider] LORazepam (ATIVAN) 2 MG/ML concentrated solution 1 mg ++HOLD EVENING DOSE PRIOR TO AM ECT++  1 mg Oral TID Rustam Rivero MD        acetaminophen (TYLENOL) tablet 650 mg  650 mg Oral Q4H PRN Rustam Rivero MD        polyethylene glycol (GLYCOLAX) packet 17 g  17 g Oral Daily PRN Rustam Rivero MD        aluminum & magnesium hydroxide-simethicone (MAALOX) 200-200-20 MG/5ML suspension 30 mL  30 mL Oral Q6H PRN Rustam Rivero MD        hydrOXYzine HCl (ATARAX) tablet 50 mg  50 mg Oral TID PRN Rustam Rivero MD        haloperidol (HALDOL) tablet 5 mg  5 mg Oral Q4H PRN Rustam Rivero MD        Or    haloperidol lactate (HALDOL) injection 5 mg  5 mg IntraMUSCular Q4H PRN Rustam Rivero MD        diphenhydrAMINE (BENADRYL) injection 50 mg  50 mg IntraMUSCular Q4H PRN Rustam Rivero MD        traZODone (DESYREL) tablet 50 mg  50 mg Oral Nightly PRN Rustam Rivero MD        LORazepam (ATIVAN) injection 2 mg  2 mg IntraMUSCular Q6H PRN Rustam Rivero MD   2 mg at 12/11/23 0747         Mental Status Exam:  Eye contact: none   Grooming: poor  Psychomotor activity: flapps feet when someone talks to him  Speech is mute  Mood is YOEL  Affect:Blunted   Perception: YOEL  Suicidal ideation: YOEL   Cognition is grossly impaired    Vitals:    12/14/23 1000   BP: (!) 125/90   Pulse: 98   Resp: 18   Temp:    SpO2:         Physical Exam:  Body habitus: There is no height

## 2023-12-14 NOTE — GROUP NOTE
Group Therapy Note    Date: 12/14/2023    Group Start Time: 1400  Group End Time: 1500  Group Topic: Recreational    [facility]    Janessa Beverly        Group Therapy Note           Patient's Goal:  To participate in relaxation activity    Notes:  Pt did not attend session      Signature:  JANESSA BEVERLY

## 2023-12-14 NOTE — PROGRESS NOTES
RN Reassessment Note 7pm-7am:    Roge was observed to be isolative to his room, quiet and withdrawn. Roge refused to have his vital signs taken, He ate his evening snack. Patient refused to answer assessment questions. Roge received court ordered IM injection of Haldol, for refusing his court ordered oral medication. Patient was calm and cooperative with medications. Will refused to answer assessment questions. Roge will  continue to be monitored for his health and safety. Patient slept for a total of 8 hrs.

## 2023-12-14 NOTE — BH NOTE
Behavioral Health Treatment Team Note     Patient goal(s) for today: pt does not identify a goal  Treatment team focus/goals: continue monitor medication, adjust as needed, continue ECT    Progress note: Pt continues to present selectively mute. Pt did respond appropriately to one question staff asked this morning. Staff assisting pt with bathing today. Pt continuing ECT.    Please see BH note by same writer from 12/13 4:47 pm for collateral from [facility].    LOS:  6  Expected LOS: 20-30    Insurance info/prescription coverage:  MEDICARE  Date of last family contact:  Called [facility] 12/11 and [facility] 12/13  Family requesting physician contact today:  No  Discharge plan:  TBD  Guns in the home:  No  Outpatient provider(s):  [facility]     Participating treatment team members: Nba Wilson MSW

## 2023-12-14 NOTE — PLAN OF CARE
Problem: Discharge Planning  Goal: Discharge to home or other facility with appropriate resources  Outcome: Not Progressing     Problem: Coping  Goal: Pt/Family able to verbalize concerns and demonstrate effective coping strategies  Description: INTERVENTIONS:  1. Assist patient/family to identify coping skills, available support systems and cultural and spiritual values  2. Provide emotional support, including active listening and acknowledgement of concerns of patient and caregivers  3. Reduce environmental stimuli, as able  4. Instruct patient/family in relaxation techniques, as appropriate  5. Assess for spiritual pain/suffering and initiate Spiritual Care, Psychosocial Clinical Specialist consults as needed  Outcome: Not Progressing     Problem: Confusion  Goal: Confusion, delirium, dementia, or psychosis is improved or at baseline  Description: INTERVENTIONS:  1. Assess for possible contributors to thought disturbance, including medications, impaired vision or hearing, underlying metabolic abnormalities, dehydration, psychiatric diagnoses, and notify attending LIP  2. [place] high risk fall precautions, as indicated  3. Provide frequent short contacts to provide reality reorientation, refocusing and direction  4. Decrease environmental stimuli, including noise as appropriate  5. Monitor and intervene to maintain adequate nutrition, hydration, elimination, sleep and activity  6. If unable to ensure safety without constant attention obtain sitter and review sitter guidelines with assigned personnel  7. Initiate Psychosocial CNS and Spiritual Care consult, as indicated  Outcome: Not Progressing     Problem: Involuntary Admit  Goal: Will cooperate with staff recommendations and doctor's orders and will demonstrate appropriate behavior  Description: INTERVENTIONS:  1. Treat underlying conditions and offer medication as ordered  2. Educate regarding involuntary admission procedures and rules  3. Contain

## 2023-12-14 NOTE — PROGRESS NOTES
Pt screened per LOS policy.  No acute nutrition or weight issues identified.  Weight is not avail in chart and pt is refusing some vitals; please obtain weight when you can.  Double portions ordered based on pt's ht and physical appearance.  Please place diet order for pt in chart.    Hx unremarkable per nutrition.  Ht is 6'  Please weigh pt when you are able and record in pt's chart.  Thank you.

## 2023-12-14 NOTE — PERIOP NOTE
Spoke with Stacey RN, [facility], to advise patient scheduled for ECT procedure 12/15/2023, NPO after midnight, PACU RN's will transport  patient to PACU at 0630.

## 2023-12-15 ENCOUNTER — ANESTHESIA (OUTPATIENT)
Facility: HOSPITAL | Age: 53
End: 2023-12-15
Payer: MEDICARE

## 2023-12-15 ENCOUNTER — ANESTHESIA EVENT (OUTPATIENT)
Facility: HOSPITAL | Age: 53
End: 2023-12-15
Payer: MEDICARE

## 2023-12-15 LAB — CK SERPL-CCNC: 424 U/L (ref 39–308)

## 2023-12-15 PROCEDURE — 82550 ASSAY OF CK (CPK): CPT

## 2023-12-15 PROCEDURE — 3700000000 HC ANESTHESIA ATTENDED CARE: Performed by: PSYCHIATRY & NEUROLOGY

## 2023-12-15 PROCEDURE — 7100000000 HC PACU RECOVERY - FIRST 15 MIN: Performed by: PSYCHIATRY & NEUROLOGY

## 2023-12-15 PROCEDURE — 3700000001 HC ADD 15 MINUTES (ANESTHESIA): Performed by: PSYCHIATRY & NEUROLOGY

## 2023-12-15 PROCEDURE — 1240000000 HC EMOTIONAL WELLNESS R&B

## 2023-12-15 PROCEDURE — 2709999900 HC NON-CHARGEABLE SUPPLY: Performed by: PSYCHIATRY & NEUROLOGY

## 2023-12-15 PROCEDURE — 36415 COLL VENOUS BLD VENIPUNCTURE: CPT

## 2023-12-15 PROCEDURE — 90870 ELECTROCONVULSIVE THERAPY: CPT | Performed by: PSYCHIATRY & NEUROLOGY

## 2023-12-15 PROCEDURE — 7100000001 HC PACU RECOVERY - ADDTL 15 MIN: Performed by: PSYCHIATRY & NEUROLOGY

## 2023-12-15 PROCEDURE — 6370000000 HC RX 637 (ALT 250 FOR IP): Performed by: NURSE PRACTITIONER

## 2023-12-15 PROCEDURE — 6360000002 HC RX W HCPCS: Performed by: ANESTHESIOLOGY

## 2023-12-15 RX ORDER — SODIUM CHLORIDE 0.9 % (FLUSH) 0.9 %
5-40 SYRINGE (ML) INJECTION EVERY 12 HOURS SCHEDULED
Status: DISCONTINUED | OUTPATIENT
Start: 2023-12-15 | End: 2023-12-15 | Stop reason: HOSPADM

## 2023-12-15 RX ORDER — SODIUM CHLORIDE 0.9 % (FLUSH) 0.9 %
5-40 SYRINGE (ML) INJECTION PRN
Status: DISCONTINUED | OUTPATIENT
Start: 2023-12-15 | End: 2023-12-15 | Stop reason: HOSPADM

## 2023-12-15 RX ORDER — SODIUM CHLORIDE 0.9 % (FLUSH) 0.9 %
5-40 SYRINGE (ML) INJECTION EVERY 12 HOURS SCHEDULED
Status: CANCELLED | OUTPATIENT
Start: 2023-12-15

## 2023-12-15 RX ORDER — SODIUM CHLORIDE 9 MG/ML
INJECTION, SOLUTION INTRAVENOUS CONTINUOUS
Status: DISCONTINUED | OUTPATIENT
Start: 2023-12-15 | End: 2023-12-15 | Stop reason: HOSPADM

## 2023-12-15 RX ORDER — SODIUM CHLORIDE 0.9 % (FLUSH) 0.9 %
5-40 SYRINGE (ML) INJECTION PRN
Status: CANCELLED | OUTPATIENT
Start: 2023-12-15

## 2023-12-15 RX ORDER — SODIUM CHLORIDE 9 MG/ML
INJECTION, SOLUTION INTRAVENOUS PRN
Status: CANCELLED | OUTPATIENT
Start: 2023-12-15

## 2023-12-15 RX ORDER — SODIUM CHLORIDE 9 MG/ML
INJECTION, SOLUTION INTRAVENOUS PRN
Status: DISCONTINUED | OUTPATIENT
Start: 2023-12-15 | End: 2023-12-15 | Stop reason: HOSPADM

## 2023-12-15 RX ORDER — SUCCINYLCHOLINE/SOD CL,ISO/PF 100 MG/5ML
SYRINGE (ML) INTRAVENOUS PRN
Status: DISCONTINUED | OUTPATIENT
Start: 2023-12-15 | End: 2023-12-15 | Stop reason: SDUPTHER

## 2023-12-15 RX ORDER — SODIUM CHLORIDE 9 MG/ML
INJECTION, SOLUTION INTRAVENOUS CONTINUOUS
Status: CANCELLED | OUTPATIENT
Start: 2023-12-15

## 2023-12-15 RX ADMIN — PROPOFOL 70 MG: 10 INJECTION, EMULSION INTRAVENOUS at 07:38

## 2023-12-15 RX ADMIN — CLOZAPINE 25 MG: 25 TABLET, ORALLY DISINTEGRATING ORAL at 22:12

## 2023-12-15 RX ADMIN — Medication 50 MG: at 07:38

## 2023-12-15 ASSESSMENT — PAIN - FUNCTIONAL ASSESSMENT
PAIN_FUNCTIONAL_ASSESSMENT: ADULT NONVERBAL PAIN SCALE (NPVS)

## 2023-12-15 ASSESSMENT — PAIN SCALES - GENERAL: PAINLEVEL_OUTOF10: 0

## 2023-12-15 NOTE — ANESTHESIA PRE PROCEDURE
Department of Anesthesiology  Preprocedure Note       Name:  Marya Lozada   Age:  46 y.o.  :  1970                                          MRN:  [id number]         Date:  12/15/2023      Surgeon: Hernandez Argueta):  Loreto Ramos MD    Procedure: Procedure(s):  ELECTROCONVULSIVE THERAPY    Medications prior to admission:   Prior to Admission medications    Medication Sig Start Date End Date Taking? Authorizing Provider   cloZAPine (FAZACLO) 25 MG disintegrating tablet Take 1 tablet by mouth 2 times daily Take with 200 mg tablet for total dose of 225 mg   Yes ProviderJaya MD   cloZAPine (FAZACLO) 200 MG TBDP disintegrating tablet Take 1 tablet by mouth 2 times daily Take with 25 mg tablet for total dose of 225 mg   Yes ProviderJaya MD   LORazepam (ATIVAN) 1 MG tablet Take 1 tablet by mouth in the morning and at bedtime.    Yes Jaya Donald MD   topiramate (TOPAMAX) 100 MG tablet Take 1 tablet by mouth 2 times daily   Yes Jaya Donald MD   traZODone (DESYREL) 50 MG tablet Take 1 tablet by mouth nightly as needed for Sleep   Yes ProviderJaya MD   ARIPiprazole ER (ABILIFY MAINTENA) 400 MG PRSY Inject 400 mg into the muscle every 28 days   Yes ProviderJaya MD   atropine 1 % ophthalmic solution Place 1-2 drops under the tongue nightly as needed (drooling)   Yes ProviderJaya MD       Current medications:    Current Facility-Administered Medications   Medication Dose Route Frequency Provider Last Rate Last Admin    cloZAPine (FAZACLO) disintegrating tablet 25 mg  25 mg Oral Nightly Ludivina Tavarez APRN - NP        Or    haloperidol lactate (HALDOL) injection 2 mg +++COURT ORDERED MEDICATION FOR REFUSAL OF CLOZAPINE+++  2 mg IntraMUSCular Nightly Ludivina Tavarez APRN - NP   2 mg at 234    [Held by provider] LORazepam (ATIVAN) 2 MG/ML concentrated solution 1 mg ++HOLD EVENING DOSE PRIOR TO AM ECT++  1 mg Oral TID Sydna Cogan, MD

## 2023-12-15 NOTE — BH NOTE
Behavioral Health Treatment Team Note     Patient goal(s) for today: pt does not identify a goal  Treatment team focus/goals: continue medications, continue ECT    Progress note: Pt presents selectively mute with face in mattress. Treatment team asked pt if he is having trouble breathing while laying face down in mattress, pt shook his head no. Pt speed of breathing increased when treatment team entered room, pt also began rubbing feet together, possibly attributed to anxiety however pt does not respond so it is unclear. Pt to continue ECT Monday.    LOS:  7  Expected LOS: 20-30    Insurance info/prescription coverage:  MEDICARE  Date of last family contact:  Called [facility] 12/11 and [facility] 12/13  Family requesting physician contact today:  No  Discharge plan:  TBD  Guns in the home:  No  Outpatient provider(s):  [facility]     Participating treatment team members: Nba Wilson MSW

## 2023-12-15 NOTE — BH NOTE
Pt is quiet, calm, and selectively mute. Poor eye contact is noted. He is encountered laying on the stretcher returning from ECT. He was sable to move himself from the stretcher to the mattress on the floor and decided to lay on the floor. Patient was encouraged to return to mattress for comfort. He shakes his head to answer some questions only conveying that he does not want to harm himself. Q 15 min rounds continue to assure safety. PT is being provided fresh fluids an regular diet trays with snacks. He is consuming both foods and fluids and has voided with no noted incontinence.

## 2023-12-15 NOTE — ANESTHESIA POSTPROCEDURE EVALUATION
Department of Anesthesiology  Postprocedure Note    Patient: Enrique Rock  MRN: [id number]  YOB: 1970  Date of evaluation: 12/15/2023    Procedure Summary       Date: 12/15/23 Room / Location: [facility] OR R2 / [facility] OR    Anesthesia Start: 0732 Anesthesia Stop: 1692    Procedure: ELECTROCONVULSIVE THERAPY (Head) Diagnosis:       Major depressive disorder, recurrent episode, moderate (HCC)      (Major depressive disorder, recurrent episode, moderate ([address]) [F33.1])    Surgeons: Velvet Robertson MD Responsible Provider: Swetha Mueller MD    Anesthesia Type: general ASA Status: 2            Anesthesia Type: No value filed. Cesar Phase I: Cesar Score: 9    Cesar Phase II:      Anesthesia Post Evaluation    Patient location during evaluation: PACU  Patient participation: complete - patient cannot participate  Level of consciousness: awake  Airway patency: patent  Nausea & Vomiting: no vomiting and no nausea  Cardiovascular status: hemodynamically stable  Respiratory status: acceptable  Hydration status: stable  There was medical reason for not using a multimodal analgesia pain management approach. Pain management: adequate    No notable events documented.

## 2023-12-15 NOTE — BH NOTE
Chief Complaint: mute    Length of Stay: 7 Days    Interval History:  Patient was seen today for rounds in his room. Mr. Cowan was laying on the floor. He received ECT this morning and was sleeping. He did not participate in the interview and did not speak or open his eyes. He appears disheveled. Per staff, he has been taking medications and no side effects have been observed or reported. He is also ambulating to the bathroom and using the facilities appropriately. No acute events reported over night.    12/13: Roge Cowan is doing poorly. Catatonic. He is mute, rigid, no eye contact, unable to follow commands. He is isolative to his room, has not been eating. Poor hygiene. No interaction is possible with him. He had his second ect today. Staff report he's been mute. He has been non adherent with his meds. Court order meds were approved Monday.     12/14 - Roge Cowan reports that he is not paddling and does not need a blanket although it is cool in the room and he was lying on his bed without a shirt on.  Poverty of content of speech with selective responses.  Emaciated and turns away when asked about suicide, homicide, or perceptual changes.  No aggression or violence.  Selectively  interactive and partially aware.  Refuses PO medications and received IM Ativan the past few days with positive effect. Eating very little and sleeping fairly.      12/15 - Roge Cowan received ECT treatments toda and remains selective in his interactions generally responding with body movements, shakes and flapping of a limb (hands or feet).  Nursing reports that he uses the bathroom appropriately and eats some.  Malodrous and emaciated.  No aggression or violence.  Interactive at times.  Limited awareness. Tolerating medications well.  Eating and sleeping fairly.    Past Medical History:  Past Medical History:   Diagnosis Date    Psychiatric disorder     Psychotic disorder (HCC)     Withdrawal syndrome (HCC)       psychiatric facility personnel. In addition, the hospital records show that services furnished were intensive treatment services, admission or related services, or equivalent services.

## 2023-12-15 NOTE — PROCEDURES
IDENTIFICATION:    Patient Name  Roge Cowan   Date of Birth 1970   [facility] [id number]   Medical Record Number  [id number]   Age  53 y.o.   PCP Cole Boyd MD   Admit date:  12/7/2023   Room Number  OR/PL  @ [facility]   Date of Service  12/15/23          Electro Convulsive Therapy:  Treatment number 3       Maintenance ECT NO   Roge Cowan  was admitted to the PACU for ECT. Patient was medically cleared and NPO for procedure.  Patient IS COURT MANDATED , gave informed consent for ECT treatment.      Patient received     Bilateral ECT YES Right Unilateral ECT NO  Administered using the Thymatron System IV - [employer].     at 35 % Energy, under general anesthesia.  (Changed from 30% last time)  Roge Cowan   with a good, well generalized seizure of 70 seconds on observation of the motor manifestations of the seizure. EEG duration was 124 secs.   Post-Ictal Suppression Index:  63.0 %  Recovery was unremarkable and with no complications.     Change in Energy: %            Anesthesia medications administered during procedure:    Today using propofol: 70mg  Change for next treament:       Succinylcholine: 50 mg  Change for next treatment:      Propofol: mg  Glycopyrrolate:  mg  Labetalol:  mg  Esmolol:  mg  Lorazepam:  mg  Ketamine:  mg  Zofran:  mg    Toradol:  mg  Lidocaine:  mg  Caffeine Benzoate: mg       No flowsheet data found.    SIGNED:    Lety Larson MD  Psychiatry

## 2023-12-15 NOTE — PERIOP NOTE
TRANSFER - OUT REPORT:    Verbal report given to GAIL Robert on Roge Cowan  being transferred to [facility] for routine post-op       Report consisted of patient's Situation, Background, Assessment and   Recommendations(SBAR).     Information from the following report(s) Nurse Handoff Report was reviewed with the receiving nurse.           Lines:   Peripheral IV 12/15/23 Proximal;Right Forearm (Active)   Site Assessment Clean, dry & intact 12/15/23 0752   Line Status Infusing 12/15/23 0752   Line Care Connections checked and tightened 12/15/23 0745   Phlebitis Assessment No symptoms 12/15/23 0752   Infiltration Assessment 0 12/15/23 0752   Alcohol Cap Used No 12/15/23 0752   Dressing Status Clean, dry & intact 12/15/23 0752   Dressing Type Transparent 12/15/23 0752        Opportunity for questions and clarification was provided.      Patient transported with:  Tech

## 2023-12-15 NOTE — GROUP NOTE
Group Therapy Note    Date: 12/15/2023    Group Start Time: 1100  Group End Time: 1200  Group Topic: Topic Group    [facility] 3 ACUTE BEHAV [facility]    Janessa Beverly        Group Therapy Note         Patient's Goal:  To participate in relaxation activity    Notes:  Pt did not attend session                                  Discipline Responsible: Recreational Therapist      Signature:  JANESSA BEVERLY

## 2023-12-15 NOTE — PERIOP NOTE
Roge Cowan  1970  [id number]    Situation:  Verbal report given from: Dr. Butler and Janice Martinez RN  Procedure: Procedure(s):  ELECTROCONVULSIVE THERAPY    Background:    Preoperative diagnosis: Major depressive disorder, recurrent episode, moderate (HCC) [F33.1]    Postoperative diagnosis: * No post-op diagnosis entered *    :  Dr. Larson    Assistant(s): Circulator: Janice Martinez RN  Scrub Person First: Arpan Morel RN    Specimens: [unfilled]    Assessment:  Intra-procedure medications         Anesthesia gave intra-procedure sedation and medications, see anesthesia flow sheet     Intravenous fluids: LR@ KVO     Vital signs stable

## 2023-12-15 NOTE — BH NOTE
Assumed care of the patient. Patient was isolative to his room and appeared unkempt. Over all, patient was uninterested and did not acknowledge the staffs, however there were times when he appeared hyper alert and animated like when he was called by his preferred name, \"Isreal\" and when asked if he wanted to be covered with a blanket . Patient also came out of his room once to ask for a snack. Unable to complete shift assessment at this time. Patient received IM Haldol for refusal of court ordered medication. Patient is malodorous and hygiene is poor. Patient is non acceptance when educated and encouraged.    Patient appeared to be sleeping for about 5 hours.

## 2023-12-16 PROCEDURE — 6360000002 HC RX W HCPCS: Performed by: PSYCHIATRY & NEUROLOGY

## 2023-12-16 PROCEDURE — 6370000000 HC RX 637 (ALT 250 FOR IP): Performed by: PSYCHIATRY & NEUROLOGY

## 2023-12-16 PROCEDURE — 1240000000 HC EMOTIONAL WELLNESS R&B

## 2023-12-16 PROCEDURE — 6360000002 HC RX W HCPCS: Performed by: NURSE PRACTITIONER

## 2023-12-16 RX ADMIN — LORAZEPAM 1 MG: 2 SOLUTION, CONCENTRATE ORAL at 16:58

## 2023-12-16 RX ADMIN — HALOPERIDOL LACTATE 2 MG: 5 INJECTION, SOLUTION INTRAMUSCULAR at 21:12

## 2023-12-16 RX ADMIN — LORAZEPAM 2 MG: 2 INJECTION INTRAMUSCULAR; INTRAVENOUS at 21:13

## 2023-12-16 ASSESSMENT — PAIN SCALES - GENERAL
PAINLEVEL_OUTOF10: 0

## 2023-12-16 NOTE — PLAN OF CARE
[id number]- I assume care of the patient. He is lying on his mattress and is selectively mute. This writer ask him if he wants to hurt himself and with surprise, he stated NO.  He was asked to take his medications and he didn't respond. I immediately informed him that he has a court ordered inject and I will go and get it now. He sat up from his mattress and stated that he will take the medication. The medication was given and he went back to lying on the mattress in a curled position. This writer explained to him that I needed to see if he has taken the medication. He didn't respond, and since he didn't respond, I told him that I was on my way to get the injection because this writer didn't have proof that he has taken the medication. He sat up quickly and opened his mouth to show this writer that he has taken the medication. He has spoken to me and stated that he has taken his medication. This writer was satisfied and will continue to monitor him throughout the night. He has his snacks at his bedside and is isolative in his room. He slept 6 hrs throughout the night.  Problem: Confusion  Goal: Confusion, delirium, dementia, or psychosis is improved or at baseline  Description: INTERVENTIONS:  1. Assess for possible contributors to thought disturbance, including medications, impaired vision or hearing, underlying metabolic abnormalities, dehydration, psychiatric diagnoses, and notify attending LIP  2. [place] high risk fall precautions, as indicated  3. Provide frequent short contacts to provide reality reorientation, refocusing and direction  4. Decrease environmental stimuli, including noise as appropriate  5. Monitor and intervene to maintain adequate nutrition, hydration, elimination, sleep and activity  6. If unable to ensure safety without constant attention obtain sitter and review sitter guidelines with assigned personnel  7. Initiate Psychosocial CNS and Spiritual Care consult, as indicated  Outcome: Not

## 2023-12-16 NOTE — BH NOTE
Chief Complaint: mute    Length of Stay: 8 Days    Interval History:  Patient was seen today for rounds in his room. Mr. Cowan was laying on the floor. He received ECT this morning and was sleeping. He did not participate in the interview and did not speak or open his eyes. He appears disheveled. Per staff, he has been taking medications and no side effects have been observed or reported. He is also ambulating to the bathroom and using the facilities appropriately. No acute events reported over night.    12/13: Roge Cowan is doing poorly. Catatonic. He is mute, rigid, no eye contact, unable to follow commands. He is isolative to his room, has not been eating. Poor hygiene. No interaction is possible with him. He had his second ect today. Staff report he's been mute. He has been non adherent with his meds. Court order meds were approved Monday.     12/14 - Roge Cowan reports that he is not paddling and does not need a blanket although it is cool in the room and he was lying on his bed without a shirt on.  Poverty of content of speech with selective responses.  Emaciated and turns away when asked about suicide, homicide, or perceptual changes.  No aggression or violence.  Selectively  interactive and partially aware.  Refuses PO medications and received IM Ativan the past few days with positive effect. Eating very little and sleeping fairly.      12/15 - Roge Cowan received ECT treatments toda and remains selective in his interactions generally responding with body movements, shakes and flapping of a limb (hands or feet).  Nursing reports that he uses the bathroom appropriately and eats some.  Malodrous and emaciated.  No aggression or violence.  Interactive at times.  Limited awareness. Tolerating medications well.  Eating and sleeping fairly.    12/16 - Roge Cowan responds selectively.  Still sleeps on the floor.  Had ECT yesterday.  Moods are good.  Appropriately dressed and groomed.  Denies  SI/HI/AH/VH.  No aggression or violence.  Isolative and selectively interactive.  Aware. Tolerating medications fairly.  Eating and sleeping fairly.    Past Medical History:  Past Medical History:   Diagnosis Date    Psychiatric disorder     Psychotic disorder (HCC)     Withdrawal syndrome (HCC)            Labs:  Lab Results   Component Value Date/Time    WBC 6.8 12/07/2023 03:20 PM    HGB 14.8 12/07/2023 03:20 PM    HCT 44.4 12/07/2023 03:20 PM     12/07/2023 03:20 PM    MCV 89.2 12/07/2023 03:20 PM      Lab Results   Component Value Date/Time     12/07/2023 03:37 PM    K 4.2 12/07/2023 03:37 PM     12/07/2023 03:37 PM    CO2 26 12/07/2023 03:37 PM    BUN 15 12/07/2023 03:37 PM    GFRAA >60 12/14/2020 01:24 PM    GLOB 3.6 12/07/2023 03:37 PM    ALT 23 12/07/2023 03:37 PM          Current Facility-Administered Medications   Medication Dose Route Frequency Provider Last Rate Last Admin    cloZAPine (FAZACLO) disintegrating tablet 25 mg  25 mg Oral Nightly Ludivina Tavarez APRN - NP   25 mg at 12/15/23 2212    Or    haloperidol lactate (HALDOL) injection 2 mg +++COURT ORDERED MEDICATION FOR REFUSAL OF CLOZAPINE+++  2 mg IntraMUSCular Nightly Ludivina Tavarez APRN - NP   2 mg at 12/14/23 2148    LORazepam (ATIVAN) 2 MG/ML concentrated solution 1 mg ++HOLD EVENING DOSE PRIOR TO AM ECT++  1 mg Oral TID Rustam Rivero MD   1 mg at 12/16/23 1658    acetaminophen (TYLENOL) tablet 650 mg  650 mg Oral Q4H PRN Rustam Rivero MD        polyethylene glycol (GLYCOLAX) packet 17 g  17 g Oral Daily PRN Rustam Rivero MD        aluminum & magnesium hydroxide-simethicone (MAALOX) 200-200-20 MG/5ML suspension 30 mL  30 mL Oral Q6H PRN Rustam Rivero MD        hydrOXYzine HCl (ATARAX) tablet 50 mg  50 mg Oral TID PRN Rustam Rivero MD        haloperidol (HALDOL) tablet 5 mg  5 mg Oral Q4H PRN Rustam Rivero MD        Or    haloperidol lactate (HALDOL) injection 5 mg  5 mg

## 2023-12-16 NOTE — PLAN OF CARE
Problem: Coping  Goal: Pt/Family able to verbalize concerns and demonstrate effective coping strategies  Description: INTERVENTIONS:  1. Assist patient/family to identify coping skills, available support systems and cultural and spiritual values  2. Provide emotional support, including active listening and acknowledgement of concerns of patient and caregivers  3. Reduce environmental stimuli, as able  4. Instruct patient/family in relaxation techniques, as appropriate  5. Assess for spiritual pain/suffering and initiate Spiritual Care, Psychosocial Clinical Specialist consults as needed  12/16/2023 1856 by Rocío Baez RN  Outcome: Progressing  12/16/2023 1834 by Rocío Baez RN  Outcome: Progressing     Problem: Confusion  Goal: Confusion, delirium, dementia, or psychosis is improved or at baseline  Description: INTERVENTIONS:  1. Assess for possible contributors to thought disturbance, including medications, impaired vision or hearing, underlying metabolic abnormalities, dehydration, psychiatric diagnoses, and notify attending LIP  2. [place] high risk fall precautions, as indicated  3. Provide frequent short contacts to provide reality reorientation, refocusing and direction  4. Decrease environmental stimuli, including noise as appropriate  5. Monitor and intervene to maintain adequate nutrition, hydration, elimination, sleep and activity  6. If unable to ensure safety without constant attention obtain sitter and review sitter guidelines with assigned personnel  7. Initiate Psychosocial CNS and Spiritual Care consult, as indicated  12/16/2023 1856 by Rocío Baez RN  Outcome: Progressing  12/16/2023 1834 by Rocío Baez RN  Outcome: Progressing     Problem: Involuntary Admit  Goal: Will cooperate with staff recommendations and doctor's orders and will demonstrate appropriate behavior  Description: INTERVENTIONS:  1. Treat underlying conditions and offer medication as ordered  2. Educate

## 2023-12-16 NOTE — BH NOTE
Morning assessment complete. Patient was encountered lying on floor in a mattress in his room.  Patient is uncooperative and and does not respond verbally   Eye contact is assessment.poor .  Patient appears withdrawn and does not respond to commands. Refused vs and eats what he wants from meal tray  Unable to assess patient for SI/HI/AVH uncooperative.  C-SSRS level is .not a risk.  Patient received Ativan 1mg po. @1658. Dr. Burton was in to see patient.  Patient has  remained isolated in his room all shift.   There are no untoward side effects from medications to note.  Hourly rounds are being completed to assure patient safety and attend to care needs. Will continue to monitor for safety.

## 2023-12-16 NOTE — PROGRESS NOTES
Comprehensive Nutrition Assessment    Type and Reason for Visit:  Initial, Consult    Nutrition Recommendations/Plan:   Diet as tolerated  Double portions ordered.  Supplements added to meal trays         Nutrition Assessment:    Pt admitted to [facility].  Hx notable for tobacco abuse.  Per chart pt is noted to be eating well.    Nutrition Related Findings:    Pt tolerating diet Wound Type: None       Current Nutrition Intake & Therapies:    Average Meal Intake: %  Average Supplements Intake: None Ordered  No diet orders on file    Anthropometric Measures:  Height: 182.9 cm (6')  Ideal Body Weight (IBW): 178 lbs (81 kg)       Current Body Weight: 80.7 kg (178 lb), 100 % IBW. Weight Source: Standing Scale  Current BMI (kg/m2): 24.1        Weight Adjustment For: No Adjustment                 BMI Categories: Normal Weight (BMI 18.5-24.9)    Estimated Daily Nutrient Needs:  Energy Requirements Based On: Formula  Weight Used for Energy Requirements: Current  Energy (kcal/day): 2600  Weight Used for Protein Requirements: Current  Protein (g/day): 105  Method Used for Fluid Requirements: 1 ml/kcal  Fluid (ml/day): 2600    Nutrition Diagnosis:   No nutrition diagnosis at this time related to   as evidenced by      Nutrition Interventions:   Food and/or Nutrient Delivery: Continue Current Diet, Start Oral Nutrition Supplement  Nutrition Education/Counseling: No recommendation at this time  Coordination of Nutrition Care: No recommendation at this time       Goals:  Previous Goal Met: Progressing toward Goal(s)  Goals: Meet at least 75% of estimated needs, prior to discharge       Nutrition Monitoring and Evaluation:   Behavioral-Environmental Outcomes: None Identified  Food/Nutrient Intake Outcomes: None Identified  Physical Signs/Symptoms Outcomes: None Identified    Discharge Planning:    Too soon to determine     Nolberto Garcia RD  Contact: [phone]

## 2023-12-16 NOTE — PLAN OF CARE
Problem: Coping  Goal: Pt/Family able to verbalize concerns and demonstrate effective coping strategies  Description: INTERVENTIONS:  1. Assist patient/family to identify coping skills, available support systems and cultural and spiritual values  2. Provide emotional support, including active listening and acknowledgement of concerns of patient and caregivers  3. Reduce environmental stimuli, as able  4. Instruct patient/family in relaxation techniques, as appropriate  5. Assess for spiritual pain/suffering and initiate Spiritual Care, Psychosocial Clinical Specialist consults as needed  Outcome: Progressing

## 2023-12-17 PROCEDURE — 1240000000 HC EMOTIONAL WELLNESS R&B

## 2023-12-17 PROCEDURE — 6360000002 HC RX W HCPCS: Performed by: NURSE PRACTITIONER

## 2023-12-17 PROCEDURE — 6370000000 HC RX 637 (ALT 250 FOR IP): Performed by: PSYCHIATRY & NEUROLOGY

## 2023-12-17 RX ADMIN — HALOPERIDOL LACTATE 2 MG: 5 INJECTION, SOLUTION INTRAMUSCULAR at 20:23

## 2023-12-17 RX ADMIN — LORAZEPAM 1 MG: 2 SOLUTION, CONCENTRATE ORAL at 08:39

## 2023-12-17 ASSESSMENT — PAIN SCALES - WONG BAKER: WONGBAKER_NUMERICALRESPONSE: 0

## 2023-12-17 ASSESSMENT — PAIN SCALES - GENERAL
PAINLEVEL_OUTOF10: 0
PAINLEVEL_OUTOF10: 0

## 2023-12-17 NOTE — PLAN OF CARE
1900 -2300- I assume care of the patient. He is in a fetal position on the floor. He is isolative and selective mute. He refuse to move, talk and eat at this time. He refuses to take take his court ordered medications. He is given Haldol 2 mg IM and Ativan 2 mg IV at 2110.  He urinated on the floor in his bathroom. He will be monitored for safety, and behavior throughout the day.     [id number]- The patient ate his dinner tray and he drank his water. He is sitting in a fetal position, he seems to be catatonic this evening and tonight but is eating and drinking. He use the bathroom but urinates everywhere in the bathroom. He is still selectively mute. Will continue to monitor the patient behavior, safety and location.    [id number]- The patient mostly slept in a fetal position most of the night. He slept 4.15 hrs. Throughout the night.  Problem: Coping  Goal: Pt/Family able to verbalize concerns and demonstrate effective coping strategies  Description: INTERVENTIONS:  1. Assist patient/family to identify coping skills, available support systems and cultural and spiritual values  2. Provide emotional support, including active listening and acknowledgement of concerns of patient and caregivers  3. Reduce environmental stimuli, as able  4. Instruct patient/family in relaxation techniques, as appropriate  5. Assess for spiritual pain/suffering and initiate Spiritual Care, Psychosocial Clinical Specialist consults as needed  12/16/2023 2313 by Vicenta Bean, RN  Outcome: Not Progressing  12/16/2023 1856 by Rocío Baez, RN  Outcome: Progressing  12/16/2023 1834 by Rocío Baez, RN  Outcome: Progressing     Problem: Confusion  Goal: Confusion, delirium, dementia, or psychosis is improved or at baseline  Description: INTERVENTIONS:  1. Assess for possible contributors to thought disturbance, including medications, impaired vision or hearing, underlying metabolic abnormalities, dehydration, psychiatric diagnoses,  and notify attending LIP  2. [place] high risk fall precautions, as indicated  3. Provide frequent short contacts to provide reality reorientation, refocusing and direction  4. Decrease environmental stimuli, including noise as appropriate  5. Monitor and intervene to maintain adequate nutrition, hydration, elimination, sleep and activity  6. If unable to ensure safety without constant attention obtain sitter and review sitter guidelines with assigned personnel  7. Initiate Psychosocial CNS and Spiritual Care consult, as indicated  12/16/2023 2313 by Vicenta Bean RN  Outcome: Not Progressing  12/16/2023 1856 by Rocío Baez RN  Outcome: Progressing  12/16/2023 1834 by Rocío Baez RN  Outcome: Progressing     Problem: Involuntary Admit  Goal: Will cooperate with staff recommendations and doctor's orders and will demonstrate appropriate behavior  Description: INTERVENTIONS:  1. Treat underlying conditions and offer medication as ordered  2. Educate regarding involuntary admission procedures and rules  3. Contain excessive/inappropriate behavior per unit and hospital policies  12/16/2023 2313 by Vicenta Bean RN  Outcome: Not Progressing  12/16/2023 1856 by Rocío Baez RN  Outcome: Progressing

## 2023-12-17 NOTE — BH NOTE
Morning assessment complete.He is calm. Selectively mute. Guarded. Flat affect. Eye contact is noted to be poor. Patient has been isolative to self. Lying in the praying position on floor of room. Observed crawling around room.   VS are stable.Patient currently denies SI/HI, AVH.   Patient is eating meals. There are no untoward side effects from medications to note.PO ativan given. C-SSRS level is low.Hourly rounds are being completed to assure patient safety and attend to care needs. Monitoring will continue for changes in patient condition

## 2023-12-17 NOTE — PLAN OF CARE
Problem: Confusion  Goal: Confusion, delirium, dementia, or psychosis is improved or at baseline  Description: INTERVENTIONS:  1. Assess for possible contributors to thought disturbance, including medications, impaired vision or hearing, underlying metabolic abnormalities, dehydration, psychiatric diagnoses, and notify attending LIP  2. [place] high risk fall precautions, as indicated  3. Provide frequent short contacts to provide reality reorientation, refocusing and direction  4. Decrease environmental stimuli, including noise as appropriate  5. Monitor and intervene to maintain adequate nutrition, hydration, elimination, sleep and activity  6. If unable to ensure safety without constant attention obtain sitter and review sitter guidelines with assigned personnel  7. Initiate Psychosocial CNS and Spiritual Care consult, as indicated  12/16/2023 2313 by Vicenta Bean, RN  Outcome: Not Progressing  Flowsheets (Taken 12/16/2023 2040)  Effect of thought disturbance (confusion, delirium, dementia, or psychosis) are managed with adequate functional status: Monitor and intervene to maintain adequate nutrition, hydration, elimination, sleep and activity     Problem: Involuntary Admit  Goal: Will cooperate with staff recommendations and doctor's orders and will demonstrate appropriate behavior  Description: INTERVENTIONS:  1. Treat underlying conditions and offer medication as ordered  2. Educate regarding involuntary admission procedures and rules  3. Contain excessive/inappropriate behavior per unit and hospital policies  12/16/2023 2313 by Vicenta Bean, RN  Outcome: Not Progressing  Flowsheets (Taken 12/16/2023 2040)  Will cooperate with staff recommendations and doctor's orders and will demonstrate appropriate behavior: Treat underlying conditions and offer medication as ordered     Problem: Coping  Goal: Pt/Family able to verbalize concerns and demonstrate effective coping strategies  Description:  with adequate functional status: Monitor and intervene to maintain adequate nutrition, hydration, elimination, sleep and activity     Problem: Involuntary Admit  Goal: Will cooperate with staff recommendations and doctor's orders and will demonstrate appropriate behavior  Description: INTERVENTIONS:  1. Treat underlying conditions and offer medication as ordered  2. Educate regarding involuntary admission procedures and rules  3. Contain excessive/inappropriate behavior per unit and hospital policies  12/16/2023 2313 by Vicenta Bean, RN  Outcome: Not Progressing  Flowsheets (Taken 12/16/2023 2040)  Will cooperate with staff recommendations and doctor's orders and will demonstrate appropriate behavior: Treat underlying conditions and offer medication as ordered

## 2023-12-18 PROCEDURE — 3700000000 HC ANESTHESIA ATTENDED CARE: Performed by: PSYCHIATRY & NEUROLOGY

## 2023-12-18 PROCEDURE — 7100000000 HC PACU RECOVERY - FIRST 15 MIN: Performed by: PSYCHIATRY & NEUROLOGY

## 2023-12-18 PROCEDURE — 6360000002 HC RX W HCPCS: Performed by: PSYCHIATRY & NEUROLOGY

## 2023-12-18 PROCEDURE — 2709999900 HC NON-CHARGEABLE SUPPLY: Performed by: PSYCHIATRY & NEUROLOGY

## 2023-12-18 PROCEDURE — 6360000002 HC RX W HCPCS: Performed by: NURSE PRACTITIONER

## 2023-12-18 PROCEDURE — 6370000000 HC RX 637 (ALT 250 FOR IP): Performed by: PSYCHIATRY & NEUROLOGY

## 2023-12-18 PROCEDURE — 90870 ELECTROCONVULSIVE THERAPY: CPT | Performed by: PSYCHIATRY & NEUROLOGY

## 2023-12-18 PROCEDURE — 1240000000 HC EMOTIONAL WELLNESS R&B

## 2023-12-18 PROCEDURE — 7100000001 HC PACU RECOVERY - ADDTL 15 MIN: Performed by: PSYCHIATRY & NEUROLOGY

## 2023-12-18 RX ORDER — SODIUM CHLORIDE 0.9 % (FLUSH) 0.9 %
5-40 SYRINGE (ML) INJECTION EVERY 12 HOURS SCHEDULED
Status: DISCONTINUED | OUTPATIENT
Start: 2023-12-18 | End: 2023-12-18 | Stop reason: HOSPADM

## 2023-12-18 RX ORDER — SODIUM CHLORIDE 9 MG/ML
INJECTION, SOLUTION INTRAVENOUS PRN
Status: DISCONTINUED | OUTPATIENT
Start: 2023-12-18 | End: 2023-12-18 | Stop reason: HOSPADM

## 2023-12-18 RX ORDER — SODIUM CHLORIDE 0.9 % (FLUSH) 0.9 %
5-40 SYRINGE (ML) INJECTION PRN
Status: DISCONTINUED | OUTPATIENT
Start: 2023-12-18 | End: 2023-12-18 | Stop reason: HOSPADM

## 2023-12-18 RX ORDER — SODIUM CHLORIDE 9 MG/ML
INJECTION, SOLUTION INTRAVENOUS CONTINUOUS
Status: DISCONTINUED | OUTPATIENT
Start: 2023-12-18 | End: 2023-12-18 | Stop reason: HOSPADM

## 2023-12-18 RX ADMIN — LORAZEPAM 2 MG: 2 INJECTION INTRAMUSCULAR; INTRAVENOUS at 14:50

## 2023-12-18 RX ADMIN — HALOPERIDOL LACTATE 2 MG: 5 INJECTION, SOLUTION INTRAMUSCULAR at 22:03

## 2023-12-18 RX ADMIN — LORAZEPAM 1 MG: 2 SOLUTION, CONCENTRATE ORAL at 08:57

## 2023-12-18 ASSESSMENT — PAIN SCALES - GENERAL: PAINLEVEL_OUTOF10: 0

## 2023-12-18 ASSESSMENT — PAIN - FUNCTIONAL ASSESSMENT
PAIN_FUNCTIONAL_ASSESSMENT: ADULT NONVERBAL PAIN SCALE (NPVS)

## 2023-12-18 NOTE — PLAN OF CARE
Problem: Discharge Planning  Goal: Discharge to home or other facility with appropriate resources  12/17/2023 2351 by Miriam Ramirez RN  Outcome: Not Progressing     Problem: Confusion  Goal: Confusion, delirium, dementia, or psychosis is improved or at baseline  Description: INTERVENTIONS:  1. Assess for possible contributors to thought disturbance, including medications, impaired vision or hearing, underlying metabolic abnormalities, dehydration, psychiatric diagnoses, and notify attending LIP  2. [place] high risk fall precautions, as indicated  3. Provide frequent short contacts to provide reality reorientation, refocusing and direction  4. Decrease environmental stimuli, including noise as appropriate  5. Monitor and intervene to maintain adequate nutrition, hydration, elimination, sleep and activity  6. If unable to ensure safety without constant attention obtain sitter and review sitter guidelines with assigned personnel  7. Initiate Psychosocial CNS and Spiritual Care consult, as indicated  12/17/2023 2351 by Miriam Ramirez RN  Outcome: Not Progressing     Problem: Involuntary Admit  Goal: Will cooperate with staff recommendations and doctor's orders and will demonstrate appropriate behavior  Description: INTERVENTIONS:  1. Treat underlying conditions and offer medication as ordered  2. Educate regarding involuntary admission procedures and rules  3. Contain excessive/inappropriate behavior per unit and hospital policies  12/17/2023 2351 by Miriam Ramirez RN  Outcome: Not Progressing     Problem: Pain  Goal: Verbalizes/displays adequate comfort level or baseline comfort level  12/17/2023 2351 by Miriam Ramirez RN  Outcome: Not Progressing     Problem: Discharge Planning  Goal: Discharge to home or other facility with appropriate resources  12/17/2023 2351 by Miriam Ramirez RN  Outcome: Not Progressing     Problem: Coping  Goal: Pt/Family able to verbalize concerns and

## 2023-12-18 NOTE — GROUP NOTE
Group Therapy Note    Date: 12/18/2023    Group Start Time: 1100  Group End Time: 1200  Group Topic: Topic Group    [facility] 3 ACUTE BEH[facility]    Janessa Beverly        Group Therapy Note           Patient's Goal:  To participate in relaxation  activity    Notes:  Pt did not attend session    Discipline Responsible: Recreational Therapist      Signature:  JANESSA BEVERLY

## 2023-12-18 NOTE — PERIOP NOTE
Patient not responding verbally, withdraws extremities under body with attempt to obtain blood pressure or O2 sats.  Patient lying in a fetal position or on knees with head to stretcher.

## 2023-12-18 NOTE — PROCEDURES
IDENTIFICATION:    Patient Name  Roge Cowan   Date of Birth 1970   [facility] [id number]   Medical Record Number  [id number]   Age  53 y.o.   PCP Cole Boyd MD   Admit date:  12/7/2023   Room Number  OR/PL  @ [facility]   Date of Service  12/18/23          Electro Convulsive Therapy:  Treatment number 4       Maintenance ECT NO   Roge Cowan  was admitted to the PACU for ECT. Patient was medically cleared and NPO for procedure.  Patient IS COURT MANDATED , gave informed consent for ECT treatment.      Patient received     Bilateral ECT YES Right Unilateral ECT NO  Administered using the Thymatron System IV - [employer].     at 35 % Energy, under general anesthesia.  (Changed from 30% last time)  Roge Cowan   with a good, well generalized seizure of 70 seconds on observation of the motor manifestations of the seizure. EEG duration was NA secs.   Post-Ictal Suppression Index:  NA %  Recovery was unremarkable and with no complications.     Change in Energy: %            Anesthesia medications administered during procedure:    Today using propofol: 70mg  Change for next treament:       Succinylcholine: 50 mg  Change for next treatment:      Propofol: mg  Glycopyrrolate:  mg  Labetalol:  mg  Esmolol:  mg  Lorazepam:  mg  Ketamine:  mg  Zofran:  mg    Toradol:  mg  Lidocaine:  mg  Caffeine Benzoate: mg       No flowsheet data found.    SIGNED:    Lety Larson MD  Psychiatry

## 2023-12-18 NOTE — BH NOTE
Behavioral Health Treatment Team Note     Patient goal(s) for today: pt does not identify a goal  Treatment team focus/goals: continue monitor medication, adjust as needed    Progress note: Pt presents selectively mute. Pt continues to not interact with staff. Pt appears unkempt, does not attend to ADLs independently. Pt continuing ECT. Writer looking at Level I and Level II assessment sent by [facility] to see if this is something that can be completed while pt is in hospital. Pt unable to return to sister's home due to APS investigation.    LOS:  10  Expected LOS: 20-30    Insurance info/prescription coverage:  MEDICARE  Date of last family contact:  Called [facility] 12/11 and [facility]PS 12/13  Family requesting physician contact today:  No  Discharge plan:  TBD  Guns in the home:  No  Outpatient provider(s):  [facility]     Participating treatment team members: Nba Wilson MSW

## 2023-12-18 NOTE — PERIOP NOTE
TRANSFER - IN REPORT:    Verbal report received from GAIL Pineda on Roge Cowan  being received from [facility] for ordered procedure      Report consisted of patient's Situation, Background, Assessment and   Recommendations(SBAR).     Information from the following report(s) Nurse Handoff Report was reviewed with the receiving nurse.    Opportunity for questions and clarification was provided.      Assessment completed upon patient's arrival to unit and care assumed.      Patient transported via stretcher to PACU by Sixto Anaya RN and Octavio WADE RN

## 2023-12-18 NOTE — BH NOTE
Chief Complaint: mute    Length of Stay: 10 Days    Interval History:  Patient was seen today for rounds in his room. Mr. Cowan was laying on the floor. He received ECT this morning and was sleeping. He did not participate in the interview and did not speak or open his eyes. He appears disheveled. Per staff, he has been taking medications and no side effects have been observed or reported. He is also ambulating to the bathroom and using the facilities appropriately. No acute events reported over night.    12/13: Roge Cowan is doing poorly. Catatonic. He is mute, rigid, no eye contact, unable to follow commands. He is isolative to his room, has not been eating. Poor hygiene. No interaction is possible with him. He had his second ect today. Staff report he's been mute. He has been non adherent with his meds. Court order meds were approved Monday.     12/14 - Roge Cowan reports that he is not paddling and does not need a blanket although it is cool in the room and he was lying on his bed without a shirt on.  Poverty of content of speech with selective responses.  Emaciated and turns away when asked about suicide, homicide, or perceptual changes.  No aggression or violence.  Selectively  interactive and partially aware.  Refuses PO medications and received IM Ativan the past few days with positive effect. Eating very little and sleeping fairly.      12/15 - Roge Cowan received ECT treatments toda and remains selective in his interactions generally responding with body movements, shakes and flapping of a limb (hands or feet).  Nursing reports that he uses the bathroom appropriately and eats some.  Malodrous and emaciated.  No aggression or violence.  Interactive at times.  Limited awareness. Tolerating medications well.  Eating and sleeping fairly.    12/16 - Roge Cowan responds selectively.  Still sleeps on the floor.  Had ECT yesterday.  Moods are good.  Appropriately dressed and groomed.  Denies  order.  Continue ECT # 5 on Wednesday  Court ordered meds granted 12/11.   Continue inpatient stay for the safety and optimum care of the patient   Routine labs to be ordered as needed.   Supportive, milieu and group therapy.   Strengths include taking medications           Continue the medication regimen as prescribed  Disposition planning to continue.   A coordinated, multidisplinary treatment team round was conducted with the patient, nurses, pharmcist,  and writer present. Discussions held with , and/or with family members; Complete current electronic health record for patient was reviewed in full including consultant notes, ancillary staff notes, nurses and tech notes, labs and vitals.  I certify that this patients inpatient psychiatric hospital services furnished since the previous certification were, and continue to be, required for treatment that could reasonably be expected to improve the patient's condition, or for diagnostic study, and that the patient continues to need, on a daily basis, active treatment furnished directly by or requiring the supervision of inpatient psychiatric facility personnel. In addition, the hospital records show that services furnished were intensive treatment services, admission or related services, or equivalent services.

## 2023-12-18 NOTE — PERIOP NOTE
Roge Cowan  1970  [id number]    Situation:  Verbal report given from: Dr. Butler and Janice Martinez RN  Procedure: Procedure(s):  ELECTROCONVULSIVE THERAPY    Background:    Preoperative diagnosis: Major depressive disorder, recurrent episode, moderate (HCC) [F33.1]    Postoperative diagnosis: * No post-op diagnosis entered *    :  Dr. Larson    Assistant(s): Circulator: Janice Martinez RN  Scrub Person First: Arpan Morel RN    Specimens: [unfilled]    Assessment:  Intra-procedure medications         Anesthesia gave intra-procedure sedation and medications, see anesthesia flow sheet     Intravenous fluids: LR@ KVO     Vital signs stable

## 2023-12-18 NOTE — PROGRESS NOTES
Night Shift assessment completed.   Roge remains in a poor state, isolative to room, lying on the floor in a praying position, catatonic, calm, selectively mute with clothing only from waist level. Pt passed urine on the floor, blanket  was wet with urine. Affect blunt. Mood anhedonia. No eye contact. Poor Insight and judgement.    Nursing Intervention:   Refused VS check. Refused oral court med. Inj Haldol 2 mg IM given. Wet blanket were changed when pt went to the bathroom. Emotional support and encouragement provided. No side effects from medications noted. Behavioral control very poor. Care plan up to date. NPO maintained from 22:00 for ECT    Response: Positive.    Plan: Monitoring for safety and behavior continues q 15 mins. Pt slept  for 8 hours on the bed.

## 2023-12-18 NOTE — PLAN OF CARE
Problem: ABCDS Injury Assessment  Goal: Absence of physical injury  Outcome: Progressing     Problem: Safety - Adult  Goal: Free from fall injury  Outcome: Progressing   Problem: Discharge Planning  Goal: Discharge to home or other facility with appropriate resources  Outcome: Not Progressing     Problem: Coping  Goal: Pt/Family able to verbalize concerns and demonstrate effective coping strategies  Description: INTERVENTIONS:  1. Assist patient/family to identify coping skills, available support systems and cultural and spiritual values  2. Provide emotional support, including active listening and acknowledgement of concerns of patient and caregivers  3. Reduce environmental stimuli, as able  4. Instruct patient/family in relaxation techniques, as appropriate  5. Assess for spiritual pain/suffering and initiate Spiritual Care, Psychosocial Clinical Specialist consults as needed  Outcome: Not Progressing     Problem: Confusion  Goal: Confusion, delirium, dementia, or psychosis is improved or at baseline  Description: INTERVENTIONS:  1. Assess for possible contributors to thought disturbance, including medications, impaired vision or hearing, underlying metabolic abnormalities, dehydration, psychiatric diagnoses, and notify attending LIP  2. [place] high risk fall precautions, as indicated  3. Provide frequent short contacts to provide reality reorientation, refocusing and direction  4. Decrease environmental stimuli, including noise as appropriate  5. Monitor and intervene to maintain adequate nutrition, hydration, elimination, sleep and activity  6. If unable to ensure safety without constant attention obtain sitter and review sitter guidelines with assigned personnel  7. Initiate Psychosocial CNS and Spiritual Care consult, as indicated  Outcome: Not Progressing     Problem: Involuntary Admit  Goal: Will cooperate with staff recommendations and doctor's orders and will demonstrate appropriate

## 2023-12-18 NOTE — PERIOP NOTE
TRANSFER - OUT REPORT:    Verbal report given to GAIL Giles on Roge Cowan  being transferred to U for routine post-op       Report consisted of patient's Situation, Background, Assessment and   Recommendations(SBAR).     Information from the following report(s) Nurse Handoff Report was reviewed with the receiving nurse.           Lines:       Opportunity for questions and clarification was provided.      Patient transported with:  Tech

## 2023-12-18 NOTE — BH NOTE
Chief Complaint: mute    Length of Stay: 10 Days    Interval History:  Patient was seen today for rounds in his room. Mr. Cowan was laying on the floor. He received ECT this morning and was sleeping. He did not participate in the interview and did not speak or open his eyes. He appears disheveled. Per staff, he has been taking medications and no side effects have been observed or reported. He is also ambulating to the bathroom and using the facilities appropriately. No acute events reported over night.    12/13: Roge Cowan is doing poorly. Catatonic. He is mute, rigid, no eye contact, unable to follow commands. He is isolative to his room, has not been eating. Poor hygiene. No interaction is possible with him. He had his second ect today. Staff report he's been mute. He has been non adherent with his meds. Court order meds were approved Monday.     12/14 - Roge Cowan reports that he is not paddling and does not need a blanket although it is cool in the room and he was lying on his bed without a shirt on.  Poverty of content of speech with selective responses.  Emaciated and turns away when asked about suicide, homicide, or perceptual changes.  No aggression or violence.  Selectively  interactive and partially aware.  Refuses PO medications and received IM Ativan the past few days with positive effect. Eating very little and sleeping fairly.      12/15 - Roge Cowan received ECT treatments toda and remains selective in his interactions generally responding with body movements, shakes and flapping of a limb (hands or feet).  Nursing reports that he uses the bathroom appropriately and eats some.  Malodrous and emaciated.  No aggression or violence.  Interactive at times.  Limited awareness. Tolerating medications well.  Eating and sleeping fairly.    12/16 - Roge Cowan responds selectively.  Still sleeps on the floor.  Had ECT yesterday.  Moods are good.  Appropriately dressed and groomed.  Denies  polyethylene glycol (GLYCOLAX) packet 17 g  17 g Oral Daily PRN Rustam Rivero MD        aluminum & magnesium hydroxide-simethicone (MAALOX) 200-200-20 MG/5ML suspension 30 mL  30 mL Oral Q6H PRN Rustam Rivero MD        hydrOXYzine HCl (ATARAX) tablet 50 mg  50 mg Oral TID PRN Rustam Rivero MD        haloperidol (HALDOL) tablet 5 mg  5 mg Oral Q4H PRN Rustam Rivero MD        Or    haloperidol lactate (HALDOL) injection 5 mg  5 mg IntraMUSCular Q4H PRN Rustam Rivero MD        diphenhydrAMINE (BENADRYL) injection 50 mg  50 mg IntraMUSCular Q4H PRN Rustam Rivero MD        traZODone (DESYREL) tablet 50 mg  50 mg Oral Nightly PRN Rustam Rivero MD        LORazepam (ATIVAN) injection 2 mg  2 mg IntraMUSCular Q6H PRN Rustam Rivero MD   2 mg at 12/16/23 2113         Mental Status Exam:  Eye contact: none   Grooming: poor  Psychomotor activity: flapps feet when someone talks to him  Speech is mute  Mood is YOEL  Affect:Blunted   Perception: YOEL  Suicidal ideation: YOEL   Cognition is grossly impaired    Vitals:    12/17/23 0845   BP: 118/80   Pulse: 78   Resp: 20   Temp: 98 °F (36.7 °C)   SpO2:         Physical Exam:  Body habitus: Body mass index is 24.14 kg/m².  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     Hold the night prior to ECT.  Then Restart after ECT treatment for that day  Clozapine 25 mg qhs with haldol 2 mg IM as back up per court order.  Continue ECT  Court ordered meds granted 12/11.   Continue inpatient stay for the safety and optimum care of the patient   Routine labs to be ordered as needed.   Supportive, milieu and group therapy.   Strengths include taking medications           Continue the medication regimen as prescribed  Disposition planning to

## 2023-12-19 PROCEDURE — 6360000002 HC RX W HCPCS: Performed by: PSYCHIATRY & NEUROLOGY

## 2023-12-19 PROCEDURE — 6360000002 HC RX W HCPCS: Performed by: NURSE PRACTITIONER

## 2023-12-19 PROCEDURE — 6370000000 HC RX 637 (ALT 250 FOR IP): Performed by: PSYCHIATRY & NEUROLOGY

## 2023-12-19 PROCEDURE — 1240000000 HC EMOTIONAL WELLNESS R&B

## 2023-12-19 RX ORDER — LORAZEPAM 2 MG/ML
1 CONCENTRATE ORAL 3 TIMES DAILY
Status: DISCONTINUED | OUTPATIENT
Start: 2023-12-19 | End: 2024-01-04

## 2023-12-19 RX ORDER — MIDAZOLAM HYDROCHLORIDE 1 MG/ML
2 INJECTION INTRAMUSCULAR; INTRAVENOUS 3 TIMES DAILY
Status: DISCONTINUED | OUTPATIENT
Start: 2023-12-19 | End: 2024-01-04

## 2023-12-19 RX ADMIN — LORAZEPAM 1 MG: 2 SOLUTION, CONCENTRATE ORAL at 09:50

## 2023-12-19 RX ADMIN — HALOPERIDOL LACTATE 2 MG: 5 INJECTION, SOLUTION INTRAMUSCULAR at 21:11

## 2023-12-19 RX ADMIN — MIDAZOLAM HYDROCHLORIDE 2 MG: 1 INJECTION, SOLUTION INTRAMUSCULAR; INTRAVENOUS at 15:34

## 2023-12-19 ASSESSMENT — PAIN SCALES - GENERAL: PAINLEVEL_OUTOF10: 0

## 2023-12-19 NOTE — BH NOTE
Behavioral Health Treatment Team Note     Patient goal(s) for today: pt does not identify a goal  Treatment team focus/goals: continue monitor medication, adjust as needed    Progress note: Pt continues to present selectively mute and in fetal position a lot of the time. Pt noncooperative with lab work this morning. Pt continuing ECT.    [facility] sent screening for nursing facility placement. Writer completed and faxed to [facility].    LOS:  11  Expected LOS: 20-30    Insurance info/prescription coverage:  MEDICARE  Date of last family contact:  Called [facility] 12/11 and [facility] 12/13  Family requesting physician contact today:  No  Discharge plan:  TBD  Guns in the home:  No  Outpatient provider(s):  [facility]     Participating treatment team members: Nba Wilson MSW

## 2023-12-19 NOTE — GROUP NOTE
Group Therapy Note    Date: 12/19/2023    Group Start Time: 1500  Group End Time: 1600  Group Topic: Recreational    [facility]    Janessa Beverly        Group Therapy Note           Patient's Goal:  To concentrate on selected task    Notes:  Pt did not attend session    Discipline Responsible: Recreational Therapist      Signature:  JANESSA BEVERLY

## 2023-12-19 NOTE — BH NOTE
Morning assessment complete. Patient was encountered lying on floor in room  Patient is withdrawn and uncooperative with with meds and daily care, Accepted Ativan this am with much encouragement but the he  refused  Ativan scheduled dose.this evening. Versed injection 2 mg given Im in left gluteal muscle @ 1534. Unable to assess patient   due patient refused to speak at times. There is not signs of    SI/HI/AVH.  C-SSRS level is .not a risk.  Patient has been isolated his room alternating lying on floor and mattress.Patient is not combative or hostile just resistive to care.   There are no untoward side effects from medications to note.  Hourly rounds are being completed to assure patient safety and attend to care needs. Will continue to monitor for safety.   Scheduled for ECT in a.m

## 2023-12-19 NOTE — GROUP NOTE
Group Therapy Note    Date: 12/19/2023    Group Start Time: 1100  Group End Time: 1200  Group Topic: Topic Group    [facility] 3 ACUTE [facility]    Janessa Beverly        Group Therapy Note           Patient's Goal:  To participate in relaxation activity    Notes:  Pt did not attend session    Discipline Responsible: Recreational Therapist      Signature:  JANESSA BEVERLY

## 2023-12-19 NOTE — BH NOTE
Unable to complete blood work this morning as patient continues to remain in fetal position while staff was trying to attempt lab work, patient is non acceptance and rigid. Will pass this along to the oncoming shift.

## 2023-12-19 NOTE — BH NOTE
Assumed care of the patient. Patient was isolative to his room this shift. Patient spoke may a few times to express his disagreement otherwise he was mostly non verbal. Through out the night patient was found in fetal position  in different corners inside his room and even in the bathroom. Patient was encouraged by the staffs to use his bed and offered blankets however patient either did not acknowledge or refused. Patient declined his court ordered bedtime medications so he received IM Haldol. Shift assessment could not be completed due to lack of interest and cooperation from the patient.     Patient did not get any sleep tonight.

## 2023-12-20 PROCEDURE — 6360000002 HC RX W HCPCS: Performed by: NURSE PRACTITIONER

## 2023-12-20 PROCEDURE — 7100000001 HC PACU RECOVERY - ADDTL 15 MIN: Performed by: PSYCHIATRY & NEUROLOGY

## 2023-12-20 PROCEDURE — 6370000000 HC RX 637 (ALT 250 FOR IP): Performed by: PSYCHIATRY & NEUROLOGY

## 2023-12-20 PROCEDURE — 6360000002 HC RX W HCPCS: Performed by: PSYCHIATRY & NEUROLOGY

## 2023-12-20 PROCEDURE — 2709999900 HC NON-CHARGEABLE SUPPLY: Performed by: PSYCHIATRY & NEUROLOGY

## 2023-12-20 PROCEDURE — 1240000000 HC EMOTIONAL WELLNESS R&B

## 2023-12-20 PROCEDURE — 7100000000 HC PACU RECOVERY - FIRST 15 MIN: Performed by: PSYCHIATRY & NEUROLOGY

## 2023-12-20 PROCEDURE — 3600000001 HC SURGERY LEVEL 1  BASE: Performed by: PSYCHIATRY & NEUROLOGY

## 2023-12-20 RX ORDER — SODIUM CHLORIDE 9 MG/ML
INJECTION, SOLUTION INTRAVENOUS CONTINUOUS
Status: DISCONTINUED | OUTPATIENT
Start: 2023-12-20 | End: 2023-12-20

## 2023-12-20 RX ORDER — SODIUM CHLORIDE 0.9 % (FLUSH) 0.9 %
5-40 SYRINGE (ML) INJECTION EVERY 12 HOURS SCHEDULED
Status: DISCONTINUED | OUTPATIENT
Start: 2023-12-20 | End: 2023-12-20 | Stop reason: HOSPADM

## 2023-12-20 RX ORDER — SODIUM CHLORIDE 0.9 % (FLUSH) 0.9 %
5-40 SYRINGE (ML) INJECTION EVERY 12 HOURS SCHEDULED
Status: CANCELLED | OUTPATIENT
Start: 2023-12-20

## 2023-12-20 RX ORDER — SODIUM CHLORIDE 9 MG/ML
INJECTION, SOLUTION INTRAVENOUS PRN
Status: CANCELLED | OUTPATIENT
Start: 2023-12-20

## 2023-12-20 RX ORDER — SODIUM CHLORIDE 0.9 % (FLUSH) 0.9 %
5-40 SYRINGE (ML) INJECTION PRN
Status: DISCONTINUED | OUTPATIENT
Start: 2023-12-20 | End: 2023-12-20 | Stop reason: HOSPADM

## 2023-12-20 RX ORDER — SODIUM CHLORIDE 9 MG/ML
INJECTION, SOLUTION INTRAVENOUS PRN
Status: DISCONTINUED | OUTPATIENT
Start: 2023-12-20 | End: 2023-12-20 | Stop reason: HOSPADM

## 2023-12-20 RX ORDER — SODIUM CHLORIDE 0.9 % (FLUSH) 0.9 %
5-40 SYRINGE (ML) INJECTION PRN
Status: CANCELLED | OUTPATIENT
Start: 2023-12-20

## 2023-12-20 RX ADMIN — HALOPERIDOL LACTATE 2 MG: 5 INJECTION, SOLUTION INTRAMUSCULAR at 21:38

## 2023-12-20 RX ADMIN — LORAZEPAM 1 MG: 2 SOLUTION, CONCENTRATE ORAL at 08:51

## 2023-12-20 RX ADMIN — MIDAZOLAM HYDROCHLORIDE 2 MG: 1 INJECTION, SOLUTION INTRAMUSCULAR; INTRAVENOUS at 21:51

## 2023-12-20 RX ADMIN — LORAZEPAM 1 MG: 2 SOLUTION, CONCENTRATE ORAL at 14:13

## 2023-12-20 ASSESSMENT — PAIN - FUNCTIONAL ASSESSMENT: PAIN_FUNCTIONAL_ASSESSMENT: ADULT NONVERBAL PAIN SCALE (NPVS)

## 2023-12-20 ASSESSMENT — PAIN SCALES - GENERAL: PAINLEVEL_OUTOF10: 0

## 2023-12-20 NOTE — PERIOP NOTE
TRANSFER - OUT REPORT:    Verbal report given to GAIL Giles on Roge Cowan  being transferred to U for routine progression of patient care       Report consisted of patient's Situation, Background, Assessment and   Recommendations(SBAR).     Information from the following report(s) Nurse Handoff Report was reviewed with the receiving nurse.           Lines:       Opportunity for questions and clarification was provided.      Patient transported with:  Tech

## 2023-12-20 NOTE — CASE COMMUNICATION
Upon my evaluation today patient was awake and alert, but agitated. He wouldn't answer any of my questions, and was unable to participate in my evaluation meaningfully. His presentation was consistent with negativism and mutism. Patient proceeded to curl himself in the fetal position. He didn't follow commands or respond to verbal re-direction to lie down and provide staff the opportunity to start his IVS. Because of this, the decision was made to defer ECT to an opportunity when patient's IV access has been established safely in order to maintain the safety of staff and administer medications, ECT appropriately.    I communicated my recommendations to Dr. Rivero.    I would recommend pre-treating patient with thorazine 50mg IM, at lease 30 minutes prior to sending patient off the floor. Patient may receive an additional thorazine 50mg IM thereafter if he remains agitated. Additionally, I would recommend placing patient in 4-point restraints.

## 2023-12-20 NOTE — PROGRESS NOTES
GROUP THERAPY PROGRESS NOTE      Roge Cowan was not present for medication group.    GROUP TIME: 45 minutes, Wednesdays 2pm    Participants: 7    PERSONAL GOAL FOR PARTICIPATION: To be present for group, participate in discussion, and answer patient-directed questions.    GOAL ORIENTATION: Personal    THERAPEUTIC INTERVENTIONS REVIEWED AND DISCUSSED: The following topics were presented: storage of medications, how to remember to refill medications and keep up with doctor appointments, relapse prevention, keeping a record of all medication including prescription and non-prescription drugs, and who to contact with medication questions. Patients were given time to ask questions regarding their current therapy.    IMPRESSION OF PARTICIPATION: Did not attend      Ángela Shelley PharmD, BCPS, BCPP  Clinical Pharmacy Specialist, Behavioral Health  Desk: [phone] ([phone])  Pharmacy: [phone] ([phone])

## 2023-12-20 NOTE — BH NOTE
Assumed care of the patient. Patient was isolative to his room. He was selectively mute tonight. Appearance was unkempt. Patient most of the time would not acknowledge the staffs on approach. He did not answer the assessment questions . He went back and forth regarding taking PO medications therefore ultimately had to get IM court ordered medication. Patient was on NPO since midnight for ETC this  morning. Patient appeared to be sleeping for about 6.5 hours.

## 2023-12-20 NOTE — PERIOP NOTE
Patient uncooperative with obtaining vital signs and placing IV for ECT procedure.  Patient lying in fetal position or kneeling with head to the stretcher.  Patient holding arms and legs close to his body and holds extremities tighter to body with procedure attempts.

## 2023-12-20 NOTE — PERIOP NOTE
Patient was uncooperative upon arrival to OR and staff were unable to start an IV. Patient was clasping his arms and hands to his chest, on his knees , curled into a ball atop the stretcher. Treatment canceled per Dr Larson. Patient was returned to PACU for transfer back to [facility].

## 2023-12-20 NOTE — GROUP NOTE
Group Therapy Note    Date: 12/20/2023    Group Start Time: 1500  Group End Time: 1600  Group Topic: Recreational    [facility]    Janessa Beverly        Group Therapy Note           Patient's Goal:  To concentrate on selected task    Notes:  Pt did not attend session    Discipline Responsible: Recreational Therapist      Signature:  JANESSA BEVERLY

## 2023-12-20 NOTE — BH NOTE
Behavioral Health Treatment Team Note     Patient goal(s) for today: pt does not identify a goal  Treatment team focus/goals: continue ECT    Progress note: Pt presents selectively mute with poor ADLs. Pt presents malodorous and unkempt. Pt not bathing or taking care of self independently and staff have been having to do ADLs for pt. Pt was supposed to receive ECT this morning but pt was noncooperative and was brought back to unit. Plan is to continue ECT. Writer thought level 2 screening for nursing facility placement was completed, however writer received voicemail asking for [facility], H+P, progress notes. Writer messaged [facility] requesting [facility] for pt. Writer will resend when all documents are together.    LOS:  12  Expected LOS: 23-30    Insurance info/prescription coverage:  MEDICARE  Date of last family contact:  Called [facility] 12/11 and [facility] 12/13  Family requesting physician contact today:  No  Discharge plan:  TBD  Guns in the home:  No  Outpatient provider(s):  [place] [facility]     Participating treatment team members: Nba Wilson MSW

## 2023-12-20 NOTE — PLAN OF CARE
Problem: Discharge Planning  Goal: Discharge to home or other facility with appropriate resources  Outcome: Progressing     Problem: Coping  Goal: Pt/Family able to verbalize concerns and demonstrate effective coping strategies  Description: INTERVENTIONS:  1. Assist patient/family to identify coping skills, available support systems and cultural and spiritual values  2. Provide emotional support, including active listening and acknowledgement of concerns of patient and caregivers  3. Reduce environmental stimuli, as able  4. Instruct patient/family in relaxation techniques, as appropriate  5. Assess for spiritual pain/suffering and initiate Spiritual Care, Psychosocial Clinical Specialist consults as needed  Outcome: Progressing  Flowsheets (Taken 12/19/2023 2035 by Regina Barba RN)  Patient/family able to verbalize anxieties, fears, and concerns, and demonstrate effective coping: Reduce environmental stimuli, as able

## 2023-12-20 NOTE — PERIOP NOTE
TRANSFER - IN REPORT:    Verbal report received from GAIL Tapia on Roge Cowan  being received from [facility] for ordered procedure      Report consisted of patient's Situation, Background, Assessment and   Recommendations(SBAR).     Information from the following report(s) Nurse Handoff Report was reviewed with the receiving nurse.    Opportunity for questions and clarification was provided.      Assessment completed upon patient's arrival to unit and care assumed.      Patient transported to PACU by PACU RN's and patient accompanied by Melanie GABRIEL girard

## 2023-12-20 NOTE — GROUP NOTE
Group Therapy Note    Date: 12/20/2023    Group Start Time: 1000  Group End Time: 1100  Group Topic: Topic Group    [facility] 3 ACUTE BEHAV [facility]    Janessa Beverly        Group Therapy Note           Patient's Goal:  To participate in relaxation activity    Notes:  Pt did not attend session    Discipline Responsible: Recreational Therapist      Signature:  JANESSA BEVERLY

## 2023-12-20 NOTE — CARE COORDINATION
12/20/23 0900   ITP   Date of Next Review 12/27/23   Short Term Goal 1   STG Goal 1 Status: Patient Appears to be  Treatment plan goal is unmet at this time   Short Term Goal 2   STG Goal 2 Status: Patient Appears to be  Treatment plan goal is unmet at this time

## 2023-12-20 NOTE — BH NOTE
Chief Complaint: mute    Length of Stay: 12 Days    Interval History:  Patient was seen today for rounds in his room. Mr. Cowan was laying on the floor. He received ECT this morning and was sleeping. He did not participate in the interview and did not speak or open his eyes. He appears disheveled. Per staff, he has been taking medications and no side effects have been observed or reported. He is also ambulating to the bathroom and using the facilities appropriately. No acute events reported over night.    12/13: Roge Cowan is doing poorly. Catatonic. He is mute, rigid, no eye contact, unable to follow commands. He is isolative to his room, has not been eating. Poor hygiene. No interaction is possible with him. He had his second ect today. Staff report he's been mute. He has been non adherent with his meds. Court order meds were approved Monday.     12/14 - Roge Cowan reports that he is not paddling and does not need a blanket although it is cool in the room and he was lying on his bed without a shirt on.  Poverty of content of speech with selective responses.  Emaciated and turns away when asked about suicide, homicide, or perceptual changes.  No aggression or violence.  Selectively  interactive and partially aware.  Refuses PO medications and received IM Ativan the past few days with positive effect. Eating very little and sleeping fairly.      12/15 - Roge Cowan received ECT treatments toda and remains selective in his interactions generally responding with body movements, shakes and flapping of a limb (hands or feet).  Nursing reports that he uses the bathroom appropriately and eats some.  Malodrous and emaciated.  No aggression or violence.  Interactive at times.  Limited awareness. Tolerating medications well.  Eating and sleeping fairly.    12/16 - Roge Cowan responds selectively.  Still sleeps on the floor.  Had ECT yesterday.  Moods are good.  Appropriately dressed and groomed.  Denies

## 2023-12-20 NOTE — BH NOTE
Chief Complaint: mute    Length of Stay: 12 Days    Interval History:  Patient was seen today for rounds in his room. Mr. Cowan was laying on the floor. He received ECT this morning and was sleeping. He did not participate in the interview and did not speak or open his eyes. He appears disheveled. Per staff, he has been taking medications and no side effects have been observed or reported. He is also ambulating to the bathroom and using the facilities appropriately. No acute events reported over night.    12/13: Roge Cowan is doing poorly. Catatonic. He is mute, rigid, no eye contact, unable to follow commands. He is isolative to his room, has not been eating. Poor hygiene. No interaction is possible with him. He had his second ect today. Staff report he's been mute. He has been non adherent with his meds. Court order meds were approved Monday.     12/14 - Roge Cowan reports that he is not paddling and does not need a blanket although it is cool in the room and he was lying on his bed without a shirt on.  Poverty of content of speech with selective responses.  Emaciated and turns away when asked about suicide, homicide, or perceptual changes.  No aggression or violence.  Selectively  interactive and partially aware.  Refuses PO medications and received IM Ativan the past few days with positive effect. Eating very little and sleeping fairly.      12/15 - Roge Cowan received ECT treatments toda and remains selective in his interactions generally responding with body movements, shakes and flapping of a limb (hands or feet).  Nursing reports that he uses the bathroom appropriately and eats some.  Malodrous and emaciated.  No aggression or violence.  Interactive at times.  Limited awareness. Tolerating medications well.  Eating and sleeping fairly.    12/16 - Roge Cowan responds selectively.  Still sleeps on the floor.  Had ECT yesterday.  Moods are good.  Appropriately dressed and groomed.  Denies    Component Value Date/Time     12/07/2023 03:37 PM    K 4.2 12/07/2023 03:37 PM     12/07/2023 03:37 PM    CO2 26 12/07/2023 03:37 PM    BUN 15 12/07/2023 03:37 PM    GFRAA >60 12/14/2020 01:24 PM    GLOB 3.6 12/07/2023 03:37 PM    ALT 23 12/07/2023 03:37 PM          Current Facility-Administered Medications   Medication Dose Route Frequency Provider Last Rate Last Admin    LORazepam (ATIVAN) 2 MG/ML concentrated solution 1 mg +++HOLD EVENING DOSE PRIOR TO AM ECT+++  1 mg Oral TID Rustam Rivero MD   1 mg at 12/20/23 1413    Or    midazolam (VERSED) injection 2 mg +++COURT ORDERED FOR REFUSAL OF PO LORAZEPAM+++HOLD EVENING DOSE PRIOR TO AM ECT+++  2 mg IntraMUSCular TID Rustam Rivero MD   2 mg at 12/19/23 1534    cloZAPine (FAZACLO) disintegrating tablet 25 mg  25 mg Oral Nightly Ludivina Tavarze APRN - NP   25 mg at 12/15/23 2212    Or    haloperidol lactate (HALDOL) injection 2 mg +++COURT ORDERED MEDICATION FOR REFUSAL OF CLOZAPINE+++  2 mg IntraMUSCular Nightly Ludivina Tavarez APRN - NP   2 mg at 12/19/23 2111    acetaminophen (TYLENOL) tablet 650 mg  650 mg Oral Q4H PRN Rustam Rivero MD        polyethylene glycol (GLYCOLAX) packet 17 g  17 g Oral Daily PRN Rustam Rivero MD        aluminum & magnesium hydroxide-simethicone (MAALOX) 200-200-20 MG/5ML suspension 30 mL  30 mL Oral Q6H PRN Rustam Rivero MD        hydrOXYzine HCl (ATARAX) tablet 50 mg  50 mg Oral TID PRN Rustam Rivero MD        haloperidol (HALDOL) tablet 5 mg  5 mg Oral Q4H PRN Rustam Rivero MD        Or    haloperidol lactate (HALDOL) injection 5 mg  5 mg IntraMUSCular Q4H PRN Rustam Rivero MD        diphenhydrAMINE (BENADRYL) injection 50 mg  50 mg IntraMUSCular Q4H PRN Rustam Rivero MD        traZODone (DESYREL) tablet 50 mg  50 mg Oral Nightly PRN Rustam Rivero MD        LORazepam (ATIVAN) injection 2 mg  2 mg IntraMUSCular Q6H PRN Rustam Rivero,

## 2023-12-21 PROCEDURE — 1240000000 HC EMOTIONAL WELLNESS R&B

## 2023-12-21 PROCEDURE — 6370000000 HC RX 637 (ALT 250 FOR IP): Performed by: PSYCHIATRY & NEUROLOGY

## 2023-12-21 RX ORDER — HALOPERIDOL 5 MG/ML
2 INJECTION INTRAMUSCULAR DAILY
Status: DISCONTINUED | OUTPATIENT
Start: 2023-12-21 | End: 2023-12-22

## 2023-12-21 RX ORDER — CHLORPROMAZINE HYDROCHLORIDE 25 MG/ML
50 INJECTION INTRAMUSCULAR DAILY PRN
Status: DISCONTINUED | OUTPATIENT
Start: 2023-12-21 | End: 2024-01-08 | Stop reason: HOSPADM

## 2023-12-21 RX ORDER — CLOZAPINE 25 MG/1
25 TABLET, ORALLY DISINTEGRATING ORAL DAILY
Status: DISCONTINUED | OUTPATIENT
Start: 2023-12-21 | End: 2023-12-22

## 2023-12-21 RX ADMIN — LORAZEPAM 1 MG: 2 SOLUTION, CONCENTRATE ORAL at 09:01

## 2023-12-21 RX ADMIN — CLOZAPINE 25 MG: 25 TABLET, ORALLY DISINTEGRATING ORAL at 14:19

## 2023-12-21 RX ADMIN — LORAZEPAM 1 MG: 2 SOLUTION, CONCENTRATE ORAL at 14:19

## 2023-12-21 ASSESSMENT — PAIN SCALES - WONG BAKER
WONGBAKER_NUMERICALRESPONSE: 0
WONGBAKER_NUMERICALRESPONSE: 0

## 2023-12-21 ASSESSMENT — PAIN SCALES - GENERAL
PAINLEVEL_OUTOF10: 0

## 2023-12-21 NOTE — BH NOTE
Night assessment complete. Patient was encountered in his room ,in a catatonic position He is calm and cooperative. Patient was non verbal to assessment questions.Eye contact is noted to be poor. Mood is noted to be expressionless  with congruent affect. No interaction with peer and staff. Refused VS. Patient is not med compliant hence Court order IM prescribed medication were administered. He ate 50% of his dinner. There are no obvious side effects from medications noted.   Hourly rounds are being completed to assure patient safety and attend to care needs. Monitoring will continue for safety reason. Patient refused/resisted nursing care

## 2023-12-21 NOTE — PLAN OF CARE
Problem: Discharge Planning  Goal: Discharge to home or other facility with appropriate resources  Outcome: Not Progressing       Problem: Coping  Goal: Pt/Family able to verbalize concerns and demonstrate effective coping strategies  Description: INTERVENTIONS:  1. Assist patient/family to identify coping skills, available support systems and cultural and spiritual values  2. Provide emotional support, including active listening and acknowledgement of concerns of patient and caregivers  3. Reduce environmental stimuli, as able  4. Instruct patient/family in relaxation techniques, as appropriate  5. Assess for spiritual pain/suffering and initiate Spiritual Care, Psychosocial Clinical Specialist consults as needed  Outcome: Not Progressing

## 2023-12-21 NOTE — PLAN OF CARE
Problem: Coping  Goal: Pt/Family able to verbalize concerns and demonstrate effective coping strategies  Description: INTERVENTIONS:  1. Assist patient/family to identify coping skills, available support systems and cultural and spiritual values  2. Provide emotional support, including active listening and acknowledgement of concerns of patient and caregivers  3. Reduce environmental stimuli, as able  4. Instruct patient/family in relaxation techniques, as appropriate  5. Assess for spiritual pain/suffering and initiate Spiritual Care, Psychosocial Clinical Specialist consults as needed  12/21/2023 1046 by Andreina Munoz RN  Outcome: Progressing     Problem: Confusion  Goal: Confusion, delirium, dementia, or psychosis is improved or at baseline  Description: INTERVENTIONS:  1. Assess for possible contributors to thought disturbance, including medications, impaired vision or hearing, underlying metabolic abnormalities, dehydration, psychiatric diagnoses, and notify attending LIP  2. [place] high risk fall precautions, as indicated  3. Provide frequent short contacts to provide reality reorientation, refocusing and direction  4. Decrease environmental stimuli, including noise as appropriate  5. Monitor and intervene to maintain adequate nutrition, hydration, elimination, sleep and activity  6. If unable to ensure safety without constant attention obtain sitter and review sitter guidelines with assigned personnel  7. Initiate Psychosocial CNS and Spiritual Care consult, as indicated  12/21/2023 1046 by Andreina Munoz, RN  Outcome: Progressing

## 2023-12-21 NOTE — BH NOTE
Behavioral Health Treatment Team Note     Patient goal(s) for today: pt does not identify a goal  Treatment team focus/goals: continue monitor medication, adjust as needed    Progress note: Unable to assess alertness and orientation. Pt presents mute and with no interaction with peers or staff, refusing vitals, and not compliant with medications. ADLs appear poor, pt not bathing with prompting from staff. Pt not able to continue ECT at this time due to noncooperativeness. Attending physician reports he will put in medical consult to see if pt needs to complete ECT from a medical floor as pt not cooperative with ECT or medications.    LOS:  13  Expected LOS: TBD    Insurance info/prescription coverage:  MEDICARE  Date of last family contact:  Called [facility] 12/11 and [facility] 12/13  Family requesting physician contact today:  No  Discharge plan:  TBD  Guns in the home:  No  Outpatient provider(s):  [facility]     Participating treatment team members: Nba Wilson MSW

## 2023-12-21 NOTE — BH NOTE
Chief Complaint: mute    Length of Stay: 13 Days    Interval History:  Patient was seen today for rounds in his room. Mr. Cowan was laying on the floor. He received ECT this morning and was sleeping. He did not participate in the interview and did not speak or open his eyes. He appears disheveled. Per staff, he has been taking medications and no side effects have been observed or reported. He is also ambulating to the bathroom and using the facilities appropriately. No acute events reported over night.    12/13: Roge Cowan is doing poorly. Catatonic. He is mute, rigid, no eye contact, unable to follow commands. He is isolative to his room, has not been eating. Poor hygiene. No interaction is possible with him. He had his second ect today. Staff report he's been mute. He has been non adherent with his meds. Court order meds were approved Monday.     12/14 - Roge Cowan reports that he is not paddling and does not need a blanket although it is cool in the room and he was lying on his bed without a shirt on.  Poverty of content of speech with selective responses.  Emaciated and turns away when asked about suicide, homicide, or perceptual changes.  No aggression or violence.  Selectively  interactive and partially aware.  Refuses PO medications and received IM Ativan the past few days with positive effect. Eating very little and sleeping fairly.      12/15 - Roge Cowan received ECT treatments toda and remains selective in his interactions generally responding with body movements, shakes and flapping of a limb (hands or feet).  Nursing reports that he uses the bathroom appropriately and eats some.  Malodrous and emaciated.  No aggression or violence.  Interactive at times.  Limited awareness. Tolerating medications well.  Eating and sleeping fairly.    12/16 - Roge Cowan responds selectively.  Still sleeps on the floor.  Had ECT yesterday.  Moods are good.  Appropriately dressed and groomed.  Denies  SI/HI/AH/VH.  No aggression or violence.  Isolative and selectively interactive.  Aware. Tolerating medications fairly.  Eating and sleeping fairly.    12/17 -  Roge Cowan States, \"I'm fine\" and reports that he is not a fighter and wants to go to a convent.  Seems to be more verbally responsive to nurse Mari and Me.  Moods are good.  Remains bizarre, however is listening to what is happening around him evidenced by his responses to questions and behaviors.  No aggression or violence.  Appropriately interactive and aware. Tolerating medications well (No prn).  Eating and sleeping fairly (8 hrs).    12/18 -  Roge Cowan received ECT this am and actively listens to people who approach him.  Selectively respons however.  Inappropriately dressed and poorly groomed.  No aggression or violence.  Appropriately interactive and aware. Tolerating medications well (No prn's).  Eating and sleeping fairly (8 hrs).  Working on his placement    12/19 -  Roge Cowan gave grunts for responses today.  Not particularly fond of ECT. No aggression or violence.  Isolative, interactive and aware. Tolerating medications well (No prn)  Eating and sleeping fairly    12/20 -  Roge Cowan remains selectively mute and isolative to his room.  Did not get ECT today due to the selective and catatonic nature of his condition.  No aggression or violence.  Appropriately interactive and aware. Tolerating medications well (No prn).  Eating and sleeping fairly (6.5 hrs).  Discussed with treatment team and Dr. Larson.  He needs to have IV access and be     12/21 - Roge Cowan got a shower today with nursing and tech support.  Washed himself.  No aggression or violence.  Selectively interactive and partially aware.  Tolerating medications well in am but generally refuses in the evenings (No prn).  Eating and sleeping fairly     Past Medical History:  Past Medical History:   Diagnosis Date    Psychiatric disorder     Psychotic

## 2023-12-21 NOTE — BH NOTE
Patient was encountered in room sitting on bed. When staff approached him, patient then curled on the floor with head down. Patient appeared to not want to interact with staff. Writer unable to assess patient. Patient does not appear to be in any distress. Does not appear to be in any pain.   Patient is not interactive.   Patient selectively mute. Patient guarded and expressionless. Eye contact is noted to be poor.   Patient refused VS.   Patient needing encouragement to take court ordered medications. PO court ordered medications given.   ANTONINA Ramesh and Delores, CCL able to assist patient to get in shower. Patient was able to verbalize needs and stand up in shower. Patient able to clean self after assistance. Dark cloudy urine noted in toilet.   Patient ate all meals.   Patient observed in room naked curled in a ball. Patient isolating to room throughout entire shift.     Hourly rounds are being completed to assure patient safety and attend to care needs. Monitoring will continue for changes in patient condition

## 2023-12-21 NOTE — GROUP NOTE
Group Therapy Note    Date: 12/21/2023    Group Start Time: 1100  Group End Time: 1200  Group Topic: Topic Group    [facility] [place] [facility] ACUTE [facility]    Janessa Beverly        Group Therapy Note           Patient's Goal:  To participate in relaxation activity    Notes:  Pt did not attend session    Discipline Responsible: Recreational Therapist      Signature:  JANESSA BEVERLY

## 2023-12-22 PROCEDURE — 6360000002 HC RX W HCPCS: Performed by: PSYCHIATRY & NEUROLOGY

## 2023-12-22 PROCEDURE — 6370000000 HC RX 637 (ALT 250 FOR IP): Performed by: PSYCHIATRY & NEUROLOGY

## 2023-12-22 PROCEDURE — 1240000000 HC EMOTIONAL WELLNESS R&B

## 2023-12-22 PROCEDURE — GZB4ZZZ OTHER ELECTROCONVULSIVE THERAPY: ICD-10-PCS | Performed by: PSYCHIATRY & NEUROLOGY

## 2023-12-22 RX ORDER — CHLORPROMAZINE HYDROCHLORIDE 25 MG/ML
50 INJECTION INTRAMUSCULAR ONCE
Status: DISCONTINUED | OUTPATIENT
Start: 2023-12-26 | End: 2023-12-22

## 2023-12-22 RX ORDER — DIPHENHYDRAMINE HYDROCHLORIDE 50 MG/ML
50 INJECTION INTRAMUSCULAR; INTRAVENOUS ONCE
Status: DISCONTINUED | OUTPATIENT
Start: 2023-12-26 | End: 2023-12-22

## 2023-12-22 RX ORDER — CHLORPROMAZINE HYDROCHLORIDE 25 MG/ML
50 INJECTION INTRAMUSCULAR ONCE
Status: COMPLETED | OUTPATIENT
Start: 2023-12-26 | End: 2023-12-26

## 2023-12-22 RX ORDER — DIPHENHYDRAMINE HYDROCHLORIDE 50 MG/ML
50 INJECTION INTRAMUSCULAR; INTRAVENOUS ONCE
Status: COMPLETED | OUTPATIENT
Start: 2023-12-26 | End: 2023-12-26

## 2023-12-22 RX ORDER — OLANZAPINE 5 MG/1
10 TABLET, ORALLY DISINTEGRATING ORAL DAILY
Status: DISCONTINUED | OUTPATIENT
Start: 2023-12-23 | End: 2024-01-04

## 2023-12-22 RX ADMIN — LORAZEPAM 1 MG: 2 SOLUTION, CONCENTRATE ORAL at 10:54

## 2023-12-22 RX ADMIN — CLOZAPINE 25 MG: 25 TABLET, ORALLY DISINTEGRATING ORAL at 10:54

## 2023-12-22 RX ADMIN — CHLORPROMAZINE HYDROCHLORIDE 50 MG: 25 INJECTION INTRAMUSCULAR at 06:09

## 2023-12-22 RX ADMIN — LORAZEPAM 1 MG: 2 SOLUTION, CONCENTRATE ORAL at 20:17

## 2023-12-22 RX ADMIN — LORAZEPAM 1 MG: 2 SOLUTION, CONCENTRATE ORAL at 14:45

## 2023-12-22 ASSESSMENT — PAIN SCALES - WONG BAKER
WONGBAKER_NUMERICALRESPONSE: 0
WONGBAKER_NUMERICALRESPONSE: 0

## 2023-12-22 ASSESSMENT — PAIN SCALES - GENERAL
PAINLEVEL_OUTOF10: 0
PAINLEVEL_OUTOF10: 0

## 2023-12-22 NOTE — GROUP NOTE
Group Therapy Note    Date: 12/22/2023    Group Start Time: 1500  Group End Time: 1600  Group Topic: Recreational    [facility] ACUTE [facility]    Janessa Beverly        Group Therapy Note           Patient's Goal:  To concentrate on selected task    Notes:  Pt did not attend session      Discipline Responsible: Recreational Therapist      Signature:  JANESSA BEVERLY

## 2023-12-22 NOTE — BH NOTE
Nude in prayer position on the floor of his room.  Malodorous.    Appropriately dressed and groomed.  Denies SI/HI/AH/VH.  No aggression or violence.  Appropriately interactive and aware. Tolerating Clozaril and ativan today.  (Thorazine prn).  Eating and sleeping fairly (6 hrs).      Past Medical History:  Past Medical History:   Diagnosis Date    Psychiatric disorder     Psychotic disorder (HCC)     Withdrawal syndrome (HCC)            Labs:  Lab Results   Component Value Date/Time    WBC 6.8 12/07/2023 03:20 PM    HGB 14.8 12/07/2023 03:20 PM    HCT 44.4 12/07/2023 03:20 PM     12/07/2023 03:20 PM    MCV 89.2 12/07/2023 03:20 PM      Lab Results   Component Value Date/Time     12/07/2023 03:37 PM    K 4.2 12/07/2023 03:37 PM     12/07/2023 03:37 PM    CO2 26 12/07/2023 03:37 PM    BUN 15 12/07/2023 03:37 PM    GFRAA >60 12/14/2020 01:24 PM    GLOB 3.6 12/07/2023 03:37 PM    ALT 23 12/07/2023 03:37 PM          Current Facility-Administered Medications   Medication Dose Route Frequency Provider Last Rate Last Admin    [START ON 12/26/2023] chlorproMAZINE (THORAZINE) injection 50 mg  50 mg IntraMUSCular Once Rustam Rivero MD        And    [START ON 12/26/2023] diphenhydrAMINE (BENADRYL) injection 50 mg  50 mg IntraMUSCular Once Rustam Rivero MD        chlorproMAZINE (THORAZINE) injection 50 mg +++COURT ORDERED+++  50 mg IntraMUSCular Daily PRN Rustam Rivero MD   50 mg at 12/22/23 0609    cloZAPine (FAZACLO) disintegrating tablet 25 mg  25 mg Oral Daily Rustam Rivero MD   25 mg at 12/22/23 1054    Or    haloperidol lactate (HALDOL) injection 2 mg +++COURT ORDERED FOR REFUSAL OF CLOZAPINE+++  2 mg IntraMUSCular Daily Rustam Rivero MD        LORazepam (ATIVAN) 2 MG/ML concentrated solution 1 mg +++HOLD EVENING DOSE PRIOR TO AM ECT+++  1 mg Oral TID Rustam Rivero MD   1 mg at 12/22/23 1054    Or    midazolam (VERSED) injection 2 mg +++COURT ORDERED FOR REFUSAL  and optimum care of the patient with medicine consult for assist with care   Routine labs to be ordered as needed.   Supportive, milieu and group therapy.   Strengths include taking medications           Continue the medication regimen as prescribed  Disposition planning to continue.   A coordinated, multidisplinary treatment team round was conducted with the patient, nurses, pharmcist,  and writer present. Discussions held with , and/or with family members; Complete current electronic health record for patient was reviewed in full including consultant notes, ancillary staff notes, nurses and tech notes, labs and vitals.  I certify that this patients inpatient psychiatric hospital services furnished since the previous certification were, and continue to be, required for treatment that could reasonably be expected to improve the patient's condition, or for diagnostic study, and that the patient continues to need, on a daily basis, active treatment furnished directly by or requiring the supervision of inpatient psychiatric facility personnel. In addition, the hospital records show that services furnished were intensive treatment services, admission or related services, or equivalent services.

## 2023-12-22 NOTE — PERIOP NOTE
0630: Pt moved to stretcher to be transferred to PACU for scheduled ECT. Pt resisting, 2 U staff members assisted pt to stretcher.  Pt attmepting to get off of stretcher and stating \"dont touch me\" while being transported to PACU. Pt arrived to OR hallway. Pt agitated and resisting PACU RNs while making several attempts to get off of stretcher. Pt stepped off stretcher, knelt to the ground with head facing the ground and extremities tucked inward. Dr. Butler witnessed pt behavior and ordered for pt to be transported back to U and cancelled ECT procedure. 3 PACU RNS, 2 U staffmembers assisted pt back to unit on stretcher. Pt resisting and attempting to get off of stretcher for the entirety of transport. Pt back in U room, report given to U RN.

## 2023-12-22 NOTE — BH NOTE
Behavioral Health Treatment Team Note     Patient goal(s) for today: pt does not identify a goal  Treatment team focus/goals: continue monitor medication, adjust as needed    Progress note: Pt presents selectively mute, ao to self and place. Pt presents noncooperative with treatment and agitated. ADLs appear poor, pt does not take care of self without prompting and often resists.  Pt denies SI. Pt denies HI. Pt denies AVH.    Pt was given thorazine as pretreatment prior to ECT today, pt reportedly resisting and attempting to get off stretcher for entirety of transport and would not cooperate with staff. Treatment team to discuss alternative plan. [facility] clinician to come see pt this afternoon.    LOS:  14  Expected LOS: TBD    Insurance info/prescription coverage:  MEDICARE  Date of last family contact:  Called [facility] 12/11 and [facility] 12/13  Family requesting physician contact today:  No  Discharge plan:  TBD  Guns in the home:  No  Outpatient provider(s):  [facility]     Participating treatment team members: Nba Wilson MSW

## 2023-12-22 NOTE — PROGRESS NOTES
Pt was noted in his room, naked, kneeling on the floor. Pt did not acknowledge writer. He would not verbally respond to questions asked. He declined to put on clothes. Pt reminded of ECT in the morning.    Pt appeared to have slept approx 4 hours during the night. No acute distress observed. Pt was maintained NPO after MN. Pt was medicated with Thorazine IM prior to ECT per court order. Monitoring for safety and behaviors continues.

## 2023-12-23 PROCEDURE — 6370000000 HC RX 637 (ALT 250 FOR IP): Performed by: PSYCHIATRY & NEUROLOGY

## 2023-12-23 PROCEDURE — 6360000002 HC RX W HCPCS: Performed by: PSYCHIATRY & NEUROLOGY

## 2023-12-23 PROCEDURE — 1240000000 HC EMOTIONAL WELLNESS R&B

## 2023-12-23 RX ADMIN — OLANZAPINE 10 MG: 5 TABLET, ORALLY DISINTEGRATING ORAL at 09:23

## 2023-12-23 RX ADMIN — LORAZEPAM 2 MG: 2 INJECTION INTRAMUSCULAR; INTRAVENOUS at 00:55

## 2023-12-23 RX ADMIN — LORAZEPAM 1 MG: 2 SOLUTION, CONCENTRATE ORAL at 20:52

## 2023-12-23 RX ADMIN — LORAZEPAM 1 MG: 2 SOLUTION, CONCENTRATE ORAL at 09:56

## 2023-12-23 RX ADMIN — LORAZEPAM 1 MG: 2 SOLUTION, CONCENTRATE ORAL at 15:21

## 2023-12-23 ASSESSMENT — PAIN SCALES - WONG BAKER
WONGBAKER_NUMERICALRESPONSE: 0
WONGBAKER_NUMERICALRESPONSE: 0

## 2023-12-23 ASSESSMENT — PAIN SCALES - GENERAL
PAINLEVEL_OUTOF10: 0
PAINLEVEL_OUTOF10: 0

## 2023-12-23 NOTE — PLAN OF CARE
Problem: Discharge Planning  Goal: Discharge to home or other facility with appropriate resources  Outcome: Not Progressing     Problem: Coping  Goal: Pt/Family able to verbalize concerns and demonstrate effective coping strategies  Description: INTERVENTIONS:  1. Assist patient/family to identify coping skills, available support systems and cultural and spiritual values  2. Provide emotional support, including active listening and acknowledgement of concerns of patient and caregivers  3. Reduce environmental stimuli, as able  4. Instruct patient/family in relaxation techniques, as appropriate  5. Assess for spiritual pain/suffering and initiate Spiritual Care, Psychosocial Clinical Specialist consults as needed  12/23/2023 0958 by Shaun Pitts, RN  Outcome: Not Progressing  12/23/2023 0159 by Vicenta Bean, RN  Outcome: Progressing     Problem: Confusion  Goal: Confusion, delirium, dementia, or psychosis is improved or at baseline  Description: INTERVENTIONS:  1. Assess for possible contributors to thought disturbance, including medications, impaired vision or hearing, underlying metabolic abnormalities, dehydration, psychiatric diagnoses, and notify attending LIP  2. [place] high risk fall precautions, as indicated  3. Provide frequent short contacts to provide reality reorientation, refocusing and direction  4. Decrease environmental stimuli, including noise as appropriate  5. Monitor and intervene to maintain adequate nutrition, hydration, elimination, sleep and activity  6. If unable to ensure safety without constant attention obtain sitter and review sitter guidelines with assigned personnel  7. Initiate Psychosocial CNS and Spiritual Care consult, as indicated  12/23/2023 0201 by Vicenta Bean, RN  Outcome: Not Progressing     Problem: Discharge Planning  Goal: Discharge to home or other facility with appropriate resources  Outcome: Not Progressing     Problem: Coping  Goal: Pt/Family able

## 2023-12-23 NOTE — PLAN OF CARE
[id number]- I assume care of the patient. He is rocking back and forth in a fetal position and not responding to staff. He is assisted with sitting up so he can take his medications. He is not responding to staff and is isolative in a fetal position. He is eats and drinks snacks when you walk away. He urinates on the floor in the bathroom but is still in a fetal position.    0100- He is still not responding to staff and is in a fetal position on the floor moaning. He is given an Ativan IM injection. He is being monitored every 15 minutes by staff. He is unable to respond to questions. C-SSRS is low and vitals is stable.     0200- The patient is now sitting up and looking around the room and sitting on the floor. When he seen this writer, he immediately went back into a fetal position on the floor.       Problem: Confusion  Goal: Confusion, delirium, dementia, or psychosis is improved or at baseline  Description: INTERVENTIONS:  1. Assess for possible contributors to thought disturbance, including medications, impaired vision or hearing, underlying metabolic abnormalities, dehydration, psychiatric diagnoses, and notify attending LIP  2. [place] high risk fall precautions, as indicated  3. Provide frequent short contacts to provide reality reorientation, refocusing and direction  4. Decrease environmental stimuli, including noise as appropriate  5. Monitor and intervene to maintain adequate nutrition, hydration, elimination, sleep and activity  6. If unable to ensure safety without constant attention obtain sitter and review sitter guidelines with assigned personnel  7. Initiate Psychosocial CNS and Spiritual Care consult, as indicated  Outcome: Not Progressing

## 2023-12-23 NOTE — BH NOTE
Chief Complaint: mute    Length of Stay: 15 Days    Interval History:  Patient was seen today for rounds in his room. Mr. Cowan was laying on the floor. He received ECT this morning and was sleeping. He did not participate in the interview and did not speak or open his eyes. He appears disheveled. Per staff, he has been taking medications and no side effects have been observed or reported. He is also ambulating to the bathroom and using the facilities appropriately. No acute events reported over night.    12/13: Roge Cowan is doing poorly. Catatonic. He is mute, rigid, no eye contact, unable to follow commands. He is isolative to his room, has not been eating. Poor hygiene. No interaction is possible with him. He had his second ect today. Staff report he's been mute. He has been non adherent with his meds. Court order meds were approved Monday.     12/14 - Roge Cowan reports that he is not paddling and does not need a blanket although it is cool in the room and he was lying on his bed without a shirt on.  Poverty of content of speech with selective responses.  Emaciated and turns away when asked about suicide, homicide, or perceptual changes.  No aggression or violence.  Selectively  interactive and partially aware.  Refuses PO medications and received IM Ativan the past few days with positive effect. Eating very little and sleeping fairly.      12/15 - Roge Cowan received ECT treatments toda and remains selective in his interactions generally responding with body movements, shakes and flapping of a limb (hands or feet).  Nursing reports that he uses the bathroom appropriately and eats some.  Malodrous and emaciated.  No aggression or violence.  Interactive at times.  Limited awareness. Tolerating medications well.  Eating and sleeping fairly.    12/16 - Roge Cowan responds selectively.  Still sleeps on the floor.  Had ECT yesterday.  Moods are good.  Appropriately dressed and groomed.  Denies  20mg  IM (Unable to get labs for Clozaril management)  Pre-treat patient with thorazine 50mg IM, at lease 30 minutes prior to sending patient off the floor   Continue ECT # 5 on Tuesday  Court ordered meds granted 12/11.   Continue inpatient stay for the safety and optimum care of the patient with medicine consult for assist with care   Routine labs to be ordered as needed.   Supportive, milieu and group therapy.   Strengths include taking medications           Continue the medication regimen as prescribed  Disposition planning to continue.   A coordinated, multidisplinary treatment team round was conducted with the patient, nurses, pharmcist,  and writer present. Discussions held with , and/or with family members; Complete current electronic health record for patient was reviewed in full including consultant notes, ancillary staff notes, nurses and tech notes, labs and vitals.  I certify that this patients inpatient psychiatric hospital services furnished since the previous certification were, and continue to be, required for treatment that could reasonably be expected to improve the patient's condition, or for diagnostic study, and that the patient continues to need, on a daily basis, active treatment furnished directly by or requiring the supervision of inpatient psychiatric facility personnel. In addition, the hospital records show that services furnished were intensive treatment services, admission or related services, or equivalent services.

## 2023-12-24 PROCEDURE — 1240000000 HC EMOTIONAL WELLNESS R&B

## 2023-12-24 PROCEDURE — 2580000003 HC RX 258: Performed by: PSYCHIATRY & NEUROLOGY

## 2023-12-24 PROCEDURE — 6370000000 HC RX 637 (ALT 250 FOR IP): Performed by: PSYCHIATRY & NEUROLOGY

## 2023-12-24 PROCEDURE — 6360000002 HC RX W HCPCS: Performed by: PSYCHIATRY & NEUROLOGY

## 2023-12-24 RX ADMIN — ZIPRASIDONE MESYLATE 20 MG: 20 INJECTION, POWDER, LYOPHILIZED, FOR SOLUTION INTRAMUSCULAR at 09:51

## 2023-12-24 RX ADMIN — MIDAZOLAM HYDROCHLORIDE 2 MG: 1 INJECTION, SOLUTION INTRAMUSCULAR; INTRAVENOUS at 15:15

## 2023-12-24 RX ADMIN — MIDAZOLAM HYDROCHLORIDE 2 MG: 1 INJECTION, SOLUTION INTRAMUSCULAR; INTRAVENOUS at 08:02

## 2023-12-24 RX ADMIN — MIDAZOLAM HYDROCHLORIDE 2 MG: 1 INJECTION, SOLUTION INTRAMUSCULAR; INTRAVENOUS at 22:21

## 2023-12-24 ASSESSMENT — PAIN SCALES - WONG BAKER
WONGBAKER_NUMERICALRESPONSE: 0
WONGBAKER_NUMERICALRESPONSE: 0

## 2023-12-24 ASSESSMENT — PAIN SCALES - GENERAL
PAINLEVEL_OUTOF10: 0
PAINLEVEL_OUTOF10: 0

## 2023-12-24 NOTE — PLAN OF CARE
[id number]- I assume care of the patient. The patient is lying on the floor naked and refused to get up. He finally had to be assisted to sit up in order to take his medication (Ativan 1 mg liquid). He still is not responding and is selectively mute. The floor is sticky as though he urinated on the floor and the entire room smells like urine. The floor was cleaned up by the tech and he refused vitals. He manage to drink and eat food when we leave his room. Will continue to monitor the patient.     [id number]- The patient is now lying on his mattress that is positioned on the floor and he is asleep. He is still not responding and he is lying naked on the mattress. He will be monitored by staff every 15 minutes. He slept 7 hrs.  Problem: Confusion  Goal: Confusion, delirium, dementia, or psychosis is improved or at baseline  Description: INTERVENTIONS:  1. Assess for possible contributors to thought disturbance, including medications, impaired vision or hearing, underlying metabolic abnormalities, dehydration, psychiatric diagnoses, and notify attending LIP  2. [place] high risk fall precautions, as indicated  3. Provide frequent short contacts to provide reality reorientation, refocusing and direction  4. Decrease environmental stimuli, including noise as appropriate  5. Monitor and intervene to maintain adequate nutrition, hydration, elimination, sleep and activity  6. If unable to ensure safety without constant attention obtain sitter and review sitter guidelines with assigned personnel  7. Initiate Psychosocial CNS and Spiritual Care consult, as indicated  Outcome: Not Progressing     Problem: Safety - Adult  Goal: Free from fall injury  Outcome: Not Progressing

## 2023-12-24 NOTE — BH NOTE
Pt remained in fetal position and refused to answer any questions or interact a all. His medication was placed beside his tray and the pt was given time to take medication. Pt took medication and accepted help with liquid medication. An attempt was made to talk pt into showering and he went back into the fetal position. Monitoring will continue.

## 2023-12-25 PROCEDURE — 6360000002 HC RX W HCPCS: Performed by: PSYCHIATRY & NEUROLOGY

## 2023-12-25 PROCEDURE — 1240000000 HC EMOTIONAL WELLNESS R&B

## 2023-12-25 PROCEDURE — 2580000003 HC RX 258: Performed by: PSYCHIATRY & NEUROLOGY

## 2023-12-25 RX ADMIN — ZIPRASIDONE MESYLATE 20 MG: 20 INJECTION, POWDER, LYOPHILIZED, FOR SOLUTION INTRAMUSCULAR at 09:37

## 2023-12-25 RX ADMIN — MIDAZOLAM HYDROCHLORIDE 2 MG: 1 INJECTION, SOLUTION INTRAMUSCULAR; INTRAVENOUS at 15:10

## 2023-12-25 RX ADMIN — MIDAZOLAM HYDROCHLORIDE 2 MG: 1 INJECTION, SOLUTION INTRAMUSCULAR; INTRAVENOUS at 09:36

## 2023-12-25 ASSESSMENT — PAIN SCALES - WONG BAKER: WONGBAKER_NUMERICALRESPONSE: 0

## 2023-12-25 ASSESSMENT — PAIN SCALES - GENERAL: PAINLEVEL_OUTOF10: 0

## 2023-12-25 NOTE — PLAN OF CARE
Problem: Coping  Goal: Pt/Family able to verbalize concerns and demonstrate effective coping strategies  Description: INTERVENTIONS:  1. Assist patient/family to identify coping skills, available support systems and cultural and spiritual values  2. Provide emotional support, including active listening and acknowledgement of concerns of patient and caregivers  3. Reduce environmental stimuli, as able  4. Instruct patient/family in relaxation techniques, as appropriate  5. Assess for spiritual pain/suffering and initiate Spiritual Care, Psychosocial Clinical Specialist consults as needed  Outcome: Not Progressing     Problem: Confusion  Goal: Confusion, delirium, dementia, or psychosis is improved or at baseline  Description: INTERVENTIONS:  1. Assess for possible contributors to thought disturbance, including medications, impaired vision or hearing, underlying metabolic abnormalities, dehydration, psychiatric diagnoses, and notify attending LIP  2. [place] high risk fall precautions, as indicated  3. Provide frequent short contacts to provide reality reorientation, refocusing and direction  4. Decrease environmental stimuli, including noise as appropriate  5. Monitor and intervene to maintain adequate nutrition, hydration, elimination, sleep and activity  6. If unable to ensure safety without constant attention obtain sitter and review sitter guidelines with assigned personnel  7. Initiate Psychosocial CNS and Spiritual Care consult, as indicated  Outcome: Not Progressing     Problem: Involuntary Admit  Goal: Will cooperate with staff recommendations and doctor's orders and will demonstrate appropriate behavior  Description: INTERVENTIONS:  1. Treat underlying conditions and offer medication as ordered  2. Educate regarding involuntary admission procedures and rules  3. Contain excessive/inappropriate behavior per unit and hospital policies  Outcome: Not Progressing

## 2023-12-25 NOTE — BH NOTE
Pt remained in fetal position and refused to answer any questions or interact a all. His medication was placed beside his tray and the pt was given time to take medication. Pt refused medication and and was given IM medication. An attempt was made to talk pt into showering and he went back into the fetal position. Monitoring will continue.

## 2023-12-25 NOTE — PROGRESS NOTES
Patient lying on bed . Does not respond verbally when assessed. He does not make eye contact. He pulls arms in contracted posture close to his body when vital signs are attempted. He does allow a radial pulse to be obtained  he accepted a PM snack and fluids. He did not take oral medication. He allowed IM substitute to be given. (See MAR)  He will not allow a sheet on his bed but will allow a blanket. He appears to be sleeping fairly.

## 2023-12-25 NOTE — BH NOTE
Met with patient lying down naked on the floor in a fetal position . Patient refused to answer any questions. Attempted to given PO Ativan and patient spit it on the floor. Refused PO Ativan and PO Zyprexa. IM Geodon and Versed given per COTX. Patient is eating meals and has been observed crawling to the bathroom. Patient is unkempt and malodorous. Encouraged patient to shower and put his clothes on , but patient would not cooperate. Remains on Q15 minute rounds for safety.

## 2023-12-26 PROCEDURE — 1240000000 HC EMOTIONAL WELLNESS R&B

## 2023-12-26 PROCEDURE — 2580000003 HC RX 258: Performed by: PSYCHIATRY & NEUROLOGY

## 2023-12-26 PROCEDURE — 6360000002 HC RX W HCPCS: Performed by: PSYCHIATRY & NEUROLOGY

## 2023-12-26 RX ADMIN — MIDAZOLAM HYDROCHLORIDE 2 MG: 1 INJECTION, SOLUTION INTRAMUSCULAR; INTRAVENOUS at 14:45

## 2023-12-26 RX ADMIN — DIPHENHYDRAMINE HYDROCHLORIDE 50 MG: 50 INJECTION INTRAMUSCULAR; INTRAVENOUS at 05:51

## 2023-12-26 RX ADMIN — MIDAZOLAM HYDROCHLORIDE 2 MG: 1 INJECTION, SOLUTION INTRAMUSCULAR; INTRAVENOUS at 11:01

## 2023-12-26 RX ADMIN — CHLORPROMAZINE HYDROCHLORIDE 50 MG: 25 INJECTION INTRAMUSCULAR at 05:52

## 2023-12-26 RX ADMIN — ZIPRASIDONE MESYLATE 20 MG: 20 INJECTION, POWDER, LYOPHILIZED, FOR SOLUTION INTRAMUSCULAR at 11:01

## 2023-12-26 RX ADMIN — MIDAZOLAM HYDROCHLORIDE 2 MG: 1 INJECTION, SOLUTION INTRAMUSCULAR; INTRAVENOUS at 22:27

## 2023-12-26 ASSESSMENT — PAIN SCALES - WONG BAKER
WONGBAKER_NUMERICALRESPONSE: 0
WONGBAKER_NUMERICALRESPONSE: 0

## 2023-12-26 ASSESSMENT — PAIN SCALES - GENERAL
PAINLEVEL_OUTOF10: 0

## 2023-12-26 NOTE — PLAN OF CARE

## 2023-12-26 NOTE — BH NOTE
Evening assessment was incomplete. Patient was encountered in his bedroom. He is anxious and uncooperative. Patient is non-verbal and refuses to participate in the assessment. Patient shows no signs of pain,  such as facial grimacing or restlessness.Eye contact is noted to be poor because patient is lying in a fetal position with his face to the floor. Mood is noted to be empty and anhedonic with blunt affect.  No interaction with staff or peers.  Unable to obtain vital signs because patient refuses. Patient is meal compliant. Patient had no scheduled medications due to ECT. There are no untoward side effects from medications to note. C-SSRS level is low risk. Hourly rounds are being completed to assure patient safety and attend to care needs. Monitoring will continue for changes in patient condition.

## 2023-12-26 NOTE — BH NOTE
PACU staff arrived to take patient to scheduled ECT appointment.  Patient was placed on stretcher and staff attempted to apply soft 4 point restraints. Patient continued to clinch his fists and resist the application of restraints.  Several unsuccessful attempts made to apply ankle and wrist restraints.  Patient removed from stretcher and on call provider notified for additional dose of benadryl and thorazine.

## 2023-12-26 NOTE — BH NOTE
OLANZAPINE+++  20 mg IntraMUSCular Daily Rustam Rivero MD   20 mg at 12/25/23 0937    chlorproMAZINE (THORAZINE) injection 50 mg +++COURT ORDERED+++  50 mg IntraMUSCular Daily PRN Rustam Rivero MD   50 mg at 12/22/23 0609    LORazepam (ATIVAN) 2 MG/ML concentrated solution 1 mg +++HOLD EVENING DOSE PRIOR TO AM ECT+++  1 mg Oral TID Rustam Rivero MD   1 mg at 12/23/23 2052    Or    midazolam (VERSED) injection 2 mg +++COURT ORDERED FOR REFUSAL OF PO LORAZEPAM+++HOLD EVENING DOSE PRIOR TO AM ECT+++  2 mg IntraMUSCular TID Rustam Rivero MD   2 mg at 12/25/23 1510    acetaminophen (TYLENOL) tablet 650 mg  650 mg Oral Q4H PRN Rustam Rivero MD        polyethylene glycol (GLYCOLAX) packet 17 g  17 g Oral Daily PRN Rustam Rivero MD        aluminum & magnesium hydroxide-simethicone (MAALOX) 200-200-20 MG/5ML suspension 30 mL  30 mL Oral Q6H PRN Rustam Rivero MD        hydrOXYzine HCl (ATARAX) tablet 50 mg  50 mg Oral TID PRN Rustam Rivero MD        haloperidol (HALDOL) tablet 5 mg  5 mg Oral Q4H PRN Rustam Rivero MD        Or    haloperidol lactate (HALDOL) injection 5 mg  5 mg IntraMUSCular Q4H PRN Rustam Rivero MD        diphenhydrAMINE (BENADRYL) injection 50 mg  50 mg IntraMUSCular Q4H PRN Rustam Rivero MD        traZODone (DESYREL) tablet 50 mg  50 mg Oral Nightly PRN Rustam Rivero MD        LORazepam (ATIVAN) injection 2 mg  2 mg IntraMUSCular Q6H PRN Rustam Rivero MD   2 mg at 12/23/23 0055         Mental Status Exam:  Eye contact: none   Grooming: poor  Psychomotor activity: flapps feet when someone talks to him  Speech is mute  Mood is YOEL  Affect:Blunted   Perception: YOEL  Suicidal ideation: YOEL   Cognition is grossly impaired    Vitals:    12/25/23 1215   Pulse:    Resp: 18        Physical Exam:  Body habitus: Body mass index is 24.14 kg/m².  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   Continue inpatient stay for the safety and optimum care of the patient with medicine consult for assist with care   Routine labs to be ordered as needed.   Supportive, milieu and group therapy.   Strengths include taking medications           Continue the medication regimen as prescribed  Disposition planning to continue.   A coordinated, multidisplinary treatment team round was conducted with the patient, nurses, pharmcist,  and writer present. Discussions held with , and/or with family members; Complete current electronic health record for patient was reviewed in full including consultant notes, ancillary staff notes, nurses and tech notes, labs and vitals.  I certify that this patients inpatient psychiatric hospital services furnished since the previous certification were, and continue to be, required for treatment that could reasonably be expected to improve the patient's condition, or for diagnostic study, and that the patient continues to need, on a daily basis, active treatment furnished directly by or requiring the supervision of inpatient psychiatric facility personnel. In addition, the hospital records show that services furnished were intensive treatment services, admission or related services, or equivalent services.

## 2023-12-26 NOTE — BH NOTE
Met with patient lying in the floor in his room. Patient was lying naked in a fetal position on the floor with a blanket on him. Patient would not respond to writer or MD.  Would not answer assessment questions. Refused AM medications. IM Versed and Geodon given per COTX.  Patient uncooperative with preparing for ECT, treatment was cancelled.  Staff assisted patient with a shower. Patient was cooperative with much encouragement. Eating and sleeping well.Staff has observed patient crawling to the bathroom. Several red areas noted on bony prominences on ankles, feet, knuckles and knees. MD aware. Wound care consult placed. Remains on Q15 minute rounds for safety.

## 2023-12-26 NOTE — BH NOTE
Perception: YOEL  Suicidal ideation: YOEL   Cognition is grossly impaired    Vitals:    12/25/23 1215   Pulse:    Resp: 18        Physical Exam:  Body habitus: Body mass index is 24.14 kg/m².  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   Continue inpatient stay for the safety and optimum care of the patient with medicine consult for assist with care   Routine labs to be ordered as needed.   Supportive, milieu and group therapy.   Strengths include taking medications           Continue the medication regimen as prescribed  Disposition planning to continue.   A coordinated, multidisplinary treatment team round was conducted with the patient, nurses, pharmcist,  and writer present. Discussions held with , and/or with family members; Complete current electronic health record for patient was reviewed in full including consultant notes, ancillary staff notes, nurses and tech notes, labs and vitals.  I certify that this patients inpatient psychiatric hospital services furnished since the previous certification were, and continue to be, required for treatment that could reasonably be expected to improve the patient's condition, or for diagnostic study, and that the patient continues to need, on a daily basis, active treatment furnished directly by or requiring the supervision of inpatient psychiatric facility personnel. In addition, the hospital records show that services furnished were intensive treatment services, admission or related  services, or equivalent services.

## 2023-12-26 NOTE — BH NOTES
5 GROUP THERAPY PROGRESS NOTE    Patient did not participate in Discharge Group.      Scottie Keen, RN, PMHNP Student

## 2023-12-27 PROCEDURE — 6360000002 HC RX W HCPCS: Performed by: PSYCHIATRY & NEUROLOGY

## 2023-12-27 PROCEDURE — 1240000000 HC EMOTIONAL WELLNESS R&B

## 2023-12-27 PROCEDURE — 2580000003 HC RX 258: Performed by: PSYCHIATRY & NEUROLOGY

## 2023-12-27 RX ADMIN — MIDAZOLAM HYDROCHLORIDE 2 MG: 1 INJECTION, SOLUTION INTRAMUSCULAR; INTRAVENOUS at 09:34

## 2023-12-27 RX ADMIN — MIDAZOLAM HYDROCHLORIDE 2 MG: 1 INJECTION, SOLUTION INTRAMUSCULAR; INTRAVENOUS at 14:44

## 2023-12-27 RX ADMIN — MIDAZOLAM HYDROCHLORIDE 2 MG: 1 INJECTION, SOLUTION INTRAMUSCULAR; INTRAVENOUS at 21:15

## 2023-12-27 RX ADMIN — ZIPRASIDONE MESYLATE 20 MG: 20 INJECTION, POWDER, LYOPHILIZED, FOR SOLUTION INTRAMUSCULAR at 09:33

## 2023-12-27 ASSESSMENT — PAIN SCALES - GENERAL
PAINLEVEL_OUTOF10: 0
PAINLEVEL_OUTOF10: 0

## 2023-12-27 NOTE — BH NOTE
records show that services furnished were intensive treatment services, admission or related services, or equivalent services.

## 2023-12-27 NOTE — PLAN OF CARE
Problem: Discharge Planning  Goal: Discharge to home or other facility with appropriate resources  Outcome: Not Progressing     Problem: Coping  Goal: Pt/Family able to verbalize concerns and demonstrate effective coping strategies  Description: INTERVENTIONS:  1. Assist patient/family to identify coping skills, available support systems and cultural and spiritual values  2. Provide emotional support, including active listening and acknowledgement of concerns of patient and caregivers  3. Reduce environmental stimuli, as able  4. Instruct patient/family in relaxation techniques, as appropriate  5. Assess for spiritual pain/suffering and initiate Spiritual Care, Psychosocial Clinical Specialist consults as needed  Outcome: Not Progressing  Flowsheets (Taken 12/26/2023 2130 by Regina Barba RN)  Patient/family able to verbalize anxieties, fears, and concerns, and demonstrate effective coping: Provide emotional support, including active listening and acknowledgement of concerns of patient and caregivers     Problem: Confusion  Goal: Confusion, delirium, dementia, or psychosis is improved or at baseline  Description: INTERVENTIONS:  1. Assess for possible contributors to thought disturbance, including medications, impaired vision or hearing, underlying metabolic abnormalities, dehydration, psychiatric diagnoses, and notify attending LIP  2. [place] high risk fall precautions, as indicated  3. Provide frequent short contacts to provide reality reorientation, refocusing and direction  4. Decrease environmental stimuli, including noise as appropriate  5. Monitor and intervene to maintain adequate nutrition, hydration, elimination, sleep and activity  6. If unable to ensure safety without constant attention obtain sitter and review sitter guidelines with assigned personnel  7. Initiate Psychosocial CNS and Spiritual Care consult, as indicated  Outcome: Not Progressing  Flowsheets (Taken 12/26/2023 2130 by

## 2023-12-27 NOTE — CARE COORDINATION
12/27/23 1559   ITP   Date of Next Review 01/03/24   Short Term Goal 1   STG Goal 1 Status: Patient Appears to be  Progressing toward treatment plan goal   Short Term Goal 2   STG Goal 2 Status: Patient Appears to be  Progressing toward treatment plan goal

## 2023-12-27 NOTE — BH NOTE
Behavioral Health Treatment Team Note     Patient goal(s) for today: pt does not identify a goal  Treatment team focus/goals: continue monitor medication, adjust as needed    Progress note: Pt presents naked on floor in fetal position, praying and unmoving. Pt not cooperative with assessment questions, at times will nod his head for yes and shake his head for no. Pt continuing to lay on floor naked. Pt has redness on skin where he is in contact with the floor. Pt presents with poor ADLS.    LOS:  19  Expected LOS: TBD    Insurance info/prescription coverage:  MEDICARE  Date of last family contact:  Called [facility] 12/11 and [facility] 12/13  Family requesting physician contact today:  No  Discharge plan:  TBD  Guns in the home:  No  Outpatient provider(s):  [facility]     Participating treatment team members: Nba Wilson MSW

## 2023-12-27 NOTE — PROGRESS NOTES
Patient is isolative to room. Patient was found in the fetal position, without clothing, and on his bathroom floor. Patient's room was cleaned and bedding sheets were replaced. Patient was offered a blanket and assistance to return to bed, though patient refused. Patient refused assessment  questions and begins to rapidly breathe whenever spoken to. Patient refused his PO medications and was given alternative IM medications instead. Patient was cooperative with IM medications. Q15 minute safety checks conducted. Will continue to monitor for changes in patient condition.

## 2023-12-27 NOTE — BH NOTE
Patient was isolative to his room this evening. He remained disrobed this shift often in fetal position on the floor/mattress. Patient at times would be seen on the mattress in side lying position but as soon as he is aware of someone entering his room he would go back to being in fetal position. Patient was encouraged and offered to use a blanket however, patient nodded his head, refusing it. Only temperature could be monitored, he refused all other assessments. Patient was given IM medications for refusal of CO medications. Patient took a few bites of snacks at bedtime.     Patient appeared to be sleeping for just about 1.5 hours.

## 2023-12-27 NOTE — PROGRESS NOTES
Behavioral Services                                              Medicare Re-Certification    I certify that the inpatient psychiatric hospital services furnished since the previous certification/re-certification were, and continue to be, medically necessary for;    [x] (1) Treatment which could reasonably be expected to improve the patient's condition,    [x] (2) Or for diagnostic study.    Estimated length of stay/service 5 - 7 days    Plan for post-hospital care per social work    This patient continues to need, on a daily basis, active treatment furnished directly by or requiring the supervision of inpatient psychiatric personnel.    Electronically signed by Rustam Rivero MD on 12/26/2023 at 9:04 PM

## 2023-12-28 PROCEDURE — 2580000003 HC RX 258: Performed by: PSYCHIATRY & NEUROLOGY

## 2023-12-28 PROCEDURE — 1240000000 HC EMOTIONAL WELLNESS R&B

## 2023-12-28 PROCEDURE — 6360000002 HC RX W HCPCS: Performed by: PSYCHIATRY & NEUROLOGY

## 2023-12-28 RX ADMIN — ZIPRASIDONE MESYLATE 20 MG: 20 INJECTION, POWDER, LYOPHILIZED, FOR SOLUTION INTRAMUSCULAR at 08:54

## 2023-12-28 RX ADMIN — MIDAZOLAM HYDROCHLORIDE 2 MG: 1 INJECTION, SOLUTION INTRAMUSCULAR; INTRAVENOUS at 16:11

## 2023-12-28 RX ADMIN — MIDAZOLAM HYDROCHLORIDE 2 MG: 1 INJECTION, SOLUTION INTRAMUSCULAR; INTRAVENOUS at 21:37

## 2023-12-28 RX ADMIN — MIDAZOLAM HYDROCHLORIDE 2 MG: 1 INJECTION, SOLUTION INTRAMUSCULAR; INTRAVENOUS at 08:57

## 2023-12-28 ASSESSMENT — PAIN SCALES - GENERAL
PAINLEVEL_OUTOF10: 0
PAINLEVEL_OUTOF10: 0

## 2023-12-28 ASSESSMENT — PAIN SCALES - WONG BAKER: WONGBAKER_NUMERICALRESPONSE: 0

## 2023-12-28 NOTE — BH NOTE
Behavioral Health Treatment Team Note     Patient goal(s) for today: pt does not identify a goal  Treatment team focus/goals: continue monitor medication, adjust as needed    Progress note: Pt presentation unchanged. Pt still presents naked and laying in fetal position, praying at times and breathing faster when people enter his room. Motivation with food has not been successful thus far. Pt still receiving IM. ADLs appear poor. Pt mostly mute.     Pt ACT team coming to see him tomorrow.    LOS:  20  Expected LOS: TBD    Insurance info/prescription coverage:  MEDICARE  Date of last family contact:  Called [facility] 12/11 and [facility] 12/13  Family requesting physician contact today:  No  Discharge plan:  TBD  Guns in the home:  No  Outpatient provider(s):  [facility]     Participating treatment team members: Nba Wilson MSW

## 2023-12-28 NOTE — BH NOTE
Chief Complaint: mute    Length of Stay: 20 Days    Interval History:  Patient was seen today for rounds in his room. Mr. Cowan was laying on the floor. He received ECT this morning and was sleeping. He did not participate in the interview and did not speak or open his eyes. He appears disheveled. Per staff, he has been taking medications and no side effects have been observed or reported. He is also ambulating to the bathroom and using the facilities appropriately. No acute events reported over night.    12/13: Roge Cowan is doing poorly. Catatonic. He is mute, rigid, no eye contact, unable to follow commands. He is isolative to his room, has not been eating. Poor hygiene. No interaction is possible with him. He had his second ect today. Staff report he's been mute. He has been non adherent with his meds. Court order meds were approved Monday.     12/14 - Rgoe Cowan reports that he is not paddling and does not need a blanket although it is cool in the room and he was lying on his bed without a shirt on.  Poverty of content of speech with selective responses.  Emaciated and turns away when asked about suicide, homicide, or perceptual changes.  No aggression or violence.  Selectively  interactive and partially aware.  Refuses PO medications and received IM Ativan the past few days with positive effect. Eating very little and sleeping fairly.      12/15 - Roge Cowan received ECT treatments toda and remains selective in his interactions generally responding with body movements, shakes and flapping of a limb (hands or feet).  Nursing reports that he uses the bathroom appropriately and eats some.  Malodrous and emaciated.  No aggression or violence.  Interactive at times.  Limited awareness. Tolerating medications well.  Eating and sleeping fairly.    12/16 - Roge Cowan responds selectively.  Still sleeps on the floor.  Had ECT yesterday.  Moods are good.  Appropriately dressed and groomed.  Denies  mg Oral Q4H PRN Rustam Rivero MD        Or    haloperidol lactate (HALDOL) injection 5 mg  5 mg IntraMUSCular Q4H PRN Rustam Rivero MD        diphenhydrAMINE (BENADRYL) injection 50 mg  50 mg IntraMUSCular Q4H PRN Rustam Rivero MD        traZODone (DESYREL) tablet 50 mg  50 mg Oral Nightly PRN Rustam Rivero MD        LORazepam (ATIVAN) injection 2 mg  2 mg IntraMUSCular Q6H PRN Rustam Rivero MD   2 mg at 12/23/23 0055         Mental Status Exam:  Eye contact: none   Grooming: poor  Psychomotor activity: flapps feet when someone talks to him  Speech is mute  Mood is YOEL  Affect:Blunted   Perception: YOEL  Suicidal ideation: YOEL   Cognition is grossly impaired    Vitals:    12/27/23 2000   BP:    Pulse:    Resp: 18   Temp: 97.5 °F (36.4 °C)        Physical Exam:  Body habitus: Body mass index is 24.14 kg/m².  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   Continue inpatient stay for the safety and optimum care of the patient with medicine consult for assist with care   Routine labs to be ordered as needed.   Supportive, milieu and group therapy.   Strengths include taking medications           Continue the medication regimen as prescribed  Disposition planning to continue.   A coordinated, multidisplinary treatment team round was conducted with the patient, nurses, pharmcist,  and writer present. Discussions held with , and/or with family members; Complete current electronic health record for patient was reviewed in full including consultant notes,

## 2023-12-28 NOTE — WOUND CARE
WOUND Note:     New consult for redness to bilat knees, ankles, feet and elbows    Chart shows:  Admitted on 12/7/23 for Schizoaffective disorder  History of Paranoid schizophrenia, confusion, withdrawal syndrome, psychotic disorder    Assessment:   Patient nonverbal and non interactive. Patient was naked and in fetal position on mattress. Snacks were opened and seemed patient had been eating crackers. When attempted to assess areas of pressure especially on knees patient became rigid and resistant. Assistance requested and 2 staff members turned patient on side.   Patient has blanchable reactive erythema on bilateral knees, feet, ankles and elbows. No open areas.    Staff notes that patient is continent of bowel and bladder and ambulates to the bathroom. Patient has also been seen laying on bed with legs extended and is drinking, eating more.  No open areas noted. Patient refuses to wear foam padding on pressure areas.    No treatment needed as this time. Continue to monitor.     Discussed with GAIL Floyd.    Transition of Care: Plan to follow weekly and as needed while admitted to hospital.      Celine Falcon RN, BSN  Wound Care Nurse, [facility]  [phone]

## 2023-12-28 NOTE — BH NOTE
SI/HI/AH/VH.  No aggression or violence.  Isolative and selectively interactive.  Aware. Tolerating medications fairly.  Eating and sleeping fairly.    12/17 -  Roge Cowan States, \"I'm fine\" and reports that he is not a fighter and wants to go to a convent.  Seems to be more verbally responsive to nurse Mari and Me.  Moods are good.  Remains bizarre, however is listening to what is happening around him evidenced by his responses to questions and behaviors.  No aggression or violence.  Appropriately interactive and aware. Tolerating medications well (No prn).  Eating and sleeping fairly (8 hrs).    12/18 -  Roge Cowan received ECT this am and actively listens to people who approach him.  Selectively respons however.  Inappropriately dressed and poorly groomed.  No aggression or violence.  Appropriately interactive and aware. Tolerating medications well (No prn's).  Eating and sleeping fairly (8 hrs).  Working on his placement    12/19 -  Roge Cowan gave grunts for responses today.  Not particularly fond of ECT. No aggression or violence.  Isolative, interactive and aware. Tolerating medications well (No prn)  Eating and sleeping fairly    12/20 -  Roge Cowan remains selectively mute and isolative to his room.  Did not get ECT today due to the selective and catatonic nature of his condition.  No aggression or violence.  Appropriately interactive and aware. Tolerating medications well (No prn).  Eating and sleeping fairly (6.5 hrs).  Discussed with treatment team and Dr. Larson.  He needs to have IV access and be     12/21 - Roge Cowan got a shower today with nursing and tech support.  Washed himself.  No aggression or violence.  Selectively interactive and partially aware.  Tolerating medications well in am but generally refuses in the evenings (No prn).  Eating and sleeping fairly     12/22 -  Roge Cowan fought to prevent ECT today and remains selective in his interactions and speech.   in a praying position nude the majority of the day.  Eats and toilets spontaneously but needs encouragement to shower and cloth self.  Refusing medications receiving Court ordered treatments instead of voluntary treatment.  Working on his placement    Past Medical History:  Past Medical History:   Diagnosis Date    Psychiatric disorder     Psychotic disorder (HCC)     Withdrawal syndrome (HCC)            Labs:  Lab Results   Component Value Date/Time    WBC 6.8 12/07/2023 03:20 PM    HGB 14.8 12/07/2023 03:20 PM    HCT 44.4 12/07/2023 03:20 PM     12/07/2023 03:20 PM    MCV 89.2 12/07/2023 03:20 PM      Lab Results   Component Value Date/Time     12/07/2023 03:37 PM    K 4.2 12/07/2023 03:37 PM     12/07/2023 03:37 PM    CO2 26 12/07/2023 03:37 PM    BUN 15 12/07/2023 03:37 PM    GFRAA >60 12/14/2020 01:24 PM    GLOB 3.6 12/07/2023 03:37 PM    ALT 23 12/07/2023 03:37 PM          Current Facility-Administered Medications   Medication Dose Route Frequency Provider Last Rate Last Admin    OLANZapine zydis (ZYPREXA) disintegrating tablet 10 mg  10 mg Oral Daily Rustam Rivero MD   10 mg at 12/23/23 0923    Or    ziprasidone (GEODON) 20 mg in sterile water 1 mL injection +++COURT ORDERED FOR REFUSAL OF OLANZAPINE+++  20 mg IntraMUSCular Daily Rustam Rivero MD   20 mg at 12/28/23 0854    chlorproMAZINE (THORAZINE) injection 50 mg +++COURT ORDERED+++  50 mg IntraMUSCular Daily PRN Rustam Rivero MD   50 mg at 12/22/23 0609    LORazepam (ATIVAN) 2 MG/ML concentrated solution 1 mg +++HOLD EVENING DOSE PRIOR TO AM ECT+++  1 mg Oral TID Rustam Rivero MD   1 mg at 12/23/23 2052    Or    midazolam (VERSED) injection 2 mg +++COURT ORDERED FOR REFUSAL OF PO LORAZEPAM+++HOLD EVENING DOSE PRIOR TO AM ECT+++  2 mg IntraMUSCular TID Rustam Rivero MD   2 mg at 12/28/23 0857    acetaminophen (TYLENOL) tablet 650 mg  650 mg Oral Q4H PRN Rustam Rivero MD        polyethylene glycol

## 2023-12-28 NOTE — PROGRESS NOTES
Patient is isolative to room. He does not answer questions including his mental health assessment. Patient was presented on his bedroom mattress in the fetal position eating his breakfast. As soon as the patient saw staff inter his room he immediately retracted further into the fetal position. Writer was able to get patient to cooperate with receiving his vital signs. Patient was offered PO medications, but he refused. Patient was given IM medications. Patient tolerated IM medications with no further concern. Q15 minute safety checks conducted. Will continue to monitor for change in patient status.     Patients room was cleaned by sanitization staff. And his bed sheets where changed by nursing staff. Patient was then assisted to the shower, so that he may take one. Patient required 3 people to assists him into the shower and aid in washing his hair, body, and drying his body. Patient was hesitant to help but was still cooperative during the process. Patient was offered further assistance after being transferred back into his bed, but offer was declined. Will continue to monitor for change in patient status.

## 2023-12-28 NOTE — BH NOTE
Night assessment complete.   Patient was encountered   his room bent with chest down and in a catatonic position. He is calm with constricted affect..   Patient non verbal to assessment question.No eye contact. Patient slept for 30minutes.   Mood is noted to be depressed affect.Patient is isolative.Refused VS and positioning.However,patient is not in any obvious respiratory distress.Patient is not med compliance hence court order IM was administered as prescribed.  There are no visible side effects from medications noted.Hourly rounds are being completed to assure patient safety and attend to care needs. Monitoring will continue for safety reason.

## 2023-12-28 NOTE — PLAN OF CARE
Problem: Discharge Planning  Goal: Discharge to home or other facility with appropriate resources  Outcome: Not Progressing     Problem: Confusion  Goal: Confusion, delirium, dementia, or psychosis is improved or at baseline  Description: INTERVENTIONS:  1. Assess for possible contributors to thought disturbance, including medications, impaired vision or hearing, underlying metabolic abnormalities, dehydration, psychiatric diagnoses, and notify attending LIP  2. [place] high risk fall precautions, as indicated  3. Provide frequent short contacts to provide reality reorientation, refocusing and direction  4. Decrease environmental stimuli, including noise as appropriate  5. Monitor and intervene to maintain adequate nutrition, hydration, elimination, sleep and activity  6. If unable to ensure safety without constant attention obtain sitter and review sitter guidelines with assigned personnel  7. Initiate Psychosocial CNS and Spiritual Care consult, as indicated  Outcome: Not Progressing

## 2023-12-28 NOTE — PLAN OF CARE
will demonstrate appropriate behavior  Description: INTERVENTIONS:  1. Treat underlying conditions and offer medication as ordered  2. Educate regarding involuntary admission procedures and rules  3. Contain excessive/inappropriate behavior per unit and hospital policies  Outcome: Not Progressing     Problem: Pain  Goal: Verbalizes/displays adequate comfort level or baseline comfort level  Outcome: Not Progressing     Problem: ABCDS Injury Assessment  Goal: Absence of physical injury  Outcome: Not Progressing     Problem: Safety - Adult  Goal: Free from fall injury  Outcome: Not Progressing     Problem: Safety - Medical Restraint  Goal: Remains free of injury from restraints (Restraint for Interference with Medical Device)  Description: INTERVENTIONS:  1. Determine that other, less restrictive measures have been tried or would not be effective before applying the restraint  2. Evaluate the patient's condition at the time of restraint application  3. Inform patient/family regarding the reason for restraint  4. Q2H: Monitor safety, psychosocial status, comfort, nutrition and hydration  Outcome: Not Progressing     Problem: Discharge Planning  Goal: Discharge to home or other facility with appropriate resources  12/28/2023 1128 by Eula Floyd, RN  Outcome: Not Progressing  12/28/2023 0040 by Juan Heredia, RN  Outcome: Not Progressing     Problem: Coping  Goal: Pt/Family able to verbalize concerns and demonstrate effective coping strategies  Description: INTERVENTIONS:  1. Assist patient/family to identify coping skills, available support systems and cultural and spiritual values  2. Provide emotional support, including active listening and acknowledgement of concerns of patient and caregivers  3. Reduce environmental stimuli, as able  4. Instruct patient/family in relaxation techniques, as appropriate  5. Assess for spiritual pain/suffering and initiate Spiritual Care, Psychosocial Clinical Specialist consults  tried or would not be effective before applying the restraint  2. Evaluate the patient's condition at the time of restraint application  3. Inform patient/family regarding the reason for restraint  4. Q2H: Monitor safety, psychosocial status, comfort, nutrition and hydration  Outcome: Not Progressing

## 2023-12-29 ENCOUNTER — APPOINTMENT (OUTPATIENT)
Facility: HOSPITAL | Age: 53
DRG: 885 | End: 2023-12-29
Attending: INTERNAL MEDICINE
Payer: MEDICARE

## 2023-12-29 PROBLEM — Z74.09 IMMOBILITY: Status: ACTIVE | Noted: 2023-12-29

## 2023-12-29 LAB — ECHO BSA: 2.03 M2

## 2023-12-29 PROCEDURE — 93970 EXTREMITY STUDY: CPT

## 2023-12-29 PROCEDURE — 6360000002 HC RX W HCPCS: Performed by: PSYCHIATRY & NEUROLOGY

## 2023-12-29 PROCEDURE — 2580000003 HC RX 258: Performed by: PSYCHIATRY & NEUROLOGY

## 2023-12-29 PROCEDURE — 6360000002 HC RX W HCPCS: Performed by: INTERNAL MEDICINE

## 2023-12-29 PROCEDURE — 1240000000 HC EMOTIONAL WELLNESS R&B

## 2023-12-29 PROCEDURE — 93970 EXTREMITY STUDY: CPT | Performed by: INTERNAL MEDICINE

## 2023-12-29 RX ORDER — CASTOR OIL AND BALSAM, PERU 788; 87 MG/G; MG/G
OINTMENT TOPICAL 2 TIMES DAILY
Status: DISCONTINUED | OUTPATIENT
Start: 2023-12-30 | End: 2024-01-08 | Stop reason: HOSPADM

## 2023-12-29 RX ORDER — ENOXAPARIN SODIUM 100 MG/ML
40 INJECTION SUBCUTANEOUS DAILY
Status: DISCONTINUED | OUTPATIENT
Start: 2023-12-29 | End: 2024-01-08 | Stop reason: HOSPADM

## 2023-12-29 RX ADMIN — MIDAZOLAM HYDROCHLORIDE 2 MG: 1 INJECTION, SOLUTION INTRAMUSCULAR; INTRAVENOUS at 21:48

## 2023-12-29 RX ADMIN — ENOXAPARIN SODIUM 40 MG: 100 INJECTION SUBCUTANEOUS at 15:40

## 2023-12-29 RX ADMIN — MIDAZOLAM HYDROCHLORIDE 2 MG: 1 INJECTION, SOLUTION INTRAMUSCULAR; INTRAVENOUS at 15:39

## 2023-12-29 RX ADMIN — ZIPRASIDONE MESYLATE 20 MG: 20 INJECTION, POWDER, LYOPHILIZED, FOR SOLUTION INTRAMUSCULAR at 09:15

## 2023-12-29 RX ADMIN — MIDAZOLAM HYDROCHLORIDE 2 MG: 1 INJECTION, SOLUTION INTRAMUSCULAR; INTRAVENOUS at 09:14

## 2023-12-29 ASSESSMENT — PAIN SCALES - WONG BAKER: WONGBAKER_NUMERICALRESPONSE: 0

## 2023-12-29 ASSESSMENT — PAIN SCALES - GENERAL
PAINLEVEL_OUTOF10: 0

## 2023-12-29 NOTE — PROGRESS NOTES
Spiritual Care Assessment/Progress Note  [facility]    Name: Roge Cowan MRN: [id number]    Age: 53 y.o.     Sex: male   Language: English     Date: 12/29/2023            Total Time Calculated: 20 min              Spiritual Assessment begun in [facility]  Service Provided For:: Patient  Referral/Consult From:: Nurse  Encounter Overview/Reason : Initial Encounter, Spiritual/Emotional Needs, Behavioral Health    Spiritual beliefs:      [] Involved in a kel[place] tradition/spiritual practice:      [] Supported by a k[place] community:      [] Claims no spiritual orientation:      [] Seeking spiritual identity:           [] Adheres to an individual form of spirituality:      [] Not able to assess:                Identified resources for coping and support system:           [] Prayer                  [] Devotional reading               [] Music                  [] Guided Imagery     [] Pet visits                                        [] Other: (COMMENT)     Specific area/focus of visit   Encounter:    Crisis:    Spiritual/Emotional needs: Type: Spiritual Support  Ritual, Rites and Sacraments:    Grief, Loss, and Adjustments:    Ethics/Mediation:    Behavioral Health: Type : Initial Encounter  Palliative Care:    Advance Care Planning:      Plan/Referrals: Continue Support (comment) (Advised of  availability)    Narrative: Discussed case with staff and responded to referral in the Behavioral Health Unit. Introduced myself and offered spiritual care and emotional support. Patient did not respond to any questions or engage with . Advised of  availability.  Reviewed chart.  Rev. Echo Tang MDiv,

## 2023-12-29 NOTE — CONSULTS
I went and evaluated patient.  Patient did not talk or answer questions and remained in crouched position on floor and it was very difficult to even examine the patient's lower extremities as patient did not cooperate at all.    Detailed review of system is unobtainable    Past Medical History:   Diagnosis Date    Psychiatric disorder     Psychotic disorder (HCC)     Withdrawal syndrome (HCC)         Past Surgical History:   Procedure Laterality Date    ELECTROCONVULSIVE THERAPY N/A 12/12/2023    ELECTROCONVULSIVE THERAPY performed by Lety Larson MD at [facility] MAIN OR    ELECTROCONVULSIVE THERAPY N/A 12/13/2023    ELECTROCONVULSIVE THERAPY performed by Lety Larson MD at [facility] MAIN OR    ELECTROCONVULSIVE THERAPY N/A 12/15/2023    ELECTROCONVULSIVE THERAPY performed by Lety Larson MD at [facility] MAIN OR    ELECTROCONVULSIVE THERAPY  12/18/2023    ELECTROCONVULSIVE THERAPY N/A 12/18/2023    ELECTROCONVULSIVE THERAPY performed by Lety Larson MD at [facility] MAIN OR       Social History     Tobacco Use    Smoking status: Some Days    Smokeless tobacco: Never   Substance Use Topics    Alcohol use: Never        Family History   Adopted: Yes     Allergies   Allergen Reactions    Fluphenazine Other (See Comments)    Penicillins Other (See Comments)        Prior to Admission medications    Medication Sig Start Date End Date Taking? Authorizing Provider   cloZAPine (FAZACLO) 25 MG disintegrating tablet Take 1 tablet by mouth 2 times daily Take with 200 mg tablet for total dose of 225 mg   Yes Jaya Donald MD   cloZAPine (FAZACLO) 200 MG TBDP disintegrating tablet Take 1 tablet by mouth 2 times daily Take with 25 mg tablet for total dose of 225 mg   Yes Jaya Donald MD   LORazepam (ATIVAN) 1 MG tablet Take 1 tablet by mouth in the morning and at bedtime.   Yes Jaya Donald MD   topiramate (TOPAMAX) 100 MG tablet Take 1 tablet by mouth 2 times daily   Yes Jaya Donald MD   traZODone (DESYREL)  MD Lizzy    Procedures: see electronic medical records for all procedures/Xrays and details which were not copied into this note but were reviewed prior to creation of Plan.    LAB DATA REVIEWED:    No results found for this or any previous visit (from the past 24 hour(s)).

## 2023-12-29 NOTE — BH NOTE
Behavioral Health Treatment Team Note     Patient goal(s) for today: pt does not identify goal  Treatment team focus/goals: continue monitor medication, adjust as needed    Progress note: Pt presentation unchanged. Pt presents naked, in fetal position on floor. Pt encouraged to reposition to avoid skin breakdown, pt not receptive. Pt continuing to refuse medication. Pt not able to receive ECT due to not being cooperative during treatments. Nursing trying to find a mat for pt to lay on so his skin does not break down.     LOS:  21  Expected LOS: TBD    Insurance info/prescription coverage:  MEDICARE  Date of last family contact:  Called [facility] 12/11 and [facility] 12/13  Family requesting physician contact today:  No  Discharge plan:  TBD  Guns in the home:  No  Outpatient provider(s):  [facility]     Participating treatment team members: Nba Wilson MSW

## 2023-12-29 NOTE — BH NOTE
Pt assessed by wound care nurse. Stage 2 pressure ulcer noted on R ankle. Covered wound with silicone bandage pad. Pt compliant with bandage application with staff assistance. Safe care report placed regarding pressure ulcer.

## 2023-12-29 NOTE — BH NOTE
Pt was moved into a warmer room due to his refusal to wear clothing. Pads were also placed on the floor to alleviate skin breakdown on pt's knees and ankles. Pt initially did not wish to be on floor pads but agreed with encouragement from physician.     Pt was assessed by hospitalist d/t concern for DVT. Pt required assistance from multiple staff to reposition for assessment. During assessment, it was observed that pt has redness on bilateral knees and ankles and visible breakdown on R ankle. Pt continues to stay in fetal position on his knees throughout the day. Pt refuses to engage or cooperate with staff. Wound care consulted d/t skin breakdown.

## 2023-12-29 NOTE — BH NOTE
Pt received in bedroom in fetal position on the ground, face down on his knees. Pt found to be naked and malodorous, continues to refuse to wear gown or scrubs and does not bathe independently. Pt observed to be hyperventilating but able to control his breathing when approached by staff. Refused VS. Pt encouraged to reposition himself to avoid skin breakdown but pt refused. Pt would not engage with this writer regarding assessment questions. Refused PO medications, court ordered IM alternatives administered. Q15 minute rounds maintained for safety.

## 2023-12-29 NOTE — BH NOTE
Order for bilateral duplex ordered d/t concerns of DVT r/t immobility. Vascular tech came to floor to perform doppler. Multiple staff assisted pt into position for procedure. Pt uncooperative and procedure was cut short. Minimal imaging was possible at this time.    Speaking Coherently

## 2023-12-29 NOTE — BH NOTE
Chief Complaint: mute    Length of Stay: 21 Days    Interval History:  Patient was seen today for rounds in his room. Mr. Cowan was laying on the floor. He received ECT this morning and was sleeping. He did not participate in the interview and did not speak or open his eyes. He appears disheveled. Per staff, he has been taking medications and no side effects have been observed or reported. He is also ambulating to the bathroom and using the facilities appropriately. No acute events reported over night.    12/13: Roge Cowan is doing poorly. Catatonic. He is mute, rigid, no eye contact, unable to follow commands. He is isolative to his room, has not been eating. Poor hygiene. No interaction is possible with him. He had his second ect today. Staff report he's been mute. He has been non adherent with his meds. Court order meds were approved Monday.     12/14 - Roge Cowan reports that he is not paddling and does not need a blanket although it is cool in the room and he was lying on his bed without a shirt on.  Poverty of content of speech with selective responses.  Emaciated and turns away when asked about suicide, homicide, or perceptual changes.  No aggression or violence.  Selectively  interactive and partially aware.  Refuses PO medications and received IM Ativan the past few days with positive effect. Eating very little and sleeping fairly.      12/15 - Roge Cowan received ECT treatments toda and remains selective in his interactions generally responding with body movements, shakes and flapping of a limb (hands or feet).  Nursing reports that he uses the bathroom appropriately and eats some.  Malodrous and emaciated.  No aggression or violence.  Interactive at times.  Limited awareness. Tolerating medications well.  Eating and sleeping fairly.    12/16 - Roge Cowan responds selectively.  Still sleeps on the floor.  Had ECT yesterday.  Moods are good.  Appropriately dressed and groomed.  Denies

## 2023-12-29 NOTE — WOUND CARE
WOUND Note:      Follow up for redness to bilat knees, ankles, feet and elbows     Chart shows:  Admitted on 12/7/23 for Schizoaffective disorder  History of Paranoid schizophrenia, confusion, withdrawal syndrome, psychotic disorder     Assessment:   Patient nonverbal and non interactive, naked and in fetal position on floor pad. Patient developed a Stage 2 on right lateral foot right below malleolus The wound is a deroofed blister, no exudate. The rest of pressure points are blanchable erythema. No s/sx of gross infection. Only able to place a protective dressing over wound. Patient was resistant to being turned.         Wound care recommendations:    Apply Venelex twice daily to all pressure points and cover pressure injury with silicone foam dressing.     Discussed with GAIL Estrella.     Transition of Care: Plan to follow weekly and as needed while admitted to hospital.       Celine Falcon RN, BSN  Wound Care Nurse, [facility]  [phone]

## 2023-12-29 NOTE — BH NOTE
Assumed care of the patient. Patient remained isolative to his room entire shift. Unable to perform and complete shift assessment due to lack of cooperation from patient. Patient would not acknowledge or respond to the staffs. Patient was given IM Midazolam for refusal of his scheduled CO medication. Patient has been non compliant with repositioning every 2 hours order.  Patient continued to remain disrobed in a fetal position, moving in different spots within his room. Patient was  heard making purposeful breathing noises as the door to his room opened, the loud breathing noise was absent when the door remained shut.      0330- Was notified by a BHT that patient was heard breathing loudly in his room, this RN went to assess the patient.  Temperature was WDL, unable to obtain complete sets of vitals, patient is in fetal position and when encouraged starts to get upset. Will continue with q 15 safety checks and hourly nursing rounds.   Patient was restless through out the shift and did not get any sleep.

## 2023-12-30 PROCEDURE — 1240000000 HC EMOTIONAL WELLNESS R&B

## 2023-12-30 PROCEDURE — 6360000002 HC RX W HCPCS: Performed by: INTERNAL MEDICINE

## 2023-12-30 PROCEDURE — 6370000000 HC RX 637 (ALT 250 FOR IP): Performed by: INTERNAL MEDICINE

## 2023-12-30 PROCEDURE — 6360000002 HC RX W HCPCS: Performed by: PSYCHIATRY & NEUROLOGY

## 2023-12-30 PROCEDURE — 2580000003 HC RX 258: Performed by: PSYCHIATRY & NEUROLOGY

## 2023-12-30 RX ADMIN — Medication: at 21:53

## 2023-12-30 RX ADMIN — MIDAZOLAM HYDROCHLORIDE 2 MG: 1 INJECTION, SOLUTION INTRAMUSCULAR; INTRAVENOUS at 20:55

## 2023-12-30 RX ADMIN — ZIPRASIDONE MESYLATE 20 MG: 20 INJECTION, POWDER, LYOPHILIZED, FOR SOLUTION INTRAMUSCULAR at 10:15

## 2023-12-30 RX ADMIN — ENOXAPARIN SODIUM 40 MG: 100 INJECTION SUBCUTANEOUS at 15:21

## 2023-12-30 RX ADMIN — MIDAZOLAM HYDROCHLORIDE 2 MG: 1 INJECTION, SOLUTION INTRAMUSCULAR; INTRAVENOUS at 10:15

## 2023-12-30 RX ADMIN — MIDAZOLAM HYDROCHLORIDE 2 MG: 1 INJECTION, SOLUTION INTRAMUSCULAR; INTRAVENOUS at 15:20

## 2023-12-30 RX ADMIN — Medication: at 10:16

## 2023-12-30 ASSESSMENT — PAIN SCALES - WONG BAKER
WONGBAKER_NUMERICALRESPONSE: 0
WONGBAKER_NUMERICALRESPONSE: 0

## 2023-12-30 ASSESSMENT — PAIN SCALES - GENERAL
PAINLEVEL_OUTOF10: 0
PAINLEVEL_OUTOF10: 0

## 2023-12-30 NOTE — BH NOTE
Chief Complaint: mute    Length of Stay: 22 Days    Interval History:  Patient was seen today for rounds in his room. Mr. Cowan was laying on the floor. He received ECT this morning and was sleeping. He did not participate in the interview and did not speak or open his eyes. He appears disheveled. Per staff, he has been taking medications and no side effects have been observed or reported. He is also ambulating to the bathroom and using the facilities appropriately. No acute events reported over night.    12/13: Roge Cowan is doing poorly. Catatonic. He is mute, rigid, no eye contact, unable to follow commands. He is isolative to his room, has not been eating. Poor hygiene. No interaction is possible with him. He had his second ect today. Staff report he's been mute. He has been non adherent with his meds. Court order meds were approved Monday.     12/14 - Roge Cowan reports that he is not paddling and does not need a blanket although it is cool in the room and he was lying on his bed without a shirt on.  Poverty of content of speech with selective responses.  Emaciated and turns away when asked about suicide, homicide, or perceptual changes.  No aggression or violence.  Selectively  interactive and partially aware.  Refuses PO medications and received IM Ativan the past few days with positive effect. Eating very little and sleeping fairly.      12/15 - Roge Cowan received ECT treatments toda and remains selective in his interactions generally responding with body movements, shakes and flapping of a limb (hands or feet).  Nursing reports that he uses the bathroom appropriately and eats some.  Malodrous and emaciated.  No aggression or violence.  Interactive at times.  Limited awareness. Tolerating medications well.  Eating and sleeping fairly.    12/16 - Roge Cwoan responds selectively.  Still sleeps on the floor.  Had ECT yesterday.  Moods are good.  Appropriately dressed and groomed.  Denies  that services furnished were intensive treatment services, admission or related services, or equivalent services.

## 2023-12-30 NOTE — PLAN OF CARE
1900- I assume care of the patient. This is a 53 year old male being seen for catatonia and schizoaffective disorder. Right now he is lying on the gray foam mat naked on his left side and is not interactive with staff. He idid not participate in the interview with this writer and he did not speak or open his eyes. He appears to not interact with staff at this time. His dinner tray is sitting in the room for him to eat and drink. Will continue to monitor the patient every hour for behavior, toilet ing, grooming, safety and location. The staff continues to monitor the patient every 15 minutes.    2000- The patient turned to his right side and is lying on the gray foam mat. He is still not interacting with staff. He is monitored for safety and his behavior every hour.    2100- The patient had entered the bathroom and refused to leave, he is not responding to staff. He is on his knees in a kneeling position and refuse to get off the floor in the bathroom and he is crouched over on his knees and continues to be naked on the floor. He is refusing to get off the floor by squeezing both of his arms together and kneeling with his face to the floor with resistance. This writer with another staff had to carry him by his arms and place him on the gray foam mat. He refuse to take his scheduled medications but he ate his dinner tray and drank the tea and juice from the tray. The patient received the court ordered Versed IM. He will be continuously monitored by this writer and staff.    2200- The patient is kneeling, crouched over by the door naked. He refused to talk to staff and he is nonverbal with rocking back and forth on his knees. He seems to be not in pain but doesn't respond to any questions being asked.     2400- The patient is now by his mattress and still not responding to staff. He is rocking back and forth on his knees. He doesn't want to talk or respond to staff. There has been no interactions with the patient this

## 2023-12-30 NOTE — PLAN OF CARE
Problem: Coping  Goal: Pt/Family able to verbalize concerns and demonstrate effective coping strategies  Description: INTERVENTIONS:  1. Assist patient/family to identify coping skills, available support systems and cultural and spiritual values  2. Provide emotional support, including active listening and acknowledgement of concerns of patient and caregivers  3. Reduce environmental stimuli, as able  4. Instruct patient/family in relaxation techniques, as appropriate  5. Assess for spiritual pain/suffering and initiate Spiritual Care, Psychosocial Clinical Specialist consults as needed  Outcome: Not Progressing     Problem: Confusion  Goal: Confusion, delirium, dementia, or psychosis is improved or at baseline  Description: INTERVENTIONS:  1. Assess for possible contributors to thought disturbance, including medications, impaired vision or hearing, underlying metabolic abnormalities, dehydration, psychiatric diagnoses, and notify attending LIP  2. [place] high risk fall precautions, as indicated  3. Provide frequent short contacts to provide reality reorientation, refocusing and direction  4. Decrease environmental stimuli, including noise as appropriate  5. Monitor and intervene to maintain adequate nutrition, hydration, elimination, sleep and activity  6. If unable to ensure safety without constant attention obtain sitter and review sitter guidelines with assigned personnel  7. Initiate Psychosocial CNS and Spiritual Care consult, as indicated  12/30/2023 0100 by Vicenta Bean, RN  Outcome: Not Progressing        Problem: Safety - Adult  Goal: Free from fall injury  12/30/2023 0857 by Lois Mckay, RN  Outcome: Progressing     Morning assessment complete. Patient was encountered in his room.  He is not able to participate in his care. He has been alternating being in the mattress lying on his left side in a fetal position or on the grey rubber mat in a kneeling, crouching position with his face

## 2023-12-31 PROCEDURE — 6360000002 HC RX W HCPCS: Performed by: PSYCHIATRY & NEUROLOGY

## 2023-12-31 PROCEDURE — 2580000003 HC RX 258: Performed by: PSYCHIATRY & NEUROLOGY

## 2023-12-31 PROCEDURE — 1240000000 HC EMOTIONAL WELLNESS R&B

## 2023-12-31 PROCEDURE — 6360000002 HC RX W HCPCS: Performed by: INTERNAL MEDICINE

## 2023-12-31 RX ADMIN — MIDAZOLAM HYDROCHLORIDE 2 MG: 1 INJECTION, SOLUTION INTRAMUSCULAR; INTRAVENOUS at 10:18

## 2023-12-31 RX ADMIN — MIDAZOLAM HYDROCHLORIDE 2 MG: 1 INJECTION, SOLUTION INTRAMUSCULAR; INTRAVENOUS at 21:05

## 2023-12-31 RX ADMIN — ENOXAPARIN SODIUM 40 MG: 100 INJECTION SUBCUTANEOUS at 14:26

## 2023-12-31 RX ADMIN — MIDAZOLAM HYDROCHLORIDE 2 MG: 1 INJECTION, SOLUTION INTRAMUSCULAR; INTRAVENOUS at 14:26

## 2023-12-31 RX ADMIN — ZIPRASIDONE MESYLATE 20 MG: 20 INJECTION, POWDER, LYOPHILIZED, FOR SOLUTION INTRAMUSCULAR at 10:18

## 2023-12-31 RX ADMIN — LORAZEPAM 2 MG: 2 INJECTION INTRAMUSCULAR; INTRAVENOUS at 00:58

## 2023-12-31 ASSESSMENT — PAIN SCALES - WONG BAKER
WONGBAKER_NUMERICALRESPONSE: 0
WONGBAKER_NUMERICALRESPONSE: 0

## 2023-12-31 ASSESSMENT — LIFESTYLE VARIABLES
HOW OFTEN DO YOU HAVE A DRINK CONTAINING ALCOHOL: NEVER
HOW MANY STANDARD DRINKS CONTAINING ALCOHOL DO YOU HAVE ON A TYPICAL DAY: PATIENT DOES NOT DRINK

## 2023-12-31 ASSESSMENT — SLEEP AND FATIGUE QUESTIONNAIRES
AVERAGE NUMBER OF SLEEP HOURS: 4
DO YOU USE A SLEEP AID: NO
DO YOU HAVE DIFFICULTY SLEEPING: YES

## 2023-12-31 ASSESSMENT — PAIN SCALES - GENERAL
PAINLEVEL_OUTOF10: 0
PAINLEVEL_OUTOF10: 0

## 2023-12-31 NOTE — PLAN OF CARE
Problem: Discharge Planning  Goal: Discharge to home or other facility with appropriate resources  12/31/2023 1105 by Shaye Lozano RN  Outcome: Not Progressing  12/31/2023 0041 by Juan Heredia RN  Outcome: Not Progressing     Problem: Coping  Goal: Pt/Family able to verbalize concerns and demonstrate effective coping strategies  Description: INTERVENTIONS:  1. Assist patient/family to identify coping skills, available support systems and cultural and spiritual values  2. Provide emotional support, including active listening and acknowledgement of concerns of patient and caregivers  3. Reduce environmental stimuli, as able  4. Instruct patient/family in relaxation techniques, as appropriate  5. Assess for spiritual pain/suffering and initiate Spiritual Care, Psychosocial Clinical Specialist consults as needed  12/31/2023 0041 by Juan Heredia RN  Outcome: Not Progressing     Problem: Confusion  Goal: Confusion, delirium, dementia, or psychosis is improved or at baseline  Description: INTERVENTIONS:  1. Assess for possible contributors to thought disturbance, including medications, impaired vision or hearing, underlying metabolic abnormalities, dehydration, psychiatric diagnoses, and notify attending LIP  2. [place] high risk fall precautions, as indicated  3. Provide frequent short contacts to provide reality reorientation, refocusing and direction  4. Decrease environmental stimuli, including noise as appropriate  5. Monitor and intervene to maintain adequate nutrition, hydration, elimination, sleep and activity  6. If unable to ensure safety without constant attention obtain sitter and review sitter guidelines with assigned personnel  7. Initiate Psychosocial CNS and Spiritual Care consult, as indicated  12/31/2023 0041 by Juan Heredia RN  Outcome: Not Progressing     Problem: Skin/Tissue Integrity  Goal: Absence of new skin breakdown  Description: 1.  Monitor for areas of redness and/or skin

## 2023-12-31 NOTE — BH NOTE
Pt was offered 2pm medications, refused PO but was mostly cooperative with IM alternatives with minimal staff assistance. When staff entered pt's room he was sitting upright on his mattress, but immediately returned to a fetal/kneeling position when staff entered. Open areas of skin breakdown noted on ankles, pt continues to be uncooperative with barrier cream application.

## 2023-12-31 NOTE — BH NOTE
0020- The patient is very difficult with turning and repositioning while urinating on himself. He has urinated on the area where there is not a foam mat. It was very difficult to get him cleaned up because of him being in a kneeling positioned. Right now he is kneeling in his urine. And he removed the protective bandages on his bony prominences that are red on bilateral feet, ankles, and knees. It took a total of five staff members to get him cleaned up and to move him and clean up the urine. This writer managed to go on the 2nd floor to get protective ointment, urinal and help from other staff and nursing supervisor to clean him.     0054- The patient is found sitting in a kneeling position with labored breathing. He is given Ativan 2 mg IM. Will continue to monitor the patient for incontinence, behavior, location and prevention of wounds with turning and reposition.

## 2023-12-31 NOTE — BH NOTE
Night  assessment complete.    Patient was encountered in his in a kneeling/fetal position. He was not cooperative and resist treatment. Sore was seen on the lateral side of the right feet, and  reddness on the pressure area hence Wound and pressure area was dried and covered with optifoam dressing. However, patient removed all the optinum dressing.Patient is not in any obvious painful distress. PRN IM Ativan 2mg was administered at 0058 for catatonic.  Eye contact is fair. Patient did not sleep through out the night.Mood is noted to be depressed with constricted affect. At 1130 , patient micturated in a drinking cup (Urine was concentrated),on the floor ,bed was also wet with urine.Patient  was kneeling on the urine. Refused V/S.Patient  was non verbal to assessment questions.Patient is not med and meal compliant hence court order Injection was given  There are no visible side effects from medications noted. C-SSRS level is low risk.Hourly rounds are being completed to assure patient safety and attend to care needs.Monitoring will continue for safety reason

## 2023-12-31 NOTE — BH NOTE
Pt received in his bedroom on the floor. Pt continues to maintain fetal/kneeling position, unclothed, and refusing to cover himself with a blanket. Pt refused to engage with this writer when asked assessment questions. Pt seems to be responding to internal stimuli. Refused PO medications and VS.  Attempted to reposition pt with assist from another RN and pt refused to cooperate or allow repositioning. Attempted to apply barrier cream to knees and pt refused, would not cooperate with application. Court ordered IM medications administered. Remains isolative, withdrawn, guarded, and in fetal position on the floor (floor covered in foam padding). Q15 minute rounds maintained for safety.

## 2023-12-31 NOTE — BH NOTE
Chief Complaint: mute    Length of Stay: 23 Days    Interval History:  Patient was seen today for rounds in his room. Mr. Cowan was laying on the floor. He received ECT this morning and was sleeping. He did not participate in the interview and did not speak or open his eyes. He appears disheveled. Per staff, he has been taking medications and no side effects have been observed or reported. He is also ambulating to the bathroom and using the facilities appropriately. No acute events reported over night.    12/13: Roge Cowan is doing poorly. Catatonic. He is mute, rigid, no eye contact, unable to follow commands. He is isolative to his room, has not been eating. Poor hygiene. No interaction is possible with him. He had his second ect today. Staff report he's been mute. He has been non adherent with his meds. Court order meds were approved Monday.     12/14 - Roge Cowan reports that he is not paddling and does not need a blanket although it is cool in the room and he was lying on his bed without a shirt on.  Poverty of content of speech with selective responses.  Emaciated and turns away when asked about suicide, homicide, or perceptual changes.  No aggression or violence.  Selectively  interactive and partially aware.  Refuses PO medications and received IM Ativan the past few days with positive effect. Eating very little and sleeping fairly.      12/15 - Roge Cowan received ECT treatments toda and remains selective in his interactions generally responding with body movements, shakes and flapping of a limb (hands or feet).  Nursing reports that he uses the bathroom appropriately and eats some.  Malodrous and emaciated.  No aggression or violence.  Interactive at times.  Limited awareness. Tolerating medications well.  Eating and sleeping fairly.    12/16 - Roge Cowan responds selectively.  Still sleeps on the floor.  Had ECT yesterday.  Moods are good.  Appropriately dressed and groomed.  Denies

## 2023-12-31 NOTE — PLAN OF CARE
Problem: Discharge Planning  Goal: Discharge to home or other facility with appropriate resources  Outcome: Not Progressing     Problem: Coping  Goal: Pt/Family able to verbalize concerns and demonstrate effective coping strategies  Description: INTERVENTIONS:  1. Assist patient/family to identify coping skills, available support systems and cultural and spiritual values  2. Provide emotional support, including active listening and acknowledgement of concerns of patient and caregivers  3. Reduce environmental stimuli, as able  4. Instruct patient/family in relaxation techniques, as appropriate  5. Assess for spiritual pain/suffering and initiate Spiritual Care, Psychosocial Clinical Specialist consults as needed  Outcome: Not Progressing     Problem: Confusion  Goal: Confusion, delirium, dementia, or psychosis is improved or at baseline  Description: INTERVENTIONS:  1. Assess for possible contributors to thought disturbance, including medications, impaired vision or hearing, underlying metabolic abnormalities, dehydration, psychiatric diagnoses, and notify attending LIP  2. [place] high risk fall precautions, as indicated  3. Provide frequent short contacts to provide reality reorientation, refocusing and direction  4. Decrease environmental stimuli, including noise as appropriate  5. Monitor and intervene to maintain adequate nutrition, hydration, elimination, sleep and activity  6. If unable to ensure safety without constant attention obtain sitter and review sitter guidelines with assigned personnel  7. Initiate Psychosocial CNS and Spiritual Care consult, as indicated  Outcome: Not Progressing

## 2024-01-01 PROCEDURE — 2580000003 HC RX 258: Performed by: PSYCHIATRY & NEUROLOGY

## 2024-01-01 PROCEDURE — 6360000002 HC RX W HCPCS: Performed by: INTERNAL MEDICINE

## 2024-01-01 PROCEDURE — 6360000002 HC RX W HCPCS: Performed by: PSYCHIATRY & NEUROLOGY

## 2024-01-01 PROCEDURE — 1240000000 HC EMOTIONAL WELLNESS R&B

## 2024-01-01 PROCEDURE — 99233 SBSQ HOSP IP/OBS HIGH 50: CPT | Performed by: PSYCHIATRY & NEUROLOGY

## 2024-01-01 RX ADMIN — MIDAZOLAM HYDROCHLORIDE 2 MG: 1 INJECTION, SOLUTION INTRAMUSCULAR; INTRAVENOUS at 23:19

## 2024-01-01 RX ADMIN — MIDAZOLAM HYDROCHLORIDE 2 MG: 1 INJECTION, SOLUTION INTRAMUSCULAR; INTRAVENOUS at 17:01

## 2024-01-01 RX ADMIN — ENOXAPARIN SODIUM 40 MG: 100 INJECTION SUBCUTANEOUS at 17:01

## 2024-01-01 RX ADMIN — ZIPRASIDONE MESYLATE 20 MG: 20 INJECTION, POWDER, LYOPHILIZED, FOR SOLUTION INTRAMUSCULAR at 10:38

## 2024-01-01 RX ADMIN — MIDAZOLAM HYDROCHLORIDE 2 MG: 1 INJECTION, SOLUTION INTRAMUSCULAR; INTRAVENOUS at 10:38

## 2024-01-01 ASSESSMENT — PAIN SCALES - GENERAL
PAINLEVEL_OUTOF10: 0
PAINLEVEL_OUTOF10: 0

## 2024-01-01 ASSESSMENT — PAIN SCALES - WONG BAKER: WONGBAKER_NUMERICALRESPONSE: 0

## 2024-01-01 NOTE — PLAN OF CARE
Problem: Safety - Adult  Goal: Free from fall injury  Outcome: Progressing     Morning assessment complete. Patient was encountered in his room.  He is not able to participate in his care. He has been alternating being in the mattress lying on his left side in a fetal position or on the grey rubber mat in a kneeling, crouching position with his face down. He is uncooperative with position changes.  NPVS used to assess pain. Patient scored a 0.  Eye contact is noted to be poor.   Mood is noted to be terrified. Affect is expressionless.   Patient has been isolative to his room. Patient observed to be breathing fast but he has been Skin warm and dry.   Patient is meal compliant and has a good appetite. He is not taking his oral medications and have to be given IM alternative. There are no untoward side effects from medications to note.      Q 15 minutes checks are being completed to assure patient safety and attend to care needs. Monitoring will continue for changes in patient condition.

## 2024-01-01 NOTE — PROGRESS NOTES
Pt was noted in his room unclothed and in a kneeling/fetal position. Skin breakdown noted bilateral ankles. He was not cooperative with application barrier cream. He would not answer assessment questions. He continued to stiffen when staff attempted to provide any care. He was resistive to having his VS measured and would not accept his scheduled meds PO. He received IM Versed for refusal per court order.     2230 pt in same position on bedroom floor. Pt continues to resist interventions in place to prevent further skin breakdown.     2300 pt noted in the bathroom lying in the same above mentioned position. Pt not receptive to assistance.    0240 pt back in bathroom in kneeling/fetal position. Pt scooted closer toward corner of room when approached by staff.    0515 Pt was noted resting in bed, supine position. When pt noticed his door opening, he quickly rolled off his mattress unto the osorio on the floor and curled up again in the fetal position. No incontinence noted.    0630 pt appeared to have only slept 1 hour during the night. Monitoring for safety and behaviors continues.

## 2024-01-02 PROCEDURE — 6360000002 HC RX W HCPCS: Performed by: INTERNAL MEDICINE

## 2024-01-02 PROCEDURE — 2580000003 HC RX 258: Performed by: PSYCHIATRY & NEUROLOGY

## 2024-01-02 PROCEDURE — 1240000000 HC EMOTIONAL WELLNESS R&B

## 2024-01-02 PROCEDURE — 6370000000 HC RX 637 (ALT 250 FOR IP): Performed by: INTERNAL MEDICINE

## 2024-01-02 PROCEDURE — 6360000002 HC RX W HCPCS: Performed by: PSYCHIATRY & NEUROLOGY

## 2024-01-02 PROCEDURE — 6370000000 HC RX 637 (ALT 250 FOR IP): Performed by: PSYCHIATRY & NEUROLOGY

## 2024-01-02 RX ADMIN — MIDAZOLAM HYDROCHLORIDE 2 MG: 1 INJECTION, SOLUTION INTRAMUSCULAR; INTRAVENOUS at 15:22

## 2024-01-02 RX ADMIN — ENOXAPARIN SODIUM 40 MG: 100 INJECTION SUBCUTANEOUS at 15:13

## 2024-01-02 RX ADMIN — Medication: at 21:57

## 2024-01-02 RX ADMIN — ZIPRASIDONE MESYLATE 20 MG: 20 INJECTION, POWDER, LYOPHILIZED, FOR SOLUTION INTRAMUSCULAR at 10:36

## 2024-01-02 RX ADMIN — MIDAZOLAM HYDROCHLORIDE 2 MG: 1 INJECTION, SOLUTION INTRAMUSCULAR; INTRAVENOUS at 10:19

## 2024-01-02 RX ADMIN — MIDAZOLAM HYDROCHLORIDE 2 MG: 1 INJECTION, SOLUTION INTRAMUSCULAR; INTRAVENOUS at 21:48

## 2024-01-02 ASSESSMENT — PAIN SCALES - GENERAL
PAINLEVEL_OUTOF10: 0
PAINLEVEL_OUTOF10: 0

## 2024-01-02 ASSESSMENT — PAIN SCALES - WONG BAKER: WONGBAKER_NUMERICALRESPONSE: 0

## 2024-01-02 NOTE — PROGRESS NOTES
Patient received crouching on the floor pads in a fetal position.  Patient got up himself and showered.  Patient uncooperative with court ordered po medications and injections were administered.  Mid afternoon patient moved from the floor pads onto his bed where he is currently lying in a fetal position on his left side.  Will continue to monitor for safety.

## 2024-01-02 NOTE — PROGRESS NOTES
Type and Reason for Visit:  Follow-Up     Nutrition Recommendations/Plan:   Diet as tolerated  Double portions ordered.  Supplements added to meal trays            Nutrition Assessment:    Pt admitted to [facility].  Hx notable for tobacco abuse.  Per chart pt is noted to be eating well. Stays on floor in fetal position most of the time; doesn't respond to questions.  Meal trays offered in his room.  Small wounds and excoriation noted on ankles.     Nutrition Related Findings:    Pt tolerating diet Wound Type: None        Current Nutrition Intake & Therapies:    Average Meal Intake: %  Average Supplements Intake: 50-75%  High protein high calorie supplement all meal trays     Anthropometric Measures:  Height: 182.9 cm (6')  Ideal Body Weight (IBW): 178 lbs (81 kg)    Current Body Weight: 80.7 kg (178 lb), 100 % IBW. Weight Source: Standing Scale  Current BMI (kg/m2): 24.1  Weight Adjustment For: No Adjustment  BMI Categories: Normal Weight (BMI 18.5-24.9)     Estimated Daily Nutrient Needs:  Energy Requirements Based On: Formula  Weight Used for Energy Requirements: Current  Energy (kcal/day): 2600  Weight Used for Protein Requirements: Current  Protein (g/day): 105  Method Used for Fluid Requirements: 1 ml/kcal  Fluid (ml/day): 2600     Nutrition Diagnosis:   No nutrition diagnosis at this time related to   as evidenced by       Nutrition Interventions:   Food and/or Nutrient Delivery: Continue Current Diet, Start Oral Nutrition Supplement  Nutrition Education/Counseling: No recommendation at this time  Coordination of Nutrition Care: No recommendation at this time     Goals:  Previous Goal Met: Progressing toward Goal(s)  Goals: Meet at least 75% of estimated needs, prior to discharge.  Previous goal met.     Nutrition Monitoring and Evaluation:   Behavioral-Environmental Outcomes: None Identified  Food/Nutrient Intake Outcomes: None Identified  Physical Signs/Symptoms Outcomes: None Identified     Discharge

## 2024-01-02 NOTE — GROUP NOTE
Group Therapy Note    Date: 1/2/2024    Group Start Time: 1500  Group End Time: 1600  Group Topic: Recreational    [facility] ACUTE BEHAV [facility]    Janessa Beverly        Group Therapy Note           Patient's Goal:  To concentrate on selected task    Notes:  Pt did not attend session    Discipline Responsible: Recreational Therapist    Signature:  JANESSA BEVERLY

## 2024-01-02 NOTE — GROUP NOTE
Group Therapy Note    Date: 1/2/2024    Group Start Time: 1100  Group End Time: 1200  Group Topic: Topic Group    [facility] 3 ACUTE BE[facility]    Janessa Beverly        Group Therapy Note           Patient's Goal:  To participate in relaxation activity    Notes:  Pt did not attend session    Discipline Responsible: Recreational Therapist      Signature:  JANESSA BEVERLY

## 2024-01-02 NOTE — BH NOTE
Chief Complaint: mute    Length of Stay: 25 Days    Interval History:  Patient was seen today for rounds in his room. Mr. Cowan was laying on the floor. He received ECT this morning and was sleeping. He did not participate in the interview and did not speak or open his eyes. He appears disheveled. Per staff, he has been taking medications and no side effects have been observed or reported. He is also ambulating to the bathroom and using the facilities appropriately. No acute events reported over night.    12/13: Roge Cowan is doing poorly. Catatonic. He is mute, rigid, no eye contact, unable to follow commands. He is isolative to his room, has not been eating. Poor hygiene. No interaction is possible with him. He had his second ect today. Staff report he's been mute. He has been non adherent with his meds. Court order meds were approved Monday.     12/14 - Roge Cowan reports that he is not paddling and does not need a blanket although it is cool in the room and he was lying on his bed without a shirt on.  Poverty of content of speech with selective responses.  Emaciated and turns away when asked about suicide, homicide, or perceptual changes.  No aggression or violence.  Selectively  interactive and partially aware.  Refuses PO medications and received IM Ativan the past few days with positive effect. Eating very little and sleeping fairly.      12/15 - Roge Cowan received ECT treatments toda and remains selective in his interactions generally responding with body movements, shakes and flapping of a limb (hands or feet).  Nursing reports that he uses the bathroom appropriately and eats some.  Malodrous and emaciated.  No aggression or violence.  Interactive at times.  Limited awareness. Tolerating medications well.  Eating and sleeping fairly.    12/16 - Roge Cowan responds selectively.  Still sleeps on the floor.  Had ECT yesterday.  Moods are good.  Appropriately dressed and groomed.  Denies

## 2024-01-02 NOTE — PLAN OF CARE
Problem: ABCDS Injury Assessment  Goal: Absence of physical injury  Outcome: Progressing     Problem: Safety - Adult  Goal: Free from fall injury  Outcome: Progressing     Problem: Coping  Goal: Pt/Family able to verbalize concerns and demonstrate effective coping strategies  Description: INTERVENTIONS:  1. Assist patient/family to identify coping skills, available support systems and cultural and spiritual values  2. Provide emotional support, including active listening and acknowledgement of concerns of patient and caregivers  3. Reduce environmental stimuli, as able  4. Instruct patient/family in relaxation techniques, as appropriate  5. Assess for spiritual pain/suffering and initiate Spiritual Care, Psychosocial Clinical Specialist consults as needed  Outcome: Not Progressing

## 2024-01-02 NOTE — BH NOTE
Chief Complaint: mute    Length of Stay: 24 Days    Interval History:  Patient was seen today for rounds in his room. Mr. Cowan was laying on the floor. He received ECT this morning and was sleeping. He did not participate in the interview and did not speak or open his eyes. He appears disheveled. Per staff, he has been taking medications and no side effects have been observed or reported. He is also ambulating to the bathroom and using the facilities appropriately. No acute events reported over night.    12/13: Roge Cowan is doing poorly. Catatonic. He is mute, rigid, no eye contact, unable to follow commands. He is isolative to his room, has not been eating. Poor hygiene. No interaction is possible with him. He had his second ect today. Staff report he's been mute. He has been non adherent with his meds. Court order meds were approved Monday.     12/14 - Roge Cowan reports that he is not paddling and does not need a blanket although it is cool in the room and he was lying on his bed without a shirt on.  Poverty of content of speech with selective responses.  Emaciated and turns away when asked about suicide, homicide, or perceptual changes.  No aggression or violence.  Selectively  interactive and partially aware.  Refuses PO medications and received IM Ativan the past few days with positive effect. Eating very little and sleeping fairly.      12/15 - Roge Cowan received ECT treatments toda and remains selective in his interactions generally responding with body movements, shakes and flapping of a limb (hands or feet).  Nursing reports that he uses the bathroom appropriately and eats some.  Malodrous and emaciated.  No aggression or violence.  Interactive at times.  Limited awareness. Tolerating medications well.  Eating and sleeping fairly.    12/16 - Roge Cowan responds selectively.  Still sleeps on the floor.  Had ECT yesterday.  Moods are good.  Appropriately dressed and groomed.  Denies  therapy.   Strengths include taking medications           Continue the medication regimen as prescribed  Disposition planning to continue.   A coordinated, multidisplinary treatment team round was conducted with the patient, nurses, pharmcist,  and writer present. Discussions held with , and/or with family members; Complete current electronic health record for patient was reviewed in full including consultant notes, ancillary staff notes, nurses and tech notes, labs and vitals.  I certify that this patients inpatient psychiatric hospital services furnished since the previous certification were, and continue to be, required for treatment that could reasonably be expected to improve the patient's condition, or for diagnostic study, and that the patient continues to need, on a daily basis, active treatment furnished directly by or requiring the supervision of inpatient psychiatric facility personnel. In addition, the hospital records show that services furnished were intensive treatment services, admission or related services, or equivalent services.

## 2024-01-02 NOTE — BH NOTE
Assumed care of the patient. Patient was isolative to his room. He was observed laying down on the mat which was on the floor. Patient was not wearing any clothes. He did not acknowledge this RN and did not show any interest to interact.     2015- Patient laying down in fetal position on the floor.Remains non verbal when spoken to. RR regular. Non - acceptance when staffs tried to reposition.     2215- Patient was observed sitting up, as soon as he realized staff was at opening the door, he went back to fetal position. Room was tidied up. Snacks kept on the side of the patient and made him aware.       1230 Patient lying in fetal position. Non acceptance when staff encouraged to change position.     0230 Patient appeared to be sleeping, on the mattress. Fetal position . RR regular.     0430 Patient asleep at this time, will attempt to reposition when awake.     0600 Patient was found incontinent of urine this morning. Attempted to clean up patient this morning, patient was uncooperative and non receptive . Staffs were able to put a bed sheet under the patient body part which came in contact with urine to prevent skin breakdown.  Purposeful hyperventilation was less frequent this shift.    Patient appeared to be sleeping for just 2 hours.

## 2024-01-03 PROCEDURE — 6360000002 HC RX W HCPCS: Performed by: PSYCHIATRY & NEUROLOGY

## 2024-01-03 PROCEDURE — 1240000000 HC EMOTIONAL WELLNESS R&B

## 2024-01-03 PROCEDURE — 6360000002 HC RX W HCPCS: Performed by: INTERNAL MEDICINE

## 2024-01-03 PROCEDURE — 2580000003 HC RX 258: Performed by: PSYCHIATRY & NEUROLOGY

## 2024-01-03 RX ADMIN — ZIPRASIDONE MESYLATE 20 MG: 20 INJECTION, POWDER, LYOPHILIZED, FOR SOLUTION INTRAMUSCULAR at 08:41

## 2024-01-03 RX ADMIN — MIDAZOLAM HYDROCHLORIDE 2 MG: 1 INJECTION, SOLUTION INTRAMUSCULAR; INTRAVENOUS at 21:30

## 2024-01-03 RX ADMIN — Medication: at 08:40

## 2024-01-03 RX ADMIN — Medication: at 21:42

## 2024-01-03 RX ADMIN — MIDAZOLAM HYDROCHLORIDE 2 MG: 1 INJECTION, SOLUTION INTRAMUSCULAR; INTRAVENOUS at 15:54

## 2024-01-03 RX ADMIN — ENOXAPARIN SODIUM 40 MG: 100 INJECTION SUBCUTANEOUS at 15:58

## 2024-01-03 RX ADMIN — MIDAZOLAM HYDROCHLORIDE 2 MG: 1 INJECTION, SOLUTION INTRAMUSCULAR; INTRAVENOUS at 08:41

## 2024-01-03 ASSESSMENT — PAIN SCALES - WONG BAKER
WONGBAKER_NUMERICALRESPONSE: 0
WONGBAKER_NUMERICALRESPONSE: 0

## 2024-01-03 ASSESSMENT — PAIN SCALES - GENERAL
PAINLEVEL_OUTOF10: 0
PAINLEVEL_OUTOF10: 0

## 2024-01-03 NOTE — PLAN OF CARE
Problem: Discharge Planning  Goal: Discharge to home or other facility with appropriate resources  Outcome: Not Progressing     Problem: Coping  Goal: Pt/Family able to verbalize concerns and demonstrate effective coping strategies  Description: INTERVENTIONS:  1. Assist patient/family to identify coping skills, available support systems and cultural and spiritual values  2. Provide emotional support, including active listening and acknowledgement of concerns of patient and caregivers  3. Reduce environmental stimuli, as able  4. Instruct patient/family in relaxation techniques, as appropriate  5. Assess for spiritual pain/suffering and initiate Spiritual Care, Psychosocial Clinical Specialist consults as needed  Outcome: Not Progressing     Problem: Confusion  Goal: Confusion, delirium, dementia, or psychosis is improved or at baseline  Description: INTERVENTIONS:  1. Assess for possible contributors to thought disturbance, including medications, impaired vision or hearing, underlying metabolic abnormalities, dehydration, psychiatric diagnoses, and notify attending LIP  2. [place] high risk fall precautions, as indicated  3. Provide frequent short contacts to provide reality reorientation, refocusing and direction  4. Decrease environmental stimuli, including noise as appropriate  5. Monitor and intervene to maintain adequate nutrition, hydration, elimination, sleep and activity  6. If unable to ensure safety without constant attention obtain sitter and review sitter guidelines with assigned personnel  7. Initiate Psychosocial CNS and Spiritual Care consult, as indicated  Outcome: Not Progressing     Problem: Involuntary Admit  Goal: Will cooperate with staff recommendations and doctor's orders and will demonstrate appropriate behavior  Description: INTERVENTIONS:  1. Treat underlying conditions and offer medication as ordered  2. Educate regarding involuntary admission procedures and rules  3. Contain  excessive/inappropriate behavior per unit and hospital policies  Outcome: Not Progressing     Problem: Pain  Goal: Verbalizes/displays adequate comfort level or baseline comfort level  Outcome: Not Progressing     Problem: ABCDS Injury Assessment  Goal: Absence of physical injury  Outcome: Not Progressing     Problem: Safety - Adult  Goal: Free from fall injury  Outcome: Not Progressing     Problem: Safety - Medical Restraint  Goal: Remains free of injury from restraints (Restraint for Interference with Medical Device)  Description: INTERVENTIONS:  1. Determine that other, less restrictive measures have been tried or would not be effective before applying the restraint  2. Evaluate the patient's condition at the time of restraint application  3. Inform patient/family regarding the reason for restraint  4. Q2H: Monitor safety, psychosocial status, comfort, nutrition and hydration  Outcome: Not Progressing     Problem: Skin/Tissue Integrity  Goal: Absence of new skin breakdown  Description: 1.  Monitor for areas of redness and/or skin breakdown  2.  Assess vascular access sites hourly  3.  Every 4-6 hours minimum:  Change oxygen saturation probe site  4.  Every 4-6 hours:  If on nasal continuous positive airway pressure, respiratory therapy assess nares and determine need for appliance change or resting period.  Outcome: Not Progressing     Problem: Discharge Planning  Goal: Discharge to home or other facility with appropriate resources  Outcome: Not Progressing     Problem: Coping  Goal: Pt/Family able to verbalize concerns and demonstrate effective coping strategies  Description: INTERVENTIONS:  1. Assist patient/family to identify coping skills, available support systems and cultural and spiritual values  2. Provide emotional support, including active listening and acknowledgement of concerns of patient and caregivers  3. Reduce environmental stimuli, as able  4. Instruct patient/family in relaxation techniques, as

## 2024-01-03 NOTE — CARE COORDINATION
01/03/24 0912   ITP   Date of Next Review 01/10/24   Short Term Goal 1   STG Goal 1 Status: Patient Appears to be  Progressing toward treatment plan goal   Short Term Goal 2   STG Goal 2 Status: Patient Appears to be  Progressing toward treatment plan goal

## 2024-01-03 NOTE — BH NOTE
Assumed care of the patient. Patient was observed lying naked on the mat, in fetal position, breathing regularly. Patient remained mute when he was asked assessment questions.      2000 Patient in fetal position.    2030 Patient laying down on right side.    2200 Patient on fetal position. He did shake his head indicating he did not want his medication. IM court medication was given.This RN was able to get a set of vital signs with a some encouragement.    1200 Patient on the floor in a somewhat relaxed fetal position, he has been changing the spots. Refused repositioning.      0130 Patient sleeping on the mattress, lying on the  right side. Breathing regularly.    0245 Patient laying down on the right side.    0435 Patient back to fetal position and appears to be sleeping.     0530 Patient on laying on right side.

## 2024-01-03 NOTE — BH NOTE
Behavioral Health Treatment Team Note     Patient goal(s) for today: pt does not identify a goal  Treatment team focus/goals: continue monitor medication, adjust as needed    Progress note: Pt presentation unchanged. Pt continuing to maintain fetal position for majority of the day. Pt continuing to not respond to different motivators.    LOS:  26  Expected LOS: TBD    Insurance info/prescription coverage:  MEDICARE  Date of last family contact:  [facility] 1[facility] and [facility]  Family requesting physician contact today:  No  Discharge plan:  TBD  Guns in the home:  No  Outpatient provider(s):  [facility]     Participating treatment team members: Nba Wilson MSW

## 2024-01-03 NOTE — GROUP NOTE
Group Therapy Note    Date: 1/3/2024    Group Start Time: 1400  Group End Time: 1500  Group Topic: Recreational    [facility]UTE [facility]    Janessa Beverly        Group Therapy Note           Patient's Goal:  To concentrate on selected task    Notes:  Pt did not attend session                                  Discipline Responsible: Recreational Therapist      Signature:  JANESSA BEVERLY

## 2024-01-03 NOTE — PROGRESS NOTES
I spoke with the director of the unit after attending a care conference on this patient and agreed to come observe him from a medical perspective.     Patient is on the floor, naked in a fetal position, face buried in the floor, hands between his knees and his eyes are closed.  He is purposely breathing as if he is smelling the floor.  He will occasionally look up to see if I am still here.    I attempted  to speak with him by saying \"trinity Guan, I am Delores, I am a nurse.  Is there anything I can get for you?\"

## 2024-01-04 PROCEDURE — 6360000002 HC RX W HCPCS: Performed by: PSYCHIATRY & NEUROLOGY

## 2024-01-04 PROCEDURE — 2580000003 HC RX 258: Performed by: PSYCHIATRY & NEUROLOGY

## 2024-01-04 PROCEDURE — 99233 SBSQ HOSP IP/OBS HIGH 50: CPT | Performed by: PSYCHIATRY & NEUROLOGY

## 2024-01-04 PROCEDURE — 1240000000 HC EMOTIONAL WELLNESS R&B

## 2024-01-04 PROCEDURE — 6360000002 HC RX W HCPCS: Performed by: INTERNAL MEDICINE

## 2024-01-04 RX ORDER — OLANZAPINE 5 MG/1
10 TABLET, ORALLY DISINTEGRATING ORAL NIGHTLY
Status: DISCONTINUED | OUTPATIENT
Start: 2024-01-04 | End: 2024-01-08 | Stop reason: HOSPADM

## 2024-01-04 RX ORDER — LORAZEPAM 2 MG/ML
1 CONCENTRATE ORAL 2 TIMES DAILY
Status: DISCONTINUED | OUTPATIENT
Start: 2024-01-04 | End: 2024-01-04

## 2024-01-04 RX ORDER — LORAZEPAM 2 MG/ML
1 INJECTION INTRAMUSCULAR 2 TIMES DAILY
Status: DISCONTINUED | OUTPATIENT
Start: 2024-01-04 | End: 2024-01-04

## 2024-01-04 RX ORDER — LORAZEPAM 2 MG/ML
1 CONCENTRATE ORAL 2 TIMES DAILY
Status: COMPLETED | OUTPATIENT
Start: 2024-01-04 | End: 2024-01-08

## 2024-01-04 RX ORDER — LORAZEPAM 2 MG/ML
1 INJECTION INTRAMUSCULAR 2 TIMES DAILY
Status: COMPLETED | OUTPATIENT
Start: 2024-01-04 | End: 2024-01-08

## 2024-01-04 RX ORDER — MIDAZOLAM HYDROCHLORIDE 1 MG/ML
2 INJECTION INTRAMUSCULAR; INTRAVENOUS 2 TIMES DAILY
Status: DISCONTINUED | OUTPATIENT
Start: 2024-01-04 | End: 2024-01-04

## 2024-01-04 RX ADMIN — Medication: at 09:33

## 2024-01-04 RX ADMIN — MIDAZOLAM HYDROCHLORIDE 2 MG: 1 INJECTION, SOLUTION INTRAMUSCULAR; INTRAVENOUS at 09:33

## 2024-01-04 RX ADMIN — ZIPRASIDONE MESYLATE 20 MG: 20 INJECTION, POWDER, LYOPHILIZED, FOR SOLUTION INTRAMUSCULAR at 09:32

## 2024-01-04 RX ADMIN — LORAZEPAM 1 MG: 2 INJECTION INTRAMUSCULAR; INTRAVENOUS at 17:30

## 2024-01-04 RX ADMIN — WATER 10 MG: 1 INJECTION INTRAMUSCULAR; INTRAVENOUS; SUBCUTANEOUS at 21:21

## 2024-01-04 ASSESSMENT — PAIN SCALES - WONG BAKER: WONGBAKER_NUMERICALRESPONSE: 0

## 2024-01-04 ASSESSMENT — PAIN SCALES - GENERAL: PAINLEVEL_OUTOF10: 0

## 2024-01-04 NOTE — BH NOTE
Patient was encountered in his room kneeling on his urine hence urine was clean and dry. Patient was cleaned. Patient refused treatment.Patient was selectively non verbal. However, patient was not in painful or respiratory distress.Eye contact is noted to be poor. Patient slept for 4.45hours at night. Mood is noted to be depressed with constricted affect. Patient is isolative to room.Patient  refused VS .Patient was not med compliance, refused court order medication hence prescribed IM midazolam 2mg was administered.There are no visible side effects from medications noted. Patient was seen sitting and relaxed when no staff is watching. He was also observed standing while drinking water from the tap with his two hands when he was alone.Also,He was on lateral  position when by himself.C-SSRS level is low risk.Hourly rounds are being completed to assure patient safety and attend to care needs. Monitoring will continue for safety reasons.

## 2024-01-04 NOTE — PLAN OF CARE
Problem: Discharge Planning  Goal: Discharge to home or other facility with appropriate resources  1/4/2024 0004 by Juan Heredia RN  Outcome: Not Progressing     Problem: Coping  Goal: Pt/Family able to verbalize concerns and demonstrate effective coping strategies  Description: INTERVENTIONS:  1. Assist patient/family to identify coping skills, available support systems and cultural and spiritual values  2. Provide emotional support, including active listening and acknowledgement of concerns of patient and caregivers  3. Reduce environmental stimuli, as able  4. Instruct patient/family in relaxation techniques, as appropriate  5. Assess for spiritual pain/suffering and initiate Spiritual Care, Psychosocial Clinical Specialist consults as needed  1/4/2024 0004 by Juan Heredia RN  Outcome: Not Progressing     Problem: Confusion  Goal: Confusion, delirium, dementia, or psychosis is improved or at baseline  Description: INTERVENTIONS:  1. Assess for possible contributors to thought disturbance, including medications, impaired vision or hearing, underlying metabolic abnormalities, dehydration, psychiatric diagnoses, and notify attending LIP  2. [place] high risk fall precautions, as indicated  3. Provide frequent short contacts to provide reality reorientation, refocusing and direction  4. Decrease environmental stimuli, including noise as appropriate  5. Monitor and intervene to maintain adequate nutrition, hydration, elimination, sleep and activity  6. If unable to ensure safety without constant attention obtain sitter and review sitter guidelines with assigned personnel  7. Initiate Psychosocial CNS and Spiritual Care consult, as indicated  1/4/2024 0004 by Juan Heredia RN  Outcome: Not Progressing

## 2024-01-04 NOTE — GROUP NOTE
Group Therapy Note    Date: 1/4/2024    Group Start Time: 1500  Group End Time: 1600  Group Topic: Recreational    [facility] AC[facility]    Janessa Beverly        Group Therapy Note           Patient's Goal:  To concentrate on selected task    Notes:  Pt did not attend session      Discipline Responsible: Recreational Therapist      Signature:  JANESSA BEVERLY

## 2024-01-04 NOTE — GROUP NOTE
Group Therapy Note    Date: 1/4/2024    Group Start Time: 1100  Group End Time: 1200  Group Topic: Topic Group    [facility] 3 ACUTE BEH[facility]    Janessa Beverly        Group Therapy Note           Patient's Goal:  To participate in relaxation activity    Notes:  Pt did not attend session    Discipline Responsible: Recreational Therapist      Signature:  JANESSA BEVERLY

## 2024-01-04 NOTE — PROGRESS NOTES
Writer met with patient in his room. Patient was curled in a fetal position on the floor but did not appear to be in acute distress. Patient was not cooperative with assessment. Writer unable to assess patient's level of orientation due to patient's catatonic state and lack of cooperation during assessment. Patient presents with an expressionless affect and anhedonic mood. Patient would not answer assessment questions, however writer observes depression symptoms of isolation, change in energy level, appetitive change, and generalized anxiety symptoms. T staff reported that patient stood and walked into the shower in the morning during 15 minute safety rounds. Patient later noted to be incontinent of urine and had urinated on his bed linens. Writer assisted patient off the linens and cleaned patient and changed soiled linens. Patient refused enoxaparin and would not allow writer and additional RN to approach. Patient refused PO court ordered medications and was given IM injections. Patient continues to be nonverbal and does not acknowledge the writer's presence or the presence of additional RN and BHT during initial assessment. Writer observed wounds on the patient's ankles, red marks on the patient's knees, and the patient's toes, MD notified and wound care continuing to follow. Q15 minute safety checks maintained.       1800 Writer, wound care RN, two  RNs, and BHT attempted to place bandages on patients knees and ankles. Patient was not cooperative with staff and actively fought against staff members by blocking them with his hands and kicking his legs. Patient remained non-verbal at this time and when staff stopped attempting to place bandages on patient, patient returned to curled fetal position with pressure on his knees and ankles. Wound care continuing to follow, MD made aware. Patient also noted to have petechiae rash on back and sacrum area. Wound care notified. Will continue to monitor patient's skin

## 2024-01-04 NOTE — BH NOTE
Chief Complaint: mute    Length of Stay: 26 Days    Interval History:  Patient was seen today for rounds in his room. Mr. Cowan was laying on the floor. He received ECT this morning and was sleeping. He did not participate in the interview and did not speak or open his eyes. He appears disheveled. Per staff, he has been taking medications and no side effects have been observed or reported. He is also ambulating to the bathroom and using the facilities appropriately. No acute events reported over night.    12/13: Roge Cowan is doing poorly. Catatonic. He is mute, rigid, no eye contact, unable to follow commands. He is isolative to his room, has not been eating. Poor hygiene. No interaction is possible with him. He had his second ect today. Staff report he's been mute. He has been non adherent with his meds. Court order meds were approved Monday.     12/14 - Roge Cowan reports that he is not paddling and does not need a blanket although it is cool in the room and he was lying on his bed without a shirt on.  Poverty of content of speech with selective responses.  Emaciated and turns away when asked about suicide, homicide, or perceptual changes.  No aggression or violence.  Selectively  interactive and partially aware.  Refuses PO medications and received IM Ativan the past few days with positive effect. Eating very little and sleeping fairly.      12/15 - Roge Cowan received ECT treatments toda and remains selective in his interactions generally responding with body movements, shakes and flapping of a limb (hands or feet).  Nursing reports that he uses the bathroom appropriately and eats some.  Malodrous and emaciated.  No aggression or violence.  Interactive at times.  Limited awareness. Tolerating medications well.  Eating and sleeping fairly.    12/16 - Roge Cowan responds selectively.  Still sleeps on the floor.  Had ECT yesterday.  Moods are good.  Appropriately dressed and groomed.  Denies  and that the patient continues to need, on a daily basis, active treatment furnished directly by or requiring the supervision of inpatient psychiatric facility personnel. In addition, the hospital records show that services furnished were intensive treatment services, admission or related services, or equivalent services.

## 2024-01-04 NOTE — PLAN OF CARE
Problem: Discharge Planning  Goal: Discharge to home or other facility with appropriate resources  Outcome: Not Progressing     Problem: Coping  Goal: Pt/Family able to verbalize concerns and demonstrate effective coping strategies  Description: INTERVENTIONS:  1. Assist patient/family to identify coping skills, available support systems and cultural and spiritual values  2. Provide emotional support, including active listening and acknowledgement of concerns of patient and caregivers  3. Reduce environmental stimuli, as able  4. Instruct patient/family in relaxation techniques, as appropriate  5. Assess for spiritual pain/suffering and initiate Spiritual Care, Psychosocial Clinical Specialist consults as needed  Outcome: Not Progressing     Problem: Confusion  Goal: Confusion, delirium, dementia, or psychosis is improved or at baseline  Description: INTERVENTIONS:  1. Assess for possible contributors to thought disturbance, including medications, impaired vision or hearing, underlying metabolic abnormalities, dehydration, psychiatric diagnoses, and notify attending LIP  2. [place] high risk fall precautions, as indicated  3. Provide frequent short contacts to provide reality reorientation, refocusing and direction  4. Decrease environmental stimuli, including noise as appropriate  5. Monitor and intervene to maintain adequate nutrition, hydration, elimination, sleep and activity  6. If unable to ensure safety without constant attention obtain sitter and review sitter guidelines with assigned personnel  7. Initiate Psychosocial CNS and Spiritual Care consult, as indicated  Outcome: Not Progressing     Problem: Involuntary Admit  Goal: Will cooperate with staff recommendations and doctor's orders and will demonstrate appropriate behavior  Description: INTERVENTIONS:  1. Treat underlying conditions and offer medication as ordered  2. Educate regarding involuntary admission procedures and rules  3. Contain

## 2024-01-05 PROCEDURE — 2580000003 HC RX 258: Performed by: PSYCHIATRY & NEUROLOGY

## 2024-01-05 PROCEDURE — 6360000002 HC RX W HCPCS: Performed by: PSYCHIATRY & NEUROLOGY

## 2024-01-05 PROCEDURE — 99233 SBSQ HOSP IP/OBS HIGH 50: CPT | Performed by: PSYCHIATRY & NEUROLOGY

## 2024-01-05 PROCEDURE — 6360000002 HC RX W HCPCS: Performed by: INTERNAL MEDICINE

## 2024-01-05 PROCEDURE — 1240000000 HC EMOTIONAL WELLNESS R&B

## 2024-01-05 RX ADMIN — WATER 10 MG: 1 INJECTION INTRAMUSCULAR; INTRAVENOUS; SUBCUTANEOUS at 21:53

## 2024-01-05 RX ADMIN — ENOXAPARIN SODIUM 40 MG: 100 INJECTION SUBCUTANEOUS at 12:57

## 2024-01-05 RX ADMIN — LORAZEPAM 1 MG: 2 INJECTION INTRAMUSCULAR; INTRAVENOUS at 08:59

## 2024-01-05 RX ADMIN — LORAZEPAM 1 MG: 2 INJECTION INTRAMUSCULAR; INTRAVENOUS at 18:00

## 2024-01-05 RX ADMIN — Medication: at 22:03

## 2024-01-05 ASSESSMENT — PAIN SCALES - GENERAL
PAINLEVEL_OUTOF10: 0

## 2024-01-05 ASSESSMENT — PAIN SCALES - WONG BAKER
WONGBAKER_NUMERICALRESPONSE: 0

## 2024-01-05 NOTE — BH NOTE
Behavioral Health Treatment Team Note     Patient goal(s) for today: pt does not identify a goal  Treatment team focus/goals: continue monitor medication, adjust as needed    Progress note: Pt presents sleeping this morning and actively snoring. Pt laying on side on mattress. Pt was originally laying on the bathroom floor and had urinated on himself. Staff pulled foam mats on floor and discovered pt appears to be mostly urinating on the mats as they were soaked in urine. ADLs appear poor, pt and his room are malodorous.     Writer had completed paperwork for Level 2 assessment for nursing home placement. Writer waiting for call back regarding assessment    LOS:  28  Expected LOS: TBD    Insurance info/prescription coverage:  MEDICARE  Date of last family contact:  [facility] 1/2/24  Family requesting physician contact today:  No  Discharge plan:  TBD  Guns in the home:  No  Outpatient provider(s):  [facility]     Participating treatment team members: Nba Wilson MSW

## 2024-01-05 NOTE — BH NOTE
0730- Patient observed laying face down in the fetal position on the floor in the bathroom of his room. Would not respond to staff when redirected. Patient appeared to have urinated on the floor in room. Cleaned up by staff.   0923- Patient observed laying on his side in the floor eating breakfast. When writer approached, patient went back into fetal position face down on the floor and would not respond. Uncooperative with vitals and taking PO medications. Ativan 2mg IM given (see Mar).   1127- Patient observed laying on his stomach asleep. Respirations even and unlabored.  Placed 2 urinals in patient room. EVS cleaned room. Privacy screen placed in front of door, left door open to allow air flow.   1258- Observed patient lying on his side, eating lunch. When staff entered room patient got back into fetal position and would not respond. Uncooperative with vitals and wound care. Patient would not allow staff to apply cream or dressings. Staff was able to apply pads on ankles but no other area. Patient's bed was covered in urine. Staff changed bed.   1500- Observed patient lying in the fetal position face down in the corner of his room. Would not respond to the writer. Appears to have eaten all of his snacks and juice.   1710- Observed patient lying in the floor in the fetal position face down in the corner of his room. Moved away from writer when writer spoke to him. Patient appears to have urinated on the floor. Staff cleaned the floor.  1830-Observed patient lying in the floor in the fetal position. Patient does not respond to staff.Uncooperative with PO medications. Ativan IM given (see mar) No acute distress observed.     Remains on Q15 minute rounds for safety.

## 2024-01-05 NOTE — GROUP NOTE
Group Therapy Note    Date: 1/5/2024    Group Start Time: 1400  Group End Time: 1500  Group Topic: Recreational    [facility] ACUTE BEHAV TH    Janessa Beverly        Group Therapy Note           Patient's Goal:  To concentrate on selected task    Notes:  Pt did not attend session    Discipline Responsible: Recreational Therapist      Signature:  JANESSA BEVERLY

## 2024-01-05 NOTE — PROGRESS NOTES
[id number] 1930  Assumed care of patient. He is first observed in the fetal position lying naked on the mat. His face is flat on the floor, and hands tucked between his stomach and legs. Even unlabored breathing. Patient does not respond when asked assessment questions.     2130 IM Zyprexa administered due to refusal of his PO medication. Attempted to assist patient to move onto the bed. Patient refuses to allow staff to assist him. Patient making a growling noise. Patient then sat up on his own and slightly moved over to the left. Patient went right back into the fetal position.    2330  Patient is sitting up on his knees eating a snack and drinking an ensure.    0130 Patient is up in the bathroom, sitting on the toilet. Patient appears to be using the restroom. Does not respond when asked questions.     0330   Patient standing up in room    0530 Patient lying in fetal position, appears to be sleeping.     0600 No significant changes this shift. Patient slept for 30 minutes.

## 2024-01-05 NOTE — WOUND CARE
WOUND Note:      Follow up for redness to bilat knees, ankles, feet and elbows     Chart shows:  Admitted on 12/7/23 for Schizoaffective disorder  History of Paranoid schizophrenia, confusion, withdrawal syndrome, psychotic disorder     Assessment:   Patient nonverbal and non interactive, naked and in fetal position on floor pad. Patient developed a Stage 2 on right lateral foot right below malleolus The wound is a deroofed blister, no exudate. The rest of pressure points are blanchable erythema. No s/sx of gross infection.   Patient very resistant to skin care from PI prevention.Attempted to place foam dressings on knees and ankles/feet. 4 staff members attempted to help turn patient. Able to place foam on ankle areas/lateral feet. As soon as patient readjusted and started rubbing feet together dressings came off. Able to apply venelex to knees and bilat feet/ankles.      Wound care recommendations:    Apply Venelex twice daily to all pressure points and cover pressure injury with silicone foam dressing.     Transition of Care: Plan to follow weekly and as needed while admitted to hospital.       Celine Falcon, RN, BSN  Wound Care Nurse, [facility]  [phone]

## 2024-01-05 NOTE — PLAN OF CARE
Problem: Coping  Goal: Pt/Family able to verbalize concerns and demonstrate effective coping strategies  Description: INTERVENTIONS:  1. Assist patient/family to identify coping skills, available support systems and cultural and spiritual values  2. Provide emotional support, including active listening and acknowledgement of concerns of patient and caregivers  3. Reduce environmental stimuli, as able  4. Instruct patient/family in relaxation techniques, as appropriate  5. Assess for spiritual pain/suffering and initiate Spiritual Care, Psychosocial Clinical Specialist consults as needed  Outcome: Not Progressing     Problem: Confusion  Goal: Confusion, delirium, dementia, or psychosis is improved or at baseline  Description: INTERVENTIONS:  1. Assess for possible contributors to thought disturbance, including medications, impaired vision or hearing, underlying metabolic abnormalities, dehydration, psychiatric diagnoses, and notify attending LIP  2. [place] high risk fall precautions, as indicated  3. Provide frequent short contacts to provide reality reorientation, refocusing and direction  4. Decrease environmental stimuli, including noise as appropriate  5. Monitor and intervene to maintain adequate nutrition, hydration, elimination, sleep and activity  6. If unable to ensure safety without constant attention obtain sitter and review sitter guidelines with assigned personnel  7. Initiate Psychosocial CNS and Spiritual Care consult, as indicated  Outcome: Not Progressing     Problem: Involuntary Admit  Goal: Will cooperate with staff recommendations and doctor's orders and will demonstrate appropriate behavior  Description: INTERVENTIONS:  1. Treat underlying conditions and offer medication as ordered  2. Educate regarding involuntary admission procedures and rules  3. Contain excessive/inappropriate behavior per unit and hospital policies  Outcome: Not Progressing

## 2024-01-05 NOTE — BH NOTE
Chief Complaint: mute    Length of Stay: 27 Days    Interval History:  Patient was seen today for rounds in his room. Mr. Cowan was laying on the floor. He received ECT this morning and was sleeping. He did not participate in the interview and did not speak or open his eyes. He appears disheveled. Per staff, he has been taking medications and no side effects have been observed or reported. He is also ambulating to the bathroom and using the facilities appropriately. No acute events reported over night.    12/13: Roge Cowan is doing poorly. Catatonic. He is mute, rigid, no eye contact, unable to follow commands. He is isolative to his room, has not been eating. Poor hygiene. No interaction is possible with him. He had his second ect today. Staff report he's been mute. He has been non adherent with his meds. Court order meds were approved Monday.     12/14 - Roge Cowan reports that he is not paddling and does not need a blanket although it is cool in the room and he was lying on his bed without a shirt on.  Poverty of content of speech with selective responses.  Emaciated and turns away when asked about suicide, homicide, or perceptual changes.  No aggression or violence.  Selectively  interactive and partially aware.  Refuses PO medications and received IM Ativan the past few days with positive effect. Eating very little and sleeping fairly.      12/15 - Roge Cowan received ECT treatments toda and remains selective in his interactions generally responding with body movements, shakes and flapping of a limb (hands or feet).  Nursing reports that he uses the bathroom appropriately and eats some.  Malodrous and emaciated.  No aggression or violence.  Interactive at times.  Limited awareness. Tolerating medications well.  Eating and sleeping fairly.    12/16 - Roge Cowan responds selectively.  Still sleeps on the floor.  Had ECT yesterday.  Moods are good.  Appropriately dressed and groomed.  Denies

## 2024-01-05 NOTE — BH NOTE
Chief Complaint: mute    Length of Stay: 28 Days    Interval History:  Patient was seen today for rounds in his room. Mr. Cowan was laying on the floor. He received ECT this morning and was sleeping. He did not participate in the interview and did not speak or open his eyes. He appears disheveled. Per staff, he has been taking medications and no side effects have been observed or reported. He is also ambulating to the bathroom and using the facilities appropriately. No acute events reported over night.    12/13: Roge Cowan is doing poorly. Catatonic. He is mute, rigid, no eye contact, unable to follow commands. He is isolative to his room, has not been eating. Poor hygiene. No interaction is possible with him. He had his second ect today. Staff report he's been mute. He has been non adherent with his meds. Court order meds were approved Monday.     12/14 - Roge Cowan reports that he is not paddling and does not need a blanket although it is cool in the room and he was lying on his bed without a shirt on.  Poverty of content of speech with selective responses.  Emaciated and turns away when asked about suicide, homicide, or perceptual changes.  No aggression or violence.  Selectively  interactive and partially aware.  Refuses PO medications and received IM Ativan the past few days with positive effect. Eating very little and sleeping fairly.      12/15 - Roge Cowan received ECT treatments toda and remains selective in his interactions generally responding with body movements, shakes and flapping of a limb (hands or feet).  Nursing reports that he uses the bathroom appropriately and eats some.  Malodrous and emaciated.  No aggression or violence.  Interactive at times.  Limited awareness. Tolerating medications well.  Eating and sleeping fairly.    12/16 - Roge Cowan responds selectively.  Still sleeps on the floor.  Had ECT yesterday.  Moods are good.  Appropriately dressed and groomed.  Denies  unsuccessful thus far.    01/05 - no acute overnight events. Patient remains incoherent, talking to himself quietly and with no insight into the situation. He was noted to be eating at times but has been relatively unchanged for much of the week. Patient unable to make his needs known. He is getting court ordered IM injections as he does not take PO agents. Wound on R ankle is swelling. Wound care aware, but dressings have been promptly removed by patient. He remains naked in his room, face down and not responding to stimuli.    Past Medical History:  Past Medical History:   Diagnosis Date    Psychiatric disorder     Psychotic disorder (HCC)     Withdrawal syndrome (HCC)            Labs:  Lab Results   Component Value Date/Time    WBC 6.8 12/07/2023 03:20 PM    HGB 14.8 12/07/2023 03:20 PM    HCT 44.4 12/07/2023 03:20 PM     12/07/2023 03:20 PM    MCV 89.2 12/07/2023 03:20 PM      Lab Results   Component Value Date/Time     12/07/2023 03:37 PM    K 4.2 12/07/2023 03:37 PM     12/07/2023 03:37 PM    CO2 26 12/07/2023 03:37 PM    BUN 15 12/07/2023 03:37 PM    GFRAA >60 12/14/2020 01:24 PM    GLOB 3.6 12/07/2023 03:37 PM    ALT 23 12/07/2023 03:37 PM          Current Facility-Administered Medications   Medication Dose Route Frequency Provider Last Rate Last Admin    OLANZapine zydis (ZYPREXA) disintegrating tablet 10 mg  10 mg Oral Nightly Deborah Velásquez MD        Or    OLANZapine (ZyPREXA) 10 mg in sterile water 2 mL injection +++DO NOT GIVE WITHIN 1 HOUR OF IM LORAZEPAM+++COURT ORDERED+++  10 mg IntraMUSCular Nightly Deborah Velásquez MD   10 mg at 01/04/24 2121    LORazepam (ATIVAN) 2 MG/ML concentrated solution 1 mg  1 mg Oral BID Deborah Velásquez MD        Or    LORazepam (ATIVAN) injection 1 mg +++DO NOT GIVE WITHIN 1 HOUR OF IM OLANZAPINE+++COURT ORDERED+++  1 mg IntraMUSCular BID Deborah Velásquez MD   1 mg at 01/05/24 0859    enoxaparin (LOVENOX) injection 40 mg

## 2024-01-06 PROCEDURE — 99232 SBSQ HOSP IP/OBS MODERATE 35: CPT | Performed by: PSYCHIATRY & NEUROLOGY

## 2024-01-06 PROCEDURE — 1240000000 HC EMOTIONAL WELLNESS R&B

## 2024-01-06 PROCEDURE — 6360000002 HC RX W HCPCS: Performed by: PSYCHIATRY & NEUROLOGY

## 2024-01-06 PROCEDURE — 6360000002 HC RX W HCPCS: Performed by: INTERNAL MEDICINE

## 2024-01-06 RX ADMIN — ENOXAPARIN SODIUM 40 MG: 100 INJECTION SUBCUTANEOUS at 14:51

## 2024-01-06 RX ADMIN — LORAZEPAM 1 MG: 2 INJECTION INTRAMUSCULAR; INTRAVENOUS at 18:11

## 2024-01-06 RX ADMIN — LORAZEPAM 1 MG: 2 INJECTION INTRAMUSCULAR; INTRAVENOUS at 08:55

## 2024-01-06 RX ADMIN — Medication: at 21:45

## 2024-01-06 ASSESSMENT — PAIN SCALES - WONG BAKER
WONGBAKER_NUMERICALRESPONSE: 0
WONGBAKER_NUMERICALRESPONSE: 0

## 2024-01-06 ASSESSMENT — PAIN SCALES - GENERAL
PAINLEVEL_OUTOF10: 0
PAINLEVEL_OUTOF10: 0

## 2024-01-06 NOTE — PLAN OF CARE
Problem: Discharge Planning  Goal: Discharge to home or other facility with appropriate resources  Outcome: Not Progressing     Problem: Coping  Goal: Pt/Family able to verbalize concerns and demonstrate effective coping strategies  Description: INTERVENTIONS:  1. Assist patient/family to identify coping skills, available support systems and cultural and spiritual values  2. Provide emotional support, including active listening and acknowledgement of concerns of patient and caregivers  3. Reduce environmental stimuli, as able  4. Instruct patient/family in relaxation techniques, as appropriate  5. Assess for spiritual pain/suffering and initiate Spiritual Care, Psychosocial Clinical Specialist consults as needed  1/5/2024 2304 by Juan Heredia RN  Outcome: Not Progressing     Problem: Confusion  Goal: Confusion, delirium, dementia, or psychosis is improved or at baseline  Description: INTERVENTIONS:  1. Assess for possible contributors to thought disturbance, including medications, impaired vision or hearing, underlying metabolic abnormalities, dehydration, psychiatric diagnoses, and notify attending LIP  2. [place] high risk fall precautions, as indicated  3. Provide frequent short contacts to provide reality reorientation, refocusing and direction  4. Decrease environmental stimuli, including noise as appropriate  5. Monitor and intervene to maintain adequate nutrition, hydration, elimination, sleep and activity  6. If unable to ensure safety without constant attention obtain sitter and review sitter guidelines with assigned personnel  7. Initiate Psychosocial CNS and Spiritual Care consult, as indicated  1/5/2024 2304 by Juan Heredia RN  Outcome: Not Progressing

## 2024-01-06 NOTE — BH NOTE
Patient was encountered  in his room in feta/prone/kneeling position. 100mls of yellow Coloured urine was emptied  from the urinal.  He is not cooperative and refused nursing care.Provider care around to review patient  but  Patient was selectively  non verbal to assessment questions.He also resisted examination.Eye contact is noted to be poor. Patient would use the urinal or wet the floor and bed with urine. He had one episode of urine incontinent during the night hence floor and bed was clean,dry and disinfected.bed  Linen was also changed.  Patient slept for 4hrs at night.Mood is noted to be depressed with constricted Affect. Patient refused VS and labs.Patient is not  med and  compliant hence court order med was administered. There are no obvious side effects from medications seen.C-SSRS level is no risk.  Hourly rounds are being completed to assure patient safety and attend to care needs. Monitoring will continue for safety precaution.

## 2024-01-06 NOTE — PROGRESS NOTES
06:02 AM    HDL 55 12/13/2023 06:02 AM     No results found for: \"GLUCPOC\"  [unfilled]    Notes reviewed from all clinical/nonclinical/nursing services involved in patient's clinical care. Care coordination discussions were held with appropriate clinical/nonclinical/ nursing providers based on care coordination needs.         Patients current active Medications were reviewed, considered, added and adjusted based on the clinical condition today.      Home Medications were reconciled to the best of my ability given all available resources at the time of admission. Route is PO if not otherwise noted.      Admission Status:[id number]:::1}      Medications Reviewed:     Current Facility-Administered Medications   Medication Dose Route Frequency    OLANZapine zydis (ZYPREXA) disintegrating tablet 10 mg  10 mg Oral Nightly    Or    OLANZapine (ZyPREXA) 10 mg in sterile water 2 mL injection +++DO NOT GIVE WITHIN 1 HOUR OF IM LORAZEPAM+++COURT ORDERED+++  10 mg IntraMUSCular Nightly    LORazepam (ATIVAN) 2 MG/ML concentrated solution 1 mg  1 mg Oral BID    Or    LORazepam (ATIVAN) injection 1 mg +++DO NOT GIVE WITHIN 1 HOUR OF IM OLANZAPINE+++COURT ORDERED+++  1 mg IntraMUSCular BID    enoxaparin (LOVENOX) injection 40 mg  40 mg SubCUTAneous Daily    balsum peru-castor oil (VENELEX) ointment   Topical BID    chlorproMAZINE (THORAZINE) injection 50 mg +++COURT ORDERED+++  50 mg IntraMUSCular Daily PRN    acetaminophen (TYLENOL) tablet 650 mg  650 mg Oral Q4H PRN    polyethylene glycol (GLYCOLAX) packet 17 g  17 g Oral Daily PRN    aluminum & magnesium hydroxide-simethicone (MAALOX) 200-200-20 MG/5ML suspension 30 mL  30 mL Oral Q6H PRN    hydrOXYzine HCl (ATARAX) tablet 50 mg  50 mg Oral TID PRN    haloperidol (HALDOL) tablet 5 mg  5 mg Oral Q4H PRN    Or    haloperidol lactate (HALDOL) injection 5 mg  5 mg IntraMUSCular Q4H PRN    diphenhydrAMINE (BENADRYL) injection 50 mg  50 mg IntraMUSCular Q4H PRN    traZODone (DESYREL)  tablet 50 mg  50 mg Oral Nightly PRN     ______________________________________________________________________  EXPECTED LENGTH OF STAY: 3  ACTUAL LENGTH OF STAY:          28                 Ugo Cabrera MD

## 2024-01-06 NOTE — PLAN OF CARE
Problem: Safety - Adult  Goal: Free from fall injury  Outcome: Progressing     Morning assessment complete. Patient was encountered in his room.   He is uncooperative, not interactive, guarded, mute.   Eye contact is noted to be poor.   Mood is noted to be anxious. Affect is constricted.    Patient has been isolative to his room/self since admission.  VS cannot be taken as the patient continues to be combative on approach and will not stay still in order to get an accurate reading.  Writer and other staff member attempted to do care (bathe, put dressing and ointment on his bony prominences) but he was hyperventilating, scooting away from staff, making his body rigid and would not allow staff to get close to him.   Patient is meal compliant. There are no untoward side effects from medications to note.     Q 15 minute checks are being completed to assure patient safety and attend to care needs. Monitoring will continue for changes in patient condition.    0800: patient crouched on the floor. Eating breakfast intermittently.    1000: patient in the bathroom. Used urinal at bedside.    1200: patient eating lunch, sitting upright on the floor. Urinal was used.    1400: patient crouched on the floor.    1600: patient lying on the bed on his side head propped up by his arms, eating his dinner quietly.    1830: patient crouching on the bed. Sat upright after being given medication. Floor cleaned and wiped.

## 2024-01-07 PROCEDURE — 6360000002 HC RX W HCPCS: Performed by: INTERNAL MEDICINE

## 2024-01-07 PROCEDURE — 2580000003 HC RX 258: Performed by: PSYCHIATRY & NEUROLOGY

## 2024-01-07 PROCEDURE — 6360000002 HC RX W HCPCS: Performed by: PSYCHIATRY & NEUROLOGY

## 2024-01-07 PROCEDURE — 1240000000 HC EMOTIONAL WELLNESS R&B

## 2024-01-07 PROCEDURE — 99232 SBSQ HOSP IP/OBS MODERATE 35: CPT | Performed by: PSYCHIATRY & NEUROLOGY

## 2024-01-07 RX ADMIN — ENOXAPARIN SODIUM 40 MG: 100 INJECTION SUBCUTANEOUS at 14:22

## 2024-01-07 RX ADMIN — LORAZEPAM 1 MG: 2 INJECTION INTRAMUSCULAR; INTRAVENOUS at 09:11

## 2024-01-07 RX ADMIN — LORAZEPAM 1 MG: 2 INJECTION INTRAMUSCULAR; INTRAVENOUS at 18:15

## 2024-01-07 RX ADMIN — Medication: at 20:05

## 2024-01-07 RX ADMIN — WATER 10 MG: 1 INJECTION INTRAMUSCULAR; INTRAVENOUS; SUBCUTANEOUS at 20:02

## 2024-01-07 ASSESSMENT — PAIN SCALES - GENERAL
PAINLEVEL_OUTOF10: 0
PAINLEVEL_OUTOF10: 0

## 2024-01-07 ASSESSMENT — PAIN SCALES - WONG BAKER: WONGBAKER_NUMERICALRESPONSE: 0

## 2024-01-07 NOTE — BH NOTE
Morning assessment complete. Patient was encountered in his room.   He is uncooperative,  guarded,  selective mute. When undersigned attempted to  apply oint on affected area, patient stated \" don't touch me.\" Undersigned replied that the oint will help you. Patient stated , \"Not it won't.\"  Eye contact is noted to be poor.   Mood is noted to be anxious. Affect is constricted.  Patient remains in his room and refuses to keep on clothing.Unable to  obtain Vital signs due patient is resistive and becomes very agitated  and breathing heavy and moving intensely to prevent care from being provided.  Patient is meal compliant There are no untoward side effects from medications to note    Q 15 minute checks are being completed to assure patient safety and attend to care needs. Monitoring will continue for changes in patient condition.  0800 Lying on mattress when entered room  pt change positions to learning forward with forehead touch the floor. Patient eatng breakfast.    1000 lying on mattress. Refused treatment  to affected areas    1200 lying on floor eating lunch    1400 sitting on knees leaning forward. Levenox given as order.    1600  Patient urinated on floor.Area clean . Patient resistive to care.      1800  Ativan Im given as ordered. Patient sitting on knees leaning forward. Nonverbal. Urine output in urinal 150ml  No acute distress.

## 2024-01-07 NOTE — BH NOTE
Chief Complaint: mute    Length of Stay: 29 Days    Interval History:  melvin on the floor. He received ECT this morning and was sleeping. He did not participate in the interview and did not speak or open his eyes. He appears disheveled. Per staff, he has been taking medications and no side effects have been observed or reported. He is also ambulating to the bathroom and using the facilities appropriately. No acute events reported over night.    12/13: Roge Cowan is doing poorly. Catatonic. He is mute, rigid, no eye contact, unable to follow commands. He is isolative to his room, has not been eating. Poor hygiene. No interaction is possible with him. He had his second ect today. Staff report he's been mute. He has been non adherent with his meds. Court order meds were approved Monday.     12/14 - Roge Cowan reports that he is not paddling and does not need a blanket although it is cool in the room and he was lying on his bed without a shirt on.  Poverty of content of speech with selective responses.  Emaciated and turns away when asked about suicide, homicide, or perceptual changes.  No aggression or violence.  Selectively  interactive and partially aware.  Refuses PO medications and received IM Ativan the past few days with positive effect. Eating very little and sleeping fairly.      12/15 - Roge Cowan received ECT treatments toda and remains selective in his interactions generally responding with body movements, shakes and flapping of a limb (hands or feet).  Nursing reports that he uses the bathroom appropriately and eats some.  Malodrous and emaciated.  No aggression or violence.  Interactive at times.  Limited awareness. Tolerating medications well.  Eating and sleeping fairly.    12/16 - Roge Cowan responds selectively.  Still sleeps on the floor.  Had ECT yesterday.  Moods are good.  Appropriately dressed and groomed.  Denies SI/HI/AH/VH.  No aggression or violence.  Isolative and  diagnosis of Catatonic Schizophrenia     Ativan to 1 mg PO *OR* IM BID (0900 & 1800) for catatonia x4 days  CONTIUE Zyprexa (Zydis) 10 mg SL *OR  IM QHS (Increase frequency after Ativan taper)  Pre-treat patient with thorazine 50mg IM and Benadryl 50 IM , at lease 30 minutes prior to sending patient off the floor   ECT on hold until patient can be restrained more appropriately   Court ordered meds granted .   Continue inpatient stay for the safety and optimum care of the patient with medicine consult for assist with care   Routine labs to be ordered as needed.   Supportive, milieu and group therapy.   Strengths include taking medications   Continue the medication regimen as prescribed  Disposition planning to continue.       A coordinated, multidisplinary treatment team round was conducted with the patient, nurses, pharmcist,  and writer present. Discussions held with , and/or with family members; Complete current electronic health record for patient was reviewed in full including consultant notes, ancillary staff notes, nurses and tech notes, labs and vitals.  I certify that this patients inpatient psychiatric hospital services furnished since the previous certification were, and continue to be, required for treatment that could reasonably be expected to improve the patient's condition, or for diagnostic study, and that the patient continues to need, on a daily basis, active treatment furnished directly by or requiring the supervision of inpatient psychiatric facility personnel. In addition, the hospital records show that services furnished were intensive treatment services, admission or related services, or equivalent services.

## 2024-01-07 NOTE — PLAN OF CARE

## 2024-01-07 NOTE — PROGRESS NOTES
1950 - Pt received in his room naked, curled in fetal position on the floor. Writer introduced self to pt. Pt did not respond to writer. Right ankle noted bright red, swollen, with a dark center, crusted with yellow exudate. Pt allowed this writer to touch the area to feel for warmth, however he did not answer it was painful. Urine was cleaned from floor in area next to urinal.    2000 - Pt was informed that his VS needed to be measured. As writer approached with BP cuff, pt edvin his arms in tightly to hs body. Attempt to obtain VS was unsuccessful due to pt being uncooperative.    2135 - Pt noted resting in bed, lying on his right side. As this writer approached pt to ask if he was ready for his HS meds, pt rolled onto floor and curled up in fetal position. Pt was offered to take meds PO. Pt did not respond to writer. Pt was informed court ordered injection for refusal would be administered. Pt received Im to right dorsogluteal muscle without incident. Writer returned with Venelex ointment and attempted to rub on ankle. Pt moved foot away from writer. He sat up with his knees drawn close to his body. Writer was able to visualize both knees at this time. Bilateral knees noted reddened. Writer was able to apply ointment to right ankle and right knee although pt continued to resist treatment by attempting to move out of writer's reach.    2325 - Pt noted in bed sleeping, lying on his right side, breathing even and unlabored.     0130 - Pt noted in bed sleeping, lying on his right side. Pt rolled over onto floor in fetal position when he became aware of writer entering the room. Pt was reassured that writer was not going to touch him. This writer cleaned urine from floor and exited.    0315 - Pt noted on floor, in fetal position. Pt shook head \"no\" when asked if he was thirsty or hungry.     0515 - Pt was noted awake, resting quietly in bed, lying on his left side. Pt appeared to have slept approx 4 hours during the

## 2024-01-08 ENCOUNTER — HOSPITAL ENCOUNTER (INPATIENT)
Facility: HOSPITAL | Age: 54
LOS: 4 days | Discharge: ANOTHER HOSPITAL | DRG: 885 | End: 2024-01-12
Attending: INTERNAL MEDICINE | Admitting: INTERNAL MEDICINE
Payer: MEDICARE

## 2024-01-08 VITALS
HEIGHT: 72 IN | WEIGHT: 178 LBS | HEART RATE: 87 BPM | RESPIRATION RATE: 20 BRPM | TEMPERATURE: 97.3 F | OXYGEN SATURATION: 98 % | BODY MASS INDEX: 24.11 KG/M2 | SYSTOLIC BLOOD PRESSURE: 144 MMHG | DIASTOLIC BLOOD PRESSURE: 84 MMHG

## 2024-01-08 PROBLEM — L03.90 CELLULITIS: Status: ACTIVE | Noted: 2024-01-08

## 2024-01-08 PROCEDURE — 6360000002 HC RX W HCPCS: Performed by: INTERNAL MEDICINE

## 2024-01-08 PROCEDURE — 87154 CUL TYP ID BLD PTHGN 6+ TRGT: CPT

## 2024-01-08 PROCEDURE — 2580000003 HC RX 258: Performed by: PSYCHIATRY & NEUROLOGY

## 2024-01-08 PROCEDURE — 1200000000 HC SEMI PRIVATE

## 2024-01-08 PROCEDURE — 6360000002 HC RX W HCPCS: Performed by: PSYCHIATRY & NEUROLOGY

## 2024-01-08 RX ORDER — SODIUM CHLORIDE 9 MG/ML
INJECTION, SOLUTION INTRAVENOUS PRN
Status: DISCONTINUED | OUTPATIENT
Start: 2024-01-08 | End: 2024-01-12 | Stop reason: HOSPADM

## 2024-01-08 RX ORDER — ACETAMINOPHEN 650 MG/1
650 SUPPOSITORY RECTAL EVERY 6 HOURS PRN
Status: DISCONTINUED | OUTPATIENT
Start: 2024-01-08 | End: 2024-01-12 | Stop reason: HOSPADM

## 2024-01-08 RX ORDER — ENOXAPARIN SODIUM 100 MG/ML
40 INJECTION SUBCUTANEOUS DAILY
OUTPATIENT
Start: 2024-01-09

## 2024-01-08 RX ORDER — CASTOR OIL AND BALSAM, PERU 788; 87 MG/G; MG/G
OINTMENT TOPICAL 2 TIMES DAILY
OUTPATIENT
Start: 2024-01-09

## 2024-01-08 RX ORDER — TRAZODONE HYDROCHLORIDE 50 MG/1
50 TABLET ORAL NIGHTLY PRN
OUTPATIENT
Start: 2024-01-08

## 2024-01-08 RX ORDER — MAGNESIUM SULFATE IN WATER 40 MG/ML
2000 INJECTION, SOLUTION INTRAVENOUS PRN
Status: DISCONTINUED | OUTPATIENT
Start: 2024-01-08 | End: 2024-01-12 | Stop reason: HOSPADM

## 2024-01-08 RX ORDER — POTASSIUM CHLORIDE 750 MG/1
40 TABLET, FILM COATED, EXTENDED RELEASE ORAL PRN
Status: DISCONTINUED | OUTPATIENT
Start: 2024-01-08 | End: 2024-01-12 | Stop reason: HOSPADM

## 2024-01-08 RX ORDER — POTASSIUM CHLORIDE 7.45 MG/ML
10 INJECTION INTRAVENOUS PRN
Status: DISCONTINUED | OUTPATIENT
Start: 2024-01-08 | End: 2024-01-12 | Stop reason: HOSPADM

## 2024-01-08 RX ORDER — HALOPERIDOL 5 MG/ML
5 INJECTION INTRAMUSCULAR EVERY 4 HOURS PRN
OUTPATIENT
Start: 2024-01-08

## 2024-01-08 RX ORDER — HALOPERIDOL 5 MG/1
5 TABLET ORAL EVERY 4 HOURS PRN
OUTPATIENT
Start: 2024-01-08

## 2024-01-08 RX ORDER — DIPHENHYDRAMINE HYDROCHLORIDE 50 MG/ML
50 INJECTION INTRAMUSCULAR; INTRAVENOUS EVERY 4 HOURS PRN
OUTPATIENT
Start: 2024-01-08

## 2024-01-08 RX ORDER — SODIUM CHLORIDE 0.9 % (FLUSH) 0.9 %
5-40 SYRINGE (ML) INJECTION EVERY 12 HOURS SCHEDULED
Status: DISCONTINUED | OUTPATIENT
Start: 2024-01-08 | End: 2024-01-12 | Stop reason: HOSPADM

## 2024-01-08 RX ORDER — TOPIRAMATE 100 MG/1
100 TABLET, FILM COATED ORAL 2 TIMES DAILY
OUTPATIENT
Start: 2024-01-08

## 2024-01-08 RX ORDER — ONDANSETRON 2 MG/ML
4 INJECTION INTRAMUSCULAR; INTRAVENOUS EVERY 6 HOURS PRN
Status: DISCONTINUED | OUTPATIENT
Start: 2024-01-08 | End: 2024-01-12 | Stop reason: HOSPADM

## 2024-01-08 RX ORDER — ENOXAPARIN SODIUM 100 MG/ML
40 INJECTION SUBCUTANEOUS DAILY
Status: DISCONTINUED | OUTPATIENT
Start: 2024-01-09 | End: 2024-01-12 | Stop reason: HOSPADM

## 2024-01-08 RX ORDER — HYDROXYZINE HYDROCHLORIDE 25 MG/1
50 TABLET, FILM COATED ORAL 3 TIMES DAILY PRN
OUTPATIENT
Start: 2024-01-08

## 2024-01-08 RX ORDER — MAGNESIUM HYDROXIDE/ALUMINUM HYDROXICE/SIMETHICONE 120; 1200; 1200 MG/30ML; MG/30ML; MG/30ML
30 SUSPENSION ORAL EVERY 6 HOURS PRN
OUTPATIENT
Start: 2024-01-08

## 2024-01-08 RX ORDER — ATROPINE SULFATE 10 MG/ML
1 SOLUTION/ DROPS OPHTHALMIC NIGHTLY PRN
OUTPATIENT
Start: 2024-01-08

## 2024-01-08 RX ORDER — SODIUM CHLORIDE 0.9 % (FLUSH) 0.9 %
5-40 SYRINGE (ML) INJECTION PRN
Status: DISCONTINUED | OUTPATIENT
Start: 2024-01-08 | End: 2024-01-12 | Stop reason: HOSPADM

## 2024-01-08 RX ORDER — POLYETHYLENE GLYCOL 3350 17 G/17G
17 POWDER, FOR SOLUTION ORAL DAILY PRN
OUTPATIENT
Start: 2024-01-08

## 2024-01-08 RX ORDER — OLANZAPINE 5 MG/1
10 TABLET, ORALLY DISINTEGRATING ORAL NIGHTLY
Status: CANCELLED | OUTPATIENT
Start: 2024-01-09

## 2024-01-08 RX ORDER — CHLORPROMAZINE HYDROCHLORIDE 25 MG/ML
50 INJECTION INTRAMUSCULAR DAILY PRN
OUTPATIENT
Start: 2024-01-08

## 2024-01-08 RX ORDER — POLYETHYLENE GLYCOL 3350 17 G/17G
17 POWDER, FOR SOLUTION ORAL DAILY PRN
Status: DISCONTINUED | OUTPATIENT
Start: 2024-01-08 | End: 2024-01-12 | Stop reason: HOSPADM

## 2024-01-08 RX ORDER — ONDANSETRON 4 MG/1
4 TABLET, ORALLY DISINTEGRATING ORAL EVERY 8 HOURS PRN
Status: DISCONTINUED | OUTPATIENT
Start: 2024-01-08 | End: 2024-01-12 | Stop reason: HOSPADM

## 2024-01-08 RX ORDER — ACETAMINOPHEN 325 MG/1
650 TABLET ORAL EVERY 6 HOURS PRN
Status: DISCONTINUED | OUTPATIENT
Start: 2024-01-08 | End: 2024-01-12 | Stop reason: HOSPADM

## 2024-01-08 RX ORDER — ACETAMINOPHEN 325 MG/1
650 TABLET ORAL EVERY 4 HOURS PRN
OUTPATIENT
Start: 2024-01-08

## 2024-01-08 RX ADMIN — LORAZEPAM 1 MG: 2 INJECTION INTRAMUSCULAR; INTRAVENOUS at 09:06

## 2024-01-08 RX ADMIN — ENOXAPARIN SODIUM 40 MG: 100 INJECTION SUBCUTANEOUS at 15:24

## 2024-01-08 RX ADMIN — WATER 10 MG: 1 INJECTION INTRAMUSCULAR; INTRAVENOUS; SUBCUTANEOUS at 21:07

## 2024-01-08 ASSESSMENT — PAIN DESCRIPTION - LOCATION: LOCATION: ANKLE

## 2024-01-08 ASSESSMENT — PAIN DESCRIPTION - DESCRIPTORS: DESCRIPTORS: PATIENT UNABLE TO DESCRIBE

## 2024-01-08 ASSESSMENT — PAIN DESCRIPTION - ORIENTATION: ORIENTATION: RIGHT

## 2024-01-08 NOTE — BH NOTE
Chief Complaint: mute    Length of Stay: 30 Days    Interval History:  melvin on the floor. He received ECT this morning and was sleeping. He did not participate in the interview and did not speak or open his eyes. He appears disheveled. Per staff, he has been taking medications and no side effects have been observed or reported. He is also ambulating to the bathroom and using the facilities appropriately. No acute events reported over night.    12/13: Roge Cowan is doing poorly. Catatonic. He is mute, rigid, no eye contact, unable to follow commands. He is isolative to his room, has not been eating. Poor hygiene. No interaction is possible with him. He had his second ect today. Staff report he's been mute. He has been non adherent with his meds. Court order meds were approved Monday.     12/14 - Roge Cowan reports that he is not paddling and does not need a blanket although it is cool in the room and he was lying on his bed without a shirt on.  Poverty of content of speech with selective responses.  Emaciated and turns away when asked about suicide, homicide, or perceptual changes.  No aggression or violence.  Selectively  interactive and partially aware.  Refuses PO medications and received IM Ativan the past few days with positive effect. Eating very little and sleeping fairly.      12/15 - Roge Cowan received ECT treatments toda and remains selective in his interactions generally responding with body movements, shakes and flapping of a limb (hands or feet).  Nursing reports that he uses the bathroom appropriately and eats some.  Malodrous and emaciated.  No aggression or violence.  Interactive at times.  Limited awareness. Tolerating medications well.  Eating and sleeping fairly.    12/16 - Roge Cowan responds selectively.  Still sleeps on the floor.  Had ECT yesterday.  Moods are good.  Appropriately dressed and groomed.  Denies SI/HI/AH/VH.  No aggression or violence.  Isolative and  patient continues to need, on a daily basis, active treatment furnished directly by or requiring the supervision of inpatient psychiatric facility personnel. In addition, the hospital records show that services furnished were intensive treatment services, admission or related services, or equivalent services.

## 2024-01-08 NOTE — PROGRESS NOTES
[facility] Adult  Hospitalist Group                                                                                          Hospitalist Progress Note  Ugo Cabrera MD  Office Phone: [phone]        Date of Service:  2024  NAME:  Roge Cowan  :  1970  MRN:  [id number]       Admission Summary:   Roge Cowan is a 53 y.o.  male admitted to psychiatric unit with schizophrenia/catatonia and remains in fetal position all the time.  Medical consultation was requested with concerns of patient's risk for DVT, as patient remains immobile in crouched position on the floor all the time.  I went and evaluated patient.  Patient did not talk or answer questions and remained in crouched position on floor and it was very difficult to even examine the patient's lower extremities as patient did not cooperate at all.       Interval history / Subjective:   The moment writer entered the room with nurse Andreina, patient went into fetal position.       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  -erythema, warmth, some bleeding, no signs of purulence or fluctuation,   -patient removes dressings  -if patient continues to remove dressings and refusing medication, it can worsen into osteomyelitis   -patient has penicillin allergy, unable to clarify type of allergic reaction  -vancomycin IV    Failure to thrive  -unclear the amount of fluids/food he is consuming  -has stage 4 wound on the right later foot  and pressure points  -physical exam was limited as patient went into fetal position on arrival  -normal skin turgor whoever wasn't able to assess skin tugor on  thigh, calf, or forearm.  -to truly assess the need medical necessity, will need blood work:  Recommend:  -CBC, CMP, CPK, Mag, Phos  -UA reflex, urine sodium, urine creatinine, urine osmolarity  -c/w wound care    I'm unable to determine the degree of dehydration or electrolyte/protein deficit the patient is as

## 2024-01-08 NOTE — GROUP NOTE
Group Therapy Note    Date: 1/8/2024    Group Start Time: 1400  Group End Time: 1500  Group Topic: Recreational    [facility]    Janessa Beverly        Group Therapy Note         Patient's Goal:  To concentrate on selected task             Notes:  Pt did not attend session    Discipline Responsible: Recreational Therapist      Signature:  JANESSA BEVERLY

## 2024-01-08 NOTE — BH NOTE
Morning assessment complete.     0745: Patient was encountered in room observed laying in fetal position on the floor. Eye contact is noted to be poor. Patient nonverbal at this time. Writer introduced self and BHT. Patient reoriented to day and time. Patient refused VS.     Reddened area noted on ankle. When assessed, patient withdraws from touch. Moans slightly. Writer unable to accurately assess patients pain level.     0906: Court ordered IM alternative, Ativan.     At approximately 1015, writeyoandy and GAIL Berger to assess patients wound. Staff explained to patient that we needed to see his wound. When touched, patient says \"do not touch me\". Patient scooting around room refusing assessment. Patient finally allowed staff to view right ankle. Area reddened. Wound noted.     Patient also states that he prefers to be called Cruzito.     At approximately 1035, staff attempted to give patient a bed bath. Hair washed. Hygiene performed to the best of staffs ability. Patient wound cleaned and dressed. Patient removed dressing. Wound care RN dressed wound again, patient again removed dressing.     Patient encouraged to remove pressure from bony prominences, especially right ankle. Patient resistant to position changes. Patient would return to fetal position.     At approximately 1140, patient observed sitting in front of lunch tray.     1320: Patient laying on floor with hands on hips, staring at the wall.     1345: During rounds, patient found lying on right side.     At approximately 1400, Jayson from [facility] came to see patient.     1524: Patient found in fetal position on floor. Patient given Lovenox injection.     At approximately 1545: Dr. Birmingham assessed patient with writer. Bleeding noted from right ankle wound. Writer attempted to clean blood, patient did not allow.    1745: Patient sitting with legs crossed on the floor. Writer introduced self and attempted to clean wound. Patient then returned to fetal

## 2024-01-08 NOTE — PROGRESS NOTES
Behavioral Services                                              Medicare Re-Certification    I certify that the inpatient psychiatric hospital services furnished since the previous certification/re-certification were, and continue to be, medically necessary for;    [x] (1) Treatment which could reasonably be expected to improve the patient's condition,    [x] (2) Or for diagnostic study.    Estimated length of stay/service 5 - 7 days    Plan for post-hospital care per social work    This patient continues to need, on a daily basis, active treatment furnished directly by or requiring the supervision of inpatient psychiatric personnel.    Electronically signed by Rustam Rivero MD on 1/8/2024 at 1:31 PM

## 2024-01-08 NOTE — BH NOTE
Chief Complaint: mute    Length of Stay: 31 Days    Interval History:  melvin on the floor. He received ECT this morning and was sleeping. He did not participate in the interview and did not speak or open his eyes. He appears disheveled. Per staff, he has been taking medications and no side effects have been observed or reported. He is also ambulating to the bathroom and using the facilities appropriately. No acute events reported over night.    12/13: Roge Cowan is doing poorly. Catatonic. He is mute, rigid, no eye contact, unable to follow commands. He is isolative to his room, has not been eating. Poor hygiene. No interaction is possible with him. He had his second ect today. Staff report he's been mute. He has been non adherent with his meds. Court order meds were approved Monday.     12/14 - Roge Cowan reports that he is not paddling and does not need a blanket although it is cool in the room and he was lying on his bed without a shirt on.  Poverty of content of speech with selective responses.  Emaciated and turns away when asked about suicide, homicide, or perceptual changes.  No aggression or violence.  Selectively  interactive and partially aware.  Refuses PO medications and received IM Ativan the past few days with positive effect. Eating very little and sleeping fairly.      12/15 - Roge Cowan received ECT treatments toda and remains selective in his interactions generally responding with body movements, shakes and flapping of a limb (hands or feet).  Nursing reports that he uses the bathroom appropriately and eats some.  Malodrous and emaciated.  No aggression or violence.  Interactive at times.  Limited awareness. Tolerating medications well.  Eating and sleeping fairly.    12/16 - Roge Cowan responds selectively.  Still sleeps on the floor.  Had ECT yesterday.  Moods are good.  Appropriately dressed and groomed.  Denies SI/HI/AH/VH.  No aggression or violence.  Isolative and  hydroxide-simethicone (MAALOX) 200-200-20 MG/5ML suspension 30 mL  30 mL Oral Q6H PRN Rustam Rivero MD        hydrOXYzine HCl (ATARAX) tablet 50 mg  50 mg Oral TID PRN Rustam Rivero MD        haloperidol (HALDOL) tablet 5 mg  5 mg Oral Q4H PRN Rustam Rivero MD        Or    haloperidol lactate (HALDOL) injection 5 mg  5 mg IntraMUSCular Q4H PRN Rustam Rivero MD        diphenhydrAMINE (BENADRYL) injection 50 mg  50 mg IntraMUSCular Q4H PRN Rustam Rivero MD        traZODone (DESYREL) tablet 50 mg  50 mg Oral Nightly PRN Rustam Rivero MD             Mental Status Exam:  Eye contact: none   Grooming: poor  Psychomotor activity: flapps feet when someone talks to him  Speech is mute  Mood is YOEL  Affect:Blunted   Perception: YOEL  Suicidal ideation: YOEL   Cognition is grossly impaired    Vitals:    24 1230   Resp: 20        Physical Exam:  Body habitus: Body mass index is 24.14 kg/m².  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   Continue inpatient stay for the safety and optimum care of the patient with medicine consult for assist with care   Routine labs to be ordered as needed.   Supportive, milieu and group therapy.   Strengths include taking medications   Care conferences being done to maximize his care.  Continue the medication regimen as prescribed  Disposition planning to continue.       A coordinated, multidisplinary treatment team round was conducted with the

## 2024-01-08 NOTE — BH NOTE
Social work called [facility] to obtain guardianship paperwork (pt sister reportedly guardian) as pt sister has not called [facility] back and has not made contact with  the entirety of pt being admitted. [facility] reports they will fax paperwork to social work.    EMERY Garcia

## 2024-01-08 NOTE — BH NOTE
Patient was met in a kneeling  position with his head bent to the floor. He is naked, mute, poorly groomed, and hyperventilating. Patient is uncooperative with care, refused vital signs check, and treatment of pressure areas. IM court ordered Zyprexa given. Will continue to monitor.   0600 : Patient continue to maintain a kneeling position, sometime lie on the bed but comes back to a fetal position when he sees staff members. Patient is able to eat his food, but urinates on the floor. ADL is poor. Writer cleaned the room, linens changed. All effort to help patient go back to the bed failed. Patient slept for 1 hour.

## 2024-01-08 NOTE — PLAN OF CARE
Problem: Skin/Tissue Integrity  Goal: Absence of new skin breakdown  Description: 1.  Monitor for areas of redness and/or skin breakdown  2.  Assess vascular access sites hourly  3.  Every 4-6 hours minimum:  Change oxygen saturation probe site  4.  Every 4-6 hours:  If on nasal continuous positive airway pressure, respiratory therapy assess nares and determine need for appliance change or resting period.  1/8/2024 0806 by Andreina Munoz RN  Outcome: Progressing

## 2024-01-08 NOTE — WOUND CARE
WOUND Note: 1/8/24     Follow up for redness to bilat knees, ankles, feet and elbows     Chart shows:  Admitted on 12/7/23 for Schizoaffective disorder  History of Paranoid schizophrenia, confusion, withdrawal syndrome, psychotic disorder     Assessment:   Patient was laying on floor mattress with legs extended. As soon as staff walked in he immediately assumed a fetal position.   Patient very resistant to any interaction or touch. Hand placed on back and patient noted \"Don't touch me!\" When patient called Isreal (name called by staff) he said \"I'm not Isreal, I'm Cruzito\". Patient moving from one place to another and assuming fetal position to get away from staff.   4 staff members assisted in providing necessary hygiene care. Hair washed and body washed as much as patient allowed.   Able to assess right ankle, area that was a ruptured blister last week and classified as a stage 2; now it is a stage 4.     Stage 4 pressure injury on R ankle:  Size of wound seems to be about 4.0 x 4.0 x 0.3 cm. There is approx. 25% eschar; 75% pale pink/gray tissue with tendon exposed and a developing tunnel. Fluctuant at wound edges.  Periwound with non-blanchable erythema at some places. Bright pink. Patient also developing non-blanchable erythema on bilat knees and left ankle.   Finally after struggling with patient to apply dressing on right ankle patient removed it. Tried again to replace dressing and removed it once more. Tried 3 times with no success. Patient prefers being naked with nothing touching skin.   Patient also kicked this nurse on arms and hands while trying to provide wound care.    TX: Cleanse area with soap and water. First applied silicone foam dressing and it easily came off when patient was rubbing feet together.   Afterwards places Medihoney with dry gauze and secured with medipore tape and patient removed it twice. Unable to do proper wound care due to resistance and time patient allows to touch leg.     Wound  care recommendations:    Apply Medihoney gel with moist to  dry dressing and cover with Medipore tape.    Transition of Care: Interdisciplinary zoom meeting at 2pm.    Discussed with Lashonda, nurse director; Delores Hogan RN, GAIL Hdz, RN, BSN  Wound Care Nurse, [facility]  [phone]

## 2024-01-08 NOTE — BH NOTE
Behavioral Health Treatment Team Note     Patient goal(s) for today: pt does not identify a goal  Treatment team focus/goals: continue monitor medication, adjust as needed    Progress note: Pt presentation unchanged. Pt naked, mute, poorly groomed. Pt malodorous and appears to be urinating on self and floor.     LOS:  31  Expected LOS: TBD    Insurance info/prescription coverage:  MEDICARE  Date of last family contact:  [facility] 1/2/24 and [facility]  Family requesting physician contact today:  No  Discharge plan:  TBD  Guns in the home:  No  Outpatient provider(s):  [facility]     Participating treatment team members: Nba Wilson MSW

## 2024-01-09 ENCOUNTER — APPOINTMENT (OUTPATIENT)
Facility: HOSPITAL | Age: 54
DRG: 885 | End: 2024-01-09
Attending: INTERNAL MEDICINE
Payer: MEDICARE

## 2024-01-09 LAB
ALBUMIN SERPL-MCNC: 3.4 G/DL (ref 3.5–5)
ALBUMIN/GLOB SERPL: 0.8 (ref 1.1–2.2)
ALP SERPL-CCNC: 131 U/L (ref 45–117)
ALT SERPL-CCNC: 71 U/L (ref 12–78)
ANION GAP SERPL CALC-SCNC: 14 MMOL/L (ref 5–15)
ANION GAP SERPL CALC-SCNC: 4 MMOL/L (ref 5–15)
AST SERPL-CCNC: 49 U/L (ref 15–37)
BASOPHILS # BLD: 0.1 K/UL (ref 0–0.1)
BASOPHILS NFR BLD: 1 % (ref 0–1)
BILIRUB SERPL-MCNC: 0.6 MG/DL (ref 0.2–1)
BUN SERPL-MCNC: 19 MG/DL (ref 6–20)
BUN SERPL-MCNC: 26 MG/DL (ref 6–20)
BUN/CREAT SERPL: 23 (ref 12–20)
BUN/CREAT SERPL: 25 (ref 12–20)
CALCIUM SERPL-MCNC: 8.8 MG/DL (ref 8.5–10.1)
CALCIUM SERPL-MCNC: 9.4 MG/DL (ref 8.5–10.1)
CHLORIDE SERPL-SCNC: 106 MMOL/L (ref 97–108)
CHLORIDE SERPL-SCNC: 109 MMOL/L (ref 97–108)
CK SERPL-CCNC: 731 U/L (ref 39–308)
CO2 SERPL-SCNC: 26 MMOL/L (ref 21–32)
CO2 SERPL-SCNC: 26 MMOL/L (ref 21–32)
CREAT SERPL-MCNC: 0.84 MG/DL (ref 0.7–1.3)
CREAT SERPL-MCNC: 1.05 MG/DL (ref 0.7–1.3)
DIFFERENTIAL METHOD BLD: NORMAL
EOSINOPHIL # BLD: 0.1 K/UL (ref 0–0.4)
EOSINOPHIL NFR BLD: 2 % (ref 0–7)
ERYTHROCYTE [DISTWIDTH] IN BLOOD BY AUTOMATED COUNT: 13.2 % (ref 11.5–14.5)
GLOBULIN SER CALC-MCNC: 4.5 G/DL (ref 2–4)
GLUCOSE SERPL-MCNC: 83 MG/DL (ref 65–100)
GLUCOSE SERPL-MCNC: 98 MG/DL (ref 65–100)
HCT VFR BLD AUTO: 41.4 % (ref 36.6–50.3)
HGB BLD-MCNC: 12.9 G/DL (ref 12.1–17)
IMM GRANULOCYTES # BLD AUTO: 0 K/UL (ref 0–0.04)
IMM GRANULOCYTES NFR BLD AUTO: 0 % (ref 0–0.5)
LACTATE SERPL-SCNC: 1 MMOL/L (ref 0.4–2)
LACTATE SERPL-SCNC: 3.5 MMOL/L (ref 0.4–2)
LYMPHOCYTES # BLD: 2.1 K/UL (ref 0.8–3.5)
LYMPHOCYTES NFR BLD: 23 % (ref 12–49)
MAGNESIUM SERPL-MCNC: 2.1 MG/DL (ref 1.6–2.4)
MCH RBC QN AUTO: 29.3 PG (ref 26–34)
MCHC RBC AUTO-ENTMCNC: 31.2 G/DL (ref 30–36.5)
MCV RBC AUTO: 94.1 FL (ref 80–99)
MONOCYTES # BLD: 0.7 K/UL (ref 0–1)
MONOCYTES NFR BLD: 7 % (ref 5–13)
NEUTS SEG # BLD: 6.2 K/UL (ref 1.8–8)
NEUTS SEG NFR BLD: 67 % (ref 32–75)
NRBC # BLD: 0 K/UL (ref 0–0.01)
NRBC BLD-RTO: 0 PER 100 WBC
PHOSPHATE SERPL-MCNC: 4.3 MG/DL (ref 2.6–4.7)
PLATELET # BLD AUTO: 288 K/UL (ref 150–400)
PMV BLD AUTO: 11.2 FL (ref 8.9–12.9)
POTASSIUM SERPL-SCNC: 4 MMOL/L (ref 3.5–5.1)
POTASSIUM SERPL-SCNC: 4.3 MMOL/L (ref 3.5–5.1)
POTASSIUM SERPL-SCNC: 6.4 MMOL/L (ref 3.5–5.1)
PROT SERPL-MCNC: 7.9 G/DL (ref 6.4–8.2)
RBC # BLD AUTO: 4.4 M/UL (ref 4.1–5.7)
SODIUM SERPL-SCNC: 139 MMOL/L (ref 136–145)
SODIUM SERPL-SCNC: 146 MMOL/L (ref 136–145)
WBC # BLD AUTO: 9.1 K/UL (ref 4.1–11.1)

## 2024-01-09 PROCEDURE — 85025 COMPLETE CBC W/AUTO DIFF WBC: CPT

## 2024-01-09 PROCEDURE — 6360000002 HC RX W HCPCS: Performed by: HOSPITALIST

## 2024-01-09 PROCEDURE — 6360000002 HC RX W HCPCS: Performed by: INTERNAL MEDICINE

## 2024-01-09 PROCEDURE — 36415 COLL VENOUS BLD VENIPUNCTURE: CPT

## 2024-01-09 PROCEDURE — 73701 CT LOWER EXTREMITY W/DYE: CPT

## 2024-01-09 PROCEDURE — 82550 ASSAY OF CK (CPK): CPT

## 2024-01-09 PROCEDURE — 2580000003 HC RX 258: Performed by: HOSPITALIST

## 2024-01-09 PROCEDURE — 6360000004 HC RX CONTRAST MEDICATION: Performed by: INTERNAL MEDICINE

## 2024-01-09 PROCEDURE — 83605 ASSAY OF LACTIC ACID: CPT

## 2024-01-09 PROCEDURE — 84132 ASSAY OF SERUM POTASSIUM: CPT

## 2024-01-09 PROCEDURE — 87040 BLOOD CULTURE FOR BACTERIA: CPT

## 2024-01-09 PROCEDURE — 2580000003 HC RX 258: Performed by: INTERNAL MEDICINE

## 2024-01-09 PROCEDURE — 93005 ELECTROCARDIOGRAM TRACING: CPT | Performed by: INTERNAL MEDICINE

## 2024-01-09 PROCEDURE — 80053 COMPREHEN METABOLIC PANEL: CPT

## 2024-01-09 PROCEDURE — 84100 ASSAY OF PHOSPHORUS: CPT

## 2024-01-09 PROCEDURE — 83735 ASSAY OF MAGNESIUM: CPT

## 2024-01-09 PROCEDURE — 1200000000 HC SEMI PRIVATE

## 2024-01-09 PROCEDURE — 6370000000 HC RX 637 (ALT 250 FOR IP): Performed by: HOSPITALIST

## 2024-01-09 RX ORDER — OLANZAPINE 5 MG/1
10 TABLET, ORALLY DISINTEGRATING ORAL NIGHTLY
Status: DISCONTINUED | OUTPATIENT
Start: 2024-01-09 | End: 2024-01-12 | Stop reason: HOSPADM

## 2024-01-09 RX ORDER — SODIUM CHLORIDE, SODIUM LACTATE, POTASSIUM CHLORIDE, CALCIUM CHLORIDE 600; 310; 30; 20 MG/100ML; MG/100ML; MG/100ML; MG/100ML
INJECTION, SOLUTION INTRAVENOUS CONTINUOUS
Status: DISCONTINUED | OUTPATIENT
Start: 2024-01-09 | End: 2024-01-09

## 2024-01-09 RX ORDER — CLINDAMYCIN PHOSPHATE 900 MG/50ML
900 INJECTION, SOLUTION INTRAVENOUS EVERY 8 HOURS
Status: DISCONTINUED | OUTPATIENT
Start: 2024-01-09 | End: 2024-01-11

## 2024-01-09 RX ORDER — LORAZEPAM 2 MG/ML
2 INJECTION INTRAMUSCULAR EVERY 4 HOURS PRN
Status: DISCONTINUED | OUTPATIENT
Start: 2024-01-09 | End: 2024-01-12 | Stop reason: HOSPADM

## 2024-01-09 RX ORDER — LORAZEPAM 2 MG/ML
2 INJECTION INTRAMUSCULAR ONCE
Status: COMPLETED | OUTPATIENT
Start: 2024-01-09 | End: 2024-01-09

## 2024-01-09 RX ORDER — DIPHENHYDRAMINE HYDROCHLORIDE 50 MG/ML
50 INJECTION INTRAMUSCULAR; INTRAVENOUS ONCE
Status: COMPLETED | OUTPATIENT
Start: 2024-01-09 | End: 2024-01-09

## 2024-01-09 RX ORDER — LORAZEPAM 2 MG/ML
3 INJECTION INTRAMUSCULAR ONCE
Status: COMPLETED | OUTPATIENT
Start: 2024-01-09 | End: 2024-01-09

## 2024-01-09 RX ORDER — HALOPERIDOL 5 MG/ML
2 INJECTION INTRAMUSCULAR EVERY 6 HOURS PRN
Status: DISCONTINUED | OUTPATIENT
Start: 2024-01-09 | End: 2024-01-12 | Stop reason: HOSPADM

## 2024-01-09 RX ORDER — SODIUM CHLORIDE, SODIUM LACTATE, POTASSIUM CHLORIDE, AND CALCIUM CHLORIDE .6; .31; .03; .02 G/100ML; G/100ML; G/100ML; G/100ML
1000 INJECTION, SOLUTION INTRAVENOUS ONCE
Status: COMPLETED | OUTPATIENT
Start: 2024-01-09 | End: 2024-01-09

## 2024-01-09 RX ORDER — DOXYCYCLINE HYCLATE 100 MG
100 TABLET ORAL EVERY 12 HOURS SCHEDULED
Status: DISCONTINUED | OUTPATIENT
Start: 2024-01-09 | End: 2024-01-09

## 2024-01-09 RX ORDER — LORAZEPAM 2 MG/ML
1 INJECTION INTRAMUSCULAR ONCE
Status: COMPLETED | OUTPATIENT
Start: 2024-01-09 | End: 2024-01-09

## 2024-01-09 RX ORDER — LORAZEPAM 2 MG/ML
2 INJECTION INTRAMUSCULAR EVERY 4 HOURS PRN
Status: DISCONTINUED | OUTPATIENT
Start: 2024-01-09 | End: 2024-01-09

## 2024-01-09 RX ORDER — CLINDAMYCIN HYDROCHLORIDE 150 MG/1
450 CAPSULE ORAL EVERY 8 HOURS SCHEDULED
Status: DISCONTINUED | OUTPATIENT
Start: 2024-01-09 | End: 2024-01-11

## 2024-01-09 RX ADMIN — VANCOMYCIN HYDROCHLORIDE 750 MG: 750 INJECTION, POWDER, LYOPHILIZED, FOR SOLUTION INTRAVENOUS at 05:57

## 2024-01-09 RX ADMIN — ZIPRASIDONE MESYLATE 20 MG: 20 INJECTION, POWDER, LYOPHILIZED, FOR SOLUTION INTRAMUSCULAR at 02:14

## 2024-01-09 RX ADMIN — HALOPERIDOL LACTATE 2 MG: 5 INJECTION, SOLUTION INTRAMUSCULAR at 03:53

## 2024-01-09 RX ADMIN — SODIUM CHLORIDE, POTASSIUM CHLORIDE, SODIUM LACTATE AND CALCIUM CHLORIDE: 600; 310; 30; 20 INJECTION, SOLUTION INTRAVENOUS at 04:43

## 2024-01-09 RX ADMIN — IOPAMIDOL 100 ML: 755 INJECTION, SOLUTION INTRAVENOUS at 13:15

## 2024-01-09 RX ADMIN — DOXYCYCLINE HYCLATE 100 MG: 100 TABLET, COATED ORAL at 05:58

## 2024-01-09 RX ADMIN — CLINDAMYCIN PHOSPHATE 900 MG: 900 INJECTION, SOLUTION INTRAVENOUS at 15:04

## 2024-01-09 RX ADMIN — DIPHENHYDRAMINE HYDROCHLORIDE 50 MG: 50 INJECTION INTRAMUSCULAR; INTRAVENOUS at 02:15

## 2024-01-09 RX ADMIN — LORAZEPAM 2 MG: 2 INJECTION INTRAMUSCULAR; INTRAVENOUS at 11:28

## 2024-01-09 RX ADMIN — CLINDAMYCIN PHOSPHATE 900 MG: 900 INJECTION, SOLUTION INTRAVENOUS at 22:55

## 2024-01-09 RX ADMIN — SODIUM CHLORIDE, POTASSIUM CHLORIDE, SODIUM LACTATE AND CALCIUM CHLORIDE 1000 ML: 600; 310; 30; 20 INJECTION, SOLUTION INTRAVENOUS at 12:05

## 2024-01-09 RX ADMIN — WATER 10 MG: 1 INJECTION INTRAMUSCULAR; INTRAVENOUS; SUBCUTANEOUS at 20:02

## 2024-01-09 RX ADMIN — ENOXAPARIN SODIUM 40 MG: 100 INJECTION SUBCUTANEOUS at 08:34

## 2024-01-09 RX ADMIN — SODIUM CHLORIDE, POTASSIUM CHLORIDE, SODIUM LACTATE AND CALCIUM CHLORIDE 1000 ML: 600; 310; 30; 20 INJECTION, SOLUTION INTRAVENOUS at 03:46

## 2024-01-09 RX ADMIN — LORAZEPAM 2 MG: 2 INJECTION INTRAMUSCULAR; INTRAVENOUS at 21:40

## 2024-01-09 RX ADMIN — LORAZEPAM 2 MG: 2 INJECTION INTRAMUSCULAR; INTRAVENOUS at 02:13

## 2024-01-09 RX ADMIN — LORAZEPAM 3 MG: 2 INJECTION INTRAMUSCULAR; INTRAVENOUS at 12:11

## 2024-01-09 RX ADMIN — SODIUM CHLORIDE, PRESERVATIVE FREE 10 ML: 5 INJECTION INTRAVENOUS at 20:10

## 2024-01-09 RX ADMIN — HALOPERIDOL LACTATE 2 MG: 5 INJECTION, SOLUTION INTRAMUSCULAR at 18:03

## 2024-01-09 RX ADMIN — HALOPERIDOL LACTATE 2 MG: 5 INJECTION, SOLUTION INTRAMUSCULAR at 11:28

## 2024-01-09 RX ADMIN — LORAZEPAM 2 MG: 2 INJECTION INTRAMUSCULAR; INTRAVENOUS at 17:32

## 2024-01-09 RX ADMIN — VANCOMYCIN HYDROCHLORIDE 1000 MG: 1 INJECTION, POWDER, LYOPHILIZED, FOR SOLUTION INTRAVENOUS at 04:40

## 2024-01-09 RX ADMIN — LORAZEPAM 1 MG: 2 INJECTION INTRAMUSCULAR; INTRAVENOUS at 00:34

## 2024-01-09 ASSESSMENT — PAIN SCALES - GENERAL: PAINLEVEL_OUTOF10: 0

## 2024-01-09 ASSESSMENT — PAIN SCALES - WONG BAKER: WONGBAKER_NUMERICALRESPONSE: 0

## 2024-01-09 NOTE — PROGRESS NOTES
2335-Pt arrived to med surg with admitting staff on hospital bed, once in hospital room pt, got into fetal position refused to allow staff to help him and get his vitals. With multiple staff at bedside,CHG bath, vitals, IV  and tele leads placed,labs were obtained. Skin assessment was performed, pt has red areas to bilateral elbows, bilateral knees, a  stage 4 would to his right ankle and red boggy area to left ankle, petechiae noted to upper back and chest. Pt is selectively mute and is not able to make needs known.Pt required verbal redirection from staff during admission process, pt not receptive and began placing himself in unsafe positions while on the hospital bed. Staff made pt aware that he is in a safe place and gave words of comfort. Pt continued to place himself in unsafe positions while on the hospital bed, and pulled his IV out with his teeth. Nutrition offered pt declined.    0030- Provider made aware of behaviors , ativan and restraints were ordered (initially bilateral wrists) and applied in efforts to continue to care for pt and maintain safety. Staff and security at bedside, awaiting time for pt to calm down. Pt continued attempting to get out of restraints.     0100-Four pt restraints ordered and placed, pt still not calm to have IV placed as he continues to fight against the restraints and maintain unsafe behaviors. Nutrition offered pt declined.    0132-Critical lab called to provider of pt's lactic acid 3.5, LR ordered, pt continues to display unsafe behaviors. Provider ordered additional medication in efforts to help pt calm down so that care can continue safely.    0200-Provider called the unit, pt's care discussed and this writer expressed concern over pt's behaviors and medical needs.    0213-Ativan 1mg, [id number] and benadryl 50mg IM given so pt can calm down and care can continue safely.    0240- Pt continues to bite and pull at restraints, security called at bedside and pt able to calm

## 2024-01-09 NOTE — PROGRESS NOTES
Assumed care of patient 1930    Patient is observed lying on his mattress on the floor. Appeared to have been sleeping when this writer first walked into the room. When patient heard this writer speak he curled up into the fetal position. He did not respond to any questions.   Refused to allow staff to check VS and refused to take PO medication.   Patient given IM Zyprexa with no issue.    Danny NP notified of medical TDO and discharge order was put in for patient to transfer to the medical unit.

## 2024-01-09 NOTE — BH NOTE
Social work contacted [facility] to obtain guardianship paperwork as pt sister has not responded to calls. [facility] reports they will send guardianship paperwork and that sister has been messaging them reporting she cannot take care of pt at this time. [facility] reports they are not [place] in any areas. Social work waiting for [facility] to send guardianship paperwork.    EMERY Garcia

## 2024-01-09 NOTE — PROGRESS NOTES
Pt transferred to PCU. Pt agitated, aggressive and fighting the staff to keep the monitors on and iv fluids .     0420 iv vanc infusing    0445 twisting and turning and biting on his wrist restraints. Applied new wrist restraints after he bit the first one.    0510 Pt violently shaking to get out of restraints. Attempted to calm down and orient multiple times but failed.    0600 Lactic acid drawn and sent to lab.    0605 took the restraints off . Pt promised to keep the IV and not to pull it out.     0640 Pt iv infusing.    0647 Pt lactic acid came down to 1.    0650 Pt declined food and drinks. Pt refused to use the urinal.

## 2024-01-09 NOTE — BH NOTE
Behavioral Health Transition Record to Provider    Patient Name: Roge Cowan  YOB: 1970  Medical Record Number: [id number]  Date of Admission: 12/7/2023  Date of Discharge: 1/8/2024    Attending Provider: No att. providers found  Discharging Provider:   To contact this individual call [phone] and ask the  to page.  If unavailable, ask to be transferred to Behavioral Health Provider on call.  A Behavioral Health Provider will be available on call 24/7 and during holidays.    Primary Care Provider: Cole Boyd MD    Allergies   Allergen Reactions    Fluphenazine Other (See Comments)    Penicillins Other (See Comments)       Reason for Admission: Roge Cowan is a 53 y.o. White (non-) male who is currently admitted to the psychiatric floor at [facility].      He was brought to hospital in police custody for a mental health problem.  He reportedly has not been engaging in self care and has not been eating or drinking.  Patient unable to participate in the interview.  Maintains the fetal position and moves feet when spoken to.  Unable to obtain any information from him.    Admission Diagnosis: Schizoaffective disorder (HCC) [F25.9]    Procedure(s) with comments:  Procedure canceled in OR - Patient was uncooperative, unable to obtain IV. Canceled per Dr Larson    Results for orders placed or performed during the hospital encounter of 12/07/23   COVID-19 & Influenza Combo    Specimen: Nasopharyngeal   Result Value Ref Range    SARS-CoV-2, PCR Not detected NOTD      Rapid Influenza A By PCR Not detected      Rapid Influenza B By PCR Not detected     Urine Drug Screen   Result Value Ref Range    Amphetamine, Urine Negative NEG      Barbiturates, Urine Negative NEG      Benzodiazepines, Urine Negative NEG      Cocaine, Urine Negative NEG      Methadone, Urine Negative NEG      Opiates, Urine Negative NEG      PCP, Urine Negative NEG      THC, TH-Cannabinol, Urine  mg/dL    BUN 15 6 - 20 MG/DL    Creatinine 0.73 0.70 - 1.30 MG/DL    Bun/Cre Ratio 21 (H) 12 - 20      Est, Glom Filt Rate >60 >60 ml/min/1.73m2    Calcium 9.4 8.5 - 10.1 MG/DL    Total Bilirubin 0.6 0.2 - 1.0 MG/DL    ALT 23 12 - 78 U/L    AST 15 15 - 37 U/L    Alk Phosphatase 125 (H) 45 - 117 U/L    Total Protein 7.7 6.4 - 8.2 g/dL    Albumin 4.1 3.5 - 5.0 g/dL    Globulin 3.6 2.0 - 4.0 g/dL    Albumin/Globulin Ratio 1.1 1.1 - 2.2     Lipid Panel   Result Value Ref Range    Cholesterol, Total 157 <200 MG/DL    Triglycerides 126 <150 MG/DL    HDL 55 MG/DL    LDL Calculated 76.8 0 - 100 MG/DL    VLDL Cholesterol Calculated 25.2 MG/DL    Chol/HDL Ratio 2.9 0.0 - 5.0     TSH   Result Value Ref Range    TSH, 3RD GENERATION 1.36 0.36 - 3.74 uIU/mL   Hemoglobin A1C   Result Value Ref Range    Hemoglobin A1C 5.2 4.0 - 5.6 %    eAG 103 mg/dL   CK   Result Value Ref Range    Total  (H) 39 - 308 U/L   Vascular duplex lower extremity venous bilateral   Result Value Ref Range    Body Surface Area 2.03 m2       Immunizations administered during this encounter:   There is no immunization history on file for this patient.    Screening for Metabolic Disorders for Patients on Antipsychotic Medications  (Data obtained from the EMR)    Estimated Body Mass Index  Estimated body mass index is 24.14 kg/m² as calculated from the following:    Height as of this encounter: 1.829 m (6').    Weight as of this encounter: 80.7 kg (178 lb).     Vital Signs/Blood Pressure  BP (!) 144/84   Pulse 87   Temp 97.3 °F (36.3 °C) (Axillary)   Resp 20   Ht 1.829 m (6')   Wt 80.7 kg (178 lb)   SpO2 98%   BMI 24.14 kg/m²     Blood Glucose/Hemoglobin A1c  No results found for: \"GLU\", \"GLUCPOC\"    No results found for: \"HBA1C\"     Lipid Panel  Lab Results   Component Value Date/Time    CHOL 157 12/13/2023 06:02 AM    HDL 55 12/13/2023 06:02 AM        Discharge Diagnosis: Paranoid Schizophrenia    Discharge Plan: DISCHARGE TO MEDICAL UNIT

## 2024-01-09 NOTE — PROGRESS NOTES
[facility]: MED RECONCILIATION    Comments/Recommendations:   Source: pharmacist med rec on 12/11/23. Please refer to this note for additional information.      Prior to Admission Medications:   Prior to Admission Medications   Prescriptions Last Dose Informant   ARIPiprazole ER (ABILIFY MAINTENA) 400 MG PRSY     Sig: Inject 400 mg into the muscle every 28 days   LORazepam (ATIVAN) 1 MG tablet     Sig: Take 1 tablet by mouth in the morning and at bedtime.   atropine 1 % ophthalmic solution     Sig: Place 1-2 drops under the tongue nightly as needed (drooling)   cloZAPine (FAZACLO) 200 MG TBDP disintegrating tablet     Sig: Take 1 tablet by mouth 2 times daily Take with 25 mg tablet for total dose of 225 mg   cloZAPine (FAZACLO) 25 MG disintegrating tablet     Sig: Take 1 tablet by mouth 2 times daily Take with 200 mg tablet for total dose of 225 mg   topiramate (TOPAMAX) 100 MG tablet     Sig: Take 1 tablet by mouth 2 times daily   traZODone (DESYREL) 50 MG tablet     Sig: Take 1 tablet by mouth nightly as needed for Sleep      Facility-Administered Medications: None               Sera Almanza RPH  Contact: [phone]

## 2024-01-09 NOTE — PLAN OF CARE
Medical TDO expires 1858. Plan of care is as follows per interdisciplinary team:    To the best of our knowledge, and proceeding in good kelby, the patient has a court appointed legal guardian; his sister Eden (see CM notes.) Paperwork has been requested from [facility] who follows this patient. We have reached out to the legal guardian to request consent for medical treatment once the medical TDO expires. We have not been able to reach her. As the patient has been deemed incompetent by the court, to the best of our available knowledge, and is currently under a psychiatric commitment to this facility with an order to give medications and treatments against objection; he will continue to be treated at this facility for his safety and in his best interest. After the Medical TDO expires we will be limited to the treatments listed on his court order (see RN note at 602pm). However, in the event that the patient suffers a true medical emergency, we may proceed with care in the patient's best interest.     The patient is scheduled to be seen by the court in the morning for reevaluation of his commitment and at that time it is our intention to request necessary treatment which are not currently covered in his order for treatment. Per interdisciplinary team, by definition of commitment the patient currently lacks capacity.

## 2024-01-09 NOTE — PROGRESS NOTES
11:55 Responded to code CT. The pt has been moved from the floor to the bed 5 times this AM, he has removed all his lines, removed his monitoring, is naked, and is now showing additional breakdown to ankles based on his prone-fetal positioning. We have attempted redirection, repositioning, diversion, medication, environmental changes, and education.     1200: MD at bedside, along with nursing supervisor. Pt placed back in bed and refused to move from his detrimental positioning. Attempt to utilize roll belt was unsuccessful. Placed in Bilateral wrist restraints and ultimately bilateral ankle restraints. Pt then required the addition of mitts to keep him in the restraints. Face-to-face completed by supervisor & pt is being monitored 1:1 with Q15 minute safety checks. Circulation checked by myself and primary RN. Pt placed in a clean gown & bedding verified as clean and dry. Several pillows used for comfort, repositioning, and to pad prominences & elevate ankles. Seizure precautions in place. New Ivs started & labs drawn. External cath placed for ordered I&O. Once IV in place, the ordered hydration and IV medications provided. Lights dimmed for comfort.     Orders meet criteria for clinical justification per MD, alternatives tried repeatedly as listed above, Director (myself) notified at time of placement as I was at bedside for placement, safe application verified and quick release appropriately in place. Education provided to patient but there is no evidence of learning, dignity maintained for patient during restraint application.     Interdisciplinary care conference held to ensure best practice and to ensure safety compliance (safety & compliance, nursing, risk, and U leadership)  Able to verify that what we are physically doing for the patient (as listed above) is what the MD has ordered, and what is currently most appropriate for the patient. Per MD the patient should have a non-violent order placed despite

## 2024-01-09 NOTE — PROGRESS NOTES
Pt identification band changed.  Confirmed pt name and date of birth before placing new identification band on pt.  Old band was removed and shredded.

## 2024-01-09 NOTE — PROGRESS NOTES
Patient was able to eat about 70% of his BF himself. Took his doxycycline tab crushed in pudding. Place food with med by pt in bed and he will take it subsequently. Takes out his PIV.  Has gotten out of the bed onto the floor twice this morning. He is lifted off the floor by team each time and put on the bed. Pt has so far not exhibited any violent behavior.

## 2024-01-09 NOTE — PROGRESS NOTES
1215) Code Hossein called.  Soft restraints applied to ankles and wrist; mitts.  IV inserted R upper arm.  Wound care performed on R ankle; foam protectors applied to bilateral knees.  Verbal order for BMP from Dr. Cabrera.  Vitals taken. 3 mg of ativan given. Male external catheter applied. Second IV inserted in L AC.     1310) pt taken to CT    1750) Pt agitated after blood draw.  Pt attempt to remove wound dressing.  Pt attempt to move to foot of bed-- biting at dressings.   Roll belt applied-- discuss order with MD.      1803) Haldol given for agitation.  Pt attempt to tuck leg with pressure injury under body.     1810) Pt removed R wrist restrain and shade.  Range of motion performed and restraint reapplied. New dressing applied to R ankle.     1820) Message to Dr. Cabrera, do you want to continue IV fluid?  Pt refusing to drink oral fluids for us.   report that pt would only drink or eat when staff left the room.  Order to continue fluid.

## 2024-01-09 NOTE — PLAN OF CARE
Problem: Discharge Planning  Goal: Discharge to home or other facility with appropriate resources  Outcome: Not Progressing     Problem: Safety - Medical Restraint  Goal: Remains free of injury from restraints (Restraint for Interference with Medical Device)  Description: INTERVENTIONS:  1. Determine that other, less restrictive measures have been tried or would not be effective before applying the restraint  2. Evaluate the patient's condition at the time of restraint application  3. Inform patient/family regarding the reason for restraint  4. Q2H: Monitor safety, psychosocial status, comfort, nutrition and hydration  Outcome: Not Progressing  Flowsheets  Taken 1/9/2024 0508 by Darlene Erickson RN  Remains free of injury from restraints (restraint for interference with medical device): Determine that other, less restrictive measures have been tried or would not be effective before applying the restraint  Taken 1/9/2024 0430 by Darlene Erickson RN  Remains free of injury from restraints (restraint for interference with medical device): Determine that other, less restrictive measures have been tried or would not be effective before applying the restraint  Taken 1/9/2024 0100 by Kristal Lambert RN  Remains free of injury from restraints (restraint for interference with medical device):   Determine that other, less restrictive measures have been tried or would not be effective before applying the restraint   Evaluate the patient's condition at the time of restraint application   Inform patient/family regarding the reason for restraint   Every 2 hours: Monitor safety, psychosocial status, comfort, nutrition and hydration     Problem: Pain  Goal: Verbalizes/displays adequate comfort level or baseline comfort level  Outcome: Not Progressing     Problem: Risk for Elopement  Goal: Patient will not exit the unit/facility without proper excort  Outcome: Not Progressing     Problem: Discharge Planning  Goal:

## 2024-01-09 NOTE — PROGRESS NOTES
[facility]                                                                                          Hospitalist Progress Note  Ugo Cabrera MD  Office Phone: [phone]        Date of Service:  2024  NAME:  Roge Cowan  :  1970  MRN:  [id number]       Admission Summary:   Roge Cowan is a 53 y.o.  male admitted to psychiatric unit with schizophrenia/catatonia and remains in fetal position all the time.  Medical consultation was requested with concerns of patient's risk for DVT, as patient remains immobile in crouched position on the floor all the time.  I went and evaluated patient.  Patient did not talk or answer questions and remained in crouched position on floor and it was very difficult to even examine the patient's lower extremities as patient did not cooperate at all.          Interval history / Subjective:   Medical TDO completed on 2024     Assessment & Plan:                Code status:   Prophylaxis:   Central Line:     Care Plan discussed with:   Anticipated Disposition:   {Admission Status:31537539}  Cardiac monitoring: {Cardiac Monitorin}         Social Determinants of Health     Tobacco Use: High Risk (2024)    Patient History     Smoking Tobacco Use: Some Days     Smokeless Tobacco Use: Never     Passive Exposure: Not on file   Alcohol Use: Not At Risk (2023)    AUDIT-C     Frequency of Alcohol Consumption: Never     Average Number of Drinks: Patient does not drink     Frequency of Binge Drinking: Never   Financial Resource Strain: Not on file   Food Insecurity: Patient Declined (2023)    Hunger Vital Sign     Worried About Running Out of Food in the Last Year: Patient declined     Ran Out of Food in the Last Year: Patient declined   Transportation Needs: Patient Declined (2023)    PRAPARE - Transportation     Lack of Transportation (Medical): Patient declined     Lack of Transportation (Non-Medical):  OF STAY:   ACTUAL LENGTH OF STAY:          1                 Ugo Cabrera MD

## 2024-01-09 NOTE — CONSULTS
PSYCHIATRY CONSULT NOTE:    REASON FOR CONSULT:  psychotic behavior  Resistant to treatments    HISTORY OF PRESENTING COMPLAINT:  Roge Cowan is a 53 y.o. male who is currently admitted to the psychiatric floor at [facility].      He was brought to hospital in police custody for a mental health problem.  He reportedly has not been engaging in self care and has not been eating or drinking.  Patient unable to participate in the interview.  Maintains the fetal position and moves feet when spoken to.  Transferred to medical due to a progressive wound on ankle.  Eating some and has showered a few times with and without soap.  Maintains no clothing and crouching fetal position.  Has court ordered treatments including ECT but resists them all, removing IV's and not taking PO here for management of his condition.    PAST PSYCHIATRIC HISTORY:  In Patient Catatonia with ECT treatments in the past.    SUBSTANCE ABUSE HISTORY:  Social History     Substance and Sexual Activity   Drug Use No     Social History     Substance and Sexual Activity   Alcohol Use Never     Social History     Tobacco Use   Smoking Status Some Days   Smokeless Tobacco Never       PAST MEDICAL HISTORY:  Past Medical History:   Diagnosis Date    Psychiatric disorder     Psychotic disorder (HCC)     Withdrawal syndrome (HCC)        SOCIAL HISTORY:  See H & P    VITALS:  Vitals:    01/09/24 0822   BP: 101/80   Pulse: 94   Resp: 18   Temp: 98.6 °F (37 °C)   SpO2: 97%       MEDICATIONS:    Current Facility-Administered Medications:     haloperidol lactate (HALDOL) injection 2 mg, 2 mg, IntraMUSCular, Q6H PRN, Roxanna Ball MD, 2 mg at 01/09/24 0353    doxycycline hyclate (VIBRA-TABS) tablet 100 mg, 100 mg, Oral, 2 times per day, Roxanna Ball MD, 100 mg at 01/09/24 0558    OLANZapine (ZyPREXA) 10 mg in sterile water 2 mL injection, 10 mg, IntraMUSCular, QHS, Ugo Cabrera MD    LORazepam (ATIVAN) injection 2 mg, 2 mg, IntraMUSCular, Q4H  last evening.  Aware.  Insight is fair and judgement is impaired.      ASSESSMENT AND PLAN:  Chronic Paranoid Schizophrenia, Catatonia , Problems with primary support group, Problems related to social environment, Housing problems, Problems with access to health care services, and Other psychosocial or environmental problems  and 21-30 behavior considerably influenced by delusions or hallucinations OR serious impairment in judgment, communication OR inability to function in almost all areas    RECOMMENDATIONS:  Not a candidate for inpatient psychiatry at this time due to his skin breakdown on his leg that is progressing to the bone.  Continue 1:1 nursing  Psychiatry to follow  Continue current care with Court ordered treatments including ECT.  Thank you for this consultation    Rustam Rivero MD  1/9/2024

## 2024-01-09 NOTE — PROGRESS NOTES
Patient’s case reviewed during interdisciplinary team meeting in Med Surg/Tele Unit 2.  Rev. Echo Tang MDiv, [facility]

## 2024-01-09 NOTE — PROGRESS NOTES
Violent Restraint Face-to-Face Evaluation  (must be completed within one hour of initiation of restraints)        Evaluate immediate situation: Pt uncooperative with medical treatment. Staff attempting to have the patient rolled to back so that wound care & IV be placed for medication. Patient placed in restraints.     Reaction to intervention: Pt pulling away & trying to curl into a ball     Medical Condition/Assessment: stable      Behavioral Condition/Assessment: uncooperative      The patient’s review of systems, history, medications, and recent labs were reviewed at this time.      Continue/Discontinue restraints at this time:      One-Hour Review Evaluation Physician Notification:     Provider Notified (Name): MD Deborah   Date  1/9/2024    Time  11:55am     Physician or Designated One-Hour Reviewer:

## 2024-01-09 NOTE — CARE COORDINATION
CM review chart.  Patient arrives to ER under [facility] with [employer].   Patient admitted on 12/7/24. Patient transferred to medical floor 1/8/24.    Patient sister Eden Montalvo [phone]    PCP: Dr. Dmitry Motta [phone]    Psych @ [place] ACT team: Dr. Olivas    [facility] Team: [phone] after hours number.      with ACT team: Jayson Andrea [phone] or [phone]     : Lorene Pabon [phone]    [facility] [address] [facility] [phone]   Court declared patient legally incompetent on 10/29/18 and Sister Eden is guardian she receives letter of qualification on 7/25/19.    CM called Christina with the ACT team CM ask for copy of guardianship paperwork she states she does not see where she has that copy on file.     CM reach out to sister.     CM spoke with Claudia with the ACT team she is able to fax CM copy of the guardianship paperwork CM provide fax [phone]    Plan of care for discharge:  Confirm if sister willing to accept back   Placement   Guardianship paperwork from sister.  Cleared from PSYCH  Follow up with [facility] team    Minoo JIMENEZ     01/09/24 7576   Service Assessment   Patient Orientation Unable to Assess   Cognition Other (see comment)   History Provided By Medical Record;Other (see comment)   Support Systems Family Members  (sister)

## 2024-01-10 PROBLEM — L89.513 PRESSURE INJURY OF RIGHT ANKLE, STAGE 3 (HCC): Status: ACTIVE | Noted: 2024-01-10

## 2024-01-10 LAB
ACCESSION NUMBER, LLC1M: ABNORMAL
ACINETOBACTER CALCOAC BAUMANNII COMPLEX BY PCR: NOT DETECTED
APPEARANCE UR: CLEAR
B FRAGILIS DNA BLD POS QL NAA+NON-PROBE: NOT DETECTED
BACTERIA SPEC CULT: ABNORMAL
BACTERIA SPEC CULT: ABNORMAL
BACTERIA URNS QL MICRO: NEGATIVE /HPF
BILIRUB UR QL: NEGATIVE
BIOFIRE TEST COMMENT: ABNORMAL
C ALBICANS DNA BLD POS QL NAA+NON-PROBE: NOT DETECTED
C AURIS DNA BLD POS QL NAA+NON-PROBE: NOT DETECTED
C GATTII+NEOFOR DNA BLD POS QL NAA+N-PRB: NOT DETECTED
C GLABRATA DNA BLD POS QL NAA+NON-PROBE: NOT DETECTED
C KRUSEI DNA BLD POS QL NAA+NON-PROBE: NOT DETECTED
C PARAP DNA BLD POS QL NAA+NON-PROBE: NOT DETECTED
C TROPICLS DNA BLD POS QL NAA+NON-PROBE: NOT DETECTED
COLOR UR: ABNORMAL
CREAT UR-MCNC: 117 MG/DL
E CLOAC COMP DNA BLD POS NAA+NON-PROBE: NOT DETECTED
E COLI DNA BLD POS QL NAA+NON-PROBE: NOT DETECTED
E FAECALIS DNA BLD POS QL NAA+NON-PROBE: NOT DETECTED
E FAECIUM DNA BLD POS QL NAA+NON-PROBE: NOT DETECTED
EKG ATRIAL RATE: 82 BPM
EKG DIAGNOSIS: NORMAL
EKG P AXIS: 68 DEGREES
EKG P-R INTERVAL: 140 MS
EKG Q-T INTERVAL: 384 MS
EKG QRS DURATION: 84 MS
EKG QTC CALCULATION (BAZETT): 448 MS
EKG R AXIS: 56 DEGREES
EKG T AXIS: 59 DEGREES
EKG VENTRICULAR RATE: 82 BPM
ENTEROBACTERALES DNA BLD POS NAA+N-PRB: NOT DETECTED
EPITH CASTS URNS QL MICRO: ABNORMAL /LPF
GLUCOSE BLD STRIP.AUTO-MCNC: 93 MG/DL (ref 65–117)
GLUCOSE UR STRIP.AUTO-MCNC: NEGATIVE MG/DL
GP B STREP DNA BLD POS QL NAA+NON-PROBE: NOT DETECTED
HAEM INFLU DNA BLD POS QL NAA+NON-PROBE: NOT DETECTED
HGB UR QL STRIP: NEGATIVE
K OXYTOCA DNA BLD POS QL NAA+NON-PROBE: NOT DETECTED
KETONES UR QL STRIP.AUTO: NEGATIVE MG/DL
KLEBSIELLA SP DNA BLD POS QL NAA+NON-PRB: NOT DETECTED
KLEBSIELLA SP DNA BLD POS QL NAA+NON-PRB: NOT DETECTED
L MONOCYTOG DNA BLD POS QL NAA+NON-PROBE: NOT DETECTED
LEUKOCYTE ESTERASE UR QL STRIP.AUTO: NEGATIVE
MECA+MECC ISLT/SPM QL: NOT DETECTED
N MEN DNA BLD POS QL NAA+NON-PROBE: NOT DETECTED
NITRITE UR QL STRIP.AUTO: NEGATIVE
OSMOLALITY UR: 1013 MOSM/KG H2O
P AERUGINOSA DNA BLD POS NAA+NON-PROBE: NOT DETECTED
PH UR STRIP: 6.5 (ref 5–8)
PROT UR STRIP-MCNC: NEGATIVE MG/DL
PROTEUS SP DNA BLD POS QL NAA+NON-PROBE: NOT DETECTED
RBC #/AREA URNS HPF: ABNORMAL /HPF (ref 0–5)
RESISTANT GENE TARGETS: ABNORMAL
S AUREUS DNA BLD POS QL NAA+NON-PROBE: NOT DETECTED
S AUREUS+CONS DNA BLD POS NAA+NON-PROBE: DETECTED
S EPIDERMIDIS DNA BLD POS QL NAA+NON-PRB: DETECTED
S LUGDUNENSIS DNA BLD POS QL NAA+NON-PRB: NOT DETECTED
S MALTOPHILIA DNA BLD POS QL NAA+NON-PRB: NOT DETECTED
S MARCESCENS DNA BLD POS NAA+NON-PROBE: NOT DETECTED
S PNEUM DNA BLD POS QL NAA+NON-PROBE: NOT DETECTED
S PYO DNA BLD POS QL NAA+NON-PROBE: NOT DETECTED
SALMONELLA DNA BLD POS QL NAA+NON-PROBE: NOT DETECTED
SERVICE CMNT-IMP: ABNORMAL
SERVICE CMNT-IMP: NORMAL
SODIUM UR-SCNC: 217 MMOL/L
SP GR UR REFRACTOMETRY: >1.03 (ref 1–1.03)
STREPTOCOCCUS DNA BLD POS NAA+NON-PROBE: NOT DETECTED
URINE CULTURE IF INDICATED: ABNORMAL
UROBILINOGEN UR QL STRIP.AUTO: 1 EU/DL (ref 0.2–1)
WBC URNS QL MICRO: ABNORMAL /HPF (ref 0–4)

## 2024-01-10 PROCEDURE — 2580000003 HC RX 258: Performed by: INTERNAL MEDICINE

## 2024-01-10 PROCEDURE — 6360000002 HC RX W HCPCS: Performed by: HOSPITALIST

## 2024-01-10 PROCEDURE — 1200000000 HC SEMI PRIVATE

## 2024-01-10 PROCEDURE — 6360000002 HC RX W HCPCS: Performed by: INTERNAL MEDICINE

## 2024-01-10 PROCEDURE — 82570 ASSAY OF URINE CREATININE: CPT

## 2024-01-10 PROCEDURE — 93010 ELECTROCARDIOGRAM REPORT: CPT | Performed by: SPECIALIST

## 2024-01-10 PROCEDURE — 87147 CULTURE TYPE IMMUNOLOGIC: CPT

## 2024-01-10 PROCEDURE — 83935 ASSAY OF URINE OSMOLALITY: CPT

## 2024-01-10 PROCEDURE — 87205 SMEAR GRAM STAIN: CPT

## 2024-01-10 PROCEDURE — 84300 ASSAY OF URINE SODIUM: CPT

## 2024-01-10 PROCEDURE — 87070 CULTURE OTHR SPECIMN AEROBIC: CPT

## 2024-01-10 PROCEDURE — 81001 URINALYSIS AUTO W/SCOPE: CPT

## 2024-01-10 PROCEDURE — 82962 GLUCOSE BLOOD TEST: CPT

## 2024-01-10 RX ADMIN — SODIUM CHLORIDE, PRESERVATIVE FREE 10 ML: 5 INJECTION INTRAVENOUS at 20:30

## 2024-01-10 RX ADMIN — CLINDAMYCIN PHOSPHATE 900 MG: 900 INJECTION, SOLUTION INTRAVENOUS at 23:33

## 2024-01-10 RX ADMIN — HALOPERIDOL LACTATE 2 MG: 5 INJECTION, SOLUTION INTRAMUSCULAR at 02:46

## 2024-01-10 RX ADMIN — LORAZEPAM 2 MG: 2 INJECTION INTRAMUSCULAR; INTRAVENOUS at 05:50

## 2024-01-10 RX ADMIN — LORAZEPAM 2 MG: 2 INJECTION INTRAMUSCULAR; INTRAVENOUS at 01:52

## 2024-01-10 RX ADMIN — CLINDAMYCIN PHOSPHATE 900 MG: 900 INJECTION, SOLUTION INTRAVENOUS at 15:04

## 2024-01-10 RX ADMIN — SODIUM CHLORIDE, PRESERVATIVE FREE 5 ML: 5 INJECTION INTRAVENOUS at 23:33

## 2024-01-10 RX ADMIN — LORAZEPAM 2 MG: 2 INJECTION INTRAMUSCULAR; INTRAVENOUS at 21:30

## 2024-01-10 RX ADMIN — LORAZEPAM 2 MG: 2 INJECTION INTRAMUSCULAR; INTRAVENOUS at 15:03

## 2024-01-10 RX ADMIN — HALOPERIDOL LACTATE 2 MG: 5 INJECTION, SOLUTION INTRAMUSCULAR at 08:41

## 2024-01-10 RX ADMIN — CLINDAMYCIN PHOSPHATE 900 MG: 900 INJECTION, SOLUTION INTRAVENOUS at 06:31

## 2024-01-10 RX ADMIN — ENOXAPARIN SODIUM 40 MG: 100 INJECTION SUBCUTANEOUS at 08:40

## 2024-01-10 RX ADMIN — WATER 10 MG: 1 INJECTION INTRAMUSCULAR; INTRAVENOUS; SUBCUTANEOUS at 20:30

## 2024-01-10 ASSESSMENT — PAIN SCALES - GENERAL
PAINLEVEL_OUTOF10: 0
PAINLEVEL_OUTOF10: 1
PAINLEVEL_OUTOF10: 4

## 2024-01-10 NOTE — PROGRESS NOTES
Patient met with the commitment hearing team for a recommitment hearing. He was recommitted for 60 days. Dr Rivero requested forced medication and ECT and both were approved for 30 days and 8-12 sessions in a 30 day period.  Dr Ball's requested order for wound care, lab work, antibiotics, IV fluids, analgesics, laxatives/anti constipation, EKG, and imaging studies were all approved. Both providers are aware of approvals.    Ruth Ann Oh [facility]

## 2024-01-10 NOTE — PROGRESS NOTES
2200 Pt was confused agitated and pulled PIV line on his right upper arm out. Reoriented and applied nursing measures to calm him down. Restraints, rechecked. Sitter at bedside.

## 2024-01-10 NOTE — CARE COORDINATION
CM receive this email from sister.        This is what one of my friends (she's the nurse  at [facility] [employer]'s children's neurology dept) said regarding me being compensated as his caregiver....      I have been taking care of him full time since , and have not had a paid job because I cannot leave him alone. Our home is in serious disrepair because DSS will \"not allow\" me to be paid because they say he has to have a MEDICAL not MENTAL condition. [facility] did an MRI a couple of years ago and found brain shrinkage and areas filling with fluid.  Does that qualify?     Before he came into the hospital, I had noticed blood in the sink after he spit, and I have seen blood in the toilet after he went and clogged it.     Just to recap - he has gone by Isreal his entire life. That is his middle name. The Cruzito thing is new to me. His birth mother named him Romulo, but he has NEVER been called that ever, and it was changed when he was adopted t 2 months old.   His motivator was always food, which now he is refusing (or was). He will usually follow directions for chips and/or a soda.   He lives with me, his sister, and my 19 year old son.   His voices claim he has a daughter and two brothers; he does not, I researched that to be sure.   His apgar when he was born was 3.  He was first diagnosed as schizophrenic when he was 18, but he was drinking and using drugs by 11 years old.   He has auditory hallucinations for sure. He used to have visual ones as well, but I don't know if they are still there. He denies everything, but he will deny anything regarding his health.  Main goal is to get him eating (preferably NOT refusing to eat meat), leaving his room to go to the bathroom, and keeping his clothes on.      If I can be of any help, please let me know. I've been letting the ACT team be the go-to just to keep information streamlined, but I am available most of the time, too.

## 2024-01-10 NOTE — H&P
on file   Utilities: Patient Declined (12/8/2023)    [facility] Utilities     Threatened with loss of utilities: Patient refused        Medications were reconciled to the best of my ability given all available resources at the time of admission. Route is PO if not otherwise noted.     Family and social history were personally reviewed, all pertinent and relevant details are outlined as above.    Objective:   /72   Pulse 73   Temp 98.1 °F (36.7 °C) (Axillary)   Resp 20   Ht 1.829 m (6' 0.01\")   SpO2 97%   BMI 24.14 kg/m²         PHYSICAL EXAM:   General : disheveled and naked.   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 4.0 x 4.0cm with no purulence, fluctuance, however does show erythema and has warmth, other pressure points wound on knees and legs are non-blanchable erythema, brisk 2+ DP pulses  Neuro/Psych: mute, blunt affect, able to move extremities unable to assess strenght or reflexes  Skin: warm     Data Review:   I have independently reviewed and interpreted patient's lab and all other diagnostic data    Abnormal Labs Reviewed   COMPREHENSIVE METABOLIC PANEL - Abnormal; Notable for the following components:       Result Value    Sodium 146 (*)     BUN 26 (*)     Bun/Cre Ratio 25 (*)     AST 49 (*)     Alk Phosphatase 131 (*)     Albumin 3.4 (*)     Globulin 4.5 (*)     Albumin/Globulin Ratio 0.8 (*)     All other components within normal limits   CK - Abnormal; Notable for the following components:    Total  (*)     All other components within normal limits   LACTIC ACID - Abnormal; Notable for the following components:    Lactic Acid, Plasma 3.5 (*)     All other components within normal limits   BASIC METABOLIC PANEL - Abnormal; Notable for the following components:    Potassium 6.4 (*)     Chloride 109 (*)     Anion  dressings  -if patient continues to remove dressings and refusing medication, it can worsen into osteomyelitis   -patient has penicillin allergy, unable to clarify type of allergic reaction  -vancomycin IV     Failure to thrive  -unclear the amount of fluids/food he is consuming  -has stage 4 wound on the right later foot  and pressure points  -physical exam was limited as patient went into fetal position on arrival  -normal skin turgor whoever wasn't able to assess skin tugor on  thigh, calf, or forearm.  -to truly assess the need medical necessity, will need blood work:  Recommend:  -CBC, CMP, CPK, Mag, Phos  -UA reflex, urine sodium, urine creatinine, urine osmolarity  -c/w wound care        DIET: ADULT DIET; Regular   ISOLATION PRECAUTIONS: No active isolations  CODE STATUS: [unfilled]   DVT PROPHYLAXIS: lovenox ppx dose  FUNCTIONAL STATUS PRIOR TO HOSPITALIZATION: unclear  Ambulatory status/function: by self  EARLY MOBILITY ASSESSMENT: [facility] E[facility]  ANTICIPATED DISCHARGE: per psychiatry  ANTICIPATED DISPOSITION: per psychiatry  EMERGENCY CONTACT/SURROGATE DECISION MAKER: unclear    CRITICAL CARE WAS PERFORMED FOR THIS ENCOUNTER: none      Signed By: Ugo Cabrera MD     January 9, 2024         Please note that this dictation may have been completed with Dragon, the [employer] voice recognition software.  Quite often unanticipated grammatical, syntax, homophones, and other interpretive errors are inadvertently transcribed by the computer software.  Please disregard these errors.  Please excuse any errors that have escaped final proofreading.

## 2024-01-10 NOTE — PROGRESS NOTES
[facility]  Hospitalist Group                                                                                          Hospitalist Progress Note  Ugo Cabrera MD  Office Phone: [phone]        Date of Service:  2024  NAME:  Roge Cowan  :  1970  MRN:  [id number]       Admission Summary:   Roge Cowan is a 53 y.o. medical history of schizophrenia who was brought by police for evaluation.  Patient was admitted on 2023 to the psychiatric stauffer due to schizophrenia with catatonia. Patient would default to becoming naked and laying on the floor in a prayer position. Long term states in this position has resulted in pressure injuries on his knees, feet, and ankles.  Nursing applied pads to minimize wound breakdown however patient will remove them.  He refused scheduled medications and only received IM meds that were court order.  Staff are unclear whether the patient is eating or the amount he is drinking.  He has not allowed for routine blood draws or vital signs.  Patient has received ECT treatment while in psychiatric care. It should be noted patient is not aggressive or violent however continues to lay in prayer position despite continual redirection.  Over time patient developed stage IV pressure wound in his right ankle despite repeated attempts to change his position and provide padding and wound care.  Wound care nurse noted tendon exposure in the right ankle with purulent drainage. Due to the patient not tolerating physical exam, vital exams, blood draws, and staff unable to acertain if the patient is eating/drinking, internal medicine was consulted to evaluate patient's current medical condition.     Interval history / Subjective:   -severe electrolyte deficiency can be fatal. Prior to blood draw, it was unclear how severe his electrolyte deficiency would be given behavioral staff did not witness patient drinking much water or eating his food.     magnesium sulfate 2000 mg in 50 mL IVPB premix  2,000 mg IntraVENous PRN    ondansetron (ZOFRAN-ODT) disintegrating tablet 4 mg  4 mg Oral Q8H PRN    Or    ondansetron (ZOFRAN) injection 4 mg  4 mg IntraVENous Q6H PRN    acetaminophen (TYLENOL) tablet 650 mg  650 mg Oral Q6H PRN    Or    acetaminophen (TYLENOL) suppository 650 mg  650 mg Rectal Q6H PRN    enoxaparin (LOVENOX) injection 40 mg  40 mg SubCUTAneous Daily    polyethylene glycol (GLYCOLAX) packet 17 g  17 g Oral Daily PRN     ______________________________________________________________________  EXPECTED LENGTH OF STAY:   ACTUAL LENGTH OF STAY:          1                 Ugo Cabrera MD

## 2024-01-10 NOTE — PROGRESS NOTES
[facility] Adult  Hospitalist Group                                                                                          Hospitalist Progress Note  Connie Ball MD  Office Phone: [phone]        Date of Service:  1/10/2024  NAME:  Roge Cowan  :  1970  MRN:  [id number]       Admission Summary:   Roge Cowan is a 53 y.o. medical history of schizophrenia who was brought by police for evaluation.  Patient was admitted on 2023 to the psychiatric stauffer due to schizophrenia with catatonia. Patient would default to becoming naked and laying on the floor in a prayer position. Long term states in this position has resulted in pressure injuries on his knees, feet, and ankles.  Nursing applied pads to minimize wound breakdown however patient will remove them.  He refused scheduled medications and only received IM meds that were court order.  Staff are unclear whether the patient is eating or the amount he is drinking.  He has not allowed for routine blood draws or vital signs.  Patient has received ECT treatment while in psychiatric care. It should be noted patient is not aggressive or violent however continues to lay in prayer position despite continual redirection.  Over time patient developed stage IV pressure wound in his right ankle despite repeated attempts to change his position and provide padding and wound care.  Wound care nurse noted tendon exposure in the right ankle with purulent drainage. Due to the patient not tolerating physical exam, vital exams, blood draws, and staff unable to acertain if the patient is eating/drinking, internal medicine was consulted to evaluate patient's current medical condition.     Interval history / Subjective:   Uneventful night.  Remains in crouched position.  Requires restraints for safety issues.     Assessment & Plan:     Cellulitis  Stage 4 wound on right ankle  -CT scan right ankle: soft tissue edema and swelling in the

## 2024-01-10 NOTE — PROGRESS NOTES
Called Eden Montalvo (pts legal guardian[sister]) at [phone].    Eden Montalvo confirmed that she was the legal guardian to Roge Cowan.    Eden gave permission to do \" All you need to do for him\" to Marce Isaacs RN and witnessed by Marco Copeland RN (James).  This writer explained to legal guardian all medical interventions that may be needed for Roge Cowan's treatment.   Legal guardian gave consent to all things listed.     Intravenous Fluids  Any oral or IV medication including antibiotics and psych medications  sedatives  EKG  CT  Vital Signs  Blood lab testing  Restraints  Telemetry   CPR and any life saving measures  Safety measures       Eden stated that she will forward via email the \"guardianship papers\" to case management.

## 2024-01-10 NOTE — PLAN OF CARE
dressing/incision, wound bed, drain sites and surrounding tissue     Problem: Musculoskeletal - Adult  Goal: Return mobility to safest level of function  Outcome: Progressing  Return Mobility to Safest Level of Function:   Ensure adequate protection for wounds/incisions during mobilization   Assess patient stability and activity tolerance for standing, transferring and ambulating with or without assistive devices   Instruct patient/family in ordered activity level  Goal: Maintain proper alignment of affected body part  Outcome: Progressing  Maintain proper alignment of affected body part: Support and protect limb and body alignment per provider's orders     Problem: Gastrointestinal - Adult  Goal: Maintains or returns to baseline bowel function  Outcome: Progressing  Maintains or returns to baseline bowel function:   Assess bowel function   Administer IV fluids as ordered to ensure adequate hydration   Administer ordered medications as needed  Goal: Maintains adequate nutritional intake  Outcome: Progressing  Maintains adequate nutritional intake:   Monitor percentage of each meal consumed   Monitor intake and output, weight and lab values     Problem: Genitourinary - Adult  Goal: Absence of urinary retention  Outcome: Progressing  Absence of urinary retention: Assess patient’s ability to void and empty bladder     Problem: Infection - Adult  Goal: Absence of infection during hospitalization  Outcome: Progressing  Absence of infection during hospitalization:   Assess and monitor for signs and symptoms of infection   Monitor lab/diagnostic results   Monitor all insertion sites i.e., indwelling lines, tubes and drains   Administer medications as ordered     Problem: Metabolic/Fluid and Electrolytes - Adult  Goal: Electrolytes maintained within normal limits  Outcome: Progressing  Electrolytes maintained within normal limits:   Monitor labs and assess patient for signs and symptoms of electrolyte imbalances    Administer electrolyte replacement as ordered  Goal: Hemodynamic stability and optimal renal function maintained  Outcome: Progressing  Hemodynamic stability and optimal renal function maintained:   Monitor labs and assess for signs and symptoms of volume excess or deficit   Monitor intake, output and patient weight   Monitor response to interventions for patient's volume status, including labs, urine output, blood pressure (other measures as available)   Monitor urine specific gravity, serum osmolarity and serum sodium as indicated or ordered   Encourage oral intake as appropriate     Problem: Hematologic - Adult  Goal: Maintains hematologic stability  Outcome: Progressing  Maintains hematologic stability: Assess for signs and symptoms of bleeding or hemorrhage

## 2024-01-10 NOTE — PLAN OF CARE
Ethics consulted today regarding questions of guardianship in the context of an APS case & patient's incapacity. Provider Leeann Woo can be reached via [employer] for any needed follow up.     Recommendations:   -A  next of kin search to determine is there is any other family, in the event it becomes necessary to have a family decision maker other than the patient's sister Eden.   -Have CM reach out to APS directly for their input and case specifics (note : CM has reached out to APS)   -Attempt to obtain documentation of legal guardianship (note: both Eden & [employer] have agreed to help locate documentation and provide it to us, but so far we have not received this paperwork.)  -Proceed with Eden as decision maker in [place] until we receive additional information or the courts provide guidance to the contrary.    -Please reach back out to Leeann if APS tells our CM/ MD that they feel strongly we should not be utilizing the current legal guardian.

## 2024-01-10 NOTE — PROGRESS NOTES
Foot and Ankle Progress Note    Admit Date: 1/8/2024  Hospital day     Subjective:     Consulted to eval and treat wound right ankle.  Patient in bed in fetal position and not arouse able    Allergies   Allergen Reactions    Fluphenazine Other (See Comments)    Penicillins Other (See Comments)        History:     Family History   Adopted: Yes      Past Medical History:   Diagnosis Date    Psychiatric disorder     Psychotic disorder (HCC)     Withdrawal syndrome (HCC)       Social History     Substance and Sexual Activity   Alcohol Use Never      Social History     Substance and Sexual Activity   Drug Use No      Past Surgical History:   Procedure Laterality Date    ELECTROCONVULSIVE THERAPY N/A 12/12/2023    ELECTROCONVULSIVE THERAPY performed by Lety Larson MD at [facility] MAIN OR    ELECTROCONVULSIVE THERAPY N/A 12/13/2023    ELECTROCONVULSIVE THERAPY performed by Lety Larson MD at [facility] MAIN OR    ELECTROCONVULSIVE THERAPY N/A 12/15/2023    ELECTROCONVULSIVE THERAPY performed by Lety Larson MD at [facility] MAIN OR    ELECTROCONVULSIVE THERAPY  12/18/2023    ELECTROCONVULSIVE THERAPY N/A 12/18/2023    ELECTROCONVULSIVE THERAPY performed by Lety Larson MD at [facility] MAIN OR      Social History     Tobacco Use   Smoking Status Some Days   Smokeless Tobacco Never        Current Facility-Administered Medications   Medication Dose Route Frequency    haloperidol lactate (HALDOL) injection 2 mg  2 mg IntraMUSCular Q6H PRN    OLANZapine zydis (ZYPREXA) disintegrating tablet 10 mg  10 mg Oral Nightly    Or    OLANZapine (ZyPREXA) 10 mg in sterile water 2 mL injection ++COURT ORDERED++ DO NOT GIVE WITHIN 1 HR OF IM LORAZEPAM++  10 mg IntraMUSCular Nightly    [Held by provider] clindamycin (CLEOCIN) capsule 450 mg  450 mg Oral 3 times per day    clindamycin (CLEOCIN) 900 mg in dextrose 5 % 50 mL IVPB  900 mg IntraVENous q8h    LORazepam (ATIVAN) injection 2 mg  2 mg IntraVENous Q4H PRN    sodium chloride flush 0.9 % injection

## 2024-01-10 NOTE — WOUND CARE
Follow up Stage 4 pressure injury R ankle: 1/10/24    Assessment:  Patient in bilat wrist restraints and trunk restraint laying on right side.  Receiving IV antibiotics. As soon as this nurse tried to assess right ankle, patient woke up and sat up in bed. Sitting crisscross patient was resistant to extent right leg and tried to pull on wrist restraints.   Patient then placed face on bed and would not allow for assessment.  Finally with assistance able to take media picture for chart.  Primary nurse notes has already cleaned wound and attempted to place dressing. Even when patient is restrained will remove dressing by rubbing it off.    Will continue to attempt placing a dressing on patient.  Patient did not allow for measurements. Able to see tendon especially when patient flexes foot.   Dry/pale granulation tissue at periphery and devitalized tissue in the middle that needs autolytic debridement.   Periwound improved from last assessment where there was nonblanchable erythema and it was very red. Large deroofed blister.                   PI Prevention:  Turn/reposition approximately every 2 hours  Offload heels with heels hanging off end of pillow at all times while in bed.  Sacral Foam dressing: lift to assess regularly; change as needed. Discontinue if incontinence is frequently soiling dressing.     Zinc ointment to buttocks and sacrum daily and as needed with incontinence care  Low Air Loss mattress ordered today. Use only flat sheet and one incontinence pad.   Rodrick Score-14    Wound Care Recommendations:  Per Dr. Mora's order.    Transition of Care: Plan to follow weekly and as needed while admitted to hospital.    Discussed with GAIL Millan, Fatuma, Director    Celine Falcon RN, BSN  Wound Care Nurse, [facility]  [phone]

## 2024-01-10 NOTE — CARE COORDINATION
JORJE     RUR 15 %     Review of the chart and discussion with the medical team yesterday and today.   Please see CM note Minoo  and Nba  SW on BH.    Received call from Arpan [phone] from [facility]- he had received some documents from [facility]- did not get the full package and need.   [place] this am referral was sent for Level 2 on this patient -in-case NH is needed.   Will update the package and resend to them.    Minoo and I had  joint conversation with the Sister this am. See her note for details.   The sister is indicating she wants to become his paid caregiver- -she did mention she still needs to get his room together.  She did mention patient was outside -without proper attire and the neighbors was concerned. She did admit to APS being involved.     I called [phone] left a  for a return call regarding concerns and guardianship -   Needs to know where they are in this process. Sometimes when patient is in a safe setting or facility- the process of APS could be onhold.  Need to confirm what they are doing at this time.       Katelin LAND RN ACM  Lead  [phone]  cell  [phone]     Addendum  We did received copy of Guardianship paper from the sister.  Also PACT team send copy of  Guardianship papers- copy on the chart.     Addendum:2:00PM     Received call back from the APS worker  she has not proceed with anything related to the guardianship process-  she indicates she was waiting on [employer].  She will discuss the situation with her supervisor and get back with me. Tomorrow.   They are all thinking placement . Shared with her  conversation we had with the sister.       Addendum  3:05 PM     Received call back from Arpan at [facility]  she received the fax still some pages missing. Will refax those.   He requested before he can proceed with level 2  Updated [facility] regarding his current condition  to meet NH Level of Car  Copy of the Guardianship forms   TDO papers   A note from the

## 2024-01-10 NOTE — PROGRESS NOTES
Comprehensive Nutrition Assessment    Type and Reason for Visit:  Initial, Consult, Wound    Nutrition Recommendations/Plan:   Diet as tolerated  Enc po and fluid intake as able  Confirm pt's actual weight     Malnutrition Assessment:  Malnutrition Status:   severe    Context:    soc environ    Findings of the 6 clinical characteristics of malnutrition:  Energy Intake:   50% or less X > 1 month   Weight Loss:      > 5% x 1 month  Body Fat Loss:      severe; orbital, triceps buccal  Muscle Mass Loss:     mild; temples, thigh, calf, scapula  Fluid Accumulation:     no significant   Strength:   not performed    Nutrition Assessment:    Pt had been admitted to the psych floor Dec 2023.  He is now admitted to medical floor for hypernatremia, dehydration, FTT.  It could not be determined if he was eating or drinking sufficient amounts to prevent serious electrolyte deficiencies with risk of death.Patient was admitted on December 12, 2023 to the psychiatric stauffer due to schizophrenia with catatonia. Patient would default to becoming naked and laying on the floor in a prayer position. Long term states in this position has resulted in pressure injuries on his knees, feet, and ankles.  Nursing applied pads to minimize wound breakdown however patient will remove them.  He refused scheduled medications and only received IM meds that were court order.  Staff are unclear whether the patient is eating or the amount he is drinking.  He has not allowed for routine blood draws or vital signs.  Patient has received ECT treatment while in psychiatric care. It should be noted patient is not aggressive or violent however continues to lay in prayer position despite continual redirection.  Over time patient developed stage IV pressure wound in his right ankle despite repeated attempts to change his position and provide padding and wound care.  Wound care nurse noted tendon exposure in the right ankle with purulent drainage. Due to the  patient not tolerating physical exam, vital exams, blood draws, and staff unable to acertain if the patient is eating/drinking, internal medicine was consulted to evaluate patient's current medical condition.  Restraints were required to obtain blood for labs and to treat wounds.  Pt continues to resist and pull out IV/ take off bandages with his teeth.  Labs indicated dehydration and pt has been drinking sufficient fluids to prevent serious health risks.  Pt has stage IV wound R lateral ankle from lying in fetal position on floor/in bed.  Wound care managing.    Nutrition Related Findings:    Pt has regular diet ordered but is not eating or drinking much.  Meds: clinda, ativan Wound Type: Stage IV, Deep Tissue Injury       Current Nutrition Intake & Therapies:    Average Meal Intake: 1-25%  Average Supplements Intake: None Ordered  ADULT DIET; Regular    Anthropometric Measures:  Height: 182.9 cm (6' 0.01\")  Ideal Body Weight (IBW): 178 lbs (81 kg)       Current Body Weight: 80.7 kg (178 lb), 100 % IBW. Weight Source: Not Specified  Current BMI (kg/m2): 24.1        Weight Adjustment For: No Adjustment                 BMI Categories: Normal Weight (BMI 18.5-24.9)    Estimated Daily Nutrient Needs:  Energy Requirements Based On: Formula  Weight Used for Energy Requirements: Current  Energy (kcal/day): 2250  Weight Used for Protein Requirements: Current  Protein (g/day): 97  Method Used for Fluid Requirements: 1 ml/kcal  Fluid (ml/day): 2250    Nutrition Diagnosis:   Inadequate protein-energy intake, In context of social or environmental circumstances, Food & Nutrition-related knowledge deficit, Limited adherence to nutrition-related recommendations related to cognitive or neurological impairment, psychological cause or life stress, increase demand for energy/nutrients as evidenced by intake 0-25%, wounds    Nutrition Interventions:   Food and/or Nutrient Delivery: Continue Current Diet, Start Oral Nutrition

## 2024-01-10 NOTE — BH NOTE
Social work did not receive guardianship paperwork from [facility] yesterday. Writer asked them to fax paperwork again. They report they will. They are aware of medical situation with pt and medical TDO process. They report pt's sister had sent them messages reporting she is throwing away all the furniture in the pt's room and replacing it and that she \"is in a horrible place and can't deal with him\".    1/10/24 9:25:    Social work attempted to call pt sister again to connect and ask for guardianship paperwork. Social work had to leave voicemail requesting call back.    EMERY Garcia

## 2024-01-10 NOTE — BSMART NOTE
BSMART Liaison Team Note     LOS:  2     Patient goal(s) for today: take medications as prescribed, make needs known in an appropriate manner, participate in recommitment hearing  BSMART Liaison team focus/goals: assess needs, provide support and education, coordinate care, coordinate with Dr Rivero and Dr Ball on requested orders for medications and treatments for recommitment hearing today    Progress note: Patient assessed face to face with sitter present. Patient is unable or unwilling to engage in assessment. He is naked in the fetal position on his knees/shins with face on bed. He is breathing and rocking. He does not respond to this writer. He does lift his head up some but does not engage.    Patient is to have his recommitment hearing today. He previously has a forced medication order and a forced ECT order. Patient will also need medical treatment orders as he has decompensated physically due to his psychiatric condition. Dr Rivero completed the forced medication order and ECT order request forms. Dr Ball, hospitalist, completed the medical treatment order request forms. [facility] and she is aware of the patient and was informed of the requested orders.    Barriers to Discharge: medical, committed to [facility], catatonic state  Guns in the home: No     Outpatient provider(s):  [facility]  Insurance info/prescription coverage:  MEDICARE PART A AND B , [facility] PLAN OF [place]     Diagnosis: Paranoid Schizophrenia with catatonia  Past Medical History:   Diagnosis Date    Psychiatric disorder     Psychotic disorder (HCC)     Withdrawal syndrome (HCC)      Plan:  Defer to medical team on medical condition. Dr Rivero to adjust medications and coordiante psychiatric care and ECT as needed. Liaison team to meet daily with patient for support and therapy as he allows.   Follow up Psych Consult placed? Yes .   Psychiatrist updated? Yes        Participating treatment team

## 2024-01-10 NOTE — PROGRESS NOTES
combative pt.. med determined not given dt removed PIV hidden under wraps. Seen wasted on bed and ace wraps; witnessed by Rosenda on bed. To waste on [employer]

## 2024-01-10 NOTE — CONSULTS
PSYCHIATRY CONSULT NOTE:    REASON FOR CONSULT:  psychotic behavior  Resistant to treatments    HISTORY OF PRESENTING COMPLAINT:  Roge Cowan is a 53 y.o. male who is currently admitted to the psychiatric floor at [facility].      He was brought to hospital in police custody for a mental health problem.  He reportedly has not been engaging in self care and has not been eating or drinking.  Patient unable to participate in the interview.  Maintains the fetal position and moves feet when spoken to.  Transferred to medical due to a progressive wound on ankle.  Eating some and has showered a few times with and without soap.  Maintains no clothing and crouching fetal position.  Has court ordered treatments including ECT but resists them all, removing IV's and not taking PO here for management of his condition.    Roge Cowan remains isolative and selective with his interactions.  Was able to get IV fluids in last evening, however he removed the IV this morning.  He resists care despite it being court ordered and attempts to get on the floor from the bed to crouch.  This position has led to a progressive breakdown of his skin on his ankles and knees.  He is up for recommitment today.  Affect is sullen and withdrawn.  Requires support for grooming and care.  Limited self motivation.  No aggression or violence.  Selectively interactive and aware.  Tolerating medications via court order well.    PAST PSYCHIATRIC HISTORY:  In Patient Catatonia with ECT treatments in the past.    SUBSTANCE ABUSE HISTORY:  Social History     Substance and Sexual Activity   Drug Use No     Social History     Substance and Sexual Activity   Alcohol Use Never     Social History     Tobacco Use   Smoking Status Some Days   Smokeless Tobacco Never       PAST MEDICAL HISTORY:  Past Medical History:   Diagnosis Date    Psychiatric disorder     Psychotic disorder (HCC)     Withdrawal syndrome (HCC)        SOCIAL HISTORY:  See H

## 2024-01-11 LAB
CRP SERPL-MCNC: 5.72 MG/DL (ref 0–0.6)
ERYTHROCYTE [SEDIMENTATION RATE] IN BLOOD: 52 MM/HR (ref 0–20)

## 2024-01-11 PROCEDURE — 36415 COLL VENOUS BLD VENIPUNCTURE: CPT

## 2024-01-11 PROCEDURE — 2580000003 HC RX 258: Performed by: INTERNAL MEDICINE

## 2024-01-11 PROCEDURE — 6360000002 HC RX W HCPCS: Performed by: INTERNAL MEDICINE

## 2024-01-11 PROCEDURE — 6360000002 HC RX W HCPCS: Performed by: HOSPITALIST

## 2024-01-11 PROCEDURE — 86140 C-REACTIVE PROTEIN: CPT

## 2024-01-11 PROCEDURE — 85652 RBC SED RATE AUTOMATED: CPT

## 2024-01-11 PROCEDURE — 1200000000 HC SEMI PRIVATE

## 2024-01-11 PROCEDURE — 87040 BLOOD CULTURE FOR BACTERIA: CPT

## 2024-01-11 RX ADMIN — SODIUM CHLORIDE, PRESERVATIVE FREE 10 ML: 5 INJECTION INTRAVENOUS at 10:22

## 2024-01-11 RX ADMIN — WATER 10 MG: 1 INJECTION INTRAMUSCULAR; INTRAVENOUS; SUBCUTANEOUS at 21:24

## 2024-01-11 RX ADMIN — VANCOMYCIN HYDROCHLORIDE 2000 MG: 1 INJECTION, POWDER, LYOPHILIZED, FOR SOLUTION INTRAVENOUS at 17:15

## 2024-01-11 RX ADMIN — SODIUM CHLORIDE, PRESERVATIVE FREE 10 ML: 5 INJECTION INTRAVENOUS at 21:26

## 2024-01-11 RX ADMIN — LORAZEPAM 2 MG: 2 INJECTION INTRAMUSCULAR; INTRAVENOUS at 04:37

## 2024-01-11 RX ADMIN — HALOPERIDOL LACTATE 2 MG: 5 INJECTION, SOLUTION INTRAMUSCULAR at 02:19

## 2024-01-11 RX ADMIN — CLINDAMYCIN PHOSPHATE 900 MG: 900 INJECTION, SOLUTION INTRAVENOUS at 10:21

## 2024-01-11 RX ADMIN — LORAZEPAM 2 MG: 2 INJECTION INTRAMUSCULAR; INTRAVENOUS at 10:57

## 2024-01-11 RX ADMIN — CEFEPIME 2000 MG: 2 INJECTION, POWDER, FOR SOLUTION INTRAVENOUS at 18:17

## 2024-01-11 RX ADMIN — CEFEPIME 1000 MG: 1 INJECTION, POWDER, FOR SOLUTION INTRAMUSCULAR; INTRAVENOUS at 22:42

## 2024-01-11 ASSESSMENT — PAIN SCALES - GENERAL
PAINLEVEL_OUTOF10: 0
PAINLEVEL_OUTOF10: 5
PAINLEVEL_OUTOF10: 0

## 2024-01-11 NOTE — PLAN OF CARE
Care plan reviewed.    Problem: Neurosensory - Adult  Goal: Achieves stable or improved neurological status  Outcome: Not Progressing     Problem: Musculoskeletal - Adult  Goal: Return mobility to safest level of function  Outcome: Not Progressing  Goal: Maintain proper alignment of affected body part  Outcome: Not Progressing     Problem: Gastrointestinal - Adult  Goal: Maintains adequate nutritional intake  Outcome: Not Progressing     Problem: Discharge Planning  Goal: Discharge to home or other facility with appropriate resources  Outcome: Progressing     Problem: Safety - Medical Restraint  Goal: Remains free of injury from restraints (Restraint for Interference with Medical Device)  Description: INTERVENTIONS:  1. Determine that other, less restrictive measures have been tried or would not be effective before applying the restraint  2. Evaluate the patient's condition at the time of restraint application  3. Inform patient/family regarding the reason for restraint  4. Q2H: Monitor safety, psychosocial status, comfort, nutrition and hydration  Outcome: Progressing  Flowsheets  Taken 1/10/2024 1800 by Monty Irving RN  Remains free of injury from restraints (restraint for interference with medical device):   Determine that other, less restrictive measures have been tried or would not be effective before applying the restraint   Every 2 hours: Monitor safety, psychosocial status, comfort, nutrition and hydration  Taken 1/10/2024 1600 by Monty Irving RN  Remains free of injury from restraints (restraint for interference with medical device):   Determine that other, less restrictive measures have been tried or would not be effective before applying the restraint   Every 2 hours: Monitor safety, psychosocial status, comfort, nutrition and hydration  Taken 1/10/2024 1500 by Monty Irving RN  Remains free of injury from restraints (restraint for interference with medical device):   Determine that other, less  restrictive measures have been tried or would not be effective before applying the restraint   Every 2 hours: Monitor safety, psychosocial status, comfort, nutrition and hydration     Problem: Pain  Goal: Verbalizes/displays adequate comfort level or baseline comfort level  Outcome: Progressing     Problem: Risk for Elopement  Goal: Patient will not exit the unit/facility without proper excort  Outcome: Progressing     Problem: Safety - Adult  Goal: Free from fall injury  Outcome: Progressing     Problem: Skin/Tissue Integrity  Goal: Absence of new skin breakdown  Description: 1.  Monitor for areas of redness and/or skin breakdown  2.  Assess vascular access sites hourly  3.  Every 4-6 hours minimum:  Change oxygen saturation probe site  4.  Every 4-6 hours:  If on nasal continuous positive airway pressure, respiratory therapy assess nares and determine need for appliance change or resting period.  Outcome: Progressing     Problem: Respiratory - Adult  Goal: Achieves optimal ventilation and oxygenation  Outcome: Progressing     Problem: Cardiovascular - Adult  Goal: Maintains optimal cardiac output and hemodynamic stability  Outcome: Progressing     Problem: Skin/Tissue Integrity - Adult  Goal: Incisions, wounds, or drain sites healing without S/S of infection  Outcome: Progressing     Problem: Gastrointestinal - Adult  Goal: Maintains or returns to baseline bowel function  Outcome: Progressing     Problem: Genitourinary - Adult  Goal: Absence of urinary retention  Outcome: Progressing     Problem: Infection - Adult  Goal: Absence of infection during hospitalization  Outcome: Progressing     Problem: Metabolic/Fluid and Electrolytes - Adult  Goal: Electrolytes maintained within normal limits  Outcome: Progressing  Goal: Hemodynamic stability and optimal renal function maintained  Outcome: Progressing     Problem: Hematologic - Adult  Goal: Maintains hematologic stability  Outcome: Progressing     Problem:

## 2024-01-11 NOTE — PROGRESS NOTES
[id number]:  Shift change report received from GAIL Millan.  Report included review of SBAR, accordion report, results review, orders, meds, ROS, and POC.  Pt had court hearing today and 60 day TDO ordered, paperwork in pt chart and additional details in notes.  Pt  in bilateral soft wrist and mitten restraints with safety sitter at bedside.  Responsibilities of sitter and required charting reviewed with sitter with understanding stated.  Dressing to pt lateral right ankle removed by pt and is currently BRITT, will attempt to reapply during night shift.  All questions answered.  Transfer of care complete.     2000:  nurse required q2h restraint documentation completed; ROM deferred at this time due to pt agitation; peripheral circulation WNL, no signs of injury.    2015:  s/w wound care RN to discuss admin of scheduled zyprexa and then prn ativan 1hr apart then attempt to complete wound care with WCRN who is agreeable with this plan pending pt behavior.    2025:  Shift start VS and assessment completed.  Pt refusing gown, blanket, po fluids and po food.  Pt bed pad clean at this time.  IVF running at KVO to left arm IV access, site patent and flushes easily without s/sx of infiltration.      2030:  Scheduled IM Zyprexa admin    2130:  PRN IV ativan 2mg IV admin for pt agitation    2200:  Q2H RN required restraint documentation completed; pt still very restless and agitated so ROM deferred, will reattempt in 1hr.    [id number]:  bilateral soft wrist restraints released and ROM provided for pt, circulation WNL and pt without signs of injury.  Incontinence care completed.  Pt refused po fluids and po food.  Pt very resistant during hygiene and incontinence care, unable to complete wound care d/t pt rigid and resistant positioning.    2333:  scheduled IVAB hung and infusing without issue; sitter instructed to pay additional attention to IV tubing when pt turning and twisting in bed as pt is noted to be attempting to bite at IV

## 2024-01-11 NOTE — PROGRESS NOTES
0730:  patient in bed resting in the fetal/prone position.  Encouraged patient to move to alternate position but patient declined at this time.  Respirations are even and unlabored. No acute distress noted. 1:1  at bedside.  Wrist restraints are in place.    0815:  Patient setup in bed and ate approximately 95 % of breakfast.  Fluids accepted.  Setup assisted needed only.    0900: Patient pulls IV out and kneels in floor in fetal prone position stating that he wants to pray. Patient is unclothed.  Encouraged patient to return to bed but patient will not acknowledge staff at this time.  Assistance provided by security to assist patient back to bed.  Patient is resisting and refuses to have restraints replaced.  Wrist restraints and roll belt in place for patient safety.  Wound to right ankle cleaned and dressing reapplied.  IV inserted in right arm by nursing supervisor and secured with coband dressing.    1330:  Patient in bed in lying in on belly in prone position with wrist restraints and roll belt in place.  Skin assessed to both wrist.  Redness noted, skin is intact.      1445:  Patient is in bed in fetal/prone position.  Bilateral wrist restraints and roll belt are both in use and patient is able to maneuver himself in fetal prone position with restraints in place.  Patient is refusing to have IV antibiotics at this time.  States, \"I don't need them.\"  Nursing supervisor along with MD are both aware of current status.  Revisiting plan of care at this time.  1:1  remains in room for close monitoring.    1500:  Patient in bed sitting upright.  Continues to decline to receive IV antibiotics at this time. Patient was offered fluids but declined those as well.  MD made aware that antibiotics have not yet been started. Will continue to provide care.    1600:  Patient lying in bed on left side.  In to assess IV site and attempted to reconnect IV antibiotic and patient twisted arm in  restraint is refusing to allow IV to be accessed.    1700:  Patient agreeable to having IV antibiotic connected at this time.  IV infusing without difficulty.  Patient in bed lying on left side. Bilateral wrist restraints remain in place.  Roll belt in use.  Offered patient dinner tray but declined at this time.  Will continue to provide care.    1820:  Patient in bed sitting upright.  Acknowledged nurse when entering room.  Second antibiotic hung.  Restraints to bilateral wrist remain in place.  Circulation is appropriate, skin intact.  Roll belt in use.  No acute distress noted.  Respirations are even and unlabored.  1:1  at bedside.  Will continue to provide care.

## 2024-01-11 NOTE — PROGRESS NOTES
I was called to patients room because patient was eating and managed to get on the floor and sat in a fetal position and would not get back in the bed.  We called security and PT and they assisted us with getting the patient back on the bed and in bilateral wrist restraints, mittens and the roll belt but the patient was able to pull the mittens off and pulled out his IV.  I inserted a new IV and we covered it with coban but the patient was moving and contorting his body in a manner that we would not be able to give him fluids or keep a bandage on his right ankle so I notified the MD and it was determined that the patient needed to be in bilateral ankle restraints at this time.  Bilateral ankle restraints were appled at 10:00 am.    Violent Restraint Face-to-Face Evaluation  (must be completed within one hour of initiation of restraints)      Evaluate immediate situation:  Patient is pulling out IV Lines and removing bandages and lying in a fetal position that caused his wounds.    Reaction to intervention: No evidence of learning.  As evidenced by him pulling his mits off and trying to pull out his IV     Medical Condition/Assessment: Patient is medically stable at this time as evidenced by his ability to moved, speak and pull at lines.  And by his vital signs.     Behavioral Condition/Assessment: stable    The patient’s review of systems, history, medications, and recent labs were reviewed at this time.     Continue/Discontinue restraints at this time: continue    One-Hour Review Evaluation Physician Notification:    Provider Notified (Name):  Dr. Ball     Date  1/11/2024     Time  10:00am    Physician or Designated One-Hour Reviewer: Geno Owens RN

## 2024-01-11 NOTE — PROGRESS NOTES
[facility]  Hospitalist Group                                                                                          Hospitalist Progress Note  Connie Ball MD  Office Phone: [phone]        Date of Service:  2024  NAME:  Roge Cowan  :  1970  MRN:  [id number]       Admission Summary:   Roge Cowan is a 53 y.o. medical history of schizophrenia who was brought by police for evaluation.  Patient was admitted on 2023 to the psychiatric stauffer due to schizophrenia with catatonia. Patient would default to becoming naked and laying on the floor in a prayer position. Long term states in this position has resulted in pressure injuries on his knees, feet, and ankles.  Nursing applied pads to minimize wound breakdown however patient will remove them.  He refused scheduled medications and only received IM meds that were court order.  Staff are unclear whether the patient is eating or the amount he is drinking.  He has not allowed for routine blood draws or vital signs.  Patient has received ECT treatment while in psychiatric care. It should be noted patient is not aggressive or violent however continues to lay in prayer position despite continual redirection.  Over time patient developed stage IV pressure wound in his right ankle despite repeated attempts to change his position and provide padding and wound care.  Wound care nurse noted tendon exposure in the right ankle with purulent drainage. Due to the patient not tolerating physical exam, vital exams, blood draws, and staff unable to acertain if the patient is eating/drinking, internal medicine was consulted to evaluate patient's current medical condition.     Interval history / Subjective:   Uneventful night.  Remains in crouched position.  Requires restraints for safety issues.     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 contamination  -CT  scan right ankle: soft tissue edema and swelling in the anterolateral ankle and midfoot. No rim-enhancing fluid collection to suggest abscess. No soft tissue gas.  -Sed rate:53  -CRP:5.72  -Wound culture:pending  -Podiatrist consultation done  -Discussed with ID, Dr. Uribe.  Changed clindamycin to vancomycin/cefepime.  -Repeat blood culture  -Wound care    Hypernatremia   Dehydration  Failure to thrive  -Encourage oral intake    Paranoid schizophrenia/catatonia  One-to-one sitter  Restraints for safety issues  Management as per psychiatric service    Code status: Full Code  Prophylaxis: Lovenox   Central Line:  Care Plan discussed with: Psychiatry service  Anticipated Disposition: psychiatry  Inpatient  Cardiac monitoring: None         Social Determinants of Health     Tobacco Use: High Risk (1/9/2024)    Patient History     Smoking Tobacco Use: Some Days     Smokeless Tobacco Use: Never     Passive Exposure: Not on file   Alcohol Use: Not At Risk (12/31/2023)    AUDIT-C     Frequency of Alcohol Consumption: Never     Average Number of Drinks: Patient does not drink     Frequency of Binge Drinking: Never   Financial Resource Strain: Not on file   Food Insecurity: Patient Declined (12/8/2023)    Hunger Vital Sign     Worried About Running Out of Food in the Last Year: Patient declined     Ran Out of Food in the Last Year: Patient declined   Transportation Needs: Patient Declined (12/8/2023)    PRAPARE - Transportation     Lack of Transportation (Medical): Patient declined     Lack of Transportation (Non-Medical): Patient declined   Physical Activity: Not on file   Stress: Not on file   Social Connections: Not on file   Intimate Partner Violence: Not on file   Depression: Not on file   Housing Stability: Unknown (12/8/2023)    Housing Stability Vital Sign     Unable to Pay for Housing in the Last Year: Patient refused     Number of Places Lived in the Last Year: 1     Unstable Housing in the Last Year: Patient

## 2024-01-11 NOTE — PROGRESS NOTES
Patient’s case reviewed during interdisciplinary team meeting in Med Surg/Tele Unit 2.  Rev. Echo Tang MDiv, [facility]

## 2024-01-11 NOTE — CONSULTS
PSYCHIATRY CONSULT NOTE:    REASON FOR CONSULT:  psychotic behavior  Resistant to treatments    HISTORY OF PRESENTING COMPLAINT:  Roge Cowan is a 53 y.o. male who is currently admitted to the psychiatric floor at [facility].      He was brought to hospital in police custody for a mental health problem.  He reportedly has not been engaging in self care and has not been eating or drinking.  Patient unable to participate in the interview.  Maintains the fetal position and moves feet when spoken to.  Transferred to medical due to a progressive wound on ankle.  Eating some and has showered a few times with and without soap.  Maintains no clothing and crouching fetal position.  Has court ordered treatments including ECT but resists them all, removing IV's and not taking PO here for management of his condition.    Roge Cowan remains isolative and selective with his interactions.  Was able to get IV fluids in last evening, however he removed the IV this morning.  He resists care despite it being court ordered and attempts to get on the floor from the bed to crouch.  This position has led to a progressive breakdown of his skin on his ankles and knees.  He is up for recommitment today.  Affect is sullen and withdrawn.  Requires support for grooming and care.  Limited self motivation.  No aggression or violence.  Selectively interactive and aware.  Tolerating medications via court order well.    1/11 -  Roge Cowan  was sitting up in bed this morning.  Has been in restraints and given chemical restraints multiple times this morning.  Rreports selectively to staff and sometimes changes position or comfort.  The wound on his ankle is open but healing.  Wound care is following.  He continues to eat and is incontinent in bed.  Appears sullen and withdrawn.  Seen stretched out at times, however when approached he will retreat to his crouching praying position.  Undressed and a body rash is noted

## 2024-01-11 NOTE — BSMART NOTE
BSMART Liaison Team Note     LOS:  3     Patient goal(s) for today: take medications as prescribed, make needs known in an appropriate manner  BSMART Liaison team focus/goals: assess needs, provide support and education, coordinate care    Progress note: Patient assessed face to face with Dr Rivero, Delores ALVAREZ RN from [facility], and sitter present. Patient does not engage in assessment. He is observed in fetal position in bed. He is naked and a rash is observed on his body. Patient recently started antibiotics and had a bath using wipes. He was awake as he was observed moving around on the bed though he stayed in the fetal position. Nursing staff report he ate his breakfast. He does not take his medication but the IM alternative was given. He was given PRNs in the last 24 hours of Haldol and ativan. Medical team has a plan of care to address his wound and medical needs. Discussed that in the past patient was on Abilify LOWE and Clozaril with good results. Patient is not taking medication orally at this time so Clozaril not started and patient remains on Zyprexa. Dr Rivero to consider the Abilify LOWE as he received this prior to hospitalization and Delores RN reports that the [facility] team reported patient is due for another injection.Sleep is unclear. Patient does selectively engage with staff, typically to state his need or desire for staff to stop touching him.     Barriers to Discharge: medical, committed to [facility], catatonic state  Guns in the home: No      Outpatient provider(s):  [facility]  Insurance info/prescription coverage:  MEDICARE PART A AND B , [facility]AN OF [facility]      Diagnosis: Paranoid Schizophrenia with catatonia    Plan:  Defer to medical team on medical condition. Dr Rivero to adjust medications and coordiante psychiatric care and ECT as needed. Liaison team to meet daily with patient for support and therapy as he allows.   Follow up Psych Consult placed? Yes .   Psychiatrist updated? Yes

## 2024-01-11 NOTE — PROGRESS NOTES
Patient is in his room resting at this time.  There is a 1:1 sitter in his room at bedside.  His pulses are +2 Bilateral feet and wrist.  Upper and lower extremities are warm and pink.  Patients current vital signs are listed on the doc flow sheet for this time period

## 2024-01-12 ENCOUNTER — HOSPITAL ENCOUNTER (INPATIENT)
Facility: HOSPITAL | Age: 54
LOS: 34 days | Discharge: PSYCHIATRIC OR SUBSTANCE USE FACILITY | End: 2024-02-15
Attending: INTERNAL MEDICINE | Admitting: INTERNAL MEDICINE
Payer: MEDICARE

## 2024-01-12 VITALS
HEART RATE: 73 BPM | TEMPERATURE: 98.7 F | SYSTOLIC BLOOD PRESSURE: 106 MMHG | HEIGHT: 72 IN | BODY MASS INDEX: 24.13 KG/M2 | OXYGEN SATURATION: 96 % | DIASTOLIC BLOOD PRESSURE: 79 MMHG | RESPIRATION RATE: 20 BRPM

## 2024-01-12 DIAGNOSIS — F20.2 CATATONIA SCHIZOPHRENIA (HCC): Primary | ICD-10-CM

## 2024-01-12 DIAGNOSIS — J96.01 ACUTE RESPIRATORY FAILURE WITH HYPOXIA (HCC): ICD-10-CM

## 2024-01-12 LAB
ALBUMIN SERPL-MCNC: 3 G/DL (ref 3.5–5)
ALBUMIN/GLOB SERPL: 0.7 (ref 1.1–2.2)
ALP SERPL-CCNC: 104 U/L (ref 45–117)
ALT SERPL-CCNC: 56 U/L (ref 12–78)
ANION GAP SERPL CALC-SCNC: 9 MMOL/L (ref 5–15)
AST SERPL-CCNC: 51 U/L (ref 15–37)
BACTERIA SPEC CULT: ABNORMAL
BACTERIA SPEC CULT: ABNORMAL
BASOPHILS # BLD: 0.1 K/UL (ref 0–0.1)
BASOPHILS NFR BLD: 1 % (ref 0–1)
BILIRUB SERPL-MCNC: 0.5 MG/DL (ref 0.2–1)
BUN SERPL-MCNC: 16 MG/DL (ref 6–20)
BUN/CREAT SERPL: 22 (ref 12–20)
CALCIUM SERPL-MCNC: 8.8 MG/DL (ref 8.5–10.1)
CHLORIDE SERPL-SCNC: 102 MMOL/L (ref 97–108)
CO2 SERPL-SCNC: 26 MMOL/L (ref 21–32)
CREAT SERPL-MCNC: 0.72 MG/DL (ref 0.7–1.3)
DIFFERENTIAL METHOD BLD: ABNORMAL
EOSINOPHIL # BLD: 0.2 K/UL (ref 0–0.4)
EOSINOPHIL NFR BLD: 4 % (ref 0–7)
ERYTHROCYTE [DISTWIDTH] IN BLOOD BY AUTOMATED COUNT: 13 % (ref 11.5–14.5)
GLOBULIN SER CALC-MCNC: 4.2 G/DL (ref 2–4)
GLUCOSE SERPL-MCNC: 79 MG/DL (ref 65–100)
GRAM STN SPEC: ABNORMAL
GRAM STN SPEC: ABNORMAL
HCT VFR BLD AUTO: 38.2 % (ref 36.6–50.3)
HGB BLD-MCNC: 12.6 G/DL (ref 12.1–17)
IMM GRANULOCYTES # BLD AUTO: 0 K/UL (ref 0–0.04)
IMM GRANULOCYTES NFR BLD AUTO: 1 % (ref 0–0.5)
LYMPHOCYTES # BLD: 1.8 K/UL (ref 0.8–3.5)
LYMPHOCYTES NFR BLD: 31 % (ref 12–49)
MCH RBC QN AUTO: 29.5 PG (ref 26–34)
MCHC RBC AUTO-ENTMCNC: 33 G/DL (ref 30–36.5)
MCV RBC AUTO: 89.5 FL (ref 80–99)
MONOCYTES # BLD: 0.5 K/UL (ref 0–1)
MONOCYTES NFR BLD: 8 % (ref 5–13)
NEUTS SEG # BLD: 3.3 K/UL (ref 1.8–8)
NEUTS SEG NFR BLD: 57 % (ref 32–75)
NRBC # BLD: 0 K/UL (ref 0–0.01)
NRBC BLD-RTO: 0 PER 100 WBC
PLATELET # BLD AUTO: 273 K/UL (ref 150–400)
PMV BLD AUTO: 10.8 FL (ref 8.9–12.9)
POTASSIUM SERPL-SCNC: 4.4 MMOL/L (ref 3.5–5.1)
PROT SERPL-MCNC: 7.2 G/DL (ref 6.4–8.2)
RBC # BLD AUTO: 4.27 M/UL (ref 4.1–5.7)
SERVICE CMNT-IMP: ABNORMAL
SODIUM SERPL-SCNC: 137 MMOL/L (ref 136–145)
WBC # BLD AUTO: 5.9 K/UL (ref 4.1–11.1)

## 2024-01-12 PROCEDURE — 2000000000 HC ICU R&B

## 2024-01-12 PROCEDURE — 80053 COMPREHEN METABOLIC PANEL: CPT

## 2024-01-12 PROCEDURE — 6360000002 HC RX W HCPCS: Performed by: HOSPITALIST

## 2024-01-12 PROCEDURE — 6360000002 HC RX W HCPCS: Performed by: PSYCHIATRY & NEUROLOGY

## 2024-01-12 PROCEDURE — 85025 COMPLETE CBC W/AUTO DIFF WBC: CPT

## 2024-01-12 PROCEDURE — 3E0G76Z INTRODUCTION OF NUTRITIONAL SUBSTANCE INTO UPPER GI, VIA NATURAL OR ARTIFICIAL OPENING: ICD-10-PCS | Performed by: PSYCHIATRY & NEUROLOGY

## 2024-01-12 PROCEDURE — 0DH67UZ INSERTION OF FEEDING DEVICE INTO STOMACH, VIA NATURAL OR ARTIFICIAL OPENING: ICD-10-PCS | Performed by: PSYCHIATRY & NEUROLOGY

## 2024-01-12 PROCEDURE — 2580000003 HC RX 258: Performed by: NURSE PRACTITIONER

## 2024-01-12 PROCEDURE — 2500000003 HC RX 250 WO HCPCS: Performed by: NURSE PRACTITIONER

## 2024-01-12 PROCEDURE — 6360000002 HC RX W HCPCS

## 2024-01-12 PROCEDURE — 6360000002 HC RX W HCPCS: Performed by: INTERNAL MEDICINE

## 2024-01-12 PROCEDURE — 6360000002 HC RX W HCPCS: Performed by: NURSE PRACTITIONER

## 2024-01-12 PROCEDURE — 36415 COLL VENOUS BLD VENIPUNCTURE: CPT

## 2024-01-12 PROCEDURE — 2580000003 HC RX 258: Performed by: INTERNAL MEDICINE

## 2024-01-12 RX ORDER — LORAZEPAM 2 MG/ML
2 INJECTION INTRAMUSCULAR EVERY 4 HOURS PRN
Status: CANCELLED | OUTPATIENT
Start: 2024-01-12

## 2024-01-12 RX ORDER — ONDANSETRON 2 MG/ML
4 INJECTION INTRAMUSCULAR; INTRAVENOUS EVERY 6 HOURS PRN
Status: DISCONTINUED | OUTPATIENT
Start: 2024-01-12 | End: 2024-01-15

## 2024-01-12 RX ORDER — LORAZEPAM 2 MG/ML
1 INJECTION INTRAMUSCULAR 2 TIMES DAILY
Status: DISCONTINUED | OUTPATIENT
Start: 2024-01-12 | End: 2024-01-12 | Stop reason: HOSPADM

## 2024-01-12 RX ORDER — ALBUTEROL SULFATE 2.5 MG/3ML
2.5 SOLUTION RESPIRATORY (INHALATION) EVERY 6 HOURS PRN
Status: DISCONTINUED | OUTPATIENT
Start: 2024-01-12 | End: 2024-02-15 | Stop reason: HOSPADM

## 2024-01-12 RX ORDER — ONDANSETRON 4 MG/1
4 TABLET, ORALLY DISINTEGRATING ORAL EVERY 8 HOURS PRN
Status: CANCELLED | OUTPATIENT
Start: 2024-01-12

## 2024-01-12 RX ORDER — ONDANSETRON 2 MG/ML
4 INJECTION INTRAMUSCULAR; INTRAVENOUS EVERY 6 HOURS PRN
Status: CANCELLED | OUTPATIENT
Start: 2024-01-12

## 2024-01-12 RX ORDER — LORAZEPAM 2 MG/ML
1 INJECTION INTRAMUSCULAR 2 TIMES DAILY
Status: DISCONTINUED | OUTPATIENT
Start: 2024-01-12 | End: 2024-01-12

## 2024-01-12 RX ORDER — ACETAMINOPHEN 650 MG/1
650 SUPPOSITORY RECTAL EVERY 6 HOURS PRN
Status: DISCONTINUED | OUTPATIENT
Start: 2024-01-12 | End: 2024-01-13

## 2024-01-12 RX ORDER — ACETAMINOPHEN 325 MG/1
650 TABLET ORAL EVERY 6 HOURS PRN
Status: DISCONTINUED | OUTPATIENT
Start: 2024-01-12 | End: 2024-01-13

## 2024-01-12 RX ORDER — OLANZAPINE 5 MG/1
10 TABLET, ORALLY DISINTEGRATING ORAL NIGHTLY
Status: CANCELLED | OUTPATIENT
Start: 2024-01-12

## 2024-01-12 RX ORDER — HALOPERIDOL 5 MG/ML
2 INJECTION INTRAMUSCULAR EVERY 6 HOURS PRN
Status: CANCELLED | OUTPATIENT
Start: 2024-01-12

## 2024-01-12 RX ORDER — ENOXAPARIN SODIUM 100 MG/ML
40 INJECTION SUBCUTANEOUS DAILY
Status: CANCELLED | OUTPATIENT
Start: 2024-01-13

## 2024-01-12 RX ORDER — SODIUM CHLORIDE 9 MG/ML
INJECTION, SOLUTION INTRAVENOUS PRN
Status: CANCELLED | OUTPATIENT
Start: 2024-01-12

## 2024-01-12 RX ORDER — POTASSIUM CHLORIDE 7.45 MG/ML
10 INJECTION INTRAVENOUS PRN
Status: CANCELLED | OUTPATIENT
Start: 2024-01-12

## 2024-01-12 RX ORDER — MAGNESIUM SULFATE IN WATER 40 MG/ML
2000 INJECTION, SOLUTION INTRAVENOUS PRN
Status: CANCELLED | OUTPATIENT
Start: 2024-01-12

## 2024-01-12 RX ORDER — SODIUM CHLORIDE 0.9 % (FLUSH) 0.9 %
5-40 SYRINGE (ML) INJECTION EVERY 12 HOURS SCHEDULED
Status: CANCELLED | OUTPATIENT
Start: 2024-01-12

## 2024-01-12 RX ORDER — LORAZEPAM 2 MG/ML
2 INJECTION INTRAMUSCULAR ONCE
Status: COMPLETED | OUTPATIENT
Start: 2024-01-12 | End: 2024-01-12

## 2024-01-12 RX ORDER — POLYETHYLENE GLYCOL 3350 17 G/17G
17 POWDER, FOR SOLUTION ORAL DAILY PRN
Status: CANCELLED | OUTPATIENT
Start: 2024-01-12

## 2024-01-12 RX ORDER — POTASSIUM CHLORIDE 750 MG/1
40 TABLET, FILM COATED, EXTENDED RELEASE ORAL PRN
Status: CANCELLED | OUTPATIENT
Start: 2024-01-12

## 2024-01-12 RX ORDER — SODIUM CHLORIDE 9 MG/ML
INJECTION, SOLUTION INTRAVENOUS PRN
Status: DISCONTINUED | OUTPATIENT
Start: 2024-01-12 | End: 2024-01-13 | Stop reason: SDUPTHER

## 2024-01-12 RX ORDER — LORAZEPAM 2 MG/ML
1 INJECTION INTRAMUSCULAR 2 TIMES DAILY
Status: CANCELLED | OUTPATIENT
Start: 2024-01-12 | End: 2024-01-16

## 2024-01-12 RX ORDER — LORAZEPAM 1 MG/1
1 TABLET ORAL 2 TIMES DAILY
Status: DISCONTINUED | OUTPATIENT
Start: 2024-01-12 | End: 2024-01-12

## 2024-01-12 RX ORDER — DEXMEDETOMIDINE HYDROCHLORIDE 4 UG/ML
.1-.8 INJECTION, SOLUTION INTRAVENOUS CONTINUOUS
Status: DISCONTINUED | OUTPATIENT
Start: 2024-01-12 | End: 2024-01-15

## 2024-01-12 RX ORDER — HALOPERIDOL 5 MG/ML
10 INJECTION INTRAMUSCULAR ONCE
Status: COMPLETED | OUTPATIENT
Start: 2024-01-12 | End: 2024-01-12

## 2024-01-12 RX ORDER — ACETAMINOPHEN 325 MG/1
650 TABLET ORAL EVERY 6 HOURS PRN
Status: CANCELLED | OUTPATIENT
Start: 2024-01-12

## 2024-01-12 RX ORDER — LORAZEPAM 1 MG/1
1 TABLET ORAL 2 TIMES DAILY
Status: DISCONTINUED | OUTPATIENT
Start: 2024-01-12 | End: 2024-01-12 | Stop reason: HOSPADM

## 2024-01-12 RX ORDER — HALOPERIDOL 5 MG/ML
INJECTION INTRAMUSCULAR
Status: COMPLETED
Start: 2024-01-12 | End: 2024-01-12

## 2024-01-12 RX ORDER — SENNOSIDES 8.8 MG/5ML
5 LIQUID ORAL 2 TIMES DAILY PRN
Status: DISCONTINUED | OUTPATIENT
Start: 2024-01-12 | End: 2024-02-15 | Stop reason: HOSPADM

## 2024-01-12 RX ORDER — HALOPERIDOL 5 MG/ML
2 INJECTION INTRAMUSCULAR EVERY 4 HOURS PRN
Status: DISCONTINUED | OUTPATIENT
Start: 2024-01-12 | End: 2024-01-13

## 2024-01-12 RX ORDER — SODIUM CHLORIDE, SODIUM LACTATE, POTASSIUM CHLORIDE, CALCIUM CHLORIDE 600; 310; 30; 20 MG/100ML; MG/100ML; MG/100ML; MG/100ML
INJECTION, SOLUTION INTRAVENOUS CONTINUOUS
Status: DISCONTINUED | OUTPATIENT
Start: 2024-01-12 | End: 2024-01-13

## 2024-01-12 RX ORDER — SODIUM CHLORIDE 0.9 % (FLUSH) 0.9 %
5-40 SYRINGE (ML) INJECTION EVERY 12 HOURS SCHEDULED
Status: DISCONTINUED | OUTPATIENT
Start: 2024-01-12 | End: 2024-01-14

## 2024-01-12 RX ORDER — ACETAMINOPHEN 650 MG/1
650 SUPPOSITORY RECTAL EVERY 6 HOURS PRN
Status: CANCELLED | OUTPATIENT
Start: 2024-01-12

## 2024-01-12 RX ORDER — SODIUM CHLORIDE 0.9 % (FLUSH) 0.9 %
5-40 SYRINGE (ML) INJECTION PRN
Status: CANCELLED | OUTPATIENT
Start: 2024-01-12

## 2024-01-12 RX ORDER — SODIUM CHLORIDE 0.9 % (FLUSH) 0.9 %
5-40 SYRINGE (ML) INJECTION PRN
Status: DISCONTINUED | OUTPATIENT
Start: 2024-01-12 | End: 2024-02-14

## 2024-01-12 RX ORDER — ENOXAPARIN SODIUM 100 MG/ML
40 INJECTION SUBCUTANEOUS DAILY
Status: DISCONTINUED | OUTPATIENT
Start: 2024-01-13 | End: 2024-01-13 | Stop reason: SDUPTHER

## 2024-01-12 RX ORDER — LORAZEPAM 1 MG/1
1 TABLET ORAL 2 TIMES DAILY
Status: CANCELLED | OUTPATIENT
Start: 2024-01-12 | End: 2024-01-16

## 2024-01-12 RX ADMIN — HALOPERIDOL LACTATE 10 MG: 5 INJECTION, SOLUTION INTRAMUSCULAR at 19:41

## 2024-01-12 RX ADMIN — SODIUM CHLORIDE, POTASSIUM CHLORIDE, SODIUM LACTATE AND CALCIUM CHLORIDE: 600; 310; 30; 20 INJECTION, SOLUTION INTRAVENOUS at 21:29

## 2024-01-12 RX ADMIN — HALOPERIDOL 10 MG: 5 INJECTION INTRAMUSCULAR at 19:41

## 2024-01-12 RX ADMIN — WATER 2000 MG: 1 INJECTION INTRAMUSCULAR; INTRAVENOUS; SUBCUTANEOUS at 20:49

## 2024-01-12 RX ADMIN — LORAZEPAM 2 MG: 2 INJECTION INTRAMUSCULAR; INTRAVENOUS at 17:53

## 2024-01-12 RX ADMIN — VANCOMYCIN HYDROCHLORIDE 1250 MG: 10 INJECTION, POWDER, LYOPHILIZED, FOR SOLUTION INTRAVENOUS at 04:42

## 2024-01-12 RX ADMIN — HALOPERIDOL LACTATE 2 MG: 5 INJECTION, SOLUTION INTRAMUSCULAR at 08:37

## 2024-01-12 RX ADMIN — LORAZEPAM 1 MG: 2 INJECTION INTRAMUSCULAR; INTRAVENOUS at 10:13

## 2024-01-12 RX ADMIN — SODIUM CHLORIDE, PRESERVATIVE FREE 10 ML: 5 INJECTION INTRAVENOUS at 20:49

## 2024-01-12 RX ADMIN — CEFEPIME 1000 MG: 1 INJECTION, POWDER, FOR SOLUTION INTRAMUSCULAR; INTRAVENOUS at 08:37

## 2024-01-12 RX ADMIN — DEXMEDETOMIDINE HYDROCHLORIDE 0.2 MCG/KG/HR: 400 INJECTION, SOLUTION INTRAVENOUS at 20:41

## 2024-01-12 RX ADMIN — ARIPIPRAZOLE 400 MG: KIT at 08:50

## 2024-01-12 RX ADMIN — ENOXAPARIN SODIUM 40 MG: 100 INJECTION SUBCUTANEOUS at 08:37

## 2024-01-12 NOTE — PROGRESS NOTES
Violent Restraint Face-to-Face Evaluation  (must be completed within one hour of initiation of restraints)        Evaluate immediate situation:  Patient is a threat to himself AEB putting himself in the fetal position on the floor (which caused wounds) and continually self removing IV access with his teeth.     Reaction to intervention: No evidence of learning, AEB Pt continually thrashing, pulling off mitts, attempting to break free of restraints     Medical Condition/Assessment: Pt with stage IV wound on right ankle. Medically stable at this time AEB his ability to move, speak, and continually pull at lines.     Behavioral Condition/Assessment: stable     The patient’s review of systems, history, medications, and recent labs were reviewed at this time.      Continue restraints at this time:      One-Hour Review Evaluation Physician Notification:     Provider Notified (Name):  Dr. Long Ball     Date: 1/12/2024     Time  1430     Physician or Designated One-Hour Reviewer: Dr. Ball & Louisa Villalba, RN

## 2024-01-12 NOTE — DISCHARGE SUMMARY
discharge medication list        NOTIFY YOUR PHYSICIAN FOR ANY OF THE FOLLOWING:   Fever over 101 degrees for 24 hours.   Chest pain, shortness of breath, fever, chills, nausea, vomiting, diarrhea, change in mentation, falling, weakness, bleeding. Severe pain or pain not relieved by medications.  Or, any other signs or symptoms that you may have questions about.    DISPOSITION:    Home With:   OT  PT  HH  RN       Long term SNF/Inpatient Rehab    Independent/assisted living    Hospice   x Other: [facility]       PATIENT CONDITION AT DISCHARGE:     Functional status    Poor    x Deconditioned     Independent      Cognition     Lucid     Forgetful     Dementia      Catheters/lines (plus indication)    Patel     PICC     PEG    x None      Code status    x Full code     DNR      PHYSICAL EXAMINATION AT DISCHARGE:    General : Disheveled.  Chest: clear to auscultation bilaterally, no rales, rhonic or crackles  CVS:  s1 s2 heard with no murmur  Abd: Soft nontender abdomen,  active bowel sounds on 4q.  Ext: Stage 4 wound on the right later foot with no purulence, fluctuance, other pressure points wound on knees and legs are non-blanchable erythema, brisk 2+ DP pulses  Neuro/Psych: mute, blunt affect, able to move extremities unable to assess strenght or reflexes  Skin: warm     CHRONIC MEDICAL DIAGNOSES:      Greater than 31 minutes were spent with the patient on counseling and coordination of care    Signed:   Conine Ball MD  1/12/2024  11:49 AM

## 2024-01-12 NOTE — PROGRESS NOTES
1015) Code Hossein Called.  Pt placed self on floor and pulled out IV.  Dr. Rivero at bedside.  Pt placed in soft 4 point restraints.

## 2024-01-12 NOTE — CONSULTS
PSYCHIATRY CONSULT NOTE:    REASON FOR CONSULT:  psychotic behavior  Resistant to treatments    HISTORY OF PRESENTING COMPLAINT:  Roge Cowan is a 53 y.o. male who is currently admitted to the psychiatric floor at [facility].      He was brought to hospital in police custody for a mental health problem.  He reportedly has not been engaging in self care and has not been eating or drinking.  Patient unable to participate in the interview.  Maintains the fetal position and moves feet when spoken to.  Transferred to medical due to a progressive wound on ankle.  Eating some and has showered a few times with and without soap.  Maintains no clothing and crouching fetal position.  Has court ordered treatments including ECT but resists them all, removing IV's and not taking PO here for management of his condition.    Roge Cowan remains isolative and selective with his interactions.  Was able to get IV fluids in last evening, however he removed the IV this morning.  He resists care despite it being court ordered and attempts to get on the floor from the bed to crouch.  This position has led to a progressive breakdown of his skin on his ankles and knees.  He is up for recommitment today.  Affect is sullen and withdrawn.  Requires support for grooming and care.  Limited self motivation.  No aggression or violence.  Selectively interactive and aware.  Tolerating medications via court order well.    1/11 -  Roge Cowan  was sitting up in bed this morning.  Has been in restraints and given chemical restraints multiple times this morning.  Rreports selectively to staff and sometimes changes position or comfort.  The wound on his ankle is open but healing.  Wound care is following.  He continues to eat and is incontinent in bed.  Appears sullen and withdrawn.  Seen stretched out at times, however when approached he will retreat to his crouching praying position.  Undressed and a body rash is noted  Daily Deborah VALLES Vishal, MD    MENTAL STATUS EXAM:  Roge Cowan is calm but resistant to care with selective cooperativity, Selective speech not heard today.  Affect is sullen and withdrawn.  Wants to be left alone.  Curled up in crouching position in his bed nude.  Got up and moved to the floor when addressed by this interviewer.  No aggression or violence.  Removed his IV today but was able to get IV fluids last evening.  Aware.  Insight is fair and judgement is impaired.      ASSESSMENT AND PLAN:  Chronic Paranoid Schizophrenia, Catatonia , Problems with primary support group, Problems related to social environment, Housing problems, Problems with access to health care services, and Other psychosocial or environmental problems  and 21-30 behavior considerably influenced by delusions or hallucinations OR serious impairment in judgment, communication OR inability to function in almost all areas    RECOMMENDATIONS:  Not a candidate for inpatient psychiatry at this time due to his skin breakdown is now a tunneling wound.  Continue 1:1 nursing  Psychiatry to follow  Considering 1 week of IM or PO ativan then trying ECT again.  Continue current care with Court ordered treatments including ECT.  Thank you for this consultation    Rustam Rivero MD  1/12/2024

## 2024-01-12 NOTE — PLAN OF CARE
Problem: Discharge Planning  Goal: Discharge to home or other facility with appropriate resources  1/12/2024 1106 by Patrice Herzog RN  Outcome: Not Progressing  1/11/2024 2146 by Dakota Lama RN  Outcome: Progressing     Problem: Safety - Medical Restraint  Goal: Remains free of injury from restraints (Restraint for Interference with Medical Device)  Description: INTERVENTIONS:  1. Determine that other, less restrictive measures have been tried or would not be effective before applying the restraint  2. Evaluate the patient's condition at the time of restraint application  3. Inform patient/family regarding the reason for restraint  4. Q2H: Monitor safety, psychosocial status, comfort, nutrition and hydration  1/12/2024 1106 by Patrice Herzog RN  Outcome: Not Progressing  1/11/2024 2146 by Dakota Lama RN  Outcome: Progressing  Flowsheets  Taken 1/11/2024 1600 by Geno Owens RN  Remains free of injury from restraints (restraint for interference with medical device): Every 2 hours: Monitor safety, psychosocial status, comfort, nutrition and hydration  Taken 1/11/2024 1400 by Geno Owesn RN  Remains free of injury from restraints (restraint for interference with medical device): Every 2 hours: Monitor safety, psychosocial status, comfort, nutrition and hydration  Taken 1/11/2024 1332 by Erendira Reyes RN  Remains free of injury from restraints (restraint for interference with medical device): Every 2 hours: Monitor safety, psychosocial status, comfort, nutrition and hydration  Taken 1/11/2024 1245 by Geno Owens RN  Remains free of injury from restraints (restraint for interference with medical device):   Every 2 hours: Monitor safety, psychosocial status, comfort, nutrition and hydration   Determine that other, less restrictive measures have been tried or would not be effective before applying the restraint   Evaluate the patient's condition at the time of  drain sites healing without S/S of infection  Outcome: Not Progressing     Problem: Musculoskeletal - Adult  Goal: Return mobility to safest level of function  Outcome: Not Progressing  Goal: Maintain proper alignment of affected body part  Outcome: Not Progressing     Problem: Gastrointestinal - Adult  Goal: Maintains or returns to baseline bowel function  Outcome: Not Progressing  Goal: Maintains adequate nutritional intake  Outcome: Not Progressing     Problem: Genitourinary - Adult  Goal: Absence of urinary retention  Outcome: Not Progressing     Problem: Infection - Adult  Goal: Absence of infection during hospitalization  Outcome: Not Progressing     Problem: Metabolic/Fluid and Electrolytes - Adult  Goal: Electrolytes maintained within normal limits  Outcome: Not Progressing  Goal: Hemodynamic stability and optimal renal function maintained  Outcome: Not Progressing     Problem: Hematologic - Adult  Goal: Maintains hematologic stability  Outcome: Not Progressing     Problem: Safety - Violent/Self-destructive Restraint  Goal: Remains free of injury from restraints (Restraint for Violent/Self-Destructive Behavior)  Description: INTERVENTIONS:  1. Determine that de-escalation and other, less restrictive measures have been tried or would not be effective before applying the restraint  2. Identify and document the criteria for restraint  3. Evaluate the patient's condition at the time of restraint application  4. Inform patient/family regarding the reason for restraint/seclusion  5. Q2H: Monitor comfort, nutrition and hydration needs  6. Q15M: Perform safety checks including skin, circulation, sensory, respiratory and psychological status  7. Ensure continuous observation  8. Identify and implement measures to help patient regain control, assess readiness for release and initiate progressive release per policy  Outcome: Not Progressing

## 2024-01-12 NOTE — PROGRESS NOTES
Attempted to reach out to patient's sister at [phone] to let her know that we were transferring the patient to [place]. Patient did not answer.

## 2024-01-12 NOTE — PROGRESS NOTES
Patient in soft 4 point restrains, roll belt, and hand mittens. Patient was able to get out hand mittens again. Patient continues to thrash around in the bed making noises and will not calm down.

## 2024-01-12 NOTE — PROGRESS NOTES
1926 Bedside and Verbal shift change report given to GAIL Duncan (oncoming nurse) by GAIL Krishna (offgoing nurse). Report included the following information Nurse Handoff Report, Intake/Output, MAR, and Recent Results.      0455 lab drawn and sent to the lab.

## 2024-01-12 NOTE — PROGRESS NOTES
PRAPARE - Transportation     Lack of Transportation (Medical): Patient declined     Lack of Transportation (Non-Medical): Patient declined   Physical Activity: Not on file   Stress: Not on file   Social Connections: Not on file   Intimate Partner Violence: Not on file   Depression: Not on file   Housing Stability: Unknown (12/8/2023)    Housing Stability Vital Sign     Unable to Pay for Housing in the Last Year: Patient refused     Number of Places Lived in the Last Year: 1     Unstable Housing in the Last Year: Patient refused   Interpersonal Safety: Patient Unable To Answer (12/8/2023)    [facility]     How often does anyone, including family and friends, physically hurt you?: Unable to respond     How often does anyone, including family and friends, scream or curse at you?: Not on file     How often does anyone, including family and friends, insult or talk down to you?: Not on file     How often does anyone, including family and friends, threaten you with harm?: Not on file   Recent Concern: Interpersonal Safety - Unknown (12/8/2023)    Interpersonal Safety ([facility])     How often does anyone, including family and friends, physically hurt you?: Unable to respond     How often does anyone, including family and friends, scream or curse at you?: Not on file     How often does anyone, including family and friends, insult or talk down to you?: Not on file     How often does anyone, including family and friends, threaten you with harm?: Not on file   Utilities: Patient Declined (12/8/2023)    [facility] Utilities     Threatened with loss of utilities: Patient refused       Review of Systems:   *No ROS as patient was non-verbal      Vital Signs:    Last 24hrs VS reviewed since prior progress note. Most recent are:  Vitals:    01/12/24 1107   BP: 124/80   Pulse: 78   Resp:    SpO2: 96%       No intake or output data in the 24 hours ending 01/12/24 1146       Physical Examination:     I had a face to face  tablet 650 mg  650 mg Oral Q6H PRN    Or    acetaminophen (TYLENOL) suppository 650 mg  650 mg Rectal Q6H PRN    enoxaparin (LOVENOX) injection 40 mg  40 mg SubCUTAneous Daily    polyethylene glycol (GLYCOLAX) packet 17 g  17 g Oral Daily PRN     ______________________________________________________________________  EXPECTED LENGTH OF STAY: 52  ACTUAL LENGTH OF STAY:          4                 Connie Ball MD

## 2024-01-12 NOTE — PROGRESS NOTES
Reviewed wound culture results of right lower leg.  Current antibiotic therapy and wound care acceptable ; no change needed.

## 2024-01-12 NOTE — WOUND CARE
Follow up Stage 4 pressure injury R ankle: 1/12/24     Assessment:  Patient in 4 point restraints Laying on Right side and placing pressure on right ankle.    Unable to measure or take media picture due to patient being restless. Wound bed is covered with slough. Periwound macerated. Erythema improving.  Able to clean a place dressing on wound with assistance.  Patient started rubbing right ankle on bed to remove dressing.  TX:  Old dressing had pulurent exudate. Cleansed with wound cleanser. Applied Medihoney gel with moist to dry dressing and secured with Medipore tape.      PI Prevention:  Turn/reposition approximately every 2 hours  Offload heels with heels hanging off end of pillow at all times while in bed.  Sacral Foam dressing: lift to assess regularly; change as needed. Discontinue if incontinence is frequently soiling dressing.     Zinc ointment to buttocks and sacrum daily and as needed with incontinence care  Low Air Loss mattress Use only flat sheet and one incontinence pad.     Rodrick Score-14     Wound Care Recommendations:  Per Dr. Mora's order.     Transition of Care: Patient being transferred today.     Discussed with GAIL Ibrahim RN, BSN  Wound Care Nurse, [facility]  [phone]

## 2024-01-12 NOTE — PROGRESS NOTES
Physician Progress Note      PATIENT:               CHOCO BASILIO  [facility] #:                  [id number]  :                       1970  ADMIT DATE:       2024 11:00 PM  DISCH DATE:  RESPONDING  PROVIDER #:        Long Ball MD          QUERY TEXT:    Dear attending,    Patient has severe malnutrition documented per dietary on 1/10/24.    If possible, please document in the progress notes and discharge summary if   you are evaluating and/or treating any of the following:        The medical record reflects the following:  Risk Factors: schizophrenia with catatonia, stage IV pressure wound in his   right ankle    Clinical Indicators: 1/10-Per dietary- He is now admitted to medical floor for   hypernatremia, dehydration, FTT.    Malnutrition Status:   severe  Context:    soc environ  Findings of the 6 clinical characteristics of malnutrition:  Energy Intake:   50% or less X > 1 month  Weight Loss:      > 5% x 1 month  Body Fat Loss:      severe; orbital, triceps buccal  Muscle Mass Loss:     mild; temples, thigh, calf, scapula  Fluid Accumulation:     no significant   Strength:   not performed    Nutrition Diagnosis:  Inadequate protein-energy intake, In context of social or environmental   circumstances, Food & Nutrition-related knowledge deficit, Limited adherence   to nutrition-related recommendations related to cognitive or neurological   impairment, psychological cause or life stress, increase demand for   energy/nutrients as evidenced by intake 0-25%, wounds    [place] criteria:   https://aspenjournals.onlinelibrary.escobedo.com/doi/full/10.1177/992291861285662  5      Thank you,  Monet Hickman RN, BSN, CRCR, CCDS  Clinical Documentation Improvement  Office: [phone] or via [employer]  Options provided:  -- Severe Malnutrition  -- Malnutrition, please specify degree.  -- Other - I will add my own diagnosis  -- Disagree - Not applicable / Not valid  -- Disagree - Clinically unable to determine /  Unknown  -- Refer to Clinical Documentation Reviewer    PROVIDER RESPONSE TEXT:    This patient has severe malnutrition.    Query created by: Monet Hickman on 1/12/2024 12:15 PM      Electronically signed by:  Long Ball MD 1/12/2024 5:55 PM

## 2024-01-12 NOTE — CARE COORDINATION
JORJE     RUR 15 %     IDR Rounds again this am with MD and team   To initiate transfer to higher level of care     Please see CM notes & BH SW notes.     Received call back from the APS worker.  [phone]  she indicates she talked to her Supervisor and they will discuss the situation with the .      Sister is the patient's legal Guardian. Forms on chart.   Patient sister Edne Montalvo [phone]   email [email or web address]    Katelin LAND RN    [phone]

## 2024-01-12 NOTE — H&P
[facility]    ICU Team History and Physical    Name: Roge Cowan   : 1970   MRN: [id number]   Date: 2024      Reason for ICU Admission: Severe psychosis, episodes of catatonia    HPI     Roge Cowan is a 53 y.o. male with past medical history of schizophrenia with catatonia, status post ECT treatment x 2.  Admitted to [facility] on 2023 after he was brought in by police for evaluation.  Patient had apparently been laying naked on the floor in the prayer position, and had been refusing all p.o. intake, care, medications except IM. Long term states in this position has resulted in pressure injuries on his knees, feet, and ankles. Patient currently has stage IV pressure wound in his right ankle which progressed to tendon exposure and purulent drainage. Nursing has applied pads to minimize wound breakdown prior to this, however patient would remove them.  He refused scheduled medications and only received IM meds that were court order. Unclear whether the patient is eating or the amount he is drinking.  He has not allowed for routine blood draws or vital signs. Patient has also received ECT treatment while in psychiatric care. Following discussion between primary team and psychiatrist at  [facility], there is concerned that patient has been a danger to self due to his underlying psychiatric condition, and requires sedation and a protective environment noted to provide adequate care. Reported to be selectively interactive and aware. No aggression or violence. Patient was seen by an infectious disease specialist who recommended vancomycin and cefepime, patient was also seen by podiatrist with no surgical interventions planned. Patient was transferred to [facility] ICU for inpatient medical care.    Active ICU Problems     Sepsis: Right ankle cellulitis and stage IV pressure ulcer wound with drainage.   Acute Psychosis Schizophrenia: Catatonic/Paranoid  Dehydration  Protein deficient malnutrition

## 2024-01-12 NOTE — PROGRESS NOTES
Violent Restraint Face-to-Face Evaluation  (must be completed within one hour of initiation of restraints)        Evaluate immediate situation:  Patient is a threat to himself AEB putting himself in the fetal position on the floor (which caused wounds) and continually self removing IV access with his teeth.     Reaction to intervention: No evidence of learning, AEB Pt continually thrashing, pulling off mitts, attempting to break free of restraints     Medical Condition/Assessment: Pt with stage IV wound on right ankle. Medically stable at this time AEB his ability to move, speak, and continually pull at lines.     Behavioral Condition/Assessment: stable     The patient’s review of systems, history, medications, and recent labs were reviewed at this time.      Continue restraints at this time:      One-Hour Review Evaluation Physician Notification:     Provider Notified (Name):  Dr. Long Ball     Date: 1/12/2024     Time  1030     Physician or Designated One-Hour Reviewer: Dr. Ball & Louisa Villalba, RN  *Verbal order provided by Dr. Ball for violent restraints beginning at 1030AM, documented on downtime forms *

## 2024-01-12 NOTE — PROGRESS NOTES
[facility] Dosing Services: Vancomycin Daily Dosing Note  Consult for dosing of vancomycin by Dr. Ball  Indication: SSTI  Day of Therapy: 2  ID consulted     Ht Readings from Last 1 Encounters:   01/10/24 1.829 m (6' 0.01\")     Wt Readings from Last 1 Encounters:   12/16/23 80.7 kg (178 lb)       Vancomycin:   Current maintenance dose: vancomycin 1250 mg IV every 12 hours    Last level NA mcg/mL   Dose calculated to approximate a target AUC/CLIFF of 400-600   Plan: Vr in AM.    Dose administration notes:  Doses given appropriately as scheduled    Date Dose & Interval Measured level AUC/CLIFF                         Non-Kinetic Antimicrobial Dosing Regimen:   Current Regimen:  cefepime 1 g IV q 8 hours  Recommendation: increase cefepime to 2g IV q 8 hours due to severity of infection, possible bone involvement   Dose administration notes:   Doses given appropriately as scheduled. Giving over 30 mins due to behavioral issues/patient pulling out IV (minimizing IV time)           Other Antimicrobial   (not dosed by pharmacist) None    Cultures 1/9 BCX: 2/2 staph epi   1/10 foot wound culture: beta hemolytic group B strep @ final   1/11 BCX: in process    Serum Creatinine Lab Results   Component Value Date/Time    BUN 16 01/12/2024 04:37 AM    CREATININE 0.72 01/12/2024 04:37 AM      Creatinine Clearance CrCl cannot be calculated (Unknown ideal weight.).   Temp Temp: 98.7 °F (37.1 °C) (Axillary)     WBC Lab Results   Component Value Date/Time    WBC 5.9 01/12/2024 05:19 AM      Procalcitonin  No results found for: \"PROCAL\"     Pharmacy will follow the patient on a daily basis and make adjustments based on patient's clinical status.    Thank you,     Sera Almanza, [facility]  [phone]

## 2024-01-12 NOTE — PROGRESS NOTES
TRANSFER - IN REPORT:    Verbal report received from GAIL Ibrahim on Roge Cowan  being received from [facility]  for change in patient condition (increased care needs)      Report consisted of patient's Situation, Background, Assessment and   Recommendations(SBAR).     Information from the following report(s) Nurse Handoff Report and MAR was reviewed with the receiving nurse.    Opportunity for questions and clarification was provided.      Assessment to be completed upon patient's arrival to unit, pending transport, and care assumed.

## 2024-01-13 LAB
ANION GAP SERPL CALC-SCNC: 4 MMOL/L (ref 5–15)
BASOPHILS # BLD: 0.1 K/UL (ref 0–0.1)
BASOPHILS NFR BLD: 1 % (ref 0–1)
BUN SERPL-MCNC: 16 MG/DL (ref 6–20)
BUN/CREAT SERPL: 25 (ref 12–20)
CALCIUM SERPL-MCNC: 8.2 MG/DL (ref 8.5–10.1)
CHLORIDE SERPL-SCNC: 110 MMOL/L (ref 97–108)
CK SERPL-CCNC: 416 U/L (ref 39–308)
CO2 SERPL-SCNC: 26 MMOL/L (ref 21–32)
CREAT SERPL-MCNC: 0.65 MG/DL (ref 0.7–1.3)
DIFFERENTIAL METHOD BLD: ABNORMAL
EOSINOPHIL # BLD: 0.2 K/UL (ref 0–0.4)
EOSINOPHIL NFR BLD: 3 % (ref 0–7)
ERYTHROCYTE [DISTWIDTH] IN BLOOD BY AUTOMATED COUNT: 13 % (ref 11.5–14.5)
GLUCOSE SERPL-MCNC: 115 MG/DL (ref 65–100)
HCT VFR BLD AUTO: 37.7 % (ref 36.6–50.3)
HGB BLD-MCNC: 12.6 G/DL (ref 12.1–17)
IMM GRANULOCYTES # BLD AUTO: 0 K/UL (ref 0–0.04)
IMM GRANULOCYTES NFR BLD AUTO: 1 % (ref 0–0.5)
INR PPP: 1 (ref 0.9–1.1)
LACTATE SERPL-SCNC: 1.3 MMOL/L (ref 0.4–2)
LYMPHOCYTES # BLD: 1.7 K/UL (ref 0.8–3.5)
LYMPHOCYTES NFR BLD: 29 % (ref 12–49)
MAGNESIUM SERPL-MCNC: 2.2 MG/DL (ref 1.6–2.4)
MCH RBC QN AUTO: 30 PG (ref 26–34)
MCHC RBC AUTO-ENTMCNC: 33.4 G/DL (ref 30–36.5)
MCV RBC AUTO: 89.8 FL (ref 80–99)
MONOCYTES # BLD: 0.4 K/UL (ref 0–1)
MONOCYTES NFR BLD: 7 % (ref 5–13)
NEUTS SEG # BLD: 3.4 K/UL (ref 1.8–8)
NEUTS SEG NFR BLD: 59 % (ref 32–75)
NRBC # BLD: 0 K/UL (ref 0–0.01)
NRBC BLD-RTO: 0 PER 100 WBC
PHOSPHATE SERPL-MCNC: 2.7 MG/DL (ref 2.6–4.7)
PLATELET # BLD AUTO: 253 K/UL (ref 150–400)
PMV BLD AUTO: 11.1 FL (ref 8.9–12.9)
POTASSIUM SERPL-SCNC: 4 MMOL/L (ref 3.5–5.1)
PROCALCITONIN SERPL-MCNC: <0.05 NG/ML
PROTHROMBIN TIME: 10.9 SEC (ref 9–11.1)
RBC # BLD AUTO: 4.2 M/UL (ref 4.1–5.7)
SODIUM SERPL-SCNC: 140 MMOL/L (ref 136–145)
VANCOMYCIN SERPL-MCNC: 14.5 UG/ML
WBC # BLD AUTO: 5.8 K/UL (ref 4.1–11.1)

## 2024-01-13 PROCEDURE — 2500000003 HC RX 250 WO HCPCS: Performed by: NURSE PRACTITIONER

## 2024-01-13 PROCEDURE — 36415 COLL VENOUS BLD VENIPUNCTURE: CPT

## 2024-01-13 PROCEDURE — 85025 COMPLETE CBC W/AUTO DIFF WBC: CPT

## 2024-01-13 PROCEDURE — 2580000003 HC RX 258: Performed by: NURSE PRACTITIONER

## 2024-01-13 PROCEDURE — 83735 ASSAY OF MAGNESIUM: CPT

## 2024-01-13 PROCEDURE — 6360000002 HC RX W HCPCS: Performed by: NURSE PRACTITIONER

## 2024-01-13 PROCEDURE — 2000000000 HC ICU R&B

## 2024-01-13 PROCEDURE — 84145 PROCALCITONIN (PCT): CPT

## 2024-01-13 PROCEDURE — 80048 BASIC METABOLIC PNL TOTAL CA: CPT

## 2024-01-13 PROCEDURE — 6360000002 HC RX W HCPCS: Performed by: INTERNAL MEDICINE

## 2024-01-13 PROCEDURE — 2580000003 HC RX 258: Performed by: INTERNAL MEDICINE

## 2024-01-13 PROCEDURE — A4216 STERILE WATER/SALINE, 10 ML: HCPCS | Performed by: NURSE PRACTITIONER

## 2024-01-13 PROCEDURE — 80202 ASSAY OF VANCOMYCIN: CPT

## 2024-01-13 PROCEDURE — 85610 PROTHROMBIN TIME: CPT

## 2024-01-13 PROCEDURE — 82550 ASSAY OF CK (CPK): CPT

## 2024-01-13 PROCEDURE — 83605 ASSAY OF LACTIC ACID: CPT

## 2024-01-13 PROCEDURE — 84100 ASSAY OF PHOSPHORUS: CPT

## 2024-01-13 PROCEDURE — 99232 SBSQ HOSP IP/OBS MODERATE 35: CPT | Performed by: INTERNAL MEDICINE

## 2024-01-13 RX ORDER — ACETAMINOPHEN 650 MG/1
650 SUPPOSITORY RECTAL EVERY 6 HOURS PRN
Status: DISCONTINUED | OUTPATIENT
Start: 2024-01-13 | End: 2024-02-15 | Stop reason: HOSPADM

## 2024-01-13 RX ORDER — POLYETHYLENE GLYCOL 3350 17 G/17G
17 POWDER, FOR SOLUTION ORAL DAILY PRN
Status: DISCONTINUED | OUTPATIENT
Start: 2024-01-13 | End: 2024-01-29

## 2024-01-13 RX ORDER — LORAZEPAM 1 MG/1
1 TABLET ORAL 2 TIMES DAILY
Status: DISCONTINUED | OUTPATIENT
Start: 2024-01-13 | End: 2024-01-13

## 2024-01-13 RX ORDER — ONDANSETRON 2 MG/ML
4 INJECTION INTRAMUSCULAR; INTRAVENOUS EVERY 6 HOURS PRN
Status: DISCONTINUED | OUTPATIENT
Start: 2024-01-13 | End: 2024-02-15 | Stop reason: HOSPADM

## 2024-01-13 RX ORDER — POTASSIUM CHLORIDE 750 MG/1
40 TABLET, FILM COATED, EXTENDED RELEASE ORAL PRN
Status: DISCONTINUED | OUTPATIENT
Start: 2024-01-13 | End: 2024-01-27

## 2024-01-13 RX ORDER — HALOPERIDOL 5 MG/ML
5 INJECTION INTRAMUSCULAR EVERY 6 HOURS PRN
Status: DISCONTINUED | OUTPATIENT
Start: 2024-01-13 | End: 2024-01-15

## 2024-01-13 RX ORDER — LORAZEPAM 2 MG/ML
2 INJECTION INTRAMUSCULAR EVERY 8 HOURS
Status: DISCONTINUED | OUTPATIENT
Start: 2024-01-13 | End: 2024-01-14

## 2024-01-13 RX ORDER — MAGNESIUM SULFATE IN WATER 40 MG/ML
2000 INJECTION, SOLUTION INTRAVENOUS PRN
Status: DISCONTINUED | OUTPATIENT
Start: 2024-01-13 | End: 2024-01-27

## 2024-01-13 RX ORDER — OLANZAPINE 5 MG/1
10 TABLET, ORALLY DISINTEGRATING ORAL NIGHTLY
Status: DISCONTINUED | OUTPATIENT
Start: 2024-01-13 | End: 2024-01-15

## 2024-01-13 RX ORDER — ENOXAPARIN SODIUM 100 MG/ML
40 INJECTION SUBCUTANEOUS DAILY
Status: DISCONTINUED | OUTPATIENT
Start: 2024-01-13 | End: 2024-02-15 | Stop reason: HOSPADM

## 2024-01-13 RX ORDER — LORAZEPAM 2 MG/ML
2 INJECTION INTRAMUSCULAR EVERY 8 HOURS
Status: DISCONTINUED | OUTPATIENT
Start: 2024-01-13 | End: 2024-01-13

## 2024-01-13 RX ORDER — SODIUM CHLORIDE 0.9 % (FLUSH) 0.9 %
5-40 SYRINGE (ML) INJECTION EVERY 12 HOURS SCHEDULED
Status: DISCONTINUED | OUTPATIENT
Start: 2024-01-13 | End: 2024-02-14

## 2024-01-13 RX ORDER — LORAZEPAM 2 MG/ML
2 INJECTION INTRAMUSCULAR EVERY 4 HOURS PRN
Status: DISCONTINUED | OUTPATIENT
Start: 2024-01-13 | End: 2024-01-15

## 2024-01-13 RX ORDER — ACETAMINOPHEN 325 MG/1
650 TABLET ORAL EVERY 6 HOURS PRN
Status: DISCONTINUED | OUTPATIENT
Start: 2024-01-13 | End: 2024-02-15 | Stop reason: HOSPADM

## 2024-01-13 RX ORDER — POTASSIUM CHLORIDE 7.45 MG/ML
10 INJECTION INTRAVENOUS PRN
Status: DISCONTINUED | OUTPATIENT
Start: 2024-01-13 | End: 2024-02-15 | Stop reason: HOSPADM

## 2024-01-13 RX ORDER — LORAZEPAM 2 MG/ML
1 INJECTION INTRAMUSCULAR 2 TIMES DAILY
Status: DISCONTINUED | OUTPATIENT
Start: 2024-01-13 | End: 2024-01-13

## 2024-01-13 RX ORDER — SODIUM CHLORIDE 0.9 % (FLUSH) 0.9 %
5-40 SYRINGE (ML) INJECTION PRN
Status: DISCONTINUED | OUTPATIENT
Start: 2024-01-13 | End: 2024-02-14

## 2024-01-13 RX ORDER — HALOPERIDOL 5 MG/ML
2 INJECTION INTRAMUSCULAR EVERY 6 HOURS PRN
Status: DISCONTINUED | OUTPATIENT
Start: 2024-01-13 | End: 2024-01-13 | Stop reason: SDUPTHER

## 2024-01-13 RX ORDER — ONDANSETRON 4 MG/1
4 TABLET, ORALLY DISINTEGRATING ORAL EVERY 8 HOURS PRN
Status: DISCONTINUED | OUTPATIENT
Start: 2024-01-13 | End: 2024-02-15 | Stop reason: HOSPADM

## 2024-01-13 RX ORDER — SODIUM CHLORIDE 9 MG/ML
INJECTION, SOLUTION INTRAVENOUS PRN
Status: DISCONTINUED | OUTPATIENT
Start: 2024-01-13 | End: 2024-02-15 | Stop reason: HOSPADM

## 2024-01-13 RX ADMIN — SODIUM CHLORIDE, PRESERVATIVE FREE 10 ML: 5 INJECTION INTRAVENOUS at 08:04

## 2024-01-13 RX ADMIN — SODIUM CHLORIDE, PRESERVATIVE FREE 10 ML: 5 INJECTION INTRAVENOUS at 21:41

## 2024-01-13 RX ADMIN — SODIUM CHLORIDE 0.2 MG/KG/HR: 9 INJECTION, SOLUTION INTRAVENOUS at 09:41

## 2024-01-13 RX ADMIN — VANCOMYCIN HYDROCHLORIDE 1250 MG: 1.25 INJECTION, POWDER, LYOPHILIZED, FOR SOLUTION INTRAVENOUS at 01:07

## 2024-01-13 RX ADMIN — HALOPERIDOL LACTATE 2 MG: 5 INJECTION, SOLUTION INTRAMUSCULAR at 04:07

## 2024-01-13 RX ADMIN — ENOXAPARIN SODIUM 40 MG: 100 INJECTION SUBCUTANEOUS at 08:00

## 2024-01-13 RX ADMIN — FAMOTIDINE 20 MG: 10 INJECTION, SOLUTION INTRAVENOUS at 07:57

## 2024-01-13 RX ADMIN — SODIUM CHLORIDE, PRESERVATIVE FREE 10 ML: 5 INJECTION INTRAVENOUS at 08:05

## 2024-01-13 RX ADMIN — VANCOMYCIN HYDROCHLORIDE 1250 MG: 1.25 INJECTION, POWDER, LYOPHILIZED, FOR SOLUTION INTRAVENOUS at 21:33

## 2024-01-13 RX ADMIN — DEXMEDETOMIDINE HYDROCHLORIDE 0.8 MCG/KG/HR: 400 INJECTION, SOLUTION INTRAVENOUS at 21:50

## 2024-01-13 RX ADMIN — SODIUM CHLORIDE, PRESERVATIVE FREE 10 ML: 5 INJECTION INTRAVENOUS at 21:42

## 2024-01-13 RX ADMIN — DEXMEDETOMIDINE HYDROCHLORIDE 1 MCG/KG/HR: 400 INJECTION, SOLUTION INTRAVENOUS at 01:06

## 2024-01-13 RX ADMIN — CEFEPIME 2000 MG: 2 INJECTION, POWDER, FOR SOLUTION INTRAVENOUS at 12:00

## 2024-01-13 RX ADMIN — WATER 10 MG: 1 INJECTION INTRAMUSCULAR; INTRAVENOUS; SUBCUTANEOUS at 21:32

## 2024-01-13 RX ADMIN — LORAZEPAM 2 MG: 2 INJECTION INTRAMUSCULAR; INTRAVENOUS at 16:03

## 2024-01-13 RX ADMIN — LORAZEPAM 2 MG: 2 INJECTION INTRAMUSCULAR; INTRAVENOUS at 08:05

## 2024-01-13 RX ADMIN — DEXMEDETOMIDINE HYDROCHLORIDE 0.8 MCG/KG/HR: 400 INJECTION, SOLUTION INTRAVENOUS at 14:52

## 2024-01-13 RX ADMIN — CEFEPIME 2000 MG: 2 INJECTION, POWDER, FOR SOLUTION INTRAVENOUS at 04:57

## 2024-01-13 RX ADMIN — DEXMEDETOMIDINE HYDROCHLORIDE 1.2 MCG/KG/HR: 400 INJECTION, SOLUTION INTRAVENOUS at 07:57

## 2024-01-13 RX ADMIN — DEXMEDETOMIDINE HYDROCHLORIDE 1.2 MCG/KG/HR: 400 INJECTION, SOLUTION INTRAVENOUS at 05:28

## 2024-01-13 RX ADMIN — VANCOMYCIN HYDROCHLORIDE 1250 MG: 1.25 INJECTION, POWDER, LYOPHILIZED, FOR SOLUTION INTRAVENOUS at 10:11

## 2024-01-13 RX ADMIN — CEFEPIME 2000 MG: 2 INJECTION, POWDER, FOR SOLUTION INTRAVENOUS at 21:32

## 2024-01-13 ASSESSMENT — PAIN SCALES - WONG BAKER: WONGBAKER_NUMERICALRESPONSE: 0

## 2024-01-13 ASSESSMENT — PAIN SCALES - GENERAL: PAINLEVEL_OUTOF10: 0

## 2024-01-13 NOTE — PROGRESS NOTES
[facility] Progress Note        Roge Cowan  1970  [id number]  1/13/2024      Assessment and plan:  Chronic paranoid schizophrenia and catatonia:   -++ delusions and psychosocial problems  -continues on court ordered meds  -resisting eating.  Not able to perform self care   -significant cellulitis as a result of his preferred position being prayer position on the hard floor   -cont precedex IV Gtt  -start ketamine IV gtt  -dc LR   -cont ativan but increase to 2 mg IV tID for catatonia treatment   -cont haldol prn but increase to 5 q 6   -cont zyprexa 10 mg at hs  -discussed with and consulted psych for help with management  -cont violent restraints  -cont sitter at bedside     Cellulitis:  involving  stage 4 wound on right ankle    -cont IV vanc and IV cefepime  -blood cx with staph epi   -wound cx with grp b strep   -ct ankle reviewed and shows soft tissue edema but no bony involvement  -CRP 5.7  -ESR 53    Severe malnutrition:  -as evidenced by Cr 0.64, Albumin 3.0 and AG 4.0  -very cachectic with low muscle mass  -not eating   -DHT placement will likely not be fruitful as he removes even Ivs  -re-eval tube as he improves      Discussed with case management team.      HPI:  remains nonverbal.  Difficult to direct towards safe pt care.  Not eating.  Malnourished.      AG 4  Low Cr  and low albumin all point to severe malnutrition      ICU DAILY CHECKLIST     Code Status:full   DVT Prophylaxis: lovenox  T/L/D: PIVs, 4- pt restraints   SUP: not indicated  Diet: not eating  Activity Level: as tolerated  ABCDEF Bundle/Checklist Completed:Yes  Disposition: cont ICU care  Multidisciplinary Rounds Completed: yes  Goals of Care Discussion/Palliative: yes  Patient/Family Updated: yes      OBJECTIVE  Vitals:    01/13/24 0530 01/13/24 0600 01/13/24 0630 01/13/24 0700   BP:  120/78  128/83   Pulse: 58 57 56 56   Resp: 22 22 19 21   Temp:       TempSrc:       SpO2: 98% 99% 100% 99%   Weight:          EXAM:   GEN: nonverbal  not interactive  sleeping this am and through my exam   HEENT  -Head: NC/AT;  -Eyes: PERRL, EOMI. No discharge or redness;  -Ears: External ears are normal. Normal TMs.  -Nose: Normal nares.  -Mouth and throat: MMM. Normal gums, mucosa, palate,. Good dentition.  NECK: Supple, with no masses. No JVD   CV: RRR, no m/r/g.  LUNGS: CTAB, no w/r/c.  ABD: Soft, NT/ND, no masses, NTTP  SKIN: Warm, well perfused. No skin rashes or abnormal lesions.  EXT: No clubbing, cyanosis, or edema.  NEURO: not able to obtain neuro exam      LABS:   Sodium 140  K 4.0  Cr 0.65 indicates muscle wasting   AG 4- also indicates malnutrition     Cpk 416, this is trending down.  Mild rhabdo noted     Wbc 5.8- never elevated on this admission  Hb   12- baseline  Plt 253- also stable baseline     Imaging reviewed   Ankle CT 1/9 reviewed and shows soft tissue edema, no bony involvement.  Reassuring to r/o osteo of this area    Princess Beverly MD    Pulmonary and Critical Care Medicine   [facility]  [phone]  1/13/2024

## 2024-01-13 NOTE — PROGRESS NOTES
Pharmacist Note - Vancomycin Dosing  Therapy day 3  Indication: stage IV pressure ulcer to ankle   Current regimen: 1250 mg IV Q12H     Recent Labs     01/12/24  0437 01/12/24  0519 01/13/24  0429   WBC  --  5.9 5.8   CREATININE 0.72  --  0.65*   BUN 16  --  16       A random vancomycin level of 14.5 mcg/mL was obtained and from this level, the patient's AUC24 is calculated to be 444 with the current regimen.     Goal target range AUC/CLIFF 400-600      Plan: Continue current regimen. Pharmacy will continue to monitor this patient daily for changes in clinical status and renal function.    *Random vancomycin levels are used to calculate AUC/CLIFF, this level should not be interpreted as a trough. Vancomycin has been dosed using Bayesian kinetics software to target an AUC24:CLIFF of 400-600, which provides adequate exposure for as assumed infection due to MRSA with an CLIFF of 1 or less while reducing the risk of nephrotoxicity as seen with traditional trough based dosing goals.

## 2024-01-13 NOTE — CONSULTS
Urine:     Synovial/Joint fluid:     Sputum/BAL/Pleural:     Peritoneal:                Wound: 01/10/2024 swab culture of wound beta-hemolytic strep group B    Pathology Results:    Imaging:     CT right ankle:01/09/24    Nodular skin thickening ulceration and soft tissue edema and swelling  Involvement of the anterolateral ankle and midfoot  No rim-enhancing fluid collection to suggest abscess, no gas  Mild osteoarthritis  Finding which can be seen with intersection syndrome of the flexor hallux was longus tendon      Procedures:     ECT : Per schedule    Assessment / Plan:       1. Right ankle ulceration without abscess collection       Continue wound care       Continue cefepime/vancomycin for now       Afebrile, no leukocytosis, elevated CRP/ESR    2.  Chronic paranoid schizophrenia with catatonia s/p ECT       Follow-up psychiatry                    Thank for the opportunity to participate in the care of this patient. Please contact with questions or concerns.

## 2024-01-13 NOTE — BSMART NOTE
BSMART Liaison Team Note     LOS:  4     Patient goal(s) for today: take medications as prescribed, make needs known in an appropriate manner  BSMART Liaison team focus/goals: assess needs, provide support and education, coordinate care    Progress note: Patient assessed face to face with Dr Rivero and margaret present. Patient remains laying naked in bed curled up on his side. He does not engage with the team. He did eat all of his breakfast as the tray was sitting at bedside and all food was gone. He was observed shaking his feet and rubbing his feet on each other. He shifted to his knees in the curled up position. He shifted back to his side after 1-2 minutes. Patient again shifted back onto his knees as the team was leaving and nurse reminded him that he needs to remain on his side to not put pressure on his wound and he did shift back onto his side per nurse report. He continues to receive PRN medications. He has been in and out of restraints and has pulled out IVs. Dr Rivero has ordered the LOWE of Abilify which patient was due for and nursing was able to administer. Dr Rivero's recommendation, \"Considering 1 week of IM or PO ativan then trying ECT again.\" Dr Rivero ordered scheduled ativan.     While Dr Rivero and this writer were at the nurses station a Code Hossein was called for patient as he had gotten out of bed and placed himself on the floor. Patient was placed back in bed and restraints. He had managed to remove his IV access again. Dr Rivero and Dr Ball discussed a transfer to an ICU for further treatment.      Barriers to Discharge: medical, committed to [facility], catatonic state  Guns in the home: No      Outpatient provider(s):  [facility]  Insurance info/prescription coverage:  MEDICARE PART A AND B , [facility]AN OF [facility]      Diagnosis: Paranoid Schizophrenia with catatonia     Plan:  Defer to medical team on medical condition. Dr Rivero to adjust medications and coordiante

## 2024-01-13 NOTE — PROGRESS NOTES
Day #1 of cefepime  Indication:  sepsis, ssti  Current regimen:  2 gm q12h  Abx regimen: cefepime  Recent Labs     01/12/24  0437 01/12/24  0519   WBC  --  5.9   CREATININE 0.72  --    BUN 16  --      Est CrCl: >100 ml/min  No data recorded.      Plan: Change to 2 gm q8h per protocol    *

## 2024-01-13 NOTE — PROGRESS NOTES
2000: pt arrived via EMS placed in bed, pt nonverbal, follows no commands, not directable, pulling and fighting against restraints. Pt continuously attempting to curl into a ball. Large wound to right outer ankle, red with exudate in wound bed. Dressing placed and wrapped with coban to try to keep dressing intact. Pt arrived with no IV access. Placed IV.

## 2024-01-13 NOTE — PROGRESS NOTES
Continued from OSH    Pharmacist Note - Vancomycin Dosing    Consult provided for this 53 y.o. male for indication of Stage IV ulcer, rt ankle.  Antibiotic regimen(s): Vancomycin  Patient on vancomycin PTA? YES     Recent Labs     01/12/24  0437 01/12/24  0519   WBC  --  5.9   CREATININE 0.72  --    BUN 16  --      Frequency of BMP: Daily   Height: 182.9 cm  Weight: 66.4 kg  Est CrCl: >100 ml/min; UO: --- ml/kg/hr  No data recorded.    Cultures:  Blood -  CoNS 2/2 bottles - epidermidis by pcr  Wound- normal sumaya     MRSA Swab ordered (if applicable)? N/A    The plan below is expected to result in a target range of AUC/CLIFF 400-500    Therapy will continue with a maintenance dose of 1250 mg IV every 12 hours initiated at [facility].  Pharmacy to follow patient daily and order levels / make dose adjustments as appropriate.    Aidan Tucker, PharmD    *Vancomycin has been dosed used Bayesian kinetics software to target an AUC/CLIFF of 400-600, which provides adequate exposure for an assumed infection due to MRSA with an CLIFF of 1 or less while reducing the risk of nephrotoxicity as seen with traditional trough based dosing goals.

## 2024-01-14 LAB
ANION GAP SERPL CALC-SCNC: 5 MMOL/L (ref 5–15)
BACTERIA SPEC CULT: NORMAL
BACTERIA SPEC CULT: NORMAL
BASOPHILS # BLD: 0.1 K/UL (ref 0–0.1)
BASOPHILS NFR BLD: 1 % (ref 0–1)
BUN SERPL-MCNC: 13 MG/DL (ref 6–20)
BUN/CREAT SERPL: 23 (ref 12–20)
CALCIUM SERPL-MCNC: 8.1 MG/DL (ref 8.5–10.1)
CHLORIDE SERPL-SCNC: 118 MMOL/L (ref 97–108)
CO2 SERPL-SCNC: 21 MMOL/L (ref 21–32)
CREAT SERPL-MCNC: 0.57 MG/DL (ref 0.7–1.3)
DIFFERENTIAL METHOD BLD: ABNORMAL
EOSINOPHIL # BLD: 0.2 K/UL (ref 0–0.4)
EOSINOPHIL NFR BLD: 3 % (ref 0–7)
ERYTHROCYTE [DISTWIDTH] IN BLOOD BY AUTOMATED COUNT: 13.2 % (ref 11.5–14.5)
GLUCOSE SERPL-MCNC: 82 MG/DL (ref 65–100)
HCT VFR BLD AUTO: 41.4 % (ref 36.6–50.3)
HGB BLD-MCNC: 13 G/DL (ref 12.1–17)
IMM GRANULOCYTES # BLD AUTO: 0.1 K/UL (ref 0–0.04)
IMM GRANULOCYTES NFR BLD AUTO: 1 % (ref 0–0.5)
LYMPHOCYTES # BLD: 1.6 K/UL (ref 0.8–3.5)
LYMPHOCYTES NFR BLD: 29 % (ref 12–49)
MAGNESIUM SERPL-MCNC: 2.2 MG/DL (ref 1.6–2.4)
MCH RBC QN AUTO: 29.5 PG (ref 26–34)
MCHC RBC AUTO-ENTMCNC: 31.4 G/DL (ref 30–36.5)
MCV RBC AUTO: 93.9 FL (ref 80–99)
MONOCYTES # BLD: 0.5 K/UL (ref 0–1)
MONOCYTES NFR BLD: 9 % (ref 5–13)
NEUTS SEG # BLD: 3.2 K/UL (ref 1.8–8)
NEUTS SEG NFR BLD: 57 % (ref 32–75)
NRBC # BLD: 0 K/UL (ref 0–0.01)
NRBC BLD-RTO: 0 PER 100 WBC
PHOSPHATE SERPL-MCNC: 2.6 MG/DL (ref 2.6–4.7)
PLATELET # BLD AUTO: 228 K/UL (ref 150–400)
PMV BLD AUTO: 10.7 FL (ref 8.9–12.9)
POTASSIUM SERPL-SCNC: 3.8 MMOL/L (ref 3.5–5.1)
RBC # BLD AUTO: 4.41 M/UL (ref 4.1–5.7)
SERVICE CMNT-IMP: NORMAL
SERVICE CMNT-IMP: NORMAL
SODIUM SERPL-SCNC: 144 MMOL/L (ref 136–145)
WBC # BLD AUTO: 5.6 K/UL (ref 4.1–11.1)

## 2024-01-14 PROCEDURE — 2500000003 HC RX 250 WO HCPCS: Performed by: NURSE PRACTITIONER

## 2024-01-14 PROCEDURE — 6360000002 HC RX W HCPCS: Performed by: INTERNAL MEDICINE

## 2024-01-14 PROCEDURE — 6360000002 HC RX W HCPCS: Performed by: NURSE PRACTITIONER

## 2024-01-14 PROCEDURE — 80048 BASIC METABOLIC PNL TOTAL CA: CPT

## 2024-01-14 PROCEDURE — A4216 STERILE WATER/SALINE, 10 ML: HCPCS | Performed by: NURSE PRACTITIONER

## 2024-01-14 PROCEDURE — 2580000003 HC RX 258: Performed by: NURSE PRACTITIONER

## 2024-01-14 PROCEDURE — 2580000003 HC RX 258: Performed by: INTERNAL MEDICINE

## 2024-01-14 PROCEDURE — 2000000000 HC ICU R&B

## 2024-01-14 PROCEDURE — 84100 ASSAY OF PHOSPHORUS: CPT

## 2024-01-14 PROCEDURE — 83735 ASSAY OF MAGNESIUM: CPT

## 2024-01-14 PROCEDURE — 36415 COLL VENOUS BLD VENIPUNCTURE: CPT

## 2024-01-14 PROCEDURE — 85025 COMPLETE CBC W/AUTO DIFF WBC: CPT

## 2024-01-14 RX ORDER — LORAZEPAM 2 MG/ML
2 INJECTION INTRAMUSCULAR EVERY 6 HOURS
Status: DISCONTINUED | OUTPATIENT
Start: 2024-01-14 | End: 2024-01-15

## 2024-01-14 RX ORDER — SODIUM CHLORIDE, SODIUM LACTATE, POTASSIUM CHLORIDE, AND CALCIUM CHLORIDE .6; .31; .03; .02 G/100ML; G/100ML; G/100ML; G/100ML
1000 INJECTION, SOLUTION INTRAVENOUS ONCE
Status: COMPLETED | OUTPATIENT
Start: 2024-01-14 | End: 2024-01-14

## 2024-01-14 RX ADMIN — WATER 10 MG: 1 INJECTION INTRAMUSCULAR; INTRAVENOUS; SUBCUTANEOUS at 21:41

## 2024-01-14 RX ADMIN — ENOXAPARIN SODIUM 40 MG: 100 INJECTION SUBCUTANEOUS at 08:35

## 2024-01-14 RX ADMIN — SODIUM CHLORIDE, POTASSIUM CHLORIDE, SODIUM LACTATE AND CALCIUM CHLORIDE 1000 ML: 600; 310; 30; 20 INJECTION, SOLUTION INTRAVENOUS at 21:37

## 2024-01-14 RX ADMIN — SODIUM CHLORIDE 0.45 MG/KG/HR: 9 INJECTION, SOLUTION INTRAVENOUS at 00:00

## 2024-01-14 RX ADMIN — LORAZEPAM 2 MG: 2 INJECTION INTRAMUSCULAR; INTRAVENOUS at 01:21

## 2024-01-14 RX ADMIN — LORAZEPAM 2 MG: 2 INJECTION INTRAMUSCULAR; INTRAVENOUS at 21:43

## 2024-01-14 RX ADMIN — CEFEPIME 2000 MG: 2 INJECTION, POWDER, FOR SOLUTION INTRAVENOUS at 11:30

## 2024-01-14 RX ADMIN — DEXMEDETOMIDINE HYDROCHLORIDE 1.2 MCG/KG/HR: 400 INJECTION, SOLUTION INTRAVENOUS at 02:35

## 2024-01-14 RX ADMIN — VANCOMYCIN HYDROCHLORIDE 1250 MG: 1.25 INJECTION, POWDER, LYOPHILIZED, FOR SOLUTION INTRAVENOUS at 10:17

## 2024-01-14 RX ADMIN — LORAZEPAM 2 MG: 2 INJECTION INTRAMUSCULAR; INTRAVENOUS at 08:35

## 2024-01-14 RX ADMIN — HALOPERIDOL LACTATE 5 MG: 5 INJECTION, SOLUTION INTRAMUSCULAR at 01:20

## 2024-01-14 RX ADMIN — SODIUM CHLORIDE, PRESERVATIVE FREE 10 ML: 5 INJECTION INTRAVENOUS at 21:51

## 2024-01-14 RX ADMIN — FAMOTIDINE 20 MG: 10 INJECTION, SOLUTION INTRAVENOUS at 08:35

## 2024-01-14 RX ADMIN — DEXMEDETOMIDINE HYDROCHLORIDE 1.2 MCG/KG/HR: 400 INJECTION, SOLUTION INTRAVENOUS at 10:27

## 2024-01-14 RX ADMIN — SODIUM CHLORIDE, PRESERVATIVE FREE 10 ML: 5 INJECTION INTRAVENOUS at 08:35

## 2024-01-14 RX ADMIN — CEFEPIME 2000 MG: 2 INJECTION, POWDER, FOR SOLUTION INTRAVENOUS at 21:42

## 2024-01-14 RX ADMIN — CEFEPIME 2000 MG: 2 INJECTION, POWDER, FOR SOLUTION INTRAVENOUS at 06:00

## 2024-01-14 RX ADMIN — LORAZEPAM 2 MG: 2 INJECTION INTRAMUSCULAR; INTRAVENOUS at 14:35

## 2024-01-14 RX ADMIN — VANCOMYCIN HYDROCHLORIDE 1250 MG: 1.25 INJECTION, POWDER, LYOPHILIZED, FOR SOLUTION INTRAVENOUS at 21:47

## 2024-01-14 RX ADMIN — SODIUM CHLORIDE 0.55 MG/KG/HR: 9 INJECTION, SOLUTION INTRAVENOUS at 13:16

## 2024-01-14 RX ADMIN — DEXMEDETOMIDINE HYDROCHLORIDE 1.2 MCG/KG/HR: 400 INJECTION, SOLUTION INTRAVENOUS at 06:46

## 2024-01-14 ASSESSMENT — PAIN SCALES - WONG BAKER: WONGBAKER_NUMERICALRESPONSE: 0

## 2024-01-14 ASSESSMENT — PAIN SCALES - GENERAL: PAINLEVEL_OUTOF10: 0

## 2024-01-14 NOTE — PROGRESS NOTES
[facility] Progress Note        Roge Cowan  1970  [id number]  1/14/2024      Assessment and plan:  Chronic paranoid schizophrenia and catatonia:   -++ delusions and psychosocial problems  -continues on court ordered meds  -resisting eating.  Not able to perform self care   -significant cellulitis as a result of his preferred position being prayer position on the hard floor   -cont precedex IV Gtt  -cont ketamine IV gtt  -cont ativan 2 mg IV tID for catatonia treatment   -cont haldol prn but increase to 5 q 6   -cont zyprexa 10 mg at hs  -discussed with and consulted psych for help with management  -cont violent restraints  -cont sitter at bedside   -discussed care and current regiment with Dr Rivero, psych MD       Cellulitis:  involving  stage 4 wound on right ankle    -cont IV vanc and IV cefepime  -blood cx with staph epi   -wound cx with grp b strep   -ct ankle reviewed and shows soft tissue edema but no bony involvement  -CRP 5.7  -ESR 53     Severe malnutrition:  -as evidenced by Cr 0.64, Albumin 3.0 and AG 4.0  -very cachectic with low muscle mass  -not eating   -DHT placement will likely not be fruitful as he removes even Ivs  -re-eval tube as he improves       Discussed with case management team.      HPI:  remains on ketamine and precedex gtt.  He is critically ill due to his psychological disorder.  He is at high risk of further clinical deterioration.        ICU DAILY CHECKLIST     Code Status:full   DVT Prophylaxis:lovenox   T/L/D: PIVs  SUP: not indicated   Diet: NPO    Activity Level:as tolerated  ABCDEF Bundle/Checklist Completed:Yes  Disposition: cont ICU care   Multidisciplinary Rounds Completed: yes  Goals of Care Discussion/Palliative: yes  Patient/Family Updated: yes      OBJECTIVE  Vitals:    01/14/24 0900 01/14/24 1000 01/14/24 1100 01/14/24 1200   BP: 115/68 110/70 124/84 107/68   Pulse: 54 54 53 57   Resp: 17 15 13 13   Temp:       TempSrc:       SpO2: 100%

## 2024-01-14 NOTE — PROGRESS NOTES
0000: attempting to clean patient after incontinent of urine. Requires 4-5 people to get patient cleaned and linen changed because when pt is unrestrained he immediately attempts to sit up, grab at Ivs, grab at feet, pull at restraints, flip over onto stomach, or pull feet under body to get into a fetal position. Pt incredibly resistant to any kind of intervention small or large. Pulls arm back, pulls body away, refuses to lay on back. Pt continues to require the need for 4 point restraints because even with sedation if leg restraints are removed patient will sit up pull legs under his body, even with arms restrained, and attempt to get into a fetal position. When patient is repositioned patient will pull and tug against restraints to reposition self back to the same place he was before.

## 2024-01-14 NOTE — CONSULTS
today.  Changes to his treatment via antibiotics and bathing routine likely causes.  No aggression or violence.  Selectively interactive and aware. Tolerating medications via court order (Haldol, Ativan prns) .  Was recommitted yesterday for 60 days.  Eating fairly and sleeping minimally.     1/12 -  Roge Cowan is a little more interactive but continues to pull his IV's out and require restraint for treatment.  Resists necessary care for his wounds.  Slightly more relaxed lying on his side in bed, however when engaged he curls up into a ball and trembles.  Affect is sullen.  Undressed and rashs seem less intense.  No aggression or violence.  Inappropriately interactive and aware. Tolerating medications via court order.  Eating and sleeping fairly without assist.  Working on his treatment and placement.    1/13 -  Roge Cowan was transferred to [facility] for management of his deteriorating condition.  He is now on Ketamine and Precidex sedation with IV's and foam dressings to his ankle wound in 4 point restraints.  Nude and resistant to care.  Sligh movement to my voices but no verbal response.  No aggression or violence.  1:1 at bedside monitoring is sedated state.    PAST PSYCHIATRIC HISTORY:  In Patient Catatonia with ECT treatments in the past.    SUBSTANCE ABUSE HISTORY:  Social History     Substance and Sexual Activity   Drug Use No     Social History     Substance and Sexual Activity   Alcohol Use Never     Social History     Tobacco Use   Smoking Status Some Days   Smokeless Tobacco Never       PAST MEDICAL HISTORY:  Past Medical History:   Diagnosis Date    Psychiatric disorder     Psychotic disorder (HCC)     Withdrawal syndrome (HCC)        SOCIAL HISTORY:  See H & P (Was living with sister in poor condition)    VITALS:  Vitals:    01/13/24 1800   BP: 93/68   Pulse: 54   Resp: 20   Temp:    SpO2:        MEDICATIONS:    Current Facility-Administered Medications:     ketamine (KETALAR) 500 mg in  Catatonia , Problems with primary support group, Problems related to social environment, Housing problems, Problems with access to health care services, and Other psychosocial or environmental problems  and 21-30 behavior considerably influenced by delusions or hallucinations OR serious impairment in judgment, communication OR inability to function in almost all areas    RECOMMENDATIONS:  Not a candidate for inpatient psychiatry at this time due to his skin breakdown is now a tunneling wound.    Continue 1:1 nursing  Psychiatry to follow  Continue current care with Court ordered treatments including ECT.  Thank you for this consultation    Rustam Rivero MD  1/13/2024

## 2024-01-15 ENCOUNTER — APPOINTMENT (OUTPATIENT)
Facility: HOSPITAL | Age: 54
End: 2024-01-15
Attending: INTERNAL MEDICINE
Payer: MEDICARE

## 2024-01-15 PROBLEM — E46 PROTEIN-CALORIE MALNUTRITION (HCC): Status: ACTIVE | Noted: 2024-01-15

## 2024-01-15 PROBLEM — R41.0 CONFUSION: Status: ACTIVE | Noted: 2024-01-15

## 2024-01-15 PROBLEM — Z51.5 PALLIATIVE CARE ENCOUNTER: Status: ACTIVE | Noted: 2024-01-15

## 2024-01-15 LAB
ANION GAP SERPL CALC-SCNC: 5 MMOL/L (ref 5–15)
ARTERIAL PATENCY WRIST A: ABNORMAL
BASE DEFICIT BLD-SCNC: 6.4 MMOL/L
BASOPHILS # BLD: 0 K/UL (ref 0–0.1)
BASOPHILS NFR BLD: 1 % (ref 0–1)
BDY SITE: ABNORMAL
BUN SERPL-MCNC: 16 MG/DL (ref 6–20)
BUN/CREAT SERPL: 25 (ref 12–20)
CALCIUM SERPL-MCNC: 8 MG/DL (ref 8.5–10.1)
CHLORIDE SERPL-SCNC: 112 MMOL/L (ref 97–108)
CO2 SERPL-SCNC: 23 MMOL/L (ref 21–32)
CREAT SERPL-MCNC: 0.65 MG/DL (ref 0.7–1.3)
DIFFERENTIAL METHOD BLD: ABNORMAL
EOSINOPHIL # BLD: 0.2 K/UL (ref 0–0.4)
EOSINOPHIL NFR BLD: 3 % (ref 0–7)
ERYTHROCYTE [DISTWIDTH] IN BLOOD BY AUTOMATED COUNT: 13 % (ref 11.5–14.5)
GAS FLOW.O2 O2 DELIVERY SYS: ABNORMAL
GAS FLOW.O2 SETTING OXYMISER: 14 BPM
GLUCOSE SERPL-MCNC: 68 MG/DL (ref 65–100)
HCO3 BLD-SCNC: 18.2 MMOL/L (ref 22–26)
HCT VFR BLD AUTO: 37.3 % (ref 36.6–50.3)
HGB BLD-MCNC: 11.8 G/DL (ref 12.1–17)
IMM GRANULOCYTES # BLD AUTO: 0.1 K/UL (ref 0–0.04)
IMM GRANULOCYTES NFR BLD AUTO: 1 % (ref 0–0.5)
IPAP/PIP/HIGH PEEP: 13
LYMPHOCYTES # BLD: 1.6 K/UL (ref 0.8–3.5)
LYMPHOCYTES NFR BLD: 32 % (ref 12–49)
MAGNESIUM SERPL-MCNC: 2 MG/DL (ref 1.6–2.4)
MCH RBC QN AUTO: 29.4 PG (ref 26–34)
MCHC RBC AUTO-ENTMCNC: 31.6 G/DL (ref 30–36.5)
MCV RBC AUTO: 93 FL (ref 80–99)
MONOCYTES # BLD: 0.4 K/UL (ref 0–1)
MONOCYTES NFR BLD: 8 % (ref 5–13)
NEUTS SEG # BLD: 2.8 K/UL (ref 1.8–8)
NEUTS SEG NFR BLD: 55 % (ref 32–75)
NRBC # BLD: 0 K/UL (ref 0–0.01)
NRBC BLD-RTO: 0 PER 100 WBC
O2/TOTAL GAS SETTING VFR VENT: 35 %
PCO2 BLD: 32.2 MMHG (ref 35–45)
PEEP RESPIRATORY: 5 CMH2O
PH BLD: 7.36 (ref 7.35–7.45)
PHOSPHATE SERPL-MCNC: 2.7 MG/DL (ref 2.6–4.7)
PLATELET # BLD AUTO: 225 K/UL (ref 150–400)
PMV BLD AUTO: 10.9 FL (ref 8.9–12.9)
PO2 BLD: 184 MMHG (ref 80–100)
POTASSIUM SERPL-SCNC: 4 MMOL/L (ref 3.5–5.1)
RBC # BLD AUTO: 4.01 M/UL (ref 4.1–5.7)
SAO2 % BLD: 99.6 % (ref 92–97)
SODIUM SERPL-SCNC: 140 MMOL/L (ref 136–145)
SPECIMEN TYPE: ABNORMAL
VENTILATION MODE VENT: ABNORMAL
VT SETTING VENT: 410 ML
WBC # BLD AUTO: 5 K/UL (ref 4.1–11.1)

## 2024-01-15 PROCEDURE — 2580000003 HC RX 258: Performed by: NURSE PRACTITIONER

## 2024-01-15 PROCEDURE — 84100 ASSAY OF PHOSPHORUS: CPT

## 2024-01-15 PROCEDURE — 0B21XEZ CHANGE ENDOTRACHEAL AIRWAY IN TRACHEA, EXTERNAL APPROACH: ICD-10-PCS | Performed by: NURSE PRACTITIONER

## 2024-01-15 PROCEDURE — 2500000003 HC RX 250 WO HCPCS: Performed by: NURSE PRACTITIONER

## 2024-01-15 PROCEDURE — 6360000002 HC RX W HCPCS: Performed by: INTERNAL MEDICINE

## 2024-01-15 PROCEDURE — 99222 1ST HOSP IP/OBS MODERATE 55: CPT | Performed by: PHYSICAL MEDICINE & REHABILITATION

## 2024-01-15 PROCEDURE — 71045 X-RAY EXAM CHEST 1 VIEW: CPT

## 2024-01-15 PROCEDURE — 2000000000 HC ICU R&B

## 2024-01-15 PROCEDURE — 0BH17EZ INSERTION OF ENDOTRACHEAL AIRWAY INTO TRACHEA, VIA NATURAL OR ARTIFICIAL OPENING: ICD-10-PCS | Performed by: INTERNAL MEDICINE

## 2024-01-15 PROCEDURE — 5A1955Z RESPIRATORY VENTILATION, GREATER THAN 96 CONSECUTIVE HOURS: ICD-10-PCS | Performed by: PSYCHIATRY & NEUROLOGY

## 2024-01-15 PROCEDURE — 85025 COMPLETE CBC W/AUTO DIFF WBC: CPT

## 2024-01-15 PROCEDURE — 36600 WITHDRAWAL OF ARTERIAL BLOOD: CPT

## 2024-01-15 PROCEDURE — A4216 STERILE WATER/SALINE, 10 ML: HCPCS | Performed by: INTERNAL MEDICINE

## 2024-01-15 PROCEDURE — 2500000003 HC RX 250 WO HCPCS: Performed by: INTERNAL MEDICINE

## 2024-01-15 PROCEDURE — 36415 COLL VENOUS BLD VENIPUNCTURE: CPT

## 2024-01-15 PROCEDURE — 6370000000 HC RX 637 (ALT 250 FOR IP): Performed by: INTERNAL MEDICINE

## 2024-01-15 PROCEDURE — 82803 BLOOD GASES ANY COMBINATION: CPT

## 2024-01-15 PROCEDURE — 6370000000 HC RX 637 (ALT 250 FOR IP): Performed by: NURSE PRACTITIONER

## 2024-01-15 PROCEDURE — 80048 BASIC METABOLIC PNL TOTAL CA: CPT

## 2024-01-15 PROCEDURE — 2580000003 HC RX 258: Performed by: INTERNAL MEDICINE

## 2024-01-15 PROCEDURE — 83735 ASSAY OF MAGNESIUM: CPT

## 2024-01-15 PROCEDURE — 74018 RADEX ABDOMEN 1 VIEW: CPT

## 2024-01-15 PROCEDURE — A4216 STERILE WATER/SALINE, 10 ML: HCPCS | Performed by: NURSE PRACTITIONER

## 2024-01-15 PROCEDURE — 6360000002 HC RX W HCPCS: Performed by: NURSE PRACTITIONER

## 2024-01-15 PROCEDURE — 94002 VENT MGMT INPAT INIT DAY: CPT

## 2024-01-15 RX ORDER — MIDAZOLAM HYDROCHLORIDE 1 MG/ML
1-20 INJECTION, SOLUTION INTRAVENOUS CONTINUOUS
Status: DISCONTINUED | OUTPATIENT
Start: 2024-01-15 | End: 2024-01-24

## 2024-01-15 RX ORDER — OLANZAPINE 5 MG/1
10 TABLET, ORALLY DISINTEGRATING ORAL 2 TIMES DAILY
Status: DISCONTINUED | OUTPATIENT
Start: 2024-01-15 | End: 2024-01-15

## 2024-01-15 RX ORDER — PROPOFOL 10 MG/ML
5-50 INJECTION, EMULSION INTRAVENOUS CONTINUOUS
Status: DISCONTINUED | OUTPATIENT
Start: 2024-01-15 | End: 2024-01-21

## 2024-01-15 RX ORDER — HALOPERIDOL 5 MG/ML
5 INJECTION INTRAMUSCULAR EVERY 6 HOURS
Status: DISCONTINUED | OUTPATIENT
Start: 2024-01-15 | End: 2024-01-15

## 2024-01-15 RX ORDER — ROCURONIUM BROMIDE 10 MG/ML
0.6 INJECTION, SOLUTION INTRAVENOUS ONCE
Status: CANCELLED | OUTPATIENT
Start: 2024-01-15 | End: 2024-01-15

## 2024-01-15 RX ORDER — SODIUM CHLORIDE, SODIUM LACTATE, POTASSIUM CHLORIDE, AND CALCIUM CHLORIDE .6; .31; .03; .02 G/100ML; G/100ML; G/100ML; G/100ML
1000 INJECTION, SOLUTION INTRAVENOUS ONCE
Status: COMPLETED | OUTPATIENT
Start: 2024-01-15 | End: 2024-01-15

## 2024-01-15 RX ORDER — DIAZEPAM 5 MG/ML
10 INJECTION, SOLUTION INTRAMUSCULAR; INTRAVENOUS EVERY 8 HOURS
Status: DISCONTINUED | OUTPATIENT
Start: 2024-01-15 | End: 2024-01-15

## 2024-01-15 RX ORDER — MAGNESIUM SULFATE IN WATER 40 MG/ML
2000 INJECTION, SOLUTION INTRAVENOUS ONCE
Status: COMPLETED | OUTPATIENT
Start: 2024-01-15 | End: 2024-01-15

## 2024-01-15 RX ORDER — VALPROIC ACID 250 MG/5ML
250 SOLUTION ORAL EVERY 8 HOURS SCHEDULED
Status: DISCONTINUED | OUTPATIENT
Start: 2024-01-15 | End: 2024-01-16

## 2024-01-15 RX ORDER — DIVALPROEX SODIUM 250 MG/1
250 TABLET, DELAYED RELEASE ORAL EVERY 8 HOURS SCHEDULED
Status: DISCONTINUED | OUTPATIENT
Start: 2024-01-15 | End: 2024-01-15

## 2024-01-15 RX ORDER — ETOMIDATE 2 MG/ML
40 INJECTION INTRAVENOUS ONCE
Status: COMPLETED | OUTPATIENT
Start: 2024-01-15 | End: 2024-01-15

## 2024-01-15 RX ORDER — ETOMIDATE 2 MG/ML
INJECTION INTRAVENOUS ONCE
Status: CANCELLED | OUTPATIENT
Start: 2024-01-15 | End: 2024-01-15

## 2024-01-15 RX ORDER — PROPOFOL 10 MG/ML
5-50 INJECTION, EMULSION INTRAVENOUS CONTINUOUS
Status: DISCONTINUED | OUTPATIENT
Start: 2024-01-15 | End: 2024-01-15

## 2024-01-15 RX ORDER — HALOPERIDOL 5 MG/ML
5 INJECTION INTRAMUSCULAR EVERY 4 HOURS
Status: DISCONTINUED | OUTPATIENT
Start: 2024-01-15 | End: 2024-01-18

## 2024-01-15 RX ORDER — MIDODRINE HYDROCHLORIDE 5 MG/1
10 TABLET ORAL EVERY 8 HOURS
Status: DISCONTINUED | OUTPATIENT
Start: 2024-01-15 | End: 2024-01-16

## 2024-01-15 RX ORDER — LORAZEPAM 2 MG/ML
2 INJECTION INTRAMUSCULAR EVERY 4 HOURS
Status: DISCONTINUED | OUTPATIENT
Start: 2024-01-15 | End: 2024-01-15

## 2024-01-15 RX ORDER — ROCURONIUM BROMIDE 10 MG/ML
80 INJECTION, SOLUTION INTRAVENOUS ONCE
Status: COMPLETED | OUTPATIENT
Start: 2024-01-15 | End: 2024-01-15

## 2024-01-15 RX ORDER — MIDAZOLAM HYDROCHLORIDE 2 MG/2ML
5 INJECTION, SOLUTION INTRAMUSCULAR; INTRAVENOUS ONCE
Status: COMPLETED | OUTPATIENT
Start: 2024-01-15 | End: 2024-01-15

## 2024-01-15 RX ADMIN — HALOPERIDOL LACTATE 5 MG: 5 INJECTION, SOLUTION INTRAMUSCULAR at 05:15

## 2024-01-15 RX ADMIN — FAMOTIDINE 20 MG: 10 INJECTION, SOLUTION INTRAVENOUS at 08:14

## 2024-01-15 RX ADMIN — PROPOFOL 20 MCG/KG/MIN: 10 INJECTION, EMULSION INTRAVENOUS at 12:21

## 2024-01-15 RX ADMIN — HALOPERIDOL LACTATE 5 MG: 5 INJECTION, SOLUTION INTRAMUSCULAR at 10:46

## 2024-01-15 RX ADMIN — SODIUM CHLORIDE, POTASSIUM CHLORIDE, SODIUM LACTATE AND CALCIUM CHLORIDE 1000 ML: 600; 310; 30; 20 INJECTION, SOLUTION INTRAVENOUS at 19:11

## 2024-01-15 RX ADMIN — THIAMINE HYDROCHLORIDE 500 MG: 100 INJECTION, SOLUTION INTRAMUSCULAR; INTRAVENOUS at 15:32

## 2024-01-15 RX ADMIN — DIAZEPAM 10 MG: 5 INJECTION, SOLUTION INTRAMUSCULAR; INTRAVENOUS at 09:29

## 2024-01-15 RX ADMIN — HALOPERIDOL LACTATE 5 MG: 5 INJECTION, SOLUTION INTRAMUSCULAR at 15:14

## 2024-01-15 RX ADMIN — ENOXAPARIN SODIUM 40 MG: 100 INJECTION SUBCUTANEOUS at 09:00

## 2024-01-15 RX ADMIN — SODIUM CHLORIDE, PRESERVATIVE FREE 10 ML: 5 INJECTION INTRAVENOUS at 08:19

## 2024-01-15 RX ADMIN — WATER 10 MG: 1 INJECTION INTRAMUSCULAR; INTRAVENOUS; SUBCUTANEOUS at 09:20

## 2024-01-15 RX ADMIN — PROPOFOL 50 MCG/KG/MIN: 10 INJECTION, EMULSION INTRAVENOUS at 22:57

## 2024-01-15 RX ADMIN — SODIUM CHLORIDE, PRESERVATIVE FREE 10 ML: 5 INJECTION INTRAVENOUS at 22:55

## 2024-01-15 RX ADMIN — LORAZEPAM 2 MG: 2 INJECTION INTRAMUSCULAR; INTRAVENOUS at 03:36

## 2024-01-15 RX ADMIN — MAGNESIUM SULFATE HEPTAHYDRATE 2000 MG: 40 INJECTION, SOLUTION INTRAVENOUS at 19:06

## 2024-01-15 RX ADMIN — SODIUM CHLORIDE 1 MG/KG/HR: 9 INJECTION, SOLUTION INTRAVENOUS at 09:20

## 2024-01-15 RX ADMIN — LORAZEPAM 2 MG: 2 INJECTION INTRAMUSCULAR; INTRAVENOUS at 00:07

## 2024-01-15 RX ADMIN — THIAMINE HYDROCHLORIDE 500 MG: 100 INJECTION, SOLUTION INTRAMUSCULAR; INTRAVENOUS at 23:33

## 2024-01-15 RX ADMIN — CEFEPIME 2000 MG: 2 INJECTION, POWDER, FOR SOLUTION INTRAVENOUS at 03:36

## 2024-01-15 RX ADMIN — VALPROIC ACID 250 MG: 250 SOLUTION ORAL at 19:01

## 2024-01-15 RX ADMIN — HALOPERIDOL LACTATE 5 MG: 5 INJECTION, SOLUTION INTRAMUSCULAR at 00:07

## 2024-01-15 RX ADMIN — LORAZEPAM 2 MG: 2 INJECTION INTRAMUSCULAR; INTRAVENOUS at 08:13

## 2024-01-15 RX ADMIN — FAMOTIDINE 20 MG: 10 INJECTION, SOLUTION INTRAVENOUS at 22:55

## 2024-01-15 RX ADMIN — SODIUM CHLORIDE, POTASSIUM CHLORIDE, SODIUM LACTATE AND CALCIUM CHLORIDE 1000 ML: 600; 310; 30; 20 INJECTION, SOLUTION INTRAVENOUS at 15:09

## 2024-01-15 RX ADMIN — WATER 2000 MG: 1 INJECTION INTRAMUSCULAR; INTRAVENOUS; SUBCUTANEOUS at 13:34

## 2024-01-15 RX ADMIN — MIDODRINE HYDROCHLORIDE 10 MG: 5 TABLET ORAL at 23:12

## 2024-01-15 RX ADMIN — LORAZEPAM 2 MG: 2 INJECTION INTRAMUSCULAR; INTRAVENOUS at 05:15

## 2024-01-15 RX ADMIN — ETOMIDATE 40 MG: 2 INJECTION INTRAVENOUS at 12:18

## 2024-01-15 RX ADMIN — MIDAZOLAM 5 MG: 1 INJECTION INTRAMUSCULAR; INTRAVENOUS at 12:17

## 2024-01-15 RX ADMIN — MIDAZOLAM 2 MG/HR: 5 INJECTION, SOLUTION INTRAMUSCULAR; INTRAVENOUS at 13:32

## 2024-01-15 RX ADMIN — PROPOFOL 40 MCG/KG/MIN: 10 INJECTION, EMULSION INTRAVENOUS at 17:22

## 2024-01-15 RX ADMIN — HALOPERIDOL LACTATE 5 MG: 5 INJECTION, SOLUTION INTRAMUSCULAR at 17:58

## 2024-01-15 RX ADMIN — HALOPERIDOL LACTATE 5 MG: 5 INJECTION, SOLUTION INTRAMUSCULAR at 23:03

## 2024-01-15 RX ADMIN — ROCURONIUM BROMIDE 80 MG: 10 SOLUTION INTRAVENOUS at 12:19

## 2024-01-15 RX ADMIN — SODIUM CHLORIDE 0.85 MG/KG/HR: 9 INJECTION, SOLUTION INTRAVENOUS at 00:08

## 2024-01-15 ASSESSMENT — PULMONARY FUNCTION TESTS
PIF_VALUE: 14
PIF_VALUE: 15
PIF_VALUE: 14
PIF_VALUE: 16

## 2024-01-15 NOTE — CONSULTS
Palliative Medicine  Patient Name: Roge Cowan  YOB: 1970  MRN: [id number]  Age: 53 y.o.  Gender: male    Date of Initial Consult: 1/15/24  Date of Service: 1/15/2024  Time: 3:14 PM  Provider: Desirae Santos MD  Hospital Day: 4  Admit Date: 1/12/2024  Referring Provider: DR ALLEN Beverly        Reasons for Consultation:  Goals of Care, Overwhelming Symptoms, and Psychosocial Distress    HISTORY OF PRESENT ILLNESS (HPI):   Roge Cowan is a 53 y.o. male with a past medical history of paranoid schizophrenia, catatonia  who was admitted on 1/12/2024 from [facility]. Pt admitted to [facility] 12/12/23 brought in by Police- he had not been engaging in self care, eating or drinking. He was in the fetal position and was getting pressure ulcers- stage IV on R ankle. Was getting court mandated ECT. He was transferred to [facility] on 1/12/24 for ICU care. He was refusing all care at [facility]. Requiring 4 pt restraints, sedation, IV abx.  He is tachypneic w/ desats, refusing sometimes even turning. Because could not care for patient, required sedation and intubation.     Psychosocial: Court declared pt legally incompetent on 10/2018 and sister Eden is guardian.   Psych @ [facility] team: Dr. Olivas   [facility] Team: [phone] after hours number.    with ACT team: Jayson Andrea [phone] or [phone]   : Lorene Pabon [phone]   PALLIATIVE DIAGNOSES:    Chronic paranoid schizophrenia and catatonia   Cellulitis on IV abx   Severe malnutrition   Shortness of breath   Palliative care encounter     ASSESSMENT AND PLAN:   Discuss patient care in ICU, consulted for more support given behavorial health conditions interfering with ability for his ability to care for himself and for staff to care for him.   Along w/ Darling SIMPSONW call sister/guardian Eden- leave voicemail. We have also discussed care with Ethics who has been following along. Also leave message for  w/  Normal appearance  [] Distended  [] Ascites  [] Other:  Neurological: [] Normal speech  [] Normal sensation  []Deficits present:  Extremity: [] Normal skin color/temp  [] Clubbing/cyanosis  [] No edema  [] Other:    Wt Readings from Last 15 Encounters:   01/15/24 69 kg (152 lb 1.6 oz)        Current Diet: ADULT TUBE FEEDING; Orogastric; Peptide Based; Continuous; 20; Yes; 10; Q 24 hours; 45; 60; Q 4 hours  DIET ONE TIME MESSAGE;       PSYCHOSOCIAL/SPIRITUAL SCREENING:   Palliative IDT has assessed this patient for cultural preferences / practices and a referral made as appropriate to needs (Cultural Services, Patient Advocacy, Ethics, etc.)    Spiritual Affiliation: None    Any spiritual / Hoahaoism concerns:  [] Yes /  [x] No   If \"Yes\" to discuss with pastoral care during IDT     Does caregiver feel burdened by caring for their loved one:   [] Yes /  [x] No /  [] No Caregiver Present/Available [] No Caregiver [] Pt Lives at Facility  If \"Yes\" to discuss with social work during IDT    Anticipatory grief assessment:   [x] Normal  / [] Maladaptive     If \"Maladaptive\" to discuss with social work during IDT    ESAS Anxiety:      ESAS Depression:          LAB AND IMAGING FINDINGS:   Objective data reviewed:  labs, images, records, medication use, vitals, and chart     FINAL COMMENTS   Thank you for allowing Palliative Medicine to participate in the care of Roge Cowan.    Only check if applicable and billing time based rather than MDM  [x] The total encounter time on this service date was __55__ minutes which was spent performing a face-to-face encounter and personally completing the provider-level activities documented in the note. This includes time spent prior to the visit and after the visit in direct care of the patient. This time does not include time spent in any separately reportable services.    Electronically signed by   Desirae Santos MD  Palliative Care Team  on 1/15/2024 at 3:14 PM

## 2024-01-15 NOTE — PROGRESS NOTES
Renal Dosing/Monitoring  Medication: Famotidine   Current regimen:  20 mg IV daily  Recent Labs     01/13/24  0429 01/14/24  0601 01/15/24  0340   CREATININE 0.65* 0.57* 0.65*   BUN 16 13 16     Estimated CrCl:  >100 ml/min  Plan: Change to 20 mg IV twice daily per [facility] P&T Committee Protocol with respect to renal function.  Pharmacy will continue to monitor patient daily and will make dosage adjustments based upon changing renal function.

## 2024-01-15 NOTE — PROGRESS NOTES
Palliative Medicine   [place] [phone] (COPE)        [facility] [phone] (COPE)      Palliative Medicine consultation received and appreciated. Patient has very complex and debilitating mental health disorder- history of paranoid schizophrenia.     The patient has been refusing care, even despite mandated court order. The patient has been under TDO, committed for 60 days per previous notes-     Our team consulted to support, as well as goals of care. Patient requiring intubation to perform basic care.     Ethics is also following, DARRELL discussed case in-detail with them, administration is also aware of case. The patient also has open APS case- DARRELL spoke with Case Management at [facility], at this time, patient's sister Eden still has active Guardianship- Guardianship papers on hard chart, SW will have them scanned into system. Given court involvement, will need to continue to be mindful of his court ordered treatment status as we explore goals of care and address as appropriate and needed.     Palliative Medicine called and left voicemail for sister Eden, her voicemail also indicates to e-mail her, DARRELL will send e-mail to [email or web address].     Palliative Medicine following with you- we left voicemails for sister as well as [facility] .     Thank you for including Palliative Medicine in the care of Roge Torres LCSW

## 2024-01-15 NOTE — PROGRESS NOTES
1217 versed   1218 etom   1219 renetta   1221 15  prop by dr simmonds   1222 prop gtt started

## 2024-01-15 NOTE — PROGRESS NOTES
0400:  attempted to turn patient, with 4 staff members patient physically resistant to the point of turning back to same side or trying to lay prone and pull feet under body. Staff was unsuccessful at any attempts to reposition patient. Pt stays in a semi fetal position and pulls against restraints and staff when trying to reposition or do any kind of care. Basic care and patient needs are almost impossible to meet based on patients persistent resistance to all activities.

## 2024-01-15 NOTE — PROGRESS NOTES
[facility] Progress Note        Roge Cowan  1970  [id number]  1/15/2024      Assessment and plan:  Chronic paranoid schizophrenia and catatonia:   -++ delusions and psychosocial problems  -we are not able to provide safe care without large amounts of BZDs and antipsychotics.    -high risk respiratory depression and intubation  -resisting eating.  Not able to perform self care   -significant cellulitis as a result of his preferred position being prayer position on the hard floor   -cont ketamine IV gtt.  DC precedex.    -cont ativan 2 mg IV  but increase to q4  -cont haldol prn but increase to 5 q 6   -increase zyprexa to 10 BID    -add valium 10 TID    -discussed with and consulted psych for help with management  -cont violent restraints  -cont sitter at bedside   -discussed care and current regiment with psych team      Cellulitis:  involving  stage 4 wound on right ankle    -cont IV vanc and IV cefepime  -blood cx with staph epi   -wound cx with grp b strep   -ct ankle reviewed and shows soft tissue edema but no bony involvement  -CRP 5.7  -ESR 53     Severe malnutrition:  -as evidenced by Cr 0.64, Albumin 3.0 and AG 4.0  -very cachectic with low muscle mass  -not eating any PO    -DHT placement will likely not be fruitful as he removes even IVs     Discussed with case management team.  discussed med regimen with PharmD and psych teams.  Very difficult case.  High risk intubation.  Consult palliative care.      HPI:  remains critically ill at risk of self harm and very diffcult for us to provide basic care.    Starting to have worsening tachypnea with occ desats.      He is high risk for intubation       ICU DAILY CHECKLIST     Code Status:full   DVT Prophylaxis:lovenox   T/L/D: PIVs  SUP: not indicated  Diet:  not taking any PO    Activity Level:as tolerated  ABCDEF Bundle/Checklist Completed:Yes  Disposition: cont ICU care  Multidisciplinary Rounds Completed: yes  Goals of Care  Discussion/Palliative: yes  Patient/Family Updated: yes      OBJECTIVE  Vitals:    01/15/24 0400 01/15/24 0500 01/15/24 0600 01/15/24 0700   BP: (!) 88/75 (!) 107/93 99/68 114/86   Pulse: 56 58 61 66   Resp: (!) 40 (!) 35 26 21   Temp: 96.8 °F (36 °C)      TempSrc: Axillary      SpO2: 100% 100% 100% 100%   Weight:         EXAM:   GEN: encephalopathic.  Non verbal  crumpled position   HEENT  -Head: NC/AT;  -Eyes: PERRL, EOMI. No discharge or redness;  -Ears: External ears are normal. Normal TMs.  -Nose: Normal nares.  -Mouth and throat: MMM. Normal gums, mucosa, palate,. Good dentition.  NECK: Supple, with no masses. No JVD   CV: RRR, no m/r/g.  LUNGS: CTAB, no w/r/c.  ABD: Soft, NT/ND, no masses, NTTP  SKIN: Warm, well perfused. No skin rashes or abnormal lesions.  EXT: No clubbing, cyanosis, or edema.  NEURO: Normal muscle strength and tone. No focal deficits.cranial nerves intact    LABS:   Na 140  K 4.0  Hco3 23  Cr 0.65  Phos 2.7    Hb 11- dropping from daily labx x 30 days  Wbc 5    Imaging reviewed   Last cxr reviewed and shows clear lungs     CCM time:   45 mins.  This patient is critically ill with risk of clinical deterioration.  The documented time was time spent providing direct patient care, formulating care plan and discussing this plan with other providers, updating family and discussing goals of care with patient and/or family. It does not include time spent performing any procedures that are billed separtately.    Princess Beverly MD    Pulmonary and Critical Care Medicine   [facility]  [phone]  1/15/2024

## 2024-01-15 NOTE — BSMART NOTE
Liaison attempted to meet with pt in his room on the medical floor at [facility]. Per staff, pt currently sedated and intubated, and he can not participate in an assessment.

## 2024-01-15 NOTE — PROGRESS NOTES
Comprehensive Nutrition Assessment    Type and Reason for Visit: Initial, Consult    Nutrition Recommendations/Plan:     -Start EN with Vital AF 1.2 @ 20 ml/hr and 60 ml water flush q 4 hr    -Monitor lytes closely and replace PRN    -Slowly advance to goal (see order) if lytes stable/improved      Goal: (with propofol): Vital AF 1.2 @ 45 ml/hr with 60 ml water flush q 4 hr and 1       packet of Prosource daily    -Add MVI    Once off propofol adjust tube feeding goal:     Vital AF 1.2 @ 70 ml/hr with 100 ml water flush q 4 hr           Malnutrition Assessment:  Malnutrition Status:  Severe malnutrition (01/15/24 1347)    Context:  Acute Illness     Findings of the 6 clinical characteristics of malnutrition:  Energy Intake:  50% or less of estimated energy requirements for 5 or more days  Weight Loss:  Unable to assess     Body Fat Loss:  Moderate body fat loss Fat Overlying Ribs, Orbital   Muscle Mass Loss:  Moderate muscle mass loss Clavicles (pectoralis & deltoids), Temples (temporalis)  Fluid Accumulation:  No significant fluid accumulation     Strength:  Not Performed       Nutrition Assessment:    Pt transferred from [facility] with Catatonia Schizophrenia (admitted 12/12). Initially treated with large amounts of BZDs and ECT x 2 however difficult to administer medical care. Pt refusing all PO intake, medical care, medications.  New development of pressure injury-WOCN following. Transferred to [facility] ICU 1/12. Intubated today d/t continued inability to care for pt medically.     Patient meets criteria for severe malnutrition (see above). Mr Cowan has been NPO since admission to [facility] and refusing to eat @ [facility]. Discussed during IDR-will start high dose B1 supplementation. Will need to monitor lytes at least daily; more frequently if labs with significant change. Propofol ordered today for sedation which will provide 525 lipid calories per day.    Recommend starting EN @ 15 kcal/kg/day per refeeding guidelines; slowly  advance to goal if lytes stable/improved. Phosphorus is low-normal today; potassium and magnesium WNL. Tube feeding @ 20 ml/hr will provide 440 ml, 530 calories, 33 gm protein and 715 ml free water (tube feeding/flush) per day.    Above tube feeding goal (on propofol) will provide 990 ml,1270 calories, 94 gm protein and 1220 ml free water (tube feeding/flush) per day. Total calories including propofol is~1055 per day or roughly 15 kcal/kg.      Nutritionally Significant Medications:  Propofol @ 19.9 ml/hr, Pepcid, B1, PRN Glycolax and Senna      Estimated Daily Nutrient Needs:  Energy Requirements Based On: Kcal/kg  Weight Used for Energy Requirements: Current (66.4 kg)  Energy (kcal/day): 1000 (15 kcal/kg initially; slowly advance to goal 25-30 kcal/kg or 9058-7806 kcals/day)  Weight Used for Protein Requirements: Current  Protein (g/day):  (1.3-1.5g/kg)  Method Used for Fluid Requirements: 1 ml/kcal  Fluid (ml/day):      Nutrition Related Findings:   Edema: None                    Last BM:      Wounds:   Wound Type: Stage IV      Current Nutrition Therapies:  Diet: NPO  Nutrition Support: None      Anthropometric Measures:  Height: 183.9 cm (6' 0.4\")  Ideal Body Weight (IBW): 180 lbs (82 kg)       Current Body Weight: 66.4 kg (146 lb 6.2 oz), 81.3 % IBW. Weight Source: Bed Scale  Current BMI (kg/m2): 19.6                               Wt Readings from Last 10 Encounters:   01/15/24 69 kg (152 lb 1.6 oz)   12/16/23 80.7 kg (178 lb)           Nutrition Diagnosis:   Inadequate oral intake, Inadequate protein-energy intake related to psychological cause or life stress as evidenced by intake 0-25%  Increased nutrient needs related to increase demand for energy/nutrients as evidenced by wounds    Nutrition Interventions:   Food and/or Nutrient Delivery: Start Tube Feeding  Nutrition Education/Counseling: No recommendation at this time  Coordination of Nutrition Care: Continue to monitor while inpatient,

## 2024-01-15 NOTE — PROCEDURES
Roge Cowan is a 53 y.o. male patient.  No diagnosis found.  Past Medical History:   Diagnosis Date    Psychiatric disorder     Psychotic disorder (HCC)     Withdrawal syndrome (HCC)      Blood pressure 93/70, pulse 79, temperature 98.8 °F (37.1 °C), temperature source Bladder, resp. rate 14, height 1.839 m (6' 0.4\"), weight 69 kg (152 lb 1.6 oz), SpO2 100 %.    Intubation    Date/Time: 1/15/2024 5:49 PM    Performed by: Princess Beverly MD  Authorized by: Princess Beverly MD  Consent: The procedure was performed in an emergent situation.  Risks and benefits: risks, benefits and alternatives were discussed  Required items: required blood products, implants, devices, and special equipment available  Patient identity confirmed: provided demographic data, hospital-assigned identification number and arm band  Time out: Immediately prior to procedure a \"time out\" was called to verify the correct patient, procedure, equipment, support staff and site/side marked as required.  Indications: respiratory failure  Intubation method: video-assisted  Patient status: paralyzed (RSI)  Preoxygenation: nonrebreather mask and BVM  Sedatives: etomidate  Paralytic: rocuronium  Laryngoscope size: Mac 3  Tube size: 7.5 mm  Tube type: cuffed  Number of attempts: 1  Ventilation between attempts: BVM  Cricoid pressure: no  Cords visualized: yes  Post-procedure assessment: ETCO2 monitor  Breath sounds: equal  Cuff inflated: yes  ETT to lip: 25 cm  Tube secured with: ETT mayer  Chest x-ray interpreted by me.  Chest x-ray findings: endotracheal tube too high  Tube repositioned: tube repositioned successfully  Patient tolerance: patient tolerated the procedure well with no immediate complications          Princess Beverly MD  1/15/2024

## 2024-01-15 NOTE — PROGRESS NOTES
Spiritual Care Assessment/Progress Note  [facility]    Name: Roge Cowan MRN: [id number]    Age: 53 y.o.     Sex: male   Language: English     Date: 1/15/2024            Total Time Calculated: 10 min              Spiritual Assessment begun in [facility] 7S2 INTENSIVE CARE  Service Provided For:: Patient not available     Encounter Overview/Reason : Attempted Encounter    Spiritual beliefs:      [] Involved in a kelby tradition/spiritual practice:      [] Supported by a kelby community:      [] Claims no spiritual orientation:      [] Seeking spiritual identity:           [] Adheres to an individual form of spirituality:      [x] Not able to assess:  patient's condition              Identified resources for coping and support system:           [] Prayer                  [] Devotional reading               [] Music                  [] Guided Imagery     [] Pet visits                                        [] Other: (COMMENT)     Specific area/focus of visit   Encounter:    Crisis:    Spiritual/Emotional needs:    Ritual, Rites and Sacraments:    Grief, Loss, and Adjustments:    Ethics/Mediation:    Behavioral Health:    Palliative Care:    Advance Care Planning:           Narrative:     Initial visit for Roge Cowan. I reviewed his medical record. He was receiving medical care when I rounded. I was unable to assess.    For additional spiritual care, please contact the  on-call at [phone]).    Reyna Peter MDiv, MS, [facility]  Staff

## 2024-01-15 NOTE — WOUND CARE
Wound Care Note:     New consult placed by nurse request for ankle wound on admission    Chart shows:  Admitted for catatonia schizophrenia   Past Medical History:   Diagnosis Date    Psychiatric disorder     Psychotic disorder (HCC)     Withdrawal syndrome (HCC)      WBC = 5.0  Admitted from [facility]    Assessment:   Patient is now intubated and sedated, incontinent with moderate assistance needed in repositioning.    Bed: [facility]  Patient has a condom catheter in place.    Diet: No diet    Bilateral heels, buttocks, and sacral skin intact and without erythema.    Palpable DP pulses bilaterally.      1. POA right ankle wound measures 1.8 cm x 2 cm x 0.6 cm, wound bed is mostly pink with small amount of slough, scant serous drainage, wound edges are open, dia-wound intact without erythema.  Silver foam, 4 x 4 and roll gauze applied.    Patient repositioned.  Heels offloaded on pillows.     Recommendations:    Right lateral ankle- Every other day cleanse with VASHE, wipe wound bed clean and dry, apply a piece of Optifoam Gentle NB AG (silver foam) and cover.    Skin Care & Pressure Prevention:  Minimize layers of linen/pads under patient to optimize support surface.    Turn/reposition approximately every 2 hours and offload heels.  Manage incontinence / promote continence   Nourishing Skin Cream to dry skin, minimize use of briefs when able    Discussed above plan with patient & GAIL Torres    Transition of Care: Plan to follow as needed while admitted to hospital.    Tracey \"Rosa\" MATTY Santiago, RN, ON  Certified Wound and Ostomy Nurse  office [phone]  Best way to contact me is through [employer]

## 2024-01-15 NOTE — PROGRESS NOTES
Spoke with Dr Larson.    Regimen will be haldol 5 q 4   Daily EKG to monitor Qtc    Vpa 250 q 8    Versed gtt  Prop gtt    Goal is to wean versed and prop by mid week to trail extubation by weeks end

## 2024-01-16 LAB
ANION GAP SERPL CALC-SCNC: 9 MMOL/L (ref 5–15)
BACTERIA SPEC CULT: NORMAL
BASOPHILS # BLD: 0 K/UL (ref 0–0.1)
BASOPHILS NFR BLD: 1 % (ref 0–1)
BUN SERPL-MCNC: 8 MG/DL (ref 6–20)
BUN/CREAT SERPL: 13 (ref 12–20)
CALCIUM SERPL-MCNC: 7.8 MG/DL (ref 8.5–10.1)
CHLORIDE SERPL-SCNC: 107 MMOL/L (ref 97–108)
CO2 SERPL-SCNC: 22 MMOL/L (ref 21–32)
CREAT SERPL-MCNC: 0.53 MG/DL (ref 0.7–1.3)
CREAT SERPL-MCNC: 0.62 MG/DL (ref 0.7–1.3)
DIFFERENTIAL METHOD BLD: ABNORMAL
EOSINOPHIL # BLD: 0.1 K/UL (ref 0–0.4)
EOSINOPHIL NFR BLD: 3 % (ref 0–7)
ERYTHROCYTE [DISTWIDTH] IN BLOOD BY AUTOMATED COUNT: 13.4 % (ref 11.5–14.5)
GLUCOSE BLD STRIP.AUTO-MCNC: 83 MG/DL (ref 65–117)
GLUCOSE SERPL-MCNC: 64 MG/DL (ref 65–100)
HCT VFR BLD AUTO: 35 % (ref 36.6–50.3)
HGB BLD-MCNC: 11.3 G/DL (ref 12.1–17)
IMM GRANULOCYTES # BLD AUTO: 0 K/UL (ref 0–0.04)
IMM GRANULOCYTES NFR BLD AUTO: 1 % (ref 0–0.5)
LYMPHOCYTES # BLD: 1.3 K/UL (ref 0.8–3.5)
LYMPHOCYTES NFR BLD: 27 % (ref 12–49)
MAGNESIUM SERPL-MCNC: 11.5 MG/DL (ref 1.6–2.4)
MAGNESIUM SERPL-MCNC: 2.3 MG/DL (ref 1.6–2.4)
MCH RBC QN AUTO: 30.3 PG (ref 26–34)
MCHC RBC AUTO-ENTMCNC: 32.3 G/DL (ref 30–36.5)
MCV RBC AUTO: 93.8 FL (ref 80–99)
MONOCYTES # BLD: 0.4 K/UL (ref 0–1)
MONOCYTES NFR BLD: 9 % (ref 5–13)
NEUTS SEG # BLD: 2.9 K/UL (ref 1.8–8)
NEUTS SEG NFR BLD: 59 % (ref 32–75)
NRBC # BLD: 0 K/UL (ref 0–0.01)
NRBC BLD-RTO: 0 PER 100 WBC
PHOSPHATE SERPL-MCNC: 3.3 MG/DL (ref 2.6–4.7)
PLATELET # BLD AUTO: 203 K/UL (ref 150–400)
PMV BLD AUTO: 11.2 FL (ref 8.9–12.9)
POTASSIUM SERPL-SCNC: 3.6 MMOL/L (ref 3.5–5.1)
RBC # BLD AUTO: 3.73 M/UL (ref 4.1–5.7)
SERVICE CMNT-IMP: NORMAL
SERVICE CMNT-IMP: NORMAL
SODIUM SERPL-SCNC: 138 MMOL/L (ref 136–145)
WBC # BLD AUTO: 4.8 K/UL (ref 4.1–11.1)

## 2024-01-16 PROCEDURE — 2580000003 HC RX 258: Performed by: INTERNAL MEDICINE

## 2024-01-16 PROCEDURE — 2000000000 HC ICU R&B

## 2024-01-16 PROCEDURE — 6360000002 HC RX W HCPCS: Performed by: INTERNAL MEDICINE

## 2024-01-16 PROCEDURE — 36415 COLL VENOUS BLD VENIPUNCTURE: CPT

## 2024-01-16 PROCEDURE — 86235 NUCLEAR ANTIGEN ANTIBODY: CPT

## 2024-01-16 PROCEDURE — 6370000000 HC RX 637 (ALT 250 FOR IP): Performed by: NURSE PRACTITIONER

## 2024-01-16 PROCEDURE — 86225 DNA ANTIBODY NATIVE: CPT

## 2024-01-16 PROCEDURE — 99233 SBSQ HOSP IP/OBS HIGH 50: CPT | Performed by: PHYSICAL MEDICINE & REHABILITATION

## 2024-01-16 PROCEDURE — 85025 COMPLETE CBC W/AUTO DIFF WBC: CPT

## 2024-01-16 PROCEDURE — 94003 VENT MGMT INPAT SUBQ DAY: CPT

## 2024-01-16 PROCEDURE — 93005 ELECTROCARDIOGRAM TRACING: CPT | Performed by: INTERNAL MEDICINE

## 2024-01-16 PROCEDURE — 83516 IMMUNOASSAY NONANTIBODY: CPT

## 2024-01-16 PROCEDURE — 2500000003 HC RX 250 WO HCPCS: Performed by: INTERNAL MEDICINE

## 2024-01-16 PROCEDURE — 6370000000 HC RX 637 (ALT 250 FOR IP): Performed by: INTERNAL MEDICINE

## 2024-01-16 PROCEDURE — G0316 PR PROLONG INPT EVAL ADD15 M: HCPCS | Performed by: PHYSICAL MEDICINE & REHABILITATION

## 2024-01-16 PROCEDURE — 80048 BASIC METABOLIC PNL TOTAL CA: CPT

## 2024-01-16 PROCEDURE — 82962 GLUCOSE BLOOD TEST: CPT

## 2024-01-16 PROCEDURE — 84100 ASSAY OF PHOSPHORUS: CPT

## 2024-01-16 PROCEDURE — A4216 STERILE WATER/SALINE, 10 ML: HCPCS | Performed by: INTERNAL MEDICINE

## 2024-01-16 PROCEDURE — 83735 ASSAY OF MAGNESIUM: CPT

## 2024-01-16 RX ORDER — VALPROIC ACID 250 MG/5ML
500 SOLUTION ORAL EVERY 12 HOURS SCHEDULED
Status: DISCONTINUED | OUTPATIENT
Start: 2024-01-16 | End: 2024-01-18

## 2024-01-16 RX ORDER — FAMOTIDINE 40 MG/5ML
20 POWDER, FOR SUSPENSION ORAL 2 TIMES DAILY
Status: DISCONTINUED | OUTPATIENT
Start: 2024-01-16 | End: 2024-01-25

## 2024-01-16 RX ORDER — SODIUM CHLORIDE, SODIUM LACTATE, POTASSIUM CHLORIDE, AND CALCIUM CHLORIDE .6; .31; .03; .02 G/100ML; G/100ML; G/100ML; G/100ML
1000 INJECTION, SOLUTION INTRAVENOUS ONCE
Status: COMPLETED | OUTPATIENT
Start: 2024-01-16 | End: 2024-01-16

## 2024-01-16 RX ORDER — MAGNESIUM SULFATE IN WATER 40 MG/ML
2000 INJECTION, SOLUTION INTRAVENOUS ONCE
Status: DISCONTINUED | OUTPATIENT
Start: 2024-01-16 | End: 2024-01-16

## 2024-01-16 RX ORDER — DEXTROSE AND SODIUM CHLORIDE 5; .9 G/100ML; G/100ML
INJECTION, SOLUTION INTRAVENOUS CONTINUOUS
Status: DISCONTINUED | OUTPATIENT
Start: 2024-01-16 | End: 2024-01-16

## 2024-01-16 RX ORDER — MIDODRINE HYDROCHLORIDE 5 MG/1
10 TABLET ORAL EVERY 6 HOURS
Status: DISCONTINUED | OUTPATIENT
Start: 2024-01-16 | End: 2024-01-20

## 2024-01-16 RX ORDER — VALPROIC ACID 250 MG/5ML
500 SOLUTION ORAL EVERY 8 HOURS SCHEDULED
Status: DISCONTINUED | OUTPATIENT
Start: 2024-01-16 | End: 2024-01-16

## 2024-01-16 RX ADMIN — PROPOFOL 50 MCG/KG/MIN: 10 INJECTION, EMULSION INTRAVENOUS at 04:05

## 2024-01-16 RX ADMIN — SODIUM CHLORIDE, PRESERVATIVE FREE 10 ML: 5 INJECTION INTRAVENOUS at 08:29

## 2024-01-16 RX ADMIN — MIDAZOLAM 10 MG/HR: 5 INJECTION, SOLUTION INTRAMUSCULAR; INTRAVENOUS at 19:47

## 2024-01-16 RX ADMIN — DEXTROSE AND SODIUM CHLORIDE: 5; 900 INJECTION, SOLUTION INTRAVENOUS at 08:27

## 2024-01-16 RX ADMIN — HALOPERIDOL LACTATE 5 MG: 5 INJECTION, SOLUTION INTRAMUSCULAR at 12:11

## 2024-01-16 RX ADMIN — HALOPERIDOL LACTATE 5 MG: 5 INJECTION, SOLUTION INTRAMUSCULAR at 06:36

## 2024-01-16 RX ADMIN — THIAMINE HYDROCHLORIDE 500 MG: 100 INJECTION, SOLUTION INTRAMUSCULAR; INTRAVENOUS at 14:22

## 2024-01-16 RX ADMIN — HALOPERIDOL LACTATE 5 MG: 5 INJECTION, SOLUTION INTRAMUSCULAR at 03:32

## 2024-01-16 RX ADMIN — POTASSIUM BICARBONATE 20 MEQ: 782 TABLET, EFFERVESCENT ORAL at 08:36

## 2024-01-16 RX ADMIN — SODIUM CHLORIDE, POTASSIUM CHLORIDE, SODIUM LACTATE AND CALCIUM CHLORIDE 1000 ML: 600; 310; 30; 20 INJECTION, SOLUTION INTRAVENOUS at 16:46

## 2024-01-16 RX ADMIN — Medication 20 MG: at 20:00

## 2024-01-16 RX ADMIN — MIDODRINE HYDROCHLORIDE 10 MG: 5 TABLET ORAL at 14:22

## 2024-01-16 RX ADMIN — POTASSIUM BICARBONATE 20 MEQ: 782 TABLET, EFFERVESCENT ORAL at 19:59

## 2024-01-16 RX ADMIN — VALPROIC ACID 500 MG: 250 SOLUTION ORAL at 20:00

## 2024-01-16 RX ADMIN — WATER 2000 MG: 1 INJECTION INTRAMUSCULAR; INTRAVENOUS; SUBCUTANEOUS at 10:38

## 2024-01-16 RX ADMIN — HALOPERIDOL LACTATE 5 MG: 5 INJECTION, SOLUTION INTRAMUSCULAR at 19:17

## 2024-01-16 RX ADMIN — SODIUM CHLORIDE, PRESERVATIVE FREE 10 ML: 5 INJECTION INTRAVENOUS at 20:00

## 2024-01-16 RX ADMIN — HALOPERIDOL LACTATE 5 MG: 5 INJECTION, SOLUTION INTRAMUSCULAR at 23:19

## 2024-01-16 RX ADMIN — HALOPERIDOL LACTATE 5 MG: 5 INJECTION, SOLUTION INTRAMUSCULAR at 14:22

## 2024-01-16 RX ADMIN — FAMOTIDINE 20 MG: 10 INJECTION, SOLUTION INTRAVENOUS at 08:36

## 2024-01-16 RX ADMIN — VALPROIC ACID 250 MG: 250 SOLUTION ORAL at 06:36

## 2024-01-16 RX ADMIN — THIAMINE HYDROCHLORIDE 500 MG: 100 INJECTION, SOLUTION INTRAMUSCULAR; INTRAVENOUS at 21:54

## 2024-01-16 RX ADMIN — ENOXAPARIN SODIUM 40 MG: 100 INJECTION SUBCUTANEOUS at 08:36

## 2024-01-16 RX ADMIN — MIDODRINE HYDROCHLORIDE 10 MG: 5 TABLET ORAL at 06:36

## 2024-01-16 RX ADMIN — PROPOFOL 40 MCG/KG/MIN: 10 INJECTION, EMULSION INTRAVENOUS at 14:35

## 2024-01-16 RX ADMIN — MIDODRINE HYDROCHLORIDE 10 MG: 5 TABLET ORAL at 20:00

## 2024-01-16 RX ADMIN — THIAMINE HYDROCHLORIDE 500 MG: 100 INJECTION, SOLUTION INTRAMUSCULAR; INTRAVENOUS at 06:36

## 2024-01-16 RX ADMIN — MIDAZOLAM 6 MG/HR: 5 INJECTION, SOLUTION INTRAMUSCULAR; INTRAVENOUS at 04:56

## 2024-01-16 RX ADMIN — PROPOFOL 45 MCG/KG/MIN: 10 INJECTION, EMULSION INTRAVENOUS at 08:26

## 2024-01-16 ASSESSMENT — PULMONARY FUNCTION TESTS
PIF_VALUE: 14
PIF_VALUE: 14
PIF_VALUE: 15
PIF_VALUE: 15
PIF_VALUE: 14

## 2024-01-16 ASSESSMENT — PAIN SCALES - GENERAL
PAINLEVEL_OUTOF10: 0

## 2024-01-16 NOTE — PROGRESS NOTES
[facility] Progress Note        Roge Cowan  1970  [id number]  1/16/2024      Assessment and plan:  Chronic paranoid schizophrenia and catatonia:   -++ delusions and psychosocial problems.  Cannot care for self.  Not eating.  Nonverbal.    -we are not able to provide safe care without large doses of BZDs and antipsychotics.    -intubated 1/16  -cont haldol 5 q 4 with daily EKG to follow Qtc.  This high dose is not without risk.  -we are actively monitoring for toxicities.    -cont versed gtt  -wean prop if able   -cont  q 8.    -discussed regiment with Dr Larson.      Acute hypoxic respiratory failure:  -inbutated due to respiratory depression and severe, profound catatonia  -cxr shows ET tube needs advanced.  Otherwise lungs are ni  -cont versed gtt  -cont prop gtt--- wean prop first  if able       Cellulitis:  involving stage 4 wound on right ankle    -discussed with ID, narrows to ceftriaxone    -blood cx with staph epi   -wound cx with grp b strep   -ct ankle reviewed and shows soft tissue edema but no bony involvement  -wound care team following    -CRP 5.7  -ESR 53     Severe malnutrition:  -as evidenced by Cr 0.64, Albumin 3.0 and AG 4.0  -very cachectic with low muscle mass  -not eating any PO    -TF started yesterday.  He is high risk for malnutrition      Hypoglycemia:  -on TF  -can add D5 NS if needed     Discussed with case management team.  discussed med regimen with PharmD and psych teams.  Very difficult case.  High risk patient.  Consulted and discussed with palliative care, who will assist with goal of care conversations.      HPI:  Remains critically ill.  Refractory psychosis.  Not able to wean sedatives thus far.  High risk to self.        ICU DAILY CHECKLIST     Code Status:full   DVT Prophylaxis:lovenox  T/L/D: ppi, ETT 1/15  SUP: pepcid   Diet: TF advancing slowly  Activity Level:as tolerated  ABCDEF Bundle/Checklist Completed:Yes  Disposition: cont

## 2024-01-16 NOTE — BSMART NOTE
BSMART Liaison Team Note     LOS:  4 days     Patient goal(s) for today: Patient goal(s) for today: take medications as prescribed, make needs known in an appropriate manner,   BSMART Liaison team focus/goals: assess needs, provide support and education, coordinate care     Progress note: Liaison attempted to meet face-to-face with patient in his room on the medical floor at [facility].  Patient intubated and sedated, and he could not participate in a formal interview.  Primary nurse advises that psychotropic medications to be restarted upon psychiatric consultation.  Patient sedated, intubated, and an NG tube started.  Patient reportedly given Versed, propranolol, Haldol, thiamine, and Depakote.  Per nurse, patient had to be placed in 4-point restraints with a sitter at [facility] because he continuously tried to get into a fetal position.  Patient then had to be sedated and intubated.     Per palliative care note on 1/15/2024: \"Patient has very complex and debilitating mental health disorder- history of paranoid schizophrenia. The patient has been refusing care, even despite mandated court order. The patient has been under TDO, committed for 60 days per previous notes-\" Also per 1/15/2024 note: \"Ethics is also following, SW discussed case in-detail with them, administration is also aware of case. The patient also has open APS case- DARRELL spoke with Case Management at [facility], at this time, patient's sister Eden still has active Guardianship- Guardianship papers on hard chart, SW will have them scanned into system.\"    Per palliative care note on 1/16/2024: \"Eden [sister and legal guardian] shared that the patient has auditory hallucinations- he was eating until recently the voices in his head told him to stop eating meat, then he lost a significant amount of weight and deteriorated mentally from there. Eden was also worried about something medical going on, but the patient often refuses medical care and recently started spitting  out his medications.\"    Also per 1/16/2024 note: \"Eden shares that the patient was wondering outside naked- would stay in fetal position outside and refuse to come in, leading neighbors to call police and APS involvement. Eden shared that the neighbors who called the police are new neighbors and were unaware of the patient's sever mental health conditions- [facility] team are highly involved, specifically Samara and Isreal.\" Liaison will continue to monitor and to support.    Barriers to Discharge: Medical clearance/psychiatric    Outpatient provider(s):  [facility]   Insurance info/prescription coverage:  MEDICARE PART A AND B , [facility] PLAN OF [facility]      Diagnosis: Chronic Paranoid Schizophrenia, Catatonia     Plan: Inpatient psychiatric admission is not recommended at this time due to medical concerns.  Please defer to most recent psychiatric consult for updated disposition and recommendations..   Follow up Psych Consult placed?  Yes  Psychiatrist updated?  No      Participating treatment team members: Roge Cowan, Kamari Jackson, GIOVANI

## 2024-01-16 NOTE — PROGRESS NOTES
Pt restless, agitated, unable to be redirected all morning long. Continuously balling up in fetal position even with four point restraints on and secure. Unable to perform basic nursing care to pt due to constant restlessness, agitation and alligator rolling in the bed to end up at the foot of the bed. Ketamine infusing through working Ivs and meds given per MAR.    MD decided to intubate patient and at 1222 he was intubated and 4 point restraints released to soft 2 point wrist restraints and safety sitter was released. More IV access was established. On sedation per MAR, pt still able to bring knees up to face, wiggle himself down in the bed.      1915 pt resting comfortably in IV sedation per MAR, in soft wrist restraints.

## 2024-01-16 NOTE — CARE COORDINATION
Transition of Care Plan:    RUR: 18% Moderate   Prior Level of Functioning: Dependent   Disposition: TBD  Transportation at discharge: TBD  IM/IMM Medicare letter given: No  Is patient a  and connected with VA? No  Caregiver Contact: Sister Eden Montalvo [phone]  Discharge Caregiver contacted prior to discharge? Yes  Care Conference needed? Yes  Barriers to discharge:    Patient with a complex mental history was transferred from [facility] for higher level of care.   Patient has lived with his sister Eden Montalvo and her son since patient was discharged in 2012 from [facility]. Per sister patient was diagnosed with schizophrenia at age 18. Per sister patient has been in multiple group homes over the years and is not a candidate for placement in a group home. Patient has auditory hallucinations and stays either in the bed or on the floor at home.   Patient's sister is his legal guardian, paperwork scanned into the EMR.   Per notes APS pursuing terminating guardianship over pt from his sister. They report pt is not permitted to return to his sister's care at this time.   Per notes Level 2 assessment for Nursing Home placement completed.   Patient is currently intubated on propofol and versed gtts, started on Haldol.     Contacts:   Sister Eden Montalvo [phone]  PCP Dr Dmitry Motta [phone]  Psych: [facility] [phone] Dr Olivas   with ACT team: Jayson Bustos [phone]  [facility] clinician :Geeta [phone]   Lorene Pabon [phone]  APS: Geno  [phone]    TONY spoke with Judge Rachel this morning to determine if paperwork needed to be amended and it does not.    Yoslein Manley RN,Care Management

## 2024-01-16 NOTE — PROGRESS NOTES
introduction to our team today, very complex case     Thank you for including Palliative Medicine in the care of Roge Cowan \"Isreal\"       Darling Torres LCSW

## 2024-01-16 NOTE — CONSULTS
treatment via antibiotics and bathing routine likely causes.  No aggression or violence.  Selectively interactive and aware. Tolerating medications via court order (Haldol, Ativan prns) .  Was recommitted yesterday for 60 days.  Eating fairly and sleeping minimally.     1/12 -  Roge Cowan is a little more interactive but continues to pull his IV's out and require restraint for treatment.  Resists necessary care for his wounds.  Slightly more relaxed lying on his side in bed, however when engaged he curls up into a ball and trembles.  Affect is sullen.  Undressed and rashs seem less intense.  No aggression or violence.  Inappropriately interactive and aware. Tolerating medications via court order.  Eating and sleeping fairly without assist.  Working on his treatment and placement.    1/13 -  Roge Cowan was transferred to [facility] for management of his deteriorating condition.  He is now on Ketamine and Precidex sedation with IV's and foam dressings to his ankle wound in 4 point restraints.  Nude and resistant to care.  Sligh movement to my voices but no verbal response.  No aggression or violence.  1:1 at bedside monitoring is sedated state.    01/15/2024  Patient was intubated today as he's becoming more agitated with despite the medications being given. Upon my evaluation, he was intubated and sedated. I discussed his past history, presentation, options and plan of care with CCM attending as well as nursing.    1/16/24-   Mr. Cowan remains intubated and sedated. He is currently receiving IV Haldol 5mg Q4H for a total daily dose of 30mg, as well as Depakene 250mg TID. EKG has been ordered given risk of QT prolongation with IV Haldol.     PAST PSYCHIATRIC HISTORY:  In Patient Catatonia with ECT treatments in the past.    SUBSTANCE ABUSE HISTORY:  Social History     Substance and Sexual Activity   Drug Use No     Social History     Substance and Sexual Activity   Alcohol Use Never     Social History  01/16/24 0636    sodium chloride flush 0.9 % injection 5-40 mL, 5-40 mL, IntraVENous, 2 times per day, Long Ball MD, 10 mL at 01/16/24 0829    sodium chloride flush 0.9 % injection 5-40 mL, 5-40 mL, IntraVENous, PRN, Long Ball MD    0.9 % sodium chloride infusion, , IntraVENous, PRN, Long Ball MD    potassium chloride (KLOR-CON) extended release tablet 40 mEq, 40 mEq, Oral, PRN **OR** potassium bicarb-citric acid (EFFER-K) effervescent tablet 40 mEq, 40 mEq, Oral, PRN **OR** potassium chloride 10 mEq/100 mL IVPB (Peripheral Line), 10 mEq, IntraVENous, PRN, Long Ball MD    magnesium sulfate 2000 mg in 50 mL IVPB premix, 2,000 mg, IntraVENous, PRN, Long Ball MD    ondansetron (ZOFRAN-ODT) disintegrating tablet 4 mg, 4 mg, Oral, Q8H PRN **OR** ondansetron (ZOFRAN) injection 4 mg, 4 mg, IntraVENous, Q6H PRN, Long Ball MD    acetaminophen (TYLENOL) tablet 650 mg, 650 mg, Oral, Q6H PRN **OR** acetaminophen (TYLENOL) suppository 650 mg, 650 mg, Rectal, Q6H PRN, Long Ball MD    enoxaparin (LOVENOX) injection 40 mg, 40 mg, SubCUTAneous, Daily, Long Ball MD, 40 mg at 01/16/24 0836    polyethylene glycol (GLYCOLAX) packet 17 g, 17 g, Oral, Daily PRN, Long Ball MD    [Held by provider] ARIPiprazole ER (ABILIFY MAINTENA) 400 mg, 400 mg, IntraMUSCular, Q28 Days, Long Ball MD    sodium chloride flush 0.9 % injection 5-40 mL, 5-40 mL, IntraVENous, PRN, Misbah Mcmanus, NP-C    albuterol (PROVENTIL) (2.5 MG/3ML) 0.083% nebulizer solution 2.5 mg, 2.5 mg, Nebulization, Q6H PRN, Misbah Mcmanus, NP-C    senna (SENOKOT) 8.8 MG/5ML syrup 8.8 mg, 5 mL, Oral, BID PRN, Princess Beverly MD    MENTAL STATUS EXAM:  Roge Cowan is calm but resistant to care with selective cooperativity, Selective speech not heard today.  Affect is sullen and withdrawn.  Wants to be left alone.  In 4 point restraints and sedated.  No aggression or violence.      ASSESSMENT AND PLAN:  Chronic

## 2024-01-16 NOTE — PROGRESS NOTES
Palliative Medicine  Patient Name: Roge Cowan  YOB: 1970  MRN: [id number]  Age: 53 y.o.  Gender: male    Date of Initial Consult: 1/15/24  Date of Service: 1/16/2024  Time: 11:26 AM  Provider: Desirae Santos MD  Hospital Day: 5  Admit Date: 1/12/2024  Referring Provider: DR ALLEN Beverly        Reasons for Consultation:  Goals of Care, Overwhelming Symptoms, and Psychosocial Distress    HISTORY OF PRESENT ILLNESS (HPI):   Roge Cowan \"Isreal\" (although his voices recently told him that he goes by \"Cruzito\"  is a 53 y.o. male with a past medical history of paranoid schizophrenia, catatonia  who was admitted on 1/12/2024 from [facility]. Pt admitted to [facility] 12/12/23 brought in by Police- he had not been engaging in self care, eating or drinking. He was in the fetal position and was getting pressure ulcers- stage IV on R ankle. Was getting court mandated ECT. He was transferred to [facility] on 1/12/24 for ICU care. He was refusing all care at [facility]. Requiring 4 pt restraints, sedation, IV abx.  He is tachypneic w/ desats, refusing sometimes even turning. Because could not care for patient, required sedation and intubation.     Psychosocial: Court declared pt legally incompetent on 10/2018 and sister Eden is guardian. They were both adopted at a young age. He has been living w/ her , and her son Rustam since leaving [facility] in 2012. Has been asked to leave many group homes. At baseline, he is low functioning- nonverbal mostly, but can walk. He spends most of day on bed or on the floor in fetal position. Does not interact much, does not listen to TV/radio. Cannot read or write.  Psych @ [facility] team: Dr. Brendon Gamble ACT Team: [phone] after hours number.    with ACT team: Jayson Andrea [phone] or [phone] (Samara as well)  : Lorene Pabon [phone]   PALLIATIVE DIAGNOSES:    Chronic paranoid schizophrenia and catatonia   Cellulitis on IV abx   Severe  malnutrition   Shortness of breath   Palliative care encounter     ASSESSMENT AND PLAN:   Pt remains intubated, sedated. Will remain sedated until psychiatric medications can be at therapeutic level and will continue tube feeds. Then plan is to return to [facility] for ongoing ECT.  Baseline:   Along w/ Darling Torres speak w/ supportive sister Eden. Shares that at baseline, he is low functioning- nonverbal mostly , but can walk. He spends most of day on bed or on the floor in fetal position. Does not interact much, does not listen to TV/radio, cannot read or write.   Was eating well, until recently voices in head told him to stop eating meat and then lost a lot of weight. Voices also told him that his name was \"Cruzito\"  He then started spitting out meds. Then APS was called, as pt refused to come back in the home and was lying in the yard in fetal position, naked. The neighbors who called did not realize about pt's severe mental health conditions. [facility] very involved.   Medical update:   Eden has not yet received update, as her cell phone often will not let her know a voicemail is left. We initially reached her at [email or web address]. At first she was concerned that palliative medicine or hospice was consulted, but then appreciative of our involvement for more support given pt's complex physical and mental health conditions- and how they are closely related. When pt is physically not feeling well, his mental health worsens. He will not share that he is feeling sick.   Sister surprised that his condition was so serious that he had to be intubated for basic self care.   Goals are to continue full restorative measures. Pt will be at risk for ongoing decline however- risk of malnutrition and future infections, although hope is that will get benefit from ongoing ECT.   Discussed pt care w/ Dr Beverly and Marley REYNOLDS  Please call with any palliative questions or concerns.  Palliative Care Team is available via Auto I.D.

## 2024-01-16 NOTE — CONSULTS
PSYCHIATRY CONSULT NOTE:    REASON FOR CONSULT:  psychotic behavior  Resistant to treatments    HISTORY OF PRESENTING COMPLAINT:  Roge Cowan is a 53 y.o. male who is currently admitted to the psychiatric floor at [facility].      He was brought to hospital in police custody for a mental health problem.  He reportedly has not been engaging in self care and has not been eating or drinking.  Patient unable to participate in the interview.  Maintains the fetal position and moves feet when spoken to.  Transferred to medical due to a progressive wound on ankle.  Eating some and has showered a few times with and without soap.  Maintains no clothing and crouching fetal position.  Has court ordered treatments including ECT but resists them all, removing IV's and not taking PO here for management of his condition.    Roge Cowan remains isolative and selective with his interactions.  Was able to get IV fluids in last evening, however he removed the IV this morning.  He resists care despite it being court ordered and attempts to get on the floor from the bed to crouch.  This position has led to a progressive breakdown of his skin on his ankles and knees.  He is up for recommitment today.  Affect is sullen and withdrawn.  Requires support for grooming and care.  Limited self motivation.  No aggression or violence.  Selectively interactive and aware.  Tolerating medications via court order well.    1/11 -  Roge Cowan  was sitting up in bed this morning.  Has been in restraints and given chemical restraints multiple times this morning.  Rreports selectively to staff and sometimes changes position or comfort.  The wound on his ankle is open but healing.  Wound care is following.  He continues to eat and is incontinent in bed.  Appears sullen and withdrawn.  Seen stretched out at times, however when approached he will retreat to his crouching praying position.  Undressed and a body rash is noted  psychosocial or environmental problems  and 21-30 behavior considerably influenced by delusions or hallucinations OR serious impairment in judgment, communication OR inability to function in almost all areas    RECOMMENDATIONS: Discussed with attending (Dr. Beverly), nursing.    01/15/2024  Discussed reducing reliance on benzos and consolidating antipsychotics to just one if possible.    D/C prn zyprexa  Recommended increase haldol from 5mg IV q6h to q4h. Obtain EKG to f/u QTC and be vigilant about monitoring VS, especially temp.  Start depakene  250mg po q8h in planning to further increase it to decrease agitation.  Plan would be to stabilize patient to undergo ECT.    01/13/2024  Not a candidate for inpatient psychiatry at this time due to his skin breakdown is now a tunneling wound.    Continue 1:1 nursing  Psychiatry to follow  Continue current care with Court ordered treatments including ECT.      Lety Larson MD  1/16/2024

## 2024-01-16 NOTE — PROCEDURES
[facility]      Procedure Note - Intubation - ETT exchange   Performed by JUANCHO Salcido - ANDRE .     Diagnosis: Respiratory insufficiency; Persistent ETT cuff leak, inadequate TV on MV (TV 200s, set )  Obtained Consent? No; emergent   Procedure Location:  ICU.      Immediately prior to the procedure, the patient was reevaluated and found suitable for the planned procedure and any planned medications.  Immediately prior to the procedure a time out was called to verify the correct patient, procedure, equipment, staff, and marking as appropriate.    Medications given were Patient already sedated on propofol and versed  A number 7.5 cuffed ETT was exchanged over a bougie and placed to 25 cm at the teeth   Placement was evaluated by noting bilateral, symmetric breath sounds, good end-tidal CO2 detector color change , no breath sounds over stomach, bulb aspirator expands promptly, and chest x-ray visualization.    Attempts required: 1.    Complications: none.    The procedure was tolerated well.    This was required because the patient was noted to have inadequate TV and audible cuff leak despite reinflating the cuff 4x and noting the existing ETT to be in appropriate placement on CXR.  Exchanging the ETT resulted in redemonstration of adequate TV on MV.     ANDRE Santa MD supervised this procedure.

## 2024-01-17 LAB
ALBUMIN SERPL-MCNC: 2.2 G/DL (ref 3.5–5)
ALBUMIN/GLOB SERPL: 0.6 (ref 1.1–2.2)
ALP SERPL-CCNC: 110 U/L (ref 45–117)
ALT SERPL-CCNC: 47 U/L (ref 12–78)
ANION GAP SERPL CALC-SCNC: 6 MMOL/L (ref 5–15)
AST SERPL-CCNC: 24 U/L (ref 15–37)
BASOPHILS # BLD: 0 K/UL (ref 0–0.1)
BASOPHILS NFR BLD: 1 % (ref 0–1)
BILIRUB DIRECT SERPL-MCNC: 0.1 MG/DL (ref 0–0.2)
BILIRUB SERPL-MCNC: 0.3 MG/DL (ref 0.2–1)
BUN SERPL-MCNC: 6 MG/DL (ref 6–20)
BUN/CREAT SERPL: 10 (ref 12–20)
CALCIUM SERPL-MCNC: 7.6 MG/DL (ref 8.5–10.1)
CENTROMERE B AB SER-ACNC: <0.2 AI (ref 0–0.9)
CHLORIDE SERPL-SCNC: 110 MMOL/L (ref 97–108)
CHROMATIN AB SERPL-ACNC: <0.2 AI (ref 0–0.9)
CO2 SERPL-SCNC: 27 MMOL/L (ref 21–32)
CREAT SERPL-MCNC: 0.44 MG/DL (ref 0.7–1.3)
CREAT SERPL-MCNC: 0.58 MG/DL (ref 0.7–1.3)
CRP SERPL-MCNC: 4.51 MG/DL (ref 0–0.6)
DIFFERENTIAL METHOD BLD: ABNORMAL
DSDNA AB SER-ACNC: <1 IU/ML (ref 0–9)
EKG ATRIAL RATE: 55 BPM
EKG ATRIAL RATE: 71 BPM
EKG ATRIAL RATE: 71 BPM
EKG DIAGNOSIS: NORMAL
EKG P AXIS: 41 DEGREES
EKG P AXIS: 46 DEGREES
EKG P AXIS: 52 DEGREES
EKG P-R INTERVAL: 160 MS
EKG P-R INTERVAL: 162 MS
EKG P-R INTERVAL: 174 MS
EKG Q-T INTERVAL: 450 MS
EKG Q-T INTERVAL: 456 MS
EKG Q-T INTERVAL: 474 MS
EKG QRS DURATION: 78 MS
EKG QRS DURATION: 84 MS
EKG QRS DURATION: 86 MS
EKG QTC CALCULATION (BAZETT): 453 MS
EKG QTC CALCULATION (BAZETT): 489 MS
EKG QTC CALCULATION (BAZETT): 495 MS
EKG R AXIS: 58 DEGREES
EKG R AXIS: 63 DEGREES
EKG R AXIS: 68 DEGREES
EKG T AXIS: 54 DEGREES
EKG T AXIS: 56 DEGREES
EKG T AXIS: 71 DEGREES
EKG VENTRICULAR RATE: 55 BPM
EKG VENTRICULAR RATE: 71 BPM
EKG VENTRICULAR RATE: 71 BPM
ENA JO1 AB SER-ACNC: <0.2 AI (ref 0–0.9)
ENA RNP AB SER-ACNC: <0.2 AI (ref 0–0.9)
ENA SCL70 AB SER-ACNC: <0.2 AI (ref 0–0.9)
ENA SM AB SER-ACNC: <0.2 AI (ref 0–0.9)
ENA SM+RNP AB SER-ACNC: <0.2 AI (ref 0–0.9)
ENA SS-A AB SER-ACNC: <0.2 AI (ref 0–0.9)
ENA SS-B AB SER-ACNC: <0.2 AI (ref 0–0.9)
EOSINOPHIL # BLD: 0.1 K/UL (ref 0–0.4)
EOSINOPHIL NFR BLD: 2 % (ref 0–7)
ERYTHROCYTE [DISTWIDTH] IN BLOOD BY AUTOMATED COUNT: 13.8 % (ref 11.5–14.5)
ERYTHROCYTE [SEDIMENTATION RATE] IN BLOOD: 32 MM/HR (ref 0–20)
GLOBULIN SER CALC-MCNC: 3.4 G/DL (ref 2–4)
GLUCOSE SERPL-MCNC: 94 MG/DL (ref 65–100)
HCT VFR BLD AUTO: 34.1 % (ref 36.6–50.3)
HGB BLD-MCNC: 11.3 G/DL (ref 12.1–17)
IMM GRANULOCYTES # BLD AUTO: 0 K/UL (ref 0–0.04)
IMM GRANULOCYTES NFR BLD AUTO: 1 % (ref 0–0.5)
LYMPHOCYTES # BLD: 1.1 K/UL (ref 0.8–3.5)
LYMPHOCYTES NFR BLD: 20 % (ref 12–49)
MAGNESIUM SERPL-MCNC: 2.2 MG/DL (ref 1.6–2.4)
MCH RBC QN AUTO: 30.1 PG (ref 26–34)
MCHC RBC AUTO-ENTMCNC: 33.1 G/DL (ref 30–36.5)
MCV RBC AUTO: 90.9 FL (ref 80–99)
MONOCYTES # BLD: 0.5 K/UL (ref 0–1)
MONOCYTES NFR BLD: 9 % (ref 5–13)
NEUTS SEG # BLD: 3.6 K/UL (ref 1.8–8)
NEUTS SEG NFR BLD: 67 % (ref 32–75)
NRBC # BLD: 0 K/UL (ref 0–0.01)
NRBC BLD-RTO: 0 PER 100 WBC
PHOSPHATE SERPL-MCNC: 2.7 MG/DL (ref 2.6–4.7)
PLATELET # BLD AUTO: 211 K/UL (ref 150–400)
PMV BLD AUTO: 10.9 FL (ref 8.9–12.9)
POTASSIUM SERPL-SCNC: 3.5 MMOL/L (ref 3.5–5.1)
PROT SERPL-MCNC: 5.6 G/DL (ref 6.4–8.2)
RBC # BLD AUTO: 3.75 M/UL (ref 4.1–5.7)
RIBOSOMAL P AB SER-ACNC: <0.2 AI (ref 0–0.9)
SEE BELOW:, 164879: NORMAL
SODIUM SERPL-SCNC: 143 MMOL/L (ref 136–145)
WBC # BLD AUTO: 5.3 K/UL (ref 4.1–11.1)

## 2024-01-17 PROCEDURE — 94003 VENT MGMT INPAT SUBQ DAY: CPT

## 2024-01-17 PROCEDURE — 6360000002 HC RX W HCPCS: Performed by: INTERNAL MEDICINE

## 2024-01-17 PROCEDURE — 85025 COMPLETE CBC W/AUTO DIFF WBC: CPT

## 2024-01-17 PROCEDURE — 93005 ELECTROCARDIOGRAM TRACING: CPT | Performed by: INTERNAL MEDICINE

## 2024-01-17 PROCEDURE — 2580000003 HC RX 258: Performed by: INTERNAL MEDICINE

## 2024-01-17 PROCEDURE — 85652 RBC SED RATE AUTOMATED: CPT

## 2024-01-17 PROCEDURE — 86140 C-REACTIVE PROTEIN: CPT

## 2024-01-17 PROCEDURE — 6370000000 HC RX 637 (ALT 250 FOR IP): Performed by: NURSE PRACTITIONER

## 2024-01-17 PROCEDURE — 6370000000 HC RX 637 (ALT 250 FOR IP): Performed by: INTERNAL MEDICINE

## 2024-01-17 PROCEDURE — 2000000000 HC ICU R&B

## 2024-01-17 PROCEDURE — 83735 ASSAY OF MAGNESIUM: CPT

## 2024-01-17 PROCEDURE — 36415 COLL VENOUS BLD VENIPUNCTURE: CPT

## 2024-01-17 PROCEDURE — 84100 ASSAY OF PHOSPHORUS: CPT

## 2024-01-17 PROCEDURE — 99231 SBSQ HOSP IP/OBS SF/LOW 25: CPT | Performed by: INTERNAL MEDICINE

## 2024-01-17 PROCEDURE — 80048 BASIC METABOLIC PNL TOTAL CA: CPT

## 2024-01-17 PROCEDURE — 80076 HEPATIC FUNCTION PANEL: CPT

## 2024-01-17 RX ADMIN — POTASSIUM BICARBONATE 20 MEQ: 782 TABLET, EFFERVESCENT ORAL at 05:30

## 2024-01-17 RX ADMIN — THIAMINE HYDROCHLORIDE 500 MG: 100 INJECTION, SOLUTION INTRAMUSCULAR; INTRAVENOUS at 05:30

## 2024-01-17 RX ADMIN — ENOXAPARIN SODIUM 40 MG: 100 INJECTION SUBCUTANEOUS at 08:59

## 2024-01-17 RX ADMIN — HALOPERIDOL LACTATE 5 MG: 5 INJECTION, SOLUTION INTRAMUSCULAR at 21:57

## 2024-01-17 RX ADMIN — POTASSIUM BICARBONATE 40 MEQ: 782 TABLET, EFFERVESCENT ORAL at 16:37

## 2024-01-17 RX ADMIN — MIDAZOLAM 15 MG/HR: 5 INJECTION, SOLUTION INTRAMUSCULAR; INTRAVENOUS at 14:40

## 2024-01-17 RX ADMIN — POTASSIUM BICARBONATE 40 MEQ: 782 TABLET, EFFERVESCENT ORAL at 12:44

## 2024-01-17 RX ADMIN — MIDODRINE HYDROCHLORIDE 10 MG: 5 TABLET ORAL at 02:19

## 2024-01-17 RX ADMIN — HALOPERIDOL LACTATE 5 MG: 5 INJECTION, SOLUTION INTRAMUSCULAR at 06:14

## 2024-01-17 RX ADMIN — PROPOFOL 25 MCG/KG/MIN: 10 INJECTION, EMULSION INTRAVENOUS at 00:21

## 2024-01-17 RX ADMIN — SODIUM CHLORIDE, PRESERVATIVE FREE 10 ML: 5 INJECTION INTRAVENOUS at 21:57

## 2024-01-17 RX ADMIN — VALPROIC ACID 500 MG: 250 SOLUTION ORAL at 08:59

## 2024-01-17 RX ADMIN — MIDODRINE HYDROCHLORIDE 10 MG: 5 TABLET ORAL at 08:59

## 2024-01-17 RX ADMIN — Medication 20 MG: at 08:59

## 2024-01-17 RX ADMIN — SODIUM CHLORIDE, PRESERVATIVE FREE 10 ML: 5 INJECTION INTRAVENOUS at 08:59

## 2024-01-17 RX ADMIN — Medication 20 MG: at 21:56

## 2024-01-17 RX ADMIN — HALOPERIDOL LACTATE 5 MG: 5 INJECTION, SOLUTION INTRAMUSCULAR at 16:37

## 2024-01-17 RX ADMIN — HALOPERIDOL LACTATE 5 MG: 5 INJECTION, SOLUTION INTRAMUSCULAR at 18:27

## 2024-01-17 RX ADMIN — VALPROIC ACID 500 MG: 250 SOLUTION ORAL at 22:00

## 2024-01-17 RX ADMIN — MIDAZOLAM 10 MG/HR: 5 INJECTION, SOLUTION INTRAMUSCULAR; INTRAVENOUS at 06:13

## 2024-01-17 RX ADMIN — HALOPERIDOL LACTATE 5 MG: 5 INJECTION, SOLUTION INTRAMUSCULAR at 02:21

## 2024-01-17 RX ADMIN — MIDODRINE HYDROCHLORIDE 10 MG: 5 TABLET ORAL at 21:57

## 2024-01-17 RX ADMIN — HALOPERIDOL LACTATE 5 MG: 5 INJECTION, SOLUTION INTRAMUSCULAR at 10:02

## 2024-01-17 RX ADMIN — MIDODRINE HYDROCHLORIDE 10 MG: 5 TABLET ORAL at 16:38

## 2024-01-17 RX ADMIN — WATER 2000 MG: 1 INJECTION INTRAMUSCULAR; INTRAVENOUS; SUBCUTANEOUS at 10:01

## 2024-01-17 RX ADMIN — MIDAZOLAM 20 MG/HR: 5 INJECTION, SOLUTION INTRAMUSCULAR; INTRAVENOUS at 20:51

## 2024-01-17 RX ADMIN — PROPOFOL 25 MCG/KG/MIN: 10 INJECTION, EMULSION INTRAVENOUS at 06:16

## 2024-01-17 ASSESSMENT — PAIN SCALES - GENERAL
PAINLEVEL_OUTOF10: 0

## 2024-01-17 ASSESSMENT — PULMONARY FUNCTION TESTS
PIF_VALUE: 16
PIF_VALUE: 23
PIF_VALUE: 16
PIF_VALUE: 17
PIF_VALUE: 15
PIF_VALUE: 18

## 2024-01-17 NOTE — PROGRESS NOTES
[facility] ICU Progress Note        Roge Cowan  1970  [id number]  1/17/2024      Assessment and plan:  Chronic paranoid schizophrenia and catatonia:   -++ delusions and psychosocial problems.  Cannot care for self.  Not eating.  Nonverbal.    -we are not able to provide safe care without large doses of BZDs and antipsychotics.    -intubated 1/16  -cont haldol 5 q 4 with daily EKG to follow Qtc.  This high dose is not without risk. Qtc 495 this am.    -Qtc is  climbing as expected.  We can tolerate up to 600 for a short time if no ectopy on continous EKG.    -keep MAG> 2. We are carefully monitoring this.    -cont  BID   -we are actively monitoring for toxicities.    -cont versed gtt  -wean prop as tolerated  -discussed regiment with Dr Larson.       Acute hypoxic respiratory failure:  -inbutated due to respiratory depression and severe, profound catatonia  -cxr shows ET tube needs advanced. We advanced 2 cm.  Otherwise lungs are clear  -cont versed gtt  -cont prop gtt--- wean prop  as tolerated.       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  Will DC.    -blood cx with staph epi, wound cx with grp b strep   -ct ankle reviewed and shows soft tissue edema but no bony involvement  -wound care team following    -CRP 5.7 -->  repeat today    -ESR 53 -->  repeat today     Severe malnutrition:  -as evidenced by Cr 0.64, Albumin 3.0 and AG 4.0  -very cachectic with low muscle mass  -not eating any PO    -TF started 1/16.  He is high risk for refeeding syndrome  -TF will advance to goal this am  -no BM yet.       Hypoglycemia: resolved  -on TF     Discussed with case management team.  discussed med regimen with PharmD and psych teams.  Very difficult case.  High risk patient.  Consulted and discussed with palliative care, who will assist with goal of care conversations.      Updated sister yesterday by phone.  Pt has been mostly  throat: MMM. Normal gums, mucosa, palate,. Good dentition.  NECK: Supple, with no masses. No JVD   CV: RRR, no m/r/g.  LUNGS: CTAB, no w/r/c.  ABD: Soft, NT/ND, no masses, NTTP  SKIN: Warm, well perfused. No skin rashes or abnormal lesions.  EXT: No clubbing, cyanosis, or edema.  NEURO: intubated and sedated     LABS:   Na 143  K 3.5  Cl 110    Cr 0.58  Mag 2.2    Imaging reviewed   Cxr reviewed and shows clear lungs      CCM time:   45 mins.  This patient is critically ill with risk of clinical deterioration.  The documented time was time spent providing direct patient care, formulating care plan and discussing this plan with other providers, updating family and discussing goals of care with patient and/or family. It does not include time spent performing any procedures that are billed separtately.    Prinecss Beverly MD    Pulmonary and Critical Care Medicine   [facility]  [phone]  1/17/2024

## 2024-01-17 NOTE — PROGRESS NOTES
NUTRITION brief    Recommendations:     Slowly advance EN to goal: Vital AF 1.2 @ 70 ml/hr with 100 ml water flush q 4 hr        Diet: NPO  Supplements/Nutrition Support: Vital AF 1.2 @ 30 ml/hr; advance by 10 ml q 24 hr to goal 45 ml/hr with 60 ml water flush q 4 hr  Nutrition-related meds: Pepcid, Effer-K, B1    New events impacting nutrition plan of care:   Propofol weaned off today. EN started yesterday and has been well tolerated thus far; advanced to 30 ml/hr this morning. Lytes stable so far and are being replaced PRN. Will increase advancement of feeding to 10 ml q 12 hr. Continue to monitor lytes-if significant drop in phosphorus recommend maintaining feeding at current rate; resume advancement once lytes replaced and lab WNL.    New goal now that pt is off propofol (see above). This will provide 1540 ml, 1850 calories, 116 gm protein and 1850 ml free water (tube feeding/flush) per day.    See full RD assessment from 1/15 for additional details, goals, and monitoring/evaluation.   Estimated Nutrition Needs:   Energy Requirements Based On: Kcal/kg  Weight Used for Energy Requirements: Current (66.4 kg)  Energy (kcal/day): 1000 (15 kcal/kg initially; slowly advance to goal 25-30 kcal/kg or 3177-5390 kcals/day)  Weight Used for Protein Requirements: Current  Protein (g/day):  (1.3-1.5g/kg)  Method Used for Fluid Requirements: 1 ml/kcal  Fluid (ml/day):      Yara Kam RD [facility]

## 2024-01-17 NOTE — BSMART NOTE
BSMART Liaison Team Note     LOS:  5 days     Patient goal(s) for today: take medications as prescribed, make needs known in an appropriate manner  BSMART Liaison team focus/goals: assess needs, provide support and education, coordinate care     Progress note: Liaison saw pt face to face on the medical floor at [facility]. Pt is currently intubated and sedated. He is unable to participate in conversation.     Barriers to Discharge: Medical and psychiatric clearance  Guns in the home: unable to assess     Outpatient provider(s):  [facility]  Insurance info/prescription coverage:   MEDICARE PART A AND B , [facility]AN OF [place]      Diagnosis: Chronic Paranoid Schizophrenia, Catatonia     Plan:  Pt cannot go to inpatient psychiatry at this moment due medical concerns. Per psych consult dated 1/15, patient to undergo ECT once medically stable. BSMART liaison to continue to follow. Please defer to medical team and psychiatry for details on discharge plan and disposition.    Follow up Psych Consult placed? No .   Psychiatrist updated? No        Participating treatment team members: JOSEPH Wilson MSW Student Intern

## 2024-01-17 NOTE — PROGRESS NOTES
Infectious Disease Progress Note       Subjective:      The patient was personally evaluated and examined by me at bedside in the ICU today.  The patient is completely sedated at this time and is intubated and appears to be peaceful and comfortable in this condition.  The team has carefully cleaned and groomed this patient so that he can be comfortable.  The patient remains afebrile without leukocytosis and discussion has been held with the critical care physician regarding antibiotic.   The right ankle has been cleaned and dressed and imaging shows no evidence of fluid collection abscess or soft tissue gas.  Open wounds on right ankle as well as bilateral knees have been cleaned.  Wound culture showed beta-hemolytic strep group B with broad spectrum antibiotics provided early in admission.  This time I recommended local care by wound care and primary team to maintain wound cleanliness.  Discontinue the antibiotic at this time in setting of patient who is afebrile without leukocytosis.      Objective:    Vitals:   Patient Vitals for the past 24 hrs:   Temp Pulse Resp BP SpO2   01/17/24 1800 -- 74 14 100/63 100 %   01/17/24 1700 -- 72 14 100/64 100 %   01/17/24 1600 99.9 °F (37.7 °C) 79 10 105/63 98 %   01/17/24 1500 -- 75 15 100/62 100 %   01/17/24 1400 -- 76 14 100/63 100 %   01/17/24 1300 -- 84 18 97/72 99 %   01/17/24 1209 -- 68 14 -- 100 %   01/17/24 1200 99.7 °F (37.6 °C) 70 14 100/66 100 %   01/17/24 0900 -- 72 15 104/68 99 %   01/17/24 0825 -- 68 14 -- 99 %   01/17/24 0800 98.2 °F (36.8 °C) 72 14 (!) 89/56 98 %   01/17/24 0700 -- 72 14 (!) 87/61 98 %   01/17/24 0600 -- 73 15 100/61 100 %   01/17/24 0500 -- 67 14 117/77 100 %   01/17/24 0447 -- 75 15 -- 96 %   01/17/24 0400 98.9 °F (37.2 °C) 73 14 (!) 85/60 97 %   01/17/24 0300 -- 74 14 96/67 96 %   01/17/24 0200 -- 78 14 (!) 96/59 98 %   01/17/24 0100 -- 74 14 104/64 100 %   01/17/24 0040 -- 78 14 -- 99 %   01/17/24 0000 99.3 °F (37.4 °C) 77 14 (!) 93/59  99 %   01/16/24 2300 -- 74 14 105/68 100 %   01/16/24 2200 -- 74 14 104/69 --   01/16/24 2100 -- 76 14 95/63 97 %   01/16/24 2010 -- 75 14 -- 99 %   01/16/24 2000 99.5 °F (37.5 °C) 82 14 (!) 90/59 98 %   01/16/24 1900 -- 83 23 104/62 --       Physical Exam:    Gen: No apparent distress, patient sedated and appears comfortable at this time  HEENT:  Normocephalic, atraumatic, no scleral icterus  CV: S1,2 heard regularly, no lower extremity edema    Lungs: Clear to auscultation bilaterally, no wheezing  Abdomen: soft, non tender, non distended  Genitourinary:  Deferred  Skin: no rash , wound right ankle abrasions bilateral knees  Psych: Unable to assess as patient is intubated and sedated  Neuro: Unable to assess as patient is intubated and sedated  Musculoskeletal: Cachectic with muscle atrophy and BMI 21.98  Lines:     Medications:    Current Facility-Administered Medications:     famotidine (PEPCID) 40 MG/5ML suspension 20 mg, 20 mg, Per NG tube, BID, Princess Beverly MD, 20 mg at 01/17/24 0859    valproic acid (DEPAKENE) 250 MG/5ML oral solution 500 mg, 500 mg, Oral, 2 times per day, Ursula Steward APRN - NP, 500 mg at 01/17/24 0859    midodrine (PROAMATINE) tablet 10 mg, 10 mg, Orogastric, Q6H, Princess Beverly MD, 10 mg at 01/17/24 1638    [COMPLETED] thiamine (B-1) 500 mg in sodium chloride 0.9 % 100 mL IVPB, 500 mg, IntraVENous, q8h, Stopped at 01/17/24 0608 **FOLLOWED BY** [START ON 1/18/2024] thiamine (B-1) 100 mg in sodium chloride 0.9 % 100 mL IVPB, 100 mg, IntraVENous, Q24H, Princess Beverly MD    propofol infusion, 5-50 mcg/kg/min, IntraVENous, Continuous, Princess Beverly MD, Last Rate: 4 mL/hr at 01/17/24 1830, 10 mcg/kg/min at 01/17/24 1830    midazolam (VERSED) 100mg/100mL in NS infusion, 1-20 mg/hr, IntraVENous, Continuous, Princess Beverly MD, Last Rate: 20 mL/hr at 01/17/24 1545, 20 mg/hr at 01/17/24 1545    haloperidol lactate (HALDOL) injection 5 mg, 5 mg, IntraVENous, Q4H, Princess Beverly MD, 5 mg at

## 2024-01-18 LAB
ANION GAP SERPL CALC-SCNC: 3 MMOL/L (ref 5–15)
BASOPHILS # BLD: 0 K/UL (ref 0–0.1)
BASOPHILS NFR BLD: 1 % (ref 0–1)
BUN SERPL-MCNC: 5 MG/DL (ref 6–20)
BUN/CREAT SERPL: 9 (ref 12–20)
CALCIUM SERPL-MCNC: 8.5 MG/DL (ref 8.5–10.1)
CHLORIDE SERPL-SCNC: 108 MMOL/L (ref 97–108)
CO2 SERPL-SCNC: 30 MMOL/L (ref 21–32)
CREAT SERPL-MCNC: 0.55 MG/DL (ref 0.7–1.3)
DIFFERENTIAL METHOD BLD: ABNORMAL
EKG ATRIAL RATE: 64 BPM
EKG DIAGNOSIS: NORMAL
EKG P AXIS: 67 DEGREES
EKG P-R INTERVAL: 180 MS
EKG Q-T INTERVAL: 456 MS
EKG QRS DURATION: 76 MS
EKG QTC CALCULATION (BAZETT): 470 MS
EKG R AXIS: 40 DEGREES
EKG T AXIS: 59 DEGREES
EKG VENTRICULAR RATE: 64 BPM
EOSINOPHIL # BLD: 0.1 K/UL (ref 0–0.4)
EOSINOPHIL NFR BLD: 2 % (ref 0–7)
ERYTHROCYTE [DISTWIDTH] IN BLOOD BY AUTOMATED COUNT: 14 % (ref 11.5–14.5)
GLUCOSE SERPL-MCNC: 98 MG/DL (ref 65–100)
HCT VFR BLD AUTO: 34.3 % (ref 36.6–50.3)
HGB BLD-MCNC: 11 G/DL (ref 12.1–17)
IMM GRANULOCYTES # BLD AUTO: 0 K/UL (ref 0–0.04)
IMM GRANULOCYTES NFR BLD AUTO: 1 % (ref 0–0.5)
LYMPHOCYTES # BLD: 1.5 K/UL (ref 0.8–3.5)
LYMPHOCYTES NFR BLD: 26 % (ref 12–49)
MAGNESIUM SERPL-MCNC: 2.2 MG/DL (ref 1.6–2.4)
MCH RBC QN AUTO: 29.6 PG (ref 26–34)
MCHC RBC AUTO-ENTMCNC: 32.1 G/DL (ref 30–36.5)
MCV RBC AUTO: 92.2 FL (ref 80–99)
MONOCYTES # BLD: 0.6 K/UL (ref 0–1)
MONOCYTES NFR BLD: 10 % (ref 5–13)
NEUTS SEG # BLD: 3.4 K/UL (ref 1.8–8)
NEUTS SEG NFR BLD: 60 % (ref 32–75)
NRBC # BLD: 0 K/UL (ref 0–0.01)
NRBC BLD-RTO: 0 PER 100 WBC
PHOSPHATE SERPL-MCNC: 2.2 MG/DL (ref 2.6–4.7)
PLATELET # BLD AUTO: 210 K/UL (ref 150–400)
PMV BLD AUTO: 11.9 FL (ref 8.9–12.9)
POTASSIUM SERPL-SCNC: 3.8 MMOL/L (ref 3.5–5.1)
RBC # BLD AUTO: 3.72 M/UL (ref 4.1–5.7)
SODIUM SERPL-SCNC: 141 MMOL/L (ref 136–145)
WBC # BLD AUTO: 5.6 K/UL (ref 4.1–11.1)

## 2024-01-18 PROCEDURE — 99233 SBSQ HOSP IP/OBS HIGH 50: CPT | Performed by: PHYSICAL MEDICINE & REHABILITATION

## 2024-01-18 PROCEDURE — 2000000000 HC ICU R&B

## 2024-01-18 PROCEDURE — 2580000003 HC RX 258: Performed by: INTERNAL MEDICINE

## 2024-01-18 PROCEDURE — 6370000000 HC RX 637 (ALT 250 FOR IP): Performed by: INTERNAL MEDICINE

## 2024-01-18 PROCEDURE — 94003 VENT MGMT INPAT SUBQ DAY: CPT

## 2024-01-18 PROCEDURE — 6370000000 HC RX 637 (ALT 250 FOR IP): Performed by: NURSE PRACTITIONER

## 2024-01-18 PROCEDURE — 84100 ASSAY OF PHOSPHORUS: CPT

## 2024-01-18 PROCEDURE — 6360000002 HC RX W HCPCS: Performed by: INTERNAL MEDICINE

## 2024-01-18 PROCEDURE — 83735 ASSAY OF MAGNESIUM: CPT

## 2024-01-18 PROCEDURE — 85025 COMPLETE CBC W/AUTO DIFF WBC: CPT

## 2024-01-18 PROCEDURE — G0316 PR PROLONG INPT EVAL ADD15 M: HCPCS | Performed by: PHYSICAL MEDICINE & REHABILITATION

## 2024-01-18 PROCEDURE — 2500000003 HC RX 250 WO HCPCS: Performed by: INTERNAL MEDICINE

## 2024-01-18 PROCEDURE — 93005 ELECTROCARDIOGRAM TRACING: CPT | Performed by: INTERNAL MEDICINE

## 2024-01-18 PROCEDURE — 36415 COLL VENOUS BLD VENIPUNCTURE: CPT

## 2024-01-18 PROCEDURE — 80048 BASIC METABOLIC PNL TOTAL CA: CPT

## 2024-01-18 RX ORDER — HALOPERIDOL 5 MG/ML
5 INJECTION INTRAMUSCULAR EVERY 4 HOURS
Status: DISCONTINUED | OUTPATIENT
Start: 2024-01-18 | End: 2024-01-19

## 2024-01-18 RX ORDER — TOPIRAMATE 100 MG/1
100 TABLET, FILM COATED ORAL 2 TIMES DAILY
Status: DISCONTINUED | OUTPATIENT
Start: 2024-01-18 | End: 2024-01-22

## 2024-01-18 RX ORDER — HALOPERIDOL 5 MG/ML
5 INJECTION INTRAMUSCULAR EVERY 6 HOURS
Status: DISCONTINUED | OUTPATIENT
Start: 2024-01-18 | End: 2024-01-18

## 2024-01-18 RX ORDER — TRAZODONE HYDROCHLORIDE 50 MG/1
50 TABLET ORAL 3 TIMES DAILY
Status: DISCONTINUED | OUTPATIENT
Start: 2024-01-18 | End: 2024-01-19

## 2024-01-18 RX ORDER — LORAZEPAM 1 MG/1
4 TABLET ORAL EVERY 4 HOURS
Status: DISCONTINUED | OUTPATIENT
Start: 2024-01-18 | End: 2024-01-20

## 2024-01-18 RX ORDER — VALPROIC ACID 250 MG/5ML
500 SOLUTION ORAL EVERY 8 HOURS SCHEDULED
Status: DISCONTINUED | OUTPATIENT
Start: 2024-01-18 | End: 2024-01-19

## 2024-01-18 RX ORDER — CLOZAPINE 25 MG/1
25 TABLET ORAL 2 TIMES DAILY
Status: DISCONTINUED | OUTPATIENT
Start: 2024-01-18 | End: 2024-01-18

## 2024-01-18 RX ORDER — CLOZAPINE 100 MG/1
200 TABLET ORAL NIGHTLY
Status: DISCONTINUED | OUTPATIENT
Start: 2024-01-18 | End: 2024-01-18

## 2024-01-18 RX ORDER — LANOLIN ALCOHOL/MO/W.PET/CERES
100 CREAM (GRAM) TOPICAL DAILY
Status: DISCONTINUED | OUTPATIENT
Start: 2024-01-19 | End: 2024-02-02

## 2024-01-18 RX ORDER — TOPIRAMATE 100 MG/1
100 TABLET, FILM COATED ORAL 2 TIMES DAILY
Status: DISCONTINUED | OUTPATIENT
Start: 2024-01-18 | End: 2024-01-18

## 2024-01-18 RX ORDER — LORAZEPAM 1 MG/1
2 TABLET ORAL 3 TIMES DAILY
Status: DISCONTINUED | OUTPATIENT
Start: 2024-01-18 | End: 2024-01-18

## 2024-01-18 RX ORDER — TRAZODONE HYDROCHLORIDE 50 MG/1
50 TABLET ORAL NIGHTLY
Status: DISCONTINUED | OUTPATIENT
Start: 2024-01-18 | End: 2024-01-18

## 2024-01-18 RX ADMIN — MIDAZOLAM 16 MG/HR: 5 INJECTION, SOLUTION INTRAMUSCULAR; INTRAVENOUS at 22:06

## 2024-01-18 RX ADMIN — THIAMINE HYDROCHLORIDE 100 MG: 100 INJECTION, SOLUTION INTRAMUSCULAR; INTRAVENOUS at 05:39

## 2024-01-18 RX ADMIN — TRAZODONE HYDROCHLORIDE 50 MG: 50 TABLET ORAL at 20:13

## 2024-01-18 RX ADMIN — TOPIRAMATE 100 MG: 100 TABLET, FILM COATED ORAL at 12:18

## 2024-01-18 RX ADMIN — HALOPERIDOL LACTATE 5 MG: 5 INJECTION, SOLUTION INTRAMUSCULAR at 12:18

## 2024-01-18 RX ADMIN — VALPROIC ACID 500 MG: 250 SOLUTION ORAL at 08:56

## 2024-01-18 RX ADMIN — MIDAZOLAM 20 MG/HR: 5 INJECTION, SOLUTION INTRAMUSCULAR; INTRAVENOUS at 11:11

## 2024-01-18 RX ADMIN — Medication 20 MG: at 08:55

## 2024-01-18 RX ADMIN — HALOPERIDOL LACTATE 5 MG: 5 INJECTION, SOLUTION INTRAMUSCULAR at 23:55

## 2024-01-18 RX ADMIN — SODIUM CHLORIDE, PRESERVATIVE FREE 10 ML: 5 INJECTION INTRAVENOUS at 20:12

## 2024-01-18 RX ADMIN — VALPROIC ACID 500 MG: 250 SOLUTION ORAL at 21:31

## 2024-01-18 RX ADMIN — MIDODRINE HYDROCHLORIDE 10 MG: 5 TABLET ORAL at 20:13

## 2024-01-18 RX ADMIN — MIDODRINE HYDROCHLORIDE 10 MG: 5 TABLET ORAL at 03:28

## 2024-01-18 RX ADMIN — MIDODRINE HYDROCHLORIDE 10 MG: 5 TABLET ORAL at 08:55

## 2024-01-18 RX ADMIN — LORAZEPAM 4 MG: 1 TABLET ORAL at 17:00

## 2024-01-18 RX ADMIN — MIDAZOLAM 19 MG/HR: 5 INJECTION, SOLUTION INTRAMUSCULAR; INTRAVENOUS at 06:43

## 2024-01-18 RX ADMIN — MIDAZOLAM 17 MG/HR: 5 INJECTION, SOLUTION INTRAMUSCULAR; INTRAVENOUS at 15:27

## 2024-01-18 RX ADMIN — SODIUM CHLORIDE, PRESERVATIVE FREE 10 ML: 5 INJECTION INTRAVENOUS at 08:55

## 2024-01-18 RX ADMIN — HALOPERIDOL LACTATE 5 MG: 5 INJECTION, SOLUTION INTRAMUSCULAR at 18:36

## 2024-01-18 RX ADMIN — LORAZEPAM 4 MG: 1 TABLET ORAL at 21:30

## 2024-01-18 RX ADMIN — TOPIRAMATE 100 MG: 100 TABLET, FILM COATED ORAL at 21:30

## 2024-01-18 RX ADMIN — ENOXAPARIN SODIUM 40 MG: 100 INJECTION SUBCUTANEOUS at 08:56

## 2024-01-18 RX ADMIN — POTASSIUM PHOSPHATE, MONOBASIC POTASSIUM PHOSPHATE, DIBASIC 20 MMOL: 224; 236 INJECTION, SOLUTION, CONCENTRATE INTRAVENOUS at 09:49

## 2024-01-18 RX ADMIN — MIDODRINE HYDROCHLORIDE 10 MG: 5 TABLET ORAL at 14:47

## 2024-01-18 RX ADMIN — Medication 20 MG: at 20:12

## 2024-01-18 RX ADMIN — MIDAZOLAM 20 MG/HR: 5 INJECTION, SOLUTION INTRAMUSCULAR; INTRAVENOUS at 01:03

## 2024-01-18 RX ADMIN — HALOPERIDOL LACTATE 5 MG: 5 INJECTION, SOLUTION INTRAMUSCULAR at 03:28

## 2024-01-18 RX ADMIN — LORAZEPAM 4 MG: 1 TABLET ORAL at 12:18

## 2024-01-18 RX ADMIN — HALOPERIDOL LACTATE 5 MG: 5 INJECTION, SOLUTION INTRAMUSCULAR at 14:46

## 2024-01-18 RX ADMIN — HALOPERIDOL LACTATE 5 MG: 5 INJECTION, SOLUTION INTRAMUSCULAR at 06:13

## 2024-01-18 RX ADMIN — TRAZODONE HYDROCHLORIDE 50 MG: 50 TABLET ORAL at 17:00

## 2024-01-18 ASSESSMENT — PULMONARY FUNCTION TESTS
PIF_VALUE: 21
PIF_VALUE: 20
PIF_VALUE: 20
PIF_VALUE: 18
PIF_VALUE: 20

## 2024-01-18 ASSESSMENT — PAIN SCALES - GENERAL
PAINLEVEL_OUTOF10: 0

## 2024-01-18 NOTE — PROGRESS NOTES
Palliative Medicine   [place] [phone] (COPE)        [facility] [phone] (COPE)    The Palliative Medicine SW and Dr. Santos (Dr. Barnard also present on call) participated in interdisciplinary ZOOM meeting with RISK, Ethics, ICU Attending, Psych NP, [place], Case Management, and the patient's OP ACT worker Grayson Zully.     As a medical team we review clinical situation in great detail with Dr. Beverly, as well as significant input from Jayson who knows the patient from OP setting. Dr. Beverly has also discussed w/ Dr. Larson. The patient has had schizophrenia (diagnosed at age 18) and ACT team has seen a decline recently- prior to two months ago, the patient was going to a Day Program and found jose from eating- especially pizza and cigarettes.     Patient was predominately nonverbal but could make simple wants and needs known, able to follow commands. Over past 2 months patient has been declining- he has stopped eating (lost 80 lbs), not going to the day program, spitting out medications, he was also defecating in the van provided by ACT team, and staying in the fetal/prayer position for long periods of time that he developed a pressure ulcer on his heel.     Patient has required 4 point restraints regularly throughout admission- and now requiring significant medications to remain call, intubated for basic self care- even on current medications/when sedation is lightened, he attempts to get into fetal/prayer position.     We discuss together plan moving forward for patient- he has complex needs due to his mental illness, and also questions regarding quality of life as well. We wanted to have meeting together to explore options for him- treatment for his severe and debilitating schizophrenia has not been controlled by ECT in the past, and medical management in both inpatient setting ([facility]) and Acute BHU setting at [facility] and now admitted at [facility] have been challenging even with court order-

## 2024-01-18 NOTE — PLAN OF CARE
Problem: Discharge Planning  Goal: Discharge to home or other facility with appropriate resources  Outcome: Progressing  Flowsheets (Taken 1/17/2024 2000)  Discharge to home or other facility with appropriate resources: Identify barriers to discharge with patient and caregiver     Problem: Safety - Medical Restraint  Goal: Remains free of injury from restraints (Restraint for Interference with Medical Device)  Description: INTERVENTIONS:  1. Determine that other, less restrictive measures have been tried or would not be effective before applying the restraint  2. Evaluate the patient's condition at the time of restraint application  3. Inform patient/family regarding the reason for restraint  4. Q2H: Monitor safety, psychosocial status, comfort, nutrition and hydration  Outcome: Progressing  Flowsheets (Taken 1/17/2024 2000)  Remains free of injury from restraints (restraint for interference with medical device):   Determine that other, less restrictive measures have been tried or would not be effective before applying the restraint   Inform patient/family regarding the reason for restraint   Every 2 hours: Monitor safety, psychosocial status, comfort, nutrition and hydration     Problem: Pain  Goal: Verbalizes/displays adequate comfort level or baseline comfort level  Outcome: Progressing  Flowsheets (Taken 1/17/2024 2000)  Verbalizes/displays adequate comfort level or baseline comfort level:   Assess pain using appropriate pain scale   Administer analgesics based on type and severity of pain and evaluate response     Problem: Safety - Adult  Goal: Free from fall injury  Outcome: Progressing  Flowsheets (Taken 1/18/2024 0136)  Free From Fall Injury: Instruct family/caregiver on patient safety     Problem: Skin/Tissue Integrity  Goal: Absence of new skin breakdown  Description: 1.  Monitor for areas of redness and/or skin breakdown  2.  Assess vascular access sites hourly  3.  Every 4-6 hours minimum:  Change  oxygen saturation probe site  4.  Every 4-6 hours:  If on nasal continuous positive airway pressure, respiratory therapy assess nares and determine need for appliance change or resting period.  Outcome: Progressing     Problem: Nutrition Deficit:  Goal: Optimize nutritional status  1/18/2024 0136 by Ashley Almaguer, RN  Outcome: Progressing  1/17/2024 1603 by Yara Kam, RD  Outcome: Progressing

## 2024-01-18 NOTE — PROGRESS NOTES
Palliative Medicine   [place] [phone] (COPE)        [facility] [phone] ([place])      Palliative Medicine spoke with Ethics, ICU MD- plan for Care Conference via ZOOM today w/ the patient's care team to further discuss complex case and plan moving forward.     DARRELL also received e-mail from the patient's legal guardian, Eden, - She is requesting a meeting with Isreal's care team. She wants to have better understanding of what is happening with Isreal- she shares that this went from \"begging for help a few months ago\" to \"being on life support.\" DARRELL e-mailed Eden back and shared that this writer would be speaking with his care team- and will notify her regarding timing of meeting once this writer speaks with Isreal's care team..     Eden is also asking about his prognosis and diagnosis.     Thank you for including Palliative Medicine in the care of Roge Torres LCSW

## 2024-01-18 NOTE — CARE COORDINATION
Transition of Care Plan:    RUR: 19% Moderate  Prior Level of Functioning: Needed total assistance with ADL's  Disposition: TBD  Caregiver Contact: Sister Eden Montalvo   Discharge Caregiver contacted prior to discharge? Yes  Care Conference needed? Yes  Barriers to discharge:    Medical Stability   Patient with a history of Paranoid Schizophrenia was transferred to [facility] from [facility] for higher level of care.   Patient has been  intubated on a versed gtt. To be able to preform basic care.   Per notes patient is admitted under a TDO committed for 60 days. CM spoke with Judge Carrie Rachel [phone] and per Judge Rachel no additional paper work is needed.   Contacts:   Sister Eden Montalvo [phone]  PCP Dr Dmitry Motta [phone]  Psych: [facility] [phone] Dr Olivas   with ACT team: Jayson Bustos [phone]  [facility] clinician :Geeta [phone]   Lorene Pabon [phone]  APS: Geno  [phone]    CM spoke with Geeta with the ACT Team to update her on patient's condition. Per Geeta the plan for discharge from [facility] had been Nursing home placement. When TONY inquired about Psychiatric institutions Geeta stated that to be admitted to [facility] patient must have a criminal background.     Per notes APS had sated patient would be unable to return to [facility]'s home as the sister was unabale to manage patent's health care needs as well as basic needs.     CM received call from Arpan with [facility] [phone] requesting Guardianship paperwork and a [facility]. Left message for call back.     Plan will be for team to meet with patient's sister tomorrow to discuss medical condition and treatment plans.   Yoselin Manley RN,Care Management    4:00 pm Received call back from Arpan with [facility] to discuss paperwork needed for Level 2 screening. It was decided CM will contact Arpan Monday morning with results from family meeting tomorrow to determine what

## 2024-01-18 NOTE — BSMART NOTE
BSMART Liaison Team Note     LOS:  6 days     Patient goal(s) for today: take medications as prescribed, make needs known in an appropriate manner  BSMART Liaison team focus/goals: assess needs, provide support and education, coordinate care    Progress note: Liaison saw pt on the medical floor at [facility]. Pt is intubated and sedated. He is unable to participate in conversation at this moment.      Barriers to Discharge: Medical and psychiatric clearance  Guns in the home: unable to assess      Outpatient provider(s):  [facility]  Insurance info/prescription coverage:   MEDICARE PART A AND B , [facility] PLAN OF [place]       Diagnosis: Chronic Paranoid Schizophrenia, Catatonia      Plan:  Pt cannot go to inpatient psychiatry at this moment due medical concerns. Per psych consult dated 1/15, patient to undergo ECT once medically stable. BSMART liaison to continue to follow. Please defer to medical team and psychiatry for details on discharge plan and disposition.  Follow up Psych Consult placed? No .   Psychiatrist updated? No        Participating treatment team members: JOSEPH Wilson MSW Student Intern

## 2024-01-18 NOTE — PROGRESS NOTES
breath    Clinical Pain Assessment (nonverbal scale for severity on nonverbal patients):   Clinical Pain Assessment  Severity: 0             Vital Signs: Blood pressure 105/68, pulse 66, temperature 99.4 °F (37.4 °C), temperature source Bladder, resp. rate 13, height 1.839 m (6' 0.4\"), weight 71 kg (156 lb 9.6 oz), SpO2 100 %.    PHYSICAL ASSESSMENT:   General: [] Oriented x3  [] Well appearing  [x] Intubated  [x]Ill appearing  [x]Other: torso flexed  Mental Status: [] Normal mental status exam  [] Drowsy  [] Confused  []Other:  Cardiovascular: [] Regular rate/rhythm  [] Arrhythmia  [] Other:  Chest: [] Effort normal  []Lungs clear  [] Respiratory distress  []Tachypnea  [] Other:  Abdomen: [] Soft/non-tender  [] Normal appearance  [] Distended  [] Ascites  [] Other:  Neurological: [] Normal speech  [] Normal sensation  []Deficits present:  Extremity: [] Normal skin color/temp  [] Clubbing/cyanosis  [] No edema  [] Other:    Wt Readings from Last 15 Encounters:   01/18/24 71 kg (156 lb 9.6 oz)        Current Diet: ADULT TUBE FEEDING; Orogastric; Peptide Based; Continuous; 50; Yes; 20; Q 4 hours; 70; 100; Q 4 hours; Fiber; 1 packet Banatrol TID (q 8 hr)       PSYCHOSOCIAL/SPIRITUAL SCREENING:   Palliative IDT has assessed this patient for cultural preferences / practices and a referral made as appropriate to needs (Cultural Services, Patient Advocacy, Ethics, etc.)    Spiritual Affiliation: None    Any spiritual / Advent concerns:  [] Yes /  [x] No   If \"Yes\" to discuss with pastoral care during IDT     Does caregiver feel burdened by caring for their loved one:   [] Yes /  [x] No /  [] No Caregiver Present/Available [] No Caregiver [] Pt Lives at Facility  If \"Yes\" to discuss with social work during IDT    Anticipatory grief assessment:   [x] Normal  / [] Maladaptive     If \"Maladaptive\" to discuss with social work during IDT    ESAS Anxiety:      ESAS Depression:          LAB AND IMAGING FINDINGS:   Objective  data reviewed:  labs, images, records, medication use, vitals, and chart     FINAL COMMENTS   Thank you for allowing Palliative Medicine to participate in the care of Roge Cowan.    Only check if applicable and billing time based rather than MDM  [x] The total encounter time on this service date was _85_ minutes which was spent performing a face-to-face encounter and personally completing the provider-level activities documented in the note. This includes time spent prior to the visit and after the visit in direct care of the patient. This time does not include time spent in any separately reportable services.    Electronically signed by   Desirae Santos MD  Palliative Care Team  on 1/18/2024 at 2:53 PM

## 2024-01-18 NOTE — PROGRESS NOTES
[facility] Progress Note        Roge Cowan  1970  [id number]  1/18/2024      Assessment and plan:   Chronic paranoid schizophrenia and catatonia: treatment resistant.    -not better with antipsychotics, ECT, long acting IM injections   -++ delusions and psychosocial problems.  Cannot care for self.  Not eating.  Nonverbal.    -we are not able to provide safe care without large doses of BZDs and antipsychotics.    -intubated 1/16  -cont haldol 5 q 4 with daily EKG to follow Qtc. This high dose is not without risk. Qtc 490 this am.    -Qtc is climbing as expected.  We can tolerate up to 600 for a short time if no ectopy on continous EKG.    -keep MAG> 2. We are carefully monitoring this.    -cont  BID   -we are actively monitoring for toxicities.    -cont versed gtt  -wean prop as tolerated- off this am.   -discussed regiment with Dr Larson.       Acute hypoxic respiratory failure:  -inbutated due to respiratory depression and severe, profound catatonia  -cxr shows ET tube needs advanced. We advanced 2 cm.  Otherwise lungs are clear  -cont versed gtt  -cont prop gtt--- wean prop  as tolerated.       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  Will DC.    -blood cx with staph epi, wound cx with grp b strep   -ct ankle reviewed and shows soft tissue edema but no bony involvement  -wound care team following    -CRP 5.7 -->  repeat today    -ESR 53 -->  repeat today     Severe malnutrition:  -as evidenced by Cr 0.64, Albumin 2.2 and AG 3.0  -very cachectic with low muscle mass  -not eating any PO    -TF started 1/16.  He is high risk for refeeding syndrome  -TF will advance to goal this am  -no BM yet.       Hypoglycemia: resolved  -on TF     Discussed with case management team.  discussed med regimen with PharmD and psych teams.  Very difficult case.  High risk patient.  Consulted and discussed with palliative care, who  or redness;  -Ears: External ears are normal. Normal TMs.  -Nose: Normal nares.  -Mouth and throat: MMM. Normal gums, mucosa, palate,. Good dentition.  NECK: Supple, with no masses. No JVD   CV: RRR, no m/r/g.  LUNGS: CTAB, no w/r/c.  ABD: Soft, NT/ND, no masses, NTTP  SKIN: Warm, well perfused. No skin rashes or abnormal lesions. Ankle wound looks better.  It is clean and dry.    EXT: No clubbing, cyanosis, or edema.  NEURO: Normal muscle strength and tone. No focal deficits.cranial nerves intact    LABS:     K 3.8  Cr 0.55  Mag 2.2    Kphos 2.2    Imaging reviewed   Cxr reviewed and shows ET tube in good position. Clear lungs      CCM time:   45 mins.  This patient is critically ill with risk of clinical deterioration.  The documented time was time spent providing direct patient care, formulating care plan and discussing this plan with other providers, updating family and discussing goals of care with patient and/or family. It does not include time spent performing any procedures that are billed separtately.    Princess Beverly MD    Pulmonary and Critical Care Medicine   [facility]  [phone]  1/18/2024

## 2024-01-18 NOTE — PROGRESS NOTES
Nutrition Note      Discussed during IDR. Tube feeding will be advanced to goal today. Phosphorus slightly BNL and was replaced. Patient started having frequent BM's yesterday; last BM was PTA-?when. Will add Banatrol today to bulk stool. RD to follow.    Electronically signed by Yara Kam RD [facility] on 1/18/24 at 1:23 PM EST  Contact: [employer]

## 2024-01-18 NOTE — PROGRESS NOTES
I participated in the IDT meeting where the plan of care for Roge Cowan was discussed on [facility] 7S2 INTENSIVE CARE. I reviewed the patient's medical record prior to this meeting.    We will continue to follow and assess for spiritual/emotional support during this hospitalization.    Please contact  paging service for any immediate needs.      _____________________________  Reyna Peter M.Div., M.S., B.C.C.  Staff

## 2024-01-18 NOTE — PROGRESS NOTES
Discussed with Jayson Starr ACT [facility].      Prior regimen  Ability monthly injection   Clozapine 225 mg ODT BID   Ativan mg 1 po BID    Topamax 100 mg po BID    Trazodone 50 mg at hs      ACT noted decline in mental health in the last few months of 2023    Started as spitting out pills but not all the time   Progressed to not caring for self- urinating and defecating on himself      Stopped going to his Day program in Nov 2023    APS is involved.  Going home is not an option according to the ACT team.  He will not be suitable for a group home.      ACT team indicates that Isreal would respond to motivation from cigarettes, not all the time.  Kathy Mo's, things of that nature.      Over time this disintegrated to Isreal getting in prayer position in their transport van, would defecate on the seats in the van and they, in November, stopped transporting him for labs and treats.      ACT team agrees we are out of options.

## 2024-01-18 NOTE — PROGRESS NOTES
1930 Bedside and Verbal shift change report given to Ann Marie RN (oncoming nurse) by GAIL Roach (offgoing nurse). Report included the following information Nurse Handoff Report, Intake/Output, MAR, Recent Results, Cardiac Rhythm NSR, and Neuro Assessment.      0034: RASS -5. Propofol stopped.    0730 Bedside and Verbal shift change report given to GAIL Roach (oncoming nurse) by Ann Marie RN (offgoing nurse). Report included the following information Nurse Handoff Report, Intake/Output, MAR, Recent Results, Cardiac Rhythm NSR, Alarm Parameters, and Neuro Assessment. '

## 2024-01-18 NOTE — CONSULTS
PSYCHIATRY CONSULT NOTE:    REASON FOR CONSULT:  Treatment-resistant schizophrenia     HISTORY OF PRESENTING COMPLAINT:  Roge Cowan is a 53 y.o. male who is currently admitted to the ICU at [facility].      He was brought to hospital in police custody for a mental health problem.  He reportedly has not been engaging in self care and has not been eating or drinking.  Patient unable to participate in the interview.  Maintains the fetal position and moves feet when spoken to.  Transferred to medical due to a progressive wound on ankle.  Eating some and has showered a few times with and without soap.  Maintains no clothing and crouching fetal position.  Has court ordered treatments including ECT but resists them all, removing IV's and not taking PO here for management of his condition.    Roge Cowan remains isolative and selective with his interactions.  Was able to get IV fluids in last evening, however he removed the IV this morning.  He resists care despite it being court ordered and attempts to get on the floor from the bed to crouch.  This position has led to a progressive breakdown of his skin on his ankles and knees.  He is up for recommitment today.  Affect is sullen and withdrawn.  Requires support for grooming and care.  Limited self motivation.  No aggression or violence.  Selectively interactive and aware.  Tolerating medications via court order well.    1/11 -  Roge Cowan  was sitting up in bed this morning.  Has been in restraints and given chemical restraints multiple times this morning.  Rreports selectively to staff and sometimes changes position or comfort.  The wound on his ankle is open but healing.  Wound care is following.  He continues to eat and is incontinent in bed.  Appears sullen and withdrawn.  Seen stretched out at times, however when approached he will retreat to his crouching praying position.  Undressed and a body rash is noted today.  Changes to his treatment via

## 2024-01-19 ENCOUNTER — APPOINTMENT (OUTPATIENT)
Facility: HOSPITAL | Age: 54
End: 2024-01-19
Attending: INTERNAL MEDICINE
Payer: MEDICARE

## 2024-01-19 LAB
ANION GAP SERPL CALC-SCNC: 6 MMOL/L (ref 5–15)
ARTERIAL PATENCY WRIST A: POSITIVE
BASE EXCESS BLD CALC-SCNC: 0.2 MMOL/L
BASOPHILS # BLD: 0.1 K/UL (ref 0–0.1)
BASOPHILS NFR BLD: 1 % (ref 0–1)
BDY SITE: ABNORMAL
BUN SERPL-MCNC: 8 MG/DL (ref 6–20)
BUN/CREAT SERPL: 16 (ref 12–20)
CALCIUM SERPL-MCNC: 8.4 MG/DL (ref 8.5–10.1)
CHLORIDE SERPL-SCNC: 113 MMOL/L (ref 97–108)
CO2 SERPL-SCNC: 24 MMOL/L (ref 21–32)
CREAT SERPL-MCNC: 0.5 MG/DL (ref 0.7–1.3)
DIFFERENTIAL METHOD BLD: ABNORMAL
EOSINOPHIL # BLD: 0.1 K/UL (ref 0–0.4)
EOSINOPHIL NFR BLD: 2 % (ref 0–7)
ERYTHROCYTE [DISTWIDTH] IN BLOOD BY AUTOMATED COUNT: 14.1 % (ref 11.5–14.5)
GAS FLOW.O2 O2 DELIVERY SYS: ABNORMAL
GAS FLOW.O2 SETTING OXYMISER: 14 BPM
GLUCOSE SERPL-MCNC: 97 MG/DL (ref 65–100)
HCO3 BLD-SCNC: 24 MMOL/L (ref 22–26)
HCT VFR BLD AUTO: 33 % (ref 36.6–50.3)
HGB BLD-MCNC: 10.9 G/DL (ref 12.1–17)
IMM GRANULOCYTES # BLD AUTO: 0.1 K/UL (ref 0–0.04)
IMM GRANULOCYTES NFR BLD AUTO: 1 % (ref 0–0.5)
LYMPHOCYTES # BLD: 1.5 K/UL (ref 0.8–3.5)
LYMPHOCYTES NFR BLD: 27 % (ref 12–49)
MAGNESIUM SERPL-MCNC: 2.1 MG/DL (ref 1.6–2.4)
MCH RBC QN AUTO: 29.9 PG (ref 26–34)
MCHC RBC AUTO-ENTMCNC: 33 G/DL (ref 30–36.5)
MCV RBC AUTO: 90.4 FL (ref 80–99)
MONOCYTES # BLD: 0.4 K/UL (ref 0–1)
MONOCYTES NFR BLD: 8 % (ref 5–13)
NEUTS SEG # BLD: 3.4 K/UL (ref 1.8–8)
NEUTS SEG NFR BLD: 61 % (ref 32–75)
NRBC # BLD: 0 K/UL (ref 0–0.01)
NRBC BLD-RTO: 0 PER 100 WBC
O2/TOTAL GAS SETTING VFR VENT: 30 %
PCO2 BLD: 35 MMHG (ref 35–45)
PEEP RESPIRATORY: 5 CMH2O
PH BLD: 7.44 (ref 7.35–7.45)
PHOSPHATE SERPL-MCNC: 3.2 MG/DL (ref 2.6–4.7)
PLATELET # BLD AUTO: 205 K/UL (ref 150–400)
PMV BLD AUTO: 11.5 FL (ref 8.9–12.9)
PO2 BLD: 130 MMHG (ref 80–100)
POTASSIUM SERPL-SCNC: 3.9 MMOL/L (ref 3.5–5.1)
RBC # BLD AUTO: 3.65 M/UL (ref 4.1–5.7)
SAO2 % BLD: 99.1 % (ref 92–97)
SODIUM SERPL-SCNC: 143 MMOL/L (ref 136–145)
SPECIMEN TYPE: ABNORMAL
VALPROATE SERPL-MCNC: 37 UG/ML (ref 50–100)
VENTILATION MODE VENT: ABNORMAL
VT SETTING VENT: 450 ML
WBC # BLD AUTO: 5.5 K/UL (ref 4.1–11.1)

## 2024-01-19 PROCEDURE — 83735 ASSAY OF MAGNESIUM: CPT

## 2024-01-19 PROCEDURE — 80164 ASSAY DIPROPYLACETIC ACD TOT: CPT

## 2024-01-19 PROCEDURE — 2580000003 HC RX 258: Performed by: INTERNAL MEDICINE

## 2024-01-19 PROCEDURE — 82803 BLOOD GASES ANY COMBINATION: CPT

## 2024-01-19 PROCEDURE — 99233 SBSQ HOSP IP/OBS HIGH 50: CPT | Performed by: PHYSICAL MEDICINE & REHABILITATION

## 2024-01-19 PROCEDURE — 6360000002 HC RX W HCPCS: Performed by: INTERNAL MEDICINE

## 2024-01-19 PROCEDURE — 6370000000 HC RX 637 (ALT 250 FOR IP): Performed by: INTERNAL MEDICINE

## 2024-01-19 PROCEDURE — 84100 ASSAY OF PHOSPHORUS: CPT

## 2024-01-19 PROCEDURE — 6360000002 HC RX W HCPCS: Performed by: NURSE PRACTITIONER

## 2024-01-19 PROCEDURE — 36600 WITHDRAWAL OF ARTERIAL BLOOD: CPT

## 2024-01-19 PROCEDURE — 6370000000 HC RX 637 (ALT 250 FOR IP): Performed by: NURSE PRACTITIONER

## 2024-01-19 PROCEDURE — 85025 COMPLETE CBC W/AUTO DIFF WBC: CPT

## 2024-01-19 PROCEDURE — 71045 X-RAY EXAM CHEST 1 VIEW: CPT

## 2024-01-19 PROCEDURE — 2000000000 HC ICU R&B

## 2024-01-19 PROCEDURE — 80048 BASIC METABOLIC PNL TOTAL CA: CPT

## 2024-01-19 PROCEDURE — 93005 ELECTROCARDIOGRAM TRACING: CPT | Performed by: INTERNAL MEDICINE

## 2024-01-19 PROCEDURE — 94003 VENT MGMT INPAT SUBQ DAY: CPT

## 2024-01-19 PROCEDURE — 36415 COLL VENOUS BLD VENIPUNCTURE: CPT

## 2024-01-19 RX ORDER — HALOPERIDOL 5 MG/ML
10 INJECTION INTRAMUSCULAR EVERY 4 HOURS
Status: DISCONTINUED | OUTPATIENT
Start: 2024-01-19 | End: 2024-01-22

## 2024-01-19 RX ORDER — VALPROIC ACID 250 MG/5ML
750 SOLUTION ORAL EVERY 8 HOURS SCHEDULED
Status: DISCONTINUED | OUTPATIENT
Start: 2024-01-19 | End: 2024-01-22

## 2024-01-19 RX ORDER — TRAZODONE HYDROCHLORIDE 50 MG/1
100 TABLET ORAL 3 TIMES DAILY
Status: DISCONTINUED | OUTPATIENT
Start: 2024-01-19 | End: 2024-01-26

## 2024-01-19 RX ADMIN — HALOPERIDOL LACTATE 5 MG: 5 INJECTION, SOLUTION INTRAMUSCULAR at 04:05

## 2024-01-19 RX ADMIN — ENOXAPARIN SODIUM 40 MG: 100 INJECTION SUBCUTANEOUS at 08:35

## 2024-01-19 RX ADMIN — SODIUM CHLORIDE, PRESERVATIVE FREE 10 ML: 5 INJECTION INTRAVENOUS at 08:35

## 2024-01-19 RX ADMIN — VALPROIC ACID 500 MG: 250 SOLUTION ORAL at 06:17

## 2024-01-19 RX ADMIN — MIDAZOLAM 14 MG/HR: 5 INJECTION, SOLUTION INTRAMUSCULAR; INTRAVENOUS at 11:16

## 2024-01-19 RX ADMIN — MIDAZOLAM 10 MG/HR: 5 INJECTION, SOLUTION INTRAMUSCULAR; INTRAVENOUS at 19:03

## 2024-01-19 RX ADMIN — SODIUM CHLORIDE, PRESERVATIVE FREE 10 ML: 5 INJECTION INTRAVENOUS at 20:30

## 2024-01-19 RX ADMIN — LORAZEPAM 4 MG: 1 TABLET ORAL at 10:00

## 2024-01-19 RX ADMIN — MIDODRINE HYDROCHLORIDE 10 MG: 5 TABLET ORAL at 08:34

## 2024-01-19 RX ADMIN — HALOPERIDOL LACTATE 10 MG: 5 INJECTION, SOLUTION INTRAMUSCULAR at 23:13

## 2024-01-19 RX ADMIN — LORAZEPAM 4 MG: 1 TABLET ORAL at 18:16

## 2024-01-19 RX ADMIN — MIDODRINE HYDROCHLORIDE 10 MG: 5 TABLET ORAL at 14:37

## 2024-01-19 RX ADMIN — MIDODRINE HYDROCHLORIDE 10 MG: 5 TABLET ORAL at 02:29

## 2024-01-19 RX ADMIN — LORAZEPAM 4 MG: 1 TABLET ORAL at 14:37

## 2024-01-19 RX ADMIN — Medication 100 MG: at 08:34

## 2024-01-19 RX ADMIN — MIDODRINE HYDROCHLORIDE 10 MG: 5 TABLET ORAL at 20:25

## 2024-01-19 RX ADMIN — LORAZEPAM 4 MG: 1 TABLET ORAL at 06:17

## 2024-01-19 RX ADMIN — POTASSIUM BICARBONATE 20 MEQ: 782 TABLET, EFFERVESCENT ORAL at 06:17

## 2024-01-19 RX ADMIN — TOPIRAMATE 100 MG: 100 TABLET, FILM COATED ORAL at 08:35

## 2024-01-19 RX ADMIN — TOPIRAMATE 100 MG: 100 TABLET, FILM COATED ORAL at 20:30

## 2024-01-19 RX ADMIN — TRAZODONE HYDROCHLORIDE 100 MG: 50 TABLET ORAL at 20:25

## 2024-01-19 RX ADMIN — HALOPERIDOL LACTATE 10 MG: 5 INJECTION, SOLUTION INTRAMUSCULAR at 18:16

## 2024-01-19 RX ADMIN — LORAZEPAM 4 MG: 1 TABLET ORAL at 21:57

## 2024-01-19 RX ADMIN — TRAZODONE HYDROCHLORIDE 50 MG: 50 TABLET ORAL at 08:35

## 2024-01-19 RX ADMIN — VALPROIC ACID 750 MG: 250 SOLUTION ORAL at 21:57

## 2024-01-19 RX ADMIN — Medication 20 MG: at 20:25

## 2024-01-19 RX ADMIN — HALOPERIDOL LACTATE 5 MG: 5 INJECTION, SOLUTION INTRAMUSCULAR at 11:18

## 2024-01-19 RX ADMIN — HALOPERIDOL LACTATE 5 MG: 5 INJECTION, SOLUTION INTRAMUSCULAR at 06:17

## 2024-01-19 RX ADMIN — LORAZEPAM 4 MG: 1 TABLET ORAL at 02:29

## 2024-01-19 RX ADMIN — TRAZODONE HYDROCHLORIDE 50 MG: 50 TABLET ORAL at 14:37

## 2024-01-19 RX ADMIN — VALPROIC ACID 750 MG: 250 SOLUTION ORAL at 14:37

## 2024-01-19 RX ADMIN — HALOPERIDOL LACTATE 5 MG: 5 INJECTION, SOLUTION INTRAMUSCULAR at 14:37

## 2024-01-19 RX ADMIN — MIDAZOLAM 15 MG/HR: 5 INJECTION, SOLUTION INTRAMUSCULAR; INTRAVENOUS at 04:53

## 2024-01-19 RX ADMIN — Medication 20 MG: at 08:35

## 2024-01-19 ASSESSMENT — PULMONARY FUNCTION TESTS
PIF_VALUE: 14
PIF_VALUE: 14
PIF_VALUE: 16
PIF_VALUE: 16
PIF_VALUE: 14
PIF_VALUE: 17

## 2024-01-19 ASSESSMENT — PAIN SCALES - GENERAL
PAINLEVEL_OUTOF10: 0

## 2024-01-19 NOTE — PROGRESS NOTES
Palliative Medicine  Patient Name: Roge Cowan  YOB: 1970  MRN: [id number]  Age: 53 y.o.  Gender: male    Date of Initial Consult: 1/15/24  Date of Service: 1/19/2024  Time: 3:37 PM  Provider: Desirae Santos MD  Hospital Day: 8  Admit Date: 1/12/2024  Referring Provider: DR ALLEN Beverly        Reasons for Consultation:  Goals of Care, Overwhelming Symptoms, and Psychosocial Distress    HISTORY OF PRESENT ILLNESS (HPI):   Roge Cowan \"Isreal\" (although his voices recently told him that he goes by \"Cruzito\"  is a 53 y.o. male with a past medical history of paranoid schizophrenia, catatonia  who was admitted on 1/12/2024 from [facility]. Pt admitted to [facility] 12/12/23 brought in by Police- he had not been engaging in self care, eating or drinking. He was in the fetal position and was getting pressure ulcers- stage IV on R ankle. Was getting court mandated ECT. He was transferred to [facility] on 1/12/24 for ICU care. He was refusing all care at [facility]. Requiring 4 pt restraints, sedation, IV abx.  He is tachypneic w/ desats, refusing sometimes even turning. Because could not care for patient, required sedation and intubation.     Psychosocial: Court declared pt legally incompetent on 10/2018 and sister Eden is guardian. They were both adopted at a young age. He has been living w/ her , and her son Rustam since leaving [facility] in 2012. Has been asked to leave many group homes. At baseline, he is low functioning- nonverbal mostly, but can walk. He spends most of day on bed or on the floor in fetal position. Does not interact much, does not listen to TV/radio. Cannot read or write.  Psych @ [facility] ACT team: Dr. Brendon Gamble ACT Team: [phone] after hours number.    with ACT team: Jayson Andrea [phone] or [phone] (Samara as well)  : Lorene Pabon [phone]   PALLIATIVE DIAGNOSES:    Chronic paranoid schizophrenia and catatonia   Cellulitis on IV abx   Severe  the visit and after the visit in direct care of the patient. This time does not include time spent in any separately reportable services.    Electronically signed by   Desirae Santos MD  Palliative Care Team  on 1/19/2024 at 3:37 PM

## 2024-01-19 NOTE — CONSULTS
Clinical Ethics Consultation Note    History and Context:  Mr. Cowan is a 53-year-old, male who presented initially to [facility] with catatonic schizophrenia. Pt was diagnosed with schizophrenia at age 18. He has developed a severe pressure wound on his ankle/foot requiring medical attention. The pt was transferred from [facility] to [facility] on 1/12/24. The pt's schizophrenia has been deemed treatment resistant, and he often refuses medical care and often requires restraints in order to administer medical care. The pt is currently sedated and intubated in order for the team to care for him. The pt had been in 4-point restraints at [facility] to provide medical care. His sister and , Eden, is involved in his care and decision-making. There is an open APS case against Eden.    Valid Advanced Medical Directive: No    Ethical Question(s) and Concern(s):  There are two ethical questions relevant to this case:    Given the need for high-dose medications for sedation, intubation, (and restraints when the pt is not sedated or intubated), how can we provide trauma-informed care that respects the pt's human dignity?   APS is investigating Eden, the pt's sister and , and per chart APS has said sending pt home to live with Eden again is not an option. What considerations, if any, do we need to have in this situation?    Analysis:  Ethics and palliative care called a team meeting on 1/18 to discuss treatment options and care plan for Mr. Cowan. In attendance were the palliative medicine SW, Dr. Santos, Dr. Barnard, risk, ethics, mission, ICU attending, Psych NP, case management, and the patient's OP ACT worker Grayson Andrea. This meeting was called before a meeting with Eden in order to get input from all perspectives on care options moving forward. In the opinion of the care team, the pt's prognosis is poor because without medical intervention, the pt may continue not to eat. He often is in a prayer/fetal  situation to support Mr. Cowan in his recovery  Continue to find placement for Mr. Cowan if possible    Closing:  Thank you for including ethics in the care of Mr. Cowan. For further discussion, questions, or concerns, please reach out to the ethicist on-call via [employer].     Leeann Taylor, PhDc  Ethics Consultant  LEEANN TAYLOR  N/A    Primary Ethical Theme: Primary Ethical Themes: End of Life related  Secondary Ethical Theme: Secondary Ethical Themes: Clarify legal surrogate decision maker

## 2024-01-19 NOTE — CONSULTS
PSYCHIATRY CONSULT NOTE:    REASON FOR CONSULT:  Treatment-resistant schizophrenia     HISTORY OF PRESENTING COMPLAINT:  Roge Cowan is a 53 y.o. male who is currently admitted to the ICU at [facility].      He was brought to hospital in police custody for a mental health problem.  He reportedly has not been engaging in self care and has not been eating or drinking.  Patient unable to participate in the interview.  Maintains the fetal position and moves feet when spoken to.  Transferred to medical due to a progressive wound on ankle.  Eating some and has showered a few times with and without soap.  Maintains no clothing and crouching fetal position.  Has court ordered treatments including ECT but resists them all, removing IV's and not taking PO here for management of his condition.    Roge Cowan remains isolative and selective with his interactions.  Was able to get IV fluids in last evening, however he removed the IV this morning.  He resists care despite it being court ordered and attempts to get on the floor from the bed to crouch.  This position has led to a progressive breakdown of his skin on his ankles and knees.  He is up for recommitment today.  Affect is sullen and withdrawn.  Requires support for grooming and care.  Limited self motivation.  No aggression or violence.  Selectively interactive and aware.  Tolerating medications via court order well.    1/11 -  Roge Cowan  was sitting up in bed this morning.  Has been in restraints and given chemical restraints multiple times this morning.  Rreports selectively to staff and sometimes changes position or comfort.  The wound on his ankle is open but healing.  Wound care is following.  He continues to eat and is incontinent in bed.  Appears sullen and withdrawn.  Seen stretched out at times, however when approached he will retreat to his crouching praying position.  Undressed and a body rash is noted today.  Changes to his treatment via  given refractory/treatment resistant schizophrenia.      1/19/24-  VPA level was subtherapeutic at 37 this morning at 0421, indicating we have plenty of room to safely optimize the Depakene as a tool to alleviate agitation. Mr. Cowan remains intubated and sedated, again appearing comfortable and without distress.   Interdisciplinary team meeting again today with ethics, palliative care, critical care, case management, psychiatry, as well as the pt's sister/guardian, Eden. We reviewed goals of care, likely outcomes given end-stage, treatment refractory schizophrenia, and all parties are in agreement that the focus will be on Mr. Cowan's comfort and preserving whatever bodily autonomy and quality of life that will be possible given the intractable illness.     PAST PSYCHIATRIC HISTORY:  In Patient Catatonia with ECT treatments in the past.    SUBSTANCE ABUSE HISTORY:  Social History     Substance and Sexual Activity   Drug Use No     Social History     Substance and Sexual Activity   Alcohol Use Never     Social History     Tobacco Use   Smoking Status Some Days   Smokeless Tobacco Never       PAST MEDICAL HISTORY:  Past Medical History:   Diagnosis Date    Psychiatric disorder     Psychotic disorder (HCC)     Withdrawal syndrome (HCC)        SOCIAL HISTORY:  See H & P (Was living with sister in poor condition)    VITALS:  Vitals:    01/19/24 1140   BP:    Pulse: 62   Resp: 14   Temp:    SpO2: 100%       MEDICATIONS:    Current Facility-Administered Medications:     valproic acid (DEPAKENE) 250 MG/5ML oral solution 750 mg, 750 mg, Per NG tube, 3 times per day, Princess Beverly MD    haloperidol lactate (HALDOL) injection 5 mg, 5 mg, IntraVENous, Q4H, Princess Beverly MD, 5 mg at 01/19/24 1118    LORazepam (ATIVAN) tablet 4 mg, 4 mg, Per NG tube, Q4H, Princess Beverly MD, 4 mg at 01/19/24 1000    thiamine tablet 100 mg, 100 mg, Per NG tube, Daily, Princess Beverly MD, 100 mg at 01/19/24 0834    topiramate (TOPAMAX) tablet

## 2024-01-19 NOTE — PROGRESS NOTES
[place] [phone] (COPE)  [facility] [phone] (COPE)      GOALS OF CARE: Plan to continue current care to work towards extubation- then focus on comfort and quality of life while also continuing psychiatric medications        TREATMENT PREFERENCES:   Code Status: DNR    Advance Care Planning:  [x] [facility] Interdisciplinary Team has updated the ACP Navigator with Health Care Agent and Patient Capacity    Primary Decision Maker (Health Care Agent):   Relationship to patient:  [] Named in a scanned document   [] Legal Next of Kin  [x] Guardian      Family Meeting Documentation    Participants: Dr. Beverly, Dr. Santos, Aidan Malone, Arianna w/ pastoral care, and Grayson Andrea (patient's OP worker), Ursula Steward, ANDRE, and patient's sister/guardian Eden     Discussion: Palliative Medicine SW and Dr. Santos, along with interdisciplinary team- Dr. Beverly, Ursula Steward, ANDRE, and Aidan Malone met with the patient's sister Eden. Eden has appropriate questions about how patient was walking and communicating some (basic language) in December 2023 to where he is now on the ventilator. She also tells us that the patient does not want to die- we rephrase this as, \"how would he want to live?\" The patient has has debilitating and complex schizophrenia for many years- Eden reflects that she thinks he started having symptoms as early as age 11- she shares he started using substances, particularly ETOH, in order to cope. She also reflects how challenging this situation is because she had to make difficult end of life care decisions for both of their parents.     Dr. Beverly and Ursula Steward discuss that the patient has refractory and end stage schizophrenia- as we talked about during care conference yesterday. We discuss with Eden that we have been talking as a team in how to approach her brother's complex care. We discuss together and are open and transparent with Eden when she asks if he can get back to  to adjust medications together and optimize with hopes to remove patient from ventilator in upcoming days.     We plan to continue medications for schizophrenia, but not other aggressive interventions- focus on comfort in treating symptoms. If patient has complications from his malnutrition and debility we would talk further about complete comfort focused care.     Palliative Medicine discussed Code Status and Eden agrees that DNR and no re-intubation when patient is extubated is the best for him- we support this decision.     Appreciative of interdisciplinary team on this patient's care.     Outcome / Plan: Continue current care- Eden agrees to DNR and no re-intubation once extubated, continue to optimize psychiatric medications but gentle care s/p extubation focusing on comfort and dignity.     Follow Up: Palliative Medicine will be following along closely in the patient's care      Thank you for including Palliative Medicine in the care of Roge Torres LCSW

## 2024-01-19 NOTE — PROGRESS NOTES
4 Eyes Skin Assessment     NAME:  Roge Cowan  YOB: 1970  MEDICAL RECORD NUMBER:  [id number]    The patient is being assessed for  Shift Handoff    I agree that at least one RN has performed a thorough Head to Toe Skin Assessment on the patient. ALL assessment sites listed below have been assessed.      Areas assessed by both nurses:    Head, Face, Ears, Shoulders, Back, Chest, Arms, Elbows, Hands, Sacrum. Buttock, Coccyx, Ischium, Legs. Feet and Heels, and Under Medical Devices         Does the Patient have a Wound? Yes wound(s) were present on assessment. LDA wound assessment was Initiated and completed by RN       Rodrick Prevention initiated by RN: Yes  Wound Care Orders initiated by RN: Yes    Pressure Injury (Stage 3,4, Unstageable, DTI, NWPT, and Complex wounds) if present, place Wound referral order by RN under : Yes    New Ostomies, if present place, Ostomy referral order under : No     Nurse 1 eSignature: Electronically signed by Mer Betancourt RN on 1/19/24 at 9:17 AM EST    **SHARE this note so that the co-signing nurse can place an eSignature**    Nurse 2 eSignature: Electronically signed by Mari Masterson RN on 1/19/24 at 5:31 PM EST

## 2024-01-19 NOTE — PROGRESS NOTES
1930: Bedside and Verbal shift change report given to Lillie RN (oncoming nurse) by Marley RN (offgoing nurse). Report included the following information Nurse Handoff Report, Adult Overview, Intake/Output, MAR, Recent Results, Cardiac Rhythm NSR, Alarm Parameters, Neuro Assessment, and Event Log.     0730: Bedside and Verbal shift change report given to Mer RN (oncoming nurse) by lillie RN (offgoing nurse). Report included the following information Nurse Handoff Report, Adult Overview, Intake/Output, MAR, Recent Results, Cardiac Rhythm NSR, Alarm Parameters, Neuro Assessment, and Event Log.

## 2024-01-19 NOTE — PROGRESS NOTES
I, the  attended the Care Conference. I provided support as family processed information obtained during the conference.     For additional spiritual care, please contact the  on-call at [phone]).    _____________________  Shirin Kaur M.Div.   Chaplain Resident

## 2024-01-19 NOTE — PLAN OF CARE
Problem: Discharge Planning  Goal: Discharge to home or other facility with appropriate resources  Outcome: Progressing  Flowsheets (Taken 1/18/2024 2000)  Discharge to home or other facility with appropriate resources: Identify barriers to discharge with patient and caregiver     Problem: Safety - Medical Restraint  Goal: Remains free of injury from restraints (Restraint for Interference with Medical Device)  Description: INTERVENTIONS:  1. Determine that other, less restrictive measures have been tried or would not be effective before applying the restraint  2. Evaluate the patient's condition at the time of restraint application  3. Inform patient/family regarding the reason for restraint  4. Q2H: Monitor safety, psychosocial status, comfort, nutrition and hydration  Outcome: Progressing  Flowsheets (Taken 1/18/2024 2000)  Remains free of injury from restraints (restraint for interference with medical device):   Determine that other, less restrictive measures have been tried or would not be effective before applying the restraint   Evaluate the patient's condition at the time of restraint application   Inform patient/family regarding the reason for restraint   Every 2 hours: Monitor safety, psychosocial status, comfort, nutrition and hydration     Problem: Pain  Goal: Verbalizes/displays adequate comfort level or baseline comfort level  Outcome: Progressing  Flowsheets (Taken 1/18/2024 2000)  Verbalizes/displays adequate comfort level or baseline comfort level:   Encourage patient to monitor pain and request assistance   Assess pain using appropriate pain scale     Problem: Safety - Adult  Goal: Free from fall injury  Outcome: Progressing     Problem: Skin/Tissue Integrity  Goal: Absence of new skin breakdown  Description: 1.  Monitor for areas of redness and/or skin breakdown  2.  Assess vascular access sites hourly  3.  Every 4-6 hours minimum:  Change oxygen saturation probe site  4.  Every 4-6 hours:  If on  nasal continuous positive airway pressure, respiratory therapy assess nares and determine need for appliance change or resting period.  Outcome: Progressing     Problem: Nutrition Deficit:  Goal: Optimize nutritional status  1/18/2024 2111 by Lillie Arteaga RN  Outcome: Progressing  1/18/2024 1327 by Yara Kam, ORI  Outcome: Progressing

## 2024-01-19 NOTE — BSMART NOTE
BSMART Liaison Team Note     LOS:  7     Patient goal(s) for today: take medications as prescribed, make needs known in an appropriate manner.  BSMART Liaison team focus/goals: assess MH needs, provide therapeutic support, brief therapy, and education, as needed.  Assist medical care management team with recommendations for coordination of care.    Progress note:  TDO (30-days) with court-ordered meds, 12/8/23.  Recommitment (60-days) 1/10/24.  Pt remains intubated and sedated.  Pt appears comfortable and in no acute distress. Ongoing interdisciplinary team meetings with ICU team, CSB ACT LCSW, Palliative, Psychiatry, Ethics, Risk, Administration, and pt's sister/guardian, Eden Montalvo, to discuss goals of care.  Per chart, pt's schizophrenia has been deemed treatment resistant.      Interdisciplinary meeting update:  \"Reviewed goals of care, likely outcomes given end-stage, treatment refractory schizophrenia, and all parties are in agreement that the focus will be on Mr. Cowan's comfort and preserving whatever bodily autonomy and quality of life that will be possible given the intractable illness.\"      Plan of Care according to Palliative Care note:  ICU and Psychiatry to adjust medications together. When able, will remove from vent.   Would continue medications for schizophrenia, but not aggressive interventions. Would treat sx.  If pt has complications from his malnutrition/debility such as infection, etc- would talk about complete comfort measures.   Code status: Sister agrees that DNR/I status the best for him. Thus, once can be safely extubated, would not place on vent.     Psychosocial hx according to pt's chart: The courts declared pt legally incompetent on 10/2018.  Sister, Eden Montalvo, is his guardian.  There is an open APS case against Eden.   Pt has been living with Eden and her son, Rustam, since discharging from [facility] in [facility].  Pt was dx with schizophrenia at age 18.  Both pt and  Patient has appt this morning   Eden were adopted at a young age.  Pt cannot read or write.  He has lived in many group homes, unsuccessfully.  Pt is low-functioning and mostly non-verbal, at baseline.      Barriers to Discharge: Medical and psychiatric clearance  Guns in the home: unable to assess      Outpatient provider(s):  Mavis KYLE  Psych: Dr. Olivas: [phone]   CM: Jayson Bustos [phone]  Clinician: Geeta [phone]    Insurance info/prescription coverage:   MEDICARE PART A AND B , [facility]       Diagnosis: Refractory schizophrenia with catatonia      Plan: Please defer to medical team and psychiatry for further details on medication changes, discharge plan, and disposition.  Per chart, APS reports that sending pt home to live with pt's sister/guardian, Eden Montalvo, is not an option.      ICU and Psychiatry to adjust medications together. When able, will remove from vent.   Would continue medications for schizophrenia, but not aggressive interventions. Would treat sx.  If pt has complications from his malnutrition/debility such as infection, etc- would talk about complete comfort measures.   Code status: Sister agrees that DNR/I status the best for him. Thus, once can be safely extubated, would not place on vent.     Follow up Psych Consult placed? No.     Psychiatrist updated? yes     Participating treatment team members: Roge Cowan, GIOVANI Daniels LCSW (Beth)  BSMART Liaison  Available on [employer]

## 2024-01-19 NOTE — PROGRESS NOTES
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  treatment failure.    -not better with antipsychotics, ECT, long acting IM injections   -++ delusions and psychosocial problems.  Cannot care for self.  Not eating.  Nonverbal.    -we are not able to provide safe care without large doses of BZDs and antipsychotics.    -intubated 1/16  -cont haldol 5 q 4 with daily EKG to follow Qtc. This high dose is not without risk. Qtc 470  -Qtc is climbing as expected.  We can tolerate up to 600 for a short time if no ectopy on continous EKG.    -keep MAG> 2. We are carefully monitoring this.    -increased VPA to 750 TID .  Level < 40  -we are actively monitoring for toxicities.    -cont versed gtt, goal is to get to 6-10 for comfort extubation   -wean prop as tolerated- off this am.   -discussed regimen with NP Bin.       Acute hypoxic respiratory failure:  -inbutated due to respiratory depression and severe, profound catatonia  -cxr shows ET tube needs advanced. We advanced 2 cm.  Otherwise lungs are clear  -cont versed gtt  -start dilaudid gtt ahead of sedation      Cellulitis:  involving stage 4 wound on right ankle  better.    -discussed with ID, today is day 7 of ceftriaxone.  Will DC.    -blood cx with staph epi, wound cx with grp b strep   -ct ankle reviewed and shows soft tissue edema but no bony involvement  -wound care team following    -CRP 5.7 --> down to 4.3  -ESR 53 -->  repeat today     Severe malnutrition:  -as evidenced by Cr 0.64, Albumin 2.2 and AG 3.0  -very cachectic with low muscle mass  -not eating any PO    -TF started 1/16.  He is high risk for refeeding syndrome  -TF will advance to goal this am  -no BM yet.       Hypoglycemia: resolved  -on TF    HPI:  Remains critically ill.   Remains on high dose versed gtt    ++ diarrhea x 2 days.  Added banana flakes   this am      ICU DAILY CHECKLIST     Code Status:full   DVT Prophylaxis: lovenox   T/L/D: PIVs, + rosales, + ETT  SUP: h2b  Diet: TF at goal   Activity Level:as tolerated  ABCDEF Bundle/Checklist Completed:Yes  Disposition: cont ICU care  Multidisciplinary Rounds Completed: yes  Goals of Care Discussion/Palliative: yes  Patient/Family Updated: yes      OBJECTIVE  Vitals:    01/19/24 0730 01/19/24 0800 01/19/24 0900 01/19/24 1000   BP:  100/65 102/67 99/65   Pulse: 65 62 64 65   Resp: 15 14 14 14   Temp:  99.2 °F (37.3 °C)     TempSrc:  Bladder     SpO2: 100% 100% 100% 100%   Weight:       Height:         EXAM:   GEN: sedated but comfortable  not responsive    HEENT  -Head: NC/AT;  -Eyes: PERRL, EOMI. No discharge or redness;  -Ears: External ears are normal. Normal TMs.  -Nose: Normal nares.  -Mouth and throat: MMM. Normal gums, mucosa, palate,. Good dentition.  NECK: Supple, with no masses. No JVD   CV: RRR, no m/r/g.  LUNGS: CTAB, no w/r/c.  ABD: Soft, NT/ND, no masses, NTTP  SKIN: Warm, well perfused. No skin rashes or abnormal lesions.  EXT: No clubbing, cyanosis, or edema.  NEURO: intubated and sedated      LABS:   Na 143  K 3.9  Cl 113  Cr 0.5    Ca 8.4    Imaging reviewed   Cxr reviewed and shows ET tube in good position.      CCM time:   45 mins.  This patient is critically ill with risk of clinical deterioration.  The documented time was time spent providing direct patient care, formulating care plan and discussing this plan with other providers, updating family and discussing goals of care with patient and/or family. It does not include time spent performing any procedures that are billed separtately.    Princess Beverly MD    Pulmonary and Critical Care Medicine   [facility]  [phone]  1/19/2024

## 2024-01-20 ENCOUNTER — APPOINTMENT (OUTPATIENT)
Facility: HOSPITAL | Age: 54
End: 2024-01-20
Attending: INTERNAL MEDICINE
Payer: MEDICARE

## 2024-01-20 LAB
ANION GAP SERPL CALC-SCNC: 4 MMOL/L (ref 5–15)
BASOPHILS # BLD: 0.1 K/UL (ref 0–0.1)
BASOPHILS NFR BLD: 1 % (ref 0–1)
BUN SERPL-MCNC: 10 MG/DL (ref 6–20)
BUN/CREAT SERPL: 19 (ref 12–20)
CALCIUM SERPL-MCNC: 8.6 MG/DL (ref 8.5–10.1)
CHLORIDE SERPL-SCNC: 110 MMOL/L (ref 97–108)
CO2 SERPL-SCNC: 25 MMOL/L (ref 21–32)
CREAT SERPL-MCNC: 0.53 MG/DL (ref 0.7–1.3)
DIFFERENTIAL METHOD BLD: ABNORMAL
EKG ATRIAL RATE: 63 BPM
EKG ATRIAL RATE: 64 BPM
EKG DIAGNOSIS: NORMAL
EKG DIAGNOSIS: NORMAL
EKG P AXIS: 44 DEGREES
EKG P AXIS: 52 DEGREES
EKG P-R INTERVAL: 162 MS
EKG P-R INTERVAL: 166 MS
EKG Q-T INTERVAL: 448 MS
EKG Q-T INTERVAL: 460 MS
EKG QRS DURATION: 74 MS
EKG QRS DURATION: 78 MS
EKG QTC CALCULATION (BAZETT): 462 MS
EKG QTC CALCULATION (BAZETT): 470 MS
EKG R AXIS: 51 DEGREES
EKG R AXIS: 52 DEGREES
EKG T AXIS: -3 DEGREES
EKG T AXIS: 59 DEGREES
EKG VENTRICULAR RATE: 63 BPM
EKG VENTRICULAR RATE: 64 BPM
EOSINOPHIL # BLD: 0.2 K/UL (ref 0–0.4)
EOSINOPHIL NFR BLD: 3 % (ref 0–7)
ERYTHROCYTE [DISTWIDTH] IN BLOOD BY AUTOMATED COUNT: 13.9 % (ref 11.5–14.5)
GLUCOSE SERPL-MCNC: 99 MG/DL (ref 65–100)
HCT VFR BLD AUTO: 37.7 % (ref 36.6–50.3)
HGB BLD-MCNC: 11.9 G/DL (ref 12.1–17)
IMM GRANULOCYTES # BLD AUTO: 0.1 K/UL (ref 0–0.04)
IMM GRANULOCYTES NFR BLD AUTO: 1 % (ref 0–0.5)
LYMPHOCYTES # BLD: 1.3 K/UL (ref 0.8–3.5)
LYMPHOCYTES NFR BLD: 27 % (ref 12–49)
MAGNESIUM SERPL-MCNC: 2.1 MG/DL (ref 1.6–2.4)
MCH RBC QN AUTO: 29.5 PG (ref 26–34)
MCHC RBC AUTO-ENTMCNC: 31.6 G/DL (ref 30–36.5)
MCV RBC AUTO: 93.3 FL (ref 80–99)
MONOCYTES # BLD: 0.4 K/UL (ref 0–1)
MONOCYTES NFR BLD: 8 % (ref 5–13)
NEUTS SEG # BLD: 3 K/UL (ref 1.8–8)
NEUTS SEG NFR BLD: 60 % (ref 32–75)
NRBC # BLD: 0 K/UL (ref 0–0.01)
NRBC BLD-RTO: 0 PER 100 WBC
PHOSPHATE SERPL-MCNC: 3.1 MG/DL (ref 2.6–4.7)
PLATELET # BLD AUTO: 200 K/UL (ref 150–400)
PMV BLD AUTO: 11.9 FL (ref 8.9–12.9)
POTASSIUM SERPL-SCNC: 4.2 MMOL/L (ref 3.5–5.1)
RBC # BLD AUTO: 4.04 M/UL (ref 4.1–5.7)
SODIUM SERPL-SCNC: 139 MMOL/L (ref 136–145)
WBC # BLD AUTO: 5 K/UL (ref 4.1–11.1)

## 2024-01-20 PROCEDURE — 6360000002 HC RX W HCPCS: Performed by: INTERNAL MEDICINE

## 2024-01-20 PROCEDURE — 2580000003 HC RX 258: Performed by: INTERNAL MEDICINE

## 2024-01-20 PROCEDURE — 6360000002 HC RX W HCPCS: Performed by: NURSE PRACTITIONER

## 2024-01-20 PROCEDURE — 36415 COLL VENOUS BLD VENIPUNCTURE: CPT

## 2024-01-20 PROCEDURE — 94003 VENT MGMT INPAT SUBQ DAY: CPT

## 2024-01-20 PROCEDURE — 84100 ASSAY OF PHOSPHORUS: CPT

## 2024-01-20 PROCEDURE — 6370000000 HC RX 637 (ALT 250 FOR IP): Performed by: NURSE PRACTITIONER

## 2024-01-20 PROCEDURE — 83735 ASSAY OF MAGNESIUM: CPT

## 2024-01-20 PROCEDURE — 6370000000 HC RX 637 (ALT 250 FOR IP): Performed by: INTERNAL MEDICINE

## 2024-01-20 PROCEDURE — 74018 RADEX ABDOMEN 1 VIEW: CPT

## 2024-01-20 PROCEDURE — 2000000000 HC ICU R&B

## 2024-01-20 PROCEDURE — 85025 COMPLETE CBC W/AUTO DIFF WBC: CPT

## 2024-01-20 PROCEDURE — 80048 BASIC METABOLIC PNL TOTAL CA: CPT

## 2024-01-20 PROCEDURE — 93005 ELECTROCARDIOGRAM TRACING: CPT | Performed by: INTERNAL MEDICINE

## 2024-01-20 RX ORDER — MIDODRINE HYDROCHLORIDE 5 MG/1
5 TABLET ORAL EVERY 6 HOURS
Status: DISCONTINUED | OUTPATIENT
Start: 2024-01-20 | End: 2024-02-13

## 2024-01-20 RX ADMIN — TRAZODONE HYDROCHLORIDE 100 MG: 50 TABLET ORAL at 08:36

## 2024-01-20 RX ADMIN — HALOPERIDOL LACTATE 10 MG: 5 INJECTION, SOLUTION INTRAMUSCULAR at 13:22

## 2024-01-20 RX ADMIN — Medication 20 MG: at 08:37

## 2024-01-20 RX ADMIN — LORAZEPAM 5 MG: 1 TABLET ORAL at 21:18

## 2024-01-20 RX ADMIN — LORAZEPAM 4 MG: 1 TABLET ORAL at 08:36

## 2024-01-20 RX ADMIN — HALOPERIDOL LACTATE 10 MG: 5 INJECTION, SOLUTION INTRAMUSCULAR at 22:55

## 2024-01-20 RX ADMIN — HALOPERIDOL LACTATE 10 MG: 5 INJECTION, SOLUTION INTRAMUSCULAR at 07:02

## 2024-01-20 RX ADMIN — Medication 20 MG: at 20:26

## 2024-01-20 RX ADMIN — TRAZODONE HYDROCHLORIDE 100 MG: 50 TABLET ORAL at 13:22

## 2024-01-20 RX ADMIN — TOPIRAMATE 100 MG: 100 TABLET, FILM COATED ORAL at 08:36

## 2024-01-20 RX ADMIN — Medication 100 MG: at 08:37

## 2024-01-20 RX ADMIN — MIDAZOLAM 12 MG/HR: 5 INJECTION, SOLUTION INTRAMUSCULAR; INTRAVENOUS at 21:15

## 2024-01-20 RX ADMIN — VALPROIC ACID 750 MG: 250 SOLUTION ORAL at 07:02

## 2024-01-20 RX ADMIN — MIDODRINE HYDROCHLORIDE 10 MG: 5 TABLET ORAL at 13:22

## 2024-01-20 RX ADMIN — HALOPERIDOL LACTATE 10 MG: 5 INJECTION, SOLUTION INTRAMUSCULAR at 11:23

## 2024-01-20 RX ADMIN — LORAZEPAM 5 MG: 1 TABLET ORAL at 18:08

## 2024-01-20 RX ADMIN — TOPIRAMATE 100 MG: 100 TABLET, FILM COATED ORAL at 20:30

## 2024-01-20 RX ADMIN — LORAZEPAM 4 MG: 1 TABLET ORAL at 07:01

## 2024-01-20 RX ADMIN — VALPROIC ACID 750 MG: 250 SOLUTION ORAL at 13:32

## 2024-01-20 RX ADMIN — HALOPERIDOL LACTATE 10 MG: 5 INJECTION, SOLUTION INTRAMUSCULAR at 02:58

## 2024-01-20 RX ADMIN — VALPROIC ACID 750 MG: 250 SOLUTION ORAL at 21:18

## 2024-01-20 RX ADMIN — HALOPERIDOL LACTATE 10 MG: 5 INJECTION, SOLUTION INTRAMUSCULAR at 18:08

## 2024-01-20 RX ADMIN — SODIUM CHLORIDE, PRESERVATIVE FREE 10 ML: 5 INJECTION INTRAVENOUS at 20:25

## 2024-01-20 RX ADMIN — TRAZODONE HYDROCHLORIDE 100 MG: 50 TABLET ORAL at 20:25

## 2024-01-20 RX ADMIN — SODIUM CHLORIDE, PRESERVATIVE FREE 10 ML: 5 INJECTION INTRAVENOUS at 08:35

## 2024-01-20 RX ADMIN — LORAZEPAM 4 MG: 1 TABLET ORAL at 13:22

## 2024-01-20 RX ADMIN — MIDODRINE HYDROCHLORIDE 10 MG: 5 TABLET ORAL at 02:23

## 2024-01-20 RX ADMIN — LORAZEPAM 4 MG: 1 TABLET ORAL at 02:23

## 2024-01-20 RX ADMIN — ENOXAPARIN SODIUM 40 MG: 100 INJECTION SUBCUTANEOUS at 08:36

## 2024-01-20 RX ADMIN — MIDODRINE HYDROCHLORIDE 10 MG: 5 TABLET ORAL at 08:37

## 2024-01-20 RX ADMIN — MIDAZOLAM 10 MG/HR: 5 INJECTION, SOLUTION INTRAMUSCULAR; INTRAVENOUS at 05:01

## 2024-01-20 RX ADMIN — MIDAZOLAM 12 MG/HR: 5 INJECTION, SOLUTION INTRAMUSCULAR; INTRAVENOUS at 13:26

## 2024-01-20 RX ADMIN — MIDODRINE HYDROCHLORIDE 5 MG: 5 TABLET ORAL at 20:25

## 2024-01-20 ASSESSMENT — PAIN SCALES - GENERAL
PAINLEVEL_OUTOF10: 0

## 2024-01-20 ASSESSMENT — PULMONARY FUNCTION TESTS
PIF_VALUE: 15
PIF_VALUE: 20
PIF_VALUE: 19
PIF_VALUE: 17
PIF_VALUE: 10
PIF_VALUE: 17

## 2024-01-20 NOTE — CONSULTS
PSYCHIATRY CONSULT NOTE:    REASON FOR CONSULT:  Treatment-resistant schizophrenia     HISTORY OF PRESENTING COMPLAINT:  Roge Cowan is a 53 y.o. male who is currently admitted to the ICU at [facility].      He was brought to hospital in police custody for a mental health problem.  He reportedly has not been engaging in self care and has not been eating or drinking.  Patient unable to participate in the interview.  Maintains the fetal position and moves feet when spoken to.  Transferred to medical due to a progressive wound on ankle.  Eating some and has showered a few times with and without soap.  Maintains no clothing and crouching fetal position.  Has court ordered treatments including ECT but resists them all, removing IV's and not taking PO here for management of his condition.    Roge Cowan remains isolative and selective with his interactions.  Was able to get IV fluids in last evening, however he removed the IV this morning.  He resists care despite it being court ordered and attempts to get on the floor from the bed to crouch.  This position has led to a progressive breakdown of his skin on his ankles and knees.  He is up for recommitment today.  Affect is sullen and withdrawn.  Requires support for grooming and care.  Limited self motivation.  No aggression or violence.  Selectively interactive and aware.  Tolerating medications via court order well.    1/11 -  Roge Cowan  was sitting up in bed this morning.  Has been in restraints and given chemical restraints multiple times this morning.  Rreports selectively to staff and sometimes changes position or comfort.  The wound on his ankle is open but healing.  Wound care is following.  He continues to eat and is incontinent in bed.  Appears sullen and withdrawn.  Seen stretched out at times, however when approached he will retreat to his crouching praying position.  Undressed and a body rash is noted today.  Changes to his treatment via

## 2024-01-20 NOTE — PROGRESS NOTES
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  treatment failure.    -not better with antipsychotics, ECT, long acting IM injections   -++ delusions and psychosocial problems.  Cannot care for self.  Not eating.  Nonverbal.    -we are not able to provide safe care without large doses of BZDs and antipsychotics.    -intubated 1/16.  Working towards compassionate extubation.    -haldol increased to 10 mg q 4 hours.    -Qtc 470  Can drive up to 600 and be ok.     -keep MAG> 2. We are carefully monitoring this.    -increased VPA to 750 TID .  Level < 40 1/19  -we are actively monitoring for toxicities.    -cont versed gtt, goal is to get to 6-10 for comfort extubation   -cont off prop   -discussed regimen with NP Bin.    -may be nearing extubation Mon or Tuesday      Acute hypoxic respiratory failure:  -inbutated due to respiratory depression and severe, profound catatonia  -cxr shows clear lungs.  I reviewed   -cont versed gtt  -start dilaudid gtt ahead of sedation      Pressure ulcer, present on admission, reason for admission  -stage 4 wound on right ankle  better.    -discussed with ID, today is day 7 of ceftriaxone.  Will DC.    -blood cx with staph epi, wound cx with grp b strep   -ct ankle reviewed and shows soft tissue edema but no bony involvement  -wound care team following    -CRP 5.7 --> down to 4.3  -ESR 53 -->  32     Severe malnutrition:  -as evidenced by Cr 0.64, Albumin 2.2 and AG 3.0  -very cachectic with low muscle mass  -not eating any PO    -TF started 1/16.     Discussed with psych and palliative care teams      HPI:  Remains critically ill on high doses on sedation, high dose antipsychotics.    ++ diarrhea.     BP soft.        ICU DAILY CHECKLIST     Code Status:DNR  DVT Prophylaxis:lovenox  T/L/D: PIVs  SUP: H2b  Diet: TF  Activity Level:   ABCDEF

## 2024-01-21 LAB
ANION GAP SERPL CALC-SCNC: 6 MMOL/L (ref 5–15)
BASOPHILS # BLD: 0 K/UL (ref 0–0.1)
BASOPHILS NFR BLD: 1 % (ref 0–1)
BUN SERPL-MCNC: 10 MG/DL (ref 6–20)
BUN/CREAT SERPL: 17 (ref 12–20)
CALCIUM SERPL-MCNC: 8.3 MG/DL (ref 8.5–10.1)
CHLORIDE SERPL-SCNC: 108 MMOL/L (ref 97–108)
CO2 SERPL-SCNC: 25 MMOL/L (ref 21–32)
CREAT SERPL-MCNC: 0.58 MG/DL (ref 0.7–1.3)
DIFFERENTIAL METHOD BLD: ABNORMAL
EKG ATRIAL RATE: 57 BPM
EKG DIAGNOSIS: NORMAL
EKG P AXIS: 62 DEGREES
EKG P-R INTERVAL: 168 MS
EKG Q-T INTERVAL: 450 MS
EKG QRS DURATION: 78 MS
EKG QTC CALCULATION (BAZETT): 438 MS
EKG R AXIS: 61 DEGREES
EKG T AXIS: 44 DEGREES
EKG VENTRICULAR RATE: 57 BPM
EOSINOPHIL # BLD: 0.1 K/UL (ref 0–0.4)
EOSINOPHIL NFR BLD: 2 % (ref 0–7)
ERYTHROCYTE [DISTWIDTH] IN BLOOD BY AUTOMATED COUNT: 13.8 % (ref 11.5–14.5)
GLUCOSE SERPL-MCNC: 86 MG/DL (ref 65–100)
HCT VFR BLD AUTO: 34.6 % (ref 36.6–50.3)
HGB BLD-MCNC: 11 G/DL (ref 12.1–17)
IMM GRANULOCYTES # BLD AUTO: 0.1 K/UL (ref 0–0.04)
IMM GRANULOCYTES NFR BLD AUTO: 1 % (ref 0–0.5)
LYMPHOCYTES # BLD: 1.6 K/UL (ref 0.8–3.5)
LYMPHOCYTES NFR BLD: 30 % (ref 12–49)
MAGNESIUM SERPL-MCNC: 2.2 MG/DL (ref 1.6–2.4)
MCH RBC QN AUTO: 29.2 PG (ref 26–34)
MCHC RBC AUTO-ENTMCNC: 31.8 G/DL (ref 30–36.5)
MCV RBC AUTO: 91.8 FL (ref 80–99)
MONOCYTES # BLD: 0.4 K/UL (ref 0–1)
MONOCYTES NFR BLD: 8 % (ref 5–13)
NEUTS SEG # BLD: 3 K/UL (ref 1.8–8)
NEUTS SEG NFR BLD: 58 % (ref 32–75)
NRBC # BLD: 0 K/UL (ref 0–0.01)
NRBC BLD-RTO: 0 PER 100 WBC
PHOSPHATE SERPL-MCNC: 3.6 MG/DL (ref 2.6–4.7)
PLATELET # BLD AUTO: 192 K/UL (ref 150–400)
PMV BLD AUTO: 11.4 FL (ref 8.9–12.9)
POTASSIUM SERPL-SCNC: 4.2 MMOL/L (ref 3.5–5.1)
RBC # BLD AUTO: 3.77 M/UL (ref 4.1–5.7)
SODIUM SERPL-SCNC: 139 MMOL/L (ref 136–145)
VALPROATE SERPL-MCNC: 66 UG/ML (ref 50–100)
WBC # BLD AUTO: 5.3 K/UL (ref 4.1–11.1)

## 2024-01-21 PROCEDURE — 80164 ASSAY DIPROPYLACETIC ACD TOT: CPT

## 2024-01-21 PROCEDURE — 6360000002 HC RX W HCPCS: Performed by: INTERNAL MEDICINE

## 2024-01-21 PROCEDURE — 2580000003 HC RX 258: Performed by: INTERNAL MEDICINE

## 2024-01-21 PROCEDURE — 36415 COLL VENOUS BLD VENIPUNCTURE: CPT

## 2024-01-21 PROCEDURE — 2000000000 HC ICU R&B

## 2024-01-21 PROCEDURE — 84100 ASSAY OF PHOSPHORUS: CPT

## 2024-01-21 PROCEDURE — 94003 VENT MGMT INPAT SUBQ DAY: CPT

## 2024-01-21 PROCEDURE — 93005 ELECTROCARDIOGRAM TRACING: CPT | Performed by: INTERNAL MEDICINE

## 2024-01-21 PROCEDURE — 6370000000 HC RX 637 (ALT 250 FOR IP): Performed by: INTERNAL MEDICINE

## 2024-01-21 PROCEDURE — 80048 BASIC METABOLIC PNL TOTAL CA: CPT

## 2024-01-21 PROCEDURE — 6370000000 HC RX 637 (ALT 250 FOR IP): Performed by: NURSE PRACTITIONER

## 2024-01-21 PROCEDURE — 83735 ASSAY OF MAGNESIUM: CPT

## 2024-01-21 PROCEDURE — 6360000002 HC RX W HCPCS: Performed by: NURSE PRACTITIONER

## 2024-01-21 PROCEDURE — 85025 COMPLETE CBC W/AUTO DIFF WBC: CPT

## 2024-01-21 RX ORDER — HYDROMORPHONE HYDROCHLORIDE 1 MG/ML
1 INJECTION, SOLUTION INTRAMUSCULAR; INTRAVENOUS; SUBCUTANEOUS
Status: DISCONTINUED | OUTPATIENT
Start: 2024-01-21 | End: 2024-01-27

## 2024-01-21 RX ADMIN — TOPIRAMATE 100 MG: 100 TABLET, FILM COATED ORAL at 08:22

## 2024-01-21 RX ADMIN — Medication 100 MG: at 07:50

## 2024-01-21 RX ADMIN — MIDAZOLAM 9 MG/HR: 5 INJECTION, SOLUTION INTRAMUSCULAR; INTRAVENOUS at 16:05

## 2024-01-21 RX ADMIN — VALPROIC ACID 750 MG: 250 SOLUTION ORAL at 21:46

## 2024-01-21 RX ADMIN — MIDAZOLAM 12 MG/HR: 5 INJECTION, SOLUTION INTRAMUSCULAR; INTRAVENOUS at 06:06

## 2024-01-21 RX ADMIN — LORAZEPAM 5 MG: 1 TABLET ORAL at 21:46

## 2024-01-21 RX ADMIN — LORAZEPAM 5 MG: 1 TABLET ORAL at 14:06

## 2024-01-21 RX ADMIN — LORAZEPAM 5 MG: 1 TABLET ORAL at 02:14

## 2024-01-21 RX ADMIN — TOPIRAMATE 100 MG: 100 TABLET, FILM COATED ORAL at 20:14

## 2024-01-21 RX ADMIN — MIDODRINE HYDROCHLORIDE 5 MG: 5 TABLET ORAL at 14:07

## 2024-01-21 RX ADMIN — MIDODRINE HYDROCHLORIDE 5 MG: 5 TABLET ORAL at 02:14

## 2024-01-21 RX ADMIN — MIDODRINE HYDROCHLORIDE 5 MG: 5 TABLET ORAL at 07:50

## 2024-01-21 RX ADMIN — ENOXAPARIN SODIUM 40 MG: 100 INJECTION SUBCUTANEOUS at 08:34

## 2024-01-21 RX ADMIN — HALOPERIDOL LACTATE 10 MG: 5 INJECTION, SOLUTION INTRAMUSCULAR at 06:52

## 2024-01-21 RX ADMIN — SODIUM CHLORIDE, PRESERVATIVE FREE 10 ML: 5 INJECTION INTRAVENOUS at 20:11

## 2024-01-21 RX ADMIN — HALOPERIDOL LACTATE 10 MG: 5 INJECTION, SOLUTION INTRAMUSCULAR at 23:05

## 2024-01-21 RX ADMIN — VALPROIC ACID 750 MG: 250 SOLUTION ORAL at 05:39

## 2024-01-21 RX ADMIN — Medication 20 MG: at 07:51

## 2024-01-21 RX ADMIN — TRAZODONE HYDROCHLORIDE 100 MG: 50 TABLET ORAL at 20:14

## 2024-01-21 RX ADMIN — SODIUM CHLORIDE, PRESERVATIVE FREE 10 ML: 5 INJECTION INTRAVENOUS at 08:37

## 2024-01-21 RX ADMIN — HALOPERIDOL LACTATE 10 MG: 5 INJECTION, SOLUTION INTRAMUSCULAR at 14:07

## 2024-01-21 RX ADMIN — HALOPERIDOL LACTATE 10 MG: 5 INJECTION, SOLUTION INTRAMUSCULAR at 10:11

## 2024-01-21 RX ADMIN — VALPROIC ACID 750 MG: 250 SOLUTION ORAL at 14:06

## 2024-01-21 RX ADMIN — HALOPERIDOL LACTATE 10 MG: 5 INJECTION, SOLUTION INTRAMUSCULAR at 03:12

## 2024-01-21 RX ADMIN — LORAZEPAM 5 MG: 1 TABLET ORAL at 08:18

## 2024-01-21 RX ADMIN — TRAZODONE HYDROCHLORIDE 100 MG: 50 TABLET ORAL at 08:22

## 2024-01-21 RX ADMIN — Medication 20 MG: at 20:13

## 2024-01-21 RX ADMIN — MIDODRINE HYDROCHLORIDE 5 MG: 5 TABLET ORAL at 20:13

## 2024-01-21 RX ADMIN — LORAZEPAM 5 MG: 1 TABLET ORAL at 05:39

## 2024-01-21 RX ADMIN — MIDAZOLAM 10 MG/HR: 5 INJECTION, SOLUTION INTRAMUSCULAR; INTRAVENOUS at 10:18

## 2024-01-21 RX ADMIN — TRAZODONE HYDROCHLORIDE 100 MG: 50 TABLET ORAL at 14:07

## 2024-01-21 ASSESSMENT — PULMONARY FUNCTION TESTS
PIF_VALUE: 20
PIF_VALUE: 15
PIF_VALUE: 16
PIF_VALUE: 17
PIF_VALUE: 20

## 2024-01-21 ASSESSMENT — PAIN SCALES - GENERAL
PAINLEVEL_OUTOF10: 0
PAINLEVEL_OUTOF10: 0

## 2024-01-21 NOTE — BH NOTE
Spoke with CM at [facility] ICU Yoselin. Called back and left message on secure voicemail that Judge Rachel could be contacted at [phone] to amend commitment.   
THERAPY N/A 12/15/2023    ELECTROCONVULSIVE THERAPY performed by Lety Larson MD at [facility] MAIN OR    ELECTROCONVULSIVE THERAPY  12/18/2023    ELECTROCONVULSIVE THERAPY N/A 12/18/2023    ELECTROCONVULSIVE THERAPY performed by Lety Larson MD at [facility] MAIN OR       Allergies:  Fluphenazine and Penicillins    Family History:       Adopted: Yes       REVIEW OF SYSTEMS:    See primary service's resident's note.    PHYSICAL EXAM:    Vitals:  BP 98/64   Pulse 56   Temp 98.2 °F (36.8 °C) (Oral)   Resp 14   Ht 1.839 m (6' 0.4\")   Wt 72.4 kg (159 lb 9.8 oz)   SpO2 100%   BMI 21.41 kg/m²     Mental Status Examination:  Pt sedated, moves spontaneously at times and shifts positions. Unable to answer questions. Calm, appears in no acute distress or pain.    DATA:  Old records have been requested.    DSM-IV DIAGNOSIS:      Impression (Axis I):  schizophrenia, unspecified    Personality Disorder and Mental Retardation (Axis II):  deferred    General Medical Condition ([place] III):  see hospitalist note    Stressors (Axis IV):  chronic illness    Psychiatric Global Assessment of Function (Axis V):  15    PLAN:    Continue current psychiatric medication regimen. May need to be adjusted after attempted extubation tomorrow.

## 2024-01-21 NOTE — PROGRESS NOTES
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  treatment failure.    -not better with antipsychotics, ECT, long acting IM injections   -++ delusions and psychosocial problems.  Cannot care for self.  Not eating.  Nonverbal.    -we are not able to provide safe care without large doses of BZDs and antipsychotics.    -intubated 1/16.  Working towards compassionate extubation.  Plan for Mon or Tues   -haldol increased to 10 mg q 4 hours.    -Qtc 470  Can drive up to 600 and be ok.     -keep MAG> 2. We are carefully monitoring this.    -increased VPA to 750 TID .  Level < 40 1/19.  May increase again, will discuss with psych.   -we are actively monitoring for toxicities.    -cont versed gtt, goal is to get to 6-10 for comfort extubation. We are there/    -add prn dilaudid for comfort   -discussed regimen with ANDRE Setward.    -tentative extubation Mon or Tuesday      Acute hypoxic respiratory failure:  -inbutated due to respiratory depression and severe, profound catatonia  -cxr shows clear lungs.  I reviewed   -cont versed gtt  -start dilaudid prns      Pressure ulcer, present on admission, reason for admission  -stage 4 wound on right ankle  better.    -discussed with ID, today is day 7 of ceftriaxone.  Will DC.    -blood cx with staph epi, wound cx with grp b strep   -ct ankle reviewed and shows soft tissue edema but no bony involvement  -wound care team following    -CRP 5.7 --> down to 4.3  -ESR 53 -->  32     Severe malnutrition: now with ileus.    -dc tube feeds  -as evidenced by Cr 0.64, Albumin 2.2 and AG 3.0  -very cachectic with low muscle mass  -not eating any PO    -ok for comfort feeds      Discussed with psych and palliative care teams      HPI:  new ileus precluding Tf.  Tentative extubation Mon or Tues        ICU DAILY CHECKLIST     Code Status:DNR  DVT  Prophylaxis: lovenox  T/L/D: PIVs, ETT, no rosales   SUP: not indicated  Diet: regular  Activity Level: as tolerated  ABCDEF Bundle/Checklist Completed:Yes  Disposition: comfort extubation soon   Multidisciplinary Rounds Completed: yes  Goals of Care Discussion/Palliative: yes  Patient/Family Updated: yes      OBJECTIVE  Vitals:    01/21/24 0800 01/21/24 0828 01/21/24 0900 01/21/24 1000   BP: 109/74  99/63 103/69   Pulse: 59 56 59 60   Resp: 18 14 16 20   Temp: 98.2 °F (36.8 °C)      TempSrc: Oral      SpO2: 100% 100% 100% 100%   Weight:       Height:         EXAM:   GEN: sedated.  Still curls into a ball but is comfortable    HEENT  -Head: NC/AT;  -Eyes: PERRL, EOMI. No discharge or redness;  -Ears: External ears are normal. Normal TMs.  -Nose: Normal nares.  -Mouth and throat: MMM. Normal gums, mucosa, palate,. Good dentition.  NECK: Supple, with no masses. No JVD   CV: RRR, no m/r/g.  LUNGS: CTAB, no w/r/c.  ABD: Soft, NT/ND, no masses, NTTP  SKIN: Warm, well perfused. No skin rashes or abnormal lesions.  EXT: No clubbing, cyanosis, or edema.  NEURO: Normal muscle strength and tone. No focal deficits.cranial nerves intact. Severe cachexia and muscle wasting      LABS:   Na 139  K 4.2  Cl 108  Cr 0.58    WBC 5.3  Hb 11  Plt 192    Imaging reviewed   Cxr reviewed and shows clear lungs        Princess Beverly MD    Pulmonary and Critical Care Medicine   [facility]  [phone]  1/21/2024

## 2024-01-22 ENCOUNTER — APPOINTMENT (OUTPATIENT)
Facility: HOSPITAL | Age: 54
End: 2024-01-22
Attending: INTERNAL MEDICINE
Payer: MEDICARE

## 2024-01-22 PROBLEM — Z51.5 PALLIATIVE CARE BY SPECIALIST: Status: ACTIVE | Noted: 2024-01-22

## 2024-01-22 PROBLEM — Z71.89 GOALS OF CARE, COUNSELING/DISCUSSION: Status: ACTIVE | Noted: 2024-01-22

## 2024-01-22 LAB
ANION GAP SERPL CALC-SCNC: 5 MMOL/L (ref 5–15)
BASOPHILS # BLD: 0 K/UL (ref 0–0.1)
BASOPHILS NFR BLD: 1 % (ref 0–1)
BUN SERPL-MCNC: 12 MG/DL (ref 6–20)
BUN/CREAT SERPL: 21 (ref 12–20)
CALCIUM SERPL-MCNC: 8.7 MG/DL (ref 8.5–10.1)
CHLORIDE SERPL-SCNC: 107 MMOL/L (ref 97–108)
CO2 SERPL-SCNC: 25 MMOL/L (ref 21–32)
CREAT SERPL-MCNC: 0.58 MG/DL (ref 0.7–1.3)
DIFFERENTIAL METHOD BLD: ABNORMAL
EKG ATRIAL RATE: 112 BPM
EKG ATRIAL RATE: 58 BPM
EKG DIAGNOSIS: NORMAL
EKG DIAGNOSIS: NORMAL
EKG P AXIS: 40 DEGREES
EKG P AXIS: 54 DEGREES
EKG P-R INTERVAL: 164 MS
EKG P-R INTERVAL: 170 MS
EKG Q-T INTERVAL: 462 MS
EKG Q-T INTERVAL: 466 MS
EKG QRS DURATION: 78 MS
EKG QRS DURATION: 78 MS
EKG QTC CALCULATION (BAZETT): 453 MS
EKG QTC CALCULATION (BAZETT): 453 MS
EKG R AXIS: 43 DEGREES
EKG R AXIS: 60 DEGREES
EKG T AXIS: 43 DEGREES
EKG T AXIS: 57 DEGREES
EKG VENTRICULAR RATE: 57 BPM
EKG VENTRICULAR RATE: 58 BPM
EOSINOPHIL # BLD: 0.1 K/UL (ref 0–0.4)
EOSINOPHIL NFR BLD: 2 % (ref 0–7)
ERYTHROCYTE [DISTWIDTH] IN BLOOD BY AUTOMATED COUNT: 13.5 % (ref 11.5–14.5)
GLUCOSE SERPL-MCNC: 89 MG/DL (ref 65–100)
HCT VFR BLD AUTO: 35.6 % (ref 36.6–50.3)
HGB BLD-MCNC: 11.7 G/DL (ref 12.1–17)
IMM GRANULOCYTES # BLD AUTO: 0.1 K/UL (ref 0–0.04)
IMM GRANULOCYTES NFR BLD AUTO: 1 % (ref 0–0.5)
LYMPHOCYTES # BLD: 1.6 K/UL (ref 0.8–3.5)
LYMPHOCYTES NFR BLD: 30 % (ref 12–49)
MAGNESIUM SERPL-MCNC: 2 MG/DL (ref 1.6–2.4)
MCH RBC QN AUTO: 29.7 PG (ref 26–34)
MCHC RBC AUTO-ENTMCNC: 32.9 G/DL (ref 30–36.5)
MCV RBC AUTO: 90.4 FL (ref 80–99)
MONOCYTES # BLD: 0.5 K/UL (ref 0–1)
MONOCYTES NFR BLD: 9 % (ref 5–13)
NEUTS SEG # BLD: 3 K/UL (ref 1.8–8)
NEUTS SEG NFR BLD: 57 % (ref 32–75)
NRBC # BLD: 0 K/UL (ref 0–0.01)
NRBC BLD-RTO: 0 PER 100 WBC
PHOSPHATE SERPL-MCNC: 3.2 MG/DL (ref 2.6–4.7)
PLATELET # BLD AUTO: 188 K/UL (ref 150–400)
PMV BLD AUTO: 11.2 FL (ref 8.9–12.9)
POTASSIUM SERPL-SCNC: 3.9 MMOL/L (ref 3.5–5.1)
RBC # BLD AUTO: 3.94 M/UL (ref 4.1–5.7)
SODIUM SERPL-SCNC: 137 MMOL/L (ref 136–145)
WBC # BLD AUTO: 5.2 K/UL (ref 4.1–11.1)

## 2024-01-22 PROCEDURE — 83735 ASSAY OF MAGNESIUM: CPT

## 2024-01-22 PROCEDURE — 93005 ELECTROCARDIOGRAM TRACING: CPT | Performed by: INTERNAL MEDICINE

## 2024-01-22 PROCEDURE — 84100 ASSAY OF PHOSPHORUS: CPT

## 2024-01-22 PROCEDURE — 2000000000 HC ICU R&B

## 2024-01-22 PROCEDURE — 80048 BASIC METABOLIC PNL TOTAL CA: CPT

## 2024-01-22 PROCEDURE — 94003 VENT MGMT INPAT SUBQ DAY: CPT

## 2024-01-22 PROCEDURE — 6370000000 HC RX 637 (ALT 250 FOR IP): Performed by: INTERNAL MEDICINE

## 2024-01-22 PROCEDURE — 2580000003 HC RX 258: Performed by: INTERNAL MEDICINE

## 2024-01-22 PROCEDURE — 85025 COMPLETE CBC W/AUTO DIFF WBC: CPT

## 2024-01-22 PROCEDURE — 99233 SBSQ HOSP IP/OBS HIGH 50: CPT | Performed by: INTERNAL MEDICINE

## 2024-01-22 PROCEDURE — 6360000002 HC RX W HCPCS: Performed by: PSYCHIATRY & NEUROLOGY

## 2024-01-22 PROCEDURE — 71045 X-RAY EXAM CHEST 1 VIEW: CPT

## 2024-01-22 PROCEDURE — 36415 COLL VENOUS BLD VENIPUNCTURE: CPT

## 2024-01-22 PROCEDURE — 93010 ELECTROCARDIOGRAM REPORT: CPT | Performed by: INTERNAL MEDICINE

## 2024-01-22 PROCEDURE — 6360000002 HC RX W HCPCS: Performed by: INTERNAL MEDICINE

## 2024-01-22 PROCEDURE — 6360000002 HC RX W HCPCS: Performed by: NURSE PRACTITIONER

## 2024-01-22 PROCEDURE — 6370000000 HC RX 637 (ALT 250 FOR IP): Performed by: NURSE PRACTITIONER

## 2024-01-22 RX ORDER — HALOPERIDOL 5 MG/ML
5 INJECTION INTRAMUSCULAR
Status: DISCONTINUED | OUTPATIENT
Start: 2024-01-22 | End: 2024-01-23

## 2024-01-22 RX ORDER — SODIUM CHLORIDE, SODIUM LACTATE, POTASSIUM CHLORIDE, AND CALCIUM CHLORIDE .6; .31; .03; .02 G/100ML; G/100ML; G/100ML; G/100ML
500 INJECTION, SOLUTION INTRAVENOUS ONCE
Status: COMPLETED | OUTPATIENT
Start: 2024-01-22 | End: 2024-01-22

## 2024-01-22 RX ORDER — VALPROIC ACID 250 MG/5ML
1000 SOLUTION ORAL EVERY 12 HOURS SCHEDULED
Status: DISCONTINUED | OUTPATIENT
Start: 2024-01-23 | End: 2024-01-23

## 2024-01-22 RX ADMIN — HALOPERIDOL LACTATE 5 MG: 5 INJECTION, SOLUTION INTRAMUSCULAR at 16:12

## 2024-01-22 RX ADMIN — TRAZODONE HYDROCHLORIDE 100 MG: 50 TABLET ORAL at 08:05

## 2024-01-22 RX ADMIN — VALPROIC ACID 750 MG: 250 SOLUTION ORAL at 13:30

## 2024-01-22 RX ADMIN — LORAZEPAM 5 MG: 1 TABLET ORAL at 13:27

## 2024-01-22 RX ADMIN — MIDAZOLAM 9 MG/HR: 5 INJECTION, SOLUTION INTRAMUSCULAR; INTRAVENOUS at 02:15

## 2024-01-22 RX ADMIN — SODIUM CHLORIDE, POTASSIUM CHLORIDE, SODIUM LACTATE AND CALCIUM CHLORIDE 500 ML: 600; 310; 30; 20 INJECTION, SOLUTION INTRAVENOUS at 17:47

## 2024-01-22 RX ADMIN — MIDAZOLAM 9 MG/HR: 5 INJECTION, SOLUTION INTRAMUSCULAR; INTRAVENOUS at 22:11

## 2024-01-22 RX ADMIN — Medication 20 MG: at 08:04

## 2024-01-22 RX ADMIN — LORAZEPAM 5 MG: 1 TABLET ORAL at 05:36

## 2024-01-22 RX ADMIN — HALOPERIDOL LACTATE 10 MG: 5 INJECTION, SOLUTION INTRAMUSCULAR at 10:50

## 2024-01-22 RX ADMIN — MIDAZOLAM 9 MG/HR: 5 INJECTION, SOLUTION INTRAMUSCULAR; INTRAVENOUS at 10:50

## 2024-01-22 RX ADMIN — Medication 20 MG: at 20:24

## 2024-01-22 RX ADMIN — Medication 100 MG: at 08:04

## 2024-01-22 RX ADMIN — SODIUM CHLORIDE, PRESERVATIVE FREE 10 ML: 5 INJECTION INTRAVENOUS at 08:05

## 2024-01-22 RX ADMIN — LORAZEPAM 5 MG: 1 TABLET ORAL at 10:52

## 2024-01-22 RX ADMIN — HALOPERIDOL LACTATE 5 MG: 5 INJECTION, SOLUTION INTRAMUSCULAR at 17:47

## 2024-01-22 RX ADMIN — MIDODRINE HYDROCHLORIDE 5 MG: 5 TABLET ORAL at 13:27

## 2024-01-22 RX ADMIN — TRAZODONE HYDROCHLORIDE 100 MG: 50 TABLET ORAL at 20:24

## 2024-01-22 RX ADMIN — VALPROIC ACID 750 MG: 250 SOLUTION ORAL at 05:36

## 2024-01-22 RX ADMIN — MIDODRINE HYDROCHLORIDE 5 MG: 5 TABLET ORAL at 20:25

## 2024-01-22 RX ADMIN — TOPIRAMATE 100 MG: 100 TABLET, FILM COATED ORAL at 08:04

## 2024-01-22 RX ADMIN — ENOXAPARIN SODIUM 40 MG: 100 INJECTION SUBCUTANEOUS at 08:05

## 2024-01-22 RX ADMIN — HALOPERIDOL LACTATE 5 MG: 5 INJECTION, SOLUTION INTRAMUSCULAR at 20:25

## 2024-01-22 RX ADMIN — LORAZEPAM 5 MG: 1 TABLET ORAL at 17:47

## 2024-01-22 RX ADMIN — MIDODRINE HYDROCHLORIDE 5 MG: 5 TABLET ORAL at 02:10

## 2024-01-22 RX ADMIN — LORAZEPAM 5 MG: 1 TABLET ORAL at 20:24

## 2024-01-22 RX ADMIN — MIDODRINE HYDROCHLORIDE 5 MG: 5 TABLET ORAL at 08:04

## 2024-01-22 RX ADMIN — HALOPERIDOL LACTATE 10 MG: 5 INJECTION, SOLUTION INTRAMUSCULAR at 13:27

## 2024-01-22 RX ADMIN — TRAZODONE HYDROCHLORIDE 100 MG: 50 TABLET ORAL at 13:27

## 2024-01-22 RX ADMIN — SODIUM CHLORIDE, PRESERVATIVE FREE 10 ML: 5 INJECTION INTRAVENOUS at 20:26

## 2024-01-22 RX ADMIN — HALOPERIDOL LACTATE 10 MG: 5 INJECTION, SOLUTION INTRAMUSCULAR at 03:08

## 2024-01-22 ASSESSMENT — PULMONARY FUNCTION TESTS
PIF_VALUE: 16
PIF_VALUE: 17
PIF_VALUE: 16
PIF_VALUE: 17

## 2024-01-22 ASSESSMENT — PAIN SCALES - GENERAL
PAINLEVEL_OUTOF10: 0
PAINLEVEL_OUTOF10: 0

## 2024-01-22 NOTE — PROGRESS NOTES
01/22/24 1248   B: Both Spontaneous Awakening and Breathing Trials   Was Patient Receiving Mechanical Ventilation Yes   Safety Screening Spontaneous Breathing Trial (SBT) Proceed with SBT - no exclusion criteria met   RT Notified Ready for SBT Yes   Spontaneous  mL   Spontaneous Breathing Trial (SBT) Outcome RSBI>105 - SBT failure   Patient Meet Extubation Criteria No   Reasons Failed Extubation Criteria Failed SAT and/or SBT   Provider Ordered Extubation No   Weaning Parameters   Spontaneous Breathing Trial Complete No (comment)   Respiratory Rate Observed 15   Ve 3.78   RSBI 212       Patient with apnea ventilation. Placed back on previous vent settings for now.

## 2024-01-22 NOTE — PROGRESS NOTES
NUTRITION brief    Recommendations:     -Continue NPO       Diet: NPO  Supplements/Nutrition Support: None   Nutrition-related meds: B1, Pepcid    New events impacting nutrition plan of care:   Tube feeding off since 1/20 d/t  ileus. Patient remains intubated with ongoing discussions regarding transition to comfort care. No plan to resume EN at this time but DHT remains in place for administration of medications.      RD to follow and assist as needed.  Estimated Nutrition Needs:   Energy Requirements Based On: Kcal/kg  Weight Used for Energy Requirements: Current (66.4 kg)  Energy (kcal/day): 1000 (15 kcal/kg initially; slowly advance to goal 25-30 kcal/kg or 7455-2523 kcals/day)  Weight Used for Protein Requirements: Current  Protein (g/day):  (1.3-1.5g/kg)  Method Used for Fluid Requirements: 1 ml/kcal  Fluid (ml/day):      Yara Kam RD [facility]

## 2024-01-22 NOTE — BSMART NOTE
BSMART Liaison Team Note     LOS:  10 days    Patient goal(s) for today: Patient goal(s) for today: take medications as prescribed, make needs known in an appropriate manner  BSMART Liaison team focus/goals: assess needs, provide support and education, coordinate care     Progress note:  Per liaison note on 1/19/2024: \"TDO (30-days) with court-ordered meds, 12/8/23. Recommitment (60-days) 1/10/24. ngoing interdisciplinary team meetings with ICU team, [facility]B ACT LCSW, Palliative, Psychiatry, Ethics, Risk, Administration, and pt's sister/guardian, Eden Montalvo, to discuss goals of care.  Per chart, pt's schizophrenia has been deemed treatment resistant.\"    Liaison attempted to meet with pt f/f in his room on the medical floor. Pt appears comfortable and in no acute distress. Pt has some bending movement in his lower legs. Liaison did consult with primary nurse. She reports no changes. Pt continues to be intubated and sedated. Per nurse, medical team attempting to contact pt's sister for extubation. Liaison will continue to monitor and to support.    Barriers to Discharge:  Medical and psychiatric clearance     Outpatient provider(s):   Mavis KYLE  Psych: Dr. Olivas: [phone]   CM: Jayson Bustos [phone]  Clinician: Geeta [phone]  Insurance info/prescription coverage: MEDICARE PART A AND B , [facility] [place]     Diagnosis: Refractory schizophrenia with catatonia     Plan: Please defer to medical team and psychiatry for further details on medication changes, discharge plan, and disposition.  Per chart, APS reports that sending pt home to live with pt's sister/guardian, Eden Montalvo, is not an option.  .   Follow up Psych Consult placed? .No   Psychiatrist updated?  No    Participating treatment team members: Kamari Wilson, GIOVANI

## 2024-01-22 NOTE — PROGRESS NOTES
Palliative Medicine  Patient Name: Roge Cowan  YOB: 1970  MRN: [id number]  Age: 53 y.o.  Gender: male    Date of Initial Consult: 1/15/24  Date of Service: 1/22/2024  Time: 5:14 PM  Provider: Leanna Barnard MD  Hospital Day: 11  Admit Date: 1/12/2024  Referring Provider: DR ALLEN Beverly        Reasons for Consultation:  Goals of Care, Overwhelming Symptoms, and Psychosocial Distress    HISTORY OF PRESENT ILLNESS (HPI):   Roge Cowan \"Isreal\" (although his voices recently told him that he goes by \"Cruzito\"  is a 53 y.o. male with a past medical history of paranoid schizophrenia, catatonia  who was admitted on 1/12/2024 from [facility]. Pt admitted to [facility] 12/12/23 brought in by Police- he had not been engaging in self care, eating or drinking. He was in the fetal position and was getting pressure ulcers- stage IV on R ankle. Was getting court mandated ECT. He was transferred to [facility] on 1/12/24 for ICU care. He was refusing all care at [facility]. Requiring 4 pt restraints, sedation, IV abx.  He is tachypneic w/ desats, refusing sometimes even turning. Because could not care for patient, required sedation and intubation.     Psychosocial: Court declared pt legally incompetent on 10/2018 and sister Eden is guardian. They were both adopted at a young age. He has been living w/ her , and her son Rustam since leaving [facility] in 2012. Has been asked to leave many group homes. At baseline, he is low functioning- nonverbal mostly, but can walk. He spends most of day on bed or on the floor in fetal position. Does not interact much, does not listen to TV/radio. Cannot read or write.  Psych @ [facility] ACT team: Dr. Brendon Gamble ACT Team: [phone] after hours number.    with ACT team: Jayson Andrea [phone] or [phone] (Samara as well)  : Lorene Pabon [phone]   PALLIATIVE DIAGNOSES:    Chronic paranoid schizophrenia and catatonia   Cellulitis on IV abx   Severe  Maladaptive     If \"Maladaptive\" to discuss with social work during IDT    ESAS Anxiety:      ESAS Depression:          LAB AND IMAGING FINDINGS:   Objective data reviewed:  labs, images, records, medication use, vitals, and chart     FINAL COMMENTS   Thank you for allowing Palliative Medicine to participate in the care of Roge Cowan.    Only check if applicable and billing time based rather than MDM  [x] The total encounter time on this service date was  85 minutes which was spent performing a face-to-face encounter and personally completing the provider-level activities documented in the note. This includes time spent prior to the visit and after the visit in direct care of the patient. This time does not include time spent in any separately reportable services.    Electronically signed by   Leanna Barnard MD  Palliative Care Team  on 1/22/2024 at 5:14 PM

## 2024-01-22 NOTE — CONSULTS
PSYCHIATRY CONSULT PROGRESS NOTE:    REASON FOR CONSULT:  Agitation    Interval History  01/22/2024  Upon my evaluation, Mr. Cowan, \"Isreal\" is intubated and sedated. He shifts infrequently, but appears in no acute distress.  I spoke with intensivist to discuss patient's case and steps forward. I reached out to palliative care at     1/21/24:  Met with nursing staff; patient is planned for weaning today and extubation tomorrow. He continues to shift positions at times but appears comfortable. He was intubated and sedated at the time of exam. Appears no change in mental status or behavior over past several days.    1/20/24-   Mr. Cowan remains intubated and sedated, though he is occasionally opening his eyes and moving around. He appears comfortable and in no distress. The plan is for possible extubation in the coming days, possibly Monday or Tuesday. QTc on EKG today was stable at 470.     1/19/24-  VPA level was subtherapeutic at 37 this morning at 0421, indicating we have plenty of room to safely optimize the Depakene as a tool to alleviate agitation. Mr. Cowan remains intubated and sedated, again appearing comfortable and without distress.   Interdisciplinary team meeting again today with ethics, palliative care, critical care, case management, psychiatry, as well as the pt's sister/guardian, Eden. We reviewed goals of care, likely outcomes given end-stage, treatment refractory schizophrenia, and all parties are in agreement that the focus will be on Mr. Cowan's comfort and preserving whatever bodily autonomy and quality of life that will be possible given the intractable illness.     1/18/24-   Mr. Cowan remains intubated and sedated, though he awakens at times, opens his eyes and shifts positions slightly. He appears comfortable and is in no visible distress. QTc 470 this morning. Interdisciplinary team meeting occurred this afternoon to discuss goals of care given refractory/treatment resistant  prolongation.   Will increase Depakene from 250mg TID to 500mg BID for increase in total daily dose from 750mg to 1000mg.     01/15/2024  Discussed reducing reliance on benzos and consolidating antipsychotics to just one if possible.    D/C prn zyprexa  Recommended increase haldol from 5mg IV q6h to q4h. Obtain EKG to f/u QTC and be vigilant about monitoring VS, especially temp.  Start depakene  250mg po q8h in planning to further increase it to decrease agitation.  Plan would be to stabilize patient to undergo ECT.    01/13/2024  Not a candidate for inpatient psychiatry at this time due to his skin breakdown is now a tunneling wound.    Continue 1:1 nursing  Psychiatry to follow  Continue current care with Court ordered treatments including ECT.      Lety Larson MD  1/22/2024

## 2024-01-22 NOTE — PROGRESS NOTES
Palliative Medicine   [place] [phone] (COPE)        [facility] [phone] (COPE)    The Palliative Medicine SW and Dr. Barnard spoke with ICU team, as well as Dr. Larson-     Discussed w/ Dr. Larson- we will be having another interdisciplinary team meeting to further discuss care moving forward prior to extubation- we will update Eden following care conference once definitive plan is made.     Care Conference scheduled for 2:30 p.m. tomorrow 1/23.     Thank you for including Palliative Medicine in the care of oRge Torres LCSW

## 2024-01-22 NOTE — WOUND CARE
Wound Care Note:     Wound care follow-up for ankle wound present on admission    Chart shows:  Admitted for catatonia schizophrenia   Past Medical History:   Diagnosis Date    Psychiatric disorder     Psychotic disorder (HCC)     Withdrawal syndrome (HCC)      WBC = 5.2  Admitted from [facility]    Assessment:   Patient is intubated and sedated, incontinent with moderate assistance needed in repositioning.    Bed: [facility]  Patient has a condom catheter in place.    Diet: No diet    Bilateral heels, buttocks, and sacral skin intact and without erythema.    Palpable DP pulses bilaterally.      1. POA right ankle wound is improving measures 1 cm x 1.5 cm x 0.2 cm, wound bed is pink,   scant serous drainage, wound edges are open, dia-wound intact without erythema.  Silver foam, 4 x 4 and roll gauze applied.      Patient repositioned supine.       Recommendations:    Right lateral ankle- Every other day cleanse with VASHE, wipe wound bed clean and dry, apply a piece of Optifoam Gentle NB AG (silver foam) and cover.    Skin Care & Pressure Prevention:  Minimize layers of linen/pads under patient to optimize support surface.    Turn/reposition approximately every 2 hours and offload heels.  Manage incontinence / promote continence   Nourishing Skin Cream to dry skin, minimize use of briefs when able    Discussed above plan with patient & GAIL Torres    Transition of Care: Plan to follow as needed while admitted to hospital.    Tracey \"Rosa\" MATTY Santiago, RN, [facility]  Certified Wound and Ostomy Nurse  office [phone]  Best way to contact me is through [employer]

## 2024-01-23 ENCOUNTER — APPOINTMENT (OUTPATIENT)
Facility: HOSPITAL | Age: 54
End: 2024-01-23
Attending: INTERNAL MEDICINE
Payer: MEDICARE

## 2024-01-23 LAB
EKG ATRIAL RATE: 60 BPM
EKG DIAGNOSIS: NORMAL
EKG P AXIS: 45 DEGREES
EKG P-R INTERVAL: 168 MS
EKG Q-T INTERVAL: 460 MS
EKG QRS DURATION: 76 MS
EKG QTC CALCULATION (BAZETT): 460 MS
EKG R AXIS: 49 DEGREES
EKG T AXIS: 44 DEGREES
EKG VENTRICULAR RATE: 60 BPM

## 2024-01-23 PROCEDURE — 74018 RADEX ABDOMEN 1 VIEW: CPT

## 2024-01-23 PROCEDURE — 2580000003 HC RX 258: Performed by: INTERNAL MEDICINE

## 2024-01-23 PROCEDURE — 94003 VENT MGMT INPAT SUBQ DAY: CPT

## 2024-01-23 PROCEDURE — 6360000002 HC RX W HCPCS: Performed by: INTERNAL MEDICINE

## 2024-01-23 PROCEDURE — 6370000000 HC RX 637 (ALT 250 FOR IP): Performed by: INTERNAL MEDICINE

## 2024-01-23 PROCEDURE — 2000000000 HC ICU R&B

## 2024-01-23 PROCEDURE — 6370000000 HC RX 637 (ALT 250 FOR IP): Performed by: PSYCHIATRY & NEUROLOGY

## 2024-01-23 PROCEDURE — 93005 ELECTROCARDIOGRAM TRACING: CPT | Performed by: INTERNAL MEDICINE

## 2024-01-23 PROCEDURE — 6360000002 HC RX W HCPCS: Performed by: PSYCHIATRY & NEUROLOGY

## 2024-01-23 PROCEDURE — 6370000000 HC RX 637 (ALT 250 FOR IP): Performed by: NURSE PRACTITIONER

## 2024-01-23 PROCEDURE — 93010 ELECTROCARDIOGRAM REPORT: CPT | Performed by: INTERNAL MEDICINE

## 2024-01-23 RX ORDER — LORAZEPAM 2 MG/1
2 TABLET ORAL EVERY 4 HOURS
Status: CANCELLED | OUTPATIENT
Start: 2024-01-23

## 2024-01-23 RX ORDER — MIDAZOLAM HYDROCHLORIDE 2 MG/2ML
2 INJECTION, SOLUTION INTRAMUSCULAR; INTRAVENOUS EVERY 4 HOURS
Status: DISCONTINUED | OUTPATIENT
Start: 2024-01-23 | End: 2024-01-25 | Stop reason: SDUPTHER

## 2024-01-23 RX ORDER — HALOPERIDOL 5 MG/ML
5 INJECTION INTRAMUSCULAR EVERY 4 HOURS
Status: DISCONTINUED | OUTPATIENT
Start: 2024-01-23 | End: 2024-01-28

## 2024-01-23 RX ORDER — VALPROIC ACID 250 MG/5ML
1000 SOLUTION ORAL EVERY 8 HOURS SCHEDULED
Status: DISCONTINUED | OUTPATIENT
Start: 2024-01-23 | End: 2024-01-27

## 2024-01-23 RX ADMIN — HALOPERIDOL LACTATE 5 MG: 5 INJECTION, SOLUTION INTRAMUSCULAR at 02:22

## 2024-01-23 RX ADMIN — MIDODRINE HYDROCHLORIDE 5 MG: 5 TABLET ORAL at 02:23

## 2024-01-23 RX ADMIN — TRAZODONE HYDROCHLORIDE 100 MG: 50 TABLET ORAL at 08:37

## 2024-01-23 RX ADMIN — LORAZEPAM 5 MG: 1 TABLET ORAL at 06:17

## 2024-01-23 RX ADMIN — Medication 20 MG: at 21:03

## 2024-01-23 RX ADMIN — TRAZODONE HYDROCHLORIDE 100 MG: 50 TABLET ORAL at 14:23

## 2024-01-23 RX ADMIN — SODIUM CHLORIDE, PRESERVATIVE FREE 10 ML: 5 INJECTION INTRAVENOUS at 08:39

## 2024-01-23 RX ADMIN — SODIUM CHLORIDE, PRESERVATIVE FREE 10 ML: 5 INJECTION INTRAVENOUS at 21:03

## 2024-01-23 RX ADMIN — HALOPERIDOL LACTATE 5 MG: 5 INJECTION, SOLUTION INTRAMUSCULAR at 09:57

## 2024-01-23 RX ADMIN — Medication 100 MG: at 08:37

## 2024-01-23 RX ADMIN — HALOPERIDOL LACTATE 5 MG: 5 INJECTION, SOLUTION INTRAMUSCULAR at 16:43

## 2024-01-23 RX ADMIN — ENOXAPARIN SODIUM 40 MG: 100 INJECTION SUBCUTANEOUS at 08:38

## 2024-01-23 RX ADMIN — MIDAZOLAM HYDROCHLORIDE 2 MG: 1 INJECTION, SOLUTION INTRAMUSCULAR; INTRAVENOUS at 20:20

## 2024-01-23 RX ADMIN — HALOPERIDOL LACTATE 5 MG: 5 INJECTION, SOLUTION INTRAMUSCULAR at 12:00

## 2024-01-23 RX ADMIN — VALPROIC ACID 1000 MG: 250 SOLUTION ORAL at 22:30

## 2024-01-23 RX ADMIN — HALOPERIDOL LACTATE 5 MG: 5 INJECTION, SOLUTION INTRAMUSCULAR at 00:17

## 2024-01-23 RX ADMIN — HALOPERIDOL LACTATE 5 MG: 5 INJECTION, SOLUTION INTRAMUSCULAR at 04:49

## 2024-01-23 RX ADMIN — LORAZEPAM 5 MG: 1 TABLET ORAL at 02:23

## 2024-01-23 RX ADMIN — MIDAZOLAM 7 MG/HR: 5 INJECTION, SOLUTION INTRAMUSCULAR; INTRAVENOUS at 10:19

## 2024-01-23 RX ADMIN — VALPROIC ACID 1000 MG: 250 SOLUTION ORAL at 08:37

## 2024-01-23 RX ADMIN — HALOPERIDOL LACTATE 5 MG: 5 INJECTION, SOLUTION INTRAMUSCULAR at 20:20

## 2024-01-23 RX ADMIN — LORAZEPAM 5 MG: 1 TABLET ORAL at 14:22

## 2024-01-23 RX ADMIN — ACETAMINOPHEN 650 MG: 325 TABLET ORAL at 17:01

## 2024-01-23 RX ADMIN — Medication 20 MG: at 08:37

## 2024-01-23 RX ADMIN — MIDODRINE HYDROCHLORIDE 5 MG: 5 TABLET ORAL at 08:37

## 2024-01-23 RX ADMIN — HALOPERIDOL LACTATE 5 MG: 5 INJECTION, SOLUTION INTRAMUSCULAR at 14:23

## 2024-01-23 RX ADMIN — HALOPERIDOL LACTATE 5 MG: 5 INJECTION, SOLUTION INTRAMUSCULAR at 06:17

## 2024-01-23 RX ADMIN — MIDODRINE HYDROCHLORIDE 5 MG: 5 TABLET ORAL at 20:20

## 2024-01-23 RX ADMIN — HALOPERIDOL LACTATE 5 MG: 5 INJECTION, SOLUTION INTRAMUSCULAR at 08:38

## 2024-01-23 RX ADMIN — VALPROIC ACID 1000 MG: 250 SOLUTION ORAL at 14:22

## 2024-01-23 RX ADMIN — TRAZODONE HYDROCHLORIDE 100 MG: 50 TABLET ORAL at 21:03

## 2024-01-23 RX ADMIN — MIDODRINE HYDROCHLORIDE 5 MG: 5 TABLET ORAL at 14:24

## 2024-01-23 RX ADMIN — LORAZEPAM 5 MG: 1 TABLET ORAL at 09:56

## 2024-01-23 ASSESSMENT — PAIN SCALES - GENERAL
PAINLEVEL_OUTOF10: 0

## 2024-01-23 ASSESSMENT — PULMONARY FUNCTION TESTS
PIF_VALUE: 20
PIF_VALUE: 15
PIF_VALUE: 15
PIF_VALUE: 17
PIF_VALUE: 21

## 2024-01-23 NOTE — PROGRESS NOTES
1312: Pt placed on SBT- PS 10/5 with RT at bedside.    1535: Placed on PS 5/5.    1700: MD Barbara notified of axillary temperature 100.6, PRN Tylenol given- no new orders received.

## 2024-01-23 NOTE — CONSULTS
PSYCHIATRY CONSULT PROGRESS NOTE:    REASON FOR CONSULT:  Agitation    Interval History  01/23/2024  Upon my evaluation, Mr. Cowan remains intubated and sedated. A full mental status exam is not possible.  All hands meeting of patient's care team today to discuss case and treatment plan.    01/22/2024  Upon my evaluation, Mr. Cowan, \"Isreal\" is intubated and sedated. He shifts infrequently, but appears in no acute distress.  I spoke with intensivist to discuss patient's case and steps forward. I reached out to palliative care at     1/21/24:  Met with nursing staff; patient is planned for weaning today and extubation tomorrow. He continues to shift positions at times but appears comfortable. He was intubated and sedated at the time of exam. Appears no change in mental status or behavior over past several days.    1/20/24-   Mr. Cowan remains intubated and sedated, though he is occasionally opening his eyes and moving around. He appears comfortable and in no distress. The plan is for possible extubation in the coming days, possibly Monday or Tuesday. QTc on EKG today was stable at 470.     1/19/24-  VPA level was subtherapeutic at 37 this morning at 0421, indicating we have plenty of room to safely optimize the Depakene as a tool to alleviate agitation. Mr. Cowan remains intubated and sedated, again appearing comfortable and without distress.   Interdisciplinary team meeting again today with ethics, palliative care, critical care, case management, psychiatry, as well as the pt's sister/guardian, Eden. We reviewed goals of care, likely outcomes given end-stage, treatment refractory schizophrenia, and all parties are in agreement that the focus will be on Mr. Cowan's comfort and preserving whatever bodily autonomy and quality of life that will be possible given the intractable illness.     1/18/24-   Mr. Cowan remains intubated and sedated, though he awakens at times, opens his eyes and shifts positions

## 2024-01-23 NOTE — PROGRESS NOTES
[facility]     ICU TEAM Progress Note           Name: Roge Cowan   : 1970   MRN: [id number]   Date: 2024          ICU PROBLEM LIST   -Chronic paranoid schizophrenia and catatonia-concern for treatment resistant/failure  -Acute respiratory insufficiency, respiratory depression   -Pressure ulcers/lower extremity wound  -Severe malnutrition     HISTORY OF PRESENT ILLNESS:     53-year-old male history of treatment resistant schizophrenia, who initially presented to hospital in police custody for mental health problem.  While admitted he is noted to be isolated, selectively interactions, noted to have withdrawn and sullen affect, requiring restraints and refusing needed treatment.  He subsequently developed pressure ulcers and wounds on his lower extremities.    Given need for more anxiolytics/antipsychotics he was transferred to [facility] to be observed in the ICU while undergoing treatment, and medication changes by psychiatrist.  During ICU stay, requiring ketamine, Precedex, he continued to be resistant to care and on 1/15 he was intubated and sedated with propofol.  He was placed on Haldol, Depakote, and was subsequently placed on a Versed drip.  Following discussions with the ethics committee, psychiatrist, and critical care team-patient was started to have overall poor prognosis with no quality of life improving treatment options as ECT was thought to have likely minute/minimal impact. Plan was discussed with patient sister for comfort measures and plan to terminally extubate on mon/tue.     No acute events overnight, patient remains on Versed at 9-10.  No additional agitation reported.    SUBJECTIVE:   No acute events overnight,   This AM on SBT.      NEUROLOGICAL:      Paranoid schizophrenia, with periods of catatonia, agitation,  Per psychiatry team -  -- Psychiatry consulted, appreciate recommendations.  -- Plan wean Versed, and extubate.  -- Continue

## 2024-01-23 NOTE — PROGRESS NOTES
1930: Bedside and Verbal shift change report given to GAIL Lomas (oncoming nurse) by GAIL Torres (offgoing nurse). Report included the following information Nurse Handoff Report, Adult Overview, Intake/Output, MAR, Recent Results, Med Rec Status, Cardiac Rhythm NSR, Alarm Parameters, and Neuro Assessment.      0730: Bedside and Verbal shift change report given to GAIL Webb (oncoming nurse) by GAIL Lomas (offgoing nurse). Report included the following information Nurse Handoff Report, Adult Overview, Intake/Output, MAR, Recent Results, Med Rec Status, Cardiac Rhythm NSR, Alarm Parameters, and Neuro Assessment.

## 2024-01-23 NOTE — PROGRESS NOTES
Palliative Medicine  Patient Name: Roge Cowan  YOB: 1970  MRN: [id number]  Age: 53 y.o.  Gender: male    Date of Initial Consult: 1/15/24  Date of Service: 1/23/2024  Time: 4:40 PM  Provider: Leanna Barnard MD  Hospital Day: 12  Admit Date: 1/12/2024  Referring Provider: DR ALLEN Beverly        Reasons for Consultation:  Goals of Care, Overwhelming Symptoms, and Psychosocial Distress    HISTORY OF PRESENT ILLNESS (HPI):   Roge Cwoan \"Isreal\" (although his voices recently told him that he goes by \"Cruzito\"  is a 53 y.o. male with a past medical history of paranoid schizophrenia, catatonia  who was admitted on 1/12/2024 from [facility]. Pt admitted to [facility] 12/12/23 brought in by Police- he had not been engaging in self care, eating or drinking. He was in the fetal position and was getting pressure ulcers- stage IV on R ankle. Was getting court mandated ECT. He was transferred to [facility] on 1/12/24 for ICU care. He was refusing all care at [facility]. Requiring 4 pt restraints, sedation, IV abx.  He is tachypneic w/ desats, refusing sometimes even turning. Because could not care for patient, required sedation and intubation.     Psychosocial: Court declared pt legally incompetent on 10/2018 and sister Eden is guardian. They were both adopted at a young age. He has been living w/ her , and her son Rustam since leaving [facility] in 2012. Has been asked to leave many group homes. At baseline, he is low functioning- nonverbal mostly, but can walk. He spends most of day on bed or on the floor in fetal position. Does not interact much, does not listen to TV/radio. Cannot read or write.  Psych @ [place] ACT team: Dr. Brendon Gamble ACT Team: [phone] after hours number.    with ACT team: Jayson Andrea [phone] or [phone] (Samara as well)  : Lorene Pabon [phone]   PALLIATIVE DIAGNOSES:    Chronic paranoid schizophrenia and catatonia   Cellulitis on IV abx   Severe

## 2024-01-23 NOTE — BSMART NOTE
BSMART Liaison Team Note     LOS:  11 days    Patient goal(s) for today: Patient goal(s) for today: take medications as prescribed, make needs known in an appropriate manner  BSMART Liaison team focus/goals: assess needs, provide support and education, coordinate care     Progress note: Per liaison note on 1/19/2024: \"TDO (30-days) with court-ordered meds, 12/8/23. Recommitment (60-days) 1/10/24. ngoing interdisciplinary team meetings with ICU team, [facility] ACT LCSW, Palliative, Psychiatry, Ethics, Risk, Administration, and pt's sister/guardian, Eden Montalvo, to discuss goals of care.  Per chart, pt's schizophrenia has been deemed treatment resistant.\"     Liaison met f/f with pt f/f in his room on the medical floor at [facility]. Pt appears comfortable and in no acute distress. Liaison consulted with primary nurse. She reports that pt continues to be intubated and sedated. She reports upcoming attempts to be made for spontaneous breathing trial. Pt reportedly still getting agitated when attempts are made to reduce sedation. She says that today there will be a meeting at 2:30 PM, which is to include Ethics, Psychiatry, ICU, management, and palliative care providers. Per report, attempts apparently still unsuccessful at contacting pt's sister.    Barriers to Discharge: Medical and psychiatric clearance      Outpatient provider(s):  Mavis KYLE  Psych: Dr. Olivas: [phone]   CM: Jayson Bustos [phone]  Clinician: Geeta [phone]  Insurance info/prescription coverage: MEDICARE PART A AND B , [facility] [place]AN OF [place]      Diagnosis: Refractory schizophrenia with catatonia     Plan: Please defer to medical team and psychiatry for further details on medication changes, discharge plan, and disposition.  Per chart, APS reports that sending pt home to live with pt's sister/guardian, Eden Montalvo, is not an option. .   Follow up Psych Consult placed? No.   Psychiatrist updated?

## 2024-01-23 NOTE — PROGRESS NOTES
NUTRITION brief    Recommendations:     -Resume EN and slowly advance to goal of Vital AF 1.2 @ 70 ml/hr with 100 ml water flush q 4 hr    -Continue to monitor lytes daily d/t refeeding risk; replace PRN       Diet: NPO  Supplements/Nutrition Support: None   Nutrition-related meds: B1, Pepcid    New events impacting nutrition plan of care:   1/22:Tube feeding off since 1/20 d/t ileus. Patient remains intubated with ongoing discussions regarding transition to comfort care. No plan to resume EN at this time but DHT remains in place for administration of medications.    1/23:Plan to resume EN this evening. Pt had BM today; ileus appears resolved on KUB. Care conference took place this afternoon. Plan to start EN at low rate and slowly advance to goal. Pt had reached goal at end of last week but off EN since 1/20; still at potential risk for refeeding.    RD to follow and assist as needed.  Estimated Nutrition Needs:   Energy Requirements Based On: Kcal/kg  Weight Used for Energy Requirements: Current (66.4 kg)  Energy (kcal/day): 1000 (15 kcal/kg initially; slowly advance to goal 25-30 kcal/kg or 7465-6215 kcals/day)  Weight Used for Protein Requirements: Current  Protein (g/day):  (1.3-1.5g/kg)  Method Used for Fluid Requirements: 1 ml/kcal  Fluid (ml/day):      Yara Kam RD [facility]

## 2024-01-24 LAB
ANION GAP SERPL CALC-SCNC: 9 MMOL/L (ref 5–15)
BUN SERPL-MCNC: 13 MG/DL (ref 6–20)
BUN/CREAT SERPL: 28 (ref 12–20)
CALCIUM SERPL-MCNC: 8.5 MG/DL (ref 8.5–10.1)
CHLORIDE SERPL-SCNC: 106 MMOL/L (ref 97–108)
CO2 SERPL-SCNC: 21 MMOL/L (ref 21–32)
CREAT SERPL-MCNC: 0.47 MG/DL (ref 0.7–1.3)
EKG ATRIAL RATE: 70 BPM
EKG DIAGNOSIS: NORMAL
EKG P AXIS: 29 DEGREES
EKG P-R INTERVAL: 138 MS
EKG Q-T INTERVAL: 446 MS
EKG QRS DURATION: 78 MS
EKG QTC CALCULATION (BAZETT): 481 MS
EKG R AXIS: 51 DEGREES
EKG T AXIS: 54 DEGREES
EKG VENTRICULAR RATE: 70 BPM
ERYTHROCYTE [DISTWIDTH] IN BLOOD BY AUTOMATED COUNT: 13.5 % (ref 11.5–14.5)
GLUCOSE SERPL-MCNC: 111 MG/DL (ref 65–100)
HCT VFR BLD AUTO: 33.8 % (ref 36.6–50.3)
HGB BLD-MCNC: 11.5 G/DL (ref 12.1–17)
MAGNESIUM SERPL-MCNC: 2.1 MG/DL (ref 1.6–2.4)
MCH RBC QN AUTO: 30 PG (ref 26–34)
MCHC RBC AUTO-ENTMCNC: 34 G/DL (ref 30–36.5)
MCV RBC AUTO: 88.3 FL (ref 80–99)
NRBC # BLD: 0 K/UL (ref 0–0.01)
NRBC BLD-RTO: 0 PER 100 WBC
PHOSPHATE SERPL-MCNC: 2.3 MG/DL (ref 2.6–4.7)
PLATELET # BLD AUTO: 186 K/UL (ref 150–400)
PMV BLD AUTO: 11.5 FL (ref 8.9–12.9)
POTASSIUM SERPL-SCNC: 3.5 MMOL/L (ref 3.5–5.1)
RBC # BLD AUTO: 3.83 M/UL (ref 4.1–5.7)
SODIUM SERPL-SCNC: 136 MMOL/L (ref 136–145)
VALPROATE SERPL-MCNC: 76 UG/ML (ref 50–100)
WBC # BLD AUTO: 10.1 K/UL (ref 4.1–11.1)

## 2024-01-24 PROCEDURE — 36415 COLL VENOUS BLD VENIPUNCTURE: CPT

## 2024-01-24 PROCEDURE — 6360000002 HC RX W HCPCS: Performed by: PSYCHIATRY & NEUROLOGY

## 2024-01-24 PROCEDURE — 93010 ELECTROCARDIOGRAM REPORT: CPT | Performed by: INTERNAL MEDICINE

## 2024-01-24 PROCEDURE — 6360000002 HC RX W HCPCS: Performed by: INTERNAL MEDICINE

## 2024-01-24 PROCEDURE — 83735 ASSAY OF MAGNESIUM: CPT

## 2024-01-24 PROCEDURE — 2580000003 HC RX 258: Performed by: INTERNAL MEDICINE

## 2024-01-24 PROCEDURE — 80048 BASIC METABOLIC PNL TOTAL CA: CPT

## 2024-01-24 PROCEDURE — 6370000000 HC RX 637 (ALT 250 FOR IP): Performed by: INTERNAL MEDICINE

## 2024-01-24 PROCEDURE — 2000000000 HC ICU R&B

## 2024-01-24 PROCEDURE — 93005 ELECTROCARDIOGRAM TRACING: CPT | Performed by: INTERNAL MEDICINE

## 2024-01-24 PROCEDURE — 94003 VENT MGMT INPAT SUBQ DAY: CPT

## 2024-01-24 PROCEDURE — 80164 ASSAY DIPROPYLACETIC ACD TOT: CPT

## 2024-01-24 PROCEDURE — 6370000000 HC RX 637 (ALT 250 FOR IP): Performed by: NURSE PRACTITIONER

## 2024-01-24 PROCEDURE — 85027 COMPLETE CBC AUTOMATED: CPT

## 2024-01-24 PROCEDURE — 84100 ASSAY OF PHOSPHORUS: CPT

## 2024-01-24 PROCEDURE — 36600 WITHDRAWAL OF ARTERIAL BLOOD: CPT

## 2024-01-24 RX ORDER — HALOPERIDOL 5 MG/ML
5 INJECTION INTRAMUSCULAR ONCE
Status: COMPLETED | OUTPATIENT
Start: 2024-01-24 | End: 2024-01-24

## 2024-01-24 RX ORDER — HALOPERIDOL 5 MG/ML
5 INJECTION INTRAMUSCULAR
Status: DISCONTINUED | OUTPATIENT
Start: 2024-01-24 | End: 2024-02-09

## 2024-01-24 RX ADMIN — MIDAZOLAM HYDROCHLORIDE 2 MG: 1 INJECTION, SOLUTION INTRAMUSCULAR; INTRAVENOUS at 04:28

## 2024-01-24 RX ADMIN — ACETAMINOPHEN 650 MG: 325 TABLET ORAL at 07:54

## 2024-01-24 RX ADMIN — TRAZODONE HYDROCHLORIDE 100 MG: 50 TABLET ORAL at 07:54

## 2024-01-24 RX ADMIN — HALOPERIDOL LACTATE 5 MG: 5 INJECTION, SOLUTION INTRAMUSCULAR at 22:59

## 2024-01-24 RX ADMIN — HALOPERIDOL LACTATE 5 MG: 5 INJECTION, SOLUTION INTRAMUSCULAR at 20:26

## 2024-01-24 RX ADMIN — VALPROIC ACID 1000 MG: 250 SOLUTION ORAL at 06:21

## 2024-01-24 RX ADMIN — ACETAMINOPHEN 650 MG: 325 TABLET ORAL at 02:23

## 2024-01-24 RX ADMIN — Medication 100 MG: at 07:54

## 2024-01-24 RX ADMIN — MIDODRINE HYDROCHLORIDE 5 MG: 5 TABLET ORAL at 02:12

## 2024-01-24 RX ADMIN — MIDAZOLAM HYDROCHLORIDE 2 MG: 1 INJECTION, SOLUTION INTRAMUSCULAR; INTRAVENOUS at 20:26

## 2024-01-24 RX ADMIN — HALOPERIDOL LACTATE 5 MG: 5 INJECTION, SOLUTION INTRAMUSCULAR at 07:58

## 2024-01-24 RX ADMIN — MIDAZOLAM HYDROCHLORIDE 2 MG: 1 INJECTION, SOLUTION INTRAMUSCULAR; INTRAVENOUS at 07:59

## 2024-01-24 RX ADMIN — Medication 20 MG: at 07:54

## 2024-01-24 RX ADMIN — HALOPERIDOL LACTATE 5 MG: 5 INJECTION, SOLUTION INTRAMUSCULAR at 00:03

## 2024-01-24 RX ADMIN — ENOXAPARIN SODIUM 40 MG: 100 INJECTION SUBCUTANEOUS at 08:04

## 2024-01-24 RX ADMIN — MIDODRINE HYDROCHLORIDE 5 MG: 5 TABLET ORAL at 14:24

## 2024-01-24 RX ADMIN — VALPROIC ACID 1000 MG: 250 SOLUTION ORAL at 14:24

## 2024-01-24 RX ADMIN — TRAZODONE HYDROCHLORIDE 100 MG: 50 TABLET ORAL at 20:26

## 2024-01-24 RX ADMIN — MIDAZOLAM HYDROCHLORIDE 2 MG: 1 INJECTION, SOLUTION INTRAMUSCULAR; INTRAVENOUS at 15:58

## 2024-01-24 RX ADMIN — HALOPERIDOL LACTATE 5 MG: 5 INJECTION, SOLUTION INTRAMUSCULAR at 12:06

## 2024-01-24 RX ADMIN — SODIUM CHLORIDE, PRESERVATIVE FREE 10 ML: 5 INJECTION INTRAVENOUS at 20:40

## 2024-01-24 RX ADMIN — HALOPERIDOL LACTATE 5 MG: 5 INJECTION, SOLUTION INTRAMUSCULAR at 15:58

## 2024-01-24 RX ADMIN — VALPROIC ACID 1000 MG: 250 SOLUTION ORAL at 22:30

## 2024-01-24 RX ADMIN — HALOPERIDOL LACTATE 5 MG: 5 INJECTION, SOLUTION INTRAMUSCULAR at 16:56

## 2024-01-24 RX ADMIN — TRAZODONE HYDROCHLORIDE 100 MG: 50 TABLET ORAL at 14:24

## 2024-01-24 RX ADMIN — Medication 20 MG: at 20:39

## 2024-01-24 RX ADMIN — MIDAZOLAM HYDROCHLORIDE 2 MG: 1 INJECTION, SOLUTION INTRAMUSCULAR; INTRAVENOUS at 00:03

## 2024-01-24 RX ADMIN — SODIUM CHLORIDE, PRESERVATIVE FREE 10 ML: 5 INJECTION INTRAVENOUS at 07:54

## 2024-01-24 RX ADMIN — MIDODRINE HYDROCHLORIDE 5 MG: 5 TABLET ORAL at 20:26

## 2024-01-24 RX ADMIN — MIDAZOLAM HYDROCHLORIDE 2 MG: 1 INJECTION, SOLUTION INTRAMUSCULAR; INTRAVENOUS at 12:05

## 2024-01-24 RX ADMIN — MIDODRINE HYDROCHLORIDE 5 MG: 5 TABLET ORAL at 07:54

## 2024-01-24 RX ADMIN — HALOPERIDOL LACTATE 5 MG: 5 INJECTION, SOLUTION INTRAMUSCULAR at 04:28

## 2024-01-24 ASSESSMENT — PULMONARY FUNCTION TESTS
PIF_VALUE: 10
PIF_VALUE: 16
PIF_VALUE: 20
PIF_VALUE: 18
PIF_VALUE: 17
PIF_VALUE: 16
PIF_VALUE: 10

## 2024-01-24 ASSESSMENT — PAIN SCALES - GENERAL
PAINLEVEL_OUTOF10: 0

## 2024-01-24 NOTE — PROGRESS NOTES
Palliative Medicine   [place] [id number] [phone] (COPE)        [facility] [phone] (COPE)    Palliative Medicine spoke with ICU Attending- please refer to attending MD note, however, per conversation ICU Attending had with Legal Guardian Eden, at this time Eden is wanting further psychiatric treatments- she also shares that she would want patient reintubated for agitation.    [facility]- offering emotional support, also offered/asked if there was a time today for our team to call and touch base with her about plan and offer support- Eden e-mailed back saying that today was not a good day for phones, and that she had her questions answered by the ICU physician- she said call her if there is anything urgent, but does not feel like a call from our team is needed at this time. Eden has my contact information and knows our team is available for support if needed-     DARRELL has also received e-mail from his OP  Jayson asking for updates- will plan to contact him and update him on plan of care, see below:    DARRELL called Jayson, the patient's OP - informed him that from a psychiatry standpoint, patient could be optimized further for psychiatric treatment and Eden per conversations w/ ICU, would like further interventions for psychiatric illness including intubation for agitation. Jayson has contact information for case management.     At this time, Palliative will sign off- SW will remain available for emotional support for family and/or participate in additional interdisciplinary discussions if needed- but will defer to ICU team and Psychiatry at this time for plan of care.     Thank you for including Palliative Medicine in the care of Roge Cowan \"Isreal\"      Darling Torres [facility]

## 2024-01-24 NOTE — CARE COORDINATION
Transition of Care Plan:    Palliative, Ethics, Psych following.  CM received call from Arpan with [facility] regarding level 2 screening.  Per Arpan, the Level 2 timeframe has  but can be initiated again if needed.     RUR: 18% Moderate  Prior Level of Functioning: Needed total assistance with ADL's  Disposition: TBD  Caregiver Contact: Sister Eden Montalvo   Discharge Caregiver contacted prior to discharge? Yes  Care Conference needed? Yes  Barriers to discharge:    Medical Stability   Patient with a history of Paranoid Schizophrenia was transferred to [facility] from [facility] for higher level of care.   Patient has been  intubated on a versed gtt. To be able to preform basic care.   Per notes patient is admitted under a TDO committed for 60 days. CM spoke with Judge Carrie Rachel [phone] and per Judge Rachel no additional paper work is needed.   Contacts:   Sister Eden Montalvo [phone]  PCP Dr Dmitry Motta [phone]  Psych: [place] ACT Team [phone] Dr Olivas   with ACT team: Jayson Bustos [phone]  [place] ACT clinician :Geeta [phone]   Lorene Pabon [phone]  APS: Geno  [phone]

## 2024-01-24 NOTE — CONSULTS
PSYCHIATRY CONSULT PROGRESS NOTE:    REASON FOR CONSULT:  Agitation    Interval History  01/24/2024  Overnight patient was reported to be moving from side to side, but no agitation or behaviors necessitating PRN medications.  Upon my evaluation, Isreal remains intubated and sedated. A full mental status exam is not possible at this time.    01/23/2024  Upon my evaluation, Mr. Cowan remains intubated and sedated. A full mental status exam is not possible.  All hands meeting of patient's care team today to discuss case and treatment plan.    01/22/2024  Upon my evaluation, Mr. Cowan, \"Isreal\" is intubated and sedated. He shifts infrequently, but appears in no acute distress.  I spoke with intensivist to discuss patient's case and steps forward. I reached out to palliative care at     1/21/24:  Met with nursing staff; patient is planned for weaning today and extubation tomorrow. He continues to shift positions at times but appears comfortable. He was intubated and sedated at the time of exam. Appears no change in mental status or behavior over past several days.    1/20/24-   Mr. Cowan remains intubated and sedated, though he is occasionally opening his eyes and moving around. He appears comfortable and in no distress. The plan is for possible extubation in the coming days, possibly Monday or Tuesday. QTc on EKG today was stable at 470.     1/19/24-  VPA level was subtherapeutic at 37 this morning at 0421, indicating we have plenty of room to safely optimize the Depakene as a tool to alleviate agitation. Mr. Cowan remains intubated and sedated, again appearing comfortable and without distress.   Interdisciplinary team meeting again today with ethics, palliative care, critical care, case management, psychiatry, as well as the pt's sister/guardian, Eden. We reviewed goals of care, likely outcomes given end-stage, treatment refractory schizophrenia, and all parties are in agreement that the focus will be on     Will increase Depakene from 250mg TID to 500mg BID for increase in total daily dose from 750mg to 1000mg.     01/15/2024  Discussed reducing reliance on benzos and consolidating antipsychotics to just one if possible.    D/C prn zyprexa  Recommended increase haldol from 5mg IV q6h to q4h. Obtain EKG to f/u QTC and be vigilant about monitoring VS, especially temp.  Start depakene  250mg po q8h in planning to further increase it to decrease agitation.  Plan would be to stabilize patient to undergo ECT.    01/13/2024  Not a candidate for inpatient psychiatry at this time due to his skin breakdown is now a tunneling wound.    Continue 1:1 nursing  Psychiatry to follow  Continue current care with Court ordered treatments including ECT.      Lety Larson MD  1/24/2024

## 2024-01-24 NOTE — PROGRESS NOTES
Palliative Medicine  Patient Name: Roge Cowan  YOB: 1970  MRN: [id number]  Age: 53 y.o.  Gender: male    Date of Initial Consult: 1/15/24  Date of Service: 1/24/2024  Time: 4:08 PM  Provider: Leanna Barnard MD  Hospital Day: 13  Admit Date: 1/12/2024  Referring Provider: DR ALLEN Beverly        Reasons for Consultation:  Goals of Care, Overwhelming Symptoms, and Psychosocial Distress    HISTORY OF PRESENT ILLNESS (HPI):   Roge Cowan \"Isreal\" (although his voices recently told him that he goes by \"Cruzito\"  is a 53 y.o. male with a past medical history of paranoid schizophrenia, catatonia  who was admitted on 1/12/2024 from [facility]. Pt admitted to [facility] 12/12/23 brought in by Police- he had not been engaging in self care, eating or drinking. He was in the fetal position and was getting pressure ulcers- stage IV on R ankle. Was getting court mandated ECT. He was transferred to [facility] on 1/12/24 for ICU care. He was refusing all care at [facility]. Requiring 4 pt restraints, sedation, IV abx.  He is tachypneic w/ desats, refusing sometimes even turning. Because could not care for patient, required sedation and intubation.     Psychosocial: Court declared pt legally incompetent on 10/2018 and sister Eden is guardian. They were both adopted at a young age. He has been living w/ her , and her son Rustam since leaving [facility] in 2012. Has been asked to leave many group homes. At baseline, he is low functioning- nonverbal mostly, but can walk. He spends most of day on bed or on the floor in fetal position. Does not interact much, does not listen to TV/radio. Cannot read or write.  Psych @ [place] ACT team: Dr. Brendon Gamble ACT Team: [phone] after hours number.    with ACT team: Jayson Andrea [phone] or [phone] (Samara as well)  : Lorene Pabon [phone]   PALLIATIVE DIAGNOSES:    Chronic paranoid schizophrenia and catatonia   Cellulitis on IV abx   Severe

## 2024-01-24 NOTE — PROGRESS NOTES
Advance Care Planning     General Advance Care Planning (ACP) Conversation    Date of Conversation: 1/12/2024  Conducted with: Patient with Decision Making Capacity   Healthcare Decision Maker: Next of Kin by law (only applies in absence of above) (name) Patrice    Healthcare Decision Maker:    Primary Decision Maker: Eden Montalvo - Brother/Sister - [phone]  Click here to complete Healthcare Decision Makers including selection of the Healthcare Decision Maker Relationship (ie \"Primary\").   Today we documented Decision Maker(s) consistent with Legal Next of Kin hierarchy.    Content/Action Overview:  Has NO ACP documents/care preferences - information provided, considering goals and options  Reviewed DNR/DNI and patient elects Full Code (Attempt Resuscitation)        Length of Voluntary ACP Conversation in minutes:  1 hour    Cristine Jimenez MD

## 2024-01-24 NOTE — PROGRESS NOTES
Physician Progress Note      PATIENT:               CHOCO BASILIO  CSN #:                  [id number]  :                       1970  ADMIT DATE:       2024 7:38 PM  DISCH DATE:  RESPONDING  PROVIDER #:        Cristine Jimenez MD          QUERY TEXT:    Patient transferred from OSH with ankle cellulitis. ICU History and Physical   documented sepsis. Pt had WBC of 5.9, temp to 94.8, HR of 50-81 and RR 11-24   on the day of transfer/admission. In order to support the diagnosis of sepsis,   please include additional clinical indicators in your documentation. Or   please document if the diagnosis of sepsis has been ruled out after further   study.    The medical record reflects the following:    Risk Factors: 53-year-old male with PMH of schizophrenia with catatonia   admitted from OSH with ankle cellulitis  Clinical Indicators:  -- First 24 hours of admission: WBC - 5.9, temp as low as 94.8, HR = 50-81, RR   = 11-24  -- ICU H&P documented: \"Sepsis: Right ankle cellulitis and stage IV pressure   ulcer wound with drainage\"  -- Infectious Diseases documented: \"Severe cellulitis/wound right ankle\"  Treatment: IV Abx (cefepime, ceftriaxone, vancomycin), IVF (LR bolus x 4   liters), ICU admission & monitoring, serial lab monitoring, Infectious   Diseases consult    Thank-you,  Arpan Pabon RN, CCDS, CRCR  Clinical   [email or web address]  [phone]  You can also contact me via [employer].  Options provided:  -- Sepsis present at the time of transfer from OSH as evidenced by, Please   document evidence.  -- Sepsis at the time of transfer from OSH was ruled out after study  -- Other - I will add my own diagnosis  -- Disagree - Not applicable / Not valid  -- Disagree - Clinically unable to determine / Unknown  -- Refer to Clinical Documentation Reviewer    PROVIDER RESPONSE TEXT:    Sepsis was present at the time of transfer from OSH as evidenced by    Query created by: Cm  Arpan on 1/23/2024 5:34 PM      Electronically signed by:  Cristine Jimenez MD 1/24/2024 5:30 PM

## 2024-01-24 NOTE — PROGRESS NOTES
[facility]     ICU TEAM Progress Note           Name: Roge Cowan   : 1970   MRN: [id number]   Date: 2024          ICU PROBLEM LIST   -Chronic paranoid schizophrenia and catatonia-concern for treatment resistant/failure  -Acute respiratory insufficiency, respiratory depression   -Pressure ulcers/lower extremity wound  -Severe malnutrition     HISTORY OF PRESENT ILLNESS:     53-year-old male history of treatment resistant schizophrenia, who initially presented to hospital in police custody for mental health problem.  While admitted he is noted to be isolated, selectively interactions, noted to have withdrawn and sullen affect, requiring restraints and refusing needed treatment.  He subsequently developed pressure ulcers and wounds on his lower extremities.    Given need for more anxiolytics/antipsychotics he was transferred to [facility] to be observed in the ICU while undergoing treatment, and medication changes by psychiatrist.  During ICU stay, requiring ketamine, Precedex, he continued to be resistant to care and on 1/15 he was intubated and sedated with propofol.  He was placed on Haldol, Depakote, and was subsequently placed on a Versed drip.  Following discussions with the ethics committee, psychiatrist, and critical care team-patient was started to have overall poor prognosis with no quality of life improving treatment options as ECT was thought to have likely minute/minimal impact. Plan was discussed with patient sister for comfort measures and plan to terminally extubate on mon/tue.     No acute events overnight, patient remains on Versed at 9-10.  No additional agitation reported.   -- passed SBT,    Overnight Versed weaned down to 3 as Depakote and Haldol doses increased,  This a.m. failed SBT. Will continue weaning Versed and retrial of SBT.    SUBJECTIVE:   As above     NEUROLOGICAL:      Paranoid schizophrenia, with periods of catatonia,  DATA: Personally reviewed  Recent Labs     01/22/24  0230 01/24/24  0624   WBC 5.2 10.1   HGB 11.7* 11.5*   HCT 35.6* 33.8*    186     Recent Labs     01/22/24  0230 01/24/24  0624    136   K 3.9 3.5    106   CO2 25 21   BUN 12 13   MG 2.0 2.1   PHOS 3.2 2.3*     No results for input(s): \"TP\", \"ALB\", \"GLOB\", \"AML\" in the last 72 hours.    Invalid input(s): \"SGOT\", \"GPT\", \"AP\", \"TBIL\", \"AMYP\", \"LPSE\"  No results for input(s): \"INR\", \"APTT\" in the last 72 hours.    Invalid input(s): \"PTP\"   Invalid input(s): \"PHI\", \"PCO2I\", \"PO2I\", \"FIO2I\"  No results for input(s): \"CPK\", \"CKMB\" in the last 72 hours.    Invalid input(s): \"TROIQ\", \"BNPP\"    Ventilator Settings:  Mode Rate Tidal Volume Pressure FiO2 PEEP          5 cm H2O         Peak airway pressure:      Minute ventilation:          MEDS: Reviewed    Chest X-Ray:  Reviewed      CRITICAL CARE CONSULTANT NOTE  I had a face to face encounter with the patient, reviewed and interpreted patient data including clinical events, labs, images, vital signs, I/O's, and examined patient.  I have discussed the case and the plan and management of the patient's care with the consulting services, the bedside nurses and the respiratory therapist.      NOTE OF PERSONAL INVOLVEMENT IN CARE   This patient has a high probability of imminent, clinically significant deterioration, which requires the highest level of preparedness to intervene urgently. I participated in the decision-making and personally managed or directed the management of the following life and organ supporting interventions that required my frequent assessment to treat or prevent imminent deterioration.    I personally spent 55 minutes of critical care time.  This is time spent at this critically ill patient's bedside actively involved in patient care as well as the coordination of care.  This does not include any procedural time which has been billed separately.    Cristine Gan

## 2024-01-24 NOTE — BSMART NOTE
BSMART Liaison Team Note     LOS:  12 days     Patient goal(s) for today: Patient goal(s) for today: take medications as prescribed, make needs known in an appropriate manner  BSMART Liaison team focus/goals: assess needs, provide support and education, coordinate care     Progress note: Writer met with pt face to face on the medical unit at [facility]. Pt is intubated and sedated. Writer cannot engage at this time.    Writer spoke with pt's primary nurse Tracey. RN reported that  interdisciplinary care conference was held regarding pt's complex case. At this time, pt's guardian/sister Eden, wishes to pursue psychiatric interventions.     Per Palliative Care- palliative signing off because pt's guardian's wishes. Pt's guardian is not ready for comfort care.    Barriers to Discharge: Medical and psychiatric clearance    Outpatient provider(s):  Mavis KYLE  Psych: Dr. Olivas: [phone]   CM: Jayson Bustos [phone]  Clinician: Geeta [phone]    Insurance info/prescription coverage: MEDICARE PART A AND B , [facility] [place]AN OF [place]       Diagnosis: Refractory schizophrenia with catatonia     Plan:  Psychiatry and BSMART Liaison to follow daily. Please defer to psychiatry and medical team for details on discharge plan and disposition.    Follow up Psych Consult placed? No .   Psychiatrist updated? No        Participating treatment team members: JOSEPH Wilson MSW Student Intern

## 2024-01-24 NOTE — PROGRESS NOTES
Palliative Medicine   [place] [id number] [phone] (COPE)        [facility] [phone] ([place])      Palliative Medicine SW participated in interdisciplinary Care Conference on 1/23 w/ Psychiatry, ICU, administration, Ethics, Palliative, etc.-     Very thorough and thoughtful discussion between team members. We reviewed the plan of care to date and patient's progress towards meaningful recovery- we reviewed the basic tenets of quality of life for Isreal including eating chips, oranges, pizza, smoking cigarettes.     Plan made for ICU, Psychiatry, and Palliative to meet together and discuss plan and any changes of care plan w/ Eden directly as an interdisciplinary team.     Eden did e-mail this writer on 1/24- sharing her anxiety of current situation, and wanting medical update from Physicians. Information will be forwarded to ICU team.     Thank you for including Palliative Medicine in the care of Roge Torres LCSW

## 2024-01-24 NOTE — PROGRESS NOTES
I participated in the IDT meeting where the plan of care for Roge Cowan was discussed on [facility] 7S2 INTENSIVE CARE. I reviewed the patient's medical record prior to this meeting.    We will continue to follow and assess for spiritual/emotional support during this hospitalization.    Please contact  paging service for any immediate needs.      _____________________________  Reyna Peter M.Div., M.S., B.C.C.  Staff

## 2024-01-24 NOTE — PROGRESS NOTES
Update Note     An IDT/care team meeting was initiated on 1/23 following discussion with the psychiatrist about prior plan for comfort care measures and  Compassionate extubation which was scheduled for January 22 -January 23.    Following discussion with the psychiatrist, there was some concerns that Mr. Cowan's current condition is not terminal, and multiple other treatment options needed to be pursued prior to a conclusion of refractory/resistant catatonic schizophrenia.  Per psychiatrist his treatment plans, goals and reason for transfer to [facility] remain unchanged -which are better control of agitation to ultimately electroconvulsive therapy.    A very thoughtful and thorough discussion between team members including palliative medicine, psychiatry, ICU, administration, ethics - underwent on 1/23.   We discussed possible treatment option, quality of life, goals of care and path forward for treatment as well as discussions with Eden patient's sister.  Plan was made for ICU, psychiatry, and palliative team to meet together and conclude on a plan.     This a.m., I discussed with Eden.  We started the conversation assessing her current health, her understanding of Isreal's health and plan.   Eden recalls the discussion with the team last week -about his quality of life, prior poor response to therapy, limited treatment options, and plans for comfort measures -including for extubation..   Eden expressed being extremely anxious (PTSD, requiring additional anxiolytics prescribed by her psychiatrist), and discomfort with the plans that were discussed last week with the team. She reports avoiding making a decision this week, because she did not feel comfortable and was not ready to withdraw care/undergo a compassionate extubation for Isreal.   She reports that Isreal has had many ups and downs, but has responded in the past to electroconvulsive therapy.  She understands that at times he will require restraints,  medications and reorientation in order to give him the treatment he needs. This is how he was treated in the past, and had periods of recovery/improvement.  She feels the major changes with his medication regimen including exclusion of Clozaril from his treatment is responsible for the current challenge with his treatment, and has delayed his recovery.   She recalls discussing with Isreal in late November through early December about continued treatment, and about aggressive care including ECT, at which point he told her he wanted to continue treatment, and live.     I notified Eden about the meeting with care team on 1/23, we discussed that the psychiatrist felt there were other treatment options and had reservations about comfort measures.  We discussed possible management of his agitation, with ultimate plan to undergo ECT.  Eden seemed relieved by this plan.  She would like him to continue receiving treatment with a goal to undergo ECT and note for any improvement.    We discussed restraints, tube feeds, extubation and reintubation.  She would like ISREAL to continue receiving tube feeds, she would like Isreal to be restrained if he is at risk to himself while undergoing treatment for agitation, she understands plan for possible extubation in the next 24 to 48 hours.  She agrees with reintubation if he is unable to protect his airway due to sedative medications, but would not want him intubated for other respiratory conditions including infections.      I updated the palliative care team, and the psychiatrist who agrees with the plan.  Attempts were made to transfer patient to a facility where he can get inpatient ECT including [facility] (no availabilities at those facilities).  Will continue weaning medications and control agitation.

## 2024-01-25 ENCOUNTER — APPOINTMENT (OUTPATIENT)
Facility: HOSPITAL | Age: 54
End: 2024-01-25
Attending: INTERNAL MEDICINE
Payer: MEDICARE

## 2024-01-25 LAB
ANION GAP SERPL CALC-SCNC: 7 MMOL/L (ref 5–15)
BUN SERPL-MCNC: 12 MG/DL (ref 6–20)
BUN/CREAT SERPL: 27 (ref 12–20)
CALCIUM SERPL-MCNC: 8.3 MG/DL (ref 8.5–10.1)
CHLORIDE SERPL-SCNC: 107 MMOL/L (ref 97–108)
CO2 SERPL-SCNC: 23 MMOL/L (ref 21–32)
CREAT SERPL-MCNC: 0.44 MG/DL (ref 0.7–1.3)
EKG ATRIAL RATE: 63 BPM
EKG DIAGNOSIS: NORMAL
EKG P AXIS: 37 DEGREES
EKG P-R INTERVAL: 162 MS
EKG Q-T INTERVAL: 468 MS
EKG QRS DURATION: 80 MS
EKG QTC CALCULATION (BAZETT): 478 MS
EKG R AXIS: 31 DEGREES
EKG T AXIS: 42 DEGREES
EKG VENTRICULAR RATE: 63 BPM
ERYTHROCYTE [DISTWIDTH] IN BLOOD BY AUTOMATED COUNT: 13.7 % (ref 11.5–14.5)
GLUCOSE SERPL-MCNC: 103 MG/DL (ref 65–100)
HCT VFR BLD AUTO: 30.8 % (ref 36.6–50.3)
HGB BLD-MCNC: 10 G/DL (ref 12.1–17)
MAGNESIUM SERPL-MCNC: 2 MG/DL (ref 1.6–2.4)
MCH RBC QN AUTO: 29.6 PG (ref 26–34)
MCHC RBC AUTO-ENTMCNC: 32.5 G/DL (ref 30–36.5)
MCV RBC AUTO: 91.1 FL (ref 80–99)
NRBC # BLD: 0 K/UL (ref 0–0.01)
NRBC BLD-RTO: 0 PER 100 WBC
PHOSPHATE SERPL-MCNC: 2.4 MG/DL (ref 2.6–4.7)
PLATELET # BLD AUTO: 142 K/UL (ref 150–400)
PMV BLD AUTO: 12 FL (ref 8.9–12.9)
POTASSIUM SERPL-SCNC: 3.3 MMOL/L (ref 3.5–5.1)
RBC # BLD AUTO: 3.38 M/UL (ref 4.1–5.7)
SODIUM SERPL-SCNC: 137 MMOL/L (ref 136–145)
WBC # BLD AUTO: 7.8 K/UL (ref 4.1–11.1)

## 2024-01-25 PROCEDURE — 36415 COLL VENOUS BLD VENIPUNCTURE: CPT

## 2024-01-25 PROCEDURE — 6360000002 HC RX W HCPCS: Performed by: INTERNAL MEDICINE

## 2024-01-25 PROCEDURE — 83735 ASSAY OF MAGNESIUM: CPT

## 2024-01-25 PROCEDURE — 6370000000 HC RX 637 (ALT 250 FOR IP): Performed by: NURSE PRACTITIONER

## 2024-01-25 PROCEDURE — 2580000003 HC RX 258: Performed by: INTERNAL MEDICINE

## 2024-01-25 PROCEDURE — 80048 BASIC METABOLIC PNL TOTAL CA: CPT

## 2024-01-25 PROCEDURE — 2000000000 HC ICU R&B

## 2024-01-25 PROCEDURE — 6370000000 HC RX 637 (ALT 250 FOR IP): Performed by: INTERNAL MEDICINE

## 2024-01-25 PROCEDURE — 6360000002 HC RX W HCPCS: Performed by: PSYCHIATRY & NEUROLOGY

## 2024-01-25 PROCEDURE — 85027 COMPLETE CBC AUTOMATED: CPT

## 2024-01-25 PROCEDURE — 84100 ASSAY OF PHOSPHORUS: CPT

## 2024-01-25 PROCEDURE — 94003 VENT MGMT INPAT SUBQ DAY: CPT

## 2024-01-25 PROCEDURE — 51798 US URINE CAPACITY MEASURE: CPT

## 2024-01-25 PROCEDURE — 74018 RADEX ABDOMEN 1 VIEW: CPT

## 2024-01-25 PROCEDURE — 93005 ELECTROCARDIOGRAM TRACING: CPT | Performed by: INTERNAL MEDICINE

## 2024-01-25 PROCEDURE — 71045 X-RAY EXAM CHEST 1 VIEW: CPT

## 2024-01-25 PROCEDURE — 93010 ELECTROCARDIOGRAM REPORT: CPT | Performed by: INTERNAL MEDICINE

## 2024-01-25 RX ORDER — MIDAZOLAM HYDROCHLORIDE 1 MG/ML
2 INJECTION INTRAMUSCULAR; INTRAVENOUS EVERY 4 HOURS
Status: DISCONTINUED | OUTPATIENT
Start: 2024-01-26 | End: 2024-01-27

## 2024-01-25 RX ORDER — MIDAZOLAM HYDROCHLORIDE 1 MG/ML
2 INJECTION INTRAMUSCULAR; INTRAVENOUS EVERY 4 HOURS
Status: DISCONTINUED | OUTPATIENT
Start: 2024-01-25 | End: 2024-01-25

## 2024-01-25 RX ADMIN — ENOXAPARIN SODIUM 40 MG: 100 INJECTION SUBCUTANEOUS at 08:19

## 2024-01-25 RX ADMIN — MIDAZOLAM HYDROCHLORIDE 2 MG: 1 INJECTION, SOLUTION INTRAMUSCULAR; INTRAVENOUS at 16:09

## 2024-01-25 RX ADMIN — MIDODRINE HYDROCHLORIDE 5 MG: 5 TABLET ORAL at 08:19

## 2024-01-25 RX ADMIN — SODIUM CHLORIDE, PRESERVATIVE FREE 10 ML: 5 INJECTION INTRAVENOUS at 08:19

## 2024-01-25 RX ADMIN — HALOPERIDOL LACTATE 5 MG: 5 INJECTION, SOLUTION INTRAMUSCULAR at 08:19

## 2024-01-25 RX ADMIN — MIDAZOLAM HYDROCHLORIDE 2 MG: 1 INJECTION, SOLUTION INTRAMUSCULAR; INTRAVENOUS at 04:14

## 2024-01-25 RX ADMIN — POTASSIUM BICARBONATE 40 MEQ: 782 TABLET, EFFERVESCENT ORAL at 06:21

## 2024-01-25 RX ADMIN — TRAZODONE HYDROCHLORIDE 100 MG: 50 TABLET ORAL at 13:30

## 2024-01-25 RX ADMIN — MIDODRINE HYDROCHLORIDE 5 MG: 5 TABLET ORAL at 02:16

## 2024-01-25 RX ADMIN — HALOPERIDOL LACTATE 5 MG: 5 INJECTION, SOLUTION INTRAMUSCULAR at 04:14

## 2024-01-25 RX ADMIN — VALPROIC ACID 1000 MG: 250 SOLUTION ORAL at 22:09

## 2024-01-25 RX ADMIN — HALOPERIDOL LACTATE 5 MG: 5 INJECTION, SOLUTION INTRAMUSCULAR at 11:45

## 2024-01-25 RX ADMIN — SODIUM CHLORIDE, PRESERVATIVE FREE 10 ML: 5 INJECTION INTRAVENOUS at 19:57

## 2024-01-25 RX ADMIN — MIDAZOLAM HYDROCHLORIDE 2 MG: 1 INJECTION, SOLUTION INTRAMUSCULAR; INTRAVENOUS at 00:27

## 2024-01-25 RX ADMIN — HALOPERIDOL LACTATE 5 MG: 5 INJECTION, SOLUTION INTRAMUSCULAR at 13:48

## 2024-01-25 RX ADMIN — VALPROIC ACID 1000 MG: 250 SOLUTION ORAL at 13:30

## 2024-01-25 RX ADMIN — MIDAZOLAM HYDROCHLORIDE 2 MG: 1 INJECTION, SOLUTION INTRAMUSCULAR; INTRAVENOUS at 08:19

## 2024-01-25 RX ADMIN — Medication 100 MG: at 08:19

## 2024-01-25 RX ADMIN — MIDODRINE HYDROCHLORIDE 5 MG: 5 TABLET ORAL at 19:51

## 2024-01-25 RX ADMIN — MIDAZOLAM HYDROCHLORIDE 2 MG: 1 INJECTION, SOLUTION INTRAMUSCULAR; INTRAVENOUS at 19:51

## 2024-01-25 RX ADMIN — TRAZODONE HYDROCHLORIDE 100 MG: 50 TABLET ORAL at 08:19

## 2024-01-25 RX ADMIN — MIDAZOLAM HYDROCHLORIDE 2 MG: 1 INJECTION, SOLUTION INTRAMUSCULAR; INTRAVENOUS at 11:45

## 2024-01-25 RX ADMIN — MIDODRINE HYDROCHLORIDE 5 MG: 5 TABLET ORAL at 13:30

## 2024-01-25 RX ADMIN — Medication 20 MG: at 08:19

## 2024-01-25 RX ADMIN — HALOPERIDOL LACTATE 5 MG: 5 INJECTION, SOLUTION INTRAMUSCULAR at 16:09

## 2024-01-25 RX ADMIN — TRAZODONE HYDROCHLORIDE 100 MG: 50 TABLET ORAL at 19:51

## 2024-01-25 RX ADMIN — HALOPERIDOL LACTATE 5 MG: 5 INJECTION, SOLUTION INTRAMUSCULAR at 19:51

## 2024-01-25 RX ADMIN — HALOPERIDOL LACTATE 5 MG: 5 INJECTION, SOLUTION INTRAMUSCULAR at 00:27

## 2024-01-25 RX ADMIN — VALPROIC ACID 1000 MG: 250 SOLUTION ORAL at 06:21

## 2024-01-25 ASSESSMENT — PAIN SCALES - GENERAL
PAINLEVEL_OUTOF10: 0

## 2024-01-25 ASSESSMENT — PULMONARY FUNCTION TESTS
PIF_VALUE: 15
PIF_VALUE: 16

## 2024-01-25 NOTE — BSMART NOTE
BSMART Liaison Team Note     LOS:  13     Patient goal(s) for today: take medications as prescribed, make needs known in an appropriate manner.  BSMART Liaison team focus/goals: assess MH needs, provide therapeutic support, brief therapy, and education, as needed.  Assist medical care management team with recommendations for coordination of care.    Progress note: Liaison met with pt, FTF, on the medical floor with [facility].  Pt remains intubated.  According to pts RN, Claudia, pt has been off of sedation for 24hrs with a plan to extubate, this afternoon.  Liaison will continue to monitor and support.        Barriers to Discharge: Medical and psychiatric clearance     Outpatient provider(s):  Mavis KYLE  Psych: Dr. Olivas: [phone]   CM: Jayson Bustos [phone]  Clinician: Geeta [phone]     Insurance info/prescription coverage: MEDICARE PART A AND B , [facility] [place]       Diagnosis: Refractory schizophrenia with catatonia      Plan:  Psychiatry and BSMART Liaison to follow daily.  Please defer to psychiatry and medical team for details on discharge plan and disposition.  Chart review indicates Palliative Care is signing off as pts guardian is not ready for comfort care.    Follow up Psych Consult placed? No .   Psychiatrist updated? No      Participating treatment team members: Mildred Wilson LCSW Susan Dabney, LCSW (Beth)  BSMART Liaison  Available on [employer]

## 2024-01-25 NOTE — CONSULTS
PSYCHIATRY CONSULT PROGRESS NOTE:    REASON FOR CONSULT:  Agitation    Interval History  01/25/2024  Patient was extubated successfully today. He remains in bilateral hand restraints. He remains overly somnolent, has NG tube.  Upon my evaluation, he is unable to participate in my exam.    01/24/2024  Overnight patient was reported to be moving from side to side, but no agitation or behaviors necessitating PRN medications.  Upon my evaluation, Isreal remains intubated and sedated. A full mental status exam is not possible at this time.    01/23/2024  Upon my evaluation, Mr. Cowan remains intubated and sedated. A full mental status exam is not possible.  All hands meeting of patient's care team today to discuss case and treatment plan.    01/22/2024  Upon my evaluation, Mr. Cowan, \"Isreal\" is intubated and sedated. He shifts infrequently, but appears in no acute distress.  I spoke with intensivist to discuss patient's case and steps forward. I reached out to palliative care at     1/21/24:  Met with nursing staff; patient is planned for weaning today and extubation tomorrow. He continues to shift positions at times but appears comfortable. He was intubated and sedated at the time of exam. Appears no change in mental status or behavior over past several days.    1/20/24-   Mr. Cowan remains intubated and sedated, though he is occasionally opening his eyes and moving around. He appears comfortable and in no distress. The plan is for possible extubation in the coming days, possibly Monday or Tuesday. QTc on EKG today was stable at 470.     1/19/24-  VPA level was subtherapeutic at 37 this morning at 0421, indicating we have plenty of room to safely optimize the Depakene as a tool to alleviate agitation. Mr. Cowan remains intubated and sedated, again appearing comfortable and without distress.   Interdisciplinary team meeting again today with ethics, palliative care, critical care, case management, psychiatry, as well

## 2024-01-25 NOTE — PROGRESS NOTES
01/25/24 0808   B: Both Spontaneous Awakening and Breathing Trials   Was Patient Receiving Mechanical Ventilation Yes   Safety Screening Spontaneous Breathing Trial (SBT) Proceed with SBT - no exclusion criteria met   RT Notified Ready for SBT Yes   Spontaneous  mL   Spontaneous Breathing Trial (SBT) Outcome SBT Passed   Patient Meet Extubation Criteria Yes   Provider Ordered Extubation Yes   Weaning Parameters   Spontaneous Breathing Trial Complete Yes   Respiratory Rate Observed 19   Ve 7.8   RSBI 41       1219: Patient extubated to 4LNC

## 2024-01-25 NOTE — PROGRESS NOTES
[facility]     ICU TEAM Progress Note           Name: Roge Cowan   : 1970   MRN: [id number]   Date: 2024          ICU PROBLEM LIST   -Chronic paranoid schizophrenia and catatonia-concern for treatment resistant/failure  -Acute respiratory insufficiency, respiratory depression   -Pressure ulcers/lower extremity wound  -Severe malnutrition     HISTORY OF PRESENT ILLNESS:     53-year-old male history of treatment resistant schizophrenia, who initially presented to hospital in police custody for mental health problem.  While admitted he is noted to be isolated, selectively interactions, noted to have withdrawn and sullen affect, requiring restraints and refusing needed treatment.  He subsequently developed pressure ulcers and wounds on his lower extremities.    Given need for more anxiolytics/antipsychotics he was transferred to [facility] to be observed in the ICU while undergoing treatment, and medication changes by psychiatrist.  During ICU stay, requiring ketamine, Precedex, he continued to be resistant to care and on 1/15 he was intubated and sedated with propofol.  He was placed on Haldol, Depakote, and was subsequently placed on a Versed drip.  Following discussions with the ethics committee, psychiatrist, and critical care team-patient was started to have overall poor prognosis with no quality of life improving treatment options as ECT was thought to have likely minute/minimal impact. Plan was discussed with patient sister for comfort measures and plan to terminally extubate on mon/tue.     No acute events overnight, patient remains on Versed at 9-10.  No additional agitation reported.   -- passed SBT,    Overnight Versed weaned down to 3 as Depakote and Haldol doses increased,  This a.m. failed SBT. Will continue weaning Versed and retrial of SBT.   extubated to room air, comfortable, no agitation.    SUBJECTIVE:   As above     NEUROLOGICAL:       Paranoid schizophrenia, with periods of catatonia, agitation, (agitation controlled)  Per psychiatry team -  -- Psychiatry consulted, appreciate recommendations.  -- Continue regimen as per psychiatrist   -will continue Haldol 5 mg IV every 4 hours, Versed 2 mg IV every 2 hours, Depakote 1000 mg every 8 hours, and trazodone p.o. 3 times daily.  Goal eventually is for ECT -attempted transfer to [facility], [facility], if not possible eventually when agitation is stable will transfer to [facility] for ECT.  Currently also undergoing evaluation/possibility of ECT therapy at this facility.  --For increasing agitation, would recommend trial of as needed 5 mg Haldol, if no improvement trial of Thorazine 25 to 50 mg.      PULMONOLOGY:     Acute respiratory insufficiency (status post extubation)  S/p mechanical ventilation,  Protective vent settings, Passed SBT for 2 days  --Plan extubate when performs better on SBT.     CARDIOVASCULAR:     Hypotension, suspect related to medications (improved/resolved for over 48 hours)  --Continue midodrine  -- Continue tube feeds     GASTROINTESTINAL:     Ileus (improved)  -- Resume tube feeds     RENAL/ELECTROLYTE/FLUIDS:     MANI     ENDOCRINE:     MANI    HEMATOLOGY/ONCOLOGY:     MANI     ID/MICRO:      MANI      SURGICAL/MSK/DERM/ENT:         ICU DAILY CHECKLIST      Code Status: Full code  DVT Prophylaxis: Lovenox  T/L/D: Peripheral IVs, ETT tube  SUP: Pepcid  Diet: Regular  Activity Level: Bedrest  ABCDEF Bundle/Checklist Completed: Yes  Disposition: ICU for 24 hours, consider transferring to medical floor.  Multidisciplinary Rounds Completed: Yes  Patient/Family Updated: Will update Sister Eden        POD:  * No surgery found *    S/P:       Active Problem List:     [unfilled]    Past Medical History:      has a past medical history of Psychiatric disorder, Psychotic disorder (HCC), and Withdrawal syndrome (HCC).    Past Surgical History:      has a past surgical history that

## 2024-01-25 NOTE — PROGRESS NOTES
4 Eyes Skin Assessment     NAME:  Roge Cowan  YOB: 1970  MEDICAL RECORD NUMBER:  [id number]    The patient is being assessed for  Other      I agree that at least one RN has performed a thorough Head to Toe Skin Assessment on the patient. ALL assessment sites listed below have been assessed.      Areas assessed by both nurses:    Head, Face, Ears, Shoulders, Back, Chest, Arms, Elbows, Hands, Sacrum. Buttock, Coccyx, Ischium, and Legs. Feet and Heels        Does the Patient have a Wound? Yes wound(s) were present on assessment. LDA wound assessment was Initiated and completed by RN       Rodrick Prevention initiated by RN: Yes  Wound Care Orders initiated by RN: Yes    Pressure Injury (Stage 3,4, Unstageable, DTI, NWPT, and Complex wounds) if present, place Wound referral order by RN under : No    New Ostomies, if present place, Ostomy referral order under : No     Nurse 1 eSignature: Electronically signed by Luke Wiggins RN on 1/25/24 at 4:49 AM EST    **SHARE this note so that the co-signing nurse can place an eSignature**    Nurse 2 eSignature: Electronically signed by Meka Maldonado RN on 1/25/24 at 5:06 AM EST

## 2024-01-25 NOTE — PROGRESS NOTES
day. Total calories including propofol is~1055 per day or roughly 15 kcal/kg.      Nutritionally Significant Medications:  Pepcid, Effer-K, B1      Estimated Daily Nutrient Needs:  Energy Requirements Based On: Kcal/kg  Weight Used for Energy Requirements: Current (66.4 kg)  Energy (kcal/day): 8630-2798 (25-30 kcal/kg)  Weight Used for Protein Requirements: Current  Protein (g/day):  (1.3-1.5g/kg)  Method Used for Fluid Requirements: 1 ml/kcal  Fluid (ml/day):      Nutrition Related Findings:   Edema: Right lower extremity, Left lower extremity   Edema Generalized: +2        RLE Edema: Non-pitting, +1  LLE Edema: Non-pitting, +1    Last BM: 01/25/24    Wounds:   Wound Type: Stage IV      Current Nutrition Therapies:  Diet: NPO  Nutrition Support: Vital AF 1.2 @ 70 ml/hr with 100 ml water flush q 4 hr      Anthropometric Measures:  Height: 183.9 cm (6' 0.4\")  Ideal Body Weight (IBW): 180 lbs (82 kg)       Current Body Weight: 72.9 kg (160 lb 11.5 oz), 89.3 % IBW. Weight Source: Bed Scale  Current BMI (kg/m2): 21.6                               Wt Readings from Last 10 Encounters:   01/24/24 72.9 kg (160 lb 11.5 oz)           Nutrition Diagnosis:   Inadequate oral intake, Inadequate protein-energy intake related to psychological cause or life stress as evidenced by nutrition support - enteral nutrition, intubation  Increased nutrient needs related to increase demand for energy/nutrients as evidenced by wounds    Nutrition Interventions:   Food and/or Nutrient Delivery: Start Tube Feeding  Nutrition Education/Counseling: No recommendation at this time  Coordination of Nutrition Care: Continue to monitor while inpatient, Interdisciplinary Rounds       Goals:  Previous Goal Met: Goal(s) Achieved  Goals:  (EN to meet at least 85% estimated needs x 5-7 days.)       Nutrition Monitoring and Evaluation:   Behavioral-Environmental Outcomes: None Identified  Food/Nutrient Intake Outcomes: Enteral Nutrition  Intake/Tolerance  Physical Signs/Symptoms Outcomes: Biochemical Data, GI Status, Skin, Fluid Status or Edema    Discharge Planning:    Too soon to determine     Yara Kam RD CNSC  Available via [employer]

## 2024-01-26 LAB
EKG ATRIAL RATE: 61 BPM
EKG DIAGNOSIS: NORMAL
EKG P AXIS: 28 DEGREES
EKG P-R INTERVAL: 162 MS
EKG Q-T INTERVAL: 422 MS
EKG QRS DURATION: 82 MS
EKG QTC CALCULATION (BAZETT): 424 MS
EKG R AXIS: 23 DEGREES
EKG T AXIS: 41 DEGREES
EKG VENTRICULAR RATE: 61 BPM

## 2024-01-26 PROCEDURE — 6360000002 HC RX W HCPCS: Performed by: PSYCHIATRY & NEUROLOGY

## 2024-01-26 PROCEDURE — 6360000002 HC RX W HCPCS: Performed by: INTERNAL MEDICINE

## 2024-01-26 PROCEDURE — 93005 ELECTROCARDIOGRAM TRACING: CPT | Performed by: INTERNAL MEDICINE

## 2024-01-26 PROCEDURE — 6370000000 HC RX 637 (ALT 250 FOR IP): Performed by: NURSE PRACTITIONER

## 2024-01-26 PROCEDURE — 93010 ELECTROCARDIOGRAM REPORT: CPT | Performed by: INTERNAL MEDICINE

## 2024-01-26 PROCEDURE — 6370000000 HC RX 637 (ALT 250 FOR IP): Performed by: INTERNAL MEDICINE

## 2024-01-26 PROCEDURE — 51701 INSERT BLADDER CATHETER: CPT

## 2024-01-26 PROCEDURE — 2580000003 HC RX 258: Performed by: INTERNAL MEDICINE

## 2024-01-26 PROCEDURE — 2000000000 HC ICU R&B

## 2024-01-26 PROCEDURE — 51798 US URINE CAPACITY MEASURE: CPT

## 2024-01-26 RX ORDER — TRAZODONE HYDROCHLORIDE 50 MG/1
100 TABLET ORAL NIGHTLY
Status: DISCONTINUED | OUTPATIENT
Start: 2024-01-27 | End: 2024-01-27

## 2024-01-26 RX ORDER — MIDODRINE HYDROCHLORIDE 5 MG/1
5 TABLET ORAL EVERY 8 HOURS
Status: CANCELLED | OUTPATIENT
Start: 2024-01-26

## 2024-01-26 RX ADMIN — MIDAZOLAM 2 MG: 1 INJECTION INTRAMUSCULAR; INTRAVENOUS at 08:23

## 2024-01-26 RX ADMIN — MIDAZOLAM 2 MG: 1 INJECTION INTRAMUSCULAR; INTRAVENOUS at 21:24

## 2024-01-26 RX ADMIN — ENOXAPARIN SODIUM 40 MG: 100 INJECTION SUBCUTANEOUS at 08:23

## 2024-01-26 RX ADMIN — MIDODRINE HYDROCHLORIDE 5 MG: 5 TABLET ORAL at 08:25

## 2024-01-26 RX ADMIN — HALOPERIDOL LACTATE 5 MG: 5 INJECTION, SOLUTION INTRAMUSCULAR at 18:33

## 2024-01-26 RX ADMIN — SODIUM CHLORIDE, PRESERVATIVE FREE 10 ML: 5 INJECTION INTRAVENOUS at 21:24

## 2024-01-26 RX ADMIN — MIDODRINE HYDROCHLORIDE 5 MG: 5 TABLET ORAL at 21:23

## 2024-01-26 RX ADMIN — HALOPERIDOL LACTATE 5 MG: 5 INJECTION, SOLUTION INTRAMUSCULAR at 08:23

## 2024-01-26 RX ADMIN — MIDODRINE HYDROCHLORIDE 5 MG: 5 TABLET ORAL at 02:06

## 2024-01-26 RX ADMIN — VALPROIC ACID 1000 MG: 250 SOLUTION ORAL at 14:13

## 2024-01-26 RX ADMIN — VALPROIC ACID 1000 MG: 250 SOLUTION ORAL at 06:04

## 2024-01-26 RX ADMIN — HALOPERIDOL LACTATE 5 MG: 5 INJECTION, SOLUTION INTRAMUSCULAR at 00:29

## 2024-01-26 RX ADMIN — TRAZODONE HYDROCHLORIDE 100 MG: 50 TABLET ORAL at 08:24

## 2024-01-26 RX ADMIN — HALOPERIDOL LACTATE 5 MG: 5 INJECTION, SOLUTION INTRAMUSCULAR at 11:43

## 2024-01-26 RX ADMIN — MIDAZOLAM 2 MG: 1 INJECTION INTRAMUSCULAR; INTRAVENOUS at 11:43

## 2024-01-26 RX ADMIN — VALPROIC ACID 1000 MG: 250 SOLUTION ORAL at 21:23

## 2024-01-26 RX ADMIN — Medication 100 MG: at 08:24

## 2024-01-26 RX ADMIN — HALOPERIDOL LACTATE 5 MG: 5 INJECTION, SOLUTION INTRAMUSCULAR at 04:02

## 2024-01-26 RX ADMIN — MIDAZOLAM 2 MG: 1 INJECTION INTRAMUSCULAR; INTRAVENOUS at 00:29

## 2024-01-26 RX ADMIN — HALOPERIDOL LACTATE 5 MG: 5 INJECTION, SOLUTION INTRAMUSCULAR at 10:03

## 2024-01-26 RX ADMIN — HALOPERIDOL LACTATE 5 MG: 5 INJECTION, SOLUTION INTRAMUSCULAR at 21:23

## 2024-01-26 RX ADMIN — MIDAZOLAM 2 MG: 1 INJECTION INTRAMUSCULAR; INTRAVENOUS at 04:02

## 2024-01-26 RX ADMIN — HALOPERIDOL LACTATE 5 MG: 5 INJECTION, SOLUTION INTRAMUSCULAR at 16:45

## 2024-01-26 RX ADMIN — MIDAZOLAM 2 MG: 1 INJECTION INTRAMUSCULAR; INTRAVENOUS at 16:46

## 2024-01-26 RX ADMIN — SODIUM CHLORIDE, PRESERVATIVE FREE 10 ML: 5 INJECTION INTRAVENOUS at 08:24

## 2024-01-26 RX ADMIN — MIDODRINE HYDROCHLORIDE 5 MG: 5 TABLET ORAL at 14:13

## 2024-01-26 ASSESSMENT — PAIN SCALES - GENERAL
PAINLEVEL_OUTOF10: 0
PAINLEVEL_OUTOF10: 0

## 2024-01-26 NOTE — PROGRESS NOTES
[facility]     ICU TEAM Progress Note           Name: Roge Cowan   : 1970   MRN: [id number]   Date: 2024          ICU PROBLEM LIST   -Chronic paranoid schizophrenia and catatonia-concern for treatment resistant/failure  -Acute respiratory insufficiency, respiratory depression   -Pressure ulcers/lower extremity wound  -Severe malnutrition     HISTORY OF PRESENT ILLNESS:     53-year-old male history of treatment resistant schizophrenia, who initially presented to hospital in police custody for mental health problem.  While admitted he is noted to be isolated, selectively interactions, noted to have withdrawn and sullen affect, requiring restraints and refusing needed treatment.  He subsequently developed pressure ulcers and wounds on his lower extremities.    Given need for more anxiolytics/antipsychotics he was transferred to [facility] to be observed in the ICU while undergoing treatment, and medication changes by psychiatrist.  During ICU stay, requiring ketamine, Precedex, he continued to be resistant to care and on 1/15 he was intubated and sedated with propofol.  He was placed on Haldol, Depakote, and was subsequently placed on a Versed drip.  Following discussions with the ethics committee, psychiatrist, and critical care team-patient was started to have overall poor prognosis with no quality of life improving treatment options as ECT was thought to have likely minute/minimal impact. Plan was discussed with patient sister for comfort measures and plan to terminally extubate on mon/tue.     No acute events overnight, patient remains on Versed at 9-10.  No additional agitation reported.   -- passed SBT,    Overnight Versed weaned down to 3 as Depakote and Haldol doses increased,  This a.m. failed SBT. Will continue weaning Versed and retrial of SBT.   extubated to room air, comfortable, no agitation.   no acute events overnight.  Requiring    Vital Signs:  BP (!) 89/47   Pulse 63   Temp 97 °F (36.1 °C) (Axillary)   Resp 22   Ht 1.839 m (6' 0.4\")   Wt 69.7 kg (153 lb 10.6 oz)   SpO2 100%   BMI 20.61 kg/m²      Temp (24hrs), Av.9 °F (36.6 °C), Min:97 °F (36.1 °C), Max:99.3 °F (37.4 °C)           Intake/Output:     Intake/Output Summary (Last 24 hours) at 2024 1256  Last data filed at 2024 0700  Gross per 24 hour   Intake 1930 ml   Output 350 ml   Net 1580 ml       Physical Exam:    LABS AND  DATA: Personally reviewed  Recent Labs     24  0624  0435   WBC 10.1 7.8   HGB 11.5* 10.0*   HCT 33.8* 30.8*    142*     Recent Labs     24  0624  0435    137   K 3.5 3.3*    107   CO2 21 23   BUN 13 12   MG 2.1 2.0   PHOS 2.3* 2.4*     No results for input(s): \"TP\", \"ALB\", \"GLOB\", \"AML\" in the last 72 hours.    Invalid input(s): \"SGOT\", \"GPT\", \"AP\", \"TBIL\", \"AMYP\", \"LPSE\"  No results for input(s): \"INR\", \"APTT\" in the last 72 hours.    Invalid input(s): \"PTP\"   Invalid input(s): \"PHI\", \"PCO2I\", \"PO2I\", \"FIO2I\"  No results for input(s): \"CPK\", \"CKMB\" in the last 72 hours.    Invalid input(s): \"TROIQ\", \"BNPP\"    Ventilator Settings:  Mode Rate Tidal Volume Pressure FiO2 PEEP          5 cm H2O         Peak airway pressure:      Minute ventilation:          MEDS: Reviewed    Chest X-Ray:  Reviewed      CRITICAL CARE CONSULTANT NOTE  I had a face to face encounter with the patient, reviewed and interpreted patient data including clinical events, labs, images, vital signs, I/O's, and examined patient.  I have discussed the case and the plan and management of the patient's care with the consulting services, the bedside nurses and the respiratory therapist.      NOTE OF PERSONAL INVOLVEMENT IN CARE   This patient has a high probability of imminent, clinically significant deterioration, which requires the highest level of preparedness to intervene urgently. I participated in the decision-making and

## 2024-01-26 NOTE — PROGRESS NOTES
Palliative Medicine   [phone] (COPE)        [facility] [phone] (COPE)      Palliative Medicine SW received e-mail from patient's Legal Guardian, Eden, endorsing that her phone is not working properly and she is having trouble receiving phone calls and messages. She asks that team PLEASE E-MAIL her for any urgent or important updates if team cannot reach her by phone.     SW sent e-mail information to ICU Attending, Psychiatrist, and ICU RN Director.     Eden is aware that I am here for emotional support- defer to ICU and psychiatry.    Thank you for including Palliative Medicine in the care of Roge Drewmaryse Torres LCSW

## 2024-01-26 NOTE — BSMART NOTE
BSMART Liaison Team Note     LOS:  14     Patient goal(s) for today: take medications as prescribed, make needs known in an appropriate manner.  BSMART Liaison team focus/goals: assess MH needs, provide therapeutic support, brief therapy, and education, as needed.  Assist medical care management team with recommendations for coordination of care.    Progress note: Liaison met with pt, FTF, on the medical floor.  Pt was extubated yesterday, has NG tube, no bilateral wrist restraints.  As Liaison entered pt's room, he was in the fetal position, appeared restless, agitated, and his sitter was trying to calm and reposition him.  Pt was not redirectable and continued to push his face into his pillow, eventually turning onto his stomach - possibly in an effort to dislodge his NG tube, which is bridled.  Pt's RN was notified and administered Haldol to help with agitation.  Pt was unable to participate in assessment.  Liaison will continue to monitor and support.       Per chart review: Psychiatrist monitoring pt for 24-48 more hours in the ICU, with hope of eventually weaning off IV meds and transitioning to p.o.       Barriers to Discharge: Medical and psychiatric clearance     Outpatient provider(s):  Mavis KYLE  Psych: Dr. Olivas: [phone]   CM: Jayson Bustos [phone]  Clinician: Geeta [phone]     Insurance info/prescription coverage: MEDICARE PART A AND B , [facility] [place]       Diagnosis: Refractory schizophrenia with catatonia      Plan:  Psychiatry and BSMART Liaison to follow daily.  Please defer to psychiatry and medical team for details on discharge plan and disposition.      Follow up Psych Consult placed? No .   Psychiatrist updated? No     Participating treatment team members: Mildred Wilson LCSW Susan ([facility] Center[facility]va Sheriff LCSW  BSMART Liaison  Available on [employer]

## 2024-01-26 NOTE — CONSULTS
MD    midodrine (PROAMATINE) tablet 5 mg, 5 mg, Orogastric, Q6H, Princess Beverly MD, 5 mg at 01/26/24 1413    thiamine tablet 100 mg, 100 mg, Per NG tube, Daily, Princess Beverly MD, 100 mg at 01/26/24 0824    sodium chloride flush 0.9 % injection 5-40 mL, 5-40 mL, IntraVENous, 2 times per day, Long Ball MD, 10 mL at 01/26/24 0824    sodium chloride flush 0.9 % injection 5-40 mL, 5-40 mL, IntraVENous, PRN, Long Ball MD    0.9 % sodium chloride infusion, , IntraVENous, PRN, Long Ball MD    potassium chloride (KLOR-CON) extended release tablet 40 mEq, 40 mEq, Oral, PRN **OR** potassium bicarb-citric acid (EFFER-K) effervescent tablet 40 mEq, 40 mEq, Oral, PRN **OR** potassium chloride 10 mEq/100 mL IVPB (Peripheral Line), 10 mEq, IntraVENous, PRN, Long Ball MD    magnesium sulfate 2000 mg in 50 mL IVPB premix, 2,000 mg, IntraVENous, PRN, Long Ball MD    ondansetron (ZOFRAN-ODT) disintegrating tablet 4 mg, 4 mg, Oral, Q8H PRN **OR** ondansetron (ZOFRAN) injection 4 mg, 4 mg, IntraVENous, Q6H PRN, Long Ball MD    acetaminophen (TYLENOL) tablet 650 mg, 650 mg, Oral, Q6H PRN, 650 mg at 01/24/24 8154 **OR** acetaminophen (TYLENOL) suppository 650 mg, 650 mg, Rectal, Q6H PRN, Long Ball MD    enoxaparin (LOVENOX) injection 40 mg, 40 mg, SubCUTAneous, Daily, Long Ball MD, 40 mg at 01/26/24 0823    polyethylene glycol (GLYCOLAX) packet 17 g, 17 g, Oral, Daily PRN, Long Ball MD    [Held by provider] ARIPiprazole ER (ABILIFY MAINTENA) 400 mg, 400 mg, IntraMUSCular, Q28 Days, Long Ball MD    sodium chloride flush 0.9 % injection 5-40 mL, 5-40 mL, IntraVENous, PRN, Misbah Mcmanus NP-C    albuterol (PROVENTIL) (2.5 MG/3ML) 0.083% nebulizer solution 2.5 mg, 2.5 mg, Nebulization, Q6H PRN, Misbah Mcmanus NP-C    senna (SENOKOT) 8.8 MG/5ML syrup 8.8 mg, 5 mL, Oral, BID PRN, Princess Beverly MD    MENTAL STATUS EXAM:  Roge Cowan is dressed in hospital gown, has NG tube  q8h.  Recommend decreasing ativan 5mg po q4h to 2mg po q4h, and hoping to d/c this altogether, as patient is already on versed IV. Would rather resort to further increase in the other agents instead to better control agitation.  Continue trazodone 100mg po tid for now.  Psychiatry will follow with you daily.    1/20/24-   -Continuing VPA, Haldol, and trazodone as ordered today, Ativan has been increased to 5mg Q4H. Plan is for extubation in the coming days per attending. D/w attending Dr. Beverly. Psychiatry will continue to follow daily.     1/19/24-   -Discussed with attending Dr. Beverly:  -Depakene has been increased to 750mg BID. Level this AM was subtherapeutic at 37, so will plan to continue to optimize VPA.  -Increasing trazodone to 100mg TID (with goal of using Dobhoff tube with bridle following extubation to allow for enteral meds), to promote sedation and comfort and allow the pt to safely receive care.   -Increased Haldol to 10mg Q4H with goal of alleviating agitation and improving underlying auditory hallucinations; the biggest risk is QT prolongation, which thus far has remained steady. Continue to monitor daily given risk of prolongation/TDP.     1/18/24-   -Discussed with attending Dr. Beverly as well as attending psychiatrist Dr. Larson - increasing VPA to 500mg TID, with VPA level tomorrow prior to AM dose.   -Continuing Haldol as ordered for now, though can potentially be increased in the coming days depending on QT prolongation.   -Increasing trazodone to 50mg TID to promote sedation - unclear at this point if the pt will tolerate enteral access following extubation, but after discussing with attending, given trazodone's favorable side effect profile when compared to other sedating agents, will attempt to employ trazodone to promote sedation and comfort in order for the pt to safely receive care, with plan of using Dobbhoff tube plus bridle following extubation in order for this to continue to be

## 2024-01-27 PROCEDURE — 2500000003 HC RX 250 WO HCPCS: Performed by: NURSE PRACTITIONER

## 2024-01-27 PROCEDURE — 6370000000 HC RX 637 (ALT 250 FOR IP): Performed by: INTERNAL MEDICINE

## 2024-01-27 PROCEDURE — 2000000000 HC ICU R&B

## 2024-01-27 PROCEDURE — 6360000002 HC RX W HCPCS: Performed by: PSYCHIATRY & NEUROLOGY

## 2024-01-27 PROCEDURE — 6360000002 HC RX W HCPCS: Performed by: INTERNAL MEDICINE

## 2024-01-27 PROCEDURE — 2580000003 HC RX 258: Performed by: INTERNAL MEDICINE

## 2024-01-27 PROCEDURE — 6370000000 HC RX 637 (ALT 250 FOR IP): Performed by: PSYCHIATRY & NEUROLOGY

## 2024-01-27 RX ORDER — VALPROIC ACID 250 MG/5ML
500 SOLUTION ORAL DAILY
Status: DISCONTINUED | OUTPATIENT
Start: 2024-01-28 | End: 2024-02-02

## 2024-01-27 RX ORDER — MIRTAZAPINE 15 MG/1
15 TABLET, ORALLY DISINTEGRATING ORAL NIGHTLY
Status: DISCONTINUED | OUTPATIENT
Start: 2024-01-27 | End: 2024-01-27

## 2024-01-27 RX ORDER — CHLORPROMAZINE HYDROCHLORIDE 25 MG/ML
100 INJECTION INTRAMUSCULAR ONCE
Status: COMPLETED | OUTPATIENT
Start: 2024-01-27 | End: 2024-01-27

## 2024-01-27 RX ORDER — MIRTAZAPINE 15 MG/1
15 TABLET, ORALLY DISINTEGRATING ORAL NIGHTLY
Status: DISCONTINUED | OUTPATIENT
Start: 2024-01-27 | End: 2024-01-28

## 2024-01-27 RX ORDER — VALPROIC ACID 250 MG/5ML
1500 SOLUTION ORAL
Status: DISCONTINUED | OUTPATIENT
Start: 2024-01-27 | End: 2024-02-02

## 2024-01-27 RX ORDER — MIDAZOLAM HYDROCHLORIDE 1 MG/ML
1 INJECTION INTRAMUSCULAR; INTRAVENOUS EVERY 4 HOURS
Status: DISCONTINUED | OUTPATIENT
Start: 2024-01-27 | End: 2024-01-28

## 2024-01-27 RX ADMIN — MIDAZOLAM 2 MG: 1 INJECTION INTRAMUSCULAR; INTRAVENOUS at 12:12

## 2024-01-27 RX ADMIN — VALPROIC ACID 1000 MG: 250 SOLUTION ORAL at 14:02

## 2024-01-27 RX ADMIN — MIDODRINE HYDROCHLORIDE 5 MG: 5 TABLET ORAL at 20:32

## 2024-01-27 RX ADMIN — HALOPERIDOL LACTATE 5 MG: 5 INJECTION, SOLUTION INTRAMUSCULAR at 00:39

## 2024-01-27 RX ADMIN — MIDAZOLAM 2 MG: 1 INJECTION INTRAMUSCULAR; INTRAVENOUS at 08:30

## 2024-01-27 RX ADMIN — HALOPERIDOL LACTATE 5 MG: 5 INJECTION, SOLUTION INTRAMUSCULAR at 14:01

## 2024-01-27 RX ADMIN — MIDAZOLAM 2 MG: 1 INJECTION INTRAMUSCULAR; INTRAVENOUS at 04:03

## 2024-01-27 RX ADMIN — HALOPERIDOL LACTATE 5 MG: 5 INJECTION, SOLUTION INTRAMUSCULAR at 23:53

## 2024-01-27 RX ADMIN — MIDAZOLAM 2 MG: 1 INJECTION INTRAMUSCULAR; INTRAVENOUS at 00:39

## 2024-01-27 RX ADMIN — HALOPERIDOL LACTATE 5 MG: 5 INJECTION, SOLUTION INTRAMUSCULAR at 16:13

## 2024-01-27 RX ADMIN — Medication 100 MG: at 08:29

## 2024-01-27 RX ADMIN — VALPROIC ACID 1000 MG: 250 SOLUTION ORAL at 06:28

## 2024-01-27 RX ADMIN — MIDODRINE HYDROCHLORIDE 5 MG: 5 TABLET ORAL at 08:29

## 2024-01-27 RX ADMIN — MIDAZOLAM 2 MG: 1 INJECTION INTRAMUSCULAR; INTRAVENOUS at 16:13

## 2024-01-27 RX ADMIN — HALOPERIDOL LACTATE 5 MG: 5 INJECTION, SOLUTION INTRAMUSCULAR at 12:12

## 2024-01-27 RX ADMIN — MIDAZOLAM 1 MG: 1 INJECTION INTRAMUSCULAR; INTRAVENOUS at 23:53

## 2024-01-27 RX ADMIN — HALOPERIDOL LACTATE 5 MG: 5 INJECTION, SOLUTION INTRAMUSCULAR at 21:05

## 2024-01-27 RX ADMIN — MIDAZOLAM 1 MG: 1 INJECTION INTRAMUSCULAR; INTRAVENOUS at 20:31

## 2024-01-27 RX ADMIN — HALOPERIDOL LACTATE 5 MG: 5 INJECTION, SOLUTION INTRAMUSCULAR at 11:21

## 2024-01-27 RX ADMIN — MIDODRINE HYDROCHLORIDE 5 MG: 5 TABLET ORAL at 14:10

## 2024-01-27 RX ADMIN — MIRTAZAPINE 15 MG: 15 TABLET, ORALLY DISINTEGRATING ORAL at 20:32

## 2024-01-27 RX ADMIN — HALOPERIDOL LACTATE 5 MG: 5 INJECTION, SOLUTION INTRAMUSCULAR at 04:03

## 2024-01-27 RX ADMIN — HALOPERIDOL LACTATE 5 MG: 5 INJECTION, SOLUTION INTRAMUSCULAR at 08:29

## 2024-01-27 RX ADMIN — VALPROIC ACID 1500 MG: 250 SOLUTION ORAL at 20:31

## 2024-01-27 RX ADMIN — CHLORPROMAZINE HYDROCHLORIDE 100 MG: 25 INJECTION INTRAMUSCULAR at 23:53

## 2024-01-27 RX ADMIN — HALOPERIDOL LACTATE 5 MG: 5 INJECTION, SOLUTION INTRAMUSCULAR at 20:31

## 2024-01-27 RX ADMIN — SODIUM CHLORIDE, PRESERVATIVE FREE 10 ML: 5 INJECTION INTRAVENOUS at 08:30

## 2024-01-27 RX ADMIN — ENOXAPARIN SODIUM 40 MG: 100 INJECTION SUBCUTANEOUS at 08:29

## 2024-01-27 RX ADMIN — SODIUM CHLORIDE, PRESERVATIVE FREE 10 ML: 5 INJECTION INTRAVENOUS at 20:32

## 2024-01-27 RX ADMIN — MIDODRINE HYDROCHLORIDE 5 MG: 5 TABLET ORAL at 04:03

## 2024-01-27 NOTE — PROGRESS NOTES
[facility]     ICU TEAM Progress Note           Name: Roge Cowan   : 1970   MRN: [id number]   Date: 2024          ICU PROBLEM LIST   -Chronic paranoid schizophrenia and treatment resistant catatonia  -Acute respiratory insufficiency, respiratory depression   -Pressure ulcers/lower extremity wound  -Severe malnutrition     HISTORY OF PRESENT ILLNESS:   53-year-old male history of treatment resistant schizophrenia, who initially presented to hospital in police custody for mental health problem.  While admitted he is noted to be isolated, selectively interactions, noted to have withdrawn and sullen affect, requiring restraints and refusing needed treatment.  He subsequently developed pressure ulcers and wounds on his lower extremities. Given need for more anxiolytics/antipsychotics he was transferred to [facility]  to be observed in the ICU while undergoing treatment, and medication changes by psychiatrist. During ICU stay, requiring ketamine, Precedex, he continued to be resistant to care and on 1/15 he was intubated and sedated with propofol.  He was placed on Haldol, Depakote, and was subsequently placed on a Versed drip.  Following discussions with the ethics committee, psychiatrist, and critical care team-patient was started to have overall poor prognosis with no quality of life improving treatment options as ECT was thought to have likely minute/minimal impact.     No acute events overnight, patient remains on Versed at 9-10.  No additional agitation reported.   -- passed SBT,    Overnight Versed weaned down to 3 as Depakote and Haldol doses increased,  This a.m. failed SBT. Will continue weaning Versed and retrial of SBT.   extubated to room air, comfortable, no agitation.   no acute events overnight.  Requiring reorientation, Haldol IV administered x 1 this morning.   Curled up in bed. Not interacting with examiner. No overt

## 2024-01-27 NOTE — CONSULTS
level of care (ICU) from inpatient psychiatry for further treatment of catatonia and associated agitation.  Reached out to [facility] to discuss potential for transfer. Will update with findings.      01/23/2024  Change haldol from 5mg IV q2h to q4h.   D/C ativan 5mg per ng tube. Instead, and because of ativan iv shortage, will go ahead and schedule midazolam 2mg IV q4h, timed to be combined with haldol 5mg IV q4h for managing agitation.  Will continue depakote 1000mg po q8h per ng tube as well as trazodone 100mg per ng tube tid.  Appreciate palliative care's input. Appreciate intensivist input.  Goal is to control agitation post extubation and examine clinical options forward for treatment of worsening catatonic symptoms; contemplating trial of combination of ECT and long acting injectible medication. Will work with the team and patient's POA to define this path forward better.  Psychiatry will follow daily with you.    01/22/2024  D/C topamax 100mg po bid to decrease risk of polypharmacy  Change haldol from 10mg q4h to 5mg q2h  Increase depakene from 750mg po q8h to 1000mg po q8h.  Recommend decreasing ativan 5mg po q4h to 2mg po q4h, and hoping to d/c this altogether, as patient is already on versed IV. Would rather resort to further increase in the other agents instead to better control agitation.  Continue trazodone 100mg po tid for now.  Psychiatry will follow with you daily.    1/20/24-   -Continuing VPA, Haldol, and trazodone as ordered today, Ativan has been increased to 5mg Q4H. Plan is for extubation in the coming days per attending. D/w attending Dr. Beverly. Psychiatry will continue to follow daily.     1/19/24-   -Discussed with attending Dr. Beverly:  -Depakene has been increased to 750mg BID. Level this AM was subtherapeutic at 37, so will plan to continue to optimize VPA.  -Increasing trazodone to 100mg TID (with goal of using Dobhoff tube with bridle following extubation to allow for enteral meds), to promote  to follow  Continue current care with Court ordered treatments including ECT.      Lety Larson MD  1/27/2024

## 2024-01-27 NOTE — PROGRESS NOTES
Physician Progress Note      PATIENT:               CHOCO BASILIO  CSN #:                  [id number]  :                       1970  ADMIT DATE:       2024 7:38 PM  DISCH DATE:  RESPONDING  PROVIDER #:        Cristine Jimenez MD          QUERY TEXT:    Patient transferred from OSH with ankle cellulitis. ICU History and Physical   documented sepsis. Pt had WBC of 5.9, temp to 94.8, HR of 50-81 and RR 11-24   on the day of transfer/admission. In order to support the diagnosis of sepsis   at the time of admission/transfer from OSH, please include additional clinical   indicators in your documentation. Or please document if the diagnosis of   sepsis at the time of admission/transfer from OSH has been ruled out after   further study.    The medical record reflects the following:    Risk Factors: 53-year-old male with PMH of schizophrenia with catatonia   admitted from OSH with ankle cellulitis  Clinical Indicators:  -- First 24 hours of admission: WBC - 5.9, temp as low as 94.8, HR = 50-81, RR   = 11-24  -- ICU H&P documented: \"Sepsis: Right ankle cellulitis and stage IV pressure   ulcer wound with drainage\"  -- Infectious Diseases documented: \"Severe cellulitis/wound right ankle\"  Treatment: IV Abx (cefepime, ceftriaxone, vancomycin), IVF (LR bolus x 4   liters), ICU admission & monitoring, serial lab monitoring, Infectious   Diseases consult    Thank-you,  Arpan Pabon RN, CCDS, CRCR  Clinical   [email or web address]  [phone]  You can also contact me via [employer].  Options provided:  -- Sepsis present at the time of transfer from OSH as evidenced by, please   document additional evidence, Please document additional evidence.  -- Sepsis at the time of admission/transfer from OSH was ruled out after study  -- Other - I will add my own diagnosis  -- Disagree - Not applicable / Not valid  -- Disagree - Clinically unable to determine / Unknown  -- Refer to Clinical

## 2024-01-28 LAB
ALBUMIN SERPL-MCNC: 2.2 G/DL (ref 3.5–5)
ALBUMIN/GLOB SERPL: 0.6 (ref 1.1–2.2)
ALP SERPL-CCNC: 125 U/L (ref 45–117)
ALT SERPL-CCNC: 31 U/L (ref 12–78)
ANION GAP SERPL CALC-SCNC: 3 MMOL/L (ref 5–15)
AST SERPL-CCNC: 19 U/L (ref 15–37)
BILIRUB SERPL-MCNC: 0.3 MG/DL (ref 0.2–1)
BUN SERPL-MCNC: 12 MG/DL (ref 6–20)
BUN/CREAT SERPL: 30 (ref 12–20)
CALCIUM SERPL-MCNC: 8.2 MG/DL (ref 8.5–10.1)
CHLORIDE SERPL-SCNC: 104 MMOL/L (ref 97–108)
CO2 SERPL-SCNC: 32 MMOL/L (ref 21–32)
CREAT SERPL-MCNC: 0.4 MG/DL (ref 0.7–1.3)
GLOBULIN SER CALC-MCNC: 3.4 G/DL (ref 2–4)
GLUCOSE SERPL-MCNC: 99 MG/DL (ref 65–100)
POTASSIUM SERPL-SCNC: 3.7 MMOL/L (ref 3.5–5.1)
PROT SERPL-MCNC: 5.6 G/DL (ref 6.4–8.2)
SODIUM SERPL-SCNC: 139 MMOL/L (ref 136–145)

## 2024-01-28 PROCEDURE — 6360000002 HC RX W HCPCS: Performed by: PSYCHIATRY & NEUROLOGY

## 2024-01-28 PROCEDURE — 6370000000 HC RX 637 (ALT 250 FOR IP): Performed by: INTERNAL MEDICINE

## 2024-01-28 PROCEDURE — 36415 COLL VENOUS BLD VENIPUNCTURE: CPT

## 2024-01-28 PROCEDURE — 80053 COMPREHEN METABOLIC PANEL: CPT

## 2024-01-28 PROCEDURE — 2580000003 HC RX 258: Performed by: INTERNAL MEDICINE

## 2024-01-28 PROCEDURE — 6370000000 HC RX 637 (ALT 250 FOR IP): Performed by: PSYCHIATRY & NEUROLOGY

## 2024-01-28 PROCEDURE — 6360000002 HC RX W HCPCS: Performed by: INTERNAL MEDICINE

## 2024-01-28 PROCEDURE — 2000000000 HC ICU R&B

## 2024-01-28 RX ORDER — HALOPERIDOL 5 MG/ML
10 INJECTION INTRAMUSCULAR 4 TIMES DAILY
Status: DISCONTINUED | OUTPATIENT
Start: 2024-01-28 | End: 2024-02-02

## 2024-01-28 RX ORDER — MIDAZOLAM HYDROCHLORIDE 1 MG/ML
2 INJECTION INTRAMUSCULAR; INTRAVENOUS EVERY 4 HOURS
Status: DISCONTINUED | OUTPATIENT
Start: 2024-01-28 | End: 2024-02-02

## 2024-01-28 RX ORDER — MIRTAZAPINE 15 MG/1
30 TABLET, ORALLY DISINTEGRATING ORAL NIGHTLY
Status: DISCONTINUED | OUTPATIENT
Start: 2024-01-28 | End: 2024-02-02

## 2024-01-28 RX ADMIN — MIDODRINE HYDROCHLORIDE 5 MG: 5 TABLET ORAL at 08:51

## 2024-01-28 RX ADMIN — HALOPERIDOL LACTATE 5 MG: 5 INJECTION, SOLUTION INTRAMUSCULAR at 12:06

## 2024-01-28 RX ADMIN — HALOPERIDOL LACTATE 5 MG: 5 INJECTION, SOLUTION INTRAMUSCULAR at 03:37

## 2024-01-28 RX ADMIN — HALOPERIDOL LACTATE 5 MG: 5 INJECTION, SOLUTION INTRAMUSCULAR at 15:02

## 2024-01-28 RX ADMIN — VALPROIC ACID 1500 MG: 250 SOLUTION ORAL at 21:05

## 2024-01-28 RX ADMIN — MIRTAZAPINE 30 MG: 15 TABLET, ORALLY DISINTEGRATING ORAL at 21:05

## 2024-01-28 RX ADMIN — ENOXAPARIN SODIUM 40 MG: 100 INJECTION SUBCUTANEOUS at 08:51

## 2024-01-28 RX ADMIN — SODIUM CHLORIDE, PRESERVATIVE FREE 40 ML: 5 INJECTION INTRAVENOUS at 08:52

## 2024-01-28 RX ADMIN — SODIUM CHLORIDE, PRESERVATIVE FREE 10 ML: 5 INJECTION INTRAVENOUS at 21:06

## 2024-01-28 RX ADMIN — HALOPERIDOL LACTATE 10 MG: 5 INJECTION, SOLUTION INTRAMUSCULAR at 16:35

## 2024-01-28 RX ADMIN — MIDAZOLAM 1 MG: 1 INJECTION INTRAMUSCULAR; INTRAVENOUS at 03:36

## 2024-01-28 RX ADMIN — MIDAZOLAM 2 MG: 1 INJECTION INTRAMUSCULAR; INTRAVENOUS at 16:36

## 2024-01-28 RX ADMIN — Medication 100 MG: at 08:51

## 2024-01-28 RX ADMIN — MIDODRINE HYDROCHLORIDE 5 MG: 5 TABLET ORAL at 03:37

## 2024-01-28 RX ADMIN — MIDAZOLAM 2 MG: 1 INJECTION INTRAMUSCULAR; INTRAVENOUS at 21:06

## 2024-01-28 RX ADMIN — HALOPERIDOL LACTATE 5 MG: 5 INJECTION, SOLUTION INTRAMUSCULAR at 07:30

## 2024-01-28 RX ADMIN — MIDAZOLAM 1 MG: 1 INJECTION INTRAMUSCULAR; INTRAVENOUS at 12:06

## 2024-01-28 RX ADMIN — MIDODRINE HYDROCHLORIDE 5 MG: 5 TABLET ORAL at 14:01

## 2024-01-28 RX ADMIN — MIDODRINE HYDROCHLORIDE 5 MG: 5 TABLET ORAL at 21:06

## 2024-01-28 RX ADMIN — VALPROIC ACID 500 MG: 250 SOLUTION ORAL at 08:52

## 2024-01-28 RX ADMIN — MIDAZOLAM 1 MG: 1 INJECTION INTRAMUSCULAR; INTRAVENOUS at 08:52

## 2024-01-28 RX ADMIN — HALOPERIDOL LACTATE 5 MG: 5 INJECTION, SOLUTION INTRAMUSCULAR at 08:52

## 2024-01-28 RX ADMIN — HALOPERIDOL LACTATE 10 MG: 5 INJECTION, SOLUTION INTRAMUSCULAR at 21:06

## 2024-01-28 ASSESSMENT — PAIN SCALES - GENERAL
PAINLEVEL_OUTOF10: 0

## 2024-01-28 NOTE — PROGRESS NOTES
[facility]     ICU TEAM Progress Note           Name: Roge Cowan   : 1970   MRN: [id number]   Date: 2024          ICU PROBLEM LIST   -Chronic paranoid schizophrenia and treatment resistant catatonia  -Acute respiratory insufficiency, respiratory depression   -Pressure ulcers/lower extremity wound  -Severe malnutrition     HISTORY OF PRESENT ILLNESS:   53-year-old male history of treatment resistant schizophrenia, who initially presented to hospital in police custody for mental health problem.  While admitted he is noted to be isolated, selectively interactions, noted to have withdrawn and sullen affect, requiring restraints and refusing needed treatment.  He subsequently developed pressure ulcers and wounds on his lower extremities. Given need for more anxiolytics/antipsychotics he was transferred to [facility]  to be observed in the ICU while undergoing treatment, and medication changes by psychiatrist. During ICU stay, requiring ketamine, Precedex, he continued to be resistant to care and on 1/15 he was intubated and sedated with propofol.  He was placed on Haldol, Depakote, and was subsequently placed on a Versed drip.  Following discussions with the ethics committee, psychiatrist, and critical care team-patient was started to have overall poor prognosis with no quality of life improving treatment options as ECT was thought to have likely minute/minimal impact.     No acute events overnight, patient remains on Versed at 9-10.  No additional agitation reported.   -- passed SBT,    Overnight Versed weaned down to 3 as Depakote and Haldol doses increased,  This a.m. failed SBT. Will continue weaning Versed and retrial of SBT.   extubated to room air, comfortable, no agitation.   no acute events overnight.  Requiring reorientation, Haldol IV administered x 1 this morning.   Curled up in bed. Not interacting with examiner. No overt distress    Little change. Had period of increased agitation last night for which he received thorazine. Transfer center contacted and is continuing to work on transfer to [facility]    SUBJECTIVE:   As above     NEUROLOGICAL:   Paranoid schizophrenia  Catatonia   Intermittent agitation   Psychiatry following and managing all psych med changes  Discussed with Dr Larson ([phone]) - midaz dosing to be decreased 01/27  Goal eventually is for ECT - attempted transfer to [facility], [facility] without acceptance  When possible will transfer to [facility] for ECT     PULMONOLOGY:  S/P intubation for required heavy sedation  Extubated 01/25  At risk for recurrent respiratory failure due to AMS   Monitor respiratory status  Continuous pulse ox  Supp O2 as needed     CARDIOVASCULAR:  Hypotension, resolved   Monitor  MAP goal > 60 mmHg  Cont midodrine     GASTROINTESTINAL:  Ileus, resolved  Protein-calorie malnutrition   Cont nutritional support - TF     RENAL/ELECTROLYTE/FLUIDS:  Mild hypokalemia   Monitor I/Os and Uo  Monitor renal function panel intermittently  Correct electrolytes as needed       ENDOCRINE:  No Acute issues   Monitor glu on chem panels  SSI if needed    HEMATOLOGY/ONCOLOGY:  Mild anemia without overt blood loss  Mild thrombocytopenia   Minimize phlebotomy  DVT ppx: LMWH  Usual transfusion triggers     ID/MICRO:  Ankle cellulitis - has completed antibiotics for this   Monitor     ICU DAILY CHECKLIST      Code Status: Full code  DVT Prophylaxis: Lovenox  T/L/D: Peripheral IVs  SUP: N/I  Diet: TF  Activity Level: AAAT  ABCDEF Bundle/Checklist Completed: Yes  Disposition: Remain in ICU  Multidisciplinary Rounds Completed: N/A (weekend)  Patient/Family Updated: pending      Past Medical History:   Reviewed       Past Surgical History:   Reviewed    Home Medications:   Reviewed      Allergies/Social/Family History:   reviewed      Objective:   Vital Signs:  BP (!) 94/54   Pulse 61   Temp 97.8 °F (36.6 °C) (Axillary)

## 2024-01-28 NOTE — CONSULTS
PSYCHIATRY CONSULT PROGRESS NOTE:    REASON FOR CONSULT:  Agitation    Interval History  01/28/2024  Nursing report that patient was agitated last night and received thorazine 100mg IM. Today he received haldol 5mg IV for agitation at 0730 and at 1502. I evaluated him three times today in the morning, in the early afternoon and in the evening.   He wouldn't engage with me and would immediately close his eyes. In the evening he would shake his head from side to side to answer 'no' to my questions. At times when he was hunched over in bed he did tell the sitter that he was praying. He wouldn't answer any questions for me regarding orientation, thought process or perception.    Touched base by phone with patient's sister, Eden, who is making decisions on his behalf. She says that some of his favorite foods are scrambled eggs, cheese, as well as oranges. She understands that his ability to swallow has to be assessed first.  She is comfortable with our current care, medications, and pursuing ECT.    01/27/2024  Received Haldol 5mg IV prn at 1121 and 1401.  Upon my evaluation patient with his remaining closed, appears to be blinking. He continues to NOT interact with me. He doesn't answer any of my questions or follow commands. He remains extubated and isn't in restraints.     01/26/2024  Patient remains extubated. He did receive haldol 4mg IV prn agitation this am.  Upon my evaluation, he was being cleaned as he'd had a bowel movement.  Isreal wasn't able to participate in my evaluation today.    01/25/2024  Patient was extubated successfully today. He remains in bilateral hand restraints. He remains overly somnolent, has NG tube.  Upon my evaluation, he is unable to participate in my exam.    01/24/2024  Overnight patient was reported to be moving from side to side, but no agitation or behaviors necessitating PRN medications.  Upon my evaluation, Isreal remains intubated and sedated. A full mental status exam is not

## 2024-01-28 NOTE — PROGRESS NOTES
2100: pt restless, flipped over on knees in praying position resistant to staff trying to reposition and clean patient. Pulled out IV, pulled off brief, pulled off BP cuff. Continues to escalate despite scheduled meds. PRN haldol IM haldol given to get IV back in.     2350: pt continued to escalate pulling at IV, brief, BP cuff, attempting to climb out of bed, pulling of gown and monitor. Thorazine ordered per Odell.    0030: pt more compliant and restful.

## 2024-01-29 LAB
ALBUMIN SERPL-MCNC: 2.6 G/DL (ref 3.5–5)
ALBUMIN/GLOB SERPL: 0.7 (ref 1.1–2.2)
ALP SERPL-CCNC: 128 U/L (ref 45–117)
ALT SERPL-CCNC: 28 U/L (ref 12–78)
ANION GAP SERPL CALC-SCNC: 2 MMOL/L (ref 5–15)
AST SERPL-CCNC: 16 U/L (ref 15–37)
BASOPHILS # BLD: 0.1 K/UL (ref 0–0.1)
BASOPHILS NFR BLD: 1 % (ref 0–1)
BILIRUB SERPL-MCNC: 0.3 MG/DL (ref 0.2–1)
BUN SERPL-MCNC: 11 MG/DL (ref 6–20)
BUN/CREAT SERPL: 21 (ref 12–20)
CALCIUM SERPL-MCNC: 8.8 MG/DL (ref 8.5–10.1)
CHLORIDE SERPL-SCNC: 102 MMOL/L (ref 97–108)
CO2 SERPL-SCNC: 32 MMOL/L (ref 21–32)
CREAT SERPL-MCNC: 0.53 MG/DL (ref 0.7–1.3)
DIFFERENTIAL METHOD BLD: ABNORMAL
EOSINOPHIL # BLD: 0.1 K/UL (ref 0–0.4)
EOSINOPHIL NFR BLD: 1 % (ref 0–7)
ERYTHROCYTE [DISTWIDTH] IN BLOOD BY AUTOMATED COUNT: 13.7 % (ref 11.5–14.5)
FERRITIN SERPL-MCNC: 72 NG/ML (ref 26–388)
FOLATE SERPL-MCNC: 12.7 NG/ML (ref 5–21)
GLOBULIN SER CALC-MCNC: 4 G/DL (ref 2–4)
GLUCOSE SERPL-MCNC: 95 MG/DL (ref 65–100)
HCT VFR BLD AUTO: 35.9 % (ref 36.6–50.3)
HGB BLD-MCNC: 11.6 G/DL (ref 12.1–17)
IMM GRANULOCYTES # BLD AUTO: 0.1 K/UL (ref 0–0.04)
IMM GRANULOCYTES NFR BLD AUTO: 1 % (ref 0–0.5)
LYMPHOCYTES # BLD: 1.8 K/UL (ref 0.8–3.5)
LYMPHOCYTES NFR BLD: 18 % (ref 12–49)
MAGNESIUM SERPL-MCNC: 1.9 MG/DL (ref 1.6–2.4)
MCH RBC QN AUTO: 29.4 PG (ref 26–34)
MCHC RBC AUTO-ENTMCNC: 32.3 G/DL (ref 30–36.5)
MCV RBC AUTO: 91.1 FL (ref 80–99)
MONOCYTES # BLD: 1.2 K/UL (ref 0–1)
MONOCYTES NFR BLD: 12 % (ref 5–13)
NEUTS SEG # BLD: 6.6 K/UL (ref 1.8–8)
NEUTS SEG NFR BLD: 67 % (ref 32–75)
NRBC # BLD: 0 K/UL (ref 0–0.01)
NRBC BLD-RTO: 0 PER 100 WBC
PHOSPHATE SERPL-MCNC: 3.6 MG/DL (ref 2.6–4.7)
PLATELET # BLD AUTO: 229 K/UL (ref 150–400)
PMV BLD AUTO: 10.9 FL (ref 8.9–12.9)
POTASSIUM SERPL-SCNC: 4.5 MMOL/L (ref 3.5–5.1)
PROT SERPL-MCNC: 6.6 G/DL (ref 6.4–8.2)
RBC # BLD AUTO: 3.94 M/UL (ref 4.1–5.7)
SODIUM SERPL-SCNC: 136 MMOL/L (ref 136–145)
T4 FREE SERPL-MCNC: 1.3 NG/DL (ref 0.8–1.5)
TSH SERPL DL<=0.05 MIU/L-ACNC: 1.4 UIU/ML (ref 0.36–3.74)
VIT B12 SERPL-MCNC: 899 PG/ML (ref 193–986)
WBC # BLD AUTO: 9.7 K/UL (ref 4.1–11.1)

## 2024-01-29 PROCEDURE — 82728 ASSAY OF FERRITIN: CPT

## 2024-01-29 PROCEDURE — 82607 VITAMIN B-12: CPT

## 2024-01-29 PROCEDURE — 6370000000 HC RX 637 (ALT 250 FOR IP): Performed by: PSYCHIATRY & NEUROLOGY

## 2024-01-29 PROCEDURE — 6360000002 HC RX W HCPCS: Performed by: PSYCHIATRY & NEUROLOGY

## 2024-01-29 PROCEDURE — 36415 COLL VENOUS BLD VENIPUNCTURE: CPT

## 2024-01-29 PROCEDURE — 82746 ASSAY OF FOLIC ACID SERUM: CPT

## 2024-01-29 PROCEDURE — 6370000000 HC RX 637 (ALT 250 FOR IP): Performed by: INTERNAL MEDICINE

## 2024-01-29 PROCEDURE — 85025 COMPLETE CBC W/AUTO DIFF WBC: CPT

## 2024-01-29 PROCEDURE — 84443 ASSAY THYROID STIM HORMONE: CPT

## 2024-01-29 PROCEDURE — 84439 ASSAY OF FREE THYROXINE: CPT

## 2024-01-29 PROCEDURE — 6360000002 HC RX W HCPCS: Performed by: INTERNAL MEDICINE

## 2024-01-29 PROCEDURE — 2580000003 HC RX 258: Performed by: INTERNAL MEDICINE

## 2024-01-29 PROCEDURE — 84100 ASSAY OF PHOSPHORUS: CPT

## 2024-01-29 PROCEDURE — 80053 COMPREHEN METABOLIC PANEL: CPT

## 2024-01-29 PROCEDURE — 1100000000 HC RM PRIVATE

## 2024-01-29 PROCEDURE — 83735 ASSAY OF MAGNESIUM: CPT

## 2024-01-29 RX ORDER — SENNA AND DOCUSATE SODIUM 50; 8.6 MG/1; MG/1
2 TABLET, FILM COATED ORAL 2 TIMES DAILY
Status: DISCONTINUED | OUTPATIENT
Start: 2024-01-29 | End: 2024-02-15 | Stop reason: HOSPADM

## 2024-01-29 RX ORDER — POLYETHYLENE GLYCOL 3350 17 G/17G
17 POWDER, FOR SOLUTION ORAL DAILY
Status: DISCONTINUED | OUTPATIENT
Start: 2024-01-29 | End: 2024-02-15 | Stop reason: HOSPADM

## 2024-01-29 RX ADMIN — MIDAZOLAM 2 MG: 1 INJECTION INTRAMUSCULAR; INTRAVENOUS at 08:15

## 2024-01-29 RX ADMIN — HALOPERIDOL LACTATE 10 MG: 5 INJECTION, SOLUTION INTRAMUSCULAR at 08:15

## 2024-01-29 RX ADMIN — MIDODRINE HYDROCHLORIDE 5 MG: 5 TABLET ORAL at 13:15

## 2024-01-29 RX ADMIN — MIDODRINE HYDROCHLORIDE 5 MG: 5 TABLET ORAL at 05:08

## 2024-01-29 RX ADMIN — SODIUM CHLORIDE, PRESERVATIVE FREE 40 ML: 5 INJECTION INTRAVENOUS at 08:16

## 2024-01-29 RX ADMIN — HALOPERIDOL LACTATE 10 MG: 5 INJECTION, SOLUTION INTRAMUSCULAR at 21:01

## 2024-01-29 RX ADMIN — MIDODRINE HYDROCHLORIDE 5 MG: 5 TABLET ORAL at 08:15

## 2024-01-29 RX ADMIN — Medication 100 MG: at 08:16

## 2024-01-29 RX ADMIN — HALOPERIDOL LACTATE 10 MG: 5 INJECTION, SOLUTION INTRAMUSCULAR at 16:38

## 2024-01-29 RX ADMIN — MIRTAZAPINE 30 MG: 15 TABLET, ORALLY DISINTEGRATING ORAL at 21:01

## 2024-01-29 RX ADMIN — MIDAZOLAM 2 MG: 1 INJECTION INTRAMUSCULAR; INTRAVENOUS at 21:01

## 2024-01-29 RX ADMIN — SODIUM CHLORIDE, PRESERVATIVE FREE 10 ML: 5 INJECTION INTRAVENOUS at 21:02

## 2024-01-29 RX ADMIN — MIDAZOLAM 2 MG: 1 INJECTION INTRAMUSCULAR; INTRAVENOUS at 00:30

## 2024-01-29 RX ADMIN — VALPROIC ACID 500 MG: 250 SOLUTION ORAL at 08:16

## 2024-01-29 RX ADMIN — ENOXAPARIN SODIUM 40 MG: 100 INJECTION SUBCUTANEOUS at 08:14

## 2024-01-29 RX ADMIN — MIDODRINE HYDROCHLORIDE 5 MG: 5 TABLET ORAL at 21:01

## 2024-01-29 RX ADMIN — HALOPERIDOL LACTATE 10 MG: 5 INJECTION, SOLUTION INTRAMUSCULAR at 12:54

## 2024-01-29 RX ADMIN — MIDAZOLAM 2 MG: 1 INJECTION INTRAMUSCULAR; INTRAVENOUS at 16:38

## 2024-01-29 RX ADMIN — MIDAZOLAM 2 MG: 1 INJECTION INTRAMUSCULAR; INTRAVENOUS at 05:08

## 2024-01-29 RX ADMIN — VALPROIC ACID 1500 MG: 250 SOLUTION ORAL at 21:00

## 2024-01-29 RX ADMIN — MIDAZOLAM 2 MG: 1 INJECTION INTRAMUSCULAR; INTRAVENOUS at 12:54

## 2024-01-29 ASSESSMENT — PAIN SCALES - GENERAL
PAINLEVEL_OUTOF10: 0

## 2024-01-29 ASSESSMENT — PAIN SCALES - WONG BAKER: WONGBAKER_NUMERICALRESPONSE: 0

## 2024-01-29 NOTE — CONSULTS
He doesn't follow commands or answer my questions  Speech is spontaneous, with patient repeating 'Benigno,' in low but audible voice.  I am unable to assess his thought process, though content, affect, mood, perception, attention, concentration, or memory.  His insight and judgment remain poor    ASSESSMENT AND PLAN: patient's case was discussed with intensivist, Dr. Abdi.  Catatonia  Schizophrenia    RECOMMENDATIONS:     01/29/2024  My recommendation remains for a trial of ECT for better and faster treatment of catatonic features of schizophrenia when agitation is better controlled.   I discussed with Intensivist need for a telemetry bed at least because of IV haldol and versed, management of NG tube.  Continue current medications of agitation and symptoms of schizophrenia:  Continue haldol 10mg IV qid, for treatment of schizophrenia and agitation.  Continue versed 2mg iv q4h, to further treat catatonia  Continue depakene NG 500mg qday and 1500mg qhs  Continue remeron 30mg qhs to treat depressive symptoms and promote appetite  Recommend 1:1 sitter for safety and re-direction, now that patient is extubated.  For agitation, recommend haldol 5mg IV q4h prn. If no IV access is available can give haldol 5mg IM Q4h prn agitation. For extreme agitation, can give Thorazine 100mg IM Q12H.  Appreciate recs from other consultant team members.    01/28/2024  Following medication regimen with noted changes for management of agitation and symptoms of schizophrenia  Increase haldol 5mg IV q4h, to 10mg qid  Will re-increase versed from 1 to 2mg iv q4h, to further treat catatonia  Continue depakene NG 500mg qday and 1500mg qhs  Increase remeron 15mg po qhs to 30mg qhs to treat depressive symptoms and promote appetite  Recommend 1:1 sitter for safety and re-direction, now that patient is extubated.  For agitation, recommend haldol 5mg IV q4h prn. If no IV access is available can give haldol 5mg IM Q4h prn agitation. For extreme  with goal of alleviating agitation and improving underlying auditory hallucinations; the biggest risk is QT prolongation, which thus far has remained steady. Continue to monitor daily given risk of prolongation/TDP.     1/18/24-   -Discussed with attending Dr. Beverly as well as attending psychiatrist Dr. Larson - increasing VPA to 500mg TID, with VPA level tomorrow prior to AM dose.   -Continuing Haldol as ordered for now, though can potentially be increased in the coming days depending on QT prolongation.   -Increasing trazodone to 50mg TID to promote sedation - unclear at this point if the pt will tolerate enteral access following extubation, but after discussing with attending, given trazodone's favorable side effect profile when compared to other sedating agents, will attempt to employ trazodone to promote sedation and comfort in order for the pt to safely receive care, with plan of using Dobbhoff tube plus bridle following extubation in order for this to continue to be possible.   -Will plan to participate in ongoing interdisciplinary team meetings, including with the pt's guardian (sister) tomorrow afternoon.     1/16/24-   Monitor EKG given risk of QT prolongation.   Will increase Depakene from 250mg TID to 500mg BID for increase in total daily dose from 750mg to 1000mg.     01/15/2024  Discussed reducing reliance on benzos and consolidating antipsychotics to just one if possible.    D/C prn zyprexa  Recommended increase haldol from 5mg IV q6h to q4h. Obtain EKG to f/u QTC and be vigilant about monitoring VS, especially temp.  Start depakene  250mg po q8h in planning to further increase it to decrease agitation.  Plan would be to stabilize patient to undergo ECT.    01/13/2024  Not a candidate for inpatient psychiatry at this time due to his skin breakdown is now a tunneling wound.    Continue 1:1 nursing  Psychiatry to follow  Continue current care with Court ordered treatments including ECT.    Lety CARRIZALES

## 2024-01-29 NOTE — PROGRESS NOTES
[facility]     ICU TEAM Progress Note           Name: Roge Cowan   : 1970   MRN: [id number]   Date: 2024          ICU PROBLEM LIST   -Chronic paranoid schizophrenia and treatment resistant catatonia  -Acute respiratory insufficiency, respiratory depression   -Pressure ulcers/lower extremity wound  -Severe malnutrition     HISTORY OF PRESENT ILLNESS:   53-year-old male history of treatment resistant schizophrenia, who initially presented to hospital in police custody for mental health problem.  While admitted he is noted to be isolated, selectively interactions, noted to have withdrawn and sullen affect, requiring restraints and refusing needed treatment.  He subsequently developed pressure ulcers and wounds on his lower extremities. Given need for more anxiolytics/antipsychotics he was transferred to [facility]  to be observed in the ICU while undergoing treatment, and medication changes by psychiatrist. During ICU stay, requiring ketamine, Precedex, he continued to be resistant to care and on 1/15 he was intubated and sedated with propofol.  He was placed on Haldol, Depakote, and was subsequently placed on a Versed drip.  Following discussions with the ethics committee, psychiatrist, and critical care team-patient was started to have overall poor prognosis with no quality of life improving treatment options as ECT was thought to have likely minute/minimal impact.     No acute events overnight, patient remains on Versed at 9-10.  No additional agitation reported.   -- passed SBT,    Overnight Versed weaned down to 3 as Depakote and Haldol doses increased,  This a.m. failed SBT. Will continue weaning Versed and retrial of SBT.   extubated to room air, comfortable, no agitation.   no acute events overnight.  Requiring reorientation, Haldol IV administered x 1 this morning.   Curled up in bed. Not interacting with examiner. No overt distress    separately.    Arpan Abdi MD  [facility] [place] [facility]  [facility] 4th floor [facility] phone: [phone]  [facility] 7th floor [facility] phone: [phone]  [facility] phone: [phone]  1/29/2024

## 2024-01-29 NOTE — CARE COORDINATION
Transition of Care Plan:    RUR: 18% Moderate   Prior Level of Functioning: Dependent with ADL's  Disposition: [facility]  DME needed: NA  Transportation at discharge: BLS  IM/IMM Medicare given: 1/29/24  Is patient a [facility] and connected with [facility]? No  Caregiver Contact: Sister Eden Maier [phone]  Discharge Caregiver contacted prior to discharge? Yes  Care Conference needed? Yes  Barriers to discharge:   Medical Stability  Need ECT treatments not available at [facility].  Transfer center notified and [facility] UR form completed and faxed to [phone]  CM spoke with patient's sister to update her on transfer.   CM emailed patient's sister Eden the Medicare IM letter conrad [email or web address]    Contacts:   Sister Eden Montalvo [phone]  PCP Dr Dmitry Motta [phone]  Psych: [facility] [phone] Dr Olivas   with ACT team: Jayson Bustos [phone]  [facility] clinician :Geeta [phone]   Lorene Pabon [phone]  [facility]: Geno  [phone]  Yoselin Manley RN,Care Management    3:30 pm Received notification from Transfer Center that neither [facility] or [facility] can accept patient, due to bed availability.   Yoselin Manley RN,Care Management

## 2024-01-29 NOTE — BSMART NOTE
BSMART Liaison Team Note     LOS:  17     Patient goal(s) for today: take medications as prescribed, make needs known in an appropriate manner.  BSMART Liaison team focus/goals: assess MH needs, provide therapeutic support, brief therapy, and education, as needed.  Assist medical care management team with recommendations for coordination of care.    Progress note: Liaison met with pt, FTF, on the medical floor, with sitter present.  He is lying in the fetal position, eyes closed, appears calm, comfortable, and in no acute distress.  NG tube in place, no restraints.  He does not open his eyes or engage with this writer when his name is called.  He will occasionally open his eyes for his sitter and tell him he is praying.  Per sitter, pt had a more restful night.   Chart review indicates [facility] with [facility].  Liaison will continue to monitor and support.        Barriers to Discharge: Medical clearance     Outpatient provider(s):  Mavis KYLE  Psych: Dr. Olivas: [phone]   CM: Jayson Bustos [phone]  Clinician: Geeta [phone]     Insurance info/prescription coverage: MEDICARE PART A AND B , [facility]AN OF [facility]       Diagnosis: Refractory schizophrenia with catatonia      Plan:  [facility] or [facility] with [facility] tx.  Psychiatry and BSMART Liaison to follow daily.  Please defer to psychiatry and medical team for details on discharge plan and disposition.       Follow up Psych Consult placed? Psychiatry following daily   Psychiatrist updated? No     Participating treatment team members: Mildred Wilson LCSW Susan ([facility] Sheriff LCSW  BSMART Liaison  Available on [email or web address]

## 2024-01-29 NOTE — H&P
History and Physical    Date of Service:  1/29/2024  Primary Care Provider: Cole Boyd MD  Source of information: Chart review    Chief Complaint: UTO due to patient's mental status    History of Presenting Illness/Hospital course:   Roge Cowan is a 53 y.o. male with schizophrenia and catatonia, s/p ECT x2 who was transferred from [facility] inpatient to [facility] ICU on 1/12/2024 for severe psychosis and episodes of catatonia. Pt was initially admitted to [facility] on 12/12/2023 after being taken there by police for evaluation for refusal of self-care. [facility] hospitalization included development of a R ankle Stage IV pressure wound that progressed to tendon exposure with purulent drainage, refusal of wound care, labs, vitals checks, oral medications, oral intake, and only took IM medications that were court ordered. Pt was seen by podiatry and ID who recommended vancomycin and cefepime. He did receive ECT x2 at [facility]. Pt was transferred to [facility] ICU for sedation and protective environment to ensure adequate care. He was started on ketamine and Precedex gtts. He required 4 point restraints. 1:1 sitter was ordered. He was intubated on 1/15 due to increasing agitation despite sedating meds.  ID, Psychiatry, Palliative Medicine, Ethics, and WOCN have all been consulted during pt's ICU stay at [facility]. Psychiatry recommended reducing reliance on BDZs and consolidating antipsychotics to just one if possible; Haldol IV was increased and Depakene was added. Ethics held an interdisciplinary meeting with all medical teams involved on 1/19. There is an open APS case against patient's sister/ Julie. Pt was extubated 1/25 and was in bilateral wrist restraints. DHT is in place and tube feeds are infusing per dietitian recs. Pt has periods of agitation for which he receives prn meds. He is currently on Haldol 10mg IV 4x daily, midazolam 2mg IV q4h, mirtazapine 30mg per NGT nightly, Depakene 1500mg per NGT nightly and  Result   Moderate stool burden. Mild gaseous distention of small bowel and   colon         XR CHEST PORTABLE   Final Result   1. No complicating features post intubation   2. Weighted tip feeding tube overlies the proximal stomach      XR ABDOMEN (KUB) (SINGLE AP VIEW)   Final Result   Dobbhoff tube tip projects over the fundus.         XR CHEST PORTABLE   Final Result      No acute process on portable chest.         XR CHEST PORTABLE   Final Result      Satisfactory endotracheal tube placement. Clear lungs.         XR CHEST 1 VIEW   Final Result      ET tube has been advanced, in satisfactory position. No acute cardiopulmonary   abnormality.      XR ABDOMEN (KUB) (SINGLE AP VIEW)   Final Result   Orogastric tube tip within the gastric lumen         XR CHEST PORTABLE   Final Result      No acute process on portable chest.            Notes reviewed from all clinical/nonclinical/nursing services involved in patient's clinical care. Care coordination discussions were held with appropriate clinical/nonclinical/ nursing providers based on care coordination needs.     Assessment/Plan:   Complex decision making was performed, which includes reviewing the patient's available past medical records, laboratory results, and imaging studies.    Catatonic schizophrenia  Acute psychosis  - admitted to [facility] 12/12/23; transferred from [facility] to [facility] ICU on 1/12/24 for sedation and protective environment to ensure adequate care  - s/p ketamine and Precedex gtts  - pt was intubated 1/15 and extubated 1/25  - Haldol 10mg IV 4x daily, midazolam 2mg IV q4h, mirtazapine 30mg per NGT nightly, Depakene 1500mg per NGT nightly and Depakene 500mg per NGT daily as per Psych  - Psych following closely  - monitor QTc while on IV Haldol  - Ethics held an interdisciplinary meeting with all medical teams involved on 1/19 as there is an open APS case against patient's sister/ Julie  - transfer center contacted to transfer to [facility] or [facility]

## 2024-01-29 NOTE — WOUND CARE
Wound Care Note:     Wound care follow-up for ankle wound present on admission    Chart shows:  Admitted for catatonia schizophrenia   Past Medical History:   Diagnosis Date    Psychiatric disorder     Psychotic disorder (HCC)     Withdrawal syndrome (HCC)      WBC = 7.8 on 1/25/24  Admitted from [facility]    Assessment:   Patient is extubated, moving around some but able to assess and apply dressing, incontinent with moderate assistance needed in repositioning.    Bed: Low air loss- [employer]rium  Patient has a condom catheter in place.    Diet: Adult tube feeding- Nasoenteric    Bilateral heels, buttocks, and sacral skin intact and without erythema.    Palpable DP pulses bilaterally.      1. POA right ankle wound is now crusted, no drainage, some dry, flaky skin to dia-wound, no drainage.  Will change dressing to hydrocolloid so that we can heal wound completely.  Hydrocolloid and roll gauze applied to assist with dressing staying in place.        Spoke with Dr. Abdi, wound care orders obtained.    Patient laying on his left side.        Recommendations:    Right lateral ankle- Please maintain Exuderm Hydrocolloid up to one week or change as needed with soiling or rolled edges.  Please use adhesive remover wipes when changing.    Skin Care & Pressure Prevention:  Minimize layers of linen/pads under patient to optimize support surface.    Turn/reposition approximately every 2 hours and offload heels.  Manage incontinence / promote continence   Nourishing Skin Cream to dry skin, minimize use of briefs when able    Discussed above plan with patient & GAIL Yoder    Transition of Care: Plan to follow as needed while admitted to hospital.    Tracey \"Rosa\" MATTY Santiago, RN, CWON  Certified Wound and Ostomy Nurse  office [phone]  Best way to contact me is through [employer]

## 2024-01-30 PROCEDURE — 6370000000 HC RX 637 (ALT 250 FOR IP): Performed by: INTERNAL MEDICINE

## 2024-01-30 PROCEDURE — 6360000002 HC RX W HCPCS: Performed by: PSYCHIATRY & NEUROLOGY

## 2024-01-30 PROCEDURE — 2580000003 HC RX 258: Performed by: INTERNAL MEDICINE

## 2024-01-30 PROCEDURE — 92610 EVALUATE SWALLOWING FUNCTION: CPT

## 2024-01-30 PROCEDURE — 2060000000 HC ICU INTERMEDIATE R&B

## 2024-01-30 PROCEDURE — 6370000000 HC RX 637 (ALT 250 FOR IP): Performed by: PSYCHIATRY & NEUROLOGY

## 2024-01-30 PROCEDURE — 6360000002 HC RX W HCPCS: Performed by: INTERNAL MEDICINE

## 2024-01-30 RX ADMIN — MIDODRINE HYDROCHLORIDE 5 MG: 5 TABLET ORAL at 04:47

## 2024-01-30 RX ADMIN — HALOPERIDOL LACTATE 10 MG: 5 INJECTION, SOLUTION INTRAMUSCULAR at 12:11

## 2024-01-30 RX ADMIN — ENOXAPARIN SODIUM 40 MG: 100 INJECTION SUBCUTANEOUS at 08:10

## 2024-01-30 RX ADMIN — MIDODRINE HYDROCHLORIDE 5 MG: 5 TABLET ORAL at 21:50

## 2024-01-30 RX ADMIN — SODIUM CHLORIDE, PRESERVATIVE FREE 40 ML: 5 INJECTION INTRAVENOUS at 08:14

## 2024-01-30 RX ADMIN — SODIUM CHLORIDE, PRESERVATIVE FREE 10 ML: 5 INJECTION INTRAVENOUS at 22:12

## 2024-01-30 RX ADMIN — VALPROIC ACID 1500 MG: 250 SOLUTION ORAL at 21:50

## 2024-01-30 RX ADMIN — VALPROIC ACID 500 MG: 250 SOLUTION ORAL at 08:14

## 2024-01-30 RX ADMIN — MIDAZOLAM 2 MG: 1 INJECTION INTRAMUSCULAR; INTRAVENOUS at 15:43

## 2024-01-30 RX ADMIN — HALOPERIDOL LACTATE 10 MG: 5 INJECTION, SOLUTION INTRAMUSCULAR at 21:50

## 2024-01-30 RX ADMIN — Medication 100 MG: at 08:14

## 2024-01-30 RX ADMIN — MIDAZOLAM 2 MG: 1 INJECTION INTRAMUSCULAR; INTRAVENOUS at 21:50

## 2024-01-30 RX ADMIN — HALOPERIDOL LACTATE 10 MG: 5 INJECTION, SOLUTION INTRAMUSCULAR at 17:04

## 2024-01-30 RX ADMIN — MIDODRINE HYDROCHLORIDE 5 MG: 5 TABLET ORAL at 08:12

## 2024-01-30 RX ADMIN — MIDAZOLAM 2 MG: 1 INJECTION INTRAMUSCULAR; INTRAVENOUS at 12:12

## 2024-01-30 RX ADMIN — MIDODRINE HYDROCHLORIDE 5 MG: 5 TABLET ORAL at 14:44

## 2024-01-30 RX ADMIN — MIDAZOLAM 2 MG: 1 INJECTION INTRAMUSCULAR; INTRAVENOUS at 01:08

## 2024-01-30 RX ADMIN — HALOPERIDOL LACTATE 10 MG: 5 INJECTION, SOLUTION INTRAMUSCULAR at 08:11

## 2024-01-30 RX ADMIN — MIDAZOLAM 2 MG: 1 INJECTION INTRAMUSCULAR; INTRAVENOUS at 04:47

## 2024-01-30 RX ADMIN — MIRTAZAPINE 30 MG: 15 TABLET, ORALLY DISINTEGRATING ORAL at 21:50

## 2024-01-30 RX ADMIN — MIDAZOLAM 2 MG: 1 INJECTION INTRAMUSCULAR; INTRAVENOUS at 08:11

## 2024-01-30 ASSESSMENT — PAIN SCALES - GENERAL
PAINLEVEL_OUTOF10: 0
PAINLEVEL_OUTOF10: 0

## 2024-01-30 NOTE — PROGRESS NOTES
[facility]                                                                                          Hospitalist Progress Note  Sushma Madala, MD  Office Phone: [phone]        Date of Service:  2024  NAME:  Roge Cowan  :  1970  MRN:  [id number]       Admission Summary:   Roge Cowan is a 53 y.o. male with schizophrenia and catatonia, s/p ECT x2 who was transferred from [facility] inpatient to [facility] ICU on 2024 for severe psychosis and episodes of catatonia. Pt was initially admitted to [facility] on 2023 after being taken there by police for evaluation for refusal of self-care. [facility] hospitalization included development of a R ankle Stage IV pressure wound that progressed to tendon exposure with purulent drainage, refusal of wound care, labs, vitals checks, oral medications, oral intake, and only took IM medications that were court ordered. Pt was seen by podiatry and ID who recommended vancomycin and cefepime. He did receive ECT x2 at [facility]. Pt was transferred to [facility] ICU for sedation and protective environment to ensure adequate care. He was started on ketamine and Precedex gtts. He required 4 point restraints. 1:1 sitter was ordered. He was intubated on 1/15 due to increasing agitation despite sedating meds.  ID, Psychiatry, Palliative Medicine, Ethics, and WOCN have all been consulted during pt's ICU stay at [facility]. Psychiatry recommended reducing reliance on BDZs and consolidating antipsychotics to just one if possible; Haldol IV was increased and Depakene was added. Ethics held an interdisciplinary meeting with all medical teams involved on . There is an open APS case against patient's sister/ Julie. Pt was extubated  and was in bilateral wrist restraints. DHT is in place and tube feeds are infusing per dietitian recs. Pt has periods of agitation for which he receives prn meds. He is currently on Haldol 10mg IV 4x daily,  mL IVPB (Peripheral Line)  10 mEq IntraVENous PRN    ondansetron (ZOFRAN-ODT) disintegrating tablet 4 mg  4 mg Oral Q8H PRN    Or    ondansetron (ZOFRAN) injection 4 mg  4 mg IntraVENous Q6H PRN    acetaminophen (TYLENOL) tablet 650 mg  650 mg Oral Q6H PRN    Or    acetaminophen (TYLENOL) suppository 650 mg  650 mg Rectal Q6H PRN    enoxaparin (LOVENOX) injection 40 mg  40 mg SubCUTAneous Daily    [Held by provider] ARIPiprazole ER (ABILIFY MAINTENA) 400 mg  400 mg IntraMUSCular Q28 Days    sodium chloride flush 0.9 % injection 5-40 mL  5-40 mL IntraVENous PRN    albuterol (PROVENTIL) (2.5 MG/3ML) 0.083% nebulizer solution 2.5 mg  2.5 mg Nebulization Q6H PRN    senna (SENOKOT) 8.8 MG/5ML syrup 8.8 mg  5 mL Oral BID PRN     ______________________________________________________________________  EXPECTED LENGTH OF STAY: 14  ACTUAL LENGTH OF STAY:          18                 Sushma Madala, MD

## 2024-01-30 NOTE — PROGRESS NOTES
Speech LAnguage Pathology EVALUATION/DISCHARGE    Patient: Roge Cowan (53 y.o. 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 Pt with no meaningful oral transit having all food and drink attempts anteriorly spill. Recommend NPO with ice chips as pt will accept. Many factors to consider with this patient. Pt will almost certainly not participate in any objective imaging, and pt PO intake will likely be variable due to mentation/behavior. If pt actively accepting, could consider cautious initiation of diet as tolerated. However, would consider goals of care conversation as concern that he would not maintain nutrition by mouth and would likely not tolerate long-term alternate means of nutrition/hydration/medication given behavior/mentation as well as concern for patient being able to manage tube feedings.     Patient will be discharged from skilled speech-language pathology services at this time.     PLAN :  Recommendations and Planned Interventions:  Diet:  PO as tolerated    Acute SLP Services: No, patient will be discharged from acute skilled speech-language pathology at this time.    Discharge Recommendations: No, additional SLP treatment not indicated at discharge     SUBJECTIVE:   Patient with eyes opening and actively resisting SLP attempts to engage.    OBJECTIVE:     Past Medical History:   Diagnosis Date    Psychiatric disorder     Psychotic disorder (HCC)     Withdrawal syndrome (HCC)      Past Surgical History:   Procedure Laterality Date    ELECTROCONVULSIVE THERAPY N/A 12/12/2023    ELECTROCONVULSIVE THERAPY performed by Lety Larson MD at [facility] MAIN OR    ELECTROCONVULSIVE THERAPY N/A 12/13/2023    ELECTROCONVULSIVE THERAPY performed by Lety Larson MD at [facility] MAIN OR    ELECTROCONVULSIVE THERAPY N/A 12/15/2023    ELECTROCONVULSIVE THERAPY performed by Lety Larson MD at [facility] MAIN OR     ELECTROCONVULSIVE THERAPY  12/18/2023    ELECTROCONVULSIVE THERAPY N/A 12/18/2023    ELECTROCONVULSIVE THERAPY performed by Lety Larson MD at [facility] MAIN OR     Prior Level of Function/Home Situation:   Social/Functional History  Lives With: Family (Sister Eden Montalvo)  ADL Assistance: Needs assistance  Bath: Dependent/Total  Dressing: Dependent/Total  Grooming: Dependent/Total  Feeding: Dependent/Total  Toileting: Needs assistance  Ambulation Assistance: Needs assistance  Transfer Assistance: Needs assistance  Active : No        Cognitive and Communication Status:  Neurologic State: Alert  Orientation Level: Unable to verbalize  Cognition: Unable to assess    Respiratory Status/Airway:  Room air                           Functional Oral Intake Scale (FOIS): 2--Tube dependent with minimal attempts of food or liquid    After treatment:   Call bell left within reach and Nursing notified    COMMUNICATION/EDUCATION:     The patient's plan of care including recommendations, planned interventions, and recommended diet changes were discussed with: Registered nurse    Patient is unable to participate in goal setting and plan of care    Thank you,  Flaquita Galarza, SLP

## 2024-01-30 NOTE — CARE COORDINATION
Transition of Care Plan:     RUR: 18% Moderate   Prior Level of Functioning: Dependent with ADL's  [facility]  DME needed: NA  Transportation at discharge: BLS  IM/IMM Medicare given: 1/29/24  Is patient a  and connected with VA? No  Caregiver Contact: Sister Eden Maier [phone]  Discharge Caregiver contacted prior to discharge? Yes  Care Conference needed? Yes  Barriers to discharge:   Medical Stability  Need ECT treatments not available at [facility].  Transfer center notified and [facility] form completed and faxed to [phone]  Received notification [facility] and [facility] are not accepting Hospital tranfers at this time.   Contacts:   Sister Eden Montalvo [phone]  PCP Dr Dmitry Motta [phone]  Psych: [facility] [phone] Dr Olivas   with ACT team: Jayson Bustos [phone]  [facility] clinician :Geeta [phone]   Lorene Pabon [phone]  APS: Geno  [phone]  Yoselin Manley RN,Care Management

## 2024-01-30 NOTE — BSMART NOTE
BSMART Liaison Team Note     LOS:  18     Patient goal(s) for today: take medications as prescribed, make needs known in an appropriate manner.  BSMART Liaison team focus/goals: assess MH needs, provide therapeutic support, brief therapy, and education, as needed.  Assist medical care management team with recommendations for coordination of care.    Progress note:  Liaison met with pt, FTF, on the medical floor with sitter present. Pt is lying in the prayer pose, face down, eyes open, NG tube in place, calm and in no acute distress.  When attempting to engage with pt, he closes his eyes, tightly, refuses to look at this writer, and shifts, slightly.  According to pt's chart, he was mildly agitated, earlier in the day, did not require PRN medication, and has been calm since transferring to the medical floor, this afternoon.  CM note indicates [facility] and [facility] are still refusing hospital transfers.   Liaison will continue to monitor and support.       Barriers to Discharge: Medical/psychiatric clearance     Outpatient provider(s):  Mavis KYLE  Psych: Dr. Olivas: [phone]   CM: Jayson Bustos [phone]  Clinician: Geeta [phone]     Insurance info/prescription coverage: MEDICARE PART A AND B , [facility] PLAN OF VA       Diagnosis: Refractory schizophrenia with catatonia      Plan:  [facility] with [facility], however, [facility] are not accepting hospital transfers, currently.  Psychiatry and BSMART Liaison to follow daily.  Please defer to psychiatry and medical team for details on discharge plan and disposition.       Follow up Psych Consult placed? Psychiatry following daily   Psychiatrist updated? No     Participating treatment team members: Mildred Wilson LCSW Susan ([facility] Sheriff LCSW  BSMART Liaison  Available on [email or web address]

## 2024-01-30 NOTE — PLAN OF CARE
Problem: Discharge Planning  Goal: Discharge to home or other facility with appropriate resources  Recent Flowsheet Documentation  Taken 1/30/2024 1600 by Madhuri Cameoj RN  Discharge to home or other facility with appropriate resources: Identify barriers to discharge with patient and caregiver  1/30/2024 0844 by Buster Modi, RN  Outcome: Not Progressing     Problem: Pain  Goal: Verbalizes/displays adequate comfort level or baseline comfort level  1/30/2024 0844 by Buster Modi, RN  Outcome: Progressing     Problem: Skin/Tissue Integrity  Goal: Absence of new skin breakdown  Description: 1.  Monitor for areas of redness and/or skin breakdown  2.  Assess vascular access sites hourly  3.  Every 4-6 hours minimum:  Change oxygen saturation probe site  4.  Every 4-6 hours:  If on nasal continuous positive airway pressure, respiratory therapy assess nares and determine need for appliance change or resting period.  1/30/2024 0844 by Buster Modi, RN  Outcome: Progressing     Problem: Safety - Adult  Goal: Free from fall injury  1/30/2024 0844 by Buster Modi, RN  Outcome: Progressing     Problem: Nutrition Deficit:  Goal: Optimize nutritional status  1/30/2024 0844 by Buster Modi, RN  Outcome: Progressing     Problem: ABCDS Injury Assessment  Goal: Absence of physical injury  1/30/2024 0844 by Buster Modi, RN  Outcome: Progressing     Problem: Discharge Planning  Goal: Discharge to home or other facility with appropriate resources  Recent Flowsheet Documentation  Taken 1/30/2024 1600 by Madhuri Camejo RN  Discharge to home or other facility with appropriate resources: Identify barriers to discharge with patient and caregiver  1/30/2024 0844 by Buster Modi, RN  Outcome: Not Progressing

## 2024-01-31 LAB
GLUCOSE BLD STRIP.AUTO-MCNC: 81 MG/DL (ref 65–117)
GLUCOSE BLD STRIP.AUTO-MCNC: 89 MG/DL (ref 65–117)
GLUCOSE BLD STRIP.AUTO-MCNC: 98 MG/DL (ref 65–117)
SERVICE CMNT-IMP: NORMAL

## 2024-01-31 PROCEDURE — 2060000000 HC ICU INTERMEDIATE R&B

## 2024-01-31 PROCEDURE — 2580000003 HC RX 258: Performed by: INTERNAL MEDICINE

## 2024-01-31 PROCEDURE — 6360000002 HC RX W HCPCS: Performed by: PSYCHIATRY & NEUROLOGY

## 2024-01-31 PROCEDURE — 6370000000 HC RX 637 (ALT 250 FOR IP): Performed by: PSYCHIATRY & NEUROLOGY

## 2024-01-31 PROCEDURE — 6370000000 HC RX 637 (ALT 250 FOR IP): Performed by: INTERNAL MEDICINE

## 2024-01-31 PROCEDURE — 82962 GLUCOSE BLOOD TEST: CPT

## 2024-01-31 PROCEDURE — 6360000002 HC RX W HCPCS: Performed by: INTERNAL MEDICINE

## 2024-01-31 RX ORDER — HYDROMORPHONE HYDROCHLORIDE 1 MG/ML
1 INJECTION, SOLUTION INTRAMUSCULAR; INTRAVENOUS; SUBCUTANEOUS ONCE
Status: DISCONTINUED | OUTPATIENT
Start: 2024-01-31 | End: 2024-01-31

## 2024-01-31 RX ADMIN — VALPROIC ACID 500 MG: 250 SOLUTION ORAL at 09:18

## 2024-01-31 RX ADMIN — HALOPERIDOL LACTATE 10 MG: 5 INJECTION, SOLUTION INTRAMUSCULAR at 13:05

## 2024-01-31 RX ADMIN — SODIUM CHLORIDE, PRESERVATIVE FREE 10 ML: 5 INJECTION INTRAVENOUS at 21:01

## 2024-01-31 RX ADMIN — MIRTAZAPINE 30 MG: 15 TABLET, ORALLY DISINTEGRATING ORAL at 21:00

## 2024-01-31 RX ADMIN — HALOPERIDOL LACTATE 10 MG: 5 INJECTION, SOLUTION INTRAMUSCULAR at 09:20

## 2024-01-31 RX ADMIN — MIDAZOLAM 2 MG: 1 INJECTION INTRAMUSCULAR; INTRAVENOUS at 09:21

## 2024-01-31 RX ADMIN — Medication 100 MG: at 09:18

## 2024-01-31 RX ADMIN — MIDAZOLAM 2 MG: 1 INJECTION INTRAMUSCULAR; INTRAVENOUS at 04:06

## 2024-01-31 RX ADMIN — HALOPERIDOL LACTATE 10 MG: 5 INJECTION, SOLUTION INTRAMUSCULAR at 21:00

## 2024-01-31 RX ADMIN — VALPROIC ACID 1500 MG: 250 SOLUTION ORAL at 21:00

## 2024-01-31 RX ADMIN — ENOXAPARIN SODIUM 40 MG: 100 INJECTION SUBCUTANEOUS at 09:17

## 2024-01-31 RX ADMIN — MIDODRINE HYDROCHLORIDE 5 MG: 5 TABLET ORAL at 21:00

## 2024-01-31 RX ADMIN — MIDODRINE HYDROCHLORIDE 5 MG: 5 TABLET ORAL at 04:06

## 2024-01-31 RX ADMIN — MIDODRINE HYDROCHLORIDE 5 MG: 5 TABLET ORAL at 09:18

## 2024-01-31 RX ADMIN — SODIUM CHLORIDE, PRESERVATIVE FREE 10 ML: 5 INJECTION INTRAVENOUS at 09:17

## 2024-01-31 RX ADMIN — MIDAZOLAM 2 MG: 1 INJECTION INTRAMUSCULAR; INTRAVENOUS at 00:27

## 2024-01-31 RX ADMIN — MIDAZOLAM 2 MG: 1 INJECTION INTRAMUSCULAR; INTRAVENOUS at 13:05

## 2024-01-31 RX ADMIN — HALOPERIDOL LACTATE 10 MG: 5 INJECTION, SOLUTION INTRAMUSCULAR at 16:28

## 2024-01-31 RX ADMIN — MIDAZOLAM 2 MG: 1 INJECTION INTRAMUSCULAR; INTRAVENOUS at 16:28

## 2024-01-31 RX ADMIN — MIDAZOLAM 2 MG: 1 INJECTION INTRAMUSCULAR; INTRAVENOUS at 21:00

## 2024-01-31 RX ADMIN — MIDODRINE HYDROCHLORIDE 5 MG: 5 TABLET ORAL at 16:28

## 2024-01-31 ASSESSMENT — PAIN SCALES - GENERAL
PAINLEVEL_OUTOF10: 0

## 2024-01-31 NOTE — PLAN OF CARE
Withdrawn patient with schizophrenia is stable, no reports of pain or signs of acute distress upon assessment. Bedside sitter continued for patient's safety due to altered mental status. Medications tolerated well through right nostril NG tube. Continuous feed maintained at goal of 70 mL/hour paused due to patient's inability to maintain upright body positioning and risk for aspiration. Patient exhibiting continent vs incontinent urinary status. Unable to complete EKG today for patient due to preferred fetal body positioning. Safety measures in place, monitoring ongoing.

## 2024-01-31 NOTE — PROGRESS NOTES
[facility]                                                                                          Hospitalist Progress Note  Sushma Madala, MD  Office Phone: [phone]        Date of Service:  2024  NAME:  Roge Cowan  :  1970  MRN:  [id number]       Admission Summary:   Roge Cowan is a 53 y.o. male with schizophrenia and catatonia, s/p ECT x2 who was transferred from [facility] inpatient to [facility] ICU on 2024 for severe psychosis and episodes of catatonia. Pt was initially admitted to [facility] on 2023 after being taken there by police for evaluation for refusal of self-care. [facility] hospitalization included development of a R ankle Stage IV pressure wound that progressed to tendon exposure with purulent drainage, refusal of wound care, labs, vitals checks, oral medications, oral intake, and only took IM medications that were court ordered. Pt was seen by podiatry and ID who recommended vancomycin and cefepime. He did receive ECT x2 at [facility]. Pt was transferred to [facility] ICU for sedation and protective environment to ensure adequate care. He was started on ketamine and Precedex gtts. He required 4 point restraints. 1:1 sitter was ordered. He was intubated on 1/15 due to increasing agitation despite sedating meds.  ID, Psychiatry, Palliative Medicine, Ethics, and WOCN have all been consulted during pt's ICU stay at [facility]. Psychiatry recommended reducing reliance on BDZs and consolidating antipsychotics to just one if possible; Haldol IV was increased and Depakene was added. Ethics held an interdisciplinary meeting with all medical teams involved on . There is an open APS case against patient's sister/ Julie. Pt was extubated  and was in bilateral wrist restraints. DHT is in place and tube feeds are infusing per dietitian recs. Pt has periods of agitation for which he receives prn meds. He is currently on Haldol 10mg IV 4x daily,  (Peripheral Line)  10 mEq IntraVENous PRN    ondansetron (ZOFRAN-ODT) disintegrating tablet 4 mg  4 mg Oral Q8H PRN    Or    ondansetron (ZOFRAN) injection 4 mg  4 mg IntraVENous Q6H PRN    acetaminophen (TYLENOL) tablet 650 mg  650 mg Oral Q6H PRN    Or    acetaminophen (TYLENOL) suppository 650 mg  650 mg Rectal Q6H PRN    enoxaparin (LOVENOX) injection 40 mg  40 mg SubCUTAneous Daily    [Held by provider] ARIPiprazole ER (ABILIFY MAINTENA) 400 mg  400 mg IntraMUSCular Q28 Days    sodium chloride flush 0.9 % injection 5-40 mL  5-40 mL IntraVENous PRN    albuterol (PROVENTIL) (2.5 MG/3ML) 0.083% nebulizer solution 2.5 mg  2.5 mg Nebulization Q6H PRN    senna (SENOKOT) 8.8 MG/5ML syrup 8.8 mg  5 mL Oral BID PRN     ______________________________________________________________________  EXPECTED LENGTH OF STAY: 24  ACTUAL LENGTH OF STAY:          19                 Sushma Madala, MD

## 2024-01-31 NOTE — CARE COORDINATION
Transition of Care Plan:    Transfer to [facility] for ECT - pending review of clinicals    [facility] contact: Mari Wagner [email or web address]; F [phone]    Transport: BLS     RUR: 18% Moderate   Prior Level of Functioning: Dependent with ADL's  Disposition: [facility] to [facility]  DME needed: NA  Transportation at discharge: BLS  IM/IMM Medicare given: 1/29/24  Is patient a [place] and connected with VA? No  Caregiver Contact: Sister Eden Maier [phone]  Discharge Caregiver contacted prior to discharge? Yes  Care Conference needed? Yes  Barriers to discharge:   - Medical Stability  - Need ECT treatments not available at [facility].   - [facility] will not accept Pt back due to medical complexity  - [facility] does not have a bed for transfer.     Pt Contacts:   Sister Eden Montalvo [phone]  PCP Dr Dmitry Motta [phone]  Psych: [facility] [phone] Dr Olivas   with ACT team: Jayson Bustos [phone]  [facility] ACT clinician :Geeta [phone]   Lorene Pabon [phone]  APS: Geno  [phone]    1/31 - CM followed up on clinicals sent to U 1/30 via email to Mari Wagner.     Pt case discussed with Dr. Larson - Pt in need of ECT, hopeful for transfer to [facility].     CM Manager aware of Pt case and following for assistance as needed.    Frannie Velasquez M.S (Ally).SILVIA.

## 2024-01-31 NOTE — CARE COORDINATION
Transition of Care Plan:     RUR: 18% Moderate   Prior Level of Functioning: Dependent with ADL's  [facility]: [facility]  DME needed: NA  Transportation at discharge: BLS  IM/IMM Medicare given: 1/29/24  Is patient a  and connected with VA? No  Caregiver Contact: Sister Eden Maier [phone]  Discharge Caregiver contacted prior to discharge? Yes  Care Conference needed? Yes  Barriers to discharge:   Medical Stability  Need E[facility] treatments not available at [facility].  Transfer center notified and [facility] form completed and faxed to [phone]  Received notification [facility] and [facility] are not accepting Hospital tranfers at this time.   Contacts:   Sister Eden Montalvo [phone]  PCP Dr Dmitry Motta [phone]  Psych: [facility] [phone] Dr Olivas   with ACT team: Jayson Bustos [phone]  [facility] clinician :Geeta [phone]   Lorene Pabon [phone]  APS: Geno  [phone]    CM spoke with Dr Larson and he has been in contact with Psychiatry at [facility]. Provided CM with Mari Wagner's email who handles psych admissions at [facility]. Faxed Mari requested clinical at [phone] to review for admission.     Spoke with Nadja at [facility] and it was determined that patient needs a psych admission. CM to follow up with Mari Wagner, her e mail is [email or web address].  Yoselin Manley RN,Care Management

## 2024-01-31 NOTE — PLAN OF CARE
Problem: Discharge Planning  Goal: Discharge to home or other facility with appropriate resources  Outcome: Progressing  Flowsheets (Taken 1/31/2024 0800)  Discharge to home or other facility with appropriate resources:   Identify barriers to discharge with patient and caregiver   Arrange for needed discharge resources and transportation as appropriate   Identify discharge learning needs (meds, wound care, etc)   Arrange for interpreters to assist at discharge as needed   Refer to discharge planning if patient needs post-hospital services based on physician order or complex needs related to functional status, cognitive ability or social support system     Problem: Pain  Goal: Verbalizes/displays adequate comfort level or baseline comfort level  Outcome: Progressing  Flowsheets (Taken 1/31/2024 1015)  Verbalizes/displays adequate comfort level or baseline comfort level:   Encourage patient to monitor pain and request assistance   Assess pain using appropriate pain scale   Administer analgesics based on type and severity of pain and evaluate response   Implement non-pharmacological measures as appropriate and evaluate response   Consider cultural and social influences on pain and pain management   Notify Licensed Independent Practitioner if interventions unsuccessful or patient reports new pain     Problem: Safety - Adult  Goal: Free from fall injury  Outcome: Progressing     Problem: Skin/Tissue Integrity  Goal: Absence of new skin breakdown  Description: 1.  Monitor for areas of redness and/or skin breakdown  2.  Assess vascular access sites hourly  3.  Every 4-6 hours minimum:  Change oxygen saturation probe site  4.  Every 4-6 hours:  If on nasal continuous positive airway pressure, respiratory therapy assess nares and determine need for appliance change or resting period.  Outcome: Progressing     Problem: Nutrition Deficit:  Goal: Optimize nutritional status  Outcome: Progressing     Problem: ABCDS Injury  Assessment  Goal: Absence of physical injury  Outcome: Progressing     Problem: Chronic Conditions and Co-morbidities  Goal: Patient's chronic conditions and co-morbidity symptoms are monitored and maintained or improved  Outcome: Progressing

## 2024-01-31 NOTE — CONSULTS
[facility] for [facility] there because of med/psych and ability to do ECT all in one place.    01/25/2024  As patient was just extubated today, will resume current regimen for a few days before considering a change to maintain good management of agitation:  haldol 5mg IV q4h, versed 2mg iv q2h, depakene 1000mg per NG tube q8h, and trazodone 100mg po tid.  Recommend 1:1 sitter for safety and re-direction, now that patient is extubated.  For agitation, recommend haldol 5mg IV q4h prn. If no IV access is available can give haldol 5mg IM Q4h prn agitation. For extreme agitation, can give Thorazine 100mg IM Q12H.  My recommendation remains for a trial of ECT for better and faster treatment of catatonic features of schizophrenia when agitation is better controlled.  Appreciate recs from other consultant team members.  Will continue communication with [facility] for possible [facility] there because of med/psych and ability to do ECT all in one place.    01/24/2024  Patient's intubation and sedation are being managed by ICU team, will defer to their expertise.  Continue current medication regimen for treatment of agitation, anticipate changes upon extubation:  haldol 5mg IV q4h, versed 2mg iv q2h, depakene 1000mg per NG tube q8h, and trazodone 100mg po tid.  Ideally recommend referral to ECT for treatment of catatonia after control of agitation. At this time, ECT is only done at [facility] in our [facility] system. Would advise keeping patient in one setting where he would undergo ECT and possible transfer to higher level of care (ICU) from inpatient psychiatry for further treatment of catatonia and associated agitation.  [facility]ached out to [facility] to discuss potential for transfer. Will update with findings.    01/23/2024  Change haldol from 5mg IV q2h to q4h.   D/C ativan 5mg per ng tube. Instead, and because of ativan iv shortage, will go ahead and schedule midazolam 2mg IV q4h, timed to be combined with haldol 5mg IV q4h for managing  to 500mg TID, with VPA level tomorrow prior to AM dose.   -Continuing Haldol as ordered for now, though can potentially be increased in the coming days depending on QT prolongation.   -Increasing trazodone to 50mg TID to promote sedation - unclear at this point if the pt will tolerate enteral access following extubation, but after discussing with attending, given trazodone's favorable side effect profile when compared to other sedating agents, will attempt to employ trazodone to promote sedation and comfort in order for the pt to safely receive care, with plan of using Dobbhoff tube plus bridle following extubation in order for this to continue to be possible.   -Will plan to participate in ongoing interdisciplinary team meetings, including with the pt's guardian (sister) tomorrow afternoon.     1/16/24-   Monitor EKG given risk of QT prolongation.   Will increase Depakene from 250mg TID to 500mg BID for increase in total daily dose from 750mg to 1000mg.     01/15/2024  Discussed reducing reliance on benzos and consolidating antipsychotics to just one if possible.    D/C prn zyprexa  Recommended increase haldol from 5mg IV q6h to q4h. Obtain EKG to f/u QTC and be vigilant about monitoring VS, especially temp.  Start depakene  250mg po q8h in planning to further increase it to decrease agitation.  Plan would be to stabilize patient to undergo ECT.    01/13/2024  Not a candidate for inpatient psychiatry at this time due to his skin breakdown is now a tunneling wound.    Continue 1:1 nursing  Psychiatry to follow  Continue current care with Court ordered treatments including ECT.    Lety Larson MD

## 2024-01-31 NOTE — BSMART NOTE
BSMART Liaison Team Note     LOS:  19     Patient goal(s) for today: take medications as prescribed, make needs known in an appropriate manner.  BSMART Liaison team focus/goals: assess MH needs, provide therapeutic support, brief therapy, and education, as needed.  Assist medical care management team with recommendations for coordination of care.    Progress note:   Liaison met with pt, FTF, on the medical floor with sitter at bedside.  He was lying in the fetal position with his head at the foot of the bed and eyes closed.   Liaison attempted to engage with pt, several times, but he did not open his eyes or shift his body.  He was calm and appeared to be in no acute distress.  Sitter reports pt has been sleeping all morning.  Liaison will continue to monitor and support.       Per chart review: CM, Yoselin Manley, spoke with Dr Larson, who has been in contact with Psychiatry at [facility].  Dr. Larson provided CM with Mari Kristy's contact information - she handles psych admissions at [facility].   CM faxed pt clinicals to Mari for review.      Barriers to Discharge: Medical clearance, psych bed     Outpatient provider(s):  Mavis KYLE  Psych: Dr. Olivas: [phone]   CM: Jayson Bustos [phone]  Clinician: Geeta [phone]     Insurance info/prescription coverage: MEDICARE PART A AND B , [facility] PLAN OF [facility]       Diagnosis: Refractory schizophrenia with catatonia      Plan:  [facility], however, [facility] are not accepting hospital [facility], currently.  Psychiatry and BSMART Liaison to follow daily.  Please defer to psychiatry and medical team for details on discharge plan and disposition.       Follow up Psych Consult placed? Psychiatry following daily   Psychiatrist updated? No     Participating treatment team members: Mildred Wilson, GIOVANI Sheriff LCSW (Beth)  BSMART Liaison  Available on [employer]

## 2024-02-01 LAB
ANION GAP SERPL CALC-SCNC: 6 MMOL/L (ref 5–15)
BUN SERPL-MCNC: 14 MG/DL (ref 6–20)
BUN/CREAT SERPL: 22 (ref 12–20)
CALCIUM SERPL-MCNC: 9.6 MG/DL (ref 8.5–10.1)
CHLORIDE SERPL-SCNC: 103 MMOL/L (ref 97–108)
CO2 SERPL-SCNC: 29 MMOL/L (ref 21–32)
CREAT SERPL-MCNC: 0.64 MG/DL (ref 0.7–1.3)
ERYTHROCYTE [DISTWIDTH] IN BLOOD BY AUTOMATED COUNT: 13.5 % (ref 11.5–14.5)
GLUCOSE BLD STRIP.AUTO-MCNC: 79 MG/DL (ref 65–117)
GLUCOSE BLD STRIP.AUTO-MCNC: 84 MG/DL (ref 65–117)
GLUCOSE SERPL-MCNC: 88 MG/DL (ref 65–100)
HCT VFR BLD AUTO: 38.7 % (ref 36.6–50.3)
HGB BLD-MCNC: 12.5 G/DL (ref 12.1–17)
MCH RBC QN AUTO: 29.7 PG (ref 26–34)
MCHC RBC AUTO-ENTMCNC: 32.3 G/DL (ref 30–36.5)
MCV RBC AUTO: 91.9 FL (ref 80–99)
NRBC # BLD: 0 K/UL (ref 0–0.01)
NRBC BLD-RTO: 0 PER 100 WBC
PLATELET # BLD AUTO: 324 K/UL (ref 150–400)
PMV BLD AUTO: 11.3 FL (ref 8.9–12.9)
POTASSIUM SERPL-SCNC: 4.6 MMOL/L (ref 3.5–5.1)
RBC # BLD AUTO: 4.21 M/UL (ref 4.1–5.7)
SERVICE CMNT-IMP: NORMAL
SERVICE CMNT-IMP: NORMAL
SODIUM SERPL-SCNC: 138 MMOL/L (ref 136–145)
WBC # BLD AUTO: 6 K/UL (ref 4.1–11.1)

## 2024-02-01 PROCEDURE — 6370000000 HC RX 637 (ALT 250 FOR IP): Performed by: INTERNAL MEDICINE

## 2024-02-01 PROCEDURE — 6370000000 HC RX 637 (ALT 250 FOR IP): Performed by: PSYCHIATRY & NEUROLOGY

## 2024-02-01 PROCEDURE — 6360000002 HC RX W HCPCS: Performed by: INTERNAL MEDICINE

## 2024-02-01 PROCEDURE — 2580000003 HC RX 258: Performed by: INTERNAL MEDICINE

## 2024-02-01 PROCEDURE — 82962 GLUCOSE BLOOD TEST: CPT

## 2024-02-01 PROCEDURE — 85027 COMPLETE CBC AUTOMATED: CPT

## 2024-02-01 PROCEDURE — 80048 BASIC METABOLIC PNL TOTAL CA: CPT

## 2024-02-01 PROCEDURE — 2060000000 HC ICU INTERMEDIATE R&B

## 2024-02-01 PROCEDURE — 36415 COLL VENOUS BLD VENIPUNCTURE: CPT

## 2024-02-01 PROCEDURE — 6360000002 HC RX W HCPCS: Performed by: PSYCHIATRY & NEUROLOGY

## 2024-02-01 RX ADMIN — ENOXAPARIN SODIUM 40 MG: 100 INJECTION SUBCUTANEOUS at 10:40

## 2024-02-01 RX ADMIN — ACETAMINOPHEN 650 MG: 325 TABLET ORAL at 10:41

## 2024-02-01 RX ADMIN — MIDAZOLAM 2 MG: 1 INJECTION INTRAMUSCULAR; INTRAVENOUS at 21:15

## 2024-02-01 RX ADMIN — HALOPERIDOL LACTATE 10 MG: 5 INJECTION, SOLUTION INTRAMUSCULAR at 10:39

## 2024-02-01 RX ADMIN — SODIUM CHLORIDE, PRESERVATIVE FREE 10 ML: 5 INJECTION INTRAVENOUS at 10:43

## 2024-02-01 RX ADMIN — MIDODRINE HYDROCHLORIDE 5 MG: 5 TABLET ORAL at 13:44

## 2024-02-01 RX ADMIN — HALOPERIDOL LACTATE 10 MG: 5 INJECTION, SOLUTION INTRAMUSCULAR at 13:41

## 2024-02-01 RX ADMIN — MIDODRINE HYDROCHLORIDE 5 MG: 5 TABLET ORAL at 10:40

## 2024-02-01 RX ADMIN — HALOPERIDOL LACTATE 10 MG: 5 INJECTION, SOLUTION INTRAMUSCULAR at 17:18

## 2024-02-01 RX ADMIN — VALPROIC ACID 1500 MG: 250 SOLUTION ORAL at 21:26

## 2024-02-01 RX ADMIN — HALOPERIDOL LACTATE 10 MG: 5 INJECTION, SOLUTION INTRAMUSCULAR at 21:15

## 2024-02-01 RX ADMIN — MIDAZOLAM 2 MG: 1 INJECTION INTRAMUSCULAR; INTRAVENOUS at 17:19

## 2024-02-01 RX ADMIN — VALPROIC ACID 500 MG: 250 SOLUTION ORAL at 10:41

## 2024-02-01 RX ADMIN — MIDAZOLAM 2 MG: 1 INJECTION INTRAMUSCULAR; INTRAVENOUS at 03:03

## 2024-02-01 RX ADMIN — MIDODRINE HYDROCHLORIDE 5 MG: 5 TABLET ORAL at 03:04

## 2024-02-01 RX ADMIN — MIRTAZAPINE 30 MG: 15 TABLET, ORALLY DISINTEGRATING ORAL at 21:26

## 2024-02-01 RX ADMIN — Medication 100 MG: at 10:40

## 2024-02-01 RX ADMIN — MIDODRINE HYDROCHLORIDE 5 MG: 5 TABLET ORAL at 21:26

## 2024-02-01 RX ADMIN — SODIUM CHLORIDE, PRESERVATIVE FREE 10 ML: 5 INJECTION INTRAVENOUS at 21:27

## 2024-02-01 RX ADMIN — MIDAZOLAM 2 MG: 1 INJECTION INTRAMUSCULAR; INTRAVENOUS at 10:37

## 2024-02-01 RX ADMIN — MIDAZOLAM 2 MG: 1 INJECTION INTRAMUSCULAR; INTRAVENOUS at 13:38

## 2024-02-01 ASSESSMENT — PAIN SCALES - GENERAL
PAINLEVEL_OUTOF10: 0
PAINLEVEL_OUTOF10: 0

## 2024-02-01 NOTE — PROGRESS NOTES
Palliative Medicine   [place] [phone] (COPE)        [facility] [phone] (COPE)      Palliative Medicine SW received e-mail from the patient's sister/Legal Guardian Eden asking if this writer knew \"what was going on with Isreal\" because she has not received update. She shared via e-mail that she was told Isreal had moved to the 6th floor, but does not know room number or phone number to unit and this was not given.     SW provided her with unit room number and contact information- SW also shared w/ Eden that this writer would reach out to Hospitalist as well as Psychiatry, who are both following patient closely, to please contact her as they are able to provide update.     Discussed w/ .     Thank you for including Palliative Medicine in the care of Roge \"Isreal\" Chapo Torres LCSW

## 2024-02-01 NOTE — PROGRESS NOTES
[facility]                                                                                          Hospitalist Progress Note  Sander Bae MD  Office Phone: [phone]        Date of Service:  2024  NAME:  Roge Cowan  :  1970  MRN:  [id number]       Admission Summary:     Roge Cowan is a 53 y.o. male with schizophrenia and catatonia, s/p ECT x2 who was transferred from [facility] inpatient to [facility] ICU on 2024 for severe psychosis and episodes of catatonia. Pt was initially admitted to [facility] on 2023 after being taken there by police for evaluation for refusal of self-care. [facility] hospitalization included development of a R ankle Stage IV pressure wound that progressed to tendon exposure with purulent drainage, refusal of wound care, labs, vitals checks, oral medications, oral intake, and only took IM medications that were court ordered. Pt was seen by podiatry and ID who recommended vancomycin and cefepime. He did receive ECT x2 at [facility]. Pt was transferred to [facility] ICU for sedation and protective environment to ensure adequate care. He was started on ketamine and Precedex gtts. He required 4 point restraints. 1:1 sitter was ordered. He was intubated on 1/15 due to increasing agitation despite sedating meds.  ID, Psychiatry, Palliative Medicine, Ethics, and WOCN have all been consulted during pt's ICU stay at [facility]. Psychiatry recommended reducing reliance on BDZs and consolidating antipsychotics to just one if possible; Haldol IV was increased and Depakene was added. Ethics held an interdisciplinary meeting with all medical teams involved on . There is an open APS case against patient's sister/ Julie. Pt was extubated  and was in bilateral wrist restraints. DHT is in place and tube feeds are infusing per dietitian recs. Pt has periods of agitation for which he receives prn meds. He is currently on Haldol 10mg IV 4x daily,

## 2024-02-01 NOTE — CARE COORDINATION
Transition of Care Plan:    [facility] denied [facility] to [facility] for [facility] treatment  - Pt needs ECT, cannot received at [facility] due to medical complexity   - Plan to schedule care conference early next week    Transport: BLS     RUR: 18% Moderate   Prior Level of Functioning: Dependent with ADL's  Disposition: TBD - Pt in need of ECT treatment in a Med Psych setting  DME needed: NA  Transportation at discharge: BLS  IM/IMM Medicare given: 1/29/24  Is patient a  and connected with VA? No  Caregiver Contact: Sister Eden Maier [phone]  Discharge Caregiver contacted prior to discharge? Yes  Care Conference needed? Yes  Barriers to discharge:   - Medical Stability  - Need ECT treatments not available at [facility].   - [facility] will not accept Pt back due to medical complexity  - [facility] does not have a bed for transfer.   - [facility] denied [facility] for transfer  - [facility] not able to accept Pt, not a Med Psych unit    Pt Contacts:   Sister Eden Montalvo [phone]  PCP Dr Dmitry Motta [phone]  Psych: [facility] [phone] Dr Olivas   with ACT team: Jayson Bustos [phone]  [facility] ACT clinician :Geeta [phone]   Lorene Pabon [phone]  APS: Geno  [phone]    2/1 - Pt has been denied [facility] to [facility]. [facility] 20 treatments of ECT with little to no improvement, was ultimately transferred back to [facility].    Discussed with Dr. Larson - Dr. Larson feels like Pt does see benefit from ECT and this is still best course for Pt to show improvement.     Plan to schedule another care conference early next week. CM Manager informed.     1530: TONY spoke with [facility] ([facility] in [place]) [phone] as they offer ECT. They do not take Med Psych; could not take Pt with DHT or feed tube.    Frannie Velasquez M.S (Ally).SILVIA.

## 2024-02-01 NOTE — PROGRESS NOTES
Comprehensive Nutrition Assessment    Type and Reason for Visit: Reassess    Nutrition Recommendations/Plan:   Resume tube feedings as able.  Allow PO as tolerated/medically appropriate.   ?long-term GOC. If unable to tolerate oral diet, and if in alignment with goals of care, if PEG placed could offer bolus feedings which could be administered over short amount of time with continued issues related to positioning.  Trend K+, Phos, and Mg.       Malnutrition Assessment:  Malnutrition Status:  Severe malnutrition (01/15/24 1347)    Context:  Acute Illness     Findings of the 6 clinical characteristics of malnutrition:  Energy Intake:  50% or less of estimated energy requirements for 5 or more days  Weight Loss:  Unable to assess     Body Fat Loss:  Moderate body fat loss Fat Overlying Ribs, Orbital   Muscle Mass Loss:  Moderate muscle mass loss Clavicles (pectoralis & deltoids), Temples (temporalis)  Fluid Accumulation:  No significant fluid accumulation     Strength:  Not Performed       Nutrition Assessment:    Pt transferred from [facility] with Catatonia Schizophrenia (admitted 12/12). Initially treated with large amounts of BZDs and ECT x 2 however difficult to administer medical care. Pt refusing all PO intake, medical care, medications. New development of pressure injury-WOCN following. Transferred to [facility] ICU 1/12. Intubated today d/t continued inability to care for pt medically.     2/1: Discussed in IDRs and with RN. Pt's tube feedings have been held d/t continued improper positioning for continuous feeding. He continues to want to lay in \"prayer form\" as previously documented. Plan is to try to resume tube feedings today, and some discussion surrounding letting him eat ad ravin. Currently lytes are stable (last Phos 1/29 WNL).     Tube feeding (Vital AF 1.2 @ 70 ml/hr with 100 ml water flush q 4 hr) provides 1540 ml, 1850 calories, 116 gm protein and 1850 ml free water (tube feeding/flush) per day to meet  wounds    Nutrition Interventions:   Food and/or Nutrient Delivery: Continue Current Tube Feeding  Nutrition Education/Counseling: No recommendation at this time  Coordination of Nutrition Care: Continue to monitor while inpatient       Goals:  Previous Goal Met: No Progress toward Goal(s)  Goals:  (EN tolerance with feedings to meet at least 90% kcal/protein needs.)       Nutrition Monitoring and Evaluation:   Behavioral-Environmental Outcomes: None Identified  Food/Nutrient Intake Outcomes: Enteral Nutrition Intake/Tolerance  Physical Signs/Symptoms Outcomes: Biochemical Data, GI Status, Skin, Fluid Status or Edema    Discharge Planning:    Too soon to determine     Leelee Storm RD  Available via [employer] [place] or ext [phone]

## 2024-02-01 NOTE — PROGRESS NOTES
Music Therapy Assessment  [facility]    Roge Cowan [id number]     1970  53 y.o.  male    Patient Telephone Number: [phone] (home)   Muslim Affiliation: None   Language: English   Patient Active Problem List    Diagnosis Date Noted    Palliative care by specialist 01/22/2024    Goals of care, counseling/discussion 01/22/2024    Confusion 01/15/2024    Protein-calorie malnutrition ([facility]) 01/15/2024    Palliative care encounter 01/15/2024    Catatonia schizophrenia ([facility]) 01/12/2024    Pressure injury of right ankle, stage 3 ([facility]) 01/10/2024    Cellulitis 01/08/2024    Immobility 12/29/2023    Schizoaffective disorder ([facility]) 12/08/2023    Schizophrenia ([facility]) 11/27/2017    Paranoid schizophrenia ([facility]) 03/15/2017        Date: 2/1/2024            Total Time Calculated: 55 min          [facility] 6S NEURO-SCI TELE    Mental Status:   [x] Alert [  ] Forgetful [  ]  Confused  [  ] Minimally responsive  [  ] Sleeping    Communication Status: [  ] Impaired Speech [  ] Nonverbal -N/A    Physical Status:   [  ] Oxygen in use  [  ] Hard of Hearing [  ] Vision Impaired  [  ] Ambulatory  [  ] Ambulatory with assistance [  ] Non-ambulatory -N/A    Music Preferences, Background: Per pt's sister, pt enjoyed listening to the radio and listening to Islam and Classic Rock music    Clinical Problem addressed: Increase social engagement & orientation; Support safety and comfort    Goal(s) met in session:  Physical/Pain management (Scale of 1-10):    Pre-session rating: N/A  Post-session rating: N/A  [  ] Increased relaxation   [  ] Affected breathing patterns  [  ] Decreased muscle tension   [  ] Decreased agitation  [  ] Affected heart rate    [x] Increased alertness     Emotional/Psychological:  [  ] Increased self-expression   [  ] Decreased aggressive behavior   [  ] Decreased feelings of stress  [  ] Discussed healthy coping skills     [  ] Improved mood    [x] Decreased withdrawn behavior     Social:  [  ]

## 2024-02-01 NOTE — PLAN OF CARE
Impulsive patient is stable, no reports of pain upon assessment. Bedside sitter continued to promote patient safety. Patient's continuous tube feed remains held due to patient's inability to maintain upright positioning to reduce aspiration risk. Right nostril dobhoff remains in place and patient, medications crushed through tube tolerated well. Patient is able to express needs and is voiding using urinal. Intravenous access removed by patient twice on shift. Provider notified. Safety measures in place, monitoring ongoing.

## 2024-02-01 NOTE — PLAN OF CARE
Problem: Discharge Planning  Goal: Discharge to home or other facility with appropriate resources  2/1/2024 1332 by Suni Carmen RN  Outcome: Progressing  Flowsheets (Taken 2/1/2024 0800)  Discharge to home or other facility with appropriate resources:   Identify barriers to discharge with patient and caregiver   Identify discharge learning needs (meds, wound care, etc)   Arrange for needed discharge resources and transportation as appropriate   Arrange for interpreters to assist at discharge as needed   Refer to discharge planning if patient needs post-hospital services based on physician order or complex needs related to functional status, cognitive ability or social support system  1/31/2024 2346 by Lore Herzog RN  Outcome: Progressing     Problem: Pain  Goal: Verbalizes/displays adequate comfort level or baseline comfort level  2/1/2024 1332 by Suni Carmen RN  Outcome: Progressing  1/31/2024 2346 by Lore Herzog RN  Outcome: Progressing  Flowsheets (Taken 1/31/2024 2008)  Verbalizes/displays adequate comfort level or baseline comfort level: Encourage patient to monitor pain and request assistance     Problem: Safety - Adult  Goal: Free from fall injury  2/1/2024 1332 by Suni Carmen, RN  Outcome: Progressing  1/31/2024 2346 by Lore Herzog RN  Outcome: Progressing  Flowsheets (Taken 1/31/2024 2100)  Free From Fall Injury: Instruct family/caregiver on patient safety     Problem: Skin/Tissue Integrity  Goal: Absence of new skin breakdown  Description: 1.  Monitor for areas of redness and/or skin breakdown  2.  Assess vascular access sites hourly  3.  Every 4-6 hours minimum:  Change oxygen saturation probe site  4.  Every 4-6 hours:  If on nasal continuous positive airway pressure, respiratory therapy assess nares and determine need for appliance change or resting period.  2/1/2024 1332 by Suni Carmen, RN  Outcome: Progressing  1/31/2024 2346 by Lore Herzog  GAIL Mike  Outcome: Progressing     Problem: Nutrition Deficit:  Goal: Optimize nutritional status  2/1/2024 1332 by Suni Carmen RN  Outcome: Progressing  1/31/2024 2346 by Lore Herzog RN  Outcome: Progressing     Problem: ABCDS Injury Assessment  Goal: Absence of physical injury  2/1/2024 1332 by Suni Carmen RN  Outcome: Progressing  1/31/2024 2346 by Lore Herzog RN  Outcome: Progressing     Problem: Chronic Conditions and Co-morbidities  Goal: Patient's chronic conditions and co-morbidity symptoms are monitored and maintained or improved  2/1/2024 1332 by Suni Carmen RN  Outcome: Progressing  Flowsheets (Taken 2/1/2024 0800)  Care Plan - Patient's Chronic Conditions and Co-Morbidity Symptoms are Monitored and Maintained or Improved:   Monitor and assess patient's chronic conditions and comorbid symptoms for stability, deterioration, or improvement   Collaborate with multidisciplinary team to address chronic and comorbid conditions and prevent exacerbation or deterioration   Update acute care plan with appropriate goals if chronic or comorbid symptoms are exacerbated and prevent overall improvement and discharge  1/31/2024 2346 by Lore Herzog RN  Outcome: Progressing     Problem: Safety - Medical Restraint  Goal: Remains free of injury from restraints (Restraint for Interference with Medical Device)  Description: INTERVENTIONS:  1. Determine that other, less restrictive measures have been tried or would not be effective before applying the restraint  2. Evaluate the patient's condition at the time of restraint application  3. Inform patient/family regarding the reason for restraint  4. Q2H: Monitor safety, psychosocial status, comfort, nutrition and hydration  Outcome: Progressing  Flowsheets (Taken 2/1/2024 1021)  Remains free of injury from restraints (restraint for interference with medical device):   Determine that other, less restrictive measures have been tried

## 2024-02-02 PROCEDURE — 6370000000 HC RX 637 (ALT 250 FOR IP): Performed by: INTERNAL MEDICINE

## 2024-02-02 PROCEDURE — 6360000002 HC RX W HCPCS: Performed by: PSYCHIATRY & NEUROLOGY

## 2024-02-02 PROCEDURE — 2060000000 HC ICU INTERMEDIATE R&B

## 2024-02-02 PROCEDURE — 6360000002 HC RX W HCPCS: Performed by: INTERNAL MEDICINE

## 2024-02-02 PROCEDURE — 6370000000 HC RX 637 (ALT 250 FOR IP): Performed by: PSYCHIATRY & NEUROLOGY

## 2024-02-02 RX ORDER — HALOPERIDOL 2 MG/ML
10 SOLUTION ORAL
Status: DISCONTINUED | OUTPATIENT
Start: 2024-02-02 | End: 2024-02-15 | Stop reason: HOSPADM

## 2024-02-02 RX ORDER — LANOLIN ALCOHOL/MO/W.PET/CERES
100 CREAM (GRAM) TOPICAL DAILY
Status: DISCONTINUED | OUTPATIENT
Start: 2024-02-02 | End: 2024-02-15 | Stop reason: HOSPADM

## 2024-02-02 RX ORDER — LORAZEPAM 2 MG/ML
1 CONCENTRATE ORAL
Status: DISCONTINUED | OUTPATIENT
Start: 2024-02-02 | End: 2024-02-13

## 2024-02-02 RX ORDER — VALPROIC ACID 250 MG/5ML
500 SOLUTION ORAL
Status: DISCONTINUED | OUTPATIENT
Start: 2024-02-03 | End: 2024-02-09

## 2024-02-02 RX ORDER — VALPROIC ACID 250 MG/5ML
1500 SOLUTION ORAL
Status: DISCONTINUED | OUTPATIENT
Start: 2024-02-02 | End: 2024-02-09

## 2024-02-02 RX ADMIN — VALPROIC ACID 1500 MG: 250 SOLUTION ORAL at 17:32

## 2024-02-02 RX ADMIN — HALOPERIDOL 10 MG: 2 SOLUTION ORAL at 18:03

## 2024-02-02 RX ADMIN — MIDODRINE HYDROCHLORIDE 5 MG: 5 TABLET ORAL at 16:01

## 2024-02-02 RX ADMIN — SENNOSIDES AND DOCUSATE SODIUM 2 TABLET: 8.6; 5 TABLET ORAL at 09:00

## 2024-02-02 RX ADMIN — HALOPERIDOL 10 MG: 2 SOLUTION ORAL at 14:04

## 2024-02-02 RX ADMIN — Medication 1 MG: at 18:07

## 2024-02-02 RX ADMIN — Medication 1 MG: at 14:04

## 2024-02-02 RX ADMIN — POLYETHYLENE GLYCOL 3350 17 G: 17 POWDER, FOR SOLUTION ORAL at 09:00

## 2024-02-02 RX ADMIN — HALOPERIDOL LACTATE 5 MG: 5 INJECTION, SOLUTION INTRAMUSCULAR at 01:22

## 2024-02-02 NOTE — CONSULTS
SOCIAL HISTORY:  See H & P (Was living with sister in poor condition)    VITALS:  Vitals:    02/02/24 1000   BP:    Pulse: 69   Resp:    Temp:    SpO2:      Meal Intake:   Patient Vitals for the past 168 hrs:    PO Meals Eaten (%)   02/01/24 1815 76 - 100%   02/01/24 1127 76 - 100%      Supplement Intake:  Patient Vitals for the past 168 hrs:    PO Supplement (%)   02/01/24 1815 76 - 100%   02/01/24 1127 76 - 100%           MEDICATIONS:    Current Facility-Administered Medications:     sennosides-docusate sodium (SENOKOT-S) 8.6-50 MG tablet 2 tablet, 2 tablet, Oral, BID, Cici Downey MD    polyethylene glycol (GLYCOLAX) packet 17 g, 17 g, Oral, Daily, Cici Downey MD    haloperidol lactate (HALDOL) injection 10 mg, 10 mg, IntraVENous, 4x daily, Lety Larson MD, 10 mg at 02/01/24 2115    midazolam (VERSED) injection 2 mg, 2 mg, IntraVENous, Q4H, Lety Larson MD, 2 mg at 02/01/24 2115    mirtazapine (REMERON SOL-TAB) disintegrating tablet 30 mg, 30 mg, Per NG tube, Nightly, Lety Larson MD, 30 mg at 02/01/24 2126    valproic acid (DEPAKENE) 250 MG/5ML oral solution 500 mg, 500 mg, Per NG tube, Daily, Lety Larson MD, 500 mg at 02/01/24 1041    valproic acid (DEPAKENE) 250 MG/5ML oral solution 1,500 mg, 1,500 mg, Per NG tube, QHS, Lety Larson MD, 1,500 mg at 02/01/24 2126    haloperidol lactate (HALDOL) injection 5 mg, 5 mg, IntraVENous, Q2H PRN, Cristine Jimenez MD, 5 mg at 02/02/24 0122    midodrine (PROAMATINE) tablet 5 mg, 5 mg, Orogastric, Q6H, Princess Beverly MD, 5 mg at 02/01/24 2126    thiamine tablet 100 mg, 100 mg, Per NG tube, Daily, Princess Beverly MD, 100 mg at 02/01/24 1040    sodium chloride flush 0.9 % injection 5-40 mL, 5-40 mL, IntraVENous, 2 times per day, Long Ball MD, 10 mL at 02/01/24 2127    sodium chloride flush 0.9 % injection 5-40 mL, 5-40 mL, IntraVENous, PRN, Long Ball MD    0.9 % sodium chloride infusion, , IntraVENous, PRN, Long Ball MD     order for the pt to safely receive care, with plan of using Dobbhoff tube plus bridle following extubation in order for this to continue to be possible.   -Will plan to participate in ongoing interdisciplinary team meetings, including with the pt's guardian (sister) tomorrow afternoon.     1/16/24-   Monitor EKG given risk of QT prolongation.   Will increase Depakene from 250mg TID to 500mg BID for increase in total daily dose from 750mg to 1000mg.     01/15/2024  Discussed reducing reliance on benzos and consolidating antipsychotics to just one if possible.    D/C prn zyprexa  Recommended increase haldol from 5mg IV q6h to q4h. Obtain EKG to f/u QTC and be vigilant about monitoring VS, especially temp.  Start depakene  250mg po q8h in planning to further increase it to decrease agitation.  Plan would be to stabilize patient to undergo ECT.    01/13/2024  Not a candidate for inpatient psychiatry at this time due to his skin breakdown is now a tunneling wound.    Continue 1:1 nursing  Psychiatry to follow  Continue current care with Court ordered treatments including ECT.    Lety Larson MD

## 2024-02-02 NOTE — BSMART NOTE
BSMART Liaison Team Note     LOS:  21 days     Patient goal(s) for today: Patient goal(s) for today: take medications as prescribed, make needs known in an appropriate manner  BSMART Liaison team focus/goals: assess needs, provide support and education, coordinate care     Progress note: Liaison met f/f with pt in his room on the medical floor. 1:1 sitter at pt's bedside. Pt laying on his side, facing toward door, with eyes closed. Liaison made many attempts to engage pt in a visit, but pt remains laying on his side with his eyes closed. Pt will not engage with liaison. Sitters report that pt did sit up and eat his breakfast this morning, before laying back down. Vimal did consult with pt's primary nurse. She reports that pt became combative last night with staff, and he pulled out all of his IV's. 3 staff attempted to draw blood and to place IV's, but couldn't. Staff were able to give pt IM Haldol.     Vimal consulted with another sitter for pt over the last 2 days, IRAIDA Flores. She reports that pt either in a fetal position or he is laying on his side. IRAIDA Flores reports that pt became less tense with her during their time together. Pt did interact with her, often whispering brief responses, like \"spare me,\" or saying that he wanted \"Benigno food.\" Pt reportedly likes to go by \"Romulo.\" He reportedly doesn't like to be touched due to an apparent hx of trauma, and possible d/t auditory hallucinations. IRAIDA Flores reports that pt was able to sit up yesterday and to eat his breakfast, lunch, and dinner. She is aware that pt sat up again today to eat his breakfast. Liaison will continue to monitor and to support.    Barriers to Discharge: Medical clearance, psych bed     Outpatient provider(s):  Mavis KYLE  Psych: Dr. Olivas: [phone]   CM: Jayson Bsutos [phone]  Clinician: Geeta [phone]  Insurance info/prescription coverage:  MEDICARE PART A AND B , [facility]  PLAN OF [facility]      Diagnosis: Refractory schizophrenia with catatonia      Plan: Discharge goal is ECT tx. Care conference planned for early next week. Psychiatry and BSMART Liaison to follow daily. Please defer to psychiatry and medical team for details on discharge plan and disposition.  .   Follow up Psych Consult placed? Psychiatry following daily   Psychiatrist updated?  No      Participating treatment team members: Kamari Wilson, GIOVANI

## 2024-02-02 NOTE — CARE COORDINATION
Transition of Care Plan:    Referral pending for Pt return to [facility] for ECT treatments    - Pt denied for transfer to [facility] for ECT treatment    Transport: BLS     RUR: 16% Moderate   Prior Level of Functioning: Dependent with ADL's  Disposition: TBD - Pt in need of ECT treatment in a Med Psych setting  DME needed: NA  Transportation at discharge: BLS  IM/IMM Medicare given: 1/29/24  Is patient a  and connected with VA? No  Caregiver Contact: Sister Eden Maier [phone]  Discharge Caregiver contacted prior to discharge? Yes  Care Conference needed? Yes  Barriers to discharge:   - Medical Stability  - Need ECT treatments not available at [facility].   - [facility] does not have a bed for transfer.   - [facility] Med Psych denied Pt for transfer  - [facility] not able to accept Pt, not a Med Psych unit    Pt Contacts:   Sister Eden Montalvo [phone]  PCP Dr Dmitry Motta [phone]  Psych: [facility] [phone] Dr Olivas   with ACT team: Jayson Bustos [phone]  [facility] clinician :Geeta [phone]   Lorene Pabon [phone]  APS: Geno  [phone]    2/2 - Discussed with Dr. Larson - plan to re-initiate [facility] transfer for Pt as he is closer to baseline than previously. Dr. Larson to switch meds to oral to see if Pt will take them.     Referral sent to CM Manager for [facility] transfer.     CORNELIUS Meyers (Ally).

## 2024-02-02 NOTE — PROGRESS NOTES
[facility]                                                                                          Hospitalist Progress Note  Sander Bae MD  Office Phone: [phone]        Date of Service:  2024  NAME:  Roge Cowan  :  1970  MRN:  [id number]       Admission Summary:     Roge Cowan is a 53 y.o. male with schizophrenia and catatonia, s/p ECT x2 who was transferred from [facility] inpatient to [facility] ICU on 2024 for severe psychosis and episodes of catatonia. Pt was initially admitted to [facility] on 2023 after being taken there by police for evaluation for refusal of self-care. [facility] hospitalization included development of a R ankle Stage IV pressure wound that progressed to tendon exposure with purulent drainage, refusal of wound care, labs, vitals checks, oral medications, oral intake, and only took IM medications that were court ordered. Pt was seen by podiatry and ID who recommended vancomycin and cefepime. He did receive ECT x2 at [facility]. Pt was transferred to [facility] ICU for sedation and protective environment to ensure adequate care. He was started on ketamine and Precedex gtts. He required 4 point restraints. 1:1 sitter was ordered. He was intubated on 1/15 due to increasing agitation despite sedating meds.  ID, Psychiatry, Palliative Medicine, Ethics, and WOCN have all been consulted during pt's ICU stay at [facility]. Psychiatry recommended reducing reliance on BDZs and consolidating antipsychotics to just one if possible; Haldol IV was increased and Depakene was added. Ethics held an interdisciplinary meeting with all medical teams involved on . There is an open APS case against patient's sister/ Julie. Pt was extubated  and was in bilateral wrist restraints. DHT is in place and tube feeds are infusing per dietitian recs. Pt has periods of agitation for which he receives prn meds. He is currently on Haldol 10mg IV 4x daily,

## 2024-02-02 NOTE — PROGRESS NOTES
0100: Patient removed IV. Unwilling to allow RN to replace. Patient agitated interacting with staff. Sitter at bedside. IM haldol given for agitation, staff safety.    0230: Sitter obtains VS. BP stable. Patient still withdrawn, minimal interaction.     0400: RN redresses R ankle ulcer with hydrocolloid. Patient withdrawing, uncooperative. Says \"don't touch.\" Unwilling to allow staff to wrap ankle wound, or start IV, draw labs. Uncooperative with all cares currently. Physically resists staff.     0530: Patient turning over in bed, pulling off telemetry leads. Patient uncooperative with allowing staff to replace telemetry leads.

## 2024-02-02 NOTE — PROGRESS NOTES
Nutrition Note    Discussed in IDRs.  DHT out, discontinued tube feeding orders.  Per RN, pt ate pretty well yesterday and did well with breakfast this morning as well.   Added once daily ONS for additional kcal/protein option.   Pt underweight/severe PCM with elevated needs; however, please refer to prior assessments/progress notes to review current barriers to nutrition. Agree with allowing pt to eat what he would like.     Meal Intake:   Patient Vitals for the past 168 hrs:   PO Meals Eaten (%)   02/01/24 1815 76 - 100%   02/01/24 1127 76 - 100%     Supplement Intake:  Patient Vitals for the past 168 hrs:   PO Supplement (%)   02/01/24 1815 76 - 100%   02/01/24 1127 76 - 100%     Estimated Daily Nutrient Needs:  Energy Requirements Based On: Kcal/kg  Weight Used for Energy Requirements: Current  Energy (kcal/day): 2623-4681 (26-28 kcal/kg)  Weight Used for Protein Requirements: Current  Protein (g/day): 105 (1.5 g/kg)  Method Used for Fluid Requirements: 1 ml/kcal  Fluid (ml/day): 2000     Electronically signed by Leelee Storm RD on 2/2/24 at 10:23 AM EST    Contact via [employer] or ext [phone]

## 2024-02-03 PROCEDURE — 1100000000 HC RM PRIVATE

## 2024-02-03 PROCEDURE — 6370000000 HC RX 637 (ALT 250 FOR IP): Performed by: PSYCHIATRY & NEUROLOGY

## 2024-02-03 PROCEDURE — 6370000000 HC RX 637 (ALT 250 FOR IP): Performed by: INTERNAL MEDICINE

## 2024-02-03 RX ORDER — MIRTAZAPINE 15 MG/1
30 TABLET, ORALLY DISINTEGRATING ORAL NIGHTLY
Status: DISCONTINUED | OUTPATIENT
Start: 2024-02-03 | End: 2024-02-15 | Stop reason: HOSPADM

## 2024-02-03 RX ADMIN — SENNOSIDES AND DOCUSATE SODIUM 2 TABLET: 8.6; 5 TABLET ORAL at 09:46

## 2024-02-03 RX ADMIN — MIRTAZAPINE 30 MG: 15 TABLET, ORALLY DISINTEGRATING ORAL at 21:10

## 2024-02-03 RX ADMIN — HALOPERIDOL 10 MG: 2 SOLUTION ORAL at 09:47

## 2024-02-03 RX ADMIN — HALOPERIDOL 10 MG: 2 SOLUTION ORAL at 16:45

## 2024-02-03 RX ADMIN — Medication 1 MG: at 16:45

## 2024-02-03 RX ADMIN — Medication 1 MG: at 09:47

## 2024-02-03 RX ADMIN — VALPROIC ACID 500 MG: 250 SOLUTION ORAL at 08:00

## 2024-02-03 RX ADMIN — MIDODRINE HYDROCHLORIDE 5 MG: 5 TABLET ORAL at 21:10

## 2024-02-03 RX ADMIN — SENNOSIDES AND DOCUSATE SODIUM 2 TABLET: 8.6; 5 TABLET ORAL at 21:10

## 2024-02-03 RX ADMIN — Medication 100 MG: at 09:46

## 2024-02-03 RX ADMIN — MIDODRINE HYDROCHLORIDE 5 MG: 5 TABLET ORAL at 09:47

## 2024-02-03 ASSESSMENT — PAIN SCALES - WONG BAKER: WONGBAKER_NUMERICALRESPONSE: 0

## 2024-02-03 ASSESSMENT — PAIN SCALES - GENERAL: PAINLEVEL_OUTOF10: 0

## 2024-02-03 NOTE — CONSULTS
PSYCHIATRY CONSULT PROGRESS NOTE:    REASON FOR CONSULT:  Continued care.    CHIEF COMPLAINT:   None    Interval History  02/03/2024  Mr. Cowan was in his \"prayer pose\" during evaluation, lying on right side, did not respond to verbal prompts. The pt's nurse reports he has been refusing care today, has not been speaking other than telling staff to be quiet when they attempt to speak to him. He did take some of his medications agreeably this morning, but refused mid-day doses. He refused his EKG. No other changes or updates today.     02/02/2024  Patient has removed his IV and resisted efforts to for further placement. He's resumed ad ravin eating and been eating his meals & supplements since yesterday's lunch. Certainly appreciate dietician's recs. He remains supine most time and often is in kneeling position on his bed.  Upon my evaluation, he awak, alert, but quickly closed his eyes and wouldn't answer any of my questions or follow any commands.    02/01/2024  Nursing report that patient was transferred from ICU to medical floor on 01/31/2024. [facility] update inability to accept patient at this time.  He has intermittent episodes of getting in a hunched over pose, preventing continued NG tube feed.  Upon my evaluation he had his eyes closed and wouldn't respond to my evaluation questions.    Spoke with patient's sister and POA, Eden. She re-iterated her wish for a trial of ECT, emphasizing that patient has benefited in the past from treatments. She shared that even though it was never 'curative' of his symptoms, that it has maintained his function at his best baseline.    01/29/2024  Nursing reported that patient had a better night last night than the night before.  He had not received any as needed Haldol IV or Versed.  Patient one-on-one sitter reported that patient Rocael is in his bed many times, and when ask what he is doing, sometimes he replies that he is praying.  Upon my evaluation today, he was sleepy. He  Q2H PRN, Cristine Jimenez MD, 5 mg at 02/02/24 0122    midodrine (PROAMATINE) tablet 5 mg, 5 mg, Orogastric, Q6H, Princess Beverly MD, 5 mg at 02/03/24 0947    sodium chloride flush 0.9 % injection 5-40 mL, 5-40 mL, IntraVENous, 2 times per day, Long Ball MD, 10 mL at 02/01/24 2127    sodium chloride flush 0.9 % injection 5-40 mL, 5-40 mL, IntraVENous, PRN, Long Ball MD    0.9 % sodium chloride infusion, , IntraVENous, PRN, Long Ball MD    [DISCONTINUED] potassium chloride (KLOR-CON) extended release tablet 40 mEq, 40 mEq, Oral, PRN **OR** [DISCONTINUED] potassium bicarb-citric acid (EFFER-K) effervescent tablet 40 mEq, 40 mEq, Oral, PRN **OR** potassium chloride 10 mEq/100 mL IVPB (Peripheral Line), 10 mEq, IntraVENous, PRN, Long Ball MD    ondansetron (ZOFRAN-ODT) disintegrating tablet 4 mg, 4 mg, Oral, Q8H PRN **OR** ondansetron (ZOFRAN) injection 4 mg, 4 mg, IntraVENous, Q6H PRN, Long Ball MD    acetaminophen (TYLENOL) tablet 650 mg, 650 mg, Oral, Q6H PRN, 650 mg at 02/01/24 1041 **OR** acetaminophen (TYLENOL) suppository 650 mg, 650 mg, Rectal, Q6H PRN, Long Ball MD    enoxaparin (LOVENOX) injection 40 mg, 40 mg, SubCUTAneous, Daily, Long Ball MD, 40 mg at 02/01/24 1040    [Held by provider] ARIPiprazole ER (ABILIFY MAINTENA) 400 mg, 400 mg, IntraMUSCular, Q28 Days, Long Ball MD    sodium chloride flush 0.9 % injection 5-40 mL, 5-40 mL, IntraVENous, PRN, Misbah Mcmanus, NP-C    albuterol (PROVENTIL) (2.5 MG/3ML) 0.083% nebulizer solution 2.5 mg, 2.5 mg, Nebulization, Q6H PRN, Misbah Mcmanus, NP-C    senna (SENOKOT) 8.8 MG/5ML syrup 8.8 mg, 5 mL, Oral, BID PRN, Princess Beverly MD    MENTAL STATUS EXAM:  Roge Cowan is unclothed, lying in bed on right side. He remains with eye closed but is awake.   He doesn't follow commands or answer my questions  Speech is non-spontaneous.  I am unable to assess his thought process, though content, affect, mood,

## 2024-02-03 NOTE — PLAN OF CARE
2000 Received patient from GAIL Frances.    0600 Pt agitated and angry while attempting wound care. Cleansed wound and attempted dressing. Pt ripped off dressing and stated \"its stupid\". Pt refused labs.     0730 Bedside shift change report given to GAIL Frances (oncoming nurse) by Kaylynn Chilel RN. Report included the following information SBAR, MAR, Recent Results.     Problem: Discharge Planning  Goal: Discharge to home or other facility with appropriate resources  Outcome: Progressing     Problem: Pain  Goal: Verbalizes/displays adequate comfort level or baseline comfort level  Outcome: Progressing     Problem: Safety - Adult  Goal: Free from fall injury  Outcome: Progressing  Flowsheets (Taken 2/2/2024 1000 by Yvrose Barbosa RN)  Free From Fall Injury: Instruct family/caregiver on patient safety     Problem: Skin/Tissue Integrity  Goal: Absence of new skin breakdown  Description: 1.  Monitor for areas of redness and/or skin breakdown  2.  Assess vascular access sites hourly  3.  Every 4-6 hours minimum:  Change oxygen saturation probe site  4.  Every 4-6 hours:  If on nasal continuous positive airway pressure, respiratory therapy assess nares and determine need for appliance change or resting period.  Outcome: Progressing     Problem: Nutrition Deficit:  Goal: Optimize nutritional status  Outcome: Progressing     Problem: ABCDS Injury Assessment  Goal: Absence of physical injury  Outcome: Progressing     Problem: Chronic Conditions and Co-morbidities  Goal: Patient's chronic conditions and co-morbidity symptoms are monitored and maintained or improved  Outcome: Progressing

## 2024-02-03 NOTE — PROGRESS NOTES
[facility]                                                                                          Hospitalist Progress Note  Sander Bae MD  Office Phone: [phone]        Date of Service:  2/3/2024  NAME:  Roge Cowan  :  1970  MRN:  [id number]       Admission Summary:     Roge Cowan is a 53 y.o. male with schizophrenia and catatonia, s/p ECT x2 who was transferred from [facility] inpatient to [facility] ICU on 2024 for severe psychosis and episodes of catatonia. Pt was initially admitted to [facility] on 2023 after being taken there by police for evaluation for refusal of self-care. [facility] hospitalization included development of a R ankle Stage IV pressure wound that progressed to tendon exposure with purulent drainage, refusal of wound care, labs, vitals checks, oral medications, oral intake, and only took IM medications that were court ordered. Pt was seen by podiatry and ID who recommended vancomycin and cefepime. He did receive ECT x2 at [facility]. Pt was transferred to [facility] ICU for sedation and protective environment to ensure adequate care. He was started on ketamine and Precedex gtts. He required 4 point restraints. 1:1 sitter was ordered. He was intubated on 1/15 due to increasing agitation despite sedating meds.  ID, Psychiatry, Palliative Medicine, Ethics, and WOCN have all been consulted during pt's ICU stay at [facility]. Psychiatry recommended reducing reliance on BDZs and consolidating antipsychotics to just one if possible; Haldol IV was increased and Depakene was added. Ethics held an interdisciplinary meeting with all medical teams involved on . There is an open APS case against patient's sister/ Julie. Pt was extubated  and was in bilateral wrist restraints. DHT is in place and tube feeds are infusing per dietitian recs. Pt has periods of agitation for which he receives prn meds. He is currently on Haldol 10mg IV 4x daily,  (GLYCOLAX) packet 17 g  17 g Oral Daily    haloperidol lactate (HALDOL) injection 5 mg  5 mg IntraVENous Q2H PRN    midodrine (PROAMATINE) tablet 5 mg  5 mg Orogastric Q6H    sodium chloride flush 0.9 % injection 5-40 mL  5-40 mL IntraVENous 2 times per day    sodium chloride flush 0.9 % injection 5-40 mL  5-40 mL IntraVENous PRN    0.9 % sodium chloride infusion   IntraVENous PRN    potassium chloride 10 mEq/100 mL IVPB (Peripheral Line)  10 mEq IntraVENous PRN    ondansetron (ZOFRAN-ODT) disintegrating tablet 4 mg  4 mg Oral Q8H PRN    Or    ondansetron (ZOFRAN) injection 4 mg  4 mg IntraVENous Q6H PRN    acetaminophen (TYLENOL) tablet 650 mg  650 mg Oral Q6H PRN    Or    acetaminophen (TYLENOL) suppository 650 mg  650 mg Rectal Q6H PRN    enoxaparin (LOVENOX) injection 40 mg  40 mg SubCUTAneous Daily    [Held by provider] ARIPiprazole ER (ABILIFY MAINTENA) 400 mg  400 mg IntraMUSCular Q28 Days    sodium chloride flush 0.9 % injection 5-40 mL  5-40 mL IntraVENous PRN    albuterol (PROVENTIL) (2.5 MG/3ML) 0.083% nebulizer solution 2.5 mg  2.5 mg Nebulization Q6H PRN    senna (SENOKOT) 8.8 MG/5ML syrup 8.8 mg  5 mL Oral BID PRN     ______________________________________________________________________  EXPECTED LENGTH OF STAY: 27  ACTUAL LENGTH OF STAY:          22                 Sander Bae MD

## 2024-02-04 PROCEDURE — 1100000000 HC RM PRIVATE

## 2024-02-04 PROCEDURE — 6370000000 HC RX 637 (ALT 250 FOR IP): Performed by: INTERNAL MEDICINE

## 2024-02-04 PROCEDURE — 6370000000 HC RX 637 (ALT 250 FOR IP): Performed by: PSYCHIATRY & NEUROLOGY

## 2024-02-04 RX ADMIN — POLYETHYLENE GLYCOL 3350 17 G: 17 POWDER, FOR SOLUTION ORAL at 08:00

## 2024-02-04 RX ADMIN — VALPROIC ACID 500 MG: 250 SOLUTION ORAL at 08:00

## 2024-02-04 RX ADMIN — HALOPERIDOL 10 MG: 2 SOLUTION ORAL at 08:00

## 2024-02-04 RX ADMIN — Medication 1 MG: at 08:00

## 2024-02-04 RX ADMIN — MIDODRINE HYDROCHLORIDE 5 MG: 5 TABLET ORAL at 08:00

## 2024-02-04 RX ADMIN — Medication 100 MG: at 08:00

## 2024-02-04 RX ADMIN — SENNOSIDES AND DOCUSATE SODIUM 2 TABLET: 8.6; 5 TABLET ORAL at 08:00

## 2024-02-04 RX ADMIN — HALOPERIDOL 10 MG: 2 SOLUTION ORAL at 17:42

## 2024-02-04 RX ADMIN — Medication 1 MG: at 17:42

## 2024-02-04 ASSESSMENT — PAIN SCALES - WONG BAKER: WONGBAKER_NUMERICALRESPONSE: 0

## 2024-02-04 NOTE — CONSULTS
PSYCHIATRY CONSULT PROGRESS NOTE:    REASON FOR CONSULT:  Continued care.    CHIEF COMPLAINT:   None    Interval History  02/04/2024  Mr. Cowan remains little changed. He did make brief eye contact with me but would not answer questions or respond in any way to verbal interaction. He was fully nude on his bed and put himself into a sort of child's pose, prone with his legs tucked under his abdomen with hands in the prayer position. His sitter reports he has not been verbal today and has largely been consistent with the presentation above. He has been eating, and he selectively takes PO medications. He did take his AM Haldol, Ativan, thiamine, and VPA, but has not yet received his midday medications. He refused vitals yesterday evening, this morning, and this afternoon, and has continued to refuse his EKG.     02/03/2024  Mr. Cowan was in his \"prayer pose\" during evaluation, lying on right side, did not respond to verbal prompts. The pt's nurse reports he has been refusing care today, has not been speaking other than telling staff to be quiet when they attempt to speak to him. He did take some of his medications agreeably this morning, but refused mid-day doses. He refused his EKG. No other changes or updates today.     02/02/2024  Patient has removed his IV and resisted efforts to for further placement. He's resumed ad ravin eating and been eating his meals & supplements since yesterday's lunch. Certainly appreciate dietician's recs. He remains supine most time and often is in kneeling position on his bed.  Upon my evaluation, he awak, alert, but quickly closed his eyes and wouldn't answer any of my questions or follow any commands.    02/01/2024  Nursing report that patient was transferred from ICU to medical floor on 01/31/2024. [facility] update inability to accept patient at this time.  He has intermittent episodes of getting in a hunched over pose, preventing continued NG tube feed.  Upon my evaluation he had his

## 2024-02-04 NOTE — PROGRESS NOTES
[facility]                                                                                          Hospitalist Progress Note  Sander Bae MD  Office Phone: [phone]        Date of Service:  2024  NAME:  Roge Cowan  :  1970  MRN:  [id number]       Admission Summary:     Roge Cowan is a 53 y.o. male with schizophrenia and catatonia, s/p ECT x2 who was transferred from [facility] inpatient to [facility] ICU on 2024 for severe psychosis and episodes of catatonia. Pt was initially admitted to [facility] on 2023 after being taken there by police for evaluation for refusal of self-care. [facility] hospitalization included development of a R ankle Stage IV pressure wound that progressed to tendon exposure with purulent drainage, refusal of wound care, labs, vitals checks, oral medications, oral intake, and only took IM medications that were court ordered. Pt was seen by podiatry and ID who recommended vancomycin and cefepime. He did receive ECT x2 at [facility]. Pt was transferred to [facility] ICU for sedation and protective environment to ensure adequate care. He was started on ketamine and Precedex gtts. He required 4 point restraints. 1:1 sitter was ordered. He was intubated on 1/15 due to increasing agitation despite sedating meds.  ID, Psychiatry, Palliative Medicine, Ethics, and WOCN have all been consulted during pt's ICU stay at [facility]. Psychiatry recommended reducing reliance on BDZs and consolidating antipsychotics to just one if possible; Haldol IV was increased and Depakene was added. Ethics held an interdisciplinary meeting with all medical teams involved on . There is an open APS case against patient's sister/ Julie. Pt was extubated  and was in bilateral wrist restraints. DHT is in place and tube feeds are infusing per dietitian recs. Pt has periods of agitation for which he receives prn meds. He is currently on Haldol 10mg IV 4x daily,  midazolam 2mg IV q4h, mirtazapine 30mg per NGT nightly, Depakene 1500mg per NGT nightly and Depakene 500mg per NGT daily as per Psych. Transfer Center has been contacted in hopes of transferring pt to [facility] or [facility] to medical psych unit where he can get ECT given his ongoing medical care.  was consulted today for transfer of care.        Interval history / Subjective:     Patient was seen and examined at the bedside. Sitter present in the room. Patient non verbal, doesn't follow command, lying knee chest position and change to right lateral flexed position       Assessment & Plan:     Catatonic schizophrenia  Acute psychosis  - admitted to [facility] 12/12/23; transferred from [facility] to [facility] ICU on 1/12/24 for sedation and protective environment to ensure adequate care  - s/p ketamine and Precedex gtts  - pt was intubated 1/15 and extubated 1/25  - pulled out his NGT, discontinued tube feeding on 2/2  - pulled out his iv line, switched Haldol 10mg IV 4x daily to haldol 10 mg tid and midazolam 2mg IV q4h to lorazepam 1 mg tid,   -mirtazapine 30mg discontued  -on Depakene 1500 mg  nightly and Depakene 500mg daily    - Psych following closely  - monitor QTc while on IV Haldol, refused and unable to do EKG 2/3  - Ethics held an interdisciplinary meeting with all medical teams involved on 1/19 as there is an open APS case against patient's sister/guardian Eden  -Bed not available to transfer to [facility] where he can get ECT and ongoing medical care -   -Dr. Larson on board, attempting to transfer to [facility] for possible ECT treatment or ECT here at [facility] if possible     Severe malnutrition (POA)  Adult failure to thrive  -NGT pulled out and tube feeding discontinued 2/2  -dietitian on board     Right ankle cellulitis and Stage IV pressure ulcer with infection  - BC 1/9 CoNS in 2 of 4 bottles; 1/11 negative  - R foot wound cx 1/10 Group B strep  - s/p cefepime and vancomycin  - appreciate ID input   - wound care     Transfer to

## 2024-02-05 LAB
GLUCOSE BLD STRIP.AUTO-MCNC: 120 MG/DL (ref 65–117)
GLUCOSE BLD STRIP.AUTO-MCNC: 91 MG/DL (ref 65–117)
SERVICE CMNT-IMP: ABNORMAL
SERVICE CMNT-IMP: NORMAL

## 2024-02-05 PROCEDURE — 6370000000 HC RX 637 (ALT 250 FOR IP): Performed by: PSYCHIATRY & NEUROLOGY

## 2024-02-05 PROCEDURE — 6360000002 HC RX W HCPCS: Performed by: INTERNAL MEDICINE

## 2024-02-05 PROCEDURE — 82962 GLUCOSE BLOOD TEST: CPT

## 2024-02-05 PROCEDURE — 1100000000 HC RM PRIVATE

## 2024-02-05 RX ADMIN — HALOPERIDOL LACTATE 5 MG: 5 INJECTION, SOLUTION INTRAMUSCULAR at 01:34

## 2024-02-05 RX ADMIN — Medication 1 MG: at 19:09

## 2024-02-05 RX ADMIN — HALOPERIDOL 10 MG: 2 SOLUTION ORAL at 19:09

## 2024-02-05 ASSESSMENT — PAIN SCALES - WONG BAKER: WONGBAKER_NUMERICALRESPONSE: 0

## 2024-02-05 NOTE — CONSULTS
sodium chloride infusion, , IntraVENous, PRN, Long Ball MD    [DISCONTINUED] potassium chloride (KLOR-CON) extended release tablet 40 mEq, 40 mEq, Oral, PRN **OR** [DISCONTINUED] potassium bicarb-citric acid (EFFER-K) effervescent tablet 40 mEq, 40 mEq, Oral, PRN **OR** potassium chloride 10 mEq/100 mL IVPB (Peripheral Line), 10 mEq, IntraVENous, PRN, Long Ball MD    ondansetron (ZOFRAN-ODT) disintegrating tablet 4 mg, 4 mg, Oral, Q8H PRN **OR** ondansetron (ZOFRAN) injection 4 mg, 4 mg, IntraVENous, Q6H PRN, Long Ball MD    acetaminophen (TYLENOL) tablet 650 mg, 650 mg, Oral, Q6H PRN, 650 mg at 02/01/24 1041 **OR** acetaminophen (TYLENOL) suppository 650 mg, 650 mg, Rectal, Q6H PRN, Long Ball MD    enoxaparin (LOVENOX) injection 40 mg, 40 mg, SubCUTAneous, Daily, Long Ball MD, 40 mg at 02/01/24 1040    [Held by provider] ARIPiprazole ER (ABILIFY MAINTENA) 400 mg, 400 mg, IntraMUSCular, Q28 Days, Long Ball MD    sodium chloride flush 0.9 % injection 5-40 mL, 5-40 mL, IntraVENous, PRN, Misbah Mcmanus, NP-C    albuterol (PROVENTIL) (2.5 MG/3ML) 0.083% nebulizer solution 2.5 mg, 2.5 mg, Nebulization, Q6H PRN, Misbah Mcmanus NP-C    senna (SENOKOT) 8.8 MG/5ML syrup 8.8 mg, 5 mL, Oral, BID PRN, Princess Beverly MD    MENTAL STATUS EXAM:  Roge Cowan is unclothed, hunched over, covered by bed cover. He remains with eye closed but is awake.   He doesn't follow commands or answer my questions  Speech is non-spontaneous.  I am unable to assess his thought process, though content, affect, mood, perception, attention, concentration, or memory.  His insight and judgment remain poor    ASSESSMENT AND PLAN: patient's case was discussed during all hands meeting on 02/05/2024  Catatonia  Schizophrenia    RECOMMENDATIONS:   02/05/2024  Discussion with all hands meeting today concluded that, despite logistical challenges, ECT trial at [facility] is worthwhile because of presence of many medical  agitation and improving underlying auditory hallucinations; the biggest risk is QT prolongation, which thus far has remained steady. Continue to monitor daily given risk of prolongation/TDP.     1/18/24-   -Discussed with attending Dr. Beverly as well as attending psychiatrist Dr. Larson - increasing VPA to 500mg TID, with VPA level tomorrow prior to AM dose.   -Continuing Haldol as ordered for now, though can potentially be increased in the coming days depending on QT prolongation.   -Increasing trazodone to 50mg TID to promote sedation - unclear at this point if the pt will tolerate enteral access following extubation, but after discussing with attending, given trazodone's favorable side effect profile when compared to other sedating agents, will attempt to employ trazodone to promote sedation and comfort in order for the pt to safely receive care, with plan of using Dobbhoff tube plus bridle following extubation in order for this to continue to be possible.   -Will plan to participate in ongoing interdisciplinary team meetings, including with the pt's guardian (sister) tomorrow afternoon.     1/16/24-   Monitor EKG given risk of QT prolongation.   Will increase Depakene from 250mg TID to 500mg BID for increase in total daily dose from 750mg to 1000mg.     01/15/2024  Discussed reducing reliance on benzos and consolidating antipsychotics to just one if possible.    D/C prn zyprexa  Recommended increase haldol from 5mg IV q6h to q4h. Obtain EKG to f/u QTC and be vigilant about monitoring VS, especially temp.  Start depakene  250mg po q8h in planning to further increase it to decrease agitation.  Plan would be to stabilize patient to undergo ECT.    01/13/2024  Not a candidate for inpatient psychiatry at this time due to his skin breakdown is now a tunneling wound.    Continue 1:1 nursing  Psychiatry to follow  Continue current care with Court ordered treatments including ECT.    Lety Larson MD

## 2024-02-05 NOTE — BSMART NOTE
BSMART Liaison Team Note     LOS:  24     Patient goal(s) for today: take medications as prescribed, make needs known in an appropriate manner.  BSMART Liaison team focus/goals: assess MH needs, provide therapeutic support, brief therapy, and education, as needed.  Assist medical care management team with recommendations for coordination of care.    Progress Note: Liaison met with pt, FTF, on the medical floor - Cesilia robles, at bedside.  When this writer walked into the room, pt was lying on his back.  As this writer approached the pt, he quickly shifted into the prayer position/robel pose, face down with arms folded underneath.  Liaison attempted to engage several times, however, the pt refused.  Cesilia reports pt ate well, this morning, and when he wanted more food he would say \"more\".  Pt also verbally asked to go to the bathroom.  Liaison offered pt encouragement, support, and reassurance of his safety.  ECT remains goal of treatment and according to CM notes, [facility] may be willing to accept pt for transfer when medically stable.  Liaison will continue to monitor and support.      Barriers to Discharge: Medical clearance, psych bed      Outpatient provider(s):  Mavis KYLE  Psych: Dr. Olivas: [phone]   CM: Jayson Bustos [phone]  Clinician: Geeta [phone]  Insurance info/prescription coverage:  MEDICARE PART A AND B , [facility]AN OF [place]       Diagnosis: Refractory schizophrenia with catatonia       Plan: Discharge goal is ECT tx. Care conference planned for early next week. Psychiatry and BSMART Liaison to follow daily. Please defer to psychiatry and medical team for details on discharge plan and disposition.  .   Follow up Psych Consult placed? Psychiatry following daily   Psychiatrist updated?  No    Participating treatment team members: Mildred Wilson LCSW Susan ([facility] LCSW  BSMART Liaison  Available on [employer]

## 2024-02-05 NOTE — PLAN OF CARE
Problem: Discharge Planning  Goal: Discharge to home or other facility with appropriate resources  Recent Flowsheet Documentation  Taken 2/4/2024 2000 by Arpan Kuo RN  Discharge to home or other facility with appropriate resources: Identify barriers to discharge with patient and caregiver     Problem: Chronic Conditions and Co-morbidities  Goal: Patient's chronic conditions and co-morbidity symptoms are monitored and maintained or improved  Recent Flowsheet Documentation  Taken 2/4/2024 2000 by Arpan Kuo RN  Care Plan - Patient's Chronic Conditions and Co-Morbidity Symptoms are Monitored and Maintained or Improved: Collaborate with multidisciplinary team to address chronic and comorbid conditions and prevent exacerbation or deterioration

## 2024-02-05 NOTE — PROGRESS NOTES
[facility]                                                                                          Hospitalist Progress Note  Sander Bae MD  Office Phone: [phone]        Date of Service:  2024  NAME:  Roge Cowan  :  1970  MRN:  [id number]       Admission Summary:     Roge Cowan is a 53 y.o. male with schizophrenia and catatonia, s/p ECT x2 who was transferred from [facility] inpatient to [facility] ICU on 2024 for severe psychosis and episodes of catatonia. Pt was initially admitted to [facility] on 2023 after being taken there by police for evaluation for refusal of self-care. [facility] hospitalization included development of a R ankle Stage IV pressure wound that progressed to tendon exposure with purulent drainage, refusal of wound care, labs, vitals checks, oral medications, oral intake, and only took IM medications that were court ordered. Pt was seen by podiatry and ID who recommended vancomycin and cefepime. He did receive ECT x2 at [facility]. Pt was transferred to [facility] ICU for sedation and protective environment to ensure adequate care. He was started on ketamine and Precedex gtts. He required 4 point restraints. 1:1 sitter was ordered. He was intubated on 1/15 due to increasing agitation despite sedating meds.  ID, Psychiatry, Palliative Medicine, Ethics, and WOCN have all been consulted during pt's ICU stay at [facility]. Psychiatry recommended reducing reliance on BDZs and consolidating antipsychotics to just one if possible; Haldol IV was increased and Depakene was added. Ethics held an interdisciplinary meeting with all medical teams involved on . There is an open APS case against patient's sister/ Julie. Pt was extubated  and was in bilateral wrist restraints. DHT is in place and tube feeds are infusing per dietitian recs. Pt has periods of agitation for which he receives prn meds. He is currently on Haldol 10mg IV 4x daily,  input(s): \"ALT\", \"TP\", \"ALB\", \"GLOB\", \"GGT\", \"AML\" in the last 72 hours.    Invalid input(s): \"SGOT\", \"GPT\", \"AP\", \"TBIL\", \"TBILI\", \"AMYP\", \"LPSE\", \"HLPSE\"    No results for input(s): \"INR\", \"APTT\" in the last 72 hours.    Invalid input(s): \"PTP\"   No results for input(s): \"TIBC\", \"FERR\" in the last 72 hours.    Invalid input(s): \"FE\", \"PSAT\"   No results found for: \"FOL\", \"RBCF\"   No results for input(s): \"PH\", \"PCO2\", \"PO2\" in the last 72 hours.  No results for input(s): \"CPK\" in the last 72 hours.    Invalid input(s): \"CPKMB\", \"CKNDX\", \"TROIQ\"  Lab Results   Component Value Date/Time    CHOL 157 12/13/2023 06:02 AM    HDL 55 12/13/2023 06:02 AM     No results found for: \"GLUCPOC\"  [unfilled]    Notes reviewed from all clinical/nonclinical/nursing services involved in patient's clinical care. Care coordination discussions were held with appropriate clinical/nonclinical/ nursing providers based on care coordination needs.         Patients current active Medications were reviewed, considered, added and adjusted based on the clinical condition today.      Home Medications were reconciled to the best of my ability given all available resources at the time of admission. Route is PO if not otherwise noted.      Admission Status:[id number]:::1}      Medications Reviewed:     Current Facility-Administered Medications   Medication Dose Route Frequency    mirtazapine (REMERON SOL-TAB) disintegrating tablet 30 mg  30 mg Oral Nightly    haloperidol (HALDOL) 2 MG/ML oral solution 10 mg  10 mg Oral TID WC    LORazepam (ATIVAN) 2 MG/ML concentrated solution 1 mg  1 mg Oral TID WC    thiamine tablet 100 mg  100 mg Oral Daily    valproic acid (DEPAKENE) 250 MG/5ML oral solution 500 mg  500 mg Oral Daily with breakfast    valproic acid (DEPAKENE) 250 MG/5ML oral solution 1,500 mg  1,500 mg Oral Dinner    sennosides-docusate sodium (SENOKOT-S) 8.6-50 MG tablet 2 tablet  2 tablet Oral BID    polyethylene glycol (GLYCOLAX) packet 17 g  17

## 2024-02-05 NOTE — CARE COORDINATION
Detailed handoff provided to 6E CM regarding patient barriers to DC and potential transfer to [facility] for ECT treatments.     CORNELIUS Meyers (Ally).

## 2024-02-06 PROCEDURE — 6370000000 HC RX 637 (ALT 250 FOR IP): Performed by: PSYCHIATRY & NEUROLOGY

## 2024-02-06 PROCEDURE — 1100000000 HC RM PRIVATE

## 2024-02-06 PROCEDURE — 6360000002 HC RX W HCPCS: Performed by: INTERNAL MEDICINE

## 2024-02-06 RX ORDER — ALBUTEROL SULFATE 2.5 MG/3ML
2.5 SOLUTION RESPIRATORY (INHALATION) EVERY 6 HOURS PRN
OUTPATIENT
Start: 2024-02-06

## 2024-02-06 RX ORDER — LORAZEPAM 2 MG/ML
1 CONCENTRATE ORAL
Status: CANCELLED | OUTPATIENT
Start: 2024-02-07

## 2024-02-06 RX ORDER — LANOLIN ALCOHOL/MO/W.PET/CERES
100 CREAM (GRAM) TOPICAL DAILY
OUTPATIENT
Start: 2024-02-07

## 2024-02-06 RX ORDER — MIDODRINE HYDROCHLORIDE 5 MG/1
5 TABLET ORAL EVERY 6 HOURS
Status: CANCELLED | OUTPATIENT
Start: 2024-02-06

## 2024-02-06 RX ORDER — POLYETHYLENE GLYCOL 3350 17 G/17G
17 POWDER, FOR SOLUTION ORAL DAILY
OUTPATIENT
Start: 2024-02-07

## 2024-02-06 RX ORDER — SENNOSIDES 8.8 MG/5ML
5 LIQUID ORAL 2 TIMES DAILY PRN
OUTPATIENT
Start: 2024-02-06

## 2024-02-06 RX ORDER — ACETAMINOPHEN 325 MG/1
650 TABLET ORAL EVERY 6 HOURS PRN
OUTPATIENT
Start: 2024-02-06

## 2024-02-06 RX ORDER — ACETAMINOPHEN 650 MG/1
650 SUPPOSITORY RECTAL EVERY 6 HOURS PRN
OUTPATIENT
Start: 2024-02-06

## 2024-02-06 RX ORDER — MIRTAZAPINE 15 MG/1
30 TABLET, ORALLY DISINTEGRATING ORAL NIGHTLY
Status: CANCELLED | OUTPATIENT
Start: 2024-02-06

## 2024-02-06 RX ORDER — ONDANSETRON 2 MG/ML
4 INJECTION INTRAMUSCULAR; INTRAVENOUS EVERY 6 HOURS PRN
OUTPATIENT
Start: 2024-02-06

## 2024-02-06 RX ORDER — HALOPERIDOL 2 MG/ML
10 SOLUTION ORAL
Status: CANCELLED | OUTPATIENT
Start: 2024-02-07

## 2024-02-06 RX ORDER — VALPROIC ACID 250 MG/5ML
500 SOLUTION ORAL
Status: CANCELLED | OUTPATIENT
Start: 2024-02-07

## 2024-02-06 RX ORDER — ONDANSETRON 4 MG/1
4 TABLET, ORALLY DISINTEGRATING ORAL EVERY 8 HOURS PRN
OUTPATIENT
Start: 2024-02-06

## 2024-02-06 RX ORDER — VALPROIC ACID 250 MG/5ML
1500 SOLUTION ORAL
Status: CANCELLED | OUTPATIENT
Start: 2024-02-07

## 2024-02-06 RX ORDER — SENNA AND DOCUSATE SODIUM 50; 8.6 MG/1; MG/1
2 TABLET, FILM COATED ORAL 2 TIMES DAILY
OUTPATIENT
Start: 2024-02-06

## 2024-02-06 RX ADMIN — VALPROIC ACID 1500 MG: 250 SOLUTION ORAL at 16:52

## 2024-02-06 RX ADMIN — HALOPERIDOL 10 MG: 2 SOLUTION ORAL at 16:52

## 2024-02-06 RX ADMIN — HALOPERIDOL LACTATE 5 MG: 5 INJECTION, SOLUTION INTRAMUSCULAR at 04:53

## 2024-02-06 RX ADMIN — Medication 1 MG: at 16:53

## 2024-02-06 ASSESSMENT — PAIN SCALES - WONG BAKER: WONGBAKER_NUMERICALRESPONSE: 0

## 2024-02-06 ASSESSMENT — PAIN SCALES - GENERAL: PAINLEVEL_OUTOF10: 0

## 2024-02-06 NOTE — PROGRESS NOTES
[facility] [facility]                                                                                          Hospitalist Progress Note  Andrew Champagne MD  Office Phone: [phone]        Date of Service:  2024  NAME:  Roge Cowan  :  1970  MRN:  [id number]       Admission Summary:     Roge Cowan is a 53 y.o. male with schizophrenia and catatonia, s/p ECT x2 who was transferred from [facility] inpatient to [facility] ICU on 2024 for severe psychosis and episodes of catatonia. Pt was initially admitted to [facility] on 2023 after being taken there by police for evaluation for refusal of self-care. [facility] hospitalization included development of a R ankle Stage IV pressure wound that progressed to tendon exposure with purulent drainage, refusal of wound care, labs, vitals checks, oral medications, oral intake, and only took IM medications that were court ordered. Pt was seen by podiatry and ID who recommended vancomycin and cefepime. He did receive ECT x2 at [facility]. Pt was transferred to [facility] ICU for sedation and protective environment to ensure adequate care. He was started on ketamine and Precedex gtts. He required 4 point restraints. 1:1 sitter was ordered. He was intubated on 1/15 due to increasing agitation despite sedating meds.  ID, Psychiatry, Palliative Medicine, Ethics, and WOCN have all been consulted during pt's ICU stay at [facility]. Psychiatry recommended reducing reliance on BDZs and consolidating antipsychotics to just one if possible; Haldol IV was increased and Depakene was added. Ethics held an interdisciplinary meeting with all medical teams involved on . There is an open APS case against patient's sister/ Julie. Pt was extubated  and was in bilateral wrist restraints. DHT is in place and tube feeds are infusing per dietitian recs. Pt has periods of agitation for which he receives prn meds. He is currently on Haldol 10mg IV 4x daily, midazolam  2mg IV q4h, mirtazapine 30mg per NGT nightly, Depakene 1500mg per NGT nightly and Depakene 500mg per NGT daily as per Psych. Transfer Center has been contacted in hopes of transferring [facility] to medical psych unit where he can get ECT given his ongoing medical care. HM was consulted today for transfer of care.        Interval history / Subjective:   Follow up Catatonic schizophrenia  No new issues       Assessment & Plan:     Catatonic schizophrenia  Acute psychosis  - admitted to [facility] 12/12/23; transferred from [facility] to [facility] ICU on 1/12/24 for sedation and protective environment to ensure adequate care  - s/p ketamine and Precedex gtts  - pt was intubated 1/15 and extubated 1/25  - pulled out his NGT, discontinued tube feeding on 2/2  - pulled out his iv line, switched Haldol 10mg IV 4x daily to haldol 10 mg tid and midazolam 2mg IV q4h to lorazepam 1 mg tid,   -mirtazapine 30mg discontued  -on Depakene 1500 mg  nightly and Depakene 500mg daily    - Psych following closely  - monitor QTc while on IV Haldol, refused and unable to do EKG    - Ethics held an interdisciplinary meeting with all medical teams involved on 1/19 as there is an open APS case against patient's sister/guardian Eden  -Bed not available to transfer to [facility] where he can get ECT and ongoing medical care   -Management per psychiatrist, Dr. Larson who believes that the patient would benefit from ECT     Severe malnutrition (POA)  Adult failure to thrive  -NGT pulled out and tube feeding discontinued 2/2  -dietitian on board     Right ankle cellulitis and Stage IV pressure ulcer with infection  - BC 1/9 CoNS in 2 of 4 bottles; 1/11 negative  - R foot wound cx 1/10 Group B strep  - s/p cefepime and vancomycin  - appreciate ID input   - wound care     Addendum:  Dr Larson has reportedly agreed on taking the patient to inpatient psych unit. Spoke to RN to initiate the process.     Code status: Full  Prophylaxis: lovenox   Care Plan discussed

## 2024-02-06 NOTE — BSMART NOTE
BSMART Liaison Team Note     LOS:  25     Patient goal(s) for today: take medications as prescribed, make needs known in an appropriate manner.  BSMART Liaison team focus/goals: assess MH needs, provide therapeutic support, brief therapy, and education, as needed.  Assist medical care management team with recommendations for coordination of care.    Progress Note: Liaison met with pt, FTF, on the medical floor.  He is currently in the fetal position and not responding to Liaison's verbal attempts.  Pt's sitter reports pt ate well this morning.  Chart review indicates pt refused medications today.  This writer offered encouragement and reassurance to pt.  Liaison will continue to monitor and support.      UPDATE at 15:30: Dr. Larson spoke with Jo Ann JIMENEZ, and requested TDO assessment through [facility].  Jo Ann faxed clinicals and Nyasia with [facility], will assess pt, FTF, today.  Goal is eventual transfer to [facility] for ECT.       Barriers to Discharge: Medical clearance, psych bed      Outpatient provider(s):  Mavis KYLE  Psych: Dr. Olivas: [phone]   CM: Jayson Bustos [phone]  Clinician: Geeta [phone]  Insurance info/prescription coverage:  MEDICARE PART A AND B , [facility] PLAN OF [place]       Diagnosis: Refractory schizophrenia with catatonia       Plan: Discharge goal is ECT tx. Care conference held, 2/5/24.  Psychiatry and BSMART Liaison to follow daily.  Please defer to psychiatry and medical team for details on discharge plan and disposition.  .   Follow up Psych Consult placed? Psychiatry following daily   Psychiatrist updated?  No      Participating treatment team members: Mildred Wilson LCSW Susan ([facility] LCS[facility]  BSMART Liaison  Available on [employer]

## 2024-02-06 NOTE — CONSULTS
psychiatrist Dr. Larson - increasing VPA to 500mg TID, with VPA level tomorrow prior to AM dose.   -Continuing Haldol as ordered for now, though can potentially be increased in the coming days depending on QT prolongation.   -Increasing trazodone to 50mg TID to promote sedation - unclear at this point if the pt will tolerate enteral access following extubation, but after discussing with attending, given trazodone's favorable side effect profile when compared to other sedating agents, will attempt to employ trazodone to promote sedation and comfort in order for the pt to safely receive care, with plan of using Dobbhoff tube plus bridle following extubation in order for this to continue to be possible.   -Will plan to participate in ongoing interdisciplinary team meetings, including with the pt's guardian (sister) tomorrow afternoon.     1/16/24-   Monitor EKG given risk of QT prolongation.   Will increase Depakene from 250mg TID to 500mg BID for increase in total daily dose from 750mg to 1000mg.     01/15/2024  Discussed reducing reliance on benzos and consolidating antipsychotics to just one if possible.    D/C prn zyprexa  Recommended increase haldol from 5mg IV q6h to q4h. Obtain EKG to f/u QTC and be vigilant about monitoring VS, especially temp.  Start depakene  250mg po q8h in planning to further increase it to decrease agitation.  Plan would be to stabilize patient to undergo ECT.    01/13/2024  Not a candidate for inpatient psychiatry at this time due to his skin breakdown is now a tunneling wound.    Continue 1:1 nursing  Psychiatry to follow  Continue current care with Court ordered treatments including ECT.    Lety Larson MD

## 2024-02-06 NOTE — PROGRESS NOTES
Palliative Medicine   [place] [phone] (COPE)        [facility] [phone] (COPE)      Palliative Medicine SW met with patient at bedside briefly- he was laying in fetal position, did not answer questions. SW also participated in multidisciplinary care conference regarding the patient's care.     Will continue to follow peripherally along complex case.     Thank you for including Palliative Medicine in the care of Roge \"Isreal\" Chapo Torres LCSW

## 2024-02-06 NOTE — WOUND CARE
Wound Care Note:     Wound care follow-up for ankle wound present on admission    Chart shows:  Admitted for catatonia schizophrenia   Past Medical History:   Diagnosis Date    Psychiatric disorder     Psychotic disorder (HCC)     Withdrawal syndrome (HCC)      WBC = 6.0 on 2/1/24  Admitted from [facility]    Assessment:   Patient is extubated, moving around some but able to assess and apply dressing, incontinent with moderate assistance needed in repositioning.    Bed: Low air loss- Isolibrium  Patient has a condom catheter in place.    Diet: Adult tube feeding- Nasoenteric    Bilateral heels, buttocks, and sacral skin intact and without erythema.    Palpable DP pulses bilaterally.      1. POA right ankle wound is still crusted, was open to air, no drainage, some erythema to dia-wound but patient had been laying on other side and moves feet back and fourth, dry, flaky skin to dia-wound.  Asked patient if band-aid could be applied to right ankle, feet started moving more rapidly and he assumed the prayer position.  Will leave ankle open to air.           2.  POA while in prayer position, noted rash to right groin/hip.          Patient in prayer position.    Recommendations:    No wound care - patient not leaving dressings on and not allowing application of dressing.  Will leave open to air.    Skin Care & Pressure Prevention:  Minimize layers of linen/pads under patient to optimize support surface.    Turn/reposition approximately every 2 hours and offload heels.  Manage incontinence / promote continence   Nourishing Skin Cream to dry skin, minimize use of briefs when able    Discussed above plan with patient & GAIL Arboleda    Transition of Care: Wound care will sign off.      Tracey \"Rosa\" MATTY Santiago, RN, CWON  Certified Wound and Ostomy Nurse  office [phone]  Best way to contact me is through [employer]

## 2024-02-06 NOTE — DISCHARGE SUMMARY
Discharge Summary       PATIENT ID: Roge Cowan  MRN: [id number]   YOB: 1970    DATE OF ADMISSION: 1/12/2024  7:38 PM    DATE OF DISCHARGE: 2/6/2024   PRIMARY CARE PROVIDER: Cole Boyd MD     ATTENDING PHYSICIAN: Dr Andrew Champagne  DISCHARGING PROVIDER: Andrew Champagne MD    To contact this individual call [phone] and ask the  to page.  If unavailable ask to be transferred the Adult Hospitalist Department.    CONSULTATIONS: IP CONSULT TO PHARMACY  IP CONSULT TO PSYCHIATRY  IP CONSULT TO PHARMACY  IP CONSULT TO PSYCHIATRY  IP CONSULT TO CASE MANAGEMENT  IP WOUND CARE NURSE CONSULT TO EVAL  IP CONSULT TO PALLIATIVE CARE  IP CONSULT TO DIETITIAN  IP CONSULT TO PSYCHIATRY  IP WOUND CARE NURSE CONSULT TO EVAL  IP CONSULT TO PALLIATIVE CARE  IP CONSULT TO PSYCHIATRY  IP CONSULT TO PALLIATIVE CARE    PROCEDURES/SURGERIES: * No surgery found *    ADMITTING DIAGNOSES & HOSPITAL COURSE:   Catatonic schizophrenia  Acute psychosis  - admitted to [facility] 12/12/23; transferred from [facility] to [facility] ICU on 1/12/24 for sedation and protective environment to ensure adequate care  - s/p ketamine and Precedex gtts  - pt was intubated 1/15 and extubated 1/25  - pulled out his NGT, discontinued tube feeding on 2/2  - pulled out his iv line, switched Haldol 10mg IV 4x daily to haldol 10 mg tid and midazolam 2mg IV q4h to lorazepam 1 mg tid,   -mirtazapine 30mg discontued  -on Depakene 1500 mg  nightly and Depakene 500mg daily    - Psych following closely  - monitor QTc while on IV Haldol, refused and unable to do EKG    - Ethics held an interdisciplinary meeting with all medical teams involved on 1/19 as there is an open APS case against patient's sister/guardian Eden  -Bed not available to transfer to U/ [facility] where he can get ECT and ongoing medical care   -Management per psychiatrist, Dr. Larson who believes that the patient would benefit from ECT     Severe malnutrition (POA)  Adult failure to thrive  -NGT

## 2024-02-06 NOTE — PROGRESS NOTES
Comprehensive Nutrition Assessment    Type and Reason for Visit: Reassess    Nutrition Recommendations/Plan:   Continue regular diet  High calorie/high protein ONS BID  Please continue to record po intake in I/O flowsheet       Malnutrition Assessment:  Malnutrition Status:  Severe malnutrition (01/15/24 1347)    Context:  Acute Illness     Findings of the 6 clinical characteristics of malnutrition:  Energy Intake:  50% or less of estimated energy requirements for 5 or more days  Weight Loss:  Unable to assess     Body Fat Loss:  Moderate body fat loss Fat Overlying Ribs, Orbital   Muscle Mass Loss:  Moderate muscle mass loss Clavicles (pectoralis & deltoids), Temples (temporalis)  Fluid Accumulation:  No significant fluid accumulation     Strength:  Not Performed       Nutrition Assessment:    Pt transferred from [facility] with Catatonia Schizophrenia (admitted 12/12). Initially treated with large amounts of BZDs and ECT x 2 however difficult to administer medical care. Pt refusing all PO intake, medical care, medications. New development of pressure injury-WOCN following. Transferred to [facility] ICU 1/12. Intubated today d/t continued inability to care for pt medically.     2/6: Follow up. Visited with pt at bedside- he did not look at me or respond to any questions. Lying naked in fetal position. Psych and RN notes indicate pt will sit up to eat his meals daily. Recorded meal intakes all %. Sitter at bedside stated he did well with his breakfast this morning. Ensure high calorie at bedside 1/2 consumed. I did not add any additional snacks or ONS on today since he was unable to provide preferences. Increased ensure to BID. No new weight.   Labs reviewed.    2/2: Discussed in IDRs.  DHT out, discontinued tube feeding orders.  Per RN, pt ate pretty well yesterday and did well with breakfast this morning as well.   Added once daily ONS for additional kcal/protein option.   Pt underweight/severe PCM with elevated  168 hrs:   PO Meals Eaten (%)   02/04/24 1214 76 - 100%   02/04/24 0800 76 - 100%   02/03/24 2000 76 - 100%   02/03/24 1218 76 - 100%   02/03/24 0825 76 - 100%   02/02/24 1949 76 - 100%   02/02/24 1245 76 - 100%   02/02/24 1000 76 - 100%   02/01/24 1815 76 - 100%   02/01/24 1127 76 - 100%     Supplement Intake:  Patient Vitals for the past 168 hrs:   PO Supplement (%)   02/01/24 1815 76 - 100%   02/01/24 1127 76 - 100%     Nutrition Support: Not infusing, hopeful to resume today      Anthropometric Measures:  Height: 183.9 cm (6' 0.4\")  Ideal Body Weight (IBW): 180 lbs (82 kg)       Current Body Weight: 71.3 kg (157 lb 3 oz), 87.3 % IBW. Weight Source: Bed Scale  Current BMI (kg/m2): 21.1                               Wt Readings from Last 10 Encounters:   01/31/24 71.3 kg (157 lb 3 oz)           Nutrition Diagnosis:   Inadequate oral intake, Inadequate protein-energy intake related to psychological cause or life stress as evidenced by severe muscle loss  Increased nutrient needs related to increase demand for energy/nutrients as evidenced by wounds    Nutrition Interventions:   Food and/or Nutrient Delivery: Continue Current Diet, Modify Oral Nutrition Supplement  Nutrition Education/Counseling: No recommendation at this time  Coordination of Nutrition Care: Continue to monitor while inpatient       Goals:  Previous Goal Met: Goal(s) Achieved  Goals: PO intake 50% or greater, by next RD assessment       Nutrition Monitoring and Evaluation:   Behavioral-Environmental Outcomes: None Identified  Food/Nutrient Intake Outcomes: Food and Nutrient Intake, Supplement Intake  Physical Signs/Symptoms Outcomes: Biochemical Data, GI Status, Skin, Fluid Status or Edema    Discharge Planning:    Continue Oral Nutrition Supplement, Continue current diet     FABRICE CALL RD  Available via [employer] or ext [phone]

## 2024-02-06 NOTE — PROGRESS NOTES
Roge refused most care today and did not interact much with RN or staff.  He did seem to turn to RN as she was speaking at some points in the day. Pt refused medications throughout the day and said \"no\" to vital signs at 12 pm and 3 pm when asked.      At 1815 hrs, RN asked sitter to leave room and sat next to patient, wrapped a blanket around shoulders and helped him with his dinner. RN was then able to give patient two if his medications from the day, Lorazepam, 2MG/ML and the haloperidol 2Mg/ML. Patient only received these medications once instead of the scheduled 3 times daily.  Patient's psychiatrist Dr. Larson informed of this.     Patient remained in the fetal position and/or protective position for most of the day, rocking back and forth to self sooth.  No outbursts.       JANEE.  Nicole Odell. GAIL 2037 hrs

## 2024-02-07 PROCEDURE — 6370000000 HC RX 637 (ALT 250 FOR IP): Performed by: PSYCHIATRY & NEUROLOGY

## 2024-02-07 PROCEDURE — 1100000000 HC RM PRIVATE

## 2024-02-07 RX ADMIN — HALOPERIDOL 10 MG: 2 SOLUTION ORAL at 16:20

## 2024-02-07 RX ADMIN — HALOPERIDOL 10 MG: 2 SOLUTION ORAL at 12:58

## 2024-02-07 RX ADMIN — VALPROIC ACID 1500 MG: 250 SOLUTION ORAL at 16:19

## 2024-02-07 RX ADMIN — Medication 1 MG: at 12:58

## 2024-02-07 RX ADMIN — Medication 1 MG: at 16:20

## 2024-02-07 ASSESSMENT — PAIN SCALES - WONG BAKER: WONGBAKER_NUMERICALRESPONSE: 0

## 2024-02-07 ASSESSMENT — PAIN SCALES - GENERAL: PAINLEVEL_OUTOF10: 0

## 2024-02-07 NOTE — CARE COORDINATION
JORJE:    CM spoke with Kaylyn at [facility] [phone]. She advised that the team here should be reviewing the court order paperwork from 1/10/24 that patient is committed for 60 days involuntary inpatient treatment. Unit Nurse Director aware, and is discussing this process and the steps moving forward for this patient. CM to follow.     Information below to be aware of:    Transition of Care Plan:     Referral pending for Pt return to [facility] for ECT treatments     - Pt denied for transfer to U for ECT treatment    Transport: S     RUR: 16% Moderate   Prior Level of Functioning: Dependent with ADL's  Disposition: TBD - Pt in need of ECT treatment in a Med Psych setting  DME needed: NA  Transportation at discharge: BLS  IM/IMM Medicare given: 1/29/24  Is patient a [place] and connected with VA? No  Caregiver Contact: Sister Eden Maier [phone]  Discharge Caregiver contacted prior to discharge? Yes  Care Conference needed? Yes  Barriers to discharge:   - Medical Stability  - Need ECT treatments not available at [facility].   - [facility] does not have a bed for transfer.   - [facility] Med Psych denied Pt for transfer  - [facility] not able to accept Pt, not a Med Psych unit     Pt Contacts:   Sister Eden Montalvo [phone]  PCP Dr Dmitry Motta [phone]  Psych: [place] [facility] [phone] Dr Olivas   with ACT team: Jayson Bustos [phone]  [place] ACT clinician :Geeta [phone]   Lorene Pabon [phone]  APS: [place]  [phone]     2/2 - Discussed with Dr. Larson - plan to re-initiate [facility] transfer for Pt as he is closer to baseline than previously. Dr. Larson to switch meds to oral to see if Pt will take them.      Referral sent to CM Manager for H transfer.       [facility]  Care Management Department  Ph:[phone]

## 2024-02-07 NOTE — BSMART NOTE
BSMART Liaison Team Note     LOS:  26 days     Patient goal(s) for today: take medications as prescribed, make needs known in an appropriate manner.  BSMART Liaison team focus/goals: assess MH needs, provide therapeutic support, brief therapy, and education, as needed.  Assist medical care management team with recommendations for coordination of care.    Progress note: Writer met face to face with pt on the medical floor at [facility]. Pt was received in fetal position on his bed. Pt was undressed and only covered with a blanket. Writer attempted to engage with the pt but he didn't respond. Writer couldn't assess mood, SI/HI/AVH, sleep, or pt's needs. 1:1 sitter at the bedside reported that pt ate all of his breakfast this morning. Pt had been observed by nursing staff to get out of bed and into fetal position on the floor. Pt has not engaged with any of the staff.    Per [facility]NSpring, pt continues to refuse his medication. RN informed to put in psych consult because pt is seen daily by psychiatry.      Barriers to Discharge: BHU admission    OOutpatient provider(s):  Mavis KYLE  Psych: Dr. Olivas: [phone]   CM: Jayson Bustos [phone]  Clinician: Geeta [phone]  Insurance info/prescription coverage:  MEDICARE PART A AND B , [facility] PLAN [facility]       Diagnosis: Refractory schizophrenia with catatonia    Plan:  Pt is indicated for inpatient psychiatric admission for ECT. Referral pending for pt's return to [facility]. Psychiatry following daily. Please defer to psychiatry for details on the discharge plan and disposition.  Follow up Psych Consult placed? Yes .   Psychiatrist updated? No        Participating treatment team members: JOSEPH Wilson MSW Student Intern

## 2024-02-07 NOTE — PROGRESS NOTES
This writer in communication with [facility] psych team about clarification regarding legal paperwork for commitment/treatment for his condition.     Cruzito Mccann and psych  Bill to review and clarify if legal guardian forms to cover all treatments required for proposed plan of IV insertion, medication, and ECT.     Current Commitment paperwork covers [facility] as we are a willing facility to provide care needed.     Team to schedule meeting to discuss defined plan of care. Awaiting review of legal guardian paperwork before proceeding with care and awaiting Dr. Larson to discuss the plan of care with legal guardian to obtain consent and agreement with proposed plan prior to proceeding. Will update primary nurse Monica.

## 2024-02-07 NOTE — CONSULTS
PSYCHIATRY CONSULT PROGRESS NOTE:    REASON FOR CONSULT:  Continued care.    CHIEF COMPLAINT:   None    Interval History  02/07/2024  Nursing report that patient remains in bed most of the time. When he goes to the bathroom, he is crawling on all four. He is eating some of his meals, but he sits with his food on the floor. He has been accepting some of his medications today.    Upon my evaluation, patient is lying on his R side naked. He is rubbing his feet continuously. He makes no eye contact.  He doesn't participate in my evaluation.    02/06/2024  Nursing report that patient's sleep is fair. He's eaten % of his breakfast this morning.  However, he only took haldol last night, but refused all other medications. He did refuse to take all his medications this morning.  Upon my evaluation, patient is naked beneath his bed sheets. He is in the fetal position and wouldn't engage in my evaluation. He was awake, alert, but avoided all eye contact. He didn't respond to any of my questions.  He doesn't answer my questions regarding whether or not he has any suicidal or homicidal ideations, intents or plans. He doesn't answer my questions regarding if he  is experiencing hallucinations of any type.    From my last discussion with his sister (POA), with whom he resided, she is advocating that we do all we can to help him. She says that in the past he would do well after ECT as he continued to take his prescribed clozapine. However, when he would stop, he would decompensate, so much so that he ended up in the [facility] hospital in the past.    /81   Pulse (!) 138   Temp 97.7 °F (36.5 °C) (Axillary)   Resp 18   Ht 1.839 m (6' 0.4\")   Wt 71.3 kg (157 lb 3 oz)   SpO2 98%   BMI 21.08 kg/m²       02/05/2024  Nursing report that patient took medications last night, but has refused medications this am and hadn't taken any this day. He hasn't been eating his meals consistently here. Patient's sitter reported that  Supplement (%)   02/01/24 1815 76 - 100%   02/01/24 1127 76 - 100%       MEDICATIONS:    Current Facility-Administered Medications:     mirtazapine (REMERON SOL-TAB) disintegrating tablet 30 mg, 30 mg, Oral, Nightly, Lety Larson MD, 30 mg at 02/03/24 2110    haloperidol (HALDOL) 2 MG/ML oral solution 10 mg, 10 mg, Oral, TID WC, Lety Larson MD, 10 mg at 02/07/24 1258    LORazepam (ATIVAN) 2 MG/ML concentrated solution 1 mg, 1 mg, Oral, TID WC, Lety Larson MD, 1 mg at 02/07/24 1258    thiamine tablet 100 mg, 100 mg, Oral, Daily, Lety Larson MD, 100 mg at 02/04/24 0800    valproic acid (DEPAKENE) 250 MG/5ML oral solution 500 mg, 500 mg, Oral, Daily with breakfast, Lety Larson MD, 500 mg at 02/04/24 0800    valproic acid (DEPAKENE) 250 MG/5ML oral solution 1,500 mg, 1,500 mg, Oral, Dinner, Lety Larson MD, 1,500 mg at 02/06/24 1652    sennosides-docusate sodium (SENOKOT-S) 8.6-50 MG tablet 2 tablet, 2 tablet, Oral, BID, Cici Downey MD, 2 tablet at 02/04/24 0800    polyethylene glycol (GLYCOLAX) packet 17 g, 17 g, Oral, Daily, Cici Downey MD, 17 g at 02/04/24 0800    haloperidol lactate (HALDOL) injection 5 mg, 5 mg, IntraVENous, Q2H PRN, Cristine Jimenez MD, 5 mg at 02/06/24 0453    midodrine (PROAMATINE) tablet 5 mg, 5 mg, Orogastric, Q6H, Princess Beverly MD, 5 mg at 02/04/24 0800    sodium chloride flush 0.9 % injection 5-40 mL, 5-40 mL, IntraVENous, 2 times per day, DinLong MD, 10 mL at 02/01/24 2127    sodium chloride flush 0.9 % injection 5-40 mL, 5-40 mL, IntraVENous, PRN, Long Ball MD    0.9 % sodium chloride infusion, , IntraVENous, PRN, Long Ball MD    [DISCONTINUED] potassium chloride (KLOR-CON) extended release tablet 40 mEq, 40 mEq, Oral, PRN **OR** [DISCONTINUED] potassium bicarb-citric acid (EFFER-K) effervescent tablet 40 mEq, 40 mEq, Oral, PRN **OR** potassium chloride 10 mEq/100 mL IVPB (Peripheral Line), 10 mEq, IntraVENous, PRN, Long Ball MD

## 2024-02-07 NOTE — PROGRESS NOTES
[facility]                                                                                          Hospitalist Progress Note  Andrew Champagne MD  Office Phone: [phone]        Date of Service:  2024  NAME:  Roge Cowan  :  1970  MRN:  [id number]       Admission Summary:     Roge Cowan is a 53 y.o. male with schizophrenia and catatonia, s/p ECT x2 who was transferred from [facility] inpatient to [facility] ICU on 2024 for severe psychosis and episodes of catatonia. Pt was initially admitted to [facility] on 2023 after being taken there by police for evaluation for refusal of self-care. [facility] hospitalization included development of a R ankle Stage IV pressure wound that progressed to tendon exposure with purulent drainage, refusal of wound care, labs, vitals checks, oral medications, oral intake, and only took IM medications that were court ordered. Pt was seen by podiatry and ID who recommended vancomycin and cefepime. He did receive ECT x2 at [facility]. Pt was transferred to [facility] ICU for sedation and protective environment to ensure adequate care. He was started on ketamine and Precedex gtts. He required 4 point restraints. 1:1 sitter was ordered. He was intubated on 1/15 due to increasing agitation despite sedating meds.  ID, Psychiatry, Palliative Medicine, Ethics, and WOCN have all been consulted during pt's ICU stay at [facility]. Psychiatry recommended reducing reliance on BDZs and consolidating antipsychotics to just one if possible; Haldol IV was increased and Depakene was added. Ethics held an interdisciplinary meeting with all medical teams involved on . There is an open APS case against patient's sister/ Julie. Pt was extubated  and was in bilateral wrist restraints. DHT is in place and tube feeds are infusing per dietitian recs. Pt has periods of agitation for which he receives prn meds. He is currently on Haldol 10mg IV 4x daily, midazolam  or curse at you?: Not on file     How often does anyone, including family and friends, insult or talk down to you?: Not on file     How often does anyone, including family and friends, threaten you with harm?: Not on file   Utilities: Patient Declined (12/8/2023)    [facility] Utilities     Threatened with loss of utilities: Patient refused       Review of Systems:   A comprehensive review of systems was negative.       Vital Signs:    Last 24hrs VS reviewed since prior progress note. Most recent are:  Vitals:    02/06/24 1330   BP:    Pulse:    Resp:    Temp:    SpO2: 98%         Intake/Output Summary (Last 24 hours) at 2/7/2024 0911  Last data filed at 2/6/2024 1656  Gross per 24 hour   Intake 120 ml   Output 650 ml   Net -530 ml          Physical Examination:     I had a face to face encounter with this patient and independently examined them on 2/7/2024 as outlined below:          General :  no acute distress, not interactive   HEENT: PEERL, EOMI, moist mucus membrane, NG tube   Neck: supple, no JVD, no meningeal signs  Chest: Clear to auscultation bilaterally   CVS: S1 S2 heard, Capillary refill less than 2 seconds  Abd: soft/ non tender, non distended, BS physiological,   Ext: no clubbing, no cyanosis, no edema, brisk 2+ DP pulses  Neuro/Psych: nonverbal and non interactive, keep turn positions by himself  Skin: warm     Data Review:    I personally reviewed  Image      I have personally and independently reviewed all pertinent labs, diagnostic studies, imaging, and have provided independent interpretation of the same.     Labs:     No results for input(s): \"WBC\", \"HGB\", \"HCT\", \"PLT\" in the last 72 hours.    No results for input(s): \"NA\", \"K\", \"CL\", \"CO2\", \"BUN\", \"CREA\", \"GLU\", \"CA\", \"MG\", \"PHOS\", \"URICA\" in the last 72 hours.    No results for input(s): \"ALT\", \"TP\", \"ALB\", \"GLOB\", \"GGT\", \"AML\" in the last 72 hours.    Invalid input(s): \"SGOT\", \"GPT\", \"AP\", \"TBIL\", \"TBILI\", \"AMYP\", \"LPSE\", \"HLPSE\"    No results for

## 2024-02-08 LAB
GLUCOSE BLD STRIP.AUTO-MCNC: 103 MG/DL (ref 65–117)
SERVICE CMNT-IMP: NORMAL

## 2024-02-08 PROCEDURE — 2580000003 HC RX 258: Performed by: INTERNAL MEDICINE

## 2024-02-08 PROCEDURE — 2000000000 HC ICU R&B

## 2024-02-08 PROCEDURE — 2500000003 HC RX 250 WO HCPCS: Performed by: PSYCHIATRY & NEUROLOGY

## 2024-02-08 PROCEDURE — 6360000002 HC RX W HCPCS: Performed by: NURSE PRACTITIONER

## 2024-02-08 PROCEDURE — 6360000002 HC RX W HCPCS: Performed by: PSYCHIATRY & NEUROLOGY

## 2024-02-08 PROCEDURE — 6370000000 HC RX 637 (ALT 250 FOR IP): Performed by: INTERNAL MEDICINE

## 2024-02-08 PROCEDURE — 6370000000 HC RX 637 (ALT 250 FOR IP): Performed by: PSYCHIATRY & NEUROLOGY

## 2024-02-08 PROCEDURE — 6360000002 HC RX W HCPCS: Performed by: STUDENT IN AN ORGANIZED HEALTH CARE EDUCATION/TRAINING PROGRAM

## 2024-02-08 PROCEDURE — 82962 GLUCOSE BLOOD TEST: CPT

## 2024-02-08 PROCEDURE — 2500000003 HC RX 250 WO HCPCS: Performed by: STUDENT IN AN ORGANIZED HEALTH CARE EDUCATION/TRAINING PROGRAM

## 2024-02-08 RX ORDER — CHLORPROMAZINE HYDROCHLORIDE 25 MG/ML
50 INJECTION INTRAMUSCULAR ONCE
Status: COMPLETED | OUTPATIENT
Start: 2024-02-08 | End: 2024-02-08

## 2024-02-08 RX ORDER — MIDAZOLAM HYDROCHLORIDE 2 MG/2ML
5 INJECTION, SOLUTION INTRAMUSCULAR; INTRAVENOUS ONCE
Status: COMPLETED | OUTPATIENT
Start: 2024-02-08 | End: 2024-02-08

## 2024-02-08 RX ORDER — MIDAZOLAM HYDROCHLORIDE 2 MG/2ML
2 INJECTION, SOLUTION INTRAMUSCULAR; INTRAVENOUS ONCE
Status: COMPLETED | OUTPATIENT
Start: 2024-02-08 | End: 2024-02-08

## 2024-02-08 RX ORDER — LIDOCAINE 40 MG/G
CREAM TOPICAL PRN
Status: DISCONTINUED | OUTPATIENT
Start: 2024-02-08 | End: 2024-02-15 | Stop reason: HOSPADM

## 2024-02-08 RX ORDER — KETAMINE HYDROCHLORIDE 10 MG/ML
50 INJECTION, SOLUTION INTRAMUSCULAR; INTRAVENOUS ONCE
Status: DISCONTINUED | OUTPATIENT
Start: 2024-02-08 | End: 2024-02-08

## 2024-02-08 RX ORDER — MIDAZOLAM HYDROCHLORIDE 2 MG/2ML
4 INJECTION, SOLUTION INTRAMUSCULAR; INTRAVENOUS EVERY 4 HOURS
Status: DISCONTINUED | OUTPATIENT
Start: 2024-02-08 | End: 2024-02-09

## 2024-02-08 RX ADMIN — MIDAZOLAM 4 MG: 1 INJECTION INTRAMUSCULAR; INTRAVENOUS at 19:46

## 2024-02-08 RX ADMIN — MIDAZOLAM 5 MG: 1 INJECTION INTRAMUSCULAR; INTRAVENOUS at 17:26

## 2024-02-08 RX ADMIN — CHLORPROMAZINE HYDROCHLORIDE 50 MG: 25 INJECTION INTRAMUSCULAR at 13:57

## 2024-02-08 RX ADMIN — SODIUM CHLORIDE, PRESERVATIVE FREE 10 ML: 5 INJECTION INTRAVENOUS at 20:32

## 2024-02-08 RX ADMIN — CHLORPROMAZINE HYDROCHLORIDE 50 MG: 25 INJECTION INTRAMUSCULAR at 19:08

## 2024-02-08 RX ADMIN — SENNOSIDES AND DOCUSATE SODIUM 2 TABLET: 8.6; 5 TABLET ORAL at 20:25

## 2024-02-08 RX ADMIN — MIDAZOLAM 4 MG: 1 INJECTION INTRAMUSCULAR; INTRAVENOUS at 22:01

## 2024-02-08 RX ADMIN — MIDAZOLAM 2 MG: 1 INJECTION INTRAMUSCULAR; INTRAVENOUS at 13:58

## 2024-02-08 RX ADMIN — MIDODRINE HYDROCHLORIDE 5 MG: 5 TABLET ORAL at 20:25

## 2024-02-08 RX ADMIN — MIRTAZAPINE 30 MG: 15 TABLET, ORALLY DISINTEGRATING ORAL at 20:25

## 2024-02-08 RX ADMIN — CHLORPROMAZINE HYDROCHLORIDE 50 MG: 25 INJECTION INTRAMUSCULAR at 18:38

## 2024-02-08 NOTE — PROGRESS NOTES
Music Therapy Assessment  [facility]    Roge Cowan [id number]     1970  54 y.o.  male    Patient Telephone Number: [phone] (home)   Denominational Affiliation: None   Language: English   Patient Active Problem List    Diagnosis Date Noted    Palliative care by specialist 01/22/2024    Goals of care, counseling/discussion 01/22/2024    Confusion 01/15/2024    Protein-calorie malnutrition (HCC) 01/15/2024    Palliative care encounter 01/15/2024    Catatonia schizophrenia ([facility]) 01/12/2024    Pressure injury of right ankle, stage 3 ([facility]) 01/10/2024    Cellulitis 01/08/2024    Immobility 12/29/2023    Schizoaffective disorder ([facility]) 12/08/2023    Schizophrenia ([facility]) 11/27/2017    Paranoid schizophrenia ([facility]) 03/15/2017        Date: 2/8/2024            Total Time Calculated: 45 min          [facility] 6E MED ONCOLOGY    Mental Status:   [x] Alert [  ] Forgetful [  ]  Confused  [  ] Minimally responsive  [  ] Sleeping    Communication Status: [  ] Impaired Speech [  ] Nonverbal -N/A    Physical Status:   [  ] Oxygen in use  [  ] Hard of Hearing [  ] Vision Impaired  [  ] Ambulatory  [  ] Ambulatory with assistance [  ] Non-ambulatory -N/A    Music Preferences, Background: Classic rock & Yarsani    Clinical Problem addressed: Environmental comfort, emotional support    Goal(s) met in session:  Physical/Pain management (Scale of 1-10):    Pre-session rating: N/A  Post-session rating: N/A  [x] Increased relaxation   [  ] Affected breathing patterns  [  ] Decreased muscle tension   [  ] Decreased agitation  [  ] Affected heart rate    [  ] Increased alertness     Emotional/Psychological:  [  ] Increased self-expression   [  ] Decreased aggressive behavior   [  ] Decreased feelings of stress  [  ] Discussed healthy coping skills     [  ] Improved mood    [x] Decreased withdrawn behavior     Social:  [  ] Decreased feelings of isolation/loneliness [  ] Positive social interaction   [  ] Provided support and/or comfort

## 2024-02-08 NOTE — CONSULTS
authorization for IV meds, VS, use of restraints so as not to interfere with care or in case of risk of harm to self or others, as well as ECT, for his treatment.  All hands meeting discussed patient's case, agreement to transfer to ICU w/goal of administering IV medications targeting catatonia, decreasing agitation with goal of ECT trial here at [facility].  Prefer ativan 2mg IV q4h, but because it is unavailable, would recommend midazolam 2mg IV q4h, even while patient is asleep.  At this time continue to offer haldol 10mg po TID, will not administer this IV just yet. Will likely give a long acting injectible of this medication.  Continue depakene 500mg po qday and 1500mg po qhs.  Continue remeron 30mg po qhs.  Continue 1:1 sitter for re-direction and safety.  Psychiatry will follow daily.    02/07/2024  Met with medical team and agreed on the treatment plan of eventually ECT trial before resorting to palliation.   With inconsistent medication adherence, advocate for IV medication regimen for the purpose of better control of catatonic features and agitation. Once in better control of agitation, recommend ECT course.  No need for court-obtained meds over objection or ECT over objection as long as guardian is agreeable to that plan. Patient's sister is his guardian. Reached out to her by phone without answer, left a message, will email her at [email or web address], with the following plan:  switch medications from PO to IV  Use of restraints in order to obtain VS, labs, IV medication administration  Once agitation is better controlled, will administer ECT here at [facility]  In the case of ECT failure, discuss palliation and next steps  Continue 1:1 sitter    02/06/2024  Patient is refusing his psychiatric medications, VS, and lab draws at this team. This puts him at risk of worsening catatonia and re-admission to ICU. His refusal to participate in care will make it unsafe for ECT.  Advocate for medications and ECT over objection.  Recommend SW reach out to [facility] for TDO pre-screen. Will prepare petition for medication over objection as well as ECT over objection.  In the interim, provide education, encourage patient to take scheduled PO medications.  Continue 1:1 sitter.    02/05/2024  Discussion with all hands meeting today concluded that, despite logistical challenges, ECT trial at [facility] is worthwhile because of presence of many medical subspecialties and ICU; while, also having a discussion with POA on palliation in the case of ECT treatment failure.  Will follow up w/Dr. Rutledge, medicine, anesthesia, and ECT staff at [facility] for a discussion on steps to overcome obstacles to providing ect at [facility].  Continue current medication regimen, encourage patient to eat his meals, take his meds.  Continue 1:1 sitter for re-direction and safety.      02/04/2024  Continue current care and dispo planning. Continue 1:1 sitter for safety    02/03/2024  Continue care and dispo planning as outlined below.     02/02/2024  Recommend ECT.   Pt removed IV and has resisted further IV placement. Will switch medication regimen to PO liquids where possible and time with meal times to facilitate further intake:  Instead of haldol 10mg IV qid ----> will start haldol 10mg po TID with meals.  Instead of versed 2mg iv q4h, ----> will start ativan 2mg po tid with meals.  Continue depakene 500mg po qbreakfast and depakene 1500mg po qDinner  Change remeron 30mg po qhs to remeron solutab 30mg po qDinner  Recommend 1:1 sitter for safety and re-direction, now that patient is extubated.  For agitation, recommend haldol 5mg IV q4h prn. If no IV access is available can give haldol 5mg IM Q4h prn agitation. For extreme agitation, can give Thorazine 100mg IM Q12H.    02/01/2024  My recommendation remains for a trial of ECT for better and faster treatment of catatonic features of schizophrenia when agitation is better controlled. Patient's sister and POA, Eden, re-iterated her wish for an

## 2024-02-08 NOTE — CASE COMMUNICATION
I attempted calling patient's sister (and his guardian), Ms. Eden Montalvo, yesterday several times, but was only able to leave her a message then. Her voice mail instructs the caller to communicate with her via email:[email or web address]. I emailed her later yesterday.     She replied giving consent, as his guardian, to employ use of restraints, IV medications, ECT and whatever is necessary to help her brother.    I will attempt to call her again today to provide for a better opportunity for a discussion of details.    Lety Larson M.D.  Psychiatry

## 2024-02-08 NOTE — PROGRESS NOTES
muscle mass;      Right ankle cellulitis and Stage IV pressure ulcer with infection  - BC 1/9 CoNS in 2 of 4 bottles; 1/11 negative  - R foot wound cx 1/10 Group B strep  - s/p cefepime and vancomycin, completed the course;  - appreciate ID input   - wound care        Code status: Full  Prophylaxis: lovenox   Care Plan discussed with: RN  Anticipated Disposition: TBD  Inpatient  Cardiac monitoring: Telemetry  Central Line:                Social Determinants of Health     Tobacco Use: High Risk (1/9/2024)    Patient History     Smoking Tobacco Use: Some Days     Smokeless Tobacco Use: Never     Passive Exposure: Not on file   Alcohol Use: Not At Risk (12/31/2023)    AUDIT-C     Frequency of Alcohol Consumption: Never     Average Number of Drinks: Patient does not drink     Frequency of Binge Drinking: Never   Financial Resource Strain: Not on file   Food Insecurity: Patient Declined (12/8/2023)    Hunger Vital Sign     Worried About Running Out of Food in the Last Year: Patient declined     Ran Out of Food in the Last Year: Patient declined   Transportation Needs: Patient Declined (12/8/2023)    PRAPARE - Transportation     Lack of Transportation (Medical): Patient declined     Lack of Transportation (Non-Medical): Patient declined   Physical Activity: Not on file   Stress: Not on file   Social Connections: Not on file   Intimate Partner Violence: Not on file   Depression: Not on file   Housing Stability: Unknown (12/8/2023)    Housing Stability Vital Sign     Unable to Pay for Housing in the Last Year: Patient refused     Number of Places Lived in the Last Year: 1     Unstable Housing in the Last Year: Patient refused   Interpersonal Safety: Patient Unable To Answer (12/8/2023)    Interpersonal Safety ([facility] HRSN)     How often does anyone, including family and friends, physically hurt you?: Unable to respond     How often does anyone, including family and friends, scream or curse at you?: Not on file     How often      Data Review:    I personally reviewed  Image      I have personally and independently reviewed all pertinent labs, diagnostic studies, imaging, and have provided independent interpretation of the same.     Labs:     No results for input(s): \"WBC\", \"HGB\", \"HCT\", \"PLT\" in the last 72 hours.    No results for input(s): \"NA\", \"K\", \"CL\", \"CO2\", \"BUN\", \"CREA\", \"GLU\", \"CA\", \"MG\", \"PHOS\", \"URICA\" in the last 72 hours.    No results for input(s): \"ALT\", \"TP\", \"ALB\", \"GLOB\", \"GGT\", \"AML\" in the last 72 hours.    Invalid input(s): \"SGOT\", \"GPT\", \"AP\", \"TBIL\", \"TBILI\", \"AMYP\", \"LPSE\", \"HLPSE\"    No results for input(s): \"INR\", \"APTT\" in the last 72 hours.    Invalid input(s): \"PTP\"   No results for input(s): \"TIBC\", \"FERR\" in the last 72 hours.    Invalid input(s): \"FE\", \"PSAT\"   No results found for: \"FOL\", \"RBCF\"   No results for input(s): \"PH\", \"PCO2\", \"PO2\" in the last 72 hours.  No results for input(s): \"CPK\" in the last 72 hours.    Invalid input(s): \"CPKMB\", \"CKNDX\", \"TROIQ\"  Lab Results   Component Value Date/Time    CHOL 157 12/13/2023 06:02 AM    HDL 55 12/13/2023 06:02 AM     No results found for: \"GLUCPOC\"  [unfilled]    Notes reviewed from all clinical/nonclinical/nursing services involved in patient's clinical care. Care coordination discussions were held with appropriate clinical/nonclinical/ nursing providers based on care coordination needs.         Patients current active Medications were reviewed, considered, added and adjusted based on the clinical condition today.      Home Medications were reconciled to the best of my ability given all available resources at the time of admission. Route is PO if not otherwise noted.      Admission Status:[id number]:::1}      Medications Reviewed:     Current Facility-Administered Medications   Medication Dose Route Frequency    midazolam PF (VERSED) injection 2 mg  2 mg IntraMUSCular Once    chlorproMAZINE (THORAZINE) injection 50 mg  50 mg IntraMUSCular Once    mirtazapine

## 2024-02-08 NOTE — BSMART NOTE
BSMART Liaison Team Note     LOS:  27 days     Patient goal(s) for today: take medications as prescribed, make needs known in an appropriate manner  BSMART Liaison team focus/goals: assess needs, provide support and education, coordinate care     Progress note: Writer met face to face with pt on the medical floor at [facility]. Pt was naked on the floor upon arrival. He is in fetal position and  1:1 sitter present. Pt did not engage with this writer. Writer was unable to assess for mood, SI/HI/AVH, or pt's needs.     RN, Ruth Ann, and margaret reported that pt has refused medications. They couldn't get pt to stay in the bed. Pt has been more incontinent and urinating on the floor. He is eating very little.     Barriers to Discharge: BHU admission      Outpatient provider(s):  Mavis KYLE  Psych: Dr. Olivas: [phone]   CM: Jayson Bustos [phone]  Clinician: Geeta [phone]  Insurance info/prescription coverage:  MEDICARE PART A AND B , [facility] PLAN [facility]       Diagnosis: Refractory schizophrenia with catatonia     Plan:  Pt is indicated for inpatient psychiatric admission for ECT trial. Psychiatry following daily. Please defer to psychiatry for details on the discharge plan and disposition.  Follow up Psych Consult placed? Yes .   Psychiatrist updated? No      Participating treatment team members: JOSEPH Wilson MSW Student Intern

## 2024-02-08 NOTE — PROGRESS NOTES
Palliative Medicine   [place] [phone] (COPE)        [facility] [phone] ([place])      Palliative Medicine SW attended Care Conference in patient's care- Psychiatry planning on pursuing further ECT, if ECT is not successful, further conversations needed.    DARRELL also sent Eden (her preferred method of communication) a supportive e-mail, checking-in on her to see how she is doing and coping. Eden has been transparent about her own struggles with anxiety, etc., and this writer sent supportive e-mail to her to reinforce that this writer is here as a support, and that the medical team is working together in her brother's care.     Email for Eden:    [email or web address]    If you cannot reach Eden by phone, please e-mail her.     Addendum: Eden replied back to outreach, she shared that she visited [facility] over-the-weekend on his birthday, however, he did not acknowledge her. She shares that he sat up some but did not engage or acknowledge her. She shares that she got a \"breakdown\" of his situation by one of the doctors. She is hoping to come visit soon- she needs transportation assistance from her son, he got called into work. She is appreciative of DARRELL checking in on her- will continue to offer emotional support.       Thank you for including Palliative Medicine in the care Roge \"Isreal\" Chapo Torres LCSW

## 2024-02-08 NOTE — PROGRESS NOTES
flat. Bowel sounds are normal. There is no distension.      Palpations: Abdomen is soft.      Tenderness: There is no abdominal tenderness.      Comments: Thin   Genitourinary:     Penis: Normal.    Musculoskeletal:      Comments: thin   Skin:     General: Skin is dry.      Comments: Healed wound on the Left later ankle   Neurological:      Mental Status: He is alert.      Comments: Unable to assess.  Is moving all extremities and has no issues moving in the bed.  Is prostrate or in a fetal position primarily. Is not answering questions at present.    Psychiatric:      Comments: Unable to assess fully.  Is somewhat catatonic with minimal movement.          LABS/DATA:  No results for input(s): \"WBC\", \"HGB\", \"HCT\", \"PLT\" in the last 72 hours.  No results for input(s): \"NA\", \"K\", \"CL\", \"CO2\", \"BUN\", \"CREA\", \"GLU\", \"CA\", \"MG\", \"PHOS\" in the last 72 hours.  No results for input(s): \"TP\", \"ALB\", \"GLOB\", \"AML\" in the last 72 hours.    Invalid input(s): \"SGOT\", \"GPT\", \"AP\", \"TBIL\", \"AMYP\", \"LPSE\"  No results for input(s): \"INR\", \"APTT\" in the last 72 hours.    Invalid input(s): \"PTP\"   Invalid input(s): \"PHI\", \"PCO2I\", \"PO2I\", \"FIO2I\"  No results for input(s): \"CPK\", \"CKMB\" in the last 72 hours.    Invalid input(s): \"TROIQ\", \"BNPP\"    IMAGING: Reviewed 02/08/24  None recent    MICROBIOLOGY:  ANTIBIOTICS TO DATE:  S/p Cefepime for ankle infection, not presently on any ABX  Results       ** No results found for the last 336 hours. **            ICU DAILY CHECKLIST     Code Status:Full  DVT Prophylaxis:Lovenox  T/L/D: None presently   SUP: None presently  Diet: Regular  Activity Level:bed rest presently with psychiatric state, elopment risk  ABCDEF Bundle/Checklist Completed:Yes  Disposition: Stay in ICU  Multidisciplinary Rounds Completed:  Yes  Patient/Family Updated: No    CRITICAL CARE DOCUMENTATION. This patient is critically ill with systemic organ failure and life threatening illness.    I had a face to face encounter  with the patient, reviewed and interpreted patient data including clinical events, labs, images, vital signs, I/O's, and examined patient.  I have discussed the case and the plan and management of the patient's care with the consulting services, the bedside nurses and the respiratory therapist.      NOTE OF PERSONAL INVOLVEMENT IN CARE   This patient has a high probability of imminent, clinically significant deterioration, which requires the highest level of preparedness to intervene urgently. I participated in the decision-making and personally managed or directed the management of the following life and organ supporting interventions that required my frequent assessment to treat or prevent imminent deterioration.    I personally spent 45 minutes of critical care time.  This is time spent at this critically ill patient's bedside actively involved in patient care as well as the coordination of care.  This does not include any procedural time which has been billed separately. This time cannot be counted by anyone else.       Magan Adhikari, JUANCHO-NP  [facility] [place] [facility]  2/8/2024

## 2024-02-09 LAB
ALBUMIN SERPL-MCNC: 3.2 G/DL (ref 3.5–5)
ALBUMIN/GLOB SERPL: 0.8 (ref 1.1–2.2)
ALP SERPL-CCNC: 91 U/L (ref 45–117)
ALT SERPL-CCNC: 17 U/L (ref 12–78)
ANION GAP SERPL CALC-SCNC: 3 MMOL/L (ref 5–15)
AST SERPL-CCNC: 14 U/L (ref 15–37)
BASOPHILS # BLD: 0.1 K/UL (ref 0–0.1)
BASOPHILS NFR BLD: 1 % (ref 0–1)
BILIRUB SERPL-MCNC: 0.4 MG/DL (ref 0.2–1)
BUN SERPL-MCNC: 18 MG/DL (ref 6–20)
BUN/CREAT SERPL: 28 (ref 12–20)
CALCIUM SERPL-MCNC: 10 MG/DL (ref 8.5–10.1)
CHLORIDE SERPL-SCNC: 106 MMOL/L (ref 97–108)
CO2 SERPL-SCNC: 30 MMOL/L (ref 21–32)
CREAT SERPL-MCNC: 0.65 MG/DL (ref 0.7–1.3)
DIFFERENTIAL METHOD BLD: NORMAL
EOSINOPHIL # BLD: 0.2 K/UL (ref 0–0.4)
EOSINOPHIL NFR BLD: 3 % (ref 0–7)
ERYTHROCYTE [DISTWIDTH] IN BLOOD BY AUTOMATED COUNT: 13.5 % (ref 11.5–14.5)
GLOBULIN SER CALC-MCNC: 3.8 G/DL (ref 2–4)
GLUCOSE BLD STRIP.AUTO-MCNC: 73 MG/DL (ref 65–117)
GLUCOSE SERPL-MCNC: 95 MG/DL (ref 65–100)
HCT VFR BLD AUTO: 42.4 % (ref 36.6–50.3)
HGB BLD-MCNC: 13.6 G/DL (ref 12.1–17)
IMM GRANULOCYTES # BLD AUTO: 0 K/UL (ref 0–0.04)
IMM GRANULOCYTES NFR BLD AUTO: 0 % (ref 0–0.5)
LYMPHOCYTES # BLD: 2.3 K/UL (ref 0.8–3.5)
LYMPHOCYTES NFR BLD: 37 % (ref 12–49)
MCH RBC QN AUTO: 29.6 PG (ref 26–34)
MCHC RBC AUTO-ENTMCNC: 32.1 G/DL (ref 30–36.5)
MCV RBC AUTO: 92.2 FL (ref 80–99)
MONOCYTES # BLD: 0.7 K/UL (ref 0–1)
MONOCYTES NFR BLD: 11 % (ref 5–13)
NEUTS SEG # BLD: 2.9 K/UL (ref 1.8–8)
NEUTS SEG NFR BLD: 48 % (ref 32–75)
NRBC # BLD: 0 K/UL (ref 0–0.01)
NRBC BLD-RTO: 0 PER 100 WBC
PLATELET # BLD AUTO: 241 K/UL (ref 150–400)
PMV BLD AUTO: 10.4 FL (ref 8.9–12.9)
POTASSIUM SERPL-SCNC: 4.2 MMOL/L (ref 3.5–5.1)
PROT SERPL-MCNC: 7 G/DL (ref 6.4–8.2)
RBC # BLD AUTO: 4.6 M/UL (ref 4.1–5.7)
SERVICE CMNT-IMP: NORMAL
SODIUM SERPL-SCNC: 139 MMOL/L (ref 136–145)
WBC # BLD AUTO: 6.2 K/UL (ref 4.1–11.1)

## 2024-02-09 PROCEDURE — 2000000000 HC ICU R&B

## 2024-02-09 PROCEDURE — 6360000002 HC RX W HCPCS: Performed by: INTERNAL MEDICINE

## 2024-02-09 PROCEDURE — 2580000003 HC RX 258: Performed by: INTERNAL MEDICINE

## 2024-02-09 PROCEDURE — 6370000000 HC RX 637 (ALT 250 FOR IP): Performed by: INTERNAL MEDICINE

## 2024-02-09 PROCEDURE — 80053 COMPREHEN METABOLIC PANEL: CPT

## 2024-02-09 PROCEDURE — 6370000000 HC RX 637 (ALT 250 FOR IP): Performed by: PSYCHIATRY & NEUROLOGY

## 2024-02-09 PROCEDURE — 36415 COLL VENOUS BLD VENIPUNCTURE: CPT

## 2024-02-09 PROCEDURE — 6360000002 HC RX W HCPCS: Performed by: PSYCHIATRY & NEUROLOGY

## 2024-02-09 PROCEDURE — 6370000000 HC RX 637 (ALT 250 FOR IP): Performed by: NURSE PRACTITIONER

## 2024-02-09 PROCEDURE — 51798 US URINE CAPACITY MEASURE: CPT

## 2024-02-09 PROCEDURE — 85025 COMPLETE CBC W/AUTO DIFF WBC: CPT

## 2024-02-09 PROCEDURE — 82962 GLUCOSE BLOOD TEST: CPT

## 2024-02-09 PROCEDURE — 2580000003 HC RX 258: Performed by: NURSE PRACTITIONER

## 2024-02-09 PROCEDURE — 6360000002 HC RX W HCPCS: Performed by: STUDENT IN AN ORGANIZED HEALTH CARE EDUCATION/TRAINING PROGRAM

## 2024-02-09 RX ORDER — SODIUM CHLORIDE, SODIUM LACTATE, POTASSIUM CHLORIDE, CALCIUM CHLORIDE 600; 310; 30; 20 MG/100ML; MG/100ML; MG/100ML; MG/100ML
INJECTION, SOLUTION INTRAVENOUS CONTINUOUS
Status: DISCONTINUED | OUTPATIENT
Start: 2024-02-09 | End: 2024-02-14

## 2024-02-09 RX ORDER — CHLORPROMAZINE HYDROCHLORIDE 25 MG/ML
50 INJECTION INTRAMUSCULAR EVERY 6 HOURS PRN
Status: DISCONTINUED | OUTPATIENT
Start: 2024-02-09 | End: 2024-02-15 | Stop reason: HOSPADM

## 2024-02-09 RX ORDER — HALOPERIDOL 5 MG/ML
5 INJECTION INTRAMUSCULAR EVERY 4 HOURS PRN
Status: DISCONTINUED | OUTPATIENT
Start: 2024-02-09 | End: 2024-02-12

## 2024-02-09 RX ORDER — MIDAZOLAM HYDROCHLORIDE 2 MG/2ML
2 INJECTION, SOLUTION INTRAMUSCULAR; INTRAVENOUS EVERY 4 HOURS
Status: DISCONTINUED | OUTPATIENT
Start: 2024-02-09 | End: 2024-02-13

## 2024-02-09 RX ADMIN — SENNOSIDES AND DOCUSATE SODIUM 2 TABLET: 8.6; 5 TABLET ORAL at 20:17

## 2024-02-09 RX ADMIN — HALOPERIDOL LACTATE 5 MG: 5 INJECTION, SOLUTION INTRAMUSCULAR at 20:35

## 2024-02-09 RX ADMIN — MIDAZOLAM HYDROCHLORIDE 2 MG: 1 INJECTION, SOLUTION INTRAMUSCULAR; INTRAVENOUS at 18:22

## 2024-02-09 RX ADMIN — MIDAZOLAM 4 MG: 1 INJECTION INTRAMUSCULAR; INTRAVENOUS at 15:22

## 2024-02-09 RX ADMIN — MIDAZOLAM HYDROCHLORIDE 2 MG: 1 INJECTION, SOLUTION INTRAMUSCULAR; INTRAVENOUS at 22:25

## 2024-02-09 RX ADMIN — MIRTAZAPINE 30 MG: 15 TABLET, ORALLY DISINTEGRATING ORAL at 20:17

## 2024-02-09 RX ADMIN — MIDODRINE HYDROCHLORIDE 5 MG: 5 TABLET ORAL at 20:17

## 2024-02-09 RX ADMIN — MIDAZOLAM 4 MG: 1 INJECTION INTRAMUSCULAR; INTRAVENOUS at 10:33

## 2024-02-09 RX ADMIN — ENOXAPARIN SODIUM 40 MG: 100 INJECTION SUBCUTANEOUS at 09:28

## 2024-02-09 RX ADMIN — MIDAZOLAM 4 MG: 1 INJECTION INTRAMUSCULAR; INTRAVENOUS at 12:41

## 2024-02-09 RX ADMIN — SODIUM CHLORIDE, PRESERVATIVE FREE 10 ML: 5 INJECTION INTRAVENOUS at 09:00

## 2024-02-09 RX ADMIN — SODIUM CHLORIDE, POTASSIUM CHLORIDE, SODIUM LACTATE AND CALCIUM CHLORIDE: 600; 310; 30; 20 INJECTION, SOLUTION INTRAVENOUS at 11:35

## 2024-02-09 RX ADMIN — MIDAZOLAM 4 MG: 1 INJECTION INTRAMUSCULAR; INTRAVENOUS at 06:22

## 2024-02-09 RX ADMIN — LIDOCAINE 4%: 4 CREAM TOPICAL at 09:19

## 2024-02-09 RX ADMIN — SODIUM CHLORIDE, PRESERVATIVE FREE 10 ML: 5 INJECTION INTRAVENOUS at 20:25

## 2024-02-09 RX ADMIN — HALOPERIDOL LACTATE 5 MG: 5 INJECTION, SOLUTION INTRAMUSCULAR at 16:38

## 2024-02-09 RX ADMIN — MIDAZOLAM 4 MG: 1 INJECTION INTRAMUSCULAR; INTRAVENOUS at 02:06

## 2024-02-09 RX ADMIN — HALOPERIDOL LACTATE 5 MG: 5 INJECTION, SOLUTION INTRAMUSCULAR at 12:41

## 2024-02-09 NOTE — BSMART NOTE
BSMART Liaison Team Note     LOS:  28 days     Patient goal(s) for today: take medications as prescribed, make needs known in an appropriate manner  BSMART Liaison team focus/goals: assess needs, provide support and education, coordinate care     Progress note: Writer met with pt face to face on the medical floor at [facility]. Pt was received curled up in bed with 1:1 sitter at the bedside. Pt was observed continuously rubbing his feet together. Sitter reported that pt had not eaten breakfast. Writer asked pt if he was ready to eat and he shook his head. Writer asked if pt wanted to listen to music and he shook and head and hid his face. Writer made several other attempts to engage but pt did not respond. Writer offered comforting words and assurance that he is in a safe place. Writer reported pt's interests in music and pizza to the sitter in case pt is receptive to someone's else's offer.    Writer spoke with pt's primary RN. RN also reported that pt is not eating this morning. Night shift nurse was able to get pt to eat some of a sandwich. Writer shared pt's interests with RN as well.     Barriers to Discharge: U admission        Outpatient provider(s):  Mavis KYLE  Psych: Dr. Olivas: [phone]   CM: Jayson Bustos [phone]  Clinician: Geeta [phone]  Insurance info/prescription coverage:  MEDICARE PART A AND B , [facility]       Diagnosis: Refractory schizophrenia with catatonia     Plan:  The goal is to undergo ECT trial at [facility]. Psychiatry following daily. Please defer to psychiatry for details on the discharge plan and disposition.  Follow up Psych Consult placed? No   Psychiatrist updated? No      Participating treatment team members: JOSEPH Wilson MSW Student Intern

## 2024-02-09 NOTE — CARE COORDINATION
Transition of Care Plan:    RUR: 17% Moderate   Prior Level of Functioning: needs assistance with ADL's  Disposition: TBD  Transportation at discharge: BLS  IM/IMM Medicare/ letter given: NA  Is patient a  and connected with VA? No  Caregiver Contact: Sister Eden Montalvo [phone]  Discharge Caregiver contacted prior to discharge? Yes  Care Conference needed? Ongoing  Barriers to discharge:    Patient transferred to ICU for higher level of care.   Team following patient  PCP Dr Dmitry Motta [phone]  Psych: [place] [facility] [phone] Dr Olivas   with ACT team: Jayson Bustos [phone]  [facility] clinician :Geeta [phone]   Lorene Pabon [phone]  APS: Gen[place]  [phone]  Yoselin Manley RN,Care Management

## 2024-02-09 NOTE — PROGRESS NOTES
As required by CMS, I notified the attending physician restraints were ordered on 2/9/24 for Roge Cowan. Acknowledgement of this order by the attending physician was confirmed.

## 2024-02-09 NOTE — PROGRESS NOTES
1715: Pt arrived to unit, pt naked in bed in in side-lying position with arms curled in and legs curled up, pt is minimally interactive but resistant to most assessments and interventions. RN's assessment limited by pt's resistance. Unable to place pt on bedside monitor at this time, MD aware.   1726: 5 mg IM versed administered.   1730: VS obtained.   1838: IM thorazine administered.   1908: IM thorazine administered.   1923: Pt placed in 4 point soft restraints. NP at bedside for application.   1940: IV team at bedside to establish IV access.

## 2024-02-09 NOTE — CONSULTS
to optimize VPA.  -Increasing trazodone to 100mg TID (with goal of using Dobhoff tube with bridle following extubation to allow for enteral meds), to promote sedation and comfort and allow the pt to safely receive care.   -Increased Haldol to 10mg Q4H with goal of alleviating agitation and improving underlying auditory hallucinations; the biggest risk is QT prolongation, which thus far has remained steady. Continue to monitor daily given risk of prolongation/TDP.     1/18/24-   -Discussed with attending Dr. Beverly as well as attending psychiatrist Dr. Larson - increasing VPA to 500mg TID, with VPA level tomorrow prior to AM dose.   -Continuing Haldol as ordered for now, though can potentially be increased in the coming days depending on QT prolongation.   -Increasing trazodone to 50mg TID to promote sedation - unclear at this point if the pt will tolerate enteral access following extubation, but after discussing with attending, given trazodone's favorable side effect profile when compared to other sedating agents, will attempt to employ trazodone to promote sedation and comfort in order for the pt to safely receive care, with plan of using Dobbhoff tube plus bridle following extubation in order for this to continue to be possible.   -Will plan to participate in ongoing interdisciplinary team meetings, including with the pt's guardian (sister) tomorrow afternoon.     1/16/24-   Monitor EKG given risk of QT prolongation.   Will increase Depakene from 250mg TID to 500mg BID for increase in total daily dose from 750mg to 1000mg.     01/15/2024  Discussed reducing reliance on benzos and consolidating antipsychotics to just one if possible.    D/C prn zyprexa  Recommended increase haldol from 5mg IV q6h to q4h. Obtain EKG to f/u QTC and be vigilant about monitoring VS, especially temp.  Start depakene  250mg po q8h in planning to further increase it to decrease agitation.  Plan would be to stabilize patient to undergo  ECT.    01/13/2024  Not a candidate for inpatient psychiatry at this time due to his skin breakdown is now a tunneling wound.    Continue 1:1 nursing  Psychiatry to follow  Continue current care with Court ordered treatments including ECT.    Lety Larson MD

## 2024-02-09 NOTE — PROGRESS NOTES
[facility] Adult  Hospitalist Group                                                                                          Hospitalist Progress Note  Jerilyn Cotton MD  Office Phone: [phone]        Date of Service:  2024  NAME:  Roge Cowan  :  1970  MRN:  [id number]       Admission Summary:     Roge Cowan is a 53 y.o. male with schizophrenia and catatonia, s/p ECT x2 who was transferred from [facility] inpatient to [facility] ICU on 2024 for severe psychosis and episodes of catatonia. Pt was initially admitted to [facility] on 2023 after being taken there by police for evaluation for refusal of self-care. [facility] hospitalization included development of a R ankle Stage IV pressure wound that progressed to tendon exposure with purulent drainage, refusal of wound care, labs, vitals checks, oral medications, oral intake, and only took IM medications that were court ordered. Pt was seen by podiatry and ID who recommended vancomycin and cefepime. He did receive ECT x2 at [facility]. Pt was transferred to [facility] ICU for sedation and protective environment to ensure adequate care. He was started on ketamine and Precedex gtts. He required 4 point restraints. 1:1 sitter was ordered. He was intubated on 1/15 due to increasing agitation despite sedating meds.  ID, Psychiatry, Palliative Medicine, Ethics, and WOCN have all been consulted during pt's ICU stay at [facility]. Psychiatry recommended reducing reliance on BDZs and consolidating antipsychotics to just one if possible; Haldol IV was increased and Depakene was added. Ethics held an interdisciplinary meeting with all medical teams involved on . There is an open APS case against patient's sister/ Julie. Pt was extubated  and was in bilateral wrist restraints. DHT is in place and tube feeds are infusing per dietitian recs. Pt has periods of agitation for which he receives prn meds. He is currently on Haldol 10mg IV 4x daily,  cream   Topical PRN    midazolam PF (VERSED) injection 4 mg  4 mg IntraVENous Q4H    mirtazapine (REMERON SOL-TAB) disintegrating tablet 30 mg  30 mg Oral Nightly    haloperidol (HALDOL) 2 MG/ML oral solution 10 mg  10 mg Oral TID WC    [Held by provider] LORazepam (ATIVAN) 2 MG/ML concentrated solution 1 mg  1 mg Oral TID WC    thiamine tablet 100 mg  100 mg Oral Daily    valproic acid (DEPAKENE) 250 MG/5ML oral solution 500 mg  500 mg Oral Daily with breakfast    valproic acid (DEPAKENE) 250 MG/5ML oral solution 1,500 mg  1,500 mg Oral Dinner    sennosides-docusate sodium (SENOKOT-S) 8.6-50 MG tablet 2 tablet  2 tablet Oral BID    polyethylene glycol (GLYCOLAX) packet 17 g  17 g Oral Daily    haloperidol lactate (HALDOL) injection 5 mg  5 mg IntraVENous Q2H PRN    midodrine (PROAMATINE) tablet 5 mg  5 mg Orogastric Q6H    sodium chloride flush 0.9 % injection 5-40 mL  5-40 mL IntraVENous 2 times per day    sodium chloride flush 0.9 % injection 5-40 mL  5-40 mL IntraVENous PRN    0.9 % sodium chloride infusion   IntraVENous PRN    potassium chloride 10 mEq/100 mL IVPB (Peripheral Line)  10 mEq IntraVENous PRN    ondansetron (ZOFRAN-ODT) disintegrating tablet 4 mg  4 mg Oral Q8H PRN    Or    ondansetron (ZOFRAN) injection 4 mg  4 mg IntraVENous Q6H PRN    acetaminophen (TYLENOL) tablet 650 mg  650 mg Oral Q6H PRN    Or    acetaminophen (TYLENOL) suppository 650 mg  650 mg Rectal Q6H PRN    enoxaparin (LOVENOX) injection 40 mg  40 mg SubCUTAneous Daily    sodium chloride flush 0.9 % injection 5-40 mL  5-40 mL IntraVENous PRN    albuterol (PROVENTIL) (2.5 MG/3ML) 0.083% nebulizer solution 2.5 mg  2.5 mg Nebulization Q6H PRN    senna (SENOKOT) 8.8 MG/5ML syrup 8.8 mg  5 mL Oral BID PRN     ______________________________________________________________________  EXPECTED LENGTH OF STAY: 58  ACTUAL LENGTH OF STAY:          28                 Jerilyn Cotton MD

## 2024-02-09 NOTE — PROGRESS NOTES
0000: Pt has not voided yet, Bladder scan showed 280. Pt reminded to try and void    0300: Bladder scan showed 318. Pt reminded to try and void and re-educated on unit cath policy     0600: Pt has not voided yet Bladder scan showed 423. Pt told that if they could not void in the next 10mins they would have to be straight cathed. Pt penis placed in urinal and told to go, pt urinated 300ml. Post void bladder scan showed 6ml

## 2024-02-09 NOTE — PROGRESS NOTES
wounds on his lower extremities. Given need for more anxiolytics/antipsychotics he was transferred to [facility] 01/12 to be observed in the ICU while undergoing treatment, and medication changes by psychiatrist. During ICU stay, requiring ketamine, Precedex, he continued to be resistant to care and on 1/15 he was intubated and sedated with propofol.  He was placed on Haldol, Depakote, and was subsequently placed on a Versed drip.  Following discussions with the ethics committee, psychiatrist, and critical care team-patient was started to have overall poor prognosis with no quality of life improving treatment options as ECT was thought to have likely minute/minimal impact.      No acute events overnight, patient remains on Versed at 9-10.  No additional agitation reported.  1/23 -- passed SBT,   1/24 Overnight Versed weaned down to 3 as Depakote and Haldol doses increased,  This a.m. failed SBT. Will continue weaning Versed and retrial of SBT.  1/25 extubated to room air, comfortable, no agitation.  1/26 no acute events overnight.  Requiring reorientation, Haldol IV administered x 1 this morning.  01/27 Curled up in bed. Not interacting with examiner. No overt distress  01/28 Little change. Had period of increased agitation last night for which he received thorazine. Transfer center contacted and is continuing to work on transfer to [facility]  0129 Grimace or smile on face but not interacting. No major events. Request transfer out of ICU and to  service. Discussed with Dr Larson who still hopes to get him somewhere that he can undergo ECT. Discussed with care manger. Hopefully change in level of care will open up possibility of transfer to [facility] or [facility]     2/8/24:  Moved back to ICU at the request of Dr. Larson for more aggressive IV therapy.  Patient began refusing oral medications and began prostrating again.  Hopeful plans for ECT here at [place] which have helped him in the past. Given Versed 5mg IM and Thorazine 50mg x 2  uncooperative and agitated.         Cognition and Memory: Cognition is impaired.         Judgment: Judgment is inappropriate.         LABS/DATA:  Recent Labs     02/09/24  0440   WBC 6.2   HGB 13.6   HCT 42.4        Recent Labs     02/09/24  0440      K 4.2      CO2 30   BUN 18     Recent Labs     02/09/24  0440   GLOB 3.8     No results for input(s): \"INR\", \"APTT\" in the last 72 hours.    Invalid input(s): \"PTP\"   Invalid input(s): \"PHI\", \"PCO2I\", \"PO2I\", \"FIO2I\"  No results for input(s): \"CPK\", \"CKMB\" in the last 72 hours.    Invalid input(s): \"TROIQ\", \"BNPP\"    IMAGING: Reviewed 02/09/24  N/A    MICROBIOLOGY:  ANTIBIOTICS TO DATE:  None presently  Results       ** No results found for the last 336 hours. **            ICU DAILY CHECKLIST     Code Status:Full  DVT Prophylaxis:Lovenox  T/L/D: PIV  SUP: None  Diet: Regular  Activity Level:bedrest with 1:1, flight risk  ABCDEF Bundle/Checklist Completed:Yes  Disposition: Stay in ICU  Multidisciplinary Rounds Completed:  Yes  Patient/Family Updated: Other      Level of Service: V3    JUANCHO Arroyo-NP  [facility] Critical [facility]  2/9/2024

## 2024-02-10 LAB
GLUCOSE BLD STRIP.AUTO-MCNC: 89 MG/DL (ref 65–117)
GLUCOSE BLD STRIP.AUTO-MCNC: 98 MG/DL (ref 65–117)
SERVICE CMNT-IMP: NORMAL
SERVICE CMNT-IMP: NORMAL

## 2024-02-10 PROCEDURE — 6370000000 HC RX 637 (ALT 250 FOR IP): Performed by: PSYCHIATRY & NEUROLOGY

## 2024-02-10 PROCEDURE — 6370000000 HC RX 637 (ALT 250 FOR IP): Performed by: INTERNAL MEDICINE

## 2024-02-10 PROCEDURE — 6360000002 HC RX W HCPCS: Performed by: INTERNAL MEDICINE

## 2024-02-10 PROCEDURE — 2580000003 HC RX 258: Performed by: INTERNAL MEDICINE

## 2024-02-10 PROCEDURE — 6360000002 HC RX W HCPCS: Performed by: PSYCHIATRY & NEUROLOGY

## 2024-02-10 PROCEDURE — 82962 GLUCOSE BLOOD TEST: CPT

## 2024-02-10 PROCEDURE — 2000000000 HC ICU R&B

## 2024-02-10 PROCEDURE — 2580000003 HC RX 258: Performed by: NURSE PRACTITIONER

## 2024-02-10 RX ADMIN — HALOPERIDOL LACTATE 5 MG: 5 INJECTION, SOLUTION INTRAMUSCULAR at 02:18

## 2024-02-10 RX ADMIN — MIDODRINE HYDROCHLORIDE 5 MG: 5 TABLET ORAL at 19:45

## 2024-02-10 RX ADMIN — ENOXAPARIN SODIUM 40 MG: 100 INJECTION SUBCUTANEOUS at 08:43

## 2024-02-10 RX ADMIN — SODIUM CHLORIDE, POTASSIUM CHLORIDE, SODIUM LACTATE AND CALCIUM CHLORIDE: 600; 310; 30; 20 INJECTION, SOLUTION INTRAVENOUS at 00:21

## 2024-02-10 RX ADMIN — SODIUM CHLORIDE, POTASSIUM CHLORIDE, SODIUM LACTATE AND CALCIUM CHLORIDE: 600; 310; 30; 20 INJECTION, SOLUTION INTRAVENOUS at 14:47

## 2024-02-10 RX ADMIN — HALOPERIDOL LACTATE 5 MG: 5 INJECTION, SOLUTION INTRAMUSCULAR at 10:34

## 2024-02-10 RX ADMIN — MIDAZOLAM HYDROCHLORIDE 2 MG: 1 INJECTION, SOLUTION INTRAMUSCULAR; INTRAVENOUS at 06:11

## 2024-02-10 RX ADMIN — MIDAZOLAM HYDROCHLORIDE 2 MG: 1 INJECTION, SOLUTION INTRAMUSCULAR; INTRAVENOUS at 10:34

## 2024-02-10 RX ADMIN — MIDODRINE HYDROCHLORIDE 5 MG: 5 TABLET ORAL at 08:40

## 2024-02-10 RX ADMIN — SENNOSIDES AND DOCUSATE SODIUM 2 TABLET: 8.6; 5 TABLET ORAL at 19:45

## 2024-02-10 RX ADMIN — SODIUM CHLORIDE, PRESERVATIVE FREE 10 ML: 5 INJECTION INTRAVENOUS at 19:50

## 2024-02-10 RX ADMIN — MIDAZOLAM HYDROCHLORIDE 2 MG: 1 INJECTION, SOLUTION INTRAMUSCULAR; INTRAVENOUS at 02:18

## 2024-02-10 RX ADMIN — HALOPERIDOL 10 MG: 2 SOLUTION ORAL at 08:40

## 2024-02-10 RX ADMIN — MIDAZOLAM HYDROCHLORIDE 2 MG: 1 INJECTION, SOLUTION INTRAMUSCULAR; INTRAVENOUS at 18:56

## 2024-02-10 RX ADMIN — MIDAZOLAM HYDROCHLORIDE 2 MG: 1 INJECTION, SOLUTION INTRAMUSCULAR; INTRAVENOUS at 14:12

## 2024-02-10 RX ADMIN — MIDAZOLAM HYDROCHLORIDE 2 MG: 1 INJECTION, SOLUTION INTRAMUSCULAR; INTRAVENOUS at 22:59

## 2024-02-10 RX ADMIN — MIDODRINE HYDROCHLORIDE 5 MG: 5 TABLET ORAL at 14:29

## 2024-02-10 RX ADMIN — Medication 100 MG: at 08:41

## 2024-02-10 RX ADMIN — SENNOSIDES AND DOCUSATE SODIUM 2 TABLET: 8.6; 5 TABLET ORAL at 08:40

## 2024-02-10 RX ADMIN — MIDODRINE HYDROCHLORIDE 5 MG: 5 TABLET ORAL at 02:12

## 2024-02-10 RX ADMIN — MIRTAZAPINE 30 MG: 15 TABLET, ORALLY DISINTEGRATING ORAL at 19:45

## 2024-02-10 RX ADMIN — HALOPERIDOL LACTATE 5 MG: 5 INJECTION, SOLUTION INTRAMUSCULAR at 20:46

## 2024-02-10 RX ADMIN — SODIUM CHLORIDE, PRESERVATIVE FREE 10 ML: 5 INJECTION INTRAVENOUS at 09:00

## 2024-02-10 NOTE — PROGRESS NOTES
CRITICAL CARE PROGRESS NOTE      Name: Roge Cowan   : 1970   MRN: [id number]   Date: 2/10/2024      Reason for ICU Admission: Paranoid Schizophrenia w/ Catatonia needing higher level of care than can be given on floor     ASSESSMENT and PLAN   Paranoid Schizophrenia  Catatonia  Intermittent Agitation  - Psychiatry primary  - CCM reconsulting  - Versed 2mg IV q4h routine with further Prn's as needed  - Haldol 5mg q4h prn IV for aggitation if not taking oral.  - holding Depakote unless he willingly takes it, not using IV versions at this time.   - Sitter  - Palliative following  - Plans for hopeful ECT therapy  - per Nursing 2/10 He has been taking some of his oral medications this AM and last PM since we started IV therapy above.      Hx Intubation  Hx Heavy Sedation  - monitor SpO2  - not presently needing any supplemental O2     Hx Hypotension  - monitor BP as able     Hx Illeus  Protein Calorie Malnutrtion  - secondary to his psychiatric history  - significant weight loss noted due to not eating  - IVF for now appears to be improving with oral intake  - Feeding Counts   - Ate 100% Lunch and Dinner   2/10- Ate most of his breakfast     Hx Hypokalemia  - resolved  - labs stable     Hx Anemia of unknown origin  Hx Thrombocytopenia  - stable     Hx Sepsis  Hx Ankle Cellulitis with Stage IV decubitus with Tendon repair  - s/p abx there is a nicely healed area on the outside of the left foot   I was unable to visualize other areas given the patients non cooperation with my attempted exam.     HISTORY OF PRESENT ILLNESS / HOSPITAL COURSE:     53-year-old male history of treatment resistant schizophrenia, who initially presented to hospital in police custody for mental health problem.  While admitted he is noted to be isolated, selectively interactions, noted to have withdrawn and sullen affect, requiring restraints and refusing needed treatment.  He subsequently developed pressure ulcers and wounds

## 2024-02-10 NOTE — PROGRESS NOTES
The Hospitalist team will sign off as patient is in ICU and being followed by the critical care team.   Please reconsult when patient is transferred out of ICU of if assistance is needed to take over medical care while in ICU.

## 2024-02-11 LAB
GLUCOSE BLD STRIP.AUTO-MCNC: 119 MG/DL (ref 65–117)
GLUCOSE BLD STRIP.AUTO-MCNC: 58 MG/DL (ref 65–117)
GLUCOSE BLD STRIP.AUTO-MCNC: 69 MG/DL (ref 65–117)
GLUCOSE BLD STRIP.AUTO-MCNC: 71 MG/DL (ref 65–117)
GLUCOSE BLD STRIP.AUTO-MCNC: 77 MG/DL (ref 65–117)
GLUCOSE BLD STRIP.AUTO-MCNC: 93 MG/DL (ref 65–117)
SERVICE CMNT-IMP: ABNORMAL
SERVICE CMNT-IMP: ABNORMAL
SERVICE CMNT-IMP: NORMAL

## 2024-02-11 PROCEDURE — 2000000000 HC ICU R&B

## 2024-02-11 PROCEDURE — 6370000000 HC RX 637 (ALT 250 FOR IP): Performed by: INTERNAL MEDICINE

## 2024-02-11 PROCEDURE — 6370000000 HC RX 637 (ALT 250 FOR IP): Performed by: PSYCHIATRY & NEUROLOGY

## 2024-02-11 PROCEDURE — 6360000002 HC RX W HCPCS: Performed by: NURSE PRACTITIONER

## 2024-02-11 PROCEDURE — 82962 GLUCOSE BLOOD TEST: CPT

## 2024-02-11 PROCEDURE — 2580000003 HC RX 258: Performed by: INTERNAL MEDICINE

## 2024-02-11 PROCEDURE — 6360000002 HC RX W HCPCS: Performed by: PSYCHIATRY & NEUROLOGY

## 2024-02-11 PROCEDURE — 2580000003 HC RX 258: Performed by: NURSE PRACTITIONER

## 2024-02-11 PROCEDURE — 6360000002 HC RX W HCPCS: Performed by: INTERNAL MEDICINE

## 2024-02-11 PROCEDURE — 2100000000 HC CCU R&B

## 2024-02-11 RX ORDER — DEXTROSE MONOHYDRATE 100 MG/ML
INJECTION, SOLUTION INTRAVENOUS CONTINUOUS PRN
Status: DISCONTINUED | OUTPATIENT
Start: 2024-02-11 | End: 2024-02-15 | Stop reason: HOSPADM

## 2024-02-11 RX ORDER — MIDAZOLAM HYDROCHLORIDE 2 MG/2ML
2 INJECTION, SOLUTION INTRAMUSCULAR; INTRAVENOUS ONCE
Status: COMPLETED | OUTPATIENT
Start: 2024-02-11 | End: 2024-02-11

## 2024-02-11 RX ADMIN — MIDAZOLAM HYDROCHLORIDE 2 MG: 1 INJECTION, SOLUTION INTRAMUSCULAR; INTRAVENOUS at 01:35

## 2024-02-11 RX ADMIN — MIDAZOLAM HYDROCHLORIDE 2 MG: 1 INJECTION, SOLUTION INTRAMUSCULAR; INTRAVENOUS at 17:55

## 2024-02-11 RX ADMIN — HALOPERIDOL 10 MG: 2 SOLUTION ORAL at 18:13

## 2024-02-11 RX ADMIN — SENNOSIDES AND DOCUSATE SODIUM 2 TABLET: 8.6; 5 TABLET ORAL at 08:47

## 2024-02-11 RX ADMIN — MIDAZOLAM HYDROCHLORIDE 2 MG: 1 INJECTION, SOLUTION INTRAMUSCULAR; INTRAVENOUS at 09:52

## 2024-02-11 RX ADMIN — SODIUM CHLORIDE, PRESERVATIVE FREE 10 ML: 5 INJECTION INTRAVENOUS at 08:47

## 2024-02-11 RX ADMIN — MIDAZOLAM 2 MG: 1 INJECTION INTRAMUSCULAR; INTRAVENOUS at 08:54

## 2024-02-11 RX ADMIN — MIDODRINE HYDROCHLORIDE 5 MG: 5 TABLET ORAL at 08:47

## 2024-02-11 RX ADMIN — MIDODRINE HYDROCHLORIDE 5 MG: 5 TABLET ORAL at 01:34

## 2024-02-11 RX ADMIN — MIDAZOLAM HYDROCHLORIDE 2 MG: 1 INJECTION, SOLUTION INTRAMUSCULAR; INTRAVENOUS at 06:22

## 2024-02-11 RX ADMIN — SODIUM CHLORIDE, POTASSIUM CHLORIDE, SODIUM LACTATE AND CALCIUM CHLORIDE: 600; 310; 30; 20 INJECTION, SOLUTION INTRAVENOUS at 02:31

## 2024-02-11 RX ADMIN — ENOXAPARIN SODIUM 40 MG: 100 INJECTION SUBCUTANEOUS at 09:14

## 2024-02-11 RX ADMIN — Medication 100 MG: at 08:47

## 2024-02-11 RX ADMIN — MIDAZOLAM HYDROCHLORIDE 2 MG: 1 INJECTION, SOLUTION INTRAMUSCULAR; INTRAVENOUS at 15:16

## 2024-02-11 RX ADMIN — SODIUM CHLORIDE, POTASSIUM CHLORIDE, SODIUM LACTATE AND CALCIUM CHLORIDE: 600; 310; 30; 20 INJECTION, SOLUTION INTRAVENOUS at 15:18

## 2024-02-11 RX ADMIN — MIDODRINE HYDROCHLORIDE 5 MG: 5 TABLET ORAL at 15:17

## 2024-02-11 RX ADMIN — HALOPERIDOL LACTATE 5 MG: 5 INJECTION, SOLUTION INTRAMUSCULAR at 12:53

## 2024-02-11 RX ADMIN — HALOPERIDOL 10 MG: 2 SOLUTION ORAL at 08:47

## 2024-02-11 RX ADMIN — HALOPERIDOL LACTATE 5 MG: 5 INJECTION, SOLUTION INTRAMUSCULAR at 06:46

## 2024-02-11 ASSESSMENT — PAIN SCALES - GENERAL
PAINLEVEL_OUTOF10: 0

## 2024-02-11 NOTE — PROGRESS NOTES
2000: start of shift pt found at foot of bed in fetal pray position. With face on mattress. NP notified. Pt did take all po meds when instructied. Would immediatly return to pray/fetal position when done. Will not stay out of this position. Pt also refused to leave position to allow urinal placement for voiding.     2100: Pt slid back up in bed and placed in bilat soft wrist restraints per ANDRE nesbitt. Pt was cooperative the entire time. Pt then allowed nurse to place urinal and voided 450ml      0130: Pts BS 69, hypoglycemia protocol initiated, Orange juice given     0145: BS 71    0645: PT suddenly agitated, fighting against restraints, trying to get into fetal/pray position. Will not follow commands, haldol given     0700: Pt still struggling against restraints, and forcing themself into fetal/pray position. No command following. ANDRE Nesbitt notified, Meds ordered see MAR

## 2024-02-11 NOTE — PROGRESS NOTES
CRITICAL CARE PROGRESS NOTE      Name: Roge Cowan   : 1970   MRN: [id number]   Date: 2024      Reason for ICU Admission: Paranoid Schizophrenia w/ Catatonia needing higher level of care than can be given on floor     ASSESSMENT and PLAN   Paranoid Schizophrenia  Catatonia  Intermittent Agitation  - Psychiatry primary  - CCM reconsulting  - Versed 2mg IV q4h routine with further Prn's as needed  - Haldol 5mg q4h prn IV for aggitation if not taking oral.  - holding Depakote unless he willingly takes it, not using IV versions at this time.   - Sitter  - Palliative following  - Plans for hopeful ECT therapy     Hx Intubation  Hx Heavy Sedation  - monitor SpO2  - not presently needing any supplemental O2     Hx Hypotension  - monitor BP as able     Hx Illeus  Protein Calorie Malnutrtion  - secondary to his psychiatric history  - significant weight loss noted due to not eating  - IVF for now appears to be improving with oral intake  - Feeding Counts   - Ate 100% Lunch and Dinner   2/10- Ate most of his breakfast, all his lunch and all his dinner   - ate his breakfast and lunch     Hx Hypokalemia  - resolved  - labs stable     Hx Anemia of unknown origin  Hx Thrombocytopenia  - stable     Hx Sepsis  Hx Ankle Cellulitis with Stage IV decubitus with Tendon repair  - s/p abx there is a nicely healed area on the outside of the left foot   I was unable to visualize other areas given the patients non cooperation with my attempted exam.     HISTORY OF PRESENT ILLNESS / HOSPITAL COURSE:     53-year-old male history of treatment resistant schizophrenia, who initially presented to hospital in police custody for mental health problem.  While admitted he is noted to be isolated, selectively interactions, noted to have withdrawn and sullen affect, requiring restraints and refusing needed treatment.  He subsequently developed pressure ulcers and wounds on his lower extremities. Given need for more  anxiolytics/antipsychotics he was transferred to [facility] 01/12 to be observed in the ICU while undergoing treatment, and medication changes by psychiatrist. During ICU stay, requiring ketamine, Precedex, he continued to be resistant to care and on 1/15 he was intubated and sedated with propofol.  He was placed on Haldol, Depakote, and was subsequently placed on a Versed drip.  Following discussions with the ethics committee, psychiatrist, and critical care team-patient was started to have overall poor prognosis with no quality of life improving treatment options as ECT was thought to have likely minute/minimal impact.      No acute events overnight, patient remains on Versed at 9-10.  No additional agitation reported.  1/23 -- passed SBT,   1/24 Overnight Versed weaned down to 3 as Depakote and Haldol doses increased,  This a.m. failed SBT. Will continue weaning Versed and retrial of SBT.  1/25 extubated to room air, comfortable, no agitation.  1/26 no acute events overnight.  Requiring reorientation, Haldol IV administered x 1 this morning.  01/27 Curled up in bed. Not interacting with examiner. No overt distress  01/28 Little change. Had period of increased agitation last night for which he received thorazine. Transfer center contacted and is continuing to work on transfer to [facility]  0129 Grimace or smile on face but not interacting. No major events. Request transfer out of ICU and to  service. Discussed with Dr Larson who still hopes to get him somewhere that he can undergo ECT. Discussed with care manger. Hopefully change in level of care will open up possibility of transfer to [facility] or [facility]     2/8:  Moved back to ICU at the request of Dr. Larson for more aggressive IV therapy.  Patient began refusing oral medications and began prostrating again.  Hopeful plans for ECT here at [place] which have helped him in the past. Given Versed 5mg IM and Thorazine 50mg x 2 IM to get settled into bed with an IV in place.

## 2024-02-11 NOTE — PROGRESS NOTES
1800: Restraints removed per Marsha STEELE and Onofre POWELL. Sitter remains at bedside.    Shift summary: Overall uneventful shift. Pt was successful in eating ~80% breakfast and 100% supplement; 100% lunch; and ~20% dinner d/t pt resuming prone fetal/prayer position in bed. Pt was compliant with ~50% PO medications. Multiple opportunities given for pt to void. Latest bladder scan at 1730 found 536 cc.

## 2024-02-12 LAB
ANION GAP SERPL CALC-SCNC: 1 MMOL/L (ref 5–15)
BASOPHILS # BLD: 0.1 K/UL (ref 0–0.1)
BASOPHILS NFR BLD: 1 % (ref 0–1)
BUN SERPL-MCNC: 14 MG/DL (ref 6–20)
BUN/CREAT SERPL: 22 (ref 12–20)
CALCIUM SERPL-MCNC: 9 MG/DL (ref 8.5–10.1)
CHLORIDE SERPL-SCNC: 107 MMOL/L (ref 97–108)
CO2 SERPL-SCNC: 29 MMOL/L (ref 21–32)
CREAT SERPL-MCNC: 0.64 MG/DL (ref 0.7–1.3)
DIFFERENTIAL METHOD BLD: NORMAL
EOSINOPHIL # BLD: 0.4 K/UL (ref 0–0.4)
EOSINOPHIL NFR BLD: 5 % (ref 0–7)
ERYTHROCYTE [DISTWIDTH] IN BLOOD BY AUTOMATED COUNT: 13.5 % (ref 11.5–14.5)
GLUCOSE BLD STRIP.AUTO-MCNC: 109 MG/DL (ref 65–117)
GLUCOSE BLD STRIP.AUTO-MCNC: 127 MG/DL (ref 65–117)
GLUCOSE BLD STRIP.AUTO-MCNC: 87 MG/DL (ref 65–117)
GLUCOSE BLD STRIP.AUTO-MCNC: 95 MG/DL (ref 65–117)
GLUCOSE SERPL-MCNC: 85 MG/DL (ref 65–100)
HCT VFR BLD AUTO: 41.4 % (ref 36.6–50.3)
HGB BLD-MCNC: 13.2 G/DL (ref 12.1–17)
IMM GRANULOCYTES # BLD AUTO: 0 K/UL (ref 0–0.04)
IMM GRANULOCYTES NFR BLD AUTO: 0 % (ref 0–0.5)
LYMPHOCYTES # BLD: 2 K/UL (ref 0.8–3.5)
LYMPHOCYTES NFR BLD: 28 % (ref 12–49)
MAGNESIUM SERPL-MCNC: 2 MG/DL (ref 1.6–2.4)
MCH RBC QN AUTO: 28.8 PG (ref 26–34)
MCHC RBC AUTO-ENTMCNC: 31.9 G/DL (ref 30–36.5)
MCV RBC AUTO: 90.4 FL (ref 80–99)
MONOCYTES # BLD: 0.5 K/UL (ref 0–1)
MONOCYTES NFR BLD: 8 % (ref 5–13)
NEUTS SEG # BLD: 4.2 K/UL (ref 1.8–8)
NEUTS SEG NFR BLD: 58 % (ref 32–75)
NRBC # BLD: 0 K/UL (ref 0–0.01)
NRBC BLD-RTO: 0 PER 100 WBC
PHOSPHATE SERPL-MCNC: 3.1 MG/DL (ref 2.6–4.7)
PLATELET # BLD AUTO: 232 K/UL (ref 150–400)
PMV BLD AUTO: 11.1 FL (ref 8.9–12.9)
POTASSIUM SERPL-SCNC: 3.8 MMOL/L (ref 3.5–5.1)
RBC # BLD AUTO: 4.58 M/UL (ref 4.1–5.7)
SERVICE CMNT-IMP: ABNORMAL
SERVICE CMNT-IMP: NORMAL
SODIUM SERPL-SCNC: 137 MMOL/L (ref 136–145)
WBC # BLD AUTO: 7.2 K/UL (ref 4.1–11.1)

## 2024-02-12 PROCEDURE — 36415 COLL VENOUS BLD VENIPUNCTURE: CPT

## 2024-02-12 PROCEDURE — 6360000002 HC RX W HCPCS: Performed by: PSYCHIATRY & NEUROLOGY

## 2024-02-12 PROCEDURE — 80048 BASIC METABOLIC PNL TOTAL CA: CPT

## 2024-02-12 PROCEDURE — 82962 GLUCOSE BLOOD TEST: CPT

## 2024-02-12 PROCEDURE — 85025 COMPLETE CBC W/AUTO DIFF WBC: CPT

## 2024-02-12 PROCEDURE — 84100 ASSAY OF PHOSPHORUS: CPT

## 2024-02-12 PROCEDURE — 6370000000 HC RX 637 (ALT 250 FOR IP): Performed by: PSYCHIATRY & NEUROLOGY

## 2024-02-12 PROCEDURE — 2580000003 HC RX 258: Performed by: INTERNAL MEDICINE

## 2024-02-12 PROCEDURE — 6360000002 HC RX W HCPCS: Performed by: INTERNAL MEDICINE

## 2024-02-12 PROCEDURE — 2580000003 HC RX 258: Performed by: NURSE PRACTITIONER

## 2024-02-12 PROCEDURE — 6360000002 HC RX W HCPCS: Performed by: NURSE PRACTITIONER

## 2024-02-12 PROCEDURE — 83735 ASSAY OF MAGNESIUM: CPT

## 2024-02-12 PROCEDURE — 2000000000 HC ICU R&B

## 2024-02-12 PROCEDURE — 6370000000 HC RX 637 (ALT 250 FOR IP): Performed by: INTERNAL MEDICINE

## 2024-02-12 RX ORDER — HALOPERIDOL 5 MG/ML
5 INJECTION INTRAMUSCULAR EVERY 4 HOURS PRN
Status: DISCONTINUED | OUTPATIENT
Start: 2024-02-12 | End: 2024-02-15 | Stop reason: HOSPADM

## 2024-02-12 RX ORDER — HALOPERIDOL 5 MG/ML
5 INJECTION INTRAMUSCULAR ONCE
Status: COMPLETED | OUTPATIENT
Start: 2024-02-12 | End: 2024-02-12

## 2024-02-12 RX ORDER — MIDAZOLAM HYDROCHLORIDE 2 MG/2ML
2 INJECTION, SOLUTION INTRAMUSCULAR; INTRAVENOUS EVERY 4 HOURS PRN
Status: DISCONTINUED | OUTPATIENT
Start: 2024-02-12 | End: 2024-02-15 | Stop reason: HOSPADM

## 2024-02-12 RX ORDER — MIDAZOLAM HYDROCHLORIDE 2 MG/2ML
2 INJECTION, SOLUTION INTRAMUSCULAR; INTRAVENOUS ONCE
Status: COMPLETED | OUTPATIENT
Start: 2024-02-12 | End: 2024-02-12

## 2024-02-12 RX ADMIN — HALOPERIDOL LACTATE 5 MG: 5 INJECTION, SOLUTION INTRAMUSCULAR at 03:15

## 2024-02-12 RX ADMIN — HALOPERIDOL LACTATE 5 MG: 5 INJECTION, SOLUTION INTRAMUSCULAR at 14:54

## 2024-02-12 RX ADMIN — MIDAZOLAM HYDROCHLORIDE 2 MG: 1 INJECTION, SOLUTION INTRAMUSCULAR; INTRAVENOUS at 14:23

## 2024-02-12 RX ADMIN — SODIUM CHLORIDE, POTASSIUM CHLORIDE, SODIUM LACTATE AND CALCIUM CHLORIDE: 600; 310; 30; 20 INJECTION, SOLUTION INTRAVENOUS at 15:37

## 2024-02-12 RX ADMIN — MIDAZOLAM 2 MG: 1 INJECTION INTRAMUSCULAR; INTRAVENOUS at 17:17

## 2024-02-12 RX ADMIN — Medication 100 MG: at 09:46

## 2024-02-12 RX ADMIN — MIDAZOLAM HYDROCHLORIDE 2 MG: 1 INJECTION, SOLUTION INTRAMUSCULAR; INTRAVENOUS at 04:44

## 2024-02-12 RX ADMIN — HALOPERIDOL 10 MG: 2 SOLUTION ORAL at 12:29

## 2024-02-12 RX ADMIN — POLYETHYLENE GLYCOL 3350 17 G: 17 POWDER, FOR SOLUTION ORAL at 09:46

## 2024-02-12 RX ADMIN — SODIUM CHLORIDE, PRESERVATIVE FREE 10 ML: 5 INJECTION INTRAVENOUS at 20:43

## 2024-02-12 RX ADMIN — MIDAZOLAM HYDROCHLORIDE 2 MG: 1 INJECTION, SOLUTION INTRAMUSCULAR; INTRAVENOUS at 09:31

## 2024-02-12 RX ADMIN — SENNOSIDES AND DOCUSATE SODIUM 2 TABLET: 8.6; 5 TABLET ORAL at 09:46

## 2024-02-12 RX ADMIN — MIDAZOLAM 2 MG: 1 INJECTION INTRAMUSCULAR; INTRAVENOUS at 20:43

## 2024-02-12 RX ADMIN — ENOXAPARIN SODIUM 40 MG: 100 INJECTION SUBCUTANEOUS at 09:29

## 2024-02-12 RX ADMIN — MIDAZOLAM HYDROCHLORIDE 2 MG: 1 INJECTION, SOLUTION INTRAMUSCULAR; INTRAVENOUS at 18:20

## 2024-02-12 RX ADMIN — MIDAZOLAM 2 MG: 1 INJECTION INTRAMUSCULAR; INTRAVENOUS at 14:54

## 2024-02-12 ASSESSMENT — PAIN SCALES - GENERAL
PAINLEVEL_OUTOF10: 0

## 2024-02-12 ASSESSMENT — PAIN SCALES - WONG BAKER
WONGBAKER_NUMERICALRESPONSE: 0
WONGBAKER_NUMERICALRESPONSE: 0

## 2024-02-12 NOTE — PROGRESS NOTES
CRITICAL CARE PROGRESS NOTE      Name: Roge Cowan   : 1970   MRN: [id number]   Date: 2024      Reason for ICU Admission: Paranoid Schizophrenia w/ Catatonia needing higher level of care than can be given on floor     Past 24hrs:  No acute changes, PRN haldol o/n af pt got out of bed while restraints in place. Laying in bed this AM    ASSESSMENT and PLAN   Paranoid Schizophrenia  Catatonia  Intermittent Agitation  - Psychiatry primary  - CCM reconsulting  - Versed 2mg IV q4h routine with further Prn's as needed  - Haldol 5mg q4h prn IV for aggitation if not taking oral.  - holding Depakote unless he willingly takes it, not using IV versions at this time.   - Sitter  - Palliative following  - Plans for hopeful ECT therapy     Hx Intubation  Hx Heavy Sedation  - monitor SpO2  - not presently needing any supplemental O2     Hx Hypotension  - monitor BP as able     Hx Illeus  Protein Calorie Malnutrtion  - secondary to his psychiatric history  - significant weight loss noted due to not eating  - Feeding Counts     Hx Hypokalemia  - resolved  - labs stable     Hx Anemia of unknown origin  Hx Thrombocytopenia  - stable     Hx Sepsis  Hx Ankle Cellulitis with Stage IV decubitus with Tendon repair  - s/p abx there is a nicely healed area on the outside of the left foot   I was unable to visualize other areas given the patients non cooperation with my attempted exam.     HISTORY OF PRESENT ILLNESS / HOSPITAL COURSE:     53-year-old male history of treatment resistant schizophrenia, who initially presented to hospital in police custody for mental health problem.  While admitted he is noted to be isolated, selectively interactions, noted to have withdrawn and sullen affect, requiring restraints and refusing needed treatment.  He subsequently developed pressure ulcers and wounds on his lower extremities. Given need for more anxiolytics/antipsychotics he was transferred to [facility]  to be observed in the  ICU while undergoing treatment, and medication changes by psychiatrist. During ICU stay, requiring ketamine, Precedex, he continued to be resistant to care and on 1/15 he was intubated and sedated with propofol.  He was placed on Haldol, Depakote, and was subsequently placed on a Versed drip.  Following discussions with the ethics committee, psychiatrist, and critical care team-patient was started to have overall poor prognosis with no quality of life improving treatment options as ECT was thought to have likely minute/minimal impact.      No acute events overnight, patient remains on Versed at 9-10.  No additional agitation reported.  1/23 -- passed SBT,   1/24 Overnight Versed weaned down to 3 as Depakote and Haldol doses increased,  This a.m. failed SBT. Will continue weaning Versed and retrial of SBT.  1/25 extubated to room air, comfortable, no agitation.  1/26 no acute events overnight.  Requiring reorientation, Haldol IV administered x 1 this morning.  01/27 Curled up in bed. Not interacting with examiner. No overt distress  01/28 Little change. Had period of increased agitation last night for which he received thorazine. Transfer center contacted and is continuing to work on transfer to [facility]  0129 Grimace or smile on face but not interacting. No major events. Request transfer out of ICU and to  service. Discussed with Dr Larson who still hopes to get him somewhere that he can undergo ECT. Discussed with care manger. Hopefully change in level of care will open up possibility of transfer to [facility] or [facility]     2/8:  Moved back to ICU at the request of Dr. Larson for more aggressive IV therapy.  Patient began refusing oral medications and began prostrating again.  Hopeful plans for ECT here at [place] which have helped him in the past. Given Versed 5mg IM and Thorazine 50mg x 2 IM to get settled into bed with an IV in place.      2/9: Started on LR @ 75 with low overall intake.  Did eat overnight.  Discuss with

## 2024-02-12 NOTE — BSMART NOTE
BSMART Liaison Team Note     LOS:  31 days    Patient goal(s) for today: Patient goal(s) for today: take medications as prescribed, make needs known in an appropriate manner  BSMART Liaison team focus/goals: assess needs, provide support and education, coordinate care     Progress note: Liaison attempted to meet f/f with pt in his room on the medical floor of [facility]. Pt laying on his side at time of visit resting, but twitching his lower legs together. When liaison attempted to engage pt, he immediately went to a prostrated, kneeling position, and hid his face. Pt could not be prompted to engage despite calm, soothing tone of voice and demeanor by this liaison.     Nursing staff report that pt restless during their shift, alternating positions in his bed, from prostrating and kneeling, to sitting upside down. Pt reportedly focused upon getting to the floor to assume his fetal, kneeling, \"prayer\"-[like] position. Pt reportedly not agitated. Primary nurse report pt seemed to digress once transferred yesterday to the unit. Pt reportedly reluctant to talk or to engage with staff. Pt reportedly wouldn't eat yesterday, and then refused his medications last night. Pt reportedly got out of his bed and got on to the floor last night. Soft wrist restraints reportedly had to be tightened. Staff report that pt connecting well with his sitter. She reportedly was able to prompt pt to take his medications and to eat his meals today. Liaison will continue to monitor and to support.     Barriers to Discharge:  [facility] admission   Guns in the home:      Outpatient provider(s):  Mavis KYLE  Psych: Dr. Olivas: [phone]   CM: Jayson Bustos [phone]  Clinician: Geeta [phone]  Insurance info/prescription coverage: MEDICARE PART A AND B , [facility] [employer] OF [facility]      Diagnosis: Schizophrenia with catatonia     Plan: Psychiatry following daily. Please defer to psychiatry for details on the discharge plan

## 2024-02-12 NOTE — PROGRESS NOTES
Pt arrived to ICU 2, prone in fetal position.  Pt cooperated and flipped himself supine.  One time medication orders placed per Magan ROMERO np.

## 2024-02-12 NOTE — FLOWSHEET NOTE
1500 Patient arrived from CCU in the fetal position. Assessment completed and documented. Vitals taken and documented.     1600 Patient took off Monitor leads. This RN tried multiple times to replace the monitor leads and patient while in restraints kept pulling them off and becoming agitated/restless. Notified ANDRE Adhikari of this and was told that it was okay to leave them off as long as we get vitals once a shift.    1720 Patient refused to take oral Haldol multiple times. Notified ANDRE Adhikari. No new orders at this time.     1722 Psych MD at bedside. Added PRN Versed 2mg l9oaabs. Ordered to give PRN dose 2mg now and continue with scheduled dose at 1845.

## 2024-02-12 NOTE — PROGRESS NOTES
1320: TRANSFER - IN REPORT:    Verbal report received from GAIL Tsai on Roge Cowan  being received from ICU for urgent transfer      Report consisted of patient's Situation, Background, Assessment and   Recommendations(SBAR).     Information from the following report(s) Nurse Handoff Report, Index, ED Encounter Summary, ED SBAR, Adult Overview, Intake/Output, MAR, Recent Results, Cardiac Rhythm NSR, and Quality Measures was reviewed with the receiving nurse.    Opportunity for questions and clarification was provided.      Assessment completed upon patient's arrival to unit and care assumed.     1830: Pt incontinent of urine; full linen change provided. Pt's LUE PIV leaking/infiltrated. LR gtt stopped and MD notified.     1930: Bedside shift change report given to GAIL Dumont (oncoming nurse) by GAIL Estrella (offgoing nurse). Report included the following information Nurse Handoff Report, Index, ED Encounter Summary, ED SBAR, Adult Overview, Surgery Report, Intake/Output, MAR, Recent Results, Cardiac Rhythm NSR, and Neuro Assessment.

## 2024-02-12 NOTE — CONSULTS
possible.   -Will plan to participate in ongoing interdisciplinary team meetings, including with the pt's guardian (sister) tomorrow afternoon.     1/16/24-   Monitor EKG given risk of QT prolongation.   Will increase Depakene from 250mg TID to 500mg BID for increase in total daily dose from 750mg to 1000mg.     01/15/2024  Discussed reducing reliance on benzos and consolidating antipsychotics to just one if possible.    D/C prn zyprexa  Recommended increase haldol from 5mg IV q6h to q4h. Obtain EKG to f/u QTC and be vigilant about monitoring VS, especially temp.  Start depakene  250mg po q8h in planning to further increase it to decrease agitation.  Plan would be to stabilize patient to undergo ECT.    01/13/2024  Not a candidate for inpatient psychiatry at this time due to his skin breakdown is now a tunneling wound.    Continue 1:1 nursing  Psychiatry to follow  Continue current care with Court ordered treatments including ECT.    Lety Larson MD

## 2024-02-12 NOTE — PROGRESS NOTES
1930 Bedside shift change report given to GAIL Dumont (oncoming nurse) by GAIL Estrella (offgoing nurse). Report included the following information Nurse Handoff Report, Index, Intake/Output, MAR, Recent Results, and Cardiac Rhythm NSR .     2130 Pt not interactive. No IV access, pt line pulled out. Pt not allowing new access to be placed. Per MD, ok to hold IV meds & give IM if needed. Pt not taking any PO meds at this time. Did not answer for offer of food & water     0015 BS 95. Pt pulling against restraints. Lights dimmed     0300 Pt got out of bed to kneel on floor at end of bed. Bilat wrist restraints still in place. Removed to get pt back in bed to prevent injury & replaced once in bed. Pt continually trying to get out of bed. PRN Haldol admin IM d/t no IV access per MD    0445 New PIV inserted. Labs drawn. Pt allowing & cooperative. Pt eating bag of chips in bed. BP cuff, refusing to put back on at this time     0730 Bedside shift change report given to GAIL Ko (oncoming nurse) by GAIL Dumont (offgoing nurse). Report included the following information Nurse Handoff Report, Index, Intake/Output, MAR, Recent Results, and Cardiac Rhythm NSR.

## 2024-02-12 NOTE — PROGRESS NOTES
4 Eyes Skin Assessment     NAME:  Roge Cowan  YOB: 1970  MEDICAL RECORD NUMBER:  [id number]    The patient is being assessed for  Transfer to New Unit    I agree that at least one RN has performed a thorough Head to Toe Skin Assessment on the patient. ALL assessment sites listed below have been assessed.      Areas assessed by both nurses:    Shoulders, Back, Chest, Arms, Elbows, Hands, Sacrum. Buttock, Coccyx, Ischium, and Legs. Feet and Heels        Does the Patient have a Wound? Yes wound(s) were present on assessment. LDA wound assessment was Initiated and completed by RN       Rodrick Prevention initiated by RN: Yes  Wound Care Orders initiated by RN: No    Pressure Injury (Stage 3,4, Unstageable, DTI, NWPT, and Complex wounds) if present, place Wound referral order by RN under : Yes    New Ostomies, if present place, Ostomy referral order under : No     Nurse 1 eSignature: Electronically signed by Laura Blunt RN on 2/11/24 at 1:30 PM EST    **SHARE this note so that the co-signing nurse can place an eSignature**    Nurse 2 eSignature: Electronically signed by Susana Brooks RN on 2/11/24 at 1:30 PM EST

## 2024-02-12 NOTE — CONSULTS
PSYCHIATRY CONSULT PROGRESS NOTE:    REASON FOR CONSULT:  Continued care.    CHIEF COMPLAINT:   None    Interval History  02/10/2024  Nursing report patient had an uneventful night in the ICU; that patient has been eating a good amount of his meals, at times with help from staff.  He has been accepting his medications and he hasn't interfered with his IV.  Upon my evaluation patient immediately closes his eyes and doesn't participate in my evaluation.  Patient doesn't answer any of my questions.  I called his sister on speaker phone to help build better rapport. She encouraged him to eat and wished him well.      02/09/2024  Patient was transferred to the ICU overnight and upon admission received midazolam 5mg as well as thorazine 50mg IM x twice for agitation.  He tolerated soft restraints. He did eat dinner last night.  This morning he was less agitated but didn't eat breakfast. He didn't take scheduled haldol this am.  Upon my evaluation, he was in soft restraints. He was volitionally breathing in an increased rate, but would breath at a normal rate after a few minutes.  He makes no eye contact with me or respond verbally to any of my questions. He did shake his head from side to side, when I asked him if he needed anything to eat.  He was unclothed.    02/08/2024  Nursing report that patient continues to refuse his medication and isn't cooperative with VS measurement or labs.  He continues to place himself on the floor and remains in the fetal position.  Upon my evaluation today, patient closes his and doesn't engage in my exam. He closes his eyes. He remains in the fetal position and is unclothed.    Ordered once midazolam 2mg IM and thorazine 50mg IM. Patient responded well and was able to eat his lunch, and participated in a limited fashion with the music therapist.    02/07/2024  Nursing report that patient remains in bed most of the time. When he goes to the bathroom, he is crawling on all four. He is  participate in my evaluation. He wouldn't follow any commands.    Discussion with all hands meeting today concluded that, despite logistical challenges, ECT at [facility] is worthwhile because of presence of many medical subspecialties and ICU; while, also having a discussion with POA on palliation in the case of ECT treatment failure.      02/04/2024  Mr. Cowan remains little changed. He did make brief eye contact with me but would not answer questions or respond in any way to verbal interaction. He was fully nude on his bed and put himself into a sort of child's pose, prone with his legs tucked under his abdomen with hands in the prayer position. His sitter reports he has not been verbal today and has largely been consistent with the presentation above. He has been eating, and he selectively takes PO medications. He did take his AM Haldol, Ativan, thiamine, and VPA, but has not yet received his midday medications. He refused vitals yesterday evening, this morning, and this afternoon, and has continued to refuse his EKG.     02/03/2024  Mr. Cowan was in his \"prayer pose\" during evaluation, lying on right side, did not respond to verbal prompts. The pt's nurse reports he has been refusing care today, has not been speaking other than telling staff to be quiet when they attempt to speak to him. He did take some of his medications agreeably this morning, but refused mid-day doses. He refused his EKG. No other changes or updates today.     02/02/2024  Patient has removed his IV and resisted efforts to for further placement. He's resumed ad ravin eating and been eating his meals & supplements since yesterday's lunch. Certainly appreciate dietician's recs. He remains supine most time and often is in kneeling position on his bed.  Upon my evaluation, he awak, alert, but quickly closed his eyes and wouldn't answer any of my questions or follow any commands.    02/01/2024  Nursing report that patient was transferred from ICU to

## 2024-02-12 NOTE — CONSULTS
Dobhoff tube with bridle following extubation to allow for enteral meds), to promote sedation and comfort and allow the pt to safely receive care.   -Increased Haldol to 10mg Q4H with goal of alleviating agitation and improving underlying auditory hallucinations; the biggest risk is QT prolongation, which thus far has remained steady. Continue to monitor daily given risk of prolongation/TDP.     1/18/24-   -Discussed with attending Dr. Beverly as well as attending psychiatrist Dr. Larson - increasing VPA to 500mg TID, with VPA level tomorrow prior to AM dose.   -Continuing Haldol as ordered for now, though can potentially be increased in the coming days depending on QT prolongation.   -Increasing trazodone to 50mg TID to promote sedation - unclear at this point if the pt will tolerate enteral access following extubation, but after discussing with attending, given trazodone's favorable side effect profile when compared to other sedating agents, will attempt to employ trazodone to promote sedation and comfort in order for the pt to safely receive care, with plan of using Dobbhoff tube plus bridle following extubation in order for this to continue to be possible.   -Will plan to participate in ongoing interdisciplinary team meetings, including with the pt's guardian (sister) tomorrow afternoon.     1/16/24-   Monitor EKG given risk of QT prolongation.   Will increase Depakene from 250mg TID to 500mg BID for increase in total daily dose from 750mg to 1000mg.     01/15/2024  Discussed reducing reliance on benzos and consolidating antipsychotics to just one if possible.    D/C prn zyprexa  Recommended increase haldol from 5mg IV q6h to q4h. Obtain EKG to f/u QTC and be vigilant about monitoring VS, especially temp.  Start depakene  250mg po q8h in planning to further increase it to decrease agitation.  Plan would be to stabilize patient to undergo ECT.    01/13/2024  Not a candidate for inpatient psychiatry at this time due  to his skin breakdown is now a tunneling wound.    Continue 1:1 nursing  Psychiatry to follow  Continue current care with Court ordered treatments including ECT.    Lety Larson MD

## 2024-02-12 NOTE — PROGRESS NOTES
0730 Shift report received from Julia REYNOLDS    0800 Shift assessment complete as able. Pt not responding to orientation questions.    0649 TRANSFER - OUT REPORT:    Verbal report given to Mike REYNOLDS on Roge Cowan  being transferred to ICU for routine progression of patient care       Report consisted of patient's Situation, Background, Assessment and   Recommendations(SBAR).     Information from the following report(s) Nurse Handoff Report was reviewed with the receiving nurse.           Lines:   Peripheral IV 02/12/24 Proximal;Right;Anterior Forearm (Active)   Site Assessment Clean, dry & intact 02/12/24 1200   Line Status Flushed;Capped 02/12/24 1200   Line Care Connections checked and tightened 02/12/24 1200   Phlebitis Assessment No symptoms 02/12/24 1200   Infiltration Assessment 0 02/12/24 1200   Alcohol Cap Used Yes 02/12/24 1200   Dressing Status Clean, dry & intact 02/12/24 1200   Dressing Type Transparent;Other (Comment) 02/12/24 1200        Opportunity for questions and clarification was provided.      Patient transported with:  Monitor, Registered Nurse, and Tech    Pt encouraged to lay flat in bed. Pt refused. Remains in fetal position as it is most comfortable for him. VSS.

## 2024-02-13 LAB
GLUCOSE BLD STRIP.AUTO-MCNC: 84 MG/DL (ref 65–117)
GLUCOSE BLD STRIP.AUTO-MCNC: 88 MG/DL (ref 65–117)
GLUCOSE BLD STRIP.AUTO-MCNC: 95 MG/DL (ref 65–117)
SERVICE CMNT-IMP: NORMAL

## 2024-02-13 PROCEDURE — 6370000000 HC RX 637 (ALT 250 FOR IP): Performed by: INTERNAL MEDICINE

## 2024-02-13 PROCEDURE — 6360000002 HC RX W HCPCS: Performed by: INTERNAL MEDICINE

## 2024-02-13 PROCEDURE — 6370000000 HC RX 637 (ALT 250 FOR IP): Performed by: PSYCHIATRY & NEUROLOGY

## 2024-02-13 PROCEDURE — 2000000000 HC ICU R&B

## 2024-02-13 PROCEDURE — 82962 GLUCOSE BLOOD TEST: CPT

## 2024-02-13 PROCEDURE — 6360000002 HC RX W HCPCS: Performed by: PSYCHIATRY & NEUROLOGY

## 2024-02-13 RX ORDER — MIDAZOLAM HYDROCHLORIDE 2 MG/2ML
4 INJECTION, SOLUTION INTRAMUSCULAR; INTRAVENOUS
Status: DISCONTINUED | OUTPATIENT
Start: 2024-02-14 | End: 2024-02-15 | Stop reason: HOSPADM

## 2024-02-13 RX ORDER — MIDODRINE HYDROCHLORIDE 5 MG/1
5 TABLET ORAL EVERY 6 HOURS
Status: DISCONTINUED | OUTPATIENT
Start: 2024-02-13 | End: 2024-02-15 | Stop reason: HOSPADM

## 2024-02-13 RX ORDER — LORAZEPAM 2 MG/ML
2 CONCENTRATE ORAL
Status: DISCONTINUED | OUTPATIENT
Start: 2024-02-14 | End: 2024-02-15 | Stop reason: HOSPADM

## 2024-02-13 RX ORDER — DIPHENHYDRAMINE HYDROCHLORIDE 50 MG/ML
50 INJECTION INTRAMUSCULAR; INTRAVENOUS ONCE
Status: COMPLETED | OUTPATIENT
Start: 2024-02-13 | End: 2024-02-13

## 2024-02-13 RX ORDER — HALOPERIDOL 5 MG/ML
5 INJECTION INTRAMUSCULAR ONCE
Status: COMPLETED | OUTPATIENT
Start: 2024-02-13 | End: 2024-02-13

## 2024-02-13 RX ORDER — MIDAZOLAM HYDROCHLORIDE 2 MG/2ML
4 INJECTION, SOLUTION INTRAMUSCULAR; INTRAVENOUS ONCE
Status: COMPLETED | OUTPATIENT
Start: 2024-02-13 | End: 2024-02-13

## 2024-02-13 RX ADMIN — MIDAZOLAM HYDROCHLORIDE 2 MG: 1 INJECTION, SOLUTION INTRAMUSCULAR; INTRAVENOUS at 02:37

## 2024-02-13 RX ADMIN — MIDAZOLAM HYDROCHLORIDE 2 MG: 1 INJECTION, SOLUTION INTRAMUSCULAR; INTRAVENOUS at 07:10

## 2024-02-13 RX ADMIN — MIDAZOLAM 4 MG: 1 INJECTION INTRAMUSCULAR; INTRAVENOUS at 14:27

## 2024-02-13 RX ADMIN — MIRTAZAPINE 30 MG: 15 TABLET, ORALLY DISINTEGRATING ORAL at 20:42

## 2024-02-13 RX ADMIN — SENNOSIDES AND DOCUSATE SODIUM 2 TABLET: 8.6; 5 TABLET ORAL at 08:11

## 2024-02-13 RX ADMIN — Medication 100 MG: at 08:11

## 2024-02-13 RX ADMIN — MIDODRINE HYDROCHLORIDE 5 MG: 5 TABLET ORAL at 08:11

## 2024-02-13 RX ADMIN — ENOXAPARIN SODIUM 40 MG: 100 INJECTION SUBCUTANEOUS at 08:11

## 2024-02-13 RX ADMIN — SENNOSIDES AND DOCUSATE SODIUM 2 TABLET: 8.6; 5 TABLET ORAL at 20:42

## 2024-02-13 RX ADMIN — MIDAZOLAM 2 MG: 1 INJECTION INTRAMUSCULAR; INTRAVENOUS at 22:43

## 2024-02-13 RX ADMIN — HALOPERIDOL LACTATE 5 MG: 5 INJECTION, SOLUTION INTRAMUSCULAR at 14:28

## 2024-02-13 RX ADMIN — MIDODRINE HYDROCHLORIDE 5 MG: 5 TABLET ORAL at 20:42

## 2024-02-13 RX ADMIN — DIPHENHYDRAMINE HYDROCHLORIDE 50 MG: 50 INJECTION INTRAMUSCULAR; INTRAVENOUS at 14:28

## 2024-02-13 RX ADMIN — HALOPERIDOL LACTATE 5 MG: 5 INJECTION, SOLUTION INTRAMUSCULAR at 21:57

## 2024-02-13 RX ADMIN — MIDAZOLAM HYDROCHLORIDE 2 MG: 1 INJECTION, SOLUTION INTRAMUSCULAR; INTRAVENOUS at 09:47

## 2024-02-13 RX ADMIN — HALOPERIDOL 10 MG: 2 SOLUTION ORAL at 08:11

## 2024-02-13 RX ADMIN — POLYETHYLENE GLYCOL 3350 17 G: 17 POWDER, FOR SOLUTION ORAL at 08:11

## 2024-02-13 ASSESSMENT — PAIN SCALES - GENERAL
PAINLEVEL_OUTOF10: 0

## 2024-02-13 NOTE — BSMART NOTE
BSMART Liaison Team Note     LOS:  32 days     Patient goal(s) for today: Patient goal(s) for today: take medications as prescribed, make needs known in an appropriate manner  BSMART Liaison team focus/goals: assess needs, provide support and education, coordinate care     Progress note: Liaison met f/f with pt in his room on the ICU at [facility]. 1:1 sitter in attendance. Pt initially w/o clothes in prostrated/kneeling position, and his face pushed into his bed mattress. Pt allowed sitter to cover him with a blanket. Sitter explains that pt has refused to wear his gown, and he had earlier refused to keep EKG leads in position. (Pulled them off.) Pt reportedly did sleep some. Sitter says that pt ate \"100% of his breakfast.\" Pt reportedly was able to advise sitter that he needed to urinate and he did so into urinal. Liaison attempted to engage and to reassure pt with soft, non-threatening tone, and encouragement. Liaison praised pt for eating his breakfast, and for telling sitter that he had to urinate.  Pt does not engage despite multiple prompts by this liaison. Unlike previous visits, pt did relax his position, and lay on his side as opposed to remaining in his prostrated position while liaison present.     Liaison did consult with pt's attending nurse. She reports that pt returned to ICU yesterday. She reports that pt intermittently cooperative. She says that pt had to be held down, even with his soft restraints, in order to obtain his vitals earlier today. Yesterday, she says that pt reportedly told nurse \"not to do that\"[to him], and then tried to bite her. RN reports there are Treatment Team meetings regarding pt today. Liaison will continue to monitor and to support.    Barriers to Discharge: ECT at [facility]    Outpatient provider(s):  Mavis KYLE  Psych: Dr. Olivas: [phone]   CM: Jayson Bustos [phone]  Clinician: Geeta [phone]  Insurance info/prescription coverage:  MEDICARE PART A  AND B , [facility] [place]    Diagnosis: Schizophrenia with catatonia     Plan: Plan is ECT at [facility]. Please defer to most recent psychiatric consult for up dates regarding disposition and recommendations.   Follow up Psych Consult placed? Daily/routine.   Psychiatrist updated?  No    Participating treatment team members: Kamari Wilson LCSW

## 2024-02-13 NOTE — PROGRESS NOTES
Palliative Medicine   [place] [phone] (COPE)        [facility] [phone] (COPE)      Palliative Medicine team participated in care conference w/ interdisciplinary team- action items put in-place, as well as plan moving forward. Palliative Medicine will continue to follow closely in the patient's case, this writer will continue to offer support to [place], and continue Music Therapy consultation requests for support to Isreal.     Thank you for including Palliative Medicine in the care of Roge \"Isreal\" Chapo Torres LCSW

## 2024-02-13 NOTE — PROGRESS NOTES
CRITICAL CARE PROGRESS NOTE      Name: Roge Cowan   : 1970   MRN: [id number]   Date: 2024      Reason for ICU Admission: Paranoid Schizophrenia w/ Catatonia needing higher level of care than can be given on floor     Past 24hrs:  No changes overnight.     ASSESSMENT and PLAN   Paranoid Schizophrenia  Catatonia  Intermittent Agitation  - Psychiatry primary  - Versed 2mg IV q4h routine with further Prn's as needed  - Haldol 5mg q4h prn IV for aggitation if not taking oral.  - holding Depakote unless he willingly takes it, not using IV versions at this time.   - Sitter  - Palliative following  - Plans for  ECT therapy  - Care meeting planned for 1500 today      Hx Intubation  Hx Heavy Sedation  - monitor SpO2  - not presently needing any supplemental O2     Hx Hypotension  - monitor BP as able     Hx Illeus  Protein Calorie Malnutrtion  - secondary to his psychiatric history  - significant weight loss noted due to not eating  - Feeding Counts     Hx Hypokalemia  - resolved  - labs stable     Hx Anemia of unknown origin  Hx Thrombocytopenia  - stable     Hx Sepsis  Hx Ankle Cellulitis with Stage IV decubitus with Tendon repair  - s/p abx  HISTORY OF PRESENT ILLNESS / HOSPITAL COURSE:     53-year-old male history of treatment resistant schizophrenia, who initially presented to hospital in police custody for mental health problem.  While admitted he is noted to be isolated, selectively interactions, noted to have withdrawn and sullen affect, requiring restraints and refusing needed treatment.  He subsequently developed pressure ulcers and wounds on his lower extremities. Given need for more anxiolytics/antipsychotics he was transferred to [facility]  to be observed in the ICU while undergoing treatment, and medication changes by psychiatrist. During ICU stay, requiring ketamine, Precedex, he continued to be resistant to care and on 1/15 he was intubated and sedated with propofol.  He was placed on  behaviors.  Discussed PPN use his sister will be contacted for further instructions.       2/10: Improving oral intake.  Appears to be taking some of his PO medications without difficulty. Less aggitated on current regimen.  Remains in two point only restraints.  Doing some talking with staff.      : Lateral movement to the 4th floor by nursing due to staffing.  Report given to 4th Floor Intensivist      3/13/2024: Discussion regarding the care of the patient was had with multidisciplinary team. Patient has shown little progression over the past several weeks. There is a care meeting planned for today at 1500. Critical Care team will sign off as patient is hemodynamically stable with no critical care needs at this time. Discussed with myself, Dr. Beverly, and Dr. Larson.     REVIEW OF SYSTEMS      Review of Systems   All other systems reviewed and are negative.      OBJECTIVE:     EXAM  /75   Pulse 63   Temp 97.3 °F (36.3 °C) (Axillary)   Resp 16   Ht 1.839 m (6' 0.4\")   Wt 61.4 kg (135 lb 5.8 oz)   SpO2 100%   BMI 18.16 kg/m²      Temp (24hrs), Av.8 °F (36.6 °C), Min:97.3 °F (36.3 °C), Max:98.2 °F (36.8 °C)           Intake/Output:     Intake/Output Summary (Last 24 hours) at 2024 1148  Last data filed at 2024 0200  Gross per 24 hour   Intake --   Output 400 ml   Net -400 ml         Physical Exam  Eyes:      General: No scleral icterus.     Pupils: Pupils are equal, round, and reactive to light.   Cardiovascular:      Rate and Rhythm: Normal rate and regular rhythm.   Pulmonary:      Effort: Pulmonary effort is normal.      Breath sounds: Normal breath sounds.   Abdominal:      General: Abdomen is flat.      Palpations: Abdomen is soft.   Musculoskeletal:      Right lower leg: No edema.      Left lower leg: No edema.      Comments: Thin   Skin:     General: Skin is dry.      Findings: No rash.      Comments: Healing area on ankle   Neurological:      Mental Status: He is alert.

## 2024-02-13 NOTE — FLOWSHEET NOTE
1330 Notified by the sitter in the patient's room that the patient pulled out his IV. Notified NP Supriya. ICU Team is not following patient anymore. The Psych MD is in a meeting with other care team members at this time. Psych MD will be notified once out of the meeting. No other new orders at this time.     1350 This nurse Vineet Served MD Larson and notified that patient pulled his IV out and did not have access to give the patient his scheduled IV Versed. MD Larson read perfect serve message at 1351 with no response. No new orders at this time.     1410 MD Marsha coronado served this nurse and stated \"Hepeter Mckeon, I’m sorry for the delay. I appreciate your patience, and I know this case has been tough on you guys, especially. I’ll put in a one time order for midazolam 4mg IM and haldol 5mg IM, and Benadryl 50. After that please try to establish IV.\" No other new orders at this time.     1428 Midazolam 4mg IM and Haldol 5mg IM, and Benadryl 50 IM given and documented. Notified MD Larson that medication was given.    1515 Tried twice to obtain IV access. Both times unsuccessful. Notified MD Larson.    1600 MD Larson on unit. Stated to not try a third time for IV access and that it is okay to leave IV out and try again tomorrow to obtain IV access. Stated to give oral medications if patient will take them and if nursing needs to give him higher doses of IM versed/Thorazine to contact him directly and he will place new orders. No other new orders at this time.     1622 Offered the patient to take his oral medication. Patient was in prayer position on his arms and knees with his head on the bed. Patient would not sit up or even acknowledge that I was speaking to him and stayed in the prayer position. Spoke with patient multiple times on the importance of taking his medication. Patient still would not acknowledge me or say anything to me about taking his medication. Patient at this time not taking oral medications.

## 2024-02-13 NOTE — CONSULTS
Clinical Ethics Consultation Note    History and Context:  Mr. Cowan is a 54 year-old, male with catatonic schizophrenia. He originally presented to [facility] for his schizophrenia; while on the  unit, he developed a pressure wound. He was transferred to [facility] for a higher level of care. Pt's sister is his guardian, and she has been actively involved in his care. Pt's sister previously stated that she wanted us to work towards a goal of ECT. (See psych note from 2/1 and critical care note from 1/24).    Valid Advanced Medical Directive: No    Process:  Ethics has been involved in Mr. Oliveiras care as needed since presenting to [facility]. Today, ethics attended an interdisciplinary team care conference that included the CMO, CM, [place], Palliative, Nursing, Psychiatry, Risk, and Quality. We discussed developments in Mr. Cowan's care, the morally distressing aspects of his case, and laid out some next steps including getting a second opinion and discussing care updates with Mr. Cowan's sister/guardian.    Ethics has been consulted formally and will follow up with the team. We understand that Mr. Cowan's case is complex, distressing, and multi-faceted. We will follow this case and are available via [employer] 24/7 for questions or concerns. Please include ethics on future interdisciplinary team meetings.     Closing:  Thank you for including ethics in the care of Mr. Cowan. Please continue to reach out to me or the ethicist on call via [employer].    Leeann Taylor, PhDc  Ethics Consultant  LEEANN TAYLOR  N/A    Primary Ethical Theme: Primary Ethical Themes: Treatment appropriateness  Secondary Ethical Theme: Secondary Ethical Themes: End of Life related

## 2024-02-14 PROBLEM — R45.1 AGITATION: Status: ACTIVE | Noted: 2024-02-14

## 2024-02-14 PROBLEM — R40.0 SOMNOLENCE: Status: ACTIVE | Noted: 2024-02-14

## 2024-02-14 LAB
GLUCOSE BLD STRIP.AUTO-MCNC: 95 MG/DL (ref 65–117)
GLUCOSE BLD STRIP.AUTO-MCNC: 96 MG/DL (ref 65–117)
SERVICE CMNT-IMP: NORMAL
SERVICE CMNT-IMP: NORMAL

## 2024-02-14 PROCEDURE — 6370000000 HC RX 637 (ALT 250 FOR IP): Performed by: INTERNAL MEDICINE

## 2024-02-14 PROCEDURE — 6360000002 HC RX W HCPCS: Performed by: PSYCHIATRY & NEUROLOGY

## 2024-02-14 PROCEDURE — 6360000002 HC RX W HCPCS: Performed by: INTERNAL MEDICINE

## 2024-02-14 PROCEDURE — 2000000000 HC ICU R&B

## 2024-02-14 PROCEDURE — 82962 GLUCOSE BLOOD TEST: CPT

## 2024-02-14 PROCEDURE — 2580000003 HC RX 258: Performed by: INTERNAL MEDICINE

## 2024-02-14 PROCEDURE — 6370000000 HC RX 637 (ALT 250 FOR IP): Performed by: PSYCHIATRY & NEUROLOGY

## 2024-02-14 PROCEDURE — 99233 SBSQ HOSP IP/OBS HIGH 50: CPT | Performed by: PHYSICAL MEDICINE & REHABILITATION

## 2024-02-14 PROCEDURE — 2500000003 HC RX 250 WO HCPCS: Performed by: PSYCHIATRY & NEUROLOGY

## 2024-02-14 PROCEDURE — G0316 PR PROLONG INPT EVAL ADD15 M: HCPCS | Performed by: PHYSICAL MEDICINE & REHABILITATION

## 2024-02-14 RX ORDER — HALOPERIDOL 5 MG/ML
10 INJECTION INTRAMUSCULAR
Status: DISCONTINUED | OUTPATIENT
Start: 2024-02-14 | End: 2024-02-15 | Stop reason: HOSPADM

## 2024-02-14 RX ORDER — LORAZEPAM 2 MG/ML
2 INJECTION INTRAMUSCULAR
Status: DISCONTINUED | OUTPATIENT
Start: 2024-02-14 | End: 2024-02-15 | Stop reason: HOSPADM

## 2024-02-14 RX ADMIN — MIDAZOLAM 4 MG: 1 INJECTION INTRAMUSCULAR; INTRAVENOUS at 08:46

## 2024-02-14 RX ADMIN — MIDODRINE HYDROCHLORIDE 5 MG: 5 TABLET ORAL at 09:01

## 2024-02-14 RX ADMIN — HALOPERIDOL 10 MG: 2 SOLUTION ORAL at 08:59

## 2024-02-14 RX ADMIN — Medication 100 MG: at 09:01

## 2024-02-14 RX ADMIN — SENNOSIDES AND DOCUSATE SODIUM 2 TABLET: 8.6; 5 TABLET ORAL at 09:37

## 2024-02-14 RX ADMIN — MIDAZOLAM 2 MG: 1 INJECTION INTRAMUSCULAR; INTRAVENOUS at 03:38

## 2024-02-14 RX ADMIN — CHLORPROMAZINE HYDROCHLORIDE 50 MG: 25 INJECTION INTRAMUSCULAR at 17:27

## 2024-02-14 RX ADMIN — ENOXAPARIN SODIUM 40 MG: 100 INJECTION SUBCUTANEOUS at 09:26

## 2024-02-14 RX ADMIN — HALOPERIDOL LACTATE 5 MG: 5 INJECTION, SOLUTION INTRAMUSCULAR at 14:17

## 2024-02-14 RX ADMIN — LORAZEPAM 2 MG: 2 INJECTION INTRAMUSCULAR; INTRAVENOUS at 17:56

## 2024-02-14 RX ADMIN — HALOPERIDOL LACTATE 10 MG: 5 INJECTION, SOLUTION INTRAMUSCULAR at 17:56

## 2024-02-14 RX ADMIN — SODIUM CHLORIDE, PRESERVATIVE FREE 10 ML: 5 INJECTION INTRAVENOUS at 09:03

## 2024-02-14 RX ADMIN — POLYETHYLENE GLYCOL 3350 17 G: 17 POWDER, FOR SOLUTION ORAL at 09:37

## 2024-02-14 ASSESSMENT — PAIN SCALES - GENERAL
PAINLEVEL_OUTOF10: 0

## 2024-02-14 NOTE — PROGRESS NOTES
Music Therapy Assessment  [facility]    Roge Cowan [id number]     1970  54 y.o.  male    Patient Telephone Number: [phone] (home)   Confucianism Affiliation: None   Language: English   Patient Active Problem List    Diagnosis Date Noted    Somnolence 02/14/2024    Agitation 02/14/2024    Palliative care by specialist 01/22/2024    Goals of care, counseling/discussion 01/22/2024    Confusion 01/15/2024    Protein-calorie malnutrition (HCC) 01/15/2024    Palliative care encounter 01/15/2024    Catatonia schizophrenia ([facility]) 01/12/2024    Pressure injury of right ankle, stage 3 ([facility]) 01/10/2024    Cellulitis 01/08/2024    Immobility 12/29/2023    Schizoaffective disorder ([facility]) 12/08/2023    Schizophrenia ([facility]) 11/27/2017    Paranoid schizophrenia ([facility]) 03/15/2017        Date: 2/14/2024            Total Time Calculated: 20 min          [facility] 7 INTENSIVE CARE    Session Observations:  F/up visit; Patient (pt) was receiving care from staff upon this music therapists (MT) arrival. MT remained in the hallway until pt's RN exited the space. She she shared about the care that was being provided and welcomed the MT in once she concluded the care and exited. Upon entering the space MT observed the pt lying on his side. He appeared to sit up and readjust his body position as this MT greeted him. MT introduced self/role and shared about previous visits. As MT asked if he would be comfortable with a visit, pt presented with overstimulation as evidenced by (AEB) body muscle tension and rubbing his hands/feet together at a fast pace. MT assessed the pt was too overstimulated in this moment and shared they would return tomorrow to offer further support. MT exited quietly and spoke with his RN to set up a time to visit before exiting the unit.     DEIDRE Nguyen (Music Therapist, Board Certified)  Spiritual Care Department  Referral-Based Services

## 2024-02-14 NOTE — BSMART NOTE
BSMART Liaison Team Note     LOS:  33 days    Patient goal(s) for today: Patient goal(s) for today: take medications as prescribed, make needs known in an appropriate manner  BSMART Liaison team focus/goals: assess needs, provide support and education, coordinate care     Progress note: Liaison met f/f with pt in his room on the medical [facility] at [facility]. 1:1 sitter at pt's bedside. Pt observed laying on his side calmly, with his eyes open, looking straight ahead, and in no wrist restraints. Sitter reports some small dialogue b/w she and pt. Liaison continued attempts at engaging pt with soft, verbal reassurances and praise for his small improvements, such as: sitting up and eating \"100%\" of his meals, feeding himself, laying on his side, and notifying sitter when he has to urinate. Sitter reports that pt has been out of his restraints since noon with no reported behavioral concerns. Pt does not respond to this liaison's attempts at engagement.     Primary nurse describes pt as refusing her attempts at providing hospital care, such as obtaining vitals and to do physical assessments. She says that pt forcefully pushes her and other staff away when they try to interact in this way. She reports that pt continues to spend a large portion of his day in a prayer position. He reportedly appears to be praying. Per nurse, pt only talks when refusing staff care-giving roles. Pt reportedly has been eating his food with the medication hidden in it. Pt reportedly being given glycolax for supporting defecation. (Pt reportedly has not defecated in 2 days.) Pt reportedly took himself out of his soft bilateral wrist restraints, and then he wouldn't allow staff to reapply them. Pt reportedly had visit with music therapist today, but he said \"no\" to her service. Per RN, music therapist to try to visit again tomorrow. Liaison will continue to monitor and to support.     Barriers to Discharge: ECT at [facility]      Outpatient provider(s):

## 2024-02-14 NOTE — CONSULTS
take his AM Haldol, Ativan, thiamine, and VPA, but has not yet received his midday medications. He refused vitals yesterday evening, this morning, and this afternoon, and has continued to refuse his EKG.     02/03/2024  Mr. Cowan was in his \"prayer pose\" during evaluation, lying on right side, did not respond to verbal prompts. The pt's nurse reports he has been refusing care today, has not been speaking other than telling staff to be quiet when they attempt to speak to him. He did take some of his medications agreeably this morning, but refused mid-day doses. He refused his EKG. No other changes or updates today.     02/02/2024  Patient has removed his IV and resisted efforts to for further placement. He's resumed ad ravin eating and been eating his meals & supplements since yesterday's lunch. Certainly appreciate dietician's recs. He remains supine most time and often is in kneeling position on his bed.  Upon my evaluation, he awak, alert, but quickly closed his eyes and wouldn't answer any of my questions or follow any commands.    02/01/2024  Nursing report that patient was transferred from ICU to medical floor on 01/31/2024. [facility] update inability to accept patient at this time.  He has intermittent episodes of getting in a hunched over pose, preventing continued NG tube feed.  Upon my evaluation he had his eyes closed and wouldn't respond to my evaluation questions.    Spoke with patient's sister and POA, Eden. She re-iterated her wish for a trial of ECT, emphasizing that patient has benefited in the past from treatments. She shared that even though it was never 'curative' of his symptoms, that it has maintained his function at his best baseline.    01/29/2024  Nursing reported that patient had a better night last night than the night before.  He had not received any as needed Haldol IV or Versed.  Patient one-on-one sitter reported that patient [place] is in his bed many times, and when ask what he is doing,  Detail Level: Simple a risk of harm to self, others, interference with care, may utilize thorazine 50mg IM q6h  Psychiatry will follow daily.    02/09/2024:  Prefer ativan 2mg IV q4h, but because it is unavailable, would recommend midazolam 2mg IV q4h, even while patient is asleep. Today he responded well to midazolam 4mg IV, but will decrease back to 2mg as his po intake is poor and has periods of hypotension.  At this time continue to offer haldol 10mg po TID, will not administer this IV just yet. Will likely give a long acting injectible of this medication.  D/C depakene 500mg po qday and 1500mg po qhs, to decrease polypharmacy and in preparation for ECT.  Continue remeron 30mg po qhs.  Continue 1:1 sitter for re-direction and safety.  For agitation:  May utilize haldol 5mg IV Q4H  If ineffective and there is a risk of harm to self, others, interference with care, may utilize thorazine 50mg IM q6h  Psychiatry will follow daily.    02/08/2024:   Patient's sister (guardian) provided authorization for IV meds, VS, use of restraints so as not to interfere with care or in case of risk of harm to self or others, as well as ECT, for his treatment.  All hands meeting discussed patient's case, agreement to transfer to ICU w/goal of administering IV medications targeting catatonia, decreasing agitation with goal of ECT trial here at [facility].  Prefer ativan 2mg IV q4h, but because it is unavailable, would recommend midazolam 2mg IV q4h, even while patient is asleep.  At this time continue to offer haldol 10mg po TID, will not administer this IV just yet. Will likely give a long acting injectible of this medication.  Continue depakene 500mg po qday and 1500mg po qhs.  Continue remeron 30mg po qhs.  Continue 1:1 sitter for re-direction and safety.  Psychiatry will follow daily.    02/07/2024  Met with medical team and agreed on the treatment plan of eventually ECT trial before resorting to palliation.   With inconsistent medication adherence,

## 2024-02-14 NOTE — PROGRESS NOTES
Palliative Medicine  Patient Name: Roge Cowan  YOB: 1970  MRN: [id number]  Age: 54 y.o.  Gender: male    Date of Initial Consult: 1/15/24  Date of Service: 2/14/2024  Time: 11:30 AM  Provider: Desirae Santos MD  Hospital Day: 34  Admit Date: 1/12/2024  Referring Provider: DR ALLEN Beverly        Reasons for Consultation:  Goals of Care, Overwhelming Symptoms, and Psychosocial Distress    HISTORY OF PRESENT ILLNESS (HPI):   Roge Cowan \"Isreal\" (although his voices recently told him that he goes by \"Cruzito\"  is a 54 y.o. male with a past medical history of paranoid schizophrenia, catatonia  who was admitted on 1/12/2024 from [facility]. Pt admitted to [facility] 12/12/23 brought in by Police- he had not been engaging in self care, eating or drinking. He was in the fetal position and was getting pressure ulcers- stage IV on R ankle. Was getting court mandated ECT. He was transferred to [facility] on 1/12/24 for ICU care. He was refusing all care at [facility]. Requiring 4 pt restraints, sedation, IV abx.  He is tachypneic w/ desats, refusing sometimes even turning. Because could not care for patient, required sedation and intubation.     More recently, has been in/out of ICU for required nursing care. Cont to pull out IV lines. Allowing intermittent care.     Psychosocial: Court declared pt legally incompetent on 10/2018 and sister Eden is guardian. They were both adopted at a young age. He has been living w/ her , and her son Rustam since leaving [facility] in 2012. Has been asked to leave many group homes. At baseline, he is low functioning- nonverbal mostly, but can walk. He spends most of day on bed or on the floor in fetal position. Does not interact much, does not listen to TV/radio. Cannot read or write.  Psych @ [place] ACT team: Dr. Brendon Gamble ACT Team: [phone] after hours number.    with ACT team: Jayson Andrea [phone] or [phone] (Samara as well)  : Lorene Pabon

## 2024-02-14 NOTE — PROGRESS NOTES
0830: Pt in robel pose on bed, hands tucked under body, naked, hyperventilating into bed mattress.     0900: Marsha STEELE at bedside and assessed pts anxious state and hyperventilation. Informed of infiltrated PIV.    0925: Pt refused removal of IV by RN, pt removed PIV. No bleeding at site.     0935: Pt pulling off bilateral soft wrist restraints. Restraints reapplied and tightened by 2 RN and PCT.     1130: Pt pulling off telemetry leads and BP cuff. Unable to assess pt. Updated Marsha STEELE via [employer]. Informed MD of plans to hold midodrine if unable to obtain BP. Advised by Marsha STEELE to continue to give meds mixed with food.    1245: Pt refused vital signs, ripped off BP cuff.  Urinal offered and refused. POCT B    1415: Attempted VS and assessment, pt refused. Pt removed BP cuff and became agitated with staff. IM haldol given. Pt removed bilateral wrist restraints. Attempted to reapply restraints and pt becoming increasingly agitated with RN and PCT.    1725: PCT alerted RN that pt climbed out of bed and layed in praying position on floor. Pt unable to redirect back into bed.    1727: PRN Thorazine given IM.    1737: Pt returned to bed independently. After several minutes in bed, pt attempted to climb back out of bed. Able to redirect pt to stay in bed and avoid replacing bilateral wrist restraints.     Shift Summary:  Pt refused most care throughout shift, spent majority of day in praying position, naked in bed. Pt remained calm without stimulation. He became increasingly agitated with touching such as attempting to measure VS. Pt requested urinal once and voided 175mL. He was incontinent once in bed which soaked the bed as well as himself. Pt was bathed, bed cleaned and linens changed following episode of incontinence. He consumed 100% of all meals. Advised by Marsha STEELE to avoid replacing PIVs and utilize PRN IM medications as available. MD advised RN to obtain VS and HTT assessments as patient

## 2024-02-14 NOTE — CONSULTS
PSYCHIATRY CONSULT PROGRESS NOTE:    REASON FOR CONSULT:  Continued care.    CHIEF COMPLAINT:   None    Interval History  02/14/2024  Nursing report patient is in fetal position often. He is eating a good majority of his meals, but his intake of po medications in his food is not consistent.  Nursing report that patient gets further upset and irritable when attempting to measure vitals or establish IV. IV this am infiltrated.   Upon my evaluation, he was hyperventilating and not responding to my evaluation questions.      02/13/2024  Nursing report that patient lost his IV today. Attempts to re-establish IV failed x 2 today.  Patient is in fetal position. He doesn't participate in my evaluation.  Staff state patient ate 100% of his meals today.  Patient's guardian gave (witnessed) authorization to place patient's medications in his food/drink.      2/12/2024  Patient was transferred back to the ICU.  As patient was being transferred IV was lost yesterday.  He has refused some of his medications, but is eating some of his meals.  Upon my evaluation and only thing patients with say is to request that I remove his IV line.  He is in restraints    02/11/2024  Nursing report patient was moved from ICU to cardiac ICU without incident.  Haldol 5mg IV prn was utilized 0646 and 1253, once during his bathing and another during his transfer.  Today he tolerated being showered and changed.  He has eaten all his meals. He is taking all of his medications.  Upon my evaluation, he was whispering that he would like to eat his lunch, but that I don't need to heat it up.  Otherwise, he didn't participate much in my evaluation.    02/10/2024  Nursing report patient had an uneventful night in the ICU; that patient has been eating a good amount of his meals, at times with help from staff.  He has been accepting his medications and he hasn't interfered with his IV.  Upon my evaluation patient immediately closes his eyes and doesn't  extubated. He did receive haldol 4mg IV prn agitation this am.  Upon my evaluation, he was being cleaned as he'd had a bowel movement.  Isreal wasn't able to participate in my evaluation today.    01/25/2024  Patient was extubated successfully today. He remains in bilateral hand restraints. He remains overly somnolent, has NG tube.  Upon my evaluation, he is unable to participate in my exam.    01/24/2024  Overnight patient was reported to be moving from side to side, but no agitation or behaviors necessitating PRN medications.  Upon my evaluation, Isreal remains intubated and sedated. A full mental status exam is not possible at this time.    01/23/2024  Upon my evaluation, Mr. Cowan remains intubated and sedated. A full mental status exam is not possible.  All hands meeting of patient's care team today to discuss case and treatment plan.    01/22/2024  Upon my evaluation, Mr. Cowan, \"Isreal\" is intubated and sedated. He shifts infrequently, but appears in no acute distress.  I spoke with intensivist to discuss patient's case and steps forward. I reached out to palliative care at     1/21/24:  Met with nursing staff; patient is planned for weaning today and extubation tomorrow. He continues to shift positions at times but appears comfortable. He was intubated and sedated at the time of exam. Appears no change in mental status or behavior over past several days.    1/20/24-   Mr. Cowan remains intubated and sedated, though he is occasionally opening his eyes and moving around. He appears comfortable and in no distress. The plan is for possible extubation in the coming days, possibly Monday or Tuesday. QTc on EKG today was stable at 470.     1/19/24-  VPA level was subtherapeutic at 37 this morning at 0421, indicating we have plenty of room to safely optimize the Depakene as a tool to alleviate agitation. Mr. Cowan remains intubated and sedated, again appearing comfortable and without distress.   Interdisciplinary

## 2024-02-14 NOTE — PROGRESS NOTES
Palliative Medicine   [facility] [place] [phone] (COPE)        [facility] [phone] (COPE)      Palliative Medicine DARRELL sent supportive e-mail to Eden, the patient's sister and Guardian- (e-mail is her preferred communication tool unless emergency). SW sent e-mail inquiring about how she and her son (Isreal's nephew) were coping. SW acknowledged that she has been getting so much informations, ups, downs, and unknowns. SW also acknowledged the challenge of balancing work, life, and worries of Isreal and his well-being.     SW also reiterated and informed Eden that the medical team is talking together and frequently in how to best care for Isreal. DARRELL also discussed that this writer asked music therapy to see him weekly while here. SW also asked Eden if she felt like she was getting good updates and have a good understanding of what she has heard so far from the medical team.     DARRELL also offered a phone call to Eden, but acknowledged that e-mail is best unless something urgent- also acknowledged that Eden gets many phone calls from providers and want to also provide opportunity for peace and rest. Offered phone call if she would like to discuss, but also sent supportive e-mail and inquiring if she has any questions currently.     DARRELL offered ongoing support, will continue to follow along and Eden is aware that our team is following.     DARRELL also spoke w/ Ethics team today.     Thank you for including Palliative Medicine in the care of Roge \"Isreal\" Chapo Torres [facility]

## 2024-02-14 NOTE — CARE COORDINATION
Transition of Care Plan:     RUR: 17% Moderate   Prior Level of Functioning: needs assistance with ADL's  Disposition: TBD  Transportation at discharge: BLS  IM/IMM Medicare/ letter given: NA  Is patient a  and connected with VA? No  Caregiver Contact: Sister Eden Montalvo [phone]  Discharge Caregiver contacted prior to discharge? Yes  Care Conference needed? Ongoing  Barriers to discharge:    Medical stability   Patient eating  Remains in restraints   Care conference 2/13/24 to discuss Goals of care and treatment plan.   Team following patient  PCP Dr Dmitry Mtota [phone]  Psych: [place] ACT Team [phone] Dr Olivas   with ACT team: Jayson Bustos [phone]  [facility] clinician :Geeta [phone]   Lorene Pabon [phone]  APS: Geno  [phone]  Yoselin Manley RN,Care Management

## 2024-02-14 NOTE — PROGRESS NOTES
Comprehensive Nutrition Assessment    Type and Reason for Visit: Reassess    Nutrition Recommendations/Plan:   Continue regular diet  Modify ONS- add Two Codey HN and Magic cup to regimen  Please continue to record po intake in I/O flowsheet  Discontinue B1-no longer indicated       Malnutrition Assessment:  Malnutrition Status:  Severe malnutrition (01/15/24 1347)    Context:  Acute Illness     Findings of the 6 clinical characteristics of malnutrition:  Energy Intake:  50% or less of estimated energy requirements for 5 or more days  Weight Loss:  Unable to assess     Body Fat Loss:  Moderate body fat loss Fat Overlying Ribs, Orbital   Muscle Mass Loss:  Moderate muscle mass loss Clavicles (pectoralis & deltoids), Temples (temporalis)  Fluid Accumulation:  No significant fluid accumulation     Strength:  Not Performed       Nutrition Assessment:    Pt transferred from [facility] with Catatonia Schizophrenia (admitted 12/12). Initially treated with large amounts of BZDs and ECT x 2 however difficult to administer medical care. Pt refusing all PO intake, medical care, medications. New development of pressure injury-WOCN following. Transferred to [facility] ICU 1/12. Intubated today d/t continued inability to care for pt medically.     2/14: Follow-up. Refractory schizophrenia with catatonia. Pt transferred back to ICU 2/8. Psychiatry now the attending service; goal is for pt to receive ECT treatments at [facility].     Visited with patient with sitter in room. Pt naked in prayer position. Attempted to talk with patient-no response to RD; asked Isreal if there was a favorite food he would like to have.    Very good appetite when pt is accepting of meals; consumed 100% of breakfast and ONS this morning. Pt also consumed 100% of meals yesterday.    Currently receiving Ensure Plus BID; will add Two Codey HN in place of vanilla Ensure and add it on lunch tray. Two Codey HN provides 475 calories and ~20 gm protein per serving (vs Ensure @ 350  evidenced by wounds    Nutrition Interventions:   Food and/or Nutrient Delivery: Continue Current Diet, Modify Oral Nutrition Supplement  Nutrition Education/Counseling: No recommendation at this time  Coordination of Nutrition Care: Continue to monitor while inpatient       Goals:  Previous Goal Met: Goal(s) Achieved  Goals: PO intake 75% or greater (x 5-7 days.)       Nutrition Monitoring and Evaluation:   Behavioral-Environmental Outcomes: None Identified  Food/Nutrient Intake Outcomes: Food and Nutrient Intake, Supplement Intake  Physical Signs/Symptoms Outcomes: Biochemical Data, GI Status, Skin, Fluid Status or Edema    Discharge Planning:    Continue Oral Nutrition Supplement, Continue current diet     Yara Kam RD CNSC  Available via [employer]

## 2024-02-14 NOTE — PROGRESS NOTES
Music Therapy Assessment  [facility]    Roge Cowan [id number]     1970  54 y.o.  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[facility]) 01/12/2024    Pressure injury of right ankle, stage 3 ([facility]) 01/10/2024    Cellulitis 01/08/2024    Immobility 12/29/2023    Schizoaffective disorder ([facility]) 12/08/2023    Schizophrenia ([facility]) 11/27/2017    Paranoid schizophrenia ([facility]) 03/15/2017        Date: 2/14/2024            Total Time Calculated: 15 min          [facility] 7S1 INTENSIVE CARE    Mental Status:   [x] Alert [  ] Forgetful [  ]  Confused  [  ] Minimally responsive  [  ] Sleeping    Communication Status: [  ] Impaired Speech [  ] Nonverbal -N/A    Physical Status:   [  ] Oxygen in use  [  ] Hard of Hearing [  ] Vision Impaired  [  ] Ambulatory  [  ] Ambulatory with assistance [  ] Non-ambulatory -N/A    Music Preferences, Background: N/A: Please see Session Observations below.     Clinical Problem addressed: Support comfort and feelings of safety; Increased alertness     Goal(s) met in session: N/A: Please see Session Observations below.   Physical/Pain management (Scale of 1-10):    Pre-session rating ___________    Post-session rating __________  [  ] Increased relaxation   [  ] Affected breathing patterns  [  ] Decreased muscle tension   [  ] Decreased agitation  [  ] Affected heart rate    [  ] Increased alertness     Emotional/Psychological:  [  ] Increased self-expression   [  ] Decreased aggressive behavior   [  ] Decreased feelings of stress  [  ] Discussed healthy coping skills     [  ] Improved mood    [  ]

## 2024-02-15 ENCOUNTER — HOSPITAL ENCOUNTER (INPATIENT)
Facility: HOSPITAL | Age: 54
LOS: 25 days | Discharge: HOME | DRG: 885 | End: 2024-03-11
Attending: PSYCHIATRY & NEUROLOGY | Admitting: PSYCHIATRY & NEUROLOGY
Payer: MEDICARE

## 2024-02-15 VITALS
RESPIRATION RATE: 14 BRPM | DIASTOLIC BLOOD PRESSURE: 78 MMHG | BODY MASS INDEX: 17.95 KG/M2 | HEART RATE: 67 BPM | SYSTOLIC BLOOD PRESSURE: 105 MMHG | WEIGHT: 132.5 LBS | OXYGEN SATURATION: 97 % | TEMPERATURE: 96.9 F | HEIGHT: 72 IN

## 2024-02-15 DIAGNOSIS — F20.2 CATATONIC SCHIZOPHRENIA (HCC): Primary | ICD-10-CM

## 2024-02-15 LAB
ANION GAP SERPL CALC-SCNC: 8 MMOL/L (ref 5–15)
BASOPHILS # BLD: 0.1 K/UL (ref 0–0.1)
BASOPHILS NFR BLD: 1 % (ref 0–1)
BUN SERPL-MCNC: 23 MG/DL (ref 6–20)
BUN/CREAT SERPL: 29 (ref 12–20)
CALCIUM SERPL-MCNC: 10.1 MG/DL (ref 8.5–10.1)
CHLORIDE SERPL-SCNC: 107 MMOL/L (ref 97–108)
CO2 SERPL-SCNC: 19 MMOL/L (ref 21–32)
CREAT SERPL-MCNC: 0.78 MG/DL (ref 0.7–1.3)
DIFFERENTIAL METHOD BLD: NORMAL
EOSINOPHIL # BLD: 0.2 K/UL (ref 0–0.4)
EOSINOPHIL NFR BLD: 3 % (ref 0–7)
ERYTHROCYTE [DISTWIDTH] IN BLOOD BY AUTOMATED COUNT: 13.5 % (ref 11.5–14.5)
GLUCOSE BLD STRIP.AUTO-MCNC: 159 MG/DL (ref 65–117)
GLUCOSE SERPL-MCNC: 99 MG/DL (ref 65–100)
HCT VFR BLD AUTO: 47 % (ref 36.6–50.3)
HGB BLD-MCNC: 15.1 G/DL (ref 12.1–17)
IMM GRANULOCYTES # BLD AUTO: 0 K/UL (ref 0–0.04)
IMM GRANULOCYTES NFR BLD AUTO: 0 % (ref 0–0.5)
LYMPHOCYTES # BLD: 2.4 K/UL (ref 0.8–3.5)
LYMPHOCYTES NFR BLD: 35 % (ref 12–49)
MAGNESIUM SERPL-MCNC: 2.4 MG/DL (ref 1.6–2.4)
MCH RBC QN AUTO: 29.7 PG (ref 26–34)
MCHC RBC AUTO-ENTMCNC: 32.1 G/DL (ref 30–36.5)
MCV RBC AUTO: 92.5 FL (ref 80–99)
MONOCYTES # BLD: 0.6 K/UL (ref 0–1)
MONOCYTES NFR BLD: 8 % (ref 5–13)
NEUTS SEG # BLD: 3.5 K/UL (ref 1.8–8)
NEUTS SEG NFR BLD: 53 % (ref 32–75)
NRBC # BLD: 0 K/UL (ref 0–0.01)
NRBC BLD-RTO: 0 PER 100 WBC
PHOSPHATE SERPL-MCNC: 4.8 MG/DL (ref 2.6–4.7)
PLATELET # BLD AUTO: 223 K/UL (ref 150–400)
PMV BLD AUTO: 11 FL (ref 8.9–12.9)
POTASSIUM SERPL-SCNC: 4 MMOL/L (ref 3.5–5.1)
RBC # BLD AUTO: 5.08 M/UL (ref 4.1–5.7)
SERVICE CMNT-IMP: ABNORMAL
SODIUM SERPL-SCNC: 134 MMOL/L (ref 136–145)
WBC # BLD AUTO: 6.8 K/UL (ref 4.1–11.1)

## 2024-02-15 PROCEDURE — 80048 BASIC METABOLIC PNL TOTAL CA: CPT

## 2024-02-15 PROCEDURE — 1140000000 HC RM PRIVATE PSYCH

## 2024-02-15 PROCEDURE — 6360000002 HC RX W HCPCS: Performed by: PSYCHIATRY & NEUROLOGY

## 2024-02-15 PROCEDURE — 84100 ASSAY OF PHOSPHORUS: CPT

## 2024-02-15 PROCEDURE — 6370000000 HC RX 637 (ALT 250 FOR IP): Performed by: PSYCHIATRY & NEUROLOGY

## 2024-02-15 PROCEDURE — 2500000003 HC RX 250 WO HCPCS: Performed by: PSYCHIATRY & NEUROLOGY

## 2024-02-15 PROCEDURE — 83735 ASSAY OF MAGNESIUM: CPT

## 2024-02-15 PROCEDURE — 99233 SBSQ HOSP IP/OBS HIGH 50: CPT | Performed by: PHYSICAL MEDICINE & REHABILITATION

## 2024-02-15 PROCEDURE — 36415 COLL VENOUS BLD VENIPUNCTURE: CPT

## 2024-02-15 PROCEDURE — 6360000002 HC RX W HCPCS: Performed by: INTERNAL MEDICINE

## 2024-02-15 PROCEDURE — 85025 COMPLETE CBC W/AUTO DIFF WBC: CPT

## 2024-02-15 PROCEDURE — 6370000000 HC RX 637 (ALT 250 FOR IP): Performed by: INTERNAL MEDICINE

## 2024-02-15 RX ORDER — MAGNESIUM HYDROXIDE/ALUMINUM HYDROXICE/SIMETHICONE 120; 1200; 1200 MG/30ML; MG/30ML; MG/30ML
30 SUSPENSION ORAL EVERY 6 HOURS PRN
Status: CANCELLED | OUTPATIENT
Start: 2024-02-15

## 2024-02-15 RX ORDER — DIPHENHYDRAMINE HYDROCHLORIDE 50 MG/ML
50 INJECTION INTRAMUSCULAR; INTRAVENOUS EVERY 4 HOURS PRN
Status: CANCELLED | OUTPATIENT
Start: 2024-02-15

## 2024-02-15 RX ORDER — HALOPERIDOL 5 MG/1
5 TABLET ORAL EVERY 4 HOURS PRN
Status: DISCONTINUED | OUTPATIENT
Start: 2024-02-15 | End: 2024-03-11 | Stop reason: HOSPADM

## 2024-02-15 RX ORDER — MIRTAZAPINE 15 MG/1
30 TABLET, FILM COATED ORAL NIGHTLY
Status: DISCONTINUED | OUTPATIENT
Start: 2024-02-15 | End: 2024-02-16

## 2024-02-15 RX ORDER — ACETAMINOPHEN 325 MG/1
650 TABLET ORAL EVERY 4 HOURS PRN
Status: CANCELLED | OUTPATIENT
Start: 2024-02-15

## 2024-02-15 RX ORDER — TRAZODONE HYDROCHLORIDE 50 MG/1
50 TABLET ORAL NIGHTLY PRN
Status: DISCONTINUED | OUTPATIENT
Start: 2024-02-15 | End: 2024-03-11 | Stop reason: HOSPADM

## 2024-02-15 RX ORDER — MAGNESIUM HYDROXIDE/ALUMINUM HYDROXICE/SIMETHICONE 120; 1200; 1200 MG/30ML; MG/30ML; MG/30ML
30 SUSPENSION ORAL EVERY 6 HOURS PRN
Status: DISCONTINUED | OUTPATIENT
Start: 2024-02-15 | End: 2024-03-11 | Stop reason: HOSPADM

## 2024-02-15 RX ORDER — SENNOSIDES A AND B 8.6 MG/1
1 TABLET, FILM COATED ORAL DAILY PRN
Status: DISCONTINUED | OUTPATIENT
Start: 2024-02-15 | End: 2024-03-11 | Stop reason: HOSPADM

## 2024-02-15 RX ORDER — POLYETHYLENE GLYCOL 3350 17 G/17G
17 POWDER, FOR SOLUTION ORAL DAILY PRN
Status: DISCONTINUED | OUTPATIENT
Start: 2024-02-15 | End: 2024-03-11 | Stop reason: HOSPADM

## 2024-02-15 RX ORDER — HALOPERIDOL 5 MG/ML
5 INJECTION INTRAMUSCULAR EVERY 4 HOURS PRN
Status: CANCELLED | OUTPATIENT
Start: 2024-02-15

## 2024-02-15 RX ORDER — TRAZODONE HYDROCHLORIDE 50 MG/1
50 TABLET ORAL NIGHTLY PRN
Status: CANCELLED | OUTPATIENT
Start: 2024-02-15

## 2024-02-15 RX ORDER — HALOPERIDOL 5 MG/ML
5 INJECTION INTRAMUSCULAR EVERY 4 HOURS PRN
Status: DISCONTINUED | OUTPATIENT
Start: 2024-02-15 | End: 2024-03-11 | Stop reason: HOSPADM

## 2024-02-15 RX ORDER — LORAZEPAM 2 MG/ML
2 INJECTION INTRAMUSCULAR
Status: DISCONTINUED | OUTPATIENT
Start: 2024-02-15 | End: 2024-02-16

## 2024-02-15 RX ORDER — HYDROXYZINE 50 MG/1
50 TABLET, FILM COATED ORAL 3 TIMES DAILY PRN
Status: DISCONTINUED | OUTPATIENT
Start: 2024-02-15 | End: 2024-03-11 | Stop reason: HOSPADM

## 2024-02-15 RX ORDER — SENNOSIDES A AND B 8.6 MG/1
1 TABLET, FILM COATED ORAL DAILY PRN
Status: CANCELLED | OUTPATIENT
Start: 2024-02-15

## 2024-02-15 RX ORDER — DIPHENHYDRAMINE HYDROCHLORIDE 50 MG/ML
50 INJECTION INTRAMUSCULAR; INTRAVENOUS EVERY 4 HOURS PRN
Status: DISCONTINUED | OUTPATIENT
Start: 2024-02-15 | End: 2024-03-11 | Stop reason: HOSPADM

## 2024-02-15 RX ORDER — HALOPERIDOL 5 MG/1
5 TABLET ORAL EVERY 4 HOURS PRN
Status: CANCELLED | OUTPATIENT
Start: 2024-02-15

## 2024-02-15 RX ORDER — HALOPERIDOL 5 MG/ML
10 INJECTION INTRAMUSCULAR
Status: DISCONTINUED | OUTPATIENT
Start: 2024-02-15 | End: 2024-02-19

## 2024-02-15 RX ORDER — HYDROXYZINE 50 MG/1
50 TABLET, FILM COATED ORAL 3 TIMES DAILY PRN
Status: CANCELLED | OUTPATIENT
Start: 2024-02-15

## 2024-02-15 RX ORDER — POLYETHYLENE GLYCOL 3350 17 G/17G
17 POWDER, FOR SOLUTION ORAL DAILY PRN
Status: CANCELLED | OUTPATIENT
Start: 2024-02-15

## 2024-02-15 RX ORDER — ACETAMINOPHEN 325 MG/1
650 TABLET ORAL EVERY 4 HOURS PRN
Status: DISCONTINUED | OUTPATIENT
Start: 2024-02-15 | End: 2024-03-11 | Stop reason: HOSPADM

## 2024-02-15 RX ADMIN — POLYETHYLENE GLYCOL 3350 17 G: 17 POWDER, FOR SOLUTION ORAL at 09:00

## 2024-02-15 RX ADMIN — HALOPERIDOL LACTATE 10 MG: 5 INJECTION, SOLUTION INTRAMUSCULAR at 08:32

## 2024-02-15 RX ADMIN — Medication 100 MG: at 08:32

## 2024-02-15 RX ADMIN — CHLORPROMAZINE HYDROCHLORIDE 50 MG: 25 INJECTION INTRAMUSCULAR at 15:07

## 2024-02-15 RX ADMIN — LORAZEPAM 2 MG: 2 INJECTION INTRAMUSCULAR; INTRAVENOUS at 12:01

## 2024-02-15 RX ADMIN — TRAZODONE HYDROCHLORIDE 50 MG: 50 TABLET ORAL at 20:46

## 2024-02-15 RX ADMIN — LORAZEPAM 2 MG: 2 INJECTION INTRAMUSCULAR; INTRAVENOUS at 19:25

## 2024-02-15 RX ADMIN — ENOXAPARIN SODIUM 40 MG: 100 INJECTION SUBCUTANEOUS at 08:32

## 2024-02-15 RX ADMIN — HALOPERIDOL LACTATE 10 MG: 5 INJECTION, SOLUTION INTRAMUSCULAR at 19:25

## 2024-02-15 RX ADMIN — HALOPERIDOL LACTATE 10 MG: 5 INJECTION, SOLUTION INTRAMUSCULAR at 12:00

## 2024-02-15 RX ADMIN — HALOPERIDOL LACTATE 5 MG: 5 INJECTION, SOLUTION INTRAMUSCULAR at 15:34

## 2024-02-15 RX ADMIN — LORAZEPAM 2 MG: 2 INJECTION INTRAMUSCULAR; INTRAVENOUS at 08:32

## 2024-02-15 RX ADMIN — MIRTAZAPINE 30 MG: 15 TABLET, FILM COATED ORAL at 20:46

## 2024-02-15 ASSESSMENT — PAIN SCALES - GENERAL
PAINLEVEL_OUTOF10: 0
PAINLEVEL_OUTOF10: 0

## 2024-02-15 NOTE — PLAN OF CARE
Problem: Confusion  Goal: Confusion, delirium, dementia, or psychosis is improved or at baseline  Description: INTERVENTIONS:  1. Assess for possible contributors to thought disturbance, including medications, impaired vision or hearing, underlying metabolic abnormalities, dehydration, psychiatric diagnoses, and notify attending LIP  2. [place] high risk fall precautions, as indicated  3. Provide frequent short contacts to provide reality reorientation, refocusing and direction  Outcome: Progressing   Problem: Behavior  Goal: Pt/Family maintain appropriate behavior and adhere to behavioral management agreement, if implemented  Description: INTERVENTIONS:  1. Assess patient/family's coping skills and  non-compliant behavior (including use of illegal substances)  2. Notify security of behavior or suspected illegal substances which indicate the need for search of the family and/or belongings  3. Encourage verbalization of thoughts and concerns in a socially appropriate manner  Outcome: Progressing     Pt received after a transfer from ICU, has been uncooperative with admission process, minimally engage in conversation, remained on the floor with fetal position. Staffs attempted to put the Pt on the mattress and Pt keeps pushing away and wanted to stay on the floor osorio. Pt had good appetite and ate 100% his meal. Pt is placed on Q 15 minutes check  for safety.

## 2024-02-15 NOTE — CONSULTS
Psychiatric Consult Note:    Chief Complaint:  Could not be obtained.     HPI:   Thank you for your consult regarding Mr. Cowan. This is an extremely complicated case which has been discussed in numerous evaluations and consults by psychiatry and the full history is available in great detail in most of these. I have reviewed the chart notes for the past few months, am familiar with the patient from his admission at [facility] and also from attempted ECT procedures. Mr. Cowan was initially admitted at [facility] on 12/8/23 and Dr. Rivero's initial evaluation at that time noted \"Roge Cowan is a 53 y.o. male per ED who presents in police custody for a mental health problem.  Arrives to ER under ECO with [place] [employer]. Patient not speaking or acknowledging this RN during triage. Patient in feral position no talking. Per  police this is how he has been in the past. Per PD patient was seen by a crisis counselor and ECO was issued. Patient lives with his sister who reports patient has not been eating, drinking or caring for himself, only lays in bed in the fetal position\". Subsequent to this ECT was attempted because of the extreme degree of treatment resistance, poor compliance with medications and level of non-cooperation with the treatment. ECT could not be administered because of the difficultly with putting in an IV line and maintaining it during the procedure as he remains quite agitated with an incredible degree of resistance to efforts by staff to provide him treatment of any kind. He has very limited responses to medications given IM, IV or orally or else he refuses to take them. These efforts at administration of ECT were abandoned after the 4th treatment.   Subsequent to this Mr. Cowan was transferred to [place] on 1/13/24 where he has needed to be intubated and sedated due to his extreme level of aggression and agitation. He was recently extubated and has been in the ICU since. The course has been  complicated by the development of pressure ulcers although these appear to be markedly improved currently. I attempted to interview Mr. Cowan today without much success. He went in to his usual posture of prostration while naked. The RN taking care of him valiantly tried to take a blood pressure without success. He has ripped out two IV lines since yesterday evening and staff have been unable to find access subsequently. Mr. Cowan exhibited a partial state of catatonia without any expressive speech, posturing, mannerisms, grimacing and rhythmic breathing. There was no verbal interaction possible with him.       Past Medical History:   Diagnosis Date    Psychiatric disorder     Psychotic disorder (HCC)     Withdrawal syndrome (HCC)      Prior to Admission medications    Medication Sig Start Date End Date Taking? Authorizing Provider   cloZAPine (FAZACLO) 25 MG disintegrating tablet Take 1 tablet by mouth 2 times daily Take with 200 mg tablet for total dose of 225 mg    Jaya Donald MD   cloZAPine (FAZACLO) 200 MG TBDP disintegrating tablet Take 1 tablet by mouth 2 times daily Take with 25 mg tablet for total dose of 225 mg    Jaya Donald MD   LORazepam (ATIVAN) 1 MG tablet Take 1 tablet by mouth in the morning and at bedtime.    Jaya Donald MD   topiramate (TOPAMAX) 100 MG tablet Take 1 tablet by mouth 2 times daily    Jaya Donald MD   traZODone (DESYREL) 50 MG tablet Take 1 tablet by mouth nightly as needed for Sleep    Jaya Donald MD   ARIPiprazole ER (ABILIFY MAINTENA) 400 MG PRSY Inject 400 mg into the muscle every 28 days    Jaya Donald MD   atropine 1 % ophthalmic solution Place 1-2 drops under the tongue nightly as needed (drooling)    Jaya Donald MD       Lab Results   Component Value Date    WBC 7.2 02/12/2024    HGB 13.2 02/12/2024    HCT 41.4 02/12/2024    MCV 90.4 02/12/2024     02/12/2024     Lab Results   Component Value

## 2024-02-15 NOTE — BSMART NOTE
BSMART Liaison Team Note     LOS:  34 days    Patient goal(s) for today: Patient goal(s) for today: take medications as prescribed, make needs known in an appropriate manner  BSMART Liaison team focus/goals: assess needs, provide support and education, coordinate care    Progress note: Writer met face to face with pt on the medical floor at [facility]. Pt was received with tech, nursing student, and nurse in room. Writer made attempts to engage with pt after nurse and student left. Pt covered his ear when asked if he wanted to listen to music. Pt didn't engage further.    Writer spoke with tech who reported that pt ate 100% of his breakfast and has been sleeping since his restraints were removed. Pt has been asking for the urinal to use the bathroom.    Pt's primary RN reported that pt allowed RN to take vital signs. Pt has accepted some medication by mouth.      Barriers to Discharge: Clearance and transfer to [facility]    Outpatient provider(s): Mavis KYLE  Psych: Dr. Olivas: [phone]   CM: Jayson Bustos [phone]  Clinician: Geeta [phone]     Insurance info/prescription coverage:  MEDICARE PART A AND B , [facility] [employer] OF [place]     Diagnosis:  Schizophrenia with catatonia      Plan:  The goal is to transfer pt to [facility] psychiatry to pursue ECT per the palliative care social work note following the interdisciplinary meeting. Psychiatry following daily.  Follow up Psych Consult placed? No .   Psychiatrist updated? No        Participating treatment team members: JOSEPH Wilson MSW Student Intern

## 2024-02-15 NOTE — PROGRESS NOTES
Music Therapy Assessment  [facility]    Roge Cowan [id number]     1970  54 y.o.  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) 01/12/2024    Pressure injury of right ankle, stage 3 ([facility]) 01/10/2024    Cellulitis 01/08/2024    Immobility 12/29/2023    Schizoaffective disorder ([facility]) 12/08/2023    Schizophrenia ([facility]) 11/27/2017    Paranoid schizophrenia ([facility]) 03/15/2017        Date: 2/15/2024            Total Time Calculated: 20 min          [facility] 7S1 INTENSIVE CARE    Mental Status:   [x] Alert [  ] Forgetful [  ]  Confused  [  ] Minimally responsive  [  ] Sleeping    Communication Status: [  ] Impaired Speech [  ] Nonverbal -N/A    Physical Status:   [  ] Oxygen in use  [  ] Hard of Hearing [  ] Vision Impaired  [  ] Ambulatory  [  ] Ambulatory with assistance [  ] Non-ambulatory -N/A    Session Observations:  Referred by Palliative team for continued support; Patient was alert, lying in bed in prayer position upon this music therapists (MT) arrival. Pt's RN was present at this time and welcomed this MT in. MT gently kneeled at bedside and quietly greeted the pt. MT re-introduced self/role and offered information on previous visits. As MT spoke, MT observed the pt lift his head slightly and then return to prayer position, while placing his face into the bed. MT asked if he was open to a music therapy session, but pt didn't respond initially. After giving him some time MT heard the pt quietly mumble a response, but was unable to hear it. When asked if he could repeat himself, pt responded \"no\" to the offer of

## 2024-02-15 NOTE — PROGRESS NOTES
Palliative Medicine   [place] [phone] (COPE)        [facility] [phone] (COPE)    The Palliative Medicine SW and Dr. Santos participated in interdisciplinary meeting with Dr. Larson, JEAN, and Dr. Danae Larson's colleague who also has reviewed the patient's care.     The plan is to minimize interventions as much as possible that are increasing agitation, and the goal of transferring the patient to [facility] psychiatry to pursue ECT therapies.     Nursing to meet later today to discuss appropriate care location- patient needs close supervision and support, would benefit from having an RN with psychiatric training. Also discussed to try to limit the patient transferring from unit to unit to avoid disruption, he has decompensated previously during admission when transferred to a new unit.     Goal at this time continues to be ECT treatments, transfer to [facility].     DARRELL heard back from Eden, the patient's sister-     She shared that she is struggling, she \"doesn't know how I feel right now\" and shared multiple stressors- limited financial resources needing housing repairs, car trouble, and lack of gas money- she shares that this is why she is not at the hospital often, she shares, \"I feel like you guys think I am ignoring the situation, and I'm not\" and shares the struggles with transportation and gas money to be able to visit [facility] as often as she would like.     Eden does share that she wants to ensure that all staff calls the patient \"Isreal\" - not Cruzito. She shares that \"Cruzito\" is something that the voices came up with.. she wants to ensure that we call him Isreal.     Eden also asked if a brain scan had been completed on her brother, given the severity in changes to make sure \"nothing else is going on.\" DARRELL sent [employer] to Dr. Larson to inform her of request.     DARRELL will explore resources for Eden- will possibly look into voucher for Uber to help support Eden in being able to visit [facility]- also wonder  if this would help Isreal as well, having supportive presence of someone he trusts. Will explore- will also offer community based resources for Eden.     Thank you for including Palliative Medicine in the care of Roge \"Isreal\" Chapo Torres LCSW

## 2024-02-15 NOTE — PROGRESS NOTES
0800: Pt naked in bed laying in prayer position, BUE soft wrist restraints in place. Pt pulling hard against restraints, hands purple. Restraints loosened for patient comfort. Pt able to reposition self in bed. Sitter at bedside.    0830: Marsha STEELE at bedside. Advised to limit stimulus and patient interaction today to promote rest and promote eating.    0835: Pt refused VS. 100% breakfast tray consumed.    0940: Pt requested bilateral wrist restraints removed, stating \"I have suffered enough.... I am healed.\" Pt educated that restraints were not placed as disciplinary action but for safety purposes. Pt verbalized that he will not get out of bed. Marsha STEELE notified of patient's request via Door 6.    0945: Restraints removed as directed by Marsha STEELE.    1100: Interdisciplinary rounds with CM and dietary teams. Discussed patient's improvement in PO intake and adjusting meal trays to include double meat portions.    1241: Pt allowed RN to obtain VS. Pt did not pull off BP cuff or telemetry leads. 100% lunch tray consumed.     1351: Pt sitting up in bed, relaxed posturing. [place] offered, pt refused.     1410: Pt refused music theapy. Pt then got out of bed and into prayer position onto fall mats on floor.     1430: Attempted to assist pt back to bed, pt refused.    1507: PRN thorazine given. Attempted to assist pt back to bed, pt refused.    1534: PRN haldol given. Attempted to assist pt back to bed, pt refused.    1540: Pt continues to stay on floormats despite multiple nursing attempts, incontinent of urine.    1550: Pt moved back to bed by 3 RN, 2 student RN, and PCT. Linens changed and pt bathed with CHG.     1612:  Bedboard contacted RN, advised pt needed PCR Covid swab completed. Covid-19 with Flu combo ordered per protocol.    1653: Report called to [place] () GAIL.    1700: Pt transferred to [facility] by bed with RN, student RN, PCT, nursing supervisor, and security.

## 2024-02-15 NOTE — CONSULTS
Clinical Ethics Consultation Note      Ethics has been involved in Mr. Cowan's case, and consulted formally and informally throughout his stay at [facility]. This particular, formal consult comes from critical care nursing. This ethicist has made two attempts to reach critical care nursing via [employer] and provided contact information with no follow up from nursing. Please re-consult ethics if there is more that we can do to support the pt, pt's family, and care team throughout his care.    Closing:  Thanks for including ethics in Mr. Cowan's care. If further questions or concerns arise, please contact me or the ethicist on-call via [employer]Vista.    Leeann Taylor, PhDc  Ethics Consultant    LEEANN TAYLOR  N/A    Primary Ethical Theme: Primary Ethical Themes: Treatment appropriateness  Secondary Ethical Theme: Secondary Ethical Themes: End of Life related

## 2024-02-15 NOTE — PROGRESS NOTES
Nutrition Note      Discussed with RN who reports pt continues to eat 100% of meals. Supplements adjusted yesterday (see full assessment-RD note) to increase caloric intake. RN does not think Isreal will consume snacks/supplements between meals therefore it was decided to send double meat portions as another means of increasing calorie/protein intake.      Meal Intake:   Patient Vitals for the past 168 hrs:   PO Meals Eaten (%)   02/15/24 1241 76 - 100%   02/15/24 0800 76 - 100%   02/14/24 1600 76 - 100%   02/14/24 1200 76 - 100%   02/14/24 0800 76 - 100%   02/13/24 1723 76 - 100%   02/13/24 1318 76 - 100%   02/13/24 1027 76 - 100%   02/11/24 1000 76 - 100%   02/10/24 0800 51 - 75%   02/09/24 1745 76 - 100%   02/09/24 1700 76 - 100%   02/09/24 1600 76 - 100%   02/08/24 2017 76 - 100%     Supplement Intake:  Patient Vitals for the past 168 hrs:   PO Supplement (%)   02/11/24 1000 51 - 75%   02/10/24 0800 76 - 100%         Electronically signed by Yara Kam RD [facility] on 2/15/24 at 3:10 PM EST  Contact: [employer]

## 2024-02-15 NOTE — PROGRESS NOTES
Behavioral Services  Medicare Certification Upon Admission    I certify that this patient's inpatient psychiatric hospital admission is medically necessary for:    [x] (1) Treatment which could reasonably be expected to improve this patient's condition,       [] (2) Or for diagnostic study;     AND     [x](2) The inpatient psychiatric services are provided while the individual is under the care of a physician and are included in the individualized plan of care.    Estimated length of stay/service 3-5 weeks    Plan for post-hospital care ECT    Electronically signed by Lety Larson MD on 2/15/2024 at 5:12 PM

## 2024-02-15 NOTE — PROGRESS NOTES
Palliative Medicine  Patient Name: Roge Cowan  YOB: 1970  MRN: [id number]  Age: 54 y.o.  Gender: male    Date of Initial Consult: 1/15/24  Date of Service: 2/15/2024  Time: 1:32 PM  Provider: Desirae Santos MD  Hospital Day: 35  Admit Date: 1/12/2024  Referring Provider: DR ALLEN Beverly        Reasons for Consultation:  Goals of Care, Overwhelming Symptoms, and Psychosocial Distress    HISTORY OF PRESENT ILLNESS (HPI):   Roge Cowan \"Isreal\" (although his voices recently told him that he goes by \"Cruzito\"  is a 54 y.o. male with a past medical history of paranoid schizophrenia, catatonia  who was admitted on 1/12/2024 from [facility]. Pt admitted to [facility] 12/12/23 brought in by Police- he had not been engaging in self care, eating or drinking. He was in the fetal position and was getting pressure ulcers- stage IV on R ankle. Was getting court mandated ECT. He was transferred to [facility] on 1/12/24 for ICU care. He was refusing all care at [facility]. Requiring 4 pt restraints, sedation, IV abx.  He is tachypneic w/ desats, refusing sometimes even turning. Because could not care for patient, required sedation and intubation.     More recently, has been in/out of ICU for required nursing care. Cont to pull out IV lines. Allowing intermittent care.   2/15- eating some, can spend time out of restraints and talking more    Psychosocial: Court declared pt legally incompetent on 10/2018 and sister Eden is guardian. They were both adopted at a young age. He has been living w/ her , and her son Rustam since leaving [facility] in 2012. Has been asked to leave many group homes. At baseline, he is low functioning- nonverbal mostly, but can walk. He spends most of day on bed or on the floor in fetal position. Does not interact much, does not listen to TV/radio. Cannot read or write.  Psych @ [facility] ACT team: Dr. Olivas   [place] ACT Team: [phone] after hours number.    with ACT team: Jayson Andrea  caregiver feel burdened by caring for their loved one:   [] Yes /  [x] No /  [] No Caregiver Present/Available [] No Caregiver [] Pt Lives at Facility  If \"Yes\" to discuss with social work during IDT    Anticipatory grief assessment:   [x] Normal  / [] Maladaptive     If \"Maladaptive\" to discuss with social work during IDT    ESAS Anxiety:      ESAS Depression:          LAB AND IMAGING FINDINGS:   Objective data reviewed:  labs, images, records, medication use, vitals, and chart     FINAL COMMENTS   Thank you for allowing Palliative Medicine to participate in the care of Roge Cowan.    Only check if applicable and billing time based rather than MDM  [x] The total encounter time on this service date was 55 minutes which was spent performing a face-to-face encounter and personally completing the provider-level activities documented in the note. This includes time spent prior to the visit and after the visit in direct care of the patient. This time does not include time spent in any separately reportable services.    Electronically signed by   Desirae Santos MD  Palliative Care Team  on 2/15/2024 at 1:32 PM

## 2024-02-16 PROBLEM — F20.2 CATATONIC SCHIZOPHRENIA (HCC): Status: ACTIVE | Noted: 2024-02-16

## 2024-02-16 PROCEDURE — 1140000000 HC RM PRIVATE PSYCH

## 2024-02-16 PROCEDURE — 6370000000 HC RX 637 (ALT 250 FOR IP): Performed by: PSYCHIATRY & NEUROLOGY

## 2024-02-16 PROCEDURE — 6360000002 HC RX W HCPCS: Performed by: PSYCHIATRY & NEUROLOGY

## 2024-02-16 RX ORDER — LORAZEPAM 2 MG/ML
3 INJECTION INTRAMUSCULAR
Status: DISCONTINUED | OUTPATIENT
Start: 2024-02-17 | End: 2024-02-16

## 2024-02-16 RX ORDER — MIRTAZAPINE 15 MG/1
15 TABLET, ORALLY DISINTEGRATING ORAL NIGHTLY
Status: DISCONTINUED | OUTPATIENT
Start: 2024-02-16 | End: 2024-02-19

## 2024-02-16 RX ORDER — FLUOXETINE 20 MG/5ML
20 SOLUTION ORAL
Status: DISCONTINUED | OUTPATIENT
Start: 2024-02-16 | End: 2024-02-17

## 2024-02-16 RX ORDER — LORAZEPAM 2 MG/ML
3 INJECTION INTRAMUSCULAR
Status: DISCONTINUED | OUTPATIENT
Start: 2024-02-16 | End: 2024-02-19

## 2024-02-16 RX ADMIN — LORAZEPAM 3 MG: 2 INJECTION INTRAMUSCULAR; INTRAVENOUS at 17:53

## 2024-02-16 RX ADMIN — HYDROXYZINE HYDROCHLORIDE 50 MG: 50 TABLET, FILM COATED ORAL at 21:35

## 2024-02-16 RX ADMIN — HALOPERIDOL LACTATE 10 MG: 5 INJECTION, SOLUTION INTRAMUSCULAR at 12:53

## 2024-02-16 RX ADMIN — HALOPERIDOL LACTATE 10 MG: 5 INJECTION, SOLUTION INTRAMUSCULAR at 08:45

## 2024-02-16 RX ADMIN — HALOPERIDOL LACTATE 10 MG: 5 INJECTION, SOLUTION INTRAMUSCULAR at 17:53

## 2024-02-16 RX ADMIN — MIRTAZAPINE 15 MG: 15 TABLET, ORALLY DISINTEGRATING ORAL at 21:35

## 2024-02-16 RX ADMIN — LORAZEPAM 2 MG: 2 INJECTION INTRAMUSCULAR; INTRAVENOUS at 08:44

## 2024-02-16 RX ADMIN — TRAZODONE HYDROCHLORIDE 50 MG: 50 TABLET ORAL at 21:35

## 2024-02-16 RX ADMIN — LORAZEPAM 2 MG: 2 INJECTION INTRAMUSCULAR; INTRAVENOUS at 12:53

## 2024-02-16 ASSESSMENT — PAIN - FUNCTIONAL ASSESSMENT
PAIN_FUNCTIONAL_ASSESSMENT: NONE - DENIES PAIN
PAIN_FUNCTIONAL_ASSESSMENT: NONE - DENIES PAIN

## 2024-02-16 NOTE — H&P
PSYCHIATRY EVALUATION NOTE    CHIEF COMPLAINT:  none    HISTORY OF PRESENTING COMPLAINT:  Roge Cowan is a 54 y.o. White (non-) male with no past medical history, but is psychiatric history is significant for paranoid schizophrenia.  He was transferred from the ICU on 02/15/2024 for continued treatment.    Patient goes by \"Isreal,\" at times, other times he goes by \"Romulo.\"  Patient's guardian is his Sister Eden Montalvo.    Briefly he is an a chronic struggle with schizophrenia several past treatment regimens including Clozaril and ECT trials.  He had been maintaining her baseline of minimal function at home where he resides with his sister.  He was followed by the pact team, but slowly stop being adherent to his medication regimen, not eating well and staying in the fetal position.  He was hospitalized at [facility] on 12/7/2024 under TDO and subsequent commitment.  ECT there was attempted, but due to patient's agitation and removal of IVs and was quite challenging to administer beyond the third time.  He developed an ulcer on his right ankle from staying consistently in one position and refusing medications and food.  That is when he was transferred to [facility] ICU on 01/10/2023.    During his stay in [facility] he was intubated because of significant agitation, and an NG tube was used for feeding.  Patient received several IV meds including for hydration and treatment for agitation.  The goal then was to transfer him to an academic center at a higher level of care in order to be further treated by the med psych unit with the eventual goal of a proper ECT trial.  He was denied by [facility].  In the interim medical visit into treat symptoms in the hopes of undergoing ECT at [facility] after resolving administrative issues there.  At the same time searching for other accepting academics and hours by widening the search area.    Treating Mr. Cowan has involved 7 consultants including

## 2024-02-16 NOTE — BH NOTE
Q 2 hour turns on patient.   @ 0845 patient in praying position, lifted  hips from heels  Given prescribed IM medications. [place]umbles thank you.  @1100 patient has been up and consumed 100 percent of meal at breakfast  @ 1300 patient is on mat on floor, praying, fetal position. Prescribed IM medications given left dorsogluteal hip. Refused the liquid prozac.  1315 Patient is up in kneeling position, unable to assess face, forehead for redness as he went back down to prayer position on floor. Mat is intact under patients face, knees, feet.  1430 work on cleaning up patient with soap, water, move mats and clean floor under mat. Change linens. Patient said, \"take it off\" when we covered him with a clean green gown.   Rolled patient,

## 2024-02-16 NOTE — BH NOTE
PRN Medication Documentation    Specific patient behavior that led to need for PRN medication: patient was agitated and had difficulty sleeping   Staff interventions attempted prior to PRN being given: diversion, relaxation techniques, repositioning   PRN medication given: trazodone 50 mg   Patient response/effectiveness of PRN medication: no further needs reported at this time

## 2024-02-16 NOTE — PLAN OF CARE
Problem: Skin/Tissue Integrity  Goal: Absence of new skin breakdown  Description: 1.  Monitor for areas of redness and/or skin breakdown  2.  Assess vascular access sites hourly  3.  Every 4-6 hours minimum:  Change oxygen saturation probe site  4.  Every 4-6 hours:  If on nasal continuous positive airway pressure, respiratory therapy assess nares and determine need for appliance change or resting period.  Outcome: Progressing

## 2024-02-16 NOTE — BH NOTE
Admission reviewed for medical necessity. Will follow with care [facility].

## 2024-02-16 NOTE — PLAN OF CARE
Problem: Safety - Adult  Goal: Free from fall injury  Outcome: Progressing     Resting in fetal position with eyes closed.  Respirations even and unlabored. Q15 minute safety checks maintained. No issues observed.

## 2024-02-16 NOTE — BH NOTE
4 Eyes Skin Assessment     NAME:  Roge Cowan  YOB: 1970  MEDICAL RECORD NUMBER:  [id number]    The patient is being assessed for  Admission    I agree that at least one RN has performed a thorough Head to Toe Skin Assessment on the patient. ALL assessment sites listed below have been assessed.      Areas assessed by both nurses:    Head, Face, Ears, Shoulders, Back, Chest, Arms, Elbows, Hands, Sacrum. Buttock, Coccyx, Ischium, and Legs. Feet and Heels        Does the Patient have a Wound? No noted wound(s)       Rodrick Prevention initiated by RN: No  Wound Care Orders initiated by RN: No    Pressure Injury (Stage 3,4, Unstageable, DTI, NWPT, and Complex wounds) if present, place Wound referral order by RN under :    Pt has observed redness on bilateral ankle, wrists, forehead, feet, toes and elbows    New Ostomies, if present place, Ostomy referral order under : No     Nurse 1 eSignature: Electronically signed by John Florez RN on 2/15/24 at 7:38 PM EST    **SHARE this note so that the co-signing nurse can place an eSignature**    Nurse 2 eSignature: Electronically signed by Matilda Banerjee RN on 2/15/24 at 7:39 PM EST

## 2024-02-16 NOTE — BH NOTE
Pt is lying in bed without clothing and in a fetal position. Respirations even and unlabored. Will continue to monitor via safety checks.

## 2024-02-16 NOTE — INTERDISCIPLINARY ROUNDS
Behavioral Health Interdisciplinary Rounds     Patient Name: Roge Cowan  Age: 54 y.o.  Room/Bed:  [facility]/  Primary Diagnosis: <principal problem not specified>   Admission Status: Involuntary Commitment    Readmission within 30 days: No  Power of  in place: guardian  Patient requires a blocked bed: Yes          Reason for blocked bed: psychosis  Sleep hours:   9      Participation in Care/Groups:  No  Medication Compliant?: Yes  PRNS (last 24 hours): Sleep Aid   Restraints (last 24 hours):  No  __________________________________________________  OQ Admission Analysis Survey completed:  OQ Admission Analysis Survey score:  __________________________________________________     Alcohol screening (AUDIT) completed -     If applicable, date SBIRT discussed in treatment team AND documented:    Tobacco - patient is a smoker:    Illegal Drugs use:      24 hour chart check complete: Yes    _______________________________________________    Patient goal(s) for today: Communicate needs to staff   Treatment team focus/goals: Asses pt, manage medications, discharge planning   Progress note: Pt was not communicating with staff. Pt remains naked and in fetal position. Pt is eating.      Spiritual Care Consult: no   Financial concerns/prescription coverage:  no   Family contact:   [place]   [phone]                    Family requesting physician contact today:  yes  Discharge plan: TBD   Access to weapons : no                                                              Outpatient provider(s): TBD   Patient's preferred phone number for follow up call :   Patient's preferred e-mail address :    LOS:  1  Expected LOS: TBD     Participating treatment team members: Roge CowanPayton, MSW, ALLEN Moran, RN, Geeta EDWARD, PHARMD, Dr. Larson

## 2024-02-16 NOTE — BH NOTE
Patient was found lying in fetal position on bathroom floor. Patient encouraged to move to bed, but did not respond. This writer and another RN helped to move the patient closer to bed. Patient then crawled to floor mats, and went back into the fetal position.

## 2024-02-17 PROCEDURE — 1140000000 HC RM PRIVATE PSYCH

## 2024-02-17 PROCEDURE — 6370000000 HC RX 637 (ALT 250 FOR IP): Performed by: PSYCHIATRY & NEUROLOGY

## 2024-02-17 PROCEDURE — 6360000002 HC RX W HCPCS: Performed by: PSYCHIATRY & NEUROLOGY

## 2024-02-17 RX ORDER — FLUOXETINE 10 MG/1
20 CAPSULE ORAL
Status: DISCONTINUED | OUTPATIENT
Start: 2024-02-17 | End: 2024-02-22

## 2024-02-17 RX ADMIN — FLUOXETINE 20 MG: 10 CAPSULE ORAL at 11:54

## 2024-02-17 RX ADMIN — HALOPERIDOL LACTATE 10 MG: 5 INJECTION, SOLUTION INTRAMUSCULAR at 17:43

## 2024-02-17 RX ADMIN — LORAZEPAM 3 MG: 2 INJECTION INTRAMUSCULAR; INTRAVENOUS at 08:22

## 2024-02-17 RX ADMIN — HALOPERIDOL LACTATE 10 MG: 5 INJECTION, SOLUTION INTRAMUSCULAR at 08:22

## 2024-02-17 RX ADMIN — HALOPERIDOL LACTATE 10 MG: 5 INJECTION, SOLUTION INTRAMUSCULAR at 12:46

## 2024-02-17 RX ADMIN — LORAZEPAM 3 MG: 2 INJECTION INTRAMUSCULAR; INTRAVENOUS at 17:46

## 2024-02-17 RX ADMIN — MIRTAZAPINE 15 MG: 15 TABLET, ORALLY DISINTEGRATING ORAL at 20:59

## 2024-02-17 RX ADMIN — LORAZEPAM 3 MG: 2 INJECTION INTRAMUSCULAR; INTRAVENOUS at 12:46

## 2024-02-17 ASSESSMENT — PAIN SCALES - GENERAL
PAINLEVEL_OUTOF10: 0
PAINLEVEL_OUTOF10: 0

## 2024-02-17 ASSESSMENT — PAIN - FUNCTIONAL ASSESSMENT
PAIN_FUNCTIONAL_ASSESSMENT: ADULT NONVERBAL PAIN SCALE (NPVS)
PAIN_FUNCTIONAL_ASSESSMENT: WONG-BAKER FACES

## 2024-02-17 ASSESSMENT — PAIN SCALES - WONG BAKER
WONGBAKER_NUMERICALRESPONSE: NO HURT
WONGBAKER_NUMERICALRESPONSE: 0

## 2024-02-17 NOTE — BH NOTE
Psychiatric Progress Note    Patient: Roge Cowan MRN: [id number]  SSN: [id number]    YOB: 1970  Age: 54 y.o.  Sex: male      Admit Date: 2/15/2024    LOS: 2 days     Subjective:     Roge Cowan  would not speak with this interviewer.  He remains crouched on the floor in praying position.  His food tray is empty in front of him.  Nursing reports that he eats all of his food and is seen walking around the room at times shifting his position.  Incontinent of urine still.  No aggression or violence.  Minimally interactive and limited awareness.  Tolerating medications well.  Eating fairly and sleeping some..    Objective:     Vitals:    02/16/24 1750 02/16/24 2008 02/17/24 0813 02/17/24 1107   BP:  115/76 (!) 118/92 103/67   Pulse:  87 99 97   Resp:  14 16 14   Temp: 97.6 °F (36.4 °C) 97.5 °F (36.4 °C) 97.6 °F (36.4 °C) 97.5 °F (36.4 °C)   TempSrc: Axillary Oral Oral Oral   Weight:       Height:            Mental Status Exam:   Sensorium  Unclear   Relations guarded, uncooperative, and vague   Eye Contact poor   Appearance:  thin & gaunt looking   Speech:  mute   Thought Process: blocked   Thought Content unclear   Suicidal ideations none   Mood:  dysthymic   Affect:  blunted   Memory   impaired   Concentration:  impaired   Insight:  impaired due to limited responses   Judgment:  impaired due to actions throughout the ay       MEDICATIONS:  Current Facility-Administered Medications   Medication Dose Route Frequency    FLUoxetine (PROZAC) capsule 20 mg  20 mg Oral Lunch    mirtazapine (REMERON SOL-TAB) disintegrating tablet 15 mg  15 mg Oral Nightly    LORazepam (ATIVAN) injection 3 mg  3 mg IntraMUSCular TID WC    acetaminophen (TYLENOL) tablet 650 mg  650 mg Oral Q4H PRN    polyethylene glycol (GLYCOLAX) packet 17 g  17 g Oral Daily PRN    senna (SENOKOT) tablet 8.6 mg  1 tablet Oral Daily PRN    aluminum & magnesium hydroxide-simethicone (MAALOX) 200-200-20 MG/5ML suspension 30 mL  30 mL  Oral Q6H PRN    hydrOXYzine HCl (ATARAX) tablet 50 mg  50 mg Oral TID PRN    haloperidol (HALDOL) tablet 5 mg  5 mg Oral Q4H PRN    Or    haloperidol lactate (HALDOL) injection 5 mg  5 mg IntraMUSCular Q4H PRN    diphenhydrAMINE (BENADRYL) injection 50 mg  50 mg IntraMUSCular Q4H PRN    traZODone (DESYREL) tablet 50 mg  50 mg Oral Nightly PRN    haloperidol lactate (HALDOL) injection 10 mg  10 mg IntraMUSCular TID WC      DISCUSSION:   the risks and benefits of the proposed medication    Lab/Data Review:  All lab results for the last 24 hours reviewed.    No results found for this or any previous visit (from the past 24 hour(s)).      Assessment:     Principal Problem:    Catatonic schizophrenia (HCC)  Resolved Problems:    * No resolved hospital problems. *      Plan:     Continue current care  Collateral information  Medication modification as appropriate  Disposition planning with social work    I certify that this patient's inpatient psychiatric hospital services furnished since the previous certification were, and continue to be, required for treatment that could reasonably be expected to improve the patient's condition, or for diagnostic study, and that the patient continues to need, on a daily basis, active treatment furnished directly by or requiring the supervision of inpatient psychiatric facility personnel. In addition, the hospital records show that services furnished were intensive treatment services, admission or related services, or equivalent services.  Signed By: Rustam Rivero MD     February 17, 2024

## 2024-02-17 NOTE — BH NOTE
Patient given Mirtazapine, atarax, and trazodone mixed in with ice cream, as ordered. Ice cream was placed next to patient, along with two bags of chips and a cup of juice. This RN and another RN watched patient from outside door. After staff had left the room, patient looked around to make sure there was no one in the room, then ate the ice cream. Patient ate 100% of ice cream which included the medications, two bags of chips, and a cup of juice. Patient appears to only eat food if there is no one else in the room.

## 2024-02-17 NOTE — BH NOTE
1145- Pt remained on the floor in a fetal position and has been resistant to care and positioning. This writer and staff noticed that Pt urinated on the floor and osorio, had bad odor from unire and gave him a bed bath with warm water. During care Pt ha been irritable and appeared to be uncooperative. Pt then transferred jone self to the mattress and slept quietly. Pt had good appetite and ate 100% of his lunch.

## 2024-02-17 NOTE — BH NOTE
07:40 PM - Staff made an attempt to reposition patient.  Patient replied, \"don't touch me\".  Staff educated patient about the benefits of changing positions.  Patient positioned himself in a fetal position on the floor.  Will continue to monitor.    09:30 PM - Patient is lying quietly on the floor.  Appears to be asleep. No respiratory distress noted.  Will continue to monitor.    11:45 - Patient is kneeling on the floor in a praying position.  Blankets are positioned under patient.  Blankets are dry.  Will continue to monitor.    0230 - Staff made another attempt to reposition patient.  Patient pushed against staff and replied, \"No! Stop!\"  Will continue to monitor.

## 2024-02-17 NOTE — PLAN OF CARE
Problem: Safety - Adult  Goal: Free from fall injury  2/17/2024 0831 by Luisa Lee, RN  Outcome: Progressing

## 2024-02-17 NOTE — BH NOTE
PRN Medication Documentation    Specific patient behavior that led to need for PRN medication: patient was agitated and restless   Staff interventions attempted prior to PRN being given: calming techniques, repositioning   PRN medication given: atarax 50 mg, trazodone 50 mg   Patient response/effectiveness of PRN medication: no further needs reported at this time

## 2024-02-17 NOTE — BH NOTE
The floor mats on the patient's floor smell strongly of old stale urine. The mats were removed from the room, taken to the seclusion room, wiped with bleach, and leaned against the wall to dry. Patient now lying in the same \"child's pose\" position on the floor on folded up blankets. Environmental services coming to the clean the floor as the floor of the patient's room is stained yellow and brown with old urine, with a strong old urine odor.

## 2024-02-17 NOTE — PLAN OF CARE
Problem: Safety - Adult  Goal: Free from fall injury  Outcome: Progressing  Flowsheets (Taken 2/16/2024 8266)  Free From Fall Injury:   Instruct family/caregiver on patient safety   Based on caregiver fall risk screen, instruct family/caregiver to ask for assistance with transferring infant if caregiver noted to have fall risk factors

## 2024-02-17 NOTE — BH NOTE
Patient is awake and alert.  He is in a kneeling position on the floor.  Blankets under patient were saturated with urine.  Staff proceeded to remove the soiled blankets, cleaned patients' skin and tried to reposition him.  Patient was resistant and tried to prevent staff from cleaning him up.  Staff tried to clean the wound on patient's ankle. We applied Optifoam dressing.  We tried to position a pillow under patients' head when he got to a sitting position and patient looked at the writer, hit her on her right foot with closed fist and said \"NO!!!'  Patient then got back in a fetal position.  Staff provided patient with 120 ml of apple juice.     06:15 am - Patient crawled off the mat and is in a fetal position with his head near the wall..  Will continue to monitor.    06:40 am - Patient removed the optifoam from his right ankle.  Will continue to monitor.

## 2024-02-18 PROCEDURE — 1140000000 HC RM PRIVATE PSYCH

## 2024-02-18 PROCEDURE — 6360000002 HC RX W HCPCS: Performed by: PSYCHIATRY & NEUROLOGY

## 2024-02-18 PROCEDURE — 87070 CULTURE OTHR SPECIMN AEROBIC: CPT

## 2024-02-18 PROCEDURE — 87205 SMEAR GRAM STAIN: CPT

## 2024-02-18 PROCEDURE — 6370000000 HC RX 637 (ALT 250 FOR IP): Performed by: PSYCHIATRY & NEUROLOGY

## 2024-02-18 RX ADMIN — MIRTAZAPINE 15 MG: 15 TABLET, ORALLY DISINTEGRATING ORAL at 20:58

## 2024-02-18 RX ADMIN — LORAZEPAM 3 MG: 2 INJECTION INTRAMUSCULAR; INTRAVENOUS at 08:41

## 2024-02-18 RX ADMIN — LORAZEPAM 3 MG: 2 INJECTION INTRAMUSCULAR; INTRAVENOUS at 16:59

## 2024-02-18 RX ADMIN — HALOPERIDOL LACTATE 10 MG: 5 INJECTION, SOLUTION INTRAMUSCULAR at 12:14

## 2024-02-18 RX ADMIN — LORAZEPAM 3 MG: 2 INJECTION INTRAMUSCULAR; INTRAVENOUS at 12:15

## 2024-02-18 RX ADMIN — HALOPERIDOL LACTATE 10 MG: 5 INJECTION, SOLUTION INTRAMUSCULAR at 08:40

## 2024-02-18 RX ADMIN — FLUOXETINE 20 MG: 10 CAPSULE ORAL at 12:09

## 2024-02-18 RX ADMIN — HALOPERIDOL LACTATE 10 MG: 5 INJECTION, SOLUTION INTRAMUSCULAR at 16:58

## 2024-02-18 ASSESSMENT — PAIN - FUNCTIONAL ASSESSMENT
PAIN_FUNCTIONAL_ASSESSMENT: NONE - DENIES PAIN
PAIN_FUNCTIONAL_ASSESSMENT: NONE - DENIES PAIN

## 2024-02-18 NOTE — PLAN OF CARE
Patient received resting in \"child's pose\" postion with NAD, unlabored breathing and no complaints noted. Safety measures in place. Will continue to monitor with q15min rounds.    Problem: Safety - Adult  Goal: Free from fall injury  Outcome: Progressing

## 2024-02-18 NOTE — BH NOTE
2040 Attempted to obtain VS. Pt arm was stiff. Let pt know staff will be back to obtain VS again.     2104 Pt, with ease, relaxed arm and place arm in a half chicken wing position to obtain VS. VS WNL. Medication was given with vanilla ice cream and ginger ale. Pt ate 100%. Med compliant. Pt lying in fetal position. Q15 min safety checks in place.     2220 Staff gave pt bottled water and nabs. Staff left, then returned at 2240. Pt had consumed 100% of water and nabs. The water bottle was full of urine, no incontinence present. Urine was syl, clear, and no odor present. Pt educated on importance of proper hydration, dia care, and therapeutic environment. When asked if pt understood any teaching, pt grunted and continued lying in prayer position on floor mattress. NAD. Floor was cleaned and sheets changed on both mattresses. Urinal placed within reach of pt. Bottled water at bedside as well. Q15 min safety checks in place.     0630 Pt used urinal, but the residual was left. Pt independently cleaned dia area. Pt independently with direction changed positions from floor to mattress on floor. Pt requested cheese its, nabs and cheese its received by pt. Pt able to express that he is not in pain. Pt bed sheets changed on both mattresses. Pt refused underwear, socks, and brief - but left by bedside. Pt educated that he is in a therapeutic environment. Pt allowed writer to  foot to look at wound. Unable to fully move foot due to pt body stiffness, but able to observe serosanguineous drainage. Pt refused to redress wound. Q15 min safety checks in place.       1930 Pt in prayer position with hands under body on floor mat. When asked if pt was in pain, pt grunted and said no softly. Q15 min safety checks in place.   2000 Pt laying on left side on mattress on the floor. NAD.  Q15 min safety checks in place.  2100 Pt laying in prayer position on floor mat. Staff placed blanket on pt. No complaints. Q15 min safety  checks in place.  2200 Pt laying on floor mat in prayer position on floor mat. [place] still on, NAD. Pt appears to be sleeping. Q15 min safety checks in place.   2300 Pt laying on floor mat in prayer position on floor mat. [place] still on, NAD. Pt appears to be sleeping. Q15 min safety checks in place.   0000 Pt laying on floor mat in prayer position on floor mat. [place] still on, NAD. Pt appears to be sleeping. Q15 min safety checks in place  0100 Pt laying on floor mat in on left side in prayer position on floor mat. [place] still on, NAD. Pt appears to be sleeping. Q15 min safety checks in place  0200 Pt laying on floor mat in on right side in prayer position on floor mat. [place] still on, NAD. Pt appears to be sleeping. Q15 min safety checks in place  0300 Pt laying on floor mat in prayer position on right side on floor mat. [place] still on, NAD. Pt appears to be sleeping. Q15 min safety checks in place  0400 Pt in prayer position on floor mat. Appears to be sleeping. NAD. Q15 min safety checks in place.   0500 Pt on floor in prayer positron. Appears to be sleeping. NAD. Q15 min safety checks in place.   0600 Pt on floor in prayer positron. Appears to be sleeping. NAD. Q15 min safety checks in place.   0700 Pt laying on mattress on floor. Pt appears to be awake, but resting.

## 2024-02-18 NOTE — PLAN OF CARE
Problem: Safety - Adult  Goal: Free from fall injury  2/18/2024 1002 by Luisa Lee RN  Outcome: Progressing

## 2024-02-19 PROCEDURE — 1140000000 HC RM PRIVATE PSYCH

## 2024-02-19 PROCEDURE — 6360000002 HC RX W HCPCS: Performed by: PSYCHIATRY & NEUROLOGY

## 2024-02-19 PROCEDURE — 6370000000 HC RX 637 (ALT 250 FOR IP): Performed by: PSYCHIATRY & NEUROLOGY

## 2024-02-19 RX ORDER — MIRTAZAPINE 15 MG/1
30 TABLET, ORALLY DISINTEGRATING ORAL NIGHTLY
Status: DISCONTINUED | OUTPATIENT
Start: 2024-02-19 | End: 2024-02-22

## 2024-02-19 RX ORDER — HALOPERIDOL 5 MG/ML
10 INJECTION INTRAMUSCULAR
Status: DISCONTINUED | OUTPATIENT
Start: 2024-02-20 | End: 2024-03-11 | Stop reason: HOSPADM

## 2024-02-19 RX ORDER — LORAZEPAM 2 MG/ML
3 INJECTION INTRAMUSCULAR
Status: COMPLETED | OUTPATIENT
Start: 2024-02-19 | End: 2024-02-19

## 2024-02-19 RX ORDER — HALOPERIDOL 5 MG/1
10 TABLET ORAL
Status: DISCONTINUED | OUTPATIENT
Start: 2024-02-20 | End: 2024-03-11 | Stop reason: HOSPADM

## 2024-02-19 RX ORDER — LORAZEPAM 2 MG/ML
3 INJECTION INTRAMUSCULAR ONCE
Status: COMPLETED | OUTPATIENT
Start: 2024-02-19 | End: 2024-02-19

## 2024-02-19 RX ORDER — LORAZEPAM 2 MG/ML
3 INJECTION INTRAMUSCULAR
Status: DISCONTINUED | OUTPATIENT
Start: 2024-02-20 | End: 2024-03-11 | Stop reason: HOSPADM

## 2024-02-19 RX ORDER — HALOPERIDOL 5 MG/ML
10 INJECTION INTRAMUSCULAR
Status: COMPLETED | OUTPATIENT
Start: 2024-02-19 | End: 2024-02-19

## 2024-02-19 RX ORDER — HALOPERIDOL 5 MG/ML
5 INJECTION INTRAMUSCULAR ONCE
Status: DISCONTINUED | OUTPATIENT
Start: 2024-02-19 | End: 2024-02-22

## 2024-02-19 RX ADMIN — HALOPERIDOL LACTATE 10 MG: 5 INJECTION, SOLUTION INTRAMUSCULAR at 19:26

## 2024-02-19 RX ADMIN — LORAZEPAM 3 MG: 2 INJECTION INTRAMUSCULAR; INTRAVENOUS at 09:24

## 2024-02-19 RX ADMIN — HALOPERIDOL LACTATE 10 MG: 5 INJECTION, SOLUTION INTRAMUSCULAR at 09:23

## 2024-02-19 RX ADMIN — MIRTAZAPINE 30 MG: 15 TABLET, ORALLY DISINTEGRATING ORAL at 21:11

## 2024-02-19 RX ADMIN — LORAZEPAM 3 MG: 2 INJECTION INTRAMUSCULAR; INTRAVENOUS at 15:31

## 2024-02-19 RX ADMIN — LORAZEPAM 3 MG: 2 INJECTION INTRAMUSCULAR; INTRAVENOUS at 19:21

## 2024-02-19 RX ADMIN — FLUOXETINE 20 MG: 10 CAPSULE ORAL at 13:26

## 2024-02-19 NOTE — BH NOTE
GROUP THERAPY PROGRESS NOTE    Patient did not participate in self-care group.    Katherine Gillies MSW, [facility]

## 2024-02-19 NOTE — PROGRESS NOTES
Palliative Medicine   [place] [phone] (COPE)        [facility] [phone] (COPE)      The Palliative Medicine SW sent follow-up e-mail (preferred way of communication) to [place], she had verbalized financial concerns to this writer as a significant stressor in addition w/ Isreal's illness. She shared that their car is in need of repairs and will not pass inspection in April (needs over $,3000 of work done) and they only have the one car (her son works during the day) she also mentioned issues with their home needing repairs. DARRELL sent Eden community resources on [employer] (income-based housing repairs), as well as [facility] (provides some emergency financial assistance, food assistance, etc). Eden is appreciative of resources.     DARRELL will continue to explore other resources, Eden has shared that one barrier in visiting [place] is transportation.     Thank you for including Palliative Medicine in the care of Roge \"Isreal\" Chapo Torres LCSW      Male

## 2024-02-19 NOTE — PROGRESS NOTES
Laboratory Monitoring for Antipsychotics:    This patient is currently prescribed the following medication(s):   Current Facility-Administered Medications: mirtazapine (REMERON SOL-TAB) disintegrating tablet 30 mg, 30 mg, Oral, Nightly  haloperidol lactate (HALDOL) injection 10 mg, 10 mg, IntraMUSCular, TID WC  LORazepam (ATIVAN) injection 3 mg, 3 mg, IntraMUSCular, TID WC  [START ON 2/20/2024] haloperidol (HALDOL) tablet 10 mg, 10 mg, Oral, TID WC **OR** [START ON 2/20/2024] haloperidol lactate (HALDOL) injection 10 mg, 10 mg, IntraMUSCular, TID WC  [START ON 2/20/2024] LORazepam (ATIVAN) tablet 3 mg, 3 mg, Oral, TID WC **OR** [START ON 2/20/2024] LORazepam (ATIVAN) injection 3 mg, 3 mg, IntraMUSCular, TID WC  FLUoxetine (PROZAC) capsule 20 mg, 20 mg, Oral, Lunch  acetaminophen (TYLENOL) tablet 650 mg, 650 mg, Oral, Q4H PRN  polyethylene glycol (GLYCOLAX) packet 17 g, 17 g, Oral, Daily PRN  senna (SENOKOT) tablet 8.6 mg, 1 tablet, Oral, Daily PRN  aluminum & magnesium hydroxide-simethicone (MAALOX) 200-200-20 MG/5ML suspension 30 mL, 30 mL, Oral, Q6H PRN  hydrOXYzine HCl (ATARAX) tablet 50 mg, 50 mg, Oral, TID PRN  haloperidol (HALDOL) tablet 5 mg, 5 mg, Oral, Q4H PRN **OR** haloperidol lactate (HALDOL) injection 5 mg, 5 mg, IntraMUSCular, Q4H PRN  diphenhydrAMINE (BENADRYL) injection 50 mg, 50 mg, IntraMUSCular, Q4H PRN  traZODone (DESYREL) tablet 50 mg, 50 mg, Oral, Nightly PRN    The following labs have been completed for monitoring of antipsychotics and/or mood stabilizers:    Height, Weight, BMI Estimation  Estimated body mass index is 17.77 kg/m² as calculated from the following:    Height as of this encounter: 1.839 m (6' 0.4\").    Weight as of this encounter: 60.1 kg (132 lb 7.9 oz).     Vital Signs/Blood Pressure  /78   Pulse (!) 105   Temp 97.2 °F (36.2 °C) (Axillary)   Resp 20 Comment: quiet, eating, respiration sounds quiet and unlabored. Patient unaware of staff presence  Ht 1.839 m (6' 0.4\")    Wt 60.1 kg (132 lb 7.9 oz)   BMI 17.77 kg/m²     Renal Function, Hepatic Function and Chemistry  Estimated Creatinine Clearance: 91 mL/min (based on SCr of 0.78 mg/dL).    Lab Results   Component Value Date/Time     02/15/2024 04:17 AM    K 4.0 02/15/2024 04:17 AM     02/15/2024 04:17 AM    CO2 19 02/15/2024 04:17 AM    BUN 23 02/15/2024 04:17 AM    GLOB 3.8 02/09/2024 04:40 AM    ALT 17 02/09/2024 04:40 AM    AST 14 02/09/2024 04:40 AM       Glucose/Hemoglobin A1c  No results found for: \"GLU\", \"GLUCPOC\"  Lab Results   Component Value Date/Time    LABA1C 5.2 12/13/2023 06:02 AM     12/13/2023 06:02 AM       Hematology  Lab Results   Component Value Date/Time    WBC 6.8 02/15/2024 04:17 AM    RBC 5.08 02/15/2024 04:17 AM    HGB 15.1 02/15/2024 04:17 AM    HCT 47.0 02/15/2024 04:17 AM    MCV 92.5 02/15/2024 04:17 AM    MCH 29.7 02/15/2024 04:17 AM    MCHC 32.1 02/15/2024 04:17 AM    RDW 13.5 02/15/2024 04:17 AM     02/15/2024 04:17 AM       Lipids  Lab Results   Component Value Date/Time    CHOL 157 12/13/2023 06:02 AM    TRIG 126 12/13/2023 06:02 AM    HDL 55 12/13/2023 06:02 AM    LDLCALC 76.8 12/13/2023 06:02 AM    CHOLHDLRATIO 2.9 12/13/2023 06:02 AM     Thyroid Function  No results found for: \"TSH\", \"TSH2\", \"TSH3\", \"TSHELE\", \"T3RIA\", \"T3UP\", \"FT3\", \"FT4\", \"FT4P\", \"T4\", \"T4P\", \"FT4T\", \"TT7\"    Assessment/Plan:  Recommended baseline laboratory monitoring has been completed based on this patient's current medication regimen. No further interventions are needed at this time. Follow-up metabolic monitoring labs should be completed in 3 months (quarterly for the first year of antipsychotic therapy). - Due again mid-March.     Brittney Caro RPH

## 2024-02-19 NOTE — BH NOTE
GROUP THERAPY PROGRESS NOTE  No group due to Low patient participation or acuity. SW provided pts with activities to complete independently.     Katherine Gillies, MSW, [facility]

## 2024-02-19 NOTE — PLAN OF CARE
Problem: Safety - Adult  Goal: Free from fall injury  2/18/2024 2328 by Franine Bucio LPN  Outcome: Progressing   Pt is currently in kneeling prayer position. NAD. Pt turns self. Mattresses on floor, urinal within reach, snack and water within reach. Q15 min safety checks in place.       Pt turns self throughout the night, transitioning from mat to mattress on the floor. Pt does eat snacks and drinks water when staff is not present in the room. Linen changed and cleansing wipes used to clean pt. Pt cleaned self dia area and butt crack this AM. Pt moves around room best if snacks and water are placed on different spots on her mattresses. Pt continues to be mostly nonverbal, but will at times respond with a grunt or faint \"yes\", \"no\", or \"get away.\" Pt educated that this is a therapeutic environment and we're here to help.

## 2024-02-19 NOTE — BH NOTE
1830: Patient's room cleaned up. Floor mats cleaned, floor cleaned, clean fitted sheet put on mattress on floor. Patient moved himself from one floor mat to another floor mat when asked by nursing staff. Patient sat up for a minute and then got back down in child's pose.    2000: patient kneeling on hands and knees in the doorway to his room, naked with a blanket over his back. Patient told that if he wants to come out of his room he has to wear clothes and walk, that he can't crawl around on the floor on the unit. Patient turned around and crawled back into his room. Patient currently lying on his side in his room, naked.

## 2024-02-19 NOTE — BH NOTE
Psychiatric Progress Note    Patient: Roge Cowan MRN: [id number]  SSN: [id number]    YOB: 1970  Age: 54 y.o.  Sex: male      Admit Date: 2/15/2024    LOS: 4 days     Subjective:   02/19/2024  Nursing report that patient remains in the fetal position mostly, but is observed to be sleeping on his side other times.  He is eating 100% of his food when nobody staff aren't present. He's been taking his PO medications. He increases his rate of breathing and opposes any attempt to physically evaluate him.  He doesn't answer any of my questions and doesn't follow any directions.      2/18 -  Roge Cowan reports little to this interviewer, however he spoke to nursing and has been compliant with medications and meals. Urinated in a bottle instead of on the floor today.  No aggression or violence.  Tolerating medications well (No prn).  Appetite is fair.  Eating and sleeping fairly per staff.      Roge Cowan  would not speak with this interviewer.  He remains crouched on the floor in praying position.  His food tray is empty in front of him.  Nursing reports that he eats all of his food and is seen walking around the room at times shifting his position.  Incontinent of urine still.  No aggression or violence.  Minimally interactive and limited awareness.  Tolerating medications well.  Eating fairly and sleeping some..    Objective:     Vitals:    02/18/24 2027 02/19/24 0815 02/19/24 0830 02/19/24 0900   BP: 118/76 136/78     Pulse: 92 (!) 105     Resp: 16 (!) 100 (!) 60 20   Temp:  97.2 °F (36.2 °C)     TempSrc:  Axillary     Weight:       Height:            Mental Status Exam:   Sensorium  Unclear   Relations guarded, uncooperative, and vague   Eye Contact poor   Appearance:  thin & gaunt looking   Speech:  mute   Thought Process: blocked   Thought Content unclear   Suicidal ideations none   Mood:  dysthymic   Affect:  blunted   Memory   impaired   Concentration:  impaired   Insight:  impaired due  related services, or equivalent services.    Signed By: Lety Larson MD     February 19, 2024

## 2024-02-19 NOTE — BH NOTE
0730 Pt naked on knees, elbows and forehead against the mat that is on the floor Two mattresses on the floor in room, pt refuses to sit/lay on them, prefers mats on floor,      0810 Pt allowed nurse to get his B/P and axillary temp. Pt is tachypneic at 100 while he is aware staff is in the room. His breathing slows when he believes no one is in the room w/ him. RN covered patient with a blanket, pt immediately removed the blanket.

## 2024-02-19 NOTE — INTERDISCIPLINARY ROUNDS
Behavioral Health Interdisciplinary Rounds     Patient Name: Roge Cowan  Age: 54 y.o.  Room/Bed:  [facility] [place]  Primary Diagnosis: Catatonic schizophrenia (HCC)   Admission Status: Involuntary Commitment    Readmission within 30 days: No  Power of  in place: No (Legal Guardian)  Patient requires a blocked bed: Yes          Reason for blocked bed: Pt disrobes   Sleep hours:        Participation in Care/Groups:  No  Medication Compliant?: Yes  PRNS (last 24 hours): None   Restraints (last 24 hours):  No  __________________________________________________  OQ Admission Analysis Survey completed:  OQ Admission Analysis Survey score:  __________________________________________________     Alcohol screening (AUDIT) completed -     If applicable, date SBIRT discussed in treatment team AND documented:    Tobacco - patient is a smoker:    Illegal Drugs use:      24 hour chart check complete: Yes    _______________________________________________    Patient goal(s) for today: Communicate needs to staff   Treatment team focus/goals: Assess pt, manage medications, discharge planning   Progress note: no significant changes      Spiritual Care Consult: no  Financial concerns/prescription coverage:  no  Family contact:                        Family requesting physician contact today:  no  Discharge plan: TBD   Access to weapons : no                                                             Outpatient provider(s):   Patient's preferred phone number for follow up call :   Patient's preferred e-mail address :    LOS:  4  Expected LOS: TBD     Participating treatment team members: Payton Wilson MSW, DINH Collier, RN, Dr. Larson

## 2024-02-19 NOTE — PLAN OF CARE
Pt is isolative, withdrawn, in a catatonic state, stays in room, in same position - on floor in fetal position naked. Pt has a rapid respirations when staff is present in room.  Resp decrease when pt believes he is alone in room. Pt does not respond when attempting to interact.       Problem: Discharge Planning  Goal: Discharge to home or other facility with appropriate resources  Outcome: Not Progressing     Problem: Safety - Adult  Goal: Free from fall injury  2/19/2024 1020 by Amira Verde RN  Outcome: Not Progressing  2/19/2024 1020 by Amira Verde RN  Outcome: Not Progressing  2/18/2024 2328 by Frannie Bucio LPN  Outcome: Progressing     Problem: Skin/Tissue Integrity  Goal: Absence of new skin breakdown  Description: 1.  Monitor for areas of redness and/or skin breakdown  2.  Assess vascular access sites hourly  3.  Every 4-6 hours minimum:  Change oxygen saturation probe site  4.  Every 4-6 hours:  If on nasal continuous positive airway pressure, respiratory therapy assess nares and determine need for appliance change or resting period.  Outcome: Not Progressing     Problem: Depression  Goal: Will be euthymic at discharge  Description: INTERVENTIONS:  1. Administer medication as ordered  2. Provide emotional support via 1:1 interaction with staff  3. Encourage involvement in milieu/groups/activities  4. Monitor for social isolation  Outcome: Not Progressing     Problem: Discharge Planning  Goal: Discharge to home or other facility with appropriate resources  Outcome: Not Progressing     Problem: Safety - Adult  Goal: Free from fall injury  2/19/2024 1020 by Amira Verde RN  Outcome: Not Progressing  2/19/2024 1020 by Amira Verde RN  Outcome: Not Progressing  2/18/2024 2328 by Frannie Bucio LPN  Outcome: Progressing     Problem: Skin/Tissue Integrity  Goal: Absence of new skin breakdown  Description: 1.  Monitor for areas of redness and/or skin breakdown  2.  Assess vascular access sites  hourly  3.  Every 4-6 hours minimum:  Change oxygen saturation probe site  4.  Every 4-6 hours:  If on nasal continuous positive airway pressure, respiratory therapy assess nares and determine need for appliance change or resting period.  Outcome: Not Progressing     Problem: Depression  Goal: Will be euthymic at discharge  Description: INTERVENTIONS:  1. Administer medication as ordered  2. Provide emotional support via 1:1 interaction with staff  3. Encourage involvement in milieu/groups/activities  4. Monitor for social isolation  Outcome: Not Progressing

## 2024-02-19 NOTE — BH NOTE
Psychiatric Progress Note    Patient: Roge Cowan MRN: [id number]  SSN: [id number]    YOB: 1970  Age: 54 y.o.  Sex: male      Admit Date: 2/15/2024    LOS: 3 days     Subjective:   2/18 -  Roge Cowan reports little to this interviewer, however he spoke to nursing and has been compliant with medications and meals. Urinated in a bottle instead of on the floor today.  No aggression or violence.  Tolerating medications well (No prn).  Appetite is fair.  Eating and sleeping fairly per staff.      Roge Cowan  would not speak with this interviewer.  He remains crouched on the floor in praying position.  His food tray is empty in front of him.  Nursing reports that he eats all of his food and is seen walking around the room at times shifting his position.  Incontinent of urine still.  No aggression or violence.  Minimally interactive and limited awareness.  Tolerating medications well.  Eating fairly and sleeping some..    Objective:     Vitals:    02/18/24 0810 02/18/24 1156 02/18/24 1629 02/18/24 2027   BP: 125/80 104/66 124/75 118/76   Pulse: 99 99 99 92   Resp: 16 16 18 16   Temp: 97.6 °F (36.4 °C) 97.3 °F (36.3 °C) 98 °F (36.7 °C)    TempSrc: Oral Oral Oral    Weight:       Height:            Mental Status Exam:   Sensorium  Unclear   Relations guarded, uncooperative, and vague   Eye Contact poor   Appearance:  thin & gaunt looking   Speech:  mute   Thought Process: blocked   Thought Content unclear   Suicidal ideations none   Mood:  dysthymic   Affect:  blunted   Memory   impaired   Concentration:  impaired   Insight:  impaired due to limited responses   Judgment:  impaired due to actions throughout the ay       MEDICATIONS:  Current Facility-Administered Medications   Medication Dose Route Frequency    FLUoxetine (PROZAC) capsule 20 mg  20 mg Oral Lunch    mirtazapine (REMERON SOL-TAB) disintegrating tablet 15 mg  15 mg Oral Nightly    LORazepam (ATIVAN) injection 3 mg  3 mg  IntraMUSCular TID WC    acetaminophen (TYLENOL) tablet 650 mg  650 mg Oral Q4H PRN    polyethylene glycol (GLYCOLAX) packet 17 g  17 g Oral Daily PRN    senna (SENOKOT) tablet 8.6 mg  1 tablet Oral Daily PRN    aluminum & magnesium hydroxide-simethicone (MAALOX) 200-200-20 MG/5ML suspension 30 mL  30 mL Oral Q6H PRN    hydrOXYzine HCl (ATARAX) tablet 50 mg  50 mg Oral TID PRN    haloperidol (HALDOL) tablet 5 mg  5 mg Oral Q4H PRN    Or    haloperidol lactate (HALDOL) injection 5 mg  5 mg IntraMUSCular Q4H PRN    diphenhydrAMINE (BENADRYL) injection 50 mg  50 mg IntraMUSCular Q4H PRN    traZODone (DESYREL) tablet 50 mg  50 mg Oral Nightly PRN    haloperidol lactate (HALDOL) injection 10 mg  10 mg IntraMUSCular TID WC      DISCUSSION:   the risks and benefits of the proposed medication    Lab/Data Review:  All lab results for the last 24 hours reviewed.    Recent Results (from the past 24 hour(s))   Culture, Wound    Collection Time: 02/18/24  3:09 PM    Specimen: Ulcer   Result Value Ref Range    Special Requests NO SPECIAL REQUESTS      Gram stain RARE WBCS SEEN      Gram stain NO ORGANISMS SEEN      Culture PENDING          Assessment:     Principal Problem:    Catatonic schizophrenia (HCC)  Resolved Problems:    * No resolved hospital problems. *      Plan:     Continue current care  Collateral information  Medication modification as appropriate  Disposition planning with social work    I certify that this patient's inpatient psychiatric hospital services furnished since the previous certification were, and continue to be, required for treatment that could reasonably be expected to improve the patient's condition, or for diagnostic study, and that the patient continues to need, on a daily basis, active treatment furnished directly by or requiring the supervision of inpatient psychiatric facility personnel. In addition, the hospital records show that services furnished were intensive treatment services, admission  or related services, or equivalent services.  Signed By: Rustam Rivero MD     February 18, 2024

## 2024-02-20 PROBLEM — R45.89 NEED FOR EMOTIONAL SUPPORT: Status: ACTIVE | Noted: 2024-02-20

## 2024-02-20 LAB
BACTERIA SPEC CULT: NORMAL
GRAM STN SPEC: NORMAL
GRAM STN SPEC: NORMAL
SERVICE CMNT-IMP: NORMAL

## 2024-02-20 PROCEDURE — 6370000000 HC RX 637 (ALT 250 FOR IP): Performed by: PSYCHIATRY & NEUROLOGY

## 2024-02-20 PROCEDURE — 1140000000 HC RM PRIVATE PSYCH

## 2024-02-20 PROCEDURE — 99231 SBSQ HOSP IP/OBS SF/LOW 25: CPT | Performed by: INTERNAL MEDICINE

## 2024-02-20 RX ADMIN — MUPIROCIN: 20 OINTMENT TOPICAL at 21:22

## 2024-02-20 RX ADMIN — HALOPERIDOL 10 MG: 5 TABLET ORAL at 09:55

## 2024-02-20 RX ADMIN — LORAZEPAM 3 MG: 2 TABLET ORAL at 17:44

## 2024-02-20 RX ADMIN — LORAZEPAM 3 MG: 2 TABLET ORAL at 13:20

## 2024-02-20 RX ADMIN — LORAZEPAM 3 MG: 2 TABLET ORAL at 09:54

## 2024-02-20 RX ADMIN — MUPIROCIN: 20 OINTMENT TOPICAL at 18:32

## 2024-02-20 RX ADMIN — HALOPERIDOL 10 MG: 5 TABLET ORAL at 13:21

## 2024-02-20 RX ADMIN — HALOPERIDOL 10 MG: 5 TABLET ORAL at 17:44

## 2024-02-20 RX ADMIN — FLUOXETINE 20 MG: 10 CAPSULE ORAL at 13:21

## 2024-02-20 RX ADMIN — MIRTAZAPINE 30 MG: 15 TABLET, ORALLY DISINTEGRATING ORAL at 21:22

## 2024-02-20 NOTE — WOUND CARE
WOCN Note:     New consult for right ankle wound     Roge Cowan is a 54 y.o. y/o male who presented for Catatonic schizophrenia (HCC) [F20.2]  Admitted on 2/15/2024    Past Medical History:   Diagnosis Date    Psychiatric disorder     Psychotic disorder (HCC)     Withdrawal syndrome (HCC)        Lab Results   Component Value Date/Time    WBC 6.8 02/15/2024 04:17 AM    LABA1C 5.2 12/13/2023 06:02 AM    POCGLU 159 (H) 02/14/2024 06:54 PM    POCGLU 95 02/14/2024 12:48 PM    HGB 15.1 02/15/2024 04:17 AM    HCT 47.0 02/15/2024 04:17 AM     02/15/2024 04:17 AM        Tobacco Use      Smoking status: Some Days      Smokeless tobacco: Never     ADULT ORAL NUTRITION SUPPLEMENT; Dinner, Breakfast; Frozen Oral Supplement  ADULT ORAL NUTRITION SUPPLEMENT; Breakfast, Dinner; Other Oral Supplement; Two Codey HN  ADULT ORAL NUTRITION SUPPLEMENT; Lunch; Standard High Calorie/High Protein Oral Supplement  ADULT DIET; Regular; Double Protein Portions     Assessment:   Seen today in room 724/01   Alert and communicative only to say \"Don't put a pad on it\" when dressing was placed  Mobile and repositions independently but does not consistently follow requests to reposition and when he does, goes back into child pose on mattress or floor   Surface: foam mattress, foam pads on floor to attempt to cushion patient when he gets off the mattress  Buttocks and sacrum intact without erythema  Right trochanter with area of blanchable erythema    1. POA Wound to Right Lateral Ankle    ~ 1.5 x ~2.5 x 0.1 cm  Red wound bed, small serous exudate  Periwound with mild maceration and blanching erythema depicted above  Uncertain if erythema is hyperemia or infectious process - patient returns to child pose too soon to see if erythema resolves with time off of the area   Ankle is warm but not warmer relative to other areas of extremity  Tx: cleansed with Vashe damp gauze, placed foam

## 2024-02-20 NOTE — PLAN OF CARE
Problem: Psychosis  Goal: Will report no hallucinations or delusions  Description: INTERVENTIONS:  1. Administer medication as  ordered  2. Assist with reality testing to support increasing orientation  3. Assess if patient's hallucinations or delusions are encouraging self harm or harm to others and intervene as appropriate  Outcome: Progressing     Wound care performed per order from wound care nurse. Wound on R ankle cleaned with vashe wash, scheduled antibiotic ointment put on wound. Patient did not allow bandage to be placed on top of antibiotic ointment. Patient immediately rolled onto R ankle wound after antibiotic ointment was applied.

## 2024-02-20 NOTE — INTERDISCIPLINARY ROUNDS
Behavioral Health Interdisciplinary Rounds     Patient Name: Roge Cowan  Age: 54 y.o.  Room/Bed:  [facility] [place]  Primary Diagnosis: Catatonic schizophrenia (HCC)   Admission Status: Involuntary Commitment     Readmission within 30 days: no  Power of  in place: no  Patient requires a blocked bed: no          Reason for blocked bed:   Sleep hours:        Participation in Care/Groups:  no  Medication Compliant?: yes  PRNS (last 24 hours): None    Restraints (last 24 hours):  no  __________________________________________________  OQ Admission Analysis Survey completed:  OQ Admission Analysis Survey score:  OQ Discharge Analysis Survey completed:  OQ Discharge Analysis Survey score:   __________________________________________________     Alcohol screening (AUDIT) completed -     If applicable, date SBIRT discussed in treatment team AND documented:    Tobacco - patient is a smoker:    Illegal Drugs use:      24 hour chart check complete: yes     _______________________________________________    Patient goal(s) for today: Communicate needs to staff   Treatment team focus/goals: Assess pt,manage medications, discharge planning   Progress note: Pt slept 7-8 hours and is now sleeping on his matress. Pt is eating his meals. Pts PO meds are in ice cream which he eats.      Spiritual Care Consult: no  Financial concerns/prescription coverage:  no  Family contact:                        Family requesting physician contact today: no   Discharge plan: TBD  Access to weapons : no                                                             Outpatient provider(s):   Patient's preferred phone number for follow up call :   Patient's preferred e-mail address :    LOS:  5  Expected LOS: TBD     Participating treatment team members: Roge RussellPayton serra G, MSW, Elaine MACHADO RN, Dr. Larson

## 2024-02-20 NOTE — BH NOTE
@  0735  Patient received laying prone position on mattress, respirations 18, not wearing clothes, dry bedding.

## 2024-02-20 NOTE — PROGRESS NOTES
Palliative Medicine   [place] [phone] (COPE)        [facility] [phone] (COPE)    The Palliative Medicine SW received e-mail from Eden, the patient's sister/Guardian-     She shared more insight about transportation barriers, they only have one vehicle and it is in need of repairs to past inspection- both her and her son (her son works) depend on this one vehicle and Eden often has to drive him to work- there are barriers with finances and gas money.     Eden also expressed and shared some of her experience as a caregiver- not only for Isreal, but for her father who had multiple strokes, her mother who had Alzheimer's w/ paranoia, Isreal, and she also drove her uncle to and from dialysis multiple days a week previously- on top of being a single parent. SW provided emotional support in acknowledging the difficulties as a caregiver- Eden shares she has not had income since 2012 due to being a caregiver, and SW acknowledged that being a caregiver is a full-time job/responsibility/role. Also normalized needing financial assistance (Eden shares that in her experience people look down on others who may need help) and this writer validated and normalized financial challenges and challenged the idea that this is not a reflection on who she is as a person. Eden also shared more about her previous work experience working at [employer] and [employer]. Emotional support and reflective listening provided (again, via e-mail per Eden's request).     DARRELL sent application to [facility] [employer] [facility] [employer] for possible Uber gift card for Eden to be able to visit more frequently- (did not share this w/ her yet because want to see if this is an option first).     DARRELL also met with patient at bedside (did not engage) and spoke with RN- update provided to Eden that patient as of late is taking in oral medications and ate some ice cream per RN.     Addendum: DARRELL sent in application for [facility],  unfortunately this was denied due to the fund specifically needing to be used to help with discharge process of a patient- given that the uber gift card would not help with patient's discharge, does not meet criteria. Appreciate review.     Following for support.     Thank you for including Palliative Medicine in the care of Roge Buitrago"Isreal\" Chapo Torres LCSW

## 2024-02-20 NOTE — PROGRESS NOTES
Palliative Medicine  Patient Name: Roge Cowan  YOB: 1970  MRN: [id number]  Age: 54 y.o.  Gender: male    Date of Initial Consult: 1/15/24  Date of Service: 2/20/2024  Time: 3:08 PM  Provider: Leanna Barnard MD  Hospital Day: 6  Admit Date: 2/15/2024  Referring Provider: DR ALLEN Beverly        Reasons for Consultation:  Goals of Care, Overwhelming Symptoms, and Psychosocial Distress    HISTORY OF PRESENT ILLNESS (HPI):   Roge Coawn \"Isreal\" (although his voices recently told him that he goes by \"Cruzito\"  is a 54 y.o. male with a past medical history of paranoid schizophrenia, catatonia  who was admitted on 2/15/2024 from [facility]. Pt admitted to [facility] 12/12/23 brought in by Police- he had not been engaging in self care, eating or drinking. He was in the fetal position and was getting pressure ulcers- stage IV on R ankle. Was getting court mandated ECT. He was transferred to [facility] on 1/12/24 for ICU care. He was refusing all care at [facility]. Requiring 4 pt restraints, sedation, IV abx.  He is tachypneic w/ desats, refusing sometimes even turning. Because could not care for patient, required sedation and intubation.     More recently, has been in/out of ICU for required nursing care. Cont to pull out IV lines. Allowing intermittent care.   2/15- eating some, can spend time out of restraints and talking more    Psychosocial: Court declared pt legally incompetent on 10/2018 and sister Eden is guardian. They were both adopted at a young age. He has been living w/ her , and her son Rustam since leaving [facility] in 2012. Has been asked to leave many group homes. At baseline, he is low functioning- nonverbal mostly, but can walk. He spends most of day on bed or on the floor in fetal position. Does not interact much, does not listen to TV/radio. Cannot read or write.  Psych @ [place] ACT team: Dr. Olivas   [place] ACT Team: [phone] after hours number.    with ACT team: Jayson Andrea  [phone] or [phone] (Ally as well)  : Lorene Pabon [phone]   PALLIATIVE DIAGNOSES:    Chronic paranoid schizophrenia and catatonia   Severe malnutrition   Shortness of breath   Palliative care encounter     ASSESSMENT AND PLAN:   Received communication from pt guardian Eden requesting update on Isreal.   Stopped by to see him along with Darling Torres LCSW to provide update to Eden.   Discussed with RN, arvin. She provided an update that he has been taking some oral meds  Pt seen, sleeping on his side. Not currently in the 'prayer' position.   He was also able to eat some ice cream.   We are following peripherally mainly as a support to pt's sister Eden  Please call with any palliative questions or concerns.  Palliative Care Team is available via perfect serve or via phone.        Referrals to:   [] Outpatient Palliative Care  [] Home Based Palliative Care  [] Home Based Primary Care  [] Hospice       ADVANCE CARE PLANNING:   [x] The Pall Med Interdisciplinary Team has updated the ACP Navigator with Health Care Decision Maker and Patient Capacity      Primary Decision Maker: Eden Montalvo - Brother/Sister - [phone]  Confirm Advance Directive: None    Current Code Status: Full Code     Goals of Care:         Please refer to Palliative Medicine ACP notes for further details.    PALLIATIVE ASSESSMENT:      Palliative Performance Scale (PPS):       Pt curled up in bed.     Modified ESAS:       Clinical Pain Assessment (nonverbal scale for severity on nonverbal patients):                 Vital Signs: Blood pressure 131/83, pulse 83, temperature 97.2 °F (36.2 °C), temperature source Axillary, resp. rate 20, height 1.839 m (6' 0.4\"), weight 60.1 kg (132 lb 7.9 oz).    PHYSICAL ASSESSMENT:   General: [] Oriented x3  [] Well appearing  [] Intubated  [x]Ill appearing  [x]Other: torso flexed, in child pose position   Mental Status: [] Normal mental status exam  [] Drowsy  [] Confused

## 2024-02-20 NOTE — PLAN OF CARE
Problem: Depression  Goal: Will be euthymic at discharge  Outcome: Progressing     Problem: Psychosis  Goal: Will report no hallucinations or delusions  Outcome: Progressing     Problem: Safety - Adult  Goal: Free from fall injury  Outcome: Progressing  Flowsheets (Taken 2/20/2024 0129)  Free From Fall Injury:   Instruct family/caregiver on patient safety   Based on caregiver fall risk screen, instruct family/caregiver to ask for assistance with transferring infant if caregiver noted to have fall risk factors

## 2024-02-20 NOTE — BH NOTE
Psychiatric Progress Note    Patient: Roge Cowan MRN: [id number]  SSN: [id number]    YOB: 1970  Age: 54 y.o.  Sex: male      Admit Date: 2/15/2024    LOS: 5 days     Subjective:   02/20/2024  Nursing report that patient now is sleeping on his mattress in his room and not sleeping on the floor. He slept 7-8 hours last night.  He does well when he is alone and isn't disturbed. He's been eating 100% of all of his meals. His PO medications are placed into ice-cream which he's been consuming.  Today upon my exam, Isreal was sound asleep. The treatment team elected to let him sleep and rest.    02/19/2024  Nursing report that patient remains in the fetal position mostly, but is observed to be sleeping on his side other times.  He is eating 100% of his food when nobody staff aren't present. He's been taking his PO medications. He increases his rate of breathing and opposes any attempt to physically evaluate him.  He doesn't answer any of my questions and doesn't follow any directions.      2/18 -  Roge Cowan reports little to this interviewer, however he spoke to nursing and has been compliant with medications and meals. Urinated in a bottle instead of on the floor today.  No aggression or violence.  Tolerating medications well (No prn).  Appetite is fair.  Eating and sleeping fairly per staff.      Roge Cowan  would not speak with this interviewer.  He remains crouched on the floor in praying position.  His food tray is empty in front of him.  Nursing reports that he eats all of his food and is seen walking around the room at times shifting his position.  Incontinent of urine still.  No aggression or violence.  Minimally interactive and limited awareness.  Tolerating medications well.  Eating fairly and sleeping some..    Objective:     Vitals:    02/19/24 0815 02/19/24 0830 02/19/24 0900 02/20/24 0838   BP: 136/78   131/83   Pulse: (!) 105   83   Resp: (!) 100 (!) 60 20    Temp: 97.2 °F (36.2  °C)      TempSrc: Axillary      Weight:       Height:            Mental Status Exam:   Mr. Cowan is a 53yo WM who appears stated age, has short hair and unshaven beard. He is unclothed.  He is sound asleep on his bed mattress, which is placed on the floor  Grooming is poor  Unable to assess Mood, Affect, thought process, thought content, recent/remote memories  Insight and Judgment remain poor    MEDICATIONS:   mirtazapine  30 mg Oral Nightly    haloperidol  10 mg Oral TID WC    Or    haloperidol lactate  10 mg IntraMUSCular TID WC    LORazepam  3 mg Oral TID WC    Or    LORazepam  3 mg IntraMUSCular TID WC    haloperidol lactate  5 mg IntraMUSCular Once    FLUoxetine  20 mg Oral Lunch       DISCUSSION:   the risks and benefits of the proposed medication    Lab/Data Review:  All lab results for the last 24 hours reviewed.    No results found for this or any previous visit (from the past 24 hour(s)).        Assessment:   Catatonia  Schizophrenia      Plan:     02/20/2024  Continue current PO medication regimen with IM alternatives (for haldol and ativan) upon refusal.  Continue remeron 30mg po qhs, prozac 20mg po qday    Dr. Garner at [facility] requested we contact her tomorrow at the earliest for transfer consideration there for further hospitalization and ECT. She'd informed us that there is likely about a 2 wk delay on the schedule. It is unclear, however, how long logistical delays of transfer may take.    02/19/2024  Increase remeron from 15mg po qhs to 30mg po qhs.  Today will administer last dosage of haldol 10mg IM TID with meals and ativan 3mg TID with meals; instead, since patient has been accepting PO medications, staff will give him the option of taking haldol/ativan by mouth, and only give them IM upon PO refusal. This way we decrease distress tot he patient.  I reached out to [facility], Dr. Garner on Friday. She informed me that she is out with COVID, and can't evaluate patient's case for ECT/transfer until this  Wednesday.    I certify that this patient's inpatient psychiatric hospital services furnished since the previous certification were, and continue to be, required for treatment that could reasonably be expected to improve the patient's condition, or for diagnostic study, and that the patient continues to need, on a daily basis, active treatment furnished directly by or requiring the supervision of inpatient psychiatric facility personnel. In addition, the hospital records show that services furnished were intensive treatment services, admission or related services, or equivalent services.    Signed By: Lety Larson MD     February 20, 2024

## 2024-02-20 NOTE — BH NOTE
Patient is currently laying on side, on mattress. Patient is turning self to other side occasionally, as noticed during Q-15 safety checks. Respirations even and unlabored.

## 2024-02-20 NOTE — BH NOTE
Patient is bautista quietly on the floor in his room.  Staff tried to talk to patient but he did not respond.  Blankets and pads under patient appears to be dry.  Patient received snacks around 2030.    12:41 AM - Patient is lying (on his left side) quietly in bed.  He appears to be asleep.  No respiratory distress noted.  Will continue to monitor.

## 2024-02-21 PROCEDURE — 1140000000 HC RM PRIVATE PSYCH

## 2024-02-21 PROCEDURE — 6370000000 HC RX 637 (ALT 250 FOR IP): Performed by: PSYCHIATRY & NEUROLOGY

## 2024-02-21 RX ADMIN — ACETAMINOPHEN 650 MG: 325 TABLET ORAL at 20:50

## 2024-02-21 RX ADMIN — MIRTAZAPINE 30 MG: 15 TABLET, ORALLY DISINTEGRATING ORAL at 20:50

## 2024-02-21 RX ADMIN — LORAZEPAM 3 MG: 2 TABLET ORAL at 09:07

## 2024-02-21 RX ADMIN — MUPIROCIN: 20 OINTMENT TOPICAL at 23:59

## 2024-02-21 RX ADMIN — FLUOXETINE 20 MG: 10 CAPSULE ORAL at 12:55

## 2024-02-21 RX ADMIN — LORAZEPAM 3 MG: 2 TABLET ORAL at 12:55

## 2024-02-21 RX ADMIN — HALOPERIDOL 10 MG: 5 TABLET ORAL at 12:55

## 2024-02-21 RX ADMIN — LORAZEPAM 3 MG: 2 TABLET ORAL at 17:23

## 2024-02-21 RX ADMIN — HALOPERIDOL 10 MG: 5 TABLET ORAL at 09:07

## 2024-02-21 RX ADMIN — MUPIROCIN: 20 OINTMENT TOPICAL at 11:38

## 2024-02-21 RX ADMIN — HALOPERIDOL 10 MG: 5 TABLET ORAL at 17:23

## 2024-02-21 ASSESSMENT — PAIN DESCRIPTION - ORIENTATION: ORIENTATION: RIGHT;OUTER

## 2024-02-21 ASSESSMENT — PAIN SCALES - GENERAL
PAINLEVEL_OUTOF10: 6
PAINLEVEL_OUTOF10: 3

## 2024-02-21 ASSESSMENT — PAIN - FUNCTIONAL ASSESSMENT: PAIN_FUNCTIONAL_ASSESSMENT: PREVENTS OR INTERFERES WITH ALL ACTIVE AND SOME PASSIVE ACTIVITIES

## 2024-02-21 ASSESSMENT — PAIN DESCRIPTION - LOCATION: LOCATION: FOOT

## 2024-02-21 NOTE — BH NOTE
GROUP THERAPY PROGRESS NOTE    Patient did not participate in recreational therapy group.    Katherine Gillies, MSW, [facility]

## 2024-02-21 NOTE — BH NOTE
Psychiatric Progress Note    Patient: Roge Cowan MRN: [id number]  SSN: [id number]    YOB: 1970  Age: 54 y.o.  Sex: male      Admit Date: 2/15/2024    LOS: 6 days     Subjective:   02/21/2024  Nursing reports that patient slept well overnight.  He is sleeping on his mattress.  Staff stated that over the weekend he received a shower with the help of the staff, and he said \"thank you.\"  The majority of the time yesterday he was on his mattress, and not on the floor.  He does state in the fetal position, but not the majority of the time now.  He has been taking his medications by mouth and eating 100% of his meals.  Upon my evaluation he was unclothed, lying on his mattress, and sound asleep.    02/20/2024  Nursing report that patient now is sleeping on his mattress in his room and not sleeping on the floor. He slept 7-8 hours last night.  He does well when he is alone and isn't disturbed. He's been eating 100% of all of his meals. His PO medications are placed into ice-cream which he's been consuming.  Today upon my exam, Isreal was sound asleep. The treatment team elected to let him sleep and rest.    02/19/2024  Nursing report that patient remains in the fetal position mostly, but is observed to be sleeping on his side other times.  He is eating 100% of his food when nobody staff aren't present. He's been taking his PO medications. He increases his rate of breathing and opposes any attempt to physically evaluate him.  He doesn't answer any of my questions and doesn't follow any directions.      2/18 -  Roge Cowan reports little to this interviewer, however he spoke to nursing and has been compliant with medications and meals. Urinated in a bottle instead of on the floor today.  No aggression or violence.  Tolerating medications well (No prn).  Appetite is fair.  Eating and sleeping fairly per staff.      Roge Cowan  would not speak with this interviewer.  He remains crouched on the floor in  medication regimen with IM alternatives (for haldol and ativan) upon refusal.  Continue remeron 30mg po qhs, prozac 20mg po qday    Dr. Garner at [facility] requested we contact her tomorrow at the earliest for transfer consideration there for further hospitalization and ECT. She'd informed us that there is likely about a 2 wk delay on the schedule. It is unclear, however, how long logistical delays of transfer may take.    02/19/2024  Increase remeron from 15mg po qhs to 30mg po qhs.  Today will administer last dosage of haldol 10mg IM TID with meals and ativan 3mg TID with meals; instead, since patient has been accepting PO medications, staff will give him the option of taking haldol/ativan by mouth, and only give them IM upon PO refusal. This way we decrease distress tot he patient.  I reached out to [facility], Dr. Garner on Friday. She informed me that she is out with COVID, and can't evaluate patient's case for ECT/transfer until this Wednesday.    I certify that this patient's inpatient psychiatric hospital services furnished since the previous certification were, and continue to be, required for treatment that could reasonably be expected to improve the patient's condition, or for diagnostic study, and that the patient continues to need, on a daily basis, active treatment furnished directly by or requiring the supervision of inpatient psychiatric facility personnel. In addition, the hospital records show that services furnished were intensive treatment services, admission or related services, or equivalent services.    Signed By: Lety Larson MD     February 21, 2024

## 2024-02-21 NOTE — PROGRESS NOTES
Spiritual Care Assessment/Progress Note  [facility]    Name: Roge Cowan MRN: [id number]    Age: 54 y.o.     Sex: male   Language: English     Date: 2/21/2024            Total Time Calculated: 15 min              Spiritual Assessment begun in [facility] 7W ACUTE [facility]  Service Provided For:: Patient  Referral/Consult From:: Rounding  Encounter Overview/Reason : Behavioral Health    Spiritual beliefs:      [] Involved in a kelby tradition/spiritual practice:      [] Supported by a kelby community:      [] Claims no spiritual orientation:      [] Seeking spiritual identity:           [] Adheres to an individual form of spirituality:      [x] Not able to assess:                Identified resources for coping and support system:   Support System: Family members       [] Prayer                  [] Devotional reading               [] Music                  [] Guided Imagery     [] Pet visits                                        [] Other: (COMMENT)     Specific area/focus of visit   Encounter:    Crisis:    Spiritual/Emotional needs: Type: Spiritual Support  Ritual, Rites and Sacraments:    Grief, Loss, and Adjustments:    Ethics/Mediation:    Behavioral Health:    Palliative Care:    Advance Care Planning:           Narrative:     Reason for visit: Initial assessment for Roge Cowan to introduce spiritual care services. I reviewed the medical record prior to this encounter. Pt prefers to be called \"Isreal\". I have been following this patient since his admission on the ICU, and was informed by music therapist that pt may respond to  support. Pt was lying on the floor in his room, unclothed and uncovered.    Intervention/Plan: I gently explored pt's Jain beliefs and spiritual perspectives. Roge Cowan was unable to clearly articulate their core belief systems or values at this time. He did not respond to my questions, or acknowledge my presence. I will reevaluate later during this admission, as  appropriate.      For additional spiritual care, please contact the  on-call at ([phone]).    Reyna Peter MDiv, MS, BCC  Staff   Spiritual Health Services

## 2024-02-21 NOTE — PLAN OF CARE
Problem: Skin/Tissue Integrity  Goal: Absence of new skin breakdown  Description: 1.  Monitor for areas of redness and/or skin breakdown  2.  Assess vascular access sites hourly  3.  Every 4-6 hours minimum:  Change oxygen saturation probe site  4.  Every 4-6 hours:  If on nasal continuous positive airway pressure, respiratory therapy assess nares and determine need for appliance change or resting period.  Outcome: Not Progressing     Problem: Skin/Tissue Integrity  Goal: Absence of new skin breakdown  Description: 1.  Monitor for areas of redness and/or skin breakdown  2.  Assess vascular access sites hourly  3.  Every 4-6 hours minimum:  Change oxygen saturation probe site  4.  Every 4-6 hours:  If on nasal continuous positive airway pressure, respiratory therapy assess nares and determine need for appliance change or resting period.  Outcome: Not Progressing    Patient continues to to lye on floor in room unclothed.  Patient continues to be non-verbal however med compliant with all medications if given in ice cream.  Patient does eat snacks and full meals given to him.    1500 Patient witnessed lying on mattress with urine in urinal.  Patient continues to be unclothed.  When asked by different staff member if he was cold and was covered in towel, patient clearly asked to remove it.      1730 2 pills found on patient floor when mattress moved to clean.  Will begin crushing pills to put in ice cream.

## 2024-02-21 NOTE — INTERDISCIPLINARY ROUNDS
Behavioral Health Interdisciplinary Rounds     Patient Name: Roge Cowan  Age: 54 y.o.  Room/Bed:  724/01  Primary Diagnosis: Catatonic schizophrenia (HCC)   Admission Status: involuntary      Readmission within 30 days: no  Power of  in place: legal guardian   Patient requires a blocked bed:  yes           Reason for blocked bed: refuses clothing, catatonic       Flu Vaccine :    Consults:          Labs/Testing due today? Have they refused labs?:     Sleep hours: 7       Participation in Care/Groups:  no  Medication Compliant?:  yes  PRNS (last 24 hours): no    Restraints (last 24 hours):  no     CIWA (range last 24 hours):     COWS (range last 24 hours):    Tobacco -:  no   Illegal Drugs use:      24 hour chart check complete:  yes        Patient goal(s) for today: meet with treatment team   Treatment team focus/goals: Plan to continue to provide medications and support.    Progress note :   He has been compliant with his medications and meals.  He does not come out of his room and does not participate in unit activities   LOS:  6  Expected LOS: TBD     Family contact:  Sister - Eden Montalvo  Brother/Sister  [phone]     Family requesting physician contact today:    Discharge plan: he will go to [facility] for ECT   Access to weapons :  no        Outpatient provider(s): [facility] -       Participating treatment team members: Roge Cowan,  DARRELL Ding - Dr. Marsha Hutson RN

## 2024-02-21 NOTE — BH NOTE
Patient lying on floor in child's pose. Wound care performed per orders by writer and Danae RN. Patient assisted to reposition and R ankle wound cleaned using gauze moistened with Vashe liquid. Bactroban ointment applied to wound site.

## 2024-02-22 PROCEDURE — 6370000000 HC RX 637 (ALT 250 FOR IP): Performed by: PSYCHIATRY & NEUROLOGY

## 2024-02-22 PROCEDURE — 1140000000 HC RM PRIVATE PSYCH

## 2024-02-22 RX ORDER — MAGNESIUM HYDROXIDE/ALUMINUM HYDROXICE/SIMETHICONE 120; 1200; 1200 MG/30ML; MG/30ML; MG/30ML
30 SUSPENSION ORAL EVERY 6 HOURS PRN
Status: DISCONTINUED | OUTPATIENT
Start: 2024-02-22 | End: 2024-02-22

## 2024-02-22 RX ORDER — HALOPERIDOL 5 MG/1
5 TABLET ORAL EVERY 4 HOURS PRN
Status: DISCONTINUED | OUTPATIENT
Start: 2024-02-22 | End: 2024-02-22

## 2024-02-22 RX ORDER — HYDROXYZINE 50 MG/1
50 TABLET, FILM COATED ORAL 3 TIMES DAILY PRN
Status: DISCONTINUED | OUTPATIENT
Start: 2024-02-22 | End: 2024-02-22

## 2024-02-22 RX ORDER — FLUOXETINE HYDROCHLORIDE 20 MG/1
40 CAPSULE ORAL
Status: DISCONTINUED | OUTPATIENT
Start: 2024-02-23 | End: 2024-02-29

## 2024-02-22 RX ORDER — ACETAMINOPHEN 325 MG/1
650 TABLET ORAL EVERY 4 HOURS PRN
Status: DISCONTINUED | OUTPATIENT
Start: 2024-02-22 | End: 2024-02-22

## 2024-02-22 RX ORDER — MIRTAZAPINE 15 MG/1
45 TABLET, ORALLY DISINTEGRATING ORAL NIGHTLY
Status: DISCONTINUED | OUTPATIENT
Start: 2024-02-22 | End: 2024-03-11 | Stop reason: HOSPADM

## 2024-02-22 RX ORDER — TRAZODONE HYDROCHLORIDE 50 MG/1
50 TABLET ORAL NIGHTLY PRN
Status: DISCONTINUED | OUTPATIENT
Start: 2024-02-22 | End: 2024-02-22

## 2024-02-22 RX ORDER — POLYETHYLENE GLYCOL 3350 17 G/17G
17 POWDER, FOR SOLUTION ORAL DAILY PRN
Status: DISCONTINUED | OUTPATIENT
Start: 2024-02-22 | End: 2024-02-22

## 2024-02-22 RX ORDER — DIPHENHYDRAMINE HYDROCHLORIDE 50 MG/ML
50 INJECTION INTRAMUSCULAR; INTRAVENOUS EVERY 4 HOURS PRN
Status: DISCONTINUED | OUTPATIENT
Start: 2024-02-22 | End: 2024-02-22

## 2024-02-22 RX ORDER — HALOPERIDOL 5 MG/ML
5 INJECTION INTRAMUSCULAR EVERY 4 HOURS PRN
Status: DISCONTINUED | OUTPATIENT
Start: 2024-02-22 | End: 2024-02-22

## 2024-02-22 RX ORDER — ENOXAPARIN SODIUM 100 MG/ML
40 INJECTION SUBCUTANEOUS DAILY
Status: DISCONTINUED | OUTPATIENT
Start: 2024-02-22 | End: 2024-02-22

## 2024-02-22 RX ADMIN — HALOPERIDOL 10 MG: 5 TABLET ORAL at 17:35

## 2024-02-22 RX ADMIN — LORAZEPAM 3 MG: 2 TABLET ORAL at 17:35

## 2024-02-22 RX ADMIN — ACETAMINOPHEN 650 MG: 325 TABLET ORAL at 21:04

## 2024-02-22 RX ADMIN — HALOPERIDOL 10 MG: 5 TABLET ORAL at 12:06

## 2024-02-22 RX ADMIN — MUPIROCIN: 20 OINTMENT TOPICAL at 12:06

## 2024-02-22 RX ADMIN — MUPIROCIN: 20 OINTMENT TOPICAL at 23:01

## 2024-02-22 RX ADMIN — FLUOXETINE 20 MG: 10 CAPSULE ORAL at 12:06

## 2024-02-22 RX ADMIN — MIRTAZAPINE 45 MG: 15 TABLET, ORALLY DISINTEGRATING ORAL at 21:04

## 2024-02-22 RX ADMIN — LORAZEPAM 3 MG: 2 TABLET ORAL at 12:06

## 2024-02-22 ASSESSMENT — PAIN SCALES - GENERAL
PAINLEVEL_OUTOF10: 2
PAINLEVEL_OUTOF10: 2
PAINLEVEL_OUTOF10: 3

## 2024-02-22 ASSESSMENT — PAIN DESCRIPTION - LOCATION: LOCATION: ANKLE

## 2024-02-22 NOTE — INTERDISCIPLINARY ROUNDS
Behavioral Health Interdisciplinary Rounds     Patient Name: Roge Cowan  Age: 54 y.o.  Room/Bed:  [facility]  Primary Diagnosis: Catatonic schizophrenia (HCC)   Admission Status: Involuntary Commitment    Readmission within 30 days: No  Power of  in place: Yes  Patient requires a blocked bed: Yes          Reason for blocked bed: behavior  Sleep hours:       Participation in Care/Groups:  No  Medication Compliant?: Yes  PRNS (last 24 hours): Tylenol  Restraints (last 24 hours):  No  ___________________________________  24 hour chart check complete: Yes    _______________________________________________    Patient goal(s) for today: meet with treatment team   Treatment team focus/goals: Plan to continue to provide support care   Progress note: He continues to not respond to questions- he has been compliant with his medications and meals      Spiritual Care Consult:   Financial concerns/prescription coverage:  medicare   Family contact: sister                        Family requesting physician contact today:    Discharge plan: he lives with his sister   Access to weapons : no                                                              Outpatient provider(s): [facility] for ECT     LOS:  7  Expected LOS: TBD     Participating treatment team members: Rosa Elena Wilson SW- Dr. Zanoun - Anne Schlicher, RN

## 2024-02-22 NOTE — PLAN OF CARE
Problem: Skin/Tissue Integrity  Goal: Absence of new skin breakdown  Description: 1.  Monitor for areas of redness and/or skin breakdown  2.  Assess vascular access sites hourly  3.  Every 4-6 hours minimum:  Change oxygen saturation probe site  4.  Every 4-6 hours:  If on nasal continuous positive airway pressure, respiratory therapy assess nares and determine need for appliance change or resting period.  Outcome: Not Progressing

## 2024-02-22 NOTE — PLAN OF CARE
Problem: Depression  Goal: Will be euthymic at discharge  Description: INTERVENTIONS:  1. Administer medication as ordered  2. Provide emotional support via 1:1 interaction with staff  3. Encourage involvement in milieu/groups/activities  4. Monitor for social isolation  Outcome: Not Progressing     Problem: Safety - Adult  Goal: Free from fall injury  Outcome: Progressing     Problem: Skin/Tissue Integrity  Goal: Absence of new skin breakdown  Description: 1.  Monitor for areas of redness and/or skin breakdown  2.  Assess vascular access sites hourly  3.  Every 4-6 hours minimum:  Change oxygen saturation probe site  4.  Every 4-6 hours:  If on nasal continuous positive airway pressure, respiratory therapy assess nares and determine need for appliance change or resting period.  2/22/2024 1232 by Elaine Beavers RN  Outcome: Not Progressing  2/22/2024 0200 by Frannie Bucio LPN  Outcome: Not Progressing     Problem: Skin/Tissue Integrity  Goal: Absence of new skin breakdown  Description: 1.  Monitor for areas of redness and/or skin breakdown  2.  Assess vascular access sites hourly  3.  Every 4-6 hours minimum:  Change oxygen saturation probe site  4.  Every 4-6 hours:  If on nasal continuous positive airway pressure, respiratory therapy assess nares and determine need for appliance change or resting period.  2/22/2024 1232 by Elaine Beavers RN  Outcome: Progressing  Due to   2/22/2024 0200 by Frannie Bucio LPN  Outcome: Not Progressing     Problem: Depression  Goal: Will be euthymic at discharge  Description: INTERVENTIONS:  1. Administer medication as ordered  2. Provide emotional support via 1:1 interaction with staff  3. Encourage involvement in milieu/groups/activities  4. Monitor for social isolation  Outcome: Not Progressing    Patient is isolative, self turns, moves about room as needed. Lays in prayer or child pose, refuses to cover or wear clothing. Using urinal. Tightens up all muscles  upon attempts to get vital signs or check and clean wound right ankle. Able to achieve both vitals and wound care with staff of three, much encouragement, calmness and kindness. To facilitate patient moving positions, food, beverages and snacks are placed out of reach. Patient ambulates when others are not present in the room as evidenced by, meals consumed, beverages consumed, empty snack wrappers on trays

## 2024-02-22 NOTE — PROGRESS NOTES
Behavioral Services                                              Medicare Re-Certification    I certify that the inpatient psychiatric hospital services furnished since the previous certification/re-certification were, and continue to be, medically necessary for;    [x] (1) Treatment which could reasonably be expected to improve the patient's condition,    [] (2) Or for diagnostic study.    Estimated length of stay/service 3-5 days    Plan for post-hospital care ECT    This patient continues to need, on a daily basis, active treatment furnished directly by or requiring the supervision of inpatient psychiatric personnel.    Electronically signed by Lety Larson MD on 2/22/2024 at 9:44 AM

## 2024-02-22 NOTE — BH NOTE
Psychiatric Progress Note    Patient: Roge Cowan MRN: [id number]  SSN: [id number]    YOB: 1970  Age: 54 y.o.  Sex: male      Admit Date: 2/15/2024    LOS: 7 days     Subjective:   02/22/2024  Nursing estimate patient slept 3 hours last night. This morning he refused vitals. He is eating 100% of his meals.  He's taken all of his PO medications.  He's often times lying down on his mattress.  Upon my evaluation, he was in the fetal position. He doesn't answer any of my questions. He doesn't follow any commands.    02/21/2024  Nursing reports that patient slept well overnight.  He is sleeping on his mattress.  Staff stated that over the weekend he received a shower with the help of the staff, and he said \"thank you.\"  The majority of the time yesterday he was on his mattress, and not on the floor.  He does state in the fetal position, but not the majority of the time now.  He has been taking his medications by mouth and eating 100% of his meals.  Upon my evaluation he was unclothed, lying on his mattress, and sound asleep.    02/20/2024  Nursing report that patient now is sleeping on his mattress in his room and not sleeping on the floor. He slept 7-8 hours last night.  He does well when he is alone and isn't disturbed. He's been eating 100% of all of his meals. His PO medications are placed into ice-cream which he's been consuming.  Today upon my exam, Isreal was sound asleep. The treatment team elected to let him sleep and rest.    02/19/2024  Nursing report that patient remains in the fetal position mostly, but is observed to be sleeping on his side other times.  He is eating 100% of his food when nobody staff aren't present. He's been taking his PO medications. He increases his rate of breathing and opposes any attempt to physically evaluate him.  He doesn't answer any of my questions and doesn't follow any directions.      2/18 -  Roge Cowan reports little to this interviewer, however he spoke  addition, the hospital records show that services furnished were intensive treatment services, admission or related services, or equivalent services.    Signed By: Lety Larson MD     February 22, 2024

## 2024-02-22 NOTE — BH NOTE
GROUP THERAPY PROGRESS NOTE  No groups due to Low patient participation. SW provided pts with activities to complete independently.     Katherine Gillies, MSW, [facility]

## 2024-02-22 NOTE — PROGRESS NOTES
Comprehensive Nutrition Assessment    Type and Reason for Visit:      Nutrition Recommendations/Plan:   Continue regular diet  Continue ONS- Two Codey HN, Ensure Plus and Magic cup supplements  Please continue to record po intake in I/O flowsheet            Malnutrition Assessment:  Malnutrition Status:  Severe malnutrition (01/15/24 1347)    Context:  Acute Illness     Findings of the 6 clinical characteristics of malnutrition:  Energy Intake:  50% or less of estimated energy requirements for 5 or more days  Weight Loss:  Unable to assess     Body Fat Loss:  Moderate body fat loss Fat Overlying Ribs, Orbital   Muscle Mass Loss:  Moderate muscle mass loss Clavicles (pectoralis & deltoids), Temples (temporalis)  Fluid Accumulation:  No significant fluid accumulation     Strength:  Not Performed                Nutrition Assessment:    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 Initially treated with large amounts of BZDs and ECT x 2 however difficult to administer medical care. Pt refusing all PO intake, medical care, medications. New development of pressure injury-WOCN following. Transferred to [facility] ICU 1/12. Intubated today d/t continued inability to care for pt medically.     2/22: Follow-up. Pt transferred to U 2/15. Pt continues to eat well at meal time-accepts supplements well. Two-Codey HN ordered when pt in ICU but did not carry over into the BHU. Spoke with Nutrition Services who have now added this to Breakfast and Dinner trays.     Unable to weigh Isreal since pt sleeps on mattress on the floor (no bed scale) and unable to get patient upright/standing position. Pt crawls vs walking.     Waiting for placement and ECT treatments at [facility].    2/15: Discussed with RN who reports pt continues to eat 100% of meals. Supplements adjusted yesterday (see full assessment-RD note) to increase caloric intake. RN does not think Isreal will consume snacks/supplements between  100%   02/17/24 2307 76 - 100%   02/17/24 1735 76 - 100%   02/17/24 1207 76 - 100%   02/17/24 0837 76 - 100%   02/16/24 2352 76 - 100%   02/16/24 1718 76 - 100%   02/16/24 1230 76 - 100%   02/16/24 0830 76 - 100%     Supplement Intake:  Patient Vitals for the past 168 hrs:   PO Supplement (%)   02/20/24 1200 0%   02/20/24 0838 0%   02/19/24 0947 76 - 100%     Nutrition Support: None      Anthropometric Measures:  Height: 183.9 cm (6' 0.4\")  Ideal Body Weight (IBW): 180 lbs (82 kg)    Admission Body Weight: 69 kg (152 lb 1.9 oz)  Current Body Weight: 60.1 kg (132 lb 7.9 oz), 73.6 % IBW.    Current BMI (kg/m2): 17.8                          BMI Categories: Underweight (BMI less than 18.5)    Wt Readings from Last 10 Encounters:   02/15/24 60.1 kg (132 lb 7.9 oz)   02/14/24 60.1 kg (132 lb 7.9 oz)   12/16/23 80.7 kg (178 lb)           Nutrition Diagnosis:   Severe malnutrition related to psychological cause or life stress as evidenced by BMI      Nutrition Interventions:   Food and/or Nutrient Delivery: Continue Current Diet, Continue Oral Nutrition Supplement  Nutrition Education/Counseling: No recommendation at this time  Coordination of Nutrition Care: Continue to monitor while inpatient       Goals:     Goals:  (Consume % of meals by next RD assessment.)       Nutrition Monitoring and Evaluation:   Behavioral-Environmental Outcomes: None Identified  Food/Nutrient Intake Outcomes: Food and Nutrient Intake, Supplement Intake  Physical Signs/Symptoms Outcomes: Meal Time Behavior, Weight    Discharge Planning:    Continue Oral Nutrition Supplement, Continue current diet     ORI FelixC  Available via Quid

## 2024-02-22 NOTE — BH NOTE
1930 Received pt in room lying on the mattress unclothed in prayer position. Pt is non-verbal to staff when asked questions. Pt blankets and mattress appear to be dry. Staff is awaiting EVS to mop floor to place additional mats for pt comfort. Pt respirations even and unlabored, no respiratory distress noted. Urinal within reach. Opened water bottle given to pt within reach.     2050 Writer administered medication to pt. When asked of pt was in pain pt answered \"ughhhya\" and grunted. Pt received Tylenol PRN in addition to scheduled Remeron. Pt meds were crushed and mixed into vanilla ice cream, ginger ale for drink. Placed meds/snack within reach of pt. While standing at door way, staff witnessed pt consume 100% of ice cream/meds and ginger ale.See I/O flowsheet. Pt appears to not be in acute distress, no respiratory distress noted.     2100 Pt given two open bottles of water within reach, and peanut butter crackers and cheese its.     2340 Wound care completed per order. Pt able to withstand changing. Pt hesitant at first, but able to make right ankle available for care when encouraged. Staff removed trash from snacks. Instead of using urinal in arms reach, pt drank two bottle of water and urinated in them. Urine was syl, clear, and no odor.     2/22/24    0025 Placed bottle of water within arms reach of pt.     0100 Pt appears to be asleep on mattress in prayer position    0400 Pt consumed 100% of water bottle. Does not appear to have urinary incontinence.

## 2024-02-23 PROCEDURE — 6370000000 HC RX 637 (ALT 250 FOR IP): Performed by: PSYCHIATRY & NEUROLOGY

## 2024-02-23 PROCEDURE — 1140000000 HC RM PRIVATE PSYCH

## 2024-02-23 RX ORDER — HALOPERIDOL 10 MG/1
10 TABLET ORAL ONCE
Status: COMPLETED | OUTPATIENT
Start: 2024-02-23 | End: 2024-02-23

## 2024-02-23 RX ADMIN — HALOPERIDOL 10 MG: 5 TABLET ORAL at 15:14

## 2024-02-23 RX ADMIN — HALOPERIDOL 10 MG: 10 TABLET ORAL at 18:56

## 2024-02-23 RX ADMIN — HALOPERIDOL 10 MG: 5 TABLET ORAL at 10:00

## 2024-02-23 RX ADMIN — FLUOXETINE 40 MG: 10 CAPSULE ORAL at 11:55

## 2024-02-23 RX ADMIN — LORAZEPAM 3 MG: 2 TABLET ORAL at 18:56

## 2024-02-23 RX ADMIN — LORAZEPAM 3 MG: 2 TABLET ORAL at 15:14

## 2024-02-23 RX ADMIN — LORAZEPAM 3 MG: 2 TABLET ORAL at 10:00

## 2024-02-23 RX ADMIN — MIRTAZAPINE 45 MG: 15 TABLET, ORALLY DISINTEGRATING ORAL at 20:45

## 2024-02-23 RX ADMIN — MUPIROCIN: 20 OINTMENT TOPICAL at 11:40

## 2024-02-23 RX ADMIN — MUPIROCIN: 20 OINTMENT TOPICAL at 20:45

## 2024-02-23 ASSESSMENT — PAIN - FUNCTIONAL ASSESSMENT: PAIN_FUNCTIONAL_ASSESSMENT: ADULT NONVERBAL PAIN SCALE (NPVS)

## 2024-02-23 NOTE — BH NOTE
Psychiatric Progress Note    Patient: Roge Cowan MRN: [id number]  SSN: [id number]    YOB: 1970  Age: 54 y.o.  Sex: male      Admit Date: 2/15/2024    LOS: 8 days     Subjective:   02/23/2024  Nursing report patient slept well overnight this past night. He is eating all of his meals and taking PO medications within his meals.  He resists patients when attempting to measure vital signs and curls into the fetal position upon entry of his room.  Upon my evaluation he was eating an ice-scream snack, but retreated into the fetal position. He was seated on the mats in his room, near his mattress.    He wouldn't answer my questions and resisted any physical evaluation of his reflexes.    02/22/2024  Nursing estimate patient slept 3 hours last night. This morning he refused vitals. He is eating 100% of his meals.  He's taken all of his PO medications.  He's often times lying down on his mattress.  Upon my evaluation, he was in the fetal position. He doesn't answer any of my questions. He doesn't follow any commands.    02/21/2024  Nursing reports that patient slept well overnight.  He is sleeping on his mattress.  Staff stated that over the weekend he received a shower with the help of the staff, and he said \"thank you.\"  The majority of the time yesterday he was on his mattress, and not on the floor.  He does state in the fetal position, but not the majority of the time now.  He has been taking his medications by mouth and eating 100% of his meals.  Upon my evaluation he was unclothed, lying on his mattress, and sound asleep.    02/20/2024  Nursing report that patient now is sleeping on his mattress in his room and not sleeping on the floor. He slept 7-8 hours last night.  He does well when he is alone and isn't disturbed. He's been eating 100% of all of his meals. His PO medications are placed into ice-cream which he's been consuming.  Today upon my exam, Isreal was sound asleep. The treatment team elected  It is unclear, however, how long logistical delays of transfer may take.    02/19/2024  Increase remeron from 15mg po qhs to 30mg po qhs.  Today will administer last dosage of haldol 10mg IM TID with meals and ativan 3mg TID with meals; instead, since patient has been accepting PO medications, staff will give him the option of taking haldol/ativan by mouth, and only give them IM upon PO refusal. This way we decrease distress tot he patient.  I reached out to Dr. Pascual GUERRERO on Friday. She informed me that she is out with COVID, and can't evaluate patient's case for ECT/transfer until this Wednesday.    I certify that this patient's inpatient psychiatric hospital services furnished since the previous certification were, and continue to be, required for treatment that could reasonably be expected to improve the patient's condition, or for diagnostic study, and that the patient continues to need, on a daily basis, active treatment furnished directly by or requiring the supervision of inpatient psychiatric facility personnel. In addition, the hospital records show that services furnished were intensive treatment services, admission or related services, or equivalent services.    Signed By: Lety Larson MD     February 23, 2024

## 2024-02-23 NOTE — PLAN OF CARE
Problem: Safety - Adult  Goal: Free from fall injury  2/23/2024 0801 by Luisa Lee, RN  Outcome: Progressing

## 2024-02-23 NOTE — PLAN OF CARE
Problem: Safety - Adult  Goal: Free from fall injury  Outcome: Progressing   Roge is resting quietly on his mattress. Respirations even and unlabored.  He is using the urinal in his room and changing his position independently.  Attempts made to cover his body with a blanket but he takes the blanket off.  Q15 minute safety checks maintained.

## 2024-02-23 NOTE — INTERDISCIPLINARY ROUNDS
Behavioral Health Interdisciplinary Rounds     Patient Name: Roge Cowan  Age: 54 y.o.  Room/Bed:  [facility]  Primary Diagnosis: Catatonic schizophrenia (HCC)   Admission Status: Involuntary Commitment    Readmission within 30 days: No  Power of  in place: Yes  Patient requires a blocked bed: Yes          Reason for blocked bed: behavior  Sleep hours:   2      Participation in Care/Groups:  No  Medication Compliant?: Yes  PRNS (last 24 hours): Pain   Restraints (last 24 hours):  No  __________________________________________________  OQ Admission Analysis Survey completed:  OQ Admission Analysis Survey score:  __________________________________________________     Alcohol screening (AUDIT) completed -     If applicable, date SBIRT discussed in treatment team AND documented:    Tobacco - patient is a smoker:    Illegal Drugs use:      24 hour chart check complete: Yes    _______________________________________________    Patient goal(s) for today: Communicate needs to staff   Treatment team focus/goals: Assess pt, manage medications, discharge planning   Progress note: No significant changes      Spiritual Care Consult:   Financial concerns/prescription coverage:    Family contact:                        Family requesting physician contact today:    Discharge plan:   Access to weapons :                                                              Outpatient provider(s):   Patient's preferred phone number for follow up call :   Patient's preferred e-mail address :    LOS:  8  Expected LOS: TBD     Participating treatment team members: Roge RussellPayton serra MSSILVIA, Luisa VILLA, RN, Dr. Larson

## 2024-02-23 NOTE — PROGRESS NOTES
Palliative Medicine   [place] [phone] (COPE)        [facility] [phone] (Echo)        Palliative Medicine DARRELL spoke with Eden yesterday, 2/22 via e-mail and ongoing correspondence today, 2/23-     She was hoping to visit Isreal today- SW notified her of visiting hours on Behavioral Health after speaking with staff on 2/22, however- informed Eden that if she cannot visit from 2:00-3:00 p.m., provided her with phone number for her to call directly to talk w/ staff if she can visit in the morning hours.     DARRELL also found resource for Eden- \"[employer]\" that offer car repairs at lower/discounted rates and provide car repairs for families in need of financial assistance. Eden is really appreciative of resource- SW encouraged her to contact and apply for assistance with her car repairs to help pass inspection.     Palliative Medicine SW continues to correspond regularly with Eden and offer emotional support.     Thank you for including Palliative Medicine in the care of Roge \"Isreal\" Chapo Torres LCSW

## 2024-02-23 NOTE — BH NOTE
Pt is awake and selectively mute.  He verbally responded when asked to allow nurses to assess his skin.  Stated, \"can I just lay here\".  He did turn to his side to allow the assessment.  Noted blanchable reddened areas on his right shoulder and right hip.  He was asked about pain but did not respond.  The wound on his right ankle is approximately 1.5 cm, clean and surrounding edges are red; no increased in warmth at this time.  Pt moved from the floor to his mattress to allow nurses to clean the floor and the mats.  Safety checks maintained.

## 2024-02-24 PROCEDURE — 6370000000 HC RX 637 (ALT 250 FOR IP): Performed by: PSYCHIATRY & NEUROLOGY

## 2024-02-24 PROCEDURE — 1140000000 HC RM PRIVATE PSYCH

## 2024-02-24 RX ORDER — PEPPERMINT OIL
SPIRIT ORAL DAILY
Status: DISCONTINUED | OUTPATIENT
Start: 2024-02-24 | End: 2024-03-07

## 2024-02-24 RX ORDER — FLAVORING AGENT
OIL (ML) MISCELLANEOUS DAILY
Status: DISCONTINUED | OUTPATIENT
Start: 2024-02-24 | End: 2024-02-24

## 2024-02-24 RX ADMIN — MUPIROCIN: 20 OINTMENT TOPICAL at 21:33

## 2024-02-24 RX ADMIN — LORAZEPAM 3 MG: 2 TABLET ORAL at 08:27

## 2024-02-24 RX ADMIN — FLUOXETINE 40 MG: 10 CAPSULE ORAL at 11:56

## 2024-02-24 RX ADMIN — HALOPERIDOL 10 MG: 5 TABLET ORAL at 08:29

## 2024-02-24 RX ADMIN — MUPIROCIN: 20 OINTMENT TOPICAL at 08:41

## 2024-02-24 RX ADMIN — MIRTAZAPINE 45 MG: 15 TABLET, ORALLY DISINTEGRATING ORAL at 21:33

## 2024-02-24 RX ADMIN — HALOPERIDOL 10 MG: 5 TABLET ORAL at 12:01

## 2024-02-24 RX ADMIN — HALOPERIDOL 10 MG: 5 TABLET ORAL at 16:26

## 2024-02-24 RX ADMIN — LORAZEPAM 3 MG: 2 TABLET ORAL at 11:58

## 2024-02-24 RX ADMIN — Medication: at 21:59

## 2024-02-24 RX ADMIN — POLYETHYLENE GLYCOL 3350 17 G: 17 POWDER, FOR SOLUTION ORAL at 08:29

## 2024-02-24 RX ADMIN — SENNOSIDES 8.6 MG: 8.6 TABLET, FILM COATED ORAL at 08:29

## 2024-02-24 RX ADMIN — LORAZEPAM 3 MG: 2 TABLET ORAL at 16:26

## 2024-02-24 ASSESSMENT — PAIN - FUNCTIONAL ASSESSMENT
PAIN_FUNCTIONAL_ASSESSMENT: ADULT NONVERBAL PAIN SCALE (NPVS)
PAIN_FUNCTIONAL_ASSESSMENT: ADULT NONVERBAL PAIN SCALE (NPVS)

## 2024-02-24 NOTE — PLAN OF CARE
Problem: Safety - Adult  Goal: Free from fall injury  2/24/2024 0811 by Luisa Lee, RN  Outcome: Progressing

## 2024-02-24 NOTE — PLAN OF CARE
Roge is resting quietly on mattress in prayer position. Breathing is even and unlabored, eyes are closed. He is in NAD at this time, monitored q15 for safety by U staff.    Problem: Safety - Adult  Goal: Free from fall injury  Outcome: Progressing     Problem: Pain  Goal: Verbalizes/displays adequate comfort level or baseline comfort level  Outcome: Progressing

## 2024-02-24 NOTE — BH NOTE
0845 Cleaned wound right ankle lon prominence with Vashe'  Applied mupirocin   Blanches, pink wound bed, erythema.  Patient maintains child pose, assist x 2 to roll patient to clean and apply mupirocin.    @ 1400, move patient from mattress to mat to facilitate changing linens, wiping mattress. Apply orange top creme to bilateral shoulders, upper back, bilateral hips,  bilateral heels. Clean wound right ankle prominence with Vashe', applied mupirocin. No dressing applied per patient refuses.  @ 1405 moved patient from mat to mattress to facilitate cleaning mats, wiping mats, drying mats. Clean floor, dry floor, replace mats.  Patient moves self from mattress to mat when it is finished. Consumes snacks of animal crackers, peanut butter crackers, bottle water 4 oz.

## 2024-02-25 PROCEDURE — 6370000000 HC RX 637 (ALT 250 FOR IP): Performed by: PSYCHIATRY & NEUROLOGY

## 2024-02-25 PROCEDURE — 1140000000 HC RM PRIVATE PSYCH

## 2024-02-25 RX ADMIN — FLUOXETINE 40 MG: 10 CAPSULE ORAL at 11:24

## 2024-02-25 RX ADMIN — LORAZEPAM 3 MG: 2 TABLET ORAL at 12:34

## 2024-02-25 RX ADMIN — HALOPERIDOL 10 MG: 5 TABLET ORAL at 07:48

## 2024-02-25 RX ADMIN — Medication: at 10:24

## 2024-02-25 RX ADMIN — TRAZODONE HYDROCHLORIDE 50 MG: 50 TABLET ORAL at 20:10

## 2024-02-25 RX ADMIN — HYDROXYZINE HYDROCHLORIDE 50 MG: 50 TABLET, FILM COATED ORAL at 20:10

## 2024-02-25 RX ADMIN — HALOPERIDOL 10 MG: 5 TABLET ORAL at 12:34

## 2024-02-25 RX ADMIN — MUPIROCIN: 20 OINTMENT TOPICAL at 10:24

## 2024-02-25 RX ADMIN — MUPIROCIN: 20 OINTMENT TOPICAL at 20:12

## 2024-02-25 RX ADMIN — LORAZEPAM 3 MG: 2 TABLET ORAL at 07:48

## 2024-02-25 RX ADMIN — HALOPERIDOL 10 MG: 5 TABLET ORAL at 16:18

## 2024-02-25 RX ADMIN — LORAZEPAM 3 MG: 2 TABLET ORAL at 16:19

## 2024-02-25 RX ADMIN — MIRTAZAPINE 45 MG: 15 TABLET, ORALLY DISINTEGRATING ORAL at 20:10

## 2024-02-25 ASSESSMENT — PAIN SCALES - GENERAL: PAINLEVEL_OUTOF10: 0

## 2024-02-25 ASSESSMENT — PAIN - FUNCTIONAL ASSESSMENT: PAIN_FUNCTIONAL_ASSESSMENT: ADULT NONVERBAL PAIN SCALE (NPVS)

## 2024-02-25 NOTE — PLAN OF CARE
Problem: Skin/Tissue Integrity  Goal: Absence of new skin breakdown  Description:  Monitor for areas of redness and/or skin breakdown  Outcome: Progressing

## 2024-02-25 NOTE — BH NOTE
Psychiatric Progress Note    Patient: Roge Cowan MRN: [id number]  SSN: [id number]    YOB: 1970  Age: 54 y.o.  Sex: male      Admit Date: 2/15/2024    LOS: 9 days     Subjective:   02/24/2024  Patient did not respond to conversation.Noted to be curled up in the fetal position  Per staff, he slept 5 hours yesterday and he is eating well.  No self-injurious behaviors.  No aggression.    02/23/2024  Nursing report patient slept well overnight this past night. He is eating all of his meals and taking PO medications within his meals.  He resists patients when attempting to measure vital signs and curls into the fetal position upon entry of his room.  Upon my evaluation he was eating an ice-scream snack, but retreated into the fetal position. He was seated on the mats in his room, near his mattress.    He wouldn't answer my questions and resisted any physical evaluation of his reflexes.    02/22/2024  Nursing estimate patient slept 3 hours last night. This morning he refused vitals. He is eating 100% of his meals.  He's taken all of his PO medications.  He's often times lying down on his mattress.  Upon my evaluation, he was in the fetal position. He doesn't answer any of my questions. He doesn't follow any commands.    02/21/2024  Nursing reports that patient slept well overnight.  He is sleeping on his mattress.  Staff stated that over the weekend he received a shower with the help of the staff, and he said \"thank you.\"  The majority of the time yesterday he was on his mattress, and not on the floor.  He does state in the fetal position, but not the majority of the time now.  He has been taking his medications by mouth and eating 100% of his meals.  Upon my evaluation he was unclothed, lying on his mattress, and sound asleep.    02/20/2024  Nursing report that patient now is sleeping on his mattress in his room and not sleeping on the floor. He slept 7-8 hours last night.  He does well when he is alone  Judgment remain poor    MEDICATIONS:   Peppermint Spirit   Does not apply Daily    FLUoxetine  40 mg Oral Lunch    mirtazapine  45 mg Oral Nightly    mupirocin   Topical BID    haloperidol  10 mg Oral TID WC    Or    haloperidol lactate  10 mg IntraMUSCular TID WC    LORazepam  3 mg Oral TID WC    Or    LORazepam  3 mg IntraMUSCular TID WC     Lab/Data Review:  All lab results for the last 24 hours reviewed.    No results found for this or any previous visit (from the past 24 hour(s)).      Assessment:   Catatonia  Schizophrenia      Plan:   02/24/2024  Continue current care    02/23/2024  Messages to Dr. Garner today weren't returned regarding ECT scheduling/transfer to [facility]. She was out with COVID starting last Friday.  For the time being will continue current medication regimen as patient has not worsened and is eating and taking all of his medications.  Wound care continues to follow pt's R ankle ulcer.  Appreciate the time and expertise of all team members, including dietitian, social work from [facility] and palliative care, as well as our nurses.    02/22/2024  Awaiting response from Dr. Garner regarding availability of her ECT schedule.  For the time being will increase prozac from 20 to 40mg po qday. Will increase remeron from 30 to 45mg po qhs.  Continue haldol 10mg po bid and ativan 3mg po tid with meals, with IM alternatives.    02/21/2024  Continue current medication regimen as prescribed.  I contacted Dr. Garner at [facility] today, who informed me that she is still at out sick due to COVID.  She did say that she will check with her ECT coordinator in order to accommodate Mr. Cowan's treatment.  As patient's irritability and resistance to treatment/staff continue to decrease, transfer to [facility] can be a consideration if transfer to [facility] is further delayed.    02/20/2024  Continue current PO medication regimen with IM alternatives (for haldol and ativan) upon refusal.  Continue remeron 30mg po qhs, prozac 20mg po

## 2024-02-25 NOTE — BH NOTE
Psychiatric Progress Note    Patient: Roge Cowan MRN: [id number]  SSN: [id number]    YOB: 1970  Age: 54 y.o.  Sex: male      Admit Date: 2/15/2024    LOS: 10 days     Subjective:   02/25/2024  Patient is not responding. He is unclothed and in the fetal position.  Per staff, he is taking his medications.  No aggression.    02/24/2024  Patient did not respond to conversation.Noted to be curled up in the fetal position  Per staff, he slept 5 hours yesterday and he is eating well.  No self-injurious behaviors.  No aggression.    02/23/2024  Nursing report patient slept well overnight this past night. He is eating all of his meals and taking PO medications within his meals.  He resists patients when attempting to measure vital signs and curls into the fetal position upon entry of his room.  Upon my evaluation he was eating an ice-scream snack, but retreated into the fetal position. He was seated on the mats in his room, near his mattress.    He wouldn't answer my questions and resisted any physical evaluation of his reflexes.    02/22/2024  Nursing estimate patient slept 3 hours last night. This morning he refused vitals. He is eating 100% of his meals.  He's taken all of his PO medications.  He's often times lying down on his mattress.  Upon my evaluation, he was in the fetal position. He doesn't answer any of my questions. He doesn't follow any commands.    02/21/2024  Nursing reports that patient slept well overnight.  He is sleeping on his mattress.  Staff stated that over the weekend he received a shower with the help of the staff, and he said \"thank you.\"  The majority of the time yesterday he was on his mattress, and not on the floor.  He does state in the fetal position, but not the majority of the time now.  He has been taking his medications by mouth and eating 100% of his meals.  Upon my evaluation he was unclothed, lying on his mattress, and sound asleep.    02/20/2024  Nursing report that  commands.  He was eating an ice-scream snack.  Unable to assess Mood, Affect, thought process, thought content, recent/remote memories  Insight and Judgment remain poor    MEDICATIONS:   Peppermint Spirit   Does not apply Daily    FLUoxetine  40 mg Oral Lunch    mirtazapine  45 mg Oral Nightly    mupirocin   Topical BID    haloperidol  10 mg Oral TID WC    Or    haloperidol lactate  10 mg IntraMUSCular TID WC    LORazepam  3 mg Oral TID WC    Or    LORazepam  3 mg IntraMUSCular TID WC     Lab/Data Review:  All lab results for the last 24 hours reviewed.    No results found for this or any previous visit (from the past 24 hour(s)).      Assessment:   Catatonia  Schizophrenia      Plan:   02/25/2024  Continue current care.    02/24/2024  Continue current care    02/23/2024  Messages to Dr. Garner today weren't returned regarding ECT scheduling/transfer to [facility]. She was out with COVID starting last Friday.  For the time being will continue current medication regimen as patient has not worsened and is eating and taking all of his medications.  Wound care continues to follow pt's R ankle ulcer.  Appreciate the time and expertise of all team members, including dietitian, social work from [facility] and palliative care, as well as our nurses.    02/22/2024  Awaiting response from Dr. Garner regarding availability of her ECT schedule.  For the time being will increase prozac from 20 to 40mg po qday. Will increase remeron from 30 to 45mg po qhs.  Continue haldol 10mg po bid and ativan 3mg po tid with meals, with IM alternatives.    02/21/2024  Continue current medication regimen as prescribed.  I contacted Dr. Garner at [facility] today, who informed me that she is still at out sick due to COVID.  She did say that she will check with her ECT coordinator in order to accommodate Mr. Cowan's treatment.  As patient's irritability and resistance to treatment/staff continue to decrease, transfer to [facility] can be a consideration if transfer to [facility]

## 2024-02-25 NOTE — PLAN OF CARE
Roge is resting quietly in child's pose/ prayer position with eyes closed. He is in NAD and no concerns are noted at this time. 15 minute visual checks are maintained.    Problem: Safety - Adult  Goal: Free from fall injury  Outcome: Progressing     Problem: Pain  Goal: Verbalizes/displays adequate comfort level or baseline comfort level  Outcome: Progressing

## 2024-02-25 NOTE — BH NOTE
1024-  Pt is resistant to wound care. Education provided. Wound bed cleaned. Mupirocin ointment applied; open to air. Pt refuses dressing. Wound care completed.   Pt is interactive with staff after wound care is complete and room is cleaned. Briefly verbal with eye contact. Frequently moves around his room changing position. Refuses vs.   Eats 100% of meals and snacks. Tolerates oral fluids well. Uses urinal. Refuses clothing, blanket, and gowns.

## 2024-02-26 PROCEDURE — 6370000000 HC RX 637 (ALT 250 FOR IP): Performed by: PSYCHIATRY & NEUROLOGY

## 2024-02-26 PROCEDURE — 1140000000 HC RM PRIVATE PSYCH

## 2024-02-26 RX ORDER — SENNA AND DOCUSATE SODIUM 50; 8.6 MG/1; MG/1
2 TABLET, FILM COATED ORAL 2 TIMES DAILY
Status: DISCONTINUED | OUTPATIENT
Start: 2024-02-26 | End: 2024-03-11 | Stop reason: HOSPADM

## 2024-02-26 RX ADMIN — LORAZEPAM 3 MG: 2 TABLET ORAL at 17:12

## 2024-02-26 RX ADMIN — HALOPERIDOL 10 MG: 5 TABLET ORAL at 09:08

## 2024-02-26 RX ADMIN — MUPIROCIN: 20 OINTMENT TOPICAL at 20:45

## 2024-02-26 RX ADMIN — HALOPERIDOL 10 MG: 5 TABLET ORAL at 13:25

## 2024-02-26 RX ADMIN — FLUOXETINE 40 MG: 10 CAPSULE ORAL at 13:25

## 2024-02-26 RX ADMIN — LORAZEPAM 3 MG: 1 TABLET ORAL at 20:34

## 2024-02-26 RX ADMIN — LORAZEPAM 3 MG: 2 TABLET ORAL at 09:08

## 2024-02-26 RX ADMIN — MUPIROCIN: 20 OINTMENT TOPICAL at 13:46

## 2024-02-26 RX ADMIN — LORAZEPAM 3 MG: 2 TABLET ORAL at 13:25

## 2024-02-26 RX ADMIN — MIRTAZAPINE 45 MG: 15 TABLET, ORALLY DISINTEGRATING ORAL at 20:33

## 2024-02-26 RX ADMIN — SENNOSIDES AND DOCUSATE SODIUM 2 TABLET: 50; 8.6 TABLET ORAL at 14:51

## 2024-02-26 RX ADMIN — SENNOSIDES AND DOCUSATE SODIUM 2 TABLET: 50; 8.6 TABLET ORAL at 20:32

## 2024-02-26 RX ADMIN — HALOPERIDOL 10 MG: 5 TABLET ORAL at 17:12

## 2024-02-26 RX ADMIN — Medication: at 13:45

## 2024-02-26 ASSESSMENT — PAIN SCALES - GENERAL
PAINLEVEL_OUTOF10: 0

## 2024-02-26 NOTE — BH NOTE
Psychiatric Progress Note    Patient: Roge Cowan MRN: [id number]  SSN: [id number]    YOB: 1970  Age: 54 y.o.  Sex: male      Admit Date: 2/15/2024    LOS: 11 days     Subjective:   02/26/2024  Nursing report patient slept 8 hours last night.  He's accepted all of his medications. He's eaten all of his meals.  He did sleep on his mattress last night. However, he sits in the fetal position on the mats on his floor throughout the day.  It takes 3 staff members to obtain his vitals because of his negativism. He doesn't do anything to harm them, just a significant amount of negativism.  Upon my evaluation, patient was lying on his R side on his mattress. He was unclothed. He did say to me today, that he is fine.  He did allow me to listen to his abdomin.   He didn't respond to any of my question thereafter.      02/25/2024  Patient is not responding. He is unclothed and in the fetal position.  Per staff, he is taking his medications.  No aggression.    02/24/2024  Patient did not respond to conversation.Noted to be curled up in the fetal position  Per staff, he slept 5 hours yesterday and he is eating well.  No self-injurious behaviors.  No aggression.    02/23/2024  Nursing report patient slept well overnight this past night. He is eating all of his meals and taking PO medications within his meals.  He resists patients when attempting to measure vital signs and curls into the fetal position upon entry of his room.  Upon my evaluation he was eating an ice-scream snack, but retreated into the fetal position. He was seated on the mats in his room, near his mattress.    He wouldn't answer my questions and resisted any physical evaluation of his reflexes.    02/22/2024  Nursing estimate patient slept 3 hours last night. This morning he refused vitals. He is eating 100% of his meals.  He's taken all of his PO medications.  He's often times lying down on his mattress.  Upon my evaluation, he was in the fetal  that this patient's inpatient psychiatric hospital services furnished since the previous certification were, and continue to be, required for treatment that could reasonably be expected to improve the patient's condition, or for diagnostic study, and that the patient continues to need, on a daily basis, active treatment furnished directly by or requiring the supervision of inpatient psychiatric facility personnel. In addition, the hospital records show that services furnished were intensive treatment services, admission or related services, or equivalent services.    Signed By: Lety Larson MD     February 26, 2024

## 2024-02-26 NOTE — INTERDISCIPLINARY ROUNDS
Behavioral Health Interdisciplinary Rounds     Patient Name: Roge Cowan  Age: 54 y.o.  Room/Bed:  [facility]  Primary Diagnosis: Catatonic schizophrenia (HCC)   Admission Status: Involuntary Commitment _______________________________________________    Patient goal(s) for today:   Treatment team focus/goals: Still trying to set up ECT   Progress note:    Marita from the [place] [facility] team to visit today     Financial concerns/prescription coverage:  medicare   Family contact:  sister /guardian                       Family requesting physician contact today:    Discharge plan: in patient ECT   Access to weapons : no                                                             Outpatient provider(s): [place] [facility]   Patient's preferred phone number for follow up call :   Patient's preferred e-mail address :    LOS:  11  Expected LOS: TBD     Participating treatment team members: Roge Cowna, CINDY Ding Dr., RN

## 2024-02-26 NOTE — PLAN OF CARE
Problem: Safety - Adult  Goal: Free from fall injury  Outcome: Progressing     Pt received resting in bed with eyes closed. Breathing is even and unlabored. Walkways are free and clear from clutter.

## 2024-02-26 NOTE — BH NOTE
2000 - Patient received resting quietly in his room. Patient is lying in a kneeling position on his mattress. Respirations even and unlabored.  No respiratory distress noted. Will continue to monitor.    2100 - Patient repositioned himself from the mattress to the floor mat.  He is in a kneeling position. Appears to be awake.  Staff provided him with his medications, snacks and 240 ml of apple juice and water.  Will continue to monitor.    2230 - Patient is lying quietly on the mattress.  Positioned on his left side.  Appears to be asleep. Respirations even and unlabored.  Will continue to monitor.    2320 - Patient is lying quietly on the mattress.  Positioned on his right side.  No distress noted.

## 2024-02-26 NOTE — PLAN OF CARE
Patient self turns, moves self to different locations of his mat or mattress. Moves to use urinal, to take medications, to feed self.  Patient refuses to left staff take vital signs in am. Patient was given bed bath, blue top lotion applied to back and bony prominences. Patient able to verbalize briefly  \"don't touch me\" when attempting and accomplishing wound care on ankle.  Linens changed, mats cleaned and sanitized, floor cleaned and sanitized.   Able to obtain vital signs in afternoon.  Problem: Skin/Tissue Integrity  Goal: Absence of new skin breakdown  Description: 1.  Monitor for areas of redness and/or skin breakdown  2.  Assess vascular access sites hourly  3.  Every 4-6 hours minimum:  Change oxygen saturation probe site  4.  Every 4-6 hours:  If on nasal continuous positive airway pressure, respiratory therapy assess nares and determine need for appliance change or resting period.  2/26/2024 1540 by Farida Knight, RN  Outcome: Progressing     Problem: Psychosis  Goal: Will report no hallucinations or delusions  Description: INTERVENTIONS:  1. Administer medication as  ordered  2. Assist with reality testing to support increasing orientation  3. Assess if patient's hallucinations or delusions are encouraging self harm or harm to others and intervene as appropriate  2/26/2024 1548 by Elaine Beavers, RN  Outcome: Not Progressing  2/26/2024 1540 by Farida Knight, RN  Outcome: Progressing

## 2024-02-26 NOTE — PLAN OF CARE
Problem: Psychosis  Goal: Will report no hallucinations or delusions  Description: INTERVENTIONS:  1. Administer medication as  ordered  2. Assist with reality testing to support increasing orientation  3. Assess if patient's hallucinations or delusions are encouraging self harm or harm to others and intervene as appropriate  Outcome: Progressing

## 2024-02-26 NOTE — BH NOTE
GROUP THERAPY PROGRESS NOTE  No group due to Low patient participation. SW provided pts with activities to complete independently.     Katherine Gillies, MSW, [facility]

## 2024-02-27 PROCEDURE — 1140000000 HC RM PRIVATE PSYCH

## 2024-02-27 PROCEDURE — 6370000000 HC RX 637 (ALT 250 FOR IP): Performed by: PSYCHIATRY & NEUROLOGY

## 2024-02-27 RX ORDER — LORAZEPAM 2 MG/ML
3 INJECTION INTRAMUSCULAR NIGHTLY
Status: DISCONTINUED | OUTPATIENT
Start: 2024-02-27 | End: 2024-03-11 | Stop reason: HOSPADM

## 2024-02-27 RX ORDER — LORAZEPAM 2 MG/ML
1 INJECTION INTRAMUSCULAR EVERY 6 HOURS PRN
Status: DISCONTINUED | OUTPATIENT
Start: 2024-02-27 | End: 2024-02-28

## 2024-02-27 RX ADMIN — MUPIROCIN 1 EACH: 20 OINTMENT TOPICAL at 21:07

## 2024-02-27 RX ADMIN — HALOPERIDOL 10 MG: 5 TABLET ORAL at 16:28

## 2024-02-27 RX ADMIN — HALOPERIDOL 10 MG: 5 TABLET ORAL at 11:36

## 2024-02-27 RX ADMIN — LORAZEPAM 3 MG: 2 TABLET ORAL at 16:28

## 2024-02-27 RX ADMIN — LORAZEPAM 3 MG: 2 TABLET ORAL at 11:43

## 2024-02-27 RX ADMIN — FLUOXETINE 40 MG: 10 CAPSULE ORAL at 11:35

## 2024-02-27 RX ADMIN — MUPIROCIN: 20 OINTMENT TOPICAL at 11:54

## 2024-02-27 RX ADMIN — LORAZEPAM 3 MG: 2 TABLET ORAL at 07:48

## 2024-02-27 RX ADMIN — MIRTAZAPINE 45 MG: 15 TABLET, ORALLY DISINTEGRATING ORAL at 20:37

## 2024-02-27 RX ADMIN — LORAZEPAM 3 MG: 2 TABLET ORAL at 20:37

## 2024-02-27 RX ADMIN — HALOPERIDOL 10 MG: 5 TABLET ORAL at 07:48

## 2024-02-27 RX ADMIN — SENNOSIDES AND DOCUSATE SODIUM 2 TABLET: 50; 8.6 TABLET ORAL at 20:37

## 2024-02-27 RX ADMIN — ACETAMINOPHEN 650 MG: 325 TABLET ORAL at 11:35

## 2024-02-27 RX ADMIN — Medication: at 09:00

## 2024-02-27 RX ADMIN — SENNOSIDES AND DOCUSATE SODIUM 2 TABLET: 50; 8.6 TABLET ORAL at 07:49

## 2024-02-27 ASSESSMENT — PAIN SCALES - GENERAL: PAINLEVEL_OUTOF10: 0

## 2024-02-27 NOTE — WOUND CARE
WOCN Note:     Follow up visit    Roge Cowan is a 54 y.o. y/o male who presented for Catatonic schizophrenia (HCC) [F20.2]  Admitted on 2/15/2024    Past Medical History:   Diagnosis Date    Psychiatric disorder     Psychotic disorder (HCC)     Withdrawal syndrome (HCC)        Lab Results   Component Value Date/Time    WBC 6.8 02/15/2024 04:17 AM    LABA1C 5.2 12/13/2023 06:02 AM    POCGLU 159 (H) 02/14/2024 06:54 PM    POCGLU 95 02/14/2024 12:48 PM    HGB 15.1 02/15/2024 04:17 AM    HCT 47.0 02/15/2024 04:17 AM     02/15/2024 04:17 AM        Tobacco Use      Smoking status: Some Days      Smokeless tobacco: Never     ADULT ORAL NUTRITION SUPPLEMENT; Dinner, Breakfast; Frozen Oral Supplement  DIET ONE TIME MESSAGE;  ADULT DIET; Regular  ADULT ORAL NUTRITION SUPPLEMENT; Breakfast, Dinner; Other Oral Supplement; Two Codey HN  ADULT ORAL NUTRITION SUPPLEMENT; Lunch; Standard High Calorie/High Protein Oral Supplement     Assessment:   Seen today in room 724/01   Alert and communicative only to say not to cover wound when dressing was placed  Mobile and repositions independently but stays in child pose on floor mat most of the time. Does not allow for visual assessment of wounds without staff assistance in repositioning   Surface: foam mattress, foam pads on floor to attempt to cushion patient when he gets off the mattress  Buttocks and sacrum intact without erythema    1. POA Pressure Injury to Right Anterior-Lateral Ankle - Stage 3    Unable to measure as priority was to photograph and dress the wound while patient was in position for visualization  Wound bed 60% pink with 40% island of tan soft devitalized tissue, small - moderate serous exudate  Periwound with maceration and blanching erythema depicted above  Uncertain if erythema is hyperemia or infectious process - patient returns to child pose too soon to see if erythema resolves with time off  of the area   Tx: cleansed with saline damp gauze, applied mupirocin and covered with foam dressing but patient immediately removed dressing     2. Stage 2 Pressure Injury to Left Anterior-Lateral Ankle    3 superficial openings depicted above covered with thin dry crusts  Periwounds with erythema depicted above and similar issue as with wound #1  Tx: Patient will not allow dressing- see above    Wound Recommendations:    Right Ankle: Daily - cleanse area gently with Vashe damp gauze, apply Santyl to tan tissue in wound bed (using cotton-tipped applicator is helpful) and cover with foam dressing.   Left Ankle: Twice Daily - cleanse area gently with Vashe damp gauze, apply Balsam peru-castor oil (Venelex) to foam dressing and then place the dressing (this ensures the dressing will adhere rather than applying the Venelex directly to skin)     Turn/reposition approximately every 2 hours - allow for breaks from restraints after maximum of 2 hours for repositioning and self care  Offload heels with heels hanging off end of pillow while in restraints.   Sacral Foam dressing: at least while in restraints. If able to maintain for long periods, lift to assess regularly; change as needed. Discontinue if incontinence is frequently soiling or wetting dressing.     Low Air Loss mattress Use only flat sheet and one incontinence pad.     Discussed assessment and plan of care with GAIL Persaud, GAIL Henry, and remainder of care team     Please reach out if any clarification is needed regarding orders or wound care     Transition of Care: Plan to follow weekly and as needed while admitted to hospital.      Anne Sturgeon MSN, RN  [facility] Inpatient Wound Care  Office [phone]   Available through [employer]

## 2024-02-27 NOTE — PLAN OF CARE
Problem: Safety - Adult  Goal: Free from fall injury  2/26/2024 2344 by Jennifer Timmons RN  Outcome: Progressing   Patient received resting quietly in bed. No signs of distress. Even and unlabored breathing. Staff will continue to monitor safety q15 and provide support.

## 2024-02-27 NOTE — CASE COMMUNICATION
Email from patient's sister (guardian) to authorize use of restraints.    Eden Montalvo <[email or web address]>  Tue 2/27/2024 2:11 PM  To:Lety Larson <[email or web address]>  WARNING: The sender of this email could not be validated and may not match the person in the “From” field.  CAUTION: This email originated from outside of the [facility].  DO NOT CLICK on any links or attachments unless you recognize the sender and you know that the content is safe.  Good afternoon.    Per our phone call on Tuesday, February 27, 2024, you have my authorization as the legal guardian and next of kin, for Roge Cowan to be restrained to receive treatment for infections in his legs and feet, and as needed to help with other medical procedures or administering medications as necessary.     Thank you,   Eden Montalvo

## 2024-02-27 NOTE — BH NOTE
Attempted to get full set of vitals on patient, was able to get BP and pulse, but when attempting to get temperature and SPO2, patient said \"no, stop\", and pulled away.

## 2024-02-27 NOTE — INTERDISCIPLINARY ROUNDS
Behavioral Health Interdisciplinary Rounds     Patient Name: Roge Cowan  Age: 54 y.o.  Room/Bed:  4/  Primary Diagnosis: Catatonic schizophrenia (HCC)   Admission Status: Involuntary Commitment    Readmission within 30 days: No  Power of  in place: No  Patient requires a blocked bed: Yes          Reason for blocked bed:   Sleep hours: 7       Participation in Care/Groups:  Yes  Medication Compliant?: Yes  PRNS (last 24 hours): None   Restraints (last 24 hours):  No  ___________________________________         Treatment team focus/goals:   Care conference on the unit today with psych staff.    [facility]ospital Administration is planning on a care conference for later today   Plan to continue medications and reach out to [facility] about possible transfer.    Progress note:    DARRELL called and left a message for the ECT nurse , she is only in the office Monday Wednesday and Friday.   - [phone]   Spiritual Care Consult: no   Financial concerns/prescription coverage:  medicare   Family contact:  sister / guardian                       LOS:  12  Expected LOS: TBD     Participating treatment team members: Roge CowanRomulo, GAIL - DARRELL Ding - Dr. Marsha Caro,PharmD. - Lore Gutierrez RN - Elaine Beavers RN - wound care nurse

## 2024-02-27 NOTE — PLAN OF CARE
Problem: Depression  Goal: Will be euthymic at discharge  Description: INTERVENTIONS:  1. Administer medication as ordered  2. Provide emotional support via 1:1 interaction with staff  3. Encourage involvement in milieu/groups/activities  4. Monitor for social isolation  2/27/2024 1522 by Farida Knight, RN  Outcome: Progressing  2/27/2024 0820 by Elaine Beavers, RN  Outcome: Not Progressing

## 2024-02-27 NOTE — BH NOTE
GROUP THERAPY PROGRESS NOTE  No group due to Low patient participation. SW provided pts with activities to complete independently.     Katherine Gillies, MSW, [facility]

## 2024-02-27 NOTE — BH NOTE
Psychiatric Progress Note    Patient: Roge Cowan MRN: [id number]  SSN: [id number]    YOB: 1970  Age: 54 y.o.  Sex: male      Admit Date: 2/15/2024    LOS: 12 days     Subjective:   02/27/2024  Nursing report that nursing place food in different places to encourage patient to move. In the interim, they use these opportunities to help clean his bedding, clean the floor and mats.   Patient is eating all of his meals and taking all of his medications.  However, when he is not asleep, he remains in the fetal position on the mats that are beside his mattress.  Wound care nurse today updated the treatment team that his R ankle is a stage 3 and his L ankle is a stage 2 pressure ulcers.    Upon my evaluation, patient is unclothed. He is in the fetal position. He starts increasing the number of his breaths per minute and flexes his muscles. He doesn't participate in my evaluation and doesn't answer any questions.    02/26/2024  Nursing report patient slept 8 hours last night.  He's accepted all of his medications. He's eaten all of his meals.  He did sleep on his mattress last night. However, he sits in the fetal position on the mats on his floor throughout the day.  It takes 3 staff members to obtain his vitals because of his negativism. He doesn't do anything to harm them, just a significant amount of negativism.  Upon my evaluation, patient was lying on his R side on his mattress. He was unclothed. He did say to me today, that he is fine.  He did allow me to listen to his abdomin.   He didn't respond to any of my question thereafter.      02/25/2024  Patient is not responding. He is unclothed and in the fetal position.  Per staff, he is taking his medications.  No aggression.    02/24/2024  Patient did not respond to conversation.Noted to be curled up in the fetal position  Per staff, he slept 5 hours yesterday and he is eating well.  No self-injurious behaviors.  No aggression.    02/23/2024  Nursing  report patient slept well overnight this past night. He is eating all of his meals and taking PO medications within his meals.  He resists patients when attempting to measure vital signs and curls into the fetal position upon entry of his room.  Upon my evaluation he was eating an ice-scream snack, but retreated into the fetal position. He was seated on the mats in his room, near his mattress.    He wouldn't answer my questions and resisted any physical evaluation of his reflexes.    02/22/2024  Nursing estimate patient slept 3 hours last night. This morning he refused vitals. He is eating 100% of his meals.  He's taken all of his PO medications.  He's often times lying down on his mattress.  Upon my evaluation, he was in the fetal position. He doesn't answer any of my questions. He doesn't follow any commands.    02/21/2024  Nursing reports that patient slept well overnight.  He is sleeping on his mattress.  Staff stated that over the weekend he received a shower with the help of the staff, and he said \"thank you.\"  The majority of the time yesterday he was on his mattress, and not on the floor.  He does state in the fetal position, but not the majority of the time now.  He has been taking his medications by mouth and eating 100% of his meals.  Upon my evaluation he was unclothed, lying on his mattress, and sound asleep.    02/20/2024  Nursing report that patient now is sleeping on his mattress in his room and not sleeping on the floor. He slept 7-8 hours last night.  He does well when he is alone and isn't disturbed. He's been eating 100% of all of his meals. His PO medications are placed into ice-cream which he's been consuming.  Today upon my exam, Isreal was sound asleep. The treatment team elected to let him sleep and rest.    02/19/2024  Nursing report that patient remains in the fetal position mostly, but is observed to be sleeping on his side other times.  He is eating 100% of his food when nobody staff  qday  Continue haldol 10mg po tid meals, ativan 3mg tid meals.  Will add ativan 3mg po qhs as well.  Continue remeron 45mg po qhs    Reached out to Dr. Garner by phone, awaiting her reply for possible transfer/ECT at [facility].    02/25/2024  Continue current care.    02/24/2024  Continue current care    02/23/2024  Messages to Dr. Garner today weren't returned regarding ECT scheduling/transfer to [facility]. She was out with COVID starting last Friday.  For the time being will continue current medication regimen as patient has not worsened and is eating and taking all of his medications.  Wound care continues to follow pt's R ankle ulcer.  Appreciate the time and expertise of all team members, including dietitian, social work from [facility] and palliative care, as well as our nurses.    02/22/2024  Awaiting response from Dr. Garner regarding availability of her ECT schedule.  For the time being will increase prozac from 20 to 40mg po qday. Will increase remeron from 30 to 45mg po qhs.  Continue haldol 10mg po bid and ativan 3mg po tid with meals, with IM alternatives.    02/21/2024  Continue current medication regimen as prescribed.  I contacted Dr. Garner at [facility] today, who informed me that she is still at out sick due to COVID.  She did say that she will check with her ECT coordinator in order to accommodate Mr. Cowan's treatment.  As patient's irritability and resistance to treatment/staff continue to decrease, transfer to [facility] can be a consideration if transfer to [facility] is further delayed.    02/20/2024  Continue current PO medication regimen with IM alternatives (for haldol and ativan) upon refusal.  Continue remeron 30mg po qhs, prozac 20mg po qday    Dr. Garner at [facility] requested we contact her tomorrow at the earliest for transfer consideration there for further hospitalization and ECT. She'd informed us that there is likely about a 2 wk delay on the schedule. It is unclear, however, how long logistical delays of transfer may

## 2024-02-27 NOTE — PLAN OF CARE
Problem: Depression  Goal: Will be euthymic at discharge  Description: INTERVENTIONS:  1. Administer medication as ordered  2. Provide emotional support via 1:1 interaction with staff  3. Encourage involvement in milieu/groups/activities  4. Monitor for social isolation  Outcome: Not Progressing     Problem: Skin/Tissue Integrity  Goal: Absence of new skin breakdown  Description: 1.  Monitor for areas of redness and/or skin breakdown  2.  Assess vascular access sites hourly  3.  Every 4-6 hours minimum:  Change oxygen saturation probe site  4.  Every 4-6 hours:  If on nasal continuous positive airway pressure, respiratory therapy assess nares and determine need for appliance change or resting period.  Outcome: Not Progressing     Problem: Infection - Adult  Goal: Absence of fever/infection during anticipated neutropenic period  Outcome: Progressing     Problem: Depression  Goal: Will be euthymic at discharge  Description: INTERVENTIONS:  1. Administer medication as ordered  2. Provide emotional support via 1:1 interaction with staff  3. Encourage involvement in milieu/groups/activities  4. Monitor for social isolation  Outcome: Not Progressing    Patient is isolative, moves self, transfers self from floor mat to mattress on floor.  Left ankle shows signs of redness, right ankle monitored, cleansed and medication applied. Patient becomes agitated and stressed when attempting vital signs, wound care, as evidenced by increased respirations, grunting and tightening up extremities into a tight fetal position.  Medication compliant, medications are crushed in placed in ice cream or magic cup. Patient uses urinal 80% of the time. Occasionally incontinent. Refuses briefs or gowns.  Wound care consulted

## 2024-02-27 NOTE — BH NOTE
GROUP THERAPY PROGRESS NOTE    Patient did not participate in psychoeducation group.    Katherine Gillies MSW, [facility]

## 2024-02-28 PROCEDURE — 6370000000 HC RX 637 (ALT 250 FOR IP): Performed by: PSYCHIATRY & NEUROLOGY

## 2024-02-28 PROCEDURE — 6360000002 HC RX W HCPCS: Performed by: PSYCHIATRY & NEUROLOGY

## 2024-02-28 PROCEDURE — 1240000000 HC EMOTIONAL WELLNESS R&B

## 2024-02-28 RX ORDER — LORAZEPAM 2 MG/ML
2 INJECTION INTRAMUSCULAR EVERY 6 HOURS PRN
Status: DISCONTINUED | OUTPATIENT
Start: 2024-02-28 | End: 2024-03-11 | Stop reason: HOSPADM

## 2024-02-28 RX ORDER — CASTOR OIL AND BALSAM, PERU 788; 87 MG/G; MG/G
OINTMENT TOPICAL DAILY
Status: DISCONTINUED | OUTPATIENT
Start: 2024-02-28 | End: 2024-03-11 | Stop reason: HOSPADM

## 2024-02-28 RX ADMIN — FLUOXETINE 40 MG: 10 CAPSULE ORAL at 11:57

## 2024-02-28 RX ADMIN — LORAZEPAM 3 MG: 2 INJECTION INTRAMUSCULAR; INTRAVENOUS at 22:21

## 2024-02-28 RX ADMIN — SENNOSIDES AND DOCUSATE SODIUM 2 TABLET: 50; 8.6 TABLET ORAL at 21:39

## 2024-02-28 RX ADMIN — LORAZEPAM 2 MG: 2 INJECTION INTRAMUSCULAR; INTRAVENOUS at 11:41

## 2024-02-28 RX ADMIN — COLLAGENASE SANTYL: 250 OINTMENT TOPICAL at 15:36

## 2024-02-28 RX ADMIN — Medication: at 15:36

## 2024-02-28 RX ADMIN — HALOPERIDOL 10 MG: 5 TABLET ORAL at 17:07

## 2024-02-28 RX ADMIN — HALOPERIDOL 10 MG: 5 TABLET ORAL at 07:59

## 2024-02-28 RX ADMIN — SENNOSIDES AND DOCUSATE SODIUM 2 TABLET: 50; 8.6 TABLET ORAL at 08:00

## 2024-02-28 RX ADMIN — HALOPERIDOL 10 MG: 5 TABLET ORAL at 11:58

## 2024-02-28 RX ADMIN — LORAZEPAM 3 MG: 2 TABLET ORAL at 08:00

## 2024-02-28 RX ADMIN — Medication: at 08:01

## 2024-02-28 RX ADMIN — LORAZEPAM 3 MG: 2 TABLET ORAL at 17:05

## 2024-02-28 RX ADMIN — HALOPERIDOL 5 MG: 5 TABLET ORAL at 15:02

## 2024-02-28 RX ADMIN — HYDROXYZINE HYDROCHLORIDE 50 MG: 50 TABLET, FILM COATED ORAL at 15:02

## 2024-02-28 RX ADMIN — MIRTAZAPINE 45 MG: 15 TABLET, ORALLY DISINTEGRATING ORAL at 21:38

## 2024-02-28 ASSESSMENT — PAIN SCALES - GENERAL: PAINLEVEL_OUTOF10: 0

## 2024-02-28 NOTE — PROGRESS NOTES
Music Therapy Assessment  [facility]    Roge Cowan [id number]     1970  54 y.o.  male    Patient Telephone Number: [phone] (home)   Temple Affiliation: None   Language: English   Patient Active Problem List    Diagnosis Date Noted    Need for emotional support 02/20/2024    Catatonic schizophrenia (HCC) 02/16/2024    Somnolence 02/14/2024    Agitation 02/14/2024    Palliative care by specialist 01/22/2024    Goals of care, counseling/discussion 01/22/2024    Confusion 01/15/2024    Protein-calorie malnutrition (HCC) 01/15/2024    Palliative care encounter 01/15/2024    Catatonia schizophrenia (HCC) 01/12/2024    Pressure injury of right ankle, stage 3 (HCC) 01/10/2024    Cellulitis 01/08/2024    Immobility 12/29/2023    Schizoaffective disorder (HCC) 12/08/2023    Schizophrenia ([facility]) 11/27/2017    Paranoid schizophrenia (HCC) 03/15/2017        Date: 2/28/2024            Total Time Calculated: 10 min          [facility] 7W ACUTE [facility]    Session Observations:  Re-referred by Darling Torres LCSW; This music therapist (MT) arrived at the unit and spoke with a unit staff member regarding music therapy services for the patient (pt). The staff member shared about the pt being transferred to the general side of the U this afternoon. Due to this she expressed it not being a good day for services. MT expressed understanding and thanked her for the additional information. MT concluded the visit and exited. MT will continue to follow as able.    DEIDRE Nguyen (Music Therapist, Board Certified)  [facility] [place] [facility]  Referral-Based Services

## 2024-02-28 NOTE — INTERDISCIPLINARY ROUNDS
Behavioral Health Interdisciplinary Rounds     Patient Name: Roge Cowan  Age: 54 y.o.  Room/Bed:  [facility]  Primary Diagnosis: Catatonic schizophrenia (HCC)   Admission Status: Involuntary Commitment    Readmission within 30 days: No  Power of  in place: Yes, Sister  Patient requires a blocked bed: No          Reason for blocked bed:   Sleep hours:        Participation in Care/Groups:  No  Medication Compliant?: Yes  PRNS (last 24 hours): Tylenol  Restraints (last 24 hours):  No  ___________________________________  24 hour chart check complete: Yes

## 2024-02-28 NOTE — BH NOTE
2000: Patient had a large BM in the toilet. Patient was on the toilet for an hour. Nursing staff asked him if he was okay while he was on the toilet for such a long time but he didn't say anything.     Patient ate 100% of his dinner.

## 2024-02-28 NOTE — BH NOTE
GROUP THERAPY PROGRESS NOTE    Patient did not participate in Coping Skills group.    Katherine Gillies MSW, [facility]

## 2024-02-28 NOTE — BH NOTE
Pt has been transferring positions independently throughout the night. Pt observed going from right sided fetal position to left sided fetal position. Pt remains naked, and declines bedding at this time. No fluids offered as pt remained sleeping throughout the night.

## 2024-02-28 NOTE — BH NOTE
GROUP THERAPY PROGRESS NOTE  No group due to Low patient participation. SW provided pts with activities to complete independently.     Katherine Gillies, MSW, [facility]

## 2024-02-28 NOTE — PLAN OF CARE
Problem: Safety - Adult  Goal: Free from fall injury  2/27/2024 2354 by Frannie Bucio LPN  Outcome: Progressing     Problem: Skin/Tissue Integrity  Goal: Absence of new skin breakdown  Description: 1.  Monitor for areas of redness and/or skin breakdown  2.  Assess vascular access sites hourly  3.  Every 4-6 hours minimum:  Change oxygen saturation probe site  4.  Every 4-6 hours:  If on nasal continuous positive airway pressure, respiratory therapy assess nares and determine need for appliance change or resting period.  2/27/2024 2354 by Frannie Bucio LPN  Outcome: Not Progressing     Problem: Depression  Goal: Will be euthymic at discharge  Description: INTERVENTIONS:  1. Administer medication as ordered  2. Provide emotional support via 1:1 interaction with staff  3. Encourage involvement in milieu/groups/activities  4. Monitor for social isolation  2/27/2024 2354 by Frannie Bucio LPN  Outcome: Not Progressing     Received pt in room lying on right side of body. Eyes closed, bu grunted when staff entered room. No respiratory distress noted, respirations even and unlabored. Urinal within reach. Room door open. Staff focus on support and sleep. Q15 min safety checks in place.

## 2024-02-28 NOTE — BH NOTE
Nursing supervisor and admin. Director notified of patient requiring 4 point soft restraints for his safety. Pt remains on constant observation.

## 2024-02-28 NOTE — BH NOTE
Patient awake in bed with 1:1 sitter. Patient sitting up in bed with bilateral soft wrist and ankle restraints in bed. Patient still at times pulling against restraints and twisting self in bed to try and get out of or losen the restraints. PRN haldol and atarax given PO in icecream, patient ate this himself after being released from soft wrist restraints. Dr. Larson notified of this. Patient continues with 1:1 sitter.

## 2024-02-28 NOTE — BH NOTE
1344  Patient restrained in bilateral soft arm restraints for protection of skin.  Patient repetitively putting self in same position causing wounds to right and left lateral ankles.      1344  As required by CMS, I notified the attending physician restraints were ordered on 2/28/24 at 1344 for Roge Cowan. Acknowledgement of this order by the attending physician was confirmed.     1344     BEHAVIORAL HEALTH RESTRAINT/SECLUSION INITIAL ASSESSMENT    1. Assessment of High Risk factors: Preexisting medical conditions that places patient at greater risk when placed in restraints are as follows: None    2. Preexisting history of psychological conditions that place patient at greater risk when placed in restraints: None    3. Current behavior/mental status: Other (comment). Patient put in bilateral soft restraints for protection of skin and to prevent further wounds.    4. Initial use of restraint for this patient is justified by: Imminent risk of injury to self    5. Least restrictive tools/methods (Check all that apply): Attended comfort needs, Problem solving, PRN medications, and 1:1 Observation    6. Criteria for release: No longer verbalizing threats of harm to self or others    7. Treatment plan revisions to assist the patient in regaining control: Continue problem solving discussions with staff    8. Patient consented to family involvement in restraint/seclusion process: No    9. Personal safety search completed: Yes     10. Vital Signs: patient refused         B/P:         Pulse:         Respirations:         Temperature:

## 2024-02-28 NOTE — BH NOTE
Psychiatric Progress Note    Patient: Roge Cowan MRN: [id number]  SSN: [id number]    YOB: 1970  Age: 54 y.o.  Sex: male      Admit Date: 2/15/2024    LOS: 13 days     Subjective:   02/28/2024  Nursing report that patient had a large BM this morning, for which he sat on the toilet.  He is eating all of his meals and taking all of his medications.  Upon my evaluation, he was unclothed and was lying on his mattress. Upon approach, he transferred to the floor and assumed the fetal position. When I started asking him questions, he started increasing his rate of breathing and wouldn't provide any answers.  I waited until patient's breathing rate normalized and stated to him that he would be transferred to  the geriatric unit for placement on a hospital bed. I explained that this would be for the best treatment of his current bilateral pressure ulcers on his ankles. He didn't express his acknowledgement or understanding as he is not interacting with me.  No SI, HI, or AVH voiced.    02/27/2024  Nursing report that nursing place food in different places to encourage patient to move. In the interim, they use these opportunities to help clean his bedding, clean the floor and mats.   Patient is eating all of his meals and taking all of his medications.  However, when he is not asleep, he remains in the fetal position on the mats that are beside his mattress.  Wound care nurse today updated the treatment team that his R ankle is a stage 3 and his L ankle is a stage 2 pressure ulcers.    Upon my evaluation, patient is unclothed. He is in the fetal position. He starts increasing the number of his breaths per minute and flexes his muscles. He doesn't participate in my evaluation and doesn't answer any questions.    02/26/2024  Nursing report patient slept 8 hours last night.  He's accepted all of his medications. He's eaten all of his meals.  He did sleep on his mattress last night. However, he sits in the fetal  point that is not responsive to verbal re-direction.    [facility] will hold weekly mid-week meetings for update on patient's status.  Transfer patient to the geriatric unit for the availability of medical bed.  Continuous line of sight.  While not sleeping, utilize 2-point non-violent restraints to prevent patient from staying in a position that interferes with the healing of his bilateral ankle wounds. From there, will attempt to fully implement wound cares recs to treat bilateral ankle pressure wounds  Will obtain CMP, CBC w/diff, and CK. Abdominal x-ray, as pt's last BM was on 16.  At this time from discussion with Dr. Rivero at [facility], not comfortable with a transfer for ECT, but he will present the case to his staff to assess readiness to provide similar level of care. He feels that it is preferable to transfer pt to medicine there with his daily consults and to pursue ECT.  SW will reach out to [facility] ECT referral through official channel, since Dr. Garner there was out with COVID.  All hands meeting today to update on progress.      02//26/2024  Will add pericolace po bid to improve BM.  Increase prozac from 40mg po qday to 60mg po qday  Continue haldol 10mg po tid meals, ativan 3mg tid meals.  Will add ativan 3mg po qhs as well.  Continue remeron 45mg po qhs    Reached out to Dr. Garner by phone, awaiting her reply for possible transfer/ECT at [facility].    02/25/2024  Continue current care.    02/24/2024  Continue current care    02/23/2024  Messages to Dr. Garner today weren't returned regarding ECT scheduling/transfer to [facility]. She was out with COVID starting last Friday.  For the time being will continue current medication regimen as patient has not worsened and is eating and taking all of his medications.  Wound care continues to follow pt's R ankle ulcer.  Appreciate the time and expertise of all team members, including dietitian, social work from [facility] and palliative care, as well as our

## 2024-02-28 NOTE — PLAN OF CARE
Problem: Safety - Adult  Goal: Free from fall injury  2/28/2024 0806 by Carrie Rodas RN  Outcome: Progressing  2/27/2024 2354 by Frannie Bucio LPN  Outcome: Progressing     Problem: Skin/Tissue Integrity  Goal: Absence of new skin breakdown  Description: 1.  Monitor for areas of redness and/or skin breakdown  2.  Assess vascular access sites hourly  3.  Every 4-6 hours minimum:  Change oxygen saturation probe site  4.  Every 4-6 hours:  If on nasal continuous positive airway pressure, respiratory therapy assess nares and determine need for appliance change or resting period.  2/27/2024 2354 by Frannie Bucio LPN  Outcome: Not Progressing     Problem: Depression  Goal: Will be euthymic at discharge  Description: INTERVENTIONS:  1. Administer medication as ordered  2. Provide emotional support via 1:1 interaction with staff  3. Encourage involvement in milieu/groups/activities  4. Monitor for social isolation  2/28/2024 0806 by Carrie Rodas RN  Outcome: Not Progressing  2/27/2024 2354 by Frannie Bucio LPN  Outcome: Not Progressing    0900: Patient received laying quietly in the floor on foam padding. Mood and affect; withdrawn, isolative, limited verbal interactions. Patient did have large bowel movement this morning, and assisted patient in cleaning self up. At this time, asked patient if able to do skin care, and patient refused. Medication and meal complaint. Plans are to move patient to geriatric unit, on speciality bed. Plans are ongoing, patient does not voice any additional concerns at this time.    1141:  PRN Medication Documentation    Specific patient behavior that led to need for PRN medication: Patient becoming visibly agitated after treatment team; and restless     Staff interventions attempted prior to PRN being given: Scheduled PO medication     PRN medication given: Ativan 2mg IM

## 2024-02-28 NOTE — PROGRESS NOTES
Collaborated with the charge RN and inquired about pt's need for spiritual care. I was informed that Isreal would be transferring to another room on the unit. This was not an appropriate time for a visit.    For additional spiritual care, please contact the  on-call at ([phone]).    Reyna Peter MDiv, MS, [facility]  Staff   Spiritual Health Services

## 2024-02-29 PROCEDURE — 6360000002 HC RX W HCPCS: Performed by: PSYCHIATRY & NEUROLOGY

## 2024-02-29 PROCEDURE — 6370000000 HC RX 637 (ALT 250 FOR IP): Performed by: PSYCHIATRY & NEUROLOGY

## 2024-02-29 PROCEDURE — 1240000000 HC EMOTIONAL WELLNESS R&B

## 2024-02-29 RX ORDER — FLUOXETINE HYDROCHLORIDE 20 MG/1
60 CAPSULE ORAL
Status: DISCONTINUED | OUTPATIENT
Start: 2024-03-01 | End: 2024-03-11 | Stop reason: HOSPADM

## 2024-02-29 RX ADMIN — LORAZEPAM 3 MG: 2 TABLET ORAL at 16:48

## 2024-02-29 RX ADMIN — LORAZEPAM 3 MG: 2 TABLET ORAL at 22:06

## 2024-02-29 RX ADMIN — HALOPERIDOL 10 MG: 5 TABLET ORAL at 12:15

## 2024-02-29 RX ADMIN — SENNOSIDES AND DOCUSATE SODIUM 2 TABLET: 50; 8.6 TABLET ORAL at 07:58

## 2024-02-29 RX ADMIN — MIRTAZAPINE 45 MG: 15 TABLET, ORALLY DISINTEGRATING ORAL at 22:06

## 2024-02-29 RX ADMIN — HALOPERIDOL 10 MG: 5 TABLET ORAL at 16:48

## 2024-02-29 RX ADMIN — LORAZEPAM 3 MG: 2 INJECTION INTRAMUSCULAR; INTRAVENOUS at 09:11

## 2024-02-29 RX ADMIN — Medication: at 07:59

## 2024-02-29 RX ADMIN — HALOPERIDOL LACTATE 10 MG: 5 INJECTION, SOLUTION INTRAMUSCULAR at 09:10

## 2024-02-29 RX ADMIN — SENNOSIDES AND DOCUSATE SODIUM 2 TABLET: 50; 8.6 TABLET ORAL at 22:06

## 2024-02-29 RX ADMIN — LORAZEPAM 3 MG: 2 TABLET ORAL at 12:15

## 2024-02-29 RX ADMIN — COLLAGENASE SANTYL: 250 OINTMENT TOPICAL at 07:59

## 2024-02-29 RX ADMIN — Medication 1 DROP: at 10:38

## 2024-02-29 ASSESSMENT — SLEEP AND FATIGUE QUESTIONNAIRES
DO YOU USE A SLEEP AID: NO
DO YOU HAVE DIFFICULTY SLEEPING: NO
AVERAGE NUMBER OF SLEEP HOURS: 6

## 2024-02-29 ASSESSMENT — PAIN SCALES - GENERAL: PAINLEVEL_OUTOF10: 0

## 2024-02-29 NOTE — BH NOTE
1301: Pt has continued with 1:1 staff for 4 pt soft non violent restraints. He ate 100 % @ breakfast and lunch. He refused am medications over objection, then received medications IM. He took scheduled po medications at 1200. Pt is resistive with ADLs, expresses dislike, alternately cooperates some. Pt is a little more verbal, making some eye contact. Pt offered time out of restraints after lunch provided he is off knees and off right side. He was complaint with this and is napping now. Pt was swabbed for MRSA and seen by wound nurse. Bilateral ankle dressings were reapplied but pt removed at first opportunity. L ankle looks improved.   1616: Restraints discontinued @ 1200, pt remains with 1:1 staff. He has remained compliant with positioning requests with verbal redirect, remains naked per preference. Pt is resting/sleeping presently. MRSA result pending.

## 2024-02-29 NOTE — INTERDISCIPLINARY ROUNDS
Behavioral Health Interdisciplinary Rounds     Patient Name: Roge Cowan  Age: 54 y.o.  Room/Bed:  0/  Primary Diagnosis: Catatonic schizophrenia (HCC)   Admission Status: Involuntary Commitment    Readmission within 30 days: No  Power of  in place: Healthcare decision maker, sister involved  Patient requires a blocked bed: Yes          Reason for blocked bed: Behavior management  Sleep hours: 3+ hours       Participation in Care/Groups:  No  Medication Compliant?: Selective  PRNS (last 24 hours): Antipsychotic (IM)   Restraints (last 24 hours):  Yes, nonviolent restraints  ___________________________________  Patient goal(s) for today:   Treatment team focus/goals: Plan to titrate his medications,   Progress note: He did respond to some questions today in treatment team, he has refused some of his medications the last 24 hours, still very religiously preoccupied      Spiritual Care Consult: consider   Financial concerns/prescription coverage:  medicare   Family contact:    sister/ we did call sister today and had her speak to patient on speaker - she is suppose to visit tomorrow                     Family requesting physician contact today:    Discharge plan: TBD   Access to weapons : no                                                              Outpatient provider(s):[facility]  Pact team       LOS:  14  Expected LOS: TBD     Participating treatment team members: Rosa Elena Wilson SW- Dr. Marsha Guteirrez,GAIL - Geeta Caro RN

## 2024-02-29 NOTE — WOUND CARE
WOCN Note:     Follow up visit, new consult for nodule in left groin    Roge Cowan is a 54 y.o. y/o male who presented for Catatonic schizophrenia (HCC) [F20.2]  Admitted on 2/15/2024    Past Medical History:   Diagnosis Date    Psychiatric disorder     Psychotic disorder (HCC)     Withdrawal syndrome (HCC)        Lab Results   Component Value Date/Time    WBC 6.8 02/15/2024 04:17 AM    LABA1C 5.2 12/13/2023 06:02 AM    POCGLU 159 (H) 02/14/2024 06:54 PM    POCGLU 95 02/14/2024 12:48 PM    HGB 15.1 02/15/2024 04:17 AM    HCT 47.0 02/15/2024 04:17 AM     02/15/2024 04:17 AM        Tobacco Use      Smoking status: Some Days      Smokeless tobacco: Never     ADULT ORAL NUTRITION SUPPLEMENT; Dinner, Breakfast; Frozen Oral Supplement  DIET ONE TIME MESSAGE;  ADULT DIET; Regular  ADULT ORAL NUTRITION SUPPLEMENT; Breakfast, Dinner; Other Oral Supplement; Two Codey HN  ADULT ORAL NUTRITION SUPPLEMENT; Lunch; Standard High Calorie/High Protein Oral Supplement     Assessment:   Seen today in room 740/01   Alert and minimally communicative today  Mobile and repositions independently, currently in 4 point restraints.   Surface: MARISA mattress    1. POA Pressure Injury to Right Anterior-Lateral Ankle - Stage 3  Dressing in place and reportedly done this morning though patient removes dressing as soon as he is released from restraint  Erythema is significantly improved    2. Stage 2 Pressure Injury to Left Anterior-Lateral Ankle  Erythema significantly improved     3. Left Groin Nodule    Soft raised area of erythema in left groin, reportedly not painful to palpation    Wound Recommendations:    Turn/reposition approximately every 2 hours - allow for breaks from restraints after maximum of 2 hours for repositioning and self care  Offload heels with heels hanging off end of pillow while in restraints.   Sacral Foam dressing: at least while in restraints. If able  to maintain for long periods, lift to assess regularly; change as needed. Discontinue if incontinence is frequently soiling or wetting dressing.     Low Air Loss mattress Use only flat sheet and one incontinence pad.     Discussed findings with Dr. Larson    Transition of Care: Plan to follow weekly and as needed while admitted to hospital.      Anne Sturgeon  MSN, RN  [facility] Inpatient Wound Care  Office [phone]   Available through [employer]

## 2024-02-29 NOTE — BH NOTE
08:00 PM - Patient was received sitting quietly in bed.  Patient is in four point soft restraints for safety and prevention of further skin breakdown. Staff present.  No respiratory distress noted.  Will continue to monitor.    10:30 PM - [facility] reported to the writer that the patient urinated in the bed.  Staff cleaned the patient with warm soap and water and changed the sheet on the patient's bed.  Patient was resistant and clasp his hands to prevent staff from cleaning him up. Small amount of redness noted around bilateral upper extremities.  No cuts or irritations noted.  1:1 continues.  Staff present.  Patient remains in four points soft restraints.

## 2024-02-29 NOTE — PLAN OF CARE
Problem: Depression  Goal: Will be euthymic at discharge  Description: INTERVENTIONS:  1. Administer medication as ordered  2. Provide emotional support via 1:1 interaction with staff  3. Encourage involvement in milieu/groups/activities  4. Monitor for social isolation  2/28/2024 2005 by Rosaura Stevens, RN  Outcome: Not Progressing      Pt catatonic. On 1:1 with staff and 4 point soft restraints. Will continue to monitor q15 minutes for safety.

## 2024-02-29 NOTE — PLAN OF CARE
Problem: Psychosis  Goal: Will report no hallucinations or delusions  Description: INTERVENTIONS:  1. Administer medication as  ordered  2. Assist with reality testing to support increasing orientation  3. Assess if patient's hallucinations or delusions are encouraging self harm or harm to others and intervene as appropriate  Outcome: Progressing  Note: Poor eye contact however frequently will look and face at staff who is talking to him. Verbal at times. Resistant to staff direction regarding wound care and changing positions frequently but does accept staff prompts to ROM. Accepting of staff ADL care this am. Declined po medications. Staff will continue to offer support and reassurance.

## 2024-02-29 NOTE — BH NOTE
Psychiatric Progress Note    Patient: Roge Cowan MRN: [id number]  SSN: [id number]    YOB: 1970  Age: 54 y.o.  Sex: male      Admit Date: 2/15/2024    LOS: 14 days     Subjective:   02/29/2024  Nursing report patient has been eating his meals and is adherent to most his medications.  However, only this morning he refused AM medications, but received IM alternative.  He was in restraints upon my evaluation, with wounds bandaged.  Isreal said that he wants to be called 'Rmoulo,' but his sister on the phone said that is NOT his name.  He requested that we turn off 'the electricity,' because of the noise of the hum of the fan of his bed. He told me that he now knows the truth, and when I asked him, he said 'Benigno.' He wouldn't answer my question about hallucinations.    02/28/2024  Nursing report that patient had a large BM this morning, for which he sat on the toilet.  He is eating all of his meals and taking all of his medications.  Upon my evaluation, he was unclothed and was lying on his mattress. Upon approach, he transferred to the floor and assumed the fetal position. When I started asking him questions, he started increasing his rate of breathing and wouldn't provide any answers.  I waited until patient's breathing rate normalized and stated to him that he would be transferred to  the geriatric unit for placement on a hospital bed. I explained that this would be for the best treatment of his current bilateral pressure ulcers on his ankles. He didn't express his acknowledgement or understanding as he is not interacting with me.  No SI, HI, or AVH voiced.    02/27/2024  Nursing report that nursing place food in different places to encourage patient to move. In the interim, they use these opportunities to help clean his bedding, clean the floor and mats.   Patient is eating all of his meals and taking all of his medications.  However, when he is not asleep, he remains in the fetal position on the  there with his daily consults and to pursue ECT.  SW will reach out to [facility] ECT referral through official channel, since Dr. Garner there was out with COVID.  All hands meeting today to update on progress.      02//26/2024  Will add pericolace po bid to improve BM.  Increase prozac from 40mg po qday to 60mg po qday  Continue haldol 10mg po tid meals, ativan 3mg tid meals.  Will add ativan 3mg po qhs as well.  Continue remeron 45mg po qhs    Reached out to Dr. Garner by phone, awaiting her reply for possible transfer/ECT at [facility].    02/25/2024  Continue current care.    02/24/2024  Continue current care    02/23/2024  Messages to Dr. Garner today weren't returned regarding ECT scheduling/transfer to [facility]. She was out with COVID starting last Friday.  For the time being will continue current medication regimen as patient has not worsened and is eating and taking all of his medications.  Wound care continues to follow pt's R ankle ulcer.  Appreciate the time and expertise of all team members, including dietitian, social work from [facility] and palliative care, as well as our nurses.    02/22/2024  Awaiting response from Dr. Garner regarding availability of her ECT schedule.  For the time being will increase prozac from 20 to 40mg po qday. Will increase remeron from 30 to 45mg po qhs.  Continue haldol 10mg po bid and ativan 3mg po tid with meals, with IM alternatives.    02/21/2024  Continue current medication regimen as prescribed.  I contacted Dr. Garner at [facility] today, who informed me that she is still at out sick due to COVID.  She did say that she will check with her ECT coordinator in order to accommodate Mr. Cowan's treatment.  As patient's irritability and resistance to treatment/staff continue to decrease, transfer to [facility] can be a consideration if transfer to [facility] is further delayed.    02/20/2024  Continue current PO medication regimen with IM alternatives (for haldol and ativan) upon refusal.  Continue

## 2024-02-29 NOTE — BH NOTE
0000: Non-violent, 4-point restraints continue to be in use for pt safety with 1:1 sitter at bedside per provider order. Specialty mattress in use for skin breakdown prevention. Pt moving independently within limits of nonviolent restraints, frequently turning from left to right side. At this time, break allowed for ROM with staff assistance. Pt declined fluids, food, and toileting. Pt continues to frequently pull at restraints. Redness noted to bilateral wrists- no open wounds or bruising noted. Attempted to elevate feet with pillows per order, but pt continues to resist and kick pillows out of the way.     0030: Pt appears to be resting in bed in no apparent distress, respirations even and unlabored. Continues to be in nonviolent restraints with 1:1 sitter at bedside.     0100: Pt appears to be resting in no apparent distress, respirations even and unlabored. Continues to be in nonviolent restraints for safety with 1:1 sitter at bedside.     0200: Non-violent, 4-point restraints continue to be in use for pt safety with 1:1 sitter at bedside per provider order. Pt continues to move independently, turning from side to side without prompting. At this time, break allowed for circulation assessment and ROM with staff assistance. Pt declined fluids, food, and toileting. Pt continues to frequently shift hands and feet, pulling at restraints. Redness noted to bilateral wrists- no open wounds or bruising noted. Attempted to elevate feet with pillows per order, but pt continues to resist and kick pillows off the bed.     0400: Non-violent, 4-point restraints continue to be in use for safety with 1:1 sitter at bedside per provider order. Pt continues to make movements independently, frequently attempting to sit up and move into a praying position despite redirection. Pt avoidant, unreceptive of staff interaction, continues to decline fluids, foods, and toileting. Circulation assessed at this time. Redness noted to bilateral

## 2024-02-29 NOTE — PLAN OF CARE
Problem: Safety - Adult  Goal: Free from fall injury  2/29/2024 0034 by Darling Pelaez, RN  Outcome: Progressing

## 2024-03-01 LAB
BACTERIA SPEC CULT: NORMAL
BACTERIA SPEC CULT: NORMAL
SERVICE CMNT-IMP: NORMAL

## 2024-03-01 PROCEDURE — 6360000002 HC RX W HCPCS: Performed by: PSYCHIATRY & NEUROLOGY

## 2024-03-01 PROCEDURE — 1240000000 HC EMOTIONAL WELLNESS R&B

## 2024-03-01 PROCEDURE — 6370000000 HC RX 637 (ALT 250 FOR IP): Performed by: PSYCHIATRY & NEUROLOGY

## 2024-03-01 RX ADMIN — HALOPERIDOL 10 MG: 5 TABLET ORAL at 17:17

## 2024-03-01 RX ADMIN — HALOPERIDOL 10 MG: 5 TABLET ORAL at 12:43

## 2024-03-01 RX ADMIN — LORAZEPAM 3 MG: 2 INJECTION INTRAMUSCULAR; INTRAVENOUS at 21:15

## 2024-03-01 RX ADMIN — LORAZEPAM 3 MG: 2 TABLET ORAL at 08:10

## 2024-03-01 RX ADMIN — FLUOXETINE HYDROCHLORIDE 60 MG: 20 CAPSULE ORAL at 12:45

## 2024-03-01 RX ADMIN — LORAZEPAM 3 MG: 2 TABLET ORAL at 17:18

## 2024-03-01 RX ADMIN — HALOPERIDOL 10 MG: 5 TABLET ORAL at 08:10

## 2024-03-01 RX ADMIN — LORAZEPAM 3 MG: 2 TABLET ORAL at 12:43

## 2024-03-01 NOTE — PLAN OF CARE
Problem: Safety - Medical Restraint  Goal: Remains free of injury from restraints (Restraint for Interference with Medical Device)  Description: INTERVENTIONS:  1. Determine that other, less restrictive measures have been tried or would not be effective before applying the restraint  2. Evaluate the patient's condition at the time of restraint application  3. Inform patient/family regarding the reason for restraint  4. Q2H: Monitor safety, psychosocial status, comfort, nutrition and hydration  Outcome: Progressing  Flowsheets (Taken 3/1/2024 0750)  Remains free of injury from restraints (restraint for interference with medical device):   Determine that other, less restrictive measures have been tried or would not be effective before applying the restraint   Every 2 hours: Monitor safety, psychosocial status, comfort, nutrition and hydration   Evaluate the patient's condition at the time of restraint application   Inform patient/family regarding the reason for restraint  Note:   0730: 1:1 continues. Staff at bedside, offer reassurance, encourage patient to not engage in the kneeling position that leads to right and left foot injuries to have direct pressure on mattress. Redirection, reassurance, distraction and medications offered, pt continues to place wound in unsafe position. Pt resistant to staff direction and required assistance out of position. Placed into 4pt soft bilat wrist and ankle restraints. Physician notified and order obtained. Pt declined for staff to inform sister. Staff will continue to offer support  0815: released from restraints pt in sitting position eating meal      0830: 1:1 continues      0930: 1:1 continues      1030: 1:1 continues      1130: 1:1 continues pt resting in bed      1230: 1:1 continues pt eating meal      1330: 1:1 continues       1430: pt placing injured feet into position with direct pressure causing harm to wound. Prompted multiple times and encouraged to change position,  offer reassurance, medications and redirection. Interventions not effective, obtained order for nonviolent bilat wrist and ankle restraints. Pt placed in restraints 1:1 continues. Tx team made aware. When pt asked if staff can inform sister pt used nonverbal communication to indicate no.

## 2024-03-01 NOTE — BH NOTE
Note:   0730: 1:1 continues. Staff at bedside, offer reassurance, encourage patient to not engage in the kneeling position that leads to right and left foot injuries to have direct pressure on mattress. Redirection, reassurance, distraction and medications offered, pt continues to place wound in unsafe position. Pt resistant to staff direction and required assistance out of position. Placed into 4pt soft bilat wrist and ankle restraints. Physician notified and order obtained. Pt declined for staff to inform sister. Staff will continue to offer support  0815: released from restraints pt in sitting position eating meal      0830: 1:1 continues      0930: 1:1 continues      1030: 1:1 continues      1130: 1:1 continues pt resting in bed      1230: 1:1 continues pt eating meal      1330: 1:1 continues       1430: pt placing injured feet into position with direct pressure causing harm to wound. Prompted multiple times and encouraged to change position, offer reassurance, medications and redirection. Interventions not effective, obtained order for nonviolent bilat wrist and ankle restraints. Pt placed in restraints 1:1 continues. Tx team made aware. When pt asked if staff can inform sister pt used nonverbal communication to indicate no.         Restraint Documentation    1855 Left ankle dressing wet, cleansed area dressing changed, Mepilex reapplied.  Right ankle dressing still clean, dry and intact.    1830 1:1 continues, pt unable to accept redirection to change positions to alleviate weight on ankles, continues to resume position of tucking in his feet and ankles under his buttocks with direct pressure on mattress. Non-violent restraints reinstated.    1730 1:1 continues, pt is calm, cooperative, quiet and able to stay off of wounds.    1530 1:1 Continues, restraints continue, patient unable to stay off of     bilateral ankle wounds. Education provided.  Pt resists repositioning to stay off of ankles at this time. Will  continue to monitor.     1615 Restraints discontinued, 1:1 continues, pt is lying on his left side, keeping pressure off of his bilat ankle wounds. Pt shows no signs of redness or injury related to restraints application    1630 Pt is able to remain off of bilateral wounds. Eating his dinner. 1:1 continues. Pt is calm and cooperative.        1315 Bilat wound care done for both ankles. Pt required much redirection and 3 people in order to keep him in place to perform wound care, he.attempted to remove dressings, education was provided that the dressings must stay in place in order to heal properly.  Will continue to monitor.    1045 Left Message for hospitalist regarding L groin abscess. Attempted via PS a telephone number was listed to call, could not send a message.      1055 Sister and her son came to visit pt. Sister says Isreal opened his eyes briefly then went back to sleep. The visit was approximately 3-5 minutes.

## 2024-03-01 NOTE — BH NOTE
Psychiatric Progress Note    Patient: Roge Cowan MRN: [id number]  SSN: [id number]    YOB: 1970  Age: 54 y.o.  Sex: male      Admit Date: 2/15/2024    LOS: 15 days     Subjective:   03/01/2024  Nursing reports that patient has been able to cooperate with staff in remaining out of his restraints.  He is able to remain out of the prayer position which has exacerbated his ulcers.  He has not interfered with dressing of either one of his ankles.  He is eating his meals and has accepted all of his medicines by mouth.  Upon my evaluation patient was sound asleep in his bed and is covered via sheet.  Because of the many interruptions, by several team members, we opted to let him rest today.    Wound nurse pointed out the presence of what appears to be a small lesion in his left groin that may be an abscess.  She noted that it was not tender, but we pointed out that patient likely has a disordered sense of pain perception. He did continue to remain in a prayer position so much so that he ended up with ulcers that he is not complaining about.    02/29/2024  Nursing report patient has been eating his meals and is adherent to most his medications.  However, only this morning he refused AM medications, but received IM alternative.  He was in restraints upon my evaluation, with wounds bandaged.  Isreal said that he wants to be called 'Romulo,' but his sister on the phone said that is NOT his name.  He requested that we turn off 'the electricity,' because of the noise of the hum of the fan of his bed. He told me that he now knows the truth, and when I asked him, he said 'Benigno.' He wouldn't answer my question about hallucinations.    02/28/2024  Nursing report that patient had a large BM this morning, for which he sat on the toilet.  He is eating all of his meals and taking all of his medications.  Upon my evaluation, he was unclothed and was lying on his mattress. Upon approach, he transferred to the floor and  assumed the fetal position. When I started asking him questions, he started increasing his rate of breathing and wouldn't provide any answers.  I waited until patient's breathing rate normalized and stated to him that he would be transferred to  the geriatric unit for placement on a hospital bed. I explained that this would be for the best treatment of his current bilateral pressure ulcers on his ankles. He didn't express his acknowledgement or understanding as he is not interacting with me.  No SI, HI, or AVH voiced.    02/27/2024  Nursing report that nursing place food in different places to encourage patient to move. In the interim, they use these opportunities to help clean his bedding, clean the floor and mats.   Patient is eating all of his meals and taking all of his medications.  However, when he is not asleep, he remains in the fetal position on the mats that are beside his mattress.  Wound care nurse today updated the treatment team that his R ankle is a stage 3 and his L ankle is a stage 2 pressure ulcers.    Upon my evaluation, patient is unclothed. He is in the fetal position. He starts increasing the number of his breaths per minute and flexes his muscles. He doesn't participate in my evaluation and doesn't answer any questions.    02/26/2024  Nursing report patient slept 8 hours last night.  He's accepted all of his medications. He's eaten all of his meals.  He did sleep on his mattress last night. However, he sits in the fetal position on the mats on his floor throughout the day.  It takes 3 staff members to obtain his vitals because of his negativism. He doesn't do anything to harm them, just a significant amount of negativism.  Upon my evaluation, patient was lying on his R side on his mattress. He was unclothed. He did say to me today, that he is fine.  He did allow me to listen to his abdomin.   He didn't respond to any of my question thereafter.      02/25/2024  Patient is not responding. He is  care    02/23/2024  Messages to Dr. Garner today weren't returned regarding ECT scheduling/transfer to [facility]. She was out with COVID starting last Friday.  For the time being will continue current medication regimen as patient has not worsened and is eating and taking all of his medications.  Wound care continues to follow pt's R ankle ulcer.  Appreciate the time and expertise of all team members, including dietitian, social work from [facility] and palliative care, as well as our nurses.    02/22/2024  Awaiting response from Dr. Garner regarding availability of her ECT schedule.  For the time being will increase prozac from 20 to 40mg po qday. Will increase remeron from 30 to 45mg po qhs.  Continue haldol 10mg po bid and ativan 3mg po tid with meals, with IM alternatives.    02/21/2024  Continue current medication regimen as prescribed.  I contacted Dr. Garner at [facility] today, who informed me that she is still at out sick due to COVID.  She did say that she will check with her ECT coordinator in order to accommodate Mr. Cowan's treatment.  As patient's irritability and resistance to treatment/staff continue to decrease, transfer to [facility] can be a consideration if transfer to [facility] is further delayed.    02/20/2024  Continue current PO medication regimen with IM alternatives (for haldol and ativan) upon refusal.  Continue remeron 30mg po qhs, prozac 20mg po qday    Dr. Garner at [facility] requested we contact her tomorrow at the earliest for transfer consideration there for further hospitalization and ECT. She'd informed us that there is likely about a 2 wk delay on the schedule. It is unclear, however, how long logistical delays of transfer may take.    02/19/2024  Increase remeron from 15mg po qhs to 30mg po qhs.  Today will administer last dosage of haldol 10mg IM TID with meals and ativan 3mg TID with meals; instead, since patient has been accepting PO medications, staff will give him the option of taking haldol/ativan by mouth,  and only give them IM upon PO refusal. This way we decrease distress tot he patient.  I reached out to YOLANDA, Dr. Garner on Friday. She informed me that she is out with COVID, and can't evaluate patient's case for ECT/transfer until this Wednesday.    I certify that this patient's inpatient psychiatric hospital services furnished since the previous certification were, and continue to be, required for treatment that could reasonably be expected to improve the patient's condition, or for diagnostic study, and that the patient continues to need, on a daily basis, active treatment furnished directly by or requiring the supervision of inpatient psychiatric facility personnel. In addition, the hospital records show that services furnished were intensive treatment services, admission or related services, or equivalent services.    Signed By: Lety Larson MD     March 1, 2024

## 2024-03-01 NOTE — BH NOTE
Pt bp was 119/57 at 2140, Np provider called and notified about pt pressure and was asked if ativan should be held. Np provider wants ativan to be administered and blood pressure checked after.

## 2024-03-01 NOTE — PLAN OF CARE
Problem: Discharge Planning  Goal: Discharge to home or other facility with appropriate resources  Outcome: Progressing     Problem: Safety - Adult  Goal: Free from fall injury  3/1/2024 0900 by Margarette Herzog RN  Outcome: Progressing  2/29/2024 2349 by Frannie Bucio LPN  Outcome: Progressing     Problem: Depression  Goal: Will be euthymic at discharge  Description: INTERVENTIONS:  1. Administer medication as ordered  2. Provide emotional support via 1:1 interaction with staff  3. Encourage involvement in milieu/groups/activities  4. Monitor for social isolation  Outcome: Progressing     Problem: Safety - Medical Restraint  Goal: Remains free of injury from restraints (Restraint for Interference with Medical Device)  Description: INTERVENTIONS:  1. Determine that other, less restrictive measures have been tried or would not be effective before applying the restraint  2. Evaluate the patient's condition at the time of restraint application  3. Inform patient/family regarding the reason for restraint  4. Q2H: Monitor safety, psychosocial status, comfort, nutrition and hydration  3/1/2024 0900 by Margarette Herzog RN  Outcome: Progressing  3/1/2024 0750 by Lore Gutierrez RN  Outcome: Progressing  Flowsheets (Taken 3/1/2024 0750)  Remains free of injury from restraints (restraint for interference with medical device):   Determine that other, less restrictive measures have been tried or would not be effective before applying the restraint   Every 2 hours: Monitor safety, psychosocial status, comfort, nutrition and hydration   Evaluate the patient's condition at the time of restraint application   Inform patient/family regarding the reason for restraint  Note: 0730: 1:1 continues. Staff at bedside, offer reassurance, encourage patient to not engage in the kneeling position that leads to right and left foot injuries to have direct pressure on mattress. Pt resistant to staff direction and required assistance

## 2024-03-01 NOTE — CONSULTS
[facility] Adult  Hospitalist Group                                                                                          Hospitalist Consult Note  David M Milligram, PA-C  Answering service: [phone] OR [phone] from in house phone        Date of Service:  3/1/2024  NAME:  Roge Cowan  :  1970  MRN:  [id number]      Admission Summary:   Roge Cowan is a 53 y.o. male with schizophrenia and catatonia, s/p ECT x2 who was transferred from [facility] inpatient to [facility] ICU on 2024 for severe psychosis and episodes of catatonia. Pt was initially admitted to [facility] on 2023 after being taken there by police for evaluation for refusal of self-care. [facility] hospitalization included development of a R ankle Stage IV pressure wound that progressed to tendon exposure with purulent drainage, refusal of wound care, labs, vitals checks, oral medications, oral intake, and only took IM medications that were court ordered. Pt was seen by podiatry and ID who recommended vancomycin and cefepime. He did receive ECT x2 at [facility]. Pt was transferred to [facility] ICU for sedation and protective environment to ensure adequate care. He was started on ketamine and Precedex gtts. He required 4 point restraints. 1:1 sitter was ordered. He was intubated on 1/15 due to increasing agitation despite sedating meds.  ID, Psychiatry, Palliative Medicine, Ethics, and WOCN have all been consulted during pt's ICU stay at [facility]. Psychiatry recommended reducing reliance on BDZs and consolidating antipsychotics to just one if possible; Haldol IV was increased and Depakene was added. Ethics held an interdisciplinary meeting with all medical teams involved on . There is an open APS case against patient's sister/ Julie. Pt was extubated  and was in bilateral wrist restraints. DHT is in place and tube feeds are infusing per dietitian recs. Pt has periods of agitation for which he receives prn meds. He is currently on  ointment   Topical Daily    LORazepam (ATIVAN) injection 2 mg  2 mg IntraMUSCular Q6H PRN    LORazepam (ATIVAN) tablet 3 mg  3 mg Oral Nightly    Or    LORazepam (ATIVAN) injection 3 mg  3 mg IntraMUSCular Nightly    sennosides-docusate sodium (SENOKOT-S) 8.6-50 MG tablet 2 tablet  2 tablet Oral BID    Peppermint Spirit   Does not apply Daily    mirtazapine (REMERON SOL-TAB) disintegrating tablet 45 mg  45 mg Oral Nightly    haloperidol (HALDOL) tablet 10 mg  10 mg Oral TID WC    Or    haloperidol lactate (HALDOL) injection 10 mg  10 mg IntraMUSCular TID WC    LORazepam (ATIVAN) tablet 3 mg  3 mg Oral TID WC    Or    LORazepam (ATIVAN) injection 3 mg  3 mg IntraMUSCular TID WC    acetaminophen (TYLENOL) tablet 650 mg  650 mg Oral Q4H PRN    polyethylene glycol (GLYCOLAX) packet 17 g  17 g Oral Daily PRN    senna (SENOKOT) tablet 8.6 mg  1 tablet Oral Daily PRN    aluminum & magnesium hydroxide-simethicone (MAALOX) 200-200-20 MG/5ML suspension 30 mL  30 mL Oral Q6H PRN    hydrOXYzine HCl (ATARAX) tablet 50 mg  50 mg Oral TID PRN    haloperidol (HALDOL) tablet 5 mg  5 mg Oral Q4H PRN    Or    haloperidol lactate (HALDOL) injection 5 mg  5 mg IntraMUSCular Q4H PRN    diphenhydrAMINE (BENADRYL) injection 50 mg  50 mg IntraMUSCular Q4H PRN    traZODone (DESYREL) tablet 50 mg  50 mg Oral Nightly PRN     ______________________________________________________________________  EXPECTED LENGTH OF STAY: 3  ACTUAL LENGTH OF STAY:          15                 David M MilligramAUGUST

## 2024-03-01 NOTE — INTERDISCIPLINARY ROUNDS
Behavioral Health Interdisciplinary Rounds     Patient Name: Roge Cowan  Age: 54 y.o.  Room/Bed:  [facility]  Primary Diagnosis: Catatonic schizophrenia (HCC)   Admission Status: Involuntary Commitment    Readmission within 30 days: No  Power of  in place: Yes, sister  Patient requires a blocked bed: Yes          Reason for blocked bed: Behavior  Sleep hours:       Participation in Care/Groups:  No  Medication Compliant?: Yes  PRNS (last 24 hours): None   Restraints (last 24 hours):  Yes  __________________________________________________  OQ Admission Analysis Survey completed:  OQ Admission Analysis Survey score:  __________________________________________________     Alcohol screening (AUDIT) completed -     If applicable, date SBIRT discussed in treatment team AND documented:    Tobacco - patient is a smoker:    Illegal Drugs use:      24 hour chart check complete: Yes    _______________________________________________    Patient goal(s) for today: Communicate needs to staff   Treatment team focus/goals: assess pt, manage medications, discharge planning   Progress note: Pt is more communicative with staff. Pt remains unclothed and laying on left side. Pt was asleep during tx team. Pt is eating.      Spiritual Care Consult: no  Financial concerns/prescription coverage:  no  Family contact:                        Family requesting physician contact today:  no  Discharge plan: TBD   Access to weapons : no                                                              Outpatient provider(s):   Patient's preferred phone number for follow up call :   Patient's preferred e-mail address :    LOS:  15  Expected LOS: TBD     Participating treatment team members: Roge CowanPayton MSSILVIA, Lore SOTO RN, Dr. Larson

## 2024-03-01 NOTE — PLAN OF CARE
Problem: Safety - Adult  Goal: Free from fall injury  Outcome: Progressing   Pt in bed with eyes closed resting, appears to be sleeping. Respirations even and unlabored, no respiratory distress noted. NAD. Q15 min safety checks in place, 1:1 continued per provider order. Staff at bedside.

## 2024-03-01 NOTE — PLAN OF CARE
Problem: Safety - Medical Restraint  Goal: Remains free of injury from restraints (Restraint for Interference with Medical Device)  Description: INTERVENTIONS:  1. Determine that other, less restrictive measures have been tried or would not be effective before applying the restraint  2. Evaluate the patient's condition at the time of restraint application  3. Inform patient/family regarding the reason for restraint  4. Q2H: Monitor safety, psychosocial status, comfort, nutrition and hydration  3/1/2024 1458 by Lore Gutierrez RN  Outcome: Progressing

## 2024-03-02 PROCEDURE — 6360000002 HC RX W HCPCS: Performed by: PSYCHIATRY & NEUROLOGY

## 2024-03-02 PROCEDURE — 6370000000 HC RX 637 (ALT 250 FOR IP): Performed by: PSYCHIATRY & NEUROLOGY

## 2024-03-02 PROCEDURE — 1240000000 HC EMOTIONAL WELLNESS R&B

## 2024-03-02 RX ADMIN — LORAZEPAM 3 MG: 2 TABLET ORAL at 16:54

## 2024-03-02 RX ADMIN — HALOPERIDOL LACTATE 5 MG: 5 INJECTION, SOLUTION INTRAMUSCULAR at 03:54

## 2024-03-02 RX ADMIN — LORAZEPAM 3 MG: 2 TABLET ORAL at 20:23

## 2024-03-02 RX ADMIN — LORAZEPAM 3 MG: 2 TABLET ORAL at 12:37

## 2024-03-02 RX ADMIN — MIRTAZAPINE 45 MG: 15 TABLET, ORALLY DISINTEGRATING ORAL at 20:23

## 2024-03-02 RX ADMIN — LORAZEPAM 2 MG: 2 INJECTION INTRAMUSCULAR; INTRAVENOUS at 03:54

## 2024-03-02 RX ADMIN — SENNOSIDES AND DOCUSATE SODIUM 2 TABLET: 50; 8.6 TABLET ORAL at 20:23

## 2024-03-02 RX ADMIN — Medication: at 11:35

## 2024-03-02 RX ADMIN — FLUOXETINE HYDROCHLORIDE 60 MG: 20 CAPSULE ORAL at 12:38

## 2024-03-02 RX ADMIN — HALOPERIDOL 10 MG: 5 TABLET ORAL at 16:55

## 2024-03-02 RX ADMIN — HALOPERIDOL 10 MG: 5 TABLET ORAL at 08:02

## 2024-03-02 RX ADMIN — HALOPERIDOL 10 MG: 5 TABLET ORAL at 12:37

## 2024-03-02 RX ADMIN — COLLAGENASE SANTYL: 250 OINTMENT TOPICAL at 11:36

## 2024-03-02 NOTE — PLAN OF CARE
Problem: Depression  Goal: Will be euthymic at discharge  Description: INTERVENTIONS:  1. Administer medication as ordered  2. Provide emotional support via 1:1 interaction with staff  3. Encourage involvement in milieu/groups/activities  4. Monitor for social isolation  3/2/2024 0834 by Carrie Rodas, RN  Outcome: Not Progressing     Problem: Depression  Goal: Will be euthymic at discharge  Description: INTERVENTIONS:  1. Administer medication as ordered  2. Provide emotional support via 1:1 interaction with staff  3. Encourage involvement in milieu/groups/activities  4. Monitor for social isolation  3/2/2024 0834 by Carrie Rodas, RN  Outcome: Not Progressing

## 2024-03-02 NOTE — CONSULTS
[facility] Adult  Hospitalist Group                                                                                          Hospitalist Consult Note  Aura Dominguez PA-C  Answering service: [phone] OR [phone] from in house phone        Date of Service:  3/2/2024  NAME:  Roge Cowan  :  1970  MRN:  [id number]      Admission Summary:   Roge Cowan is a 53 y.o. male with schizophrenia and catatonia, s/p ECT x2 who was transferred from [facility] inpatient to [facility] ICU on 2024 for severe psychosis and episodes of catatonia. Pt was initially admitted to [facility] on 2023 after being taken there by police for evaluation for refusal of self-care. [facility] hospitalization included development of a R ankle Stage IV pressure wound that progressed to tendon exposure with purulent drainage, refusal of wound care, labs, vitals checks, oral medications, oral intake, and only took IM medications that were court ordered. Pt was seen by podiatry and ID who recommended vancomycin and cefepime. He did receive ECT x2 at [facility]. Pt was transferred to [facility] ICU for sedation and protective environment to ensure adequate care. He was started on ketamine and Precedex gtts. He required 4 point restraints. 1:1 sitter was ordered. He was intubated on 1/15 due to increasing agitation despite sedating meds.  ID, Psychiatry, Palliative Medicine, Ethics, and WOCN have all been consulted during pt's ICU stay at [facility]. Psychiatry recommended reducing reliance on BDZs and consolidating antipsychotics to just one if possible; Haldol IV was increased and Depakene was added. Ethics held an interdisciplinary meeting with all medical teams involved on . There is an open APS case against patient's sister/ Julie. Pt was extubated  and was in bilateral wrist restraints. DHT is in place and tube feeds are infusing per dietitian recs. Pt has periods of agitation for which he receives prn meds. He is currently on  Haldol 10mg IV 4x daily, midazolam 2mg IV q4h, mirtazapine 30mg per NGT nightly, Depakene 1500mg per NGT nightly and Depakene 500mg per NGT daily as per Psych. Transfer Center has been contacted in hopes of transferring pt to [facility] or [facility] to medical psych unit where he can get ECT given his ongoing medical care.  was consulted today for transfer of care.      Interval history / Subjective:      Saw the patient on BHU. He is in the fetal position and non-verbal.  On exam, no appreciable fluctuance, no discharge, nontender. Afebrile and no leukocytosis.     Assessment & Plan:     Catatonic schizophrenia  Acute psychosis  - Management per primary team     Severe malnutrition (POA)  Adult failure to thrive  -dietitian following     L groin lesion  ? Abscess  - Patient does have a small area of erythema and induration in the L groin area. No fluctuance appreciated. Possible a furuncle, likely exacerbated by patient refusing to leave the fetal position. As he is afebrile and the lesion is non-tender will hold off on ABX and surgery consult for I&D.   - no leukocytosis, afebrile     Right ankle cellulitis and Stage IV pressure ulcer with infection: Resolved  - BC 1/9 CoNS in 2 of 4 bottles; 1/11 negative  - R foot wound cx 1/10 Group B strep  - s/p cefepime and vancomycin  - appreciate ID input   - wound care        Remainder of management per primary team. Hospitalist will sign off, please reach out with any questions or change in pt status.              Review of Systems:   Pertinent items are noted in HPI.       Vital Signs:    Last 24hrs VS reviewed since prior progress note. Most recent are:  Vitals:    03/02/24 0835   BP: 120/81   Pulse: 74   Resp: 16   Temp: 97.7 °F (36.5 °C)   SpO2:        No intake or output data in the 24 hours ending 03/02/24 1649       Physical Examination:     I had a face to face encounter with this patient and independently examined them on 3/2/2024 as outlined  below:          Constitutional:  No acute distress, cooperative, pleasant    ENT:  Oral mucosa moist, oropharynx benign.    Resp:  CTA bilaterally. No wheezing/rhonchi/rales. No accessory muscle use.    CV:  Regular rhythm, normal rate, no murmurs, gallops, rubs    GI:  Soft, non distended, non tender. normoactive bowel sounds    Musculoskeletal:  L groin lesion, see WOCN for photograph, erythematous and oval shaped, non-tender, no purulence or fluctuance      Neurologic:  Moves all extremities.  AAOx3, CN II-XII reviewed            Data Review:    Review and/or order of clinical lab test  Review and/or order of tests in the radiology section of CPT  Review and/or order of tests in the medicine section of CPT      Labs:   No results for input(s): \"WBC\", \"HGB\", \"HCT\", \"PLT\" in the last 72 hours.  No results for input(s): \"NA\", \"K\", \"CL\", \"CO2\", \"BUN\", \"CREA\", \"GLU\", \"CA\", \"MG\", \"PHOS\", \"URICA\" in the last 72 hours.  No results for input(s): \"ALT\", \"TP\", \"ALB\", \"GLOB\", \"GGT\", \"AML\" in the last 72 hours.    Invalid input(s): \"SGOT\", \"GPT\", \"AP\", \"TBIL\", \"TBILI\", \"AMYP\", \"LPSE\", \"HLPSE\"  No results for input(s): \"INR\", \"APTT\" in the last 72 hours.    Invalid input(s): \"PTP\"   No results for input(s): \"TIBC\", \"FERR\" in the last 72 hours.    Invalid input(s): \"FE\", \"PSAT\"   No results found for: \"FOL\", \"RBCF\"   No results for input(s): \"PH\", \"PCO2\", \"PO2\" in the last 72 hours.  No results for input(s): \"CPK\" in the last 72 hours.    Invalid input(s): \"CPKMB\", \"CKNDX\", \"TROIQ\"  Lab Results   Component Value Date/Time    CHOL 157 12/13/2023 06:02 AM    HDL 55 12/13/2023 06:02 AM     No results found for: \"GLUCPOC\"  [unfilled]      Medications Reviewed:     Current Facility-Administered Medications   Medication Dose Route Frequency    FLUoxetine (PROZAC) capsule 60 mg  60 mg Oral Lunch    collagenase ointment   Topical Daily    balsum peru-castor oil (VENELEX) ointment   Topical Daily    LORazepam (ATIVAN) injection 2 mg  2 mg

## 2024-03-02 NOTE — PLAN OF CARE
Problem: Safety - Medical Restraint  Goal: Remains free of injury from restraints (Restraint for Interference with Medical Device)  Description: INTERVENTIONS:  1. Determine that other, less restrictive measures have been tried or would not be effective before applying the restraint  2. Evaluate the patient's condition at the time of restraint application  3. Inform patient/family regarding the reason for restraint  4. Q2H: Monitor safety, psychosocial status, comfort, nutrition and hydration  3/1/2024 2009 by Jennifer Timmons RN  Outcome: Progressing   2000: 1:1 continues, patient agreeable to allow staff to take select vitals at this time. Still needing redirection and encouragement from staff.  Vitals:    03/01/24 1950   BP: 106/83   Pulse: 91   Resp: 20   Temp:    SpO2:     2100: 1:1 continues, sitter in place, patient remains off wounds.    2130: Restraints discontinued, 1:1 still in place. Patient currently resting on left side. Wounds remain covered and dry at this time.  2230: Patient resting in bed, even unlabored resp. 1:1 still in place. Sitter present providing support.  2327: Patient currently awake in  bed. 1:1 still in place. Sitter present to provide support.  0030: Patient currently awake in bed 1:1 still in place. Sitter present providing support.   0130: Patient urinated on self and all over bedding. Linen changed, patient cleaned.   0145: Patient not accepting verbal redirection from staff and resisting repositioning placing pressure on wounded areas. Currently not allowing staff to redress soiled wound dressings. ANDRE Steward called orders for 4 point soft restraints given.   0200:  Patient placed in soft 4 point restraints. 1:1 still in place to provided support and to monitor safety.  0300: Patient remains in restraints. 1:1 sitter in place.  0400: Patient awake, still in restraints. 1:1 sitter present.  0500: Patient awake in bed, 1:1 sitter present.  0600: Restraints discontinued, 1:1 still in  place.

## 2024-03-02 NOTE — BH NOTE
Department of Psychiatry  Attending Progress Note      SUBJECTIVE:  pt nonverbal, lying in bed without clothes on    OBJECTIVE    Physical  VITALS:  /81   Pulse 74   Temp 97.7 °F (36.5 °C) (Axillary)   Resp 16   Ht 1.839 m (6' 0.4\")   Wt 60.1 kg (132 lb 7.9 oz)   SpO2 99%   PF 98 L/min   BMI 17.77 kg/m²   CONSTITUTIONAL:  awake, alert, and cooperative  Mental Status Examination:  Level of consciousness:  arousable to voice  Lying in bed, does not verbalize. In fetal position, has been eating meals per staff. Internally preoccupied      Medications  Current Facility-Administered Medications: FLUoxetine (PROZAC) capsule 60 mg, 60 mg, Oral, Lunch  collagenase ointment, , Topical, Daily  balsum peru-castor oil (VENELEX) ointment, , Topical, Daily  LORazepam (ATIVAN) injection 2 mg, 2 mg, IntraMUSCular, Q6H PRN  LORazepam (ATIVAN) tablet 3 mg, 3 mg, Oral, Nightly **OR** LORazepam (ATIVAN) injection 3 mg, 3 mg, IntraMUSCular, Nightly  sennosides-docusate sodium (SENOKOT-S) 8.6-50 MG tablet 2 tablet, 2 tablet, Oral, BID  Peppermint Spirit, , Does not apply, Daily  mirtazapine (REMERON SOL-TAB) disintegrating tablet 45 mg, 45 mg, Oral, Nightly  haloperidol (HALDOL) tablet 10 mg, 10 mg, Oral, TID WC **OR** haloperidol lactate (HALDOL) injection 10 mg, 10 mg, IntraMUSCular, TID WC  LORazepam (ATIVAN) tablet 3 mg, 3 mg, Oral, TID WC **OR** LORazepam (ATIVAN) injection 3 mg, 3 mg, IntraMUSCular, TID WC  acetaminophen (TYLENOL) tablet 650 mg, 650 mg, Oral, Q4H PRN  polyethylene glycol (GLYCOLAX) packet 17 g, 17 g, Oral, Daily PRN  senna (SENOKOT) tablet 8.6 mg, 1 tablet, Oral, Daily PRN  aluminum & magnesium hydroxide-simethicone (MAALOX) 200-200-20 MG/5ML suspension 30 mL, 30 mL, Oral, Q6H PRN  hydrOXYzine HCl (ATARAX) tablet 50 mg, 50 mg, Oral, TID PRN  haloperidol (HALDOL) tablet 5 mg, 5 mg, Oral, Q4H PRN **OR** haloperidol lactate (HALDOL) injection 5 mg, 5 mg, IntraMUSCular, Q4H PRN  diphenhydrAMINE (BENADRYL)  injection 50 mg, 50 mg, IntraMUSCular, Q4H PRN  traZODone (DESYREL) tablet 50 mg, 50 mg, Oral, Nightly PRN    ASSESSMENT AND PLAN    Continue current treatment

## 2024-03-02 NOTE — PLAN OF CARE
Problem: Safety - Adult  Goal: Free from fall injury  3/2/2024 0834 by Carrie Rodas RN  Outcome: Progressing  3/2/2024 0012 by Lore Engel LPN  Outcome: Progressing     Problem: Depression  Goal: Will be euthymic at discharge  Description: INTERVENTIONS:  1. Administer medication as ordered  2. Provide emotional support via 1:1 interaction with staff  3. Encourage involvement in milieu/groups/activities  4. Monitor for social isolation  Outcome: Not Progressing    Blocked Bed Documentation:    Room number: 740  Type: Behavior/Contact  Rationale: Poor Sleep Pattern/MRSA  Anticipated duration: TBD qshift  Additional comments: Pt has history of MRSA, catatonic     1000: Patient minimally participatory in treatment team. Mood and affect; dull, flat, withdrawn and selectively verbal. Patient did eat 100% of breakfast. Patient laying in fetal position, educated patient on importance of needing to lay supine, patient not accepting of verbal redirection and required being placed in non-violent four point restraints.      0730: Pt currently on 1:1 for safety; patient laying quietly in bed; no     distress noted.     0830: Pt currently on 1:1 for safety; patient sitting in bed currently eating breakfast; no distress noted.     0930: Pt currently on 1:1 for safety; patient laying quietly in bed; no distress noted.    1020: Pt placed in non-violent four point soft restraints for safety; patient exited bed and was urinating on floor, and refused to get back in the bed. With staff assistance, patient was placed back in bed, and patient was laying in position that was placing pressure on wounds on bilateral ankles' patient redirected verbally to change positions but was non-compliant. Ordered obtained for non-violent 4 point restraints from Dr. Leal who was rounding on unit. Nursing supervisor, Guy, contacted per his directions if patient were to go into restraints, will re-contact when patient is removed. Contacted  Cruzito, about start of restraints episode as well.     1030: Pt currently on 1:1 for safety; patient in non-violent 4 point restraints; patient sitting quietly in bed in supine position with HOB at 45 degree angle, no distress noted.    1135: Performed wound care on patient, to bilateral ankles. Patient attempting to rub dressings off with feet, educated patient on importance of leaving them on. Obtained heel drop boots; will consult with Wound care to see if this may be helpful solution to prevent patient from rubbing off dressings. Noted that patient has some redness to left shoulder, skin is not blanching currently, will pass on in report to monitor site.     1230: Pt currently on 1:1 for safety; patient in non-violent 4 point restraints; patient lying quietly in bed on right side, no distress noted.     1250: Patient removed from non-violent 4 point restraints, notified nursing supervisor Guy of restraints being removed.     1330: Pt currently on 1:1 for safety, patient resting quietly in bed, attempted to assist patient in using urinal, but patient was drowsy and resting, so covered with blanket and let him rest.     1430:

## 2024-03-02 NOTE — BH NOTE
PRN Medication Documentation    Specific patient behavior that led to need for PRN medication: Agitation  Staff interventions attempted prior to PRN being given: turn, reposition, therapeutic communication  PRN medication given: Haloperidol 5 mg and lorazepam 2 mg IM  Patient response/effectiveness of PRN medication: 04:30 - Pt remains restless, charge nurse notified, sitter remains at bedside. Plan of care ongoing.

## 2024-03-02 NOTE — BH NOTE
2018 - Pt declined scheduled oral medications.     2115 - Pt received IM lorazepam 3 mg per linked order.

## 2024-03-02 NOTE — PLAN OF CARE
Problem: Safety - Medical Restraint  Goal: Remains free of injury from restraints (Restraint for Interference with Medical Device)  Description: INTERVENTIONS:  1. Determine that other, less restrictive measures have been tried or would not be effective before applying the restraint  2. Evaluate the patient's condition at the time of restraint application  3. Inform patient/family regarding the reason for restraint  4. Q2H: Monitor safety, psychosocial status, comfort, nutrition and hydration  3/2/2024 0610 by Jennifer Timmons, RN  Outcome: Completed  3/2/2024 0233 by Jennifer Timmons, RN  Outcome: Progressing  Flowsheets (Taken 3/2/2024 0200)  Remains free of injury from restraints (restraint for interference with medical device): Evaluate the patient's condition at the time of restraint application

## 2024-03-02 NOTE — BH NOTE
Pt rubbed right ankle on bed sheet until dressing came off. Wounds to right and left ankles were cleansed, ointments applied according to wound care orders, and dressings reapplied. Pt currently sitting upright in bed in 4 point restraints. Pt remains restless. Charge nurse notified. Plan of care ongoing.

## 2024-03-03 PROCEDURE — 1240000000 HC EMOTIONAL WELLNESS R&B

## 2024-03-03 PROCEDURE — 6370000000 HC RX 637 (ALT 250 FOR IP): Performed by: PSYCHIATRY & NEUROLOGY

## 2024-03-03 RX ADMIN — COLLAGENASE SANTYL: 250 OINTMENT TOPICAL at 10:34

## 2024-03-03 RX ADMIN — FLUOXETINE HYDROCHLORIDE 60 MG: 20 CAPSULE ORAL at 12:52

## 2024-03-03 RX ADMIN — SENNOSIDES AND DOCUSATE SODIUM 2 TABLET: 50; 8.6 TABLET ORAL at 09:38

## 2024-03-03 RX ADMIN — HALOPERIDOL 10 MG: 5 TABLET ORAL at 09:40

## 2024-03-03 RX ADMIN — MIRTAZAPINE 45 MG: 15 TABLET, ORALLY DISINTEGRATING ORAL at 20:37

## 2024-03-03 RX ADMIN — LORAZEPAM 3 MG: 2 TABLET ORAL at 09:41

## 2024-03-03 RX ADMIN — LORAZEPAM 3 MG: 2 TABLET ORAL at 12:50

## 2024-03-03 RX ADMIN — SENNOSIDES AND DOCUSATE SODIUM 2 TABLET: 50; 8.6 TABLET ORAL at 20:37

## 2024-03-03 RX ADMIN — HALOPERIDOL 10 MG: 5 TABLET ORAL at 16:29

## 2024-03-03 RX ADMIN — HALOPERIDOL 10 MG: 5 TABLET ORAL at 12:51

## 2024-03-03 RX ADMIN — LORAZEPAM 3 MG: 2 TABLET ORAL at 20:37

## 2024-03-03 RX ADMIN — LORAZEPAM 3 MG: 2 TABLET ORAL at 16:29

## 2024-03-03 RX ADMIN — Medication: at 09:48

## 2024-03-03 NOTE — BH NOTE
Department of Psychiatry  Attending Progress Note      SUBJECTIVE:  Isreal is lying in bed, nonverbal with this provider    OBJECTIVE    Physical  VITALS:  /63   Pulse 72   Temp 97.9 °F (36.6 °C) (Axillary)   Resp 16   Ht 1.839 m (6' 0.4\")   Wt 60.1 kg (132 lb 7.9 oz)   SpO2 99%   PF 98 L/min   BMI 17.77 kg/m²     Mental Status Examination:  Level of consciousness:  awake  Appearance:  no clothes but covered by sheet  Behavior/Motor:  fetal position  Attitude toward examiner:  uncooperative  Speech:  mute  Mood:  unable to assess  Affect:  blunted  Thought processes:  unable to assess  Thought content:  appears to have internal stimuli    Data  Old records have been requested.    Medications  Current Facility-Administered Medications: FLUoxetine (PROZAC) capsule 60 mg, 60 mg, Oral, Lunch  collagenase ointment, , Topical, Daily  balsum peru-castor oil (VENELEX) ointment, , Topical, Daily  LORazepam (ATIVAN) injection 2 mg, 2 mg, IntraMUSCular, Q6H PRN  LORazepam (ATIVAN) tablet 3 mg, 3 mg, Oral, Nightly **OR** LORazepam (ATIVAN) injection 3 mg, 3 mg, IntraMUSCular, Nightly  sennosides-docusate sodium (SENOKOT-S) 8.6-50 MG tablet 2 tablet, 2 tablet, Oral, BID  Peppermint Spirit, , Does not apply, Daily  mirtazapine (REMERON SOL-TAB) disintegrating tablet 45 mg, 45 mg, Oral, Nightly  haloperidol (HALDOL) tablet 10 mg, 10 mg, Oral, TID WC **OR** haloperidol lactate (HALDOL) injection 10 mg, 10 mg, IntraMUSCular, TID WC  LORazepam (ATIVAN) tablet 3 mg, 3 mg, Oral, TID WC **OR** LORazepam (ATIVAN) injection 3 mg, 3 mg, IntraMUSCular, TID WC  acetaminophen (TYLENOL) tablet 650 mg, 650 mg, Oral, Q4H PRN  polyethylene glycol (GLYCOLAX) packet 17 g, 17 g, Oral, Daily PRN  senna (SENOKOT) tablet 8.6 mg, 1 tablet, Oral, Daily PRN  aluminum & magnesium hydroxide-simethicone (MAALOX) 200-200-20 MG/5ML suspension 30 mL, 30 mL, Oral, Q6H PRN  hydrOXYzine HCl (ATARAX) tablet 50 mg, 50 mg, Oral, TID PRN  haloperidol  (HALDOL) tablet 5 mg, 5 mg, Oral, Q4H PRN **OR** haloperidol lactate (HALDOL) injection 5 mg, 5 mg, IntraMUSCular, Q4H PRN  diphenhydrAMINE (BENADRYL) injection 50 mg, 50 mg, IntraMUSCular, Q4H PRN  traZODone (DESYREL) tablet 50 mg, 50 mg, Oral, Nightly PRN    ASSESSMENT AND PLAN    Continue current treatment

## 2024-03-03 NOTE — BH NOTE
1930: Patient resting in bed. 1:1 sitter in place to provide support and manage safety.  2030: Patient in bed, currently awake. 1:1 sitter in place to provide support and manage safety  2130: Patient resting in bed. 1:1 sitter in place to provide support and manage safety.  2230: Patient resting in bed. 1:1 sitter in place to provide support and manage safety.  2330: Patient resting in bed. 1:1 sitter in place to provide support and manage safety  2345: Patient incontinent of urine while in bed. Patient was able to ambulate from bed to chair with 2 standby assist from staff while the linen was changed. Patient talking minimally to staff during this time, accepting direction. Staff offered gown for patient, patient still declining at this time. Wound dressing assessed, still dry and intact. Patient currently resting on side. 1:1 present to provide support and manage safety.  0030: Patient awake in bed. 1:1 sitter in place to provide support and manage safety  0130: Patient resting in bed. 1:1 sitter in place to provide support and monitor safety  0230: Patient resting in bed 1:1 sitter in place to provide support and monitor safety.  0330: Patient resting in bed. 1:1 sitter in place to provide support and monitor safety.  0430: Patient resting in bed. 1:1 sitter in place to provide support and monitor safety.  0530: Patient resting in bed. 1:1 sitter in place to provide support and monitor safety.   0615: Patient rt ankle dressing came off while sleeping. Wound cleansed with  Vashe, Santyl applied and new foam dressing applied to area.

## 2024-03-03 NOTE — BH NOTE
1530- pt resting on his left side calm, continued on 1:1.   1630- pt resting on his  right side calm, continued on 1:1.   1730- Pt resting in fetal/prayer position and turned back to left side lying position. Continues on a 1:1 constant observation.   1830- t resting on his  right side calm, continued on 1:1.   1930- t resting on his  right side calm, continued on 1:1.   2030- pt resting on his left side calm, continued on 1:1.   2130- pt resting on his left side calm, continued on 1:1.   22:30- Pt turned to be on fetal/prayer position, assisted back to right side. Pt remained resistant to turning and positioning. Continued on 1:1 constant observation.

## 2024-03-03 NOTE — PLAN OF CARE
Problem: Safety - Adult  Goal: Free from fall injury  3/2/2024 2336 by Lakeshia Samson RN  Flowsheets (Taken 3/2/2024 2336)  Free From Fall Injury:   Instruct family/caregiver on patient safety   Based on caregiver fall risk screen, instruct family/caregiver to ask for assistance with transferring infant if caregiver noted to have fall risk factors    Problem: Skin/Tissue Integrity  Goal: Absence of new skin breakdown  3/2/2024 2337, GAIL Asher  Outcome: Progressing       Problem: Infection - Adult  Goal: Absence of fever/infection during anticipated neutropenic period  3/2/2024 Greg Asher RN  Outcome: Progressing

## 2024-03-03 NOTE — BH NOTE
07:35 am-pt resting in room continued on 1:1 with staff.  08:35 am-pt ate breakfast. Continued on 1:1 with staff.  09:30 am-Pt on left side. Continued on 1:1 with staff.  09:37 am-Left ankle cleaned with Vashe and Velenex applied to foam dressing.  10:35 am-Righ don dressing changed-cleaned with Vashe and Santyl applied to wound-covered with foam dressing.  Currently on right side. Continued on 1:1.  11:18 am-pt urinated in bed. Linens changed and pt washed. Currently on the right side.Continued on 1:1.  11:50 am-pt resting on his left side. Continued on 1:1.    12:26 pm-pt resting on his left side. Restless in the bed. Continued on 1:1.  13:35-pt restless in bed. On his right side. Continued on 1:1.  14:30-pt restless in bed. Currently on his left side.

## 2024-03-03 NOTE — PLAN OF CARE
Problem: Safety - Adult  Goal: Free from fall injury  3/3/2024 0838 by Margarette Herzog RN  Outcome: Progressing  3/2/2024 2336 by Lakeshia Samson RN  Flowsheets (Taken 3/2/2024 2336)  Free From Fall Injury:   Instruct family/caregiver on patient safety   Based on caregiver fall risk screen, instruct family/caregiver to ask for assistance with transferring infant if caregiver noted to have fall risk factors     Problem: Skin/Tissue Integrity  Goal: Absence of new skin breakdown  Description: 1.  Monitor for areas of redness and/or skin breakdown  2.  Assess vascular access sites hourly  3.  Every 4-6 hours minimum:  Change oxygen saturation probe site  4.  Every 4-6 hours:  If on nasal continuous positive airway pressure, respiratory therapy assess nares and determine need for appliance change or resting period.  Outcome: Progressing     Problem: Psychosis  Goal: Will report no hallucinations or delusions  Description: INTERVENTIONS:  1. Administer medication as  ordered  2. Assist with reality testing to support increasing orientation  3. Assess if patient's hallucinations or delusions are encouraging self harm or harm to others and intervene as appropriate  3/2/2024 2336 by Lakeshia Samson, RN  Outcome: Progressing     Problem: Infection - Adult  Goal: Absence of fever/infection during anticipated neutropenic period  Outcome: Progressing

## 2024-03-04 PROCEDURE — 1240000000 HC EMOTIONAL WELLNESS R&B

## 2024-03-04 PROCEDURE — 6370000000 HC RX 637 (ALT 250 FOR IP): Performed by: PSYCHIATRY & NEUROLOGY

## 2024-03-04 RX ADMIN — SENNOSIDES AND DOCUSATE SODIUM 2 TABLET: 50; 8.6 TABLET ORAL at 09:28

## 2024-03-04 RX ADMIN — LORAZEPAM 3 MG: 2 TABLET ORAL at 09:28

## 2024-03-04 RX ADMIN — HALOPERIDOL 10 MG: 5 TABLET ORAL at 12:34

## 2024-03-04 RX ADMIN — LORAZEPAM 3 MG: 2 TABLET ORAL at 17:04

## 2024-03-04 RX ADMIN — COLLAGENASE SANTYL: 250 OINTMENT TOPICAL at 14:12

## 2024-03-04 RX ADMIN — LORAZEPAM 3 MG: 2 TABLET ORAL at 12:26

## 2024-03-04 RX ADMIN — SENNOSIDES AND DOCUSATE SODIUM 2 TABLET: 50; 8.6 TABLET ORAL at 21:45

## 2024-03-04 RX ADMIN — HALOPERIDOL 10 MG: 5 TABLET ORAL at 17:04

## 2024-03-04 RX ADMIN — HALOPERIDOL 10 MG: 5 TABLET ORAL at 09:27

## 2024-03-04 RX ADMIN — Medication: at 10:06

## 2024-03-04 RX ADMIN — LORAZEPAM 3 MG: 2 TABLET ORAL at 21:45

## 2024-03-04 RX ADMIN — Medication: at 14:11

## 2024-03-04 RX ADMIN — MIRTAZAPINE 45 MG: 15 TABLET, ORALLY DISINTEGRATING ORAL at 21:46

## 2024-03-04 ASSESSMENT — PAIN SCALES - GENERAL: PAINLEVEL_OUTOF10: 0

## 2024-03-04 NOTE — PROGRESS NOTES
Behavioral Services                                              Medicare Re-Certification    I certify that the inpatient psychiatric hospital services furnished since the previous certification/re-certification were, and continue to be, medically necessary for;    [x] (1) Treatment which could reasonably be expected to improve the patient's condition,    [] (2) Or for diagnostic study.    Estimated length of stay/service 3-5 days.    Plan for post-hospital care Outpatient Psychiatry.    This patient continues to need, on a daily basis, active treatment furnished directly by or requiring the supervision of inpatient psychiatric personnel.    Electronically signed by Lety Larson MD on 3/4/2024 at 11:31 AM

## 2024-03-04 NOTE — PLAN OF CARE
Problem: Depression  Goal: Will be euthymic at discharge  Description: INTERVENTIONS:  1. Administer medication as ordered  2. Provide emotional support via 1:1 interaction with staff  3. Encourage involvement in milieu/groups/activities  4. Monitor for social isolation  Outcome: Not Progressing     Problem: Psychosis  Goal: Will report no hallucinations or delusions  Description: INTERVENTIONS:  1. Administer medication as  ordered  2. Assist with reality testing to support increasing orientation  3. Assess if patient's hallucinations or delusions are encouraging self harm or harm to others and intervene as appropriate  Outcome: Not Progressing     Problem: Safety - Adult  Goal: Free from fall injury  Outcome: Progressing     Problem: Skin/Tissue Integrity  Goal: Absence of new skin breakdown  Description: 1.  Monitor for areas of redness and/or skin breakdown  Outcome: Progressing     Problem: Infection - Adult  Goal: Absence of fever/infection during anticipated neutropenic period  Outcome: Progressing     Problem: Pain  Goal: Verbalizes/displays adequate comfort level or baseline comfort level  Outcome: Progressing  Verbalizes/displays adequate comfort level or baseline comfort level: Encourage patient to monitor pain and request assistance     Problem: Skin/Tissue Integrity - Adult  Goal: Incisions, wounds, or drain sites healing without S/S of infection  Outcome: Progressing     Problem: Nutrition Deficit:  Goal: Optimize nutritional status  Outcome: Adequate for Discharge    0938: Pt is alert, quiet, nonverbal, maintaining fetal 'praying' position but does shift weight and make small changes to position. He does this despite numerous staff instructions to lie on either side or back in order to shift weight off  existing wound. Pt has been resistive with VS attempts. He eats 100% of meal independently, demonstrates good dexterity, then quickly retreats back to fetal position. He does not respond verbally to  questions or conversation posed by this writer or other staff entering, including Dr. reed. He occasionally raises head to look around and scan room. No pain verbalized nor apparent.   Of note MRSA swab result was negative.   Pt only took partial dose of am meds in ice cream approx 50%.  1030: pt went into nonviolent soft 4 pt restraints d/t continued self postioning in fetal position on knees and onto both lateral ankles (R has an existing pressure wound).   1252: Pt has been out of restraints since 1145 when he was released for lunch and consumed 100% of meal and supplement. He took 1200 medications over objection orally per his preference. He refused prozac 60 mg. Pt is going onto L side as instructed,  but briefly (1 minute), the reverts to prayer position mostly. Behavior appears ritualistic with hyperventilating during these position changes. Restraints discontinued.   1400: Dressing change to R lateral ankle completed and reinforced with wrap since pt is 1:1.

## 2024-03-04 NOTE — BH NOTE
Psychiatric Progress Note    Patient: Roge Cowan MRN: [id number]  SSN: [id number]    YOB: 1970  Age: 54 y.o.  Sex: male      Admit Date: 2/15/2024    LOS: 18 days     Subjective:   03/04/2024  Nursing report that patient is eating all of his food. He is sleeping well  and is taking all of his PO medications.  He does assume the fetal position, and they communicate to  him the danger in remaining in this position as it would exacerbate his 2 ankle ulcers. When he is not amenable to verbal re-direction, he is placed in soft restraints.  Today he wouldn't move out of the fetal position and soft restraints applied at 10.25.  Upon my evaluation this morning, patient increased his rate of breathing. He didn't answer any of my questions.    03/01/2024  Nursing reports that patient has been able to cooperate with staff in remaining out of his restraints.  He is able to remain out of the prayer position which has exacerbated his ulcers.  He has not interfered with dressing of either one of his ankles.  He is eating his meals and has accepted all of his medicines by mouth.  Upon my evaluation patient was sound asleep in his bed and is covered via sheet.  Because of the many interruptions, by several team members, we opted to let him rest today.    Wound nurse pointed out the presence of what appears to be a small lesion in his left groin that may be an abscess.  She noted that it was not tender, but we pointed out that patient likely has a disordered sense of pain perception. He did continue to remain in a prayer position so much so that he ended up with ulcers that he is not complaining about.    02/29/2024  Nursing report patient has been eating his meals and is adherent to most his medications.  However, only this morning he refused AM medications, but received IM alternative.  He was in restraints upon my evaluation, with wounds bandaged.  Isreal said that he wants to be called 'Romulo,' but his sister  yesterday he was on his mattress, and not on the floor.  He does state in the fetal position, but not the majority of the time now.  He has been taking his medications by mouth and eating 100% of his meals.  Upon my evaluation he was unclothed, lying on his mattress, and sound asleep.    02/20/2024  Nursing report that patient now is sleeping on his mattress in his room and not sleeping on the floor. He slept 7-8 hours last night.  He does well when he is alone and isn't disturbed. He's been eating 100% of all of his meals. His PO medications are placed into ice-cream which he's been consuming.  Today upon my exam, Isreal was sound asleep. The treatment team elected to let him sleep and rest.    02/19/2024  Nursing report that patient remains in the fetal position mostly, but is observed to be sleeping on his side other times.  He is eating 100% of his food when nobody staff aren't present. He's been taking his PO medications. He increases his rate of breathing and opposes any attempt to physically evaluate him.  He doesn't answer any of my questions and doesn't follow any directions.      2/18 -  Roge Chapo reports little to this interviewer, however he spoke to nursing and has been compliant with medications and meals. Urinated in a bottle instead of on the floor today.  No aggression or violence.  Tolerating medications well (No prn).  Appetite is fair.  Eating and sleeping fairly per staff.      Roge Cowan  would not speak with this interviewer.  He remains crouched on the floor in praying position.  His food tray is empty in front of him.  Nursing reports that he eats all of his food and is seen walking around the room at times shifting his position.  Incontinent of urine still.  No aggression or violence.  Minimally interactive and limited awareness.  Tolerating medications well.  Eating fairly and sleeping some..    Objective:     Vitals:    03/03/24 0900 03/04/24 0745 03/04/24 0807 03/04/24 0915  He feels that it is preferable to transfer pt to medicine there with his daily consults and to pursue ECT.  SW will reach out to [facility] ECT referral through official channel, since Dr. Garner there was out with COVID.  All hands meeting today to update on progress.      02//26/2024  Will add pericolace po bid to improve BM.  Increase prozac from 40mg po qday to 60mg po qday  Continue haldol 10mg po tid meals, ativan 3mg tid meals.  Will add ativan 3mg po qhs as well.  Continue remeron 45mg po qhs    Reached out to Dr. Garner by phone, awaiting her reply for possible transfer/ECT at [facility].    02/25/2024  Continue current care.    02/24/2024  Continue current care    02/23/2024  Messages to Dr. Garner today weren't returned regarding ECT scheduling/transfer to [facility]. She was out with COVID starting last Friday.  For the time being will continue current medication regimen as patient has not worsened and is eating and taking all of his medications.  Wound care continues to follow pt's R ankle ulcer.  Appreciate the time and expertise of all team members, including dietitian, social work from [facility] and palliative care, as well as our nurses.    02/22/2024  Awaiting response from Dr. Garner regarding availability of her ECT schedule.  For the time being will increase prozac from 20 to 40mg po qday. Will increase remeron from 30 to 45mg po qhs.  Continue haldol 10mg po bid and ativan 3mg po tid with meals, with IM alternatives.    02/21/2024  Continue current medication regimen as prescribed.  I contacted Dr. Garner at [facility] today, who informed me that she is still at out sick due to COVID.  She did say that she will check with her ECT coordinator in order to accommodate Mr. Cowan's treatment.  As patient's irritability and resistance to treatment/staff continue to decrease, transfer to [facility] can be a consideration if transfer to [facility] is further delayed.    02/20/2024  Continue current PO medication regimen with IM

## 2024-03-04 NOTE — BH NOTE
2300 - Patient is resting quietly on his left side.  Staff present 1:1 continues.  0000 - Patient is resting quietly on his left side.  Staff present 1:1 continues.  0130 - 1:1 continues. Staff present.  Patient is kneeling in a prayer position.  Offered snacks and 240 ml of water.  Tolerated it well.  0230 - Patient is resting quietly in bed.  Appears to be asleep.  1:1 continues.  Staff present.  0330 - Patient is resting quietly in bed.  Appears to be asleep.  1:1 continues.  Staff present.  0510 - Patient is awake and alert.  Staff present.  Linen on bed was saturated with urine.  Staff cleaned the bed, gave patient a sponge bath and applied clean sheet on the bed. Will continue to monitor.  0610 - Patient is resting quietly in bed.  Appears to be asleep.  No distress noted.  1:1 continues.  Staff present

## 2024-03-04 NOTE — INTERDISCIPLINARY ROUNDS
Behavioral Health Interdisciplinary Rounds     Patient Name: Roge Cowan  Age: 54 y.o.  Room/Bed:  0/  Primary Diagnosis: Catatonic schizophrenia (HCC)   Admission Status: Involuntary Commitment    Readmission within 30 days: No  Power of  in place: Yes, sister   Patient requires a blocked bed: Yes          Reason for blocked bed: behavior  Sleep hours:        Participation in Care/Groups:  no  Medication Compliant?: Yes  PRNS (last 24 hours): None   Restraints (last 24 hours):  Yes  __________________________________________________    24 hour chart check complete: Yes    _______________________________________________    Patient goal(s) for today:   Treatment team focus/goals: Plan to fax admission paperwork to [facility] for possible admission   Progress note: He has been compliant with his medications, and is eating 100% of his meals     SW faxed clinicals to Dr. Morel at [facility] for possible admission for ECT .  Fax - [phone]      Financial concerns/prescription coverage:  medicare   Family contact:     sister / guardian                    Family requesting physician contact today:    Discharge plan: he will return home with his sister   Access to weapons : no                                                              Outpatient provider(s): [facility]       LOS:  18  Expected LOS: TBD     Participating treatment team members: Rosa Elena Wilson SW - Dr. Marsha Beavers RN

## 2024-03-04 NOTE — BH NOTE
GROUP THERAPY PROGRESS NOTE    Patient did not participate in psychoeducation group.    Katherine Gillies MSW, [facility]

## 2024-03-04 NOTE — PLAN OF CARE
Problem: Skin/Tissue Integrity  Goal: Absence of new skin breakdown  Description: 1.  Monitor for areas of redness and/or skin breakdown  2.  Assess vascular access sites hourly  3.  Every 4-6 hours minimum:  Change oxygen saturation probe site  4.  Every 4-6 hours:  If on nasal continuous positive airway pressure, respiratory therapy assess nares and determine need for appliance change or resting period.  3/4/2024 1547 by Elaine Beavers RN  Outcome: Progressing  3/4/2024 1141 by Elaine Beavers RN  Outcome: Not Progressing  3/4/2024 0911 by Matilda Banerjee RN  Outcome: Progressing     Problem: Depression  Goal: Will be euthymic at discharge  Description: INTERVENTIONS:  1. Administer medication as ordered  2. Provide emotional support via 1:1 interaction with staff  3. Encourage involvement in milieu/groups/activities  4. Monitor for social isolation  3/4/2024 0911 by Matilda Banerjee RN  Outcome: Not Progressing     Problem: Psychosis  Goal: Will report no hallucinations or delusions  Description: INTERVENTIONS:  1. Administer medication as  ordered  2. Assist with reality testing to support increasing orientation  3. Assess if patient's hallucinations or delusions are encouraging self harm or harm to others and intervene as appropriate  3/4/2024 0911 by Matilda Banerjee RN  Outcome: Not Progressing  Patient remains isolative to self with little to no interaction with staff. Makes brief requests known, when asked for po medication instead of shots or crushed in pudding.   Patient placed in restraints for two hours today,   Second order for restraints obtained at 1309 due to patient not rotating sides and to do wound care. Patient did allow the wound care to be provided with a mepilex dressing and a wrap. !:1 staff is in place while this wrap is on the patient. Patient is cooperative with care.. Second order for the restraints is cancelled, not put in.               1035 orders for 4 point soft  restraints obtained by verbal order.  1100 1:1 w staff continues. Requires verbal prompts to keep right ankle wound off of sheets.  1115 patient rubbing right foot against sheet, refuses instruction.  1130 provide wound care, apply mepilex dressing, wrap with Coban, (because patient is on a 1:1)  1145 remove restraints, bilateral wrists, bilateral ankles for patient to eat, take medications.  1200  1:1 w staff continues. patient resting, lying left side.

## 2024-03-04 NOTE — BH NOTE
GROUP THERAPY PROGRESS NOTE    Patient did not participate in Healthy Living group.    Katherine Gillies, MSW, [facility]

## 2024-03-05 ENCOUNTER — APPOINTMENT (OUTPATIENT)
Facility: HOSPITAL | Age: 54
DRG: 885 | End: 2024-03-05
Attending: PSYCHIATRY & NEUROLOGY
Payer: MEDICARE

## 2024-03-05 LAB
ALBUMIN SERPL-MCNC: 3.2 G/DL (ref 3.5–5)
ALBUMIN/GLOB SERPL: 0.8 (ref 1.1–2.2)
ALP SERPL-CCNC: 147 U/L (ref 45–117)
ALT SERPL-CCNC: 58 U/L (ref 12–78)
ANION GAP SERPL CALC-SCNC: 5 MMOL/L (ref 5–15)
AST SERPL-CCNC: ABNORMAL U/L (ref 15–37)
BILIRUB SERPL-MCNC: 0.3 MG/DL (ref 0.2–1)
BUN SERPL-MCNC: 29 MG/DL (ref 6–20)
BUN/CREAT SERPL: 32 (ref 12–20)
CALCIUM SERPL-MCNC: 9.2 MG/DL (ref 8.5–10.1)
CHLORIDE SERPL-SCNC: 112 MMOL/L (ref 97–108)
CO2 SERPL-SCNC: 24 MMOL/L (ref 21–32)
CREAT SERPL-MCNC: 0.92 MG/DL (ref 0.7–1.3)
GLOBULIN SER CALC-MCNC: 4 G/DL (ref 2–4)
GLUCOSE SERPL-MCNC: 108 MG/DL (ref 65–100)
POTASSIUM SERPL-SCNC: ABNORMAL MMOL/L (ref 3.5–5.1)
PROT SERPL-MCNC: 7.2 G/DL (ref 6.4–8.2)
SODIUM SERPL-SCNC: 141 MMOL/L (ref 136–145)

## 2024-03-05 PROCEDURE — 71045 X-RAY EXAM CHEST 1 VIEW: CPT

## 2024-03-05 PROCEDURE — 6370000000 HC RX 637 (ALT 250 FOR IP): Performed by: PSYCHIATRY & NEUROLOGY

## 2024-03-05 PROCEDURE — 1240000000 HC EMOTIONAL WELLNESS R&B

## 2024-03-05 PROCEDURE — 85025 COMPLETE CBC W/AUTO DIFF WBC: CPT

## 2024-03-05 PROCEDURE — 6360000002 HC RX W HCPCS: Performed by: PSYCHIATRY & NEUROLOGY

## 2024-03-05 PROCEDURE — 36415 COLL VENOUS BLD VENIPUNCTURE: CPT

## 2024-03-05 PROCEDURE — 80053 COMPREHEN METABOLIC PANEL: CPT

## 2024-03-05 RX ADMIN — HALOPERIDOL LACTATE 10 MG: 5 INJECTION, SOLUTION INTRAMUSCULAR at 09:52

## 2024-03-05 RX ADMIN — LORAZEPAM 3 MG: 2 TABLET ORAL at 17:17

## 2024-03-05 RX ADMIN — LORAZEPAM 3 MG: 2 INJECTION INTRAMUSCULAR; INTRAVENOUS at 22:29

## 2024-03-05 RX ADMIN — HALOPERIDOL LACTATE 10 MG: 5 INJECTION, SOLUTION INTRAMUSCULAR at 14:25

## 2024-03-05 RX ADMIN — LORAZEPAM 3 MG: 2 INJECTION INTRAMUSCULAR; INTRAVENOUS at 09:50

## 2024-03-05 RX ADMIN — HALOPERIDOL 10 MG: 5 TABLET ORAL at 17:17

## 2024-03-05 RX ADMIN — COLLAGENASE SANTYL: 250 OINTMENT TOPICAL at 10:07

## 2024-03-05 RX ADMIN — Medication: at 10:07

## 2024-03-05 ASSESSMENT — PAIN SCALES - GENERAL: PAINLEVEL_OUTOF10: 0

## 2024-03-05 NOTE — BH NOTE
GROUP THERAPY PROGRESS NOTE    Patient did not participate in recreational therapy group.    Katherine Gillies, MSW, [facility]

## 2024-03-05 NOTE — PLAN OF CARE
Problem: Safety - Medical Restraint  Goal: Remains free of injury from restraints (Restraint for Interference with Medical Device)  Description: INTERVENTIONS:  1. Determine that other, less restrictive measures have been tried or would not be effective before applying the restraint  2. Evaluate the patient's condition at the time of restraint application  3. Inform patient/family regarding the reason for restraint  4. Q2H: Monitor safety, psychosocial status, comfort, nutrition and hydration  Outcome: Not Progressing  Flowsheets  Taken 3/5/2024 0620 by Washington Owens RN  Remains free of injury from restraints (restraint for interference with medical device):   Every 2 hours: Monitor safety, psychosocial status, comfort, nutrition and hydration   Evaluate the patient's condition at the time of restraint application   Determine that other, less restrictive measures have been tried or would not be effective before applying the restraint   Inform patient/family regarding the reason for restraint  Taken 3/5/2024 0600 by Washington Owens RN  Remains free of injury from restraints (restraint for interference with medical device): Every 2 hours: Monitor safety, psychosocial status, comfort, nutrition and hydration      Patient was placed in restraints due to being in fetal position and not following instructions on moving to another position.  Pt was in restraints from [id number]   Pt removed from restraints at 0615

## 2024-03-05 NOTE — BH NOTE
BEHAVIORAL HEALTH RESTRAINT/SECLUSION PROGRESS NOTE TERMINATION OF RESTRAINT/SECLUSION    1. Current mental status/behavior: Calm and Cooperative    2. Criteria for release met: Acute signs and symptoms necessitating restraint/seclusion have decreased substantially to the level where patient can safely function in least restrictive environment.    3. Additional interventions to prevent recurrence of dangerous behaviors include the following in addition to the debriefing process by the team.   Encourage pt to lay on side

## 2024-03-05 NOTE — BH NOTE
Held patient's Ativan due to low BP of 96/57.  Charge nurse aware.    1345:  rechecked patients BP.  Medication is not available on unit as has not been restocked by pharmacy.  Pharmacy has been contacted and asked to restock asap.  Haldol will be given to patient IM as he refused PO.

## 2024-03-05 NOTE — BH NOTE
1:1 Observation     2330: Pt remains on 1:1 observation for safety. Pt appears to be sleeping at this time. Respirations have been even and unlabored. Pt currently lying on left side.     0030: Pt remains on 1:1 observation for safety. Pt appears to be sleeping at this time. Respirations have been even and unlabored. Pt currently lying on left side.     0100: Pt had an episode of urinary incontinence. Pt urinated in bed and independently turned to a praying position, lying in the urine face down. Staff present to clean patient, bed, and floor. Turned patient to right side. Pt awake at this time. Pt remains on a 1:1 observation for safety.    0200: Pt remains on a 1:1 observation for safety.  Staff has been turning patient to a side lying position as pt continues to ignore staff prompts and turns independently to a praying position, lying face down in mattress. This position is also causing pressure to wounds on bilateral ankles. Non-violent soft restraints applied to bilateral wrists. Heel cushions applied as well. Pt is currently sitting up in bed and awake. Staff will continue to monitor.     0300: Pt remains in 2-point soft restraints to bilateral wrists. Pt has removed heel cushions by kicking them off. Attempted to re-apply and pt kicked heel cushions off again. Pt is awake and lying on back with the head of the bed elevated. Pt remains on 1:1 for safety.    0400: Pt remains in 2-point soft restraints to bilateral wrists. Head of the bed is elevated. Pt is lying on right side. Pt is awake. Pt remains on 1:1 for safety.     0500: Pt remains in 2-point soft restraints to bilateral wrists. Head of the bed is elevated. Pt is lying on right side. Pt is awake. Pt remains on 1:1 for safety.

## 2024-03-05 NOTE — BH NOTE
Psychiatric Progress Note    Patient: Roge Cowan MRN: [id number]  SSN: [id number]    YOB: 1970  Age: 54 y.o.  Sex: male      Admit Date: 2/15/2024    LOS: 19 days     Subjective:   03/05/2024  Patient remains on 1:1 observation. Staff continue to provide verbal re-direction to advise patient about safe positioning in his medical bed so as not cause further injury to his bilateral ankle ulcers. When patient isn't responsive to verbal re-direction, he is placed in restraints to help him offload pressure from his ankle or pressure points.  Nursing report that patient slept well overnight.  He is eating his meals and taking his medications. He does respond to some nurses and utilizes a sheet to cover himself, but he remains unclothed for the majority of the time.  Upon my evaluation, he wasn't responsive to any of my questions.      03/04/2024  Nursing report that patient is eating all of his food. He is sleeping well  and is taking all of his PO medications.  He does assume the fetal position, and they communicate to  him the danger in remaining in this position as it would exacerbate his 2 ankle ulcers. When he is not amenable to verbal re-direction, he is placed in soft restraints.  Today he wouldn't move out of the fetal position and soft restraints applied at 10.25.  Upon my evaluation this morning, patient increased his rate of breathing. He didn't answer any of my questions.    03/01/2024  Nursing reports that patient has been able to cooperate with staff in remaining out of his restraints.  He is able to remain out of the prayer position which has exacerbated his ulcers.  He has not interfered with dressing of either one of his ankles.  He is eating his meals and has accepted all of his medicines by mouth.  Upon my evaluation patient was sound asleep in his bed and is covered via sheet.  Because of the many interruptions, by several team members, we opted to let him rest today.    Wound nurse  pointed out the presence of what appears to be a small lesion in his left groin that may be an abscess.  She noted that it was not tender, but we pointed out that patient likely has a disordered sense of pain perception. He did continue to remain in a prayer position so much so that he ended up with ulcers that he is not complaining about.    02/29/2024  Nursing report patient has been eating his meals and is adherent to most his medications.  However, only this morning he refused AM medications, but received IM alternative.  He was in restraints upon my evaluation, with wounds bandaged.  Isreal said that he wants to be called 'Romulo,' but his sister on the phone said that is NOT his name.  He requested that we turn off 'the electricity,' because of the noise of the hum of the fan of his bed. He told me that he now knows the truth, and when I asked him, he said 'Benigno.' He wouldn't answer my question about hallucinations.    02/28/2024  Nursing report that patient had a large BM this morning, for which he sat on the toilet.  He is eating all of his meals and taking all of his medications.  Upon my evaluation, he was unclothed and was lying on his mattress. Upon approach, he transferred to the floor and assumed the fetal position. When I started asking him questions, he started increasing his rate of breathing and wouldn't provide any answers.  I waited until patient's breathing rate normalized and stated to him that he would be transferred to  the geriatric unit for placement on a hospital bed. I explained that this would be for the best treatment of his current bilateral pressure ulcers on his ankles. He didn't express his acknowledgement or understanding as he is not interacting with me.  No SI, HI, or AVH voiced.    02/27/2024  Nursing report that nursing place food in different places to encourage patient to move. In the interim, they use these opportunities to help clean his bedding, clean the floor and mats.

## 2024-03-05 NOTE — PLAN OF CARE
Problem: Depression  Goal: Will be euthymic at discharge  Description: INTERVENTIONS:  1. Administer medication as ordered  2. Provide emotional support via 1:1 interaction with staff  3. Encourage involvement in milieu/groups/activities  4. Monitor for social isolation  Outcome: Not Progressing     Problem: Psychosis  Goal: Will report no hallucinations or delusions  Description: INTERVENTIONS:  1. Administer medication as  ordered  2. Assist with reality testing to support increasing orientation  3. Assess if patient's hallucinations or delusions are encouraging self harm or harm to others and intervene as appropriate  Outcome: Not Progressing     Problem: Safety - Adult  Goal: Free from fall injury  Outcome: Progressing     Problem: Skin/Tissue Integrity  Goal: Absence of new skin breakdown  Description: 1.  Monitor for areas of redness and/or skin breakdown  2.  Assess vascular access sites hourly  3.  Every 4-6 hours minimum:  Change oxygen saturation probe site  4.  Every 4-6 hours:  If on nasal continuous positive airway pressure, respiratory therapy assess nares and determine need for appliance change or resting period.  Outcome: Progressing     Problem: Infection - Adult  Goal: Absence of fever/infection during anticipated neutropenic period  Outcome: Progressing     Problem: Nutrition Deficit:  Goal: Optimize nutritional status  Outcome: Progressing     Problem: Skin/Tissue Integrity - Adult  Goal: Incisions, wounds, or drain sites healing without S/S of infection  Outcome: Progressing     Problem: Safety - Medical Restraint  Goal: Remains free of injury from restraints (Restraint for Interference with Medical Device)  Description: INTERVENTIONS:  1. Determine that other, less restrictive measures have been tried or would not be effective before applying the restraint  2. Evaluate the patient's condition at the time of restraint application  3. Inform patient/family regarding the reason for restraint  4.

## 2024-03-05 NOTE — BH NOTE
RESTRAINT DEBRIEFING FORM FOR BEHAVIOR MANAGEMENT STANDARD       Roge Cowan                                                                                               1. Did the patient request family involvement in the debriefing meeting: No    2. Is patient/family involved in the debriefing: Yes    3. Did patient request family be notified of application of restraint: No  If YES, who was notified?  Their perception of the event:     4. Date restraint applied: 3/5/24 Time restraint applied: 1400    5. Type of restraint applied: 4 Point    6. Who applied restraints (names): Gregorio Torrez    7. Why was restraint applied: Pt removing dressings from wounds on ankles. Pt putting self in prayer position leading to skin breakdown/pressure on existing wounds    8. What led to the application of restraint: Pt removing dressings from wounds on ankles. Pt putting self in prayer position leading to skin breakdown/pressure on existing wounds    9. What measures were tried prior to application of restraint: verbal redirection, distraction, education about wound healing, music     10. During the time the patient was restrained, were the following addressed: Physical Well Being, Yes, Psychological Comfort, Yes, Right to Privacy, Yes    11. Did the patient sustain any trauma from this incident: No  If YES, explain:   Did staff sustain any trauma from this incident: No  If YES, explain:     12. Was patient restrained/secluded for more than 12 hours? No  If YES, was Clinical Medical Director notified? N/A  If YES, name of  on call notified:     13. Did patient have (2) or more separate behavior episodes within a 12 hour period? No  If YES, was Clinical Medical Director notified? N/A  If YES, name of  on call notified:     14.  What staff members participated in the debriefing? Matilda Kaplan Debbie, Jason    15. What issues were identified as a result of the debriefing? None    16. Based on the  incident, was the patient's Care Plan modified: No  If YES, explain:

## 2024-03-05 NOTE — BH NOTE
06:00- pt remains in 2 point soft restraints to bilateral wrists. Head of the bed is elevated. Pt is lying on right side. Pt is awake. Pt remains on 1:1 for safety

## 2024-03-05 NOTE — PLAN OF CARE
Problem: Safety - Medical Restraint  Goal: Remains free of injury from restraints (Restraint for Interference with Medical Device)  Description: INTERVENTIONS:  1. Determine that other, less restrictive measures have been tried or would not be effective before applying the restraint  2. Evaluate the patient's condition at the time of restraint application  3. Inform patient/family regarding the reason for restraint  4. Q2H: Monitor safety, psychosocial status, comfort, nutrition and hydration  3/5/2024 1047 by Lore Gutierrez RN  Outcome: Progressing  Note: 0730: 11: w staff continues requires verbal prompts to not place bilat foot wounds in a unsafe position causing direct pressure  0830: 1:1 w staff continues. Focus is offering verbal prompts and encouragement when demonstrating appropriate safe behaviors  0930: 1:1 w staff continues. Requires increase in verbal prompts to not place bilat foot wound in a unsafe position  1000: 1:1 continues, refusing to follow staff direction placing bilat foot wounds in unsafe position causing direct pressure on wounds  1030: 1:1 continues focusing on offering verbal encouragement when demonstrating appropriate safe behaviors. Released from restraints  1130: 1:1 continues, offering praise when patient demonstrates appropriate behaviors. Noted increase verbal communication w staff.   1225: 1:1 continues, pt removed both dressings on bilat feet and took off his undergarments. Verbal redirection and much prompts offered with pt accepting redirection and assistance. Staff continues to offer praise, reassurance and established limit setting. Will reassess at 1300.   1300: resting in bed w 1:1 staff  1400: patient rubbing wound dressing off on left foot and placing wound on bilat feet on top of mattress and placing direct pressure. Not able to follow nursing direction and change position. Placed in restraints per order from Dr. Larson. Remains on 1:1 for safety  1430: 1:1

## 2024-03-05 NOTE — WOUND CARE
WOCN Note:     Follow up visit    Roge Cowan is a 54 y.o. y/o male who presented for Catatonic schizophrenia (HCC) [F20.2]  Admitted on 2/15/2024    Past Medical History:   Diagnosis Date    Psychiatric disorder     Psychotic disorder (HCC)     Withdrawal syndrome (HCC)        Lab Results   Component Value Date/Time    WBC 6.8 02/15/2024 04:17 AM    LABA1C 5.2 12/13/2023 06:02 AM    POCGLU 159 (H) 02/14/2024 06:54 PM    POCGLU 95 02/14/2024 12:48 PM    HGB 15.1 02/15/2024 04:17 AM    HCT 47.0 02/15/2024 04:17 AM     02/15/2024 04:17 AM        Tobacco Use      Smoking status: Some Days      Smokeless tobacco: Never     ADULT ORAL NUTRITION SUPPLEMENT; Dinner, Breakfast; Frozen Oral Supplement  DIET ONE TIME MESSAGE;  ADULT DIET; Regular  ADULT ORAL NUTRITION SUPPLEMENT; Breakfast, Dinner; Other Oral Supplement; Two Codey HN  ADULT ORAL NUTRITION SUPPLEMENT; Lunch; Standard High Calorie/High Protein Oral Supplement     Assessment:   Seen today in room 740/01   Alert and minimally communicative  Mobile and repositions independently.   Surface: MARISA mattress    1. POA Pressure Injury to Right Anterior-Lateral Ankle - Stage 3     1 x 1.5 x 0.1 cm  70% red, 30% tan  Small serous exudate  Mild erythema noted to periwound, and maceration to edges  Tx: cleansed with Vashe damp gauze, applied Santyl, covered with foam dressing     2. Healing Stage 2 Pressure Injury to Left Anterior-Lateral Ankle  Erythema improved, blanching, 3 circular areas with resolving thin crusts     Wound Recommendations:    Turn/reposition approximately every 2 hours - allow for breaks from restraints after maximum of 2 hours for repositioning and self care  Offload heels with heels hanging off end of pillow while in restraints.   Sacral Foam dressing: at least while in restraints. If able to maintain for long periods, lift to assess regularly; change as needed. Discontinue if

## 2024-03-05 NOTE — BH NOTE
GROUP THERAPY PROGRESS NOTE    Patient did not participate in psychoeducation group.    Katherine Gillies MSW, [facility]

## 2024-03-05 NOTE — PLAN OF CARE
Problem: Skin/Tissue Integrity  Goal: Absence of new skin breakdown  Description: 1.  Monitor for areas of redness and/or skin breakdown  2.  Assess vascular access sites hourly  3.  Every 4-6 hours minimum:  Change oxygen saturation probe site  4.  Every 4-6 hours:  If on nasal continuous positive airway pressure, respiratory therapy assess nares and determine need for appliance change or resting period.  3/4/2024 1547 by Elaine Beavers, RN  Outcome: Progressing  3/4/2024 1141 by Elaine Beavers, RN  Outcome: Not Progressing   Pt received appearing to be sleeping. Pt remains without clothes. No restraints present at this time. Respirations have been even and unlabored. Pt remains on a 1:1 observation for safety. Staff will continue to monitor.

## 2024-03-05 NOTE — BH NOTE
1530: 1:1 w staff at bedside. Pt remains in bilateral soft wrist and ankle restraints. Writer offered food and fluids which was refused by pt.    1630: 1:1 w staff at bedside. Pt remains in bilateral soft wrist and ankle restraints.    1730: 1:1 w staff at bedside. Pt out of restraints. Pt remains calm and cooperative resting quietly in bed.    1830: 1:1 w staff at bedside.    1930: 1:1 w staff at bedside. Pt returned to prayer position. Pt educated on why he needs to stay off of his wounds and pressure points. Pt rolled onto side.     1959: Pt went back into prayer position and rubbing ankle dressings against bed. Pt refusing to reposition or leave dressings alone. Pt placed into bilateral soft wrist and ankle restraints. Will continue to monitor.     2030: 1:1 w staff at bedside.    2130: 1:1 w staff at bedside    2230: 1:1 w staff at bedside. Pt refusing to take medications orally. Pt will receive IM ativan per orders.

## 2024-03-05 NOTE — BH NOTE
BEHAVIORAL HEALTH RESTRAINT/SECLUSION INITIAL ASSESSMENT      1. Assessment of High Risk factors: Preexisting medical conditions that places patient at greater risk when placed in restraints are as follows: {[facility] PREEXISITNG MEDICAL CONDITIONS THAT PLACES PATIENT AT GREATER RISK WHEN PLACED IN RESTRAINTS ARE:11296}    2. Preexisting history of psychological conditions that place patient at greater risk when placed in restraints: {Preexisting history of psychological conditions that places patient at greater risk when placed in restraints:08446}    3. Current behavior/mental status: {[facility] CURRENT BEHAVIOR/MENTAL STATUS:54448}    4. Initial use of restraint for this patient is justified by: {[facility] INITIAL USE OF RESTRAINT FOR THIS PATIENT IS JUSTIFIED BY:88608}    5. Least restrictive tools/methods (Check all that apply): {[facility] LEAST RESTRICTIVE TOOLS/METHODS (CHECK ALL THAT APPLY):30466}    6. Criteria for release: {CRITERIA FOR RELEASE:88473}    7. Treatment plan revisions to assist the patient in regaining control: {TREATMENT PLAN REVISIONS TO ASSIST THE PATIENT IN REGAINING CONTROL:40045}    8. Patient consented to family involvement in restraint/seclusion process: {[facility] YES/NO:82398}    9. Personal safety search completed: {[facility] YES/NO:14600}    10. Vital Signs:         B/P:         Pulse:         Respirations:         Temperature:        MD/RN Signature:_________________________________  Date:_____________

## 2024-03-05 NOTE — BH NOTE
Pt was repeatedly instructed to turn to side to not be in fetal position. Staff attempted multiple times to turn him on his side to which he immediately returned to fetal position. Staff placed patient in soft bilateral wrist restraints to avoid pt turning into the fetal position. Pt

## 2024-03-05 NOTE — BH NOTE
Behavioral Health Interdisciplinary Rounds     Patient Name: Roge Cowan  Age: 54 y.o.  Room/Bed:  [facility]  Primary Diagnosis: Catatonic schizophrenia (HCC)   Admission Status: Involuntary Commitment    Readmission within 30 days: No  Power of  in place: Yes  Patient requires a blocked bed: Yes          Reason for blocked bed: behavior  Sleep hours:        Participation in Care/Groups:  No  Medication Compliant?: Yes  PRNS (last 24 hours): None   Restraints (last 24 hours):  Yes  __________________________________________________    24 hour chart check complete: Yes    _______________________________________________    Patient goal(s) for today:   Treatment team focus/goals:   Progress note:      Spiritual Care Consult:   Financial concerns/prescription coverage:    Family contact:                        Family requesting physician contact today:    Discharge plan:   Access to weapons :                                                              Outpatient provider(s):   Patient's preferred phone number for follow up call :   Patient's preferred e-mail address :    LOS:  19  Expected LOS:     Participating treatment team members: Roge Cowan, * (assigned SW),

## 2024-03-06 PROCEDURE — 6370000000 HC RX 637 (ALT 250 FOR IP): Performed by: PSYCHIATRY & NEUROLOGY

## 2024-03-06 PROCEDURE — 1240000000 HC EMOTIONAL WELLNESS R&B

## 2024-03-06 PROCEDURE — 80053 COMPREHEN METABOLIC PANEL: CPT

## 2024-03-06 PROCEDURE — 85025 COMPLETE CBC W/AUTO DIFF WBC: CPT

## 2024-03-06 PROCEDURE — 6360000002 HC RX W HCPCS: Performed by: PSYCHIATRY & NEUROLOGY

## 2024-03-06 PROCEDURE — 36415 COLL VENOUS BLD VENIPUNCTURE: CPT

## 2024-03-06 RX ORDER — CHLORPROMAZINE HYDROCHLORIDE 25 MG/ML
100 INJECTION INTRAMUSCULAR ONCE
Status: COMPLETED | OUTPATIENT
Start: 2024-03-06 | End: 2024-03-06

## 2024-03-06 RX ADMIN — LORAZEPAM 3 MG: 2 INJECTION INTRAMUSCULAR; INTRAVENOUS at 17:13

## 2024-03-06 RX ADMIN — LORAZEPAM 3 MG: 2 TABLET ORAL at 12:40

## 2024-03-06 RX ADMIN — COLLAGENASE SANTYL: 250 OINTMENT TOPICAL at 13:51

## 2024-03-06 RX ADMIN — Medication: at 13:51

## 2024-03-06 RX ADMIN — LORAZEPAM 3 MG: 2 TABLET ORAL at 08:27

## 2024-03-06 RX ADMIN — SENNOSIDES AND DOCUSATE SODIUM 2 TABLET: 50; 8.6 TABLET ORAL at 08:26

## 2024-03-06 RX ADMIN — HALOPERIDOL 10 MG: 5 TABLET ORAL at 08:26

## 2024-03-06 RX ADMIN — HALOPERIDOL 10 MG: 5 TABLET ORAL at 12:40

## 2024-03-06 RX ADMIN — LORAZEPAM 3 MG: 2 INJECTION INTRAMUSCULAR; INTRAVENOUS at 22:38

## 2024-03-06 RX ADMIN — FLUOXETINE HYDROCHLORIDE 60 MG: 20 CAPSULE ORAL at 12:46

## 2024-03-06 RX ADMIN — CHLORPROMAZINE HYDROCHLORIDE 100 MG: 25 INJECTION INTRAMUSCULAR at 16:24

## 2024-03-06 NOTE — BH NOTE
Psychiatric Progress Note    Patient: Roge Cowan MRN: [id number]  SSN: [id number]    YOB: 1970  Age: 54 y.o.  Sex: male      Admit Date: 2/15/2024    LOS: 20 days     Subjective:   03/06/2024  Nursing report that patient had a challenging day today where he was assuming the fetal position. He hit his 1:1 tech as he was assisting him in getting out of fetal position.  Patient continues to eat his meals and accepts his medications by mouth. He refuses to don any clothing, including undergarments. Her remains unclothed.  Upon my evaluation he was not in restraints, but was lying on his R side. Upon attempting to engage him in evaluation, he started flexing his muscles and increased his rate of breathing.      03/05/2024  Patient remains on 1:1 observation. Staff continue to provide verbal re-direction to advise patient about safe positioning in his medical bed so as not cause further injury to his bilateral ankle ulcers. When patient isn't responsive to verbal re-direction, he is placed in restraints to help him offload pressure from his ankle or pressure points.  Nursing report that patient slept well overnight.  He is eating his meals and taking his medications. He does respond to some nurses and utilizes a sheet to cover himself, but he remains unclothed for the majority of the time.  Upon my evaluation, he wasn't responsive to any of my questions.      03/04/2024  Nursing report that patient is eating all of his food. He is sleeping well  and is taking all of his PO medications.  He does assume the fetal position, and they communicate to  him the danger in remaining in this position as it would exacerbate his 2 ankle ulcers. When he is not amenable to verbal re-direction, he is placed in soft restraints.  Today he wouldn't move out of the fetal position and soft restraints applied at 10.25.  Upon my evaluation this morning, patient increased his rate of breathing. He didn't answer any of my  Mat at [facility], not comfortable with a transfer for ECT, but he will present the case to his staff to assess readiness to provide similar level of care. He feels that it is preferable to transfer pt to medicine there with his daily consults and to pursue ECT.  SW will reach out to [facility] ECT referral through official channel, since Dr. Garner there was out with COVID.  All hands meeting today to update on progress.      02//26/2024  Will add pericolace po bid to improve BM.  Increase prozac from 40mg po qday to 60mg po qday  Continue haldol 10mg po tid meals, ativan 3mg tid meals.  Will add ativan 3mg po qhs as well.  Continue remeron 45mg po qhs    Reached out to Dr. Garner by phone, awaiting her reply for possible transfer/ECT at [facility].    02/25/2024  Continue current care.    02/24/2024  Continue current care    02/23/2024  Messages to Dr. Garner today weren't returned regarding ECT scheduling/transfer to [facility]. She was out with COVID starting last Friday.  For the time being will continue current medication regimen as patient has not worsened and is eating and taking all of his medications.  Wound care continues to follow pt's R ankle ulcer.  Appreciate the time and expertise of all team members, including dietitian, social work from [facility] and palliative care, as well as our nurses.    02/22/2024  Awaiting response from Dr. Garner regarding availability of her ECT schedule.  For the time being will increase prozac from 20 to 40mg po qday. Will increase remeron from 30 to 45mg po qhs.  Continue haldol 10mg po bid and ativan 3mg po tid with meals, with IM alternatives.    02/21/2024  Continue current medication regimen as prescribed.  I contacted Dr. Garner at [facility] today, who informed me that she is still at out sick due to COVID.  She did say that she will check with her ECT coordinator in order to accommodate Mr. Cowan's treatment.  As patient's irritability and resistance to treatment/staff continue to  decrease, transfer to [facility] can be a consideration if transfer to [facility] is further delayed.    02/20/2024  Continue current PO medication regimen with IM alternatives (for haldol and ativan) upon refusal.  Continue remeron 30mg po qhs, prozac 20mg po qday    Dr. Garner at [facility] requested we contact her tomorrow at the earliest for transfer consideration there for further hospitalization and ECT. She'd informed us that there is likely about a 2 wk delay on the schedule. It is unclear, however, how long logistical delays of transfer may take.    02/19/2024  Increase remeron from 15mg po qhs to 30mg po qhs.  Today will administer last dosage of haldol 10mg IM TID with meals and ativan 3mg TID with meals; instead, since patient has been accepting PO medications, staff will give him the option of taking haldol/ativan by mouth, and only give them IM upon PO refusal. This way we decrease distress tot he patient.  I reached out to [facility], Dr. Garner on Friday. She informed me that she is out with COVID, and can't evaluate patient's case for ECT/transfer until this Wednesday.    I certify that this patient's inpatient psychiatric hospital services furnished since the previous certification were, and continue to be, required for treatment that could reasonably be expected to improve the patient's condition, or for diagnostic study, and that the patient continues to need, on a daily basis, active treatment furnished directly by or requiring the supervision of inpatient psychiatric facility personnel. In addition, the hospital records show that services furnished were intensive treatment services, admission or related services, or equivalent services.    Signed By: Lety Larson MD     March 6, 2024

## 2024-03-06 NOTE — BH NOTE
GROUP THERAPY PROGRESS NOTE    Patient did not participate in recreational therapy group.    Katherine Gillies, MSW, [facility]     Lens Material:

## 2024-03-06 NOTE — PLAN OF CARE
Problem: Discharge Planning  Goal: Discharge to home or other facility with appropriate resources  Outcome: Progressing     Problem: Safety - Adult  Goal: Free from fall injury  Outcome: Progressing     Problem: Skin/Tissue Integrity  Goal: Absence of new skin breakdown  Description: 1.  Monitor for areas of redness and/or skin breakdown  Outcome: Progressing        Problem: Psychosis  Goal: Will report no hallucinations or delusions  Description: INTERVENTIONS:  1. Administer medication as  ordered  2. Assist with reality testing to support increasing orientation  3. Assess if patient's hallucinations or delusions are encouraging self harm or harm to others and intervene as appropriate  Outcome: Progressing     Problem: Infection - Adult  Goal: Absence of fever/infection during anticipated neutropenic period  Outcome: Progressing     Problem: Nutrition Deficit:  Goal: Optimize nutritional status  Outcome: Progressing     Problem: Skin/Tissue Integrity - Adult  Goal: Incisions, wounds, or drain sites healing without S/S of infection  Outcome: Progressing   TWICE DAILY: Assess and document dressing/incision, wound bed, drain sites and surrounding tissue   TWICE DAILY: Assess and document skin integrity   Outcome : progressing    Problem: Safety - Medical Restraint  Goal: Remains free of injury from restraints (Restraint for Interference with Medical Device)  Description: INTERVENTIONS:  1. Determine that other, less restrictive measures have been tried or would not be effective before applying the restraint  2. Evaluate the patient's condition at the time of restraint application  3. Inform patient/family regarding the reason for restraint  4. Q2H: Monitor safety, psychosocial status, comfort, nutrition and hydration  Outcome: Progressing    Pt continues on 1:1 staff supervision for safety and to promote wound healing on bilat ankles, pt is on a specialty bed. He has remained unclothed today per preference. He has  taken scheduled medications po so has not required IMs. He ate  % of B and L. Pt is mute, making no eye contact, increases resp rate at times. He is physically resistive with VS. His writer to complete dressing change now.   Pt was visited by Nurses from [facility]. Pt was given opportunity to cover self, he removed. He did not attempt to engage. No aggression, but physical resistance.   1410: while providing wound care pt was off and on restless, cooperating partially but moving LE and alternately resistive. Pt was atop bed with 3 staff assisting dressing changes including this writer.  Pt suddenly kicked staff member who was positioning/supporting his ankle hard in the stomach. Pt also was sitting up in bed with legs over the side as if to get up. This writer asked if he had to use bathroom, pt mute. Urinal provided and encouraged pt to position self in urinal. He did not. Pt got into prayer position and urinated a large amount onto the bed, his legs, soaking his ankle dressings. He then said he had to use BR but when assisted dropped to floor. Order obtained for pt to go into 4 pt soft non violent restraints to enable second wound care procedure.

## 2024-03-06 NOTE — INTERDISCIPLINARY ROUNDS
Behavioral Health Interdisciplinary Rounds     Patient Name: Roge Cowan  Age: 54 y.o.  Room/Bed:  0/  Primary Diagnosis: Catatonic schizophrenia (HCC)   Admission Status: Involuntary Commitment    Readmission within 30 days: No  Power of  in place:  Patient requires a blocked bed: Yes          Reason for blocked bed: behavior  Sleep hours: 2+     Participation in Care/Groups:  No  Medication Compliant?: Yes  PRNS (last 24 hours): None   Restraints (last 24 hours):  Yes  __________________________________________________  24 hour chart check complete: Yes    _______________________________________________    Patient goal(s) for today:   Treatment team focus/goals: Plan to work on getting blood work , EKG and medical clearance for ECT   Progress note: He remains in bed, he has been compliant with his medications and is eating his meals , did not respond to treatment team today     Dr. Larson discussed case with Dr. Morel at [facility] and possible transfer      Spiritual Care Consult: yes   Financial concerns/prescription coverage:  medicare   Family contact:    sister/guardian                     Family requesting physician contact today:    Discharge plan: lives with his sister   Access to weapons :     no                                                          Outpatient provider(s): [facility]   Patient's preferred phone number for follow up call :   Patient's preferred e-mail address :    LOS:  20  Expected LOS: TBD     Participating treatment team members: Roge Cowan, DARRELL Ding- Dr. Larson - Carrie Rodas,RN
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
## 2024-03-06 NOTE — BH NOTE
GROUP THERAPY PROGRESS NOTE    Patient did not participate in Self-Care group.    Katherine Gillies MSW, [facility]

## 2024-03-06 NOTE — BH NOTE
2330: Patient remains in soft restraints. 1:1 sitter at bedside to monitor patient.  0030: Patient remains in soft restraints. 1:1 sitter at bedside to monitor patient.  0115: Soft restraints discontinued. Patient currently resting on left side. 1:1 sitter at bedside to monitor patient.
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
## 2024-03-06 NOTE — PLAN OF CARE
Problem: Safety - Medical Restraint  Goal: Remains free of injury from restraints (Restraint for Interference with Medical Device)  Description: INTERVENTIONS:  1. Determine that other, less restrictive measures have been tried or would not be effective before applying the restraint  2. Evaluate the patient's condition at the time of restraint application  3. Inform patient/family regarding the reason for restraint  4. Q2H: Monitor safety, psychosocial status, comfort, nutrition and hydration  3/5/2024 2353 by Frannie Bucio LPN  Outcome: Progressing   Pt received in soft bilateral wrist and ankle restraints. Sitter at bedside. Respirations even and unlabored, NAD. Pt irritable, only giving responses when encouraged. Pt did allow staff to place pad over groin for privacy. Pt moves self.  Pt continues to refuse food/drink. 1:1 continued per provider order, restraints continued due to increased restlessness and harm to self.     2330 Pt in 4 point soft restraints. Pt awake in bed, restless. 1:1 continued.  0000 Pt in 4 point soft restraints. Pt awake in bed, restless. 1:1 continued.  0100 Pt in 4 point soft restraints. Pt awake in bed, restless. 1:1 continued.  0200 Restraints discontinued @0115. Pt attempted in the past hour to move into prayer position, but agreed with staff to remain on sides with much encouragement. Pt currently laying on right side, awake, NAD, no voiced concerns. Staff at bedside, 1:1 continued.   0300 Pt in resting in bed with eyes closed awake, laying on left side. NAD, no voiced concerns. Staff at bedside, 1:1 continued.   0400 Pt incontinent of urine. Linen changed, pt wiped down with cleansing wipes, and brief placed on pt. Pt laying on right side, wound dressings intact on bilateral ankles. NAD, no voiced concerns. Staff at bedside. 1:1 continued.   0500 Pt resting on right side with eyes closed appears to be sleeping, wound dressing intact. Respirations even and unlabored, NAD.  Staff present and 1:1 continued.   0600 Pt in bed with eyes closed, appears to be sleeping. NAD, respirations even and unlabored. Staff present and 1:1 continued.   0700 Pt in bed with eyes closed, appears to be sleeping. NAD, respirations even and unlabored. Staff present and 1:1 continued.

## 2024-03-06 NOTE — PLAN OF CARE
Problem: Depression  Goal: Will be euthymic at discharge  Description: INTERVENTIONS:  1. Administer medication as ordered  2. Provide emotional support via 1:1 interaction with staff  3. Encourage involvement in milieu/groups/activities  4. Monitor for social isolation  3/6/2024 1650 by Rosaura Stevens, RN  Outcome: Not Progressing     Problem: Safety - Medical Restraint  Goal: Remains free of injury from restraints (Restraint for Interference with Medical Device)  Description: INTERVENTIONS:  1. Determine that other, less restrictive measures have been tried or would not be effective before applying the restraint  2. Evaluate the patient's condition at the time of restraint application  3. Inform patient/family regarding the reason for restraint  4. Q2H: Monitor safety, psychosocial status, comfort, nutrition and hydration  3/6/2024 1650 by Rosaura Stevens, RN  Outcome: Not Progressing   Pt in soft bilateral wrist and ankle restraints with 1:1 sitter at bedside. Pt agitated and restless. Noncomplaint with treatment, attempting to pull EKG stickers off. Remains selectively medication and meal compliant. Will continue to monitor q15 minutes for safety.    1530: 1:1 w sitter at bedside. Pt in soft bilateral wrist and ankle restraints.     1630: 1:1 w sitter at bedside. Pt in soft bilateral wrist and ankle restraints.     1730: 1:1 w sitter at bedside. Pt in soft bilateral wrist and ankle restraints.     1830: 1:1 w sitter at bedside. Pt in soft bilateral wrist and ankle restraints. Pt remains agitated, will not allow staff to touch him and assist with hygiene. Will continue to monitor.

## 2024-03-06 NOTE — BH NOTE
GROUP THERAPY PROGRESS NOTE    Patient did not participate in psychoeducation group.    Katherine Gillies MSW, [facility]

## 2024-03-06 NOTE — BH NOTE
0105: Pt attempted to get up from right side-lying position. Writer offered urinal, pt declined. Writer asked if pt needed to turn, pt nodded. Writer asked if pt could turn self, pt shook head. Writer called for assistance from other nursing staff.    0115: Pt agreed to remain side-lying on opposite side if soft restraints were removed. Restraints removed.    0135: Pt curled into prayer position. Writer offered urinal, pt declined. Writer intervened and turned pt back to left side-lying position. Pt agreed to remain side-lying.    0140: Pt curled into prayer position. Writer and additional nursing staff encouraged repositioning back to left side-lying position. Pt complied.    0149: Pt asked writer if he could pray. Writer encouraged pt to pray in side-lying position.    0150: Pt curled into prayer position. Writer encouraged pt to turn back to side-lying position. Pt continued to ask, \"Can I pray?\" Writer encouraged pt to pray in side-lying position. Pt turned to left side-lying position.    0154. Pt curled into prayer position. Writer turned pt to left side-lying position.

## 2024-03-07 LAB
ALBUMIN SERPL-MCNC: 3.5 G/DL (ref 3.5–5)
ALBUMIN/GLOB SERPL: 0.9 (ref 1.1–2.2)
ALP SERPL-CCNC: 149 U/L (ref 45–117)
ALT SERPL-CCNC: 49 U/L (ref 12–78)
ANION GAP SERPL CALC-SCNC: 4 MMOL/L (ref 5–15)
AST SERPL-CCNC: 29 U/L (ref 15–37)
BILIRUB SERPL-MCNC: 0.8 MG/DL (ref 0.2–1)
BUN SERPL-MCNC: 22 MG/DL (ref 6–20)
BUN/CREAT SERPL: 33 (ref 12–20)
CALCIUM SERPL-MCNC: 9.6 MG/DL (ref 8.5–10.1)
CHLORIDE SERPL-SCNC: 104 MMOL/L (ref 97–108)
CO2 SERPL-SCNC: 26 MMOL/L (ref 21–32)
CREAT SERPL-MCNC: 0.67 MG/DL (ref 0.7–1.3)
EKG ATRIAL RATE: 68 BPM
EKG DIAGNOSIS: NORMAL
EKG P AXIS: 45 DEGREES
EKG P-R INTERVAL: 164 MS
EKG Q-T INTERVAL: 424 MS
EKG QRS DURATION: 82 MS
EKG QTC CALCULATION (BAZETT): 450 MS
EKG R AXIS: 25 DEGREES
EKG T AXIS: 41 DEGREES
EKG VENTRICULAR RATE: 68 BPM
GLOBULIN SER CALC-MCNC: 3.8 G/DL (ref 2–4)
GLUCOSE SERPL-MCNC: 123 MG/DL (ref 65–100)
POTASSIUM SERPL-SCNC: 4.3 MMOL/L (ref 3.5–5.1)
PROT SERPL-MCNC: 7.3 G/DL (ref 6.4–8.2)
SODIUM SERPL-SCNC: 134 MMOL/L (ref 136–145)

## 2024-03-07 PROCEDURE — 93005 ELECTROCARDIOGRAM TRACING: CPT | Performed by: PSYCHIATRY & NEUROLOGY

## 2024-03-07 PROCEDURE — 6370000000 HC RX 637 (ALT 250 FOR IP): Performed by: PSYCHIATRY & NEUROLOGY

## 2024-03-07 PROCEDURE — 6360000002 HC RX W HCPCS: Performed by: PSYCHIATRY & NEUROLOGY

## 2024-03-07 PROCEDURE — 1240000000 HC EMOTIONAL WELLNESS R&B

## 2024-03-07 RX ORDER — PEPPERMINT OIL
SPIRIT ORAL PRN
Status: DISCONTINUED | OUTPATIENT
Start: 2024-03-07 | End: 2024-03-11 | Stop reason: HOSPADM

## 2024-03-07 RX ADMIN — Medication: at 09:11

## 2024-03-07 RX ADMIN — SENNOSIDES AND DOCUSATE SODIUM 2 TABLET: 50; 8.6 TABLET ORAL at 21:23

## 2024-03-07 RX ADMIN — HALOPERIDOL LACTATE 10 MG: 5 INJECTION, SOLUTION INTRAMUSCULAR at 09:32

## 2024-03-07 RX ADMIN — COLLAGENASE SANTYL: 250 OINTMENT TOPICAL at 09:11

## 2024-03-07 RX ADMIN — LORAZEPAM 3 MG: 2 TABLET ORAL at 21:22

## 2024-03-07 RX ADMIN — HALOPERIDOL LACTATE 10 MG: 5 INJECTION, SOLUTION INTRAMUSCULAR at 14:51

## 2024-03-07 RX ADMIN — MIRTAZAPINE 45 MG: 15 TABLET, ORALLY DISINTEGRATING ORAL at 21:23

## 2024-03-07 RX ADMIN — LORAZEPAM 3 MG: 2 INJECTION INTRAMUSCULAR; INTRAVENOUS at 14:51

## 2024-03-07 RX ADMIN — LORAZEPAM 3 MG: 2 INJECTION INTRAMUSCULAR; INTRAVENOUS at 09:33

## 2024-03-07 NOTE — PLAN OF CARE
Problem: Safety - Adult  Goal: Free from fall injury  Outcome: Progressing     Problem: Skin/Tissue Integrity  Goal: Absence of new skin breakdown  Description: 1.  Monitor for areas of redness and/or skin breakdown  2.  Assess vascular access sites hourly  3.  Every 4-6 hours minimum:  Change oxygen saturation probe site  4.  Every 4-6 hours:  If on nasal continuous positive airway pressure, respiratory therapy assess nares and determine need for appliance change or resting period.  Outcome: Progressing     Problem: Safety - Medical Restraint  Goal: Remains free of injury from restraints (Restraint for Interference with Medical Device)  Description: INTERVENTIONS:  1. Determine that other, less restrictive measures have been tried or would not be effective before applying the restraint  2. Evaluate the patient's condition at the time of restraint application  3. Inform patient/family regarding the reason for restraint  4. Q2H: Monitor safety, psychosocial status, comfort, nutrition and hydration  3/7/2024 1526 by Amira Verde RN  Outcome: Progressing  Flowsheets (Taken 3/7/2024 0915 by Lore Gutierrez RN)  Remains free of injury from restraints (restraint for interference with medical device):   Every 2 hours: Monitor safety, psychosocial status, comfort, nutrition and hydration   Inform patient/family regarding the reason for restraint   Determine that other, less restrictive measures have been tried or would not be effective before applying the restraint  3/7/2024 0749 by Lore Gutierrez RN  Outcome: Progressing  Flowsheets  Taken 3/7/2024 0730 by Lore Gutierrez RN  Remains free of injury from restraints (restraint for interference with medical device):   Determine that other, less restrictive measures have been tried or would not be effective before applying the restraint   Evaluate the patient's condition at the time of restraint application   Inform patient/family regarding the reason for restraint

## 2024-03-07 NOTE — BH NOTE
1600- pt resting in bed. No voiced complaints or acute distress at present.was able to use the urinal  with assistance  Staff at bedside 1:1  for observation  Pt is cooperative was able to obtain EKG .  1700-resting in bed,staff at bedside. Ate dinner  Continue 1:1 safety for observation  1800- resting quietly,no voiced complaints.note no agitation at  present  1900-

## 2024-03-07 NOTE — INTERDISCIPLINARY ROUNDS
Behavioral Health Interdisciplinary Rounds     Patient Name: Roge Cowan  Age: 54 y.o.  Room/Bed:  [facility]/  Primary Diagnosis: Catatonic schizophrenia (HCC)   Admission Status: Involuntary Commitment and Forced Medication Order    Readmission within 30 days: No  Power of  in place: Healthcare decision maker - sister  Patient requires a blocked bed: Yes          Reason for blocked bed: behavior  Sleep hours:        Participation in Care/Groups:  No  Medication Compliant?: Selective  PRNS (last 24 hours): Antipsychotic (IM)   Restraints (last 24 hours):  Yes non violent restraints    24 hour chart check complete: Yes    _______________________________________________    Patient goal(s) for today:   Treatment team focus/goals: SW will fax lab work, guardianship papers and H& P to Dr. Morel at [facility].  Plan to contact [facility] regarding TDO assessment   Progress note:    He has been agitated today and remains on 1:1 FOR HIS SAFETY        Dr. Morel [phone]  fax     [facility] team - [phone]     LOS:  21  Expected LOS: TBD     Participating treatment team members: Roge Cowan, DARRELL Ding - Dr. Marsha Gutierrez RN

## 2024-03-07 NOTE — PROGRESS NOTES
Comprehensive Nutrition Assessment    Type and Reason for Visit:      Nutrition Recommendations/Plan:   Continue regular diet  Continue ONS- Two Codey HN, Ensure Plus and Magic cup supplements  Weigh pt weekly             Malnutrition Assessment:  Malnutrition Status:  Severe malnutrition (01/15/24 1347)    Context:  Acute Illness     Findings of the 6 clinical characteristics of malnutrition:  Energy Intake:  50% or less of estimated energy requirements for 5 or more days  Weight Loss:  Unable to assess     Body Fat Loss:  Moderate body fat loss Fat Overlying Ribs, Orbital   Muscle Mass Loss:  Moderate muscle mass loss Clavicles (pectoralis & deltoids), Temples (temporalis)  Fluid Accumulation:  No significant fluid accumulation     Strength:  Not Performed                Nutrition Assessment:    Pt transferred from [facility] with Catatonia Schizophrenia (admitted 12/12). Initially treated with large amounts of BZDs and ECT x 2 however difficult to administer medical care. Pt refusing all PO intake, medical care, medications. New development of pressure injury-WOCN following. Transferred to [facility] ICU 1/12. Intubated today d/t continued inability to care for pt medically.     3/7: Follow-up. Discussed with nursing. Mr Cowan is eating well; eats whatever is brought to him at meal time. Noted pt now in soft B/L wrist and ankle restraints (so now able to weigh pt since in a bed). Recommend weighing patient. Hope to see some weight gain. Ankle wounds x 2 (stage 2 healing; stage 3 stable).    2/22: Follow-up. Pt transferred to U 2/15. Pt continues to eat well at meal time-accepts supplements well. Two-Codey HN ordered when pt in ICU but did not carry over into the BHU. Spoke with Nutrition Services who have now added this to Breakfast and Dinner trays.     Unable to weigh Isreal since pt sleeps on mattress on the floor (no bed scale) and unable to get patient upright/standing position. Pt crawls vs walking.     Waiting for  Eaten (%)   03/07/24 1450 76 - 100%   03/07/24 0900 76 - 100%   03/06/24 1230 51 - 75%   03/06/24 0845 51 - 75%   03/06/24 0256 0%   03/05/24 1739 76 - 100%   03/05/24 1245 76 - 100%   03/05/24 0900 76 - 100%   03/04/24 1700 76 - 100%   03/04/24 1158 76 - 100%   03/04/24 0807 76 - 100%   03/03/24 0900 76 - 100%   02/29/24 1710 76 - 100%     Supplement Intake:  Patient Vitals for the past 168 hrs:   PO Supplement (%)   03/06/24 0256 0%   03/05/24 1739 76 - 100%   03/04/24 1158 51 - 75%   03/04/24 1045 0%   02/29/24 1710 76 - 100%     Nutrition Support: None      Anthropometric Measures:  Height: 183.9 cm (6' 0.4\")  Ideal Body Weight (IBW): 180 lbs (82 kg)    Admission Body Weight: 69 kg (152 lb 1.9 oz)  Current Body Weight: 60.1 kg (132 lb 7.9 oz), 73.6 % IBW.    Current BMI (kg/m2): 17.8                          BMI Categories: Underweight (BMI less than 18.5)    Wt Readings from Last 10 Encounters:   02/15/24 60.1 kg (132 lb 7.9 oz)   02/14/24 60.1 kg (132 lb 7.9 oz)   12/16/23 80.7 kg (178 lb)           Nutrition Diagnosis:   Severe malnutrition related to psychological cause or life stress as evidenced by BMI      Nutrition Interventions:   Food and/or Nutrient Delivery: Continue Current Diet, Continue Oral Nutrition Supplement  Nutrition Education/Counseling: No recommendation at this time  Coordination of Nutrition Care: Continue to monitor while inpatient       Goals:     Goals:  (Consume >75% of meals and ONS.)       Nutrition Monitoring and Evaluation:   Behavioral-Environmental Outcomes: None Identified  Food/Nutrient Intake Outcomes: Food and Nutrient Intake, Supplement Intake  Physical Signs/Symptoms Outcomes: Meal Time Behavior, Weight    Discharge Planning:    Continue Oral Nutrition Supplement, Continue current diet     Yara Kam RD CNSDINH  Available via [employer]

## 2024-03-07 NOTE — BH NOTE
1930: Patient received in soft bilateral wrist and ankle restraints. 1:1 sitter in place to monitor safety.  2030: Patient in soft bilateral wrist and ankle restraints. 1:1 sitter in place to monitor safety  2130: Patient in soft bilateral wrist and ankle restraints. 1:1 sitter in place to monitor safety.  2230: Patient in soft bilateral wrist and ankle restraints. Staff was able to get BP and pulse of patient, patient somewhat combative during the exchange.  Vitals:    03/06/24 2230   BP: 128/78   Pulse: 90   Resp:    Temp:    SpO2:

## 2024-03-07 NOTE — BH NOTE
0400: Patient removed from non violent soft four point restraints. Patient urinated on self and soiled bed linen. Patient cleaned and linen changed. 1:1 sitter remains in place to monitor safety.

## 2024-03-07 NOTE — BH NOTE
PRN Medication Documentation    Specific patient behavior that led to need for PRN medication: agitated and combative towards staff  Staff interventions attempted prior to PRN being given: coping skills and redirection has failed  PRN medication given: thorazine one time order  Patient response/effectiveness of PRN medication: tl and md aware of above
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
## 2024-03-07 NOTE — BH NOTE
GROUP THERAPY PROGRESS NOTE    Patient did not participate in recreational therapy group.    Katherine Gillies, MSW, [facility]

## 2024-03-07 NOTE — PROGRESS NOTES
Music Therapy Assessment  [facility]    Roge Cowan [id number]     1970  54 y.o.  male    Patient Telephone Number: [phone] (home)   Shinto Affiliation: None   Language: English   Patient Active Problem List    Diagnosis Date Noted    Need for emotional support 02/20/2024    Catatonic schizophrenia (HCC) 02/16/2024    Somnolence 02/14/2024    Agitation 02/14/2024    Palliative care by specialist 01/22/2024    Goals of care, counseling/discussion 01/22/2024    Confusion 01/15/2024    Protein-calorie malnutrition (HCC) 01/15/2024    Palliative care encounter 01/15/2024    Catatonia schizophrenia (HCC) 01/12/2024    Pressure injury of right ankle, stage 3 (HCC) 01/10/2024    Cellulitis 01/08/2024    Immobility 12/29/2023    Schizoaffective disorder (HCC) 12/08/2023    Schizophrenia (HCC) 11/27/2017    Paranoid schizophrenia (HCC) 03/15/2017        Date: 3/7/2024            Total Time Calculated: 25 min          [facility] 7W GERIATRIC [facility]    Session Observations:  F/up visit; This music therapist (MT) arrived on the unit and consulted with staff to learn if it was an appropriate time to offer music therapy services. Staff shared the pt was unavailable at this time and welcomed the MT to f/up later in the afternoon. MT thanked them for the information and concluded the visit.    Later in the afternoon this MT called the unit to ask questions about the pt, as well as to learn a time that worked best for a music therapy visit. The staff member expressed it was not a good day for services. When asked further questions, MT learned the pt was directly requesting to be left alone and to keep his space quiet. Out of respect for his wishes, MT will plan to follow up another week for continued support. Thank you for including music therapy in this patient's care!    DEIDRE Nguyen (Music Therapist, Board Certified)  Spiritual Health Department  Referral-Based Services

## 2024-03-07 NOTE — BH NOTE
Pt continues to be combative and agitated.staff at bedside 1:1 observation for safety. Trying to \"kick\" staff.medication  given as ordered. Md aware,tl aware

## 2024-03-07 NOTE — BH NOTE
GROUP THERAPY PROGRESS NOTE    Patient did not participate in psychoeducation group.    Katherine Gillies MSW, [facility]

## 2024-03-07 NOTE — PLAN OF CARE
Problem: Safety - Adult  Goal: Free from fall injury  3/6/2024 2326 by Frannie Bucio LPN  Outcome: Progressing     Problem: Skin/Tissue Integrity  Goal: Absence of new skin breakdown  Description: 1.  Monitor for areas of redness and/or skin breakdown  2.  Assess vascular access sites hourly  3.  Every 4-6 hours minimum:  Change oxygen saturation probe site  4.  Every 4-6 hours:  If on nasal continuous positive airway pressure, respiratory therapy assess nares and determine need for appliance change or resting period.  3/6/2024 2326 by Frannie Bucio LPN  Outcome: Not Progressing  2330 Pt currently 1:1 per provider order, staff at bedside. Pt in soft bilateral wrist and ankle restraints. Pt appears to be awake and restless, respirations even and unlabored. Pt laying on right side. Bilateral ankle pressure ulcers appear to not be leaking, wound dressing intact. No self harming behaviors at present. Pt continues to decline food and water, even when opened and placed in front of him. Hourly rounds and updates below.     0000 Pt resting in bed with eyes closed laying on right side, awake and restless. No self harming behaviors. NAD. 1:1 continued.   0100 Pt resting in bed with eyes closed laying on right side, awake and restless. NO self harming behaviors. NAD. 1:1 continued.   0200 Pt resting in bed with eyes closed. Awake and restless. No self harming behaviors. NAD. 1:1 and restraints continued.   0300 Pt resting in bed with eyes closed. Awake and restless. No self harming behaviors. NAD. 1:1 and restraints continued.    0400 Bilateral soft ankle/wrist restraints discontinued. Pt in bed as of now being verbally directed to reposition to laying in side position with much encouragement. Pt incontinent - linens change. During linen change and wipe down, pt became combative by attempting to kick staff and hit staff hands. Pt was redirected and stopped behavior. Pt laying on left side position. NAD.  Staff at bedside. 1:1 continued.   0500 Pt in bed resting with eyes closed, appears to be sleeping. Pt repositions self to prayer position, but with much encouragement staff able to reposition in side lying positions. No concerns at present time. NAD. 1:1 continued.   0600 Pt resting in bed with eyes closed, appears to be sleeping. No respiratory distress noted. Lying   0700 Pt resting in bed awake with eyes closed. No respiratory distress noted. Pt lying on left side w/pillow under legs for prop up. NAD. 1:1 continued.

## 2024-03-07 NOTE — PLAN OF CARE
Problem: Safety - Medical Restraint  Goal: Remains free of injury from restraints (Restraint for Interference with Medical Device)  Description: INTERVENTIONS:  1. Determine that other, less restrictive measures have been tried or would not be effective before applying the restraint  2. Evaluate the patient's condition at the time of restraint application  3. Inform patient/family regarding the reason for restraint  4. Q2H: Monitor safety, psychosocial status, comfort, nutrition and hydration  Outcome: Progressing    Note: 0715: in bed with bilat foot wounds applying direct pressure to bed, declined to change position and follow plan of care. Prompted and encouraged by staff pt continues to refuse to change positions. Per staff pt has been applying direct pressure on wounds x 2 hours. Obtained order for nonviolent soft restraints for bilat ankles and wrist. Pt placed in restraints. 1:1 with staff continues.     0830: 1:1 continues, pt declines food/fluids  0930: 1:1 continues, verbal and passively engaged w staff, declines toilet, accepting of icecream and water  1030: 1:1 continues, verbally communicating w staff, states he will not apply pressure to wounds and not remove his dressings. Eye contact maintained during this time and verbally agrees to wear undergarments. Independently placed underwear on. Staff offer praise and encouragements.   1120: 1:1 continues pt resting in bed quietly, undergarments remain on, wound dressings intact, pt no placing bilat wounds with direct pressure. Staff offer praise and encouargment  1230: 1:1 continues resting in bed  1315: 1:1 continues resting in bed labs obtained.   1330: 1:1 continues resting in bed.  1430: 1:1 continues resting in bed

## 2024-03-08 LAB
BASOPHILS # BLD: 0 K/UL (ref 0–0.1)
BASOPHILS NFR BLD: 0 % (ref 0–1)
DIFFERENTIAL METHOD BLD: NORMAL
EOSINOPHIL # BLD: 0.1 K/UL (ref 0–0.4)
EOSINOPHIL NFR BLD: 2 % (ref 0–7)
ERYTHROCYTE [DISTWIDTH] IN BLOOD BY AUTOMATED COUNT: 14.3 % (ref 11.5–14.5)
HCT VFR BLD AUTO: 41.4 % (ref 36.6–50.3)
HGB BLD-MCNC: 13.7 G/DL (ref 12.1–17)
IMM GRANULOCYTES # BLD AUTO: 0 K/UL (ref 0–0.04)
IMM GRANULOCYTES NFR BLD AUTO: 0 % (ref 0–0.5)
LYMPHOCYTES # BLD: 1.7 K/UL (ref 0.8–3.5)
LYMPHOCYTES NFR BLD: 18 % (ref 12–49)
MCH RBC QN AUTO: 29.5 PG (ref 26–34)
MCHC RBC AUTO-ENTMCNC: 33.1 G/DL (ref 30–36.5)
MCV RBC AUTO: 89 FL (ref 80–99)
MONOCYTES # BLD: 0.7 K/UL (ref 0–1)
MONOCYTES NFR BLD: 7 % (ref 5–13)
NEUTS SEG # BLD: 6.9 K/UL (ref 1.8–8)
NEUTS SEG NFR BLD: 73 % (ref 32–75)
NRBC # BLD: 0 K/UL (ref 0–0.01)
NRBC BLD-RTO: 0 PER 100 WBC
PLATELET # BLD AUTO: 266 K/UL (ref 150–400)
PMV BLD AUTO: 10.6 FL (ref 8.9–12.9)
RBC # BLD AUTO: 4.65 M/UL (ref 4.1–5.7)
WBC # BLD AUTO: 9.5 K/UL (ref 4.1–11.1)

## 2024-03-08 PROCEDURE — 6370000000 HC RX 637 (ALT 250 FOR IP): Performed by: PSYCHIATRY & NEUROLOGY

## 2024-03-08 PROCEDURE — 1240000000 HC EMOTIONAL WELLNESS R&B

## 2024-03-08 RX ADMIN — HALOPERIDOL 10 MG: 5 TABLET ORAL at 08:37

## 2024-03-08 RX ADMIN — LORAZEPAM 3 MG: 2 TABLET ORAL at 17:16

## 2024-03-08 RX ADMIN — HALOPERIDOL 10 MG: 5 TABLET ORAL at 17:16

## 2024-03-08 RX ADMIN — HALOPERIDOL 10 MG: 5 TABLET ORAL at 13:26

## 2024-03-08 RX ADMIN — LORAZEPAM 3 MG: 2 TABLET ORAL at 13:25

## 2024-03-08 RX ADMIN — SENNOSIDES AND DOCUSATE SODIUM 2 TABLET: 50; 8.6 TABLET ORAL at 08:37

## 2024-03-08 RX ADMIN — SENNOSIDES AND DOCUSATE SODIUM 2 TABLET: 50; 8.6 TABLET ORAL at 21:12

## 2024-03-08 RX ADMIN — LORAZEPAM 3 MG: 2 TABLET ORAL at 21:12

## 2024-03-08 RX ADMIN — FLUOXETINE HYDROCHLORIDE 60 MG: 20 CAPSULE ORAL at 13:26

## 2024-03-08 RX ADMIN — MIRTAZAPINE 45 MG: 15 TABLET, ORALLY DISINTEGRATING ORAL at 21:15

## 2024-03-08 RX ADMIN — LORAZEPAM 3 MG: 2 TABLET ORAL at 08:36

## 2024-03-08 NOTE — BH NOTE
Sent fax including, facehseet, H+P, BH notes, Labs, MAR, and proof of guardianship to Dr. Morel  Fax # [phone] and [phone]    Fax receipt COMPLETE @ 11:46 and 12:03 respectively

## 2024-03-08 NOTE — BH NOTE
12:10 AM - 1:1 continues.  Patient is  awake and alert.  Staff providing perineal care and changed the linen on his bed.  Patient is uncooperative and resistant to wearing adult briefs.  Dressing on his ankles (bilaterally) are intact.  He is currently resting quietly on his left side.  Will continue to monitor.  1:15 AM - 1:1 continues.  Patient is resting quietly in bed with eyes closed.  Appears to be asleep.  No respiratory distress noted.  Staff present.     0215 - Patient is resting quietly in bed with eyes closed.  Appears to be asleep.  No respiratory distress noted.  1:1 continues.  Staff present.    0300 - Patient is resting quietly in a kneeling position.  Staff encouraged patient to turn on his side.  Patient refused. 1:1 continues. Staff present.    0400 - Patient was placed in restraints to prevent further  skin breakdown on bony prominences.  No redness or bruises noted under or around restraints.    0500 - 1:1 continues.  Patient remains in restraint.  Patient is restless, uncooperative and agitated.  Staff present.  Patient remains in restraints.    0600 - Patient remains in restraint.  Staff present.  Assessed bilateral extremities and tried to perform range of motion.  Patient was uncooperative.  He told staff to \"stop, leave me alone\".  Pedal and radial pulses palpable.  No redness or bruises noted under restraints on bilateral extremities.

## 2024-03-08 NOTE — BH NOTE
BEHAVIORAL HEALTH RESTRAINT/SECLUSION INITIAL ASSESSMENT      1. Assessment of High Risk factors: Preexisting medical conditions that places patient at greater risk when placed in restraints are as follows: None    2. Preexisting history of psychological conditions that place patient at greater risk when placed in restraints: None    3. Current behavior/mental status: Agitated, Thrashing, and Other (comment)    4. Initial use of restraint for this patient is justified by: Imminent risk of injury to self and Occurrence of self injury    5. Least restrictive tools/methods (Check all that apply): Attended comfort needs and 1:1 Observation    6. Criteria for release: Substantial reduction in level of agitation/anxiety as indicated in reduction in motor over activity, ability to focus, maintain attention and decrease in hostility and willing to agree to least restrictive alternatives that include,     7. Treatment plan revisions to assist the patient in regaining control: Continue problem solving discussions with staff    8. Patient consented to family involvement in restraint/seclusion process: No    9. Personal safety search completed: Yes -     10. Vital Signs:         B/P:  124/90         Pulse: 76         Respirations: 16         Temperature: refused        MD/RN Signature:_________________________________  Date:_____________

## 2024-03-08 NOTE — PLAN OF CARE
Problem: Depression  Goal: Will be euthymic at discharge  Description: INTERVENTIONS:  1. Administer medication as ordered  2. Provide emotional support via 1:1 interaction with staff  3. Encourage involvement in milieu/groups/activities  4. Monitor for social isolation  Outcome: Progressing  Note: 0730: 1:1 with staff resting in bed.   0830: 1:1 with staff resting in bed. Using verbal communication when encouraged, demonstrates appropriate safe behaviors. Released from restraints. Up to side of bed in sitting position, improved eye contact. Voices he is ready to eat and agrees to nursing changing his dressing  0930: 1:1 with staff. Up out of chair ambulate in bear walking to bathroom using toilet appropriately, accepting staff using hand  on his hands. Independently ambulates back to chair at bedside. Staff continues to offer praise and encouragement  1020: 1:1 with staff. Sitting in chair, wounds covered and patient continues to follow staff direction

## 2024-03-08 NOTE — BH NOTE
Psychiatric Progress Note    Patient: Roge Cowan MRN: [id number]  SSN: [id number]    YOB: 1970  Age: 54 y.o.  Sex: male      Admit Date: 2/15/2024    LOS: 22 days     Subjective:   03/08/2024  Patient has been in and out of restraints.  He has accepted his medications by mouth today.  He has been responsive to staff's verbal redirection to remain out of prayer/fetal position.   He was agreeable to transfer from bed to chair and remained seated, with underwear on.  Upon entering the room he moved from the chair to the fetal position on the floor.  He would not respond to my questions.  He looked up and said that he needs to use the bathroom.  He crawled on all 4 to the bathroom to have a bowel movement.    03/07/2024  Mr. Cowan had a challenging night yesterday. He was quite irritable and wouldn't allow staff to safely obtain lab work. Last night and this am he didn't take his PO meds and received IM alternatives. He did eat all his meals, however.  Today he's been intermittently in restraints for attempting to interfere with his care and the safety of his bilateral ankle wounds.  Upon my evaluation, he was sound asleep.    Staff update me that he was agreeable to provide lab work and EKG today. Staff worked with him and he was agreeable to don underwear from 7am to 7pm.      03/06/2024  Nursing report that patient had a challenging day today where he was assuming the fetal position. He hit his 1:1 tech as he was assisting him in getting out of fetal position.  Patient continues to eat his meals and accepts his medications by mouth. He refuses to don any clothing, including undergarments. Her remains unclothed.  Upon my evaluation he was not in restraints, but was lying on his R side. Upon attempting to engage him in evaluation, he started flexing his muscles and increased his rate of breathing.      03/05/2024  Patient remains on 1:1 observation. Staff continue to provide verbal re-direction to  emailed Dr. Morel to start a transfer to [facility] for later ECT there by Dr. Garner.  Continue current medication regimen.    03/01/2024  Hospitalist consult to evaluate and provide recommendations for left groin lesion.  Please appreciate the patient's perception of pain is likely disordered, as evidenced by his ulcers that he never complained about.  Continue currently prescribed medications and restraints as necessary, as agreed upon by patient's guardian.  Upon stabilization of wounds, we will likely transfer to medicine at [facility] with oversight by psychiatric consultants, with ultimate goal of receiving ECT.      02/29/2024  Increase prozac from 40mg po qday to 60mg po qday  Continue medication regimen  Continue use of restraints as needed to prevent patient's interference in care of his wound, as authorized by his guardian.  Efforts ongoing to get patient stabilized for a transfer to [facility] medical with psych oversight with goal of ECT.      02/28/2024  Transfer patient from acute to the geriatric unit.  4pm call to discuss next steps, recommended transfer to [facility] medical floor, w/psycho consulting, and ECT    Otherwise, will implement recs from  2/27 below. SW reached out to [facility] for  ECT coordination and will update when she hears back.    02/27/2024  Continue current medication regimen.    Will provide for backup IM alternative in case patient refuses PO scheduled ativan 3mg po qhs   Will provide for ativan 1mg IM prn agitation.    Treatment team meeting with our nursing, social work, pharmacy, wound care regarding plan with the following outcome. This plan was discussed with patient's guardian (sister, Eden) on 2/27/2024. Even though patient's PO intake, medication adherence has improved, his behaviors (staying on the floor in fetal position) haven't; and have reached a point that is not responsive to verbal re-direction.    [facility] will hold weekly mid-week meetings for update on patient's

## 2024-03-08 NOTE — INTERDISCIPLINARY ROUNDS
Behavioral Health Interdisciplinary Rounds     Patient Name: Roge Cowan  Age: 54 y.o.  Room/Bed:  [facility]  Primary Diagnosis: Catatonic schizophrenia (HCC)   Admission Status: Involuntary Commitment     Readmission within 30 days: no  Power of  in place: no  Patient requires a blocked bed:  Behavior           Reason for blocked bed:   Sleep hours:        Participation in Care/Groups:  no  Medication Compliant?: Selective  PRNS (last 24 hours):  Haldol, Ativan     Restraints (last 24 hours):  no  __________________________________________________  OQ Admission Analysis Survey completed:  OQ Admission Analysis Survey score:  OQ Discharge Analysis Survey completed:  OQ Discharge Analysis Survey score:   __________________________________________________     Alcohol screening (AUDIT) completed -     If applicable, date SBIRT discussed in treatment team AND documented:    Tobacco - patient is a smoker:    Illegal Drugs use:      24 hour chart check complete: yes     _______________________________________________    Patient goal(s) for today: Communicate needs to staff  Treatment team focus/goals: Assess pt, manage medications, discharge planning   Progress note: Pt has followed all cues from nursing. Pt is eating. Pts wounds look better. Pt will be transported to [facility] [place] [place] @ 8am on 3/9/24     Spiritual Care Consult:   Financial concerns/prescription coverage:    Family contact:  Eden Montalvo (Brother/Sister)  [phone]                        Family requesting physician contact today:    Discharge plan:   Access to weapons : no                                                              Outpatient provider(s):   Patient's preferred phone number for follow up call :   Patient's preferred e-mail address :    LOS:  22  Expected LOS:3/9/24     Participating treatment team members: Roge Cowan, Payton FERRELL, MSW, Lore SOTO, RN, Geeta EDWARD, PHARMD, Dr. Larson

## 2024-03-08 NOTE — PLAN OF CARE
Problem: Safety - Medical Restraint  Goal: Remains free of injury from restraints (Restraint for Interference with Medical Device)  Recent Flowsheet Documentation  Taken 3/8/2024 0056 by Lakeshia Samson RN  Remains free of injury from restraints (restraint for interference with medical device):   Determine that other, less restrictive measures have been tried or would not be effective before applying the restraint   Evaluate the patient's condition at the time of restraint application   Every 2 hours: Monitor safety, psychosocial status, comfort, nutrition and hydration     Problem: Safety - Medical Restraint  Goal: Remains free of injury from restraints (Restraint for Interference with Medical Device)  Recent Flowsheet Documentation  Taken 3/8/2024 0056 by Lakeshia Samson RN  Remains free of injury from restraints (restraint for interference with medical device):   Determine that other, less restrictive measures have been tried or would not be effective before applying the restraint   Evaluate the patient's condition at the time of restraint application   Every 2 hours: Monitor safety, psychosocial status, comfort, nutrition and hydration

## 2024-03-08 NOTE — PLAN OF CARE
Problem: Skin/Tissue Integrity - Adult  Goal: Incisions, wounds, or drain sites healing without S/S of infection  Recent Flowsheet Documentation  Taken 3/8/2024 0102 Lakeshia REYNOLDS  Incisions, Wounds, or Drain Sites Healing Without Sign and Symptoms of Infection: TWICE DAILY: Assess and document skin integrity

## 2024-03-08 NOTE — PLAN OF CARE
Problem: Safety - Violent/Self-destructive Restraint  Goal: Remains free of injury from restraints (Restraint for Violent/Self-Destructive Behavior)  Flowsheets (Taken 3/8/2024 9343)  Remains Free of Injury from Restraints (Restraint for Violent/Self-destructive Behavior):   Identify and document the criteria for restraint   Evaluate the patient's condition at the time of restraint application   Inform patient/family regarding the reason for restraint/seclusion   Every 2 hours: Monitor comfort, nutrition and hydration needs   Every 15 minutes: Perform safety checks including skin, circulation, sensory, respiratory and psychological status   Ensure continuous observation   Identify and implement measures to help patient regain control, assess readiness for release and initiate progressive release per policy

## 2024-03-08 NOTE — PLAN OF CARE
Problem: Safety - Adult  Goal: Free from fall injury  3/8/2024 0802 by Margarette Herzog RN  Outcome: Progressing  3/8/2024 0056 by Lakeshia Samson RN  Flowsheets (Taken 3/8/2024 0056)  Free From Fall Injury:   Instruct family/caregiver on patient safety   Based on caregiver fall risk screen, instruct family/caregiver to ask for assistance with transferring infant if caregiver noted to have fall risk factors     Problem: Skin/Tissue Integrity  Goal: Absence of new skin breakdown  Description: 1.  Monitor for areas of redness and/or skin breakdown  2.  Assess vascular access sites hourly  3.  Every 4-6 hours minimum:  Change oxygen saturation probe site  4.  Every 4-6 hours:  If on nasal continuous positive airway pressure, respiratory therapy assess nares and determine need for appliance change or resting period.  3/8/2024 0057 by Lakeshia Samson RN  Outcome: Progressing     Problem: Infection - Adult  Goal: Absence of fever/infection during anticipated neutropenic period  3/8/2024 0058 by Lakeshia Samson RN  Outcome: Progressing  Flowsheets (Taken 3/8/2024 0058)  Absence of fever/infection during anticipated neutropenic period:   Monitor white blood cell count   Administer growth factors as ordered   Implement neutropenic guidelines     Problem: Nutrition Deficit:  Goal: Optimize nutritional status  3/8/2024 0058 by Lakeshia Samson RN  Outcome: Progressing  Flowsheets (Taken 3/8/2024 0058)  Nutrient intake appropriate for improving, restoring, or maintaining nutritional needs:   Assess nutritional status and recommend course of action   Recommend, monitor, and adjust tube feedings and TPN/PPN based on assessed needs   Recommend appropriate diets, oral nutritional supplements, and vitamin/mineral supplements     Problem: Safety - Medical Restraint  Goal: Remains free of injury from restraints (Restraint for Interference with Medical Device)  Description: INTERVENTIONS:  1. Determine that other, less restrictive

## 2024-03-08 NOTE — BH NOTE
Medical transport scheduled for 3/9/24 @ 8AM with [facility].     Transport # [id number]    [facility] [phone]

## 2024-03-08 NOTE — WOUND CARE
WOCN Note:     Follow up visit    Roge Cowan is a 54 y.o. y/o male who presented for Catatonic schizophrenia (HCC) [F20.2]  Admitted on 2/15/2024    Past Medical History:   Diagnosis Date    Psychiatric disorder     Psychotic disorder (HCC)     Withdrawal syndrome (HCC)        Lab Results   Component Value Date/Time    WBC 9.5 03/07/2024 01:12 PM    LABA1C 5.2 12/13/2023 06:02 AM    POCGLU 159 (H) 02/14/2024 06:54 PM    POCGLU 95 02/14/2024 12:48 PM    HGB 13.7 03/07/2024 01:12 PM    HCT 41.4 03/07/2024 01:12 PM     03/07/2024 01:12 PM        Tobacco Use      Smoking status: Some Days      Smokeless tobacco: Never     ADULT DIET; Regular  ADULT ORAL NUTRITION SUPPLEMENT; Breakfast, Dinner; Other Oral Supplement; Two Codey HN  ADULT ORAL NUTRITION SUPPLEMENT; Lunch; Standard High Calorie/High Protein Oral Supplement  ADULT ORAL NUTRITION SUPPLEMENT; Dinner, Lunch; Frozen Oral Supplement     Assessment:   Seen today in room 740/01   Alert and minimally communicative  Mobile and repositions independently.   Surface: MARISA mattress    1. POA Pressure Injury to Right Anterior-Lateral Ankle - Healing from Stage 3   ~ 0.5 x 0.5 x 0.1 cm  100% red  Small serous exudate  Mild erythema noted to periwound, periwound with freshly re-epithelialized tissue   Tx: cleansed with Vashe damp gauze, applied Balsam peru-castor oil (Venelex), covered with foam dressing     2. Healing from Stage 2 Pressure Injury to Left Anterior-Lateral Ankle  Erythema improved, blanching, 3 circular areas resolved    Wound Recommendations:   Bilateral Ankles: Daily - cleanse open area on right ankle gently with Vashe damp gauze, apply Balsam peru-castor oil (Venelex) to both ankles, cover with foam dressings      Turn/reposition approximately every 2 hours - allow for breaks from restraints after maximum of 2 hours for repositioning and self care  Offload heels with heels hanging off  end of pillow while in restraints.   Sacral Foam dressing: at least while in restraints. If able to maintain for long periods, lift to assess regularly; change as needed. Discontinue if incontinence is frequently soiling or wetting dressing.     Low Air Loss mattress Use only flat sheet and one incontinence pad.     Discussed findings and plan with RN    Transition of Care: Plan to follow weekly and as needed while admitted to hospital.      Anne Sturgeon  MSN, RN  [facility]  Office [phone]   Available through [employer]

## 2024-03-09 PROCEDURE — 1240000000 HC EMOTIONAL WELLNESS R&B

## 2024-03-09 PROCEDURE — 6370000000 HC RX 637 (ALT 250 FOR IP): Performed by: PSYCHIATRY & NEUROLOGY

## 2024-03-09 PROCEDURE — 6360000002 HC RX W HCPCS: Performed by: PSYCHIATRY & NEUROLOGY

## 2024-03-09 RX ADMIN — HALOPERIDOL LACTATE 10 MG: 5 INJECTION, SOLUTION INTRAMUSCULAR at 18:03

## 2024-03-09 RX ADMIN — LORAZEPAM 3 MG: 2 INJECTION INTRAMUSCULAR; INTRAVENOUS at 22:55

## 2024-03-09 RX ADMIN — LORAZEPAM 3 MG: 2 INJECTION INTRAMUSCULAR; INTRAVENOUS at 11:55

## 2024-03-09 RX ADMIN — Medication: at 12:21

## 2024-03-09 RX ADMIN — HALOPERIDOL LACTATE 10 MG: 5 INJECTION, SOLUTION INTRAMUSCULAR at 07:48

## 2024-03-09 RX ADMIN — LORAZEPAM 3 MG: 2 INJECTION INTRAMUSCULAR; INTRAVENOUS at 07:48

## 2024-03-09 RX ADMIN — HALOPERIDOL LACTATE 5 MG: 5 INJECTION, SOLUTION INTRAMUSCULAR at 22:55

## 2024-03-09 RX ADMIN — LORAZEPAM 3 MG: 2 INJECTION INTRAMUSCULAR; INTRAVENOUS at 18:02

## 2024-03-09 RX ADMIN — HALOPERIDOL LACTATE 10 MG: 5 INJECTION, SOLUTION INTRAMUSCULAR at 11:54

## 2024-03-09 NOTE — BH NOTE
1600- pt resting in bed. No voiced complaints or acute distress  at present.  Staff  at bedside.    1615- restraints removed ,patient eating  No agitation thus far  Staff at bedside  1715- eating dinner.  Resting in bed  1815-staff at bedside,pt urinated incont  Refused po medication scheduled,I'm given as ordered..1:1 observation continues as ordered. For safety   aware  1900- resting quietly,appears less restless at present.  Remains 1:1 observation for safety.  2000-resting in bed.  No voiced complaints or acute distress at present  Staff at bedside  2100- resting in bed,remains unchanged from previous.staff at bedside  2200- awake ,slightly restless.staff at bedside  Continue 1:1 observation for safety.  2300- very restless,\"crawling around in bed\" resistive to care given Becoming agitated\"PRAYING POSITION NOTED\"  Staff at bedside.tl aware of above.

## 2024-03-09 NOTE — BH NOTE
Pt was found on the floor outside of the bathroom in the prayer position. Pt had removed bandages from both ankles. He was told multiple times to return to his bed and lie on either the right of left side. Pt crawled to on the floor to the bed and continued to stay in the prayer position and did not return to his bed. Pt was instructed multiple times to return to his bed, staff had to assist pt to bed. Pt was placed into 4 point bilateral wrists and ankle soft restraints due to refusing to remove self from prayer position. Pt bandages were replaced on bilateral ankle wounds.   Pt remains in soft restraints until determined that pt can comply to instructions given, and not return to the prayer position.

## 2024-03-09 NOTE — PLAN OF CARE
Problem: Safety - Adult  Goal: Free from fall injury  Outcome: Progressing     Problem: Skin/Tissue Integrity  Goal: Absence of new skin breakdown  Description: 1.  Monitor for areas of redness and/or skin breakdown  2.  Assess vascular access sites hourly  3.  Every 4-6 hours minimum:  Change oxygen saturation probe site  4.  Every 4-6 hours:  If on nasal continuous positive airway pressure, respiratory therapy assess nares and determine need for appliance change or resting period.  Outcome: Progressing     Problem: Psychosis  Goal: Will report no hallucinations or delusions  Description: INTERVENTIONS:  1. Administer medication as  ordered  2. Assist with reality testing to support increasing orientation  3. Assess if patient's hallucinations or delusions are encouraging self harm or harm to others and intervene as appropriate  3/9/2024 0810 by Lore Gutierrez RN  Outcome: Progressing  Note: 0730: 1:1 with staff. Sitting in bed nonverbal with sitter staff     Problem: Safety - Medical Restraint  Goal: Remains free of injury from restraints (Restraint for Interference with Medical Device)  Description: INTERVENTIONS:  1. Determine that other, less restrictive measures have been tried or would not be effective before applying the restraint  2. Evaluate the patient's condition at the time of restraint application  3. Inform patient/family regarding the reason for restraint  4. Q2H: Monitor safety, psychosocial status, comfort, nutrition and hydration  3/9/2024 1625 by Margarette Herzog RN  Outcome: Progressing  Flowsheets  Taken 3/9/2024 1345 by Lore Gutierrez RN  Remains free of injury from restraints (restraint for interference with medical device):   Determine that other, less restrictive measures have been tried or would not be effective before applying the restraint   Evaluate the patient's condition at the time of restraint application   Inform patient/family regarding the reason for restraint   Every 2

## 2024-03-09 NOTE — PLAN OF CARE
Problem: Safety - Medical Restraint  Goal: Remains free of injury from restraints (Restraint for Interference with Medical Device)  Description: INTERVENTIONS:  1. Determine that other, less restrictive measures have been tried or would not be effective before applying the restraint  2. Evaluate the patient's condition at the time of restraint application  3. Inform patient/family regarding the reason for restraint  4. Q2H: Monitor safety, psychosocial status, comfort, nutrition and hydration  3/9/2024 0435 by Darling Pelaez RN  Outcome: Progressing  Flowsheets (Taken 3/9/2024 0145)  Remains free of injury from restraints (restraint for interference with medical device):   Determine that other, less restrictive measures have been tried or would not be effective before applying the restraint   Evaluate the patient's condition at the time of restraint application   Inform patient/family regarding the reason for restraint   Every 2 hours: Monitor safety, psychosocial status, comfort, nutrition and hydration

## 2024-03-09 NOTE — PLAN OF CARE
Problem: Safety - Medical Restraint  Goal: Remains free of injury from restraints (Restraint for Interference with Medical Device)  Description: INTERVENTIONS:  1. Determine that other, less restrictive measures have been tried or would not be effective before applying the restraint  2. Evaluate the patient's condition at the time of restraint application  3. Inform patient/family regarding the reason for restraint  4. Q2H: Monitor safety, psychosocial status, comfort, nutrition and hydration  Recent Flowsheet Documentation  Taken 3/9/2024 0800 by Lore Gutierrez RN  Remains free of injury from restraints (restraint for interference with medical device): (pt refused staff to speak with sister.)   Determine that other, less restrictive measures have been tried or would not be effective before applying the restraint   Evaluate the patient's condition at the time of restraint application   Every 2 hours: Monitor safety, psychosocial status, comfort, nutrition and hydration    0730: 1:1 w staff resting in bed. Nurse report called to [facility] behavioral unit spoke with charge nurse Mari. [facility] unit awaiting transport pt.   0800: spoke with Dr. Larson transfer cancelled. Pt made aware. Message left with guardian.   0830: 1:1 w staff delusional statements made, noted increase irritability requiring verbal redirection and multiple prompts to accept wound care from nursing. ROM performed. No pain voiced, declined fluids and food at this time. Compliant with wearing undergarments. Praise and encouragement offered for accepting wound care and wearing undergarments.   0930: 1:1 w staff, resting quietly in a safe position nonverbal with staff  1000: placed self in unsafe position with direct pressure on bilat foot wounds and knees for extended period of time. Verbally states he will not change position to be safe. Placed in non-violent soft bilat wrist and ankle restraints. Order obtained from  provider.  Staff remains 1:1   1030: 1:1 w staff, pulling against restraints, verbally states he will continue unsafe behaviors such as placing bilat foot wound directly on bed and apply pressure  1130: 1:1 w staff, calm and using verbal communication. Agree to place body in safe position, staff offer praise and encouragement  1230: 1:1 w staff, resting in bed  1330: placed self in unsafe position with direct pressure to bilat knees and bilat foot wounds. Verbally states he will not move from unsafe position. Redirection and distraction unsuccessful.   1345: placed in nonviolent bilat wrist and ankle restraints. 1:1 remains with patinet  1430: 1:1 w staff, refusing to communicate verbally. Continues to attempt to rub wound dressings off.

## 2024-03-09 NOTE — CASE COMMUNICATION
Correspondence from patient's guardian (sister) to transport patient from [place] to [facility] for continuation of care:    Re: Need you to give us a call [phone]  Eden Montalvo <[email or web address]>  Fri 3/8/2024 11:05 AM  Lety Larson <[email or web address]>  WARNING: The sender of this email could not be validated and may not match the person in the “From” field.  CAUTION: This email originated from outside of the [facility].  DO NOT CLICK on any links or attachments unless you recognize the sender and you know that the content is safe.    You have my permission, as next of kin and legal guardian of Roge Cowan,  1970, to transfer him to [facility] in [place], [place], for inpatient care as deemed necessary, to include ECT, use of restraints for administering medicine and/or preventing self harm, and other treatments/procedures that may help Isreal reach his base level.    Thank you,  [email or web address] [email or web address]    (I'm out until after 2:00 and cannot call until then.)    Get [employer] for [employer]  From: Lety Larson <[email or web address]  Sent: 2024 10:55:21 AM  To: Eden Montalvo <[email or web address]>  Subject: Need you to give us a call [phone]     Memo Harmon. Attempted call you on your mobile.    We have an accepting program at [facility] in [place], [place] that is able to admit the patient to their inpatient psychiatric unit and also administer ECT. We and they need your agreement before we actually transfer him.    Thank you    Lety Larson MD     Department , Psychiatry  Medical Director, Behavioral Health Unit  [place], [place]  [email or web address]    CONFIDENTIALITY NOTICE: This message, including any attachments, is for the sole use of the intended recipient(s) and may contain confidential and privileged information. Any unauthorized review, use, disclosure or distribution is

## 2024-03-09 NOTE — BH NOTE
0000: Pt appears to be resting in no apparent distress, respirations even and unlabored. Specialty mattress being utilized for pressure wound prevention. Bed in lowest position with wheels locked, pathway free of clutter. Pt is currently lying on his right side with his head at the foot of the bed, moving independently without encouragement thusfar. Both ankle dressings are dry and intact. No voiced concerns. Staff at bedside for 1:1 for patient safety.     0100: Pt awake, ambulated independently to the restroom. Continues to be 1:1 for safety.     0145: Narrative behavior note completed by Ty RN. Order initiated for 4-point soft restraints by on call provider, bilateral wrist and ankle for pt safety. Pt distressed and resistant to staff interaction, unable to apply sacral foam dressing due to pt behavior. Sacrum and heels assessed per shift requirement, no redness, bruising, or open wounds noted at this time. Bilateral ankle dressings replaced, currently clean and intact. Offered food, fluids, and toileting and pt declined. Attempt to notify house supervisor at 0143 with no return call. 1:1 staff present in room.     0200: Nursing supervisor notified. Upon 0200 round, pt is visibly less distressed, resting on his right side. Bilateral wrist and bilateral ankle restraints in place for pt safety. Respirations even and unlabored. 1:1 staff present in room. Q2H documentation completed.    0300: Pt is awake, restless and resistant to staff interaction. He continues to pull on his non-violent restraints to attempt to sit in his prayer position. He also continues to slide his ankles on the bed to remove dressings despite frequent redirection. No noted bruising or open areas to wrists or ankles. Pulses bilaterally present and equal. 1:1 staff present in room.     0400: Pt appears to be resting, respirations even and unlabored. Pt is resting on his right side currently, 4-point nonviolent restraints continued for pt safety.

## 2024-03-09 NOTE — BH NOTE
unit for placement on a hospital bed. I explained that this would be for the best treatment of his current bilateral pressure ulcers on his ankles. He didn't express his acknowledgement or understanding as he is not interacting with me.  No SI, HI, or AVH voiced.    02/27/2024  Nursing report that nursing place food in different places to encourage patient to move. In the interim, they use these opportunities to help clean his bedding, clean the floor and mats.   Patient is eating all of his meals and taking all of his medications.  However, when he is not asleep, he remains in the fetal position on the mats that are beside his mattress.  Wound care nurse today updated the treatment team that his R ankle is a stage 3 and his L ankle is a stage 2 pressure ulcers.    Upon my evaluation, patient is unclothed. He is in the fetal position. He starts increasing the number of his breaths per minute and flexes his muscles. He doesn't participate in my evaluation and doesn't answer any questions.    02/26/2024  Nursing report patient slept 8 hours last night.  He's accepted all of his medications. He's eaten all of his meals.  He did sleep on his mattress last night. However, he sits in the fetal position on the mats on his floor throughout the day.  It takes 3 staff members to obtain his vitals because of his negativism. He doesn't do anything to harm them, just a significant amount of negativism.  Upon my evaluation, patient was lying on his R side on his mattress. He was unclothed. He did say to me today, that he is fine.  He did allow me to listen to his abdomin.   He didn't respond to any of my question thereafter.      02/25/2024  Patient is not responding. He is unclothed and in the fetal position.  Per staff, he is taking his medications.  No aggression.    02/24/2024  Patient did not respond to conversation.Noted to be curled up in the fetal position  Per staff, he slept 5 hours yesterday and he is eating well.  No  refusal.  Continue remeron 30mg po qhs, prozac 20mg po qday    Dr. Garner at [facility] requested we contact her tomorrow at the earliest for transfer consideration there for further hospitalization and ECT. She'd informed us that there is likely about a 2 wk delay on the schedule. It is unclear, however, how long logistical delays of transfer may take.    02/19/2024  Increase remeron from 15mg po qhs to 30mg po qhs.  Today will administer last dosage of haldol 10mg IM TID with meals and ativan 3mg TID with meals; instead, since patient has been accepting PO medications, staff will give him the option of taking haldol/ativan by mouth, and only give them IM upon PO refusal. This way we decrease distress tot he patient.  I reached out to [facility], Dr. Garner on Friday. She informed me that she is out with COVID, and can't evaluate patient's case for ECT/transfer until this Wednesday.    I certify that this patient's inpatient psychiatric hospital services furnished since the previous certification were, and continue to be, required for treatment that could reasonably be expected to improve the patient's condition, or for diagnostic study, and that the patient continues to need, on a daily basis, active treatment furnished directly by or requiring the supervision of inpatient psychiatric facility personnel. In addition, the hospital records show that services furnished were intensive treatment services, admission or related services, or equivalent services.    Signed By: JUANCHO Shepard NP     March 9, 2024

## 2024-03-09 NOTE — CASE COMMUNICATION
Transfer to [facility] paused for further case clarification.    Through my conversation with Dr. Morel at [facility] this morning, it appears that there is a miscommunication/misunderstanding of the case details; not appreciating that patient STILL requires intermittent restraints and 1:1 re-direction. [facility] is unable to provide such care on the psychiatric unit.    [facility] is very much interested in providing ECT for the patient IF it would be clinically effective, as they are unable to house him on the psychiatric unit if he continues to require 1:1 or restraints thereafter.    [facility] will hold their Wednesday ECT spot for Mr. Cowan for now.     Instead of sending pt through ED admission, we will pursue TDO through [facility] with help of [facility]. This will help his case should this transfer to [facility] or any other alternative.       No

## 2024-03-10 VITALS
HEIGHT: 72 IN | RESPIRATION RATE: 16 BRPM | OXYGEN SATURATION: 97 % | WEIGHT: 132.5 LBS | SYSTOLIC BLOOD PRESSURE: 136 MMHG | HEART RATE: 88 BPM | BODY MASS INDEX: 17.95 KG/M2 | TEMPERATURE: 97.5 F | DIASTOLIC BLOOD PRESSURE: 89 MMHG

## 2024-03-10 PROCEDURE — 6370000000 HC RX 637 (ALT 250 FOR IP): Performed by: PSYCHIATRY & NEUROLOGY

## 2024-03-10 PROCEDURE — 1240000000 HC EMOTIONAL WELLNESS R&B

## 2024-03-10 RX ADMIN — MIRTAZAPINE 45 MG: 15 TABLET, ORALLY DISINTEGRATING ORAL at 21:00

## 2024-03-10 RX ADMIN — SENNOSIDES AND DOCUSATE SODIUM 2 TABLET: 50; 8.6 TABLET ORAL at 21:00

## 2024-03-10 RX ADMIN — LORAZEPAM 3 MG: 2 TABLET ORAL at 17:13

## 2024-03-10 RX ADMIN — HALOPERIDOL 10 MG: 5 TABLET ORAL at 17:17

## 2024-03-10 RX ADMIN — LORAZEPAM 3 MG: 2 TABLET ORAL at 12:01

## 2024-03-10 RX ADMIN — HALOPERIDOL 10 MG: 5 TABLET ORAL at 12:01

## 2024-03-10 RX ADMIN — LORAZEPAM 3 MG: 2 TABLET ORAL at 09:30

## 2024-03-10 RX ADMIN — LORAZEPAM 3 MG: 2 TABLET ORAL at 21:00

## 2024-03-10 RX ADMIN — SENNOSIDES AND DOCUSATE SODIUM 2 TABLET: 50; 8.6 TABLET ORAL at 09:30

## 2024-03-10 RX ADMIN — HALOPERIDOL 10 MG: 5 TABLET ORAL at 09:30

## 2024-03-10 RX ADMIN — Medication: at 10:17

## 2024-03-10 RX ADMIN — FLUOXETINE HYDROCHLORIDE 60 MG: 20 CAPSULE ORAL at 12:01

## 2024-03-10 NOTE — BH NOTE
1630: Patient removed from 4 point non violent restraints so patient can eat dinner.    1745: Orders obtained for 4 point non violent restraints. After eating dinner, patient placed himself on the floor in child's pose. Patient would not get out of child's pose. In order to keep pressure off of the patient's bilateral ankle pressure ulcers, patient placed in restraints.

## 2024-03-10 NOTE — BH NOTE
Hourly Nursing Rounds    2305:Pt incontinent of urine. Pt crawled out of bed and onto floor. Pt in the praying position. Multiple staff present and attempting to redirect pt back in bed and in a position that would not further complicate wounds. Pt not easily redirectable and refusing to follow verbal commands. Staff assisted patient to bed. Cleaned bed and patient. Placed pt on left side. Pt immediately went into praying position. Again, asked pt to move onto right or left side. Pt refused.     2314: Pt placed in 4 point soft restraints to bilateral wrists and ankles. Pt agitated and aggressive. Pt attempted to kick staff and bite staff. Pt yelling \"Don't touch me!\" Pt remains on 1:1 for safety.     0015: Pt remains in restraints and on 1:1 for safety.    0115: Pt remains in restraints and on 1:1 for safety.    0145: Pt is sleeping, Restraints discontinued. Remains on 1:1 for safety.    0200: Pt remains on 1:1 for safety. Pt appears to be sleeping at this time. No behavioral issues at this time. Staff will continue to monitor.     0300: Pt remains on 1:1 for safety. Pt appears to be sleeping at this time. No behavioral issues at this time. Staff will continue to monitor.     0400: Pt remains on 1:1 for safety. Pt appears to be sleeping at this time. No behavioral issues at this time. Staff will continue to monitor.     0500: Pt remains on 1:1 for safety. Pt appears to be sleeping at this time. No behavioral issues at this time. Staff will continue to monitor.     0600: Pt incontinent of urine. Pt crawled out of bed onto floor. Staff present to clean patient and bed. Pt placed back into bed. Remains on 1:1.     0615: Pt placed in 4 point soft restraints to bilateral wrists and ankles as he refused to get out of praying position. Pt has removed dressings to wounds. Nursing supervisor notified. On call psych paged. Pt remains on 1:1 observation for safety.

## 2024-03-10 NOTE — PLAN OF CARE
Problem: Safety - Adult  Goal: Free from fall injury  Outcome: Progressing     Problem: Safety - Medical Restraint  Goal: Remains free of injury from restraints (Restraint for Interference with Medical Device)  Description: INTERVENTIONS:  1. Determine that other, less restrictive measures have been tried or would not be effective before applying the restraint  2. Evaluate the patient's condition at the time of restraint application  3. Inform patient/family regarding the reason for restraint  4. Q2H: Monitor safety, psychosocial status, comfort, nutrition and hydration  Outcome: Progressing  Flowsheets  Taken 3/10/2024 0615 by Dorys Mckenna RN  Remains free of injury from restraints (restraint for interference with medical device):   Determine that other, less restrictive measures have been tried or would not be effective before applying the restraint   Evaluate the patient's condition at the time of restraint application   Every 2 hours: Monitor safety, psychosocial status, comfort, nutrition and hydration  Taken 3/10/2024 0115 by Dorys Mckenna RN  Remains free of injury from restraints (restraint for interference with medical device): Every 2 hours: Monitor safety, psychosocial status, comfort, nutrition and hydration  Taken 3/9/2024 2314 by Dorys Mckenna RN  Remains free of injury from restraints (restraint for interference with medical device):   Every 2 hours: Monitor safety, psychosocial status, comfort, nutrition and hydration   Evaluate the patient's condition at the time of restraint application   Determine that other, less restrictive measures have been tried or would not be effective before applying the restraint

## 2024-03-10 NOTE — BH NOTE
At 23:20 nursing supervisor was notified that pt had to be placed in 4 point soft bilateral restraints

## 2024-03-10 NOTE — BH NOTE
Psychiatric Progress Note    Patient: Roge Cowan MRN: [id number]  SSN: [id number]    YOB: 1970  Age: 54 y.o.  Sex: male      Admit Date: 2/15/2024    LOS: 24 days     Subjective:   03/10/2024  Mr. Cowan is having a better day today than yesterday. He took all of his scheduled medications today without issue. He also got up and went to the bathroom, an improvement from recent days. He did require restraints this morning for his own safety, as he would not get out of his prayer pose on the floor, putting direct pressure on his wounds, and was not receptive to staff's attempts at redirecting him to a safer position. However, since the restraints were removed, the pt has been without them for 45 minutes and has been able to exhibit safe body postures. No other significant changes at this time.     03/09/2024  Mr. Cowan is little changed. He is verbal at times, responding selectively to questions with one word answers, often responding \"my toe\" when asked about changing positions. He required soft restraints earlier today when he was attempting to go into the prayer position, putting direct pressure on his wounds, and would not respond to staff's attempts at redirecting him to a safe position that would not cause him further injury. He pulled off his bandages last night, but has allowed them to stay on this morning. He did not respond to me at all during evaluation. He was in soft restraints during evaluation, with knees bent towards his right side, sitter at bedside. No other significant changes to the pt presentation at this time.     03/08/2024  Patient has been in and out of restraints.  He has accepted his medications by mouth today.  He has been responsive to staff's verbal redirection to remain out of prayer/fetal position.   He was agreeable to transfer from bed to chair and remained seated, with underwear on.  Upon entering the room he moved from the chair to the fetal position on the  supervision of inpatient psychiatric facility personnel. In addition, the hospital records show that services furnished were intensive treatment services, admission or related services, or equivalent services.    Signed By: JUANCHO Shepard - NP     March 10, 2024

## 2024-03-10 NOTE — BH NOTE
1600- resting in bed.no voiced complaints or acute distress at present.  1700- pt was able too feed himself with some assistance.  Remains on 1:1 observation for safety  1800- up in bathroom sitting on toilet.  Dressings on and intact.  Becoming restless.tl aware  1900- sitting up in bed asking for some water. Staff at bedside.appears animated and slightly restless.  Remains on 1:1 observation for safety

## 2024-03-10 NOTE — BH NOTE
Pt refused ordered oral medications. Pt was agitated and refused to remove self from praying position. Pt moved self from the bed onto the floor in the prayer position. Pt   Was given IM ativan 3mg per medication order along with haldol I'm for agitation.

## 2024-03-11 PROCEDURE — 6360000002 HC RX W HCPCS: Performed by: PSYCHIATRY & NEUROLOGY

## 2024-03-11 PROCEDURE — 6370000000 HC RX 637 (ALT 250 FOR IP): Performed by: PSYCHIATRY & NEUROLOGY

## 2024-03-11 RX ORDER — FLUOXETINE HYDROCHLORIDE 20 MG/1
60 CAPSULE ORAL
Qty: 90 CAPSULE | Refills: 0 | Status: SHIPPED
Start: 2024-03-11 | End: 2024-04-10

## 2024-03-11 RX ORDER — MIRTAZAPINE 45 MG/1
45 TABLET, ORALLY DISINTEGRATING ORAL NIGHTLY
Qty: 30 TABLET | Refills: 0 | Status: SHIPPED
Start: 2024-03-11 | End: 2024-04-10

## 2024-03-11 RX ORDER — SENNA AND DOCUSATE SODIUM 50; 8.6 MG/1; MG/1
2 TABLET, FILM COATED ORAL 2 TIMES DAILY
Qty: 120 TABLET | Refills: 0 | Status: SHIPPED
Start: 2024-03-11 | End: 2024-04-10

## 2024-03-11 RX ORDER — CASTOR OIL AND BALSAM, PERU 788; 87 MG/G; MG/G
OINTMENT TOPICAL DAILY
Qty: 28.35 G | Refills: 0 | Status: SHIPPED
Start: 2024-03-11 | End: 2024-04-10

## 2024-03-11 RX ORDER — LORAZEPAM 1 MG/1
3 TABLET ORAL
Qty: 270 TABLET | Refills: 0 | Status: SHIPPED
Start: 2024-03-11 | End: 2024-04-10

## 2024-03-11 RX ORDER — LORAZEPAM 1 MG/1
3 TABLET ORAL NIGHTLY
Qty: 90 TABLET | Refills: 0 | Status: SHIPPED
Start: 2024-03-11 | End: 2024-04-10

## 2024-03-11 RX ORDER — HALOPERIDOL 10 MG/1
10 TABLET ORAL
Qty: 90 TABLET | Refills: 0 | Status: SHIPPED
Start: 2024-03-11 | End: 2024-04-10

## 2024-03-11 RX ADMIN — HALOPERIDOL 10 MG: 5 TABLET ORAL at 12:13

## 2024-03-11 RX ADMIN — HALOPERIDOL LACTATE 10 MG: 5 INJECTION, SOLUTION INTRAMUSCULAR at 08:40

## 2024-03-11 RX ADMIN — LORAZEPAM 3 MG: 2 TABLET ORAL at 12:13

## 2024-03-11 RX ADMIN — LORAZEPAM 3 MG: 2 INJECTION INTRAMUSCULAR; INTRAVENOUS at 08:44

## 2024-03-11 RX ADMIN — Medication: at 10:00

## 2024-03-11 NOTE — DISCHARGE INSTRUCTIONS
DISCHARGE SUMMARY    NAME:Roge Cowan  : 1970  MRN: [id number]    The patient Roge Cowan exhibits the ability to control behavior in a less restrictive environment.  Patient's level of functioning is improving.  No assaultive/destructive behavior has been observed for the past 24 hours.  No suicidal/homicidal threat or behavior has been observed for the past 24 hours.  There is no evidence of serious medication side effects.  Patient has not been in physical or protective restraints for at least the past 24 hours.    If weapons involved, how are they secured? No weapons involved     Is patient aware of and in agreement with discharge plan? He is aware of discharge and is in agreement     Arrangements for medication:  no prescriptions     Copy of discharge instructions to provider?:  yes, [place] [facility]angements for transportation home:  yes - [facility] up     Keep all follow up appointments as scheduled, continue to take prescribed medications per physician instructions.  Mental health crisis number:  911 or your local mental health crisis line number at 7486453   [facility]       Mental Health Emergency WARM LINE      [phone] ([phone])      M-F: 9am to 9pm      Sat & Sun: 5pm - 9pm  National suicide prevention lines:                             9-073-IPZDLYW (1-435.645.9659)       3-267-720-TALK (1-634.586.9212)    Crisis Text Line:  Text HOME to 859004  DISCHARGE SUMMARY from Nurse    PATIENT INSTRUCTIONS:    What to do at Home:  Recommended activity:     Safe positions: non weight bearing on bilat knees and bilat feet wounds. Per wound care recommendations no more than 20 minutes in a position that places direct pressure on bilat foot wounds and knees. Nursing interventions: much praise and encouragement when demonstrating safe appropriate behaviors. Consistent education and prompts when demonstrating safe and appropriate behaviors.        *  Please give a list of your current medications to your Primary Care Provider.    *  Please update this list whenever your medications are discontinued, doses are      changed, or new medications (including over-the-counter products) are added.    *  Please carry medication information at all times in case of emergency situations.    These are general instructions for a healthy lifestyle:    No smoking/ No tobacco products/ Avoid exposure to second hand smoke  Surgeon General's Warning:  Quitting smoking now greatly reduces serious risk to your health.    Obesity, smoking, and sedentary lifestyle greatly increases your risk for illness    A healthy diet, regular physical exercise & weight monitoring are important for maintaining a healthy lifestyle    You may be retaining fluid if you have a history of heart failure or if you experience any of the following symptoms:  Weight gain of 3 pounds or more overnight or 5 pounds in a week, increased swelling in our hands or feet or shortness of breath while lying flat in bed.  Please call your doctor as soon as you notice any of these symptoms; do not wait until your next office visit.        The discharge information has been reviewed with the patient.  The patient verbalized understanding.  Discharge medications reviewed with the patient and appropriate educational materials and side effects teaching were provided.  ___________________________________________________________________________________________________________________________________

## 2024-03-11 NOTE — PLAN OF CARE
Problem: Discharge Planning  Goal: Discharge to home or other facility with appropriate resources  3/11/2024 1020 by Lore Gutierrez RN  Outcome: Progressing  Flowsheets (Taken 3/11/2024 1020)  Discharge to home or other facility with appropriate resources:   Identify barriers to discharge with patient and caregiver   Arrange for needed discharge resources and transportation as appropriate   Refer to discharge planning if patient needs post-hospital services based on physician order or complex needs related to functional status, cognitive ability or social support system

## 2024-03-11 NOTE — PROGRESS NOTES
Pharmacist Discharge Medication Reconciliation    Discharging Provider: Dr. Larson    Significant PMH:   Past Medical History:   Diagnosis Date    Psychiatric disorder     Psychotic disorder (HCC)     Withdrawal syndrome (HCC)      Chief Complaint for this Admission: No chief complaint on file.    Allergies: Fluphenazine and Penicillins    Discharge Medications:      Medication List        START taking these medications      balsum peru-castor oil Oint ointment  Apply topically daily     FLUoxetine 20 MG capsule  Commonly known as: PROZAC  Take 3 capsules by mouth Daily with lunch     haloperidol 10 MG tablet  Commonly known as: HALDOL  Take 1 tablet by mouth 3 times daily (with meals)     mirtazapine 45 MG disintegrating tablet  Commonly known as: REMERON SOL-TAB  Take 1 tablet by mouth nightly     sennosides-docusate sodium 8.6-50 MG tablet  Commonly known as: SENOKOT-S  Take 2 tablets by mouth in the morning and at bedtime            CHANGE how you take these medications      * LORazepam 1 MG tablet  Commonly known as: ATIVAN  Take 3 tablets by mouth 3 times daily (with meals) for 30 days. Max Daily Amount: 9 mg  What changed:   how much to take  when to take this     * LORazepam 1 MG tablet  Commonly known as: ATIVAN  Take 3 tablets by mouth nightly for 30 days. Max Daily Amount: 3 mg  What changed: You were already taking a medication with the same name, and this prescription was added. Make sure you understand how and when to take each.           * This list has 2 medication(s) that are the same as other medications prescribed for you. Read the directions carefully, and ask your doctor or other care provider to review them with you.                STOP taking these medications      Abilify Maintena 400 MG Prsy  Generic drug: ARIPiprazole ER     atropine 1 % ophthalmic solution     cloZAPine 25 MG disintegrating tablet  Commonly known as: FAZACLO     FazaClo 200 MG Tbdp disintegrating tablet  Generic drug:  cloZAPine     topiramate 100 MG tablet  Commonly known as: TOPAMAX     traZODone 50 MG tablet  Commonly known as: DESYREL               Where to Get Your Medications        Information about where to get these medications is not yet available    Ask your nurse or doctor about these medications  balsum peru-castor oil Oint ointment  FLUoxetine 20 MG capsule  haloperidol 10 MG tablet  LORazepam 1 MG tablet  LORazepam 1 MG tablet  mirtazapine 45 MG disintegrating tablet  sennosides-docusate sodium 8.6-50 MG tablet       The patient's chart, MAR and AVS were reviewed by Brittney Caro RPH.

## 2024-03-11 NOTE — BH NOTE
0000: Patient in bed, resting. 1:1 sitter in place to monitor safety and provide support.  0100: Patient in bed, resting. 1:1 sitter in place to monitor safety and provide support.  0200: Patient resting in bed. 1:1 sitter is present to monitor safety and provide support.  0300: Patient resting in bed. 1:1 sitter is present to monitor safety and provide support.  0400: Patient resting in bed. 1:1 sitter in place to monitor safety and provide support.  0500: Patient resting in bed. 1:1 sitter in place to monitor safety and provide support.  0600: Patient resting in bed. 1:1 sitter in place to monitor safety and provide support.  0700: Patient resting in bed. 1:1 sitter in place to monitor safety and provide support.

## 2024-03-11 NOTE — BH NOTE
Behavioral Health Transition Record to Provider    Patient Name: Roge Cowan  YOB: 1970   Medical Record Number: [id number]  Date of Admission: 2/15/2024  5:11 PM   Date of Discharge: 3/11/24    Attending Provider: No att. providers found   Discharging Provider: Dr. Larson   To contact this individual call [phone]  and ask the  to page.  If unavailable, ask to be transferred to Behavioral Health Provider on call.  A Behavioral Health Provider will be available on call 24/7 and during holidays.    Primary Care Provider: Cole Boyd MD    Allergies   Allergen Reactions    Fluphenazine Other (See Comments)    Penicillins Other (See Comments)       Reason for Admission: CHIEF COMPLAINT:  none     HISTORY OF PRESENTING COMPLAINT:  Roge Cowan is a 54 y.o. White (non-) male with no past medical history, but is psychiatric history is significant for paranoid schizophrenia.  He was transferred from the ICU on 02/15/2024 for continued treatment.     Patient goes by \"Isreal,\" at times, other times he goes by \"Romulo.\"  Patient's guardian is his Sister Eden Montalvo.     Briefly he is an a chronic struggle with schizophrenia several past treatment regimens including Clozaril and ECT trials.  He had been maintaining her baseline of minimal function at home where he resides with his sister.  He was followed by the pact team, but slowly stop being adherent to his medication regimen, not eating well and staying in the fetal position.  He was hospitalized at [facility] on 12/7/2024 under TDO and subsequent commitment.  ECT there was attempted, but due to patient's agitation and removal of IVs and was quite challenging to administer beyond the third time.  He developed an ulcer on his right ankle from staying consistently in one position and refusing medications and food.  That is when he was transferred to [facility] ICU on 01/10/2023.     During [facility] stay [facility]    Does the patient have an appointed surrogate decision maker?   Eden Montalvo Brother/Sister  Guardianship Document        Primary Phone: [phone] (M)Mobile Phone: [phone]        Does the patient have a Medical Advance Directive? Yes  Does the patient have a Psychiatric Advance Directive? Yes  If the patient does not have a surrogate or Medical Advance Directive AND Psychiatric Advance Directive, the patient was offered information on these advance directives Patient declined to complete    Patient Instructions: Please continue all medications until otherwise directed by physician.      Tobacco Cessation Discharge Plan:   Is the patient a tobacco user  and needs referral for tobacco cessation? Yes  Patient referred to the following for tobacco cessation with an appointment? No  Patient was offered medication to assist with tobacco cessation at discharge? No    Alcohol/Substance Abuse Discharge Plan:   Does the patient have a history of substance/alcohol abuse and requires a referral for treatment? No  Patient referred to the following for substance/alcohol abuse treatment with an appointment? No  Patient was offered medication to assist with alcohol cessation at discharge? No      Patient discharged to: Inpatient facility; 24-hour/7-day contact information , contact information for pending studies, plan for follow-up care, and Primary physician, other healthcare professional, or site designated for follow up care

## 2024-03-11 NOTE — PLAN OF CARE
Problem: Safety - Adult  Goal: Free from fall injury  3/11/2024 0019 by Lore Engel LPN  Outcome: Progressing     Pt received resting in bed with eyes closed. Breathing is even and unlabored. Walkways are free and clear from clutter.

## 2024-03-11 NOTE — BH NOTE
2000 - Pt remains in 4 point soft restraints under 1:1 observation for safety.    2100 - Pt release from 4 point soft restraints. Eyes are closed. Respirations are even and unlabored. Pt remains on 1:1 observation for safety.    2200 - Pt resting in bed with eyes closed. Respirations are even and unlabored. Pt remains on 1:1 observation for safety.    2300 - Pt resting in bed with eyes closed. Respirations are even and unlabored. Pt remains on 1:1 observation for safety.

## 2024-03-11 NOTE — PLAN OF CARE
Problem: Safety - Medical Restraint  Goal: Remains free of injury from restraints (Restraint for Interference with Medical Device)  Description: INTERVENTIONS:  1. Determine that other, less restrictive measures have been tried or would not be effective before applying the restraint  2. Evaluate the patient's condition at the time of restraint application  3. Inform patient/family regarding the reason for restraint  4. Q2H: Monitor safety, psychosocial status, comfort, nutrition and hydration  Outcome: Progressing  Flowsheets (Taken 3/11/2024 0800)  Remains free of injury from restraints (restraint for interference with medical device):   Determine that other, less restrictive measures have been tried or would not be effective before applying the restraint   Evaluate the patient's condition at the time of restraint application   Inform patient/family regarding the reason for restraint   Every 2 hours: Monitor safety, psychosocial status, comfort, nutrition and hydration  Note:   0730: 1:1 w staff quietly resting in unsafe position with bilat foot wounds having direct pressure onto bed. Verbally refusing to change positions and stop applying pressure to bilat knees and bilat foot wounds. Staff offer encouragement and praise when demonstrating appropriate behaviors. Offer education on wound care and skin preventative care. Pt does not accept education and demonstrate ability to follow nursing direction.   0800: 1:1 w staff, continues to demonstrate unsafe position with body movements, placed in restraints, order obtained.       0915: 1:1 with staff continues, verbally states he is able to place himself in a safe position. Restraints discontinued, pt sits on side of bed verbally states he is ready to eat, meal tray offered. Pt demonstrated ability to follow direction. Staff offer praise and encouragement when behaviors are safe and appropriate.   1000: 1:1 w staff, resting in bed quietly  1100: 1:1 w staff resting in  bed quietly.   1107: Pt was accepted by [facility] for ECT, transfer today via EMS. Called hospital to give nursing report: no answer on behaviors health nursing station, ED accepted call and states they are attempting to confirm if they will accept report or if behavior health will accept report. Placed on hold. ED nurse returned on phone states that ED will not accept report and that Behaviors Health unit does not acknowledge this pt as a scheduled admission however the psych resident does. ED Nurse states she will call back with clarification on who will accept report and to confirm what unit he is to be accepted at.   1130: 1:1 w staff, right foot wound redressed. Pt compliant with nursing intervention. Per  pt has been accepted by Dr. Morel recently spoke on phone to confirm with Dr. Morel stating she will inform nursing supervisor of transfer. Will attempt to call report again.   1200: 1:1 w staff. Bilat foot dressings intact. Pt resting quietly on bed. Called nursing report to ED nurse, then called nurse report to psych unit.   1214: [facility] at bedside.   1245: discharged off unit

## 2024-03-11 NOTE — INTERDISCIPLINARY ROUNDS
Behavioral Health Interdisciplinary Rounds     Patient Name: Roge Cowan  Age: 54 y.o.  Room/Bed:  [facility]/  Primary Diagnosis: Catatonic schizophrenia (HCC)   Admission Status: Involuntary Commitment    Readmission within 30 days: No  Power of  in place: No  Patient requires a blocked bed: Yes          Reason for blocked bed: behavior  Sleep hours: 6+       Participation in Care/Groups:  Yes  Medication Compliant?: Yes  PRNS (last 24 hours): None   Restraints (last 24 hours):  Yes    24 hour chart check complete: Yes    _______________________________________________    Patient goal(s) for today:   Treatment team focus/goals: Plan to discharge today to [facility]   Progress note: Transfer to [facility] for ECT      Spiritual Care Consult:   Financial concerns/prescription coverage:    Family contact:   sister  / guardian      DARRELL called and left a message for his sister               Family requesting physician contact today:    Discharge plan: transfer   Access to weapons :  no                                                          Outpatient provider(s): [facility] team       LOS:  25  Expected LOS: today     Participating treatment team members: Rosa Elena Wilson SW - Dr. Zanoun - Jessica Lurey, RN

## 2024-03-11 NOTE — DISCHARGE SUMMARY
DISCHARGE SUMMARY    Some parts of the discharge summary are from the initial Psychiatric interview that was done on admission by the admitting psychiatrist.      Date of Admission: 2/15/2024    Date of Discharge:3/11/2024     TYPE OF DISCHARGE:   REGULAR -  YES    ADMISSION EVALUATION:  CHIEF COMPLAINT:  none     HISTORY OF PRESENTING COMPLAINT:  Roge Cowan is a 54 y.o. White (non-) male with no past medical history, but is psychiatric history is significant for paranoid schizophrenia.  He was transferred from the ICU on 02/15/2024 for continued treatment.     Patient goes by \"Isreal,\" at times, other times he goes by \"Romulo.\"  Patient's guardian is his Sister Eden Montalvo.     Briefly he is an a chronic struggle with schizophrenia several past treatment regimens including Clozaril and ECT trials.  He had been maintaining her baseline of minimal function at home where he resides with his sister.  He was followed by the pact team, but slowly stop being adherent to his medication regimen, not eating well and staying in the fetal position.  He was hospitalized at [facility] on 12/7/2024 under TDO and subsequent commitment.  ECT there was attempted, but due to patient's agitation and removal of IVs and was quite challenging to administer beyond the third time.  He developed an ulcer on his right ankle from staying consistently in one position and refusing medications and food.  That is when he was transferred to [facility] ICU on 01/10/2023.     During his stay in [facility] he was intubated because of significant agitation, and an NG tube was used for feeding.  Patient received several IV meds including for hydration and treatment for agitation.  The goal then was to transfer him to an academic center at a higher level of care in order to be further treated by the med psych unit with the eventual goal of a proper ECT trial.  He was denied by [facility].  In the interim medical visit into  tablet/capsule instead. Nursing astutely observed that patient will eat his food when nobody is around.  Staff observed that patient is overwhelmed with noises and many persons in his room. The goal has been to minimize number of visitors at a time, only obtaining vitals after IM medications and possibly before meals.  Attempts to administer ECT at [facility] involves significant administrative roles that may take over a month.  Leadership is investigating this option for later treatment of such emergency cases.  Have reached out to Dr. Marina Garner at [facility], but she is out sick starting today, and will return next week.  She recommended I reach out to her upon her return next week for transfer consideration of Mr. Cowan there for ECT.  Realistic estimate for acceptance and starting ECT is two to 4 weeks.  Please review Dr. Fink's evaluation and the requested 2nd opinion on 2/14/2024.      Course in the Hospital:   Patient was admitted to the inpatient psychiatry unit for acute psychiatric stabilization in regards to symptomatology as described in the HPI above and placed on Q15 minute checks and withdrawal precautions.     03/11/2024  Pt will be discharged to [facility] ED for admission to medicine w/plan for ECT.    03/08/2024  Patient has been in and out of restraints.  He has accepted his medications by mouth today.  He has been responsive to staff's verbal redirection to remain out of prayer/fetal position.   He was agreeable to transfer from bed to chair and remained seated, with underwear on.  Upon entering the room he moved from the chair to the fetal position on the floor.  He would not respond to my questions.  He looked up and said that he needs to use the bathroom.  He crawled on all 4 to the bathroom to have a bowel movement.     03/07/2024  Mr. Cowan had a challenging night yesterday. He was quite irritable and wouldn't allow staff to safely obtain lab work. Last night and this am he didn't take his PO    02/25/2024  Patient is not responding. He is unclothed and in the fetal position.  Per staff, he is taking his medications.  No aggression.     02/24/2024  Patient did not respond to conversation.Noted to be curled up in the fetal position  Per staff, he slept 5 hours yesterday and he is eating well.  No self-injurious behaviors.  No aggression.     02/23/2024  Nursing report patient slept well overnight this past night. He is eating all of his meals and taking PO medications within his meals.  He resists patients when attempting to measure vital signs and curls into the fetal position upon entry of his room.  Upon my evaluation he was eating an ice-scream snack, but retreated into the fetal position. He was seated on the mats in his room, near his mattress.     He wouldn't answer my questions and resisted any physical evaluation of his reflexes.     02/22/2024  Nursing estimate patient slept 3 hours last night. This morning he refused vitals. He is eating 100% of his meals.  He's taken all of his PO medications.  He's often times lying down on his mattress.  Upon my evaluation, he was in the fetal position. He doesn't answer any of my questions. He doesn't follow any commands.     02/21/2024  Nursing reports that patient slept well overnight.  He is sleeping on his mattress.  Staff stated that over the weekend he received a shower with the help of the staff, and he said \"thank you.\"  The majority of the time yesterday he was on his mattress, and not on the floor.  He does state in the fetal position, but not the majority of the time now.  He has been taking his medications by mouth and eating 100% of his meals.  Upon my evaluation he was unclothed, lying on his mattress, and sound asleep.     02/20/2024  Nursing report that patient now is sleeping on his mattress in his room and not sleeping on the floor. He slept 7-8 hours last night.  He does well when he is alone and isn't disturbed. He's been eating 100% of  prescribed.  I contacted Dr. Garner at [facility] today, who informed me that she is still at out sick due to COVID.  She did say that she will check with her ECT coordinator in order to accommodate Mr. Cowan's treatment.  As patient's irritability and resistance to treatment/staff continue to decrease, transfer to [facility] can be a consideration if transfer to [facility] is further delayed.     02/20/2024  Continue current PO medication regimen with IM alternatives (for haldol and ativan) upon refusal.  Continue remeron 30mg po qhs, prozac 20mg po qday     Dr. Garner at [facility] requested we contact her tomorrow at the earliest for transfer consideration there for further hospitalization and ECT. She'd informed us that there is likely about a 2 wk delay on the schedule. It is unclear, however, how long logistical delays of transfer may take.     02/19/2024  Increase remeron from 15mg po qhs to 30mg po qhs.  Today will administer last dosage of haldol 10mg IM TID with meals and ativan 3mg TID with meals; instead, since patient has been accepting PO medications, staff will give him the option of taking haldol/ativan by mouth, and only give them IM upon PO refusal. This way we decrease distress tot he patient.  I reached out to [facility], Dr. Garner on Friday. She informed me that she is out with COVID, and can't evaluate patient's case for ECT/transfer until this Wednesday.    DISCHARGE DIAGNOSIS:  Catatonia  Schizophrenia       Medication List        START taking these medications      balsum peru-castor oil Oint ointment  Apply topically daily     FLUoxetine 20 MG capsule  Commonly known as: PROZAC  Take 3 capsules by mouth Daily with lunch     haloperidol 10 MG tablet  Commonly known as: HALDOL  Take 1 tablet by mouth 3 times daily (with meals)     mirtazapine 45 MG disintegrating tablet  Commonly known as: REMERON SOL-TAB  Take 1 tablet by mouth nightly     sennosides-docusate sodium 8.6-50 MG tablet  Commonly known as: SENOKOT-S  Take 2

## 2024-03-14 LAB
EKG ATRIAL RATE: 69 BPM
EKG DIAGNOSIS: NORMAL
EKG P AXIS: 45 DEGREES
EKG P-R INTERVAL: 154 MS
EKG Q-T INTERVAL: 398 MS
EKG QRS DURATION: 90 MS
EKG QTC CALCULATION (BAZETT): 426 MS
EKG R AXIS: 68 DEGREES
EKG T AXIS: 65 DEGREES
EKG VENTRICULAR RATE: 69 BPM

## 2024-04-07 NOTE — BH NOTE
GROUP THERAPY PROGRESS NOTE    Patient did not participate in recreational therapy group.    Katherine Gillies, MSW, [facility]     Home

## 2025-06-10 NOTE — BSMART NOTE
BSMART Liaison Team Note     LOS:  20     Patient goal(s) for today: take medications as prescribed, make needs known in an appropriate manner.  BSMART Liaison team focus/goals: assess MH needs, provide therapeutic support, brief therapy, and education, as needed.  Assist medical care management team with recommendations for coordination of care.    Progress note: Liaison met with pt, FTF, on the medical floor.  Pt is lying in bed, positioned in child's pose/prayer pose.  Liaison attempted to engage, several times, but pt remained in position, face down.  Liaison offered pt reassurance and encouragement.  Pt grunted a few times in response but did not shift position.  Pt's RN, Suni, reports pt passed his swallow test today and ate a good portion of his lunch: chicken, rice, carrots.  Suni states the NG tube remains in place because she was unsure of how pt would react to having the tube removed.  Suni reports pt was more alert in the morning and while she was working on pt's tubing, he stated \"spare me\".  Liaison will continue to monitor and support.      Chart Review: per 2/1/24 CM note, [facility] denied pt for ECT treatments.  [facility], showed little to no improvement, and was transferred back to [facility].  Dr. Larson continues to feel ECT is best course for the pt.  Plan is for care conference early next week.      Barriers to Discharge: Medical clearance, psych bed     Outpatient provider(s):  Mavis KYLE  Psych: Dr. Olivas: [phone]   CM: Jayson Bustos [phone]  Clinician: Geeta [phone]     Insurance info/prescription coverage: MEDICARE PART A AND B , [facility]       Diagnosis: Refractory schizophrenia with catatonia      Plan:  Discharge goal is ECT tx.  Care conference planned for early next week.  Psychiatry and BSMART Liaison to follow daily.  Please defer to psychiatry and medical team for details on discharge plan and  Patient Notified via mychart of all provider recommendations.   disposition.       Follow up Psych Consult placed? Psychiatry following daily   Psychiatrist updated? No     Participating treatment team members: Roge Cowna, GIOVANI DanielsMinneapolis VA Health Care SystemAva Sheriff LCSW  BSMART Liaison  Available on [employer]

## (undated) DEVICE — GEL ELECTRD 8.5OZ HIGHLY COND BACTSTAT H2O SOL NONSTAINING

## (undated) DEVICE — ELECTRODE EKG PED PREGELLED FOAM